# Patient Record
Sex: FEMALE | Race: WHITE | Employment: OTHER
[De-identification: names, ages, dates, MRNs, and addresses within clinical notes are randomized per-mention and may not be internally consistent; named-entity substitution may affect disease eponyms.]

---

## 2017-02-13 DIAGNOSIS — E78.5 DYSLIPIDEMIA: ICD-10-CM

## 2017-02-13 RX ORDER — CLOPIDOGREL BISULFATE 75 MG/1
75 TABLET ORAL DAILY
Qty: 90 TABLET | Refills: 3 | Status: CANCELLED | OUTPATIENT
Start: 2017-02-13

## 2017-02-13 RX ORDER — ASPIRIN 81 MG/1
81 TABLET, CHEWABLE ORAL DAILY
Qty: 90 TABLET | Refills: 3 | Status: CANCELLED | OUTPATIENT
Start: 2017-02-13

## 2017-02-13 RX ORDER — ATORVASTATIN CALCIUM 40 MG/1
40 TABLET, FILM COATED ORAL DAILY
Qty: 90 TABLET | Refills: 3 | Status: CANCELLED | OUTPATIENT
Start: 2017-02-13

## 2017-02-13 RX ORDER — LOSARTAN POTASSIUM 25 MG/1
25 TABLET ORAL DAILY
Qty: 90 TABLET | Refills: 3 | Status: CANCELLED | OUTPATIENT
Start: 2017-02-13

## 2017-02-14 ENCOUNTER — OFFICE VISIT (OUTPATIENT)
Dept: PRIMARY CARE CLINIC | Facility: CLINIC | Age: 52
End: 2017-02-14

## 2017-02-14 VITALS
HEIGHT: 63 IN | RESPIRATION RATE: 18 BRPM | TEMPERATURE: 97.4 F | HEART RATE: 93 BPM | DIASTOLIC BLOOD PRESSURE: 72 MMHG | SYSTOLIC BLOOD PRESSURE: 133 MMHG | BODY MASS INDEX: 41.62 KG/M2 | WEIGHT: 234.9 LBS

## 2017-02-14 DIAGNOSIS — I10 ESSENTIAL HYPERTENSION: Chronic | ICD-10-CM

## 2017-02-14 DIAGNOSIS — F34.1 DYSTHYMIA: ICD-10-CM

## 2017-02-14 DIAGNOSIS — N28.9 DECREASED RENAL FUNCTION: ICD-10-CM

## 2017-02-14 PROCEDURE — 99214 OFFICE O/P EST MOD 30 MIN: CPT | Performed by: NURSE PRACTITIONER

## 2017-02-14 RX ORDER — ESCITALOPRAM OXALATE 10 MG/1
TABLET ORAL
Qty: 90 TABLET | Refills: 3 | Status: SHIPPED | OUTPATIENT
Start: 2017-02-14 | End: 2018-02-24 | Stop reason: SDUPTHER

## 2017-02-14 RX ORDER — LOSARTAN POTASSIUM 25 MG/1
25 TABLET ORAL DAILY
Qty: 90 TABLET | Refills: 3 | Status: SHIPPED | OUTPATIENT
Start: 2017-02-14 | End: 2017-11-21 | Stop reason: SDUPTHER

## 2017-02-14 RX ORDER — FLUDROCORTISONE ACETATE 0.1 MG/1
0.1 TABLET ORAL DAILY
Qty: 90 TABLET | Refills: 3 | Status: SHIPPED | OUTPATIENT
Start: 2017-02-14 | End: 2017-08-01 | Stop reason: SDUPTHER

## 2017-02-14 RX ORDER — GABAPENTIN 800 MG/1
TABLET ORAL
Qty: 90 TABLET | Refills: 0 | Status: SHIPPED | OUTPATIENT
Start: 2017-02-14 | End: 2017-03-02 | Stop reason: SDUPTHER

## 2017-02-14 RX ORDER — CLOPIDOGREL BISULFATE 75 MG/1
75 TABLET ORAL DAILY
Qty: 90 TABLET | Refills: 3 | Status: SHIPPED | OUTPATIENT
Start: 2017-02-14 | End: 2018-02-24 | Stop reason: SDUPTHER

## 2017-02-14 RX ORDER — ASPIRIN 81 MG/1
81 TABLET, CHEWABLE ORAL DAILY
Qty: 100 TABLET | Refills: 3 | Status: SHIPPED | OUTPATIENT
Start: 2017-02-14 | End: 2018-03-13 | Stop reason: SDUPTHER

## 2017-02-14 RX ORDER — ATORVASTATIN CALCIUM 40 MG/1
40 TABLET, FILM COATED ORAL DAILY
Qty: 90 TABLET | Refills: 3 | Status: SHIPPED | OUTPATIENT
Start: 2017-02-14 | End: 2018-02-24 | Stop reason: SDUPTHER

## 2017-02-14 ASSESSMENT — ENCOUNTER SYMPTOMS
SORE THROAT: 0
ABDOMINAL PAIN: 0
NAUSEA: 0
SHORTNESS OF BREATH: 0
COUGH: 0
WHEEZING: 0
VOMITING: 0
RHINORRHEA: 0
BACK PAIN: 1
DIARRHEA: 0
CONSTIPATION: 0

## 2017-02-15 ENCOUNTER — TELEPHONE (OUTPATIENT)
Dept: PRIMARY CARE CLINIC | Facility: CLINIC | Age: 52
End: 2017-02-15

## 2017-03-02 ENCOUNTER — OFFICE VISIT (OUTPATIENT)
Dept: NEUROLOGY | Facility: CLINIC | Age: 52
End: 2017-03-02

## 2017-03-02 VITALS
DIASTOLIC BLOOD PRESSURE: 80 MMHG | HEIGHT: 63 IN | SYSTOLIC BLOOD PRESSURE: 114 MMHG | WEIGHT: 235.6 LBS | HEART RATE: 78 BPM | BODY MASS INDEX: 41.75 KG/M2

## 2017-03-02 DIAGNOSIS — R26.9 GAIT DIFFICULTY: ICD-10-CM

## 2017-03-02 DIAGNOSIS — M79.605 DIFFUSE PAIN IN LEFT LOWER EXTREMITY: Primary | ICD-10-CM

## 2017-03-02 PROCEDURE — 99214 OFFICE O/P EST MOD 30 MIN: CPT | Performed by: PSYCHIATRY & NEUROLOGY

## 2017-03-02 RX ORDER — CAPSAICIN 0.025 %
CREAM (GRAM) TOPICAL 2 TIMES DAILY
Qty: 1 TUBE | Refills: 6 | Status: SHIPPED | OUTPATIENT
Start: 2017-03-02 | End: 2018-05-21 | Stop reason: ALTCHOICE

## 2017-03-02 RX ORDER — GABAPENTIN 800 MG/1
TABLET ORAL
Qty: 90 TABLET | Refills: 6 | Status: SHIPPED | OUTPATIENT
Start: 2017-03-02 | End: 2018-03-01 | Stop reason: SDUPTHER

## 2017-04-12 ENCOUNTER — HOSPITAL ENCOUNTER (EMERGENCY)
Age: 52
Discharge: HOME | DRG: 364 | End: 2017-04-13
Payer: COMMERCIAL

## 2017-04-12 VITALS
SYSTOLIC BLOOD PRESSURE: 163 MMHG | HEART RATE: 106 BPM | RESPIRATION RATE: 16 BRPM | OXYGEN SATURATION: 93 % | TEMPERATURE: 100 F | DIASTOLIC BLOOD PRESSURE: 93 MMHG

## 2017-04-12 DIAGNOSIS — L03.311 ABDOMINAL WALL CELLULITIS: Primary | ICD-10-CM

## 2017-04-12 LAB
-: ABNORMAL
ABSOLUTE EOS #: 0.1 K/UL (ref 0–0.4)
ABSOLUTE LYMPH #: 1.8 K/UL (ref 1–4.8)
ABSOLUTE MONO #: 0.5 K/UL (ref 0–1)
AMORPHOUS: ABNORMAL
ANION GAP SERPL CALCULATED.3IONS-SCNC: 15 MMOL/L (ref 9–17)
BACTERIA: ABNORMAL
BASOPHILS # BLD: 1 % (ref 0–2)
BASOPHILS ABSOLUTE: 0.1 K/UL (ref 0–0.2)
BILIRUBIN URINE: NEGATIVE
BUN BLDV-MCNC: 10 MG/DL (ref 6–20)
BUN/CREAT BLD: 16 (ref 9–20)
CALCIUM SERPL-MCNC: 8.7 MG/DL (ref 8.6–10.4)
CASTS UA: ABNORMAL /LPF
CHLORIDE BLD-SCNC: 94 MMOL/L (ref 98–107)
CO2: 24 MMOL/L (ref 20–31)
COLOR: YELLOW
COMMENT UA: ABNORMAL
CREAT SERPL-MCNC: 0.62 MG/DL (ref 0.5–0.9)
CRYSTALS, UA: ABNORMAL /HPF
DIFFERENTIAL TYPE: ABNORMAL
EOSINOPHILS RELATIVE PERCENT: 1 % (ref 0–8)
EPITHELIAL CELLS UA: ABNORMAL /HPF (ref 0–25)
GFR AFRICAN AMERICAN: >60 ML/MIN
GFR NON-AFRICAN AMERICAN: >60 ML/MIN
GFR SERPL CREATININE-BSD FRML MDRD: ABNORMAL ML/MIN/{1.73_M2}
GFR SERPL CREATININE-BSD FRML MDRD: ABNORMAL ML/MIN/{1.73_M2}
GLUCOSE BLD-MCNC: 311 MG/DL (ref 70–99)
GLUCOSE URINE: ABNORMAL
HCT VFR BLD CALC: 39.6 % (ref 36–46)
HEMOGLOBIN: 13.2 G/DL (ref 12–16)
KETONES, URINE: ABNORMAL
LACTIC ACID, WHOLE BLOOD: NORMAL MMOL/L (ref 0.7–2.1)
LACTIC ACID: 2 MMOL/L (ref 0.5–2.2)
LEUKOCYTE ESTERASE, URINE: ABNORMAL
LYMPHOCYTES # BLD: 18 % (ref 24–44)
MCH RBC QN AUTO: 26 PG (ref 26–34)
MCHC RBC AUTO-ENTMCNC: 33.3 G/DL (ref 31–37)
MCV RBC AUTO: 78.2 FL (ref 80–100)
MONOCYTES # BLD: 5 % (ref 0–12)
MUCUS: ABNORMAL
NITRITE, URINE: NEGATIVE
OTHER OBSERVATIONS UA: ABNORMAL
PDW BLD-RTO: 15.8 % (ref 12.1–15.2)
PH UA: 5.5 (ref 5–9)
PLATELET # BLD: 276 K/UL (ref 140–450)
PLATELET ESTIMATE: ABNORMAL
PMV BLD AUTO: ABNORMAL FL (ref 6–12)
POTASSIUM SERPL-SCNC: 4.3 MMOL/L (ref 3.7–5.3)
PROTEIN UA: NEGATIVE
RBC # BLD: 5.07 M/UL (ref 4–5.2)
RBC # BLD: ABNORMAL 10*6/UL
RBC UA: ABNORMAL /HPF (ref 0–2)
RENAL EPITHELIAL, UA: ABNORMAL /HPF
SEG NEUTROPHILS: 75 % (ref 36–66)
SEGMENTED NEUTROPHILS ABSOLUTE COUNT: 7.3 K/UL (ref 1.8–7.7)
SODIUM BLD-SCNC: 133 MMOL/L (ref 135–144)
SPECIFIC GRAVITY UA: 1.01 (ref 1.01–1.02)
TRICHOMONAS: ABNORMAL
TURBIDITY: CLEAR
URINE HGB: NEGATIVE
UROBILINOGEN, URINE: NORMAL
WBC # BLD: 9.8 K/UL (ref 3.5–11)
WBC # BLD: ABNORMAL 10*3/UL
WBC UA: ABNORMAL /HPF (ref 0–5)
YEAST: ABNORMAL

## 2017-04-12 PROCEDURE — 99283 EMERGENCY DEPT VISIT LOW MDM: CPT

## 2017-04-12 PROCEDURE — 96365 THER/PROPH/DIAG IV INF INIT: CPT

## 2017-04-12 PROCEDURE — 2580000003 HC RX 258: Performed by: PHYSICIAN ASSISTANT

## 2017-04-12 PROCEDURE — 83605 ASSAY OF LACTIC ACID: CPT

## 2017-04-12 PROCEDURE — 2500000003 HC RX 250 WO HCPCS: Performed by: PHYSICIAN ASSISTANT

## 2017-04-12 PROCEDURE — 80048 BASIC METABOLIC PNL TOTAL CA: CPT

## 2017-04-12 PROCEDURE — 81001 URINALYSIS AUTO W/SCOPE: CPT

## 2017-04-12 PROCEDURE — 6360000002 HC RX W HCPCS: Performed by: PHYSICIAN ASSISTANT

## 2017-04-12 PROCEDURE — 87040 BLOOD CULTURE FOR BACTERIA: CPT

## 2017-04-12 PROCEDURE — 6370000000 HC RX 637 (ALT 250 FOR IP): Performed by: PHYSICIAN ASSISTANT

## 2017-04-12 PROCEDURE — 96375 TX/PRO/DX INJ NEW DRUG ADDON: CPT

## 2017-04-12 PROCEDURE — 86403 PARTICLE AGGLUT ANTBDY SCRN: CPT

## 2017-04-12 PROCEDURE — 87086 URINE CULTURE/COLONY COUNT: CPT

## 2017-04-12 PROCEDURE — 85025 COMPLETE CBC W/AUTO DIFF WBC: CPT

## 2017-04-12 PROCEDURE — 36415 COLL VENOUS BLD VENIPUNCTURE: CPT

## 2017-04-12 RX ORDER — CLINDAMYCIN PHOSPHATE 600 MG/50ML
600 INJECTION INTRAVENOUS ONCE
Status: COMPLETED | OUTPATIENT
Start: 2017-04-12 | End: 2017-04-12

## 2017-04-12 RX ORDER — ONDANSETRON 2 MG/ML
4 INJECTION INTRAMUSCULAR; INTRAVENOUS ONCE
Status: COMPLETED | OUTPATIENT
Start: 2017-04-12 | End: 2017-04-12

## 2017-04-12 RX ORDER — 0.9 % SODIUM CHLORIDE 0.9 %
1000 INTRAVENOUS SOLUTION INTRAVENOUS ONCE
Status: COMPLETED | OUTPATIENT
Start: 2017-04-12 | End: 2017-04-12

## 2017-04-12 RX ORDER — ONDANSETRON 4 MG/1
4 TABLET, ORALLY DISINTEGRATING ORAL EVERY 8 HOURS PRN
Qty: 10 TABLET | Refills: 0 | Status: SHIPPED | OUTPATIENT
Start: 2017-04-12 | End: 2018-02-15 | Stop reason: ALTCHOICE

## 2017-04-12 RX ORDER — MORPHINE SULFATE 2 MG/ML
2 INJECTION, SOLUTION INTRAMUSCULAR; INTRAVENOUS ONCE
Status: COMPLETED | OUTPATIENT
Start: 2017-04-12 | End: 2017-04-12

## 2017-04-12 RX ORDER — CLINDAMYCIN HYDROCHLORIDE 150 MG/1
300 CAPSULE ORAL 3 TIMES DAILY
Qty: 60 CAPSULE | Refills: 0 | Status: ON HOLD | OUTPATIENT
Start: 2017-04-12 | End: 2017-04-17 | Stop reason: HOSPADM

## 2017-04-12 RX ADMIN — ONDANSETRON 4 MG: 2 INJECTION INTRAMUSCULAR; INTRAVENOUS at 21:56

## 2017-04-12 RX ADMIN — MORPHINE SULFATE 2 MG: 2 INJECTION, SOLUTION INTRAMUSCULAR; INTRAVENOUS at 21:56

## 2017-04-12 RX ADMIN — CLINDAMYCIN PHOSPHATE 600 MG: 12 INJECTION, SOLUTION INTRAMUSCULAR; INTRAVENOUS at 22:53

## 2017-04-12 RX ADMIN — SODIUM CHLORIDE 1000 ML: 9 INJECTION, SOLUTION INTRAVENOUS at 22:53

## 2017-04-12 ASSESSMENT — ENCOUNTER SYMPTOMS
COLOR CHANGE: 1
CONSTIPATION: 0
EYE DISCHARGE: 0
ABDOMINAL PAIN: 0
SHORTNESS OF BREATH: 0
CHEST TIGHTNESS: 0
WHEEZING: 0
COUGH: 0
RHINORRHEA: 0
BACK PAIN: 0
VOMITING: 0
SORE THROAT: 0
EYE REDNESS: 0
BLOOD IN STOOL: 0
NAUSEA: 0
DIARRHEA: 0

## 2017-04-12 ASSESSMENT — PAIN SCALES - GENERAL
PAINLEVEL_OUTOF10: 6
PAINLEVEL_OUTOF10: 4

## 2017-04-12 ASSESSMENT — PAIN DESCRIPTION - LOCATION: LOCATION: ABDOMEN

## 2017-04-12 ASSESSMENT — PAIN DESCRIPTION - PAIN TYPE: TYPE: ACUTE PAIN

## 2017-04-12 ASSESSMENT — PAIN DESCRIPTION - DESCRIPTORS: DESCRIPTORS: BURNING

## 2017-04-13 ENCOUNTER — OFFICE VISIT (OUTPATIENT)
Dept: PRIMARY CARE CLINIC | Age: 52
End: 2017-04-13
Payer: COMMERCIAL

## 2017-04-13 ENCOUNTER — HOSPITAL ENCOUNTER (INPATIENT)
Age: 52
LOS: 4 days | Discharge: HOME | DRG: 364 | End: 2017-04-17
Attending: INTERNAL MEDICINE | Admitting: INTERNAL MEDICINE
Payer: COMMERCIAL

## 2017-04-13 VITALS
WEIGHT: 238.7 LBS | DIASTOLIC BLOOD PRESSURE: 70 MMHG | TEMPERATURE: 97.5 F | HEART RATE: 82 BPM | SYSTOLIC BLOOD PRESSURE: 103 MMHG | RESPIRATION RATE: 20 BRPM | BODY MASS INDEX: 42.28 KG/M2

## 2017-04-13 DIAGNOSIS — L03.311 CELLULITIS OF ABDOMINAL WALL: Primary | ICD-10-CM

## 2017-04-13 DIAGNOSIS — L03.116 CELLULITIS OF LEFT LOWER EXTREMITY: ICD-10-CM

## 2017-04-13 DIAGNOSIS — L03.119 CELLULITIS AND ABSCESS OF LEG: ICD-10-CM

## 2017-04-13 DIAGNOSIS — L03.311 ABDOMINAL WALL CELLULITIS: Primary | ICD-10-CM

## 2017-04-13 DIAGNOSIS — L02.419 CELLULITIS AND ABSCESS OF LEG: ICD-10-CM

## 2017-04-13 PROBLEM — L03.115 CELLULITIS OF RIGHT LEG: Status: ACTIVE | Noted: 2017-04-13

## 2017-04-13 LAB
ABSOLUTE EOS #: 0.1 K/UL (ref 0–0.4)
ABSOLUTE LYMPH #: 1.9 K/UL (ref 1–4.8)
ABSOLUTE MONO #: 0.6 K/UL (ref 0–1)
ANION GAP SERPL CALCULATED.3IONS-SCNC: 14 MMOL/L (ref 9–17)
BASOPHILS # BLD: 1 % (ref 0–2)
BASOPHILS ABSOLUTE: 0 K/UL (ref 0–0.2)
BUN BLDV-MCNC: 14 MG/DL (ref 6–20)
BUN/CREAT BLD: 19 (ref 9–20)
CALCIUM SERPL-MCNC: 9.4 MG/DL (ref 8.6–10.4)
CHLORIDE BLD-SCNC: 96 MMOL/L (ref 98–107)
CO2: 24 MMOL/L (ref 20–31)
CREAT SERPL-MCNC: 0.72 MG/DL (ref 0.5–0.9)
CULTURE: ABNORMAL
CULTURE: ABNORMAL
DIFFERENTIAL TYPE: ABNORMAL
EOSINOPHILS RELATIVE PERCENT: 2 % (ref 0–8)
ESTIMATED AVERAGE GLUCOSE: 315 MG/DL
GFR AFRICAN AMERICAN: >60 ML/MIN
GFR NON-AFRICAN AMERICAN: >60 ML/MIN
GFR SERPL CREATININE-BSD FRML MDRD: ABNORMAL ML/MIN/{1.73_M2}
GFR SERPL CREATININE-BSD FRML MDRD: ABNORMAL ML/MIN/{1.73_M2}
GLUCOSE BLD-MCNC: 297 MG/DL (ref 74–100)
GLUCOSE BLD-MCNC: 407 MG/DL (ref 70–99)
HBA1C MFR BLD: 12.6 % (ref 4.8–5.9)
HCT VFR BLD CALC: 37.8 % (ref 36–46)
HEMOGLOBIN: 12.5 G/DL (ref 12–16)
LACTIC ACID: 3 MMOL/L (ref 0.5–2.2)
LYMPHOCYTES # BLD: 21 % (ref 24–44)
Lab: ABNORMAL
Lab: ABNORMAL
MCH RBC QN AUTO: 26.1 PG (ref 26–34)
MCHC RBC AUTO-ENTMCNC: 33.2 G/DL (ref 31–37)
MCV RBC AUTO: 78.7 FL (ref 80–100)
MONOCYTES # BLD: 6 % (ref 0–12)
PDW BLD-RTO: 15.4 % (ref 12.1–15.2)
PLATELET # BLD: 276 K/UL (ref 140–450)
PLATELET ESTIMATE: ABNORMAL
PMV BLD AUTO: ABNORMAL FL (ref 6–12)
POTASSIUM SERPL-SCNC: 4.5 MMOL/L (ref 3.7–5.3)
RBC # BLD: 4.81 M/UL (ref 4–5.2)
RBC # BLD: ABNORMAL 10*6/UL
SEG NEUTROPHILS: 70 % (ref 36–66)
SEGMENTED NEUTROPHILS ABSOLUTE COUNT: 6.2 K/UL (ref 1.8–7.7)
SODIUM BLD-SCNC: 134 MMOL/L (ref 135–144)
SPECIMEN DESCRIPTION: ABNORMAL
SPECIMEN DESCRIPTION: ABNORMAL
STATUS: ABNORMAL
WBC # BLD: 8.9 K/UL (ref 3.5–11)
WBC # BLD: ABNORMAL 10*3/UL

## 2017-04-13 PROCEDURE — 94760 N-INVAS EAR/PLS OXIMETRY 1: CPT

## 2017-04-13 PROCEDURE — 94664 DEMO&/EVAL PT USE INHALER: CPT

## 2017-04-13 PROCEDURE — 1200000000 HC SEMI PRIVATE

## 2017-04-13 PROCEDURE — 6370000000 HC RX 637 (ALT 250 FOR IP): Performed by: INTERNAL MEDICINE

## 2017-04-13 PROCEDURE — 2580000003 HC RX 258: Performed by: NURSE PRACTITIONER

## 2017-04-13 PROCEDURE — 83036 HEMOGLOBIN GLYCOSYLATED A1C: CPT

## 2017-04-13 PROCEDURE — 82947 ASSAY GLUCOSE BLOOD QUANT: CPT

## 2017-04-13 PROCEDURE — 99284 EMERGENCY DEPT VISIT MOD MDM: CPT

## 2017-04-13 PROCEDURE — 80048 BASIC METABOLIC PNL TOTAL CA: CPT

## 2017-04-13 PROCEDURE — 2580000003 HC RX 258: Performed by: INTERNAL MEDICINE

## 2017-04-13 PROCEDURE — 85025 COMPLETE CBC W/AUTO DIFF WBC: CPT

## 2017-04-13 PROCEDURE — 99213 OFFICE O/P EST LOW 20 MIN: CPT | Performed by: NURSE PRACTITIONER

## 2017-04-13 PROCEDURE — 2580000003 HC RX 258

## 2017-04-13 PROCEDURE — 83605 ASSAY OF LACTIC ACID: CPT

## 2017-04-13 PROCEDURE — 6360000002 HC RX W HCPCS

## 2017-04-13 RX ORDER — SODIUM CHLORIDE 9 MG/ML
INJECTION, SOLUTION INTRAVENOUS CONTINUOUS
Status: DISCONTINUED | OUTPATIENT
Start: 2017-04-13 | End: 2017-04-13

## 2017-04-13 RX ORDER — FAMOTIDINE 20 MG/1
20 TABLET, FILM COATED ORAL 2 TIMES DAILY
Status: DISCONTINUED | OUTPATIENT
Start: 2017-04-13 | End: 2017-04-17 | Stop reason: HOSPADM

## 2017-04-13 RX ORDER — SODIUM CHLORIDE 9 MG/ML
INJECTION, SOLUTION INTRAVENOUS CONTINUOUS
Status: DISCONTINUED | OUTPATIENT
Start: 2017-04-13 | End: 2017-04-16

## 2017-04-13 RX ORDER — DEXTROSE MONOHYDRATE 50 MG/ML
100 INJECTION, SOLUTION INTRAVENOUS PRN
Status: DISCONTINUED | OUTPATIENT
Start: 2017-04-13 | End: 2017-04-17 | Stop reason: HOSPADM

## 2017-04-13 RX ORDER — ATORVASTATIN CALCIUM 40 MG/1
40 TABLET, FILM COATED ORAL DAILY
Status: DISCONTINUED | OUTPATIENT
Start: 2017-04-13 | End: 2017-04-17 | Stop reason: HOSPADM

## 2017-04-13 RX ORDER — ONDANSETRON 2 MG/ML
4 INJECTION INTRAMUSCULAR; INTRAVENOUS EVERY 6 HOURS PRN
Status: DISCONTINUED | OUTPATIENT
Start: 2017-04-13 | End: 2017-04-17 | Stop reason: HOSPADM

## 2017-04-13 RX ORDER — ASPIRIN 81 MG/1
81 TABLET, CHEWABLE ORAL DAILY
Status: DISCONTINUED | OUTPATIENT
Start: 2017-04-13 | End: 2017-04-17 | Stop reason: HOSPADM

## 2017-04-13 RX ORDER — GABAPENTIN 400 MG/1
800 CAPSULE ORAL 3 TIMES DAILY
Status: DISCONTINUED | OUTPATIENT
Start: 2017-04-13 | End: 2017-04-17 | Stop reason: HOSPADM

## 2017-04-13 RX ORDER — DEXTROSE MONOHYDRATE 25 G/50ML
12.5 INJECTION, SOLUTION INTRAVENOUS PRN
Status: DISCONTINUED | OUTPATIENT
Start: 2017-04-13 | End: 2017-04-17 | Stop reason: HOSPADM

## 2017-04-13 RX ORDER — SODIUM CHLORIDE 0.9 % (FLUSH) 0.9 %
10 SYRINGE (ML) INJECTION PRN
Status: DISCONTINUED | OUTPATIENT
Start: 2017-04-13 | End: 2017-04-17 | Stop reason: HOSPADM

## 2017-04-13 RX ORDER — NICOTINE POLACRILEX 4 MG
15 LOZENGE BUCCAL PRN
Status: DISCONTINUED | OUTPATIENT
Start: 2017-04-13 | End: 2017-04-17 | Stop reason: HOSPADM

## 2017-04-13 RX ORDER — SODIUM CHLORIDE 0.9 % (FLUSH) 0.9 %
10 SYRINGE (ML) INJECTION EVERY 12 HOURS SCHEDULED
Status: DISCONTINUED | OUTPATIENT
Start: 2017-04-13 | End: 2017-04-17 | Stop reason: HOSPADM

## 2017-04-13 RX ORDER — DEXTROSE MONOHYDRATE 50 MG/ML
INJECTION, SOLUTION INTRAVENOUS
Status: COMPLETED
Start: 2017-04-13 | End: 2017-04-13

## 2017-04-13 RX ORDER — FLUDROCORTISONE ACETATE 0.1 MG/1
0.1 TABLET ORAL DAILY
Status: DISCONTINUED | OUTPATIENT
Start: 2017-04-13 | End: 2017-04-17 | Stop reason: HOSPADM

## 2017-04-13 RX ORDER — ALBUTEROL SULFATE 90 UG/1
2 AEROSOL, METERED RESPIRATORY (INHALATION) EVERY 6 HOURS PRN
Status: DISCONTINUED | OUTPATIENT
Start: 2017-04-13 | End: 2017-04-17 | Stop reason: HOSPADM

## 2017-04-13 RX ORDER — VANCOMYCIN HYDROCHLORIDE 500 MG/10ML
INJECTION, POWDER, LYOPHILIZED, FOR SOLUTION INTRAVENOUS
Status: DISPENSED
Start: 2017-04-13 | End: 2017-04-14

## 2017-04-13 RX ORDER — ESCITALOPRAM OXALATE 5 MG/1
10 TABLET ORAL DAILY
Status: DISCONTINUED | OUTPATIENT
Start: 2017-04-13 | End: 2017-04-17 | Stop reason: HOSPADM

## 2017-04-13 RX ORDER — CLOPIDOGREL BISULFATE 75 MG/1
75 TABLET ORAL DAILY
Status: DISCONTINUED | OUTPATIENT
Start: 2017-04-13 | End: 2017-04-17 | Stop reason: HOSPADM

## 2017-04-13 RX ORDER — HYDROCODONE BITARTRATE AND ACETAMINOPHEN 5; 325 MG/1; MG/1
1 TABLET ORAL EVERY 6 HOURS PRN
Status: DISCONTINUED | OUTPATIENT
Start: 2017-04-13 | End: 2017-04-17 | Stop reason: HOSPADM

## 2017-04-13 RX ORDER — ACETAMINOPHEN 325 MG/1
650 TABLET ORAL EVERY 4 HOURS PRN
Status: DISCONTINUED | OUTPATIENT
Start: 2017-04-13 | End: 2017-04-17 | Stop reason: HOSPADM

## 2017-04-13 RX ORDER — LOSARTAN POTASSIUM 25 MG/1
25 TABLET ORAL DAILY
Status: DISCONTINUED | OUTPATIENT
Start: 2017-04-13 | End: 2017-04-17 | Stop reason: HOSPADM

## 2017-04-13 RX ADMIN — SODIUM CHLORIDE: 9 INJECTION, SOLUTION INTRAVENOUS at 21:49

## 2017-04-13 RX ADMIN — VANCOMYCIN HYDROCHLORIDE 1500 MG: 500 INJECTION, POWDER, LYOPHILIZED, FOR SOLUTION INTRAVENOUS at 21:52

## 2017-04-13 RX ADMIN — SODIUM CHLORIDE: 9 INJECTION, SOLUTION INTRAVENOUS at 16:11

## 2017-04-13 RX ADMIN — DEXTROSE MONOHYDRATE 250 ML: 50 INJECTION, SOLUTION INTRAVENOUS at 21:52

## 2017-04-13 RX ADMIN — INSULIN GLARGINE 40 UNITS: 100 INJECTION, SOLUTION SUBCUTANEOUS at 22:01

## 2017-04-13 RX ADMIN — FAMOTIDINE 20 MG: 20 TABLET ORAL at 22:09

## 2017-04-13 RX ADMIN — GABAPENTIN 800 MG: 400 CAPSULE ORAL at 22:09

## 2017-04-13 RX ADMIN — INSULIN LISPRO 3 UNITS: 100 INJECTION, SOLUTION INTRAVENOUS; SUBCUTANEOUS at 22:00

## 2017-04-13 RX ADMIN — METOPROLOL TARTRATE 12.5 MG: 25 TABLET, FILM COATED ORAL at 22:09

## 2017-04-13 ASSESSMENT — ENCOUNTER SYMPTOMS
DIARRHEA: 0
COLOR CHANGE: 1
NAUSEA: 0
CHANGE IN BOWEL HABIT: 0
RESPIRATORY NEGATIVE: 1
TROUBLE SWALLOWING: 0
COUGH: 0
ABDOMINAL PAIN: 1
VOMITING: 0
COUGH: 0
COLOR CHANGE: 1
CHEST TIGHTNESS: 0
CONSTIPATION: 0
EYES NEGATIVE: 1
BLOOD IN STOOL: 0
GASTROINTESTINAL NEGATIVE: 1

## 2017-04-13 ASSESSMENT — PAIN SCALES - GENERAL
PAINLEVEL_OUTOF10: 0
PAINLEVEL_OUTOF10: 6
PAINLEVEL_OUTOF10: 0

## 2017-04-13 ASSESSMENT — PAIN DESCRIPTION - PAIN TYPE: TYPE: ACUTE PAIN

## 2017-04-13 ASSESSMENT — PAIN DESCRIPTION - ORIENTATION: ORIENTATION: LEFT;ANTERIOR

## 2017-04-14 LAB
GLUCOSE BLD-MCNC: 233 MG/DL (ref 74–100)
GLUCOSE BLD-MCNC: 352 MG/DL (ref 74–100)
GLUCOSE BLD-MCNC: 362 MG/DL (ref 74–100)
GLUCOSE BLD-MCNC: 383 MG/DL (ref 74–100)

## 2017-04-14 PROCEDURE — 1200000000 HC SEMI PRIVATE

## 2017-04-14 PROCEDURE — 87205 SMEAR GRAM STAIN: CPT

## 2017-04-14 PROCEDURE — 0J9B0ZZ DRAINAGE OF PERINEUM SUBCUTANEOUS TISSUE AND FASCIA, OPEN APPROACH: ICD-10-PCS | Performed by: SURGERY

## 2017-04-14 PROCEDURE — 6370000000 HC RX 637 (ALT 250 FOR IP): Performed by: INTERNAL MEDICINE

## 2017-04-14 PROCEDURE — 86403 PARTICLE AGGLUT ANTBDY SCRN: CPT

## 2017-04-14 PROCEDURE — 87186 SC STD MICRODIL/AGAR DIL: CPT

## 2017-04-14 PROCEDURE — 2580000003 HC RX 258: Performed by: INTERNAL MEDICINE

## 2017-04-14 PROCEDURE — 87075 CULTR BACTERIA EXCEPT BLOOD: CPT

## 2017-04-14 PROCEDURE — 6370000000 HC RX 637 (ALT 250 FOR IP): Performed by: PHYSICIAN ASSISTANT

## 2017-04-14 PROCEDURE — 6360000002 HC RX W HCPCS: Performed by: INTERNAL MEDICINE

## 2017-04-14 PROCEDURE — 87070 CULTURE OTHR SPECIMN AEROBIC: CPT

## 2017-04-14 PROCEDURE — 2580000003 HC RX 258: Performed by: PHYSICIAN ASSISTANT

## 2017-04-14 PROCEDURE — 2500000003 HC RX 250 WO HCPCS: Performed by: SURGERY

## 2017-04-14 PROCEDURE — 82947 ASSAY GLUCOSE BLOOD QUANT: CPT

## 2017-04-14 RX ORDER — LIDOCAINE HYDROCHLORIDE 10 MG/ML
30 INJECTION, SOLUTION INFILTRATION; PERINEURAL ONCE
Status: COMPLETED | OUTPATIENT
Start: 2017-04-14 | End: 2017-04-14

## 2017-04-14 RX ADMIN — GABAPENTIN 800 MG: 400 CAPSULE ORAL at 08:41

## 2017-04-14 RX ADMIN — METOPROLOL TARTRATE 12.5 MG: 25 TABLET, FILM COATED ORAL at 20:45

## 2017-04-14 RX ADMIN — GABAPENTIN 800 MG: 400 CAPSULE ORAL at 14:10

## 2017-04-14 RX ADMIN — FLUDROCORTISONE ACETATE 0.1 MG: 0.1 TABLET ORAL at 08:42

## 2017-04-14 RX ADMIN — GABAPENTIN 800 MG: 400 CAPSULE ORAL at 20:45

## 2017-04-14 RX ADMIN — INSULIN LISPRO 10 UNITS: 100 INJECTION, SOLUTION INTRAVENOUS; SUBCUTANEOUS at 12:15

## 2017-04-14 RX ADMIN — HYDROCODONE BITARTRATE AND ACETAMINOPHEN 1 TABLET: 5; 325 TABLET ORAL at 03:15

## 2017-04-14 RX ADMIN — HYDROCODONE BITARTRATE AND ACETAMINOPHEN 1 TABLET: 5; 325 TABLET ORAL at 20:53

## 2017-04-14 RX ADMIN — VANCOMYCIN HYDROCHLORIDE 1500 MG: 1 INJECTION, POWDER, LYOPHILIZED, FOR SOLUTION INTRAVENOUS at 20:44

## 2017-04-14 RX ADMIN — ASPIRIN 81 MG 81 MG: 81 TABLET ORAL at 08:42

## 2017-04-14 RX ADMIN — ATORVASTATIN CALCIUM 40 MG: 40 TABLET, FILM COATED ORAL at 08:41

## 2017-04-14 RX ADMIN — INSULIN GLARGINE 40 UNITS: 100 INJECTION, SOLUTION SUBCUTANEOUS at 20:48

## 2017-04-14 RX ADMIN — VANCOMYCIN HYDROCHLORIDE 1500 MG: 1 INJECTION, POWDER, LYOPHILIZED, FOR SOLUTION INTRAVENOUS at 09:29

## 2017-04-14 RX ADMIN — CLOPIDOGREL BISULFATE 75 MG: 75 TABLET ORAL at 08:41

## 2017-04-14 RX ADMIN — FAMOTIDINE 20 MG: 20 TABLET ORAL at 08:41

## 2017-04-14 RX ADMIN — INSULIN LISPRO 10 UNITS: 100 INJECTION, SOLUTION INTRAVENOUS; SUBCUTANEOUS at 08:10

## 2017-04-14 RX ADMIN — INSULIN LISPRO 2 UNITS: 100 INJECTION, SOLUTION INTRAVENOUS; SUBCUTANEOUS at 20:49

## 2017-04-14 RX ADMIN — ESCITALOPRAM 10 MG: 5 TABLET, FILM COATED ORAL at 08:41

## 2017-04-14 RX ADMIN — ENOXAPARIN SODIUM 40 MG: 40 INJECTION SUBCUTANEOUS at 08:42

## 2017-04-14 RX ADMIN — FAMOTIDINE 20 MG: 20 TABLET ORAL at 20:45

## 2017-04-14 RX ADMIN — SODIUM CHLORIDE: 9 INJECTION, SOLUTION INTRAVENOUS at 15:58

## 2017-04-14 RX ADMIN — LIDOCAINE HYDROCHLORIDE 30 ML: 10 INJECTION, SOLUTION INFILTRATION; PERINEURAL at 16:55

## 2017-04-14 RX ADMIN — INSULIN LISPRO 10 UNITS: 100 INJECTION, SOLUTION INTRAVENOUS; SUBCUTANEOUS at 16:35

## 2017-04-14 ASSESSMENT — PAIN SCALES - GENERAL
PAINLEVEL_OUTOF10: 6
PAINLEVEL_OUTOF10: 7
PAINLEVEL_OUTOF10: 0
PAINLEVEL_OUTOF10: 3

## 2017-04-14 ASSESSMENT — PAIN DESCRIPTION - LOCATION: LOCATION: LEG

## 2017-04-14 ASSESSMENT — PAIN DESCRIPTION - DESCRIPTORS: DESCRIPTORS: BURNING;DISCOMFORT

## 2017-04-14 ASSESSMENT — PAIN DESCRIPTION - PAIN TYPE: TYPE: ACUTE PAIN

## 2017-04-14 ASSESSMENT — PAIN DESCRIPTION - ORIENTATION: ORIENTATION: RIGHT;UPPER

## 2017-04-15 LAB
GLUCOSE BLD-MCNC: 212 MG/DL (ref 74–100)
GLUCOSE BLD-MCNC: 238 MG/DL (ref 74–100)
GLUCOSE BLD-MCNC: 285 MG/DL (ref 74–100)
GLUCOSE BLD-MCNC: 298 MG/DL (ref 74–100)

## 2017-04-15 PROCEDURE — 6370000000 HC RX 637 (ALT 250 FOR IP): Performed by: INTERNAL MEDICINE

## 2017-04-15 PROCEDURE — 2580000003 HC RX 258: Performed by: INTERNAL MEDICINE

## 2017-04-15 PROCEDURE — 1200000000 HC SEMI PRIVATE

## 2017-04-15 PROCEDURE — 82947 ASSAY GLUCOSE BLOOD QUANT: CPT

## 2017-04-15 PROCEDURE — 6370000000 HC RX 637 (ALT 250 FOR IP): Performed by: PHYSICIAN ASSISTANT

## 2017-04-15 PROCEDURE — 6360000002 HC RX W HCPCS: Performed by: INTERNAL MEDICINE

## 2017-04-15 PROCEDURE — 2580000003 HC RX 258: Performed by: PHYSICIAN ASSISTANT

## 2017-04-15 RX ADMIN — FLUDROCORTISONE ACETATE 0.1 MG: 0.1 TABLET ORAL at 08:16

## 2017-04-15 RX ADMIN — VANCOMYCIN HYDROCHLORIDE 1500 MG: 1 INJECTION, POWDER, LYOPHILIZED, FOR SOLUTION INTRAVENOUS at 08:22

## 2017-04-15 RX ADMIN — GABAPENTIN 800 MG: 400 CAPSULE ORAL at 08:17

## 2017-04-15 RX ADMIN — SODIUM CHLORIDE: 9 INJECTION, SOLUTION INTRAVENOUS at 04:38

## 2017-04-15 RX ADMIN — GABAPENTIN 800 MG: 400 CAPSULE ORAL at 14:28

## 2017-04-15 RX ADMIN — METOPROLOL TARTRATE 12.5 MG: 25 TABLET, FILM COATED ORAL at 20:30

## 2017-04-15 RX ADMIN — FAMOTIDINE 20 MG: 20 TABLET ORAL at 08:16

## 2017-04-15 RX ADMIN — INSULIN LISPRO 6 UNITS: 100 INJECTION, SOLUTION INTRAVENOUS; SUBCUTANEOUS at 12:57

## 2017-04-15 RX ADMIN — ACETAMINOPHEN 650 MG: 325 TABLET, FILM COATED ORAL at 08:17

## 2017-04-15 RX ADMIN — CLOPIDOGREL BISULFATE 75 MG: 75 TABLET ORAL at 08:17

## 2017-04-15 RX ADMIN — HYDROCODONE BITARTRATE AND ACETAMINOPHEN 1 TABLET: 5; 325 TABLET ORAL at 16:19

## 2017-04-15 RX ADMIN — SODIUM CHLORIDE: 9 INJECTION, SOLUTION INTRAVENOUS at 17:20

## 2017-04-15 RX ADMIN — FAMOTIDINE 20 MG: 20 TABLET ORAL at 20:30

## 2017-04-15 RX ADMIN — ESCITALOPRAM 10 MG: 5 TABLET, FILM COATED ORAL at 08:17

## 2017-04-15 RX ADMIN — INSULIN LISPRO 4 UNITS: 100 INJECTION, SOLUTION INTRAVENOUS; SUBCUTANEOUS at 08:21

## 2017-04-15 RX ADMIN — INSULIN LISPRO 6 UNITS: 100 INJECTION, SOLUTION INTRAVENOUS; SUBCUTANEOUS at 17:16

## 2017-04-15 RX ADMIN — INSULIN LISPRO 2 UNITS: 100 INJECTION, SOLUTION INTRAVENOUS; SUBCUTANEOUS at 20:35

## 2017-04-15 RX ADMIN — HYDROCODONE BITARTRATE AND ACETAMINOPHEN 1 TABLET: 5; 325 TABLET ORAL at 04:39

## 2017-04-15 RX ADMIN — ATORVASTATIN CALCIUM 40 MG: 40 TABLET, FILM COATED ORAL at 08:16

## 2017-04-15 RX ADMIN — VANCOMYCIN HYDROCHLORIDE 1500 MG: 1 INJECTION, POWDER, LYOPHILIZED, FOR SOLUTION INTRAVENOUS at 20:33

## 2017-04-15 RX ADMIN — GABAPENTIN 800 MG: 400 CAPSULE ORAL at 20:30

## 2017-04-15 ASSESSMENT — PAIN DESCRIPTION - PAIN TYPE
TYPE: ACUTE PAIN

## 2017-04-15 ASSESSMENT — PAIN DESCRIPTION - ORIENTATION
ORIENTATION: RIGHT;UPPER
ORIENTATION: RIGHT

## 2017-04-15 ASSESSMENT — PAIN SCALES - GENERAL
PAINLEVEL_OUTOF10: 3
PAINLEVEL_OUTOF10: 7
PAINLEVEL_OUTOF10: 6
PAINLEVEL_OUTOF10: 3
PAINLEVEL_OUTOF10: 0
PAINLEVEL_OUTOF10: 3
PAINLEVEL_OUTOF10: 4

## 2017-04-15 ASSESSMENT — PAIN DESCRIPTION - DESCRIPTORS
DESCRIPTORS: SORE
DESCRIPTORS: ACHING
DESCRIPTORS: ACHING
DESCRIPTORS: DISCOMFORT
DESCRIPTORS: SORE

## 2017-04-15 ASSESSMENT — PAIN DESCRIPTION - LOCATION
LOCATION: LEG

## 2017-04-15 ASSESSMENT — PAIN DESCRIPTION - FREQUENCY: FREQUENCY: INTERMITTENT

## 2017-04-16 LAB
GLUCOSE BLD-MCNC: 152 MG/DL (ref 74–100)
GLUCOSE BLD-MCNC: 224 MG/DL (ref 74–100)
GLUCOSE BLD-MCNC: 238 MG/DL (ref 74–100)
GLUCOSE BLD-MCNC: 269 MG/DL (ref 74–100)
VANCOMYCIN TROUGH DATE LAST DOSE: NORMAL
VANCOMYCIN TROUGH DOSE AMOUNT: NORMAL
VANCOMYCIN TROUGH TIME LAST DOSE: NORMAL
VANCOMYCIN TROUGH: 15.1 UG/ML (ref 10–20)

## 2017-04-16 PROCEDURE — 6370000000 HC RX 637 (ALT 250 FOR IP): Performed by: PHYSICIAN ASSISTANT

## 2017-04-16 PROCEDURE — 6360000002 HC RX W HCPCS: Performed by: INTERNAL MEDICINE

## 2017-04-16 PROCEDURE — 36415 COLL VENOUS BLD VENIPUNCTURE: CPT

## 2017-04-16 PROCEDURE — 1200000000 HC SEMI PRIVATE

## 2017-04-16 PROCEDURE — 2580000003 HC RX 258: Performed by: INTERNAL MEDICINE

## 2017-04-16 PROCEDURE — 94760 N-INVAS EAR/PLS OXIMETRY 1: CPT

## 2017-04-16 PROCEDURE — 94664 DEMO&/EVAL PT USE INHALER: CPT

## 2017-04-16 PROCEDURE — 6370000000 HC RX 637 (ALT 250 FOR IP): Performed by: INTERNAL MEDICINE

## 2017-04-16 PROCEDURE — 82947 ASSAY GLUCOSE BLOOD QUANT: CPT

## 2017-04-16 PROCEDURE — 2580000003 HC RX 258: Performed by: PHYSICIAN ASSISTANT

## 2017-04-16 PROCEDURE — 94640 AIRWAY INHALATION TREATMENT: CPT

## 2017-04-16 PROCEDURE — 80202 ASSAY OF VANCOMYCIN: CPT

## 2017-04-16 RX ADMIN — METOPROLOL TARTRATE 12.5 MG: 25 TABLET, FILM COATED ORAL at 20:50

## 2017-04-16 RX ADMIN — GABAPENTIN 800 MG: 400 CAPSULE ORAL at 09:22

## 2017-04-16 RX ADMIN — INSULIN LISPRO 2 UNITS: 100 INJECTION, SOLUTION INTRAVENOUS; SUBCUTANEOUS at 20:53

## 2017-04-16 RX ADMIN — FAMOTIDINE 20 MG: 20 TABLET ORAL at 09:23

## 2017-04-16 RX ADMIN — ALBUTEROL SULFATE 2 PUFF: 90 AEROSOL, METERED RESPIRATORY (INHALATION) at 14:25

## 2017-04-16 RX ADMIN — METOPROLOL TARTRATE 12.5 MG: 25 TABLET, FILM COATED ORAL at 09:34

## 2017-04-16 RX ADMIN — SODIUM CHLORIDE: 9 INJECTION, SOLUTION INTRAVENOUS at 04:41

## 2017-04-16 RX ADMIN — GABAPENTIN 800 MG: 400 CAPSULE ORAL at 14:10

## 2017-04-16 RX ADMIN — Medication 10 ML: at 20:50

## 2017-04-16 RX ADMIN — FLUDROCORTISONE ACETATE 0.1 MG: 0.1 TABLET ORAL at 09:22

## 2017-04-16 RX ADMIN — HYDROCODONE BITARTRATE AND ACETAMINOPHEN 1 TABLET: 5; 325 TABLET ORAL at 04:41

## 2017-04-16 RX ADMIN — ASPIRIN 81 MG 81 MG: 81 TABLET ORAL at 09:22

## 2017-04-16 RX ADMIN — ESCITALOPRAM 10 MG: 5 TABLET, FILM COATED ORAL at 09:22

## 2017-04-16 RX ADMIN — VANCOMYCIN HYDROCHLORIDE 1500 MG: 1 INJECTION, POWDER, LYOPHILIZED, FOR SOLUTION INTRAVENOUS at 20:50

## 2017-04-16 RX ADMIN — INSULIN LISPRO 4 UNITS: 100 INJECTION, SOLUTION INTRAVENOUS; SUBCUTANEOUS at 17:04

## 2017-04-16 RX ADMIN — GABAPENTIN 800 MG: 400 CAPSULE ORAL at 20:50

## 2017-04-16 RX ADMIN — INSULIN LISPRO 6 UNITS: 100 INJECTION, SOLUTION INTRAVENOUS; SUBCUTANEOUS at 12:09

## 2017-04-16 RX ADMIN — VANCOMYCIN HYDROCHLORIDE 1500 MG: 1 INJECTION, POWDER, LYOPHILIZED, FOR SOLUTION INTRAVENOUS at 09:47

## 2017-04-16 RX ADMIN — CLOPIDOGREL BISULFATE 75 MG: 75 TABLET ORAL at 09:22

## 2017-04-16 RX ADMIN — ATORVASTATIN CALCIUM 40 MG: 40 TABLET, FILM COATED ORAL at 09:23

## 2017-04-16 RX ADMIN — FAMOTIDINE 20 MG: 20 TABLET ORAL at 20:50

## 2017-04-16 RX ADMIN — ENOXAPARIN SODIUM 40 MG: 40 INJECTION SUBCUTANEOUS at 09:23

## 2017-04-16 RX ADMIN — LOSARTAN POTASSIUM 25 MG: 25 TABLET, FILM COATED ORAL at 09:23

## 2017-04-16 ASSESSMENT — PAIN DESCRIPTION - PAIN TYPE
TYPE: ACUTE PAIN
TYPE: ACUTE PAIN

## 2017-04-16 ASSESSMENT — PAIN DESCRIPTION - LOCATION
LOCATION: LEG
LOCATION: LEG

## 2017-04-16 ASSESSMENT — PAIN SCALES - GENERAL
PAINLEVEL_OUTOF10: 2
PAINLEVEL_OUTOF10: 5
PAINLEVEL_OUTOF10: 0
PAINLEVEL_OUTOF10: 5

## 2017-04-16 ASSESSMENT — PAIN DESCRIPTION - DESCRIPTORS
DESCRIPTORS: SORE
DESCRIPTORS: SORE

## 2017-04-16 ASSESSMENT — PAIN DESCRIPTION - ORIENTATION
ORIENTATION: UPPER;RIGHT
ORIENTATION: RIGHT;UPPER

## 2017-04-17 ENCOUNTER — TELEPHONE (OUTPATIENT)
Dept: NEUROLOGY | Age: 52
End: 2017-04-17

## 2017-04-17 VITALS
OXYGEN SATURATION: 95 % | HEART RATE: 66 BPM | SYSTOLIC BLOOD PRESSURE: 128 MMHG | TEMPERATURE: 98 F | HEIGHT: 63 IN | WEIGHT: 245.5 LBS | BODY MASS INDEX: 43.5 KG/M2 | RESPIRATION RATE: 16 BRPM | DIASTOLIC BLOOD PRESSURE: 71 MMHG

## 2017-04-17 LAB
BUN BLDV-MCNC: 10 MG/DL (ref 6–20)
CREAT SERPL-MCNC: 0.62 MG/DL (ref 0.5–0.9)
GFR AFRICAN AMERICAN: >60 ML/MIN
GFR NON-AFRICAN AMERICAN: >60 ML/MIN
GFR SERPL CREATININE-BSD FRML MDRD: NORMAL ML/MIN/{1.73_M2}
GFR SERPL CREATININE-BSD FRML MDRD: NORMAL ML/MIN/{1.73_M2}
GLUCOSE BLD-MCNC: 166 MG/DL (ref 74–100)
GLUCOSE BLD-MCNC: 282 MG/DL (ref 74–100)

## 2017-04-17 PROCEDURE — 6370000000 HC RX 637 (ALT 250 FOR IP): Performed by: PHYSICIAN ASSISTANT

## 2017-04-17 PROCEDURE — 84520 ASSAY OF UREA NITROGEN: CPT

## 2017-04-17 PROCEDURE — 82565 ASSAY OF CREATININE: CPT

## 2017-04-17 PROCEDURE — 82947 ASSAY GLUCOSE BLOOD QUANT: CPT

## 2017-04-17 PROCEDURE — 2580000003 HC RX 258: Performed by: INTERNAL MEDICINE

## 2017-04-17 PROCEDURE — 36415 COLL VENOUS BLD VENIPUNCTURE: CPT

## 2017-04-17 PROCEDURE — 6360000002 HC RX W HCPCS: Performed by: INTERNAL MEDICINE

## 2017-04-17 PROCEDURE — 6370000000 HC RX 637 (ALT 250 FOR IP): Performed by: INTERNAL MEDICINE

## 2017-04-17 RX ORDER — LEVOFLOXACIN 750 MG/1
750 TABLET ORAL DAILY
Qty: 10 TABLET | Refills: 0 | Status: SHIPPED | OUTPATIENT
Start: 2017-04-17 | End: 2017-04-27

## 2017-04-17 RX ADMIN — CLOPIDOGREL BISULFATE 75 MG: 75 TABLET ORAL at 09:04

## 2017-04-17 RX ADMIN — FLUDROCORTISONE ACETATE 0.1 MG: 0.1 TABLET ORAL at 09:04

## 2017-04-17 RX ADMIN — Medication 10 ML: at 09:07

## 2017-04-17 RX ADMIN — ASPIRIN 81 MG 81 MG: 81 TABLET ORAL at 09:04

## 2017-04-17 RX ADMIN — ESCITALOPRAM 10 MG: 5 TABLET, FILM COATED ORAL at 09:04

## 2017-04-17 RX ADMIN — FAMOTIDINE 20 MG: 20 TABLET ORAL at 09:04

## 2017-04-17 RX ADMIN — VANCOMYCIN HYDROCHLORIDE 1500 MG: 1 INJECTION, POWDER, LYOPHILIZED, FOR SOLUTION INTRAVENOUS at 09:18

## 2017-04-17 RX ADMIN — GABAPENTIN 800 MG: 400 CAPSULE ORAL at 09:04

## 2017-04-17 RX ADMIN — LOSARTAN POTASSIUM 25 MG: 25 TABLET, FILM COATED ORAL at 09:04

## 2017-04-17 RX ADMIN — INSULIN LISPRO 6 UNITS: 100 INJECTION, SOLUTION INTRAVENOUS; SUBCUTANEOUS at 12:34

## 2017-04-17 RX ADMIN — METOPROLOL TARTRATE 12.5 MG: 25 TABLET, FILM COATED ORAL at 09:04

## 2017-04-17 RX ADMIN — INSULIN LISPRO 2 UNITS: 100 INJECTION, SOLUTION INTRAVENOUS; SUBCUTANEOUS at 09:06

## 2017-04-17 RX ADMIN — ENOXAPARIN SODIUM 40 MG: 40 INJECTION SUBCUTANEOUS at 09:04

## 2017-04-17 RX ADMIN — ATORVASTATIN CALCIUM 40 MG: 40 TABLET, FILM COATED ORAL at 09:04

## 2017-04-17 ASSESSMENT — PAIN SCALES - GENERAL
PAINLEVEL_OUTOF10: 0
PAINLEVEL_OUTOF10: 3
PAINLEVEL_OUTOF10: 3
PAINLEVEL_OUTOF10: 0
PAINLEVEL_OUTOF10: 0

## 2017-04-17 ASSESSMENT — PAIN DESCRIPTION - PAIN TYPE: TYPE: ACUTE PAIN

## 2017-04-17 ASSESSMENT — PAIN DESCRIPTION - DESCRIPTORS: DESCRIPTORS: SORE

## 2017-04-17 ASSESSMENT — PAIN DESCRIPTION - ORIENTATION: ORIENTATION: RIGHT;UPPER

## 2017-04-17 ASSESSMENT — PAIN DESCRIPTION - LOCATION: LOCATION: LEG

## 2017-04-18 ENCOUNTER — TELEPHONE (OUTPATIENT)
Dept: SURGERY | Age: 52
End: 2017-04-18

## 2017-04-18 LAB
CULTURE: NORMAL
Lab: NORMAL
SPECIMEN DESCRIPTION: NORMAL
STATUS: NORMAL

## 2017-04-19 LAB
CULTURE: ABNORMAL
DIRECT EXAM: ABNORMAL
DIRECT EXAM: ABNORMAL
Lab: ABNORMAL
Lab: ABNORMAL
ORGANISM: ABNORMAL
SPECIMEN DESCRIPTION: ABNORMAL
SPECIMEN DESCRIPTION: ABNORMAL
STATUS: ABNORMAL

## 2017-05-01 ENCOUNTER — HOSPITAL ENCOUNTER (OUTPATIENT)
Age: 52
Discharge: HOME | End: 2017-05-01
Payer: COMMERCIAL

## 2017-05-01 LAB
ANION GAP SERPL CALCULATED.3IONS-SCNC: 17 MMOL/L (ref 9–17)
BUN BLDV-MCNC: 15 MG/DL (ref 6–20)
BUN/CREAT BLD: 21 (ref 9–20)
CALCIUM SERPL-MCNC: 9.4 MG/DL (ref 8.6–10.4)
CHLORIDE BLD-SCNC: 96 MMOL/L (ref 98–107)
CO2: 24 MMOL/L (ref 20–31)
CREAT SERPL-MCNC: 0.72 MG/DL (ref 0.5–0.9)
CREATININE URINE: 182.8 MG/DL (ref 28–217)
ESTIMATED AVERAGE GLUCOSE: 303 MG/DL
GFR AFRICAN AMERICAN: >60 ML/MIN
GFR NON-AFRICAN AMERICAN: >60 ML/MIN
GFR SERPL CREATININE-BSD FRML MDRD: ABNORMAL ML/MIN/{1.73_M2}
GFR SERPL CREATININE-BSD FRML MDRD: ABNORMAL ML/MIN/{1.73_M2}
GLUCOSE BLD-MCNC: 328 MG/DL (ref 70–99)
HBA1C MFR BLD: 12.2 % (ref 4.8–5.9)
MICROALBUMIN/CREAT 24H UR: 23 MG/L
MICROALBUMIN/CREAT UR-RTO: 13 MCG/MG CREAT
POTASSIUM SERPL-SCNC: 4.7 MMOL/L (ref 3.7–5.3)
SODIUM BLD-SCNC: 137 MMOL/L (ref 135–144)

## 2017-05-01 PROCEDURE — 82570 ASSAY OF URINE CREATININE: CPT

## 2017-05-01 PROCEDURE — 36415 COLL VENOUS BLD VENIPUNCTURE: CPT

## 2017-05-01 PROCEDURE — 83036 HEMOGLOBIN GLYCOSYLATED A1C: CPT

## 2017-05-01 PROCEDURE — 80048 BASIC METABOLIC PNL TOTAL CA: CPT

## 2017-05-01 PROCEDURE — 82043 UR ALBUMIN QUANTITATIVE: CPT

## 2017-05-03 ENCOUNTER — TELEPHONE (OUTPATIENT)
Dept: NEUROLOGY | Age: 52
End: 2017-05-03

## 2017-05-09 ENCOUNTER — OFFICE VISIT (OUTPATIENT)
Dept: CARDIOLOGY | Age: 52
End: 2017-05-09
Payer: COMMERCIAL

## 2017-05-09 VITALS
BODY MASS INDEX: 41.46 KG/M2 | HEIGHT: 63 IN | WEIGHT: 234 LBS | HEART RATE: 82 BPM | RESPIRATION RATE: 16 BRPM | OXYGEN SATURATION: 95 % | SYSTOLIC BLOOD PRESSURE: 111 MMHG | DIASTOLIC BLOOD PRESSURE: 74 MMHG

## 2017-05-09 DIAGNOSIS — R07.2 PRECORDIAL PAIN: Primary | ICD-10-CM

## 2017-05-09 DIAGNOSIS — I20.9 ANGINA, CLASS II (HCC): ICD-10-CM

## 2017-05-09 DIAGNOSIS — G47.33 OSA (OBSTRUCTIVE SLEEP APNEA): ICD-10-CM

## 2017-05-09 DIAGNOSIS — K21.9 GASTROESOPHAGEAL REFLUX DISEASE WITHOUT ESOPHAGITIS: ICD-10-CM

## 2017-05-09 DIAGNOSIS — I25.118 CORONARY ARTERY DISEASE INVOLVING NATIVE CORONARY ARTERY OF NATIVE HEART WITH OTHER FORM OF ANGINA PECTORIS (HCC): ICD-10-CM

## 2017-05-09 PROCEDURE — 99214 OFFICE O/P EST MOD 30 MIN: CPT | Performed by: FAMILY MEDICINE

## 2017-05-09 RX ORDER — OMEPRAZOLE 20 MG/1
20 CAPSULE, DELAYED RELEASE ORAL DAILY
Qty: 90 CAPSULE | Refills: 3 | Status: SHIPPED | OUTPATIENT
Start: 2017-05-09 | End: 2020-04-29 | Stop reason: ALTCHOICE

## 2017-05-12 ENCOUNTER — HOSPITAL ENCOUNTER (INPATIENT)
Age: 52
LOS: 2 days | Discharge: HOME | DRG: 383 | End: 2017-05-14
Attending: INTERNAL MEDICINE | Admitting: INTERNAL MEDICINE
Payer: COMMERCIAL

## 2017-05-12 DIAGNOSIS — L03.311 CELLULITIS OF ABDOMINAL WALL: Primary | ICD-10-CM

## 2017-05-12 LAB
-: ABNORMAL
ABSOLUTE EOS #: 0.1 K/UL (ref 0–0.4)
ABSOLUTE LYMPH #: 1.8 K/UL (ref 1–4.8)
ABSOLUTE MONO #: 0.5 K/UL (ref 0–1)
AMORPHOUS: ABNORMAL
ANION GAP SERPL CALCULATED.3IONS-SCNC: 14 MMOL/L (ref 9–17)
BACTERIA: ABNORMAL
BASOPHILS # BLD: 0 %
BASOPHILS ABSOLUTE: 0 K/UL (ref 0–0.2)
BILIRUBIN URINE: NEGATIVE
BUN BLDV-MCNC: 11 MG/DL (ref 6–20)
BUN/CREAT BLD: 18 (ref 9–20)
CALCIUM SERPL-MCNC: 9.3 MG/DL (ref 8.6–10.4)
CASTS UA: ABNORMAL /LPF
CHLORIDE BLD-SCNC: 92 MMOL/L (ref 98–107)
CO2: 24 MMOL/L (ref 20–31)
COLOR: YELLOW
COMMENT UA: ABNORMAL
CREAT SERPL-MCNC: 0.62 MG/DL (ref 0.5–0.9)
CRYSTALS, UA: ABNORMAL /HPF
DIFFERENTIAL TYPE: ABNORMAL
EOSINOPHILS RELATIVE PERCENT: 1 %
EPITHELIAL CELLS UA: ABNORMAL /HPF (ref 0–25)
GFR AFRICAN AMERICAN: >60 ML/MIN
GFR NON-AFRICAN AMERICAN: >60 ML/MIN
GFR SERPL CREATININE-BSD FRML MDRD: ABNORMAL ML/MIN/{1.73_M2}
GFR SERPL CREATININE-BSD FRML MDRD: ABNORMAL ML/MIN/{1.73_M2}
GLUCOSE BLD-MCNC: 313 MG/DL (ref 70–99)
GLUCOSE BLD-MCNC: 328 MG/DL (ref 74–100)
GLUCOSE URINE: ABNORMAL
HCT VFR BLD CALC: 38.4 % (ref 36–46)
HEMOGLOBIN: 12.8 G/DL (ref 12–16)
KETONES, URINE: NEGATIVE
LACTIC ACID, WHOLE BLOOD: ABNORMAL MMOL/L (ref 0.7–2.1)
LACTIC ACID: 2.7 MMOL/L (ref 0.5–2.2)
LEUKOCYTE ESTERASE, URINE: NEGATIVE
LYMPHOCYTES # BLD: 14 %
MCH RBC QN AUTO: 26.2 PG (ref 26–34)
MCHC RBC AUTO-ENTMCNC: 33.5 G/DL (ref 31–37)
MCV RBC AUTO: 78.3 FL (ref 80–100)
MONOCYTES # BLD: 4 %
MUCUS: ABNORMAL
NITRITE, URINE: NEGATIVE
OTHER OBSERVATIONS UA: ABNORMAL
PDW BLD-RTO: 15.1 % (ref 12.1–15.2)
PH UA: 5.5 (ref 5–9)
PLATELET # BLD: 250 K/UL (ref 140–450)
PLATELET ESTIMATE: ABNORMAL
PMV BLD AUTO: ABNORMAL FL (ref 6–12)
POTASSIUM SERPL-SCNC: 4 MMOL/L (ref 3.7–5.3)
PROTEIN UA: NEGATIVE
RBC # BLD: 4.9 M/UL (ref 4–5.2)
RBC # BLD: ABNORMAL 10*6/UL
RBC UA: ABNORMAL /HPF (ref 0–2)
RENAL EPITHELIAL, UA: ABNORMAL /HPF
SEG NEUTROPHILS: 81 %
SEGMENTED NEUTROPHILS ABSOLUTE COUNT: 10.1 K/UL (ref 1.8–7.7)
SODIUM BLD-SCNC: 130 MMOL/L (ref 135–144)
SPECIFIC GRAVITY UA: 1.02 (ref 1.01–1.02)
TRICHOMONAS: ABNORMAL
TURBIDITY: ABNORMAL
URINE HGB: ABNORMAL
UROBILINOGEN, URINE: NORMAL
WBC # BLD: 12.4 K/UL (ref 3.5–11)
WBC # BLD: ABNORMAL 10*3/UL
WBC UA: ABNORMAL /HPF (ref 0–5)
YEAST: ABNORMAL

## 2017-05-12 PROCEDURE — 36415 COLL VENOUS BLD VENIPUNCTURE: CPT

## 2017-05-12 PROCEDURE — 87040 BLOOD CULTURE FOR BACTERIA: CPT

## 2017-05-12 PROCEDURE — 6370000000 HC RX 637 (ALT 250 FOR IP): Performed by: PHYSICIAN ASSISTANT

## 2017-05-12 PROCEDURE — 99284 EMERGENCY DEPT VISIT MOD MDM: CPT

## 2017-05-12 PROCEDURE — 83605 ASSAY OF LACTIC ACID: CPT

## 2017-05-12 PROCEDURE — 6370000000 HC RX 637 (ALT 250 FOR IP): Performed by: INTERNAL MEDICINE

## 2017-05-12 PROCEDURE — 96374 THER/PROPH/DIAG INJ IV PUSH: CPT

## 2017-05-12 PROCEDURE — 80048 BASIC METABOLIC PNL TOTAL CA: CPT

## 2017-05-12 PROCEDURE — 96375 TX/PRO/DX INJ NEW DRUG ADDON: CPT

## 2017-05-12 PROCEDURE — 6360000002 HC RX W HCPCS: Performed by: PHYSICIAN ASSISTANT

## 2017-05-12 PROCEDURE — 87086 URINE CULTURE/COLONY COUNT: CPT

## 2017-05-12 PROCEDURE — 82947 ASSAY GLUCOSE BLOOD QUANT: CPT

## 2017-05-12 PROCEDURE — 1200000000 HC SEMI PRIVATE

## 2017-05-12 PROCEDURE — 85025 COMPLETE CBC W/AUTO DIFF WBC: CPT

## 2017-05-12 PROCEDURE — 81001 URINALYSIS AUTO W/SCOPE: CPT

## 2017-05-12 PROCEDURE — 2580000003 HC RX 258: Performed by: PHYSICIAN ASSISTANT

## 2017-05-12 PROCEDURE — 2580000003 HC RX 258: Performed by: INTERNAL MEDICINE

## 2017-05-12 PROCEDURE — 6360000002 HC RX W HCPCS: Performed by: INTERNAL MEDICINE

## 2017-05-12 RX ORDER — ESCITALOPRAM OXALATE 5 MG/1
10 TABLET ORAL DAILY
Status: DISCONTINUED | OUTPATIENT
Start: 2017-05-12 | End: 2017-05-14 | Stop reason: HOSPADM

## 2017-05-12 RX ORDER — ASPIRIN 81 MG/1
81 TABLET, CHEWABLE ORAL DAILY
Status: DISCONTINUED | OUTPATIENT
Start: 2017-05-12 | End: 2017-05-14 | Stop reason: HOSPADM

## 2017-05-12 RX ORDER — GABAPENTIN 400 MG/1
800 CAPSULE ORAL 3 TIMES DAILY
Status: DISCONTINUED | OUTPATIENT
Start: 2017-05-12 | End: 2017-05-14 | Stop reason: HOSPADM

## 2017-05-12 RX ORDER — ONDANSETRON 2 MG/ML
4 INJECTION INTRAMUSCULAR; INTRAVENOUS EVERY 6 HOURS PRN
Status: DISCONTINUED | OUTPATIENT
Start: 2017-05-12 | End: 2017-05-14 | Stop reason: HOSPADM

## 2017-05-12 RX ORDER — ACETAMINOPHEN 325 MG/1
650 TABLET ORAL EVERY 4 HOURS PRN
Status: DISCONTINUED | OUTPATIENT
Start: 2017-05-12 | End: 2017-05-14 | Stop reason: HOSPADM

## 2017-05-12 RX ORDER — LOSARTAN POTASSIUM 25 MG/1
25 TABLET ORAL DAILY
Status: DISCONTINUED | OUTPATIENT
Start: 2017-05-12 | End: 2017-05-14 | Stop reason: HOSPADM

## 2017-05-12 RX ORDER — MORPHINE SULFATE 2 MG/ML
2 INJECTION, SOLUTION INTRAMUSCULAR; INTRAVENOUS
Status: DISCONTINUED | OUTPATIENT
Start: 2017-05-12 | End: 2017-05-14 | Stop reason: HOSPADM

## 2017-05-12 RX ORDER — PANTOPRAZOLE SODIUM 40 MG/1
40 TABLET, DELAYED RELEASE ORAL
Status: DISCONTINUED | OUTPATIENT
Start: 2017-05-13 | End: 2017-05-14 | Stop reason: HOSPADM

## 2017-05-12 RX ORDER — ATORVASTATIN CALCIUM 40 MG/1
40 TABLET, FILM COATED ORAL NIGHTLY
Status: DISCONTINUED | OUTPATIENT
Start: 2017-05-12 | End: 2017-05-14 | Stop reason: HOSPADM

## 2017-05-12 RX ORDER — NICOTINE POLACRILEX 4 MG
15 LOZENGE BUCCAL PRN
Status: DISCONTINUED | OUTPATIENT
Start: 2017-05-12 | End: 2017-05-14 | Stop reason: HOSPADM

## 2017-05-12 RX ORDER — DEXTROSE MONOHYDRATE 25 G/50ML
12.5 INJECTION, SOLUTION INTRAVENOUS PRN
Status: DISCONTINUED | OUTPATIENT
Start: 2017-05-12 | End: 2017-05-14 | Stop reason: HOSPADM

## 2017-05-12 RX ORDER — FLUDROCORTISONE ACETATE 0.1 MG/1
0.1 TABLET ORAL DAILY
Status: DISCONTINUED | OUTPATIENT
Start: 2017-05-12 | End: 2017-05-14 | Stop reason: HOSPADM

## 2017-05-12 RX ORDER — ACETAMINOPHEN 500 MG
1000 TABLET ORAL ONCE
Status: COMPLETED | OUTPATIENT
Start: 2017-05-12 | End: 2017-05-12

## 2017-05-12 RX ORDER — 0.9 % SODIUM CHLORIDE 0.9 %
1000 INTRAVENOUS SOLUTION INTRAVENOUS ONCE
Status: COMPLETED | OUTPATIENT
Start: 2017-05-12 | End: 2017-05-12

## 2017-05-12 RX ORDER — ONDANSETRON 4 MG/1
4 TABLET, ORALLY DISINTEGRATING ORAL EVERY 8 HOURS PRN
Status: DISCONTINUED | OUTPATIENT
Start: 2017-05-12 | End: 2017-05-14 | Stop reason: HOSPADM

## 2017-05-12 RX ORDER — SODIUM CHLORIDE 9 MG/ML
INJECTION, SOLUTION INTRAVENOUS CONTINUOUS
Status: DISCONTINUED | OUTPATIENT
Start: 2017-05-12 | End: 2017-05-14 | Stop reason: HOSPADM

## 2017-05-12 RX ORDER — SODIUM CHLORIDE 0.9 % (FLUSH) 0.9 %
10 SYRINGE (ML) INJECTION PRN
Status: DISCONTINUED | OUTPATIENT
Start: 2017-05-12 | End: 2017-05-14 | Stop reason: HOSPADM

## 2017-05-12 RX ORDER — MORPHINE SULFATE 2 MG/ML
2 INJECTION, SOLUTION INTRAMUSCULAR; INTRAVENOUS ONCE
Status: COMPLETED | OUTPATIENT
Start: 2017-05-12 | End: 2017-05-12

## 2017-05-12 RX ORDER — CLOPIDOGREL BISULFATE 75 MG/1
75 TABLET ORAL DAILY
Status: DISCONTINUED | OUTPATIENT
Start: 2017-05-12 | End: 2017-05-14 | Stop reason: HOSPADM

## 2017-05-12 RX ORDER — ONDANSETRON 2 MG/ML
4 INJECTION INTRAMUSCULAR; INTRAVENOUS ONCE
Status: COMPLETED | OUTPATIENT
Start: 2017-05-12 | End: 2017-05-12

## 2017-05-12 RX ORDER — ALBUTEROL SULFATE 90 UG/1
2 AEROSOL, METERED RESPIRATORY (INHALATION) EVERY 6 HOURS PRN
Status: DISCONTINUED | OUTPATIENT
Start: 2017-05-12 | End: 2017-05-13

## 2017-05-12 RX ORDER — SODIUM CHLORIDE 0.9 % (FLUSH) 0.9 %
10 SYRINGE (ML) INJECTION EVERY 12 HOURS SCHEDULED
Status: DISCONTINUED | OUTPATIENT
Start: 2017-05-12 | End: 2017-05-14 | Stop reason: HOSPADM

## 2017-05-12 RX ORDER — DEXTROSE MONOHYDRATE 50 MG/ML
100 INJECTION, SOLUTION INTRAVENOUS PRN
Status: DISCONTINUED | OUTPATIENT
Start: 2017-05-12 | End: 2017-05-14 | Stop reason: HOSPADM

## 2017-05-12 RX ADMIN — Medication 10 ML: at 19:43

## 2017-05-12 RX ADMIN — INSULIN GLARGINE 50 UNITS: 100 INJECTION, SOLUTION SUBCUTANEOUS at 20:40

## 2017-05-12 RX ADMIN — MORPHINE SULFATE 2 MG: 2 INJECTION, SOLUTION INTRAMUSCULAR; INTRAVENOUS at 16:13

## 2017-05-12 RX ADMIN — ACETAMINOPHEN 1000 MG: 500 TABLET ORAL at 16:12

## 2017-05-12 RX ADMIN — SODIUM CHLORIDE: 9 INJECTION, SOLUTION INTRAVENOUS at 19:43

## 2017-05-12 RX ADMIN — INSULIN LISPRO 4 UNITS: 100 INJECTION, SOLUTION INTRAVENOUS; SUBCUTANEOUS at 20:41

## 2017-05-12 RX ADMIN — ONDANSETRON 4 MG: 2 INJECTION INTRAMUSCULAR; INTRAVENOUS at 16:13

## 2017-05-12 RX ADMIN — VANCOMYCIN HYDROCHLORIDE 1000 MG: 1 INJECTION, POWDER, LYOPHILIZED, FOR SOLUTION INTRAVENOUS at 19:43

## 2017-05-12 RX ADMIN — GABAPENTIN 800 MG: 400 CAPSULE ORAL at 20:40

## 2017-05-12 RX ADMIN — METOPROLOL TARTRATE 12.5 MG: 25 TABLET ORAL at 20:40

## 2017-05-12 RX ADMIN — ENOXAPARIN SODIUM 40 MG: 40 INJECTION SUBCUTANEOUS at 20:42

## 2017-05-12 RX ADMIN — SODIUM CHLORIDE 1000 ML: 9 INJECTION, SOLUTION INTRAVENOUS at 16:32

## 2017-05-12 ASSESSMENT — ENCOUNTER SYMPTOMS
BACK PAIN: 0
COUGH: 0
SHORTNESS OF BREATH: 0
EYE REDNESS: 0
ABDOMINAL PAIN: 0
RHINORRHEA: 0
CHEST TIGHTNESS: 0
SORE THROAT: 0
DIARRHEA: 0
NAUSEA: 0
EYE DISCHARGE: 0
WHEEZING: 0
VOMITING: 0
CONSTIPATION: 0
BLOOD IN STOOL: 0

## 2017-05-12 ASSESSMENT — PAIN DESCRIPTION - PAIN TYPE
TYPE: ACUTE PAIN

## 2017-05-12 ASSESSMENT — PAIN DESCRIPTION - FREQUENCY: FREQUENCY: CONTINUOUS

## 2017-05-12 ASSESSMENT — PAIN SCALES - GENERAL
PAINLEVEL_OUTOF10: 2
PAINLEVEL_OUTOF10: 3
PAINLEVEL_OUTOF10: 2
PAINLEVEL_OUTOF10: 3
PAINLEVEL_OUTOF10: 3

## 2017-05-12 ASSESSMENT — PAIN DESCRIPTION - LOCATION
LOCATION: ABDOMEN

## 2017-05-12 ASSESSMENT — PAIN DESCRIPTION - ORIENTATION: ORIENTATION: LOWER

## 2017-05-12 ASSESSMENT — PAIN DESCRIPTION - DESCRIPTORS: DESCRIPTORS: BURNING;DISCOMFORT

## 2017-05-13 LAB
ALBUMIN SERPL-MCNC: 3.3 G/DL (ref 3.5–5.2)
ALBUMIN/GLOBULIN RATIO: 1.1 (ref 1–2.5)
ALP BLD-CCNC: 81 U/L (ref 35–104)
ALT SERPL-CCNC: 9 U/L (ref 5–33)
ANION GAP SERPL CALCULATED.3IONS-SCNC: 12 MMOL/L (ref 9–17)
AST SERPL-CCNC: 9 U/L
BILIRUB SERPL-MCNC: 0.48 MG/DL (ref 0.3–1.2)
BUN BLDV-MCNC: 10 MG/DL (ref 6–20)
BUN/CREAT BLD: 20 (ref 9–20)
CALCIUM SERPL-MCNC: 8.3 MG/DL (ref 8.6–10.4)
CHLORIDE BLD-SCNC: 98 MMOL/L (ref 98–107)
CO2: 25 MMOL/L (ref 20–31)
CREAT SERPL-MCNC: 0.51 MG/DL (ref 0.5–0.9)
CULTURE: NORMAL
CULTURE: NORMAL
ESTIMATED AVERAGE GLUCOSE: 298 MG/DL
GFR AFRICAN AMERICAN: >60 ML/MIN
GFR NON-AFRICAN AMERICAN: >60 ML/MIN
GFR SERPL CREATININE-BSD FRML MDRD: ABNORMAL ML/MIN/{1.73_M2}
GFR SERPL CREATININE-BSD FRML MDRD: ABNORMAL ML/MIN/{1.73_M2}
GLUCOSE BLD-MCNC: 226 MG/DL (ref 74–100)
GLUCOSE BLD-MCNC: 267 MG/DL (ref 74–100)
GLUCOSE BLD-MCNC: 297 MG/DL (ref 74–100)
GLUCOSE BLD-MCNC: 299 MG/DL (ref 74–100)
GLUCOSE BLD-MCNC: 300 MG/DL (ref 70–99)
HBA1C MFR BLD: 12 % (ref 4.8–5.9)
Lab: NORMAL
Lab: NORMAL
POTASSIUM SERPL-SCNC: 3.9 MMOL/L (ref 3.7–5.3)
SODIUM BLD-SCNC: 135 MMOL/L (ref 135–144)
SPECIMEN DESCRIPTION: NORMAL
SPECIMEN DESCRIPTION: NORMAL
STATUS: NORMAL
TOTAL PROTEIN: 6.3 G/DL (ref 6.4–8.3)

## 2017-05-13 PROCEDURE — 6370000000 HC RX 637 (ALT 250 FOR IP): Performed by: INTERNAL MEDICINE

## 2017-05-13 PROCEDURE — 36415 COLL VENOUS BLD VENIPUNCTURE: CPT

## 2017-05-13 PROCEDURE — 6360000002 HC RX W HCPCS: Performed by: INTERNAL MEDICINE

## 2017-05-13 PROCEDURE — 94664 DEMO&/EVAL PT USE INHALER: CPT

## 2017-05-13 PROCEDURE — 2580000003 HC RX 258: Performed by: INTERNAL MEDICINE

## 2017-05-13 PROCEDURE — 82947 ASSAY GLUCOSE BLOOD QUANT: CPT

## 2017-05-13 PROCEDURE — 1200000000 HC SEMI PRIVATE

## 2017-05-13 PROCEDURE — 80053 COMPREHEN METABOLIC PANEL: CPT

## 2017-05-13 PROCEDURE — 83036 HEMOGLOBIN GLYCOSYLATED A1C: CPT

## 2017-05-13 RX ORDER — ALBUTEROL SULFATE 90 UG/1
2 AEROSOL, METERED RESPIRATORY (INHALATION) 3 TIMES DAILY
Status: DISCONTINUED | OUTPATIENT
Start: 2017-05-13 | End: 2017-05-13

## 2017-05-13 RX ORDER — ALBUTEROL SULFATE 90 UG/1
2 AEROSOL, METERED RESPIRATORY (INHALATION) EVERY 4 HOURS PRN
Status: DISCONTINUED | OUTPATIENT
Start: 2017-05-13 | End: 2017-05-14 | Stop reason: HOSPADM

## 2017-05-13 RX ADMIN — ESCITALOPRAM 10 MG: 5 TABLET, FILM COATED ORAL at 08:17

## 2017-05-13 RX ADMIN — INSULIN LISPRO 3 UNITS: 100 INJECTION, SOLUTION INTRAVENOUS; SUBCUTANEOUS at 20:15

## 2017-05-13 RX ADMIN — ASPIRIN 81 MG 81 MG: 81 TABLET ORAL at 08:18

## 2017-05-13 RX ADMIN — LOSARTAN POTASSIUM 25 MG: 25 TABLET, FILM COATED ORAL at 08:18

## 2017-05-13 RX ADMIN — METOPROLOL TARTRATE 12.5 MG: 25 TABLET ORAL at 08:18

## 2017-05-13 RX ADMIN — PANTOPRAZOLE SODIUM 40 MG: 40 TABLET, DELAYED RELEASE ORAL at 06:56

## 2017-05-13 RX ADMIN — ACETAMINOPHEN 650 MG: 325 TABLET, FILM COATED ORAL at 07:03

## 2017-05-13 RX ADMIN — CLOPIDOGREL BISULFATE 75 MG: 75 TABLET ORAL at 08:18

## 2017-05-13 RX ADMIN — VANCOMYCIN HYDROCHLORIDE 1000 MG: 1 INJECTION, POWDER, LYOPHILIZED, FOR SOLUTION INTRAVENOUS at 08:16

## 2017-05-13 RX ADMIN — VANCOMYCIN HYDROCHLORIDE 1000 MG: 1 INJECTION, POWDER, LYOPHILIZED, FOR SOLUTION INTRAVENOUS at 22:31

## 2017-05-13 RX ADMIN — ATORVASTATIN CALCIUM 40 MG: 40 TABLET, FILM COATED ORAL at 20:14

## 2017-05-13 RX ADMIN — INSULIN GLARGINE 50 UNITS: 100 INJECTION, SOLUTION SUBCUTANEOUS at 20:15

## 2017-05-13 RX ADMIN — FLUDROCORTISONE ACETATE 0.1 MG: 0.1 TABLET ORAL at 08:18

## 2017-05-13 RX ADMIN — GABAPENTIN 800 MG: 400 CAPSULE ORAL at 08:17

## 2017-05-13 RX ADMIN — GABAPENTIN 800 MG: 400 CAPSULE ORAL at 13:59

## 2017-05-13 RX ADMIN — ENOXAPARIN SODIUM 40 MG: 40 INJECTION SUBCUTANEOUS at 08:19

## 2017-05-13 RX ADMIN — METOPROLOL TARTRATE 12.5 MG: 25 TABLET ORAL at 20:14

## 2017-05-13 RX ADMIN — SODIUM CHLORIDE: 9 INJECTION, SOLUTION INTRAVENOUS at 20:14

## 2017-05-13 RX ADMIN — INSULIN LISPRO 4 UNITS: 100 INJECTION, SOLUTION INTRAVENOUS; SUBCUTANEOUS at 16:59

## 2017-05-13 RX ADMIN — VANCOMYCIN HYDROCHLORIDE 1000 MG: 1 INJECTION, POWDER, LYOPHILIZED, FOR SOLUTION INTRAVENOUS at 20:13

## 2017-05-13 RX ADMIN — INSULIN LISPRO 6 UNITS: 100 INJECTION, SOLUTION INTRAVENOUS; SUBCUTANEOUS at 08:20

## 2017-05-13 RX ADMIN — INSULIN LISPRO 6 UNITS: 100 INJECTION, SOLUTION INTRAVENOUS; SUBCUTANEOUS at 12:17

## 2017-05-13 RX ADMIN — GABAPENTIN 800 MG: 400 CAPSULE ORAL at 20:13

## 2017-05-13 ASSESSMENT — PAIN DESCRIPTION - PAIN TYPE
TYPE: ACUTE PAIN
TYPE: ACUTE PAIN

## 2017-05-13 ASSESSMENT — PAIN SCALES - GENERAL
PAINLEVEL_OUTOF10: 3
PAINLEVEL_OUTOF10: 2
PAINLEVEL_OUTOF10: 1
PAINLEVEL_OUTOF10: 0

## 2017-05-13 ASSESSMENT — PAIN DESCRIPTION - LOCATION
LOCATION: ABDOMEN
LOCATION: ABDOMEN

## 2017-05-13 ASSESSMENT — PAIN DESCRIPTION - DESCRIPTORS
DESCRIPTORS: ACHING;BURNING
DESCRIPTORS: ACHING

## 2017-05-13 ASSESSMENT — PAIN DESCRIPTION - ORIENTATION
ORIENTATION: MID;LOWER
ORIENTATION: LOWER

## 2017-05-14 VITALS
DIASTOLIC BLOOD PRESSURE: 86 MMHG | TEMPERATURE: 97.8 F | BODY MASS INDEX: 42.88 KG/M2 | HEART RATE: 82 BPM | WEIGHT: 242 LBS | SYSTOLIC BLOOD PRESSURE: 132 MMHG | OXYGEN SATURATION: 93 % | RESPIRATION RATE: 18 BRPM | HEIGHT: 63 IN

## 2017-05-14 LAB
GLUCOSE BLD-MCNC: 203 MG/DL (ref 74–100)
GLUCOSE BLD-MCNC: 260 MG/DL (ref 74–100)
VANCOMYCIN TROUGH DATE LAST DOSE: ABNORMAL
VANCOMYCIN TROUGH DOSE AMOUNT: ABNORMAL
VANCOMYCIN TROUGH TIME LAST DOSE: ABNORMAL
VANCOMYCIN TROUGH: 8.5 UG/ML (ref 10–20)

## 2017-05-14 PROCEDURE — 2580000003 HC RX 258: Performed by: INTERNAL MEDICINE

## 2017-05-14 PROCEDURE — 80202 ASSAY OF VANCOMYCIN: CPT

## 2017-05-14 PROCEDURE — 6370000000 HC RX 637 (ALT 250 FOR IP): Performed by: INTERNAL MEDICINE

## 2017-05-14 PROCEDURE — 36415 COLL VENOUS BLD VENIPUNCTURE: CPT

## 2017-05-14 PROCEDURE — 82947 ASSAY GLUCOSE BLOOD QUANT: CPT

## 2017-05-14 PROCEDURE — 6360000002 HC RX W HCPCS: Performed by: INTERNAL MEDICINE

## 2017-05-14 RX ORDER — CLINDAMYCIN HYDROCHLORIDE 300 MG/1
300 CAPSULE ORAL 3 TIMES DAILY
Qty: 30 CAPSULE | Refills: 0 | Status: SHIPPED | OUTPATIENT
Start: 2017-05-14 | End: 2017-05-24

## 2017-05-14 RX ADMIN — PANTOPRAZOLE SODIUM 40 MG: 40 TABLET, DELAYED RELEASE ORAL at 07:47

## 2017-05-14 RX ADMIN — ASPIRIN 81 MG 81 MG: 81 TABLET ORAL at 09:08

## 2017-05-14 RX ADMIN — ESCITALOPRAM 10 MG: 5 TABLET, FILM COATED ORAL at 09:08

## 2017-05-14 RX ADMIN — INSULIN LISPRO 4 UNITS: 100 INJECTION, SOLUTION INTRAVENOUS; SUBCUTANEOUS at 07:49

## 2017-05-14 RX ADMIN — VANCOMYCIN HYDROCHLORIDE 1000 MG: 1 INJECTION, POWDER, LYOPHILIZED, FOR SOLUTION INTRAVENOUS at 07:47

## 2017-05-14 RX ADMIN — GABAPENTIN 800 MG: 400 CAPSULE ORAL at 09:08

## 2017-05-14 RX ADMIN — METOPROLOL TARTRATE 12.5 MG: 25 TABLET ORAL at 09:08

## 2017-05-14 RX ADMIN — CLOPIDOGREL BISULFATE 75 MG: 75 TABLET ORAL at 09:08

## 2017-05-14 RX ADMIN — INSULIN LISPRO 6 UNITS: 100 INJECTION, SOLUTION INTRAVENOUS; SUBCUTANEOUS at 12:09

## 2017-05-14 RX ADMIN — LOSARTAN POTASSIUM 25 MG: 25 TABLET, FILM COATED ORAL at 09:08

## 2017-05-14 RX ADMIN — FLUDROCORTISONE ACETATE 0.1 MG: 0.1 TABLET ORAL at 09:08

## 2017-05-14 RX ADMIN — ENOXAPARIN SODIUM 40 MG: 40 INJECTION SUBCUTANEOUS at 09:08

## 2017-05-14 ASSESSMENT — PAIN SCALES - GENERAL: PAINLEVEL_OUTOF10: 0

## 2017-05-15 ENCOUNTER — CARE COORDINATION (OUTPATIENT)
Dept: CARE COORDINATION | Age: 52
End: 2017-05-15

## 2017-05-16 ENCOUNTER — OFFICE VISIT (OUTPATIENT)
Dept: PRIMARY CARE CLINIC | Age: 52
End: 2017-05-16
Payer: COMMERCIAL

## 2017-05-16 VITALS
BODY MASS INDEX: 42.77 KG/M2 | WEIGHT: 241.4 LBS | DIASTOLIC BLOOD PRESSURE: 82 MMHG | SYSTOLIC BLOOD PRESSURE: 149 MMHG | TEMPERATURE: 96.6 F | HEART RATE: 83 BPM | RESPIRATION RATE: 20 BRPM | HEIGHT: 63 IN

## 2017-05-16 DIAGNOSIS — F43.23 ADJUSTMENT DISORDER WITH MIXED ANXIETY AND DEPRESSED MOOD: ICD-10-CM

## 2017-05-16 DIAGNOSIS — L03.311 ABDOMINAL WALL CELLULITIS: Primary | ICD-10-CM

## 2017-05-16 PROCEDURE — 99496 TRANSJ CARE MGMT HIGH F2F 7D: CPT | Performed by: NURSE PRACTITIONER

## 2017-05-16 RX ORDER — HYDROXYZINE HYDROCHLORIDE 25 MG/1
25 TABLET, FILM COATED ORAL 3 TIMES DAILY PRN
Qty: 30 TABLET | Refills: 0 | Status: ON HOLD | OUTPATIENT
Start: 2017-05-16 | End: 2017-05-30 | Stop reason: HOSPADM

## 2017-05-17 LAB
CULTURE: NORMAL
Lab: NORMAL
Lab: NORMAL
SPECIMEN DESCRIPTION: NORMAL
SPECIMEN DESCRIPTION: NORMAL
STATUS: NORMAL
STATUS: NORMAL

## 2017-05-26 ENCOUNTER — APPOINTMENT (OUTPATIENT)
Dept: GENERAL RADIOLOGY | Age: 52
DRG: 383 | End: 2017-05-26
Payer: COMMERCIAL

## 2017-05-26 ENCOUNTER — HOSPITAL ENCOUNTER (INPATIENT)
Age: 52
LOS: 4 days | Discharge: HOME | DRG: 383 | End: 2017-05-30
Attending: EMERGENCY MEDICINE | Admitting: FAMILY MEDICINE
Payer: COMMERCIAL

## 2017-05-26 DIAGNOSIS — N39.0 URINARY TRACT INFECTION, SITE UNSPECIFIED: ICD-10-CM

## 2017-05-26 DIAGNOSIS — Z95.828 S/P PICC CENTRAL LINE PLACEMENT: ICD-10-CM

## 2017-05-26 DIAGNOSIS — L02.219 CELLULITIS AND ABSCESS OF TRUNK: Primary | ICD-10-CM

## 2017-05-26 DIAGNOSIS — R26.9 GAIT DIFFICULTY: ICD-10-CM

## 2017-05-26 DIAGNOSIS — L03.319 CELLULITIS AND ABSCESS OF TRUNK: Primary | ICD-10-CM

## 2017-05-26 LAB
-: ABNORMAL
ABSOLUTE BANDS #: 1.6 K/UL (ref 0–1)
ABSOLUTE EOS #: 0 K/UL (ref 0–0.4)
ABSOLUTE LYMPH #: 1.78 K/UL (ref 1–4.8)
ABSOLUTE MONO #: 0.18 K/UL (ref 0–1)
ALBUMIN SERPL-MCNC: 4 G/DL (ref 3.5–5.2)
ALBUMIN/GLOBULIN RATIO: 1 (ref 1–2.5)
ALP BLD-CCNC: 103 U/L (ref 35–104)
ALT SERPL-CCNC: 16 U/L (ref 5–33)
AMORPHOUS: ABNORMAL
ANION GAP SERPL CALCULATED.3IONS-SCNC: 20 MMOL/L (ref 9–17)
AST SERPL-CCNC: 17 U/L
BACTERIA: ABNORMAL
BANDS: 9 %
BASOPHILS # BLD: 0 %
BASOPHILS ABSOLUTE: 0 K/UL (ref 0–0.2)
BILIRUB SERPL-MCNC: 0.54 MG/DL (ref 0.3–1.2)
BILIRUBIN URINE: NEGATIVE
BUN BLDV-MCNC: 12 MG/DL (ref 6–20)
BUN/CREAT BLD: 13 (ref 9–20)
CALCIUM SERPL-MCNC: 9.1 MG/DL (ref 8.6–10.4)
CASTS UA: ABNORMAL /LPF
CHLORIDE BLD-SCNC: 96 MMOL/L (ref 98–107)
CO2: 22 MMOL/L (ref 20–31)
COLOR: YELLOW
COMMENT UA: ABNORMAL
CREAT SERPL-MCNC: 0.94 MG/DL (ref 0.5–0.9)
CRYSTALS, UA: ABNORMAL /HPF
DIFFERENTIAL TYPE: ABNORMAL
EOSINOPHILS RELATIVE PERCENT: 0 %
EPITHELIAL CELLS UA: ABNORMAL /HPF (ref 0–25)
GFR AFRICAN AMERICAN: >60 ML/MIN
GFR NON-AFRICAN AMERICAN: >60 ML/MIN
GFR SERPL CREATININE-BSD FRML MDRD: ABNORMAL ML/MIN/{1.73_M2}
GFR SERPL CREATININE-BSD FRML MDRD: ABNORMAL ML/MIN/{1.73_M2}
GLUCOSE BLD-MCNC: 244 MG/DL (ref 70–99)
GLUCOSE URINE: ABNORMAL
HCT VFR BLD CALC: 39.5 % (ref 36–46)
HEMOGLOBIN: 13.3 G/DL (ref 12–16)
KETONES, URINE: NEGATIVE
LACTIC ACID, WHOLE BLOOD: ABNORMAL MMOL/L (ref 0.7–2.1)
LACTIC ACID: 2.7 MMOL/L (ref 0.5–2.2)
LEUKOCYTE ESTERASE, URINE: ABNORMAL
LYMPHOCYTES # BLD: 10 %
MCH RBC QN AUTO: 26.7 PG (ref 26–34)
MCHC RBC AUTO-ENTMCNC: 33.8 G/DL (ref 31–37)
MCV RBC AUTO: 79 FL (ref 80–100)
MONOCYTES # BLD: 1 %
MORPHOLOGY: NORMAL
MUCUS: ABNORMAL
NITRITE, URINE: NEGATIVE
OTHER OBSERVATIONS UA: ABNORMAL
PDW BLD-RTO: 15 % (ref 12.1–15.2)
PH UA: 5.5 (ref 5–9)
PLATELET # BLD: 312 K/UL (ref 140–450)
PLATELET ESTIMATE: ABNORMAL
PMV BLD AUTO: ABNORMAL FL (ref 6–12)
POTASSIUM SERPL-SCNC: 4 MMOL/L (ref 3.7–5.3)
PROTEIN UA: NEGATIVE
RBC # BLD: 5 M/UL (ref 4–5.2)
RBC # BLD: ABNORMAL 10*6/UL
RBC UA: ABNORMAL /HPF (ref 0–2)
RENAL EPITHELIAL, UA: ABNORMAL /HPF
SEG NEUTROPHILS: 80 %
SEGMENTED NEUTROPHILS ABSOLUTE COUNT: 14.24 K/UL (ref 1.8–7.7)
SODIUM BLD-SCNC: 138 MMOL/L (ref 135–144)
SPECIFIC GRAVITY UA: >1.03 (ref 1.01–1.02)
TOTAL PROTEIN: 7.9 G/DL (ref 6.4–8.3)
TRICHOMONAS: ABNORMAL
TURBIDITY: ABNORMAL
URINE HGB: ABNORMAL
UROBILINOGEN, URINE: NORMAL
WBC # BLD: 17.8 K/UL (ref 3.5–11)
WBC # BLD: ABNORMAL 10*3/UL
WBC UA: ABNORMAL /HPF (ref 0–5)
YEAST: ABNORMAL

## 2017-05-26 PROCEDURE — 6360000002 HC RX W HCPCS: Performed by: EMERGENCY MEDICINE

## 2017-05-26 PROCEDURE — 2580000003 HC RX 258: Performed by: EMERGENCY MEDICINE

## 2017-05-26 PROCEDURE — 36415 COLL VENOUS BLD VENIPUNCTURE: CPT

## 2017-05-26 PROCEDURE — 86403 PARTICLE AGGLUT ANTBDY SCRN: CPT

## 2017-05-26 PROCEDURE — 81001 URINALYSIS AUTO W/SCOPE: CPT

## 2017-05-26 PROCEDURE — 99285 EMERGENCY DEPT VISIT HI MDM: CPT

## 2017-05-26 PROCEDURE — 71010 XR CHEST PORTABLE: CPT

## 2017-05-26 PROCEDURE — 85025 COMPLETE CBC W/AUTO DIFF WBC: CPT

## 2017-05-26 PROCEDURE — 80053 COMPREHEN METABOLIC PANEL: CPT

## 2017-05-26 PROCEDURE — 1200000000 HC SEMI PRIVATE

## 2017-05-26 PROCEDURE — 87040 BLOOD CULTURE FOR BACTERIA: CPT

## 2017-05-26 PROCEDURE — 83605 ASSAY OF LACTIC ACID: CPT

## 2017-05-26 PROCEDURE — 96365 THER/PROPH/DIAG IV INF INIT: CPT

## 2017-05-26 PROCEDURE — 87086 URINE CULTURE/COLONY COUNT: CPT

## 2017-05-26 RX ORDER — SODIUM CHLORIDE 9 MG/ML
INJECTION, SOLUTION INTRAVENOUS CONTINUOUS
Status: DISCONTINUED | OUTPATIENT
Start: 2017-05-26 | End: 2017-05-27

## 2017-05-26 RX ADMIN — CEFTRIAXONE 1 G: 1 INJECTION, POWDER, FOR SOLUTION INTRAMUSCULAR; INTRAVENOUS at 23:28

## 2017-05-26 RX ADMIN — VANCOMYCIN HYDROCHLORIDE 1000 MG: 1 INJECTION, POWDER, LYOPHILIZED, FOR SOLUTION INTRAVENOUS at 22:02

## 2017-05-26 RX ADMIN — SODIUM CHLORIDE: 9 INJECTION, SOLUTION INTRAVENOUS at 21:50

## 2017-05-26 ASSESSMENT — PAIN DESCRIPTION - DESCRIPTORS: DESCRIPTORS: BURNING;STABBING

## 2017-05-26 ASSESSMENT — PAIN SCALES - GENERAL: PAINLEVEL_OUTOF10: 3

## 2017-05-26 ASSESSMENT — PAIN DESCRIPTION - FREQUENCY: FREQUENCY: INTERMITTENT

## 2017-05-26 ASSESSMENT — ENCOUNTER SYMPTOMS
EYES NEGATIVE: 1
RESPIRATORY NEGATIVE: 1

## 2017-05-26 ASSESSMENT — PAIN DESCRIPTION - ONSET: ONSET: ON-GOING

## 2017-05-26 ASSESSMENT — PAIN DESCRIPTION - LOCATION: LOCATION: ABDOMEN

## 2017-05-26 ASSESSMENT — PAIN DESCRIPTION - ORIENTATION: ORIENTATION: LOWER;RIGHT

## 2017-05-26 ASSESSMENT — PAIN DESCRIPTION - PAIN TYPE: TYPE: ACUTE PAIN

## 2017-05-27 PROBLEM — L03.311 CELLULITIS OF ABDOMINAL WALL: Status: ACTIVE | Noted: 2017-05-26

## 2017-05-27 LAB
ABSOLUTE EOS #: 0.1 K/UL (ref 0–0.4)
ABSOLUTE LYMPH #: 0.9 K/UL (ref 1–4.8)
ABSOLUTE MONO #: 0.4 K/UL (ref 0–1)
ANION GAP SERPL CALCULATED.3IONS-SCNC: 19 MMOL/L (ref 9–17)
BASOPHILS # BLD: 0 %
BASOPHILS ABSOLUTE: 0 K/UL (ref 0–0.2)
BUN BLDV-MCNC: 10 MG/DL (ref 6–20)
BUN/CREAT BLD: 13 (ref 9–20)
CALCIUM SERPL-MCNC: 8.4 MG/DL (ref 8.6–10.4)
CHLORIDE BLD-SCNC: 93 MMOL/L (ref 98–107)
CO2: 21 MMOL/L (ref 20–31)
CREAT SERPL-MCNC: 0.77 MG/DL (ref 0.5–0.9)
CULTURE: ABNORMAL
CULTURE: ABNORMAL
DIFFERENTIAL TYPE: ABNORMAL
EOSINOPHILS RELATIVE PERCENT: 1 %
GFR AFRICAN AMERICAN: >60 ML/MIN
GFR NON-AFRICAN AMERICAN: >60 ML/MIN
GFR SERPL CREATININE-BSD FRML MDRD: ABNORMAL ML/MIN/{1.73_M2}
GFR SERPL CREATININE-BSD FRML MDRD: ABNORMAL ML/MIN/{1.73_M2}
GLUCOSE BLD-MCNC: 228 MG/DL (ref 74–100)
GLUCOSE BLD-MCNC: 238 MG/DL (ref 74–100)
GLUCOSE BLD-MCNC: 245 MG/DL (ref 74–100)
GLUCOSE BLD-MCNC: 248 MG/DL (ref 74–100)
GLUCOSE BLD-MCNC: 306 MG/DL (ref 74–100)
GLUCOSE BLD-MCNC: 398 MG/DL (ref 70–99)
HCT VFR BLD CALC: 37.8 % (ref 36–46)
HEMOGLOBIN: 12.7 G/DL (ref 12–16)
LYMPHOCYTES # BLD: 11 %
Lab: ABNORMAL
Lab: ABNORMAL
MCH RBC QN AUTO: 26.7 PG (ref 26–34)
MCHC RBC AUTO-ENTMCNC: 33.6 G/DL (ref 31–37)
MCV RBC AUTO: 79.3 FL (ref 80–100)
MONOCYTES # BLD: 4 %
PDW BLD-RTO: 15.3 % (ref 12.1–15.2)
PLATELET # BLD: 270 K/UL (ref 140–450)
PLATELET ESTIMATE: ABNORMAL
PMV BLD AUTO: ABNORMAL FL (ref 6–12)
POTASSIUM SERPL-SCNC: 3.8 MMOL/L (ref 3.7–5.3)
RBC # BLD: 4.76 M/UL (ref 4–5.2)
RBC # BLD: ABNORMAL 10*6/UL
SEG NEUTROPHILS: 84 %
SEGMENTED NEUTROPHILS ABSOLUTE COUNT: 6.9 K/UL (ref 1.8–7.7)
SODIUM BLD-SCNC: 133 MMOL/L (ref 135–144)
SPECIMEN DESCRIPTION: ABNORMAL
SPECIMEN DESCRIPTION: ABNORMAL
STATUS: ABNORMAL
WBC # BLD: 8.3 K/UL (ref 3.5–11)
WBC # BLD: ABNORMAL 10*3/UL

## 2017-05-27 PROCEDURE — 94664 DEMO&/EVAL PT USE INHALER: CPT

## 2017-05-27 PROCEDURE — 6360000002 HC RX W HCPCS: Performed by: FAMILY MEDICINE

## 2017-05-27 PROCEDURE — 2580000003 HC RX 258: Performed by: FAMILY MEDICINE

## 2017-05-27 PROCEDURE — 94760 N-INVAS EAR/PLS OXIMETRY 1: CPT

## 2017-05-27 PROCEDURE — 80048 BASIC METABOLIC PNL TOTAL CA: CPT

## 2017-05-27 PROCEDURE — 6370000000 HC RX 637 (ALT 250 FOR IP): Performed by: FAMILY MEDICINE

## 2017-05-27 PROCEDURE — 85025 COMPLETE CBC W/AUTO DIFF WBC: CPT

## 2017-05-27 PROCEDURE — 82947 ASSAY GLUCOSE BLOOD QUANT: CPT

## 2017-05-27 PROCEDURE — 1200000000 HC SEMI PRIVATE

## 2017-05-27 PROCEDURE — 36415 COLL VENOUS BLD VENIPUNCTURE: CPT

## 2017-05-27 RX ORDER — HYDROXYZINE HYDROCHLORIDE 25 MG/1
25 TABLET, FILM COATED ORAL 3 TIMES DAILY PRN
Status: DISCONTINUED | OUTPATIENT
Start: 2017-05-27 | End: 2017-05-31 | Stop reason: HOSPADM

## 2017-05-27 RX ORDER — ATORVASTATIN CALCIUM 40 MG/1
40 TABLET, FILM COATED ORAL NIGHTLY
Status: DISCONTINUED | OUTPATIENT
Start: 2017-05-27 | End: 2017-05-31 | Stop reason: HOSPADM

## 2017-05-27 RX ORDER — NICOTINE POLACRILEX 4 MG
15 LOZENGE BUCCAL PRN
Status: DISCONTINUED | OUTPATIENT
Start: 2017-05-27 | End: 2017-05-31 | Stop reason: HOSPADM

## 2017-05-27 RX ORDER — ESCITALOPRAM OXALATE 5 MG/1
10 TABLET ORAL DAILY
Status: DISCONTINUED | OUTPATIENT
Start: 2017-05-27 | End: 2017-05-31 | Stop reason: HOSPADM

## 2017-05-27 RX ORDER — ACETAMINOPHEN 325 MG/1
650 TABLET ORAL EVERY 4 HOURS PRN
Status: DISCONTINUED | OUTPATIENT
Start: 2017-05-27 | End: 2017-05-31 | Stop reason: HOSPADM

## 2017-05-27 RX ORDER — LOSARTAN POTASSIUM 25 MG/1
25 TABLET ORAL DAILY
Status: DISCONTINUED | OUTPATIENT
Start: 2017-05-27 | End: 2017-05-31 | Stop reason: HOSPADM

## 2017-05-27 RX ORDER — GABAPENTIN 400 MG/1
800 CAPSULE ORAL 3 TIMES DAILY
Status: DISCONTINUED | OUTPATIENT
Start: 2017-05-27 | End: 2017-05-31 | Stop reason: HOSPADM

## 2017-05-27 RX ORDER — ONDANSETRON 4 MG/1
4 TABLET, ORALLY DISINTEGRATING ORAL EVERY 8 HOURS PRN
Status: DISCONTINUED | OUTPATIENT
Start: 2017-05-27 | End: 2017-05-31 | Stop reason: HOSPADM

## 2017-05-27 RX ORDER — ASPIRIN 81 MG/1
81 TABLET, CHEWABLE ORAL DAILY
Status: DISCONTINUED | OUTPATIENT
Start: 2017-05-27 | End: 2017-05-31 | Stop reason: HOSPADM

## 2017-05-27 RX ORDER — DEXTROSE MONOHYDRATE 50 MG/ML
100 INJECTION, SOLUTION INTRAVENOUS PRN
Status: DISCONTINUED | OUTPATIENT
Start: 2017-05-27 | End: 2017-05-31 | Stop reason: HOSPADM

## 2017-05-27 RX ORDER — ALBUTEROL SULFATE 90 UG/1
2 AEROSOL, METERED RESPIRATORY (INHALATION) EVERY 6 HOURS PRN
Status: DISCONTINUED | OUTPATIENT
Start: 2017-05-27 | End: 2017-05-31 | Stop reason: HOSPADM

## 2017-05-27 RX ORDER — OMEPRAZOLE 20 MG/1
20 CAPSULE, DELAYED RELEASE ORAL DAILY
Status: DISCONTINUED | OUTPATIENT
Start: 2017-05-27 | End: 2017-05-31 | Stop reason: HOSPADM

## 2017-05-27 RX ORDER — FLUDROCORTISONE ACETATE 0.1 MG/1
0.1 TABLET ORAL DAILY
Status: DISCONTINUED | OUTPATIENT
Start: 2017-05-27 | End: 2017-05-31 | Stop reason: HOSPADM

## 2017-05-27 RX ORDER — CLOPIDOGREL BISULFATE 75 MG/1
75 TABLET ORAL DAILY
Status: DISCONTINUED | OUTPATIENT
Start: 2017-05-27 | End: 2017-05-31 | Stop reason: HOSPADM

## 2017-05-27 RX ORDER — DEXTROSE MONOHYDRATE 25 G/50ML
12.5 INJECTION, SOLUTION INTRAVENOUS PRN
Status: DISCONTINUED | OUTPATIENT
Start: 2017-05-27 | End: 2017-05-31 | Stop reason: HOSPADM

## 2017-05-27 RX ADMIN — LINAGLIPTIN 2.5 MG: 5 TABLET, FILM COATED ORAL at 08:38

## 2017-05-27 RX ADMIN — ASPIRIN 81 MG 81 MG: 81 TABLET ORAL at 08:02

## 2017-05-27 RX ADMIN — INSULIN LISPRO 6 UNITS: 100 INJECTION, SOLUTION INTRAVENOUS; SUBCUTANEOUS at 17:04

## 2017-05-27 RX ADMIN — INSULIN GLARGINE 50 UNITS: 100 INJECTION, SOLUTION SUBCUTANEOUS at 01:01

## 2017-05-27 RX ADMIN — METFORMIN HYDROCHLORIDE 1000 MG: 500 TABLET, FILM COATED ORAL at 17:05

## 2017-05-27 RX ADMIN — CLOPIDOGREL BISULFATE 75 MG: 75 TABLET ORAL at 08:01

## 2017-05-27 RX ADMIN — DEXTROSE MONOHYDRATE 500 MG: 50 INJECTION, SOLUTION INTRAVENOUS at 01:30

## 2017-05-27 RX ADMIN — ESCITALOPRAM 10 MG: 5 TABLET, FILM COATED ORAL at 08:02

## 2017-05-27 RX ADMIN — ATORVASTATIN CALCIUM 40 MG: 40 TABLET, FILM COATED ORAL at 20:08

## 2017-05-27 RX ADMIN — GABAPENTIN 800 MG: 400 CAPSULE ORAL at 08:01

## 2017-05-27 RX ADMIN — VANCOMYCIN HYDROCHLORIDE 1500 MG: 1 INJECTION, POWDER, LYOPHILIZED, FOR SOLUTION INTRAVENOUS at 10:01

## 2017-05-27 RX ADMIN — INSULIN LISPRO 3 UNITS: 100 INJECTION, SOLUTION INTRAVENOUS; SUBCUTANEOUS at 20:13

## 2017-05-27 RX ADMIN — METOPROLOL TARTRATE 12.5 MG: 25 TABLET ORAL at 08:03

## 2017-05-27 RX ADMIN — INSULIN LISPRO 3 UNITS: 100 INJECTION, SOLUTION INTRAVENOUS; SUBCUTANEOUS at 01:05

## 2017-05-27 RX ADMIN — VANCOMYCIN HYDROCHLORIDE 1500 MG: 1 INJECTION, POWDER, LYOPHILIZED, FOR SOLUTION INTRAVENOUS at 22:21

## 2017-05-27 RX ADMIN — FLUDROCORTISONE ACETATE 0.1 MG: 0.1 TABLET ORAL at 08:02

## 2017-05-27 RX ADMIN — METOPROLOL TARTRATE 12.5 MG: 25 TABLET ORAL at 01:01

## 2017-05-27 RX ADMIN — INSULIN LISPRO 12 UNITS: 100 INJECTION, SOLUTION INTRAVENOUS; SUBCUTANEOUS at 12:04

## 2017-05-27 RX ADMIN — INSULIN GLARGINE 50 UNITS: 100 INJECTION, SOLUTION SUBCUTANEOUS at 20:13

## 2017-05-27 RX ADMIN — INSULIN LISPRO 6 UNITS: 100 INJECTION, SOLUTION INTRAVENOUS; SUBCUTANEOUS at 08:50

## 2017-05-27 RX ADMIN — LINAGLIPTIN 2.5 MG: 5 TABLET, FILM COATED ORAL at 17:05

## 2017-05-27 RX ADMIN — OMEPRAZOLE 20 MG: 20 CAPSULE, DELAYED RELEASE ORAL at 08:38

## 2017-05-27 RX ADMIN — LOSARTAN POTASSIUM 25 MG: 25 TABLET, FILM COATED ORAL at 08:02

## 2017-05-27 RX ADMIN — GABAPENTIN 800 MG: 400 CAPSULE ORAL at 20:08

## 2017-05-27 RX ADMIN — METFORMIN HYDROCHLORIDE 1000 MG: 500 TABLET, FILM COATED ORAL at 08:38

## 2017-05-27 RX ADMIN — ATORVASTATIN CALCIUM 40 MG: 40 TABLET, FILM COATED ORAL at 01:00

## 2017-05-27 RX ADMIN — GABAPENTIN 800 MG: 400 CAPSULE ORAL at 14:12

## 2017-05-27 ASSESSMENT — PAIN DESCRIPTION - PAIN TYPE
TYPE: ACUTE PAIN

## 2017-05-27 ASSESSMENT — PAIN DESCRIPTION - FREQUENCY
FREQUENCY: INTERMITTENT

## 2017-05-27 ASSESSMENT — PAIN DESCRIPTION - LOCATION
LOCATION: ABDOMEN

## 2017-05-27 ASSESSMENT — PAIN DESCRIPTION - DESCRIPTORS
DESCRIPTORS: DISCOMFORT

## 2017-05-27 ASSESSMENT — PAIN DESCRIPTION - ORIENTATION
ORIENTATION: LOWER
ORIENTATION: LOWER
ORIENTATION: LOWER;OUTER;RIGHT

## 2017-05-27 ASSESSMENT — PAIN SCALES - GENERAL
PAINLEVEL_OUTOF10: 2
PAINLEVEL_OUTOF10: 4
PAINLEVEL_OUTOF10: 3

## 2017-05-28 LAB
ABSOLUTE EOS #: 0.2 K/UL (ref 0–0.4)
ABSOLUTE LYMPH #: 1.3 K/UL (ref 1–4.8)
ABSOLUTE MONO #: 0.3 K/UL (ref 0–1)
ANION GAP SERPL CALCULATED.3IONS-SCNC: 15 MMOL/L (ref 9–17)
BASOPHILS # BLD: 0 %
BASOPHILS ABSOLUTE: 0 K/UL (ref 0–0.2)
BUN BLDV-MCNC: 8 MG/DL (ref 6–20)
BUN/CREAT BLD: 11 (ref 9–20)
CALCIUM SERPL-MCNC: 8.7 MG/DL (ref 8.6–10.4)
CHLORIDE BLD-SCNC: 100 MMOL/L (ref 98–107)
CO2: 23 MMOL/L (ref 20–31)
CREAT SERPL-MCNC: 0.7 MG/DL (ref 0.5–0.9)
DIFFERENTIAL TYPE: ABNORMAL
EOSINOPHILS RELATIVE PERCENT: 3 %
GFR AFRICAN AMERICAN: >60 ML/MIN
GFR NON-AFRICAN AMERICAN: >60 ML/MIN
GFR SERPL CREATININE-BSD FRML MDRD: ABNORMAL ML/MIN/{1.73_M2}
GFR SERPL CREATININE-BSD FRML MDRD: ABNORMAL ML/MIN/{1.73_M2}
GLUCOSE BLD-MCNC: 173 MG/DL (ref 74–100)
GLUCOSE BLD-MCNC: 174 MG/DL (ref 70–99)
GLUCOSE BLD-MCNC: 182 MG/DL (ref 74–100)
GLUCOSE BLD-MCNC: 230 MG/DL (ref 74–100)
GLUCOSE BLD-MCNC: 244 MG/DL (ref 74–100)
HCT VFR BLD CALC: 37.6 % (ref 36–46)
HEMOGLOBIN: 12.6 G/DL (ref 12–16)
LACTIC ACID, WHOLE BLOOD: ABNORMAL MMOL/L (ref 0.7–2.1)
LACTIC ACID: 3.8 MMOL/L (ref 0.5–2.2)
LYMPHOCYTES # BLD: 21 %
MCH RBC QN AUTO: 26.8 PG (ref 26–34)
MCHC RBC AUTO-ENTMCNC: 33.4 G/DL (ref 31–37)
MCV RBC AUTO: 80.2 FL (ref 80–100)
MONOCYTES # BLD: 5 %
PDW BLD-RTO: 15.6 % (ref 12.1–15.2)
PLATELET # BLD: 275 K/UL (ref 140–450)
PLATELET ESTIMATE: ABNORMAL
PMV BLD AUTO: ABNORMAL FL (ref 6–12)
POTASSIUM SERPL-SCNC: 3.6 MMOL/L (ref 3.7–5.3)
RBC # BLD: 4.69 M/UL (ref 4–5.2)
RBC # BLD: ABNORMAL 10*6/UL
SEG NEUTROPHILS: 71 %
SEGMENTED NEUTROPHILS ABSOLUTE COUNT: 4.5 K/UL (ref 1.8–7.7)
SODIUM BLD-SCNC: 138 MMOL/L (ref 135–144)
VANCOMYCIN TROUGH DATE LAST DOSE: NORMAL
VANCOMYCIN TROUGH DOSE AMOUNT: NORMAL
VANCOMYCIN TROUGH TIME LAST DOSE: NORMAL
VANCOMYCIN TROUGH: 11.1 UG/ML (ref 10–20)
WBC # BLD: 6.3 K/UL (ref 3.5–11)
WBC # BLD: ABNORMAL 10*3/UL

## 2017-05-28 PROCEDURE — 80048 BASIC METABOLIC PNL TOTAL CA: CPT

## 2017-05-28 PROCEDURE — 80202 ASSAY OF VANCOMYCIN: CPT

## 2017-05-28 PROCEDURE — 82947 ASSAY GLUCOSE BLOOD QUANT: CPT

## 2017-05-28 PROCEDURE — 85025 COMPLETE CBC W/AUTO DIFF WBC: CPT

## 2017-05-28 PROCEDURE — 83605 ASSAY OF LACTIC ACID: CPT

## 2017-05-28 PROCEDURE — 6360000002 HC RX W HCPCS: Performed by: FAMILY MEDICINE

## 2017-05-28 PROCEDURE — 1200000000 HC SEMI PRIVATE

## 2017-05-28 PROCEDURE — 36415 COLL VENOUS BLD VENIPUNCTURE: CPT

## 2017-05-28 PROCEDURE — 6370000000 HC RX 637 (ALT 250 FOR IP): Performed by: FAMILY MEDICINE

## 2017-05-28 PROCEDURE — 2580000003 HC RX 258: Performed by: FAMILY MEDICINE

## 2017-05-28 RX ORDER — LOPERAMIDE HYDROCHLORIDE 2 MG/1
4 CAPSULE ORAL 2 TIMES DAILY PRN
Status: DISCONTINUED | OUTPATIENT
Start: 2017-05-28 | End: 2017-05-31 | Stop reason: HOSPADM

## 2017-05-28 RX ADMIN — LINAGLIPTIN 2.5 MG: 5 TABLET, FILM COATED ORAL at 08:26

## 2017-05-28 RX ADMIN — LINAGLIPTIN 2.5 MG: 5 TABLET, FILM COATED ORAL at 16:56

## 2017-05-28 RX ADMIN — ESCITALOPRAM 10 MG: 5 TABLET, FILM COATED ORAL at 08:19

## 2017-05-28 RX ADMIN — METOPROLOL TARTRATE 12.5 MG: 25 TABLET ORAL at 08:19

## 2017-05-28 RX ADMIN — CLOPIDOGREL BISULFATE 75 MG: 75 TABLET ORAL at 08:19

## 2017-05-28 RX ADMIN — LOPERAMIDE HYDROCHLORIDE 4 MG: 2 CAPSULE ORAL at 20:54

## 2017-05-28 RX ADMIN — METOPROLOL TARTRATE 12.5 MG: 25 TABLET ORAL at 20:54

## 2017-05-28 RX ADMIN — ATORVASTATIN CALCIUM 40 MG: 40 TABLET, FILM COATED ORAL at 20:54

## 2017-05-28 RX ADMIN — GABAPENTIN 800 MG: 400 CAPSULE ORAL at 14:30

## 2017-05-28 RX ADMIN — INSULIN LISPRO 3 UNITS: 100 INJECTION, SOLUTION INTRAVENOUS; SUBCUTANEOUS at 20:53

## 2017-05-28 RX ADMIN — VANCOMYCIN HYDROCHLORIDE 1500 MG: 1 INJECTION, POWDER, LYOPHILIZED, FOR SOLUTION INTRAVENOUS at 10:30

## 2017-05-28 RX ADMIN — OMEPRAZOLE 20 MG: 20 CAPSULE, DELAYED RELEASE ORAL at 08:26

## 2017-05-28 RX ADMIN — METFORMIN HYDROCHLORIDE 1000 MG: 500 TABLET, FILM COATED ORAL at 08:20

## 2017-05-28 RX ADMIN — LOSARTAN POTASSIUM 25 MG: 25 TABLET, FILM COATED ORAL at 08:19

## 2017-05-28 RX ADMIN — INSULIN GLARGINE 50 UNITS: 100 INJECTION, SOLUTION SUBCUTANEOUS at 20:54

## 2017-05-28 RX ADMIN — ASPIRIN 81 MG 81 MG: 81 TABLET ORAL at 08:19

## 2017-05-28 RX ADMIN — INSULIN LISPRO 3 UNITS: 100 INJECTION, SOLUTION INTRAVENOUS; SUBCUTANEOUS at 16:57

## 2017-05-28 RX ADMIN — METFORMIN HYDROCHLORIDE 1000 MG: 500 TABLET, FILM COATED ORAL at 16:56

## 2017-05-28 RX ADMIN — FLUDROCORTISONE ACETATE 0.1 MG: 0.1 TABLET ORAL at 08:19

## 2017-05-28 RX ADMIN — INSULIN LISPRO 3 UNITS: 100 INJECTION, SOLUTION INTRAVENOUS; SUBCUTANEOUS at 08:28

## 2017-05-28 RX ADMIN — GABAPENTIN 800 MG: 400 CAPSULE ORAL at 08:19

## 2017-05-28 RX ADMIN — GABAPENTIN 800 MG: 400 CAPSULE ORAL at 20:54

## 2017-05-28 RX ADMIN — VANCOMYCIN HYDROCHLORIDE 1500 MG: 1 INJECTION, POWDER, LYOPHILIZED, FOR SOLUTION INTRAVENOUS at 22:03

## 2017-05-28 RX ADMIN — INSULIN LISPRO 6 UNITS: 100 INJECTION, SOLUTION INTRAVENOUS; SUBCUTANEOUS at 12:06

## 2017-05-28 ASSESSMENT — PAIN SCALES - GENERAL
PAINLEVEL_OUTOF10: 0
PAINLEVEL_OUTOF10: 3
PAINLEVEL_OUTOF10: 0

## 2017-05-28 ASSESSMENT — PAIN DESCRIPTION - DESCRIPTORS: DESCRIPTORS: DISCOMFORT

## 2017-05-28 ASSESSMENT — PAIN DESCRIPTION - LOCATION: LOCATION: ABDOMEN

## 2017-05-28 ASSESSMENT — PAIN DESCRIPTION - FREQUENCY: FREQUENCY: INTERMITTENT

## 2017-05-28 ASSESSMENT — PAIN DESCRIPTION - ONSET: ONSET: ON-GOING

## 2017-05-28 ASSESSMENT — PAIN DESCRIPTION - PAIN TYPE: TYPE: ACUTE PAIN

## 2017-05-28 ASSESSMENT — PAIN DESCRIPTION - ORIENTATION: ORIENTATION: LOWER

## 2017-05-29 LAB
ABSOLUTE EOS #: 0.2 K/UL (ref 0–0.4)
ABSOLUTE LYMPH #: 1.6 K/UL (ref 1–4.8)
ABSOLUTE MONO #: 0.4 K/UL (ref 0–1)
ANION GAP SERPL CALCULATED.3IONS-SCNC: 13 MMOL/L (ref 9–17)
BASOPHILS # BLD: 0 %
BASOPHILS ABSOLUTE: 0 K/UL (ref 0–0.2)
BUN BLDV-MCNC: 9 MG/DL (ref 6–20)
BUN/CREAT BLD: 15 (ref 9–20)
CALCIUM SERPL-MCNC: 8.5 MG/DL (ref 8.6–10.4)
CHLORIDE BLD-SCNC: 103 MMOL/L (ref 98–107)
CO2: 24 MMOL/L (ref 20–31)
CREAT SERPL-MCNC: 0.6 MG/DL (ref 0.5–0.9)
DIFFERENTIAL TYPE: ABNORMAL
EOSINOPHILS RELATIVE PERCENT: 3 %
GFR AFRICAN AMERICAN: >60 ML/MIN
GFR NON-AFRICAN AMERICAN: >60 ML/MIN
GFR SERPL CREATININE-BSD FRML MDRD: ABNORMAL ML/MIN/{1.73_M2}
GFR SERPL CREATININE-BSD FRML MDRD: ABNORMAL ML/MIN/{1.73_M2}
GLUCOSE BLD-MCNC: 142 MG/DL (ref 74–100)
GLUCOSE BLD-MCNC: 156 MG/DL (ref 70–99)
GLUCOSE BLD-MCNC: 158 MG/DL (ref 74–100)
GLUCOSE BLD-MCNC: 243 MG/DL (ref 74–100)
GLUCOSE BLD-MCNC: 256 MG/DL (ref 74–100)
HCT VFR BLD CALC: 36 % (ref 36–46)
HEMOGLOBIN: 11.9 G/DL (ref 12–16)
LYMPHOCYTES # BLD: 24 %
MCH RBC QN AUTO: 26.2 PG (ref 26–34)
MCHC RBC AUTO-ENTMCNC: 32.9 G/DL (ref 31–37)
MCV RBC AUTO: 79.7 FL (ref 80–100)
MONOCYTES # BLD: 6 %
PDW BLD-RTO: 15.2 % (ref 12.1–15.2)
PLATELET # BLD: 253 K/UL (ref 140–450)
PLATELET ESTIMATE: ABNORMAL
PMV BLD AUTO: ABNORMAL FL (ref 6–12)
POTASSIUM SERPL-SCNC: 3.7 MMOL/L (ref 3.7–5.3)
RBC # BLD: 4.52 M/UL (ref 4–5.2)
RBC # BLD: ABNORMAL 10*6/UL
SEG NEUTROPHILS: 67 %
SEGMENTED NEUTROPHILS ABSOLUTE COUNT: 4.6 K/UL (ref 1.8–7.7)
SODIUM BLD-SCNC: 140 MMOL/L (ref 135–144)
WBC # BLD: 6.8 K/UL (ref 3.5–11)
WBC # BLD: ABNORMAL 10*3/UL

## 2017-05-29 PROCEDURE — 80048 BASIC METABOLIC PNL TOTAL CA: CPT

## 2017-05-29 PROCEDURE — 2580000003 HC RX 258: Performed by: FAMILY MEDICINE

## 2017-05-29 PROCEDURE — 36415 COLL VENOUS BLD VENIPUNCTURE: CPT

## 2017-05-29 PROCEDURE — 6360000002 HC RX W HCPCS: Performed by: FAMILY MEDICINE

## 2017-05-29 PROCEDURE — 82947 ASSAY GLUCOSE BLOOD QUANT: CPT

## 2017-05-29 PROCEDURE — 6370000000 HC RX 637 (ALT 250 FOR IP): Performed by: FAMILY MEDICINE

## 2017-05-29 PROCEDURE — 85025 COMPLETE CBC W/AUTO DIFF WBC: CPT

## 2017-05-29 PROCEDURE — 1200000000 HC SEMI PRIVATE

## 2017-05-29 RX ORDER — SODIUM CHLORIDE 0.9 % (FLUSH) 0.9 %
10 SYRINGE (ML) INJECTION PRN
Status: DISCONTINUED | OUTPATIENT
Start: 2017-05-29 | End: 2017-05-31 | Stop reason: HOSPADM

## 2017-05-29 RX ORDER — LIDOCAINE HYDROCHLORIDE 10 MG/ML
5 INJECTION, SOLUTION EPIDURAL; INFILTRATION; INTRACAUDAL; PERINEURAL ONCE
Status: DISCONTINUED | OUTPATIENT
Start: 2017-05-29 | End: 2017-05-31 | Stop reason: HOSPADM

## 2017-05-29 RX ORDER — SODIUM CHLORIDE 0.9 % (FLUSH) 0.9 %
10 SYRINGE (ML) INJECTION EVERY 12 HOURS SCHEDULED
Status: DISCONTINUED | OUTPATIENT
Start: 2017-05-29 | End: 2017-05-31 | Stop reason: HOSPADM

## 2017-05-29 RX ADMIN — INSULIN LISPRO 5 UNITS: 100 INJECTION, SOLUTION INTRAVENOUS; SUBCUTANEOUS at 20:33

## 2017-05-29 RX ADMIN — INSULIN LISPRO 6 UNITS: 100 INJECTION, SOLUTION INTRAVENOUS; SUBCUTANEOUS at 12:08

## 2017-05-29 RX ADMIN — CLOPIDOGREL BISULFATE 75 MG: 75 TABLET ORAL at 08:05

## 2017-05-29 RX ADMIN — ATORVASTATIN CALCIUM 40 MG: 40 TABLET, FILM COATED ORAL at 20:31

## 2017-05-29 RX ADMIN — VANCOMYCIN HYDROCHLORIDE 1500 MG: 1 INJECTION, POWDER, LYOPHILIZED, FOR SOLUTION INTRAVENOUS at 10:24

## 2017-05-29 RX ADMIN — LOSARTAN POTASSIUM 25 MG: 25 TABLET, FILM COATED ORAL at 08:05

## 2017-05-29 RX ADMIN — INSULIN GLARGINE 50 UNITS: 100 INJECTION, SOLUTION SUBCUTANEOUS at 20:33

## 2017-05-29 RX ADMIN — LINAGLIPTIN 2.5 MG: 5 TABLET, FILM COATED ORAL at 08:07

## 2017-05-29 RX ADMIN — ASPIRIN 81 MG 81 MG: 81 TABLET ORAL at 08:05

## 2017-05-29 RX ADMIN — OMEPRAZOLE 20 MG: 20 CAPSULE, DELAYED RELEASE ORAL at 08:07

## 2017-05-29 RX ADMIN — GABAPENTIN 800 MG: 400 CAPSULE ORAL at 20:31

## 2017-05-29 RX ADMIN — INSULIN LISPRO 3 UNITS: 100 INJECTION, SOLUTION INTRAVENOUS; SUBCUTANEOUS at 08:09

## 2017-05-29 RX ADMIN — Medication 10 ML: at 21:38

## 2017-05-29 RX ADMIN — METFORMIN HYDROCHLORIDE 1000 MG: 500 TABLET, FILM COATED ORAL at 17:10

## 2017-05-29 RX ADMIN — METOPROLOL TARTRATE 12.5 MG: 25 TABLET ORAL at 20:30

## 2017-05-29 RX ADMIN — METOPROLOL TARTRATE 12.5 MG: 25 TABLET ORAL at 08:06

## 2017-05-29 RX ADMIN — VANCOMYCIN HYDROCHLORIDE 1500 MG: 1 INJECTION, POWDER, LYOPHILIZED, FOR SOLUTION INTRAVENOUS at 21:36

## 2017-05-29 RX ADMIN — GABAPENTIN 800 MG: 400 CAPSULE ORAL at 13:39

## 2017-05-29 RX ADMIN — INSULIN LISPRO 3 UNITS: 100 INJECTION, SOLUTION INTRAVENOUS; SUBCUTANEOUS at 17:06

## 2017-05-29 RX ADMIN — LINAGLIPTIN 2.5 MG: 5 TABLET, FILM COATED ORAL at 17:07

## 2017-05-29 RX ADMIN — GABAPENTIN 800 MG: 400 CAPSULE ORAL at 08:05

## 2017-05-29 RX ADMIN — ESCITALOPRAM 10 MG: 5 TABLET, FILM COATED ORAL at 08:05

## 2017-05-29 RX ADMIN — METFORMIN HYDROCHLORIDE 1000 MG: 500 TABLET, FILM COATED ORAL at 08:05

## 2017-05-29 RX ADMIN — FLUDROCORTISONE ACETATE 0.1 MG: 0.1 TABLET ORAL at 08:05

## 2017-05-29 ASSESSMENT — PAIN SCALES - GENERAL: PAINLEVEL_OUTOF10: 0

## 2017-05-30 ENCOUNTER — APPOINTMENT (OUTPATIENT)
Dept: GENERAL RADIOLOGY | Age: 52
DRG: 383 | End: 2017-05-30
Payer: COMMERCIAL

## 2017-05-30 VITALS
BODY MASS INDEX: 42.82 KG/M2 | WEIGHT: 241.7 LBS | RESPIRATION RATE: 16 BRPM | HEIGHT: 63 IN | SYSTOLIC BLOOD PRESSURE: 109 MMHG | HEART RATE: 78 BPM | TEMPERATURE: 97 F | DIASTOLIC BLOOD PRESSURE: 76 MMHG | OXYGEN SATURATION: 95 %

## 2017-05-30 PROBLEM — E66.01 MORBID OBESITY (HCC): Chronic | Status: ACTIVE | Noted: 2017-05-30

## 2017-05-30 PROBLEM — N30.00 ACUTE CYSTITIS: Status: ACTIVE | Noted: 2017-05-30

## 2017-05-30 LAB
GLUCOSE BLD-MCNC: 122 MG/DL (ref 74–100)
GLUCOSE BLD-MCNC: 137 MG/DL (ref 74–100)
GLUCOSE BLD-MCNC: 144 MG/DL (ref 74–100)
GLUCOSE BLD-MCNC: 154 MG/DL (ref 74–100)
GLUCOSE BLD-MCNC: 242 MG/DL (ref 74–100)
INR BLD: 1 (ref 0.9–1.2)
PROTHROMBIN TIME: 10.7 SEC (ref 9.7–12.2)

## 2017-05-30 PROCEDURE — 2580000003 HC RX 258: Performed by: FAMILY MEDICINE

## 2017-05-30 PROCEDURE — C1751 CATH, INF, PER/CENT/MIDLINE: HCPCS

## 2017-05-30 PROCEDURE — 6360000002 HC RX W HCPCS: Performed by: FAMILY MEDICINE

## 2017-05-30 PROCEDURE — 36415 COLL VENOUS BLD VENIPUNCTURE: CPT

## 2017-05-30 PROCEDURE — 71010 XR CHEST LIMITED: CPT

## 2017-05-30 PROCEDURE — 6370000000 HC RX 637 (ALT 250 FOR IP): Performed by: FAMILY MEDICINE

## 2017-05-30 PROCEDURE — 85610 PROTHROMBIN TIME: CPT

## 2017-05-30 PROCEDURE — 2580000003 HC RX 258: Performed by: PHYSICIAN ASSISTANT

## 2017-05-30 PROCEDURE — 02HV33Z INSERTION OF INFUSION DEVICE INTO SUPERIOR VENA CAVA, PERCUTANEOUS APPROACH: ICD-10-PCS | Performed by: RADIOLOGY

## 2017-05-30 PROCEDURE — 1200000000 HC SEMI PRIVATE

## 2017-05-30 PROCEDURE — 36569 INSJ PICC 5 YR+ W/O IMAGING: CPT | Performed by: RADIOLOGY

## 2017-05-30 PROCEDURE — 2500000003 HC RX 250 WO HCPCS: Performed by: RADIOLOGY

## 2017-05-30 PROCEDURE — 77001 FLUOROGUIDE FOR VEIN DEVICE: CPT | Performed by: RADIOLOGY

## 2017-05-30 PROCEDURE — 82947 ASSAY GLUCOSE BLOOD QUANT: CPT

## 2017-05-30 PROCEDURE — 36569 INSJ PICC 5 YR+ W/O IMAGING: CPT

## 2017-05-30 RX ORDER — 0.9 % SODIUM CHLORIDE 0.9 %
10 VIAL (ML) INJECTION PRN
Status: CANCELLED | OUTPATIENT
Start: 2017-05-31

## 2017-05-30 RX ORDER — LIDOCAINE HYDROCHLORIDE 10 MG/ML
5 INJECTION, SOLUTION INFILTRATION; PERINEURAL ONCE
Status: DISCONTINUED | OUTPATIENT
Start: 2017-05-30 | End: 2017-05-31 | Stop reason: HOSPADM

## 2017-05-30 RX ORDER — LIDOCAINE HYDROCHLORIDE 20 MG/ML
INJECTION, SOLUTION INFILTRATION; PERINEURAL
Status: COMPLETED | OUTPATIENT
Start: 2017-05-30 | End: 2017-05-30

## 2017-05-30 RX ORDER — SODIUM CHLORIDE 0.9 % (FLUSH) 0.9 %
10 SYRINGE (ML) INJECTION PRN
Status: DISCONTINUED | OUTPATIENT
Start: 2017-05-30 | End: 2017-05-31 | Stop reason: HOSPADM

## 2017-05-30 RX ORDER — HEPARIN SODIUM (PORCINE) LOCK FLUSH IV SOLN 100 UNIT/ML 100 UNIT/ML
500 SOLUTION INTRAVENOUS PRN
Status: CANCELLED | OUTPATIENT
Start: 2017-05-31

## 2017-05-30 RX ORDER — 0.9 % SODIUM CHLORIDE 0.9 %
5 VIAL (ML) INJECTION PRN
Status: CANCELLED | OUTPATIENT
Start: 2017-05-31

## 2017-05-30 RX ORDER — SODIUM CHLORIDE 0.9 % (FLUSH) 0.9 %
10 SYRINGE (ML) INJECTION EVERY 12 HOURS SCHEDULED
Status: DISCONTINUED | OUTPATIENT
Start: 2017-05-30 | End: 2017-05-31 | Stop reason: HOSPADM

## 2017-05-30 RX ADMIN — LIDOCAINE HYDROCHLORIDE 2 ML: 20 INJECTION, SOLUTION INFILTRATION; PERINEURAL at 13:33

## 2017-05-30 RX ADMIN — METOPROLOL TARTRATE 12.5 MG: 25 TABLET ORAL at 08:17

## 2017-05-30 RX ADMIN — METFORMIN HYDROCHLORIDE 1000 MG: 500 TABLET, FILM COATED ORAL at 16:19

## 2017-05-30 RX ADMIN — ESCITALOPRAM 10 MG: 5 TABLET, FILM COATED ORAL at 08:15

## 2017-05-30 RX ADMIN — ATORVASTATIN CALCIUM 40 MG: 40 TABLET, FILM COATED ORAL at 20:26

## 2017-05-30 RX ADMIN — OMEPRAZOLE 20 MG: 20 CAPSULE, DELAYED RELEASE ORAL at 08:30

## 2017-05-30 RX ADMIN — LINAGLIPTIN 2.5 MG: 5 TABLET, FILM COATED ORAL at 08:29

## 2017-05-30 RX ADMIN — INSULIN LISPRO 6 UNITS: 100 INJECTION, SOLUTION INTRAVENOUS; SUBCUTANEOUS at 11:49

## 2017-05-30 RX ADMIN — VANCOMYCIN HYDROCHLORIDE 1500 MG: 1 INJECTION, POWDER, LYOPHILIZED, FOR SOLUTION INTRAVENOUS at 11:33

## 2017-05-30 RX ADMIN — METFORMIN HYDROCHLORIDE 1000 MG: 500 TABLET, FILM COATED ORAL at 08:16

## 2017-05-30 RX ADMIN — INSULIN LISPRO 3 UNITS: 100 INJECTION, SOLUTION INTRAVENOUS; SUBCUTANEOUS at 16:19

## 2017-05-30 RX ADMIN — Medication 10 ML: at 20:07

## 2017-05-30 RX ADMIN — METOPROLOL TARTRATE 12.5 MG: 25 TABLET ORAL at 20:27

## 2017-05-30 RX ADMIN — FLUDROCORTISONE ACETATE 0.1 MG: 0.1 TABLET ORAL at 08:16

## 2017-05-30 RX ADMIN — GABAPENTIN 800 MG: 400 CAPSULE ORAL at 08:16

## 2017-05-30 RX ADMIN — ASPIRIN 81 MG 81 MG: 81 TABLET ORAL at 08:17

## 2017-05-30 RX ADMIN — GABAPENTIN 800 MG: 400 CAPSULE ORAL at 13:56

## 2017-05-30 RX ADMIN — LOSARTAN POTASSIUM 25 MG: 25 TABLET, FILM COATED ORAL at 08:16

## 2017-05-30 RX ADMIN — LINAGLIPTIN 2.5 MG: 5 TABLET, FILM COATED ORAL at 16:21

## 2017-05-30 RX ADMIN — GABAPENTIN 800 MG: 400 CAPSULE ORAL at 20:26

## 2017-05-30 RX ADMIN — CLOPIDOGREL BISULFATE 75 MG: 75 TABLET ORAL at 08:16

## 2017-05-30 RX ADMIN — VANCOMYCIN HYDROCHLORIDE 1500 MG: 1 INJECTION, POWDER, LYOPHILIZED, FOR SOLUTION INTRAVENOUS at 20:08

## 2017-05-30 RX ADMIN — INSULIN GLARGINE 50 UNITS: 100 INJECTION, SOLUTION SUBCUTANEOUS at 20:26

## 2017-05-30 ASSESSMENT — PAIN SCALES - GENERAL
PAINLEVEL_OUTOF10: 0

## 2017-05-31 ENCOUNTER — HOSPITAL ENCOUNTER (OUTPATIENT)
Dept: INFUSION THERAPY | Age: 52
Discharge: HOME | End: 2017-05-31
Payer: COMMERCIAL

## 2017-05-31 VITALS
TEMPERATURE: 98.3 F | HEART RATE: 72 BPM | RESPIRATION RATE: 18 BRPM | SYSTOLIC BLOOD PRESSURE: 110 MMHG | DIASTOLIC BLOOD PRESSURE: 72 MMHG | OXYGEN SATURATION: 98 %

## 2017-05-31 VITALS
TEMPERATURE: 98.5 F | HEART RATE: 78 BPM | SYSTOLIC BLOOD PRESSURE: 107 MMHG | DIASTOLIC BLOOD PRESSURE: 62 MMHG | RESPIRATION RATE: 18 BRPM

## 2017-05-31 DIAGNOSIS — L03.311 CELLULITIS OF ABDOMINAL WALL: ICD-10-CM

## 2017-05-31 PROCEDURE — 2580000003 HC RX 258: Performed by: PHYSICIAN ASSISTANT

## 2017-05-31 PROCEDURE — 6360000002 HC RX W HCPCS: Performed by: PHYSICIAN ASSISTANT

## 2017-05-31 PROCEDURE — 96366 THER/PROPH/DIAG IV INF ADDON: CPT

## 2017-05-31 PROCEDURE — 96365 THER/PROPH/DIAG IV INF INIT: CPT

## 2017-05-31 RX ORDER — 0.9 % SODIUM CHLORIDE 0.9 %
10 VIAL (ML) INJECTION PRN
Status: CANCELLED | OUTPATIENT
Start: 2017-05-31

## 2017-05-31 RX ORDER — 0.9 % SODIUM CHLORIDE 0.9 %
10 VIAL (ML) INJECTION PRN
Status: DISCONTINUED | OUTPATIENT
Start: 2017-05-31 | End: 2017-06-01 | Stop reason: HOSPADM

## 2017-05-31 RX ORDER — HEPARIN SODIUM (PORCINE) LOCK FLUSH IV SOLN 100 UNIT/ML 100 UNIT/ML
500 SOLUTION INTRAVENOUS PRN
Status: CANCELLED | OUTPATIENT
Start: 2017-05-31

## 2017-05-31 RX ORDER — 0.9 % SODIUM CHLORIDE 0.9 %
5 VIAL (ML) INJECTION PRN
Status: CANCELLED | OUTPATIENT
Start: 2017-05-31

## 2017-05-31 RX ADMIN — DEXTROSE MONOHYDRATE 1500 MG: 50 INJECTION, SOLUTION INTRAVENOUS at 08:17

## 2017-05-31 RX ADMIN — Medication 10 ML: at 10:21

## 2017-05-31 RX ADMIN — VANCOMYCIN HYDROCHLORIDE 1500 MG: 1 INJECTION, POWDER, LYOPHILIZED, FOR SOLUTION INTRAVENOUS at 19:45

## 2017-05-31 RX ADMIN — Medication 10 ML: at 08:05

## 2017-05-31 RX ADMIN — Medication 10 ML: at 21:55

## 2017-05-31 RX ADMIN — Medication 10 ML: at 19:45

## 2017-05-31 RX ADMIN — Medication 10 ML: at 10:20

## 2017-05-31 NOTE — PLAN OF CARE
Problem: Infection - Central Venous Catheter-Associated Bloodstream Infection:  Goal: Will show no infection signs and symptoms  Will show no infection signs and symptoms  Outcome: Ongoing    Problem: Other  Goal: Other  Tolerate IV medication with no adverse reaction noted.   Outcome: Ongoing

## 2017-05-31 NOTE — PLAN OF CARE
Problem: Infection - Central Venous Catheter-Associated Bloodstream Infection:  Goal: Will show no infection signs and symptoms  Will show no infection signs and symptoms   Outcome: Met This Shift    Problem: Other  Goal: Other  Tolerate IV medication with no adverse reaction noted. Outcome: Met This Shift    Problem: KNOWLEDGE DEFICIT  Goal: Patient/S.O. demonstrates understanding of disease process, treatment plan, medications, and discharge instructions.   Outcome: Met This Shift

## 2017-05-31 NOTE — IP AVS SNAPSHOT
After Visit Summary  (Discharge Instructions)    Medication List for Home    Based on the information you provided to us as well as any changes during this visit, the following is your updated medication list.  Compare this with your prescription bottles at home. If you have any questions or concerns, contact your primary care physician's office. Daily Medication List (This medication list can be shared with any healthcare provider who is helping you manage your medications)      ASK your doctor about these medications if you have questions        Last Dose    Next Dose Due AM NOON PM NIGHT    albuterol sulfate  (90 BASE) MCG/ACT inhaler   Commonly known as:  VENTOLIN HFA   INHALE 2 PUFFS INTO THE LUNGS EVERY 6 HOURS AS NEEDED FOR WHEEZING. aspirin 81 MG chewable tablet   Take 1 tablet by mouth daily                                         atorvastatin 40 MG tablet   Commonly known as:  LIPITOR   Take 1 tablet by mouth daily                                         Blood Pressure Monitor Kit   1 each by Does not apply route daily as needed ESSENTIAL HYPERTENSION   I10                                         capsaicin 0.025 % cream   Commonly known as:  ZOSTRIX   Apply topically 2 times daily Apply topically 2 times daily. clopidogrel 75 MG tablet   Commonly known as:  PLAVIX   Take 1 tablet by mouth daily                                         escitalopram 10 MG tablet   Commonly known as:  LEXAPRO   TAKE 1 TABLET BY MOUTH DAILY.                                          fludrocortisone 0.1 MG tablet   Commonly known as:  FLORINEF   Take 1 tablet by mouth daily                                         gabapentin 800 MG tablet   Commonly known as:  NEURONTIN   TAKE 1 TABLET BY MOUTH 3 TIMES A DAY                                         glucose blood VI test strips strip   Commonly known as:  FREESTYLE LITE Most Recent Value    Temp  98.5 °F (36.9 °C)    Pulse  78    Resp  18    BP  107/62         After Visit Summary    This summary was created for you. Thank you for entrusting your care to us. The following information includes details about your hospital/visit stay along with steps you should take to help with your recovery once you leave the hospital.  In this packet, you will find information about the topics listed below:    · Instructions about your medications including a list of your home medications  · A summary of your hospital visit  · Follow-up appointments once you have left the hospital  · Your care plan at home      You may receive a survey regarding the care you received during your stay. Your input is valuable to us. We encourage you to complete and return your survey in the envelope provided. We hope you will choose us in the future for your healthcare needs. Patient Information     Patient Name MARCOS Kraus 1965      Care Provided at:     Name Address Phone       [facility] [place] [phone]            Your Visit    Here you will find information about your visit, including the reason for your visit. Please take this sheet with you when you visit your doctor or other health care provider in the future. It will help determine the best possible medical care for you at that time. If you have any questions once you leave the hospital, please call the department phone number listed below. Why you were here     Your primary diagnosis was:  Not on File      Visit Information     Date & Time Department Dept. Phone    2017 [address] [facility] ([facility])        Follow-up Appointments    Below is a list of your follow-up and future appointments. This may not be a complete list as you may have made appointments directly with providers that we are not aware of or your providers may have made some for you. Please call your providers to confirm appointments. It is important to keep your appointments. Please bring your current insurance card, photo ID, co-pay, and all medication bottles to your appointment. If self-pay, payment is expected at the time of service.         Future Appointments     5/31/2017 8:00 PM     Appointment with [facility] 01 at [address] ([place])   No address on file       6/1/2017 8:00 AM     Appointment with [facility] 05 at [address] (MOB)   No address on file       6/1/2017 8:00 PM     Appointment with [facility] 01 at [address] (MOB)   No address on file       6/2/2017 8:00 AM     Appointment with [facility] 05 at [address] (MOB)   No address on file       6/2/2017 8:00 PM     Appointment with [facility] 01 at [address] (MOB)   No address on file       6/3/2017 8:00 AM     Appointment with [facility] 01 at [address] (MOB)   No address on file       6/3/2017 8:00 PM     Appointment with [facility] 01 at [address] (MOB)   No address on file       6/4/2017 8:00 AM     Appointment with [facility] 01 at [address] (MOB)   No address on file       6/4/2017 8:00 PM     Appointment with [facility] 01 at [address] (MOB)   No address on file       6/5/2017 8:00 AM     Appointment with [facility] 05 at [address] (MOB)   No address on file       6/5/2017 9:20 AM     Appointment with Zoya Mendez MD at [facility] ([phone] [place] [address],Sixth Floor [phone]       6/5/2017 8:00 PM     Appointment with [facility] 01 at [address] (MOB)   No address on file       6/6/2017 9:30 AM     [facility] [address] [place] [address] ([phone])   Please arrive 15 minutes prior to appointment, bring insurance card and photo ID.   [facility]       6/12/2017 11:00 AM     Appointment with Desiree Bella CNP at [facility] ([phone])   Please arrive 15 minutes prior to appointment, bring photo ID and insurance card. [facility]  UMANG Lux       6/27/2017 1:30 PM     Appointment with Kain Caldwell MD at [address] [place] [address] ([phone])   Please arrive 15 minutes prior to appointment time, bring insurance card and photo ID. [address]  [address]              Care Plan Once You Return Home    This section includes instructions you will need to follow once you leave the hospital.  Your care team will discuss these with you, so you and those caring for you know how to best care for your health needs at home. This section may also include educational information about certain health topics that may be of help to you. Discharge Instructions                                 Outpatient Department Instructions for IV Therapy    [place] [address]  [phone]      You are advised to carry out the following instructions:    Diet:  As prescribed by your physician    Activity:  As prescribed by your physician    Heparin/Saline Lock:  If the PICC site becomes red, sore, has drainage, becomes swollen or painful,or you develop a fever notify your physician. Your PICC site will be evaluated everyday by the therapy nurse. Other:  · If you develop hives, rash, itching or have trouble breathing notify your physician or go to the nearest Emergency Room  · These could be signs of an allergic reaction to the medication. · Keep the IV site covered with a bandage - and keep it clean and dry  ·           Prescriptions: As prescribed by your physician    Follow up appointment:  Return twice a day for the next five days for IV Antibotic therapy. 8 a.m. and  p.m.   The 8 p.m. dose you need to go to the Emergency room and tell them that they need to call the supervisor and they will take you to third floor and give it to you there. 8 p.m. is starting tonight. Important information for a smoker       SMOKING: QUIT SMOKING. THIS IS THE MOST IMPORTANT ACTION YOU CAN TAKE TO IMPROVE YOUR CURRENT AND FUTURE HEALTH. Call the [facility] [place] NOW ([phone])    Smoking harms nonsmokers. When nonsmokers are around people who smoke, they absorb nicotine, carbon monoxide, and other ingredients of tobacco smoke. DO NOT SMOKE AROUND CHILDREN     Children exposed to secondhand smoke are at an increased risk of:  Sudden Infant Death Syndrome (SIDS), acute respiratory infections, inflammation of the middle ear, and severe asthma. Over a longer time, it causes heart disease and lung cancer. There is no safe level of exposure to secondhand smoke. [employer] Signup     Our records indicate that you have an active [employer] account. You can view your After Visit Summary by going to [email or web address] and logging in with your [employer] username and password. If you don't have a [employer] username and password but a parent or guardian has access to your record, the parent or guardian should login with their own [employer]pper username and password and access your record to view the After Visit Summary. Additional Information  If you have questions, please contact the physician practice where you receive care. Remember, [employer] is NOT to be used for urgent needs. For medical emergencies, dial 911. For questions regarding your [employer] account call [phone]. If you have a clinical question, please call your doctor's office. View your information online  ? Review your current list of  medications, immunization, and allergies. ? Review your future test results online . ? Review your discharge instructions provided by your caregivers at discharge    Certain functionality such as prescription refills, scheduling appointments or sending messages to your provider are not activated if your provider does not use Ronald in his/her office    For questions regarding your MyChart account call [phone]. If you have a clinical question, please call your doctor's office. The information on all pages of the After Visit Summary has been reviewed with me, the patient and/or responsible adult, by my health care provider(s). I had the opportunity to ask questions regarding this information. I understand I should dispose of my armband safely at home to protect my health information. A complete copy of the After Visit Summary has been given to me, the patient and/or responsible adult.            Patient Signature/Responsible Adult:____________________    Clinician Signature:_____________________    Date:_____________________    Time:_____________________

## 2017-06-01 ENCOUNTER — HOSPITAL ENCOUNTER (OUTPATIENT)
Dept: INFUSION THERAPY | Age: 52
Discharge: HOME | End: 2017-06-01
Payer: COMMERCIAL

## 2017-06-01 VITALS
DIASTOLIC BLOOD PRESSURE: 66 MMHG | TEMPERATURE: 98.3 F | HEART RATE: 82 BPM | RESPIRATION RATE: 18 BRPM | SYSTOLIC BLOOD PRESSURE: 122 MMHG | OXYGEN SATURATION: 92 %

## 2017-06-01 VITALS
TEMPERATURE: 98.5 F | RESPIRATION RATE: 20 BRPM | SYSTOLIC BLOOD PRESSURE: 122 MMHG | DIASTOLIC BLOOD PRESSURE: 72 MMHG | HEART RATE: 89 BPM

## 2017-06-01 DIAGNOSIS — L03.311 CELLULITIS OF ABDOMINAL WALL: ICD-10-CM

## 2017-06-01 PROCEDURE — 6360000002 HC RX W HCPCS: Performed by: PHYSICIAN ASSISTANT

## 2017-06-01 PROCEDURE — 2580000003 HC RX 258: Performed by: PHYSICIAN ASSISTANT

## 2017-06-01 PROCEDURE — 96366 THER/PROPH/DIAG IV INF ADDON: CPT

## 2017-06-01 PROCEDURE — 96365 THER/PROPH/DIAG IV INF INIT: CPT

## 2017-06-01 RX ORDER — 0.9 % SODIUM CHLORIDE 0.9 %
10 VIAL (ML) INJECTION PRN
Status: CANCELLED | OUTPATIENT
Start: 2017-06-01

## 2017-06-01 RX ORDER — HEPARIN SODIUM (PORCINE) LOCK FLUSH IV SOLN 100 UNIT/ML 100 UNIT/ML
500 SOLUTION INTRAVENOUS PRN
Status: CANCELLED | OUTPATIENT
Start: 2017-06-01

## 2017-06-01 RX ORDER — 0.9 % SODIUM CHLORIDE 0.9 %
10 VIAL (ML) INJECTION PRN
Status: DISCONTINUED | OUTPATIENT
Start: 2017-06-01 | End: 2017-06-02 | Stop reason: HOSPADM

## 2017-06-01 RX ORDER — 0.9 % SODIUM CHLORIDE 0.9 %
5 VIAL (ML) INJECTION PRN
Status: CANCELLED | OUTPATIENT
Start: 2017-06-01

## 2017-06-01 RX ADMIN — Medication 10 ML: at 07:47

## 2017-06-01 RX ADMIN — DEXTROSE MONOHYDRATE 1500 MG: 50 INJECTION, SOLUTION INTRAVENOUS at 07:59

## 2017-06-01 RX ADMIN — DEXTROSE MONOHYDRATE 1500 MG: 50 INJECTION, SOLUTION INTRAVENOUS at 20:30

## 2017-06-01 RX ADMIN — Medication 10 ML: at 10:07

## 2017-06-01 RX ADMIN — Medication 10 ML: at 10:06

## 2017-06-01 NOTE — PLAN OF CARE
Problem: Other  Goal: Other  Tolerate IV medication with no adverse reaction noted.    Outcome: Met This Shift

## 2017-06-02 ENCOUNTER — HOSPITAL ENCOUNTER (OUTPATIENT)
Dept: INFUSION THERAPY | Age: 52
Discharge: HOME | End: 2017-06-02
Payer: COMMERCIAL

## 2017-06-02 VITALS
DIASTOLIC BLOOD PRESSURE: 75 MMHG | SYSTOLIC BLOOD PRESSURE: 109 MMHG | TEMPERATURE: 97.2 F | OXYGEN SATURATION: 94 % | RESPIRATION RATE: 18 BRPM | HEART RATE: 94 BPM

## 2017-06-02 VITALS
DIASTOLIC BLOOD PRESSURE: 81 MMHG | RESPIRATION RATE: 20 BRPM | HEART RATE: 77 BPM | SYSTOLIC BLOOD PRESSURE: 140 MMHG | TEMPERATURE: 98 F

## 2017-06-02 DIAGNOSIS — L03.311 CELLULITIS OF ABDOMINAL WALL: ICD-10-CM

## 2017-06-02 PROCEDURE — 6360000002 HC RX W HCPCS: Performed by: PHYSICIAN ASSISTANT

## 2017-06-02 PROCEDURE — 96366 THER/PROPH/DIAG IV INF ADDON: CPT

## 2017-06-02 PROCEDURE — 96365 THER/PROPH/DIAG IV INF INIT: CPT

## 2017-06-02 PROCEDURE — 2580000003 HC RX 258: Performed by: PHYSICIAN ASSISTANT

## 2017-06-02 RX ORDER — 0.9 % SODIUM CHLORIDE 0.9 %
5 VIAL (ML) INJECTION PRN
Status: CANCELLED | OUTPATIENT
Start: 2017-06-02

## 2017-06-02 RX ORDER — 0.9 % SODIUM CHLORIDE 0.9 %
10 VIAL (ML) INJECTION PRN
Status: CANCELLED | OUTPATIENT
Start: 2017-06-02

## 2017-06-02 RX ORDER — HEPARIN SODIUM (PORCINE) LOCK FLUSH IV SOLN 100 UNIT/ML 100 UNIT/ML
500 SOLUTION INTRAVENOUS PRN
Status: CANCELLED | OUTPATIENT
Start: 2017-06-02

## 2017-06-02 RX ORDER — 0.9 % SODIUM CHLORIDE 0.9 %
10 VIAL (ML) INJECTION PRN
Status: DISCONTINUED | OUTPATIENT
Start: 2017-06-02 | End: 2017-06-03 | Stop reason: HOSPADM

## 2017-06-02 RX ADMIN — Medication 10 ML: at 10:20

## 2017-06-02 RX ADMIN — Medication 10 ML: at 10:19

## 2017-06-02 RX ADMIN — DEXTROSE MONOHYDRATE 1500 MG: 50 INJECTION, SOLUTION INTRAVENOUS at 08:12

## 2017-06-02 RX ADMIN — Medication 10 ML: at 08:02

## 2017-06-02 RX ADMIN — VANCOMYCIN HYDROCHLORIDE 1500 MG: 1 INJECTION, POWDER, LYOPHILIZED, FOR SOLUTION INTRAVENOUS at 19:53

## 2017-06-02 NOTE — PLAN OF CARE
Problem: Other  Goal: Other  Tolerate IV medication with no adverse reaction noted.    Outcome: Ongoing

## 2017-06-02 NOTE — PLAN OF CARE
Problem: KNOWLEDGE DEFICIT  Goal: Patient/S.O. demonstrates understanding of disease process, treatment plan, medications, and discharge instructions.   Outcome: Ongoing

## 2017-06-02 NOTE — PLAN OF CARE
Problem: Infection - Central Venous Catheter-Associated Bloodstream Infection:  Goal: Will show no infection signs and symptoms  Will show no infection signs and symptoms   Outcome: Met This Shift    Problem: Other  Goal: Other  Tolerate IV medication with no adverse reaction noted.    Outcome: Met This Shift

## 2017-06-03 ENCOUNTER — HOSPITAL ENCOUNTER (OUTPATIENT)
Dept: INFUSION THERAPY | Age: 52
Discharge: HOME | End: 2017-06-03
Payer: COMMERCIAL

## 2017-06-03 VITALS
TEMPERATURE: 98.5 F | RESPIRATION RATE: 14 BRPM | DIASTOLIC BLOOD PRESSURE: 77 MMHG | HEART RATE: 92 BPM | SYSTOLIC BLOOD PRESSURE: 106 MMHG

## 2017-06-03 VITALS
SYSTOLIC BLOOD PRESSURE: 122 MMHG | TEMPERATURE: 97.3 F | HEART RATE: 74 BPM | RESPIRATION RATE: 16 BRPM | OXYGEN SATURATION: 96 % | DIASTOLIC BLOOD PRESSURE: 77 MMHG

## 2017-06-03 DIAGNOSIS — L03.311 CELLULITIS OF ABDOMINAL WALL: ICD-10-CM

## 2017-06-03 PROCEDURE — 6360000002 HC RX W HCPCS: Performed by: PHYSICIAN ASSISTANT

## 2017-06-03 PROCEDURE — 2580000003 HC RX 258: Performed by: PHYSICIAN ASSISTANT

## 2017-06-03 PROCEDURE — 96366 THER/PROPH/DIAG IV INF ADDON: CPT

## 2017-06-03 PROCEDURE — 96365 THER/PROPH/DIAG IV INF INIT: CPT

## 2017-06-03 RX ORDER — HEPARIN SODIUM (PORCINE) LOCK FLUSH IV SOLN 100 UNIT/ML 100 UNIT/ML
500 SOLUTION INTRAVENOUS PRN
Status: CANCELLED | OUTPATIENT
Start: 2017-06-03

## 2017-06-03 RX ORDER — 0.9 % SODIUM CHLORIDE 0.9 %
5 VIAL (ML) INJECTION PRN
Status: CANCELLED | OUTPATIENT
Start: 2017-06-03

## 2017-06-03 RX ORDER — 0.9 % SODIUM CHLORIDE 0.9 %
10 VIAL (ML) INJECTION PRN
Status: DISCONTINUED | OUTPATIENT
Start: 2017-06-03 | End: 2017-06-04 | Stop reason: HOSPADM

## 2017-06-03 RX ORDER — 0.9 % SODIUM CHLORIDE 0.9 %
10 VIAL (ML) INJECTION PRN
Status: CANCELLED | OUTPATIENT
Start: 2017-06-03

## 2017-06-03 RX ORDER — 0.9 % SODIUM CHLORIDE 0.9 %
5 VIAL (ML) INJECTION PRN
Status: DISCONTINUED | OUTPATIENT
Start: 2017-06-03 | End: 2017-06-04 | Stop reason: HOSPADM

## 2017-06-03 RX ORDER — HEPARIN SODIUM (PORCINE) LOCK FLUSH IV SOLN 100 UNIT/ML 100 UNIT/ML
500 SOLUTION INTRAVENOUS PRN
Status: DISCONTINUED | OUTPATIENT
Start: 2017-06-03 | End: 2017-06-04 | Stop reason: HOSPADM

## 2017-06-03 RX ADMIN — VANCOMYCIN HYDROCHLORIDE 1500 MG: 1 INJECTION, POWDER, LYOPHILIZED, FOR SOLUTION INTRAVENOUS at 20:00

## 2017-06-03 RX ADMIN — Medication 10 ML: at 10:42

## 2017-06-03 RX ADMIN — DEXTROSE MONOHYDRATE 1500 MG: 50 INJECTION, SOLUTION INTRAVENOUS at 08:06

## 2017-06-03 ASSESSMENT — PAIN SCALES - GENERAL: PAINLEVEL_OUTOF10: 0

## 2017-06-04 ENCOUNTER — HOSPITAL ENCOUNTER (OUTPATIENT)
Dept: INFUSION THERAPY | Age: 52
Discharge: HOME | End: 2017-06-04
Payer: COMMERCIAL

## 2017-06-04 VITALS
HEART RATE: 86 BPM | DIASTOLIC BLOOD PRESSURE: 76 MMHG | SYSTOLIC BLOOD PRESSURE: 116 MMHG | RESPIRATION RATE: 16 BRPM | TEMPERATURE: 98.1 F

## 2017-06-04 VITALS
DIASTOLIC BLOOD PRESSURE: 72 MMHG | TEMPERATURE: 97.6 F | HEART RATE: 72 BPM | OXYGEN SATURATION: 93 % | SYSTOLIC BLOOD PRESSURE: 113 MMHG | RESPIRATION RATE: 14 BRPM

## 2017-06-04 DIAGNOSIS — L03.311 CELLULITIS OF ABDOMINAL WALL: ICD-10-CM

## 2017-06-04 PROCEDURE — 96366 THER/PROPH/DIAG IV INF ADDON: CPT

## 2017-06-04 PROCEDURE — 6360000002 HC RX W HCPCS: Performed by: PHYSICIAN ASSISTANT

## 2017-06-04 PROCEDURE — 96365 THER/PROPH/DIAG IV INF INIT: CPT

## 2017-06-04 PROCEDURE — 2580000003 HC RX 258: Performed by: PHYSICIAN ASSISTANT

## 2017-06-04 RX ORDER — 0.9 % SODIUM CHLORIDE 0.9 %
5 VIAL (ML) INJECTION PRN
Status: CANCELLED | OUTPATIENT
Start: 2017-06-04

## 2017-06-04 RX ORDER — HEPARIN SODIUM (PORCINE) LOCK FLUSH IV SOLN 100 UNIT/ML 100 UNIT/ML
500 SOLUTION INTRAVENOUS PRN
Status: CANCELLED | OUTPATIENT
Start: 2017-06-04

## 2017-06-04 RX ORDER — 0.9 % SODIUM CHLORIDE 0.9 %
10 VIAL (ML) INJECTION PRN
Status: CANCELLED | OUTPATIENT
Start: 2017-06-04

## 2017-06-04 RX ORDER — 0.9 % SODIUM CHLORIDE 0.9 %
10 VIAL (ML) INJECTION PRN
Status: DISCONTINUED | OUTPATIENT
Start: 2017-06-04 | End: 2017-06-05 | Stop reason: HOSPADM

## 2017-06-04 RX ADMIN — DEXTROSE MONOHYDRATE 1500 MG: 50 INJECTION, SOLUTION INTRAVENOUS at 08:08

## 2017-06-04 RX ADMIN — VANCOMYCIN HYDROCHLORIDE 1500 MG: 1 INJECTION, POWDER, LYOPHILIZED, FOR SOLUTION INTRAVENOUS at 19:45

## 2017-06-04 NOTE — PLAN OF CARE
Verbally reviewed discharge instructions for care and follow up. Previous print out of these instructions were given with prior treatment. Pt verbalized understanding of instructions given verbally.

## 2017-06-04 NOTE — PLAN OF CARE
Problem: KNOWLEDGE DEFICIT  Goal: Patient/S.O. demonstrates understanding of disease process, treatment plan, medications, and discharge instructions. Intervention: ASSESS BASELINE KNOWLEDGE  Aware treatment rationale and number of therapies scheduled. Discussed s/s infection  Intervention: PROVIDE TEACHING AT LEVEL OF UNDERSTANDING  Verbalizes understanding          Problem: Infection - Central Venous Catheter-Associated Bloodstream Infection:  Intervention: Central line needs assessment  PICC free from redness and swelling. Denies pain. Cool top the touch.     Goal: Will show no infection signs and symptoms  Will show no infection signs and symptoms   Outcome: Ongoing

## 2017-06-05 ENCOUNTER — HOSPITAL ENCOUNTER (OUTPATIENT)
Dept: INFUSION THERAPY | Age: 52
Discharge: HOME | End: 2017-06-05
Payer: COMMERCIAL

## 2017-06-05 ENCOUNTER — TELEPHONE (OUTPATIENT)
Dept: PRIMARY CARE CLINIC | Age: 52
End: 2017-06-05

## 2017-06-05 ENCOUNTER — HOSPITAL ENCOUNTER (OUTPATIENT)
Age: 52
Discharge: HOME | End: 2017-06-05
Payer: COMMERCIAL

## 2017-06-05 VITALS
TEMPERATURE: 98 F | HEART RATE: 82 BPM | SYSTOLIC BLOOD PRESSURE: 129 MMHG | OXYGEN SATURATION: 94 % | RESPIRATION RATE: 18 BRPM | DIASTOLIC BLOOD PRESSURE: 70 MMHG

## 2017-06-05 VITALS
SYSTOLIC BLOOD PRESSURE: 138 MMHG | RESPIRATION RATE: 16 BRPM | DIASTOLIC BLOOD PRESSURE: 76 MMHG | HEART RATE: 71 BPM | TEMPERATURE: 96.8 F

## 2017-06-05 DIAGNOSIS — L03.311 CELLULITIS OF ABDOMINAL WALL: ICD-10-CM

## 2017-06-05 PROCEDURE — 96366 THER/PROPH/DIAG IV INF ADDON: CPT

## 2017-06-05 PROCEDURE — 2580000003 HC RX 258: Performed by: PHYSICIAN ASSISTANT

## 2017-06-05 PROCEDURE — 6360000002 HC RX W HCPCS: Performed by: PHYSICIAN ASSISTANT

## 2017-06-05 PROCEDURE — 96365 THER/PROPH/DIAG IV INF INIT: CPT

## 2017-06-05 RX ORDER — HEPARIN SODIUM (PORCINE) LOCK FLUSH IV SOLN 100 UNIT/ML 100 UNIT/ML
500 SOLUTION INTRAVENOUS PRN
Status: CANCELLED | OUTPATIENT
Start: 2017-06-05

## 2017-06-05 RX ORDER — 0.9 % SODIUM CHLORIDE 0.9 %
10 VIAL (ML) INJECTION PRN
Status: CANCELLED | OUTPATIENT
Start: 2017-06-05

## 2017-06-05 RX ORDER — 0.9 % SODIUM CHLORIDE 0.9 %
5 VIAL (ML) INJECTION PRN
Status: CANCELLED | OUTPATIENT
Start: 2017-06-05

## 2017-06-05 RX ORDER — 0.9 % SODIUM CHLORIDE 0.9 %
10 VIAL (ML) INJECTION PRN
Status: DISCONTINUED | OUTPATIENT
Start: 2017-06-05 | End: 2017-06-06 | Stop reason: HOSPADM

## 2017-06-05 RX ADMIN — Medication 10 ML: at 08:30

## 2017-06-05 RX ADMIN — Medication 10 ML: at 10:55

## 2017-06-05 RX ADMIN — VANCOMYCIN HYDROCHLORIDE 1500 MG: 1 INJECTION, POWDER, LYOPHILIZED, FOR SOLUTION INTRAVENOUS at 19:57

## 2017-06-05 RX ADMIN — DEXTROSE MONOHYDRATE 1500 MG: 50 INJECTION, SOLUTION INTRAVENOUS at 08:32

## 2017-06-05 NOTE — PLAN OF CARE
Problem: KNOWLEDGE DEFICIT  Goal: Patient/S.O. demonstrates understanding of disease process, treatment plan, medications, and discharge instructions.   Outcome: Met This Shift    Problem: Infection - Central Venous Catheter-Associated Bloodstream Infection:  Goal: Will show no infection signs and symptoms  Will show no infection signs and symptoms   Outcome: Met This Shift

## 2017-06-05 NOTE — PLAN OF CARE
Verbally reviewed discharge instructions for care and follow up. Previous print out of these instructions were given with prior treatment. Pt verbalized understanding of instructions given verbally. Escorted pt to door. Left via ambulation.

## 2017-06-05 NOTE — PLAN OF CARE
Problem: KNOWLEDGE DEFICIT  Goal: Patient/S.O. demonstrates understanding of disease process, treatment plan, medications, and discharge instructions.   Outcome: Ongoing    Problem: Infection - Central Venous Catheter-Associated Bloodstream Infection:  Goal: Will show no infection signs and symptoms  Will show no infection signs and symptoms   Outcome: Ongoing

## 2017-06-06 ENCOUNTER — OFFICE VISIT (OUTPATIENT)
Dept: PRIMARY CARE CLINIC | Age: 52
End: 2017-06-06
Payer: COMMERCIAL

## 2017-06-06 ENCOUNTER — HOSPITAL ENCOUNTER (OUTPATIENT)
Dept: INFUSION THERAPY | Age: 52
Discharge: HOME | End: 2017-06-06
Payer: COMMERCIAL

## 2017-06-06 VITALS
HEART RATE: 72 BPM | RESPIRATION RATE: 18 BRPM | HEIGHT: 60 IN | WEIGHT: 238.1 LBS | TEMPERATURE: 97.4 F | BODY MASS INDEX: 46.75 KG/M2 | DIASTOLIC BLOOD PRESSURE: 72 MMHG | SYSTOLIC BLOOD PRESSURE: 108 MMHG

## 2017-06-06 VITALS — TEMPERATURE: 99 F | HEART RATE: 80 BPM | DIASTOLIC BLOOD PRESSURE: 91 MMHG | SYSTOLIC BLOOD PRESSURE: 148 MMHG

## 2017-06-06 DIAGNOSIS — L03.311 CELLULITIS OF ABDOMINAL WALL: Primary | ICD-10-CM

## 2017-06-06 PROBLEM — L03.115 CELLULITIS OF RIGHT LEG: Status: RESOLVED | Noted: 2017-04-13 | Resolved: 2017-06-06

## 2017-06-06 PROCEDURE — 99214 OFFICE O/P EST MOD 30 MIN: CPT | Performed by: NURSE PRACTITIONER

## 2017-06-06 PROCEDURE — 99211 OFF/OP EST MAY X REQ PHY/QHP: CPT

## 2017-06-06 RX ORDER — 0.9 % SODIUM CHLORIDE 0.9 %
10 VIAL (ML) INJECTION PRN
Status: CANCELLED | OUTPATIENT
Start: 2017-06-06

## 2017-06-06 RX ORDER — 0.9 % SODIUM CHLORIDE 0.9 %
5 VIAL (ML) INJECTION PRN
Status: CANCELLED | OUTPATIENT
Start: 2017-06-06

## 2017-06-06 RX ORDER — HEPARIN SODIUM (PORCINE) LOCK FLUSH IV SOLN 100 UNIT/ML 100 UNIT/ML
500 SOLUTION INTRAVENOUS PRN
Status: CANCELLED | OUTPATIENT
Start: 2017-06-06

## 2017-06-06 ASSESSMENT — ENCOUNTER SYMPTOMS
COUGH: 0
WHEEZING: 0
SORE THROAT: 0
VOMITING: 0
COLOR CHANGE: 1
DIARRHEA: 0
CONSTIPATION: 0
SHORTNESS OF BREATH: 0
NAUSEA: 0
ABDOMINAL PAIN: 0
RHINORRHEA: 0

## 2017-06-06 ASSESSMENT — PATIENT HEALTH QUESTIONNAIRE - PHQ9
1. LITTLE INTEREST OR PLEASURE IN DOING THINGS: 1
SUM OF ALL RESPONSES TO PHQ QUESTIONS 1-9: 1
SUM OF ALL RESPONSES TO PHQ9 QUESTIONS 1 & 2: 1
2. FEELING DOWN, DEPRESSED OR HOPELESS: 0

## 2017-06-27 ENCOUNTER — OFFICE VISIT (OUTPATIENT)
Dept: CARDIOLOGY | Age: 52
End: 2017-06-27
Payer: COMMERCIAL

## 2017-06-27 VITALS
OXYGEN SATURATION: 96 % | HEART RATE: 97 BPM | WEIGHT: 237.6 LBS | RESPIRATION RATE: 18 BRPM | SYSTOLIC BLOOD PRESSURE: 148 MMHG | BODY MASS INDEX: 42.1 KG/M2 | HEIGHT: 63 IN | DIASTOLIC BLOOD PRESSURE: 81 MMHG

## 2017-06-27 DIAGNOSIS — G47.33 OSA (OBSTRUCTIVE SLEEP APNEA): ICD-10-CM

## 2017-06-27 DIAGNOSIS — I20.9 ANGINA, CLASS II (HCC): ICD-10-CM

## 2017-06-27 DIAGNOSIS — I10 ESSENTIAL HYPERTENSION: Chronic | ICD-10-CM

## 2017-06-27 DIAGNOSIS — I25.118 CORONARY ARTERY DISEASE INVOLVING NATIVE CORONARY ARTERY OF NATIVE HEART WITH OTHER FORM OF ANGINA PECTORIS (HCC): Primary | ICD-10-CM

## 2017-06-27 PROCEDURE — 99213 OFFICE O/P EST LOW 20 MIN: CPT | Performed by: FAMILY MEDICINE

## 2017-07-24 ENCOUNTER — HOSPITAL ENCOUNTER (EMERGENCY)
Age: 52
Discharge: HOME | End: 2017-07-25
Attending: INTERNAL MEDICINE
Payer: COMMERCIAL

## 2017-07-24 DIAGNOSIS — R55 SYNCOPE AND COLLAPSE: Primary | ICD-10-CM

## 2017-07-24 PROCEDURE — 93005 ELECTROCARDIOGRAM TRACING: CPT

## 2017-07-24 PROCEDURE — 80053 COMPREHEN METABOLIC PANEL: CPT

## 2017-07-24 PROCEDURE — 83874 ASSAY OF MYOGLOBIN: CPT

## 2017-07-24 PROCEDURE — 99285 EMERGENCY DEPT VISIT HI MDM: CPT

## 2017-07-24 PROCEDURE — 84484 ASSAY OF TROPONIN QUANT: CPT

## 2017-07-24 PROCEDURE — 82550 ASSAY OF CK (CPK): CPT

## 2017-07-24 PROCEDURE — 85379 FIBRIN DEGRADATION QUANT: CPT

## 2017-07-24 PROCEDURE — 94761 N-INVAS EAR/PLS OXIMETRY MLT: CPT

## 2017-07-24 PROCEDURE — 85025 COMPLETE CBC W/AUTO DIFF WBC: CPT

## 2017-07-24 PROCEDURE — 82553 CREATINE MB FRACTION: CPT

## 2017-07-24 RX ORDER — IPRATROPIUM BROMIDE AND ALBUTEROL SULFATE 2.5; .5 MG/3ML; MG/3ML
1 SOLUTION RESPIRATORY (INHALATION) ONCE
Status: COMPLETED | OUTPATIENT
Start: 2017-07-24 | End: 2017-07-25

## 2017-07-24 RX ORDER — METHYLPREDNISOLONE SODIUM SUCCINATE 125 MG/2ML
125 INJECTION, POWDER, LYOPHILIZED, FOR SOLUTION INTRAMUSCULAR; INTRAVENOUS ONCE
Status: COMPLETED | OUTPATIENT
Start: 2017-07-24 | End: 2017-07-25

## 2017-07-24 ASSESSMENT — ENCOUNTER SYMPTOMS
COUGH: 0
NAUSEA: 0
SORE THROAT: 0
CHEST TIGHTNESS: 0
VOMITING: 0
ABDOMINAL PAIN: 0
EYE PAIN: 0
EYE DISCHARGE: 0
BACK PAIN: 0
DIARRHEA: 0
SHORTNESS OF BREATH: 1

## 2017-07-24 ASSESSMENT — PAIN DESCRIPTION - DESCRIPTORS: DESCRIPTORS: SHARP

## 2017-07-24 ASSESSMENT — PAIN DESCRIPTION - LOCATION: LOCATION: HEAD

## 2017-07-24 ASSESSMENT — PAIN DESCRIPTION - PAIN TYPE: TYPE: ACUTE PAIN

## 2017-07-24 ASSESSMENT — PAIN SCALES - GENERAL: PAINLEVEL_OUTOF10: 5

## 2017-07-25 ENCOUNTER — APPOINTMENT (OUTPATIENT)
Dept: CT IMAGING | Age: 52
End: 2017-07-25
Payer: COMMERCIAL

## 2017-07-25 ENCOUNTER — APPOINTMENT (OUTPATIENT)
Dept: GENERAL RADIOLOGY | Age: 52
End: 2017-07-25
Payer: COMMERCIAL

## 2017-07-25 VITALS
HEIGHT: 63 IN | TEMPERATURE: 99.6 F | OXYGEN SATURATION: 95 % | DIASTOLIC BLOOD PRESSURE: 69 MMHG | RESPIRATION RATE: 20 BRPM | WEIGHT: 238 LBS | HEART RATE: 89 BPM | SYSTOLIC BLOOD PRESSURE: 111 MMHG | BODY MASS INDEX: 42.17 KG/M2

## 2017-07-25 LAB
% CKMB: 1.8 % (ref 0–3)
ABSOLUTE EOS #: 0.1 K/UL (ref 0–0.4)
ABSOLUTE LYMPH #: 2.1 K/UL (ref 1–4.8)
ABSOLUTE MONO #: 0.4 K/UL (ref 0–1)
ALBUMIN SERPL-MCNC: 3.8 G/DL (ref 3.5–5.2)
ALBUMIN/GLOBULIN RATIO: 1 (ref 1–2.5)
ALP BLD-CCNC: 86 U/L (ref 35–104)
ALT SERPL-CCNC: 16 U/L (ref 5–33)
ANION GAP SERPL CALCULATED.3IONS-SCNC: 21 MMOL/L (ref 9–17)
AST SERPL-CCNC: 17 U/L
BASOPHILS # BLD: 0 %
BASOPHILS ABSOLUTE: 0 K/UL (ref 0–0.2)
BILIRUB SERPL-MCNC: 0.31 MG/DL (ref 0.3–1.2)
BUN BLDV-MCNC: 9 MG/DL (ref 6–20)
BUN/CREAT BLD: 10 (ref 9–20)
CALCIUM SERPL-MCNC: 9 MG/DL (ref 8.6–10.4)
CHLORIDE BLD-SCNC: 94 MMOL/L (ref 98–107)
CK MB: 1 NG/ML
CKMB INTERPRETATION: NORMAL
CO2: 18 MMOL/L (ref 20–31)
CREAT SERPL-MCNC: 0.87 MG/DL (ref 0.5–0.9)
D-DIMER QUANTITATIVE: 0.33 MG/L FEU (ref 0.19–0.5)
DIFFERENTIAL TYPE: ABNORMAL
EKG ATRIAL RATE: 107 BPM
EKG ATRIAL RATE: 91 BPM
EKG P AXIS: 40 DEGREES
EKG P AXIS: 43 DEGREES
EKG P-R INTERVAL: 142 MS
EKG P-R INTERVAL: 146 MS
EKG Q-T INTERVAL: 328 MS
EKG Q-T INTERVAL: 374 MS
EKG QRS DURATION: 74 MS
EKG QRS DURATION: 86 MS
EKG QTC CALCULATION (BAZETT): 437 MS
EKG QTC CALCULATION (BAZETT): 460 MS
EKG R AXIS: 15 DEGREES
EKG R AXIS: 16 DEGREES
EKG T AXIS: 50 DEGREES
EKG T AXIS: 99 DEGREES
EKG VENTRICULAR RATE: 107 BPM
EKG VENTRICULAR RATE: 91 BPM
EOSINOPHILS RELATIVE PERCENT: 2 %
GFR AFRICAN AMERICAN: >60 ML/MIN
GFR NON-AFRICAN AMERICAN: >60 ML/MIN
GFR SERPL CREATININE-BSD FRML MDRD: ABNORMAL ML/MIN/{1.73_M2}
GFR SERPL CREATININE-BSD FRML MDRD: ABNORMAL ML/MIN/{1.73_M2}
GLUCOSE BLD-MCNC: 378 MG/DL (ref 70–99)
HCT VFR BLD CALC: 38.6 % (ref 36–46)
HEMOGLOBIN: 13.1 G/DL (ref 12–16)
LYMPHOCYTES # BLD: 25 %
MCH RBC QN AUTO: 26.8 PG (ref 26–34)
MCHC RBC AUTO-ENTMCNC: 33.8 G/DL (ref 31–37)
MCV RBC AUTO: 79.2 FL (ref 80–100)
MONOCYTES # BLD: 5 %
MYOGLOBIN: 24 NG/ML (ref 25–58)
PDW BLD-RTO: 15.3 % (ref 12.1–15.2)
PLATELET # BLD: 267 K/UL (ref 140–450)
PLATELET ESTIMATE: ABNORMAL
PMV BLD AUTO: 8.5 FL (ref 6–12)
POTASSIUM SERPL-SCNC: 3.6 MMOL/L (ref 3.7–5.3)
RBC # BLD: 4.88 M/UL (ref 4–5.2)
RBC # BLD: ABNORMAL 10*6/UL
SEG NEUTROPHILS: 68 %
SEGMENTED NEUTROPHILS ABSOLUTE COUNT: 5.8 K/UL (ref 1.8–7.7)
SODIUM BLD-SCNC: 133 MMOL/L (ref 135–144)
TOTAL CK: 55 U/L (ref 26–192)
TOTAL PROTEIN: 7.6 G/DL (ref 6.4–8.3)
TROPONIN INTERP: NORMAL
TROPONIN INTERP: NORMAL
TROPONIN T: <0.03 NG/ML
TROPONIN T: <0.03 NG/ML
WBC # BLD: 8.5 K/UL (ref 3.5–11)
WBC # BLD: ABNORMAL 10*3/UL

## 2017-07-25 PROCEDURE — 6370000000 HC RX 637 (ALT 250 FOR IP): Performed by: INTERNAL MEDICINE

## 2017-07-25 PROCEDURE — 84484 ASSAY OF TROPONIN QUANT: CPT

## 2017-07-25 PROCEDURE — 96374 THER/PROPH/DIAG INJ IV PUSH: CPT

## 2017-07-25 PROCEDURE — 36415 COLL VENOUS BLD VENIPUNCTURE: CPT

## 2017-07-25 PROCEDURE — 6360000002 HC RX W HCPCS: Performed by: INTERNAL MEDICINE

## 2017-07-25 PROCEDURE — 93005 ELECTROCARDIOGRAM TRACING: CPT

## 2017-07-25 PROCEDURE — 71010 XR CHEST LIMITED ONE VIEW: CPT

## 2017-07-25 PROCEDURE — 94664 DEMO&/EVAL PT USE INHALER: CPT

## 2017-07-25 PROCEDURE — 70450 CT HEAD/BRAIN W/O DYE: CPT

## 2017-07-25 PROCEDURE — 96375 TX/PRO/DX INJ NEW DRUG ADDON: CPT

## 2017-07-25 RX ORDER — ONDANSETRON 2 MG/ML
4 INJECTION INTRAMUSCULAR; INTRAVENOUS ONCE
Status: DISCONTINUED | OUTPATIENT
Start: 2017-07-25 | End: 2017-07-25

## 2017-07-25 RX ORDER — ONDANSETRON 2 MG/ML
4 INJECTION INTRAMUSCULAR; INTRAVENOUS ONCE
Status: COMPLETED | OUTPATIENT
Start: 2017-07-25 | End: 2017-07-25

## 2017-07-25 RX ORDER — MORPHINE SULFATE 4 MG/ML
4 INJECTION, SOLUTION INTRAMUSCULAR; INTRAVENOUS ONCE
Status: DISCONTINUED | OUTPATIENT
Start: 2017-07-25 | End: 2017-07-25

## 2017-07-25 RX ORDER — ACETAMINOPHEN 325 MG/1
650 TABLET ORAL ONCE
Status: COMPLETED | OUTPATIENT
Start: 2017-07-25 | End: 2017-07-25

## 2017-07-25 RX ADMIN — IPRATROPIUM BROMIDE AND ALBUTEROL SULFATE 1 AMPULE: .5; 3 SOLUTION RESPIRATORY (INHALATION) at 00:04

## 2017-07-25 RX ADMIN — ACETAMINOPHEN 650 MG: 325 TABLET, FILM COATED ORAL at 00:27

## 2017-07-25 RX ADMIN — ONDANSETRON 4 MG: 2 INJECTION INTRAMUSCULAR; INTRAVENOUS at 00:27

## 2017-07-25 RX ADMIN — METHYLPREDNISOLONE SODIUM SUCCINATE 125 MG: 125 INJECTION, POWDER, FOR SOLUTION INTRAMUSCULAR; INTRAVENOUS at 00:06

## 2017-07-25 ASSESSMENT — PAIN SCALES - GENERAL: PAINLEVEL_OUTOF10: 5

## 2017-08-01 ENCOUNTER — OFFICE VISIT (OUTPATIENT)
Dept: CARDIOLOGY | Age: 52
End: 2017-08-01
Payer: COMMERCIAL

## 2017-08-01 VITALS
OXYGEN SATURATION: 98 % | SYSTOLIC BLOOD PRESSURE: 118 MMHG | BODY MASS INDEX: 41.25 KG/M2 | HEART RATE: 79 BPM | RESPIRATION RATE: 16 BRPM | WEIGHT: 232.8 LBS | HEIGHT: 63 IN | DIASTOLIC BLOOD PRESSURE: 85 MMHG

## 2017-08-01 DIAGNOSIS — I95.1 DYSAUTONOMIA ORTHOSTATIC HYPOTENSION SYNDROME: ICD-10-CM

## 2017-08-01 DIAGNOSIS — I20.9 ANGINA, CLASS II (HCC): ICD-10-CM

## 2017-08-01 DIAGNOSIS — R55 SYNCOPE AND COLLAPSE: Primary | ICD-10-CM

## 2017-08-01 DIAGNOSIS — I25.118 CORONARY ARTERY DISEASE OF NATIVE ARTERY OF NATIVE HEART WITH STABLE ANGINA PECTORIS (HCC): ICD-10-CM

## 2017-08-01 PROCEDURE — 99213 OFFICE O/P EST LOW 20 MIN: CPT | Performed by: FAMILY MEDICINE

## 2017-08-01 RX ORDER — FLUDROCORTISONE ACETATE 0.1 MG/1
0.2 TABLET ORAL DAILY
Qty: 90 TABLET | Refills: 3 | Status: SHIPPED | OUTPATIENT
Start: 2017-08-01 | End: 2019-10-23

## 2017-08-11 ENCOUNTER — HOSPITAL ENCOUNTER (OUTPATIENT)
Age: 52
Discharge: HOME | End: 2017-08-11
Payer: COMMERCIAL

## 2017-09-06 ENCOUNTER — HOSPITAL ENCOUNTER (OUTPATIENT)
Age: 52
Discharge: HOME | End: 2017-09-06
Payer: COMMERCIAL

## 2017-09-06 LAB
ALBUMIN SERPL-MCNC: 4 G/DL (ref 3.5–5.2)
ALBUMIN/GLOBULIN RATIO: 1.1 (ref 1–2.5)
ALP BLD-CCNC: 94 U/L (ref 35–104)
ALT SERPL-CCNC: 18 U/L (ref 5–33)
ANION GAP SERPL CALCULATED.3IONS-SCNC: 13 MMOL/L (ref 9–17)
AST SERPL-CCNC: 20 U/L
BILIRUB SERPL-MCNC: 0.54 MG/DL (ref 0.3–1.2)
BUN BLDV-MCNC: 10 MG/DL (ref 6–20)
BUN/CREAT BLD: 16 (ref 9–20)
CALCIUM SERPL-MCNC: 9.3 MG/DL (ref 8.6–10.4)
CHLORIDE BLD-SCNC: 98 MMOL/L (ref 98–107)
CHOLESTEROL/HDL RATIO: 4.1
CHOLESTEROL: 172 MG/DL
CO2: 25 MMOL/L (ref 20–31)
CREAT SERPL-MCNC: 0.62 MG/DL (ref 0.5–0.9)
CREATININE URINE: 110.4 MG/DL (ref 28–217)
ESTIMATED AVERAGE GLUCOSE: 255 MG/DL
GFR AFRICAN AMERICAN: >60 ML/MIN
GFR NON-AFRICAN AMERICAN: >60 ML/MIN
GFR SERPL CREATININE-BSD FRML MDRD: ABNORMAL ML/MIN/{1.73_M2}
GFR SERPL CREATININE-BSD FRML MDRD: ABNORMAL ML/MIN/{1.73_M2}
GLUCOSE BLD-MCNC: 322 MG/DL (ref 70–99)
HBA1C MFR BLD: 10.5 % (ref 4.8–5.9)
HDLC SERPL-MCNC: 42 MG/DL
LDL CHOLESTEROL: 82 MG/DL (ref 0–130)
MICROALBUMIN/CREAT 24H UR: 28 MG/L
MICROALBUMIN/CREAT UR-RTO: 25 MCG/MG CREAT
POTASSIUM SERPL-SCNC: 4.7 MMOL/L (ref 3.7–5.3)
SODIUM BLD-SCNC: 136 MMOL/L (ref 135–144)
TOTAL PROTEIN: 7.5 G/DL (ref 6.4–8.3)
TRIGL SERPL-MCNC: 241 MG/DL
VLDLC SERPL CALC-MCNC: ABNORMAL MG/DL (ref 1–30)

## 2017-09-06 PROCEDURE — 80053 COMPREHEN METABOLIC PANEL: CPT

## 2017-09-06 PROCEDURE — 80061 LIPID PANEL: CPT

## 2017-09-06 PROCEDURE — 82570 ASSAY OF URINE CREATININE: CPT

## 2017-09-06 PROCEDURE — 36415 COLL VENOUS BLD VENIPUNCTURE: CPT

## 2017-09-06 PROCEDURE — 82043 UR ALBUMIN QUANTITATIVE: CPT

## 2017-09-06 PROCEDURE — 83036 HEMOGLOBIN GLYCOSYLATED A1C: CPT

## 2017-09-07 ENCOUNTER — OFFICE VISIT (OUTPATIENT)
Dept: PRIMARY CARE CLINIC | Age: 52
End: 2017-09-07
Payer: COMMERCIAL

## 2017-09-07 VITALS
HEIGHT: 63 IN | RESPIRATION RATE: 18 BRPM | TEMPERATURE: 97.5 F | HEART RATE: 92 BPM | DIASTOLIC BLOOD PRESSURE: 68 MMHG | SYSTOLIC BLOOD PRESSURE: 93 MMHG | BODY MASS INDEX: 41.37 KG/M2 | WEIGHT: 233.5 LBS

## 2017-09-07 DIAGNOSIS — E78.5 DYSLIPIDEMIA: ICD-10-CM

## 2017-09-07 PROCEDURE — 99214 OFFICE O/P EST MOD 30 MIN: CPT | Performed by: NURSE PRACTITIONER

## 2017-09-07 RX ORDER — ALBUTEROL SULFATE 90 UG/1
AEROSOL, METERED RESPIRATORY (INHALATION)
Qty: 1 INHALER | Refills: 3 | Status: SHIPPED | OUTPATIENT
Start: 2017-09-07 | End: 2018-01-09 | Stop reason: SDUPTHER

## 2017-09-07 RX ORDER — ALOGLIPTIN AND METFORMIN HYDROCHLORIDE 12.5; 1 MG/1; MG/1
1 TABLET, FILM COATED ORAL 2 TIMES DAILY
Qty: 60 TABLET | Refills: 11 | Status: SHIPPED | OUTPATIENT
Start: 2017-09-07 | End: 2018-08-07 | Stop reason: SDUPTHER

## 2017-09-07 ASSESSMENT — ENCOUNTER SYMPTOMS
WHEEZING: 0
VOMITING: 0
ABDOMINAL PAIN: 0
COUGH: 0
RHINORRHEA: 0
NAUSEA: 0
SHORTNESS OF BREATH: 0
SORE THROAT: 0

## 2017-09-21 ENCOUNTER — APPOINTMENT (OUTPATIENT)
Dept: GENERAL RADIOLOGY | Age: 52
End: 2017-09-21
Payer: COMMERCIAL

## 2017-09-21 ENCOUNTER — HOSPITAL ENCOUNTER (EMERGENCY)
Age: 52
Discharge: HOME | End: 2017-09-22
Attending: EMERGENCY MEDICINE
Payer: COMMERCIAL

## 2017-09-21 VITALS
SYSTOLIC BLOOD PRESSURE: 148 MMHG | HEART RATE: 97 BPM | TEMPERATURE: 97.6 F | RESPIRATION RATE: 13 BRPM | OXYGEN SATURATION: 98 % | DIASTOLIC BLOOD PRESSURE: 100 MMHG

## 2017-09-21 DIAGNOSIS — S63.501A RIGHT WRIST SPRAIN, INITIAL ENCOUNTER: Primary | ICD-10-CM

## 2017-09-21 DIAGNOSIS — S63.91XA HAND SPRAIN, RIGHT, INITIAL ENCOUNTER: ICD-10-CM

## 2017-09-21 PROCEDURE — 99284 EMERGENCY DEPT VISIT MOD MDM: CPT

## 2017-09-21 PROCEDURE — 73110 X-RAY EXAM OF WRIST: CPT

## 2017-09-21 PROCEDURE — 6370000000 HC RX 637 (ALT 250 FOR IP): Performed by: EMERGENCY MEDICINE

## 2017-09-21 PROCEDURE — 73130 X-RAY EXAM OF HAND: CPT

## 2017-09-21 PROCEDURE — 29125 APPL SHORT ARM SPLINT STATIC: CPT

## 2017-09-21 PROCEDURE — 73080 X-RAY EXAM OF ELBOW: CPT

## 2017-09-21 RX ORDER — IBUPROFEN 600 MG/1
600 TABLET ORAL ONCE
Status: COMPLETED | OUTPATIENT
Start: 2017-09-21 | End: 2017-09-21

## 2017-09-21 RX ADMIN — IBUPROFEN 600 MG: 600 TABLET, FILM COATED ORAL at 23:28

## 2017-09-21 ASSESSMENT — PAIN DESCRIPTION - LOCATION: LOCATION: ARM

## 2017-09-21 ASSESSMENT — PAIN DESCRIPTION - PAIN TYPE: TYPE: ACUTE PAIN

## 2017-09-21 ASSESSMENT — PAIN SCALES - GENERAL: PAINLEVEL_OUTOF10: 4

## 2017-09-21 ASSESSMENT — PAIN DESCRIPTION - ORIENTATION: ORIENTATION: RIGHT

## 2017-09-21 ASSESSMENT — PAIN DESCRIPTION - DESCRIPTORS: DESCRIPTORS: ACHING

## 2017-09-25 ENCOUNTER — HOSPITAL ENCOUNTER (EMERGENCY)
Age: 52
Discharge: HOME | End: 2017-09-25
Payer: COMMERCIAL

## 2017-09-25 VITALS
SYSTOLIC BLOOD PRESSURE: 152 MMHG | HEART RATE: 94 BPM | OXYGEN SATURATION: 98 % | DIASTOLIC BLOOD PRESSURE: 115 MMHG | RESPIRATION RATE: 16 BRPM | TEMPERATURE: 97.9 F

## 2017-09-25 DIAGNOSIS — Z47.89 AFTERCARE FOR CAST OR SPLINT CHECK OR CHANGE: Primary | ICD-10-CM

## 2017-09-25 PROCEDURE — 29125 APPL SHORT ARM SPLINT STATIC: CPT

## 2017-09-25 PROCEDURE — 99282 EMERGENCY DEPT VISIT SF MDM: CPT

## 2017-09-25 RX ORDER — HYDROCODONE BITARTRATE AND ACETAMINOPHEN 5; 325 MG/1; MG/1
1 TABLET ORAL EVERY 6 HOURS PRN
Qty: 10 TABLET | Refills: 0 | Status: SHIPPED | OUTPATIENT
Start: 2017-09-25 | End: 2017-10-02

## 2017-09-25 ASSESSMENT — ENCOUNTER SYMPTOMS
RHINORRHEA: 0
EYE PAIN: 0
WHEEZING: 0
DIARRHEA: 0
EYE ITCHING: 0
VOMITING: 0
BACK PAIN: 0
ABDOMINAL PAIN: 0
NAUSEA: 0
COUGH: 0
SHORTNESS OF BREATH: 0
SORE THROAT: 0

## 2017-09-25 ASSESSMENT — PAIN DESCRIPTION - ORIENTATION: ORIENTATION: RIGHT

## 2017-09-25 ASSESSMENT — PAIN DESCRIPTION - LOCATION: LOCATION: WRIST

## 2017-09-25 ASSESSMENT — PAIN SCALES - GENERAL: PAINLEVEL_OUTOF10: 5

## 2017-10-11 ENCOUNTER — HOSPITAL ENCOUNTER (OUTPATIENT)
Dept: DIABETES SERVICES | Age: 52
Setting detail: THERAPIES SERIES
Discharge: HOME | End: 2017-10-11
Payer: COMMERCIAL

## 2017-10-11 PROCEDURE — G0108 DIAB MANAGE TRN  PER INDIV: HCPCS | Performed by: COUNSELOR

## 2017-10-11 ASSESSMENT — PATIENT HEALTH QUESTIONNAIRE - PHQ9
7. TROUBLE CONCENTRATING ON THINGS, SUCH AS READING THE NEWSPAPER OR WATCHING TELEVISION: 2
9. THOUGHTS THAT YOU WOULD BE BETTER OFF DEAD, OR OF HURTING YOURSELF: 0
2. FEELING DOWN, DEPRESSED OR HOPELESS: 1
4. FEELING TIRED OR HAVING LITTLE ENERGY: 1
5. POOR APPETITE OR OVEREATING: 1
SUM OF ALL RESPONSES TO PHQ QUESTIONS 1-9: 12
8. MOVING OR SPEAKING SO SLOWLY THAT OTHER PEOPLE COULD HAVE NOTICED. OR THE OPPOSITE, BEING SO FIGETY OR RESTLESS THAT YOU HAVE BEEN MOVING AROUND A LOT MORE THAN USUAL: 1
6. FEELING BAD ABOUT YOURSELF - OR THAT YOU ARE A FAILURE OR HAVE LET YOURSELF OR YOUR FAMILY DOWN: 1
SUM OF ALL RESPONSES TO PHQ9 QUESTIONS 1 & 2: 3
1. LITTLE INTEREST OR PLEASURE IN DOING THINGS: 2
3. TROUBLE FALLING OR STAYING ASLEEP: 3
10. IF YOU CHECKED OFF ANY PROBLEMS, HOW DIFFICULT HAVE THESE PROBLEMS MADE IT FOR YOU TO DO YOUR WORK, TAKE CARE OF THINGS AT HOME, OR GET ALONG WITH OTHER PEOPLE: 1

## 2017-10-11 NOTE — PROGRESS NOTES
Initial  Assess    Date:  10-11-17 2nd  Visit     Date:   3rd  Visit    Date: 4th  Visit    Date: Comments  S.O.C=Stage of Change/Readiness to change:  · Pre=Pre-contemplation stage--not thinking about changing  · C=Contemplation stage--ambivalent about changing  · P=Preparation stage--prepared to made a specific change  · A=Action stage--committed to modify behaviors  · M=Maintenance and relapse prevention--incorporating new behavior   Participant Stated  Goal       To stop drinking soda and drink water instead. Participant Stated Goal  Patient will use the plate method to monitor carbs at meals. Will eat three meals a day with a snack in the evening. S.O.C  [] PRE  [] C  [] P      [] A  [] M                    S.O.C  [] PRE  [] C  [] P      [] A  [] M     S.O.C  [] PRE  [] C  [] P      [] A  [] M                     S.O.C  [] PRE  [] C  [] P      [] A  [] M      S.O.C  [] PRE  [] C  [] P      [] A  [] M                    S.O.C  [] PRE  [] C  [] P      [] A  [] M     S.O.C  [] PRE  [] C  [] P      [] A  [] M                    S.O.C  [] PRE  [] C  [] P      [] A  [] M   Current main goal attainment frequency:   [] Never  [] Some  [] Half  [] Most  [] All    Participant confidence to master goal this visit:   [] Excellent  [] Good  [] Yandy Hurd  [] Poor            Current main goal attainment frequency:   [] Never  [] Some  [] Half  [] Most  [] All    Participant confidence to master goal this visit:   [] Excellent  [] Good [] Fair  [] Poor                  Session  Topics & Learning Objectives:      Comments:   Diabetes disease process & Treatment process: Define diabetes & identify own type of diabetes;  Identify options for treating diabetes                  Rating  [] 1  [] 2  [x] 3  [] 4      [] NC  [] N/A   Rating  [] 1  [] 2  [] 3  [] 4  [] NC  [] N/A     Rating  [] 1  [] 2  [] 3  [] 4  [] NC  [] N/A     Rating  [] 1  [] 2  [] 3  [] 4  [] NC  [] N/A           Incorporating nutritional management into lifestyle: Describe effect of type, amount & timing of food on blood glucose; Describe basic meal planning techniques & current nutrition guidelines; Correctly read food labels & demonstrate CHO counting & portion control with personalized meal plan. Rating  [] 1  [] 2  [x] 3  [] 4  [] NC  [] N/A     Rating  [] 1  [] 2  [] 3  [] 4  [] NC  [] N/A     Rating  [] 1  [] 2  [] 3  [] 4  [] NC  [] N/A     Rating  [] 1  [] 2  [] 3  [] 4  [] NC  [] N/A                 Incorporating physical activity into lifestyle:   State effect of exercise on blood glucose levels. Identifies personal exercise plan. Discussed safety tips while exercising. Rating  [] 1  [] 2  [x] 3  [] 4  [] NC  [] N/A     Rating  [] 1  [] 2  [] 3  [] 4  [] NC  [] N/A       Rating  [] 1  [] 2  [] 3  [] 4  [] NC  [] N/A     Rating  [] 1  [] 2  [] 3  [] 4  [] NC  [] N/A           Using medications safely: State effect of diabetes medicines on diabetes;   Name diabetes medication taking, action, timing & side effects     Rating  [] 1  [] 2  [x] 3  [] 4  [] NC  [] N/A       Rating  [] 1  [] 2  [] 3  [] 4  [] NC  [] N/A       Rating  [] 1  [] 2  [] 3  [] 4  [] NC  [] N/A       Rating  [] 1  [] 2  [] 3  [] 4  [] NC  [] N/A           Monitoring blood glucose, interpreting and using results: Identify recommended blood glucose targets, personal targets, appropriate techniques and problem solving. Rating  [] 1  [] 2  [x] 3  [] 4  [] NC  [] N/A     Rating  [] 1  [] 2  [] 3  [] 4  [] NC  [] N/A       Rating  [] 1  [] 2  [] 3  [] 4  [] NC  [] N/A     Rating  [] 1  [] 2  [] 3  [] 4  [] NC  [] N/A         Prevention, detection & treatment of acute complications: List symptoms of hyper- and hypoglycemia;    Describe how to treat low blood sugar & actions for lowering high blood glucose levels  Rating  [] 1  [] 2  [x] 3  [] 4  [] NC  [] N/A     Rating  [] 1  [] 2  [] 3  [] 4  [] NC  [] N/A     Rating  [] 1  [] 2  [] 3  [] 4  [] NC  [] N/A     Rating  [] 1  [] 2  [] 3  [] 4  [] NC  [] N/A         Prevention, detection & treatment of chronic complications: Define the natural course of diabetes & describe the relationship of blood glucose levels to long term complications of diabetes               Rating  [] 1  [] 2  [x] 3  [] 4  [] NC  [] N/A     Rating  [] 1  [] 2  [] 3  [] 4  [] NC  [] N/A     Rating  [] 1  [] 2  [] 3  [] 4  [] NC  [] N/A     Rating  [] 1  [] 2  [] 3  [] 4  [] NC  [] N/A      Developing strategies to address psychosocial issues: Describe feelings about living with diabetes; Identify support needed & support network. Rating  [] 1  [] 2  [x] 3  [] 4  [] NC  [] N/A       Rating  [] 1  [] 2  [] 3  [] 4  [] NC  [] N/A     Rating  [] 1  [] 2  [] 3  [] 4  [] NC  [] N/A     Rating  [] 1  [] 2  [] 3  [] 4  [] NC  [] N/A          Developing strategies to promote health/change behavior:  Define the ABCs of diabetes; Identify appropriate screenings, schedule & personal plan for screenings.       Rating  [] 1  [] 2  [x] 3  [] 4  [] N  [] N/A     Rating  [] 1  [] 2  [] 3  [] 4  [] NC  [] N/A     Rating  [] 1  [] 2  [] 3  [] 4  [] NC  [] N/A     Rating  [] 1  [] 2  [] 3  [] 4  [] NC  [] N/A               Handouts/Booklets given:     [x] Living Well with Diabetes    [x] Daily Diabetic Meal Planning Guide   [] Nutrition in the [employer] Resources    [] Resources for People With Diabetes   [] Other

## 2017-10-21 ENCOUNTER — OFFICE VISIT (OUTPATIENT)
Dept: NEUROLOGY | Age: 52
End: 2017-10-21
Payer: COMMERCIAL

## 2017-10-21 DIAGNOSIS — M79.605 DIFFUSE PAIN IN LEFT LOWER EXTREMITY: ICD-10-CM

## 2017-10-21 PROCEDURE — 95886 MUSC TEST DONE W/N TEST COMP: CPT | Performed by: PSYCHIATRY & NEUROLOGY

## 2017-10-21 PROCEDURE — 95910 NRV CNDJ TEST 7-8 STUDIES: CPT | Performed by: PSYCHIATRY & NEUROLOGY

## 2017-10-21 NOTE — PROGRESS NOTES
[facility] [address], [address] [facility]    [phone] phone  [phone] fax          Name: Azucena Porter Patient ID: [id number]   Gender: Female Date of Exam: 10/21/2017   Age: 46 y YOB: 1965   Height: 5'3\" Weight: 80 Lbs   Referring Physician: Marilin Woodward MD   Examining Physician: Pretty Suazo MD        Patient History:   numbness, pain in bilateral lower extremtites, pain mostly in legs, numbness mostly in feet, having the symptoms for approximately 3 years. Motor Nerve Conduction:    Nerve and Site Latency Amplitude Segment Latency  Difference Distance Conduction  Velocity            Peroneal.R         Ankle 4.5 ms 3.6 mV Extensor digitorum brevis-Ankle 4.5 ms 80 mm  m/s   Fibula (head) 11.8 ms 3.2 mV Ankle-Fibula (head) 7.3 ms 290 mm 40 m/s   Popliteal fossa 12.7 ms 5.7 mV Fibula (head)-Popliteal fossa 0.9 ms 80 mm 89 m/s   Tibial.L         Ankle 4.7 ms 9.5 mV Abductor hallucis-Ankle 4.7 ms 80 mm  m/s   Popliteal fossa 14.1 ms 7.6 mV Ankle-Popliteal fossa 9.4 ms 380 mm 40 m/s   Peroneal.L         Ankle 4.6 ms 1.7 mV Extensor digitorum brevis-Ankle 4.6 ms 80 mm  m/s   Fibula (head) 12.6 ms 1.5 mV Ankle-Fibula (head) 8.0 ms 300 mm 38 m/s   Popliteal fossa 13.4 ms 4.6 mV Fibula (head)-Popliteal fossa 0.8 ms 90 mm 73 m/s   Tibial.R         Ankle 4.5 ms 7.5 mV Abductor hallucis-Ankle 4.5 ms 80 mm  m/s   Popliteal fossa 14.0 ms 8.3 mV Ankle-Popliteal fossa 9.5 ms 360 mm 38 m/s       Sensory Nerve Conduction:    Nerve and Site Onset Latency Peak  Latency Amplitude Segment Latency  Difference Distance Conduction  Velocity             Sural.R          Lower leg 3.1 ms 3.8 ms 9 mV Ankle-Lower leg 3.1 ms 140 mm 45 m/s   Sural.L          Lower leg  ms  ms  mV Ankle-Lower leg  ms 140 mm  m/s           EMG Summary Table     Spontaneous MUAP Recruitment    IA Fib PSW Fasc H.F. Amp Dur.  PPP Pattern   L. TIBIALIS ANTERIOR + + + + None + + N FFU   L. GASTROC. MEDIUS + +1 +1 None None + + N FFU   L. VASTUS MEDIALIS N None None None None N N N N   L. GLUTEUS MEDIUS N None None None None N N N N   L. ILIOPSOAS N None None None None N N N N   L. LUMBAR PSP (U) N None None None        L. LUMBAR PSP (M) N None None None        L. LUMBAR PSP (L) N None None None          EMG Summary Table     Spontaneous MUAP Recruitment    IA Fib PSW Fasc H.F. Amp Dur. PPP Pattern   R. TIBIALIS ANTERIOR + + + + None + + N FFU   R. GASTROC. MEDIUS + +1 +1 None None + + N FFU   R. VASTUS MEDIALIS N None None None None N N N N   R. GLUTEUS MEDIUS N None None None None N N N N   R. ILIOPSOAS N None None None None N N N N   R. LUMBAR PSP (U) N None None None        R. LUMBAR PSP (M) N None None None        R. LUMBAR PSP (L) N None None None        FFU=Fast Firing Unit        Right peroneal motor study showed normal DML, CMAP amplitude, borderline normal motor nerve conduction velocity distally. Left tibial motor study showed normal DML, normal CMAP amplitude and borderline normal motor nerve conduction velocity. Left peroneal motor study showed normal DML, low distal CMAP amplitude and borderline slow motor nerve conduction velocity distally. Right tibial motor study showed normal DML, CMAP amplitude and slow motor nerve conduction velocity. Right sural antidromic sensory response showed normal peak latency, low amplitude and sensory nerve conduction velocity. Left sural antidromic sensory response was absent. Monopolar EMG study of the selected muscles revealed evidence of active denervation in bilateral tibialis anterior and gastrocnemius medialis. 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 There is no evidence of lumbosacral radiculopathy or plexopathy.     __________________________________  Mario Jackson MD  Diplomat American Board of Psychiatry and Neurology  Diplomat American Board of Neurophysiology

## 2017-10-24 ENCOUNTER — HOSPITAL ENCOUNTER (OUTPATIENT)
Dept: DIABETES SERVICES | Age: 52
Setting detail: THERAPIES SERIES
Discharge: HOME | End: 2017-10-24
Payer: COMMERCIAL

## 2017-10-24 PROCEDURE — G0108 DIAB MANAGE TRN  PER INDIV: HCPCS | Performed by: COUNSELOR

## 2017-10-24 NOTE — PROGRESS NOTES
Diabetes Self-Management Education Record     Progress Note: 10-24-17Patient reports she has reduced soda intake. She has been feeling dizzy at times. Her blood glucose readings have gone from 260-121. Reviewed signs and symptoms  Of hypoglycemia and how to treat. Encouraged to talk with PCP to report change in blood glucose readings. Patient reports that she eats three meals per day. When a food diary was taken, it was observed that Larissa Madden is taking in too many carbs at most meals.   Also discussed importance of not drinking regular soda.       Participant Name: Sandie Qiana  Referring Provider:  [address] [place] [address]     [place] to learning:  Considerations: []Language []Emotional []Health Literacy  []Cognitive []Memory changes []Financial []Cultural   []Muslim [x]Vision []Hearing  []Speech []Lack of desire  []Literacy  []Psycho-social  []None  If considerations are noted, accommodations made:  When asked if patient could see written material, she said yes.     Identified barriers to learning/self management:      The following information was discussed:     [x] Diabetes disease process and treatment options   [x] Healthy nutrition, carbohydrate counting, meal planning  [x] Monitoring blood glucose and other parameters; interpreting and using results  [x] Acute complications--prevention, detection and treatment  [x] Medication management and safety Instructed by: [] Pharmacist [] nurse  [x] Incorporating physical activity into lifestyle Instructed by:[] Exercise physiologist [] nurse  [x] Exercise for Health, Reducing Risks for Heart Disease, Diabetes and Heart Health  [] Preventing, through risk reduction behaviors, detecting, and treating chronic complications  [] Sick Day management  [] Developing personalized strategies to address psychosocial issues and concerns  [] Developing personalized strategies to promote health and behavior change through goalsetting, behavior change strategies aimed at risk NC  [] N/A         Prevention, detection & treatment of acute complications: List symptoms of hyper- and hypoglycemia; Describe how to treat low blood sugar & actions for lowering high blood glucose levels  Rating  [] 1  [] 2  [x] 3  [] 4  [] NC  [] N/A    Rating  [] 1  [] 2  [] 3  [x] 4  [] NC  [] N/A    Rating  [] 1  [] 2  [] 3  [] 4  [] NC  [] N/A    Rating  [] 1  [] 2  [] 3  [] 4  [] NC  [] N/A         Prevention, detection & treatment of chronic complications: Define the natural course of diabetes & describe the relationship of blood glucose levels to long term complications of diabetes                   Rating  [] 1  [] 2  [x] 3  [] 4  [] NC  [] N/A    Rating  [] 1  [] 2  [] 3  [x] 4  [] NC  [] N/A    Rating  [] 1  [] 2  [] 3  [] 4  [] NC  [] N/A    Rating  [] 1  [] 2  [] 3  [] 4  [] NC  [] N/A      Developing strategies to address psychosocial issues: Describe feelings about living with diabetes; Identify support needed & support network. Rating  [] 1  [] 2  [x] 3  [] 4  [] NC  [] N/A       Rating  [] 1  [] 2  [] 3  [x] 4  [] NC  [] N/A    Rating  [] 1  [] 2  [] 3  [] 4  [] NC  [] N/A    Rating  [] 1  [] 2  [] 3  [] 4  [] NC  [] N/A          Developing strategies to promote health/change behavior:  Define the ABCs of diabetes;  Identify appropriate screenings, schedule & personal plan for screenings.       Rating  [] 1  [] 2  [x] 3  [] 4  [] N  [] N/A    Rating  [] 1  [] 2  [] 3  [x] 4  [] NC  [] N/A    Rating  [] 1  [] 2  [] 3  [] 4  [] NC  [] N/A    Rating  [] 1  [] 2  [] 3  [] 4  [] NC  [] N/A               Handouts/Booklets given:      [x] Living Well with Diabetes    [x] Daily Diabetic Meal Planning Guide   [] Nutrition in the [place] [id number]    [] Resources for People With Diabetes   [] Other

## 2017-10-25 ENCOUNTER — OFFICE VISIT (OUTPATIENT)
Dept: NEUROLOGY | Age: 52
End: 2017-10-25
Payer: COMMERCIAL

## 2017-10-25 VITALS
HEART RATE: 82 BPM | SYSTOLIC BLOOD PRESSURE: 98 MMHG | WEIGHT: 229.2 LBS | HEIGHT: 63 IN | BODY MASS INDEX: 40.61 KG/M2 | DIASTOLIC BLOOD PRESSURE: 73 MMHG

## 2017-10-25 DIAGNOSIS — E11.42 DIABETIC POLYNEUROPATHY ASSOCIATED WITH TYPE 2 DIABETES MELLITUS (HCC): ICD-10-CM

## 2017-10-25 DIAGNOSIS — R26.9 GAIT DIFFICULTY: ICD-10-CM

## 2017-10-25 DIAGNOSIS — M79.2 NEUROPATHIC PAIN: Primary | ICD-10-CM

## 2017-10-25 DIAGNOSIS — M62.838 MUSCLE SPASM: ICD-10-CM

## 2017-10-25 PROCEDURE — G8417 CALC BMI ABV UP PARAM F/U: HCPCS | Performed by: PSYCHIATRY & NEUROLOGY

## 2017-10-25 PROCEDURE — 3014F SCREEN MAMMO DOC REV: CPT | Performed by: PSYCHIATRY & NEUROLOGY

## 2017-10-25 PROCEDURE — 3046F HEMOGLOBIN A1C LEVEL >9.0%: CPT | Performed by: PSYCHIATRY & NEUROLOGY

## 2017-10-25 PROCEDURE — G8484 FLU IMMUNIZE NO ADMIN: HCPCS | Performed by: PSYCHIATRY & NEUROLOGY

## 2017-10-25 PROCEDURE — G8598 ASA/ANTIPLAT THER USED: HCPCS | Performed by: PSYCHIATRY & NEUROLOGY

## 2017-10-25 PROCEDURE — 99214 OFFICE O/P EST MOD 30 MIN: CPT | Performed by: PSYCHIATRY & NEUROLOGY

## 2017-10-25 PROCEDURE — 3017F COLORECTAL CA SCREEN DOC REV: CPT | Performed by: PSYCHIATRY & NEUROLOGY

## 2017-10-25 PROCEDURE — 1036F TOBACCO NON-USER: CPT | Performed by: PSYCHIATRY & NEUROLOGY

## 2017-10-25 PROCEDURE — G8427 DOCREV CUR MEDS BY ELIG CLIN: HCPCS | Performed by: PSYCHIATRY & NEUROLOGY

## 2017-10-25 RX ORDER — DULOXETIN HYDROCHLORIDE 30 MG/1
CAPSULE, DELAYED RELEASE ORAL
Qty: 30 CAPSULE | Refills: 0 | Status: SHIPPED | OUTPATIENT
Start: 2017-10-25 | End: 2017-11-17 | Stop reason: RX

## 2017-10-25 NOTE — PROGRESS NOTES
frequency: present, Urinary urgency: absent, Urinary incontinence: absent   Musculoskeletal Neck pain: absent, Back pain: present, Stiffness: absent, Muscle pain: present, Joint pain: present, Restless legs: present   Dermatological Hair loss: absent, Skin changes: absent   Neurological Memory loss: absent, Confusion: present, Seizures: absent; Trouble walking or imbalance: present, Dizziness: present, Weakness: present, Numbness present, Tremor: absent, Spasm: present, Speech difficulty: absent, Headache: absent, Light sensitivity: absent   Psychiatric Anxiety: absent, Depression  present, Suicidal ideations absent, Mood Disorder: absent   Hematologic Abnormal bleeding: absent, Anemia: absent, Clotting disorder: absent, Lymph gland changes: absent     Current Outpatient Prescriptions on File Prior to Visit   Medication Sig Dispense Refill    albuterol sulfate HFA (VENTOLIN HFA) 108 (90 Base) MCG/ACT inhaler INHALE 2 PUFFS INTO THE LUNGS EVERY 6 HOURS AS NEEDED FOR WHEEZING.  1 Inhaler 3    glucose blood VI test strips (FREESTYLE LITE) strip Use to check blood sugars 3 times daily and as needed for Diabetes E11.40 100 each 11    alogliptin-metformin 12.5-1000 MG TABS Take 1 tablet by mouth 2 times daily 60 tablet 11    fludrocortisone (FLORINEF) 0.1 MG tablet Take 2 tablets by mouth daily 90 tablet 3    metoprolol tartrate (LOPRESSOR) 25 MG tablet Take 1 tablet by mouth 2 times daily 180 tablet 3    omeprazole (PRILOSEC) 20 MG delayed release capsule Take 1 capsule by mouth daily 90 capsule 3    insulin glargine (LANTUS SOLOSTAR) 100 UNIT/ML injection pen Inject 50 Units into the skin nightly 5 Pen 5    ondansetron (ZOFRAN ODT) 4 MG disintegrating tablet Take 1 tablet by mouth every 8 hours as needed for Nausea or Vomiting 10 tablet 0    gabapentin (NEURONTIN) 800 MG tablet TAKE 1 TABLET BY MOUTH 3 TIMES A DAY 90 tablet 6    capsaicin (ZOSTRIX) 0.025 % cream Apply topically 2 times daily Apply topically 2 times daily. 1 Tube 6    Liraglutide (VICTOZA) 18 MG/3ML SOPN SC injection INJECT 1.8 MG INTO THE SKIN DAILY AS DIRECTED 5 Pen 11    escitalopram (LEXAPRO) 10 MG tablet TAKE 1 TABLET BY MOUTH DAILY. 90 tablet 3    Insulin Pen Needle (BD ULTRA-FINE PEN NEEDLES) 29G X 12.7MM MISC USE AS DIRECTED BY PHYSICIAN 100 each 3    atorvastatin (LIPITOR) 40 MG tablet Take 1 tablet by mouth daily 90 tablet 3    losartan (COZAAR) 25 MG tablet Take 1 tablet by mouth daily 90 tablet 3    clopidogrel (PLAVIX) 75 MG tablet Take 1 tablet by mouth daily 90 tablet 3    aspirin 81 MG chewable tablet Take 1 tablet by mouth daily 100 tablet 3    Blood Pressure Monitor KIT 1 each by Does not apply route daily as needed ESSENTIAL HYPERTENSION   I10 1 kit 0     No current facility-administered medications on file prior to visit. Allergies: Jay Bangura has No Known Allergies. Past Medical History:   Diagnosis Date    Anxiety     Asthma     CAD (coronary artery disease)     Clinical trial participant at discharge 3/31/16    [facility] [address]     Depression     Diabetic neuropathy ([facility] [address]     H/O cardiac catheterization 4/26/16    LMCA: Mild Irregularities 10-20%. LAD: Lesion on Prox LAD: Proximal subsection. 100% stenosis. LCx: Mild irregularities 10-20%. RCA: Mild irregularities 10-20%. Widely patent mid RCA stent. EF:60%.  H/O cardiovascular stress test 10/07/2016    Overall results are most consistent with a low risk for significant CAD.     H/O echocardiogram 10/05/2016    EF:>60%. Inferoseptal wall abnormal in its motion which is not unusal status post open heart surgery. Evidence of mild (grade I) diastolic dysfunction is seen.  H/O tilt table evaluation 07/12/2016    Abnormal. PTs HR, Blood Pressure response and symptoms were most consistent with dysautonomia.  Combined w/viligant maintenance of euvolemia and maintaining a moderate salt intake, pharmaciologic treatment w/ ProAmatine, SSRI such as Lexapro and/or Mestinon among other treatments have shown some effectiveness in the treatment of this condition.  Hx of blood clots     Hyperlipidemia     Hypertension     Intermittent claudication (HCC)     Kidney stones     Movement disorder     neuropathy in legs    Neuromuscular disorder (HCC)     Osteoarthritis     Reflux     Seizures (HCC)     last over 10 yr ago    Stented coronary artery 9/22/2010     LAD/[place]    Stented coronary artery 3/31/16    RCA/[place]    Type II or unspecified type diabetes mellitus without mention of complication, not stated as uncontrolled     Unspecified sleep apnea     no machine       Past Surgical History:   Procedure Laterality Date    ABDOMINAL HERNIA REPAIR  1-2016    repair done [place]    APPENDECTOMY      CARDIAC CATHETERIZATION Left 4/26/2016    right radial/ [facility]/ Dr Hernandez Sender COLONOSCOPY  12/16/14    -hemorrhoids,bx    CORONARY ANGIOPLASTY WITH STENT PLACEMENT  9/2010    CORONARY ANGIOPLASTY WITH STENT PLACEMENT  3-    [facility] / DR Agustin Billy CORONARY ARTERY BYPASS GRAFT  08/15/2016    OP X 1- Dr Francy Weaver, COLON, DIAGNOSTIC  12/16/2014    HERNIA REPAIR  12/13/12    at the medial aspect of a [place] RUQ scar); repaired [address]  02/16/2015    incisional, recurrent    HERNIA REPAIR  02/2016    HYSTERECTOMY      OTHER SURGICAL HISTORY  10/8/2015    abd wound washout, mesh removal, wound vac placement    UPPP UVULOPALATOPHARYGOPLASTY  06/26/2012    VENTRAL HERNIA REPAIR  09/21/2015    With [facility] - Dr Titi Martinez History: Romina Camejo  reports that she quit smoking about 7 years ago. She has never used smokeless tobacco. She reports that she does not drink alcohol or use drugs.     Family History   Problem Relation Age of Onset   [facility] Cancer Mother     Diabetes Mother     Cancer Father      thyroid    Diabetes Sister     Heart Disease Sister     Heart Disease Brother     Heart Disease Maternal Uncle     Cancer Maternal Grandmother      On exam: Blood pressure 98/73, pulse 82, height 5' 3\" (1.6 m), weight 229 lb 3.2 oz (104 kg), last menstrual period 12/05/1996, not currently breastfeeding. This is a 46 y.o. female appears her stated age and in no acute distress. HEENT: NC/ AT. Neck: supple and no bruits heard. On neurologic exam: she  is alert, awake, and oriented times three. She followed commands well. She could recall major news events well. Speech exam revealed no dysarthria and no aphasia. She has good naming and good repetition. Able to do serial subtractions ok. Her higher intellectual functions seem to be with in normal limits. CN exam: esotropia on right side; PERRLA. visual fields are full. Fundi reveal intact venous pulsations. Disc margins are clear. No ptosis noted. Facial sensation and facial motor exam grossly normal. Tongue protrudes midline. No atrophy noted. Palate elevates symmetrically. Able to hear to the finger rub equally well. Shoulder shrug is 5/5 bilaterally. Motor exam:  she has good motor strength in proximal and distal muscle groups bilaterally in all of the extremities. DTRs: symmetric and 1+ biceps, brachioradialis, triceps, patellar reflexes and absent Achilles reflexes bilaterally. No pronator drift noted. No atrophy noted. Sensory exam: impaired pinprick and temperature in stocking pattern, asymmetric pattern with the left worse than right. Cerebellar exam:  Reveals intact finger-nose-finger testing. Muscle tone is normal.   Gait exam: she has wide-based gait and could not do tandem gait. Positive Romberg sign present. She is able to walk with cane. Diagnostic data reviewed with the patient:   NCS/ EMG (8-22-14): There is electrodiagnostic evidence of peripheral polyneuropathy with predominant involvement of sensory fibers only.

## 2017-11-09 ENCOUNTER — APPOINTMENT (OUTPATIENT)
Dept: GENERAL RADIOLOGY | Age: 52
DRG: 463 | End: 2017-11-09
Payer: COMMERCIAL

## 2017-11-09 ENCOUNTER — HOSPITAL ENCOUNTER (INPATIENT)
Age: 52
LOS: 2 days | Discharge: HOME | DRG: 463 | End: 2017-11-11
Attending: FAMILY MEDICINE | Admitting: INTERNAL MEDICINE
Payer: COMMERCIAL

## 2017-11-09 DIAGNOSIS — A41.9 SEPSIS, DUE TO UNSPECIFIED ORGANISM: Primary | ICD-10-CM

## 2017-11-09 DIAGNOSIS — N30.00 ACUTE CYSTITIS WITHOUT HEMATURIA: ICD-10-CM

## 2017-11-09 LAB
-: ABNORMAL
ABSOLUTE EOS #: 0.3 K/UL (ref 0–0.4)
ABSOLUTE IMMATURE GRANULOCYTE: ABNORMAL K/UL (ref 0–0.3)
ABSOLUTE LYMPH #: 2.4 K/UL (ref 1–4.8)
ABSOLUTE MONO #: 0.7 K/UL (ref 0–1)
ALBUMIN SERPL-MCNC: 4 G/DL (ref 3.5–5.2)
ALBUMIN/GLOBULIN RATIO: 1.2 (ref 1–2.5)
ALP BLD-CCNC: 94 U/L (ref 35–104)
ALT SERPL-CCNC: 17 U/L (ref 5–33)
AMORPHOUS: ABNORMAL
ANION GAP SERPL CALCULATED.3IONS-SCNC: 18 MMOL/L (ref 9–17)
AST SERPL-CCNC: 15 U/L
BACTERIA: ABNORMAL
BASOPHILS # BLD: 1 %
BASOPHILS ABSOLUTE: 0.1 K/UL (ref 0–0.2)
BILIRUB SERPL-MCNC: 0.74 MG/DL (ref 0.3–1.2)
BILIRUBIN DIRECT: <0.08 MG/DL
BILIRUBIN URINE: NEGATIVE
BILIRUBIN, INDIRECT: NORMAL MG/DL (ref 0–1)
BNP INTERPRETATION: NORMAL
BUN BLDV-MCNC: 19 MG/DL (ref 6–20)
BUN/CREAT BLD: 25 (ref 9–20)
CALCIUM SERPL-MCNC: 9 MG/DL (ref 8.6–10.4)
CASTS UA: ABNORMAL /LPF
CHLORIDE BLD-SCNC: 93 MMOL/L (ref 98–107)
CO2: 18 MMOL/L (ref 20–31)
COLOR: YELLOW
COMMENT UA: ABNORMAL
CREAT SERPL-MCNC: 0.75 MG/DL (ref 0.5–0.9)
CRYSTALS, UA: ABNORMAL /HPF
DIFFERENTIAL TYPE: ABNORMAL
EKG ATRIAL RATE: 85 BPM
EKG P AXIS: 19 DEGREES
EKG P-R INTERVAL: 142 MS
EKG Q-T INTERVAL: 356 MS
EKG QRS DURATION: 76 MS
EKG QTC CALCULATION (BAZETT): 423 MS
EKG R AXIS: 26 DEGREES
EKG T AXIS: 41 DEGREES
EKG VENTRICULAR RATE: 85 BPM
EOSINOPHILS RELATIVE PERCENT: 2 %
EPITHELIAL CELLS UA: ABNORMAL /HPF (ref 0–25)
GFR AFRICAN AMERICAN: >60 ML/MIN
GFR NON-AFRICAN AMERICAN: >60 ML/MIN
GFR SERPL CREATININE-BSD FRML MDRD: ABNORMAL ML/MIN/{1.73_M2}
GFR SERPL CREATININE-BSD FRML MDRD: ABNORMAL ML/MIN/{1.73_M2}
GLOBULIN: NORMAL G/DL (ref 1.5–3.8)
GLUCOSE BLD-MCNC: 298 MG/DL (ref 74–100)
GLUCOSE BLD-MCNC: 446 MG/DL (ref 70–99)
GLUCOSE URINE: ABNORMAL
HCT VFR BLD CALC: 42.5 % (ref 36–46)
HEMOGLOBIN: 14.2 G/DL (ref 12–16)
IMMATURE GRANULOCYTES: ABNORMAL %
INR BLD: 1 (ref 0.9–1.2)
KETONES, URINE: ABNORMAL
LACTIC ACID: 2.7 MMOL/L (ref 0.5–2.2)
LEUKOCYTE ESTERASE, URINE: NEGATIVE
LIPASE: 40 U/L (ref 13–60)
LYMPHOCYTES # BLD: 17 %
MCH RBC QN AUTO: 27.2 PG (ref 26–34)
MCHC RBC AUTO-ENTMCNC: 33.3 G/DL (ref 31–37)
MCV RBC AUTO: 81.7 FL (ref 80–100)
MONOCYTES # BLD: 5 %
MUCUS: ABNORMAL
NITRITE, URINE: POSITIVE
OTHER OBSERVATIONS UA: ABNORMAL
PDW BLD-RTO: 14.4 % (ref 12.1–15.2)
PH UA: 5.5 (ref 5–9)
PLATELET # BLD: 291 K/UL (ref 140–450)
PLATELET ESTIMATE: ABNORMAL
PMV BLD AUTO: 9.1 FL (ref 6–12)
POTASSIUM SERPL-SCNC: 4.8 MMOL/L (ref 3.7–5.3)
PRO-BNP: 58 PG/ML
PROTEIN UA: ABNORMAL
PROTHROMBIN TIME: 10.3 SEC (ref 9.7–12.2)
RBC # BLD: 5.2 M/UL (ref 4–5.2)
RBC # BLD: ABNORMAL 10*6/UL
RBC UA: ABNORMAL /HPF (ref 0–2)
RENAL EPITHELIAL, UA: ABNORMAL /HPF
SEG NEUTROPHILS: 75 %
SEGMENTED NEUTROPHILS ABSOLUTE COUNT: 10.2 K/UL (ref 1.8–7.7)
SODIUM BLD-SCNC: 129 MMOL/L (ref 135–144)
SPECIFIC GRAVITY UA: 1.02 (ref 1.01–1.02)
TOTAL PROTEIN: 7.4 G/DL (ref 6.4–8.3)
TRICHOMONAS: ABNORMAL
TROPONIN INTERP: NORMAL
TROPONIN T: <0.03 NG/ML
TURBIDITY: CLEAR
URINE HGB: NEGATIVE
UROBILINOGEN, URINE: NORMAL
WBC # BLD: 13.8 K/UL (ref 3.5–11)
WBC # BLD: ABNORMAL 10*3/UL
WBC UA: ABNORMAL /HPF (ref 0–5)
YEAST: ABNORMAL

## 2017-11-09 PROCEDURE — 84484 ASSAY OF TROPONIN QUANT: CPT

## 2017-11-09 PROCEDURE — 2000000000 HC ICU R&B

## 2017-11-09 PROCEDURE — 83690 ASSAY OF LIPASE: CPT

## 2017-11-09 PROCEDURE — 80076 HEPATIC FUNCTION PANEL: CPT

## 2017-11-09 PROCEDURE — 94760 N-INVAS EAR/PLS OXIMETRY 1: CPT

## 2017-11-09 PROCEDURE — 83880 ASSAY OF NATRIURETIC PEPTIDE: CPT

## 2017-11-09 PROCEDURE — 6370000000 HC RX 637 (ALT 250 FOR IP): Performed by: FAMILY MEDICINE

## 2017-11-09 PROCEDURE — 6370000000 HC RX 637 (ALT 250 FOR IP): Performed by: INTERNAL MEDICINE

## 2017-11-09 PROCEDURE — 80048 BASIC METABOLIC PNL TOTAL CA: CPT

## 2017-11-09 PROCEDURE — 71010 XR CHEST PORTABLE: CPT

## 2017-11-09 PROCEDURE — 94664 DEMO&/EVAL PT USE INHALER: CPT

## 2017-11-09 PROCEDURE — 85610 PROTHROMBIN TIME: CPT

## 2017-11-09 PROCEDURE — 6360000002 HC RX W HCPCS: Performed by: FAMILY MEDICINE

## 2017-11-09 PROCEDURE — 2580000003 HC RX 258: Performed by: FAMILY MEDICINE

## 2017-11-09 PROCEDURE — 83605 ASSAY OF LACTIC ACID: CPT

## 2017-11-09 PROCEDURE — 85025 COMPLETE CBC W/AUTO DIFF WBC: CPT

## 2017-11-09 PROCEDURE — 87077 CULTURE AEROBIC IDENTIFY: CPT

## 2017-11-09 PROCEDURE — 93005 ELECTROCARDIOGRAM TRACING: CPT

## 2017-11-09 PROCEDURE — 87040 BLOOD CULTURE FOR BACTERIA: CPT

## 2017-11-09 PROCEDURE — 99285 EMERGENCY DEPT VISIT HI MDM: CPT

## 2017-11-09 PROCEDURE — 87186 SC STD MICRODIL/AGAR DIL: CPT

## 2017-11-09 PROCEDURE — 36415 COLL VENOUS BLD VENIPUNCTURE: CPT

## 2017-11-09 PROCEDURE — 87086 URINE CULTURE/COLONY COUNT: CPT

## 2017-11-09 PROCEDURE — 6360000002 HC RX W HCPCS: Performed by: INTERNAL MEDICINE

## 2017-11-09 PROCEDURE — 2580000003 HC RX 258: Performed by: INTERNAL MEDICINE

## 2017-11-09 PROCEDURE — 82947 ASSAY GLUCOSE BLOOD QUANT: CPT

## 2017-11-09 PROCEDURE — 81001 URINALYSIS AUTO W/SCOPE: CPT

## 2017-11-09 RX ORDER — ACETAMINOPHEN 650 MG/1
650 SUPPOSITORY RECTAL EVERY 4 HOURS PRN
Status: DISCONTINUED | OUTPATIENT
Start: 2017-11-09 | End: 2017-11-11 | Stop reason: HOSPADM

## 2017-11-09 RX ORDER — ASPIRIN 81 MG/1
324 TABLET, CHEWABLE ORAL ONCE
Status: COMPLETED | OUTPATIENT
Start: 2017-11-09 | End: 2017-11-09

## 2017-11-09 RX ORDER — GABAPENTIN 400 MG/1
800 CAPSULE ORAL 3 TIMES DAILY
Status: DISCONTINUED | OUTPATIENT
Start: 2017-11-09 | End: 2017-11-11 | Stop reason: HOSPADM

## 2017-11-09 RX ORDER — ALOGLIPTIN AND METFORMIN HYDROCHLORIDE 12.5; 1 MG/1; MG/1
1 TABLET, FILM COATED ORAL 2 TIMES DAILY
Status: DISCONTINUED | OUTPATIENT
Start: 2017-11-09 | End: 2017-11-09

## 2017-11-09 RX ORDER — 0.9 % SODIUM CHLORIDE 0.9 %
500 INTRAVENOUS SOLUTION INTRAVENOUS ONCE
Status: COMPLETED | OUTPATIENT
Start: 2017-11-09 | End: 2017-11-09

## 2017-11-09 RX ORDER — LOSARTAN POTASSIUM 25 MG/1
25 TABLET ORAL DAILY
Status: DISCONTINUED | OUTPATIENT
Start: 2017-11-10 | End: 2017-11-11 | Stop reason: HOSPADM

## 2017-11-09 RX ORDER — ESCITALOPRAM OXALATE 5 MG/1
10 TABLET ORAL DAILY
Status: DISCONTINUED | OUTPATIENT
Start: 2017-11-10 | End: 2017-11-11 | Stop reason: HOSPADM

## 2017-11-09 RX ORDER — FLUDROCORTISONE ACETATE 0.1 MG/1
0.2 TABLET ORAL DAILY
Status: DISCONTINUED | OUTPATIENT
Start: 2017-11-10 | End: 2017-11-11 | Stop reason: HOSPADM

## 2017-11-09 RX ORDER — PANTOPRAZOLE SODIUM 40 MG/1
40 TABLET, DELAYED RELEASE ORAL
Status: DISCONTINUED | OUTPATIENT
Start: 2017-11-10 | End: 2017-11-11 | Stop reason: HOSPADM

## 2017-11-09 RX ORDER — CLOPIDOGREL BISULFATE 75 MG/1
75 TABLET ORAL DAILY
Status: DISCONTINUED | OUTPATIENT
Start: 2017-11-10 | End: 2017-11-11 | Stop reason: HOSPADM

## 2017-11-09 RX ORDER — ASPIRIN 81 MG/1
81 TABLET, CHEWABLE ORAL DAILY
Status: DISCONTINUED | OUTPATIENT
Start: 2017-11-10 | End: 2017-11-11 | Stop reason: HOSPADM

## 2017-11-09 RX ORDER — SODIUM CHLORIDE 0.9 % (FLUSH) 0.9 %
10 SYRINGE (ML) INJECTION PRN
Status: DISCONTINUED | OUTPATIENT
Start: 2017-11-09 | End: 2017-11-11 | Stop reason: HOSPADM

## 2017-11-09 RX ORDER — ONDANSETRON 2 MG/ML
4 INJECTION INTRAMUSCULAR; INTRAVENOUS EVERY 6 HOURS PRN
Status: DISCONTINUED | OUTPATIENT
Start: 2017-11-09 | End: 2017-11-11 | Stop reason: HOSPADM

## 2017-11-09 RX ORDER — 0.9 % SODIUM CHLORIDE 0.9 %
1000 INTRAVENOUS SOLUTION INTRAVENOUS ONCE
Status: COMPLETED | OUTPATIENT
Start: 2017-11-09 | End: 2017-11-09

## 2017-11-09 RX ORDER — ALBUTEROL SULFATE 90 UG/1
2 AEROSOL, METERED RESPIRATORY (INHALATION) EVERY 6 HOURS PRN
Status: DISCONTINUED | OUTPATIENT
Start: 2017-11-09 | End: 2017-11-11 | Stop reason: HOSPADM

## 2017-11-09 RX ORDER — DULOXETIN HYDROCHLORIDE 30 MG/1
30 CAPSULE, DELAYED RELEASE ORAL DAILY
Status: DISCONTINUED | OUTPATIENT
Start: 2017-11-10 | End: 2017-11-11 | Stop reason: HOSPADM

## 2017-11-09 RX ORDER — SODIUM CHLORIDE 0.9 % (FLUSH) 0.9 %
10 SYRINGE (ML) INJECTION EVERY 12 HOURS SCHEDULED
Status: DISCONTINUED | OUTPATIENT
Start: 2017-11-09 | End: 2017-11-11 | Stop reason: HOSPADM

## 2017-11-09 RX ORDER — FAMOTIDINE 20 MG/1
20 TABLET, FILM COATED ORAL 2 TIMES DAILY
Status: DISCONTINUED | OUTPATIENT
Start: 2017-11-09 | End: 2017-11-11 | Stop reason: HOSPADM

## 2017-11-09 RX ORDER — ACETAMINOPHEN 325 MG/1
650 TABLET ORAL EVERY 4 HOURS PRN
Status: DISCONTINUED | OUTPATIENT
Start: 2017-11-09 | End: 2017-11-11 | Stop reason: HOSPADM

## 2017-11-09 RX ORDER — ATORVASTATIN CALCIUM 40 MG/1
40 TABLET, FILM COATED ORAL DAILY
Status: DISCONTINUED | OUTPATIENT
Start: 2017-11-09 | End: 2017-11-11 | Stop reason: HOSPADM

## 2017-11-09 RX ORDER — ONDANSETRON 4 MG/1
4 TABLET, ORALLY DISINTEGRATING ORAL EVERY 8 HOURS PRN
Status: DISCONTINUED | OUTPATIENT
Start: 2017-11-09 | End: 2017-11-11 | Stop reason: HOSPADM

## 2017-11-09 RX ORDER — SODIUM CHLORIDE 9 MG/ML
INJECTION, SOLUTION INTRAVENOUS CONTINUOUS
Status: DISCONTINUED | OUTPATIENT
Start: 2017-11-09 | End: 2017-11-11

## 2017-11-09 RX ADMIN — Medication 30 ML: at 13:39

## 2017-11-09 RX ADMIN — ENOXAPARIN SODIUM 40 MG: 40 INJECTION SUBCUTANEOUS at 20:42

## 2017-11-09 RX ADMIN — ASPIRIN 81 MG 324 MG: 81 TABLET ORAL at 13:39

## 2017-11-09 RX ADMIN — CEFTRIAXONE 1 G: 1 INJECTION, POWDER, FOR SOLUTION INTRAMUSCULAR; INTRAVENOUS at 16:46

## 2017-11-09 RX ADMIN — METFORMIN HYDROCHLORIDE 1000 MG: 500 TABLET, FILM COATED ORAL at 18:21

## 2017-11-09 RX ADMIN — ONDANSETRON 4 MG: 2 INJECTION INTRAMUSCULAR; INTRAVENOUS at 20:41

## 2017-11-09 RX ADMIN — INSULIN GLARGINE 50 UNITS: 100 INJECTION, SOLUTION SUBCUTANEOUS at 20:42

## 2017-11-09 RX ADMIN — SODIUM CHLORIDE 500 ML: 9 INJECTION, SOLUTION INTRAVENOUS at 13:43

## 2017-11-09 RX ADMIN — SODIUM CHLORIDE 500 ML: 9 INJECTION, SOLUTION INTRAVENOUS at 14:40

## 2017-11-09 RX ADMIN — FAMOTIDINE 20 MG: 20 TABLET, FILM COATED ORAL at 20:42

## 2017-11-09 RX ADMIN — GABAPENTIN 800 MG: 400 CAPSULE ORAL at 20:42

## 2017-11-09 RX ADMIN — SODIUM CHLORIDE 1000 ML: 9 INJECTION, SOLUTION INTRAVENOUS at 16:49

## 2017-11-09 RX ADMIN — SODIUM CHLORIDE: 9 INJECTION, SOLUTION INTRAVENOUS at 18:20

## 2017-11-09 ASSESSMENT — PAIN DESCRIPTION - LOCATION
LOCATION: ABDOMEN;CHEST
LOCATION: ABDOMEN
LOCATION: ABDOMEN

## 2017-11-09 ASSESSMENT — PAIN DESCRIPTION - DESCRIPTORS
DESCRIPTORS: ACHING
DESCRIPTORS: ACHING

## 2017-11-09 ASSESSMENT — PAIN SCALES - GENERAL
PAINLEVEL_OUTOF10: 2
PAINLEVEL_OUTOF10: 3
PAINLEVEL_OUTOF10: 5

## 2017-11-09 ASSESSMENT — PAIN DESCRIPTION - PAIN TYPE
TYPE: ACUTE PAIN

## 2017-11-09 NOTE — ED PROVIDER NOTES
stent (9/2010); UPPP (06/26/2012); Colonoscopy (12/16/14); Endoscopy, colon, diagnostic (12/16/2014); ventral hernia repair (09/21/2015); other surgical history (10/8/2015); Abdominal hernia repair (1-2016); Coronary angioplasty with stent (3-); Appendectomy; Cardiac surgery; Cardiac catheterization (Left, 4/26/2016); hernia repair (12/13/12); hernia repair (02/16/2015); hernia repair (02/2016); and Coronary artery bypass graft (08/15/2016). CURRENT MEDICATIONS       Current Discharge Medication List      CONTINUE these medications which have NOT CHANGED    Details   DULoxetine (CYMBALTA) 30 MG extended release capsule Take one cap once daily  Qty: 30 capsule, Refills: 0    Associated Diagnoses: Neuropathic pain; Diabetic polyneuropathy associated with type 2 diabetes mellitus (HCC)      albuterol sulfate HFA (VENTOLIN HFA) 108 (90 Base) MCG/ACT inhaler INHALE 2 PUFFS INTO THE LUNGS EVERY 6 HOURS AS NEEDED FOR WHEEZING.   Qty: 1 Inhaler, Refills: 3      glucose blood VI test strips (FREESTYLE LITE) strip Use to check blood sugars 3 times daily and as needed for Diabetes E11.40  Qty: 100 each, Refills: 11    Associated Diagnoses: Uncontrolled type 2 diabetes mellitus with diabetic polyneuropathy, with long-term current use of insulin ([facility])      alogliptin-metformin 12.5-1000 MG TABS Take 1 tablet by mouth 2 times daily  Qty: 60 tablet, Refills: 11    Associated Diagnoses: Uncontrolled type 2 diabetes mellitus with diabetic polyneuropathy, with long-term current use of insulin ([facility])      fludrocortisone (FLORINEF) 0.1 MG tablet Take 2 tablets by mouth daily  Qty: 90 tablet, Refills: 3      metoprolol tartrate (LOPRESSOR) 25 MG tablet Take 1 tablet by mouth 2 times daily  Qty: 180 tablet, Refills: 3    Associated Diagnoses: Essential hypertension      omeprazole (PRILOSEC) 20 MG delayed release capsule Take 1 capsule by mouth daily  Qty: 90 capsule, Refills: 3      insulin glargine (LANTUS SOLOSTAR) 100 UNIT/ML injection pen Inject 50 Units into the skin nightly  Qty: 5 Pen, Refills: 5    Associated Diagnoses: Uncontrolled type 2 diabetes mellitus with diabetic polyneuropathy, with long-term current use of insulin ([facility])      gabapentin (NEURONTIN) 800 MG tablet TAKE 1 TABLET BY MOUTH 3 TIMES A DAY  Qty: 90 tablet, Refills: 6    Associated Diagnoses: Uncontrolled type 2 diabetes mellitus with diabetic polyneuropathy, with long-term current use of insulin ([address] Gait difficulty      capsaicin (ZOSTRIX) 0.025 % cream Apply topically 2 times daily Apply topically 2 times daily. Qty: 1 Tube, Refills: 6    Associated Diagnoses: Gait difficulty      Liraglutide (VICTOZA) 18 MG/3ML SOPN SC injection INJECT 1.8 MG INTO THE SKIN DAILY AS DIRECTED  Qty: 5 Pen, Refills: 11    Associated Diagnoses: Uncontrolled type 2 diabetes mellitus with diabetic polyneuropathy, with long-term current use of insulin ([facility])      escitalopram (LEXAPRO) 10 MG tablet TAKE 1 TABLET BY MOUTH DAILY.   Qty: 90 tablet, Refills: 3    Associated Diagnoses: Dysthymia      Insulin Pen Needle (BD ULTRA-FINE PEN NEEDLES) 29G X 12.7MM MISC USE AS DIRECTED BY PHYSICIAN  Qty: 100 each, Refills: 3    Comments: NEEDS REFILL RENEWAL  Associated Diagnoses: Uncontrolled type 2 diabetes mellitus with diabetic polyneuropathy, with long-term current use of insulin ([facility])      atorvastatin (LIPITOR) 40 MG tablet Take 1 tablet by mouth daily  Qty: 90 tablet, Refills: 3    Associated Diagnoses: Uncontrolled type 2 diabetes mellitus with diabetic polyneuropathy, with long-term current use of insulin ([facility])      losartan (COZAAR) 25 MG tablet Take 1 tablet by mouth daily  Qty: 90 tablet, Refills: 3    Associated Diagnoses: Essential hypertension      clopidogrel (PLAVIX) 75 MG tablet Take 1 tablet by mouth daily  Qty: 90 tablet, Refills: 3      aspirin 81 MG chewable tablet Take 1 tablet by mouth daily  Qty: 100 tablet, Refills: 3      Blood Pressure Monitor KIT 1 each by Does not apply route daily as needed ESSENTIAL HYPERTENSION   I10  Qty: 1 kit, Refills: 0    Associated Diagnoses: Essential hypertension      ondansetron (ZOFRAN ODT) 4 MG disintegrating tablet Take 1 tablet by mouth every 8 hours as needed for Nausea or Vomiting  Qty: 10 tablet, Refills: 0             ALLERGIES     has No Known Allergies. FAMILY HISTORY     indicated that her mother is . She indicated that her father is . She indicated that her sister is alive. She indicated that her brother is alive. She indicated that the status of her maternal grandmother is unknown. She indicated that her maternal uncle is . family history includes Cancer in her father, maternal grandmother, and mother; Diabetes in her mother and sister; Heart Disease in her brother, maternal uncle, and sister. SOCIAL HISTORY      reports that she quit smoking about 7 years ago. She has never used smokeless tobacco. She reports that she does not drink alcohol or use drugs. PHYSICAL EXAM     INITIAL VITALS:  height is 5' 3\" (1.6 m) and weight is 230 lb 3.2 oz (104.4 kg). Her temporal temperature is 98.6 °F (37 °C). Her blood pressure is 104/56 (abnormal) and her pulse is 81. Her respiration is 14 and oxygen saturation is 96%. Physical Exam   Constitutional: Patient is oriented to person, place, and time. Patient appears well-developed and well-nourished. Patient is active and cooperative. HENT:   Head: Normocephalic and atraumatic. Head is without contusion. Right Ear: Hearing and external ear normal. No drainage. Left Ear: Hearing and external ear normal. No drainage. Nose: Nose normal. No nasal deformity. No epistaxis. Mouth/Throat: Mucous membranes are not dry. Eyes: Noted right amblyopia, left eye appears normal. Conjunctivae, sclera, and lids are normal. Right eye exhibits no discharge. Left eye exhibits no discharge.    Neck: Full passive range of motion without pain and phonation normal. Cardiovascular:  Normal rate, regular rhythm and intact distal pulses. Pulses: Right radial pulse  2+   Pulmonary/Chest: Effort normal. No tachypnea and no bradypnea. No wheezes, rhonchi, or rales. Abdominal: Soft. Markedly Increased BMI, mild epigastric and RUQ pain to moderate palpation without rigidity rebound or guarding,  Musculoskeletal:   Negative acute trauma or deformity,  apparent full range of motion and normal strength all extremities appropriate to age. Neurological: Patient is alert and oriented to person, place, and time. patient displays no tremor. Patient displays no seizure activity. .   Skin: Skin is warm and dry. Patient is not diaphoretic. Psychiatric: Patient has a normal mood and affect. Patient speech is normal and behavior is normal. Cognition and memory are normal.      DIFFERENTIAL DIAGNOSIS:   ACS, angina, gerd, aa, msk, anxiety, abd wall hernia, gallstones in liver (post zaid)    DIAGNOSTIC RESULTS     EKG: All EKG's are interpreted by the Emergency Department Physician who either signs or Co-signs this chart in the absence of a cardiologist.  EKG    The patient had an EKG which is interpreted by me in the absence of a Cardiologist.   [] Without comparison to previous. [x] With comparison to a previous EKG Dated 7/25/2017    Sinus rhythm rate 85 normal axis normal intervals      RADIOLOGY: non-plain film images(s) such as CT, Ultrasound and MRI are read by the radiologist.  XR Chest Portable   Final Result   PREVIOUS THORACIC SURGERY. NO ACTIVE INFILTRATE OR VASCULAR CONGESTION.           LABS:   Labs Reviewed   CBC WITH AUTO DIFFERENTIAL - Abnormal; Notable for the following:        Result Value    WBC 13.8 (*)     Segs Absolute 10.20 (*)     All other components within normal limits   BASIC METABOLIC PANEL - Abnormal; Notable for the following:     Glucose 446 (*)     Bun/Cre Ratio 25 (*)     Sodium 129 (*)     Chloride 93 (*)     CO2 18 (*)     Anion Gap 18 (*)     All other components within normal limits   LACTIC ACID - Abnormal; Notable for the following:     Lactic Acid 2.7 (*)     All other components within normal limits   UA W/REFLEX CULTURE - Abnormal; Notable for the following:     Glucose, Ur 3+ (*)     Ketones, Urine TRACE (*)     Specific Gravity, UA 1.025 (*)     Protein, UA TRACE (*)     Nitrite, Urine POSITIVE (*)     All other components within normal limits   MICROSCOPIC URINALYSIS - Abnormal; Notable for the following:     Bacteria, UA 2+ (*)     All other components within normal limits   GLUCOSE, WHOLE BLOOD - Abnormal; Notable for the following:     POC Glucose 298 (*)     All other components within normal limits   URINE CULTURE   CULTURE BLOOD #1   CULTURE BLOOD #2   HEPATIC FUNCTION PANEL   LIPASE   TROPONIN   BRAIN NATRIURETIC PEPTIDE   PROTIME-INR   COMPREHENSIVE METABOLIC PANEL       EMERGENCY DEPARTMENT COURSE:   Vitals:    Vitals:    11/09/17 1830 11/09/17 1900 11/09/17 1930 11/09/17 2000   BP: 104/70 98/79 98/79 (!) 104/56   Pulse: 80 88 85 81   Resp: 19 10 25 14   Temp: 98.6 °F (37 °C)      TempSrc: Temporal      SpO2: 94% 95% 95% 96%   Weight:       Height:         Patient history and physical exam taken at bedside, discussed patient's symptoms and exam findings as well as initial plan of workup to include EKG, chest x-ray, blood work with saline lock insertion, and chewable aspirin, GI cocktail, IV fluids 500 mL normal saline ordered. Patient placed on cardiac monitor and continuous pulse oximetry resting semi-Fowlers in bed.     EKG as above    Initial lab work reviewed, noting lactic acid 2.7, white blood cell count 13.8 with 75% PMNs, no reported bandemia, blood glucose 446 creatinine 0.75 BUN 19, sodium 129 (corrected to 137 Corby method) hepatic function panel and lipase normal    Chest x-ray as above    Urinalysis reviewed positive for nitrite    SIRS/Sepsis Criteria:    Temp >38 C or <36 C  HR > 90 bpm   RR > or PaCO2 < 32  WBC > 96407 or mg (not administered)   fludrocortisone (FLORINEF) tablet 0.2 mg (not administered)   gabapentin (NEURONTIN) capsule 800 mg (not administered)   insulin glargine (LANTUS) injection pen 50 Units (not administered)   Liraglutide (VICTOZA) SC injection 1.8 mg (1.8 mg Subcutaneous Not Given 11/9/17 1808)   losartan (COZAAR) tablet 25 mg (not administered)   metoprolol tartrate (LOPRESSOR) tablet 25 mg (not administered)   pantoprazole (PROTONIX) tablet 40 mg (not administered)   ondansetron (ZOFRAN-ODT) disintegrating tablet 4 mg (not administered)   0.9 % sodium chloride infusion ( Intravenous New Bag 11/9/17 1820)   sodium chloride flush 0.9 % injection 10 mL (10 mLs Intravenous Not Given 11/9/17 1930)   sodium chloride flush 0.9 % injection 10 mL (not administered)   acetaminophen (TYLENOL) tablet 650 mg (not administered)   acetaminophen (TYLENOL) suppository 650 mg (not administered)   magnesium hydroxide (MILK OF MAGNESIA) 400 MG/5ML suspension 30 mL (not administered)   ondansetron (ZOFRAN) injection 4 mg (4 mg Intravenous Given 11/9/17 2041)   famotidine (PEPCID) tablet 20 mg (not administered)   enoxaparin (LOVENOX) injection 40 mg (not administered)   cefTRIAXone (ROCEPHIN) 1 g in sterile water 10 mL IV syringe (not administered)   influenza quadrivalent split vaccine (FLUZONE;FLUARIX;FLULAVAL;AFLURIA) injection 0.5 mL (not administered)   linagliptin (TRADJENTA) tablet 5 mg (not administered)     And   metFORMIN (GLUCOPHAGE) tablet 1,000 mg (1,000 mg Oral Given 11/9/17 1821)   aspirin chewable tablet 324 mg (324 mg Oral Given 11/9/17 1339)   gi cocktail 30 mL (30 mLs Oral Given 11/9/17 1339)   0.9 % sodium chloride bolus (0 mLs Intravenous Stopped 11/9/17 1420)   0.9 % sodium chloride bolus (0 mLs Intravenous Stopped 11/9/17 1540)   cefTRIAXone (ROCEPHIN) 1 g in sterile water 10 mL IV syringe (0 g Intravenous Stopped 11/9/17 1649)   0.9 % sodium chloride bolus (0 mLs Intravenous Stopped 11/9/17 2564)

## 2017-11-09 NOTE — ED NOTES
Multiple attempts to collect 2nd blood culture without success.       Giuliana Roberts RN  11/09/17 [id number]

## 2017-11-10 PROBLEM — N30.00 ACUTE CYSTITIS: Status: ACTIVE | Noted: 2017-11-10

## 2017-11-10 LAB
ALBUMIN SERPL-MCNC: 3.3 G/DL (ref 3.5–5.2)
ALBUMIN/GLOBULIN RATIO: 1.1 (ref 1–2.5)
ALP BLD-CCNC: 76 U/L (ref 35–104)
ALT SERPL-CCNC: 12 U/L (ref 5–33)
ANION GAP SERPL CALCULATED.3IONS-SCNC: 15 MMOL/L (ref 9–17)
AST SERPL-CCNC: 11 U/L
BILIRUB SERPL-MCNC: 0.51 MG/DL (ref 0.3–1.2)
BUN BLDV-MCNC: 13 MG/DL (ref 6–20)
BUN/CREAT BLD: 22 (ref 9–20)
CALCIUM SERPL-MCNC: 8.6 MG/DL (ref 8.6–10.4)
CHLORIDE BLD-SCNC: 101 MMOL/L (ref 98–107)
CO2: 19 MMOL/L (ref 20–31)
CREAT SERPL-MCNC: 0.6 MG/DL (ref 0.5–0.9)
CULTURE: ABNORMAL
CULTURE: ABNORMAL
GFR AFRICAN AMERICAN: >60 ML/MIN
GFR NON-AFRICAN AMERICAN: >60 ML/MIN
GFR SERPL CREATININE-BSD FRML MDRD: ABNORMAL ML/MIN/{1.73_M2}
GFR SERPL CREATININE-BSD FRML MDRD: ABNORMAL ML/MIN/{1.73_M2}
GLUCOSE BLD-MCNC: 191 MG/DL (ref 74–100)
GLUCOSE BLD-MCNC: 236 MG/DL (ref 74–100)
GLUCOSE BLD-MCNC: 268 MG/DL (ref 70–99)
Lab: ABNORMAL
Lab: ABNORMAL
ORGANISM: ABNORMAL
POTASSIUM SERPL-SCNC: 4.2 MMOL/L (ref 3.7–5.3)
SODIUM BLD-SCNC: 135 MMOL/L (ref 135–144)
SPECIMEN DESCRIPTION: ABNORMAL
SPECIMEN DESCRIPTION: ABNORMAL
STATUS: ABNORMAL
TOTAL PROTEIN: 6.3 G/DL (ref 6.4–8.3)

## 2017-11-10 PROCEDURE — 87040 BLOOD CULTURE FOR BACTERIA: CPT

## 2017-11-10 PROCEDURE — 6370000000 HC RX 637 (ALT 250 FOR IP): Performed by: PHYSICIAN ASSISTANT

## 2017-11-10 PROCEDURE — 2580000003 HC RX 258: Performed by: PHYSICIAN ASSISTANT

## 2017-11-10 PROCEDURE — 80053 COMPREHEN METABOLIC PANEL: CPT

## 2017-11-10 PROCEDURE — 2580000003 HC RX 258: Performed by: INTERNAL MEDICINE

## 2017-11-10 PROCEDURE — 6370000000 HC RX 637 (ALT 250 FOR IP): Performed by: INTERNAL MEDICINE

## 2017-11-10 PROCEDURE — 36415 COLL VENOUS BLD VENIPUNCTURE: CPT

## 2017-11-10 PROCEDURE — 1200000000 HC SEMI PRIVATE

## 2017-11-10 PROCEDURE — 6360000002 HC RX W HCPCS: Performed by: INTERNAL MEDICINE

## 2017-11-10 PROCEDURE — 82947 ASSAY GLUCOSE BLOOD QUANT: CPT

## 2017-11-10 RX ORDER — NICOTINE POLACRILEX 4 MG
15 LOZENGE BUCCAL PRN
Status: DISCONTINUED | OUTPATIENT
Start: 2017-11-10 | End: 2017-11-11 | Stop reason: HOSPADM

## 2017-11-10 RX ORDER — DEXTROSE MONOHYDRATE 50 MG/ML
100 INJECTION, SOLUTION INTRAVENOUS PRN
Status: DISCONTINUED | OUTPATIENT
Start: 2017-11-10 | End: 2017-11-11 | Stop reason: HOSPADM

## 2017-11-10 RX ORDER — DEXTROSE MONOHYDRATE 25 G/50ML
12.5 INJECTION, SOLUTION INTRAVENOUS PRN
Status: DISCONTINUED | OUTPATIENT
Start: 2017-11-10 | End: 2017-11-11 | Stop reason: HOSPADM

## 2017-11-10 RX ADMIN — GABAPENTIN 800 MG: 400 CAPSULE ORAL at 20:51

## 2017-11-10 RX ADMIN — INSULIN LISPRO 1 UNITS: 100 INJECTION, SOLUTION INTRAVENOUS; SUBCUTANEOUS at 20:48

## 2017-11-10 RX ADMIN — CEFTRIAXONE 1 G: 1 INJECTION, POWDER, FOR SOLUTION INTRAMUSCULAR; INTRAVENOUS at 16:30

## 2017-11-10 RX ADMIN — LOSARTAN POTASSIUM 25 MG: 25 TABLET ORAL at 08:03

## 2017-11-10 RX ADMIN — METOPROLOL TARTRATE 25 MG: 25 TABLET ORAL at 08:03

## 2017-11-10 RX ADMIN — ENOXAPARIN SODIUM 40 MG: 40 INJECTION SUBCUTANEOUS at 08:03

## 2017-11-10 RX ADMIN — INSULIN LISPRO 4 UNITS: 100 INJECTION, SOLUTION INTRAVENOUS; SUBCUTANEOUS at 16:36

## 2017-11-10 RX ADMIN — GABAPENTIN 800 MG: 400 CAPSULE ORAL at 08:02

## 2017-11-10 RX ADMIN — FAMOTIDINE 20 MG: 20 TABLET, FILM COATED ORAL at 08:03

## 2017-11-10 RX ADMIN — SODIUM CHLORIDE: 9 INJECTION, SOLUTION INTRAVENOUS at 02:12

## 2017-11-10 RX ADMIN — ASPIRIN 81 MG 81 MG: 81 TABLET ORAL at 08:03

## 2017-11-10 RX ADMIN — PANTOPRAZOLE SODIUM 40 MG: 40 TABLET, DELAYED RELEASE ORAL at 08:08

## 2017-11-10 RX ADMIN — ATORVASTATIN CALCIUM 40 MG: 40 TABLET, FILM COATED ORAL at 08:03

## 2017-11-10 RX ADMIN — SODIUM CHLORIDE: 9 INJECTION, SOLUTION INTRAVENOUS at 18:44

## 2017-11-10 RX ADMIN — METFORMIN HYDROCHLORIDE 1000 MG: 500 TABLET, FILM COATED ORAL at 16:37

## 2017-11-10 RX ADMIN — METOPROLOL TARTRATE 25 MG: 25 TABLET ORAL at 20:51

## 2017-11-10 RX ADMIN — INSULIN GLARGINE 50 UNITS: 100 INJECTION, SOLUTION SUBCUTANEOUS at 20:48

## 2017-11-10 RX ADMIN — ESCITALOPRAM 10 MG: 5 TABLET, FILM COATED ORAL at 08:02

## 2017-11-10 RX ADMIN — GABAPENTIN 800 MG: 400 CAPSULE ORAL at 14:16

## 2017-11-10 RX ADMIN — DULOXETINE HYDROCHLORIDE 30 MG: 30 CAPSULE, DELAYED RELEASE ORAL at 08:03

## 2017-11-10 RX ADMIN — FLUDROCORTISONE ACETATE 0.2 MG: 0.1 TABLET ORAL at 08:03

## 2017-11-10 RX ADMIN — CLOPIDOGREL BISULFATE 75 MG: 75 TABLET ORAL at 08:03

## 2017-11-10 RX ADMIN — FAMOTIDINE 20 MG: 20 TABLET, FILM COATED ORAL at 20:51

## 2017-11-10 RX ADMIN — LINAGLIPTIN 5 MG: 5 TABLET, FILM COATED ORAL at 08:10

## 2017-11-10 RX ADMIN — METFORMIN HYDROCHLORIDE 1000 MG: 500 TABLET, FILM COATED ORAL at 08:03

## 2017-11-10 RX ADMIN — METOPROLOL TARTRATE 25 MG: 25 TABLET ORAL at 01:30

## 2017-11-10 ASSESSMENT — PAIN SCALES - GENERAL
PAINLEVEL_OUTOF10: 0

## 2017-11-10 NOTE — PLAN OF CARE
Problem: Pain:  Goal: Pain level will decrease  Pain level will decrease   Outcome: Ongoing  Pt is able to rate pain using a 0-10 scale. Medication, ice and repositioning reduce the pain if needed and pt is aware that it is available. Will continue to monitor. Problem: Falls - Risk of  Goal: Absence of falls  Outcome: Ongoing  Pt has no falls and is continuing to be monitored. Fall precautions remain intact. Bracelet on pt, star on, bed low and locked, alarm on, side rails up, call light and personal belongings within reach, and room clear and clean of clutter. Problem: Gas Exchange - Impaired:  Goal: Levels of oxygenation will improve  Levels of oxygenation will improve   Outcome: Ongoing  Patient is on room air at this time. Pulse oximetry is 92% or higher will continue to monitor. Problem: Infection, Septic Shock:  Goal: Will show no infection signs and symptoms  Will show no infection signs and symptoms   Outcome: Ongoing  Patient on IV antibiotics at this time. No fever present. IV fluids infusing. Will continue to monitor.

## 2017-11-10 NOTE — PROGRESS NOTES
Nutrition Assessment    Type and Reason for Visit: Initial (decreased PO before admission)    Nutrition Recommendations:   Continue with current diet  F/u for CC diet education needs    Malnutrition Assessment:  · Malnutrition Status: Insufficient data  · Context: Acute illness or injury  · Findings of the 6 clinical characteristics of malnutrition (Minimum of 2 out of 6 clinical characteristics is required to make the diagnosis of moderate or severe Protein Calorie Malnutrition based on AND/ASPEN Guidelines):  1. Energy Intake-Not available,      2. Weight Loss-No significant weight loss,    3. Fat Loss-Unable to assess,    4. Muscle Loss-Unable to assess,    5. Fluid Accumulation-No significant fluid accumulation,    6.  Strength-Not measured    Nutrition Diagnosis:   · Problem: Altered nutrition-related lab values  · Etiology: related to Endocrine dysfunction     Signs and symptoms:  as evidenced by Lab values (A1c 10.5)    Nutrition Assessment:  · Subjective Assessment: None. Pt is asleep per nursing. Noted Pt facing the window and appears asleep.    · Nutrition-Focused Physical Findings:    · Wound Type: None  · Current Nutrition Therapies:  · Oral Diet Orders: Carb Control 4 Carbs/Meal   · Oral Diet intake: %, Select  · Oral Nutrition Supplement (ONS) Orders: None  · Anthropometric Measures:  · Ht: 5' 3\" (160 cm)   · Current Body Wt: 232 lb 1.6 oz (105.3 kg)  · Admission Body Wt: 232 lb (105.2 kg)  · Usual Body Wt: 241 lb 6.4 oz (109.5 kg) (May 16, 2016)  · % Weight Change: 4.1% weight loss ,  6 months  · Ideal Body Wt: 115 lb (52.2 kg), % Ideal Body 202%  · BMI Classification: BMI > or equal to 40.0 Obese Class III  Wt Readings from Last 10 Encounters:   11/10/17 232 lb 1.6 oz (105.3 kg)   10/25/17 229 lb 3.2 oz (104 kg)   09/07/17 233 lb 8 oz (105.9 kg)   08/01/17 232 lb 12.8 oz (105.6 kg)   07/24/17 238 lb (108 kg)   06/27/17 237 lb 9.6 oz (107.8 kg)   06/06/17 238 lb 1.6 oz (108 kg) 05/30/17 241 lb 11.2 oz (109.6 kg)   05/16/17 241 lb 6.4 oz (109.5 kg)   05/14/17 242 lb (109.8 kg)     Lab Results   Component Value Date    LABA1C 10.5 09/06/2017     Recent Labs      11/09/17   2038   POCGLU  298*     Lab Results   Component Value Date    TRIG 241 09/06/2017    HDL 42 09/06/2017     Recent Labs      11/09/17   1307  11/10/17   0540   NA  129*  135   K  4.8  4.2   CL  93*  101   CO2  18*  19*   BUN  19  13   CREATININE  0.75  0.60   GLUCOSE  446*  268*   ALT  17  12   ALKPHOS  94  76   GFR                              Lab Results   Component Value Date    LABALBU 3.3 11/10/2017    LABALBU 4.2 12/05/2011      Estimated Intake vs Estimated Needs: Intake Meets Needs    Nutrition Risk Level: Moderate  Pt with poorly controlled glycemia. She had seen diabetes educator, but did not show up for her appointment for the dietitian. PO is good. She is here with a UTI. Will f/u with diet education needs.    Nutrition Interventions:   Continue current diet  Continued Inpatient Monitoring, Education Needed, Coordination of Care    Nutrition Evaluation:   · Evaluation: Goals set   · Goals: PO > 75% of meals    · Monitoring: Meal Intake, Pertinent Labs, Weight, Patient/Family Education      Electronically signed by Lizbeth Pierson RD, LD on 11/10/17 at 2:27 PM    Contact Number: [phone]

## 2017-11-10 NOTE — PROGRESS NOTES
RESPIRATORY ASSESSMENT PROTOCOL                                                                                              Patient Name: Symone Mckeon Room#: U861/J001-97 : 1965     Admitting diagnosis: UTI (urinary tract infection) [N39.0]       Medical History:   Past Medical History:   Diagnosis Date    Anxiety     Asthma     CAD (coronary artery disease)     Clinical trial participant at discharge 3/31/16    [facility]) [facility]     Depression     Diabetic neuropathy ([facility] [facility]     H/O cardiac catheterization 16    LMCA: Mild Irregularities 10-20%. LAD: Lesion on Prox LAD: Proximal subsection. 100% stenosis. LCx: Mild irregularities 10-20%. RCA: Mild irregularities 10-20%. Widely patent mid RCA stent. EF:60%.  H/O cardiovascular stress test 10/07/2016    Overall results are most consistent with a low risk for significant CAD.     H/O echocardiogram 10/05/2016    EF:>60%. Inferoseptal wall abnormal in its motion which is not unusal status post open heart surgery. Evidence of mild (grade I) diastolic dysfunction is seen.  H/O tilt table evaluation 2016    Abnormal. PTs HR, Blood Pressure response and symptoms were most consistent with dysautonomia. Combined w/viligant maintenance of euvolemia and maintaining a moderate salt intake, pharmaciologic treatment w/ ProAmatine, SSRI such as Lexapro and/or Mestinon among other treatments have shown some effectiveness in the treatment of this condition.      Hx of blood clots     Hyperlipidemia     Hypertension     Intermittent claudication (HCC)     Kidney stones     Movement disorder     neuropathy in legs    Neuromuscular disorder (HCC)     Osteoarthritis     Reflux     Seizures (HCC)     last over 10 yr ago    Stented coronary artery 2010     LAD/[place]    Stented coronary artery 3/31/16    RCA/[place]    Type II or unspecified type diabetes mellitus without mention of complication, not with  [physician-ordered bronchodilator(s)] via spacer TID PRN. If unable to utilize MDI: HHN [physician-ordered bronchodilator(s)] TID PRN. Notify physician if condition deteriorates. If Acuity Level is 2, 3, or 4 in any of the following:    [] COUGH     [] SURGICAL HISTORY (SURG HX)  [] CHEST XRAY (CXR)    Goal: Improvement in sputum mobilization in patients with ineffective airway clearance. Reverse atelectasis. [] Bronchopulmonary Hygiene Protocol    Total Acuity:   16-32  []  Secondary Assessment in 24 hrs Total Acuity:  9-15  []  Secondary Assessment in 24 hrs Total Acuity:  4-8  []  Secondary Assessment in 48 hrs Total Acuity:  0-3  []  Secondary Assessment in 72 hrs   METANEB QID with [physician-ordered bronchodilator(s)] if CXR Acuity = 4; otherwise:  PD&P, PEP, or Vest QID & PRN  NT Sxn PRN for ineffective cough  METANEB QID with [physician-ordered bronchodilator(s)] if CXR Acuity = 4; otherwise:  PD&P, PEP, or Vest TID & PRN  NT Sxn PRN for ineffective cough  Instruct patient to self-perform IS q1hr WA   Directed Cough self-performed q1hr WA     If Acuity Level is 2 or above in the following:    [] PULMONARY HISTORY (PULM HX)    Goal: Assist patient in quitting smoking to slow or stop the progression of lung disease.     [] Smoking Cessation Protocol    SMOKING CESSATION EDUCATION provided according to policy FM_215: (tristen with an X)  ____Yes    ____ No     ____ NA    Smoking Cessation Booklet given:  ____Yes  ____No ____Patient Sandra Search

## 2017-11-10 NOTE — PLAN OF CARE
Problem: Nutrition  Goal: Optimal nutrition therapy  Outcome: Ongoing  Nutrition Problem: Altered nutrition-related lab values  Intervention: Food and/or Nutrient Delivery: Continue current diet  Nutritional Goals: PO > 75% of meals

## 2017-11-10 NOTE — PROGRESS NOTES
Discussed discharge plans with the patient. Patient is a 46year old female here with Sepsis, due to unspecified organism . She is alert and oriented. Patient is  and lives with family. She uses a cane at home. Patient helps with the cooking and the cleaning. She manges her own medication. The family does the driving. Her PCP is Luli Plasencia CNP. She has medical insurance that helps with medication costs. The discharge plan is home with no services.     JASIEL Felix

## 2017-11-10 NOTE — PLAN OF CARE
Problem: Falls - Risk of  Goal: Absence of falls  Outcome: Ongoing  Pt up with standby assist, call light in reach, instructed pt to use call light for assistance, will continue to monitor    Problem: Serum Glucose Level - Abnormal:  Goal: Ability to maintain appropriate glucose levels will improve  Ability to maintain appropriate glucose levels will improve   Outcome: Ongoing  Will continue to monitor

## 2017-11-10 NOTE — H&P
Hospital Medicine  History and Physical    Patient:  Staci Tilley  MRN: [id number]    Chief Complaint:  Abdominal pain    History Obtained From:  patient, electronic medical record    PCP: Skip Plasencia CNP    History of Present Illness: The patient is a 46 y.o. female known to the hospitalist service. Presented with abdominal pain and pleuritic type chest pain as it worsened with deep breathing and cough. Mild SOB at home. Evening 11/8/17 patient's symptoms began. RUQ and epigastric sharp pain as well. Denied any nausea, diaphoresis, fever, chills, trauma or falls. h/o COPD, CAD with MIs, CABG and PCI in past, anxiety, DM, HTN. Elevated WBC and LA. CXR with no acute process. Troponin negative. Found to have UTI. She was hypotensive in the ER and responded to IVF. She was admitted for UTI. Past Medical History:        Diagnosis Date    Anxiety     Asthma     CAD (coronary artery disease)     Clinical trial participant at discharge 3/31/16    COPD [facility] obstructive [facility]) ([facility] [address]     Depression     Diabetic neuropathy ([facility] [address]     H/O cardiac catheterization 4/26/16    LMCA: Mild Irregularities 10-20%. LAD: Lesion on Prox LAD: Proximal subsection. 100% stenosis. LCx: Mild irregularities 10-20%. RCA: Mild irregularities 10-20%. Widely patent mid RCA stent. EF:60%.  H/O cardiovascular stress test 10/07/2016    Overall results are most consistent with a low risk for significant CAD.     H/O echocardiogram 10/05/2016    EF:>60%. Inferoseptal wall abnormal in its motion which is not unusal status post open heart surgery. Evidence of mild (grade I) diastolic dysfunction is seen.  H/O tilt table evaluation 07/12/2016    Abnormal. PTs HR, Blood Pressure response and symptoms were most consistent with dysautonomia.  Combined w/viligant maintenance of euvolemia and maintaining a moderate salt intake, pharmaciologic treatment w/ ProAmatine, SSRI such as Lexapro and/or Mestinon among other distress  EYES: PERRL  NECK: normal, supple,  no carotid bruits  PULM: clear to auscultation bilaterally- no wheezes, rales or rhonchi, normal air movement, no respiratory distress  COR: regular rate & rhythm and no murmurs  ABD:  Obese, soft, non-tender, non-distended, normal bowel sounds, no masses or organomegaly  EXT:   edema: trace affecting bilateral foot, bilateral ankle  NEURO: follows commands, HUMPHRIES, no deficits  SKIN:  no rashes or significant lesions  -----------------------------------------------------------------  Diagnostic Data:   Lab Results   Component Value Date    WBC 13.8 (H) 11/09/2017    HGB 14.2 11/09/2017     11/09/2017       Lab Results   Component Value Date    BUN 13 11/10/2017    CREATININE 0.60 11/10/2017     11/10/2017    K 4.2 11/10/2017    CALCIUM 8.6 11/10/2017     11/10/2017    CO2 19 (L) 11/10/2017    LABGLOM >60 11/10/2017       Lab Results   Component Value Date    WBCUA 2 TO 5 11/09/2017    RBCUA None 11/09/2017    EPITHUA 0 TO 2 11/09/2017    LEUKOCYTESUR NEGATIVE 11/09/2017    SPECGRAV 1.025 (H) 11/09/2017    GLUCOSEU 3+ (A) 11/09/2017    KETUA TRACE (A) 11/09/2017    PROTEINU TRACE (A) 11/09/2017    HGBUR NEGATIVE 11/09/2017    CASTUA NOT REPORTED 11/09/2017    CRYSTUA NOT REPORTED 11/09/2017    BACTERIA 2+ (A) 11/09/2017    YEAST NOT REPORTED 11/09/2017       Lab Results   Component Value Date    MYOGLOBIN 24 (L) 07/25/2017    TROPONINT <0.03 11/09/2017    CKTOTAL 55 07/25/2017    CKMB 1.0 07/25/2017    PROBNP 58 11/09/2017       Xr Chest Portable    Result Date: 11/9/2017  REPORT: Portable chest. INDICATION: Chest pain; mid chest discomfort since last night. FINDINGS: A portable erect view of the chest, taken at 1306 hours, shows no significant change from the study of 7/25/2017. There are sternal sutures. The heart is not enlarged. The lung fields and costophrenic angles are clear.  There is no evidence of a  pneumothorax or free air beneath the diaphragm. Final report electronically signed by Farhat Maldonado on 11/9/2017 1:21 PM    PREVIOUS THORACIC SURGERY. NO ACTIVE INFILTRATE OR VASCULAR CONGESTION. Assessment:    Principal Problem:    Acute cystitis  Active Problems:     Morbid obesity with BMI of 40.0-44.9, adult ([facility])    COPD (chronic obstructive pulmonary disease)    Diabetic neuropathy    Diabetes type 2, controlled ([address]    S/P CABG x 1    DARON (obstructive sleep apnea)      Patient Active Problem List    Diagnosis Date Noted    Cellulitis of abdominal wall 05/26/2017     Priority: High     Class: Acute    Heart palpitations 05/10/2016     Priority: High    Essential hypertension      Priority: High     Class: Chronic    Diabetes type 2, uncontrolled 02/21/2012     Priority: High     Class: Chronic    CAD (coronary artery disease) 11/22/2011     Priority: High     Class: Chronic    Morbid obesity with BMI of 40.0-44.9, adult ([address] 02/16/2015     Priority: Medium     Class: Chronic    Acute cystitis 11/10/2017    Acute cystitis 05/30/2017    Morbid obesity ([address] 05/30/2017    Angina, class II ([address] 05/09/2017    Precordial pain 05/09/2017    DARON (obstructive sleep apnea) 05/09/2017    Angina, class I ([address] 11/01/2016    S/P CABG x 1 09/26/2016    Angina, class III ([address] 07/08/2016    Coronary artery disease involving native coronary artery of native heart     Hypotension due to drugs 05/11/2016    Dysautonomia orthostatic hypotension syndrome ([address] 05/11/2016    Syncope and collapse     Primary osteoarthritis involving multiple joints 04/14/2016    Diabetes type 2, controlled ([address] 11/23/2015    Gastroesophageal reflux disease without esophagitis 10/27/2015    Simple chronic bronchitis ([address]     Post op infection     History of ventral hernia repair 10/06/2015    History of incisional hernia repair 10/06/2015    Abdominal wall cellulitis 10/06/2015    Abdominal wall abscess 10/06/2015    Affective disorder ([address] 05/22/2014    Muscle spasm 09/24/2013    Gait difficulty 03/18/2013    Diabetic neuropathy 03/18/2013    Back pain 03/18/2013    Bilateral leg weakness 03/02/2013    Insomnia 12/22/2012    Allergic rhinitis 12/22/2012    COPD (chronic obstructive pulmonary disease) 12/22/2012    Depression 12/22/2012    Dyslipidemia 11/22/2011    Radiculopathy 11/22/2011       Plan:     · This patient requires inpatient admission because of UTI, hypotensive in ER - now resolved  · Factors affecting the medical complexity of this patient include COPD, CAD, DM, HTN  · Estimated length of stay is 2 days  · IVF  · abx  · PT  · SSI  · Transfer to floor  · High risk medication monitoring: none    CORE MEASURES  DVT prophylaxis: Lovenox  Decubitus ulcer present on admission: No  CODE STATUS: FULL CODE  Nutrition Status: good   Physical therapy: Yes   Old Charts reviewed: Yes  EKG Reviewed: Yes  Advance Directive Addressed:  Yes    Dayne Dolan PA-C  11/10/2017, 2:08 PM

## 2017-11-11 VITALS
SYSTOLIC BLOOD PRESSURE: 108 MMHG | TEMPERATURE: 98.1 F | DIASTOLIC BLOOD PRESSURE: 73 MMHG | WEIGHT: 235.2 LBS | HEART RATE: 70 BPM | BODY MASS INDEX: 41.67 KG/M2 | OXYGEN SATURATION: 93 % | RESPIRATION RATE: 18 BRPM | HEIGHT: 63 IN

## 2017-11-11 LAB
GLUCOSE BLD-MCNC: 155 MG/DL (ref 74–100)
GLUCOSE BLD-MCNC: 198 MG/DL (ref 74–100)

## 2017-11-11 PROCEDURE — 82947 ASSAY GLUCOSE BLOOD QUANT: CPT

## 2017-11-11 PROCEDURE — 6360000002 HC RX W HCPCS: Performed by: INTERNAL MEDICINE

## 2017-11-11 PROCEDURE — 2580000003 HC RX 258: Performed by: PHYSICIAN ASSISTANT

## 2017-11-11 PROCEDURE — G0008 ADMIN INFLUENZA VIRUS VAC: HCPCS | Performed by: INTERNAL MEDICINE

## 2017-11-11 PROCEDURE — 6370000000 HC RX 637 (ALT 250 FOR IP): Performed by: INTERNAL MEDICINE

## 2017-11-11 PROCEDURE — 90686 IIV4 VACC NO PRSV 0.5 ML IM: CPT | Performed by: INTERNAL MEDICINE

## 2017-11-11 RX ORDER — CEPHALEXIN 500 MG/1
500 CAPSULE ORAL 3 TIMES DAILY
Qty: 21 CAPSULE | Refills: 0 | Status: SHIPPED | OUTPATIENT
Start: 2017-11-11 | End: 2017-11-18

## 2017-11-11 RX ADMIN — ASPIRIN 81 MG 81 MG: 81 TABLET ORAL at 08:37

## 2017-11-11 RX ADMIN — FAMOTIDINE 20 MG: 20 TABLET, FILM COATED ORAL at 08:37

## 2017-11-11 RX ADMIN — FLUDROCORTISONE ACETATE 0.2 MG: 0.1 TABLET ORAL at 08:37

## 2017-11-11 RX ADMIN — INSULIN LISPRO 2 UNITS: 100 INJECTION, SOLUTION INTRAVENOUS; SUBCUTANEOUS at 12:08

## 2017-11-11 RX ADMIN — GABAPENTIN 800 MG: 400 CAPSULE ORAL at 13:29

## 2017-11-11 RX ADMIN — ATORVASTATIN CALCIUM 40 MG: 40 TABLET, FILM COATED ORAL at 08:37

## 2017-11-11 RX ADMIN — ESCITALOPRAM 10 MG: 5 TABLET, FILM COATED ORAL at 08:36

## 2017-11-11 RX ADMIN — INFLUENZA VIRUS VACCINE 0.5 ML: 15; 15; 15; 15 SUSPENSION INTRAMUSCULAR at 09:54

## 2017-11-11 RX ADMIN — LINAGLIPTIN 5 MG: 5 TABLET, FILM COATED ORAL at 09:54

## 2017-11-11 RX ADMIN — DULOXETINE HYDROCHLORIDE 30 MG: 30 CAPSULE, DELAYED RELEASE ORAL at 08:37

## 2017-11-11 RX ADMIN — METFORMIN HYDROCHLORIDE 1000 MG: 500 TABLET, FILM COATED ORAL at 08:37

## 2017-11-11 RX ADMIN — PANTOPRAZOLE SODIUM 40 MG: 40 TABLET, DELAYED RELEASE ORAL at 07:07

## 2017-11-11 RX ADMIN — GABAPENTIN 800 MG: 400 CAPSULE ORAL at 08:37

## 2017-11-11 RX ADMIN — CLOPIDOGREL BISULFATE 75 MG: 75 TABLET ORAL at 08:37

## 2017-11-11 RX ADMIN — LOSARTAN POTASSIUM 25 MG: 25 TABLET ORAL at 08:37

## 2017-11-11 RX ADMIN — METOPROLOL TARTRATE 25 MG: 25 TABLET ORAL at 08:37

## 2017-11-11 RX ADMIN — ENOXAPARIN SODIUM 40 MG: 40 INJECTION SUBCUTANEOUS at 08:37

## 2017-11-11 RX ADMIN — INSULIN LISPRO 2 UNITS: 100 INJECTION, SOLUTION INTRAVENOUS; SUBCUTANEOUS at 08:43

## 2017-11-11 RX ADMIN — SODIUM CHLORIDE: 9 INJECTION, SOLUTION INTRAVENOUS at 04:25

## 2017-11-11 ASSESSMENT — PAIN SCALES - GENERAL
PAINLEVEL_OUTOF10: 0

## 2017-11-11 NOTE — PROGRESS NOTES
Pt d/c'd off unit at this time, ambulatory with family member to home. Belongings in hand. No issues noted.

## 2017-11-11 NOTE — PLAN OF CARE
Problem: Falls - Risk of  Goal: Absence of falls  Outcome: Ongoing  Bed in low position. Wheels locked. 2/4 side rails are up. Fall band on. Call light within reach. Problem: Serum Glucose Level - Abnormal:  Goal: Ability to maintain appropriate glucose levels will improve  Ability to maintain appropriate glucose levels will improve   Outcome: Ongoing  Monitoring blood sugars, and giving insulin as ordered, will continue to monitor.

## 2017-11-11 NOTE — PROGRESS NOTES
Progress Note    SUBJECTIVE:  FU related to  Feeling better overall. Denies shortness of breath. Denies fevers. Much more like herself. OBJECTIVE:    Vitals:   TEMPERATURE:  Current - Temp: 98.1 °F (36.7 °C); Max - Temp  Av.3 °F (36.8 °C)  Min: 98.1 °F (36.7 °C)  Max: 98.5 °F (36.9 °C)  RESPIRATIONS RANGE: Resp  Av.5  Min: 18  Max: 20  PULSE RANGE: Pulse  Av.8  Min: 70  Max: 81  BLOOD PRESSURE RANGE:  Systolic (99ZRD), ARACELI:742 , Min:96 , WPE:464   ; Diastolic (53BVC), OLO:62, Min:61, Max:73    PULSE OXIMETRY RANGE: SpO2  Av.8 %  Min: 93 %  Max: 97 %  24HR INTAKE/OUTPUT:      Intake/Output Summary (Last 24 hours) at 17 1045  Last data filed at 17 0854   Gross per 24 hour   Intake             4637 ml   Output              500 ml   Net             4137 ml     -----------------------------------------------------------------  Exam:  General: A & O x3  HEENT: Head: Normal, normocephalic, atraumatic.   Supple neck & negative  Heart: regular rate & rhythm and Regular  Lungs: clear to auscultation bilaterally  Abdomen: Normal & soft, No tenderness and BS normal  Extremities:  No edema   Neuro: NonFocal     -----------------------------------------------------------------  Diagnostic Data:  Lab Results   Component Value Date    WBC 13.8 (H) 2017    HGB 14.2 2017     2017       Lab Results   Component Value Date    BUN 13 11/10/2017    CREATININE 0.60 11/10/2017     11/10/2017    K 4.2 11/10/2017    CALCIUM 8.6 11/10/2017     11/10/2017    CO2 19 (L) 11/10/2017    LABGLOM >60 11/10/2017       Lab Results   Component Value Date    WBCUA 2 TO 5 2017    RBCUA None 2017    EPITHUA 0 TO 2 2017    LEUKOCYTESUR NEGATIVE 2017    SPECGRAV 1.025 (H) 2017    GLUCOSEU 3+ (A) 2017    KETUA TRACE (A) 2017    PROTEINU TRACE (A) 2017    HGBUR NEGATIVE 2017    CASTUA NOT REPORTED 2017    CRYSTUA NOT REPORTED ([facility] 07/08/2016    Coronary artery disease involving native coronary artery of native heart     Hypotension due to drugs 05/11/2016    Dysautonomia orthostatic hypotension syndrome (HCC) 05/11/2016    Syncope and collapse     Primary osteoarthritis involving multiple joints 04/14/2016    Diabetes type 2, controlled ([facility] 11/23/2015    Gastroesophageal reflux disease without esophagitis 10/27/2015    Simple chronic bronchitis (HCC)     Post op infection     History of ventral hernia repair 10/06/2015    History of incisional hernia repair 10/06/2015    Abdominal wall cellulitis 10/06/2015    Abdominal wall abscess 10/06/2015    Affective disorder ([facility] 05/22/2014    Muscle spasm 09/24/2013    Gait difficulty 03/18/2013    Diabetic neuropathy 03/18/2013    Back pain 03/18/2013    Bilateral leg weakness 03/02/2013    Insomnia 12/22/2012    Allergic rhinitis 12/22/2012    COPD (chronic obstructive pulmonary disease) 12/22/2012    Depression 12/22/2012    Dyslipidemia 11/22/2011    Radiculopathy 11/22/2011       PLAN:  · Decrease IV fluids. Probably discharge later today if still doing well.   · Critical Care Time: Angélica Adams M.D.

## 2017-11-13 ENCOUNTER — TELEPHONE (OUTPATIENT)
Dept: PRIMARY CARE CLINIC | Age: 52
End: 2017-11-13

## 2017-11-13 NOTE — TELEPHONE ENCOUNTER
Post Acute Care Discharge Phone Assessment     Review purpose of telephone call with: Luana Beckford  Date: 17    - To evaluate the client after hospital discharge  - To ensure the client has received and understands self care instructions  - To identify and prevent potential adverse events  - To provide additional education and initiate post acute services       Raven Nunez   : 1965 Phone #: [phone] (home)    1. Hospital Information    Discharged from: [facility]    Discharge to home  Discharge Date: 17   Admission Date: 17    Non-face-to-face services provided:  Phone call f/u, schedule f/u appt. Hospital records: reviewed    Was instructed to follow up in: 1 week    Hospital Diagnosis: Sepsis, UTI       Hospital Consultants:  none    Initial contact Date: 2017  Type of Contact:  by phone      2. Assessment of Current Condition      Questions: How have you felt since discharge from the hospital:     better    Did you receive a discharge summary from the hospital? yes    Is there any lingering or new fever? No    Are you eating and drinking OK? yes    Are there any new complaints of pain? No    If you have a wound - is the dressing clean, dry, and intact? N/A    Reinforce Education    Does the patient know -   1. What to do if her symptoms worsen? yes   2. For what symptoms she should call the doctor?  yes   3. What symptoms she should get help with right away? Yes    4. Does she know how to contact her physician?  yes    Are there any questions about the patients medications? No   Does the patient have a list of all the medications that were prescribed to be taken after discharge? yes   Does the patient have all the medications that were prescribed?  yes   Is the patient taking all the prescribed medications?   yes   Does the patient understand what all the medications are taken for? yes      ( Update medication list and refresh medication smartlinks (PLAVIX) 75 MG tablet Take 1 tablet by mouth daily 90 tablet 3    aspirin 81 MG chewable tablet Take 1 tablet by mouth daily 100 tablet 3    Blood Pressure Monitor KIT 1 each by Does not apply route daily as needed ESSENTIAL HYPERTENSION   I10 1 kit 0     No current facility-administered medications for this visit. Current Medications (record all meds as 'taken' or 'not taken' in home med activity)  No outpatient prescriptions have been marked as taking for the 11/13/17 encounter (Telephone) with Skip Plasencia CNP. Are there any questions about how the patient should take care of herself? No   Does the patient understand her diet regimen? yes   Does the patient understand his or her activity level? yes   Does the patient understand how to care for her wound? N/A   Does the patient understand how to monitor her weight? N/A   Does the patient understand what to do if she becomes short of breath? N/A   Does the patient understand what to do if she has increased edema? yes    Does the patient understand how to monitor vital signs? Blood Pressure? N/A    Heart Rate? N/A     Blood sugar?  yes    Is the patient having any trouble with ADL's? No   Any problems showering or bathing? No   Does the patient have trouble eating or feeding themselves? No   Is the patient having trouble getting out of bed or going to the toilet? No   Is the patient able to move around as expected? Yes       Home care services initiated: no     Services provided: none      Does that patient have equipment needs? No   Does the patient have the appropriate equipment? N/A   Can the patient use the equipment properly and safely? N/A    Reinforce Follow-Up  - Does patient have an appointment with her PCP? yes  - Does patient have an appointment with her specialist physicians? N/A      Care Coordination  Is patient assigned ambulatory care coordinator for chronic condition management?  N/A    If yes, was Care Coordination informed  -        Follow up appointment date: (7 days for more severe illness, 14 days for others)  Future Appointments  Date Time Provider Emma Gaxiola   11/21/2017 11:00 AM [facility] Burn[facility]   12/7/2017 2:30 PM ANGELIKA Horne [facility]   3/1/2018 1:20 PM Dot Lin MD Neuro Spec [facility]       Other Interventions or Actions taken as result of  Assessment  - Appointment scheduled for Friday 11/17.          Jorge A Paige

## 2017-11-14 LAB
CULTURE: NORMAL
Lab: NORMAL
SPECIMEN DESCRIPTION: NORMAL
STATUS: NORMAL

## 2017-11-14 NOTE — DISCHARGE SUMMARY
Discharge Summary    Saleem Dee  :  1965  MRN:  [id number]    Admit date:  2017      Discharge date:  2017     Admitting Physician:  Dion Isaacs MD    Discharge Diagnoses:      Principal Problem:    Acute cystitis  Active Problems: Morbid obesity with BMI of 40.0-44.9, adult ([facility])    COPD (chronic obstructive pulmonary disease)    Diabetic neuropathy    Diabetes type 2, controlled ([facility] [address]    S/P CABG x 1    DARON (obstructive sleep apnea)      Active Hospital Problems    Diagnosis Date Noted    Morbid obesity with BMI of 40.0-44.9, adult ([facility] [address] [E66.01, Z68.41] 2015     Priority: Medium     Class: Chronic    Acute cystitis [N30.00] 11/10/2017    DARON (obstructive sleep apnea) [G47.33] 2017    S/P CABG x 1 [Z95.1] 2016    Diabetes type 2, controlled ([facility] [address] [E11.9] 2015    Diabetic neuropathy [E11.40] 2013    COPD (chronic obstructive pulmonary disease) [J44.9] 2012       Discharge Medications:       Kavon Arreguin   Home Medication Instructions CRY:[id number]    Printed on:17 1618   Medication Information                      albuterol sulfate HFA (VENTOLIN HFA) 108 (90 Base) MCG/ACT inhaler  INHALE 2 PUFFS INTO THE LUNGS EVERY 6 HOURS AS NEEDED FOR WHEEZING. alogliptin-metformin 12.5-1000 MG TABS  Take 1 tablet by mouth 2 times daily             aspirin 81 MG chewable tablet  Take 1 tablet by mouth daily             atorvastatin (LIPITOR) 40 MG tablet  Take 1 tablet by mouth daily             Blood Pressure Monitor KIT  1 each by Does not apply route daily as needed ESSENTIAL HYPERTENSION   I10             capsaicin (ZOSTRIX) 0.025 % cream  Apply topically 2 times daily Apply topically 2 times daily.              cephALEXin (KEFLEX) 500 MG capsule  Take 1 capsule by mouth 3 times daily for 7 days             clopidogrel (PLAVIX) 75 MG tablet  Take 1 tablet by mouth daily             DULoxetine (CYMBALTA) 30 MG extended release capsule  Take one cap once daily             escitalopram (LEXAPRO) 10 MG tablet  TAKE 1 TABLET BY MOUTH DAILY.              fludrocortisone (FLORINEF) 0.1 MG tablet  Take 2 tablets by mouth daily             gabapentin (NEURONTIN) 800 MG tablet  TAKE 1 TABLET BY MOUTH 3 TIMES A DAY             glucose blood VI test strips (FREESTYLE LITE) strip  Use to check blood sugars 3 times daily and as needed for Diabetes E11.40             insulin glargine (LANTUS SOLOSTAR) 100 UNIT/ML injection pen  Inject 50 Units into the skin nightly             Insulin Pen Needle (BD ULTRA-FINE PEN NEEDLES) 29G X 12.7MM MISC  USE AS DIRECTED BY PHYSICIAN             Liraglutide (VICTOZA) 18 MG/3ML SOPN SC injection  INJECT 1.8 MG INTO THE SKIN DAILY AS DIRECTED             losartan (COZAAR) 25 MG tablet  Take 1 tablet by mouth daily             metoprolol tartrate (LOPRESSOR) 25 MG tablet  Take 1 tablet by mouth 2 times daily             omeprazole (PRILOSEC) 20 MG delayed release capsule  Take 1 capsule by mouth daily             ondansetron (ZOFRAN ODT) 4 MG disintegrating tablet  Take 1 tablet by mouth every 8 hours as needed for Nausea or Vomiting                     Patient will be followed by Shane Plasencia CNP in 1-2 weeks    Signed:  Deni Rodriguez  11/14/2017, 4:18 PM

## 2017-11-15 LAB
CULTURE: NORMAL
Lab: NORMAL
SPECIMEN DESCRIPTION: NORMAL
STATUS: NORMAL

## 2017-11-17 ENCOUNTER — OFFICE VISIT (OUTPATIENT)
Dept: PRIMARY CARE CLINIC | Age: 52
End: 2017-11-17
Payer: COMMERCIAL

## 2017-11-17 VITALS
RESPIRATION RATE: 18 BRPM | WEIGHT: 232.5 LBS | HEIGHT: 63 IN | SYSTOLIC BLOOD PRESSURE: 115 MMHG | HEART RATE: 67 BPM | DIASTOLIC BLOOD PRESSURE: 73 MMHG | BODY MASS INDEX: 41.2 KG/M2 | TEMPERATURE: 97.2 F

## 2017-11-17 DIAGNOSIS — A41.9 SEPSIS, DUE TO UNSPECIFIED ORGANISM: Primary | ICD-10-CM

## 2017-11-17 DIAGNOSIS — N30.00 ACUTE CYSTITIS WITHOUT HEMATURIA: ICD-10-CM

## 2017-11-17 DIAGNOSIS — R22.42 MASS OF LEFT THIGH: ICD-10-CM

## 2017-11-17 PROCEDURE — 99495 TRANSJ CARE MGMT MOD F2F 14D: CPT | Performed by: NURSE PRACTITIONER

## 2017-11-17 PROCEDURE — 1111F DSCHRG MED/CURRENT MED MERGE: CPT | Performed by: NURSE PRACTITIONER

## 2017-11-17 ASSESSMENT — ENCOUNTER SYMPTOMS
DIARRHEA: 0
SHORTNESS OF BREATH: 0
COUGH: 0
VOMITING: 0
ABDOMINAL PAIN: 0
NAUSEA: 0
RHINORRHEA: 0
BACK PAIN: 0
SORE THROAT: 0

## 2017-11-17 NOTE — PATIENT INSTRUCTIONS
or above 250 mg/dL.)  Call your doctor when your blood sugar results are ___________________________________. (For example: Less than 70 or above 250 mg/dL.)  What are the symptoms of low and high blood sugar? Common symptoms of low blood sugar are sweating and feeling shaky, weak, hungry, or confused. Symptoms can start quickly. Common symptoms of high blood sugar are feeling very thirsty or very hungry. You may also pass urine more often than usual. You may have blurry vision and may lose weight without trying. But some people may have high or low blood sugar without having any symptoms. That's a good reason to check your blood sugar on a regular schedule. What should you do if you have symptoms? Work with your doctor to fill in the blank spaces below that apply to you. Low blood sugar  If you have symptoms of low blood sugar, check your blood sugar. If it's below _____ (for example, below 70), eat or drink a quick-sugar food that has about 15 grams of carbohydrate. Your goal is to get your level back to your safe range. Check your blood sugar again 15 minutes later. If it's still not in your target range, take another 15 grams of carbohydrate and check your blood sugar again in 15 minutes. Repeat this until you reach your target. Then go back to your regular testing schedule. When you have low blood sugar, it's best to stop or reduce any physical activity until your blood sugar is back in your target range and is stable. If you must stay active, eat or drink 30 grams of carbohydrate. Then check your blood sugar again in 15 minutes. If it's not in your target range, take another 30 grams of carbohydrates. Check your blood sugar again in 15 minutes. Keep doing this until you reach your target. You can then go back to your regular testing schedule. If your symptoms or blood sugar levels are getting worse or have not improved after 15 minutes, seek medical care right away.   Here are some examples of warranty or liability for your use of this information.

## 2017-11-17 NOTE — PROGRESS NOTES
Post-Discharge Transitional Care Management Services      Arthur Luo   YOB: 1965    Date of Office Visit:  11/17/2017  Date of Hospital Admission: 11/9/17  Date of Hospital Discharge: 11/11/17  [facility] Risk Score [risk of hospital readmission >=10  medium risk (chance of readmission ~ 12%) >14  high risk (chance of readmission ~18%)]:Risk Score: 26.75    Care management risk score Rising risk (score 2-5) and Complex Care (Scores >=6): 6       Patient Active Problem List   Diagnosis    CAD (coronary artery disease)    Dyslipidemia    Radiculopathy    Diabetes type 2, uncontrolled    Insomnia    Allergic rhinitis    COPD (chronic obstructive pulmonary disease)    Depression    Bilateral leg weakness    Gait difficulty    Diabetic neuropathy    Back pain    Muscle spasm    Affective disorder ([address]    Morbid obesity with BMI of 40.0-44.9, adult ([address]    History of ventral hernia repair    History of incisional hernia repair    Abdominal wall cellulitis    Abdominal wall abscess    Post op infection    Simple chronic bronchitis (HCC)    Essential hypertension    Gastroesophageal reflux disease without esophagitis    Diabetes type 2, controlled ([address]    Primary osteoarthritis involving multiple joints    Heart palpitations    Hypotension due to drugs    Dysautonomia orthostatic hypotension syndrome (HCC)    Syncope and collapse    Coronary artery disease involving native coronary artery of native heart    Angina, class III (HCC)    S/P CABG x 1    Angina, class I (HCC)    Angina, class II (HCC)    Precordial pain    DARON (obstructive sleep apnea)    Cellulitis of abdominal wall    Acute cystitis    Morbid obesity ([address]    Acute cystitis       No Known Allergies    Medications listed as ordered at the time of discharge from hospital   Timo Rabago   [place] Medication Instructions MIS:    Printed on:11/17/17 2786   Medication Information albuterol sulfate HFA (VENTOLIN HFA) 108 (90 Base) MCG/ACT inhaler  INHALE 2 PUFFS INTO THE LUNGS EVERY 6 HOURS AS NEEDED FOR WHEEZING. alogliptin-metformin 12.5-1000 MG TABS  Take 1 tablet by mouth 2 times daily             aspirin 81 MG chewable tablet  Take 1 tablet by mouth daily             atorvastatin (LIPITOR) 40 MG tablet  Take 1 tablet by mouth daily             Blood Pressure Monitor KIT  1 each by Does not apply route daily as needed ESSENTIAL HYPERTENSION   I10             capsaicin (ZOSTRIX) 0.025 % cream  Apply topically 2 times daily Apply topically 2 times daily. cephALEXin (KEFLEX) 500 MG capsule  Take 1 capsule by mouth 3 times daily for 7 days             clopidogrel (PLAVIX) 75 MG tablet  Take 1 tablet by mouth daily             escitalopram (LEXAPRO) 10 MG tablet  TAKE 1 TABLET BY MOUTH DAILY.              fludrocortisone (FLORINEF) 0.1 MG tablet  Take 2 tablets by mouth daily             gabapentin (NEURONTIN) 800 MG tablet  TAKE 1 TABLET BY MOUTH 3 TIMES A DAY             glucose blood VI test strips (FREESTYLE LITE) strip  Use to check blood sugars 3 times daily and as needed for Diabetes E11.40             insulin glargine (LANTUS SOLOSTAR) 100 UNIT/ML injection pen  Inject 50 Units into the skin nightly             Insulin Pen Needle (BD ULTRA-FINE PEN NEEDLES) 29G X 12.7MM MISC  USE AS DIRECTED BY PHYSICIAN             Liraglutide (VICTOZA) 18 MG/3ML SOPN SC injection  INJECT 1.8 MG INTO THE SKIN DAILY AS DIRECTED             losartan (COZAAR) 25 MG tablet  Take 1 tablet by mouth daily             metoprolol tartrate (LOPRESSOR) 25 MG tablet  Take 1 tablet by mouth 2 times daily             omeprazole (PRILOSEC) 20 MG delayed release capsule  Take 1 capsule by mouth daily             ondansetron (ZOFRAN ODT) 4 MG disintegrating tablet  Take 1 tablet by mouth every 8 hours as needed for Nausea or Vomiting                   Medications marked \"taking\" at this

## 2017-11-20 ENCOUNTER — HOSPITAL ENCOUNTER (OUTPATIENT)
Dept: ULTRASOUND IMAGING | Age: 52
Discharge: HOME | End: 2017-11-20
Payer: COMMERCIAL

## 2017-11-20 ENCOUNTER — TELEPHONE (OUTPATIENT)
Dept: PRIMARY CARE CLINIC | Age: 52
End: 2017-11-20

## 2017-11-20 DIAGNOSIS — R22.42 MASS OF LEFT THIGH: ICD-10-CM

## 2017-11-20 PROCEDURE — 76881 US COMPL JOINT R-T W/IMG: CPT

## 2017-11-20 NOTE — TELEPHONE ENCOUNTER
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

## 2017-11-20 NOTE — TELEPHONE ENCOUNTER
Patient informed of US results and verbalized understanding. . Patient states she will hold off on seeing a surgeon and will let the office know if the lipoma bothers her.

## 2017-11-21 ENCOUNTER — OFFICE VISIT (OUTPATIENT)
Dept: CARDIOLOGY | Age: 52
End: 2017-11-21
Payer: COMMERCIAL

## 2017-11-21 VITALS
WEIGHT: 230 LBS | HEIGHT: 63 IN | DIASTOLIC BLOOD PRESSURE: 83 MMHG | OXYGEN SATURATION: 97 % | HEART RATE: 80 BPM | BODY MASS INDEX: 40.75 KG/M2 | SYSTOLIC BLOOD PRESSURE: 122 MMHG | RESPIRATION RATE: 20 BRPM

## 2017-11-21 DIAGNOSIS — I20.9 ANGINA, CLASS II (HCC): ICD-10-CM

## 2017-11-21 DIAGNOSIS — I95.1 DYSAUTONOMIA ORTHOSTATIC HYPOTENSION SYNDROME: Primary | ICD-10-CM

## 2017-11-21 DIAGNOSIS — I25.118 CORONARY ARTERY DISEASE OF NATIVE ARTERY OF NATIVE HEART WITH STABLE ANGINA PECTORIS (HCC): ICD-10-CM

## 2017-11-21 PROCEDURE — G8598 ASA/ANTIPLAT THER USED: HCPCS | Performed by: FAMILY MEDICINE

## 2017-11-21 PROCEDURE — G8484 FLU IMMUNIZE NO ADMIN: HCPCS | Performed by: FAMILY MEDICINE

## 2017-11-21 PROCEDURE — G8417 CALC BMI ABV UP PARAM F/U: HCPCS | Performed by: FAMILY MEDICINE

## 2017-11-21 PROCEDURE — 3017F COLORECTAL CA SCREEN DOC REV: CPT | Performed by: FAMILY MEDICINE

## 2017-11-21 PROCEDURE — 99214 OFFICE O/P EST MOD 30 MIN: CPT | Performed by: FAMILY MEDICINE

## 2017-11-21 PROCEDURE — 1036F TOBACCO NON-USER: CPT | Performed by: FAMILY MEDICINE

## 2017-11-21 PROCEDURE — G8427 DOCREV CUR MEDS BY ELIG CLIN: HCPCS | Performed by: FAMILY MEDICINE

## 2017-11-21 PROCEDURE — 1111F DSCHRG MED/CURRENT MED MERGE: CPT | Performed by: FAMILY MEDICINE

## 2017-11-21 PROCEDURE — 3014F SCREEN MAMMO DOC REV: CPT | Performed by: FAMILY MEDICINE

## 2017-11-21 RX ORDER — LOSARTAN POTASSIUM 25 MG/1
25 TABLET ORAL NIGHTLY
Qty: 90 TABLET | Refills: 3
Start: 2017-11-21 | End: 2018-02-26 | Stop reason: SDUPTHER

## 2017-11-21 NOTE — PROGRESS NOTES
Allison Dugan [facility] am scribing for and in the presence of Dr. Ruby Cross    Patient: Juancarlos Yoo  : 1965  Date of Visit: 2017    REASON FOR VISIT / CONSULTATION: 3 Month Follow-Up (HX: CABG, Syncope, CAD. Last visit increased her Florinef to 0.2mg daily. In ER on  for CP and abdominal pain found out it was an infection. Pt states she is doing ok. C/O: CP (discomfort in her chest feels ache, lasts about 5min or so comes and goes, occurs about two to three times a month), Lightheaded/dizziness usually in the mornings. Denies: Palpitations, SOB. )      Dear Loyda Plasencia CNP      I had the pleasure of seeing your patient Juancarlos Yoo in consultation today. As you know, Ms. Glo Gregory is a 46 y.o. female who presents with a history of intermittent episodes of back and chest discomfort that started developing since her recent CABG involving a LIMA-LAD 8/15/16. She also has a history of  dysautonomia on a tilt table test, and was started on Florinef as well as Lexapro. She reports having moderate lightheadedness and dizziness most mornings, that sometimes becomes severe and then resolves after a few hours. She also reports having chest discomfort which is ache in quality and lasts for about 5 minutes with rest but this only happens maybe a couple of times per month and is much better since going up on her beta-blocker. She denied any current chest pain, palpitations, shortness of breath at rest, abdominal pain, bleeding problems, problems with her medications or any other concerns at this time. Past Medical History:   Diagnosis Date    Anxiety     Asthma     CAD (coronary artery disease)     Clinical trial participant at discharge 3/31/16    COPD (chronic obstructive pulmonary disease) ([facility] [address]     Depression     Diabetic neuropathy ([facility] [address] [facility]     H/O cardiac catheterization 16    LMCA: Mild Irregularities 10-20%. LAD: Lesion on Prox LAD: Proximal subsection. 100% stenosis.  LCx: PLACEMENT  3-    JESSE RCA / DR Jae Rosenthal    CORONARY ARTERY BYPASS GRAFT  08/15/2016    OP X 1- Dr Niki Cantu, COLON, DIAGNOSTIC  12/16/2014    HERNIA REPAIR  12/13/12    at the medial aspect of a [place] RUQ scar); repaired [address]   02/16/2015    incisional, recurrent    HERNIA REPAIR  02/2016    HYSTERECTOMY      OTHER SURGICAL HISTORY  10/8/2015    abd wound washout, mesh removal, wound vac placement    UPPP UVULOPALATOPHARYGOPLASTY  06/26/2012    VENTRAL HERNIA REPAIR  09/21/2015    With [facility] - Dr Shelia Doll History:  Social History   Substance Use Topics    Smoking status: Former Smoker     Quit date: 9/19/2010    Smokeless tobacco: Never Used    Alcohol use No        CURRENT MEDICATIONS:  Outpatient Prescriptions Marked as Taking for the 11/21/17 encounter (Office Visit) with Peggy Pelayo MD   Medication Sig Dispense Refill    Insulin Degludec (TRESIBA FLEXTOUCH) 200 UNIT/ML SOPN Inject 50 Units into the skin daily 3 Pen 5    albuterol sulfate HFA (VENTOLIN HFA) 108 (90 Base) MCG/ACT inhaler INHALE 2 PUFFS INTO THE LUNGS EVERY 6 HOURS AS NEEDED FOR WHEEZING.  1 Inhaler 3    glucose blood VI test strips (FREESTYLE LITE) strip Use to check blood sugars 3 times daily and as needed for Diabetes E11.40 100 each 11    alogliptin-metformin 12.5-1000 MG TABS Take 1 tablet by mouth 2 times daily 60 tablet 11    fludrocortisone (FLORINEF) 0.1 MG tablet Take 2 tablets by mouth daily 90 tablet 3    metoprolol tartrate (LOPRESSOR) 25 MG tablet Take 1 tablet by mouth 2 times daily 180 tablet 3    omeprazole (PRILOSEC) 20 MG delayed release capsule Take 1 capsule by mouth daily 90 capsule 3    ondansetron (ZOFRAN ODT) 4 MG disintegrating tablet Take 1 tablet by mouth every 8 hours as needed for Nausea or Vomiting 10 tablet 0    gabapentin (NEURONTIN) 800 MG tablet TAKE 1 TABLET BY MOUTH 3 TIMES A DAY 90 tablet 6    capsaicin (ZOSTRIX) 0.025 % cream Apply topically 2 times daily Apply topically 2 times daily. 1 Tube 6    Liraglutide (VICTOZA) 18 MG/3ML SOPN SC injection INJECT 1.8 MG INTO THE SKIN DAILY AS DIRECTED 5 Pen 11    escitalopram (LEXAPRO) 10 MG tablet TAKE 1 TABLET BY MOUTH DAILY. 90 tablet 3    Insulin Pen Needle (BD ULTRA-FINE PEN NEEDLES) 29G X 12.7MM MISC USE AS DIRECTED BY PHYSICIAN 100 each 3    atorvastatin (LIPITOR) 40 MG tablet Take 1 tablet by mouth daily 90 tablet 3    losartan (COZAAR) 25 MG tablet Take 1 tablet by mouth daily 90 tablet 3    clopidogrel (PLAVIX) 75 MG tablet Take 1 tablet by mouth daily 90 tablet 3    aspirin 81 MG chewable tablet Take 1 tablet by mouth daily 100 tablet 3    Blood Pressure Monitor KIT 1 each by Does not apply route daily as needed ESSENTIAL HYPERTENSION   I10 1 kit 0       FAMILY HISTORY: family history includes Cancer in her father, maternal grandmother, and mother; Diabetes in her mother and sister; Heart Disease in her brother, maternal uncle, and sister. PHYSICAL EXAM:   /83 (Site: Left Arm, Position: Sitting, Cuff Size: Large Adult)   Pulse 80   Resp 20   Ht 5' 3\" (1.6 m)   Wt 230 lb (104.3 kg)   LMP 12/05/1996   SpO2 97%   BMI 40.74 kg/m²  Body mass index is 40.74 kg/m². Constitutional: She is oriented to person, place, and time. She appears well-developed and well-nourished. In no acute distress. HEENT: Normocephalic and atraumatic. No JVD present. Carotid bruit is not present. No mass and no thyromegaly present. No lymphadenopathy present. Cardiovascular: Normal rate, regular rhythm, normal heart sounds and intact distal pulses. Exam reveals no gallop and no friction rub. No significant cardiac murmur was heard. Pulmonary/Chest: Effort normal and breath sounds normal. No respiratory distress. She has no wheezes, rhonchi or rales. Abdominal: Soft, non-tender. Bowel sounds and aorta are normal. She exhibits no organomegaly, mass or bruit.    Extremities: No edema, cyanosis, or clubbing. Pulses are 2+ radial/carotid/dorsalis pedis and posterior tibial bilaterally. Neurological: She is alert and oriented to person, place, and time. No evidence of gross cranial nerve deficit. Coordination appeared normal.   Skin: Skin is warm and dry. There is no rash or diaphoresis. Psychiatric: She has a normal mood and affect. Her speech is normal and behavior is normal.      MOST RECENT LABS ON RECORD:   Lab Results   Component Value Date    WBC 13.8 (H) 11/09/2017    HGB 14.2 11/09/2017    HCT 42.5 11/09/2017     11/09/2017    CHOL 172 09/06/2017    TRIG 241 (H) 09/06/2017    HDL 42 09/06/2017    LDLCHOLESTEROL 82 09/06/2017    ALT 12 11/10/2017    AST 11 11/10/2017     11/10/2017    K 4.2 11/10/2017     11/10/2017    CREATININE 0.60 11/10/2017    BUN 13 11/10/2017    CO2 19 (L) 11/10/2017    TSH 2.80 06/04/2016    INR 1.0 11/09/2017    LABA1C 10.5 (H) 09/06/2017    LABMICR 25 (H) 09/06/2017    BNP NOT REPORTED 05/11/2014        ASSESSMENT:  1. Dysautonomia orthostatic hypotension syndrome (HCC)    2. Coronary artery disease of native artery of native heart with stable angina pectoris ([address]    3. Angina, class II ([address]       PLAN:    Orthostatic Hypotension Dysautonomia Orthostatic Hypotension Syndrome:  · Pharmacological Management: Continue Fludocortisone (Florinef)  0.2 mg   · ACE/ARB Inhibitor: Told her that instead of taking her Losartan 25 mg in the morning to switch this to nightly to see if this helps with her lightheadedness/dizziness  · Nonpharmacologic counseling: Because of her condition, I reminded her to try and keep herself well-hydrated and to take extra time when moving from laying to sitting, sitting to standing and standing to walking and not to avoid salt in her diet. I also advised her to put water by her bedside and drink this before she gets out of bed in the morning.     · Atherosclerotic Heart Disease: CABG involving LIMA-LAD on 8/15/16  Antiplatelet Agent: Continue Aspirin 81 mg daily and clopidogrel (Plavix 75 mg daily. I also reminded her to watch for signs of blood in her stool or black tarry stools and stop the medication immediately if this develops as this could be life threatening. · Beta Blocker Therapy: Continue Metoprolol tartrate (Lopressor) 25 mg twice daily I discussed the potential side effects of this medication including lightheadedness and dizziness and told her to call the office if this occurs. · Other Anti-anginal medications: Not indicated     · Statin Therapy: Continue atorvastatin (Lipitor) 40 mg nightly. · Angina: [place] Class II  Beta Blocker: Continue dose of Metoprolol 25 mg twice daily since this continues to improve her symptoms   Counseling: Counseled her to come to the emergency room if she develops persistent or worsening chest pain or worsening shortness of breath at any point. FOLLOW UP:   I told Ms. William Palm to call my office if she had any problems, but otherwise told her to Return in about 4 months (around 3/21/2018). However, I would be happy to see her sooner should the need arise. Once again, thank you for allowing me to participate in this patients care. Please do not hesitate to contact me could I be of further assistance. Sincerely,  Andres Barone. Eugenio ZENG, MS, F.A.C.C. [facility] Cardiology Specialist   [address] [place] [address]  Phone: [phone]; Fax: [phone]    I believe that the risk of significant morbidity and mortality related to the patient's current medical conditions are: Intermediate. The documentation recorded by the scribe, accurately and completely reflects the services I personally performed and the decisions made by me. Ld Rose.  Amador Mcpherson MD, MS, F.A.C.C. 11/21/2017

## 2017-11-22 DIAGNOSIS — M79.2 NEUROPATHIC PAIN: ICD-10-CM

## 2017-11-22 DIAGNOSIS — E11.42 DIABETIC POLYNEUROPATHY ASSOCIATED WITH TYPE 2 DIABETES MELLITUS (HCC): ICD-10-CM

## 2017-11-22 RX ORDER — DULOXETIN HYDROCHLORIDE 30 MG/1
CAPSULE, DELAYED RELEASE ORAL
Qty: 30 CAPSULE | Refills: 3 | Status: SHIPPED | OUTPATIENT
Start: 2017-11-22 | End: 2018-03-01 | Stop reason: SDUPTHER

## 2017-12-07 ENCOUNTER — OFFICE VISIT (OUTPATIENT)
Dept: PRIMARY CARE CLINIC | Age: 52
End: 2017-12-07
Payer: COMMERCIAL

## 2017-12-07 VITALS
SYSTOLIC BLOOD PRESSURE: 119 MMHG | RESPIRATION RATE: 18 BRPM | BODY MASS INDEX: 40.38 KG/M2 | WEIGHT: 227.9 LBS | HEART RATE: 84 BPM | DIASTOLIC BLOOD PRESSURE: 75 MMHG | HEIGHT: 63 IN | TEMPERATURE: 97.2 F

## 2017-12-07 DIAGNOSIS — I10 ESSENTIAL HYPERTENSION: Chronic | ICD-10-CM

## 2017-12-07 DIAGNOSIS — Z12.31 ENCOUNTER FOR SCREENING MAMMOGRAM FOR BREAST CANCER: ICD-10-CM

## 2017-12-07 LAB — HBA1C MFR BLD: 13.3 %

## 2017-12-07 PROCEDURE — G8417 CALC BMI ABV UP PARAM F/U: HCPCS | Performed by: NURSE PRACTITIONER

## 2017-12-07 PROCEDURE — G8484 FLU IMMUNIZE NO ADMIN: HCPCS | Performed by: NURSE PRACTITIONER

## 2017-12-07 PROCEDURE — 3017F COLORECTAL CA SCREEN DOC REV: CPT | Performed by: NURSE PRACTITIONER

## 2017-12-07 PROCEDURE — 3046F HEMOGLOBIN A1C LEVEL >9.0%: CPT | Performed by: NURSE PRACTITIONER

## 2017-12-07 PROCEDURE — 1111F DSCHRG MED/CURRENT MED MERGE: CPT | Performed by: NURSE PRACTITIONER

## 2017-12-07 PROCEDURE — 83036 HEMOGLOBIN GLYCOSYLATED A1C: CPT | Performed by: NURSE PRACTITIONER

## 2017-12-07 PROCEDURE — G8598 ASA/ANTIPLAT THER USED: HCPCS | Performed by: NURSE PRACTITIONER

## 2017-12-07 PROCEDURE — 1036F TOBACCO NON-USER: CPT | Performed by: NURSE PRACTITIONER

## 2017-12-07 PROCEDURE — 3014F SCREEN MAMMO DOC REV: CPT | Performed by: NURSE PRACTITIONER

## 2017-12-07 PROCEDURE — G8427 DOCREV CUR MEDS BY ELIG CLIN: HCPCS | Performed by: NURSE PRACTITIONER

## 2017-12-07 PROCEDURE — 99214 OFFICE O/P EST MOD 30 MIN: CPT | Performed by: NURSE PRACTITIONER

## 2017-12-07 RX ORDER — CLOPIDOGREL BISULFATE 75 MG/1
75 TABLET ORAL DAILY
Qty: 90 TABLET | Refills: 3 | Status: CANCELLED | OUTPATIENT
Start: 2017-12-07

## 2017-12-07 RX ORDER — ESCITALOPRAM OXALATE 10 MG/1
TABLET ORAL
Qty: 90 TABLET | Refills: 3 | Status: CANCELLED | OUTPATIENT
Start: 2017-12-07

## 2017-12-07 RX ORDER — ASPIRIN 81 MG/1
81 TABLET, CHEWABLE ORAL DAILY
Qty: 100 TABLET | Refills: 3 | Status: CANCELLED | OUTPATIENT
Start: 2017-12-07

## 2017-12-07 RX ORDER — ATORVASTATIN CALCIUM 40 MG/1
40 TABLET, FILM COATED ORAL DAILY
Qty: 90 TABLET | Refills: 3 | Status: CANCELLED | OUTPATIENT
Start: 2017-12-07

## 2017-12-07 ASSESSMENT — ENCOUNTER SYMPTOMS
COUGH: 0
WHEEZING: 0
DIARRHEA: 0
VISUAL CHANGE: 0
RHINORRHEA: 0
NAUSEA: 0
CONSTIPATION: 0
BLURRED VISION: 0
ABDOMINAL PAIN: 0
VOMITING: 0
SORE THROAT: 0
SHORTNESS OF BREATH: 0

## 2017-12-07 NOTE — PATIENT INSTRUCTIONS
Patient Education        Diabetic Neuropathy: Care Instructions  Your Care Instructions    When you have diabetes, your blood sugar level may get too high. Over time, high blood sugar levels can damage nerves. This is called diabetic neuropathy. Nerve damage can cause pain, burning, tingling, and numbness and may leave you feeling weak. The feet are often affected. When you have nerve damage in your feet, you cannot feel your feet and toes as well as normal and may not notice cuts or sores. Even a small injury can lead to a serious infection. It is very important that you follow your doctor's advice on foot care. Sometimes diabetes damages nerves that help the body function. If this happens, your blood pressure, sweating, digestion, and urination might be affected. Your doctor may give you a target blood sugar level that is higher or lower than you are used to. Try to keep your blood sugar very close to this target level to prevent more damage. Follow-up care is a key part of your treatment and safety. Be sure to make and go to all appointments, and call your doctor if you are having problems. It's also a good idea to know your test results and keep a list of the medicines you take. How can you care for yourself at home? · Take your medicines exactly as prescribed. Call your doctor if you think you are having a problem with your medicine. It is very important that you take your insulin or diabetes pills as your doctor tells you. · Try to keep blood sugar at your target level. ¨ Eat a variety of healthy foods, with carbohydrate spread out in your meals. A dietitian can help you plan meals. ¨ Try to get at least 30 minutes of exercise on most days. ¨ Check your blood sugar as many times each day as your doctor recommends. · Take and record your blood pressure at home if your doctor tells you to. Learn the importance of the two measures of blood pressure (such as 130 over 80, or 130/80).  To take your blood pressure at home:  ¨ Ask your doctor to check your blood pressure monitor to be sure it is accurate and the cuff fits you. Also ask your doctor to watch you to make sure that you are using it right. ¨ Do not use medicine known to raise blood pressure (such as some nasal decongestant sprays) before taking your blood pressure. ¨ Avoid taking your blood pressure if you have just exercised or are nervous or upset. Rest at least 15 minutes before you take a reading. · Take pain medicines exactly as directed. ¨ If the doctor gave you a prescription medicine for pain, take it as prescribed. ¨ If you are not taking a prescription pain medicine, ask your doctor if you can take an over-the-counter medicine. · Do not smoke. Smoking can increase your chance for a heart attack or stroke. If you need help quitting, talk to your doctor about stop-smoking programs and medicines. These can increase your chances of quitting for good. · Limit alcohol to 2 drinks a day for men and 1 drink a day for women. Too much alcohol can cause health problems. · Eat small meals often, rather than 2 or 3 large meals a day. To care for your feet  · Prevent injury by wearing shoes at all times, even when you are indoors. · Do foot care as part of your daily routine. Wash your feet and then rub lotion on your feet, but not between your toes. Use a handheld mirror or magnifying mirror to inspect your feet for blisters, cuts, cracks, or sores. · Have your toenails trimmed and filed straight across. · Wear shoes and socks that fit well. Soft shoes that have good support and that fit well (such as tennis shoes) are best for your feet. · Check your shoes for any loose objects or rough edges before you put them on. · Ask your doctor to check your feet during each visit. Your doctor may notice a foot problem you have missed. · Get early treatment for any foot problem, even a minor one. When should you call for help?   Call your doctor now or

## 2017-12-07 NOTE — PROGRESS NOTES
Name: Carlos Richard  : 1965         Chief Complaint:     Chief Complaint   Patient presents with    Diabetes     Blood sugar today was 421 at lunch and 369 this morning.  Hypertension       History of Present Illness:      Carlos Richard is a 46 y.o.  female who presents with Diabetes (Blood sugar today was 421 at lunch and 369 this morning. ) and Hypertension      Angeles Mccray is here today for a routine office visit. Diabetes   She presents for her follow-up diabetic visit. She has type 2 diabetes mellitus. Her disease course has been worsening. Hypoglycemia symptoms include dizziness. Pertinent negatives for hypoglycemia include no headaches. Associated symptoms include foot paresthesias and polyphagia. Pertinent negatives for diabetes include no blurred vision, no chest pain, no fatigue, no polydipsia, no polyuria, no visual change and no weakness. There are no hypoglycemic complications. Symptoms are worsening. Diabetic complications include heart disease and peripheral neuropathy. Pertinent negatives for diabetic complications include no CVA or nephropathy. Risk factors for coronary artery disease include diabetes mellitus, dyslipidemia, hypertension, obesity, post-menopausal and sedentary lifestyle. Current diabetic treatment includes oral agent (dual therapy) and insulin injections. She is compliant with treatment most of the time. Her weight is stable. She is following a generally healthy diet. When asked about meal planning, she reported none. She has had a previous visit with a dietitian. She rarely participates in exercise. Her home blood glucose trend is increasing steadily. Her breakfast blood glucose range is generally >200 mg/dl. An ACE inhibitor/angiotensin II receptor blocker is being taken. She does not see a podiatrist.Eye exam is not current. Hypertension   This is a chronic problem. The current episode started more than 1 year ago. The problem is unchanged.  The problem is Hypertension     Intermittent claudication (HCC)     Kidney stones     Movement disorder     neuropathy in legs    Neuromuscular disorder (HCC)     Osteoarthritis     Reflux     Seizures (HCC)     last over 10 yr ago    Stented coronary artery 9/22/2010     LAD/Kabour    Stented coronary artery 3/31/16    RCA/Kabour    Type II or unspecified type diabetes mellitus without mention of complication, not stated as uncontrolled     Unspecified sleep apnea     no machine      Reviewed all health maintenance requirements and ordered appropriate tests  Health Maintenance Due   Topic Date Due    Diabetic foot exam  05/04/2017    Breast cancer screen  08/10/2017    Diabetic hemoglobin A1C test  12/06/2017       Past Surgical History:     Past Surgical History:   Procedure Laterality Date    ABDOMINAL HERNIA REPAIR  1-2016    repair done [place]    APPENDECTOMY      CARDIAC CATHETERIZATION Left 4/26/2016    [facility] To[facility] Ac[facility]  12/16/14    -hemorrhoids,bx    CORONARY ANGIOPLASTY WITH STENT PLACEMENT  9/2010    CORONARY ANGIOPLASTY WITH STENT PLACEMENT  3-    [facility]CA / DR Naty Alfaro    CORONARY ARTERY BYPASS GRAFT  08/15/2016    OP X 1- Dr Frank Urbina, COLON, DIAGNOSTIC  12/16/2014    HERNIA REPAIR  12/13/12    at the medial aspect of a [place] RUQ scar); [address]  02/16/2015    incisional, recurrent    HERNIA REPAIR  02/2016    HYSTERECTOMY      OTHER SURGICAL HISTORY  10/8/2015    abd wound washout, mesh removal, wound vac placement    UPPP UVULOPALATOPHARYGOPLASTY  06/26/2012    VENTRAL HERNIA REPAIR  09/21/2015    With Mesh - Dr Amado Schwab        Medications:       Prior to Admission medications    Medication Sig Start Date End Date Taking?  Authorizing Provider   insulin aspart (NOVOLOG FLEXPEN) 100 UNIT/ML injection pen Inject 4 Units into the skin 3 times daily (before meals) 12/7/17  Yes Loyda Plasencia CNP   Insulin Pen Needle (BD ULTRA-FINE PEN NEEDLES) 29G X 12.7MM MISC USE AS DIRECTED BY PHYSICIAN 12/7/17  Yes Loyda Plasencia CNP   DULoxetine (CYMBALTA) 30 MG extended release capsule TAKE ONE CAPSULE BY MOUTH EVERY DAY 11/22/17  Yes Melyssa Douglas MD   losartan (COZAAR) 25 MG tablet Take 1 tablet by mouth nightly 11/21/17  Yes Peggy Pelayo MD   Insulin Degludec (TRESIBA FLEXTOUCH) 200 UNIT/ML SOPN Inject 50 Units into the skin daily 11/17/17  Yes Loyda Plasencia CNP   albuterol sulfate HFA (VENTOLIN HFA) 108 (90 Base) MCG/ACT inhaler INHALE 2 PUFFS INTO THE LUNGS EVERY 6 HOURS AS NEEDED FOR WHEEZING. 9/7/17  Yes Loyda Plasencia CNP   glucose blood VI test strips (FREESTYLE LITE) strip Use to check blood sugars 3 times daily and as needed for Diabetes E11.40 9/7/17  Yes Loyda Plasencia CNP   alogliptin-metformin 12.5-1000 MG TABS Take 1 tablet by mouth 2 times daily 9/7/17  Yes Loyda Plasencia CNP   fludrocortisone (FLORINEF) 0.1 MG tablet Take 2 tablets by mouth daily 8/1/17  Yes Peggy Pelayo MD   metoprolol tartrate (LOPRESSOR) 25 MG tablet Take 1 tablet by mouth 2 times daily 6/27/17  Yes Peggy Pelayo MD   omeprazole (PRILOSEC) 20 MG delayed release capsule Take 1 capsule by mouth daily 5/9/17  Yes Peggy Pelayo MD   ondansetron (ZOFRAN ODT) 4 MG disintegrating tablet Take 1 tablet by mouth every 8 hours as needed for Nausea or Vomiting 4/12/17  Yes Kasey Sommer PA-C   gabapentin (NEURONTIN) 800 MG tablet TAKE 1 TABLET BY MOUTH 3 TIMES A DAY 3/2/17  Yes Melyssa Douglas MD   capsaicin (ZOSTRIX) 0.025 % cream Apply topically 2 times daily Apply topically 2 times daily. 3/2/17  Yes Melyssa Douglas MD   Liraglutide (VICTOZA) 18 MG/3ML SOPN SC injection INJECT 1.8 MG INTO THE SKIN DAILY AS DIRECTED 2/14/17  Yes Pearle Lose Might, CNP   escitalopram (LEXAPRO) 10 MG tablet TAKE 1 TABLET BY MOUTH DAILY.  2/14/17  Yes Pearle Lose Might, CNP   atorvastatin (LIPITOR) 40 MG tablet Take 1 tablet by mouth daily 2/14/17  Yes [employer] Might, CNP   clopidogrel (PLAVIX) 75 MG tablet Take 1 tablet by mouth daily 2/14/17  Yes [employer] Might, CNP   aspirin 81 MG chewable tablet Take 1 tablet by mouth daily 2/14/17  Yes [employer] Might, CNP   Blood Pressure Monitor KIT 1 each by Does not apply route daily as needed ESSENTIAL HYPERTENSION   I10 5/31/16  Yes [employer] Might, CNP        Allergies:       Review of patient's allergies indicates no known allergies. Social History:     Tobacco:    reports that she quit smoking about 7 years ago. She has never used smokeless tobacco.  Alcohol:      reports that she does not drink alcohol. Drug Use:  reports that she does not use drugs. Family History:     Family History   Problem Relation Age of Onset    Cancer Mother     Diabetes Mother     Cancer Father      thyroid    Diabetes Sister     Heart Disease Sister     Heart Disease Brother     Heart Disease Maternal Uncle     Cancer Maternal Grandmother        Review of Systems:     Positive and Negative as described in HPI    Review of Systems   Constitutional: Negative for chills, fatigue and fever. HENT: Negative for congestion, rhinorrhea and sore throat. Eyes: Negative for blurred vision and visual disturbance. Respiratory: Negative for cough, shortness of breath and wheezing. Cardiovascular: Negative for chest pain and palpitations. Gastrointestinal: Negative for abdominal pain, constipation, diarrhea, nausea and vomiting. Endocrine: Positive for polyphagia. Negative for polydipsia and polyuria. Genitourinary: Negative for difficulty urinating and dysuria. Musculoskeletal: Negative for neck pain and neck stiffness. Skin: Negative for rash. Neurological: Positive for dizziness. Negative for syncope, weakness and headaches.        Physical Exam:   Vitals:  /75 (Site: Left Arm, Position: Sitting, Cuff Size: Medium Adult)   Pulse 84   Temp 97.2 °F polyneuropathy, with long-term current use of insulin ([facility])   DIABETES FOOT EXAM    POCT glycosylated hemoglobin (Hb A1C)    insulin aspart (NOVOLOG FLEXPEN) 100 UNIT/ML injection pen    Insulin Pen Needle (BD ULTRA-FINE PEN NEEDLES) 29G X 12.7MM MISC    Comprehensive Metabolic Panel    Hemoglobin A1C    Microalbumin, Ur   2. Essential hypertension     3. Encounter for screening mammogram for breast cancer  ELIGIO DIGITAL SCREEN W CAD BILATERAL       1. Omayra Antunez received counseling on the following healthy behaviors: nutrition, exercise and medication adherence  2. Patient given educational materials - see patient instructions  3. Was a self-tracking handout given in paper form or via [employer]? No  If yes, see orders or list here. 4.  Discussed use, benefit, and side effects of prescribed medications. Barriers to medication compliance addressed. All patient questions answered. Pt voiced understanding. 5.  Reviewed prior labs and health maintenance  6. Continue current medications, diet and exercise. Completed Refills   Requested Prescriptions     Signed Prescriptions Disp Refills    insulin aspart (NOVOLOG FLEXPEN) 100 UNIT/ML injection pen 5 pen 3     Sig: Inject 4 Units into the skin 3 times daily (before meals)    Insulin Pen Needle (BD ULTRA-FINE PEN NEEDLES) 29G X 12.7MM MISC 150 each 11     Sig: USE AS DIRECTED BY PHYSICIAN     ADD MEALTIME INSULIN START WITH 4 U PRIOR TO MEAL  RECORD 2 HR PP GLUCOSE  CALL READINGS WEEKLY    Return in about 3 months (around 3/7/2018) for check up.

## 2017-12-11 ENCOUNTER — APPOINTMENT (OUTPATIENT)
Dept: GENERAL RADIOLOGY | Age: 52
End: 2017-12-11
Payer: COMMERCIAL

## 2017-12-11 ENCOUNTER — HOSPITAL ENCOUNTER (EMERGENCY)
Age: 52
Discharge: HOME | End: 2017-12-11
Attending: EMERGENCY MEDICINE
Payer: COMMERCIAL

## 2017-12-11 VITALS
BODY MASS INDEX: 39.51 KG/M2 | TEMPERATURE: 98 F | WEIGHT: 223 LBS | RESPIRATION RATE: 16 BRPM | OXYGEN SATURATION: 96 % | DIASTOLIC BLOOD PRESSURE: 91 MMHG | SYSTOLIC BLOOD PRESSURE: 126 MMHG | HEART RATE: 88 BPM | HEIGHT: 63 IN

## 2017-12-11 DIAGNOSIS — M79.604 RIGHT LEG PAIN: ICD-10-CM
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
## 2017-12-11 PROCEDURE — 99283 EMERGENCY DEPT VISIT LOW MDM: CPT

## 2017-12-11 PROCEDURE — 73552 X-RAY EXAM OF FEMUR 2/>: CPT

## 2017-12-11 PROCEDURE — 73590 X-RAY EXAM OF LOWER LEG: CPT

## 2017-12-11 PROCEDURE — 73502 X-RAY EXAM HIP UNI 2-3 VIEWS: CPT

## 2017-12-11 PROCEDURE — 6370000000 HC RX 637 (ALT 250 FOR IP): Performed by: EMERGENCY MEDICINE

## 2017-12-11 RX ORDER — IBUPROFEN 800 MG/1
800 TABLET ORAL EVERY 8 HOURS
Qty: 30 TABLET | Refills: 0 | Status: SHIPPED | OUTPATIENT
Start: 2017-12-11 | End: 2018-03-08 | Stop reason: ALTCHOICE

## 2017-12-11 RX ORDER — IBUPROFEN 800 MG/1
800 TABLET ORAL ONCE
Status: COMPLETED | OUTPATIENT
Start: 2017-12-11 | End: 2017-12-11

## 2017-12-11 RX ADMIN — IBUPROFEN 800 MG: 800 TABLET, FILM COATED ORAL at 08:48

## 2017-12-11 ASSESSMENT — ENCOUNTER SYMPTOMS
SHORTNESS OF BREATH: 0
CONSTIPATION: 0
DIARRHEA: 0
ABDOMINAL DISTENTION: 0
NAUSEA: 0
VOMITING: 0
SORE THROAT: 0
COUGH: 0
RHINORRHEA: 0
WHEEZING: 0

## 2017-12-11 ASSESSMENT — PAIN SCALES - GENERAL
PAINLEVEL_OUTOF10: 4
PAINLEVEL_OUTOF10: 6
PAINLEVEL_OUTOF10: 6

## 2017-12-11 NOTE — ED PROVIDER NOTES
Coronary angioplasty with stent (3-); Appendectomy; Cardiac surgery; Cardiac catheterization (Left, 4/26/2016); hernia repair (12/13/12); hernia repair (02/16/2015); hernia repair (02/2016); and Coronary artery bypass graft (08/15/2016). Social History     Social History    Marital status:      Spouse name: N/A    Number of children: N/A    Years of education: N/A     Occupational History    Not on file. Social History Main Topics    Smoking status: Former Smoker     Quit date: 9/19/2010    Smokeless tobacco: Never Used    Alcohol use No    Drug use: No    Sexual activity: Yes     Partners: Male     Other Topics Concern    Not on file     Social History Narrative    No narrative on file       Family History   Problem Relation Age of Onset    Cancer Mother     Diabetes Mother     Cancer Father      thyroid    Diabetes Sister     Heart Disease Sister     Heart Disease Brother     Heart Disease Maternal Uncle     Cancer Maternal Grandmother        Allergies:  Review of patient's allergies indicates no known allergies. Home Medications:  Prior to Admission medications    Medication Sig Start Date End Date Taking?  Authorizing Provider   ibuprofen (ADVIL;MOTRIN) 800 MG tablet Take 1 tablet by mouth every 8 hours 12/11/17  Yes Albino Frames, DO   insulin aspart (NOVOLOG FLEXPEN) 100 UNIT/ML injection pen Inject 4 Units into the skin 3 times daily (before meals) 12/7/17   Filomena Plasencia CNP   Insulin Pen Needle (BD ULTRA-FINE PEN NEEDLES) 29G X 12.7MM MISC USE AS DIRECTED BY PHYSICIAN 12/7/17   Filomena Plasencia CNP   DULoxetine (CYMBALTA) 30 MG extended release capsule TAKE ONE CAPSULE BY MOUTH EVERY DAY 11/22/17   Jeni Arias MD   losartan (COZAAR) 25 MG tablet Take 1 tablet by mouth nightly 11/21/17   Cristina Torres MD   Insulin Degludec (TRESIBA FLEXTOUCH) 200 UNIT/ML SOPN Inject 50 Units into the skin daily 11/17/17   Filomena Plasencia CNP   albuterol sulfate HFA

## 2017-12-29 ENCOUNTER — TELEPHONE (OUTPATIENT)
Dept: PRIMARY CARE CLINIC | Age: 52
End: 2017-12-29

## 2018-01-09 NOTE — TELEPHONE ENCOUNTER
Health Maintenance   Topic Date Due    Diabetic foot exam  05/04/2017    Breast cancer screen  08/10/2017    DTaP/Tdap/Td vaccine (1 - Tdap) 12/07/2018 (Originally 2/13/1984)    A1C test (Diabetic or Prediabetic)  03/07/2018    Diabetic retinal exam  04/06/2018    Diabetic microalbuminuria test  09/06/2018    Lipid screen  09/06/2018    Potassium monitoring  11/10/2018    Creatinine monitoring  11/10/2018    Colon cancer screen colonoscopy  01/27/2026    Flu vaccine  Completed    Pneumococcal med risk  Completed    Hepatitis C screen  Completed    HIV screen  Completed             (applicable per patient's age: Cancer Screenings, Depression Screening, Fall Risk Screening, Immunizations)    Hemoglobin A1C (%)   Date Value   12/07/2017 13.3   09/06/2017 10.5 (H)   05/13/2017 12.0 (H)     Microalb/Crt.  Ratio (mcg/mg creat)   Date Value   09/06/2017 25 (H)     LDL Cholesterol (mg/dL)   Date Value   09/06/2017 82     AST (U/L)   Date Value   11/10/2017 11     ALT (U/L)   Date Value   11/10/2017 12     BUN (mg/dL)   Date Value   11/10/2017 13      (goal A1C is < 7)   (goal LDL is <100) need 30-50% reduction from baseline     BP Readings from Last 3 Encounters:   12/11/17 (!) 126/91   12/07/17 119/75   11/21/17 122/83    (goal /80)      All Future Testing planned in [facility]:  Lab Frequency Next Occurrence   Microalbumin, Ur Once 06/12/2017   PT eval and treat Once 11/01/2017   ELIGIO DIGITAL SCREEN W CAD BILATERAL Once 01/12/2018   Comprehensive Metabolic Panel Once 07/95/1406   Hemoglobin A1C Once 03/07/2018   Microalbumin, Ur Once 03/07/2018       Next Visit Date:  Future Appointments  Date Time Provider Emma Gaxiola   1/10/2018 12:30 PM [facility] MAMMOGRAPHY ROOM AT [facility] Rad   3/1/2018 1:20 PM Leslie Joshua MD Neuro Spec [id number]   3/8/2018 2:00 PM Zoraida Plasencia CNP [facility]   3/27/2018 1:30 PM Meet Gomez MD [facility]            Patient Active Problem

## 2018-01-10 ENCOUNTER — TELEPHONE (OUTPATIENT)
Dept: PRIMARY CARE CLINIC | Age: 53
End: 2018-01-10

## 2018-01-10 ENCOUNTER — HOSPITAL ENCOUNTER (OUTPATIENT)
Dept: WOMENS IMAGING | Age: 53
Discharge: HOME | End: 2018-01-10
Payer: COMMERCIAL

## 2018-01-10 DIAGNOSIS — Z12.31 ENCOUNTER FOR SCREENING MAMMOGRAM FOR BREAST CANCER: ICD-10-CM

## 2018-01-10 PROCEDURE — 77067 SCR MAMMO BI INCL CAD: CPT

## 2018-01-10 NOTE — TELEPHONE ENCOUNTER
----- Message from Brian Pereira CNP sent at 1/10/2018  2:18 PM EST -----  Results are normal, please call patient and make them aware.

## 2018-02-15 ENCOUNTER — APPOINTMENT (OUTPATIENT)
Dept: CT IMAGING | Age: 53
End: 2018-02-15
Payer: COMMERCIAL

## 2018-02-15 ENCOUNTER — HOSPITAL ENCOUNTER (EMERGENCY)
Age: 53
Discharge: HOME | End: 2018-02-15
Payer: COMMERCIAL

## 2018-02-15 VITALS
HEART RATE: 104 BPM | TEMPERATURE: 99.4 F | OXYGEN SATURATION: 93 % | RESPIRATION RATE: 20 BRPM | DIASTOLIC BLOOD PRESSURE: 78 MMHG | SYSTOLIC BLOOD PRESSURE: 127 MMHG

## 2018-02-15 DIAGNOSIS — L08.9 SOFT TISSUE INFECTION: Primary | ICD-10-CM

## 2018-02-15 LAB
ABSOLUTE EOS #: 0.2 K/UL (ref 0–0.4)
ABSOLUTE IMMATURE GRANULOCYTE: ABNORMAL K/UL (ref 0–0.3)
ABSOLUTE LYMPH #: 2.1 K/UL (ref 1–4.8)
ABSOLUTE MONO #: 0.4 K/UL (ref 0–1)
ANION GAP SERPL CALCULATED.3IONS-SCNC: 14 MMOL/L (ref 9–17)
BASOPHILS # BLD: 1 % (ref 0–2)
BASOPHILS ABSOLUTE: 0.1 K/UL (ref 0–0.2)
BUN BLDV-MCNC: 12 MG/DL (ref 6–20)
BUN/CREAT BLD: 16 (ref 9–20)
CALCIUM SERPL-MCNC: 9.5 MG/DL (ref 8.6–10.4)
CHLORIDE BLD-SCNC: 94 MMOL/L (ref 98–107)
CHP ED QC CHECK: YES
CO2: 27 MMOL/L (ref 20–31)
CREAT SERPL-MCNC: 0.75 MG/DL (ref 0.5–0.9)
DIFFERENTIAL TYPE: ABNORMAL
EOSINOPHILS RELATIVE PERCENT: 2 % (ref 0–8)
GFR AFRICAN AMERICAN: >60 ML/MIN
GFR NON-AFRICAN AMERICAN: >60 ML/MIN
GFR SERPL CREATININE-BSD FRML MDRD: ABNORMAL ML/MIN/{1.73_M2}
GFR SERPL CREATININE-BSD FRML MDRD: ABNORMAL ML/MIN/{1.73_M2}
GLUCOSE BLD-MCNC: 267 MG/DL
GLUCOSE BLD-MCNC: 267 MG/DL (ref 74–100)
GLUCOSE BLD-MCNC: 480 MG/DL (ref 70–99)
HCT VFR BLD CALC: 39.9 % (ref 36–46)
HEMOGLOBIN: 13.5 G/DL (ref 12–16)
IMMATURE GRANULOCYTES: ABNORMAL %
LYMPHOCYTES # BLD: 25 % (ref 24–44)
MCH RBC QN AUTO: 27.3 PG (ref 26–34)
MCHC RBC AUTO-ENTMCNC: 33.9 G/DL (ref 31–37)
MCV RBC AUTO: 80.4 FL (ref 80–100)
MONOCYTES # BLD: 5 % (ref 0–12)
NRBC AUTOMATED: ABNORMAL PER 100 WBC
PDW BLD-RTO: 14.8 % (ref 12.1–15.2)
PLATELET # BLD: 296 K/UL (ref 140–450)
PLATELET ESTIMATE: ABNORMAL
PMV BLD AUTO: 8.3 FL (ref 6–12)
POTASSIUM SERPL-SCNC: 4.5 MMOL/L (ref 3.7–5.3)
RBC # BLD: 4.96 M/UL (ref 4–5.2)
RBC # BLD: ABNORMAL 10*6/UL
SEG NEUTROPHILS: 67 % (ref 36–66)
SEGMENTED NEUTROPHILS ABSOLUTE COUNT: 5.9 K/UL (ref 1.8–7.7)
SODIUM BLD-SCNC: 135 MMOL/L (ref 135–144)
WBC # BLD: 8.6 K/UL (ref 3.5–11)
WBC # BLD: ABNORMAL 10*3/UL

## 2018-02-15 PROCEDURE — 85025 COMPLETE CBC W/AUTO DIFF WBC: CPT

## 2018-02-15 PROCEDURE — 80048 BASIC METABOLIC PNL TOTAL CA: CPT

## 2018-02-15 PROCEDURE — 99283 EMERGENCY DEPT VISIT LOW MDM: CPT

## 2018-02-15 PROCEDURE — 96372 THER/PROPH/DIAG INJ SC/IM: CPT

## 2018-02-15 PROCEDURE — 6370000000 HC RX 637 (ALT 250 FOR IP): Performed by: PHYSICIAN ASSISTANT

## 2018-02-15 PROCEDURE — 70491 CT SOFT TISSUE NECK W/DYE: CPT

## 2018-02-15 PROCEDURE — 6360000004 HC RX CONTRAST MEDICATION: Performed by: PHYSICIAN ASSISTANT

## 2018-02-15 PROCEDURE — 82947 ASSAY GLUCOSE BLOOD QUANT: CPT

## 2018-02-15 RX ORDER — CLINDAMYCIN HYDROCHLORIDE 150 MG/1
300 CAPSULE ORAL 4 TIMES DAILY
Qty: 80 CAPSULE | Refills: 0 | Status: SHIPPED | OUTPATIENT
Start: 2018-02-15 | End: 2018-02-25

## 2018-02-15 RX ADMIN — INSULIN LISPRO 10 UNITS: 100 INJECTION, SOLUTION INTRAVENOUS; SUBCUTANEOUS at 16:19

## 2018-02-15 RX ADMIN — IOPAMIDOL 100 ML: 612 INJECTION, SOLUTION INTRAVENOUS at 15:37

## 2018-02-15 ASSESSMENT — PAIN SCALES - GENERAL: PAINLEVEL_OUTOF10: 5

## 2018-02-15 ASSESSMENT — PATIENT HEALTH QUESTIONNAIRE - PHQ9: SUM OF ALL RESPONSES TO PHQ QUESTIONS 1-9: 4

## 2018-02-16 ASSESSMENT — ENCOUNTER SYMPTOMS
EYE DISCHARGE: 0
DIARRHEA: 0
WHEEZING: 0
BACK PAIN: 0
ABDOMINAL PAIN: 0
CONSTIPATION: 0
NAUSEA: 0
EYE REDNESS: 0
VOMITING: 0
COUGH: 0
SHORTNESS OF BREATH: 0
SORE THROAT: 0
BLOOD IN STOOL: 0
CHEST TIGHTNESS: 0
RHINORRHEA: 0
FACIAL SWELLING: 1

## 2018-02-16 NOTE — ED PROVIDER NOTES
Negative for arthralgias, back pain and myalgias. Skin: Negative for pallor and rash. Allergic/Immunologic: Negative for food allergies and immunocompromised state. Neurological: Negative for dizziness, syncope, weakness and light-headedness. Hematological: Negative for adenopathy. Does not bruise/bleed easily. Psychiatric/Behavioral: Negative for behavioral problems and suicidal ideas. The patient is not nervous/anxious. Except as noted above the remainder of the review of systems was reviewed and negative. PAST MEDICAL HISTORY     Past Medical History:   Diagnosis Date    Anxiety     Asthma     CAD (coronary artery disease)     Clinical trial participant at discharge 3/31/16    COPD [facility] [facility]     Depression     Diabetic neuropathy ([facility]     H/O cardiac catheterization 4/26/16    LMCA: Mild Irregularities 10-20%. LAD: Lesion on Prox LAD: Proximal subsection. 100% stenosis. LCx: Mild irregularities 10-20%. RCA: Mild irregularities 10-20%. Widely patent mid RCA stent. EF:60%.  H/O cardiovascular stress test 10/07/2016    Overall results are most consistent with a low risk for significant CAD.     H/O echocardiogram 10/05/2016    EF:>60%. Inferoseptal wall abnormal in its motion which is not unusal status post open heart surgery. Evidence of mild (grade I) diastolic dysfunction is seen.  H/O tilt table evaluation 07/12/2016    Abnormal. PTs HR, Blood Pressure response and symptoms were most consistent with dysautonomia. Combined w/viligant maintenance of euvolemia and maintaining a moderate salt intake, pharmaciologic treatment w/ ProAmatine, SSRI such as Lexapro and/or Mestinon among other treatments have shown some effectiveness in the treatment of this condition.      Hx of blood clots     Hyperlipidemia     Hypertension     Intermittent claudication (HCC)     Kidney stones     Movement disorder     neuropathy in legs    [facility]rom[facility] [address]     Osteoarthritis     Reflux     Seizures (HCC)     last over 10 yr ago    Stented coronary artery 9/22/2010     LAD/[facility]our    Stented coronary artery 3/31/16    RCA/Kabour    Type II or unspecified type diabetes mellitus without mention of complication, not stated as uncontrolled     Unspecified sleep apnea     no machine         SURGICAL HISTORY       Past Surgical History:   Procedure Laterality Date    ABDOMINAL HERNIA REPAIR  1-2016    repair done [place]    APPENDECTOMY      CARDIAC CATHETERIZATION Left 4/26/2016    right radial/ [facility]/ Dr Cassie Núñez COLONOSCOPY  12/16/14    -hemorrhoids,bx    CORONARY ANGIOPLASTY WITH STENT PLACEMENT  9/2010    CORONARY ANGIOPLASTY WITH STENT PLACEMENT  3-    [facility] / DR Brett Flores    CORONARY ARTERY BYPASS GRAFT  08/15/2016    OP X 1- Dr Celestino Lee, COLON, DIAGNOSTIC  12/16/2014    HERNIA REPAIR  12/13/12    at the medial aspect of a [place] RUQ scar); [address]  02/16/2015    incisional, recurrent    HERNIA REPAIR  02/2016    HYSTERECTOMY      OTHER SURGICAL HISTORY  10/8/2015    abd wound washout, mesh removal, wound vac placement    UPPP UVULOPALATOPHARYGOPLASTY  06/26/2012    VENTRAL HERNIA REPAIR  09/21/2015    With Mesh - Dr Tosha Bills       Discharge Medication List as of 2/15/2018  5:24 PM      CONTINUE these medications which have NOT CHANGED    Details   VENTOLIN  (90 Base) MCG/ACT inhaler INHALE 2 PUFFS INTO THE LUNGS EVERY 6 HOURS AS NEEDED FOR WHEEZING., Disp-18 Inhaler, R-3Normal      Blood Glucose Monitoring Suppl BERE 3 TIMES DAILY Starting Fri 12/29/2017, Disp-1 Device, R-0, NormalUnit insurance will cover      ibuprofen (ADVIL;MOTRIN) 800 MG tablet Take 1 tablet by mouth every 8 hours, Disp-30 tablet, R-0Print      insulin aspart (NOVOLOG FLEXPEN) 100 UNIT/ML injection pen Inject 4 none    LABS:  Labs Reviewed   CBC WITH AUTO DIFFERENTIAL - Abnormal; Notable for the following:        Result Value    Seg Neutrophils 67 (*)     All other components within normal limits   BASIC METABOLIC PANEL - Abnormal; Notable for the following:     Glucose 480 (*)     Chloride 94 (*)     All other components within normal limits   GLUCOSE, WHOLE BLOOD - Abnormal; Notable for the following:     POC Glucose 267 (*)     All other components within normal limits   POCT GLUCOSE - Normal       All other labs were within normal range or not returned as of this dictation. EMERGENCY DEPARTMENT COURSE and DIFFERENTIAL DIAGNOSIS/MDM:   Vitals:    Vitals:    02/15/18 1305   BP: 127/78   Pulse: 104   Resp: 20   Temp: 99.4 °F (37.4 °C)   TempSrc: Tympanic   SpO2: 93%         MDM  51-year-old female who presents with a lump under her chin. On exam it feels like lymphadenopathy. It is very tender to touch. She is afebrile she is diabetic. She is missing teeth does have some erythema noted to the floor of the oral pharyngeal space. This could be an abscess with this soft tissue infection. I'm going to get labs and get a CT    CT is showing a soft tissue infection I reviewed the CT results with my attending physician, Dr. Thee Ndiaye. He agrees with plan to start clindamycin. An discharge home. No other recommendations. Patient's blood sugars come down she is feeling well. She reports she would like to go home. I discussed with her results diagnosis and plan of treatment. Discussed specifically with her that she needs to be rechecked tomorrow. If she develops a fever or any worsening swelling she is return immediately to the ER. She verbalized agreement of this plan questions have been answered at length. She will otherwise return immediately to the ER with any new or worsening complaints     Procedures    FINAL IMPRESSION      1.  Soft tissue infection          DISPOSITION/PLAN   DISPOSITION Decision To

## 2018-02-17 ENCOUNTER — HOSPITAL ENCOUNTER (OUTPATIENT)
Age: 53
Discharge: HOME | End: 2018-02-17
Payer: COMMERCIAL

## 2018-02-17 LAB
BUN BLDV-MCNC: 13 MG/DL (ref 6–20)
CREAT SERPL-MCNC: 0.58 MG/DL (ref 0.5–0.9)
GFR AFRICAN AMERICAN: >60 ML/MIN
GFR NON-AFRICAN AMERICAN: >60 ML/MIN
GFR SERPL CREATININE-BSD FRML MDRD: NORMAL ML/MIN/{1.73_M2}
GFR SERPL CREATININE-BSD FRML MDRD: NORMAL ML/MIN/{1.73_M2}

## 2018-02-17 PROCEDURE — 84520 ASSAY OF UREA NITROGEN: CPT

## 2018-02-17 PROCEDURE — 82565 ASSAY OF CREATININE: CPT

## 2018-02-17 PROCEDURE — 36415 COLL VENOUS BLD VENIPUNCTURE: CPT

## 2018-02-20 ENCOUNTER — OFFICE VISIT (OUTPATIENT)
Dept: PRIMARY CARE CLINIC | Age: 53
End: 2018-02-20
Payer: COMMERCIAL

## 2018-02-20 VITALS
BODY MASS INDEX: 41.21 KG/M2 | SYSTOLIC BLOOD PRESSURE: 138 MMHG | HEIGHT: 63 IN | DIASTOLIC BLOOD PRESSURE: 78 MMHG | WEIGHT: 232.6 LBS | TEMPERATURE: 97.4 F | HEART RATE: 86 BPM | RESPIRATION RATE: 20 BRPM

## 2018-02-20 DIAGNOSIS — L08.9 NECK INFECTION: ICD-10-CM

## 2018-02-20 PROCEDURE — 3017F COLORECTAL CA SCREEN DOC REV: CPT | Performed by: NURSE PRACTITIONER

## 2018-02-20 PROCEDURE — G8598 ASA/ANTIPLAT THER USED: HCPCS | Performed by: NURSE PRACTITIONER

## 2018-02-20 PROCEDURE — G8484 FLU IMMUNIZE NO ADMIN: HCPCS | Performed by: NURSE PRACTITIONER

## 2018-02-20 PROCEDURE — 3046F HEMOGLOBIN A1C LEVEL >9.0%: CPT | Performed by: NURSE PRACTITIONER

## 2018-02-20 PROCEDURE — 3014F SCREEN MAMMO DOC REV: CPT | Performed by: NURSE PRACTITIONER

## 2018-02-20 PROCEDURE — G8417 CALC BMI ABV UP PARAM F/U: HCPCS | Performed by: NURSE PRACTITIONER

## 2018-02-20 PROCEDURE — 99214 OFFICE O/P EST MOD 30 MIN: CPT | Performed by: NURSE PRACTITIONER

## 2018-02-20 PROCEDURE — G8427 DOCREV CUR MEDS BY ELIG CLIN: HCPCS | Performed by: NURSE PRACTITIONER

## 2018-02-20 PROCEDURE — 1036F TOBACCO NON-USER: CPT | Performed by: NURSE PRACTITIONER

## 2018-02-20 ASSESSMENT — ENCOUNTER SYMPTOMS
WHEEZING: 0
COUGH: 0
NAUSEA: 0
SHORTNESS OF BREATH: 0
CONSTIPATION: 0
VOMITING: 0
SORE THROAT: 0
ABDOMINAL PAIN: 0
DIARRHEA: 0
BLURRED VISION: 0
RHINORRHEA: 0

## 2018-02-24 DIAGNOSIS — I10 ESSENTIAL HYPERTENSION: Chronic | ICD-10-CM

## 2018-02-24 DIAGNOSIS — F34.1 DYSTHYMIA: ICD-10-CM

## 2018-02-26 RX ORDER — ATORVASTATIN CALCIUM 40 MG/1
40 TABLET, FILM COATED ORAL DAILY
Qty: 90 TABLET | Refills: 1 | Status: SHIPPED | OUTPATIENT
Start: 2018-02-26 | End: 2019-01-05 | Stop reason: SDUPTHER

## 2018-02-26 RX ORDER — CLOPIDOGREL BISULFATE 75 MG/1
75 TABLET ORAL DAILY
Qty: 90 TABLET | Refills: 1 | Status: SHIPPED | OUTPATIENT
Start: 2018-02-26 | End: 2018-05-21 | Stop reason: ALTCHOICE

## 2018-02-26 RX ORDER — ESCITALOPRAM OXALATE 10 MG/1
TABLET ORAL
Qty: 90 TABLET | Refills: 1 | Status: SHIPPED | OUTPATIENT
Start: 2018-02-26 | End: 2019-01-05 | Stop reason: SDUPTHER

## 2018-02-26 RX ORDER — LOSARTAN POTASSIUM 25 MG/1
25 TABLET ORAL DAILY
Qty: 90 TABLET | Refills: 1 | Status: SHIPPED | OUTPATIENT
Start: 2018-02-26 | End: 2019-01-05 | Stop reason: SDUPTHER

## 2018-03-01 ENCOUNTER — OFFICE VISIT (OUTPATIENT)
Dept: NEUROLOGY | Age: 53
End: 2018-03-01
Payer: COMMERCIAL

## 2018-03-01 VITALS
HEART RATE: 91 BPM | HEIGHT: 63 IN | SYSTOLIC BLOOD PRESSURE: 119 MMHG | DIASTOLIC BLOOD PRESSURE: 86 MMHG | WEIGHT: 231 LBS | BODY MASS INDEX: 40.93 KG/M2

## 2018-03-01 DIAGNOSIS — M79.2 NEUROPATHIC PAIN: ICD-10-CM

## 2018-03-01 DIAGNOSIS — R26.9 GAIT DIFFICULTY: ICD-10-CM

## 2018-03-01 DIAGNOSIS — E11.42 DIABETIC POLYNEUROPATHY ASSOCIATED WITH TYPE 2 DIABETES MELLITUS (HCC): ICD-10-CM

## 2018-03-01 PROCEDURE — G8484 FLU IMMUNIZE NO ADMIN: HCPCS | Performed by: PSYCHIATRY & NEUROLOGY

## 2018-03-01 PROCEDURE — 99214 OFFICE O/P EST MOD 30 MIN: CPT | Performed by: PSYCHIATRY & NEUROLOGY

## 2018-03-01 PROCEDURE — 3014F SCREEN MAMMO DOC REV: CPT | Performed by: PSYCHIATRY & NEUROLOGY

## 2018-03-01 PROCEDURE — G8427 DOCREV CUR MEDS BY ELIG CLIN: HCPCS | Performed by: PSYCHIATRY & NEUROLOGY

## 2018-03-01 PROCEDURE — G8598 ASA/ANTIPLAT THER USED: HCPCS | Performed by: PSYCHIATRY & NEUROLOGY

## 2018-03-01 PROCEDURE — 1036F TOBACCO NON-USER: CPT | Performed by: PSYCHIATRY & NEUROLOGY

## 2018-03-01 PROCEDURE — G8417 CALC BMI ABV UP PARAM F/U: HCPCS | Performed by: PSYCHIATRY & NEUROLOGY

## 2018-03-01 PROCEDURE — 3017F COLORECTAL CA SCREEN DOC REV: CPT | Performed by: PSYCHIATRY & NEUROLOGY

## 2018-03-01 PROCEDURE — 3046F HEMOGLOBIN A1C LEVEL >9.0%: CPT | Performed by: PSYCHIATRY & NEUROLOGY

## 2018-03-01 RX ORDER — DULOXETIN HYDROCHLORIDE 30 MG/1
CAPSULE, DELAYED RELEASE ORAL
Qty: 30 CAPSULE | Refills: 3 | Status: SHIPPED | OUTPATIENT
Start: 2018-03-01 | End: 2018-09-11 | Stop reason: SDUPTHER

## 2018-03-01 RX ORDER — GABAPENTIN 800 MG/1
TABLET ORAL
Qty: 90 TABLET | Refills: 3 | Status: SHIPPED | OUTPATIENT
Start: 2018-03-01 | End: 2018-08-30 | Stop reason: SDUPTHER

## 2018-03-01 ASSESSMENT — ENCOUNTER SYMPTOMS
EYES NEGATIVE: 1
DIARRHEA: 1
RESPIRATORY NEGATIVE: 1

## 2018-03-01 NOTE — PROGRESS NOTES
NEUROLOGY FOLLOW-UP  Patient Name:  Mitchell Baldwin  :   1965  Clinic Visit Date: 3/1/2018    I saw Ms. Mitchell Baldwin in follow-up in the office today in continuation of neurologic care. She is a 48 y.o.  female with diabetic neuropathy. She comes to clinic stating that her bilateral lower extremity painful sensations are much better. She has been using duloxetine Cymbalta along with the gabapentin and capsaicin cream with significant relief in dysesthesias. Upon further questioning; she has not had any medication related adverse effects. She denies any side effects such as headache, drowsiness, nausea, abdominal pain, weakness. She also stated that she has been participating in physical therapy and it has been helping. Review of Systems   Constitutional: Negative. HENT: Negative. Eyes: Negative. Respiratory: Negative. Cardiovascular: Negative. Gastrointestinal: Positive for diarrhea. Genitourinary: Positive for frequency and urgency. Musculoskeletal: Positive for joint pain and myalgias. Skin: Negative. Neurological: Positive for sensory change. Restless Legs; balance problems; numbness and weakness; spasms   Endo/Heme/Allergies: Negative. Psychiatric/Behavioral: Positive for depression. Denies suicidal ideations       Current Outpatient Prescriptions on File Prior to Visit   Medication Sig Dispense Refill    clopidogrel (PLAVIX) 75 MG tablet TAKE 1 TABLET BY MOUTH DAILY 90 tablet 1    atorvastatin (LIPITOR) 40 MG tablet TAKE 1 TABLET BY MOUTH DAILY 90 tablet 1    escitalopram (LEXAPRO) 10 MG tablet TAKE 1 TABLET EVERY DAY 90 tablet 1    losartan (COZAAR) 25 MG tablet TAKE 1 TABLET BY MOUTH DAILY 90 tablet 1    VENTOLIN  (90 Base) MCG/ACT inhaler INHALE 2 PUFFS INTO THE LUNGS EVERY 6 HOURS AS NEEDED FOR WHEEZING.  18 Inhaler 3    Blood Glucose Monitoring Suppl BERE 1 Units by Does not apply route three times daily Unit insurance CARDIAC CATHETERIZATION Left 4/26/2016    right radial/ [facility] - [facility] [place]/ Dr Harjinder Sutton COLONOSCOPY  12/16/14    -hemorrhoids,bx    CORONARY ANGIOPLASTY WITH STENT PLACEMENT  9/2010    CORONARY ANGIOPLASTY WITH STENT PLACEMENT  3-    [facility] / DR Beacher Dance    CORONARY ARTERY BYPASS GRAFT  08/15/2016    OP X 1- Dr Ish Ashley, COLON, DIAGNOSTIC  12/16/2014    HERNIA REPAIR  12/13/12    at the medial aspect of a [place] RUQ scar); repaired [address]. [id number]  02/16/2015    incisional, recurrent    HERNIA REPAIR  02/2016    HYSTERECTOMY      OTHER SURGICAL HISTORY  10/8/2015    abd wound washout, mesh removal, wound vac placement    UPPP UVULOPALATOPHARYGOPLASTY  06/26/2012    VENTRAL HERNIA REPAIR  09/21/2015    With Mesh - Dr Favian Breaux History: Macario Anderson  reports that she quit smoking about 7 years ago. She has never used smokeless tobacco. She reports that she does not drink alcohol or use drugs. Family History   Problem Relation Age of Onset    Cancer Mother     Diabetes Mother     Cancer Father      thyroid    Diabetes Sister     Heart Disease Sister     Heart Disease Brother     Heart Disease Maternal Uncle     Cancer Maternal Grandmother      On exam: Blood pressure 119/86, pulse 91, height 5' 3\" (1.6 m), weight 231 lb (104.8 kg), last menstrual period 12/05/1996, not currently breastfeeding. This is a 48 y.o. female appears her stated age and in no acute distress. HEENT: NC/ AT. Neck: supple and no bruits heard. On neurologic exam: she  is alert, awake, and oriented times three. She followed commands well. She could recall major news events well. Speech exam revealed no dysarthria and no aphasia. She has good naming and good repetition. Able to do serial subtractions ok. Her higher intellectual functions seem to be with in normal limits. CN exam: esotropia on right side; PERRLA.  visual headley are full. Fundi reveal intact venous pulsations. Disc margins are clear. No ptosis noted. Facial sensation and facial motor exam grossly normal. Tongue protrudes midline. No atrophy noted. Palate elevates symmetrically. Able to hear to the finger rub equally well. Shoulder shrug is 5/5 bilaterally. Motor exam:  she has good motor strength in proximal and distal muscle groups bilaterally in all of the extremities. DTRs: symmetric and 1+ biceps, brachioradialis, triceps, patellar reflexes and absent Achilles reflexes bilaterally. No pronator drift noted. No atrophy noted. Sensory exam: impaired pinprick and temperature in stocking pattern, asymmetric pattern with the left worse than right. Cerebellar exam:  Reveals intact finger-nose-finger testing. Muscle tone is normal.   Gait exam: she has wide-based gait and could not do tandem gait. Positive Romberg sign present. She is able to walk with cane. Diagnostic data reviewed with the patient:   NCS/ EMG (8-22-14): There is electrodiagnostic evidence of peripheral polyneuropathy with predominant involvement of sensory fibers only. Supporting features include reduced amplitudes of bilateral superficial peroneal sensory studies and prolonged H-reflex latencies. X-ray of lumbar spine (3-20-13): Mild degenerative change is noted at L3-L4 with disk space narrowing and spur formation. MRI BRAIN (w/wo): done on 1-2-2013: essentially unremarkable. EEG (1-2-2013): unremarkable. Lab Results   Component Value Date    LABA1C 13.3 12/07/2017       EMG bilateral LE (10/21/17): abnormal electrophysiological study indicative of primarily axonal sensorimotor neuropathy in both lower extremities. There is no evidence of lumbosacral radiculopathy or plexopathy.           Impression and Plan: Ms. Marjorie Ramirez is a 48 y.o. female with   Painful diabetic peripheral polyneuropathy; abnormal EMG c/w axonal polyneuropathy seen in diabetes;

## 2018-03-08 ENCOUNTER — OFFICE VISIT (OUTPATIENT)
Dept: PRIMARY CARE CLINIC | Age: 53
End: 2018-03-08
Payer: COMMERCIAL

## 2018-03-08 ENCOUNTER — TELEPHONE (OUTPATIENT)
Dept: PRIMARY CARE CLINIC | Age: 53
End: 2018-03-08

## 2018-03-08 ENCOUNTER — HOSPITAL ENCOUNTER (OUTPATIENT)
Age: 53
Discharge: HOME | End: 2018-03-08
Payer: COMMERCIAL

## 2018-03-08 VITALS
BODY MASS INDEX: 40.88 KG/M2 | RESPIRATION RATE: 18 BRPM | HEIGHT: 63 IN | DIASTOLIC BLOOD PRESSURE: 77 MMHG | WEIGHT: 230.7 LBS | HEART RATE: 108 BPM | SYSTOLIC BLOOD PRESSURE: 133 MMHG | TEMPERATURE: 97.2 F

## 2018-03-08 DIAGNOSIS — I10 ESSENTIAL HYPERTENSION: Chronic | ICD-10-CM

## 2018-03-08 PROBLEM — N30.00 ACUTE CYSTITIS: Status: RESOLVED | Noted: 2017-11-10 | Resolved: 2018-03-08

## 2018-03-08 PROBLEM — N30.00 ACUTE CYSTITIS: Status: RESOLVED | Noted: 2017-05-30 | Resolved: 2018-03-08

## 2018-03-08 PROBLEM — L03.311 CELLULITIS OF ABDOMINAL WALL: Status: RESOLVED | Noted: 2017-05-26 | Resolved: 2018-03-08

## 2018-03-08 LAB
ALBUMIN SERPL-MCNC: 3.7 G/DL (ref 3.5–5.2)
ALBUMIN/GLOBULIN RATIO: 1 (ref 1–2.5)
ALP BLD-CCNC: 97 U/L (ref 35–104)
ALT SERPL-CCNC: 17 U/L (ref 5–33)
ANION GAP SERPL CALCULATED.3IONS-SCNC: 16 MMOL/L (ref 9–17)
AST SERPL-CCNC: 14 U/L
BILIRUB SERPL-MCNC: 0.31 MG/DL (ref 0.3–1.2)
BUN BLDV-MCNC: 18 MG/DL (ref 6–20)
BUN/CREAT BLD: 27 (ref 9–20)
CALCIUM SERPL-MCNC: 9.6 MG/DL (ref 8.6–10.4)
CHLORIDE BLD-SCNC: 91 MMOL/L (ref 98–107)
CO2: 24 MMOL/L (ref 20–31)
CREAT SERPL-MCNC: 0.67 MG/DL (ref 0.5–0.9)
CREATININE URINE: 33.9 MG/DL (ref 28–217)
ESTIMATED AVERAGE GLUCOSE: 289 MG/DL
GFR AFRICAN AMERICAN: >60 ML/MIN
GFR NON-AFRICAN AMERICAN: >60 ML/MIN
GFR SERPL CREATININE-BSD FRML MDRD: ABNORMAL ML/MIN/{1.73_M2}
GFR SERPL CREATININE-BSD FRML MDRD: ABNORMAL ML/MIN/{1.73_M2}
GLUCOSE BLD-MCNC: 517 MG/DL (ref 70–99)
HBA1C MFR BLD: 11.7 % (ref 4.8–5.9)
MICROALBUMIN/CREAT 24H UR: 21 MG/L
MICROALBUMIN/CREAT UR-RTO: 62 MCG/MG CREAT
POTASSIUM SERPL-SCNC: 4.8 MMOL/L (ref 3.7–5.3)
SODIUM BLD-SCNC: 131 MMOL/L (ref 135–144)
TOTAL PROTEIN: 7.5 G/DL (ref 6.4–8.3)

## 2018-03-08 PROCEDURE — 1036F TOBACCO NON-USER: CPT | Performed by: NURSE PRACTITIONER

## 2018-03-08 PROCEDURE — G8427 DOCREV CUR MEDS BY ELIG CLIN: HCPCS | Performed by: NURSE PRACTITIONER

## 2018-03-08 PROCEDURE — 36415 COLL VENOUS BLD VENIPUNCTURE: CPT

## 2018-03-08 PROCEDURE — 83036 HEMOGLOBIN GLYCOSYLATED A1C: CPT

## 2018-03-08 PROCEDURE — 3014F SCREEN MAMMO DOC REV: CPT | Performed by: NURSE PRACTITIONER

## 2018-03-08 PROCEDURE — 82043 UR ALBUMIN QUANTITATIVE: CPT

## 2018-03-08 PROCEDURE — 3017F COLORECTAL CA SCREEN DOC REV: CPT | Performed by: NURSE PRACTITIONER

## 2018-03-08 PROCEDURE — G8482 FLU IMMUNIZE ORDER/ADMIN: HCPCS | Performed by: NURSE PRACTITIONER

## 2018-03-08 PROCEDURE — 99214 OFFICE O/P EST MOD 30 MIN: CPT | Performed by: NURSE PRACTITIONER

## 2018-03-08 PROCEDURE — G8598 ASA/ANTIPLAT THER USED: HCPCS | Performed by: NURSE PRACTITIONER

## 2018-03-08 PROCEDURE — 82570 ASSAY OF URINE CREATININE: CPT

## 2018-03-08 PROCEDURE — G8417 CALC BMI ABV UP PARAM F/U: HCPCS | Performed by: NURSE PRACTITIONER

## 2018-03-08 PROCEDURE — 80053 COMPREHEN METABOLIC PANEL: CPT

## 2018-03-08 PROCEDURE — 3046F HEMOGLOBIN A1C LEVEL >9.0%: CPT | Performed by: NURSE PRACTITIONER

## 2018-03-08 ASSESSMENT — ENCOUNTER SYMPTOMS
NAUSEA: 0
SHORTNESS OF BREATH: 0
VISUAL CHANGE: 0
ABDOMINAL PAIN: 0
SORE THROAT: 0
DIARRHEA: 0
BLURRED VISION: 0
RHINORRHEA: 0
CONSTIPATION: 0
WHEEZING: 0
COUGH: 0
VOMITING: 0

## 2018-03-08 NOTE — PROGRESS NOTES
Musculoskeletal: Negative for neck pain and neck stiffness. Skin: Negative for rash. Neurological: Negative for syncope, weakness and headaches. Physical Exam:   Vitals:  /77 (Site: Left Arm, Position: Sitting, Cuff Size: Medium Adult)   Pulse 108   Temp 97.2 °F (36.2 °C) (Temporal)   Resp 18   Ht 5' 3\" (1.6 m)   Wt 230 lb 11.2 oz (104.6 kg)   LMP 12/05/1996   BMI 40.87 kg/m²     Physical Exam   Constitutional: She is oriented to person, place, and time. She appears well-developed and well-nourished. No distress. Over nourished   HENT:   Mouth/Throat: Oropharynx is clear and moist.   Eyes: Conjunctivae are normal. No scleral icterus. Neck: Normal range of motion. Neck supple. Cardiovascular: Normal rate and regular rhythm. Pulmonary/Chest: Effort normal and breath sounds normal. She has no wheezes. She has no rales. Abdominal: Soft. Bowel sounds are normal. There is no tenderness. Obese abdomen   Musculoskeletal: She exhibits no edema. Lymphadenopathy:     She has no cervical adenopathy. Neurological: She is alert and oriented to person, place, and time. Skin: Skin is warm and dry. Psychiatric: She has a normal mood and affect. Her behavior is normal.   Nursing note and vitals reviewed. Patient is here for diabetic followup. A complete foot exam was done as follows:  Skin: Skin color, texture, turgor normal. No rashes or lesions.   Pulses:  present 2+  Nails:  normal appearing nails bilaterally  Monofilament testing:  partially present  Hair:  absent  Data:     Lab Results   Component Value Date     03/08/2018    K 4.8 03/08/2018    CL 91 03/08/2018    CO2 24 03/08/2018    BUN 18 03/08/2018    CREATININE 0.67 03/08/2018    GLUCOSE 517 03/08/2018    GLUCOSE 181 02/16/2012    PROT 7.5 03/08/2018    LABALBU 3.7 03/08/2018    LABALBU 4.2 12/05/2011    BILITOT 0.31 03/08/2018    ALKPHOS 97 03/08/2018    AST 14 03/08/2018    ALT 17 03/08/2018     Lab Results

## 2018-03-13 RX ORDER — ASPIRIN 81 MG
81 TABLET,CHEWABLE ORAL DAILY
Qty: 100 TABLET | Refills: 3 | Status: SHIPPED | OUTPATIENT
Start: 2018-03-13 | End: 2018-08-07

## 2018-03-13 NOTE — TELEPHONE ENCOUNTER
Depression     Bilateral leg weakness     Gait difficulty     Diabetic neuropathy     Back pain     Muscle spasm     Affective disorder (HCC)     Morbid obesity with BMI of 40.0-44.9, adult ([facility])     History of ventral hernia repair     History of incisional hernia repair     Simple chronic bronchitis ([facility])     Essential hypertension     Gastroesophageal reflux disease without esophagitis     Primary osteoarthritis involving multiple joints     Heart palpitations     Dysautonomia orthostatic hypotension syndrome ([facility])     Coronary artery disease involving native coronary artery of native heart     Angina, class III ([facility])     S/P CABG x 1     Angina, class I ([facility])     Angina, class II ([facility])     Precordial pain     DARON (obstructive sleep apnea)     Morbid obesity ([facility])     Neuropathic pain

## 2018-05-07 ENCOUNTER — APPOINTMENT (OUTPATIENT)
Dept: CT IMAGING | Age: 53
End: 2018-05-07
Payer: COMMERCIAL

## 2018-05-07 ENCOUNTER — APPOINTMENT (OUTPATIENT)
Dept: GENERAL RADIOLOGY | Age: 53
End: 2018-05-07
Payer: COMMERCIAL

## 2018-05-07 ENCOUNTER — HOSPITAL ENCOUNTER (EMERGENCY)
Age: 53
Discharge: HOME | End: 2018-05-07
Payer: COMMERCIAL

## 2018-05-07 VITALS
RESPIRATION RATE: 20 BRPM | OXYGEN SATURATION: 95 % | HEART RATE: 99 BPM | DIASTOLIC BLOOD PRESSURE: 93 MMHG | SYSTOLIC BLOOD PRESSURE: 134 MMHG | TEMPERATURE: 98.8 F
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
## 2018-05-07 DIAGNOSIS — S63.501A SPRAIN OF RIGHT WRIST, INITIAL ENCOUNTER: ICD-10-CM

## 2018-05-07 PROCEDURE — 74176 CT ABD & PELVIS W/O CONTRAST: CPT

## 2018-05-07 PROCEDURE — 99284 EMERGENCY DEPT VISIT MOD MDM: CPT

## 2018-05-07 PROCEDURE — 73110 X-RAY EXAM OF WRIST: CPT

## 2018-05-07 ASSESSMENT — ENCOUNTER SYMPTOMS
BACK PAIN: 0
CONSTIPATION: 0
COUGH: 0
CHEST TIGHTNESS: 0
SHORTNESS OF BREATH: 0
ABDOMINAL PAIN: 0
EYE DISCHARGE: 0
WHEEZING: 0
RHINORRHEA: 0
VOMITING: 0
EYE REDNESS: 0
BLOOD IN STOOL: 0
SORE THROAT: 0
DIARRHEA: 0
NAUSEA: 0

## 2018-05-07 ASSESSMENT — PAIN SCALES - GENERAL: PAINLEVEL_OUTOF10: 5

## 2018-05-07 ASSESSMENT — PAIN DESCRIPTION - PAIN TYPE: TYPE: ACUTE PAIN

## 2018-05-07 ASSESSMENT — PAIN DESCRIPTION - LOCATION: LOCATION: ABDOMEN

## 2018-05-08 ENCOUNTER — APPOINTMENT (OUTPATIENT)
Dept: CT IMAGING | Age: 53
End: 2018-05-08
Payer: COMMERCIAL

## 2018-05-08 ENCOUNTER — HOSPITAL ENCOUNTER (EMERGENCY)
Age: 53
Discharge: HOME | End: 2018-05-09
Attending: EMERGENCY MEDICINE
Payer: COMMERCIAL

## 2018-05-08 DIAGNOSIS — R10.84 GENERALIZED ABDOMINAL PAIN: Primary | ICD-10-CM

## 2018-05-08 LAB
ALBUMIN SERPL-MCNC: 3.8 G/DL (ref 3.5–5.2)
ALBUMIN/GLOBULIN RATIO: 1.1 (ref 1–2.5)
ALP BLD-CCNC: 104 U/L (ref 35–104)
ALT SERPL-CCNC: 21 U/L (ref 5–33)
ANION GAP SERPL CALCULATED.3IONS-SCNC: 15 MMOL/L (ref 9–17)
AST SERPL-CCNC: 18 U/L
BILIRUB SERPL-MCNC: 0.29 MG/DL (ref 0.3–1.2)
BUN BLDV-MCNC: 9 MG/DL (ref 6–20)
BUN/CREAT BLD: 16 (ref 9–20)
CALCIUM SERPL-MCNC: 9.2 MG/DL (ref 8.6–10.4)
CHLORIDE BLD-SCNC: 94 MMOL/L (ref 98–107)
CO2: 24 MMOL/L (ref 20–31)
CREAT SERPL-MCNC: 0.56 MG/DL (ref 0.5–0.9)
GFR AFRICAN AMERICAN: >60 ML/MIN
GFR NON-AFRICAN AMERICAN: >60 ML/MIN
GFR SERPL CREATININE-BSD FRML MDRD: ABNORMAL ML/MIN/{1.73_M2}
GFR SERPL CREATININE-BSD FRML MDRD: ABNORMAL ML/MIN/{1.73_M2}
GLUCOSE BLD-MCNC: 409 MG/DL (ref 70–99)
HCT VFR BLD CALC: 41.5 % (ref 36.3–47.1)
HEMOGLOBIN: 13.9 G/DL (ref 11.9–15.1)
LACTIC ACID, WHOLE BLOOD: ABNORMAL MMOL/L (ref 0.7–2.1)
LACTIC ACID: 4.3 MMOL/L (ref 0.5–2.2)
LIPASE: 56 U/L (ref 13–60)
MCH RBC QN AUTO: 27.1 PG (ref 25.2–33.5)
MCHC RBC AUTO-ENTMCNC: 33.5 G/DL (ref 28.4–34.8)
MCV RBC AUTO: 81.1 FL (ref 82.6–102.9)
NRBC AUTOMATED: 0 PER 100 WBC
PDW BLD-RTO: 13.7 % (ref 11.8–14.4)
PLATELET # BLD: 286 K/UL (ref 138–453)
PMV BLD AUTO: 9.8 FL (ref 8.1–13.5)
POTASSIUM SERPL-SCNC: 4.4 MMOL/L (ref 3.7–5.3)
RBC # BLD: 5.12 M/UL (ref 3.95–5.11)
SODIUM BLD-SCNC: 133 MMOL/L (ref 135–144)
TOTAL PROTEIN: 7.3 G/DL (ref 6.4–8.3)
WBC # BLD: 8.7 K/UL (ref 3.5–11.3)

## 2018-05-08 PROCEDURE — 83690 ASSAY OF LIPASE: CPT

## 2018-05-08 PROCEDURE — 96374 THER/PROPH/DIAG INJ IV PUSH: CPT

## 2018-05-08 PROCEDURE — 6360000002 HC RX W HCPCS: Performed by: PHYSICIAN ASSISTANT

## 2018-05-08 PROCEDURE — 6360000004 HC RX CONTRAST MEDICATION: Performed by: PHYSICIAN ASSISTANT

## 2018-05-08 PROCEDURE — 36415 COLL VENOUS BLD VENIPUNCTURE: CPT

## 2018-05-08 PROCEDURE — 83605 ASSAY OF LACTIC ACID: CPT

## 2018-05-08 PROCEDURE — 99284 EMERGENCY DEPT VISIT MOD MDM: CPT

## 2018-05-08 PROCEDURE — 96375 TX/PRO/DX INJ NEW DRUG ADDON: CPT

## 2018-05-08 PROCEDURE — 85027 COMPLETE CBC AUTOMATED: CPT

## 2018-05-08 PROCEDURE — 74177 CT ABD & PELVIS W/CONTRAST: CPT

## 2018-05-08 PROCEDURE — 80053 COMPREHEN METABOLIC PANEL: CPT

## 2018-05-08 PROCEDURE — 2580000003 HC RX 258: Performed by: PHYSICIAN ASSISTANT

## 2018-05-08 RX ORDER — 0.9 % SODIUM CHLORIDE 0.9 %
1000 INTRAVENOUS SOLUTION INTRAVENOUS ONCE
Status: COMPLETED | OUTPATIENT
Start: 2018-05-08 | End: 2018-05-09

## 2018-05-08 RX ORDER — MORPHINE SULFATE 4 MG/ML
4 INJECTION, SOLUTION INTRAMUSCULAR; INTRAVENOUS ONCE
Status: COMPLETED | OUTPATIENT
Start: 2018-05-08 | End: 2018-05-08

## 2018-05-08 RX ORDER — ONDANSETRON 2 MG/ML
4 INJECTION INTRAMUSCULAR; INTRAVENOUS ONCE
Status: COMPLETED | OUTPATIENT
Start: 2018-05-08 | End: 2018-05-08

## 2018-05-08 RX ADMIN — ONDANSETRON 4 MG: 2 INJECTION INTRAMUSCULAR; INTRAVENOUS at 22:55

## 2018-05-08 RX ADMIN — MORPHINE SULFATE 4 MG: 4 INJECTION INTRAVENOUS at 22:56

## 2018-05-08 RX ADMIN — SODIUM CHLORIDE 1000 ML: 9 INJECTION, SOLUTION INTRAVENOUS at 22:54

## 2018-05-08 RX ADMIN — IOPAMIDOL 100 ML: 612 INJECTION, SOLUTION INTRAVENOUS at 23:31

## 2018-05-08 ASSESSMENT — ENCOUNTER SYMPTOMS
SHORTNESS OF BREATH: 0
SORE THROAT: 0
BLOOD IN STOOL: 0
EYE REDNESS: 0
BACK PAIN: 0
CHEST TIGHTNESS: 0
RHINORRHEA: 0
COUGH: 0
NAUSEA: 0
VOMITING: 0
EYE DISCHARGE: 0
DIARRHEA: 0
ABDOMINAL PAIN: 1
WHEEZING: 0
CONSTIPATION: 0

## 2018-05-08 ASSESSMENT — PAIN DESCRIPTION - DESCRIPTORS: DESCRIPTORS: SHARP

## 2018-05-08 ASSESSMENT — PAIN DESCRIPTION - LOCATION: LOCATION: ABDOMEN

## 2018-05-08 ASSESSMENT — PAIN DESCRIPTION - ORIENTATION: ORIENTATION: RIGHT;UPPER

## 2018-05-08 ASSESSMENT — PAIN SCALES - GENERAL: PAINLEVEL_OUTOF10: 4

## 2018-05-08 ASSESSMENT — PAIN DESCRIPTION - PAIN TYPE: TYPE: ACUTE PAIN

## 2018-05-08 ASSESSMENT — PAIN DESCRIPTION - DIRECTION: RADIATING_TOWARDS: BACK

## 2018-05-09 VITALS
HEIGHT: 64 IN | WEIGHT: 232 LBS | TEMPERATURE: 98.8 F | HEART RATE: 94 BPM | SYSTOLIC BLOOD PRESSURE: 113 MMHG | DIASTOLIC BLOOD PRESSURE: 86 MMHG | OXYGEN SATURATION: 95 % | RESPIRATION RATE: 18 BRPM | BODY MASS INDEX: 39.61 KG/M2

## 2018-05-09 LAB
LACTIC ACID, WHOLE BLOOD: ABNORMAL MMOL/L (ref 0.7–2.1)
LACTIC ACID: 2.8 MMOL/L (ref 0.5–2.2)

## 2018-05-09 PROCEDURE — 2580000003 HC RX 258: Performed by: PHYSICIAN ASSISTANT

## 2018-05-09 PROCEDURE — 83605 ASSAY OF LACTIC ACID: CPT

## 2018-05-09 PROCEDURE — 36415 COLL VENOUS BLD VENIPUNCTURE: CPT

## 2018-05-09 RX ADMIN — SODIUM CHLORIDE 1000 ML: 9 INJECTION, SOLUTION INTRAVENOUS at 00:04

## 2018-05-21 ENCOUNTER — OFFICE VISIT (OUTPATIENT)
Dept: CARDIOLOGY | Age: 53
End: 2018-05-21
Payer: COMMERCIAL

## 2018-05-21 VITALS
DIASTOLIC BLOOD PRESSURE: 85 MMHG | RESPIRATION RATE: 18 BRPM | HEART RATE: 88 BPM | SYSTOLIC BLOOD PRESSURE: 122 MMHG | WEIGHT: 227.6 LBS | BODY MASS INDEX: 38.86 KG/M2 | OXYGEN SATURATION: 97 % | HEIGHT: 64 IN

## 2018-05-21 DIAGNOSIS — I10 ESSENTIAL HYPERTENSION: Chronic | ICD-10-CM

## 2018-05-21 DIAGNOSIS — I25.118 CORONARY ARTERY DISEASE OF NATIVE ARTERY OF NATIVE HEART WITH STABLE ANGINA PECTORIS (HCC): Primary | ICD-10-CM

## 2018-05-21 DIAGNOSIS — I95.1 ORTHOSTATIC HYPOTENSION: ICD-10-CM

## 2018-05-21 DIAGNOSIS — I20.9 ANGINA, CLASS II (HCC): ICD-10-CM

## 2018-05-21 PROCEDURE — 1036F TOBACCO NON-USER: CPT | Performed by: FAMILY MEDICINE

## 2018-05-21 PROCEDURE — G8427 DOCREV CUR MEDS BY ELIG CLIN: HCPCS | Performed by: FAMILY MEDICINE

## 2018-05-21 PROCEDURE — G8598 ASA/ANTIPLAT THER USED: HCPCS | Performed by: FAMILY MEDICINE

## 2018-05-21 PROCEDURE — 3017F COLORECTAL CA SCREEN DOC REV: CPT | Performed by: FAMILY MEDICINE

## 2018-05-21 PROCEDURE — G8417 CALC BMI ABV UP PARAM F/U: HCPCS | Performed by: FAMILY MEDICINE

## 2018-05-21 PROCEDURE — 99214 OFFICE O/P EST MOD 30 MIN: CPT | Performed by: FAMILY MEDICINE

## 2018-05-21 RX ORDER — METOPROLOL SUCCINATE 50 MG/1
50 TABLET, EXTENDED RELEASE ORAL DAILY
Qty: 90 TABLET | Refills: 3 | Status: SHIPPED | OUTPATIENT
Start: 2018-05-21 | End: 2019-01-16

## 2018-06-07 ASSESSMENT — ENCOUNTER SYMPTOMS
EYE PAIN: 0
EYE DISCHARGE: 0
COUGH: 0
SINUS PRESSURE: 0
DIARRHEA: 0
VOMITING: 0
SINUS PAIN: 0
SHORTNESS OF BREATH: 0
NAUSEA: 0
SORE THROAT: 0
ABDOMINAL PAIN: 1

## 2018-08-07 ENCOUNTER — OFFICE VISIT (OUTPATIENT)
Dept: PRIMARY CARE CLINIC | Age: 53
End: 2018-08-07
Payer: COMMERCIAL

## 2018-08-07 VITALS
TEMPERATURE: 98 F | HEART RATE: 80 BPM | RESPIRATION RATE: 18 BRPM | WEIGHT: 230 LBS | SYSTOLIC BLOOD PRESSURE: 122 MMHG | DIASTOLIC BLOOD PRESSURE: 70 MMHG | BODY MASS INDEX: 39.27 KG/M2 | HEIGHT: 64 IN

## 2018-08-07 DIAGNOSIS — J41.0 SIMPLE CHRONIC BRONCHITIS (HCC): ICD-10-CM

## 2018-08-07 DIAGNOSIS — J44.9 CHRONIC OBSTRUCTIVE PULMONARY DISEASE, UNSPECIFIED COPD TYPE (HCC): Primary | ICD-10-CM

## 2018-08-07 DIAGNOSIS — J44.9 CHRONIC OBSTRUCTIVE PULMONARY DISEASE, UNSPECIFIED COPD TYPE (HCC): ICD-10-CM

## 2018-08-07 DIAGNOSIS — I95.1 DYSAUTONOMIA ORTHOSTATIC HYPOTENSION SYNDROME: ICD-10-CM

## 2018-08-07 DIAGNOSIS — D17.24 LIPOMA OF LEFT THIGH: ICD-10-CM

## 2018-08-07 LAB — HBA1C MFR BLD: 14 %

## 2018-08-07 PROCEDURE — 3046F HEMOGLOBIN A1C LEVEL >9.0%: CPT | Performed by: NURSE PRACTITIONER

## 2018-08-07 PROCEDURE — 83036 HEMOGLOBIN GLYCOSYLATED A1C: CPT | Performed by: NURSE PRACTITIONER

## 2018-08-07 PROCEDURE — G8926 SPIRO NO PERF OR DOC: HCPCS | Performed by: NURSE PRACTITIONER

## 2018-08-07 PROCEDURE — G8427 DOCREV CUR MEDS BY ELIG CLIN: HCPCS | Performed by: NURSE PRACTITIONER

## 2018-08-07 PROCEDURE — 99214 OFFICE O/P EST MOD 30 MIN: CPT | Performed by: NURSE PRACTITIONER

## 2018-08-07 PROCEDURE — G8598 ASA/ANTIPLAT THER USED: HCPCS | Performed by: NURSE PRACTITIONER

## 2018-08-07 PROCEDURE — 1036F TOBACCO NON-USER: CPT | Performed by: NURSE PRACTITIONER

## 2018-08-07 PROCEDURE — 2022F DILAT RTA XM EVC RTNOPTHY: CPT | Performed by: NURSE PRACTITIONER

## 2018-08-07 PROCEDURE — 3023F SPIROM DOC REV: CPT | Performed by: NURSE PRACTITIONER

## 2018-08-07 PROCEDURE — 3017F COLORECTAL CA SCREEN DOC REV: CPT | Performed by: NURSE PRACTITIONER

## 2018-08-07 PROCEDURE — G8417 CALC BMI ABV UP PARAM F/U: HCPCS | Performed by: NURSE PRACTITIONER

## 2018-08-07 RX ORDER — ASPIRIN 81 MG/1
TABLET ORAL
COMMUNITY
Start: 2018-05-13 | End: 2019-03-17 | Stop reason: SDUPTHER

## 2018-08-07 RX ORDER — ALBUTEROL SULFATE 90 UG/1
AEROSOL, METERED RESPIRATORY (INHALATION)
Qty: 18 G | Refills: 3 | Status: SHIPPED | OUTPATIENT
Start: 2018-08-07 | End: 2019-10-08 | Stop reason: SDUPTHER

## 2018-08-07 RX ORDER — ALOGLIPTIN AND METFORMIN HYDROCHLORIDE 12.5; 1 MG/1; MG/1
1 TABLET, FILM COATED ORAL 2 TIMES DAILY
Qty: 60 TABLET | Refills: 11 | Status: SHIPPED | OUTPATIENT
Start: 2018-08-07 | End: 2019-08-25 | Stop reason: SDUPTHER

## 2018-08-07 ASSESSMENT — ENCOUNTER SYMPTOMS
NAUSEA: 0
VISUAL CHANGE: 0
CHEST TIGHTNESS: 0
ABDOMINAL PAIN: 0
RHINORRHEA: 0
SORE THROAT: 0
COUGH: 0
SPUTUM PRODUCTION: 0
SHORTNESS OF BREATH: 0
WHEEZING: 0
BLURRED VISION: 0
TROUBLE SWALLOWING: 0
DIARRHEA: 0
CONSTIPATION: 0
HEMOPTYSIS: 0
VOMITING: 0

## 2018-08-07 ASSESSMENT — COPD QUESTIONNAIRES: COPD: 1

## 2018-08-07 NOTE — PROGRESS NOTES
TABLET BY MOUTH DAILY 2/26/18  Yes CHELSEA Manzo CNP   Blood Glucose Monitoring Suppl BERE 1 Units by Does not apply route three times daily Unit insurance will cover 12/29/17  Yes CHELSEA Ogden CNP   glucose blood VI test strips (FREESTYLE LITE) strip Use to check blood sugars 3 times daily and as needed for Diabetes E11.40 9/7/17  Yes CHELSEA Manzo CNP   fludrocortisone (FLORINEF) 0.1 MG tablet Take 2 tablets by mouth daily 8/1/17  Yes Alyson Madison MD   omeprazole (PRILOSEC) 20 MG delayed release capsule Take 1 capsule by mouth daily 5/9/17  Yes Alyson Madison MD   Blood Pressure Monitor KIT 1 each by Does not apply route daily as needed ESSENTIAL HYPERTENSION   I10 5/31/16  Yes CHELSEA Manzo CNP   Blood Glucose Monitoring Suppl (BLOOD GLUCOSE MONITOR KIT) KIT 1 each by Does not apply route daily. Please dispense whatever BS monitoring kit [facility] covers. Dx: 250, new onset T2DM. Testing once daily 2/21/12 12/29/18 Yes CHELSEA Luong CNP   aspirin 81 MG EC tablet  5/13/18   Historical Provider, MD        Allergies:       Patient has no known allergies. Social History:     Tobacco:    reports that she quit smoking about 7 years ago. She has never used smokeless tobacco.  Alcohol:      reports that she does not drink alcohol. Drug Use:  reports that she does not use drugs. Family History:     Family History   Problem Relation Age of Onset    Cancer Mother     Diabetes Mother     Cancer Father         thyroid    Diabetes Sister     Heart Disease Sister     Heart Disease Brother     Heart Disease Maternal Uncle     Cancer Maternal Grandmother        Review of Systems:     Positive and Negative as described in HPI    Review of Systems   Constitutional: Negative for chills, fatigue and fever. HENT: Negative for congestion, rhinorrhea, sore throat and trouble swallowing. Eyes: Negative for blurred vision and visual disturbance.    Respiratory: Negative BILITOT 0.29 05/08/2018    ALKPHOS 104 05/08/2018    AST 18 05/08/2018    ALT 21 05/08/2018     Lab Results   Component Value Date    WBC 8.7 05/08/2018    RBC 5.12 05/08/2018    RBC 3.88 12/05/2011    HGB 13.9 05/08/2018    HCT 41.5 05/08/2018    MCV 81.1 05/08/2018    MCH 27.1 05/08/2018    MCHC 33.5 05/08/2018    RDW 13.7 05/08/2018     05/08/2018     12/05/2011    MPV 9.8 05/08/2018     Lab Results   Component Value Date    TSH 2.80 06/04/2016     Lab Results   Component Value Date    CHOL 172 09/06/2017    HDL 42 09/06/2017    LABA1C 14.0 08/07/2018       Assessment/Plan:      Diagnosis Orders   1. Uncontrolled type 2 diabetes mellitus with diabetic polyneuropathy, with long-term current use of insulin (HCC)  POCT glycosylated hemoglobin (Hb A1C)     DIABETES FOOT EXAM    insulin aspart (NOVOLOG FLEXPEN) 100 UNIT/ML injection pen    Insulin Degludec (TRESIBA FLEXTOUCH) 200 UNIT/ML SOPN    alogliptin-metformin 12.5-1000 MG TABS    Liraglutide (VICTOZA) 18 MG/3ML SOPN SC injection    Insulin Pen Needle (BD ULTRA-FINE PEN NEEDLES) 29G X 12.7MM MISC    Basic Metabolic Panel    Hemoglobin A1C    Microalbumin, Ur   2. Chronic obstructive pulmonary disease, unspecified COPD type ([address]     3. Simple chronic bronchitis ([address]     4. Dysautonomia orthostatic hypotension syndrome (HCC)     5. Lipoma of left thigh  Jace Willard MD, General Surgery [place]       1. Yaneli Link received counseling on the following healthy behaviors: nutrition, exercise and medication adherence  2. Patient given educational materials - see patient instructions  3. Was a self-tracking handout given in paper form or via [employer]? No  If yes, see orders or list here. 4.  Discussed use, benefit, and side effects of prescribed medications. Barriers to medication compliance addressed. All patient questions answered. Pt voiced understanding. 5.  Reviewed prior labs and health maintenance  6.   Continue current medications,

## 2018-08-08 ENCOUNTER — TELEPHONE (OUTPATIENT)
Dept: PRIMARY CARE CLINIC | Age: 53
End: 2018-08-08

## 2018-08-10 ENCOUNTER — HOSPITAL ENCOUNTER (OUTPATIENT)
Dept: LAB | Age: 53
Discharge: HOME | End: 2018-08-10
Payer: COMMERCIAL

## 2018-08-10 ENCOUNTER — OFFICE VISIT (OUTPATIENT)
Dept: SURGERY | Age: 53
End: 2018-08-10
Payer: COMMERCIAL

## 2018-08-10 ENCOUNTER — TELEPHONE (OUTPATIENT)
Dept: SURGERY | Age: 53
End: 2018-08-10

## 2018-08-10 VITALS
BODY MASS INDEX: 40.7 KG/M2 | HEART RATE: 73 BPM | DIASTOLIC BLOOD PRESSURE: 75 MMHG | SYSTOLIC BLOOD PRESSURE: 112 MMHG | TEMPERATURE: 97 F | HEIGHT: 63 IN | RESPIRATION RATE: 20 BRPM | WEIGHT: 229.7 LBS

## 2018-08-10 DIAGNOSIS — Z01.818 PREOP TESTING: ICD-10-CM

## 2018-08-10 DIAGNOSIS — R22.42 MASS OF LEFT THIGH: Primary | ICD-10-CM

## 2018-08-10 LAB
BUN BLDV-MCNC: 9 MG/DL (ref 6–20)
CREAT SERPL-MCNC: 0.64 MG/DL (ref 0.5–0.9)
GFR AFRICAN AMERICAN: >60 ML/MIN
GFR NON-AFRICAN AMERICAN: >60 ML/MIN
GFR SERPL CREATININE-BSD FRML MDRD: NORMAL ML/MIN/{1.73_M2}
GFR SERPL CREATININE-BSD FRML MDRD: NORMAL ML/MIN/{1.73_M2}

## 2018-08-10 PROCEDURE — 99203 OFFICE O/P NEW LOW 30 MIN: CPT | Performed by: SURGERY

## 2018-08-10 PROCEDURE — G8417 CALC BMI ABV UP PARAM F/U: HCPCS | Performed by: SURGERY

## 2018-08-10 PROCEDURE — 1036F TOBACCO NON-USER: CPT | Performed by: SURGERY

## 2018-08-10 PROCEDURE — 36415 COLL VENOUS BLD VENIPUNCTURE: CPT

## 2018-08-10 PROCEDURE — G8598 ASA/ANTIPLAT THER USED: HCPCS | Performed by: SURGERY

## 2018-08-10 PROCEDURE — 3017F COLORECTAL CA SCREEN DOC REV: CPT | Performed by: SURGERY

## 2018-08-10 PROCEDURE — G8427 DOCREV CUR MEDS BY ELIG CLIN: HCPCS | Performed by: SURGERY

## 2018-08-10 PROCEDURE — 84520 ASSAY OF UREA NITROGEN: CPT

## 2018-08-10 PROCEDURE — 82565 ASSAY OF CREATININE: CPT

## 2018-08-10 NOTE — PROGRESS NOTES
the medial aspect of a [place] RUQ scar); repaired byDr. Soto    HERNIA REPAIR  02/16/2015    incisional, recurrent    HERNIA REPAIR  02/2016    HYSTERECTOMY      OTHER SURGICAL HISTORY  10/8/2015    abd wound washout, mesh removal, wound vac placement    UPPP UVULOPALATOPHARYGOPLASTY  06/26/2012    VENTRAL HERNIA REPAIR  09/21/2015    With Mesh - Dr Nia Ortega History:  reports that she quit smoking about 7 years ago. She has never used smokeless tobacco. She reports that she does not drink alcohol or use drugs. Family History: family history includes Cancer in her father, maternal grandmother, and mother; Diabetes in her mother and sister; Heart Disease in her brother, maternal uncle, and sister. Review of Systems:   General: Denies fever, chills, night sweats, weight loss, malaise, fatigue  HEENT: Denies sore throat, sinus problems, allergic rhinosinusitis  Card: Denies chest pain, palpitations, orthopnea/PND. Denies h/o murmurs  Pulm: Denies cough, shortness of breath, GAMING  GI:  per HPI; denies history of constipation, diarrhea, hematochezia or melena  : Denies polyuria, dysuria, hematuria  Endo: Denies diabetes, thyroid problems. Heme: Denies anemia, h/o bleeding or clotting problems. Neuro: Denies h/o CVA, TIA  Skin: Denies rashes, ulcers  Musculoskeletal: Denies muscle, joint, back pain. Allergies: Patient has no known allergies.     Current Meds:  Current Outpatient Prescriptions:     albuterol sulfate HFA (VENTOLIN HFA) 108 (90 Base) MCG/ACT inhaler, INHALE 2 PUFFS INTO THE LUNGS EVERY 6 HOURS AS NEEDED FOR WHEEZING., Disp: 18 g, Rfl: 3    aspirin 81 MG EC tablet, , Disp: , Rfl:     insulin aspart (NOVOLOG FLEXPEN) 100 UNIT/ML injection pen, Inject 8 Units into the skin 3 times daily (before meals), Disp: 5 pen, Rfl: 3    Insulin Degludec (TRESIBA FLEXTOUCH) 200 UNIT/ML SOPN, Inject 50 Units into the skin daily, Disp: 3 pen, Rfl: 5    alogliptin-metformin 12.5-1000 MG TABS, Take 1 tablet by mouth 2 times daily, Disp: 60 tablet, Rfl: 11    Liraglutide (VICTOZA) 18 MG/3ML SOPN SC injection, INJECT 1.8 MG INTO THE SKIN DAILY AS DIRECTED, Disp: 3 pen, Rfl: 11    Insulin Pen Needle (BD ULTRA-FINE PEN NEEDLES) 29G X 12.7MM MISC, USE AS DIRECTED BY PHYSICIAN 5 times daily, Disp: 150 each, Rfl: 11    metoprolol succinate (TOPROL XL) 50 MG extended release tablet, Take 1 tablet by mouth daily, Disp: 90 tablet, Rfl: 3    DULoxetine (CYMBALTA) 30 MG extended release capsule, TAKE ONE CAPSULE BY MOUTH EVERY DAY, Disp: 30 capsule, Rfl: 3    gabapentin (NEURONTIN) 800 MG tablet, TAKE 1 TABLET BY MOUTH 3 TIMES A DAY., Disp: 90 tablet, Rfl: 3    atorvastatin (LIPITOR) 40 MG tablet, TAKE 1 TABLET BY MOUTH DAILY, Disp: 90 tablet, Rfl: 1    escitalopram (LEXAPRO) 10 MG tablet, TAKE 1 TABLET EVERY DAY, Disp: 90 tablet, Rfl: 1    losartan (COZAAR) 25 MG tablet, TAKE 1 TABLET BY MOUTH DAILY, Disp: 90 tablet, Rfl: 1    Blood Glucose Monitoring Suppl BERE, 1 Units by Does not apply route three times daily Unit insurance will cover, Disp: 1 Device, Rfl: 0    glucose blood VI test strips (FREESTYLE LITE) strip, Use to check blood sugars 3 times daily and as needed for Diabetes E11.40, Disp: 100 each, Rfl: 11    fludrocortisone (FLORINEF) 0.1 MG tablet, Take 2 tablets by mouth daily, Disp: 90 tablet, Rfl: 3    omeprazole (PRILOSEC) 20 MG delayed release capsule, Take 1 capsule by mouth daily, Disp: 90 capsule, Rfl: 3    Blood Pressure Monitor KIT, 1 each by Does not apply route daily as needed ESSENTIAL HYPERTENSION   I10, Disp: 1 kit, Rfl: 0    Blood Glucose Monitoring Suppl (BLOOD GLUCOSE MONITOR KIT) KIT, 1 each by Does not apply route daily. Please dispense whatever BS monitoring kit [facility] covers. Dx: 250, new onset T2DM.  Testing once daily, Disp: 1 kit, Rfl: 0    Vital Signs:  /75 (Site: Left Arm, Position: Sitting, Cuff Size: Medium Adult)   Pulse 73   Temp 97 °F (36.1 °C)

## 2018-08-10 NOTE — LETTER
[address] [facility]  [address] [place] [address] Street  Phone: [phone]  Fax: [phone]    Jensen Fernandes MD        August 10, 2018     Tristan Plasencia, APRN - [facility]  [address] [address]    Patient: Charolet Hamman  MR Number: [id number]  YOB: 1965  Date of Visit: 8/10/2018    Dear Dr. Tristan Plasencia:    Thank you for the request for consultation for Candido Wallace to me for the evaluation of left thigh mass. Below are the relevant portions of my assessment and plan of care. Large soft tissue mass over anterior left thigh, likely a lipoma, but its >4cm and firm, therefore we have to rule out sarcoma. Will get MRI of left thigh with contrast. Followed by core needle biopsy. Definitive treatment would be based on the above tests. If you have questions, please do not hesitate to call me. I look forward to following Herminia Banks along with you.     Sincerely,        Jensen Fernandes MD

## 2018-08-10 NOTE — TELEPHONE ENCOUNTER
GELY Cobb at [facility] office regarding needle core bx in office with  on 8/21/18. Tio Ervin will need the supplies brought up from 's office.

## 2018-08-17 ENCOUNTER — HOSPITAL ENCOUNTER (OUTPATIENT)
Dept: MRI IMAGING | Age: 53
Discharge: HOME | End: 2018-08-19
Payer: COMMERCIAL

## 2018-08-17 DIAGNOSIS — R22.42 MASS OF LEFT THIGH: ICD-10-CM

## 2018-08-17 PROCEDURE — 73720 MRI LWR EXTREMITY W/O&W/DYE: CPT

## 2018-08-17 PROCEDURE — 6360000004 HC RX CONTRAST MEDICATION: Performed by: SURGERY

## 2018-08-17 PROCEDURE — A9579 GAD-BASE MR CONTRAST NOS,1ML: HCPCS | Performed by: SURGERY

## 2018-08-17 RX ADMIN — GADOTERIDOL 20 ML: 279.3 INJECTION, SOLUTION INTRAVENOUS at 14:24

## 2018-08-21 ENCOUNTER — PROCEDURE VISIT (OUTPATIENT)
Dept: SURGERY | Age: 53
End: 2018-08-21

## 2018-08-21 VITALS
HEIGHT: 63 IN | HEART RATE: 105 BPM | WEIGHT: 222.7 LBS | RESPIRATION RATE: 24 BRPM | DIASTOLIC BLOOD PRESSURE: 76 MMHG | TEMPERATURE: 99 F | SYSTOLIC BLOOD PRESSURE: 112 MMHG | BODY MASS INDEX: 39.46 KG/M2

## 2018-08-21 DIAGNOSIS — D17.24 LIPOMA OF LEFT LOWER EXTREMITY: Primary | ICD-10-CM

## 2018-08-21 DIAGNOSIS — Z01.818 PREOP EXAMINATION: ICD-10-CM

## 2018-08-21 PROCEDURE — 99024 POSTOP FOLLOW-UP VISIT: CPT | Performed by: SURGERY

## 2018-08-21 NOTE — PROGRESS NOTES
REPAIR  02/2016    HYSTERECTOMY      OTHER SURGICAL HISTORY  10/8/2015    abd wound washout, mesh removal, wound vac placement    UPPP UVULOPALATOPHARYGOPLASTY  06/26/2012    VENTRAL HERNIA REPAIR  09/21/2015    With Mesh - Dr Artem Grimaldo History:  reports that she quit smoking about 7 years ago. She has never used smokeless tobacco. She reports that she does not drink alcohol or use drugs. Family History: family history includes Cancer in her father, maternal grandmother, and mother; Diabetes in her mother and sister; Heart Disease in her brother, maternal uncle, and sister. Review of Systems:   General: Denies fever, chills, night sweats, weight loss, malaise, fatigue  HEENT: Denies sore throat, sinus problems, allergic rhinosinusitis  Card: Denies chest pain, palpitations, orthopnea/PND. Denies h/o murmurs  Pulm: Denies cough, shortness of breath, GAMING  GI:  per HPI; denies history of constipation, diarrhea, hematochezia or melena  : Denies polyuria, dysuria, hematuria  Endo: Denies diabetes, thyroid problems. Heme: Denies anemia, h/o bleeding or clotting problems. Neuro: Denies h/o CVA, TIA  Skin: Denies rashes, ulcers  Musculoskeletal: Denies muscle, joint, back pain.     Allergies: Tape Juliocesar Passe tape]    Current Meds:  Current Outpatient Prescriptions:     albuterol sulfate HFA (VENTOLIN HFA) 108 (90 Base) MCG/ACT inhaler, INHALE 2 PUFFS INTO THE LUNGS EVERY 6 HOURS AS NEEDED FOR WHEEZING., Disp: 18 g, Rfl: 3    insulin aspart (NOVOLOG FLEXPEN) 100 UNIT/ML injection pen, Inject 8 Units into the skin 3 times daily (before meals), Disp: 5 pen, Rfl: 3    alogliptin-metformin 12.5-1000 MG TABS, Take 1 tablet by mouth 2 times daily, Disp: 60 tablet, Rfl: 11    Liraglutide (VICTOZA) 18 MG/3ML SOPN SC injection, INJECT 1.8 MG INTO THE SKIN DAILY AS DIRECTED, Disp: 3 pen, Rfl: 11    metoprolol succinate (TOPROL XL) 50 MG extended release tablet, Take 1 tablet by mouth daily, Disp: 90

## 2018-08-22 ENCOUNTER — HOSPITAL ENCOUNTER (OUTPATIENT)
Dept: PREADMISSION TESTING | Age: 53
Discharge: HOME | End: 2018-08-26
Attending: SURGERY
Payer: COMMERCIAL

## 2018-08-22 ENCOUNTER — TELEPHONE (OUTPATIENT)
Dept: SURGERY | Age: 53
End: 2018-08-22

## 2018-08-22 VITALS
HEIGHT: 63 IN | RESPIRATION RATE: 20 BRPM | SYSTOLIC BLOOD PRESSURE: 108 MMHG | DIASTOLIC BLOOD PRESSURE: 73 MMHG | HEART RATE: 103 BPM | TEMPERATURE: 97.2 F | WEIGHT: 226.2 LBS | OXYGEN SATURATION: 95 % | BODY MASS INDEX: 40.08 KG/M2

## 2018-08-22 DIAGNOSIS — Z01.818 PREOP EXAMINATION: ICD-10-CM

## 2018-08-22 LAB
ABSOLUTE EOS #: 0.14 K/UL (ref 0–0.44)
ABSOLUTE IMMATURE GRANULOCYTE: 0.05 K/UL (ref 0–0.3)
ABSOLUTE LYMPH #: 2.68 K/UL (ref 1.1–3.7)
ABSOLUTE MONO #: 0.5 K/UL (ref 0.1–1.2)
ANION GAP SERPL CALCULATED.3IONS-SCNC: 12 MMOL/L (ref 9–17)
BASOPHILS # BLD: 1 % (ref 0–2)
BASOPHILS ABSOLUTE: 0.07 K/UL (ref 0–0.2)
BUN BLDV-MCNC: 15 MG/DL (ref 6–20)
BUN/CREAT BLD: 22 (ref 9–20)
CALCIUM SERPL-MCNC: 10.3 MG/DL (ref 8.6–10.4)
CHLORIDE BLD-SCNC: 95 MMOL/L (ref 98–107)
CO2: 30 MMOL/L (ref 20–31)
CREAT SERPL-MCNC: 0.69 MG/DL (ref 0.5–0.9)
DIFFERENTIAL TYPE: ABNORMAL
EKG ATRIAL RATE: 96 BPM
EKG P AXIS: 29 DEGREES
EKG P-R INTERVAL: 136 MS
EKG Q-T INTERVAL: 352 MS
EKG QRS DURATION: 76 MS
EKG QTC CALCULATION (BAZETT): 444 MS
EKG R AXIS: 30 DEGREES
EKG T AXIS: 75 DEGREES
EKG VENTRICULAR RATE: 96 BPM
EOSINOPHILS RELATIVE PERCENT: 1 % (ref 1–4)
GFR AFRICAN AMERICAN: >60 ML/MIN
GFR NON-AFRICAN AMERICAN: >60 ML/MIN
GFR SERPL CREATININE-BSD FRML MDRD: ABNORMAL ML/MIN/{1.73_M2}
GFR SERPL CREATININE-BSD FRML MDRD: ABNORMAL ML/MIN/{1.73_M2}
GLUCOSE BLD-MCNC: 422 MG/DL (ref 70–99)
HCT VFR BLD CALC: 42.8 % (ref 36.3–47.1)
HEMOGLOBIN: 14.2 G/DL (ref 11.9–15.1)
IMMATURE GRANULOCYTES: 1 %
LYMPHOCYTES # BLD: 27 % (ref 24–43)
MCH RBC QN AUTO: 27 PG (ref 25.2–33.5)
MCHC RBC AUTO-ENTMCNC: 33.2 G/DL (ref 28.4–34.8)
MCV RBC AUTO: 81.4 FL (ref 82.6–102.9)
MONOCYTES # BLD: 5 % (ref 3–12)
NRBC AUTOMATED: 0 PER 100 WBC
PDW BLD-RTO: 13.4 % (ref 11.8–14.4)
PLATELET # BLD: 323 K/UL (ref 138–453)
PLATELET ESTIMATE: ABNORMAL
PMV BLD AUTO: 9.4 FL (ref 8.1–13.5)
POTASSIUM SERPL-SCNC: 4.9 MMOL/L (ref 3.7–5.3)
RBC # BLD: 5.26 M/UL (ref 3.95–5.11)
RBC # BLD: ABNORMAL 10*6/UL
SEG NEUTROPHILS: 65 % (ref 36–65)
SEGMENTED NEUTROPHILS ABSOLUTE COUNT: 6.46 K/UL (ref 1.5–8.1)
SODIUM BLD-SCNC: 137 MMOL/L (ref 135–144)
WBC # BLD: 9.9 K/UL (ref 3.5–11.3)
WBC # BLD: ABNORMAL 10*3/UL

## 2018-08-22 PROCEDURE — 36415 COLL VENOUS BLD VENIPUNCTURE: CPT

## 2018-08-22 PROCEDURE — 85025 COMPLETE CBC W/AUTO DIFF WBC: CPT

## 2018-08-22 PROCEDURE — 93005 ELECTROCARDIOGRAM TRACING: CPT

## 2018-08-22 PROCEDURE — 80048 BASIC METABOLIC PNL TOTAL CA: CPT

## 2018-08-22 NOTE — PROGRESS NOTES
Pt has multiple open sores on her marilyn lower extremities and a large area of sores across her abdomen. Pt states Dr. Kelsey Coombs and JENN Plasencia CNP are both aware of these sores. Pt states her abdomen is healing and it was much worse a week ago.

## 2018-08-22 NOTE — TELEPHONE ENCOUNTER
Writer spoke with Leelee Dawkins at [facility] with Jesenia Plasencia's office to infom her that [address] will need to clear pt d/t glucose of 422. Leelee Dawkins stated she will talk to Jesenia Plasencia tomorrow to see about getting pt in to see him asap. Writer also mentioned to Leelee Dawkins that Lorie Prajapati from [facility] informed writer that pt has opened sores on her ABD and bilat legs that she was concerned about.

## 2018-08-24 ENCOUNTER — OFFICE VISIT (OUTPATIENT)
Dept: CARDIOLOGY | Age: 53
End: 2018-08-24
Payer: COMMERCIAL

## 2018-08-24 VITALS
DIASTOLIC BLOOD PRESSURE: 74 MMHG | SYSTOLIC BLOOD PRESSURE: 115 MMHG | OXYGEN SATURATION: 97 % | WEIGHT: 223.6 LBS | HEIGHT: 63 IN | RESPIRATION RATE: 18 BRPM | HEART RATE: 101 BPM | BODY MASS INDEX: 39.62 KG/M2

## 2018-08-24 DIAGNOSIS — I95.1 DYSAUTONOMIA ORTHOSTATIC HYPOTENSION SYNDROME: ICD-10-CM

## 2018-08-24 DIAGNOSIS — I25.118 CORONARY ARTERY DISEASE OF NATIVE ARTERY OF NATIVE HEART WITH STABLE ANGINA PECTORIS (HCC): Primary | ICD-10-CM

## 2018-08-24 DIAGNOSIS — I20.9 ANGINA, CLASS I (HCC): ICD-10-CM

## 2018-08-24 DIAGNOSIS — Z01.810 PREOP CARDIOVASCULAR EXAM: ICD-10-CM

## 2018-08-24 DIAGNOSIS — R00.0 TACHYCARDIA: ICD-10-CM

## 2018-08-24 DIAGNOSIS — I10 ESSENTIAL HYPERTENSION: Chronic | ICD-10-CM

## 2018-08-24 PROCEDURE — 1036F TOBACCO NON-USER: CPT | Performed by: FAMILY MEDICINE

## 2018-08-24 PROCEDURE — 99214 OFFICE O/P EST MOD 30 MIN: CPT | Performed by: FAMILY MEDICINE

## 2018-08-24 PROCEDURE — G8417 CALC BMI ABV UP PARAM F/U: HCPCS | Performed by: FAMILY MEDICINE

## 2018-08-24 PROCEDURE — 3017F COLORECTAL CA SCREEN DOC REV: CPT | Performed by: FAMILY MEDICINE

## 2018-08-24 PROCEDURE — G8598 ASA/ANTIPLAT THER USED: HCPCS | Performed by: FAMILY MEDICINE

## 2018-08-24 PROCEDURE — G8427 DOCREV CUR MEDS BY ELIG CLIN: HCPCS | Performed by: FAMILY MEDICINE

## 2018-08-24 NOTE — PROGRESS NOTES
I CITLAIL Granger am scribing for and in the presence of Dr. 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 Denies: CP, SOB, light headed/dizziness)      Dear [address] [place] [address], APRN - CNP    I had the pleasure of seeing your patient Rona Tejada in consultation today. As you know, Ms. Carlos Light is a 48 y.o. female who presents with a history of intermittent episodes of back and chest discomfort that started developing since her recent CABG involving a LIMA-LAD 8/15/16. She also has a history of  dysautonomia on a tilt table test, and was started on Florinef as well as Lexapro. She reports doing very well since last visit with no issues. Ms. Carlos Light is having a fatty tumor removed for left thigh by Dr Brandon Aguiar on 18. She denied any current chest pain, palpitations, shortness of breath at rest, abdominal pain, bleeding problems, problems with her medications or any other concerns at this time. Past Medical History:   Diagnosis Date    Anxiety     Asthma     CAD (coronary artery disease)     Clinical trial participant at discharge 3/31/16    [facility]     Depression     Diabetic neuropathy ([facility]     H/O cardiac catheterization 16    LMCA: Mild Irregularities 10-20%. LAD: Lesion on Prox LAD: Proximal subsection. 100% stenosis. LCx: Mild irregularities 10-20%. RCA: Mild irregularities 10-20%. Widely patent mid RCA stent. EF:60%.  H/O cardiovascular stress test 10/07/2016    Overall results are most consistent with a low risk for significant CAD.     H/O echocardiogram 10/05/2016    EF:>60%. Inferoseptal wall abnormal in its motion which is not unusal status post open heart surgery.  Evidence of mild removal, wound vac placement    UPPP UVULOPALATOPHARYGOPLASTY  06/26/2012    VENTRAL HERNIA REPAIR  09/21/2015    With Mesh - Dr Jeffrey Lyon History:  Social History   Substance Use Topics    Smoking status: Former Smoker     Quit date: 9/19/2010    Smokeless tobacco: Never Used    Alcohol use No        CURRENT MEDICATIONS:  Outpatient Prescriptions Marked as Taking for the 8/24/18 encounter (Office Visit) with Alyson Madison MD   Medication Sig Dispense Refill    albuterol sulfate HFA (VENTOLIN HFA) 108 (90 Base) MCG/ACT inhaler INHALE 2 PUFFS INTO THE LUNGS EVERY 6 HOURS AS NEEDED FOR WHEEZING. 18 g 3    aspirin 81 MG EC tablet       insulin aspart (NOVOLOG FLEXPEN) 100 UNIT/ML injection pen Inject 8 Units into the skin 3 times daily (before meals) 5 pen 3    Insulin Degludec (TRESIBA FLEXTOUCH) 200 UNIT/ML SOPN Inject 50 Units into the skin daily (Patient taking differently: Inject 50 Units into the skin nightly ) 3 pen 5    alogliptin-metformin 12.5-1000 MG TABS Take 1 tablet by mouth 2 times daily 60 tablet 11    Liraglutide (VICTOZA) 18 MG/3ML SOPN SC injection INJECT 1.8 MG INTO THE SKIN DAILY AS DIRECTED (Patient taking differently: Inject into the skin daily INJECT 1.8 MG INTO THE SKIN DAILY AS DIRECTED) 3 pen 11    Insulin Pen Needle (BD ULTRA-FINE PEN NEEDLES) 29G X 12.7MM MISC USE AS DIRECTED BY PHYSICIAN 5 times daily 150 each 11    metoprolol succinate (TOPROL XL) 50 MG extended release tablet Take 1 tablet by mouth daily 90 tablet 3    DULoxetine (CYMBALTA) 30 MG extended release capsule TAKE ONE CAPSULE BY MOUTH EVERY DAY 30 capsule 3    gabapentin (NEURONTIN) 800 MG tablet TAKE 1 TABLET BY MOUTH 3 TIMES A DAY.  90 tablet 3    atorvastatin (LIPITOR) 40 MG tablet TAKE 1 TABLET BY MOUTH DAILY 90 tablet 1    escitalopram (LEXAPRO) 10 MG tablet TAKE 1 TABLET EVERY DAY 90 tablet 1    losartan (COZAAR) 25 MG tablet TAKE 1 TABLET BY MOUTH DAILY 90 tablet 1    Blood Glucose Monitoring Suppl BERE 1 Units by Does not apply route three times daily Unit insurance will cover 1 Device 0    glucose blood VI test strips (FREESTYLE LITE) strip Use to check blood sugars 3 times daily and as needed for Diabetes E11.40 100 each 11    fludrocortisone (FLORINEF) 0.1 MG tablet Take 2 tablets by mouth daily 90 tablet 3    omeprazole (PRILOSEC) 20 MG delayed release capsule Take 1 capsule by mouth daily 90 capsule 3    Blood Pressure Monitor KIT 1 each by Does not apply route daily as needed ESSENTIAL HYPERTENSION   I10 1 kit 0    Blood Glucose Monitoring Suppl (BLOOD GLUCOSE MONITOR KIT) KIT 1 each by Does not apply route daily. Please dispense whatever BS monitoring kit [employer] covers. Dx: 250, new onset T2DM. Testing once daily 1 kit 0       FAMILY HISTORY: family history includes Cancer in her father, maternal grandmother, and mother; Diabetes in her mother and sister; Heart Disease in her brother, maternal uncle, and sister. PHYSICAL EXAM:   /74 (Site: Right Arm, Position: Sitting, Cuff Size: Large Adult)   Pulse 101   Resp 18   Ht 5' 3\" (1.6 m)   Wt 223 lb 9.6 oz (101.4 kg)   LMP 12/05/1996   SpO2 97%   BMI 39.61 kg/m²  Body mass index is 39.61 kg/m². Constitutional: She is oriented to person, place, and time. She appears well-developed and well-nourished. In no acute distress. HEENT: Normocephalic and atraumatic. No JVD present. Carotid bruit is not present. No mass and no thyromegaly present. No lymphadenopathy present. Cardiovascular: Tachycardic rate, regular rhythm, normal heart sounds. Exam reveals no gallop and no friction rubs. No heart murmur heard. Normal rate, regular rhythm, normal heart sounds and intact distal pulses. Exam reveals no gallop and no friction rub. No significant cardiac murmur was heard. Pulmonary/Chest: Effort normal and breath sounds normal. No respiratory distress. She has no wheezes, rhonchi or rales. Abdominal: Soft, non-tender.  Bowel sounds and aorta are normal. She exhibits no organomegaly, mass or bruit. Extremities: Trace-1+ lower extremity pitting pedal edema. 1/2 way up to the knees bilaterally , cyanosis, or clubbing. Pulses are 2+ radial/carotid/dorsalis pedis and posterior tibial bilaterally. Neurological: She is alert and oriented to person, place, and time. No evidence of gross cranial nerve deficit. Coordination appeared normal.   Skin: Skin is warm and dry. There is no rash or diaphoresis. Psychiatric: She has a normal mood and affect. Her speech is normal and behavior is normal.      MOST RECENT LABS ON RECORD:   Lab Results   Component Value Date    WBC 9.9 08/22/2018    HGB 14.2 08/22/2018    HCT 42.8 08/22/2018     08/22/2018    CHOL 172 09/06/2017    TRIG 241 (H) 09/06/2017    HDL 42 09/06/2017    LDLCHOLESTEROL 82 09/06/2017    ALT 21 05/08/2018    AST 18 05/08/2018     08/22/2018    K 4.9 08/22/2018    CL 95 (L) 08/22/2018    CREATININE 0.69 08/22/2018    BUN 15 08/22/2018    CO2 30 08/22/2018    TSH 2.80 06/04/2016    INR 1.0 11/09/2017    LABA1C 14.0 08/07/2018    LABMICR 62 (H) 03/08/2018    BNP NOT REPORTED 05/11/2014        ASSESSMENT:  1. Coronary artery disease of native artery of native heart with stable angina pectoris ([address]    2. Angina, class I ([address]    3. Dysautonomia orthostatic hypotension syndrome ([address]    4. Essential hypertension    5. Preop cardiovascular exam       PLAN:  · Atherosclerotic Heart Disease: CABG involving LIMA-LAD on 8/15/16  Antiplatelet Agent: Continue ASA 81 mg daily. I also reminded her to watch for signs of blood in her stool or black tarry stools and stop the medication immediately if this develops as this could be life threatening. · Beta Blocker Therapy: Continue Toprol XL 50 mg once daily. I discussed the potential side effects of this medication including lightheadedness and dizziness and told her to call the office if this occurs.   · Statin Therapy: Continue Recommendations: I would also suggest that he continue her beta blocker and statin throughout the perioperative period. · Additional Testing: I Ordered an Echocardiogram to assess Ms. Belcher's ejection fraction and to look for significant valvular heart disease as a source of Ms. Esha Dale symptoms  · ECG done 8/22/2018 shows Sinus Sinus Rhythm. · Additional Testing: Stress test 10/16 Normal.      FOLLOW UP:   I told Ms. Esha Dale to call my office if she had any problems, but otherwise told her to Return in about 6 months (around 2/24/2019). However, I would be happy to see her sooner should the need arise. Once again, thank you for allowing me to participate in this patients care. Please do not hesitate to contact me could I be of further assistance. Sincerely,  Luciana Kocher. Eugenio ZENG, MS, F.A.C.C. [facility] Cardiology Specialist   [address] [place] [address]  Phone: [phone]; Fax: [phone]    I believe that the risk of significant morbidity and mortality related to the patient's current medical conditions are: Intermediate. The documentation recorded by the scribe, accurately and completely reflects the services I personally performed and the decisions made by me. Jose Becker MD, MS, F.A.C.C.  August 24, 2018

## 2018-08-24 NOTE — PATIENT INSTRUCTIONS
SURVEY:    You may be receiving a survey from [employer] regarding your visit today. Please complete the survey to enable us to provide the highest quality of care to you and your family. If you cannot score us a very good on any question, please call the office to discuss how we could have made your experience a very good one. Thank you.

## 2018-08-27 ENCOUNTER — HOSPITAL ENCOUNTER (OUTPATIENT)
Age: 53
Setting detail: OUTPATIENT SURGERY
Discharge: HOME | End: 2018-08-27
Attending: SURGERY | Admitting: SURGERY
Payer: COMMERCIAL

## 2018-08-27 VITALS
DIASTOLIC BLOOD PRESSURE: 77 MMHG | WEIGHT: 226 LBS | BODY MASS INDEX: 40.04 KG/M2 | SYSTOLIC BLOOD PRESSURE: 121 MMHG | TEMPERATURE: 97 F | OXYGEN SATURATION: 92 % | HEART RATE: 83 BPM | RESPIRATION RATE: 18 BRPM | HEIGHT: 63 IN

## 2018-08-27 PROCEDURE — 88304 TISSUE EXAM BY PATHOLOGIST: CPT

## 2018-08-27 PROCEDURE — 3600000012 HC SURGERY LEVEL 2 ADDTL 15MIN: Performed by: SURGERY

## 2018-08-27 PROCEDURE — 3600000002 HC SURGERY LEVEL 2 BASE: Performed by: SURGERY

## 2018-08-27 PROCEDURE — 2709999900 HC NON-CHARGEABLE SUPPLY: Performed by: SURGERY

## 2018-08-27 PROCEDURE — 27337 EXC THIGH/KNEE LES SC 3 CM/>: CPT | Performed by: SURGERY

## 2018-08-27 PROCEDURE — 2500000003 HC RX 250 WO HCPCS: Performed by: SURGERY

## 2018-08-27 RX ORDER — BUPIVACAINE HYDROCHLORIDE AND EPINEPHRINE 2.5; 5 MG/ML; UG/ML
INJECTION, SOLUTION EPIDURAL; INFILTRATION; INTRACAUDAL; PERINEURAL PRN
Status: DISCONTINUED | OUTPATIENT
Start: 2018-08-27 | End: 2018-08-27 | Stop reason: HOSPADM

## 2018-08-27 RX ORDER — SODIUM CHLORIDE, SODIUM LACTATE, POTASSIUM CHLORIDE, CALCIUM CHLORIDE 600; 310; 30; 20 MG/100ML; MG/100ML; MG/100ML; MG/100ML
INJECTION, SOLUTION INTRAVENOUS CONTINUOUS
Status: DISCONTINUED | OUTPATIENT
Start: 2018-08-27 | End: 2018-08-27 | Stop reason: HOSPADM

## 2018-08-27 ASSESSMENT — PAIN - FUNCTIONAL ASSESSMENT: PAIN_FUNCTIONAL_ASSESSMENT: 0-10

## 2018-08-27 NOTE — H&P
metoprolol succinate (TOPROL XL) 50 MG extended release tablet, Take 1 tablet by mouth daily, Disp: 90 tablet, Rfl: 3    DULoxetine (CYMBALTA) 30 MG extended release capsule, TAKE ONE CAPSULE BY MOUTH EVERY DAY, Disp: 30 capsule, Rfl: 3    gabapentin (NEURONTIN) 800 MG tablet, TAKE 1 TABLET BY MOUTH 3 TIMES A DAY., Disp: 90 tablet, Rfl: 3    atorvastatin (LIPITOR) 40 MG tablet, TAKE 1 TABLET BY MOUTH DAILY, Disp: 90 tablet, Rfl: 1    escitalopram (LEXAPRO) 10 MG tablet, TAKE 1 TABLET EVERY DAY, Disp: 90 tablet, Rfl: 1    losartan (COZAAR) 25 MG tablet, TAKE 1 TABLET BY MOUTH DAILY, Disp: 90 tablet, Rfl: 1    fludrocortisone (FLORINEF) 0.1 MG tablet, Take 2 tablets by mouth daily, Disp: 90 tablet, Rfl: 3    omeprazole (PRILOSEC) 20 MG delayed release capsule, Take 1 capsule by mouth daily, Disp: 90 capsule, Rfl: 3    aspirin 81 MG EC tablet, , Disp: , Rfl:     Insulin Degludec (TRESIBA FLEXTOUCH) 200 UNIT/ML SOPN, Inject 50 Units into the skin daily, Disp: 3 pen, Rfl: 5    Insulin Pen Needle (BD ULTRA-FINE PEN NEEDLES) 29G X 12.7MM MISC, USE AS DIRECTED BY PHYSICIAN 5 times daily, Disp: 150 each, Rfl: 11    Blood Glucose Monitoring Suppl BERE, 1 Units by Does not apply route three times daily Unit insurance will cover, Disp: 1 Device, Rfl: 0    glucose blood VI test strips (FREESTYLE LITE) strip, Use to check blood sugars 3 times daily and as needed for Diabetes E11.40, Disp: 100 each, Rfl: 11    Blood Pressure Monitor KIT, 1 each by Does not apply route daily as needed ESSENTIAL HYPERTENSION   I10, Disp: 1 kit, Rfl: 0    Blood Glucose Monitoring Suppl (BLOOD GLUCOSE MONITOR KIT) KIT, 1 each by Does not apply route daily. Please dispense whatever BS monitoring kit [facility] covers. Dx: 250, new onset T2DM.  Testing once daily, Disp: 1 kit, Rfl: 0        Vital Signs:  /76 (Site: Left Arm, Position: Sitting, Cuff Size: Medium Adult)   Pulse 105   Temp 99 °F (37.2 °C) (Tympanic)   Resp 24   Ht 5' 3\" (1.6 m)   Wt 222 lb 11.2 oz (101 kg)   LMP 12/05/1996   BMI 39.45 kg/m²      Physical Exam:  Vital signs and Nurse's note reviewed  Gen:  A&Ox3, NAD  HEENT: NCAT, PERRLA, EOMI, no scleral icterus, oral mucosa moist  Neck: Supple, no thyroid enlargement, no cervical or supraclavicular lymphadenopathy  Chest: Symmetric rise with inhalation, no evidence of trauma  CVS: Regular rate and rhythm, no murmurs, no rubs or gallops  Resp: Good bilateral air entry, clear to auscultation b/l, no wheeze or rhonchi  Abd: soft, non-tender, non-distended, no hepatosplenomegaly or palpable masses, bowel sounds present. Ext: No clubbing, cyanosis, edema, peripheral pulses 2+ Rad/Fem/DP/PT. Findings confirmed, left anterior thigh , soft, 4cm oval, mass partly mobile, skin overlying normal.  CNS: Moves all extremities, no gross focal motor deficits  Skin: No erythema or ulcerations      Labs:         Lab Results   Component Value Date     WBC 8.7 05/08/2018     HGB 13.9 05/08/2018     HCT 41.5 05/08/2018     MCV 81.1 05/08/2018      05/08/2018      12/05/2011            Lab Results   Component Value Date      05/08/2018     K 4.4 05/08/2018     CL 94 05/08/2018     CO2 24 05/08/2018     BUN 9 08/10/2018     CREATININE 0.64 08/10/2018     GLUCOSE 409 05/08/2018     GLUCOSE 181 02/16/2012     CALCIUM 9.2 05/08/2018            Lab Results   Component Value Date     ALKPHOS 104 05/08/2018     ALT 21 05/08/2018     AST 18 05/08/2018     PROT 7.3 05/08/2018     BILITOT 0.29 05/08/2018     BILIDIR <0.08 11/09/2017     LABALBU 3.8 05/08/2018     LABALBU 4.2 12/05/2011            Lab Results   Component Value Date     LACTA 2.8 05/09/2018            Lab Results   Component Value Date     AMYLASE 39 07/17/2016            Lab Results   Component Value Date     LIPASE 56 05/08/2018            Lab Results   Component Value Date     INR 1.0 11/09/2017         Assessment:    Diagnosis Orders   1.  Lipoma of left lower

## 2018-08-29 LAB — SURGICAL PATHOLOGY REPORT: NORMAL

## 2018-08-30 DIAGNOSIS — M79.2 NEUROPATHIC PAIN: ICD-10-CM

## 2018-08-30 DIAGNOSIS — E11.42 DIABETIC POLYNEUROPATHY ASSOCIATED WITH TYPE 2 DIABETES MELLITUS (HCC): ICD-10-CM

## 2018-08-30 RX ORDER — GABAPENTIN 800 MG/1
TABLET ORAL
Qty: 90 TABLET | Refills: 3 | Status: SHIPPED | OUTPATIENT
Start: 2018-08-30 | End: 2018-09-11 | Stop reason: SDUPTHER

## 2018-09-06 ENCOUNTER — HOSPITAL ENCOUNTER (OUTPATIENT)
Dept: NON INVASIVE DIAGNOSTICS | Age: 53
Discharge: HOME | End: 2018-09-06
Payer: COMMERCIAL

## 2018-09-06 ENCOUNTER — OFFICE VISIT (OUTPATIENT)
Dept: SURGERY | Age: 53
End: 2018-09-06

## 2018-09-06 VITALS
RESPIRATION RATE: 24 BRPM | WEIGHT: 227 LBS | HEART RATE: 82 BPM | BODY MASS INDEX: 40.22 KG/M2 | HEIGHT: 63 IN | DIASTOLIC BLOOD PRESSURE: 78 MMHG | SYSTOLIC BLOOD PRESSURE: 122 MMHG | TEMPERATURE: 98.4 F

## 2018-09-06 DIAGNOSIS — Z09 POSTOP CHECK: Primary | ICD-10-CM

## 2018-09-06 DIAGNOSIS — Z01.810 PREOP CARDIOVASCULAR EXAM: ICD-10-CM

## 2018-09-06 DIAGNOSIS — R00.0 TACHYCARDIA: ICD-10-CM

## 2018-09-06 DIAGNOSIS — I20.9 ANGINA, CLASS I (HCC): ICD-10-CM

## 2018-09-06 DIAGNOSIS — I10 ESSENTIAL HYPERTENSION: Chronic | ICD-10-CM

## 2018-09-06 DIAGNOSIS — I95.1 DYSAUTONOMIA ORTHOSTATIC HYPOTENSION SYNDROME: ICD-10-CM

## 2018-09-06 LAB
LV EF: 60 %
LVEF MODALITY: NORMAL

## 2018-09-06 PROCEDURE — 93306 TTE W/DOPPLER COMPLETE: CPT

## 2018-09-06 PROCEDURE — 99024 POSTOP FOLLOW-UP VISIT: CPT | Performed by: SURGERY

## 2018-09-06 NOTE — PROGRESS NOTES
 OTHER SURGICAL HISTORY  10/8/2015    abd wound washout, mesh removal, wound vac placement    CA SUCT NICOLE LIPECTOMY,HEAD/NECK Left 8/27/2018    THIGH LESION BIOPSY EXCISION, SOFT TISSUE MASS performed by Kulwant Johansen [facility] at [facility]  06/26/2012    VENTRAL HERNIA REPAIR  09/21/2015    With Mesh - Dr Cinthya Friend History:  reports that she quit smoking about 7 years ago. She has never used smokeless tobacco. She reports that she does not drink alcohol or use drugs. Family History: family history includes Cancer in her father, maternal grandmother, and mother; Diabetes in her mother and sister; Heart Disease in her brother, maternal uncle, and sister. Review of Systems:   General: Denies fever, chills, night sweats, weight loss, malaise, fatigue  HEENT: Denies sore throat, sinus problems, allergic rhinosinusitis  Card: Denies chest pain, palpitations, orthopnea/PND. Denies h/o murmurs  Pulm: Denies cough, shortness of breath, GAMING  GI:  per HPI; denies history of constipation, diarrhea, hematochezia or melena  : Denies polyuria, dysuria, hematuria  Endo: Denies diabetes, thyroid problems. Heme: Denies anemia, h/o bleeding or clotting problems. Neuro: Denies h/o CVA, TIA  Skin: Denies rashes, ulcers  Musculoskeletal: Denies muscle, joint, back pain.     Allergies: Tape Teryl Bail tape]    Current Meds:  Current Outpatient Prescriptions:     gabapentin (NEURONTIN) 800 MG tablet, TAKE 1 TABLET BY MOUTH 3 TIMES A DAY., Disp: 90 tablet, Rfl: 3    albuterol sulfate HFA (VENTOLIN HFA) 108 (90 Base) MCG/ACT inhaler, INHALE 2 PUFFS INTO THE LUNGS EVERY 6 HOURS AS NEEDED FOR WHEEZING., Disp: 18 g, Rfl: 3    aspirin 81 MG EC tablet, , Disp: , Rfl:     insulin aspart (NOVOLOG FLEXPEN) 100 UNIT/ML injection pen, Inject 8 Units into the skin 3 times daily (before meals), Disp: 5 pen, Rfl: 3    Insulin Degludec (TRESIBA FLEXTOUCH) 200 UNIT/ML SOPN, Inject 50 Units into the skin daily (Patient taking differently: Inject 50 Units into the skin nightly ), Disp: 3 pen, Rfl: 5    alogliptin-metformin 12.5-1000 MG TABS, Take 1 tablet by mouth 2 times daily, Disp: 60 tablet, Rfl: 11    Liraglutide (VICTOZA) 18 MG/3ML SOPN SC injection, INJECT 1.8 MG INTO THE SKIN DAILY AS DIRECTED (Patient taking differently: Inject into the skin daily INJECT 1.8 MG INTO THE SKIN DAILY AS DIRECTED), Disp: 3 pen, Rfl: 11    Insulin Pen Needle (BD ULTRA-FINE PEN NEEDLES) 29G X 12.7MM MISC, USE AS DIRECTED BY PHYSICIAN 5 times daily, Disp: 150 each, Rfl: 11    metoprolol succinate (TOPROL XL) 50 MG extended release tablet, Take 1 tablet by mouth daily, Disp: 90 tablet, Rfl: 3    DULoxetine (CYMBALTA) 30 MG extended release capsule, TAKE ONE CAPSULE BY MOUTH EVERY DAY, Disp: 30 capsule, Rfl: 3    atorvastatin (LIPITOR) 40 MG tablet, TAKE 1 TABLET BY MOUTH DAILY, Disp: 90 tablet, Rfl: 1    escitalopram (LEXAPRO) 10 MG tablet, TAKE 1 TABLET EVERY DAY, Disp: 90 tablet, Rfl: 1    losartan (COZAAR) 25 MG tablet, TAKE 1 TABLET BY MOUTH DAILY, Disp: 90 tablet, Rfl: 1    Blood Glucose Monitoring Suppl BERE, 1 Units by Does not apply route three times daily Unit insurance will cover, Disp: 1 Device, Rfl: 0    glucose blood VI test strips (FREESTYLE LITE) strip, Use to check blood sugars 3 times daily and as needed for Diabetes E11.40, Disp: 100 each, Rfl: 11    fludrocortisone (FLORINEF) 0.1 MG tablet, Take 2 tablets by mouth daily, Disp: 90 tablet, Rfl: 3    omeprazole (PRILOSEC) 20 MG delayed release capsule, Take 1 capsule by mouth daily, Disp: 90 capsule, Rfl: 3    Blood Pressure Monitor KIT, 1 each by Does not apply route daily as needed ESSENTIAL HYPERTENSION   I10, Disp: 1 kit, Rfl: 0    Blood Glucose Monitoring Suppl (BLOOD GLUCOSE MONITOR KIT) KIT, 1 each by Does not apply route daily. Please dispense whatever BS monitoring kit [facility] covers. Dx: 250, new onset T2DM.  Testing once daily, Disp: 1 encounter.         Electronically signed by Florentino Aguirre MD on 9/6/18 at 1:14 PM

## 2018-09-11 ENCOUNTER — OFFICE VISIT (OUTPATIENT)
Dept: NEUROLOGY | Age: 53
End: 2018-09-11
Payer: COMMERCIAL

## 2018-09-11 VITALS
HEART RATE: 99 BPM | SYSTOLIC BLOOD PRESSURE: 127 MMHG | BODY MASS INDEX: 39.83 KG/M2 | DIASTOLIC BLOOD PRESSURE: 83 MMHG | WEIGHT: 224.8 LBS | HEIGHT: 63 IN

## 2018-09-11 DIAGNOSIS — E11.42 DIABETIC POLYNEUROPATHY ASSOCIATED WITH TYPE 2 DIABETES MELLITUS (HCC): ICD-10-CM

## 2018-09-11 DIAGNOSIS — M62.838 MUSCLE SPASM: ICD-10-CM

## 2018-09-11 DIAGNOSIS — M79.2 NEUROPATHIC PAIN: Primary | ICD-10-CM

## 2018-09-11 PROCEDURE — 1036F TOBACCO NON-USER: CPT | Performed by: PSYCHIATRY & NEUROLOGY

## 2018-09-11 PROCEDURE — G8427 DOCREV CUR MEDS BY ELIG CLIN: HCPCS | Performed by: PSYCHIATRY & NEUROLOGY

## 2018-09-11 PROCEDURE — G8598 ASA/ANTIPLAT THER USED: HCPCS | Performed by: PSYCHIATRY & NEUROLOGY

## 2018-09-11 PROCEDURE — G8417 CALC BMI ABV UP PARAM F/U: HCPCS | Performed by: PSYCHIATRY & NEUROLOGY

## 2018-09-11 PROCEDURE — 99214 OFFICE O/P EST MOD 30 MIN: CPT | Performed by: PSYCHIATRY & NEUROLOGY

## 2018-09-11 PROCEDURE — 2022F DILAT RTA XM EVC RTNOPTHY: CPT | Performed by: PSYCHIATRY & NEUROLOGY

## 2018-09-11 PROCEDURE — 3017F COLORECTAL CA SCREEN DOC REV: CPT | Performed by: PSYCHIATRY & NEUROLOGY

## 2018-09-11 PROCEDURE — 3046F HEMOGLOBIN A1C LEVEL >9.0%: CPT | Performed by: PSYCHIATRY & NEUROLOGY

## 2018-09-11 RX ORDER — CYCLOBENZAPRINE HCL 10 MG
10 TABLET ORAL NIGHTLY
Qty: 30 TABLET | Refills: 3 | Status: SHIPPED | OUTPATIENT
Start: 2018-09-11 | End: 2019-01-28 | Stop reason: SDUPTHER

## 2018-09-11 RX ORDER — GABAPENTIN 800 MG/1
TABLET ORAL
Qty: 90 TABLET | Refills: 6 | Status: SHIPPED | OUTPATIENT
Start: 2018-09-11 | End: 2019-03-11 | Stop reason: SDUPTHER

## 2018-09-11 RX ORDER — DULOXETINE 40 MG/1
CAPSULE, DELAYED RELEASE ORAL
Qty: 30 CAPSULE | Refills: 6 | Status: SHIPPED | OUTPATIENT
Start: 2018-09-11 | End: 2019-03-11 | Stop reason: DRUGHIGH

## 2018-09-11 ASSESSMENT — ENCOUNTER SYMPTOMS
EYES NEGATIVE: 1
RESPIRATORY NEGATIVE: 1
DIARRHEA: 1

## 2018-09-11 NOTE — PROGRESS NOTES
NEUROLOGY FOLLOW-UP  Patient Name:  Jamila Mcneil  :   1965  Clinic Visit Date: 2018    I saw Ms. Jamila Mcneil in follow-up in the office today in continuation of neurologic care. She is a 48 y.o.  female with diabetic neuropathy. She comes to clinic stating that Cymbalta has initially helped for her bilateral lower extremity painful sensations. , she also has been having burning pain in lower extremities along with cramps in both calves. Cymbalta has not been helpful for the last few weeks. She is tolerating medication well without any adverse effects. .    Denies symptoms suggestive of lumbar radiculopathy. She has been using duloxetine Cymbalta along with the gabapentin and capsaicin cream  For relief in dysesthesias. Upon further questioning; she has not had any medication related adverse effects. She denies any side effects such as headache, drowsiness, nausea, abdominal pain, weakness. She also stated that she has been participating in physical therapy and it has been helping. Review of Systems   Constitutional: Negative. HENT: Negative. Eyes: Negative. Respiratory: Negative. Cardiovascular: Negative. Gastrointestinal: Positive for diarrhea. Genitourinary: Positive for frequency and urgency. Musculoskeletal: Positive for joint pain and myalgias. Skin: Negative. Neurological: Positive for sensory change. Restless Legs; balance problems; numbness and weakness; spasms   Endo/Heme/Allergies: Negative. Psychiatric/Behavioral: Positive for depression. Denies suicidal ideations       Current Outpatient Prescriptions on File Prior to Visit   Medication Sig Dispense Refill    gabapentin (NEURONTIN) 800 MG tablet TAKE 1 TABLET BY MOUTH 3 TIMES A DAY. 90 tablet 3    albuterol sulfate HFA (VENTOLIN HFA) 108 (90 Base) MCG/ACT inhaler INHALE 2 PUFFS INTO THE LUNGS EVERY 6 HOURS AS NEEDED FOR WHEEZING.  18 g 3    aspirin 81 MG EC tablet        ABDOMINAL HERNIA REPAIR  1-2016    repair done julian    APPENDECTOMY      CARDIAC CATHETERIZATION Left 4/26/2016    right radial/ [facility]/ Dr Angel Barrera COLONOSCOPY  12/16/14    -hemorrhoids,bx    CORONARY ANGIOPLASTY WITH STENT PLACEMENT  9/2010    CORONARY ANGIOPLASTY WITH STENT PLACEMENT  3-    [facility] / DR Hugo Carnes    CORONARY ARTERY BYPASS GRAFT  08/15/2016    OP X 1- Dr Casillas Poplin, COLON, DIAGNOSTIC  12/16/2014    HERNIA REPAIR  12/13/12    at the medial aspect of a [place] RUQ scar); repaired [id number]  02/16/2015    incisional, recurrent    HERNIA REPAIR  02/2016    HYSTERECTOMY      OTHER SURGICAL HISTORY  10/8/2015    abd wound washout, mesh removal, wound vac placement    WA SUCT NICOLE LIPECTOMY,HEAD/NECK Left 8/27/2018    THIGH LESION BIOPSY EXCISION, SOFT TISSUE MASS performed by Esther London MD at [facility] Left 2018    leg    UPPP UVULOPALATOPHARYGOPLASTY  06/26/2012    VENTRAL HERNIA REPAIR  09/21/2015    With Mesh - Dr Kailey Mojica History: Gustavo Hope  reports that she quit smoking about 7 years ago. She has never used smokeless tobacco. She reports that she does not drink alcohol or use drugs. Family History   Problem Relation Age of Onset    Cancer Mother     Diabetes Mother     Cancer Father         thyroid    Diabetes Sister     Heart Disease Sister     Heart Disease Brother     Heart Disease Maternal Uncle     Cancer Maternal Grandmother      On exam: Blood pressure 127/83, pulse 99, height 5' 3\" (1.6 m), weight 224 lb 12.8 oz (102 kg), last menstrual period 12/05/1996, not currently breastfeeding. This is a 48 y.o. female appears her stated age and in no acute distress. HEENT: NC/ AT. Neck: supple and no bruits heard. On neurologic exam: she  is alert, awake, and oriented times three. She followed commands well.   She could recall

## 2018-09-11 NOTE — PROGRESS NOTES
NEUROLOGY FOLLOW-UP  Patient Name:  Gustavo Hope  :   1965  Clinic Visit Date: 2018    I saw Ms. Gustavo Hope in follow-up in the office today in continuation of neurologic care. REVIEW OF SYSTEMS  Constitutional Weight changes: absent, Fevers : absent, Fatigue: present; Any recent hospitalizations:  absent.    HEENT Ears: normal, Eyes: glasses, Visual disturbance: present   Reespiratory Shortness of breath: present, Cough: absent   Cardivascular Chest pain: absent, Leg swelling :absent   GI Constipation: absent, Diarrhea: absent, Swallowing change: absent    Urinary frequency: present, Urinary urgency: absent, Urinary incontinence: absent   Musculoskeletal Neck pain: absent, Back pain: present, Stiffness: absent, Muscle pain: present, Joint pain: present   Dermatological Hair loss: absent, Skin changes: absent   Neurological Memory loss: absent, Confusion: absent, Seizures: absent; Trouble walking or imbalance: present, Dizziness: absent, Weakness: present, Numbness present, Tremor: absent, Spasm: present, Speech difficulty: absent, Headache: absent, Light sensitivity: absent   Psychiatric Anxiety: absent, Depression  present, Suicidal ideations absent   Hematologic Abnormal bleeding: absent, Anemia: absent, Clotting disorder: absent, Lymph gland changes: absent

## 2018-11-13 ENCOUNTER — HOSPITAL ENCOUNTER (OUTPATIENT)
Age: 53
Discharge: HOME | End: 2018-11-13
Payer: COMMERCIAL

## 2018-11-13 LAB
ANION GAP SERPL CALCULATED.3IONS-SCNC: 15 MMOL/L (ref 9–17)
BUN BLDV-MCNC: 14 MG/DL (ref 6–20)
BUN/CREAT BLD: 23 (ref 9–20)
CALCIUM SERPL-MCNC: 9.6 MG/DL (ref 8.6–10.4)
CHLORIDE BLD-SCNC: 91 MMOL/L (ref 98–107)
CO2: 24 MMOL/L (ref 20–31)
CREAT SERPL-MCNC: 0.62 MG/DL (ref 0.5–0.9)
GFR AFRICAN AMERICAN: >60 ML/MIN
GFR NON-AFRICAN AMERICAN: >60 ML/MIN
GFR SERPL CREATININE-BSD FRML MDRD: ABNORMAL ML/MIN/{1.73_M2}
GFR SERPL CREATININE-BSD FRML MDRD: ABNORMAL ML/MIN/{1.73_M2}
GLUCOSE BLD-MCNC: 416 MG/DL (ref 70–99)
POTASSIUM SERPL-SCNC: 4.4 MMOL/L (ref 3.7–5.3)
SODIUM BLD-SCNC: 130 MMOL/L (ref 135–144)

## 2018-11-13 PROCEDURE — 36415 COLL VENOUS BLD VENIPUNCTURE: CPT

## 2018-11-13 PROCEDURE — 80048 BASIC METABOLIC PNL TOTAL CA: CPT

## 2018-11-15 ENCOUNTER — OFFICE VISIT (OUTPATIENT)
Dept: PRIMARY CARE CLINIC | Age: 53
End: 2018-11-15
Payer: COMMERCIAL

## 2018-11-15 VITALS
RESPIRATION RATE: 18 BRPM | BODY MASS INDEX: 40.72 KG/M2 | HEIGHT: 63 IN | DIASTOLIC BLOOD PRESSURE: 62 MMHG | TEMPERATURE: 97.6 F | SYSTOLIC BLOOD PRESSURE: 107 MMHG | HEART RATE: 80 BPM | WEIGHT: 229.8 LBS

## 2018-11-15 DIAGNOSIS — E11.65 UNCONTROLLED TYPE 2 DIABETES MELLITUS WITH HYPERGLYCEMIA (HCC): Primary | ICD-10-CM

## 2018-11-15 DIAGNOSIS — S31.109A OPEN WOUND OF ABDOMINAL WALL, INITIAL ENCOUNTER: ICD-10-CM

## 2018-11-15 DIAGNOSIS — Z23 NEED FOR INFLUENZA VACCINATION: ICD-10-CM

## 2018-11-15 LAB — HBA1C MFR BLD: 13.9 %

## 2018-11-15 PROCEDURE — G8598 ASA/ANTIPLAT THER USED: HCPCS | Performed by: NURSE PRACTITIONER

## 2018-11-15 PROCEDURE — 2022F DILAT RTA XM EVC RTNOPTHY: CPT | Performed by: NURSE PRACTITIONER

## 2018-11-15 PROCEDURE — 1036F TOBACCO NON-USER: CPT | Performed by: NURSE PRACTITIONER

## 2018-11-15 PROCEDURE — 3017F COLORECTAL CA SCREEN DOC REV: CPT | Performed by: NURSE PRACTITIONER

## 2018-11-15 PROCEDURE — 90686 IIV4 VACC NO PRSV 0.5 ML IM: CPT | Performed by: NURSE PRACTITIONER

## 2018-11-15 PROCEDURE — G8427 DOCREV CUR MEDS BY ELIG CLIN: HCPCS | Performed by: NURSE PRACTITIONER

## 2018-11-15 PROCEDURE — 3046F HEMOGLOBIN A1C LEVEL >9.0%: CPT | Performed by: NURSE PRACTITIONER

## 2018-11-15 PROCEDURE — G8417 CALC BMI ABV UP PARAM F/U: HCPCS | Performed by: NURSE PRACTITIONER

## 2018-11-15 PROCEDURE — G8482 FLU IMMUNIZE ORDER/ADMIN: HCPCS | Performed by: NURSE PRACTITIONER

## 2018-11-15 PROCEDURE — 99214 OFFICE O/P EST MOD 30 MIN: CPT | Performed by: NURSE PRACTITIONER

## 2018-11-15 PROCEDURE — 83036 HEMOGLOBIN GLYCOSYLATED A1C: CPT | Performed by: NURSE PRACTITIONER

## 2018-11-15 PROCEDURE — 90471 IMMUNIZATION ADMIN: CPT | Performed by: NURSE PRACTITIONER

## 2018-11-15 ASSESSMENT — PATIENT HEALTH QUESTIONNAIRE - PHQ9
SUM OF ALL RESPONSES TO PHQ9 QUESTIONS 1 & 2: 0
SUM OF ALL RESPONSES TO PHQ QUESTIONS 1-9: 0
SUM OF ALL RESPONSES TO PHQ QUESTIONS 1-9: 0
2. FEELING DOWN, DEPRESSED OR HOPELESS: 0
1. LITTLE INTEREST OR PLEASURE IN DOING THINGS: 0

## 2018-11-15 ASSESSMENT — ENCOUNTER SYMPTOMS
SHORTNESS OF BREATH: 0
COUGH: 0
DIARRHEA: 0
ABDOMINAL PAIN: 0
SORE THROAT: 0
WHEEZING: 0
VOMITING: 0
CONSTIPATION: 0
NAUSEA: 0
RHINORRHEA: 0

## 2018-11-15 NOTE — PATIENT INSTRUCTIONS
the risk of severe complications from flu, which can be prevented by flu vaccine. · [facility] children who get the flu shot along with pneumococcal vaccine (PCV13) and/or DTaP vaccine at the same time might be slightly more likely to have a seizure caused by fever. Ask your doctor for more information. Tell your doctor if a child who is getting flu vaccine has ever had a seizure  Problems that could happen after any injected vaccine:  · People sometimes faint after a medical procedure, including vaccination. Sitting or lying down for about 15 minutes can help prevent fainting, and injuries caused by a fall. Tell your doctor if you feel dizzy, or have vision changes or ringing in the ears. · Some people get severe pain in the shoulder and have difficulty moving the arm where a shot was given. This happens very rarely. · Any medication can cause a severe allergic reaction. Such reactions from a vaccine are very rare, estimated at about 1 in a million doses, and would happen within a few minutes to a few hours after the vaccination. As with any medicine, there is a very remote chance of a vaccine causing a serious injury or death. The safety of vaccines is always being monitored. For more information, visit: www.cdc.gov/vaccinesafety/. What if there is a serious reaction? What should I look for? · Look for anything that concerns you, such as signs of a severe allergic reaction, very high fever, or unusual behavior. Signs of a severe allergic reaction can include hives, swelling of the face and throat, difficulty breathing, a fast heartbeat, dizziness, and weakness - usually within a few minutes to a few hours after the vaccination. What should I do? · If you think it is a severe allergic reaction or other emergency that can't wait, call 9-1-1 and get the person to the nearest hospital. Otherwise, call your doctor. · Reactions should be reported to the \"Vaccine Adverse Event Reporting System\" (VAERS).  Your

## 2018-11-21 ENCOUNTER — OFFICE VISIT (OUTPATIENT)
Dept: SURGERY | Age: 53
End: 2018-11-21
Payer: COMMERCIAL

## 2018-11-21 VITALS
SYSTOLIC BLOOD PRESSURE: 146 MMHG | DIASTOLIC BLOOD PRESSURE: 83 MMHG | HEART RATE: 83 BPM | TEMPERATURE: 97 F | WEIGHT: 225 LBS | HEIGHT: 63 IN | BODY MASS INDEX: 39.87 KG/M2 | RESPIRATION RATE: 24 BRPM

## 2018-11-21 DIAGNOSIS — T14.8XXA EXCORIATION OF PERIWOUND SKIN: Primary | ICD-10-CM

## 2018-11-21 PROCEDURE — 3017F COLORECTAL CA SCREEN DOC REV: CPT | Performed by: SURGERY

## 2018-11-21 PROCEDURE — G8417 CALC BMI ABV UP PARAM F/U: HCPCS | Performed by: SURGERY

## 2018-11-21 PROCEDURE — G8598 ASA/ANTIPLAT THER USED: HCPCS | Performed by: SURGERY

## 2018-11-21 PROCEDURE — G8427 DOCREV CUR MEDS BY ELIG CLIN: HCPCS | Performed by: SURGERY

## 2018-11-21 PROCEDURE — 99213 OFFICE O/P EST LOW 20 MIN: CPT | Performed by: SURGERY

## 2018-11-21 PROCEDURE — 1036F TOBACCO NON-USER: CPT | Performed by: SURGERY

## 2018-11-21 PROCEDURE — G8482 FLU IMMUNIZE ORDER/ADMIN: HCPCS | Performed by: SURGERY

## 2018-11-26 RX ORDER — BLOOD-GLUCOSE METER
KIT MISCELLANEOUS
Qty: 100 STRIP | Refills: 1 | Status: SHIPPED | OUTPATIENT
Start: 2018-11-26 | End: 2019-01-21 | Stop reason: SDUPTHER

## 2018-11-30 ENCOUNTER — OFFICE VISIT (OUTPATIENT)
Dept: SURGERY | Age: 53
End: 2018-11-30
Payer: COMMERCIAL

## 2018-11-30 VITALS
RESPIRATION RATE: 20 BRPM | BODY MASS INDEX: 39.42 KG/M2 | TEMPERATURE: 97 F | HEIGHT: 63 IN | HEART RATE: 85 BPM | DIASTOLIC BLOOD PRESSURE: 88 MMHG | WEIGHT: 222.5 LBS | SYSTOLIC BLOOD PRESSURE: 119 MMHG

## 2018-11-30 DIAGNOSIS — T14.8XXA SKIN ABRASION: Primary | ICD-10-CM

## 2018-11-30 PROCEDURE — G8482 FLU IMMUNIZE ORDER/ADMIN: HCPCS | Performed by: SURGERY

## 2018-11-30 PROCEDURE — G8598 ASA/ANTIPLAT THER USED: HCPCS | Performed by: SURGERY

## 2018-11-30 PROCEDURE — 1036F TOBACCO NON-USER: CPT | Performed by: SURGERY

## 2018-11-30 PROCEDURE — 3017F COLORECTAL CA SCREEN DOC REV: CPT | Performed by: SURGERY

## 2018-11-30 PROCEDURE — G8427 DOCREV CUR MEDS BY ELIG CLIN: HCPCS | Performed by: SURGERY

## 2018-11-30 PROCEDURE — 99213 OFFICE O/P EST LOW 20 MIN: CPT | Performed by: SURGERY

## 2018-11-30 PROCEDURE — G8417 CALC BMI ABV UP PARAM F/U: HCPCS | Performed by: SURGERY

## 2018-12-01 NOTE — PROGRESS NOTES
11/30/18    Cesar Polanco is here for wound check up. The abrasions are slowly healing. She is doing the wound care at home as instructed. I do give her additional multiple adaptic and telfa samples. She is to continue antibiotic ointment daily with non-adhesive dressings. Return next week for wound check. She is advised if this does not fully heal she may need a plastic surgeon opinion owing to her extensive surgical history/mildly compromised blood supply/flow to overlying skin above surgical hernia repair. There are NO signs of infection today. Abdomen soft, nontender, vitals stable. Nonsmoker. Obesity. 15 minutes spent with the patient and more than 50% of that time was face to face and spent on counseling and/or coordination/planning of testing and/or procedures as outlined above (excluding exam, excluding imaging reviewed).

## 2018-12-07 ENCOUNTER — OFFICE VISIT (OUTPATIENT)
Dept: SURGERY | Age: 53
End: 2018-12-07
Payer: COMMERCIAL

## 2018-12-07 VITALS
TEMPERATURE: 97.3 F | RESPIRATION RATE: 20 BRPM | HEART RATE: 91 BPM | BODY MASS INDEX: 40.03 KG/M2 | DIASTOLIC BLOOD PRESSURE: 86 MMHG | SYSTOLIC BLOOD PRESSURE: 126 MMHG | WEIGHT: 226 LBS

## 2018-12-07 DIAGNOSIS — Z51.89 VISIT FOR WOUND CHECK: Primary | ICD-10-CM

## 2018-12-07 PROCEDURE — 1036F TOBACCO NON-USER: CPT | Performed by: SURGERY

## 2018-12-07 PROCEDURE — 3017F COLORECTAL CA SCREEN DOC REV: CPT | Performed by: SURGERY

## 2018-12-07 PROCEDURE — G8482 FLU IMMUNIZE ORDER/ADMIN: HCPCS | Performed by: SURGERY

## 2018-12-07 PROCEDURE — G8598 ASA/ANTIPLAT THER USED: HCPCS | Performed by: SURGERY

## 2018-12-07 PROCEDURE — G8417 CALC BMI ABV UP PARAM F/U: HCPCS | Performed by: SURGERY

## 2018-12-07 PROCEDURE — G8427 DOCREV CUR MEDS BY ELIG CLIN: HCPCS | Performed by: SURGERY

## 2018-12-07 PROCEDURE — 99212 OFFICE O/P EST SF 10 MIN: CPT | Performed by: SURGERY

## 2018-12-07 NOTE — PROGRESS NOTES
12/7/18 visit    Liss Calvo is here for wound check. She is continuing to heal slowly but overall well all things considered with her history. Dressings reapplied. She is to return after the holidays in January for continued monitoring. Skin is without cellulitis, open superficial abrasions are starting to granulate in well and heal. Nonsmoker. Abdomen obese, non-tender, no guarding. Vitals stable. The open sores are located near her prior incisions, likely due to somewhat compromised blood supply overall to the skin. 10 minutes spent with the patient and more than 50% of that time was face to face and spent on counseling and/or coordination/planning of testing and/or procedures as outlined above (excluding exam, excluding imaging reviewed). 11/30/18 visit    Liss Calvo is here for wound check up. The abrasions are slowly healing. She is doing the wound care at home as instructed. I do give her additional multiple adaptic and telfa samples. She is to continue antibiotic ointment daily with non-adhesive dressings. Return next week for wound check. She is advised if this does not fully heal she may need a plastic surgeon opinion owing to her extensive surgical history/mildly compromised blood supply/flow to overlying skin above surgical hernia repair. There are NO signs of infection today. Abdomen soft, nontender, vitals stable. Nonsmoker. Obesity.

## 2018-12-19 ENCOUNTER — HOSPITAL ENCOUNTER (EMERGENCY)
Age: 53
Discharge: HOME | End: 2018-12-20
Attending: EMERGENCY MEDICINE
Payer: COMMERCIAL

## 2018-12-19 VITALS
SYSTOLIC BLOOD PRESSURE: 127 MMHG | DIASTOLIC BLOOD PRESSURE: 88 MMHG | HEART RATE: 98 BPM | RESPIRATION RATE: 18 BRPM | TEMPERATURE: 97.7 F | OXYGEN SATURATION: 95 %

## 2018-12-19 DIAGNOSIS — H04.301 DACROCYSTITIS, RIGHT: Primary | ICD-10-CM

## 2018-12-19 PROCEDURE — 99282 EMERGENCY DEPT VISIT SF MDM: CPT

## 2018-12-19 RX ORDER — MORPHINE SULFATE 2 MG/ML
4 INJECTION, SOLUTION INTRAMUSCULAR; INTRAVENOUS ONCE
Status: COMPLETED | OUTPATIENT
Start: 2018-12-20 | End: 2018-12-20

## 2018-12-19 ASSESSMENT — PAIN DESCRIPTION - LOCATION: LOCATION: EYE

## 2018-12-19 ASSESSMENT — PAIN SCALES - GENERAL: PAINLEVEL_OUTOF10: 4

## 2018-12-19 ASSESSMENT — PAIN DESCRIPTION - ORIENTATION: ORIENTATION: RIGHT

## 2018-12-19 ASSESSMENT — PAIN DESCRIPTION - PAIN TYPE: TYPE: ACUTE PAIN

## 2018-12-20 PROCEDURE — 6360000002 HC RX W HCPCS: Performed by: EMERGENCY MEDICINE

## 2018-12-20 PROCEDURE — 6370000000 HC RX 637 (ALT 250 FOR IP): Performed by: EMERGENCY MEDICINE

## 2018-12-20 PROCEDURE — 96372 THER/PROPH/DIAG INJ SC/IM: CPT

## 2018-12-20 RX ORDER — AMOXICILLIN AND CLAVULANATE POTASSIUM 500; 125 MG/1; MG/1
1 TABLET, FILM COATED ORAL 3 TIMES DAILY
Qty: 30 TABLET | Refills: 0 | Status: SHIPPED | OUTPATIENT
Start: 2018-12-20 | End: 2018-12-30

## 2018-12-20 RX ORDER — SULFACETAMIDE SODIUM 100 MG/ML
2 SOLUTION/ DROPS OPHTHALMIC ONCE
Status: COMPLETED | OUTPATIENT
Start: 2018-12-20 | End: 2018-12-20

## 2018-12-20 RX ORDER — AMOXICILLIN AND CLAVULANATE POTASSIUM 500; 125 MG/1; MG/1
1 TABLET, FILM COATED ORAL ONCE
Status: COMPLETED | OUTPATIENT
Start: 2018-12-20 | End: 2018-12-20

## 2018-12-20 RX ADMIN — SULFACETAMIDE SODIUM 2 DROP: 100 SOLUTION/ DROPS OPHTHALMIC at 01:06

## 2018-12-20 RX ADMIN — MORPHINE SULFATE 4 MG: 2 INJECTION, SOLUTION INTRAMUSCULAR; INTRAVENOUS at 00:22

## 2018-12-20 RX ADMIN — AMOXICILLIN AND CLAVULANATE POTASSIUM 1 TABLET: 500; 125 TABLET, FILM COATED ORAL at 01:06

## 2018-12-20 ASSESSMENT — PAIN SCALES - GENERAL: PAINLEVEL_OUTOF10: 4

## 2018-12-21 ENCOUNTER — CARE COORDINATION (OUTPATIENT)
Dept: CARE COORDINATION | Age: 53
End: 2018-12-21

## 2018-12-21 ASSESSMENT — ENCOUNTER SYMPTOMS
EYE DISCHARGE: 1
EYE ITCHING: 1
RESPIRATORY NEGATIVE: 1
EYE PAIN: 1
EYE REDNESS: 1

## 2018-12-21 NOTE — ED PROVIDER NOTES
[facility] ED  eMERGENCY dEPARTMENT eNCOUnter      Pt Name: Arun Dickey  MRN: [id number]  [place] 1965  Date of evaluation: 12/19/2018  Provider: Evens Rodriguez MD    [address] [place] [address]     Chief Complaint   Patient presents with    Eye Problem     right eye painful and red, onset Monday         HISTORY OF PRESENT ILLNESS   (Location/Symptom, Timing/Onset, Context/Setting,Quality, Duration, Modifying Factors, Severity)  Note limiting factors. Arun Dickey is a51 y.o. female who presents to the emergency department      HPIThis is a 66-year-old woman who woke up 2 days ago with a red painful and swollen eye. She has no vision changes but she does have some excessive tearing. She's never had infections in that eye before. She denies any photophobia or pain with extraocular movements. Nursing Notes werereviewed. REVIEW OF SYSTEMS    (2-9 systems for level 4, 10 or more for level 5)     Review of Systems   Constitutional: Negative. HENT: Negative. Eyes: Positive for pain, discharge, redness and itching. Respiratory: Negative. Cardiovascular: Negative. Neurological: Negative. Hematological: Negative. Except as noted above the remainder of the review of systems was reviewed and negative. PAST MEDICAL HISTORY     Past Medical History:   Diagnosis Date    Anxiety     Asthma     CAD (coronary artery disease)     Clinical trial participant at discharge 3/31/16    COPD [facility]) [facility] [address]     Depression     Diabetic neuropathy ([facility] [address] [facility]     H/O cardiac catheterization 4/26/16    LMCA: Mild Irregularities 10-20%. LAD: Lesion on Prox LAD: Proximal subsection. 100% stenosis. LCx: Mild irregularities 10-20%. RCA: Mild irregularities 10-20%. Widely patent mid RCA stent. EF:60%.     H/O cardiovascular stress test 10/07/2016    Overall results are most consistent with a low risk for significant CAD.     H/O echocardiogram 10/05/2016 EF: >60%. Inferoseptal wall abnormal in its motion which is not unusal status post open heart surgery. Evidence of mild (grade I) diastolic dysfunction is seen.  H/O tilt table evaluation 07/12/2016    Abnormal. PTs HR, Blood Pressure response and symptoms were most consistent with dysautonomia. Combined w/viligant maintenance of euvolemia and maintaining a moderate salt intake, pharmaciologic treatment w/ ProAmatine, SSRI such as Lexapro and/or Mestinon among other treatments have shown some effectiveness in the treatment of this condition.      Hx of blood clots     Hyperlipidemia     Hypertension     Intermittent claudication (HCC)     Kidney stones     Movement disorder     neuropathy in legs    Neuromuscular disorder (HCC)     Osteoarthritis     Reflux     Seizures (HCC)     last over 10 yr ago    Stented coronary artery 9/22/2010     LAD/[place]    Stented coronary artery 3/31/16    RCA/[place]    Type II or unspecified type diabetes mellitus without mention of complication, not stated as uncontrolled     Unspecified sleep apnea     no machine         SURGICALHISTORY       Past Surgical History:   Procedure Laterality Date    ABDOMINAL HERNIA REPAIR  1-2016    repair done [place]    APPENDECTOMY      CARDIAC CATHETERIZATION Left 4/26/2016    right radial/ [facility] [place]/ Dr Radah Thomas COLONOSCOPY  12/16/14    -hemorrhoids,bx    CORONARY ANGIOPLASTY WITH STENT PLACEMENT  9/2010    CORONARY ANGIOPLASTY WITH STENT PLACEMENT  3-    [facility] RCA / DR Arango Gist CORONARY ARTERY BYPASS GRAFT  08/15/2016    OP X 1- Dr Dona Bajwa, COLON, DIAGNOSTIC  12/16/2014    HERNIA REPAIR  12/13/12    at the medial aspect of a [place] RUQ scar); [address]  02/16/2015    incisional, recurrent    HERNIA REPAIR  02/2016    HYSTERECTOMY      OTHER SURGICAL HISTORY  10/8/2015    abd wound washout, mesh removal, wound vac placement    NV SUCT NICOLE LIPECTOMY,HEAD/NECK Left 8/27/2018    THIGH LESION BIOPSY EXCISION, SOFT TISSUE MASS performed by Almira Lefort, [facility] Left 2018    leg    UPPP UVULOPALATOPHARYGOPLASTY  06/26/2012    VENTRAL HERNIA REPAIR  09/21/2015    With Mesh - Dr Prince Rodriguez       Discharge Medication List as of 12/20/2018 12:38 AM      CONTINUE these medications which have NOT CHANGED    Details   FREESTYLE LITE strip USE TO CHECK BLOOD SUGARS 3 TIMES DAILY AND AS NEEDED FOR DIABETES E11.40, Disp-100 strip, R-1Normal      DULoxetine 40 MG CPEP TAKE ONE CAPSULE BY MOUTH EVERY DAY, Disp-30 capsule, R-6Normal      gabapentin (NEURONTIN) 800 MG tablet TAKE 1 TABLET BY MOUTH 3 TIMES A DAY. ., Disp-90 tablet, R-6Normal      cyclobenzaprine (FLEXERIL) 10 MG tablet Take 1 tablet by mouth nightly, Disp-30 tablet, R-3Normal      albuterol sulfate HFA (VENTOLIN HFA) 108 (90 Base) MCG/ACT inhaler INHALE 2 PUFFS INTO THE LUNGS EVERY 6 HOURS AS NEEDED FOR WHEEZING., Disp-18 g, R-3Normal      aspirin 81 MG EC tablet Historical Med      insulin aspart (NOVOLOG FLEXPEN) 100 UNIT/ML injection pen Inject 8 Units into the skin 3 times daily (before meals), Disp-5 pen, R-3Normal      Insulin Degludec (TRESIBA FLEXTOUCH) 200 UNIT/ML SOPN Inject 50 Units into the skin daily, Disp-3 pen, R-5Normal      alogliptin-metformin 12.5-1000 MG TABS Take 1 tablet by mouth 2 times daily, Disp-60 tablet, R-11Normal      Liraglutide (VICTOZA) 18 MG/3ML SOPN SC injection INJECT 1.8 MG INTO THE SKIN DAILY AS DIRECTED, Disp-3 pen, R-11Normal      Insulin Pen Needle (BD ULTRA-FINE PEN NEEDLES) 29G X 12.7MM MISC Disp-150 each, R-11, NormalUSE AS DIRECTED BY PHYSICIAN 5 times daily      metoprolol succinate (TOPROL XL) 50 MG extended release tablet Take 1 tablet by mouth daily, Disp-90 tablet, R-3Normal      atorvastatin (LIPITOR) 40 MG tablet TAKE 1 TABLET BY MOUTH DAILY, Disp-90 tablet,

## 2019-01-04 ENCOUNTER — TELEPHONE (OUTPATIENT)
Dept: SURGERY | Age: 54
End: 2019-01-04

## 2019-01-05 DIAGNOSIS — F34.1 DYSTHYMIA: ICD-10-CM

## 2019-01-05 DIAGNOSIS — I10 ESSENTIAL HYPERTENSION: Chronic | ICD-10-CM

## 2019-01-07 RX ORDER — ESCITALOPRAM OXALATE 10 MG/1
TABLET ORAL
Qty: 90 TABLET | Refills: 1 | Status: SHIPPED | OUTPATIENT
Start: 2019-01-07 | End: 2019-07-20 | Stop reason: SDUPTHER

## 2019-01-07 RX ORDER — LOSARTAN POTASSIUM 25 MG/1
25 TABLET ORAL DAILY
Qty: 90 TABLET | Refills: 1 | Status: SHIPPED | OUTPATIENT
Start: 2019-01-07 | End: 2019-07-20 | Stop reason: SDUPTHER

## 2019-01-07 RX ORDER — ATORVASTATIN CALCIUM 40 MG/1
40 TABLET, FILM COATED ORAL DAILY
Qty: 90 TABLET | Refills: 1 | Status: SHIPPED | OUTPATIENT
Start: 2019-01-07 | End: 2019-07-21 | Stop reason: SDUPTHER

## 2019-01-07 RX ORDER — INSULIN ASPART 100 [IU]/ML
4 INJECTION, SOLUTION INTRAVENOUS; SUBCUTANEOUS
Refills: 2 | OUTPATIENT
Start: 2019-01-07

## 2019-01-15 ENCOUNTER — APPOINTMENT (OUTPATIENT)
Dept: GENERAL RADIOLOGY | Age: 54
End: 2019-01-15
Payer: COMMERCIAL

## 2019-01-15 ENCOUNTER — HOSPITAL ENCOUNTER (EMERGENCY)
Age: 54
Discharge: HOME | End: 2019-01-16
Attending: EMERGENCY MEDICINE
Payer: COMMERCIAL

## 2019-01-15 DIAGNOSIS — I20.9 ANGINA PECTORIS (HCC): Primary | ICD-10-CM

## 2019-01-15 LAB
ABSOLUTE EOS #: 0.14 K/UL (ref 0–0.44)
ABSOLUTE IMMATURE GRANULOCYTE: 0.06 K/UL (ref 0–0.3)
ABSOLUTE LYMPH #: 1.86 K/UL (ref 1.1–3.7)
ABSOLUTE MONO #: 0.5 K/UL (ref 0.1–1.2)
BASOPHILS # BLD: 1 % (ref 0–2)
BASOPHILS ABSOLUTE: 0.06 K/UL (ref 0–0.2)
DIFFERENTIAL TYPE: ABNORMAL
EOSINOPHILS RELATIVE PERCENT: 2 % (ref 1–4)
HCT VFR BLD CALC: 40.4 % (ref 36.3–47.1)
HEMOGLOBIN: 13.4 G/DL (ref 11.9–15.1)
IMMATURE GRANULOCYTES: 1 %
LYMPHOCYTES # BLD: 20 % (ref 24–43)
MCH RBC QN AUTO: 26.6 PG (ref 25.2–33.5)
MCHC RBC AUTO-ENTMCNC: 33.2 G/DL (ref 28.4–34.8)
MCV RBC AUTO: 80.3 FL (ref 82.6–102.9)
MONOCYTES # BLD: 5 % (ref 3–12)
NRBC AUTOMATED: 0 PER 100 WBC
PDW BLD-RTO: 13.9 % (ref 11.8–14.4)
PLATELET # BLD: 300 K/UL (ref 138–453)
PLATELET ESTIMATE: ABNORMAL
PMV BLD AUTO: 10.4 FL (ref 8.1–13.5)
RBC # BLD: 5.03 M/UL (ref 3.95–5.11)
RBC # BLD: ABNORMAL 10*6/UL
SEG NEUTROPHILS: 71 % (ref 36–65)
SEGMENTED NEUTROPHILS ABSOLUTE COUNT: 6.68 K/UL (ref 1.5–8.1)
WBC # BLD: 9.3 K/UL (ref 3.5–11.3)
WBC # BLD: ABNORMAL 10*3/UL

## 2019-01-15 PROCEDURE — 99285 EMERGENCY DEPT VISIT HI MDM: CPT

## 2019-01-15 PROCEDURE — 71045 X-RAY EXAM CHEST 1 VIEW: CPT

## 2019-01-15 PROCEDURE — 84484 ASSAY OF TROPONIN QUANT: CPT

## 2019-01-15 PROCEDURE — 80048 BASIC METABOLIC PNL TOTAL CA: CPT

## 2019-01-15 PROCEDURE — 85025 COMPLETE CBC W/AUTO DIFF WBC: CPT

## 2019-01-15 PROCEDURE — 36415 COLL VENOUS BLD VENIPUNCTURE: CPT

## 2019-01-15 PROCEDURE — 93005 ELECTROCARDIOGRAM TRACING: CPT

## 2019-01-15 RX ORDER — NITROGLYCERIN 0.4 MG/1
0.4 TABLET SUBLINGUAL EVERY 5 MIN PRN
Status: DISCONTINUED | OUTPATIENT
Start: 2019-01-15 | End: 2019-01-16 | Stop reason: HOSPADM

## 2019-01-15 RX ORDER — MORPHINE SULFATE 2 MG/ML
4 INJECTION, SOLUTION INTRAMUSCULAR; INTRAVENOUS ONCE
Status: COMPLETED | OUTPATIENT
Start: 2019-01-16 | End: 2019-01-16

## 2019-01-15 ASSESSMENT — PAIN DESCRIPTION - PAIN TYPE: TYPE: ACUTE PAIN

## 2019-01-15 ASSESSMENT — ENCOUNTER SYMPTOMS
WHEEZING: 0
ABDOMINAL PAIN: 0
BACK PAIN: 0
TROUBLE SWALLOWING: 0
COLOR CHANGE: 0
SHORTNESS OF BREATH: 0
ABDOMINAL DISTENTION: 0
NAUSEA: 0
COUGH: 0
CHOKING: 0
DIARRHEA: 0
CONSTIPATION: 0
VOMITING: 0

## 2019-01-15 ASSESSMENT — PAIN DESCRIPTION - LOCATION: LOCATION: BACK

## 2019-01-15 ASSESSMENT — PAIN DESCRIPTION - ORIENTATION: ORIENTATION: RIGHT

## 2019-01-15 ASSESSMENT — PAIN SCALES - GENERAL: PAINLEVEL_OUTOF10: 5

## 2019-01-16 VITALS
HEART RATE: 89 BPM | WEIGHT: 225.97 LBS | BODY MASS INDEX: 40.03 KG/M2 | TEMPERATURE: 99.3 F | OXYGEN SATURATION: 98 % | SYSTOLIC BLOOD PRESSURE: 120 MMHG | RESPIRATION RATE: 16 BRPM | DIASTOLIC BLOOD PRESSURE: 80 MMHG

## 2019-01-16 LAB
ANION GAP SERPL CALCULATED.3IONS-SCNC: 17 MMOL/L (ref 9–17)
BUN BLDV-MCNC: 14 MG/DL (ref 6–20)
BUN/CREAT BLD: 24 (ref 9–20)
CALCIUM SERPL-MCNC: 9.2 MG/DL (ref 8.6–10.4)
CHLORIDE BLD-SCNC: 92 MMOL/L (ref 98–107)
CHP ED QC CHECK: YES
CO2: 21 MMOL/L (ref 20–31)
CREAT SERPL-MCNC: 0.59 MG/DL (ref 0.5–0.9)
EKG ATRIAL RATE: 107 BPM
EKG ATRIAL RATE: 117 BPM
EKG P AXIS: 33 DEGREES
EKG P AXIS: 34 DEGREES
EKG P-R INTERVAL: 124 MS
EKG P-R INTERVAL: 132 MS
EKG Q-T INTERVAL: 322 MS
EKG Q-T INTERVAL: 346 MS
EKG QRS DURATION: 66 MS
EKG QRS DURATION: 66 MS
EKG QTC CALCULATION (BAZETT): 449 MS
EKG QTC CALCULATION (BAZETT): 461 MS
EKG R AXIS: 16 DEGREES
EKG R AXIS: 6 DEGREES
EKG T AXIS: 51 DEGREES
EKG T AXIS: 82 DEGREES
EKG VENTRICULAR RATE: 107 BPM
EKG VENTRICULAR RATE: 117 BPM
GFR AFRICAN AMERICAN: >60 ML/MIN
GFR NON-AFRICAN AMERICAN: >60 ML/MIN
GFR SERPL CREATININE-BSD FRML MDRD: ABNORMAL ML/MIN/{1.73_M2}
GFR SERPL CREATININE-BSD FRML MDRD: ABNORMAL ML/MIN/{1.73_M2}
GLUCOSE BLD-MCNC: 292 MG/DL
GLUCOSE BLD-MCNC: 292 MG/DL (ref 74–100)
GLUCOSE BLD-MCNC: 425 MG/DL (ref 70–99)
POTASSIUM SERPL-SCNC: 4 MMOL/L (ref 3.7–5.3)
SODIUM BLD-SCNC: 130 MMOL/L (ref 135–144)
TROPONIN INTERP: NORMAL
TROPONIN INTERP: NORMAL
TROPONIN T: <0.03 NG/ML
TROPONIN T: <0.03 NG/ML
TROPONIN, HIGH SENSITIVITY: NORMAL NG/L (ref 0–14)
TROPONIN, HIGH SENSITIVITY: NORMAL NG/L (ref 0–14)

## 2019-01-16 PROCEDURE — 96374 THER/PROPH/DIAG INJ IV PUSH: CPT

## 2019-01-16 PROCEDURE — 6360000002 HC RX W HCPCS: Performed by: EMERGENCY MEDICINE

## 2019-01-16 PROCEDURE — 6370000000 HC RX 637 (ALT 250 FOR IP): Performed by: EMERGENCY MEDICINE

## 2019-01-16 PROCEDURE — 96372 THER/PROPH/DIAG INJ SC/IM: CPT

## 2019-01-16 PROCEDURE — 93005 ELECTROCARDIOGRAM TRACING: CPT

## 2019-01-16 PROCEDURE — 82947 ASSAY GLUCOSE BLOOD QUANT: CPT

## 2019-01-16 PROCEDURE — 96375 TX/PRO/DX INJ NEW DRUG ADDON: CPT

## 2019-01-16 PROCEDURE — 84484 ASSAY OF TROPONIN QUANT: CPT

## 2019-01-16 RX ORDER — METOPROLOL SUCCINATE 50 MG/1
50 TABLET, EXTENDED RELEASE ORAL 2 TIMES DAILY
Qty: 60 TABLET | Refills: 0 | Status: SHIPPED | OUTPATIENT
Start: 2019-01-16 | End: 2019-02-01 | Stop reason: SDUPTHER

## 2019-01-16 RX ORDER — ONDANSETRON 2 MG/ML
4 INJECTION INTRAMUSCULAR; INTRAVENOUS ONCE
Status: COMPLETED | OUTPATIENT
Start: 2019-01-16 | End: 2019-01-16

## 2019-01-16 RX ADMIN — TICAGRELOR 180 MG: 90 TABLET ORAL at 00:08

## 2019-01-16 RX ADMIN — NITROGLYCERIN 0.4 MG: 0.4 TABLET, ORALLY DISINTEGRATING SUBLINGUAL at 00:12

## 2019-01-16 RX ADMIN — INSULIN HUMAN 10 UNITS: 100 INJECTION, SOLUTION PARENTERAL at 01:00

## 2019-01-16 RX ADMIN — MORPHINE SULFATE 4 MG: 2 INJECTION, SOLUTION INTRAMUSCULAR; INTRAVENOUS at 00:09

## 2019-01-16 RX ADMIN — ONDANSETRON 4 MG: 2 INJECTION INTRAMUSCULAR; INTRAVENOUS at 00:59

## 2019-01-16 RX ADMIN — NITROGLYCERIN 0.4 MG: 0.4 TABLET, ORALLY DISINTEGRATING SUBLINGUAL at 00:06

## 2019-01-16 RX ADMIN — ENOXAPARIN SODIUM 100 MG: 100 INJECTION SUBCUTANEOUS at 00:13

## 2019-01-16 ASSESSMENT — PAIN SCALES - GENERAL: PAINLEVEL_OUTOF10: 4

## 2019-01-17 ENCOUNTER — CARE COORDINATION (OUTPATIENT)
Dept: CARE COORDINATION | Age: 54
End: 2019-01-17

## 2019-01-21 RX ORDER — BLOOD-GLUCOSE METER
KIT MISCELLANEOUS
Qty: 100 STRIP | Refills: 1 | Status: SHIPPED | OUTPATIENT
Start: 2019-01-21 | End: 2019-03-25 | Stop reason: SDUPTHER

## 2019-01-28 DIAGNOSIS — M62.838 MUSCLE SPASM: ICD-10-CM

## 2019-01-30 RX ORDER — CYCLOBENZAPRINE HCL 10 MG
TABLET ORAL
Qty: 30 TABLET | Refills: 1 | Status: SHIPPED | OUTPATIENT
Start: 2019-01-30 | End: 2019-03-05

## 2019-01-31 ENCOUNTER — CARE COORDINATION (OUTPATIENT)
Dept: CARE COORDINATION | Age: 54
End: 2019-01-31

## 2019-02-01 ENCOUNTER — OFFICE VISIT (OUTPATIENT)
Dept: CARDIOLOGY | Age: 54
End: 2019-02-01
Payer: COMMERCIAL

## 2019-02-01 VITALS
HEIGHT: 63 IN | RESPIRATION RATE: 16 BRPM | WEIGHT: 226 LBS | SYSTOLIC BLOOD PRESSURE: 128 MMHG | DIASTOLIC BLOOD PRESSURE: 86 MMHG | HEART RATE: 87 BPM | OXYGEN SATURATION: 96 % | BODY MASS INDEX: 40.04 KG/M2

## 2019-02-01 DIAGNOSIS — I95.1 DYSAUTONOMIA ORTHOSTATIC HYPOTENSION SYNDROME: ICD-10-CM

## 2019-02-01 DIAGNOSIS — I10 ESSENTIAL HYPERTENSION: ICD-10-CM

## 2019-02-01 DIAGNOSIS — I25.708 CORONARY ARTERY DISEASE OF BYPASS GRAFT OF NATIVE HEART WITH STABLE ANGINA PECTORIS (HCC): Primary | ICD-10-CM

## 2019-02-01 DIAGNOSIS — I20.9 ANGINA, CLASS III (HCC): ICD-10-CM

## 2019-02-01 PROCEDURE — G8417 CALC BMI ABV UP PARAM F/U: HCPCS | Performed by: FAMILY MEDICINE

## 2019-02-01 PROCEDURE — 1036F TOBACCO NON-USER: CPT | Performed by: FAMILY MEDICINE

## 2019-02-01 PROCEDURE — G8482 FLU IMMUNIZE ORDER/ADMIN: HCPCS | Performed by: FAMILY MEDICINE

## 2019-02-01 PROCEDURE — 99214 OFFICE O/P EST MOD 30 MIN: CPT | Performed by: FAMILY MEDICINE

## 2019-02-01 PROCEDURE — G8427 DOCREV CUR MEDS BY ELIG CLIN: HCPCS | Performed by: FAMILY MEDICINE

## 2019-02-01 PROCEDURE — G8598 ASA/ANTIPLAT THER USED: HCPCS | Performed by: FAMILY MEDICINE

## 2019-02-01 PROCEDURE — 3017F COLORECTAL CA SCREEN DOC REV: CPT | Performed by: FAMILY MEDICINE

## 2019-02-01 RX ORDER — METOPROLOL SUCCINATE 50 MG/1
50 TABLET, EXTENDED RELEASE ORAL DAILY
Qty: 90 TABLET | Refills: 3
Start: 2019-02-01 | End: 2019-03-04

## 2019-02-13 ENCOUNTER — HOSPITAL ENCOUNTER (OUTPATIENT)
Dept: NON INVASIVE DIAGNOSTICS | Age: 54
Discharge: HOME | End: 2019-02-13
Payer: COMMERCIAL

## 2019-02-13 DIAGNOSIS — I25.708 CORONARY ARTERY DISEASE OF BYPASS GRAFT OF NATIVE HEART WITH STABLE ANGINA PECTORIS (HCC): ICD-10-CM

## 2019-02-13 DIAGNOSIS — I95.1 DYSAUTONOMIA ORTHOSTATIC HYPOTENSION SYNDROME: ICD-10-CM

## 2019-02-13 DIAGNOSIS — I10 ESSENTIAL HYPERTENSION: ICD-10-CM

## 2019-02-13 PROCEDURE — A9500 TC99M SESTAMIBI: HCPCS | Performed by: FAMILY MEDICINE

## 2019-02-13 PROCEDURE — 3430000000 HC RX DIAGNOSTIC RADIOPHARMACEUTICAL: Performed by: FAMILY MEDICINE

## 2019-02-13 PROCEDURE — 93017 CV STRESS TEST TRACING ONLY: CPT

## 2019-02-13 PROCEDURE — 6360000002 HC RX W HCPCS: Performed by: FAMILY MEDICINE

## 2019-02-13 PROCEDURE — 78452 HT MUSCLE IMAGE SPECT MULT: CPT

## 2019-02-13 RX ADMIN — Medication 30 MILLICURIE: at 08:36

## 2019-02-13 RX ADMIN — REGADENOSON 0.4 MG: 0.08 INJECTION, SOLUTION INTRAVENOUS at 08:35

## 2019-02-14 ENCOUNTER — HOSPITAL ENCOUNTER (OUTPATIENT)
Dept: NON INVASIVE DIAGNOSTICS | Age: 54
Discharge: HOME | End: 2019-02-14
Payer: COMMERCIAL

## 2019-02-14 PROCEDURE — A9500 TC99M SESTAMIBI: HCPCS | Performed by: FAMILY MEDICINE

## 2019-02-14 PROCEDURE — 3430000000 HC RX DIAGNOSTIC RADIOPHARMACEUTICAL: Performed by: FAMILY MEDICINE

## 2019-02-14 RX ADMIN — Medication 30 MILLICURIE: at 12:30

## 2019-02-15 PROCEDURE — 93018 CV STRESS TEST I&R ONLY: CPT | Performed by: FAMILY MEDICINE

## 2019-02-15 PROCEDURE — 93016 CV STRESS TEST SUPVJ ONLY: CPT | Performed by: FAMILY MEDICINE

## 2019-02-15 PROCEDURE — 78452 HT MUSCLE IMAGE SPECT MULT: CPT | Performed by: FAMILY MEDICINE

## 2019-02-18 ENCOUNTER — TELEPHONE (OUTPATIENT)
Dept: CARDIOLOGY | Age: 54
End: 2019-02-18

## 2019-03-04 ENCOUNTER — OFFICE VISIT (OUTPATIENT)
Dept: CARDIOLOGY | Age: 54
End: 2019-03-04
Payer: COMMERCIAL

## 2019-03-04 ENCOUNTER — HOSPITAL ENCOUNTER (OUTPATIENT)
Age: 54
Discharge: HOME | End: 2019-03-04
Payer: COMMERCIAL

## 2019-03-04 VITALS
SYSTOLIC BLOOD PRESSURE: 133 MMHG | RESPIRATION RATE: 16 BRPM | OXYGEN SATURATION: 96 % | HEART RATE: 97 BPM | BODY MASS INDEX: 40.11 KG/M2 | WEIGHT: 226.4 LBS | HEIGHT: 63 IN | DIASTOLIC BLOOD PRESSURE: 87 MMHG

## 2019-03-04 DIAGNOSIS — I25.10 CORONARY ARTERY DISEASE INVOLVING NATIVE CORONARY ARTERY OF NATIVE HEART WITHOUT ANGINA PECTORIS: ICD-10-CM

## 2019-03-04 DIAGNOSIS — I10 ESSENTIAL HYPERTENSION: Chronic | ICD-10-CM

## 2019-03-04 DIAGNOSIS — R94.39 ABNORMAL STRESS TEST: Primary | ICD-10-CM

## 2019-03-04 DIAGNOSIS — R00.2 HEART PALPITATIONS: ICD-10-CM

## 2019-03-04 DIAGNOSIS — G90.1 DYSAUTONOMIA (HCC): ICD-10-CM

## 2019-03-04 DIAGNOSIS — R94.39 ABNORMAL STRESS TEST: ICD-10-CM

## 2019-03-04 DIAGNOSIS — I20.9 ANGINA, CLASS III (HCC): ICD-10-CM

## 2019-03-04 LAB
ANION GAP SERPL CALCULATED.3IONS-SCNC: 19 MMOL/L (ref 9–17)
BUN BLDV-MCNC: 14 MG/DL (ref 6–20)
BUN/CREAT BLD: 22 (ref 9–20)
CALCIUM SERPL-MCNC: 9.7 MG/DL (ref 8.6–10.4)
CHLORIDE BLD-SCNC: 88 MMOL/L (ref 98–107)
CO2: 21 MMOL/L (ref 20–31)
CREAT SERPL-MCNC: 0.64 MG/DL (ref 0.5–0.9)
GFR AFRICAN AMERICAN: >60 ML/MIN
GFR NON-AFRICAN AMERICAN: >60 ML/MIN
GFR SERPL CREATININE-BSD FRML MDRD: ABNORMAL ML/MIN/{1.73_M2}
GFR SERPL CREATININE-BSD FRML MDRD: ABNORMAL ML/MIN/{1.73_M2}
GLUCOSE BLD-MCNC: 489 MG/DL (ref 70–99)
HCT VFR BLD CALC: 44.9 % (ref 36.3–47.1)
HEMOGLOBIN: 14.4 G/DL (ref 11.9–15.1)
MCH RBC QN AUTO: 25.9 PG (ref 25.2–33.5)
MCHC RBC AUTO-ENTMCNC: 32.1 G/DL (ref 28.4–34.8)
MCV RBC AUTO: 80.8 FL (ref 82.6–102.9)
NRBC AUTOMATED: 0 PER 100 WBC
PDW BLD-RTO: 13.9 % (ref 11.8–14.4)
PLATELET # BLD: 343 K/UL (ref 138–453)
PMV BLD AUTO: 10.2 FL (ref 8.1–13.5)
POTASSIUM SERPL-SCNC: 4.4 MMOL/L (ref 3.7–5.3)
RBC # BLD: 5.56 M/UL (ref 3.95–5.11)
SODIUM BLD-SCNC: 128 MMOL/L (ref 135–144)
WBC # BLD: 9.3 K/UL (ref 3.5–11.3)

## 2019-03-04 PROCEDURE — 99215 OFFICE O/P EST HI 40 MIN: CPT | Performed by: FAMILY MEDICINE

## 2019-03-04 PROCEDURE — 1036F TOBACCO NON-USER: CPT | Performed by: FAMILY MEDICINE

## 2019-03-04 PROCEDURE — G8482 FLU IMMUNIZE ORDER/ADMIN: HCPCS | Performed by: FAMILY MEDICINE

## 2019-03-04 PROCEDURE — 80048 BASIC METABOLIC PNL TOTAL CA: CPT

## 2019-03-04 PROCEDURE — 85027 COMPLETE CBC AUTOMATED: CPT

## 2019-03-04 PROCEDURE — 3017F COLORECTAL CA SCREEN DOC REV: CPT | Performed by: FAMILY MEDICINE

## 2019-03-04 PROCEDURE — G8417 CALC BMI ABV UP PARAM F/U: HCPCS | Performed by: FAMILY MEDICINE

## 2019-03-04 PROCEDURE — G8427 DOCREV CUR MEDS BY ELIG CLIN: HCPCS | Performed by: FAMILY MEDICINE

## 2019-03-04 PROCEDURE — G8598 ASA/ANTIPLAT THER USED: HCPCS | Performed by: FAMILY MEDICINE

## 2019-03-04 PROCEDURE — 36415 COLL VENOUS BLD VENIPUNCTURE: CPT

## 2019-03-04 RX ORDER — METOPROLOL SUCCINATE 100 MG/1
100 TABLET, EXTENDED RELEASE ORAL DAILY
Qty: 90 TABLET | Refills: 3 | Status: SHIPPED | OUTPATIENT
Start: 2019-03-04 | End: 2020-03-13 | Stop reason: SDUPTHER

## 2019-03-05 ENCOUNTER — TELEPHONE (OUTPATIENT)
Dept: CARDIOLOGY | Age: 54
End: 2019-03-05

## 2019-03-05 ENCOUNTER — CARE COORDINATION (OUTPATIENT)
Dept: CARE COORDINATION | Age: 54
End: 2019-03-05

## 2019-03-05 ENCOUNTER — OFFICE VISIT (OUTPATIENT)
Dept: PRIMARY CARE CLINIC | Age: 54
End: 2019-03-05
Payer: COMMERCIAL

## 2019-03-05 VITALS
DIASTOLIC BLOOD PRESSURE: 62 MMHG | HEART RATE: 84 BPM | RESPIRATION RATE: 20 BRPM | SYSTOLIC BLOOD PRESSURE: 100 MMHG | TEMPERATURE: 98.7 F | BODY MASS INDEX: 40.33 KG/M2 | HEIGHT: 63 IN | WEIGHT: 227.6 LBS

## 2019-03-05 DIAGNOSIS — J44.9 CHRONIC OBSTRUCTIVE PULMONARY DISEASE, UNSPECIFIED COPD TYPE (HCC): ICD-10-CM

## 2019-03-05 DIAGNOSIS — E11.42 DIABETIC POLYNEUROPATHY ASSOCIATED WITH TYPE 2 DIABETES MELLITUS (HCC): ICD-10-CM

## 2019-03-05 DIAGNOSIS — E11.65 UNCONTROLLED TYPE 2 DIABETES MELLITUS WITH HYPERGLYCEMIA (HCC): Primary | ICD-10-CM

## 2019-03-05 LAB — HBA1C MFR BLD: 13.6 %

## 2019-03-05 PROCEDURE — 2022F DILAT RTA XM EVC RTNOPTHY: CPT | Performed by: NURSE PRACTITIONER

## 2019-03-05 PROCEDURE — G8427 DOCREV CUR MEDS BY ELIG CLIN: HCPCS | Performed by: NURSE PRACTITIONER

## 2019-03-05 PROCEDURE — G8926 SPIRO NO PERF OR DOC: HCPCS | Performed by: NURSE PRACTITIONER

## 2019-03-05 PROCEDURE — 3046F HEMOGLOBIN A1C LEVEL >9.0%: CPT | Performed by: NURSE PRACTITIONER

## 2019-03-05 PROCEDURE — 1036F TOBACCO NON-USER: CPT | Performed by: NURSE PRACTITIONER

## 2019-03-05 PROCEDURE — G8417 CALC BMI ABV UP PARAM F/U: HCPCS | Performed by: NURSE PRACTITIONER

## 2019-03-05 PROCEDURE — 3017F COLORECTAL CA SCREEN DOC REV: CPT | Performed by: NURSE PRACTITIONER

## 2019-03-05 PROCEDURE — 3023F SPIROM DOC REV: CPT | Performed by: NURSE PRACTITIONER

## 2019-03-05 PROCEDURE — G8598 ASA/ANTIPLAT THER USED: HCPCS | Performed by: NURSE PRACTITIONER

## 2019-03-05 PROCEDURE — 99214 OFFICE O/P EST MOD 30 MIN: CPT | Performed by: NURSE PRACTITIONER

## 2019-03-05 PROCEDURE — 83036 HEMOGLOBIN GLYCOSYLATED A1C: CPT | Performed by: NURSE PRACTITIONER

## 2019-03-05 PROCEDURE — G8482 FLU IMMUNIZE ORDER/ADMIN: HCPCS | Performed by: NURSE PRACTITIONER

## 2019-03-05 ASSESSMENT — ENCOUNTER SYMPTOMS
SORE THROAT: 0
COUGH: 1
DYSPNEA ASSOCIATED WITH: EXERTION
CHEST TIGHTNESS: 0
VOMITING: 0
HOARSE VOICE: 0
DIARRHEA: 0
RHINORRHEA: 0
FREQUENT THROAT CLEARING: 0
DIFFICULTY BREATHING: 0
NAUSEA: 0
ABDOMINAL PAIN: 0
SPUTUM PRODUCTION: 0
HEMOPTYSIS: 0
WHEEZING: 0
SHORTNESS OF BREATH: 0
VISUAL CHANGE: 0
CONSTIPATION: 0

## 2019-03-05 ASSESSMENT — COPD QUESTIONNAIRES: COPD: 1

## 2019-03-06 ENCOUNTER — TELEPHONE (OUTPATIENT)
Dept: PRIMARY CARE CLINIC | Age: 54
End: 2019-03-06

## 2019-03-07 ENCOUNTER — CARE COORDINATION (OUTPATIENT)
Dept: CARE COORDINATION | Age: 54
End: 2019-03-07

## 2019-03-07 ENCOUNTER — HOSPITAL ENCOUNTER (OUTPATIENT)
Dept: CARDIAC CATH/INVASIVE PROCEDURES | Age: 54
Discharge: HOME | End: 2019-03-07
Attending: FAMILY MEDICINE | Admitting: FAMILY MEDICINE
Payer: COMMERCIAL

## 2019-03-07 VITALS
TEMPERATURE: 98.2 F | WEIGHT: 223 LBS | RESPIRATION RATE: 12 BRPM | OXYGEN SATURATION: 92 % | HEIGHT: 63 IN | DIASTOLIC BLOOD PRESSURE: 65 MMHG | BODY MASS INDEX: 39.51 KG/M2 | HEART RATE: 77 BPM | SYSTOLIC BLOOD PRESSURE: 106 MMHG

## 2019-03-07 LAB — GLUCOSE BLD-MCNC: 448 MG/DL (ref 74–100)

## 2019-03-07 PROCEDURE — 93459 L HRT ART/GRFT ANGIO: CPT | Performed by: FAMILY MEDICINE

## 2019-03-07 PROCEDURE — 82947 ASSAY GLUCOSE BLOOD QUANT: CPT

## 2019-03-07 PROCEDURE — 2709999900 HC NON-CHARGEABLE SUPPLY

## 2019-03-07 PROCEDURE — C1894 INTRO/SHEATH, NON-LASER: HCPCS

## 2019-03-07 PROCEDURE — 2580000003 HC RX 258: Performed by: FAMILY MEDICINE

## 2019-03-07 PROCEDURE — 6360000002 HC RX W HCPCS

## 2019-03-07 PROCEDURE — C1769 GUIDE WIRE: HCPCS

## 2019-03-07 PROCEDURE — 2500000003 HC RX 250 WO HCPCS

## 2019-03-07 RX ORDER — NITROGLYCERIN 0.4 MG/1
0.4 TABLET SUBLINGUAL EVERY 5 MIN PRN
Status: DISCONTINUED | OUTPATIENT
Start: 2019-03-07 | End: 2019-03-07 | Stop reason: HOSPADM

## 2019-03-07 RX ORDER — ACETAMINOPHEN 325 MG/1
650 TABLET ORAL EVERY 4 HOURS PRN
Status: DISCONTINUED | OUTPATIENT
Start: 2019-03-07 | End: 2019-03-07 | Stop reason: HOSPADM

## 2019-03-07 RX ORDER — SODIUM CHLORIDE 0.9 % (FLUSH) 0.9 %
10 SYRINGE (ML) INJECTION EVERY 12 HOURS SCHEDULED
Status: DISCONTINUED | OUTPATIENT
Start: 2019-03-07 | End: 2019-03-07 | Stop reason: HOSPADM

## 2019-03-07 RX ORDER — SODIUM CHLORIDE 0.9 % (FLUSH) 0.9 %
10 SYRINGE (ML) INJECTION PRN
Status: DISCONTINUED | OUTPATIENT
Start: 2019-03-07 | End: 2019-03-07 | Stop reason: HOSPADM

## 2019-03-07 RX ORDER — SODIUM CHLORIDE 9 MG/ML
INJECTION, SOLUTION INTRAVENOUS CONTINUOUS
Status: DISCONTINUED | OUTPATIENT
Start: 2019-03-07 | End: 2019-03-07 | Stop reason: HOSPADM

## 2019-03-07 RX ORDER — DIPHENHYDRAMINE HCL 25 MG
50 CAPSULE ORAL ONCE
Status: DISCONTINUED | OUTPATIENT
Start: 2019-03-07 | End: 2019-03-07 | Stop reason: HOSPADM

## 2019-03-07 RX ADMIN — SODIUM CHLORIDE: 9 INJECTION, SOLUTION INTRAVENOUS at 10:12

## 2019-03-11 ENCOUNTER — CARE COORDINATION (OUTPATIENT)
Dept: CARE COORDINATION | Age: 54
End: 2019-03-11

## 2019-03-11 ENCOUNTER — OFFICE VISIT (OUTPATIENT)
Dept: NEUROLOGY | Age: 54
End: 2019-03-11
Payer: COMMERCIAL

## 2019-03-11 VITALS
WEIGHT: 223.6 LBS | HEIGHT: 63 IN | HEART RATE: 121 BPM | BODY MASS INDEX: 39.62 KG/M2 | DIASTOLIC BLOOD PRESSURE: 78 MMHG | SYSTOLIC BLOOD PRESSURE: 133 MMHG

## 2019-03-11 DIAGNOSIS — E11.42 DIABETIC POLYNEUROPATHY ASSOCIATED WITH TYPE 2 DIABETES MELLITUS (HCC): ICD-10-CM

## 2019-03-11 DIAGNOSIS — M79.2 NEUROPATHIC PAIN: Primary | ICD-10-CM

## 2019-03-11 PROCEDURE — 2022F DILAT RTA XM EVC RTNOPTHY: CPT | Performed by: PSYCHIATRY & NEUROLOGY

## 2019-03-11 PROCEDURE — G8598 ASA/ANTIPLAT THER USED: HCPCS | Performed by: PSYCHIATRY & NEUROLOGY

## 2019-03-11 PROCEDURE — G8427 DOCREV CUR MEDS BY ELIG CLIN: HCPCS | Performed by: PSYCHIATRY & NEUROLOGY

## 2019-03-11 PROCEDURE — 99214 OFFICE O/P EST MOD 30 MIN: CPT | Performed by: PSYCHIATRY & NEUROLOGY

## 2019-03-11 PROCEDURE — 3017F COLORECTAL CA SCREEN DOC REV: CPT | Performed by: PSYCHIATRY & NEUROLOGY

## 2019-03-11 PROCEDURE — 1036F TOBACCO NON-USER: CPT | Performed by: PSYCHIATRY & NEUROLOGY

## 2019-03-11 PROCEDURE — 3046F HEMOGLOBIN A1C LEVEL >9.0%: CPT | Performed by: PSYCHIATRY & NEUROLOGY

## 2019-03-11 PROCEDURE — G8482 FLU IMMUNIZE ORDER/ADMIN: HCPCS | Performed by: PSYCHIATRY & NEUROLOGY

## 2019-03-11 PROCEDURE — G8417 CALC BMI ABV UP PARAM F/U: HCPCS | Performed by: PSYCHIATRY & NEUROLOGY

## 2019-03-11 RX ORDER — GABAPENTIN 800 MG/1
TABLET ORAL
Qty: 90 TABLET | Refills: 6 | Status: SHIPPED | OUTPATIENT
Start: 2019-03-11 | End: 2019-11-26 | Stop reason: SDUPTHER

## 2019-03-11 RX ORDER — DULOXETIN HYDROCHLORIDE 60 MG/1
60 CAPSULE, DELAYED RELEASE ORAL DAILY
Qty: 30 CAPSULE | Refills: 3 | Status: SHIPPED | OUTPATIENT
Start: 2019-03-11 | End: 2019-07-19 | Stop reason: SDUPTHER

## 2019-03-18 ENCOUNTER — CARE COORDINATION (OUTPATIENT)
Dept: CARE COORDINATION | Age: 54
End: 2019-03-18

## 2019-03-18 RX ORDER — ASPIRIN 81 MG/1
TABLET ORAL
Qty: 100 TABLET | Refills: 0 | Status: SHIPPED | OUTPATIENT
Start: 2019-03-18 | End: 2019-06-30 | Stop reason: SDUPTHER

## 2019-03-20 ENCOUNTER — HOSPITAL ENCOUNTER (EMERGENCY)
Age: 54
Discharge: HOME | End: 2019-03-20
Attending: EMERGENCY MEDICINE
Payer: COMMERCIAL

## 2019-03-20 VITALS
HEART RATE: 99 BPM | SYSTOLIC BLOOD PRESSURE: 100 MMHG | TEMPERATURE: 98.6 F | BODY MASS INDEX: 39.5 KG/M2 | DIASTOLIC BLOOD PRESSURE: 81 MMHG | OXYGEN SATURATION: 98 % | WEIGHT: 223 LBS | RESPIRATION RATE: 14 BRPM

## 2019-03-20 DIAGNOSIS — M79.601 RIGHT ARM PAIN: Primary | ICD-10-CM

## 2019-03-20 LAB
ABSOLUTE EOS #: 0.03 K/UL (ref 0–0.44)
ABSOLUTE IMMATURE GRANULOCYTE: 0.05 K/UL (ref 0–0.3)
ABSOLUTE LYMPH #: 1.65 K/UL (ref 1.1–3.7)
ABSOLUTE MONO #: 0.63 K/UL (ref 0.1–1.2)
ALBUMIN SERPL-MCNC: 4.1 G/DL (ref 3.5–5.2)
ALBUMIN/GLOBULIN RATIO: 1 (ref 1–2.5)
ALP BLD-CCNC: 109 U/L (ref 35–104)
ALT SERPL-CCNC: 10 U/L (ref 5–33)
ANION GAP SERPL CALCULATED.3IONS-SCNC: 17 MMOL/L (ref 9–17)
AST SERPL-CCNC: 8 U/L
BASOPHILS # BLD: 1 % (ref 0–2)
BASOPHILS ABSOLUTE: 0.06 K/UL (ref 0–0.2)
BILIRUB SERPL-MCNC: 0.88 MG/DL (ref 0.3–1.2)
BNP INTERPRETATION: NORMAL
BUN BLDV-MCNC: 9 MG/DL (ref 6–20)
BUN/CREAT BLD: 11 (ref 9–20)
CALCIUM SERPL-MCNC: 9.7 MG/DL (ref 8.6–10.4)
CHLORIDE BLD-SCNC: 94 MMOL/L (ref 98–107)
CO2: 23 MMOL/L (ref 20–31)
CREAT SERPL-MCNC: 0.82 MG/DL (ref 0.5–0.9)
DIFFERENTIAL TYPE: ABNORMAL
EKG ATRIAL RATE: 105 BPM
EKG P AXIS: 48 DEGREES
EKG P-R INTERVAL: 134 MS
EKG Q-T INTERVAL: 342 MS
EKG QRS DURATION: 74 MS
EKG QTC CALCULATION (BAZETT): 452 MS
EKG R AXIS: 30 DEGREES
EKG T AXIS: 27 DEGREES
EKG VENTRICULAR RATE: 105 BPM
EOSINOPHILS RELATIVE PERCENT: 0 % (ref 1–4)
GFR AFRICAN AMERICAN: >60 ML/MIN
GFR NON-AFRICAN AMERICAN: >60 ML/MIN
GFR SERPL CREATININE-BSD FRML MDRD: ABNORMAL ML/MIN/{1.73_M2}
GFR SERPL CREATININE-BSD FRML MDRD: ABNORMAL ML/MIN/{1.73_M2}
GLUCOSE BLD-MCNC: 296 MG/DL (ref 74–100)
GLUCOSE BLD-MCNC: 433 MG/DL (ref 70–99)
HCT VFR BLD CALC: 42.7 % (ref 36.3–47.1)
HEMOGLOBIN: 14.1 G/DL (ref 11.9–15.1)
IMMATURE GRANULOCYTES: 0 %
LYMPHOCYTES # BLD: 14 % (ref 24–43)
MCH RBC QN AUTO: 26.5 PG (ref 25.2–33.5)
MCHC RBC AUTO-ENTMCNC: 33 G/DL (ref 28.4–34.8)
MCV RBC AUTO: 80.3 FL (ref 82.6–102.9)
MONOCYTES # BLD: 5 % (ref 3–12)
NRBC AUTOMATED: 0 PER 100 WBC
PDW BLD-RTO: 14 % (ref 11.8–14.4)
PLATELET # BLD: 296 K/UL (ref 138–453)
PLATELET ESTIMATE: ABNORMAL
PMV BLD AUTO: 10 FL (ref 8.1–13.5)
POTASSIUM SERPL-SCNC: 4.5 MMOL/L (ref 3.7–5.3)
PRO-BNP: 139 PG/ML
RBC # BLD: 5.32 M/UL (ref 3.95–5.11)
RBC # BLD: ABNORMAL 10*6/UL
SEG NEUTROPHILS: 80 % (ref 36–65)
SEGMENTED NEUTROPHILS ABSOLUTE COUNT: 9.81 K/UL (ref 1.5–8.1)
SODIUM BLD-SCNC: 134 MMOL/L (ref 135–144)
TOTAL PROTEIN: 8.3 G/DL (ref 6.4–8.3)
TROPONIN INTERP: NORMAL
TROPONIN INTERP: NORMAL
TROPONIN T: <0.03 NG/ML
TROPONIN T: <0.03 NG/ML
TROPONIN, HIGH SENSITIVITY: NORMAL NG/L (ref 0–14)
TROPONIN, HIGH SENSITIVITY: NORMAL NG/L (ref 0–14)
WBC # BLD: 12.2 K/UL (ref 3.5–11.3)
WBC # BLD: ABNORMAL 10*3/UL

## 2019-03-20 PROCEDURE — 99284 EMERGENCY DEPT VISIT MOD MDM: CPT

## 2019-03-20 PROCEDURE — 36415 COLL VENOUS BLD VENIPUNCTURE: CPT

## 2019-03-20 PROCEDURE — 80053 COMPREHEN METABOLIC PANEL: CPT

## 2019-03-20 PROCEDURE — 6370000000 HC RX 637 (ALT 250 FOR IP): Performed by: EMERGENCY MEDICINE

## 2019-03-20 PROCEDURE — 84484 ASSAY OF TROPONIN QUANT: CPT

## 2019-03-20 PROCEDURE — 96374 THER/PROPH/DIAG INJ IV PUSH: CPT

## 2019-03-20 PROCEDURE — 82947 ASSAY GLUCOSE BLOOD QUANT: CPT

## 2019-03-20 PROCEDURE — 93005 ELECTROCARDIOGRAM TRACING: CPT

## 2019-03-20 PROCEDURE — 85025 COMPLETE CBC W/AUTO DIFF WBC: CPT

## 2019-03-20 PROCEDURE — 83880 ASSAY OF NATRIURETIC PEPTIDE: CPT

## 2019-03-20 PROCEDURE — 2580000003 HC RX 258: Performed by: EMERGENCY MEDICINE

## 2019-03-20 RX ORDER — CYCLOBENZAPRINE HCL 10 MG
10 TABLET ORAL ONCE
Status: COMPLETED | OUTPATIENT
Start: 2019-03-20 | End: 2019-03-20

## 2019-03-20 RX ORDER — ASPIRIN 325 MG
325 TABLET ORAL ONCE
Status: COMPLETED | OUTPATIENT
Start: 2019-03-20 | End: 2019-03-20

## 2019-03-20 RX ORDER — SODIUM CHLORIDE 9 MG/ML
INJECTION, SOLUTION INTRAVENOUS ONCE
Status: COMPLETED | OUTPATIENT
Start: 2019-03-20 | End: 2019-03-20

## 2019-03-20 RX ADMIN — SODIUM CHLORIDE: 9 INJECTION, SOLUTION INTRAVENOUS at 15:36

## 2019-03-20 RX ADMIN — INSULIN HUMAN 5 UNITS: 100 INJECTION, SOLUTION PARENTERAL at 15:36

## 2019-03-20 RX ADMIN — ASPIRIN 325 MG: 325 TABLET, COATED ORAL at 14:25

## 2019-03-20 RX ADMIN — CYCLOBENZAPRINE HYDROCHLORIDE 10 MG: 10 TABLET, FILM COATED ORAL at 14:25

## 2019-03-20 ASSESSMENT — ENCOUNTER SYMPTOMS
EYE PAIN: 0
SHORTNESS OF BREATH: 0
NAUSEA: 0
SINUS PRESSURE: 0
SINUS PAIN: 0
EYE DISCHARGE: 0
COUGH: 0
SORE THROAT: 0
DIARRHEA: 0
VOMITING: 0
ABDOMINAL PAIN: 0

## 2019-03-20 ASSESSMENT — PAIN DESCRIPTION - ORIENTATION: ORIENTATION: RIGHT

## 2019-03-20 ASSESSMENT — PAIN SCALES - GENERAL
PAINLEVEL_OUTOF10: 5
PAINLEVEL_OUTOF10: 4

## 2019-03-20 ASSESSMENT — PAIN DESCRIPTION - LOCATION: LOCATION: NECK;SHOULDER

## 2019-03-20 ASSESSMENT — PAIN DESCRIPTION - PAIN TYPE: TYPE: ACUTE PAIN

## 2019-03-22 ENCOUNTER — TELEPHONE (OUTPATIENT)
Dept: PRIMARY CARE CLINIC | Age: 54
End: 2019-03-22

## 2019-03-25 RX ORDER — BLOOD-GLUCOSE METER
KIT MISCELLANEOUS
Qty: 100 STRIP | Refills: 1 | Status: SHIPPED | OUTPATIENT
Start: 2019-03-25 | End: 2019-05-26 | Stop reason: SDUPTHER

## 2019-04-01 ENCOUNTER — CARE COORDINATION (OUTPATIENT)
Dept: CARE COORDINATION | Age: 54
End: 2019-04-01

## 2019-04-01 NOTE — CARE COORDINATION
Nutrition Care Coordinator Follow-Up visit:    Food Recall: eating 2-3 meals/day    Activity Level:  Sedentary: X  Lightly Active: Moderately Active:  Very Active:    Adult BMI:  Underweight (below 18.5)  Normal Weight (18.5-24.9)  Overweight (25-29. 9)  Obese (30-39. 9) X  Morbidly Obese (>40)    Weight Change: no significant weight change    Plan:  Plan was established with patient:  Increase dietary fiber by consuming whole grains, fruits and vegetables: X  Limit dietary cholesterol to >200mg/day: Increase water intake:  Avoid added sugar: X    Monitoring: Will monitor weight:  Will monitor adherence to meal plan: Will monitor adherence to exercise plan: Will monitor HGA1c: X    Handouts Provided :  Low Carb snacking: X  Carb counting /individual meal plan: X  Portion Control: X  Food Labels: X  Physical Activity:  Low Fat/Cholesterol:  Hypo/Hyperglycemia:  Calorie Controlled Meal Plan:    Goals: Increase water consumption to 8oz. 6-8 times daily:  Manage blood sugars by consuming 3 meals spaced every 4-5 hours with 2-3 snacks daily: discussed  Increase fiber and decrease fat intake by consuming 1-2 fruit servings and 2-3 vegetable servings per day. Increase physical activity by:  Consume less than 2,000mg of sodium/day  Avoid consumption of sweetened beverages and added sugar by reading food labels: discussed  Monitor blood sugars by using meter to check blood glucose before morning meal and 2 hours after a meal daily: BS down now in 200's down from 300's  Decrease risk of coronary heart disease by consuming fish that contains omega-3 fatty acids at least twice a week, avoiding partially hydrogenated oil/trans fats and limiting saturated fat intake by reading food labels: discussed    Patient goals set:  1. Reviewed what foods contain carbohydrate and how to use  food labels to identify these foods. Discussed limiting portions, using measuring cups or hand to estimate a serving size.   Goal is <1c. of carbohydrate/meal, 45gms of carb/meal, 15gms/snack. 2. Reviewed patient's meal pattern, she is skipping breakfast some days. Discussed quick/easy breakfast ideas and will mail patient list of diabetic friendly breakfast suggestions. Timing and spacing of meals discussed. Goal is 3 meals daily spaced every 4-5 hours. 3. Reviewed cutting out all sweetened drinks and replacing with water or sugar free beverages. 4. Reviewed plate method and encouraged patient to work on increasing non-starchy vegetables. Goal is 1/2 of plate to be non-starchy vegetables, 1/4 lean protein, 1/4 carbs. Patient relays she is working on her meal pattern and reducing her portions. Patient relays she did not get the nutrition information I mailed to her, will re-mail information.   Will follow up in 3-4 weeks to review nutrition information and answer questions.      Lacey Nogueira

## 2019-04-03 ENCOUNTER — HOSPITAL ENCOUNTER (OUTPATIENT)
Age: 54
Discharge: HOME | End: 2019-04-03
Payer: COMMERCIAL

## 2019-04-03 ENCOUNTER — OFFICE VISIT (OUTPATIENT)
Dept: CARDIOLOGY | Age: 54
End: 2019-04-03
Payer: COMMERCIAL

## 2019-04-03 VITALS
SYSTOLIC BLOOD PRESSURE: 101 MMHG | BODY MASS INDEX: 39.41 KG/M2 | DIASTOLIC BLOOD PRESSURE: 63 MMHG | WEIGHT: 222.4 LBS | HEART RATE: 80 BPM | OXYGEN SATURATION: 95 % | HEIGHT: 63 IN | RESPIRATION RATE: 16 BRPM

## 2019-04-03 DIAGNOSIS — I10 ESSENTIAL HYPERTENSION: ICD-10-CM

## 2019-04-03 DIAGNOSIS — R00.2 HEART PALPITATIONS: ICD-10-CM

## 2019-04-03 DIAGNOSIS — E78.5 DYSLIPIDEMIA: ICD-10-CM

## 2019-04-03 DIAGNOSIS — I25.10 CORONARY ARTERY DISEASE INVOLVING NATIVE CORONARY ARTERY OF NATIVE HEART WITHOUT ANGINA PECTORIS: Primary | ICD-10-CM

## 2019-04-03 DIAGNOSIS — I95.1 DYSAUTONOMIA ORTHOSTATIC HYPOTENSION SYNDROME: ICD-10-CM

## 2019-04-03 DIAGNOSIS — I20.9 ANGINA, CLASS III (HCC): ICD-10-CM

## 2019-04-03 DIAGNOSIS — I25.10 CORONARY ARTERY DISEASE INVOLVING NATIVE CORONARY ARTERY OF NATIVE HEART WITHOUT ANGINA PECTORIS: ICD-10-CM

## 2019-04-03 LAB
CHOLESTEROL/HDL RATIO: 3.1
CHOLESTEROL: 138 MG/DL
HDLC SERPL-MCNC: 44 MG/DL
LDL CHOLESTEROL: 50 MG/DL (ref 0–130)
TRIGL SERPL-MCNC: 220 MG/DL
VLDLC SERPL CALC-MCNC: ABNORMAL MG/DL (ref 1–30)

## 2019-04-03 PROCEDURE — G8417 CALC BMI ABV UP PARAM F/U: HCPCS | Performed by: FAMILY MEDICINE

## 2019-04-03 PROCEDURE — 36415 COLL VENOUS BLD VENIPUNCTURE: CPT

## 2019-04-03 PROCEDURE — 1036F TOBACCO NON-USER: CPT | Performed by: FAMILY MEDICINE

## 2019-04-03 PROCEDURE — G8427 DOCREV CUR MEDS BY ELIG CLIN: HCPCS | Performed by: FAMILY MEDICINE

## 2019-04-03 PROCEDURE — 80061 LIPID PANEL: CPT

## 2019-04-03 PROCEDURE — 3017F COLORECTAL CA SCREEN DOC REV: CPT | Performed by: FAMILY MEDICINE

## 2019-04-03 PROCEDURE — 99213 OFFICE O/P EST LOW 20 MIN: CPT | Performed by: FAMILY MEDICINE

## 2019-04-03 PROCEDURE — G8598 ASA/ANTIPLAT THER USED: HCPCS | Performed by: FAMILY MEDICINE

## 2019-04-03 NOTE — PROGRESS NOTES
Debby Leggett am scribing for and in the presence of Sharin Lesches, MD.    Patient: Germaine Scheuermann  : 1965  Date of Visit: April 3, 2019    REASON FOR VISIT / CONSULTATION: Follow-up (HX:Abnoraml stress, CAD, CABG, HTN, Persistent Palp Pt is here for follow up she had heart cath done 3/7/19 and is feeling pretty good since. Her Endocrinologist did make some changes to meds. She has occasional SOB and mild palpitations. Denies: CP, Lightheaded/dizziness)      Dear Krysta Lezama, APRN - CNP,    I had the pleasure of seeing your patient Germaine Scheuermann in consultation today. As you know, Ms. Edie Dial is a 47 y.o. female who presents with a history of intermittent episodes of back and chest discomfort that started developing since her recent CABG involving a LIMA-LAD 8/15/16. She also has a history of  dysautonomia on a tilt table test, and was started on Florinef as well as Lexapro. Recent heart cath done 3/7/2019 shows severe single vessel disease involving LAD Normal LVEDP. Ms. Edie Dial is here for follow up. She states she is feeling well at this time. She only has occasional palpitations when it is time for medication and are not bothersome. Since then she denied any shortness of breath at rest, abdominal pain, bleeding problems, problems with her medications or any other concerns at this time. Past Medical History:   Diagnosis Date    Anxiety     Asthma     CAD (coronary artery disease)     Clinical trial participant at discharge 3/31/16    COPD (chronic obstructive pulmonary disease) ([facility] [address]     Depression     Diabetic neuropathy ([facility] [address]     H/O cardiac catheterization 16    LMCA: Mild Irregularities 10-20%. LAD: Lesion on Prox LAD: Proximal subsection. 100% stenosis. LCx: Mild irregularities 10-20%. RCA: Mild irregularities 10-20%. Widely patent mid RCA stent. EF:60%.     H/O cardiovascular stress test 10/07/2016    Overall results are most consistent with a low risk for significant CAD.     H/O echocardiogram 10/05/2016    EF:>60%. Inferoseptal wall abnormal in its motion which is not unusal status post open heart surgery. Evidence of mild (grade I) diastolic dysfunction is seen.  H/O tilt table evaluation 07/12/2016    Abnormal. PTs HR, Blood Pressure response and symptoms were most consistent with dysautonomia. Combined w/viligant maintenance of euvolemia and maintaining a moderate salt intake, pharmaciologic treatment w/ ProAmatine, SSRI such as Lexapro and/or Mestinon among other treatments have shown some effectiveness in the treatment of this condition.  History of cardiovascular stress test 02/13/2019    Abnormal. Small/moderate perfusion defect of mild/moderate intesity in the anterior and anterolateral regions during stress imaging which is most consistent w/ ischemia but may be artifact. Overall low/intermediate risk for significant CAD.  History of echocardiogram 09/06/2018    EF >60%. Inferoseptal wall is abnormal in its motion which is not unusual s/p open heart. Evidence of mild (grade I) diastolic dysfunction seen.  Hx of blood clots     Hyperlipidemia     Hypertension     Intermittent claudication (HCC)     Kidney stones     Movement disorder     neuropathy in legs    Neuromuscular disorder (HCC)     neuropathy    Osteoarthritis     Reflux     Seizures (HCC)     last over 10 yr ago    Stented coronary artery 9/22/2010     LAD/[place]    Stented coronary artery 3/31/16    RCA/[place]    Type II or unspecified type diabetes mellitus without mention of complication, not stated as uncontrolled     Unspecified sleep apnea     no machine       CURRENT ALLERGIES: Tape [adhesive tape] REVIEW OF SYSTEMS: 14 systems were reviewed. Pertinent positives and negatives as above, all else negative.      Past Surgical History:   Procedure Laterality Date    ABDOMINAL HERNIA REPAIR  1-2016    repair done [place]    APPENDECTOMY      CARDIAC CATHETERIZATION Left into the skin nightly (Patient taking differently: Inject 70 Units into the skin nightly ) 3 pen 0    metoprolol succinate (TOPROL XL) 100 MG extended release tablet Take 1 tablet by mouth daily 90 tablet 3    atorvastatin (LIPITOR) 40 MG tablet TAKE 1 TABLET BY MOUTH DAILY 90 tablet 1    losartan (COZAAR) 25 MG tablet TAKE 1 TABLET BY MOUTH DAILY 90 tablet 1    escitalopram (LEXAPRO) 10 MG tablet TAKE 1 TABLET BY MOUTH EVERY DAY 90 tablet 1    insulin aspart (NOVOLOG FLEXPEN) 100 UNIT/ML injection pen Inject 8 Units into the skin 3 times daily (before meals) (Patient taking differently: Inject 10 Units into the skin 3 times daily (before meals) States takes 15 units breakfast, 20 units lunch, 25 units with dinner and Sliding Scale) 5 pen 3    albuterol sulfate HFA (VENTOLIN HFA) 108 (90 Base) MCG/ACT inhaler INHALE 2 PUFFS INTO THE LUNGS EVERY 6 HOURS AS NEEDED FOR WHEEZING. 18 g 3    alogliptin-metformin 12.5-1000 MG TABS Take 1 tablet by mouth 2 times daily 60 tablet 11    Insulin Pen Needle (BD ULTRA-FINE PEN NEEDLES) 29G X 12.7MM MISC USE AS DIRECTED BY PHYSICIAN 5 times daily 150 each 11    Blood Glucose Monitoring Suppl BERE 1 Units by Does not apply route three times daily Unit insurance will cover 1 Device 0    fludrocortisone (FLORINEF) 0.1 MG tablet Take 2 tablets by mouth daily 90 tablet 3    omeprazole (PRILOSEC) 20 MG delayed release capsule Take 1 capsule by mouth daily 90 capsule 3    Blood Pressure Monitor KIT 1 each by Does not apply route daily as needed ESSENTIAL HYPERTENSION   I10 1 kit 0       FAMILY HISTORY: family history includes Cancer in her father, maternal grandmother, and mother; Diabetes in her mother and sister; Heart Disease in her brother, maternal uncle, and sister.      PHYSICAL EXAM:   /63 (Site: Left Upper Arm, Position: Sitting, Cuff Size: Medium Adult)   Pulse 80   Resp 16   Ht 5' 3\" (1.6 m)   Wt 222 lb 6.4 oz (100.9 kg)   LMP 12/05/1996   SpO2 95% ASSESSMENT:  1. Coronary artery disease involving native coronary artery of native heart without angina pectoris    2. Angina, class III (HCC)    3. Dysautonomia orthostatic hypotension syndrome ([address]    4. Essential hypertension    5. Heart palpitations    6. Dyslipidemia       PLAN:  · Atherosclerotic Heart Disease: CABG involving LIMA-LAD on 8/15/16: Currently appears well controlled  Antiplatelet Agent: Continue ASA 81 mg daily. I also reminded her to watch for signs of blood in her stool or black tarry stools and stop the medication immediately if this develops as this could be life threatening. · Beta Blocker Therapy: Continue Toprol XL to 100 mg once daily. I discussed the potential side effects of this medication including lightheadedness and dizziness and told her to call the office if this occurs. · Statin Therapy: Continue atorvastatin (Lipitor) 40 mg nightly. · Angina: Recent [place] Class III-IV-Currently no Angina   Beta Blocker: Continue Toprol XL  100 mg once daily. Dysautonomia Orthostatic Hypotension Syndrome: Currently appears to be well controlled. It sounds like her balance is not as good lately but is probably not related to this. We will monitor at least for now. · Pharmacological Management: Continue Fludocortisone (Florinef)  0.2 mg and continue Lexapro   · ACE/ARB Inhibitor: Continue Losartan (Cozaar) 25 mg once daily. · Nonpharmacologic counseling: Because of her condition, I reminded her to try and keep herself well-hydrated and to take extra time when moving from laying to sitting, sitting to standing and standing to walking and not to avoid salt in her diet. I also advised her to put water by her bedside and drink this before she gets out of bed in the morning. Essential Hypertension: Controlled   · ACE Inibitor/ARB: Continue losartan (Cozaar)      · Beta Blocker: Continue Toprol  mg once daily.      Persistent Heart palpitations   · Beta Blocker: Continue

## 2019-04-04 ENCOUNTER — TELEPHONE (OUTPATIENT)
Dept: CARDIOLOGY | Age: 54
End: 2019-04-04

## 2019-04-04 NOTE — TELEPHONE ENCOUNTER
----- Message from Francisco J Starks MD sent at 4/3/2019 10:11 PM EDT -----  Let MsHeather Alyson Garcia know their test result was ok. Thanks.

## 2019-04-08 ENCOUNTER — CARE COORDINATION (OUTPATIENT)
Dept: CARE COORDINATION | Age: 54
End: 2019-04-08

## 2019-04-08 NOTE — CARE COORDINATION
W CHELSEA Plasencia CNP   albuterol sulfate HFA (VENTOLIN HFA) 108 (90 Base) MCG/ACT inhaler INHALE 2 PUFFS INTO THE LUNGS EVERY 6 HOURS AS NEEDED FOR WHEEZING. 8/7/18 Jan CHELSEA Hernández CNP   alogliptin-metformin 12.5-1000 MG TABS Take 1 tablet by mouth 2 times daily 8/7/18 Jan CHELSEA Hernández CNP   Insulin Pen Needle (BD ULTRA-FINE PEN NEEDLES) 29G X 12.7MM MISC USE AS DIRECTED BY PHYSICIAN 5 times daily 8/7/18 Jan CHELSEA Hernández CNP   Blood Glucose Monitoring Suppl BERE 1 Units by Does not apply route three times daily Unit insurance will cover 12/29/17   CHELSEA Polk CNP   fludrocortisone (FLORINEF) 0.1 MG tablet Take 2 tablets by mouth daily 8/1/17   Dunia Macias MD   omeprazole (PRILOSEC) 20 MG delayed release capsule Take 1 capsule by mouth daily 5/9/17   Dunia Macias MD   Blood Pressure Monitor KIT 1 each by Does not apply route daily as needed ESSENTIAL HYPERTENSION   I10 5/31/16 Jan CHELSEA Hernández CNP   Blood Glucose Monitoring Suppl (BLOOD GLUCOSE MONITOR KIT) KIT 1 each by Does not apply route daily. Please dispense [facility]. Dx: 250, new onset T2DM.  Testing once daily 2/21/12 3/11/19  CHELSEA Womack CNP       Future Appointments   Date Time Provider Emma Khalida   6/5/2019  1:00 PM CHELSEA Fenton CNP [facility]   9/11/2019  1:20 PM Eboni Villafuerte MD Neuro [facility]   10/23/2019  1:40 PM Dunia Macias MD TIFFIN C[facility]

## 2019-04-10 NOTE — TELEPHONE ENCOUNTER
Health Maintenance   Topic Date Due    Shingles Vaccine (1 of 2) 02/13/2015    Diabetic microalbuminuria test  03/08/2019    Diabetic retinal exam  04/19/2019    Hepatitis B Vaccine (1 of 3 - Risk 3-dose series) 03/05/2020 (Originally 2/13/1984)    DTaP/Tdap/Td vaccine (1 - Tdap) 03/05/2020 (Originally 2/13/1984)    A1C test (Diabetic or Prediabetic)  06/05/2019    Diabetic foot exam  11/15/2019    Breast cancer screen  01/10/2020    Potassium monitoring  03/20/2020    Creatinine monitoring  03/20/2020    Lipid screen  04/03/2020    Colon cancer screen colonoscopy  01/27/2026    Flu vaccine  Completed    Pneumococcal 0-64 years Vaccine  Completed    Hepatitis C screen  Completed    HIV screen  Completed             (applicable per patient's age: Cancer Screenings, Depression Screening, Fall Risk Screening, Immunizations)    Hemoglobin A1C (%)   Date Value   03/05/2019 13.6   11/15/2018 13.9   08/07/2018 14.0     Microalb/Crt.  Ratio (mcg/mg creat)   Date Value   03/08/2018 62 (H)     LDL Cholesterol (mg/dL)   Date Value   04/03/2019 50     AST (U/L)   Date Value   03/20/2019 8     ALT (U/L)   Date Value   03/20/2019 10     BUN (mg/dL)   Date Value   03/20/2019 9      (goal A1C is < 7)   (goal LDL is <100) need 30-50% reduction from baseline     BP Readings from Last 3 Encounters:   04/03/19 101/63   03/20/19 100/81   03/11/19 133/78    (goal /80)      All Future Testing planned in [facility]:  Lab Frequency Next Occurrence   Comprehensive Metabolic Panel Once 58/47/9875   CBC Auto Differential Once 04/26/2019   Hemoglobin A1C Once 04/26/2019   Lipid Panel Once 04/26/2019   Microalbumin, Ur Once 04/26/2019   Referral to Cardiac Cath Once 03/07/2019       Next Visit Date:  Future Appointments   Date Time Provider Emma Gaxiola   6/5/2019  1:00 PM Davin Plasencia APRN - CNP [facility]   9/11/2019  1:20 PM Maximilian Otero MD Neuro Spec [facility]   10/23/2019  1:40 PM Tashi Patel MD

## 2019-04-22 ENCOUNTER — CARE COORDINATION (OUTPATIENT)
Dept: CARE COORDINATION | Age: 54
End: 2019-04-22

## 2019-04-22 NOTE — CARE COORDINATION
assistance currently. Possible Social Deterrents Reviewed:   · Adequate food/ Transportation? Yes, son drives or she takes medic[employer] cab    · Is patient independent with ADL's/IADL'S? Yes   Does pt require help No      Health Maintenance Due   Topic Date Due    Shingles Vaccine (1 of 2) 02/13/2015    Diabetic microalbuminuria test  03/08/2019    Diabetic retinal exam  04/19/2019         Care Coordination Interventions    Program Enrollment:  Complex Care  Referral from Primary Care Provider:  No  Suggested Interventions and Community Resources  Diabetes Education:  Declined (Comment: States completed 3 years ago)  Fall Risk Prevention: In Process  Registered Dietician:  Completed (Comment: Refer to Jacob Tatum 3-5-19)  Zone Management Tools: In Process (Comment: Sent DM, COPD zones 3/5/19)         Goals Addressed     None          Prior to Admission medications    Medication Sig Start Date End Date Taking?  Authorizing Provider   Insulin Degludec (TRESIBA FLEXTOUCH) 200 UNIT/ML SOPN Inject 70 Units into the skin nightly 4/10/19   Robin Plasencia APRN - CNP   FREESTYLE LITE strip USE TO CHECK BLOOD SUGARS 3 TIMES DAILY AND AS NEEDED FOR DIABETES E11.40 3/25/19   Robin Plasencia, APRN - CNP   aspirin 81 MG EC tablet TAKE 1 TABLET BY MOUTH DAILY 3/18/19   Robin Plasencia, APRN - CNP   DULoxetine (CYMBALTA) 60 MG extended release capsule Take 1 capsule by mouth daily 3/11/19   Forrest Bush MD   gabapentin (NEURONTIN) 800 MG tablet TAKE 1 TABLET BY MOUTH 3 TIMES A DAY. 3/11/19 3/10/20  Forrest Bush MD   metoprolol succinate (TOPROL XL) 100 MG extended release tablet Take 1 tablet by mouth daily 3/4/19   Shawn Alvarez MD   atorvastatin (LIPITOR) 40 MG tablet TAKE 1 TABLET BY MOUTH DAILY 1/7/19   Robin Plasencia APRN - CNP   losartan (COZAAR) 25 MG tablet TAKE 1 TABLET BY MOUTH DAILY 1/7/19   Robin Plasencia, APRN - CNP   escitalopram (LEXAPRO) 10 MG tablet TAKE 1 TABLET BY MOUTH EVERY DAY 1/7/19 CHELSEA Mcclellan CNP   insulin aspart (NOVOLOG FLEXPEN) 100 UNIT/ML injection pen Inject 8 Units into the skin 3 times daily (before meals)  Patient taking differently: Inject 10 Units into the skin 3 times daily (before meals) States takes 15 units breakfast, 20 units lunch, 25 units with dinner and Sliding Scale 1/7/19   CHELSEA Mcclellan CNP   albuterol sulfate HFA (VENTOLIN HFA) 108 (90 Base) MCG/ACT inhaler INHALE 2 PUFFS INTO THE LUNGS EVERY 6 HOURS AS NEEDED FOR WHEEZING. 8/7/18   CHELSEA Mcclellan CNP   alogliptin-metformin 12.5-1000 MG TABS Take 1 tablet by mouth 2 times daily 8/7/18   CHELSEA Mcclellan CNP   Insulin Pen Needle (BD ULTRA-FINE PEN NEEDLES) 29G X 12.7MM MISC USE AS DIRECTED BY PHYSICIAN 5 times daily 8/7/18   CHELSEA Mcclellan CNP   Blood Glucose Monitoring Suppl BERE 1 Units by Does not apply route three times daily Unit insurance will cover 12/29/17   CHELSEA Peterson CNP   fludrocortisone (FLORINEF) 0.1 MG tablet Take 2 tablets by mouth daily 8/1/17   Carroll Hickey MD   omeprazole (PRILOSEC) 20 MG delayed release capsule Take 1 capsule by mouth daily 5/9/17   Carroll Hickey MD   Blood Pressure Monitor KIT 1 each by Does not apply route daily as needed ESSENTIAL HYPERTENSION   I10 5/31/16   CHELSEA Mcclellan CNP   Blood Glucose Monitoring Suppl (BLOOD GLUCOSE MONITOR KIT) KIT 1 each by Does not apply route daily. Please dispense whatever BS monitoring kit [facility] covers. Dx: 250, new onset T2DM.  Testing once daily 2/21/12 3/11/19  Leonetta Castleman, APRN - CNP       Future Appointments   Date Time Provider Emma Gaxiola   6/5/2019  1:00 PM CHELSEA Mcclellan CNP [facility]   9/11/2019  1:20 PM Nataliia Mccray MD Neuro Spec [facility]   10/23/2019  1:40 PM Carroll Hickey MD [facility]

## 2019-04-30 ENCOUNTER — CARE COORDINATION (OUTPATIENT)
Dept: CARE COORDINATION | Age: 54
End: 2019-04-30

## 2019-04-30 NOTE — CARE COORDINATION
Ambulatory Care Coordination Note      Attempt to contact patient, no answer. LVM    4/30/2019  CM Risk Score: 10  Cipriano Mortality Risk Score:      ACC: Glo Bautista RN    Patient history; reason for CC: DM, COPD   Episode Started: 3/5/19      CC Plan for next Outreach:  Review current medications, does pt have freestyle, discuss diet exercise    Reason For Call: Berkley Chau dietician referral, review BS, review Cardiology notes, pt no showed Gen. Surg appt 1/4/19, needs to reschedule. Maybe behavioral health referral? Endocrinology referral ( had appt 3/11/19, did she go?)  · Last PCP appt / notes reviewed: 3/5/19  · A1C 13.6 , no change in medication referral to Endocrinology    Specialist appt / notes reviewed:    3/15/19 - Endocrinology - Freestyle placed, Medications as follows : Tresiba 60 units, Novolog 10-12-15 depending on meal size, and sliding scale, continue alogliptin-metformin 12.5-100mg , D/C Victozia and return in two weeks    3/11/19- Neurology - f/u in 6 months increase the dose of Duloxetine from 40mg to 60mg every day and to continue Gabapentin 800mg tid and Capsaicin cream. She will also to continue Cyclobenazeprine as needed only for muscle spasm. 3/5/19 - Cardiology -  Coronary Cath 3/67/19, continue medications, increase Toprol XL to 100 mg, f/u 2 weeks 3/18/19    12/7/18 General Surgery - Wound Check; slow healing recheck in January NO SHOWED APPT 1/4/19    ED visits or Hospitalizations? Patient has had 1 ED visits, and 0 hospitalizations since last CC contact. Last ED visit 1/15/19, last hospitalization greater than 12 months ago    Medications Reviewed with today: Yes  Patient verbalizes that she has all of medications; and denies needing any assistance currently. Possible Social Deterrents Reviewed:   · Adequate food/ Transportation? Yes, son drives or she takes medic[employer] cab    · Is patient independent with ADL's/IADL'S?  Yes   Does pt require help No      Health Maintenance 25 units with dinner and Sliding Scale 1/7/19   CHELSEA Moreno CNP   albuterol sulfate HFA (VENTOLIN HFA) 108 (90 Base) MCG/ACT inhaler INHALE 2 PUFFS INTO THE LUNGS EVERY 6 HOURS AS NEEDED FOR WHEEZING. 8/7/18   CHELSEA Moreno CNP   alogliptin-metformin 12.5-1000 MG TABS Take 1 tablet by mouth 2 times daily 8/7/18   CHELSEA Moreno CNP   Insulin Pen Needle (BD ULTRA-FINE PEN NEEDLES) 29G X 12.7MM MISC USE AS DIRECTED BY PHYSICIAN 5 times daily 8/7/18   CHELSEA Moreno CNP   Blood Glucose Monitoring Suppl BERE 1 Units by Does not apply route three times daily Unit insurance will cover 12/29/17   Massielcorinna Brower CHELSEA Driscoll CNP   fludrocortisone (FLORINEF) 0.1 MG tablet Take 2 tablets by mouth daily 8/1/17   Chucky Long MD   omeprazole (PRILOSEC) 20 MG delayed release capsule Take 1 capsule by mouth daily 5/9/17   Chucky Long MD   Blood Pressure Monitor KIT 1 each by Does not apply route daily as needed ESSENTIAL HYPERTENSION   I10 5/31/16   CHELSEA Moreno CNP   Blood Glucose Monitoring Suppl (BLOOD GLUCOSE MONITOR KIT) KIT 1 each by Does not apply route daily. Please dispense whatever BS monitoring kit [facility]. Dx: 250, new onset T2DM.  Testing once daily 2/21/12 3/11/19  CHELSEA Delgado CNP       Future Appointments   Date Time Provider Emma Vegai   6/5/2019  1:00 PM CHELSEA Moreno CNPf Prim [facility]   9/11/2019  1:20 PM [facility]   10/23/2019  1:40 PM Chucky Long MD [place] [facility]

## 2019-05-02 ENCOUNTER — TELEPHONE (OUTPATIENT)
Dept: NEUROLOGY | Age: 54
End: 2019-05-02

## 2019-05-03 ENCOUNTER — CARE COORDINATION (OUTPATIENT)
Dept: CARE COORDINATION | Age: 54
End: 2019-05-03

## 2019-05-03 NOTE — CARE COORDINATION
Nutrition Care Coordinator Follow-Up visit:    Food Recall: eating 2-3 meals/day    Activity Level:  Sedentary: X  Lightly Active: Moderately Active:  Very Active:    Adult BMI:  Underweight (below 18.5)  Normal Weight (18.5-24.9)  Overweight (25-29. 9)  Obese (30-39. 9) X  Morbidly Obese (>40)    Weight Change: weight down 5# in last month or so    Plan:  Plan was established with patient:  Increase dietary fiber by consuming whole grains, fruits and vegetables: X  Limit dietary cholesterol to >200mg/day: Increase water intake:  Avoid added sugar: X  Avoid sweetened beverages: X  Choose lean meats: X      Monitoring: Will monitor weight:  Will monitor adherence to meal plan: Will monitor adherence to exercise plan: Will monitor HGA1c: X    Handouts Provided :  Low Carb snacking:  Carb counting /individual meal plan:  Portion Control: X  Food Labels:  Physical Activity:  Low Fat/Cholesterol:  Hypo/Hyperglycemia:  Calorie Controlled Meal Plan:    Goals: Increase water consumption to 8oz. 6-8 times daily:  Manage blood sugars by consuming 3 meals spaced every 4-5 hours with 2-3 snacks daily: reviewed  Increase fiber and decrease fat intake by consuming 1-2 fruit servings and 2-3 vegetable servings per day. Increase physical activity by:  Consume less than 2,000mg of sodium/day  Avoid consumption of sweetened beverages and added sugar by reading food labels: reviewed  Monitor blood sugars by using meter to check blood glucose before morning meal and 2 hours after a meal daily: BS still 200's slowly improving, patient has endo f/u soon  Decrease risk of coronary heart disease by consuming fish that contains omega-3 fatty acids at least twice a week, avoiding partially hydrogenated oil/trans fats and limiting saturated fat intake by reading food labels: reviewed    Patient goals set:  1. Reviewed using measuring cups or hand to estimate a serving size.  Goal is <1c. of carbohydrate/meal, 45gms of carb/meal, 15gms/snack. 2. Reviewed patient's meal pattern, she admits to still skipping breakfast some days. Discussed quick/easy breakfast ideas. Timing and spacing of meals discussed.  Goal is 3 meals daily spaced every 4-5 hours. 3. Reviewed zero calorie beverages. Encouraged replacing sugary drinks with water or sugar free beverages. 4. Reviewed plate method. Patient relays she is working on increasing non-starchy vegetables. Pascual Bring is 1/2 of plate to be non-starchy vegetables, 1/4 lean protein, 1/4 carbs. Patient relays seeing endo now has f/u next month, blood sugars slowly coming down. Patient did get nutrition information I sent to her and has reviewed. Patient is working on lifestyle changes, encouraged to continue.   Austin Narayan

## 2019-05-08 ENCOUNTER — CARE COORDINATION (OUTPATIENT)
Dept: CARE COORDINATION | Age: 54
End: 2019-05-08

## 2019-05-08 NOTE — CARE COORDINATION
Ambulatory Care Coordination Note      Attempt to contact patient, no answer. LVM    5/8/2019  CM Risk Score: 10  Cipriano Mortality Risk Score:      ACC: Homero Kerr RN    Patient history; reason for CC: DM, COPD   Episode Started: 3/5/19      CC Plan for next Outreach:  Review current medications, does pt have freestyle, discuss diet exercise    Reason For Call: Juana Wallace dietician referral, review BS, review Cardiology notes, pt no showed Gen. Surg appt 1/4/19, needs to reschedule. Maybe behavioral health referral? Endocrinology referral ( had appt 3/11/19, did she go?)  · Last PCP appt / notes reviewed: 3/5/19  · A1C 13.6 , no change in medication referral to Endocrinology    Specialist appt / notes reviewed:    3/28/19 - Endocrinology - Freestyle placed, Medications as follows : Tresiba  70, Novolog 15-20-25 depending on meal size, and sliding scale, continue alogliptin-metformin 12.5-100mg , D/C Victozia and return in 3 months PRN    3/11/19- Neurology - f/u in 6 months increase the dose of Duloxetine from 40mg to 60mg every day and to continue Gabapentin 800mg tid and Capsaicin cream. She will also to continue Cyclobenazeprine as needed only for muscle spasm. 3/5/19 - Cardiology -  Coronary Cath 3/67/19, continue medications, increase Toprol XL to 100 mg, f/u 2 weeks 3/18/19    12/7/18 General Surgery - Wound Check; slow healing recheck in January NO SHOWED APPT 1/4/19    ED visits or Hospitalizations? Patient has had 1 ED visits, and 0 hospitalizations since last CC contact. Last ED visit 1/15/19, last hospitalization greater than 12 months ago    Medications Reviewed with today: Yes  Patient verbalizes that she has all of medications; and denies needing any assistance currently. Possible Social Deterrents Reviewed:   · Adequate food/ Transportation? Yes, son drives or she takes medicad cab    · Is patient independent with ADL's/IADL'S?  Yes   Does pt require help No      Health Maintenance Due Topic Date Due    Shingles Vaccine (1 of 2) 02/13/2015    Diabetic microalbuminuria test  03/08/2019         Care Coordination Interventions    Program Enrollment:  Complex Care  Referral from Primary Care Provider:  No  Suggested Interventions and Community Resources  Diabetes Education:  Declined (Comment: States completed 3 years ago)  Fall Risk Prevention: In Process  Registered Dietician:  Completed (Comment: Refer to Jolene Signs 3-5-19)  Zone Management Tools: In Process (Comment: Sent DM, COPD zones 3/5/19)         Goals Addressed     None          Prior to Admission medications    Medication Sig Start Date End Date Taking?  Authorizing Provider   Insulin Degludec (TRESIBA FLEXTOUCH) 200 UNIT/ML SOPN Inject 70 Units into the skin nightly 4/10/19   Ashish Plasencia APRN - CNP   FREESTYLE LITE strip USE TO CHECK BLOOD SUGARS 3 TIMES DAILY AND AS NEEDED FOR DIABETES E11.40 3/25/19   Ashish Plasencia APRN - CNP   aspirin 81 MG EC tablet TAKE 1 TABLET BY MOUTH DAILY 3/18/19   Ashish Plasencia, APRN - CNP   DULoxetine (CYMBALTA) 60 MG extended release capsule Take 1 capsule by mouth daily 3/11/19   Hattie Harada, MD   gabapentin (NEURONTIN) 800 MG tablet TAKE 1 TABLET BY MOUTH 3 TIMES A DAY. 3/11/19 3/10/20  Hattie Harada, MD   metoprolol succinate (TOPROL XL) 100 MG extended release tablet Take 1 tablet by mouth daily 3/4/19   Emery Upton MD   atorvastatin (LIPITOR) 40 MG tablet TAKE 1 TABLET BY MOUTH DAILY 1/7/19   Ashish Plasencia APRN - CNP   losartan (COZAAR) 25 MG tablet TAKE 1 TABLET BY MOUTH DAILY 1/7/19   Ashish Plasencia, APRN - CNP   escitalopram (LEXAPRO) 10 MG tablet TAKE 1 TABLET BY MOUTH EVERY DAY 1/7/19   Ashish Plasencia, APRN - CNP   insulin aspart (NOVOLOG FLEXPEN) 100 UNIT/ML injection pen Inject 8 Units into the skin 3 times daily (before meals)  Patient taking differently: Inject 10 Units into the skin 3 times daily (before meals) States takes 15 units breakfast, 20 units lunch, 25

## 2019-05-16 ENCOUNTER — CARE COORDINATION (OUTPATIENT)
Dept: CARE COORDINATION | Age: 54
End: 2019-05-16

## 2019-05-16 NOTE — CARE COORDINATION
Ambulatory Care Coordination Note      Attempt to contact patient, no answer. LVM, on VM instructed patient if no call back in one week will no longer reach out to patient. 5/16/2019  CM Risk Score: 10  Cipriano Mortality Risk Score:      ACC: Homero Kerr RN    Patient history; reason for CC: DM, COPD   Episode Started: 3/5/19      CC Plan for next Outreach:  Review current medications, does pt have freestyle, discuss diet exercise    Reason For Call: Juana Wallace dietician referral, review BS, review Cardiology notes, pt no showed Gen. Surg appt 1/4/19, needs to reschedule. Maybe behavioral health referral? Endocrinology referral ( had appt 3/11/19, did she go?)  · Last PCP appt / notes reviewed: 3/5/19  · A1C 13.6 , no change in medication referral to Endocrinology    Specialist appt / notes reviewed:    3/28/19 - Endocrinology - Freestyle placed, Medications as follows : Tresiba  70, Novolog 15-20-25 depending on meal size, and sliding scale, continue alogliptin-metformin 12.5-100mg , D/C Victozia and return in 3 months PRN    3/11/19- Neurology - f/u in 6 months increase the dose of Duloxetine from 40mg to 60mg every day and to continue Gabapentin 800mg tid and Capsaicin cream. She will also to continue Cyclobenazeprine as needed only for muscle spasm. 3/5/19 - Cardiology -  Coronary Cath 3/67/19, continue medications, increase Toprol XL to 100 mg, f/u 2 weeks 3/18/19    12/7/18 General Surgery - Wound Check; slow healing recheck in January NO SHOWED APPT 1/4/19    ED visits or Hospitalizations? Patient has had 1 ED visits, and 0 hospitalizations since last CC contact. Last ED visit 1/15/19, last hospitalization greater than 12 months ago    Medications Reviewed with today: Yes  Patient verbalizes that she has all of medications; and denies needing any assistance currently. Possible Social Deterrents Reviewed:   · Adequate food/ Transportation?  Yes, son drives or she takes medicad cab    · Is patient independent with ADL's/IADL'S? Yes   Does pt require help No      Health Maintenance Due   Topic Date Due    Shingles Vaccine (1 of 2) 02/13/2015    Diabetic microalbuminuria test  03/08/2019         Care Coordination Interventions    Program Enrollment:  Complex Care  Referral from Primary Care Provider:  No  Suggested Interventions and Community Resources  Diabetes Education:  Declined (Comment: States completed 3 years ago)  Fall Risk Prevention: In Process  Registered Dietician:  Completed (Comment: Refer to Valarie Combs 3-5-19)  Zone Management Tools: In Process (Comment: Sent DM, COPD zones 3/5/19)         Goals Addressed     None          Prior to Admission medications    Medication Sig Start Date End Date Taking?  Authorizing Provider   Insulin Degludec (TRESIBA FLEXTOUCH) 200 UNIT/ML SOPN Inject 70 Units into the skin nightly 4/10/19   CHELSEA Zaldivar CNP   FREESTYLE LITE strip USE TO CHECK BLOOD SUGARS 3 TIMES DAILY AND AS NEEDED FOR DIABETES E11.40 3/25/19   CHELSEA Zaldivar CNP   aspirin 81 MG EC tablet TAKE 1 TABLET BY MOUTH DAILY 3/18/19   CHELSEA Zaldivar - CNP   DULoxetine (CYMBALTA) 60 MG extended release capsule Take 1 capsule by mouth daily 3/11/19   Mikala Watson MD   gabapentin (NEURONTIN) 800 MG tablet TAKE 1 TABLET BY MOUTH 3 TIMES A DAY. 3/11/19 3/10/20  Mikala Watson MD   metoprolol succinate (TOPROL XL) 100 MG extended release tablet Take 1 tablet by mouth daily 3/4/19   Dashawn Raymond MD   atorvastatin (LIPITOR) 40 MG tablet TAKE 1 TABLET BY MOUTH DAILY 1/7/19   CHELSEA Zaldivar CNP   losartan (COZAAR) 25 MG tablet TAKE 1 TABLET BY MOUTH DAILY 1/7/19   CHELSEA Zaldivar - CNP   escitalopram (LEXAPRO) 10 MG tablet TAKE 1 TABLET BY MOUTH EVERY DAY 1/7/19   CHELSEA Zaldivar CNP   insulin aspart (NOVOLOG FLEXPEN) 100 UNIT/ML injection pen Inject 8 Units into the skin 3 times daily (before meals)  Patient taking differently: Inject 10 Units into the skin 3 times daily (before meals) States takes 15 units breakfast, 20 units lunch, 25 units with dinner and Sliding Scale 1/7/19   CHELSEA Bob CNP   albuterol sulfate HFA (VENTOLIN HFA) 108 (90 Base) MCG/ACT inhaler INHALE 2 PUFFS INTO THE LUNGS EVERY 6 HOURS AS NEEDED FOR WHEEZING. 8/7/18   CHELSEA Bob CNP   alogliptin-metformin 12.5-1000 MG TABS Take 1 tablet by mouth 2 times daily 8/7/18   CHELSEA Bob CNP   Insulin Pen Needle (BD ULTRA-FINE PEN NEEDLES) 29G X 12.7MM MISC USE AS DIRECTED BY PHYSICIAN 5 times daily 8/7/18   CHELSEA Bob CNP   Blood Glucose Monitoring Suppl BERE 1 Units by Does not apply route three times daily Unit insurance will cover 12/29/17   CHELSEA Santoyo CNP   fludrocortisone (FLORINEF) 0.1 MG tablet Take 2 tablets by mouth daily 8/1/17   Anabel De Leon MD   omeprazole (PRILOSEC) 20 MG delayed release capsule Take 1 capsule by mouth daily 5/9/17   Anabel De Leon MD   Blood Pressure Monitor KIT 1 each by Does not apply route daily as needed ESSENTIAL HYPERTENSION   I10 5/31/16   CHELSEA Bob CNP   Blood Glucose Monitoring Suppl (BLOOD GLUCOSE MONITOR KIT) KIT 1 each by Does not apply route daily. Please dispense [facility] BS [facility] kit [facility]. Dx: 250, new onset T2DM.  Testing once daily 2/21/12 3/11/19  Lilia Ganser, APRN - CNP       Future Appointments   Date Time Provider Emma Gaxiola   6/5/2019 12:20 PM CHELSEA Bob CNP [facility]   9/12/2019  1:20 PM Nilsa Riedel, MD Neuro Spec [facility]   10/23/2019  1:40 PM Anabel De Leon MD [facility]

## 2019-05-22 ENCOUNTER — CARE COORDINATION (OUTPATIENT)
Dept: CARE COORDINATION | Age: 54
End: 2019-05-22

## 2019-05-22 NOTE — CARE COORDINATION
Topic Date Due    Shingles Vaccine (1 of 2) 02/13/2015    Diabetic microalbuminuria test  03/08/2019    A1C test (Diabetic or Prediabetic)  06/05/2019         Care Coordination Interventions    Program Enrollment:  Complex Care  Referral from Primary Care Provider:  No  Suggested Interventions and Community Resources  Diabetes Education:  Declined (Comment: States completed 3 years ago)  Fall Risk Prevention: In Process  Registered Dietician:  Completed (Comment: Refer to Dayana Simons 3-5-19)  Zone Management Tools: In Process (Comment: Sent DM, COPD zones 3/5/19)         Goals Addressed     None          Prior to Admission medications    Medication Sig Start Date End Date Taking?  Authorizing Provider   Insulin Degludec (TRESIBA FLEXTOUCH) 200 UNIT/ML SOPN Inject 70 Units into the skin nightly 4/10/19   Nadia Deem Might, APRN - CNP   FREESTYLE LITE strip USE TO CHECK BLOOD SUGARS 3 TIMES DAILY AND AS NEEDED FOR DIABETES E11.40 3/25/19   Nadia Deem Might, APRN - CNP   aspirin 81 MG EC tablet TAKE 1 TABLET BY MOUTH DAILY 3/18/19   Nadia Deem Might, APRN - CNP   DULoxetine (CYMBALTA) 60 MG extended release capsule Take 1 capsule by mouth daily 3/11/19   Hermelinda Freedman MD   gabapentin (NEURONTIN) 800 MG tablet TAKE 1 TABLET BY MOUTH 3 TIMES A DAY. 3/11/19 3/10/20  Hermelinda Freedman MD   metoprolol succinate (TOPROL XL) 100 MG extended release tablet Take 1 tablet by mouth daily 3/4/19   Kristina Craft MD   atorvastatin (LIPITOR) 40 MG tablet TAKE 1 TABLET BY MOUTH DAILY 1/7/19   Nadia Deem Might, APRN - CNP   losartan (COZAAR) 25 MG tablet TAKE 1 TABLET BY MOUTH DAILY 1/7/19   Nadia Deem Might, APRN - CNP   escitalopram (LEXAPRO) 10 MG tablet TAKE 1 TABLET BY MOUTH EVERY DAY 1/7/19   Nadia Deem Might, APRN - CNP   insulin aspart (NOVOLOG FLEXPEN) 100 UNIT/ML injection pen Inject 8 Units into the skin 3 times daily (before meals)  Patient taking differently: Inject 10 Units into the skin 3 times daily (before meals) States takes 15 units breakfast, 20 units lunch, 25 units with dinner and Sliding Scale 1/7/19   CHELSEA Fish CNP   albuterol sulfate HFA (VENTOLIN HFA) 108 (90 Base) MCG/ACT inhaler INHALE 2 PUFFS INTO THE LUNGS EVERY 6 HOURS AS NEEDED FOR WHEEZING. 8/7/18   CHELSEA Fish CNP   alogliptin-metformin 12.5-1000 MG TABS Take 1 tablet by mouth 2 times daily 8/7/18   CHELSEA Fish CNP   Insulin Pen Needle (BD ULTRA-FINE PEN NEEDLES) 29G X 12.7MM MISC USE AS DIRECTED BY PHYSICIAN 5 times daily 8/7/18   CHELSEA Fish CNP   Blood Glucose Monitoring Suppl BERE 1 Units by Does not apply route three times daily Unit insurance will cover 12/29/17   Liz Alcaraz CHELSEA Driscoll CNP   fludrocortisone (FLORINEF) 0.1 MG tablet Take 2 tablets by mouth daily 8/1/17   Margene Klinefelter, MD   omeprazole (PRILOSEC) 20 MG delayed release capsule Take 1 capsule by mouth daily 5/9/17   Margene Klinefelter, MD   Blood Pressure Monitor KIT 1 each by Does not apply route daily as needed ESSENTIAL HYPERTENSION   I10 5/31/16   CHELSEA Fish CNP   Blood Glucose Monitoring Suppl (BLOOD GLUCOSE MONITOR KIT) KIT 1 each by Does not apply route daily. Please dispense whatever BS monitoring kit [facility] covers. Dx: 250, new onset T2DM.  Testing once daily 2/21/12 3/11/19  CHELSEA Momin CNP       Future Appointments   Date Time Provider Emma Gaxiola   6/5/2019 12:20 PM CHELSEA Fish CNP [facility]   9/12/2019  1:20 PM Hector Hale MD Neuro Spec [facility]   10/23/2019  1:40 PM Margene Klinefelter, MD [facility]

## 2019-05-28 RX ORDER — BLOOD-GLUCOSE METER
KIT MISCELLANEOUS
Qty: 100 STRIP | Refills: 1 | Status: SHIPPED | OUTPATIENT
Start: 2019-05-28 | End: 2019-07-26 | Stop reason: SDUPTHER

## 2019-05-30 ENCOUNTER — TELEPHONE (OUTPATIENT)
Dept: PRIMARY CARE CLINIC | Age: 54
End: 2019-05-30

## 2019-05-30 NOTE — TELEPHONE ENCOUNTER
Attempted to call patient regarding need to get fasting labs done for upcoming appt. Instructed to call this office with any questions.

## 2019-06-04 ENCOUNTER — HOSPITAL ENCOUNTER (OUTPATIENT)
Age: 54
Discharge: HOME | End: 2019-06-04
Payer: COMMERCIAL

## 2019-06-04 DIAGNOSIS — E11.65 UNCONTROLLED TYPE 2 DIABETES MELLITUS WITH HYPERGLYCEMIA (HCC): ICD-10-CM

## 2019-06-04 LAB
ABSOLUTE EOS #: 0.18 K/UL (ref 0–0.44)
ABSOLUTE IMMATURE GRANULOCYTE: 0.06 K/UL (ref 0–0.3)
ABSOLUTE LYMPH #: 2.18 K/UL (ref 1.1–3.7)
ABSOLUTE MONO #: 0.56 K/UL (ref 0.1–1.2)
ALBUMIN SERPL-MCNC: 3.6 G/DL (ref 3.5–5.2)
ALBUMIN/GLOBULIN RATIO: 0.8 (ref 1–2.5)
ALP BLD-CCNC: 107 U/L (ref 35–104)
ALT SERPL-CCNC: 14 U/L (ref 5–33)
ANION GAP SERPL CALCULATED.3IONS-SCNC: 16 MMOL/L (ref 9–17)
AST SERPL-CCNC: 11 U/L
BASOPHILS # BLD: 1 % (ref 0–2)
BASOPHILS ABSOLUTE: 0.09 K/UL (ref 0–0.2)
BILIRUB SERPL-MCNC: 0.41 MG/DL (ref 0.3–1.2)
BUN BLDV-MCNC: 13 MG/DL (ref 6–20)
BUN/CREAT BLD: 21 (ref 9–20)
CALCIUM SERPL-MCNC: 9.4 MG/DL (ref 8.6–10.4)
CHLORIDE BLD-SCNC: 96 MMOL/L (ref 98–107)
CHOLESTEROL/HDL RATIO: 3.6
CHOLESTEROL: 161 MG/DL
CO2: 22 MMOL/L (ref 20–31)
CREAT SERPL-MCNC: 0.63 MG/DL (ref 0.5–0.9)
CREATININE URINE: 51.6 MG/DL (ref 28–217)
DIFFERENTIAL TYPE: ABNORMAL
EOSINOPHILS RELATIVE PERCENT: 2 % (ref 1–4)
ESTIMATED AVERAGE GLUCOSE: 318 MG/DL
GFR AFRICAN AMERICAN: >60 ML/MIN
GFR NON-AFRICAN AMERICAN: >60 ML/MIN
GFR SERPL CREATININE-BSD FRML MDRD: ABNORMAL ML/MIN/{1.73_M2}
GFR SERPL CREATININE-BSD FRML MDRD: ABNORMAL ML/MIN/{1.73_M2}
GLUCOSE BLD-MCNC: 400 MG/DL (ref 70–99)
HBA1C MFR BLD: 12.7 % (ref 4.8–5.9)
HCT VFR BLD CALC: 43.8 % (ref 36.3–47.1)
HDLC SERPL-MCNC: 45 MG/DL
HEMOGLOBIN: 14.3 G/DL (ref 11.9–15.1)
IMMATURE GRANULOCYTES: 1 %
LDL CHOLESTEROL: 60 MG/DL (ref 0–130)
LYMPHOCYTES # BLD: 21 % (ref 24–43)
MCH RBC QN AUTO: 26.6 PG (ref 25.2–33.5)
MCHC RBC AUTO-ENTMCNC: 32.6 G/DL (ref 28.4–34.8)
MCV RBC AUTO: 81.4 FL (ref 82.6–102.9)
MICROALBUMIN/CREAT 24H UR: 50 MG/L
MICROALBUMIN/CREAT UR-RTO: 97 MCG/MG CREAT
MONOCYTES # BLD: 6 % (ref 3–12)
NRBC AUTOMATED: 0 PER 100 WBC
PDW BLD-RTO: 14 % (ref 11.8–14.4)
PLATELET # BLD: 311 K/UL (ref 138–453)
PLATELET ESTIMATE: ABNORMAL
PMV BLD AUTO: 10 FL (ref 8.1–13.5)
POTASSIUM SERPL-SCNC: 4.7 MMOL/L (ref 3.7–5.3)
RBC # BLD: 5.38 M/UL (ref 3.95–5.11)
RBC # BLD: ABNORMAL 10*6/UL
SEG NEUTROPHILS: 69 % (ref 36–65)
SEGMENTED NEUTROPHILS ABSOLUTE COUNT: 7.13 K/UL (ref 1.5–8.1)
SODIUM BLD-SCNC: 134 MMOL/L (ref 135–144)
TOTAL PROTEIN: 8.1 G/DL (ref 6.4–8.3)
TRIGL SERPL-MCNC: 279 MG/DL
VLDLC SERPL CALC-MCNC: ABNORMAL MG/DL (ref 1–30)
WBC # BLD: 10.2 K/UL (ref 3.5–11.3)
WBC # BLD: ABNORMAL 10*3/UL

## 2019-06-04 PROCEDURE — 80053 COMPREHEN METABOLIC PANEL: CPT

## 2019-06-04 PROCEDURE — 82043 UR ALBUMIN QUANTITATIVE: CPT

## 2019-06-04 PROCEDURE — 83036 HEMOGLOBIN GLYCOSYLATED A1C: CPT

## 2019-06-04 PROCEDURE — 36415 COLL VENOUS BLD VENIPUNCTURE: CPT

## 2019-06-04 PROCEDURE — 85025 COMPLETE CBC W/AUTO DIFF WBC: CPT

## 2019-06-04 PROCEDURE — 80061 LIPID PANEL: CPT

## 2019-06-04 PROCEDURE — 82570 ASSAY OF URINE CREATININE: CPT

## 2019-06-05 ENCOUNTER — CARE COORDINATION (OUTPATIENT)
Dept: CARE COORDINATION | Age: 54
End: 2019-06-05

## 2019-06-05 NOTE — CARE COORDINATION
Nutrition Care Coordinator Follow-Up visit:    Food Recall: eating 2-3 meals/day    Activity Level:  Sedentary: X  Lightly Active: Moderately Active:  Very Active:    Adult BMI:  Underweight (below 18.5)  Normal Weight (18.5-24.9)  Overweight (25-29. 9)  Obese (30-39. 9) X  Morbidly Obese (>40)    Weight Change: no change    Plan:  Plan was established with patient:  Increase dietary fiber by consuming whole grains, fruits and vegetables: X  Limit dietary cholesterol to >200mg/day: Increase water intake:  Avoid added sugar: X  Avoid sweetened beverages: X  Choose lean meats: X      Monitoring: Will monitor weight:  Will monitor adherence to meal plan: Will monitor adherence to exercise plan: Will monitor HGA1c: X    Handouts Provided :  Low Carb snacking:  Carb counting /individual meal plan:  Portion Control:  Food Labels:  Physical Activity:  Low Fat/Cholesterol:  Hypo/Hyperglycemia:  Calorie Controlled Meal Plan:  Plate method: X    Goals: Increase water consumption to 8oz. 6-8 times daily:  Manage blood sugars by consuming 3 meals spaced every 4-5 hours with 2-3 snacks daily: reviewed  Increase fiber and decrease fat intake by consuming 1-2 fruit servings and 2-3 vegetable servings per day. Increase physical activity by:  Consume less than 2,000mg of sodium/day  Avoid consumption of sweetened beverages and added sugar by reading food labels: reviewed  Monitor blood sugars by using meter to check blood glucose before morning meal and 2 hours after a meal daily: BS still elevated 200-300 per patient, has appointment with new endocrinologist on June 10th. Decrease risk of coronary heart disease by consuming fish that contains omega-3 fatty acids at least twice a week, avoiding partially hydrogenated oil/trans fats and limiting saturated fat intake by reading food labels: reviewed    Patient goals set:  1. Reviewed using measuring cups or hand to estimate a serving size.  Goal is <1c. of carbohydrate/meal, 45gms of carb/meal, 15gms/snack. 2. Patient continues to skip breakfast some days.  Discussed quick/easy breakfast ideas. Timing and spacing of meals reviewed.  Goal is 3 meals daily spaced every 4-5 hours. 3. Reviewed zero calorie beverages, patient relays she has cut out sugary drinks. 4. Reviewed using plate method at meals. Goal is 1/2 of plate to be non-starchy vegetables, 1/4 lean protein, 1/4 carbs. Reviewed foods from each category along with serving size. Patient relays seeing a new endocrinologist on June 10th due to continued high sugars. Encouraged patient to work on lifestyle changes. Will follow up in 3-4 weeks to review and answer questions.     Nat Low

## 2019-06-12 ENCOUNTER — OFFICE VISIT (OUTPATIENT)
Dept: PRIMARY CARE CLINIC | Age: 54
End: 2019-06-12
Payer: COMMERCIAL

## 2019-06-12 VITALS
HEART RATE: 78 BPM | TEMPERATURE: 97.2 F | WEIGHT: 225.6 LBS | SYSTOLIC BLOOD PRESSURE: 110 MMHG | DIASTOLIC BLOOD PRESSURE: 76 MMHG | HEIGHT: 63 IN | RESPIRATION RATE: 16 BRPM | BODY MASS INDEX: 39.97 KG/M2

## 2019-06-12 DIAGNOSIS — E11.65 UNCONTROLLED TYPE 2 DIABETES MELLITUS WITH HYPERGLYCEMIA (HCC): Primary | ICD-10-CM

## 2019-06-12 DIAGNOSIS — I25.810 CORONARY ARTERY DISEASE INVOLVING CORONARY BYPASS GRAFT OF NATIVE HEART WITHOUT ANGINA PECTORIS: ICD-10-CM

## 2019-06-12 DIAGNOSIS — F51.01 PRIMARY INSOMNIA: ICD-10-CM

## 2019-06-12 PROCEDURE — 3017F COLORECTAL CA SCREEN DOC REV: CPT | Performed by: NURSE PRACTITIONER

## 2019-06-12 PROCEDURE — G8417 CALC BMI ABV UP PARAM F/U: HCPCS | Performed by: NURSE PRACTITIONER

## 2019-06-12 PROCEDURE — 1036F TOBACCO NON-USER: CPT | Performed by: NURSE PRACTITIONER

## 2019-06-12 PROCEDURE — G8427 DOCREV CUR MEDS BY ELIG CLIN: HCPCS | Performed by: NURSE PRACTITIONER

## 2019-06-12 PROCEDURE — G8598 ASA/ANTIPLAT THER USED: HCPCS | Performed by: NURSE PRACTITIONER

## 2019-06-12 PROCEDURE — 2022F DILAT RTA XM EVC RTNOPTHY: CPT | Performed by: NURSE PRACTITIONER

## 2019-06-12 PROCEDURE — 99214 OFFICE O/P EST MOD 30 MIN: CPT | Performed by: NURSE PRACTITIONER

## 2019-06-12 PROCEDURE — 3046F HEMOGLOBIN A1C LEVEL >9.0%: CPT | Performed by: NURSE PRACTITIONER

## 2019-06-12 RX ORDER — TRAZODONE HYDROCHLORIDE 50 MG/1
50 TABLET ORAL NIGHTLY
Qty: 90 TABLET | Refills: 1 | Status: SHIPPED | OUTPATIENT
Start: 2019-06-12 | End: 2019-12-11 | Stop reason: SDUPTHER

## 2019-06-12 ASSESSMENT — PATIENT HEALTH QUESTIONNAIRE - PHQ9
SUM OF ALL RESPONSES TO PHQ QUESTIONS 1-9: 1
SUM OF ALL RESPONSES TO PHQ9 QUESTIONS 1 & 2: 1
SUM OF ALL RESPONSES TO PHQ QUESTIONS 1-9: 1
2. FEELING DOWN, DEPRESSED OR HOPELESS: 1
1. LITTLE INTEREST OR PLEASURE IN DOING THINGS: 0

## 2019-06-12 ASSESSMENT — ENCOUNTER SYMPTOMS
SHORTNESS OF BREATH: 0
DIFFICULTY BREATHING: 0
FREQUENT THROAT CLEARING: 0
SORE THROAT: 0
VISUAL CHANGE: 0
COUGH: 1
WHEEZING: 0
CHEST TIGHTNESS: 0
HOARSE VOICE: 0
VOMITING: 0
HEMOPTYSIS: 0
SPUTUM PRODUCTION: 0
DIARRHEA: 0
ABDOMINAL PAIN: 0
CONSTIPATION: 0
RHINORRHEA: 0
NAUSEA: 0

## 2019-06-12 ASSESSMENT — COPD QUESTIONNAIRES: COPD: 1

## 2019-06-12 NOTE — PROGRESS NOTES
Name: Aleida Vargas  : 1965         Chief Complaint:     Chief Complaint   Patient presents with    Diabetes     Routine office visit. States her blood sugar today was 304. States \"I have an appt with Dr. Ranjan Mooney on Monday. \"     Coronary Artery Disease    Insomnia       History of Present Illness:      Aleida Vargas is a 47 y.o.  female who presents with Diabetes (Routine office visit. States her blood sugar today was 304. States \"I have an appt with Dr. Ranjan Mooney on Monday. \" ); Coronary Artery Disease; and Insomnia      Beatris Landau is here today for a routine office visit. Primary insomnia-patient states she has had trouble sleeping for quite some time. Patient states she has trouble falling asleep and staying asleep. Patient states she is only been sleeping approximately 3 hours per night. Diabetes- slightly improved, patient has appointment on Monday with her endocrinologist.  She is encouraged to discuss her continually elevated fasting sugars. Diabetes   She presents for her follow-up diabetic visit. She has type 2 diabetes mellitus. Her disease course has been stable. There are no hypoglycemic associated symptoms. Pertinent negatives for hypoglycemia include no dizziness or headaches. Associated symptoms include foot paresthesias, polydipsia and polyuria. Pertinent negatives for diabetes include no chest pain, no fatigue, no polyphagia, no visual change and no weakness. There are no hypoglycemic complications. Symptoms are worsening. Diabetic complications include heart disease and peripheral neuropathy. Pertinent negatives for diabetic complications include no CVA or nephropathy. Risk factors for coronary artery disease include diabetes mellitus, obesity and sedentary lifestyle. Current diabetic treatment includes insulin injections and oral agent (dual therapy). She is compliant with treatment all of the time. Her weight is stable. She is following a generally healthy diet.  Meal planning condition.  History of cardiovascular stress test 02/13/2019    Abnormal. Small/moderate perfusion defect of mild/moderate intesity in the anterior and anterolateral regions during stress imaging which is most consistent w/ ischemia but may be artifact. Overall low/intermediate risk for significant CAD.  History of echocardiogram 09/06/2018    EF >60%. Inferoseptal wall is abnormal in its motion which is not unusual s/p open heart. Evidence of mild (grade I) diastolic dysfunction seen.  Hx of blood clots     Hyperlipidemia     Hypertension     Intermittent claudication (HCC)     Kidney stones     Movement disorder     neuropathy in legs    Neuromuscular disorder (HCC)     neuropathy    Osteoarthritis     Reflux     Seizures (HCC)     last over 10 yr ago    Stented coronary artery 9/22/2010     LAD/[facility]    Stented coronary artery 3/31/16    RCA/[facility]    Type II or unspecified type diabetes mellitus without mention of complication, not stated as uncontrolled     Unspecified sleep apnea     no machine      Reviewed all health maintenance requirements and ordered appropriate tests  There are no preventive care reminders to display for this patient.     Past Surgical History:     Past Surgical History:   Procedure Laterality Date    ABDOMINAL HERNIA REPAIR  1-2016    repair done [place]    APPENDECTOMY      CARDIAC CATHETERIZATION Left 4/26/2016    [facility] radial/ [facility]/ Dr Jennifer Sahu Left 03/07/2019    Left Radial/[facility]/    CARDIAC SURGERY      CHOLECYSTECTOMY  1991    COLONOSCOPY  12/16/14    -hemorrhoids,bx    CORONARY ANGIOPLASTY WITH STENT PLACEMENT  9/2010    CORONARY ANGIOPLASTY WITH STENT PLACEMENT  3-    [facility] RCA / DR Dawson Beverly    CORONARY ARTERY BYPASS GRAFT  08/15/2016    OP X 1- Dr Jaida Mack, COLON, DIAGNOSTIC  12/16/2014    HERNIA REPAIR  12/13/12    at the medial aspect of a [place] RUQ scar); [id number]   02/16/2015    incisional, recurrent    HERNIA REPAIR  02/2016    HYSTERECTOMY      OTHER SURGICAL HISTORY  10/8/2015    abd wound washout, mesh removal, wound vac placement    TN SUCT NICOLE LIPECTOMY,HEAD/NECK Left 8/27/2018    THIGH LESION BIOPSY EXCISION, SOFT TISSUE MASS performed by Narcisa Siddiqui MD at [facility] Left 2018    leg    UPPP UVULOPALATOPHARYGOPLASTY  06/26/2012    VENTRAL HERNIA REPAIR  09/21/2015    With [facility] - Dr Dalia Weiss        Medications:       Prior to Admission medications    Medication Sig Start Date End Date Taking? Authorizing Provider   traZODone (DESYREL) 50 MG tablet Take 1 tablet by mouth nightly 6/12/19  Yes Belen Plasencia APRN - CNP   FREESTYLE LITE strip USE TO CHECK BLOOD SUGARS 3 TIMES DAILY AND AS NEEDED FOR DIABETES E11.40 5/28/19  Yes Belen Plasencia, APRN - CNP   insulin aspart (NOVOLOG FLEXPEN) 100 UNIT/ML injection pen Inject 10 Units into the skin 3 times daily (before meals)  Patient taking differently: Inject 10 Units into the skin 3 times daily (before meals) Sliding scale, 10, 12 and 15 units daily.  5/28/19  Yes Belen Plasencia APRN - ANGELIKA   Insulin Degludec (TRESIBA FLEXTOUCH) 200 UNIT/ML SOPN Inject 70 Units into the skin nightly 4/10/19  Yes Belen Plasencia, APRN - CNP   aspirin 81 MG EC tablet TAKE 1 TABLET BY MOUTH DAILY 3/18/19  Yes Belen Plasencia, APRN - CNP   DULoxetine (CYMBALTA) 60 MG extended release capsule Take 1 capsule by mouth daily 3/11/19  Yes Nilsa Riedel, MD   gabapentin (NEURONTIN) 800 MG tablet TAKE 1 TABLET BY MOUTH 3 TIMES A DAY. 3/11/19 3/10/20 Yes Nilsa Riedel, MD   metoprolol succinate (TOPROL XL) 100 MG extended release tablet Take 1 tablet by mouth daily 3/4/19  Yes Anabel De Leon MD   atorvastatin (LIPITOR) 40 MG tablet TAKE 1 TABLET BY MOUTH DAILY 1/7/19  Yes Belen Plasencia, APRN - CNP   losartan (COZAAR) 25 MG tablet TAKE 1 TABLET BY MOUTH DAILY 1/7/19  Yes Carlene Ray CHELSEA Vann CNP   escitalopram (LEXAPRO) 10 MG tablet TAKE 1 TABLET BY MOUTH EVERY DAY 1/7/19  Yes CHELSEA Armendariz CNP   albuterol sulfate HFA (VENTOLIN HFA) 108 (90 Base) MCG/ACT inhaler INHALE 2 PUFFS INTO THE LUNGS EVERY 6 HOURS AS NEEDED FOR WHEEZING. 8/7/18  Yes CHELSEA Armendariz CNP   alogliptin-metformin 12.5-1000 MG TABS Take 1 tablet by mouth 2 times daily 8/7/18  Yes CHELSEA Armendariz CNP   Insulin Pen Needle (BD ULTRA-FINE PEN NEEDLES) 29G X 12.7MM MISC USE AS DIRECTED BY PHYSICIAN 5 times daily 8/7/18  Yes CHELSEA Armendariz CNP   Blood Glucose Monitoring Suppl BERE 1 Units by Does not apply route three times daily Unit insurance will cover 12/29/17  Yes CHELSEA Swanson CNP   fludrocortisone (FLORINEF) 0.1 MG tablet Take 2 tablets by mouth daily 8/1/17  Yes Edmund Mcgarry MD   omeprazole (PRILOSEC) 20 MG delayed release capsule Take 1 capsule by mouth daily 5/9/17  Yes Edmund Mcgarry MD   Blood Pressure Monitor KIT 1 each by Does not apply route daily as needed ESSENTIAL HYPERTENSION   I10 5/31/16  Yes CHELSEA Armendariz CNP   Blood Glucose Monitoring Suppl (BLOOD GLUCOSE MONITOR KIT) KIT 1 each by Does not apply route daily. Please dispense whatever BS monitoring kit [facility] covers. Dx: 250, new onset T2DM. Testing once daily 2/21/12 3/11/19  CHELSEA Castellanos CNP        Allergies:       Tape Zainab Spurling tape]    Social History:     Tobacco:    reports that she quit smoking about 8 years ago. Her smoking use included cigarettes. She has never used smokeless tobacco.  Alcohol:      reports that she does not drink alcohol. Drug Use:  reports that she does not use drugs.     Family History:     Family History   Problem Relation Age of Onset   [facility] Cancer Mother     Diabetes Mother     Cancer Father         thyroid    Diabetes Sister     Heart Disease Sister     Heart Disease Brother     Heart Disease Maternal Uncle     Cancer Maternal Grandmother        Review of Systems:     Positive and Negative as described in HPI    Review of Systems   Constitutional: Negative for appetite change, chills, fatigue, fever and weight gain. HENT: Positive for postnasal drip. Negative for congestion, hoarse voice, rhinorrhea, sneezing and sore throat. Eyes: Negative for visual disturbance. Respiratory: Positive for cough. Negative for hemoptysis, sputum production, chest tightness, shortness of breath and wheezing. Cardiovascular: Negative for chest pain, dyspnea on exertion, palpitations and leg swelling. Gastrointestinal: Negative for abdominal pain, constipation, diarrhea, nausea and vomiting. Endocrine: Positive for polydipsia and polyuria. Negative for polyphagia. Genitourinary: Negative for difficulty urinating and dysuria. Musculoskeletal: Negative for gait problem and muscle weakness. Skin: Negative for rash. Neurological: Negative for dizziness, syncope, weakness and headaches. Psychiatric/Behavioral: Positive for sleep disturbance. Physical Exam:   Vitals:  /76 (Site: Left Upper Arm, Position: Sitting, Cuff Size: Large Adult)   Pulse 78   Temp 97.2 °F (36.2 °C) (Temporal)   Resp 16   Ht 5' 3\" (1.6 m)   Wt 225 lb 9.6 oz (102.3 kg)   LMP 12/05/1996   BMI 39.96 kg/m²     Physical Exam   Constitutional: She is oriented to person, place, and time. She appears well-developed and well-nourished. No distress. Over nourished   HENT:   Mouth/Throat: Oropharynx is clear and moist.   Eyes: Conjunctivae are normal. No scleral icterus. Neck: Normal range of motion. Neck supple. Cardiovascular: Normal rate and regular rhythm. Pulmonary/Chest: Effort normal and breath sounds normal. She has no wheezes. She has no rales. Abdominal: Soft. Bowel sounds are normal. She exhibits no distension. There is no tenderness. Obese abdomen   Musculoskeletal: She exhibits no edema. Lymphadenopathy:     She has no cervical adenopathy.    Neurological: She is and health maintenance  6. Continue current medications, diet and exercise. Completed Refills   Requested Prescriptions     Signed Prescriptions Disp Refills    traZODone (DESYREL) 50 MG tablet 90 tablet 1     Sig: Take 1 tablet by mouth nightly         Return in about 6 months (around 12/12/2019) for check up.

## 2019-06-12 NOTE — PATIENT INSTRUCTIONS
SURVEY:    You may be receiving a survey from [employer] regarding your visit today. Please complete the survey to enable us to provide the highest quality of care to you and your family. If you cannot score us a very good on any question, please call the office to discuss how we could have made your experience a very good one. Thank you. Patient Education        Counting Carbohydrates: Care Instructions  Your Care Instructions    You don't have to eat special foods when you have diabetes. You just have to be careful to eat healthy foods. Carbohydrates (carbs) raise blood sugar higher and quicker than any other nutrient. Carbs are found in desserts, breads and cereals, and fruit. They're also in starchy vegetables. These include potatoes, corn, and grains such as rice and pasta. Carbs are also in milk and yogurt. The more carbs you eat at one time, the higher your blood sugar will rise. Spreading carbs all through the day helps keep your blood sugar levels within your target range. Counting carbs is one of the best ways to keep your blood sugar under control. If you use insulin, counting carbs helps you match the right amount of insulin to the number of grams of carbs in a meal. Then you can change your diet and insulin dose as needed. Testing your blood sugar several times a day can help you learn how carbs affect your blood sugar. A registered dietitian or certified diabetes educator can help you plan meals and snacks. Follow-up care is a key part of your treatment and safety. Be sure to make and go to all appointments, and call your doctor if you are having problems. It's also a good idea to know your test results and keep a list of the medicines you take. How can you care for yourself at home?   Know your daily amount of carbohydrates  Your daily amount depends on several things, such as your weight, how active you are, which diabetes medicines you take, and what your goals are for your blood sugar levels. A registered dietitian or certified diabetes educator can help you plan how many carbs to include in each meal and snack. For most adults, a guideline for the daily amount of carbs is:  · 45 to 60 grams at each meal. That's about the same as 3 to 4 carbohydrate servings. · 15 to 20 grams at each snack. That's about the same as 1 carbohydrate serving. Count carbs  Counting carbs lets you know how much rapid-acting insulin to take before you eat. If you use an insulin pump, you get a constant rate of insulin during the day. So the pump must be programmed at meals. This gives you extra insulin to cover the rise in blood sugar after meals. If you take insulin:  · Learn your own insulin-to-carb ratio. You and your diabetes health professional will figure out the ratio. You can do this by testing your blood sugar after meals. For example, you may need a certain amount of insulin for every 15 grams of carbs. · Add up the carb grams in a meal. Then you can figure out how many units of insulin to take based on your insulin-to-carb ratio. · Exercise lowers blood sugar. You can use less insulin than you would if you were not doing exercise. Keep in mind that timing matters. If you exercise within 1 hour after a meal, your body may need less insulin for that meal than it would if you exercised 3 hours after the meal. Test your blood sugar to find out how exercise affects your need for insulin. If you do or don't take insulin:  · Look at labels on packaged foods. This can tell you how many carbs are in a serving. You can also use guides from the American Diabetes Association. · Be aware of portions, or serving sizes. If a package has two servings and you eat the whole package, you need to double the number of grams of carbohydrate listed for one serving. · Protein, fat, and fiber do not raise blood sugar as much as carbs do.  If you eat a lot of these nutrients in a meal, your blood sugar will rise more

## 2019-06-21 ENCOUNTER — TELEPHONE (OUTPATIENT)
Dept: PRIMARY CARE CLINIC | Age: 54
End: 2019-06-21

## 2019-07-08 ENCOUNTER — CARE COORDINATION (OUTPATIENT)
Dept: CARE COORDINATION | Age: 54
End: 2019-07-08

## 2019-07-23 RX ORDER — DULOXETIN HYDROCHLORIDE 60 MG/1
CAPSULE, DELAYED RELEASE ORAL
Qty: 30 CAPSULE | Refills: 1 | Status: SHIPPED | OUTPATIENT
Start: 2019-07-23 | End: 2019-09-22 | Stop reason: SDUPTHER

## 2019-07-26 RX ORDER — BLOOD-GLUCOSE METER
KIT MISCELLANEOUS
Qty: 100 STRIP | Refills: 1 | Status: SHIPPED | OUTPATIENT
Start: 2019-07-26 | End: 2019-09-24 | Stop reason: SDUPTHER

## 2019-07-29 RX ORDER — INSULIN DEGLUDEC 200 U/ML
INJECTION, SOLUTION SUBCUTANEOUS
Qty: 12 PEN | Refills: 3 | Status: SHIPPED | OUTPATIENT
Start: 2019-07-29 | End: 2020-03-30

## 2019-07-29 NOTE — TELEPHONE ENCOUNTER
1:40 PM Barber Plasencia, APRN - CNP [facility]            Patient Active Problem List:     CAD (coronary artery disease)     Dyslipidemia     Radiculopathy     Diabetes type 2, uncontrolled     Insomnia     Allergic rhinitis     COPD (chronic obstructive pulmonary disease)     Depression     Bilateral leg weakness     Gait difficulty     Diabetic neuropathy     Back pain     Muscle spasm     Affective disorder (HCC)     Morbid obesity with BMI of 40.0-44.9, adult ([facility] [address]     History of ventral hernia repair     History of incisional hernia repair     Essential hypertension     Gastroesophageal reflux disease without esophagitis     Primary osteoarthritis involving multiple joints     Heart palpitations     Dysautonomia orthostatic hypotension syndrome (HCC)     Coronary artery disease involving native coronary artery of native heart     Angina, class III (HCC)     S/P CABG x 1     Angina, class I (HCC)     Angina pectoris (HCC)     Precordial pain     DARON (obstructive sleep apnea)     Morbid obesity (HCC)     Neuropathic pain     Soft tissue mass     Dysautonomia (HCC)     Abnormal cardiovascular stress test

## 2019-08-05 ENCOUNTER — CARE COORDINATION (OUTPATIENT)
Dept: CARE COORDINATION | Age: 54
End: 2019-08-05

## 2019-08-22 ENCOUNTER — CARE COORDINATION (OUTPATIENT)
Dept: CARE COORDINATION | Age: 54
End: 2019-08-22

## 2019-08-26 RX ORDER — ALOGLIPTIN AND METFORMIN HYDROCHLORIDE 12.5; 1 MG/1; MG/1
TABLET, FILM COATED ORAL
Qty: 60 TABLET | Refills: 11 | Status: SHIPPED | OUTPATIENT
Start: 2019-08-26 | End: 2019-12-12

## 2019-08-26 NOTE — TELEPHONE ENCOUNTER
Health Maintenance   Topic Date Due    A1C test (Diabetic or Prediabetic)  09/04/2019    Hepatitis B Vaccine (1 of 3 - Risk 3-dose series) 03/05/2020 (Originally 2/13/1984)    DTaP/Tdap/Td vaccine (1 - Tdap) 03/05/2020 (Originally 2/13/1984)    Shingles Vaccine (1 of 2) 06/12/2020 (Originally 2/13/2015)    Flu vaccine (1) 09/01/2019    Diabetic foot exam  11/15/2019    Breast cancer screen  01/10/2020    Diabetic retinal exam  04/25/2020    Diabetic microalbuminuria test  06/04/2020    Lipid screen  06/04/2020    Potassium monitoring  06/04/2020    Creatinine monitoring  06/04/2020    Colon cancer screen colonoscopy  01/27/2026    Pneumococcal 0-64 years Vaccine  Completed    Hepatitis C screen  Completed    HIV screen  Completed             (applicable per patient's age: Cancer Screenings, Depression Screening, Fall Risk Screening, Immunizations)    Hemoglobin A1C (%)   Date Value   06/04/2019 12.7 (H)   03/05/2019 13.6   11/15/2018 13.9     Microalb/Crt.  Ratio (mcg/mg creat)   Date Value   06/04/2019 97 (H)     LDL Cholesterol (mg/dL)   Date Value   06/04/2019 60     AST (U/L)   Date Value   06/04/2019 11     ALT (U/L)   Date Value   06/04/2019 14     BUN (mg/dL)   Date Value   06/04/2019 13      (goal A1C is < 7)   (goal LDL is <100) need 30-50% reduction from baseline     BP Readings from Last 3 Encounters:   06/12/19 110/76   04/03/19 101/63   03/20/19 100/81    (goal /80)      All Future Testing planned in [facility]:  Lab Frequency Next Occurrence   Referral to Cardiac Cath Once 03/07/2019   CBC Auto Differential Once 12/09/2019   Comprehensive Metabolic Panel Once 20/52/1492   Lipid Panel Once 12/09/2019   Microalbumin, Ur Once 12/09/2019   Hemoglobin A1C Once 12/09/2019       Next Visit Date:  Future Appointments   Date Time Provider Emma Gaxiola   9/12/2019  1:20 PM Kimberly Sommer MD [facility]   10/23/2019  1:40 PM Berkley Wilkins MD [facility]   12/12/2019 1:40 PM Jeanie Plasencia, APRN - CNP [facility]            Patient Active Problem List:     CAD (coronary artery disease)     Dyslipidemia     Radiculopathy     Diabetes type 2, uncontrolled     Insomnia     Allergic rhinitis     COPD (chronic obstructive pulmonary disease)     Depression     Bilateral leg weakness     Gait difficulty     Diabetic neuropathy     Back pain     Muscle spasm     Affective disorder (HCC)     Morbid obesity with BMI of 40.0-44.9, adult ([facility] [address]     History of ventral hernia repair     History of incisional hernia repair     Essential hypertension     Gastroesophageal reflux disease without esophagitis     Primary osteoarthritis involving multiple joints     Heart palpitations     Dysautonomia orthostatic hypotension syndrome (HCC)     Coronary artery disease involving native coronary artery of native heart     Angina, class III (HCC)     S/P CABG x 1     Angina, class I (HCC)     Angina pectoris (HCC)     Precordial pain     DARON (obstructive sleep apnea)     Morbid obesity (HCC)     Neuropathic pain     Soft tissue mass     Dysautonomia (HCC)     Abnormal cardiovascular stress test

## 2019-09-02 ENCOUNTER — HOSPITAL ENCOUNTER (EMERGENCY)
Age: 54
Discharge: HOME | End: 2019-09-02
Payer: COMMERCIAL

## 2019-09-02 VITALS
HEIGHT: 63 IN | TEMPERATURE: 96.6 F | HEART RATE: 105 BPM | OXYGEN SATURATION: 95 % | SYSTOLIC BLOOD PRESSURE: 113 MMHG | DIASTOLIC BLOOD PRESSURE: 80 MMHG | BODY MASS INDEX: 42.17 KG/M2 | WEIGHT: 238 LBS | RESPIRATION RATE: 16 BRPM

## 2019-09-02 DIAGNOSIS — H04.301 INFECTION OF RIGHT TEAR DUCT: Primary | ICD-10-CM

## 2019-09-02 PROCEDURE — 99282 EMERGENCY DEPT VISIT SF MDM: CPT

## 2019-09-02 RX ORDER — TETRACAINE HYDROCHLORIDE 5 MG/ML
1 SOLUTION OPHTHALMIC ONCE
Status: DISCONTINUED | OUTPATIENT
Start: 2019-09-02 | End: 2019-09-02 | Stop reason: HOSPADM

## 2019-09-02 RX ORDER — ERYTHROMYCIN 5 MG/G
1 OINTMENT OPHTHALMIC EVERY 6 HOURS
Qty: 3.5 G | Refills: 0 | Status: SHIPPED | OUTPATIENT
Start: 2019-09-02 | End: 2019-09-09

## 2019-09-02 ASSESSMENT — PAIN DESCRIPTION - ORIENTATION: ORIENTATION: RIGHT

## 2019-09-02 ASSESSMENT — PAIN DESCRIPTION - LOCATION: LOCATION: EYE

## 2019-09-02 ASSESSMENT — PAIN DESCRIPTION - DESCRIPTORS: DESCRIPTORS: BURNING

## 2019-09-02 ASSESSMENT — VISUAL ACUITY
OU: 20/50
OS: 20/50
OD: 20/160

## 2019-09-02 ASSESSMENT — PAIN SCALES - GENERAL: PAINLEVEL_OUTOF10: 5

## 2019-09-02 ASSESSMENT — PAIN DESCRIPTION - PAIN TYPE: TYPE: ACUTE PAIN;CHRONIC PAIN

## 2019-09-02 NOTE — ED PROVIDER NOTES
[facility] ED  EMERGENCY DEPARTMENT ENCOUNTER      Pt Name: Carlos Roland  MRN: [id number]  [place] 1965  Date of evaluation: 9/2/2019  Provider: Donavon Curtis PA-C    CHIEF COMPLAINT       Chief Complaint   Patient presents with    Eye Pain     Right, onset yesterday. History of infection to right eye. Pt states she is partially blind in that eye normally       HISTORY OF PRESENT ILLNESS    Carlos Roland is a 47 y.o. female who presents to the emergency department from home noted pain and swelling on the medial right lower eyelid yesterday. Patient is partially blind in that eye had and is noted no additional visual changes. She does get chronic eye infections but states that this seems more inflamed on the medial aspect. Triage notes and Nursing notes were reviewed by myself. Any discrepancies are addressed above. PAST MEDICAL HISTORY     Past Medical History:   Diagnosis Date    Anxiety     Asthma     CAD (coronary artery disease)     Clinical trial participant at discharge 3/31/16    COPD [facility] obstructive pulmonary [facility]) [facility] [address]     Depression     Diabetic neuropathy ([facility] [address]     H/O cardiac catheterization 4/26/16    LMCA: Mild Irregularities 10-20%. LAD: Lesion on Prox LAD: Proximal subsection. 100% stenosis. LCx: Mild irregularities 10-20%. RCA: Mild irregularities 10-20%. Widely patent mid RCA stent. EF:60%.  H/O cardiovascular stress test 10/07/2016    Overall results are most consistent with a low risk for significant CAD.     H/O echocardiogram 10/05/2016    EF:>60%. Inferoseptal wall abnormal in its motion which is not unusal status post open heart surgery. Evidence of mild (grade I) diastolic dysfunction is seen.  H/O tilt table evaluation 07/12/2016    Abnormal. PTs HR, Blood Pressure response and symptoms were most consistent with dysautonomia.  Combined w/viligant maintenance of euvolemia and maintaining a moderate salt intake, pharmaciologic 2010     Years since quittin.9    Smokeless tobacco: Never Used   Substance and Sexual Activity    Alcohol use: No    Drug use: No    Sexual activity: Yes     Partners: Male   Lifestyle    Physical activity:     Days per week: None     Minutes per session: None    Stress: None   Relationships    Social connections:     Talks on phone: None     Gets together: None     Attends Taoist service: None     Active member of club or organization: None     Attends meetings of clubs or organizations: None     Relationship status: None    Intimate partner violence:     Fear of current or ex partner: None     Emotionally abused: None     Physically abused: None     Forced sexual activity: None   Other Topics Concern    None   Social History Narrative    None       REVIEW OF SYSTEMS     Review of Systems    Except as noted above the remainder of the review of systems was reviewed and is negative. SCREENINGS           PHYSICAL EXAM    (up to 7 for level 4, 8 or more for level 5)     ED Triage Vitals [19 1103]   BP Temp Temp Source Pulse Resp SpO2 Height Weight   (!) 139/100 96.6 °F (35.9 °C) Temporal 102 16 95 % 5' 3\" (1.6 m) 238 lb (108 kg)       Physical Exam  Active and oriented ×3. Nontoxic. No acute distress. Well-hydrated. Head is atraumatic, facies symmetrical.  Pupils equal round and reactive to light, extraocular movements are intact with a disconjugate gaze noted on the right that is chronic, anterior chambers are clear bilaterally. No foreign bodies noted under eyelid. Fluorescein dye negative for uptake on right eye. Right inferior proximal tear duct is inflamed with mild erythema noted. Respirations nonlabored. Skin free of any obvious rashes or lesions. Extremities without edema. Good affect. Pleasant patient.     DIAGNOSTIC RESULTS     none    EMERGENCY DEPARTMENT COURSE andMedical Decision Making:     Vitals:    Vitals:    19 1103 19 1148   BP: (!) 139/100

## 2019-09-03 ENCOUNTER — TELEPHONE (OUTPATIENT)
Dept: PRIMARY CARE CLINIC | Age: 54
End: 2019-09-03

## 2019-09-03 NOTE — TELEPHONE ENCOUNTER
[facility] Transitions Initial Follow Up Call    Outreach made within 2 business days of discharge: Yes    Patient: Hannah Tamayo Patient : 1965   MRN: [id number]  Reason for Admission: There are no discharge diagnoses documented for the most recent discharge. Discharge Date: 19       Spoke with: 9/3/18 @ 11:57am Called and left message for pt to call our office re: recent ER visit. cf  9/3/19 @ 1:39pm Called and left message for pt to call our office re: recent ER visit. cf      Discharge department/facility: [facility] ER    TCM Interactive Patient Contact:  Was patient able to fill all prescriptions:   Was patient instructed to bring all medications to the follow-up visit:   Is patient taking all medications as directed in the discharge summary?    Does patient understand their discharge instructions:   Does patient have questions or concerns that need addressed prior to 7-14 day follow up office visit:     Scheduled appointment with PCP within 7-14 days    Follow Up  Future Appointments   Date Time Provider Emma Gaxiola   2019  1:20 PM Vasquez Mota MD Neuro [facility]   10/23/2019  1:40 PM Anamika Puga MD [facility]   2019  1:40 PM Cande Plasencia, APRN - CNP [facility]       Nathalie Keith

## 2019-09-06 ENCOUNTER — CARE COORDINATION (OUTPATIENT)
Dept: CARE COORDINATION | Age: 54
End: 2019-09-06

## 2019-09-06 NOTE — CARE COORDINATION
3rd unsuccessful attempt to reach patient. Plan: will send patient a letter letting her know I  Am trying to reach her and attempt to reach again in 1-2 week if no response.

## 2019-09-12 ENCOUNTER — HOSPITAL ENCOUNTER (EMERGENCY)
Age: 54
Discharge: HOME | End: 2019-09-12
Payer: COMMERCIAL

## 2019-09-12 ENCOUNTER — OFFICE VISIT (OUTPATIENT)
Dept: NEUROLOGY | Age: 54
End: 2019-09-12
Payer: COMMERCIAL

## 2019-09-12 VITALS
WEIGHT: 219.6 LBS | DIASTOLIC BLOOD PRESSURE: 85 MMHG | BODY MASS INDEX: 38.91 KG/M2 | SYSTOLIC BLOOD PRESSURE: 133 MMHG | HEIGHT: 63 IN | HEART RATE: 101 BPM

## 2019-09-12 VITALS
OXYGEN SATURATION: 94 % | SYSTOLIC BLOOD PRESSURE: 166 MMHG | RESPIRATION RATE: 17 BRPM | HEART RATE: 110 BPM | DIASTOLIC BLOOD PRESSURE: 94 MMHG | TEMPERATURE: 98 F

## 2019-09-12 DIAGNOSIS — M79.2 NEUROPATHIC PAIN: Primary | ICD-10-CM

## 2019-09-12 DIAGNOSIS — E11.42 DIABETIC POLYNEUROPATHY ASSOCIATED WITH TYPE 2 DIABETES MELLITUS (HCC): ICD-10-CM

## 2019-09-12 DIAGNOSIS — R21 RASH AND OTHER NONSPECIFIC SKIN ERUPTION: Primary | ICD-10-CM

## 2019-09-12 PROCEDURE — G8598 ASA/ANTIPLAT THER USED: HCPCS | Performed by: PSYCHIATRY & NEUROLOGY

## 2019-09-12 PROCEDURE — 1036F TOBACCO NON-USER: CPT | Performed by: PSYCHIATRY & NEUROLOGY

## 2019-09-12 PROCEDURE — 2022F DILAT RTA XM EVC RTNOPTHY: CPT | Performed by: PSYCHIATRY & NEUROLOGY

## 2019-09-12 PROCEDURE — 99214 OFFICE O/P EST MOD 30 MIN: CPT | Performed by: PSYCHIATRY & NEUROLOGY

## 2019-09-12 PROCEDURE — 3046F HEMOGLOBIN A1C LEVEL >9.0%: CPT | Performed by: PSYCHIATRY & NEUROLOGY

## 2019-09-12 PROCEDURE — G8427 DOCREV CUR MEDS BY ELIG CLIN: HCPCS | Performed by: PSYCHIATRY & NEUROLOGY

## 2019-09-12 PROCEDURE — 3017F COLORECTAL CA SCREEN DOC REV: CPT | Performed by: PSYCHIATRY & NEUROLOGY

## 2019-09-12 PROCEDURE — 99282 EMERGENCY DEPT VISIT SF MDM: CPT

## 2019-09-12 PROCEDURE — G8417 CALC BMI ABV UP PARAM F/U: HCPCS | Performed by: PSYCHIATRY & NEUROLOGY

## 2019-09-12 RX ORDER — TRIAMCINOLONE ACETONIDE 1 MG/G
CREAM TOPICAL
Qty: 1 TUBE | Refills: 0 | Status: SHIPPED | OUTPATIENT
Start: 2019-09-12 | End: 2019-10-27 | Stop reason: ALTCHOICE

## 2019-09-12 ASSESSMENT — ENCOUNTER SYMPTOMS
VOMITING: 0
RHINORRHEA: 0
BACK PAIN: 0
COUGH: 0
DIARRHEA: 0
WHEEZING: 0
SORE THROAT: 0
BLOOD IN STOOL: 0
EYE DISCHARGE: 0
CONSTIPATION: 0
CHEST TIGHTNESS: 0
ABDOMINAL PAIN: 0
NAUSEA: 0
SHORTNESS OF BREATH: 0
EYE REDNESS: 0

## 2019-09-12 ASSESSMENT — PAIN DESCRIPTION - DESCRIPTORS: DESCRIPTORS: BURNING

## 2019-09-12 ASSESSMENT — PAIN DESCRIPTION - PAIN TYPE: TYPE: ACUTE PAIN

## 2019-09-12 ASSESSMENT — PAIN DESCRIPTION - FREQUENCY: FREQUENCY: CONTINUOUS

## 2019-09-12 ASSESSMENT — PAIN DESCRIPTION - LOCATION: LOCATION: GROIN

## 2019-09-12 ASSESSMENT — PAIN SCALES - GENERAL: PAINLEVEL_OUTOF10: 5

## 2019-09-12 ASSESSMENT — PAIN DESCRIPTION - ORIENTATION: ORIENTATION: LEFT;RIGHT

## 2019-09-12 NOTE — ED PROVIDER NOTES
Discharge Medication List as of 9/12/2019  6:52 PM      CONTINUE these medications which have NOT CHANGED    Details   alogliptin-metformin 12.5-1000 MG TABS TAKE 1 TABLET BY MOUTH TWICE A DAY, Disp-60 tablet, R-11Normal      TRESIBA FLEXTOUCH 200 UNIT/ML SOPN INJECT 70 UNITS INTO THE SKIN NIGHTLY, Disp-12 pen, R-3Normal      DULoxetine (CYMBALTA) 60 MG extended release capsule TAKE 1 CAPSULE BY MOUTH EVERY DAY, Disp-30 capsule, R-1Normal      escitalopram (LEXAPRO) 10 MG tablet TAKE 1 TABLET BY MOUTH EVERY DAY, Disp-90 tablet, R-3Normal      losartan (COZAAR) 25 MG tablet TAKE 1 TABLET BY MOUTH DAILY, Disp-90 tablet, R-3Normal      atorvastatin (LIPITOR) 40 MG tablet TAKE 1 TABLET BY MOUTH DAILY, Disp-90 tablet, R-3Normal      aspirin 81 MG EC tablet TAKE 1 TABLET BY MOUTH DAILY, Disp-90 tablet, R-3Normal      traZODone (DESYREL) 50 MG tablet Take 1 tablet by mouth nightly, Disp-90 tablet, R-1Normal      insulin aspart (NOVOLOG FLEXPEN) 100 UNIT/ML injection pen Inject 10 Units into the skin 3 times daily (before meals), Disp-5 pen, R-5Normal      gabapentin (NEURONTIN) 800 MG tablet TAKE 1 TABLET BY MOUTH 3 TIMES A DAY., Disp-90 tablet, R-6Normal      metoprolol succinate (TOPROL XL) 100 MG extended release tablet Take 1 tablet by mouth daily, Disp-90 tablet, R-3Normal      fludrocortisone (FLORINEF) 0.1 MG tablet Take 2 tablets by mouth daily, Disp-90 tablet, R-3Normal      omeprazole (PRILOSEC) 20 MG delayed release capsule Take 1 capsule by mouth daily, Disp-90 capsule, R-3Normal      Gabapentin POWD Gabapentin 6%, amitriptyline 5%, Ketamine 5%., baclofen2% to be applied topically to painful areas tid., Disp-5 g, R-1Normal      FREESTYLE LITE strip USE TO CHECK BLOOD SUGARS 3 TIMES DAILY AND AS NEEDED FOR DIABETES E11.40, Disp-100 strip, R-1Normal      albuterol sulfate HFA (VENTOLIN HFA) 108 (90 Base) MCG/ACT inhaler INHALE 2 PUFFS INTO THE LUNGS EVERY 6 HOURS AS NEEDED FOR WHEEZING., Disp-18 g, R-3Normal

## 2019-09-12 NOTE — PROGRESS NOTES
NEUROLOGY FOLLOW-UP  Patient Name:  Janett Chi  :   1965  Clinic Visit Date: 2019        REVIEW OF SYSTEMS  Constitutional Weight changes: absent, Fevers : absent, Fatigue: present; Any recent hospitalizations:  absent.    HEENT Ears: normal, Eyes: , Visual disturbance: present   Reespiratory Shortness of breath: present, Cough: present   Cardivascular Chest pain: absent, Leg swelling :absent   GI Constipation: present, Diarrhea: absent, Swallowing change: absent    Urinary frequency: present, Urinary urgency: absent, Urinary incontinence: absent   Musculoskeletal Neck pain: absent, Back pain: present, Stiffness: absent, Muscle pain: absent, Joint pain: present   Dermatological Hair loss: absent, Skin changes: absent   Neurological Memory loss: absent, Confusion: absent, Seizures: absent; Trouble walking or imbalance: present, Dizziness: absent, Weakness: present, Numbness present, Tremor: absent, Spasm: absent, Speech difficulty: absent, Headache: present, Light sensitivity: absent   Psychiatric Anxiety: absent, Depression  present, Suicidal ideations absent   Hematologic Abnormal bleeding: absent, Anemia: absent, Clotting disorder: absent, Lymph gland changes: absent
Although every effort was made to ensure the accuracy of this  automated transcription, some errors in transcription may have occurred, occasionally words are mis-transcribed.

## 2019-09-13 ENCOUNTER — TELEPHONE (OUTPATIENT)
Dept: PRIMARY CARE CLINIC | Age: 54
End: 2019-09-13

## 2019-09-13 NOTE — TELEPHONE ENCOUNTER
[facility] Transitions Initial Follow Up Call    Outreach made within 2 business days of discharge: Yes    Patient: Gino Willoughby Patient : 1965   MRN: [id number]  Reason for Admission: There are no discharge diagnoses documented for the most recent discharge. Discharge Date: 19       Spoke with: Renee Call    Discharge department/facility: [facility] ER    TCM Interactive Patient Contact:  Was patient able to fill all prescriptions: Yes  Was patient instructed to bring all medications to the follow-up visit: Yes  Is patient taking all medications as directed in the discharge summary?  Yes  Does patient understand their discharge instructions: Yes  Does patient have questions or concerns that need addressed prior to 7-14 day follow up office visit: no, will be calling dermatologist today    Scheduled appointment with PCP within 7-14 days    Follow Up  Future Appointments   Date Time Provider Emma Gaxiola   10/23/2019  1:40 PM MD UMANG Ring [facility]   12/10/2019 11:20 AM Sol Beauchamp MD Neuro Spec [facility]   2019  1:40 PM CHELSEA Astorga - CNP [facility]       Johanna Roger

## 2019-09-18 ENCOUNTER — CARE COORDINATION (OUTPATIENT)
Dept: CARE COORDINATION | Age: 54
End: 2019-09-18

## 2019-09-23 RX ORDER — DULOXETIN HYDROCHLORIDE 60 MG/1
CAPSULE, DELAYED RELEASE ORAL
Qty: 30 CAPSULE | Refills: 1 | Status: SHIPPED | OUTPATIENT
Start: 2019-09-23 | End: 2020-02-10 | Stop reason: DRUGHIGH

## 2019-09-24 ENCOUNTER — TELEPHONE (OUTPATIENT)
Dept: NEUROLOGY | Age: 54
End: 2019-09-24

## 2019-09-24 RX ORDER — BLOOD-GLUCOSE METER
KIT MISCELLANEOUS
Qty: 100 STRIP | Refills: 1 | Status: SHIPPED | OUTPATIENT
Start: 2019-09-24 | End: 2019-11-19 | Stop reason: SDUPTHER

## 2019-09-24 RX ORDER — ASPIRIN 81 MG
TABLET,CHEWABLE ORAL
Qty: 1 TUBE | Refills: 3 | Status: SHIPPED | OUTPATIENT
Start: 2019-09-24 | End: 2019-10-27 | Stop reason: ALTCHOICE

## 2019-09-24 NOTE — TELEPHONE ENCOUNTER
Jw Pinedo  Please let the patient know that we can send the script for Capsaicin cream if she has not tried; if she has tried, please find out what strength cream, she tried.    Thank you.   -dr. Jericho Art

## 2019-09-24 NOTE — TELEPHONE ENCOUNTER
12/12/2019  1:40 PM Perry Plasencia, APRN - CNP [facility]            Patient Active Problem List:     CAD (coronary artery disease)     Dyslipidemia     Radiculopathy     Diabetes type 2, uncontrolled     Insomnia     Allergic rhinitis     COPD (chronic obstructive pulmonary disease)     Depression     Bilateral leg weakness     Gait difficulty     Diabetic neuropathy     Back pain     Muscle spasm     Affective disorder (HCC)     Morbid obesity with BMI of 40.0-44.9, adult ([facility] [address]     History of ventral hernia repair     History of incisional hernia repair     Essential hypertension     Gastroesophageal reflux disease without esophagitis     Primary osteoarthritis involving multiple joints     Heart palpitations     Dysautonomia orthostatic hypotension syndrome (HCC)     Coronary artery disease involving native coronary artery of native heart     Angina, class III (HCC)     S/P CABG x 1     Angina, class I (HCC)     Angina pectoris (HCC)     Precordial pain     DARON (obstructive sleep apnea)     Morbid obesity (HCC)     Neuropathic pain     Soft tissue mass     Dysautonomia (HCC)     Abnormal cardiovascular stress test

## 2019-09-24 NOTE — TELEPHONE ENCOUNTER
Christie's insurance denied coverage for the Gabapentin cream. Please advise if there is something else that can be prescribed.

## 2019-10-08 DIAGNOSIS — J44.9 CHRONIC OBSTRUCTIVE PULMONARY DISEASE, UNSPECIFIED COPD TYPE (HCC): ICD-10-CM

## 2019-10-08 RX ORDER — ALBUTEROL SULFATE 90 UG/1
AEROSOL, METERED RESPIRATORY (INHALATION)
Qty: 18 INHALER | Refills: 3 | Status: SHIPPED | OUTPATIENT
Start: 2019-10-08 | End: 2020-11-10

## 2019-10-11 ENCOUNTER — TELEPHONE (OUTPATIENT)
Dept: NEUROLOGY | Age: 54
End: 2019-10-11

## 2019-10-23 ENCOUNTER — HOSPITAL ENCOUNTER (OUTPATIENT)
Dept: NON INVASIVE DIAGNOSTICS | Age: 54
Discharge: HOME | End: 2019-10-23
Payer: COMMERCIAL

## 2019-10-23 ENCOUNTER — OFFICE VISIT (OUTPATIENT)
Dept: CARDIOLOGY | Age: 54
End: 2019-10-23
Payer: COMMERCIAL

## 2019-10-23 VITALS
RESPIRATION RATE: 18 BRPM | HEART RATE: 80 BPM | SYSTOLIC BLOOD PRESSURE: 117 MMHG | DIASTOLIC BLOOD PRESSURE: 76 MMHG | HEIGHT: 63 IN | OXYGEN SATURATION: 95 % | BODY MASS INDEX: 38.66 KG/M2 | WEIGHT: 218.2 LBS

## 2019-10-23 DIAGNOSIS — R42 EPISODIC LIGHTHEADEDNESS: ICD-10-CM

## 2019-10-23 DIAGNOSIS — R00.2 PALPITATIONS: ICD-10-CM

## 2019-10-23 DIAGNOSIS — Z95.1 S/P CABG X 1: ICD-10-CM

## 2019-10-23 DIAGNOSIS — R00.2 PALPITATIONS: Primary | ICD-10-CM

## 2019-10-23 DIAGNOSIS — I25.10 CORONARY ARTERY DISEASE INVOLVING NATIVE CORONARY ARTERY OF NATIVE HEART WITHOUT ANGINA PECTORIS: ICD-10-CM

## 2019-10-23 DIAGNOSIS — E66.01 MORBID OBESITY (HCC): Chronic | ICD-10-CM

## 2019-10-23 PROBLEM — I20.9 ANGINA PECTORIS (HCC): Status: RESOLVED | Noted: 2017-05-09 | Resolved: 2019-10-23

## 2019-10-23 PROCEDURE — G8484 FLU IMMUNIZE NO ADMIN: HCPCS | Performed by: FAMILY MEDICINE

## 2019-10-23 PROCEDURE — 93225 XTRNL ECG REC<48 HRS REC: CPT

## 2019-10-23 PROCEDURE — 1036F TOBACCO NON-USER: CPT | Performed by: FAMILY MEDICINE

## 2019-10-23 PROCEDURE — 3017F COLORECTAL CA SCREEN DOC REV: CPT | Performed by: FAMILY MEDICINE

## 2019-10-23 PROCEDURE — G8427 DOCREV CUR MEDS BY ELIG CLIN: HCPCS | Performed by: FAMILY MEDICINE

## 2019-10-23 PROCEDURE — G8598 ASA/ANTIPLAT THER USED: HCPCS | Performed by: FAMILY MEDICINE

## 2019-10-23 PROCEDURE — 93226 XTRNL ECG REC<48 HR SCAN A/R: CPT

## 2019-10-23 PROCEDURE — 99214 OFFICE O/P EST MOD 30 MIN: CPT | Performed by: FAMILY MEDICINE

## 2019-10-23 PROCEDURE — G8417 CALC BMI ABV UP PARAM F/U: HCPCS | Performed by: FAMILY MEDICINE

## 2019-10-23 RX ORDER — FLUDROCORTISONE ACETATE 0.1 MG/1
0.3 TABLET ORAL DAILY
Qty: 90 TABLET | Refills: 3 | Status: SHIPPED | OUTPATIENT
Start: 2019-10-23 | End: 2020-02-26

## 2019-10-25 ENCOUNTER — OFFICE VISIT (OUTPATIENT)
Dept: PRIMARY CARE CLINIC | Age: 54
End: 2019-10-25
Payer: COMMERCIAL

## 2019-10-25 VITALS
TEMPERATURE: 97.8 F | DIASTOLIC BLOOD PRESSURE: 55 MMHG | BODY MASS INDEX: 38 KG/M2 | HEART RATE: 107 BPM | WEIGHT: 214.5 LBS | RESPIRATION RATE: 22 BRPM | SYSTOLIC BLOOD PRESSURE: 124 MMHG

## 2019-10-25 DIAGNOSIS — L03.116 CELLULITIS OF LEFT LOWER LEG: Primary | ICD-10-CM

## 2019-10-25 PROCEDURE — G8427 DOCREV CUR MEDS BY ELIG CLIN: HCPCS | Performed by: NURSE PRACTITIONER

## 2019-10-25 PROCEDURE — 1036F TOBACCO NON-USER: CPT | Performed by: NURSE PRACTITIONER

## 2019-10-25 PROCEDURE — 3017F COLORECTAL CA SCREEN DOC REV: CPT | Performed by: NURSE PRACTITIONER

## 2019-10-25 PROCEDURE — G8484 FLU IMMUNIZE NO ADMIN: HCPCS | Performed by: NURSE PRACTITIONER

## 2019-10-25 PROCEDURE — G8417 CALC BMI ABV UP PARAM F/U: HCPCS | Performed by: NURSE PRACTITIONER

## 2019-10-25 PROCEDURE — G8598 ASA/ANTIPLAT THER USED: HCPCS | Performed by: NURSE PRACTITIONER

## 2019-10-25 PROCEDURE — 99213 OFFICE O/P EST LOW 20 MIN: CPT | Performed by: NURSE PRACTITIONER

## 2019-10-25 RX ORDER — CEPHALEXIN 500 MG/1
500 CAPSULE ORAL 4 TIMES DAILY
Qty: 40 CAPSULE | Refills: 0 | Status: SHIPPED | OUTPATIENT
Start: 2019-10-25 | End: 2019-11-04

## 2019-10-25 ASSESSMENT — ENCOUNTER SYMPTOMS: COLOR CHANGE: 1

## 2019-10-27 ENCOUNTER — HOSPITAL ENCOUNTER (EMERGENCY)
Age: 54
Discharge: HOME | End: 2019-10-27
Payer: COMMERCIAL

## 2019-10-27 ENCOUNTER — APPOINTMENT (OUTPATIENT)
Dept: GENERAL RADIOLOGY | Age: 54
End: 2019-10-27
Payer: COMMERCIAL

## 2019-10-27 VITALS
TEMPERATURE: 98.1 F | HEART RATE: 85 BPM | SYSTOLIC BLOOD PRESSURE: 153 MMHG | RESPIRATION RATE: 18 BRPM | DIASTOLIC BLOOD PRESSURE: 94 MMHG | OXYGEN SATURATION: 94 %

## 2019-10-27 DIAGNOSIS — L03.116 CELLULITIS OF LEFT LOWER EXTREMITY: Primary | ICD-10-CM

## 2019-10-27 LAB
ANION GAP SERPL CALCULATED.3IONS-SCNC: 14 MMOL/L (ref 9–17)
BUN BLDV-MCNC: 5 MG/DL (ref 6–20)
BUN/CREAT BLD: 10 (ref 9–20)
CALCIUM SERPL-MCNC: 9.1 MG/DL (ref 8.6–10.4)
CHLORIDE BLD-SCNC: 94 MMOL/L (ref 98–107)
CHP ED QC CHECK: YES
CO2: 24 MMOL/L (ref 20–31)
CREAT SERPL-MCNC: 0.5 MG/DL (ref 0.5–0.9)
GFR AFRICAN AMERICAN: >60 ML/MIN
GFR NON-AFRICAN AMERICAN: >60 ML/MIN
GFR SERPL CREATININE-BSD FRML MDRD: ABNORMAL ML/MIN/{1.73_M2}
GFR SERPL CREATININE-BSD FRML MDRD: ABNORMAL ML/MIN/{1.73_M2}
GLUCOSE BLD-MCNC: 305 MG/DL
GLUCOSE BLD-MCNC: 305 MG/DL (ref 74–100)
GLUCOSE BLD-MCNC: 358 MG/DL (ref 70–99)
HCT VFR BLD CALC: 39.6 % (ref 36.3–47.1)
HEMOGLOBIN: 13 G/DL (ref 11.9–15.1)
LACTIC ACID, WHOLE BLOOD: NORMAL MMOL/L (ref 0.7–2.1)
LACTIC ACID: 1.4 MMOL/L (ref 0.5–2.2)
MCH RBC QN AUTO: 25.8 PG (ref 25.2–33.5)
MCHC RBC AUTO-ENTMCNC: 32.8 G/DL (ref 28.4–34.8)
MCV RBC AUTO: 78.6 FL (ref 82.6–102.9)
NRBC AUTOMATED: 0 PER 100 WBC
PDW BLD-RTO: 13.6 % (ref 11.8–14.4)
PLATELET # BLD: 312 K/UL (ref 138–453)
PMV BLD AUTO: 9.6 FL (ref 8.1–13.5)
POTASSIUM SERPL-SCNC: 3.8 MMOL/L (ref 3.7–5.3)
RBC # BLD: 5.04 M/UL (ref 3.95–5.11)
SODIUM BLD-SCNC: 132 MMOL/L (ref 135–144)
WBC # BLD: 10.3 K/UL (ref 3.5–11.3)

## 2019-10-27 PROCEDURE — 96365 THER/PROPH/DIAG IV INF INIT: CPT

## 2019-10-27 PROCEDURE — 85027 COMPLETE CBC AUTOMATED: CPT

## 2019-10-27 PROCEDURE — 6370000000 HC RX 637 (ALT 250 FOR IP): Performed by: PHYSICIAN ASSISTANT

## 2019-10-27 PROCEDURE — 80048 BASIC METABOLIC PNL TOTAL CA: CPT

## 2019-10-27 PROCEDURE — 73590 X-RAY EXAM OF LOWER LEG: CPT

## 2019-10-27 PROCEDURE — 2500000003 HC RX 250 WO HCPCS: Performed by: PHYSICIAN ASSISTANT

## 2019-10-27 PROCEDURE — 82947 ASSAY GLUCOSE BLOOD QUANT: CPT

## 2019-10-27 PROCEDURE — 36415 COLL VENOUS BLD VENIPUNCTURE: CPT

## 2019-10-27 PROCEDURE — 99283 EMERGENCY DEPT VISIT LOW MDM: CPT

## 2019-10-27 PROCEDURE — 96375 TX/PRO/DX INJ NEW DRUG ADDON: CPT

## 2019-10-27 PROCEDURE — 83605 ASSAY OF LACTIC ACID: CPT

## 2019-10-27 RX ORDER — SULFAMETHOXAZOLE AND TRIMETHOPRIM 800; 160 MG/1; MG/1
1 TABLET ORAL 2 TIMES DAILY
Qty: 20 TABLET | Refills: 0 | Status: SHIPPED | OUTPATIENT
Start: 2019-10-27 | End: 2019-11-06

## 2019-10-27 RX ORDER — CLINDAMYCIN PHOSPHATE 600 MG/50ML
600 INJECTION INTRAVENOUS ONCE
Status: COMPLETED | OUTPATIENT
Start: 2019-10-27 | End: 2019-10-27

## 2019-10-27 RX ADMIN — CLINDAMYCIN PHOSPHATE 600 MG: 600 INJECTION, SOLUTION INTRAVENOUS at 13:06

## 2019-10-27 RX ADMIN — INSULIN HUMAN 5 UNITS: 100 INJECTION, SOLUTION PARENTERAL at 13:06

## 2019-10-27 ASSESSMENT — PAIN DESCRIPTION - PAIN TYPE: TYPE: ACUTE PAIN

## 2019-10-27 ASSESSMENT — ENCOUNTER SYMPTOMS
RHINORRHEA: 0
SHORTNESS OF BREATH: 0
CONSTIPATION: 0
BLOOD IN STOOL: 0
ABDOMINAL PAIN: 0
DIARRHEA: 0
WHEEZING: 0
NAUSEA: 0
VOMITING: 0
SORE THROAT: 0
EYE DISCHARGE: 0
BACK PAIN: 0
COUGH: 0
CHEST TIGHTNESS: 0
EYE REDNESS: 0

## 2019-10-27 ASSESSMENT — PAIN DESCRIPTION - LOCATION: LOCATION: LEG

## 2019-10-27 ASSESSMENT — PAIN DESCRIPTION - FREQUENCY: FREQUENCY: INTERMITTENT

## 2019-10-27 ASSESSMENT — PAIN DESCRIPTION - DESCRIPTORS: DESCRIPTORS: DISCOMFORT

## 2019-10-27 ASSESSMENT — PAIN SCALES - GENERAL: PAINLEVEL_OUTOF10: 7

## 2019-10-27 ASSESSMENT — PAIN DESCRIPTION - ORIENTATION: ORIENTATION: LEFT;LOWER

## 2019-10-28 ENCOUNTER — TELEPHONE (OUTPATIENT)
Dept: PRIMARY CARE CLINIC | Age: 54
End: 2019-10-28

## 2019-10-29 LAB
ACQUISITION DURATION: NORMAL S
AVERAGE HEART RATE: 82 BPM
EKG DIAGNOSIS: NORMAL
HOLTER MAX HEART RATE: 129 BPM
HOOKUP DATE: NORMAL
HOOKUP TIME: NORMAL
MAX HEART RATE TIME/DATE: NORMAL
MIN HEART RATE TIME/DATE: NORMAL
MIN HEART RATE: 55 BPM
NUMBER OF QRS COMPLEXES: NORMAL
NUMBER OF SUPRAVENTRICULAR BEATS IN RUNS: 0
NUMBER OF SUPRAVENTRICULAR COUPLETS: 0
NUMBER OF SUPRAVENTRICULAR ECTOPICS: 17
NUMBER OF SUPRAVENTRICULAR ISOLATED BEATS: 17
NUMBER OF SUPRAVENTRICULAR RUNS: 0
NUMBER OF VENTRICULAR BEATS IN RUNS: 0
NUMBER OF VENTRICULAR BIGEMINAL CYCLES: 0
NUMBER OF VENTRICULAR COUPLETS: 0
NUMBER OF VENTRICULAR ECTOPICS: 6
NUMBER OF VENTRICULAR ISOLATED BEATS: 6
NUMBER OF VENTRICULAR RUNS: 0

## 2019-11-12 ENCOUNTER — APPOINTMENT (OUTPATIENT)
Dept: GENERAL RADIOLOGY | Age: 54
End: 2019-11-12
Payer: COMMERCIAL

## 2019-11-12 ENCOUNTER — APPOINTMENT (OUTPATIENT)
Dept: CT IMAGING | Age: 54
End: 2019-11-12
Payer: COMMERCIAL

## 2019-11-12 ENCOUNTER — HOSPITAL ENCOUNTER (EMERGENCY)
Age: 54
Discharge: HOME | End: 2019-11-12
Attending: EMERGENCY MEDICINE
Payer: COMMERCIAL

## 2019-11-12 VITALS
TEMPERATURE: 98.1 F | BODY MASS INDEX: 37.91 KG/M2 | RESPIRATION RATE: 18 BRPM | HEART RATE: 67 BPM | OXYGEN SATURATION: 95 % | SYSTOLIC BLOOD PRESSURE: 126 MMHG | WEIGHT: 214 LBS | DIASTOLIC BLOOD PRESSURE: 81 MMHG

## 2019-11-12 DIAGNOSIS — M79.605 LEFT LEG PAIN: Primary | ICD-10-CM

## 2019-11-12 DIAGNOSIS — R73.9 HYPERGLYCEMIA: ICD-10-CM

## 2019-11-12 LAB
-: NORMAL
AMORPHOUS: NORMAL
ANION GAP SERPL CALCULATED.3IONS-SCNC: 14 MMOL/L (ref 9–17)
BACTERIA: NORMAL
BILIRUBIN URINE: NEGATIVE
BUN BLDV-MCNC: 12 MG/DL (ref 6–20)
BUN/CREAT BLD: 23 (ref 9–20)
C-REACTIVE PROTEIN: 31.9 MG/L (ref 0–5)
CALCIUM SERPL-MCNC: 9.2 MG/DL (ref 8.6–10.4)
CASTS UA: NORMAL /LPF
CHLORIDE BLD-SCNC: 93 MMOL/L (ref 98–107)
CHP ED QC CHECK: NORMAL
CHP ED QC CHECK: YES
CO2: 22 MMOL/L (ref 20–31)
COLOR: YELLOW
COMMENT UA: ABNORMAL
CREAT SERPL-MCNC: 0.52 MG/DL (ref 0.5–0.9)
CRYSTALS, UA: NORMAL /HPF
EPITHELIAL CELLS UA: NORMAL /HPF (ref 0–25)
GFR AFRICAN AMERICAN: >60 ML/MIN
GFR NON-AFRICAN AMERICAN: >60 ML/MIN
GFR SERPL CREATININE-BSD FRML MDRD: ABNORMAL ML/MIN/{1.73_M2}
GFR SERPL CREATININE-BSD FRML MDRD: ABNORMAL ML/MIN/{1.73_M2}
GLUCOSE BLD-MCNC: 262 MG/DL
GLUCOSE BLD-MCNC: 262 MG/DL (ref 74–100)
GLUCOSE BLD-MCNC: 350 MG/DL
GLUCOSE BLD-MCNC: 350 MG/DL (ref 74–100)
GLUCOSE BLD-MCNC: 429 MG/DL (ref 70–99)
GLUCOSE URINE: ABNORMAL
HCT VFR BLD CALC: 40.8 % (ref 36.3–47.1)
HEMOGLOBIN: 13.4 G/DL (ref 11.9–15.1)
KETONES, URINE: NEGATIVE
LEUKOCYTE ESTERASE, URINE: NEGATIVE
MCH RBC QN AUTO: 25.7 PG (ref 25.2–33.5)
MCHC RBC AUTO-ENTMCNC: 32.8 G/DL (ref 28.4–34.8)
MCV RBC AUTO: 78.3 FL (ref 82.6–102.9)
MUCUS: NORMAL
NITRITE, URINE: NEGATIVE
NRBC AUTOMATED: 0 PER 100 WBC
OTHER OBSERVATIONS UA: NORMAL
PDW BLD-RTO: 13.8 % (ref 11.8–14.4)
PH UA: 5.5 (ref 5–9)
PLATELET # BLD: 307 K/UL (ref 138–453)
PMV BLD AUTO: 9.8 FL (ref 8.1–13.5)
POTASSIUM SERPL-SCNC: 4.5 MMOL/L (ref 3.7–5.3)
PROTEIN UA: NEGATIVE
RBC # BLD: 5.21 M/UL (ref 3.95–5.11)
RBC UA: NORMAL /HPF (ref 0–2)
RENAL EPITHELIAL, UA: NORMAL /HPF
SEDIMENTATION RATE, ERYTHROCYTE: 28 MM (ref 0–20)
SODIUM BLD-SCNC: 129 MMOL/L (ref 135–144)
SPECIFIC GRAVITY UA: 1.01 (ref 1.01–1.02)
TRICHOMONAS: NORMAL
TURBIDITY: CLEAR
URINE HGB: NEGATIVE
UROBILINOGEN, URINE: NORMAL
WBC # BLD: 9.1 K/UL (ref 3.5–11.3)
WBC UA: NORMAL /HPF (ref 0–5)
YEAST: NORMAL

## 2019-11-12 PROCEDURE — 85027 COMPLETE CBC AUTOMATED: CPT

## 2019-11-12 PROCEDURE — 96376 TX/PRO/DX INJ SAME DRUG ADON: CPT

## 2019-11-12 PROCEDURE — 80048 BASIC METABOLIC PNL TOTAL CA: CPT

## 2019-11-12 PROCEDURE — 81001 URINALYSIS AUTO W/SCOPE: CPT

## 2019-11-12 PROCEDURE — 2580000003 HC RX 258: Performed by: EMERGENCY MEDICINE

## 2019-11-12 PROCEDURE — 82947 ASSAY GLUCOSE BLOOD QUANT: CPT

## 2019-11-12 PROCEDURE — 85651 RBC SED RATE NONAUTOMATED: CPT

## 2019-11-12 PROCEDURE — 73552 X-RAY EXAM OF FEMUR 2/>: CPT

## 2019-11-12 PROCEDURE — 6360000002 HC RX W HCPCS: Performed by: EMERGENCY MEDICINE

## 2019-11-12 PROCEDURE — 96374 THER/PROPH/DIAG INJ IV PUSH: CPT

## 2019-11-12 PROCEDURE — 86140 C-REACTIVE PROTEIN: CPT

## 2019-11-12 PROCEDURE — 99284 EMERGENCY DEPT VISIT MOD MDM: CPT

## 2019-11-12 PROCEDURE — 74174 CTA ABD&PLVS W/CONTRAST: CPT

## 2019-11-12 PROCEDURE — 6360000004 HC RX CONTRAST MEDICATION: Performed by: EMERGENCY MEDICINE

## 2019-11-12 PROCEDURE — 72170 X-RAY EXAM OF PELVIS: CPT

## 2019-11-12 PROCEDURE — 6370000000 HC RX 637 (ALT 250 FOR IP): Performed by: EMERGENCY MEDICINE

## 2019-11-12 PROCEDURE — 36415 COLL VENOUS BLD VENIPUNCTURE: CPT

## 2019-11-12 PROCEDURE — 96375 TX/PRO/DX INJ NEW DRUG ADDON: CPT

## 2019-11-12 RX ORDER — ACETAMINOPHEN 325 MG/1
650 TABLET ORAL EVERY 6 HOURS PRN
Qty: 20 TABLET | Refills: 0 | Status: SHIPPED | OUTPATIENT
Start: 2019-11-12 | End: 2020-09-09

## 2019-11-12 RX ORDER — ACETAMINOPHEN 500 MG
1000 TABLET ORAL ONCE
Status: COMPLETED | OUTPATIENT
Start: 2019-11-12 | End: 2019-11-12

## 2019-11-12 RX ORDER — FENTANYL CITRATE 50 UG/ML
25 INJECTION, SOLUTION INTRAMUSCULAR; INTRAVENOUS ONCE
Status: COMPLETED | OUTPATIENT
Start: 2019-11-12 | End: 2019-11-12

## 2019-11-12 RX ORDER — 0.9 % SODIUM CHLORIDE 0.9 %
1000 INTRAVENOUS SOLUTION INTRAVENOUS ONCE
Status: COMPLETED | OUTPATIENT
Start: 2019-11-12 | End: 2019-11-12

## 2019-11-12 RX ADMIN — INSULIN LISPRO 10 UNITS: 100 INJECTION, SOLUTION INTRAVENOUS; SUBCUTANEOUS at 12:12

## 2019-11-12 RX ADMIN — FENTANYL CITRATE 25 MCG: 50 INJECTION INTRAMUSCULAR; INTRAVENOUS at 10:51

## 2019-11-12 RX ADMIN — ACETAMINOPHEN 1000 MG: 500 TABLET, FILM COATED ORAL at 13:05

## 2019-11-12 RX ADMIN — SODIUM CHLORIDE 1000 ML: 9 INJECTION, SOLUTION INTRAVENOUS at 12:08

## 2019-11-12 RX ADMIN — INSULIN LISPRO 10 UNITS: 100 INJECTION, SOLUTION INTRAVENOUS; SUBCUTANEOUS at 13:05

## 2019-11-12 RX ADMIN — IOPAMIDOL 75 ML: 755 INJECTION, SOLUTION INTRAVENOUS at 11:41

## 2019-11-12 ASSESSMENT — PAIN SCALES - GENERAL
PAINLEVEL_OUTOF10: 3
PAINLEVEL_OUTOF10: 2
PAINLEVEL_OUTOF10: 3
PAINLEVEL_OUTOF10: 8
PAINLEVEL_OUTOF10: 6

## 2019-11-12 ASSESSMENT — PAIN DESCRIPTION - PAIN TYPE
TYPE: ACUTE PAIN

## 2019-11-12 ASSESSMENT — ENCOUNTER SYMPTOMS
COUGH: 0
NAUSEA: 0
DIARRHEA: 0
RHINORRHEA: 0
VOMITING: 0
SORE THROAT: 0
ABDOMINAL DISTENTION: 0
WHEEZING: 0
SHORTNESS OF BREATH: 0
CONSTIPATION: 0

## 2019-11-12 ASSESSMENT — PAIN DESCRIPTION - DESCRIPTORS
DESCRIPTORS: ACHING
DESCRIPTORS: SHOOTING;SHARP
DESCRIPTORS: ACHING

## 2019-11-12 ASSESSMENT — PAIN DESCRIPTION - LOCATION
LOCATION: HIP

## 2019-11-12 ASSESSMENT — PAIN DESCRIPTION - FREQUENCY: FREQUENCY: CONTINUOUS

## 2019-11-12 ASSESSMENT — PAIN DESCRIPTION - ORIENTATION
ORIENTATION: LEFT

## 2019-11-13 ENCOUNTER — TELEPHONE (OUTPATIENT)
Dept: PRIMARY CARE CLINIC | Age: 54
End: 2019-11-13

## 2019-11-19 RX ORDER — INSULIN DEGLUDEC 200 U/ML
INJECTION, SOLUTION SUBCUTANEOUS
Refills: 3 | OUTPATIENT
Start: 2019-11-19

## 2019-11-19 RX ORDER — BLOOD-GLUCOSE METER
KIT MISCELLANEOUS
Qty: 100 STRIP | Refills: 1 | Status: SHIPPED | OUTPATIENT
Start: 2019-11-19 | End: 2020-01-22

## 2019-11-24 DIAGNOSIS — M79.2 NEUROPATHIC PAIN: ICD-10-CM

## 2019-11-24 DIAGNOSIS — E11.42 DIABETIC POLYNEUROPATHY ASSOCIATED WITH TYPE 2 DIABETES MELLITUS (HCC): ICD-10-CM

## 2019-11-26 RX ORDER — GABAPENTIN 800 MG/1
TABLET ORAL
Qty: 90 TABLET | Refills: 1 | OUTPATIENT
Start: 2019-11-26 | End: 2020-01-24

## 2019-11-27 RX ORDER — GABAPENTIN 800 MG/1
TABLET ORAL
Qty: 90 TABLET | Refills: 1 | Status: SHIPPED | OUTPATIENT
Start: 2019-11-27 | End: 2020-01-22

## 2019-12-02 ENCOUNTER — CARE COORDINATION (OUTPATIENT)
Dept: CARE COORDINATION | Age: 54
End: 2019-12-02

## 2019-12-04 ENCOUNTER — TELEPHONE (OUTPATIENT)
Dept: PRIMARY CARE CLINIC | Age: 54
End: 2019-12-04

## 2019-12-09 ENCOUNTER — HOSPITAL ENCOUNTER (OUTPATIENT)
Age: 54
Discharge: HOME | End: 2019-12-09
Payer: COMMERCIAL

## 2019-12-09 DIAGNOSIS — E11.65 UNCONTROLLED TYPE 2 DIABETES MELLITUS WITH HYPERGLYCEMIA (HCC): ICD-10-CM

## 2019-12-09 LAB
ABSOLUTE EOS #: 0.11 K/UL (ref 0–0.44)
ABSOLUTE IMMATURE GRANULOCYTE: 0.05 K/UL (ref 0–0.3)
ABSOLUTE LYMPH #: 1.95 K/UL (ref 1.1–3.7)
ABSOLUTE MONO #: 0.41 K/UL (ref 0.1–1.2)
ALBUMIN SERPL-MCNC: 3.7 G/DL (ref 3.5–5.2)
ALBUMIN/GLOBULIN RATIO: 0.9 (ref 1–2.5)
ALP BLD-CCNC: 100 U/L (ref 35–104)
ALT SERPL-CCNC: 16 U/L (ref 5–33)
ANION GAP SERPL CALCULATED.3IONS-SCNC: 13 MMOL/L (ref 9–17)
AST SERPL-CCNC: 12 U/L
BASOPHILS # BLD: 1 % (ref 0–2)
BASOPHILS ABSOLUTE: 0.06 K/UL (ref 0–0.2)
BILIRUB SERPL-MCNC: 0.4 MG/DL (ref 0.3–1.2)
BUN BLDV-MCNC: 9 MG/DL (ref 6–20)
BUN/CREAT BLD: 17 (ref 9–20)
CALCIUM SERPL-MCNC: 9.3 MG/DL (ref 8.6–10.4)
CHLORIDE BLD-SCNC: 99 MMOL/L (ref 98–107)
CHOLESTEROL/HDL RATIO: 3.4
CHOLESTEROL: 163 MG/DL
CO2: 21 MMOL/L (ref 20–31)
CREAT SERPL-MCNC: 0.54 MG/DL (ref 0.5–0.9)
CREATININE URINE: 35.6 MG/DL (ref 28–217)
DIFFERENTIAL TYPE: ABNORMAL
EOSINOPHILS RELATIVE PERCENT: 1 % (ref 1–4)
ESTIMATED AVERAGE GLUCOSE: 301 MG/DL
GFR AFRICAN AMERICAN: >60 ML/MIN
GFR NON-AFRICAN AMERICAN: >60 ML/MIN
GFR SERPL CREATININE-BSD FRML MDRD: ABNORMAL ML/MIN/{1.73_M2}
GFR SERPL CREATININE-BSD FRML MDRD: ABNORMAL ML/MIN/{1.73_M2}
GLUCOSE BLD-MCNC: 394 MG/DL (ref 70–99)
HBA1C MFR BLD: 12.1 % (ref 4.8–5.9)
HCT VFR BLD CALC: 40.8 % (ref 36.3–47.1)
HDLC SERPL-MCNC: 48 MG/DL
HEMOGLOBIN: 13.5 G/DL (ref 11.9–15.1)
IMMATURE GRANULOCYTES: 1 %
LDL CHOLESTEROL: 81 MG/DL (ref 0–130)
LYMPHOCYTES # BLD: 22 % (ref 24–43)
MCH RBC QN AUTO: 26.6 PG (ref 25.2–33.5)
MCHC RBC AUTO-ENTMCNC: 33.1 G/DL (ref 28.4–34.8)
MCV RBC AUTO: 80.5 FL (ref 82.6–102.9)
MICROALBUMIN/CREAT 24H UR: <12 MG/L
MICROALBUMIN/CREAT UR-RTO: NORMAL MCG/MG CREAT
MONOCYTES # BLD: 5 % (ref 3–12)
NRBC AUTOMATED: 0 PER 100 WBC
PDW BLD-RTO: 14 % (ref 11.8–14.4)
PLATELET # BLD: 308 K/UL (ref 138–453)
PLATELET ESTIMATE: ABNORMAL
PMV BLD AUTO: 9.8 FL (ref 8.1–13.5)
POTASSIUM SERPL-SCNC: 4.3 MMOL/L (ref 3.7–5.3)
RBC # BLD: 5.07 M/UL (ref 3.95–5.11)
RBC # BLD: ABNORMAL 10*6/UL
SEG NEUTROPHILS: 70 % (ref 36–65)
SEGMENTED NEUTROPHILS ABSOLUTE COUNT: 6.29 K/UL (ref 1.5–8.1)
SODIUM BLD-SCNC: 133 MMOL/L (ref 135–144)
TOTAL PROTEIN: 7.7 G/DL (ref 6.4–8.3)
TRIGL SERPL-MCNC: 172 MG/DL
VLDLC SERPL CALC-MCNC: ABNORMAL MG/DL (ref 1–30)
WBC # BLD: 8.9 K/UL (ref 3.5–11.3)
WBC # BLD: ABNORMAL 10*3/UL

## 2019-12-09 PROCEDURE — 83036 HEMOGLOBIN GLYCOSYLATED A1C: CPT

## 2019-12-09 PROCEDURE — 85025 COMPLETE CBC W/AUTO DIFF WBC: CPT

## 2019-12-09 PROCEDURE — 82043 UR ALBUMIN QUANTITATIVE: CPT

## 2019-12-09 PROCEDURE — 82570 ASSAY OF URINE CREATININE: CPT

## 2019-12-09 PROCEDURE — 80053 COMPREHEN METABOLIC PANEL: CPT

## 2019-12-09 PROCEDURE — 36415 COLL VENOUS BLD VENIPUNCTURE: CPT

## 2019-12-09 PROCEDURE — 80061 LIPID PANEL: CPT

## 2019-12-11 DIAGNOSIS — F51.01 PRIMARY INSOMNIA: ICD-10-CM

## 2019-12-11 RX ORDER — TRAZODONE HYDROCHLORIDE 50 MG/1
TABLET ORAL
Qty: 90 TABLET | Refills: 3 | Status: SHIPPED | OUTPATIENT
Start: 2019-12-11 | End: 2020-03-13 | Stop reason: ALTCHOICE

## 2019-12-12 ENCOUNTER — CARE COORDINATION (OUTPATIENT)
Dept: CARE COORDINATION | Age: 54
End: 2019-12-12

## 2019-12-12 ENCOUNTER — OFFICE VISIT (OUTPATIENT)
Dept: PRIMARY CARE CLINIC | Age: 54
End: 2019-12-12
Payer: COMMERCIAL

## 2019-12-12 VITALS
HEIGHT: 63 IN | SYSTOLIC BLOOD PRESSURE: 121 MMHG | BODY MASS INDEX: 38.45 KG/M2 | TEMPERATURE: 98.3 F | HEART RATE: 96 BPM | DIASTOLIC BLOOD PRESSURE: 75 MMHG | WEIGHT: 217 LBS | RESPIRATION RATE: 14 BRPM

## 2019-12-12 DIAGNOSIS — Z23 NEED FOR INFLUENZA VACCINATION: ICD-10-CM

## 2019-12-12 DIAGNOSIS — I25.810 CORONARY ARTERY DISEASE INVOLVING CORONARY BYPASS GRAFT OF NATIVE HEART WITHOUT ANGINA PECTORIS: ICD-10-CM

## 2019-12-12 DIAGNOSIS — Z12.31 ENCOUNTER FOR SCREENING MAMMOGRAM FOR BREAST CANCER: ICD-10-CM

## 2019-12-12 PROCEDURE — 90471 IMMUNIZATION ADMIN: CPT | Performed by: NURSE PRACTITIONER

## 2019-12-12 PROCEDURE — 90686 IIV4 VACC NO PRSV 0.5 ML IM: CPT | Performed by: NURSE PRACTITIONER

## 2019-12-12 PROCEDURE — 99214 OFFICE O/P EST MOD 30 MIN: CPT | Performed by: NURSE PRACTITIONER

## 2019-12-12 PROCEDURE — G8482 FLU IMMUNIZE ORDER/ADMIN: HCPCS | Performed by: NURSE PRACTITIONER

## 2019-12-12 PROCEDURE — G8598 ASA/ANTIPLAT THER USED: HCPCS | Performed by: NURSE PRACTITIONER

## 2019-12-12 PROCEDURE — 1036F TOBACCO NON-USER: CPT | Performed by: NURSE PRACTITIONER

## 2019-12-12 PROCEDURE — 2022F DILAT RTA XM EVC RTNOPTHY: CPT | Performed by: NURSE PRACTITIONER

## 2019-12-12 PROCEDURE — G8427 DOCREV CUR MEDS BY ELIG CLIN: HCPCS | Performed by: NURSE PRACTITIONER

## 2019-12-12 PROCEDURE — 3017F COLORECTAL CA SCREEN DOC REV: CPT | Performed by: NURSE PRACTITIONER

## 2019-12-12 PROCEDURE — 3046F HEMOGLOBIN A1C LEVEL >9.0%: CPT | Performed by: NURSE PRACTITIONER

## 2019-12-12 PROCEDURE — G8417 CALC BMI ABV UP PARAM F/U: HCPCS | Performed by: NURSE PRACTITIONER

## 2019-12-12 RX ORDER — METFORMIN HYDROCHLORIDE 500 MG/1
1000 TABLET, EXTENDED RELEASE ORAL
Qty: 180 TABLET | Refills: 3 | Status: SHIPPED | OUTPATIENT
Start: 2019-12-12 | End: 2021-03-30 | Stop reason: SDUPTHER

## 2019-12-12 ASSESSMENT — ENCOUNTER SYMPTOMS
ABDOMINAL PAIN: 0
RHINORRHEA: 0
COUGH: 0
SHORTNESS OF BREATH: 0
VOMITING: 0
DIARRHEA: 0
CHEST TIGHTNESS: 0
NAUSEA: 0
SORE THROAT: 0
VISUAL CHANGE: 0
CONSTIPATION: 0
WHEEZING: 0

## 2019-12-13 ENCOUNTER — CARE COORDINATION (OUTPATIENT)
Dept: CARE COORDINATION | Age: 54
End: 2019-12-13

## 2019-12-13 ASSESSMENT — ENCOUNTER SYMPTOMS: DYSPNEA ASSOCIATED WITH: EXERTION

## 2019-12-16 ENCOUNTER — CARE COORDINATION (OUTPATIENT)
Dept: CARE COORDINATION | Age: 54
End: 2019-12-16

## 2019-12-23 ENCOUNTER — CARE COORDINATION (OUTPATIENT)
Dept: CARE COORDINATION | Age: 54
End: 2019-12-23

## 2019-12-27 ENCOUNTER — CARE COORDINATION (OUTPATIENT)
Dept: CARE COORDINATION | Age: 54
End: 2019-12-27

## 2019-12-30 ENCOUNTER — OFFICE VISIT (OUTPATIENT)
Dept: PRIMARY CARE CLINIC | Age: 54
End: 2019-12-30
Payer: COMMERCIAL

## 2019-12-30 ENCOUNTER — CARE COORDINATION (OUTPATIENT)
Dept: CARE COORDINATION | Age: 54
End: 2019-12-30

## 2019-12-30 VITALS
TEMPERATURE: 98.6 F | DIASTOLIC BLOOD PRESSURE: 88 MMHG | RESPIRATION RATE: 22 BRPM | HEART RATE: 102 BPM | WEIGHT: 213.6 LBS | SYSTOLIC BLOOD PRESSURE: 127 MMHG | BODY MASS INDEX: 37.84 KG/M2

## 2019-12-30 DIAGNOSIS — J20.9 ACUTE BRONCHITIS, UNSPECIFIED ORGANISM: ICD-10-CM

## 2019-12-30 DIAGNOSIS — J01.40 ACUTE NON-RECURRENT PANSINUSITIS: Primary | ICD-10-CM

## 2019-12-30 PROCEDURE — G8427 DOCREV CUR MEDS BY ELIG CLIN: HCPCS | Performed by: NURSE PRACTITIONER

## 2019-12-30 PROCEDURE — G8598 ASA/ANTIPLAT THER USED: HCPCS | Performed by: NURSE PRACTITIONER

## 2019-12-30 PROCEDURE — G8417 CALC BMI ABV UP PARAM F/U: HCPCS | Performed by: NURSE PRACTITIONER

## 2019-12-30 PROCEDURE — 99214 OFFICE O/P EST MOD 30 MIN: CPT | Performed by: NURSE PRACTITIONER

## 2019-12-30 PROCEDURE — 1036F TOBACCO NON-USER: CPT | Performed by: NURSE PRACTITIONER

## 2019-12-30 PROCEDURE — G8482 FLU IMMUNIZE ORDER/ADMIN: HCPCS | Performed by: NURSE PRACTITIONER

## 2019-12-30 PROCEDURE — 3017F COLORECTAL CA SCREEN DOC REV: CPT | Performed by: NURSE PRACTITIONER

## 2019-12-30 RX ORDER — BENZONATATE 200 MG/1
200 CAPSULE ORAL 3 TIMES DAILY PRN
Qty: 20 CAPSULE | Refills: 0 | Status: SHIPPED | OUTPATIENT
Start: 2019-12-30 | End: 2020-01-06

## 2019-12-30 RX ORDER — AMOXICILLIN AND CLAVULANATE POTASSIUM 875; 125 MG/1; MG/1
1 TABLET, FILM COATED ORAL 2 TIMES DAILY
Qty: 20 TABLET | Refills: 0 | Status: SHIPPED | OUTPATIENT
Start: 2019-12-30 | End: 2020-01-09

## 2019-12-30 ASSESSMENT — ENCOUNTER SYMPTOMS
SINUS PRESSURE: 1
SHORTNESS OF BREATH: 0
COUGH: 1
WHEEZING: 0
RHINORRHEA: 1
SORE THROAT: 1
DIARRHEA: 0
SINUS PAIN: 1
TROUBLE SWALLOWING: 0
HEARTBURN: 0
NAUSEA: 0
VOMITING: 0
DYSPNEA ASSOCIATED WITH: EXERTION
HEMOPTYSIS: 0
ABDOMINAL PAIN: 0

## 2020-01-03 ENCOUNTER — CARE COORDINATION (OUTPATIENT)
Dept: CARE COORDINATION | Age: 55
End: 2020-01-03

## 2020-01-03 NOTE — CARE COORDINATION
Reason For Call Today:  -Attempted to reach Shirlene Zazueta today to follow up on blood sugar readings since increasing Novolog to 15 units TID. Also follow up on walk in visit- did she get antibiotic and tessalon pearls picked up? Effective yet?   -Unable to reach Shirlene Zazueta today  -Left a voicemail message requesting a return phone call back to this AC when patient is able to [phone], office hours given. Future Appointments   Date Time Provider Emma Gaxiola   2/7/2020 12:30 PM [facility] MAMMOGRAPHY ROOM AT [facility]   2/25/2020  9:00 AM Shae Green MD Neuro Spec [facility]   3/13/2020 10:00 AM CHELSEA Dickinson - CNP [facility]   4/29/2020  1:40 [facility]       Care Coordination Plan of Care: This nurse Care Coordinator will await call back from patient, and if no return call; will attempt to reach patient back again next week.

## 2020-01-08 ENCOUNTER — CARE COORDINATION (OUTPATIENT)
Dept: CARE COORDINATION | Age: 55
End: 2020-01-08

## 2020-01-08 ASSESSMENT — ENCOUNTER SYMPTOMS: DYSPNEA ASSOCIATED WITH: EXERTION

## 2020-01-08 NOTE — CARE COORDINATION
Ambulatory Care Coordination Note  1/8/2020  CM Risk Score: 10  Charlson 10 Year Mortality Risk Score: 79%     ACC: Olga Lidia Dixon RN    Summary Note:   Date Care Coordination Episode Started:   12/12/19    Reason For Call Today:   Follow up on blood sugar readings since increasing Novolog to 15 units three times daily. Remind Scott Sauceda to increase her Ozempic dose this coming Sunday to 0.5mg. Follow up on recent illness- has she completed her antibiotic?     -Spoke with Scott Sauceda today   -Scott Sauceda states she is doing better       Topics discussed today:   1.) Illness  -Utilized walk in clinic as encouraged on 12/30/19  -Was prescribed Augmentin and tessalon pearls   -Reports she has 1 more day of Augmentin left   -Has been using Tessalon pearls as needed  -Still having a slight cough but has improved greatly  -Reports feeling \"much better\"    2.) Ozempic   -ACM reminding Scott Sauceda that this Sunday she should increase her Ozempic from 0.25mg to 0.5 mg weekly   -Rosannakei Sauceda has been on 0.25 mg weekly for 4 weeks   -Scott Sauceda voices understanding and will increase dose on Sunday as instructed       Recent appointments Reviewed (PCP/Specialist):   Office note from Walk In Visit:  Assessment:        Diagnosis Orders   1. Acute non-recurrent pansinusitis  amoxicillin-clavulanate (AUGMENTIN) 875-125 MG per tablet   2. Acute bronchitis, unspecified organism  benzonatate (TESSALON) 200 MG capsule         Plan:   Discussed exam, POCT findings, plan of care (including prescriptive and supportive as listed below) and follow-up atlength with  Patient. Reviewed all prescribed and recommended medications, administration and side effects. Encouraged to return to [address] [place] [address] for noimprovement and or worsening of symptoms. To ER or call 911 if any difficulty breathing, shortness of breath, inability to swallow, hives or temp greater than 103 degrees. Questions answered. They verbalized understandingand agreement.          Reason Patient is stable  Breathlessness:  exertion  Change in chronic cough?:  No/At Baseline  Change in sputum?:  No/At Baseline  Self Monitoring - SaO2:  No  Have you had a recent diagnosis of pneumonia either by PCP or at a hospital?:  No      and   General Assessment    Do you have any symptoms that are causing concern?:  Yes  Progression since Onset:  Gradually Improving  Reported Symptoms:  Cough, Congestion         Any ED visits or Hospitalizations since last Call? · No  · Did utilize walk in clinic on 12/30/19      Medications Reviewed with Vivian Javed today:  · Arik Santamaria that she has all medications. · Vivian Javed denies any questions. · Any cost issues with medications No         Reviewed Upcoming follow up appointments with Vivian Monteroo  Future Appointments   Date Time Provider Emma Gaxiola   2/7/2020 12:30  Dirk Lo. [facility] Rad   2/25/2020  9:00 AM Jaime Newberry MD Neuro Spec [facility]   3/13/2020 10:00 AM CHELSEA Galeano - CNP Tiff [facility]   4/29/2020  1:40 PM Chandni Lerner MD TIFF CARD [facility]       Goals reviewed. Patient to continue to work to improve:   Goals      Self Monitoring      Self-Monitored Blood Glucose - I will check my blood sugar Fasting blood sugar and Other: 2 hours PP largest meal  I will notify my provider of any trends of increasing or decreasing blood sugars over a 1 month period. I will notify my provider if I have any blood sugar readings less than 70 more than 2 times a month.       Barriers: fear of failure, overwhelmed by complexity of regimen and lack of education  Plan for overcoming my barriers: working with this ambulatory care manager  -Start monitoring blood sugar morning fasting and then 2 hours PP largest meal   -Start keeping a blood sugar log using tracker sheets ACM provided in the office   -Inform ACM or PCP of any low or high readings   -Take all insulin as prescribed   -Comply with site rotation   -Get yearly eye exam with optometrist -Get yearly foot check with podiatrist   -Follow sick day guidelines if needed   -Follow a carb controlled diet, meal plan with avoidance of concentrated sweets and plate method   Confidence: 7/10  Anticipated Goal Completion Date: 3/13/2020              Education Reviewed with patient today: See Education Module. Interventions completed today:  Increase Ozempic dose on Sunday to 0.5 mg weekly. · Patient to reach out to care coordinator at [phone] or MD office with questions. · Reminded Sedrick Louis of walk in care clinic availability/hours; and when to utilize the facility if MD office not available. Care Coordinator Plan of Care: This nurse CC will plan to follow up with Sedrick Louis in 1-2 weeks. Obtain blood sugar log. Have readings improved? Assess night time snack if AM readings are still high. Make sure she did increase her Ozempic to 0.5mg weekly on 1/12/2020. Care Coordination Interventions    Program Enrollment:  Complex Care  Referral from Primary Care Provider:  No  Suggested Interventions and Community Resources         Prior to Admission medications    Medication Sig Start Date End Date Taking? Authorizing Provider   insulin aspart (NOVOLOG FLEXPEN) 100 UNIT/ML injection pen Inject 15 Units into the skin 3 times daily (before meals) 12/30/19   CHELSEA Cruz CNP   amoxicillin-clavulanate (AUGMENTIN) 875-125 MG per tablet Take 1 tablet by mouth 2 times daily for 10 days 12/30/19 1/9/20  CHELSEA Cruz CNP   Semaglutide, 1 MG/DOSE, (OZEMPIC, 1 MG/DOSE,) 2 MG/1.5ML SOPN Inject 1 mg into the skin once a week 12/12/19   CHELSEA Cruz CNP   Semaglutide,0.25 or 0.5MG/DOS, (OZEMPIC, 0.25 OR 0.5 MG/DOSE,) 2 MG/1.5ML SOPN Inject 0.25 mg weekly for 4 weeks, then increase to 0.5 mg weekly.  12/12/19   CHELSEA Cruz CNP   metFORMIN (GLUCOPHAGE-XR) 500 MG extended release tablet Take 2 tablets by mouth daily (with breakfast) 12/12/19   CHELSEA Cruz CNP traZODone (DESYREL) 50 MG tablet TAKE 1 TABLET BY MOUTH EVERY DAY AT NIGHT 12/11/19   Grady Plasencia APRN - CNP   gabapentin (NEURONTIN) 800 MG tablet TAKE 1 TABLET BY MOUTH 3 TIMES A DAY.  11/27/19 11/25/20  Jayshree Dye MD   FREESTYLE LITE strip USE TO CHECK BLOOD SUGARS 3 TIMES DAILY AND AS NEEDED FOR DIABETES E11.40 11/19/19   Grady Plasencia APRN - CNP   acetaminophen (TYLENOL) 325 MG tablet Take 2 tablets by mouth every 6 hours as needed for Pain 11/12/19   Jonelle Relic, DO   fludrocortisone (FLORINEF) 0.1 MG tablet Take 3 tablets by mouth daily 10/23/19   Lauren Starks MD   albuterol sulfate HFA (VENTOLIN HFA) 108 (90 Base) MCG/ACT inhaler INHALE 2 PUFFS EVERY 6 HOURS AS NEEDED FOR WHEEZING 10/8/19   CHELSEA Quinteros - CNP   DULoxetine (CYMBALTA) 60 MG extended release capsule TAKE 1 CAPSULE BY MOUTH EVERY DAY 9/23/19   Jayshree Dye MD   TRESIBA FLEXTOUCH 200 UNIT/ML SOPN INJECT 70 UNITS INTO THE SKIN NIGHTLY 7/29/19   Grady Plasencia APRN - CNP   escitalopram (LEXAPRO) 10 MG tablet TAKE 1 TABLET BY MOUTH EVERY DAY  Patient taking differently: Take 10 mg by mouth daily  7/22/19   Grady Plasencia APRN - CNP   losartan (COZAAR) 25 MG tablet TAKE 1 TABLET BY MOUTH DAILY 7/22/19   Orshalini Plasencia, APRN - CNP   atorvastatin (LIPITOR) 40 MG tablet TAKE 1 TABLET BY MOUTH DAILY 7/22/19   [facility], APRN - CNP   aspirin 81 MG EC tablet TAKE 1 TABLET BY MOUTH DAILY  Patient taking differently: Take 81 mg by mouth daily  7/1/19   Grady Plasencia APRN - ANGELIKA   metoprolol succinate (TOPROL XL) 100 MG extended release tablet Take 1 tablet by mouth daily 3/4/19   Lauren Starks MD   Insulin Pen Needle (BD ULTRA-FINE PEN NEEDLES) 29G X 12.7MM MISC USE AS DIRECTED BY PHYSICIAN 5 times daily 8/7/18   CHELSEA Quinteros CNP   Blood Glucose Monitoring Suppl BERE 1 Units by Does not apply route three times daily Unit insurance will cover 12/29/17   CHELSEA Kraus CNP   omeprazole (PRILOSEC) 20 MG delayed release capsule Take 1 capsule by mouth daily 5/9/17   Bj Smalls MD   Blood Pressure Monitor KIT 1 each by Does not apply route daily as needed ESSENTIAL HYPERTENSION   I10  Patient not taking: Reported on 12/30/2019 5/31/16   CHELSEA Pham CNP   Blood Glucose Monitoring Suppl (BLOOD GLUCOSE MONITOR KIT) KIT 1 each by Does not apply route daily. Please dispense whatever BS monitoring kit [employer] covers. Dx: 250, new onset T2DM.  Testing once daily 2/21/12 10/27/19  CHELSEA Díaz - ANGELIKA

## 2020-01-22 ENCOUNTER — CARE COORDINATION (OUTPATIENT)
Dept: CARE COORDINATION | Age: 55
End: 2020-01-22

## 2020-01-22 ENCOUNTER — TELEPHONE (OUTPATIENT)
Dept: PRIMARY CARE CLINIC | Age: 55
End: 2020-01-22

## 2020-01-22 RX ORDER — INSULIN LISPRO 100 [IU]/ML
15 INJECTION, SOLUTION INTRAVENOUS; SUBCUTANEOUS
Qty: 12 PEN | Refills: 1 | Status: SHIPPED | OUTPATIENT
Start: 2020-01-22 | End: 2020-03-13 | Stop reason: DRUGHIGH

## 2020-01-22 RX ORDER — GABAPENTIN 800 MG/1
TABLET ORAL
Qty: 90 TABLET | Refills: 0 | Status: SHIPPED | OUTPATIENT
Start: 2020-01-25 | End: 2020-02-04

## 2020-01-22 RX ORDER — BLOOD-GLUCOSE METER
KIT MISCELLANEOUS
Qty: 100 STRIP | Refills: 1 | Status: SHIPPED | OUTPATIENT
Start: 2020-01-22 | End: 2020-03-19

## 2020-01-22 ASSESSMENT — ENCOUNTER SYMPTOMS: DYSPNEA ASSOCIATED WITH: EXERTION

## 2020-01-22 NOTE — TELEPHONE ENCOUNTER
Health Maintenance   Topic Date Due    Breast cancer screen  01/10/2020    Hepatitis B vaccine (1 of 3 - Risk 3-dose series) 03/05/2020 (Originally 2/13/1984)    DTaP/Tdap/Td vaccine (1 - Tdap) 03/05/2020 (Originally 2/13/1976)    Shingles Vaccine (1 of 2) 06/12/2020 (Originally 2/13/2015)    A1C test (Diabetic or Prediabetic)  03/09/2020    Diabetic retinal exam  04/25/2020    Diabetic microalbuminuria test  12/09/2020    Lipid screen  12/09/2020    Potassium monitoring  12/09/2020    Creatinine monitoring  12/09/2020    Diabetic foot exam  12/12/2020    Colon cancer screen colonoscopy  01/27/2026    Flu vaccine  Completed    Pneumococcal 0-64 years Vaccine  Completed    Hepatitis C screen  Completed    HIV screen  Completed             (applicable per patient's age: Cancer Screenings, Depression Screening, Fall Risk Screening, Immunizations)    Hemoglobin A1C (%)   Date Value   12/09/2019 12.1 (H)   06/04/2019 12.7 (H)   03/05/2019 13.6     Microalb/Crt.  Ratio (mcg/mg creat)   Date Value   12/09/2019 CANNOT BE CALCULATED     LDL Cholesterol (mg/dL)   Date Value   12/09/2019 81     AST (U/L)   Date Value   12/09/2019 12     ALT (U/L)   Date Value   12/09/2019 16     BUN (mg/dL)   Date Value   12/09/2019 9      (goal A1C is < 7)   (goal LDL is <100) need 30-50% reduction from baseline     BP Readings from Last 3 Encounters:   12/30/19 127/88   12/12/19 121/75   11/12/19 126/81    (goal /80)      All Future Testing planned in [facility]:  Lab Frequency Next Occurrence   Referral to Cardiac Cath Once 03/07/2019   ELIGIO DIGITAL SCREEN W CAD BILATERAL Once 02/07/2020       Next Visit Date:  Future Appointments   Date Time Provider Emma Gaxiola   2/4/2020 10:40 AM Maria Antonia Narvaez MD Neuro Spec [facility]   2/7/2020 12:30 PM [facility] MAMMOGRAPHY ROOM AT [facility]   3/13/2020 10:00 AM CHELSEA Haines - CNP Tiff [facility]   4/29/2020  1:40 PM Johanna Lucas MD [facility] [place] Patient Active Problem List:     CAD (coronary artery disease)     Dyslipidemia     Radiculopathy     Diabetes type 2, uncontrolled     Insomnia     Allergic rhinitis     COPD (chronic obstructive pulmonary disease)     Depression     Bilateral leg weakness     Gait difficulty     Diabetic neuropathy     Back pain     Muscle spasm     Affective disorder (HCC)     Morbid obesity with BMI of 40.0-44.9, adult ([facility] [address]     History of ventral hernia repair     History of incisional hernia repair     Essential hypertension     Gastroesophageal reflux disease without esophagitis     Primary osteoarthritis involving multiple joints     Heart palpitations     Dysautonomia orthostatic hypotension syndrome (HCC)     Coronary artery disease involving native coronary artery of native heart     Angina, class III ([facility])     S/P CABG x 1     Precordial pain     DARON (obstructive sleep apnea)     Morbid obesity ([facility])     Neuropathic pain     Soft tissue mass     D[facility]autonomia ([facility]

## 2020-01-22 NOTE — CARE COORDINATION
your blood sugar?:  Other (Comment: morning fasting and 2 hours PP largest meal)   Do you have barriers with adherence to non-pharmacologic self-management interventions? (Nutrition/Exercise/Self-Monitoring):  Yes   Have you ever had to go to the ED for symptoms of low blood sugar?:  No       No patient-reported symptoms   Do you have hyperglycemia symptoms?:  No   Do you have hypoglycemia symptoms?:  No   Last Blood Sugar Value:  212   Blood Sugar Monitoring Regimen:  2 Hours Post Meal, Morning Fasting   Blood Sugar Trends:  Fluctuating          2.) COPD  -Denies any increased shortness of breath or changes in cough   -No concerns/issues voiced today  Assessment Completed:  COPD Assessment    Does the patient understand envrionmental exposure?:  Yes  Is the patient able to verbalize Rescue vs. Long Acting medications?:  Yes  Does the patient have a nebulizer?:  No  Does the patient use a space with inhaled medications?:  Yes     No patient-reported symptoms         Symptoms:   None:  Yes      Symptom course:  stable  Breathlessness:  exertion  Change in chronic cough?:  No/At Baseline  Change in sputum?:  No/At Baseline  Self Monitoring - SaO2:  No  Have you had a recent diagnosis of pneumonia either by PCP or at a hospital?:  No         Any ED visits or Hospitalizations since last Call? · No      Medications Reviewed with [place] today:  · Otilio Wallace that she has all medications. · [facility] denies any questions. · Any cost issues with medications No- need to change insulin to what insurance will cover (Novolog and Ozempic)       [employer] Management tool reviewed today is applicable:   Yes, Diabetes       Reviewed social needs/Home Needs if any:   · Does patient have enough food? Yes  · Does patient have transportation to appointment/store/pharmacy, etc?Yes  · Does patient need any help in the home? Denies   · If yes, what type of help? n/a         Reviewed Upcoming follow up appointments with [facility]  Future Appointments   Date Time Provider Emma Gaxiola   2/4/2020 10:40 AM Antoine Leija MD Neuro Spec [facility]   2/7/2020 12:30 PM [facility] MAMMOGRAPHY ROOM AT [facility] Rad   3/13/2020 10:00 AM Suzette Plasencia, APRN - CNP T[facility] Prim [facility]   4/29/2020  1:40 PM Hamida Ventura MD TIFF CARD [facility]         Goals reviewed. Patient to continue to work to improve:   Goals      Self Monitoring      Self-Monitored Blood Glucose - I will check my blood sugar Fasting blood sugar and Other: 2 hours PP largest meal  I will notify my provider of any trends of increasing or decreasing blood sugars over a 1 month period. I will notify my provider if I have any blood sugar readings less than 70 more than 2 times a month. Barriers: fear of failure, overwhelmed by complexity of regimen and lack of education  Plan for overcoming my barriers: working with this ambulatory care manager  -Start monitoring blood sugar morning fasting and then 2 hours PP largest meal   -Start keeping a blood sugar log using tracker sheets ACM provided in the office   -Inform ACM or PCP of any low or high readings   -Take all insulin as prescribed   -Comply with site rotation   -Get yearly eye exam with optometrist   -Get yearly foot check with podiatrist   -Follow sick day guidelines if needed   -Follow a carb controlled diet, meal plan with avoidance of concentrated sweets and plate method   Confidence: 7/10  Anticipated Goal Completion Date: 3/13/2020                Education Reviewed with patient today: See Education Module. Interventions completed today:  · Patient to reach out to care coordinator at [phone] or MD office with questions. · Reminded Sven Ramesh of walk in care clinic availability/hours; and when to utilize the facility if MD office not available. Care Coordinator Plan of Care: This nurse CC will plan to route encounter to PCP- inform about no alternative formulary for [address] covered by [employer].  Inform him of alternative for Novolog. Will update PCP as well on blood sugar readings. Will then reach out to [place] and update. Care Coordination Interventions    Program Enrollment:  Complex Care  Referral from Primary Care Provider:  No  Suggested Interventions and Community Resources         Prior to Admission medications    Medication Sig Start Date End Date Taking? Authorizing Provider   FREESTYLE LITE strip USE TO CHECK BLOOD SUGARS 3 TIMES DAILY AND AS NEEDED FOR DIABETES E11.40 1/22/20   CHELSEA So CNP   insulin aspart (NOVOLOG FLEXPEN) 100 UNIT/ML injection pen Inject 15 Units into the skin 3 times daily (before meals) 12/30/19   CHELSEA Joshua CNP   Semaglutide, 1 MG/DOSE, (OZEMPIC, 1 MG/DOSE,) 2 MG/1.5ML SOPN Inject 1 mg into the skin once a week 12/12/19   CHELSEA Joshua - CNP   Semaglutide,0.25 or 0.5MG/DOS, (OZEMPIC, 0.25 OR 0.5 MG/DOSE,) 2 MG/1.5ML SOPN Inject 0.25 mg weekly for 4 weeks, then increase to 0.5 mg weekly. 12/12/19   CHELSEA Joshua CNP   metFORMIN (GLUCOPHAGE-XR) 500 MG extended release tablet Take 2 tablets by mouth daily (with breakfast) 12/12/19   CHELSEA Joshua - ANGELIKA   traZODone (DESYREL) 50 MG tablet TAKE 1 TABLET BY MOUTH EVERY DAY AT NIGHT 12/11/19   CHELSEA Joshua - CNP   gabapentin (NEURONTIN) 800 MG tablet TAKE 1 TABLET BY MOUTH 3 TIMES A DAY.  11/27/19 11/25/20  Marcia Mann MD   acetaminophen (TYLENOL) 325 MG tablet Take 2 tablets by mouth every 6 hours as needed for Pain 11/12/19   Mika Conn DO   fludrocortisone (FLORINEF) 0.1 MG tablet Take 3 tablets by mouth daily 10/23/19   Carmelo Tom MD   albuterol sulfate HFA (VENTOLIN HFA) 108 (90 Base) MCG/ACT inhaler INHALE 2 PUFFS EVERY 6 HOURS AS NEEDED FOR WHEEZING 10/8/19   CHELSEA Joshua - CNP   DULoxetine (CYMBALTA) 60 MG extended release capsule TAKE 1 CAPSULE BY MOUTH EVERY DAY 9/23/19   Marcia Mann MD   TRESIBA FLEXTOUCH 200 UNIT/ML SOPN INJECT

## 2020-01-23 NOTE — TELEPHONE ENCOUNTER
Can we please contact patient's pharmacy or insurance company to see what an alternative to [address] is that they will cover.   Thank you

## 2020-01-29 ENCOUNTER — CARE COORDINATION (OUTPATIENT)
Dept: CARE COORDINATION | Age: 55
End: 2020-01-29

## 2020-01-29 NOTE — CARE COORDINATION
Ambulatory Care Coordination Note  1/29/2020  CM Risk Score: 10  Charlson 10 Year Mortality Risk Score: 79%     ACC: Jovan Reyes RN    Summary Note:   Date Care Coordination Episode Started:   12/12/2019    Reason For Call Today:   Follow up blood sugar readings. Review blood sugar goals. Discuss insulin now covered by insurance. -Spoke with Nicolasa Tisha today   -Nicolasajoss Giles states she is doing well     Topics discussed today:   Insulin Coverage   -Informed Nicolasa Giles that her Novolog will now by Insulin Lispro (Humalog)- continue 15 units three times daily with meals as she was   -Informed Nicolasa Giles that her Ozempic will be changing to [address] [place] [address] to continue taking Ozempic until last dose then change to [facility]   -Start taking Trulicity [facility]- will take Trulicity weekly (recommend sticking with Sundays as she was with Ozempic) will take 0.75 mg weekly   -Nicolasa Giles voices understanding       Reason Patient is in Care coordination:   Diabetes, Elevated A1C, COPD       Chronic Conditions:   1.)Diabetes  -Nicolasa Giles is compliant with monitoring blood sugars: morning fasting and 2 hours post prandial largest meal (evening)   -Nicolasa Giles states he average morning fasting readings are [facility] states her average post prandial is under 200   -Reviewed with Nicolasa Giles goal of blood sugar being 130 and under prior to meals, and 180 and under 2 hours after meals.  Nicolasa Simons verbalizes understanding.   -Discussed diabetic friendly snacks for bedtime as her morning readings are so high   -Denies missing any doses of Tresiba 70 units nightly   Lab Results   Component Value Date    LABA1C 12.1 (H) 12/09/2019     Lab Results   Component Value Date     12/09/2019   Assessment Completed:  Diabetes Assessment    Medic Alert ID:  No  Meal Planning:  Avoidance of concentrated sweets   How often do you test your blood sugar?:  Other (Comment: morning fasting and 2 hours PP largest meal)   Do you have barriers with adherence to non-pharmacologic self-management interventions? (Nutrition/Exercise/Self-Monitoring):  Yes   Have you ever had to go to the ED for symptoms of low blood sugar?:  No       No patient-reported symptoms   Do you have hyperglycemia symptoms?:  No   Do you have hypoglycemia symptoms?:  No   Last Blood Sugar Value:  193   Blood Sugar Monitoring Regimen:  Morning Fasting, 2 Hours Post Meal   Blood Sugar Trends:  Fluctuating          2.) COPD  Assessment Completed:  COPD Assessment    Does the patient understand envrionmental exposure?:  Yes  Is the patient able to verbalize Rescue vs. Long Acting medications?:  Yes  Does the patient have a nebulizer?:  No  Does the patient use a space with inhaled medications?:  Yes     No patient-reported symptoms         Symptoms:   None:  Yes      Symptom course:  stable  Change in chronic cough?:  No/At Baseline  Change in sputum?:  No/At Baseline  Self Monitoring - SaO2:  No  Have you had a recent diagnosis of pneumonia either by PCP or at a hospital?:  No         Any ED visits or Hospitalizations since last Call? · No      Medications Reviewed with Marty Flores today:  · Lolly Salgado that she has all medications. · Marty Flores denies any questions. · Any cost issues with medications No      [employer] Management tool reviewed today is applicable:   Yes, Diabetes       Reviewed social needs/Home Needs if any:   · Does patient have enough food? Yes  · Does patient have transportation to appointment/store/pharmacy, etc?Yes  · Does patient need any help in the home?denies   · If yes, what type of help? n/a         Reviewed Upcoming follow up appointments with Marty Flores  Future Appointments   Date Time Provider [facility]   2/4/2020 10:40 AM Syed Krause MD Neuro Spec [facility]   2/7/2020 12:30 PM [facility] MAMMOGRAPHY ROOM AT [facility] Rad   3/13/2020 10:00 AM CHELSEA Dill - CNP Tiff Prim Ca [facility]   4/29/2020  1:40 PM Martina Woodall MD TIFF CARD [facility]wasi Antonio Goals reviewed. Patient to continue to work to improve:   Goals      Self Monitoring      Self-Monitored Blood Glucose - I will check my blood sugar Fasting blood sugar and Other: 2 hours PP largest meal  I will notify my provider of any trends of increasing or decreasing blood sugars over a 1 month period. I will notify my provider if I have any blood sugar readings less than 70 more than 2 times a month. Barriers: fear of failure, overwhelmed by complexity of regimen and lack of education  Plan for overcoming my barriers: working with this ambulatory care manager  -Start monitoring blood sugar morning fasting and then 2 hours PP largest meal   -Start keeping a blood sugar log using tracker sheets ACM provided in the office   -Inform ACM or PCP of any low or high readings   -Take all insulin as prescribed   -Comply with site rotation   -Get yearly eye exam with optometrist   -Get yearly foot check with podiatrist   -Follow sick day guidelines if needed   -Follow a carb controlled diet, meal plan with avoidance of concentrated sweets and plate method   Confidence: 7/10  Anticipated Goal Completion Date: 3/13/2020              Education Reviewed with patient today: See Education Module. Interventions completed today:  · Patient to reach out to care coordinator at [phone] or MD office with questions. · Reminded Marty Sandra  of walk in care clinic availability/hours      Care Coordinator Plan of Care: This nurse CC will plan to reach out to Marty Sandra in 1-2 weeks. Has she got Trulicity from pharmacy yet? Any questions about new insulin? Obtain blood sugar log- update PCP. If morning fasting readings still high- may need to increase Dre Smiley? Review diet again.           Care Coordination Interventions    Program Enrollment:  Complex Care  Referral from Primary Care Provider:  No  Suggested Interventions and Community Resources         Prior to Admission medications    Medication Sig Start Date End Date CHELSEA Plasencia CNP   metoprolol succinate (TOPROL XL) 100 MG extended release tablet Take 1 tablet by mouth daily 3/4/19   Itzel Steel MD   Insulin Pen Needle (BD ULTRA-FINE PEN NEEDLES) 29G X 12.7MM MISC USE AS DIRECTED BY PHYSICIAN 5 times daily 8/7/18   CHELSEA Villarreal CNP   Blood Glucose Monitoring Suppl BERE 1 Units by Does not apply route three times daily Unit insurance will cover 12/29/17   CHELSEA Dimas CNP   omeprazole (PRILOSEC) 20 MG delayed release capsule Take 1 capsule by mouth daily 5/9/17   Itzel Steel MD   Blood Pressure Monitor KIT 1 each by Does not apply route daily as needed ESSENTIAL HYPERTENSION   I10  Patient not taking: Reported on 12/30/2019 5/31/16   CHELSEA Villarreal CNP   Blood Glucose Monitoring Suppl (BLOOD GLUCOSE MONITOR KIT) KIT 1 each by Does not apply route daily. Please dispense whatever BS monitoring kit [employer] covers. Dx: 250, new onset T2DM.  Testing once daily 2/21/12 10/27/19  CHELSEA Dillon CNP

## 2020-02-04 ENCOUNTER — OFFICE VISIT (OUTPATIENT)
Dept: NEUROLOGY | Age: 55
End: 2020-02-04
Payer: COMMERCIAL

## 2020-02-04 VITALS
HEART RATE: 90 BPM | WEIGHT: 214 LBS | DIASTOLIC BLOOD PRESSURE: 83 MMHG | BODY MASS INDEX: 37.92 KG/M2 | HEIGHT: 63 IN | SYSTOLIC BLOOD PRESSURE: 124 MMHG

## 2020-02-04 PROCEDURE — G8427 DOCREV CUR MEDS BY ELIG CLIN: HCPCS | Performed by: PSYCHIATRY & NEUROLOGY

## 2020-02-04 PROCEDURE — G8482 FLU IMMUNIZE ORDER/ADMIN: HCPCS | Performed by: PSYCHIATRY & NEUROLOGY

## 2020-02-04 PROCEDURE — 3017F COLORECTAL CA SCREEN DOC REV: CPT | Performed by: PSYCHIATRY & NEUROLOGY

## 2020-02-04 PROCEDURE — G8417 CALC BMI ABV UP PARAM F/U: HCPCS | Performed by: PSYCHIATRY & NEUROLOGY

## 2020-02-04 PROCEDURE — 3046F HEMOGLOBIN A1C LEVEL >9.0%: CPT | Performed by: PSYCHIATRY & NEUROLOGY

## 2020-02-04 PROCEDURE — 2022F DILAT RTA XM EVC RTNOPTHY: CPT | Performed by: PSYCHIATRY & NEUROLOGY

## 2020-02-04 PROCEDURE — 99214 OFFICE O/P EST MOD 30 MIN: CPT | Performed by: PSYCHIATRY & NEUROLOGY

## 2020-02-04 PROCEDURE — 1036F TOBACCO NON-USER: CPT | Performed by: PSYCHIATRY & NEUROLOGY

## 2020-02-04 RX ORDER — TIZANIDINE 2 MG/1
2 TABLET ORAL NIGHTLY PRN
Qty: 20 TABLET | Refills: 1 | Status: SHIPPED | OUTPATIENT
Start: 2020-02-04 | End: 2020-03-22

## 2020-02-04 RX ORDER — PREGABALIN 100 MG/1
100 CAPSULE ORAL 3 TIMES DAILY
Qty: 90 CAPSULE | Refills: 1 | Status: SHIPPED | OUTPATIENT
Start: 2020-02-04 | End: 2020-03-13 | Stop reason: ALTCHOICE

## 2020-02-04 NOTE — PROGRESS NOTES
NEUROLOGY FOLLOW-UP  Patient Name:  Maddi Coffey  :   1965  Clinic Visit Date: 2020    I saw Ms. Maddi Coffey in follow-up in the office today in continuation of neurologic care. She is a 47 y.o.  female with painful diabetic peripheral polyneuropathy with poorly controlled diabetes with hemoglobin A1c at 12.1 (2019) on gabapentin 800 tid & cyclobenzaprine prn. Comes to clinic stating that cyclobenzaprine stopped as she was having severe sleepiness. She has been having severe muscle spasms in bilateral lower extremities. Gabapentin is not at all helping for pain in both lower legs and feet. Her pain has been extending upwards and she is also having painful sensations in fingers, left hand greater than right hand. Denies neck pain radiating down upper extremities. She describes painful sensations as burning pain in both feet and lower legs along with cramps in calves. Since last visit; her hemoglobin A1c has a slightly improved from 12.7 to 12.1. She is still taking duloxetine with no significant relief. She is tolerating it well without any adverse effects. She has been using duloxetine, Cymbalta along with the gabapentin and capsaicin cream for relief in dysesthesias. She gets exacerbation of symptoms whenever she misses any dose. She has not had any medication related adverse effects. She denies any side effects such as headache, drowsiness, nausea, abdominal pain, weakness. She also stated that she has been participating in physical therapy and it has been helping. Review of systems done by staff reviewed and pertinent positives include balance difficulties, numbness, spasms, headaches and depression. Denies suicidal ideations.       Current Outpatient Medications on File Prior to Visit   Medication Sig Dispense Refill    Dulaglutide (TRULICITY) 3.09 BR/7.7PM SOPN Inject 0.75 mg into the skin once a week 12 pen 1    gabapentin (NEURONTIN) 800 MG tablet TAKE 1 TABLET BY MOUTH THREE TIMES A DAY 90 tablet 0    FREESTYLE LITE strip USE TO CHECK BLOOD SUGARS 3 TIMES DAILY AND AS NEEDED FOR DIABETES E11.40 100 strip 1    insulin lispro, 1 Unit Dial, (HUMALOG KWIKPEN) 100 UNIT/ML SOPN Inject 15 Units into the skin 3 times daily (before meals) 12 pen 1    metFORMIN (GLUCOPHAGE-XR) 500 MG extended release tablet Take 2 tablets by mouth daily (with breakfast) 180 tablet 3    traZODone (DESYREL) 50 MG tablet TAKE 1 TABLET BY MOUTH EVERY DAY AT NIGHT 90 tablet 3    fludrocortisone (FLORINEF) 0.1 MG tablet Take 3 tablets by mouth daily 90 tablet 3    albuterol sulfate HFA (VENTOLIN HFA) 108 (90 Base) MCG/ACT inhaler INHALE 2 PUFFS EVERY 6 HOURS AS NEEDED FOR WHEEZING 18 Inhaler 3    DULoxetine (CYMBALTA) 60 MG extended release capsule TAKE 1 CAPSULE BY MOUTH EVERY DAY 30 capsule 1    TRESIBA FLEXTOUCH 200 UNIT/ML SOPN INJECT 70 UNITS INTO THE SKIN NIGHTLY 12 pen 3    escitalopram (LEXAPRO) 10 MG tablet TAKE 1 TABLET BY MOUTH EVERY DAY (Patient taking differently: Take 10 mg by mouth daily ) 90 tablet 3    losartan (COZAAR) 25 MG tablet TAKE 1 TABLET BY MOUTH DAILY 90 tablet 3    atorvastatin (LIPITOR) 40 MG tablet TAKE 1 TABLET BY MOUTH DAILY 90 tablet 3    aspirin 81 MG EC tablet TAKE 1 TABLET BY MOUTH DAILY (Patient taking differently: Take 81 mg by mouth daily ) 90 tablet 3    metoprolol succinate (TOPROL XL) 100 MG extended release tablet Take 1 tablet by mouth daily 90 tablet 3    Insulin Pen Needle (BD ULTRA-FINE PEN NEEDLES) 29G X 12.7MM MISC USE AS DIRECTED BY PHYSICIAN 5 times daily 150 each 11    Blood Glucose Monitoring Suppl BERE 1 Units by Does not apply route three times daily Unit insurance will cover 1 Device 0    omeprazole (PRILOSEC) 20 MG delayed release capsule Take 1 capsule by mouth daily 90 capsule 3    Blood Pressure Monitor KIT 1 each by Does not apply route daily as needed ESSENTIAL HYPERTENSION   I10 1 kit 0    Blood Glucose Monitoring Suppl (BLOOD GLUCOSE MONITOR KIT) KIT 1 each by Does not apply route daily. Please dispense whatever BS monitoring kit [employer] covers. Dx: 250, new onset T2DM. Testing once daily 1 kit 0    acetaminophen (TYLENOL) 325 MG tablet Take 2 tablets by mouth every 6 hours as needed for Pain (Patient not taking: Reported on 2/4/2020) 20 tablet 0     No current facility-administered medications on file prior to visit. Allergies: Liliam Lebron is allergic to tape [adhesive tape]. Past Medical History:   Diagnosis Date    Anxiety     Asthma     CAD (coronary artery disease)     Clinical trial participant at discharge 3/31/16    COPD [facility]chronic obstructive pulmonary [facility]) [facility] [address]     Depression     Diabetic neuropathy ([facility] [address]     H/O cardiac catheterization 4/26/16    LMCA: Mild Irregularities 10-20%. LAD: Lesion on Prox LAD: Proximal subsection. 100% stenosis. LCx: Mild irregularities 10-20%. RCA: Mild irregularities 10-20%. Widely patent mid RCA stent. EF:60%.  H/O cardiovascular stress test 10/07/2016    Overall results are most consistent with a low risk for significant CAD.     H/O echocardiogram 10/05/2016    EF:>60%. Inferoseptal wall abnormal in its motion which is not unusal status post open heart surgery. Evidence of mild (grade I) diastolic dysfunction is seen.  H/O tilt table evaluation 07/12/2016    Abnormal. PTs HR, Blood Pressure response and symptoms were most consistent with dysautonomia. Combined w/viligant maintenance of euvolemia and maintaining a moderate salt intake, pharmaciologic treatment w/ ProAmatine, SSRI such as Lexapro and/or Mestinon among other treatments have shown some effectiveness in the treatment of this condition.  History of cardiovascular stress test 02/13/2019    Abnormal. Small/moderate perfusion defect of mild/moderate intesity in the anterior and anterolateral regions during stress imaging which is most consistent w/ ischemia but may be artifact.  Overall low/intermediate risk for significant CAD.  History of echocardiogram 09/06/2018    EF >60%. Inferoseptal wall is abnormal in its motion which is not unusual s/p open heart. Evidence of mild (grade I) diastolic dysfunction seen.     Hx of blood clots     Hyperlipidemia     Hypertension     Intermittent claudication (HCC)     Kidney stones     Movement disorder     neuropathy in legs    Neuromuscular disorder (HCC)     neuropathy    Osteoarthritis     Reflux     Seizures (HCC)     last over 10 yr ago    Stented coronary artery 9/22/2010     LAD/[place]    Stented coronary artery 3/31/16    RCA/[place]    Type II or unspecified type diabetes mellitus without mention of complication, not stated as uncontrolled     Unspecified sleep apnea     no machine       Past Surgical History:   Procedure Laterality Date    ABDOMINAL HERNIA REPAIR  1-2016    repair done [place]    APPENDECTOMY      CARDIAC CATHETERIZATION Left 4/26/2016    right radial/ Latisha Graves/ Dr Harshad Sher Left 03/07/2019    [email or web address]   [address]    COLONOSCOPY  12/16/14    -hemorrhoids,bx    CORONARY ANGIOPLASTY WITH STENT PLACEMENT  9/2010    CORONARY ANGIOPLASTY WITH STENT PLACEMENT  3-    [facility] RCA / DR Yue Medrano CORONARY ARTERY BYPASS GRAFT  08/15/2016    OP X 1- Dr Marnie Lew, COLON, DIAGNOSTIC  12/16/2014    HERNIA REPAIR  12/13/12    at the medial aspect of a [place] RUQ scar); repaired [address]. [id number]  02/16/2015    incisional, recurrent    HERNIA REPAIR  02/2016    HYSTERECTOMY      OTHER SURGICAL HISTORY  10/8/2015    abd wound washout, mesh removal, wound vac placement    [place] SUCT NICOLE LIPECTOMY,HEAD/NECK Left 8/27/2018    THIGH LESION BIOPSY EXCISION, SOFT TISSUE MASS performed by Maria Teresa Valverde MD at [facility] Left 2018    leg    UPPP UVULOPALATOPHARYGOPLASTY  06/26/2012  VENTRAL HERNIA REPAIR  09/21/2015    With [facility] - Dr Tristan Left History: Monika Dugan  reports that she quit smoking about 9 years ago. Her smoking use included cigarettes. She has never used smokeless tobacco. She reports that she does not drink alcohol or use drugs. Family History   Problem Relation Age of Onset    Cancer Mother     Diabetes Mother     Cancer Father         thyroid    Diabetes Sister     Heart Disease Sister     Heart Disease Brother     Heart Disease Maternal Uncle     Cancer Maternal Grandmother      On exam: Blood pressure 124/83, pulse 90, height 5' 3\" (1.6 m), weight 214 lb (97.1 kg), last menstrual period 12/05/1996, not currently breastfeeding. GENERAL  Appears comfortable and in no distress   HEENT  NC/ AT   NECK  Supple and no bruits heard   MENTAL STATUS:  Alert, oriented, intact memory, no confusion, normal speech, normal language, no hallucination or delusion: Flat affect stating ongoing pain is making her sad. CRANIAL NERVES: II     -      PERRLA, Fundi reveal intact venous pulsations; Visual fields intact to confrontation  III,IV,VI -  EOMs full, no afferent defect, no                      ELOY, no ptosis  V     -     Normal facial sensation  VII    -     Normal facial symmetry  VIII   -     Intact hearing  IX,X -     Symmetrical palate  XI    -     Symmetrical shoulder shrug  XII   -     Midline tongue, no atrophy    MOTOR FUNCTION:  significant for good strength of grade 5/5 in bilateral proximal and distal muscle groups of both upper and lower extremities with normal bulk, normal tone and no involuntary movements, no tremor   SENSORY FUNCTION:  Impaired pinprick and temperature in stocking pattern, asymmetric; left greater than right.      CEREBELLAR FUNCTION:  Intact fine motor control over upper limbs   REFLEX FUNCTION:  Symmetric, no perverted reflex, no Babinski sign   STATION and GAIT  Normal station, wide based gait; could not do tandem gait; possibility  Medication Counseling: risks and benefits including possible adverse effects discussed with the patient and she verbalized understanding those instructions. Also discussed about importance of euglycemia in control of dysesthesias and she voiced understanding those instructions. Follow-up in 3 months or sooner for further questions and concerns. Please note that portions of this note were completed with a voice recognition program.  Although every effort was made to ensure the accuracy of this  automated transcription, some errors in transcription may have occurred, occasionally words are mis-transcribed.

## 2020-02-04 NOTE — PROGRESS NOTES
NEUROLOGY FOLLOW-UP  Patient Name:  Jesus Zazueta  :   1965  Clinic Visit Date: 2020        REVIEW OF SYSTEMS  Constitutional Weight changes: absent, Fevers : absent, Fatigue: present; Any recent hospitalizations:  absent.    HEENT Ears: normal, Eyes: glasses, Visual disturbance: absent   Reespiratory Shortness of breath: present, Cough: absent   Cardivascular Chest pain: absent, Leg swelling :absent   GI Constipation: absent, Diarrhea: absent, Swallowing change: absent    Urinary frequency: absent, Urinary urgency: absent, Urinary incontinence: absent   Musculoskeletal Neck pain: absent, Back pain: present, Stiffness: absent, Muscle pain: absent, Joint pain: present   Dermatological Hair loss: absent, Skin changes: absent   Neurological Memory loss: absent, Confusion: present, Seizures: absent; Trouble walking or imbalance: present, Dizziness: absent, Weakness: present, Numbness present, Tremor: absent, Spasm: present, Speech difficulty: absent, Headache: present, Light sensitivity: absent   Psychiatric Anxiety: absent, Depression  present, Suicidal ideations absent   Hematologic Abnormal bleeding: absent, Anemia: absent, Clotting disorder: absent, Lymph gland changes: absent

## 2020-02-07 ENCOUNTER — HOSPITAL ENCOUNTER (OUTPATIENT)
Dept: WOMENS IMAGING | Age: 55
Discharge: HOME | End: 2020-02-09
Payer: COMMERCIAL

## 2020-02-07 ENCOUNTER — CARE COORDINATION (OUTPATIENT)
Dept: CARE COORDINATION | Age: 55
End: 2020-02-07

## 2020-02-07 PROCEDURE — 77063 BREAST TOMOSYNTHESIS BI: CPT

## 2020-02-07 NOTE — CARE COORDINATION
Reason For Call Today:  -Attempted to reach [facility] today to follow up Dr. Acosta Foster appointment. Any new orders/instructions? Obtain blood sugar log. Did she change to new covered insulin (Ozempic vs Trulicity). -Unable to reach [facility] today   -Left a voicemail message requesting a return phone call back to this [facility] when patient is able to [phone], office hours given. Future Appointments   Date Time Provider Emma Gaxiola   2/7/2020 12:30 PM [place] MAMMOGRAPHY ROOM AT [facility]   3/13/2020 10:00 AM CHELSEA Jama - CNP Tiff Prim Ca [facility]   4/29/2020  1:40 PM Flora Blood MD TIFF CARD [facility]   5/6/2020  1:00 PM Trell Kunz MD [address] [place] [address] Coordination Plan of Care: This nurse Care Coordinator will await call back from patient, and if no return call; will attempt to reach patient back again next week.

## 2020-02-10 ENCOUNTER — TELEPHONE (OUTPATIENT)
Dept: PRIMARY CARE CLINIC | Age: 55
End: 2020-02-10

## 2020-02-10 RX ORDER — DULOXETIN HYDROCHLORIDE 30 MG/1
30 CAPSULE, DELAYED RELEASE ORAL DAILY
Qty: 30 CAPSULE | Refills: 1 | Status: SHIPPED | OUTPATIENT
Start: 2020-02-10 | End: 2022-09-01 | Stop reason: SDUPTHER

## 2020-02-10 NOTE — TELEPHONE ENCOUNTER
----- Message from [address] [place] [address], CHELSEA - CNP sent at 2/9/2020 11:39 AM EST -----  Results are normal, please call patient and make them aware.

## 2020-02-13 ENCOUNTER — CARE COORDINATION (OUTPATIENT)
Dept: CARE COORDINATION | Age: 55
End: 2020-02-13

## 2020-02-13 ENCOUNTER — TELEPHONE (OUTPATIENT)
Dept: CARDIOLOGY | Age: 55
End: 2020-02-13

## 2020-02-13 NOTE — TELEPHONE ENCOUNTER
Dr Reynaldo Etienne has pt on Cymbalta and Taz Plasencia has her on Trazodone. She was told that there was a flag on her medications that the combination of Cymbalta and trazodone can make her heart race. You have her on Toprol  mg and she states her heart is still racing. She wants to know if she needs to increase her Toprol to help with her racing heart?

## 2020-02-13 NOTE — CARE COORDINATION
diagnosis of pneumonia either by PCP or at a hospital?:  No         Any ED visits or Hospitalizations since last Call? · No      Medications Reviewed with Felicitas Méndez today:  · Chandrakant Tomlinson that she has all medications. · Felicitas Méndez denies any questions. · Any cost issues with medications No      [employer] Management tool reviewed today is applicable:   Yes, Diabetes     Reviewed social needs/Home Needs if any:   · Does patient have enough food? Yes  · Does patient have transportation to appointment/store/pharmacy, etc?Yes, caresource and sister in law   · Does patient need any help in the home? Denies   · If yes, what type of help? n/a         Reviewed Upcoming follow up appointments with [facility]  Future Appointments   Date Time Provider Emma Gaxiola   3/13/2020 10:00 AM CHELSEA Waite - CNP [facility]   4/29/2020  1:40 PM Joaquina Santos MD [facility]   5/6/2020  1:00 PM Cameron Zhu MD [address] [place] [address] reviewed. Patient to continue to work to improve:   Goals      Self Monitoring      Self-Monitored Blood Glucose - I will check my blood sugar Fasting blood sugar and Other: 2 hours PP largest meal  I will notify my provider of any trends of increasing or decreasing blood sugars over a 1 month period. I will notify my provider if I have any blood sugar readings less than 70 more than 2 times a month.       Barriers: fear of failure, overwhelmed by complexity of regimen and lack of education  Plan for overcoming my barriers: working with this ambulatory care manager  -Start monitoring blood sugar morning fasting and then 2 hours PP largest meal   -Start keeping a blood sugar log using tracker sheets ACM provided in the office   -Inform ACM or PCP of any low or high readings   -Take all insulin as prescribed   -Comply with site rotation   -Get yearly eye exam with optometrist   -Get yearly foot check with podiatrist   -Follow sick day guidelines if needed   -Follow a carb controlled diet, meal plan with avoidance of concentrated sweets and plate method   Confidence: 7/10  Anticipated Goal Completion Date: 3/13/2020              Education Reviewed with patient today: See Education Module. Interventions completed today:  · Patient to reach out to care coordinator at [phone] or MD office with questions. · Reminded Joe Vasquez of walk in care clinic availability/hours; and when to utilize the facility if MD office not available. Care Coordinator Plan of Care: This nurse CC will plan to mail Diabetic Support group pamphlet to Joe Vasquez. Will also send diabetic educator business card. Will plan to follow up with Joe Vasquez in 2 weeks. Obtain blood sugar log and update PCP. Remind her of support group. Assess for new care coordination needs. Care Coordination Interventions    Program Enrollment:  Complex Care  Referral from Primary Care Provider:  No  Suggested Interventions and Community Resources         Prior to Admission medications    Medication Sig Start Date End Date Taking? Authorizing Provider   DULoxetine (CYMBALTA) 30 MG extended release capsule Take 1 capsule by mouth daily 2/10/20   Dean Liu MD   pregabalin (LYRICA) 100 MG capsule Take 1 capsule by mouth 3 times daily for 90 doses.  2/4/20 3/5/20  Dean Liu MD   tiZANidine (ZANAFLEX) 2 MG tablet Take 1 tablet by mouth nightly as needed (spasms) 2/4/20   Dean Liu MD   Dulaglutide (TRULICITY) 7.84 BI/6.6ES SOPN Inject 0.75 mg into the skin once a week 1/29/20   CHELSEA So CNP   FREESTYLE LITE strip USE TO CHECK BLOOD SUGARS 3 TIMES DAILY AND AS NEEDED FOR DIABETES E11.40 1/22/20   CHELSEA So CNP   insulin lispro, 1 Unit Dial, (HUMALOG KWIKPEN) 100 UNIT/ML SOPN Inject 15 Units into the skin 3 times daily (before meals) 1/22/20   CHELSEA So CNP   metFORMIN (GLUCOPHAGE-XR) 500 MG extended release tablet Take 2 tablets by mouth daily (with

## 2020-02-13 NOTE — TELEPHONE ENCOUNTER
Please let patient know that she can go up on Toprol XL to 1 and 1/2 tab daily for 150 daily but if symptoms persist we should do another Holter. Thanks.

## 2020-02-13 NOTE — TELEPHONE ENCOUNTER
Pt advised to increase to 1 and 1/2 tabs a day. I told her if her symptoms worsen to go to the ER. She is going to call Monday and let us know how she feels.

## 2020-02-24 ENCOUNTER — CARE COORDINATION (OUTPATIENT)
Dept: CARE COORDINATION | Age: 55
End: 2020-02-24

## 2020-02-26 RX ORDER — FLUDROCORTISONE ACETATE 0.1 MG/1
TABLET ORAL
Qty: 90 TABLET | Refills: 3 | Status: SHIPPED | OUTPATIENT
Start: 2020-02-26 | End: 2020-06-16

## 2020-03-02 ENCOUNTER — CARE COORDINATION (OUTPATIENT)
Dept: CARE COORDINATION | Age: 55
End: 2020-03-02

## 2020-03-02 NOTE — CARE COORDINATION
Reason For Call Today:  -Attempted to reach Modesto Salgado today to follow up on blood sugar readings. Follow up on her receiving mailing about diabetic support group- remind of meeting 3/3 at 9 am. Follow up on increasing Toprol per Cardiology for racing heart- effective?   -Unable to reach Modesto Salgado today   -Left a voicemail message requesting a return phone call back to this AC when patient is able to [phone], office hours given. Future Appointments   Date Time Provider Emma Khalida   3/13/2020 10:00 AM CHELSEA Bazzi - CNP [facility]   4/29/2020  1:40 PM Colletta Busman, MD [facility]   5/6/2020  1:00 PM Meg Brennan MD [address] [place] [address] Coordination Plan of Care: This nurse Care Coordinator will await call back from patient, and if no return call; will attempt to reach patient back again next week.

## 2020-03-03 ENCOUNTER — TELEPHONE (OUTPATIENT)
Dept: PRIMARY CARE CLINIC | Age: 55
End: 2020-03-03

## 2020-03-03 NOTE — TELEPHONE ENCOUNTER
pts alogliptin metformin is on manufacture back order. Pharmacy is requesting a alternative to be called in. Health Maintenance   Topic Date Due    A1C test (Diabetic or Prediabetic)  03/09/2020    Hepatitis B vaccine (1 of 3 - Risk 3-dose series) 03/05/2020 (Originally 2/13/1984)    DTaP/Tdap/Td vaccine (1 - Tdap) 03/05/2020 (Originally 2/13/1976)    Shingles Vaccine (1 of 2) 06/12/2020 (Originally 2/13/2015)    Diabetic retinal exam  04/25/2020    Diabetic microalbuminuria test  12/09/2020    Lipid screen  12/09/2020    Potassium monitoring  12/09/2020    Creatinine monitoring  12/09/2020    Diabetic foot exam  12/12/2020    Breast cancer screen  02/07/2022    Colon cancer screen colonoscopy  01/27/2026    Flu vaccine  Completed    Pneumococcal 0-64 years Vaccine  Completed    Hepatitis C screen  Completed    HIV screen  Completed    Hepatitis A vaccine  Aged Out    Hib vaccine  Aged Out    Meningococcal (ACWY) vaccine  Aged Out             (applicable per patient's age: Cancer Screenings, Depression Screening, Fall Risk Screening, Immunizations)    Hemoglobin A1C (%)   Date Value   12/09/2019 12.1 (H)   06/04/2019 12.7 (H)   03/05/2019 13.6     Microalb/Crt.  Ratio (mcg/mg creat)   Date Value   12/09/2019 CANNOT BE CALCULATED     LDL Cholesterol (mg/dL)   Date Value   12/09/2019 81     AST (U/L)   Date Value   12/09/2019 12     ALT (U/L)   Date Value   12/09/2019 16     BUN (mg/dL)   Date Value   12/09/2019 9      (goal A1C is < 7)   (goal LDL is <100) need 30-50% reduction from baseline     BP Readings from Last 3 Encounters:   02/04/20 124/83   12/30/19 127/88   12/12/19 121/75    (goal /80)      All Future Testing planned in [facility]:  Lab Frequency Next Occurrence   Referral to Cardiac Cath Once 03/07/2019       Next Visit Date:  Future Appointments   Date Time Provider Emma Gaxiola   3/13/2020 10:00 AM Kwan Plasencia, APRN - CNP [facility]

## 2020-03-03 NOTE — TELEPHONE ENCOUNTER
Looks like she is on the Trulicity and extended release metformin. Since she is on Trulicity she does not need to be on alogliptin. Can you please contact her to make sure she is able to get the Trulicity.   Thank you

## 2020-03-10 ENCOUNTER — CARE COORDINATION (OUTPATIENT)
Dept: CARE COORDINATION | Age: 55
End: 2020-03-10

## 2020-03-10 NOTE — CARE COORDINATION
Reason For Call Today:  -Attempted to reach Rosalia Ramey today to follow up on blood sugar readings. Review medications- dosage and frequency. Does she have all of her medications at home?   -Unable to reach Rosalia Ramey today   -3rd unsuccessful reach out attempt via phone   -Left a voicemail message requesting a return phone call back to this AC when patient is able to [phone], office hours given. Future Appointments   Date Time Provider Emma Persaudisti   3/13/2020 10:00 AM CHELSEA Black - CNP [facility]   4/29/2020  1:40 PM Maribel Key MD [facility]   5/6/2020  1:00 PM Elizabeth Sears MD [address] [place] [address] Coordination Plan of Care: This nurse Care Coordinator will await call back from patient, and if no return call; will attempt to meet patient at PCP appointment this week.

## 2020-03-13 ENCOUNTER — CARE COORDINATION (OUTPATIENT)
Dept: CARE COORDINATION | Age: 55
End: 2020-03-13

## 2020-03-13 ENCOUNTER — OFFICE VISIT (OUTPATIENT)
Dept: PRIMARY CARE CLINIC | Age: 55
End: 2020-03-13
Payer: COMMERCIAL

## 2020-03-13 VITALS
RESPIRATION RATE: 16 BRPM | TEMPERATURE: 97.6 F | HEIGHT: 63 IN | SYSTOLIC BLOOD PRESSURE: 116 MMHG | HEART RATE: 95 BPM | WEIGHT: 216.4 LBS | DIASTOLIC BLOOD PRESSURE: 88 MMHG | BODY MASS INDEX: 38.34 KG/M2

## 2020-03-13 LAB — HBA1C MFR BLD: 12.7 %

## 2020-03-13 PROCEDURE — 3023F SPIROM DOC REV: CPT | Performed by: NURSE PRACTITIONER

## 2020-03-13 PROCEDURE — G8417 CALC BMI ABV UP PARAM F/U: HCPCS | Performed by: NURSE PRACTITIONER

## 2020-03-13 PROCEDURE — 3017F COLORECTAL CA SCREEN DOC REV: CPT | Performed by: NURSE PRACTITIONER

## 2020-03-13 PROCEDURE — 99214 OFFICE O/P EST MOD 30 MIN: CPT | Performed by: NURSE PRACTITIONER

## 2020-03-13 PROCEDURE — G8926 SPIRO NO PERF OR DOC: HCPCS | Performed by: NURSE PRACTITIONER

## 2020-03-13 PROCEDURE — 3046F HEMOGLOBIN A1C LEVEL >9.0%: CPT | Performed by: NURSE PRACTITIONER

## 2020-03-13 PROCEDURE — G8427 DOCREV CUR MEDS BY ELIG CLIN: HCPCS | Performed by: NURSE PRACTITIONER

## 2020-03-13 PROCEDURE — 1036F TOBACCO NON-USER: CPT | Performed by: NURSE PRACTITIONER

## 2020-03-13 PROCEDURE — 2022F DILAT RTA XM EVC RTNOPTHY: CPT | Performed by: NURSE PRACTITIONER

## 2020-03-13 PROCEDURE — 83036 HEMOGLOBIN GLYCOSYLATED A1C: CPT | Performed by: NURSE PRACTITIONER

## 2020-03-13 PROCEDURE — G8482 FLU IMMUNIZE ORDER/ADMIN: HCPCS | Performed by: NURSE PRACTITIONER

## 2020-03-13 RX ORDER — DULAGLUTIDE 1.5 MG/.5ML
1.5 INJECTION, SOLUTION SUBCUTANEOUS WEEKLY
Qty: 12 PEN | Refills: 3 | Status: SHIPPED | OUTPATIENT
Start: 2020-03-13 | End: 2021-03-30

## 2020-03-13 RX ORDER — INSULIN LISPRO 100 [IU]/ML
18 INJECTION, SOLUTION INTRAVENOUS; SUBCUTANEOUS
Qty: 12 PEN | Refills: 1 | Status: SHIPPED | OUTPATIENT
Start: 2020-03-13 | End: 2020-06-23 | Stop reason: DRUGHIGH

## 2020-03-13 RX ORDER — METOPROLOL SUCCINATE 100 MG/1
100 TABLET, EXTENDED RELEASE ORAL DAILY
Qty: 90 TABLET | Refills: 3 | Status: SHIPPED | OUTPATIENT
Start: 2020-03-13 | End: 2022-05-16

## 2020-03-13 ASSESSMENT — ENCOUNTER SYMPTOMS
HOARSE VOICE: 0
VOMITING: 0
CONSTIPATION: 0
SPUTUM PRODUCTION: 0
VISUAL CHANGE: 0
WHEEZING: 0
RHINORRHEA: 0
HEMOPTYSIS: 0
SHORTNESS OF BREATH: 0
DIARRHEA: 0
ABDOMINAL PAIN: 0
DIFFICULTY BREATHING: 0
SORE THROAT: 0
CHEST TIGHTNESS: 0
FREQUENT THROAT CLEARING: 0
NAUSEA: 0
COUGH: 0

## 2020-03-13 ASSESSMENT — COPD QUESTIONNAIRES: COPD: 1

## 2020-03-13 NOTE — PROGRESS NOTES
Name: Kelle Nino  : 1965         Chief Complaint:     Chief Complaint   Patient presents with    Diabetes     Routine office visit. States \"BS sugar this morning was 366. \"     Coronary Artery Disease       History of Present Illness:      Kelle Nino is a 54 y.o.  female who presents with Diabetes (Routine office visit. States \"BS sugar this morning was 366. \" ) and Coronary Artery Disease      Rolf Galarza is here today for a routine office visit. Care coordinator present for the visit today. DM-no improvement, below for further detail     Dysautonomia-stable, patient follows with cardiology    COPD-stable, see below for further detail    Affective disorder-stable, patient follows with neurology and continues with Cymbalta with good result. Angina-stable, patient follows with cardiology    Morbid obesity-stable, patient working with care coordinator with efforts to lose weight. Diabetes   She presents for her follow-up diabetic visit. She has type 2 diabetes mellitus. Her disease course has been stable. There are no hypoglycemic associated symptoms. Pertinent negatives for hypoglycemia include no dizziness or headaches. Associated symptoms include foot paresthesias, polydipsia and polyuria. Pertinent negatives for diabetes include no chest pain, no fatigue, no polyphagia, no visual change and no weakness. There are no hypoglycemic complications. Symptoms are worsening. Diabetic complications include heart disease and peripheral neuropathy. Pertinent negatives for diabetic complications include no CVA or nephropathy. Risk factors for coronary artery disease include diabetes mellitus, obesity and sedentary lifestyle. Current diabetic treatment includes insulin injections and oral agent (dual therapy). She is compliant with treatment all of the time. Her weight is stable. She is following a generally healthy diet. Meal planning includes avoidance of concentrated sweets.  She has had a previous included cigarettes. She has a 20.00 pack-year smoking history. She has never used smokeless tobacco.  Alcohol:      reports no history of alcohol use. Drug Use:  reports no history of drug use. Family History:     Family History   Problem Relation Age of Onset    Cancer Mother     Diabetes Mother     Cancer Father         thyroid    Diabetes Sister     Heart Disease Sister     Heart Disease Brother     Heart Disease Maternal Uncle     Cancer Maternal Grandmother        Review of Systems:     Positive and Negative as described in HPI    Review of Systems   Constitutional: Negative for appetite change, chills, fatigue, fever and weight gain. HENT: Negative for congestion, hoarse voice, postnasal drip, rhinorrhea, sneezing and sore throat. Eyes: Negative for visual disturbance. Respiratory: Negative for cough, hemoptysis, sputum production, chest tightness, shortness of breath and wheezing. Cardiovascular: Negative for chest pain, palpitations and leg swelling. Gastrointestinal: Negative for abdominal pain, constipation, diarrhea, nausea and vomiting. Endocrine: Positive for polydipsia and polyuria. Negative for polyphagia. Genitourinary: Negative for difficulty urinating and dysuria. Musculoskeletal: Negative for gait problem and muscle weakness. Skin: Negative for rash. Neurological: Negative for dizziness, syncope, weakness and headaches. Physical Exam:   Vitals:  /88 (Site: Right Upper Arm, Position: Sitting, Cuff Size: Large Adult)   Pulse 95   Temp 97.6 °F (36.4 °C) (Temporal)   Resp 16   Ht 5' 3\" (1.6 m)   Wt 216 lb 6.4 oz (98.2 kg)   LMP 12/05/1996   BMI 38.33 kg/m²     Physical Exam  Vitals signs and nursing note reviewed. Constitutional:       General: She is not in acute distress. Appearance: Normal appearance. She is well-developed. She is obese.    HENT:      Mouth/Throat:      Mouth: Mucous membranes are moist.   Eyes:      General: No scleral icterus. Conjunctiva/sclera: Conjunctivae normal.   Neck:      Musculoskeletal: Normal range of motion and neck supple. Cardiovascular:      Rate and Rhythm: Normal rate and regular rhythm. Pulmonary:      Effort: Pulmonary effort is normal.      Breath sounds: Normal breath sounds. No wheezing or rales. Abdominal:      General: Bowel sounds are normal. There is no distension. Palpations: Abdomen is soft. Tenderness: There is no abdominal tenderness. Comments: Obese abdomen   Musculoskeletal:      Right lower leg: No edema. Left lower leg: No edema. Lymphadenopathy:      Cervical: No cervical adenopathy. Skin:     General: Skin is warm and dry. Neurological:      Mental Status: She is alert and oriented to person, place, and time. Psychiatric:         Mood and Affect: Mood normal.         Behavior: Behavior normal.         Data:     Lab Results   Component Value Date     12/09/2019    K 4.3 12/09/2019    CL 99 12/09/2019    CO2 21 12/09/2019    BUN 9 12/09/2019    CREATININE 0.54 12/09/2019    GLUCOSE 394 12/09/2019    GLUCOSE 181 02/16/2012    PROT 7.7 12/09/2019    LABALBU 3.7 12/09/2019    LABALBU 4.2 12/05/2011    BILITOT 0.40 12/09/2019    ALKPHOS 100 12/09/2019    AST 12 12/09/2019    ALT 16 12/09/2019     Lab Results   Component Value Date    WBC 8.9 12/09/2019    RBC 5.07 12/09/2019    RBC 3.88 12/05/2011    HGB 13.5 12/09/2019    HCT 40.8 12/09/2019    MCV 80.5 12/09/2019    MCH 26.6 12/09/2019    MCHC 33.1 12/09/2019    RDW 14.0 12/09/2019     12/09/2019     12/05/2011    MPV 9.8 12/09/2019     Lab Results   Component Value Date    TSH 2.80 06/04/2016     Lab Results   Component Value Date    CHOL 163 12/09/2019    HDL 48 12/09/2019    LABA1C 12.7 03/13/2020       Assessment/Plan:      Diagnosis Orders   1.  Uncontrolled type 2 diabetes mellitus with diabetic polyneuropathy, with long-term current use of insulin (HCC)  POCT glycosylated hemoglobin (Hb A1C)    Dulaglutide (TRULICITY) 1.5 MQ/3.4UY SOPN    CBC Auto Differential    ALT    AST    Basic Metabolic Panel    Hemoglobin A1C    Lipid Panel    Microalbumin, Ur   2. Dysautonomia ([facility]     3. Chronic obstructive pulmonary disease, unspecified COPD type ([facility]     4. Affective disorder ([facility]     5. [facility]     6. Angina pectoris, unspecified ([facility]       We will increase Trulicity to 1.5 mg weekly. Continue 70 units of Tresiba daily. Continue metformin 1000 mg daily and adjust Humalog to 18 units prior to meals. She will keep in close contact with the care coordinator and report 2-hour postprandial and fasting blood sugars. We can discontinue alogliptin at this time. Stop Lexapro and trazodone. Continue Cymbalta. 1.  Zigmund Screen received counseling on the following healthy behaviors: nutrition, exercise and medication adherence  2. Patient given educational materials - see patient instructions  3. Was a self-tracking handout given in paper form or via [employer]? No  If yes, see orders or list here. 4.  Discussed use, benefit, and side effects of prescribed medications. Barriers to medication compliance addressed. All patient questions answered. Pt voiced understanding. 5.  Reviewed prior labs and health maintenance  6. Continue current medications, diet and exercise. Completed Refills   Requested Prescriptions     Signed Prescriptions Disp Refills    Dulaglutide (TRULICITY) 1.5 LC/9.8YO SOPN 12 pen 3     Sig: Inject 1.5 mg into the skin once a week    insulin lispro, 1 Unit Dial, (HUMALOG KWIKPEN) 100 UNIT/ML SOPN 12 pen 1     Sig: Inject 18 Units into the skin 3 times daily (before meals)    metoprolol succinate (TOPROL XL) 100 MG extended release tablet 90 tablet 3     Sig: Take 1 tablet by mouth daily         Return in about 3 months (around 6/13/2020) for check up.

## 2020-03-13 NOTE — PATIENT INSTRUCTIONS
SURVEY:    You may be receiving a survey from [employer] regarding your visit today. Please complete the survey to enable us to provide the highest quality of care to you and your family. If you cannot score us a very good on any question, please call the office to discuss how we could have made your experience a very good one. Thank you. Patient Education        Counting Carbohydrates: Care Instructions  Your Care Instructions    You don't have to eat special foods when you have diabetes. You just have to be careful to eat healthy foods. Carbohydrates (carbs) raise blood sugar higher and quicker than any other nutrient. Carbs are found in desserts, breads and cereals, and fruit. They're also in starchy vegetables. These include potatoes, corn, and grains such as rice and pasta. Carbs are also in milk and yogurt. The more carbs you eat at one time, the higher your blood sugar will rise. Spreading carbs all through the day helps keep your blood sugar levels within your target range. Counting carbs is one of the best ways to keep your blood sugar under control. If you use insulin, counting carbs helps you match the right amount of insulin to the number of grams of carbs in a meal. Then you can change your diet and insulin dose as needed. Testing your blood sugar several times a day can help you learn how carbs affect your blood sugar. A registered dietitian or certified diabetes educator can help you plan meals and snacks. Follow-up care is a key part of your treatment and safety. Be sure to make and go to all appointments, and call your doctor if you are having problems. It's also a good idea to know your test results and keep a list of the medicines you take. How can you care for yourself at home?   Know your daily amount of carbohydrates  Your daily amount depends on several things, such as your weight, how active you are, which diabetes medicines you take, and what your goals are for your blood

## 2020-03-13 NOTE — CARE COORDINATION
Interventions and Community Resources         Prior to Admission medications    Medication Sig Start Date End Date Taking?  Authorizing Provider   Dulaglutide (TRULICITY) 1.5 PK/0.6XG SOPN Inject 1.5 mg into the skin once a week 3/13/20   CHELSEA Dickinson CNP   insulin lispro, 1 Unit Dial, (HUMALOG KWIKPEN) 100 UNIT/ML SOPN Inject 18 Units into the skin 3 times daily (before meals) 3/13/20   CHELSEA Dickinson - CNP   metoprolol succinate (TOPROL XL) 100 MG extended release tablet Take 1 tablet by mouth daily 3/13/20   CHELSEA Dickinson - CNP   fludrocortisone (FLORINEF) 0.1 MG tablet TAKE 3 TABLETS BY MOUTH EVERY DAY 2/26/20   Idris Castillo MD   DULoxetine (CYMBALTA) 30 MG extended release capsule Take 1 capsule by mouth daily 2/10/20   April MD Peter   tiZANidine (ZANAFLEX) 2 MG tablet Take 1 tablet by mouth nightly as needed (spasms) 2/4/20 April MD Peter   FREESTYLE LITE strip USE TO CHECK BLOOD SUGARS 3 TIMES DAILY AND AS NEEDED FOR DIABETES E11.40 1/22/20   CHELSEA Sherman - CNP   metFORMIN (GLUCOPHAGE-XR) 500 MG extended release tablet Take 2 tablets by mouth daily (with breakfast) 12/12/19   CHELSEA Dickinson - CNP   acetaminophen (TYLENOL) 325 MG tablet Take 2 tablets by mouth every 6 hours as needed for Pain 11/12/19   Seema Zuniga DO   albuterol sulfate HFA (VENTOLIN HFA) 108 (90 Base) MCG/ACT inhaler INHALE 2 PUFFS EVERY 6 HOURS AS NEEDED FOR WHEEZING 10/8/19   CHELSEA Dickinson - ANGELIKA   TRESIBA FLEXTOUCH 200 UNIT/ML SOPN INJECT 70 UNITS INTO THE SKIN NIGHTLY 7/29/19   Praveenia Macy Plasencia APRN - CNP   losartan (COZAAR) 25 MG tablet TAKE 1 TABLET BY MOUTH DAILY 7/22/19   Praveenia Sit Luis Angel APRN - CNP   atorvastatin (LIPITOR) 40 MG tablet TAKE 1 TABLET BY MOUTH DAILY 7/22/19   Praveenia Macy Plasencia APRN - CNP   aspirin 81 MG EC tablet TAKE 1 TABLET BY MOUTH DAILY  Patient taking differently: Take 81 mg by mouth daily  7/1/19   oRnna Plasencia, APRN - CNP   Insulin Pen Needle (BD ULTRA-FINE PEN NEEDLES) 29G X 12.7MM MISC USE AS DIRECTED BY PHYSICIAN 5 times daily 8/7/18   CHELSEA Lawson CNP   Blood Glucose Monitoring Suppl BERE 1 Units by Does not apply route three times daily Unit insurance will cover 12/29/17   Rocio Loza CHELSEA Driscoll CNP   omeprazole (PRILOSEC) 20 MG delayed release capsule Take 1 capsule by mouth daily 5/9/17   Delmi Rivas MD   Blood Pressure Monitor KIT 1 each by Does not apply route daily as needed ESSENTIAL HYPERTENSION   I10 5/31/16   CHELSEA Lawson CNP   Blood Glucose Monitoring Suppl (BLOOD GLUCOSE MONITOR KIT) KIT 1 each by Does not apply route daily. Please dispense whatever BS [facility] kit [facility]. Dx: 89, new onset T2DM.  Testing once daily 2/21/12 2/4/20  CHELSEA Pack CNP

## 2020-03-20 ENCOUNTER — CARE COORDINATION (OUTPATIENT)
Dept: CARE COORDINATION | Age: 55
End: 2020-03-20

## 2020-03-20 ASSESSMENT — ENCOUNTER SYMPTOMS: DYSPNEA ASSOCIATED WITH: EXERTION

## 2020-03-20 NOTE — CARE COORDINATION
Ambulatory Care Coordination Note  3/20/2020  CM Risk Score: 10  Charlson 10 Year Mortality Risk Score: 79%     ACC: Ed Machado RN    Summary Note:   Date Care Coordination Episode Started:   12/12/2019    Reason For Call Today:   Follow up PCP visit from last week- make sure patient made medication adjustments. Review medications. Discuss diet/stress. Obtain blood sugar log and update PCP. -Spoke with Marty Sandra today   -Marty Sandra states she is doing well     Topics discussed today:   1.) Medications   -Reviewed med list with patient   -Patient did make all medication adjustments per orders/instructions at PCP appointment on 3/13  -Denies any questions about medications today   -Reports she has all of her medications and testing supplies at home     2.) COVID-19 Risk  Preventing the Spread of Coronavirus Disease 2019 in Homes  Stay home except to get medical care  People who are mildly ill with COVID-19 are able to isolate at home during their illness. You should restrict activities outside your home, except for getting medical care. Do not go to work, school, or public areas. Avoid using public transportation, ride-sharing, or taxis. Separate yourself from other people and animals in your home  People: As much as possible, you should stay in a specific room and away from other people in your home. Also, you should use a separate bathroom, if available. Animals: You should restrict contact with pets and other animals while you are sick with COVID-19, just like you would around other people. Although there have not been reports of pets or other animals becoming sick with COVID-19, it is still recommended that people sick with COVID-19 limit contact with animals until more information is known about the virus. When possible, have another member of your household care for your animals while you are sick.  If you are sick with COVID-19, avoid contact with your pet, including petting, snuggling, being kissed or licked, water. Clean all high-touch surfaces everyday  High touch surfaces include counters, tabletops, doorknobs, bathroom fixtures, toilets, phones, keyboards, tablets, and bedside tables. Also, clean any surfaces that may have blood, stool, or body fluids on them. Use a household cleaning spray or wipe, according to the label instructions. Labels contain instructions for safe and effective use of the cleaning product including precautions you should take when applying the product, such as wearing gloves and making sure you have good ventilation during use of the product. Reason Patient is in Care coordination:   Diabetes, Elevated A1C, COPD       Chronic Conditions:   1.)Diabetes   -Compliant with monitoring morning fasting and 2 hours PP largest meal   -Denies missing any doses of medications/insulin   -Reviewed medication/insulin dosing/frequesncy today   -Reports she is eating 3 meals per day with 1-2 snacks throughout the day   -Did report stress level back down from last week   -Discussed diabetic friendly snack options again   -Blood sugar log obtained and will be routed to PCP:  3/14- 376,210  3/,209  3/,468   3/,183  3/,136  3/,139  3/   Lab Results   Component Value Date    LABA1C 12.7 03/13/2020     Lab Results   Component Value Date     12/09/2019   Assessment Completed:  Diabetes Assessment    Medic Alert ID:  No  Meal Planning:  Avoidance of concentrated sweets   How often do you test your blood sugar?:  Other (Comment: morning fasting and 2 hours PP largest meal)   Do you have barriers with adherence to non-pharmacologic self-management interventions?  (Nutrition/Exercise/Self-Monitoring):  Yes   Have you ever had to go to the ED for symptoms of low blood sugar?:  No       Increase or Decrease trend in Blood Sugars   Do you have hyperglycemia symptoms?:  No   Do you have hypoglycemia symptoms?:  No   Last Blood Sugar Value:  219   Blood Sugar Monitoring mg into the skin once a week 3/13/20   Jaqueline Plasencia, APRN - CNP   insulin lispro, 1 Unit Dial, (HUMALOG KWIKPEN) 100 UNIT/ML SOPN Inject 18 Units into the skin 3 times daily (before meals) 3/13/20   Jaqueline Stewart Might, APRN - CNP   metoprolol succinate (TOPROL XL) 100 MG extended release tablet Take 1 tablet by mouth daily 3/13/20   Jaqueline Plasencia, APRN - CNP   fludrocortisone (FLORINEF) 0.1 MG tablet TAKE 3 TABLETS BY MOUTH EVERY DAY 2/26/20   Joaquina Santos MD   DULoxetine (CYMBALTA) 30 MG extended release capsule Take 1 capsule by mouth daily 2/10/20   Cameron Zhu MD   tiZANidine (ZANAFLEX) 2 MG tablet Take 1 tablet by mouth nightly as needed (spasms) 2/4/20   Cameron Zhu MD   metFORMIN (GLUCOPHAGE-XR) 500 MG extended release tablet Take 2 tablets by mouth daily (with breakfast) 12/12/19   Jaqueline Plasencia, APRN - CNP   acetaminophen (TYLENOL) 325 MG tablet Take 2 tablets by mouth every 6 hours as needed for Pain 11/12/19   Nehemiah Saucedo DO   albuterol sulfate HFA (VENTOLIN HFA) 108 (90 Base) MCG/ACT inhaler INHALE 2 PUFFS EVERY 6 HOURS AS NEEDED FOR WHEEZING 10/8/19   Jaqueline Plasencia, APRN - CNP   TRESIBA FLEXTOUCH 200 UNIT/ML SOPN INJECT 70 UNITS INTO THE SKIN NIGHTLY 7/29/19   Jaqueline Plasencia, APRN - CNP   losartan (COZAAR) 25 MG tablet TAKE 1 TABLET BY MOUTH DAILY 7/22/19   Pama Crown Luis Angel, APRN - CNP   atorvastatin (LIPITOR) 40 MG tablet TAKE 1 TABLET BY MOUTH DAILY 7/22/19   Pama Crown Luis Angel, APRN - CNP   aspirin 81 MG EC tablet TAKE 1 TABLET BY MOUTH DAILY  Patient taking differently: Take 81 mg by mouth daily  7/1/19   Jaqueline Plasencia, APRN - CNP   Insulin Pen Needle (BD ULTRA-FINE PEN NEEDLES) 29G X 12.7MM MISC USE AS DIRECTED BY PHYSICIAN 5 times daily 8/7/18   Jaqueline Plasencia, APRN - CNP   Blood Glucose Monitoring Suppl BERE 1 Units by Does not apply route three times daily Unit insurance will cover 12/29/17   Bunny Landau Rosipko, APRN - CNP   omeprazole (PRILOSEC) 20 MG delayed release capsule Take 1 capsule by mouth daily 5/9/17   Mary Magana MD   Blood Pressure Monitor KIT 1 each by Does not apply route daily as needed ESSENTIAL HYPERTENSION   I10 5/31/16   CHELSEA Cruz CNP   Blood Glucose Monitoring Suppl (BLOOD GLUCOSE MONITOR KIT) KIT 1 each by Does not apply route daily. Please dispense whatever BS monitoring kit [employer] covers. Dx: 250, new onset T2DM.  Testing once daily 2/21/12 2/4/20  CHELSEA Pace CNP

## 2020-03-20 NOTE — CARE COORDINATION
Reason For Call:  -Call back to [address] of PCP instructions:         Coming down nicely, continue current plan. Reduce mealtime insulin to 16 units. Thank you.                Judy Zhang voiced understanding and was able to repeat instructions back to [id number] [facility] of Care: This nurse Care Coordinator will plan to reach out to [facility] again in 1 week to obtain blood sugar log and update PCP. Discuss diet/exercise.

## 2020-03-27 ENCOUNTER — CARE COORDINATION (OUTPATIENT)
Dept: CARE COORDINATION | Age: 55
End: 2020-03-27

## 2020-03-30 ENCOUNTER — CARE COORDINATION (OUTPATIENT)
Dept: CARE COORDINATION | Age: 55
End: 2020-03-30

## 2020-03-30 RX ORDER — INSULIN DEGLUDEC 200 U/ML
80 INJECTION, SOLUTION SUBCUTANEOUS DAILY
Qty: 12 PEN | Refills: 3
Start: 2020-03-30 | End: 2021-03-30 | Stop reason: DRUGHIGH

## 2020-03-30 NOTE — CARE COORDINATION
Could we please verity with Jaleesa Wilson that the first number is the fasting and the second number is the 2 hr pp. Thank you.

## 2020-03-30 NOTE — CARE COORDINATION
Ambulatory Care Coordination Note  3/30/2020  CM Risk Score: 10  Charlson 10 Year Mortality Risk Score: 79%     ACC: Tiara Barajas, RN    Summary Note: Called patient today to get blood sugar log and update PCP. Patient denies missing any medication. insulin. Reports she is fighting off a cold (sneezing, congestion,watery eyes). Denies fever, chills, headache, cough. Denies needing PCP appointment at this time. Blood sugar log obtained. Readings are morning fasting and 2 hours PP largest meal:   3/, 163  3/,143  3/,125  3/,121  9/,516  3/,189 (started with cold symptoms)   7/,495  3/,207  3/,131  3/    Future Appointments   Date Time Provider Emma Gaxiola   4/29/2020  1:40 PM Chandni Lerner MD [facility]   5/6/2020  1:00 PM Jaime Newberry MD [address]   6/16/2020 10:00 AM CHELSEA Galeano - CNP [facility]         Care Coordination Plan of Care: This nurse Care Coordinator will update PCP with blood sugar readings. If new orders obtained will inform patient. Otherwise will reach out to [place] again next week to obtain blood sugar readings. Discuss diet/exercise. Care Coordination Interventions    Program Enrollment:  Complex Care  Referral from Primary Care Provider:  No  Suggested Interventions and Community Resources  Diabetes Education:  Declined  Fall Risk Prevention:  Completed  Medi Set or Pill Pack:  Completed  Specialty Services Referral:  Completed (Comment: Pulmonology)  Transportation Support:  Completed  Zone Management Tools:  Completed (Comment: DM, COPD)         Prior to Admission medications    Medication Sig Start Date End Date Taking?  Authorizing Provider   tiZANidine (ZANAFLEX) 2 MG tablet TAKE 1 TABLET BY MOUTH NIGHTLY AS NEEDED (SPASMS) 3/22/20   Jaime Newberry MD   FREESTYLE LITE strip USE TO CHECK BLOOD SUGARS 3 TIMES DAILY AND AS NEEDED FOR DIABETES E11.40 3/19/20 CHELSEA Westfall - ANGELIKA   Dulaglutide (TRULICITY) 1.5 OP/5.2PW SOPN Inject 1.5 mg into the skin once a week 3/13/20   CHELSEA Westfall CNP   insulin lispro, 1 Unit Dial, (HUMALOG KWIKPEN) 100 UNIT/ML SOPN Inject 18 Units into the skin 3 times daily (before meals) 3/13/20   CHELSEA Westfall CNP   metoprolol succinate (TOPROL XL) 100 MG extended release tablet Take 1 tablet by mouth daily 3/13/20   CHELSEA Westfall - CNP   fludrocortisone (FLORINEF) 0.1 MG tablet TAKE 3 TABLETS BY MOUTH EVERY DAY 2/26/20   Dinorah Longo MD   DULoxetine (CYMBALTA) 30 MG extended release capsule Take 1 capsule by mouth daily 2/10/20   Wyatt Fernandes MD   metFORMIN (GLUCOPHAGE-XR) 500 MG extended release tablet Take 2 tablets by mouth daily (with breakfast) 12/12/19   CHELSEA Westfall CNP   acetaminophen (TYLENOL) 325 MG tablet Take 2 tablets by mouth every 6 hours as needed for Pain 11/12/19   Laura Potter DO   albuterol sulfate HFA (VENTOLIN HFA) 108 (90 Base) MCG/ACT inhaler INHALE 2 PUFFS EVERY 6 HOURS AS NEEDED FOR WHEEZING 10/8/19   CHELSEA Wetsfall CNP   TRESIBA FLEXTOUCH 200 UNIT/ML SOPN INJECT 70 UNITS INTO THE SKIN NIGHTLY 7/29/19   CHELSEA Westfall CNP   losartan (COZAAR) 25 MG tablet TAKE 1 TABLET BY MOUTH DAILY 7/22/19   CHELSEA Westfall - CNP   atorvastatin (LIPITOR) 40 MG tablet TAKE 1 TABLET BY MOUTH DAILY 7/22/19   CHELSEA Westfall - CNP   aspirin 81 MG EC tablet TAKE 1 TABLET BY MOUTH DAILY  Patient taking differently: Take 81 mg by mouth daily  7/1/19   CHELSEA Westfall CNP   Insulin Pen Needle (BD ULTRA-FINE PEN NEEDLES) 29G X 12.7MM MISC USE AS DIRECTED BY PHYSICIAN 5 times daily 8/7/18   CHELSEA Westfall - CNP   Blood Glucose Monitoring Suppl BERE 1 Units by Does not apply route three times daily Unit insurance will cover 12/29/17   CHELSEA Malagon CNP   omeprazole (PRILOSEC) 20 MG delayed release capsule Take 1 capsule by mouth daily 5/9/17 Tyrell Nicholson MD   Blood Pressure Monitor KIT 1 each by Does not apply route daily as needed ESSENTIAL HYPERTENSION   I10 5/31/16   CHELSEA Viera CNP   Blood Glucose Monitoring Suppl (BLOOD GLUCOSE MONITOR KIT) KIT 1 each by Does not apply route daily. Please dispense whatever BS monitoring kit [employer] covers. Dx: 250, new onset T2DM.  Testing once daily 2/21/12 2/4/20  CHELSEA Mei CNP

## 2020-03-30 NOTE — CARE COORDINATION
Call To:  -Call placed to [address] [place] [address] of PCP instructions:       -Increase Mary Balsam slowly by 2 units every 3 days until fastings are improved or she gets to 80 units.     -Handy Rutledge voices understanding  -Reports she is taking 70 units currently daily   -Instructed to take 72 units today, tuesday and Wednesday- may need to increase to 74 units for Thursday- voices understanding       Care Coordination Plan of Care: This nurse Care Coordinator will reach out to Handy Rutledge in 1 week- follow up on blood sugar readings. Update PCP.

## 2020-03-30 NOTE — CARE COORDINATION
Call To:  -Call placed to [address] [place] [address] that readings were correct for morning fasting and 2 hours PP   -Log reviewed again       Care Coordination Plan of Care:    This nurse Care Coordinator will update PCP

## 2020-04-06 ENCOUNTER — CARE COORDINATION (OUTPATIENT)
Dept: CARE COORDINATION | Age: 55
End: 2020-04-06

## 2020-04-06 NOTE — CARE COORDINATION
Reason For Call Today:  -Attempted to reach Nikki Millerble today to follow up on blood sugar log and update PCP. Follow up on what dose of Estephanie Fraser she is currently taking. Discuss diet and exercise.   -Unable to reach Nikki Millerble today   -Left a voicemail message requesting a return phone call back to this AC when patient is able to [phone], office hours given. Future Appointments   Date Time Provider Emma Gaxiola   4/29/2020  1:40 PM Dennie Savant, MD [facility]   5/6/2020  1:00 PM Romeo Cleary MD [address]   6/16/2020 10:00 AM CHELSEA Wiggins - CNP [facility]         Care Coordination Plan of Care: This nurse Care Coordinator will await call back from patient, and if no return call; will attempt to reach patient back again this week if able.

## 2020-04-08 ENCOUNTER — CARE COORDINATION (OUTPATIENT)
Dept: CARE COORDINATION | Age: 55
End: 2020-04-08

## 2020-04-08 NOTE — CARE COORDINATION
Reason For Call Today:  -Attempted to reach Cathi Gonzalez today to follow up on blood sugar log and update PCP. Follow up on what dose of [place] she is currently taking. Discuss diet and exercise.   -Unable to reach Cathi Gonzalez today   -Left a voicemail message requesting a return phone call back to this AC when patient is able to [phone], office hours given. Future Appointments   Date Time Provider Emma Gaxiola   4/29/2020  1:40 PM Georgi Butler MD [facility]   5/6/2020  1:00 PM Marta Correia MD [address]   6/16/2020 10:00 AM CHELSEA Patterson - CNP [facility]         Care Coordination Plan of Care: This nurse Care Coordinator will await call back from patient, and if no return call; will attempt to reach patient back again early next week.

## 2020-04-13 ENCOUNTER — CARE COORDINATION (OUTPATIENT)
Dept: CARE COORDINATION | Age: 55
End: 2020-04-13

## 2020-04-13 NOTE — TELEPHONE ENCOUNTER
Pharmacy requesting refill of Lyrica      Medication active on med list yes      Date of last fill: 3/5/2020  verified on 4/13/2020   verified by Alize Oliver      Date of last appointment 2/4/2020    Next Visit Date:  5/6/2020    Pt notified response time for refills is 24-48 hours

## 2020-04-14 RX ORDER — PREGABALIN 100 MG/1
CAPSULE ORAL
Qty: 90 CAPSULE | Refills: 1 | Status: SHIPPED | OUTPATIENT
Start: 2020-04-14 | End: 2020-04-29 | Stop reason: ALTCHOICE

## 2020-04-14 ASSESSMENT — ENCOUNTER SYMPTOMS: DYSPNEA ASSOCIATED WITH: EXERTION

## 2020-04-14 NOTE — CARE COORDINATION
Resources  Diabetes Education:  Declined  Fall Risk Prevention:  Completed  Medi Set or Pill Pack:  Completed  Specialty Services Referral:  Completed (Comment: Pulmonology)  Transportation Support:  Completed  Zone Management Tools:  Completed (Comment: DM, COPD)         Prior to Admission medications    Medication Sig Start Date End Date Taking?  Authorizing Provider   Insulin Degludec (TRESIBA FLEXTOUCH) 200 UNIT/ML SOPN Inject 80 Units into the skin daily  Patient taking differently: Inject 80 Units into the skin daily Currently taking 76 units daily 3/30/20  Yes CHELSEA Truong CNP   tiZANidine (ZANAFLEX) 2 MG tablet TAKE 1 TABLET BY MOUTH NIGHTLY AS NEEDED (SPASMS) 3/22/20   Alethea Burgos MD   FREESTYLE LITE strip USE TO CHECK BLOOD SUGARS 3 TIMES DAILY AND AS NEEDED FOR DIABETES E11.40 3/19/20   CHELSEA Truong CNP   Dulaglutide (TRULICITY) 1.5 MT/0.6GH SOPN Inject 1.5 mg into the skin once a week 3/13/20   CHELSEA Truong CNP   insulin lispro, 1 Unit Dial, (HUMALOG KWIKPEN) 100 UNIT/ML SOPN Inject 18 Units into the skin 3 times daily (before meals) 3/13/20   CHELSEA Truong CNP   metoprolol succinate (TOPROL XL) 100 MG extended release tablet Take 1 tablet by mouth daily 3/13/20   CHELSEA Truong CNP   fludrocortisone (FLORINEF) 0.1 MG tablet TAKE 3 TABLETS BY MOUTH EVERY DAY 2/26/20   Paola Angela MD   DULoxetine (CYMBALTA) 30 MG extended release capsule Take 1 capsule by mouth daily 2/10/20   Alethea Burgso MD   metFORMIN (GLUCOPHAGE-XR) 500 MG extended release tablet Take 2 tablets by mouth daily (with breakfast) 12/12/19   CHELSEA Truong CNP   acetaminophen (TYLENOL) 325 MG tablet Take 2 tablets by mouth every 6 hours as needed for Pain 11/12/19   Keo Murrieta DO   albuterol sulfate HFA (VENTOLIN HFA) 108 (90 Base) MCG/ACT inhaler INHALE 2 PUFFS EVERY 6 HOURS AS NEEDED FOR WHEEZING 10/8/19   CHELSEA Truong CNP   losartan (COZAAR) 25

## 2020-04-28 ENCOUNTER — CARE COORDINATION (OUTPATIENT)
Dept: CARE COORDINATION | Age: 55
End: 2020-04-28

## 2020-04-28 NOTE — CARE COORDINATION
Reason For Call Today:  -Attempted to reach Virginie Gunn today to follow up on blood sugar log and update PCP. Follow up on what dose of Junita Sax she is currently taking. Discuss diet and exercise.   -Unable to reach Virginie Gunn today   -Left a voicemail message requesting a return phone call back to this AC when patient is able to [phone], office hours given. Future Appointments   Date Time Provider Emma Gaxiola   4/29/2020  1:40 PM Maurice Burgos MD [facility]   5/6/2020  1:00 PM Claudia Bergman MD [facility]   6/16/2020 10:00 AM Clement Plasencia APRN - CNP [facility]         Care Coordination Plan of Care: This nurse Care Coordinator will await call back from patient, and if no return call; will attempt to reach patient back again early next week.

## 2020-04-29 ENCOUNTER — OFFICE VISIT (OUTPATIENT)
Dept: CARDIOLOGY | Age: 55
End: 2020-04-29
Payer: COMMERCIAL

## 2020-04-29 ENCOUNTER — HOSPITAL ENCOUNTER (OUTPATIENT)
Dept: NON INVASIVE DIAGNOSTICS | Age: 55
Discharge: HOME | End: 2020-04-29
Payer: COMMERCIAL

## 2020-04-29 VITALS
SYSTOLIC BLOOD PRESSURE: 138 MMHG | OXYGEN SATURATION: 96 % | BODY MASS INDEX: 38.27 KG/M2 | DIASTOLIC BLOOD PRESSURE: 91 MMHG | HEIGHT: 63 IN | HEART RATE: 96 BPM | WEIGHT: 216 LBS

## 2020-04-29 PROCEDURE — G8427 DOCREV CUR MEDS BY ELIG CLIN: HCPCS | Performed by: FAMILY MEDICINE

## 2020-04-29 PROCEDURE — 99214 OFFICE O/P EST MOD 30 MIN: CPT | Performed by: FAMILY MEDICINE

## 2020-04-29 PROCEDURE — 1036F TOBACCO NON-USER: CPT | Performed by: FAMILY MEDICINE

## 2020-04-29 PROCEDURE — 3017F COLORECTAL CA SCREEN DOC REV: CPT | Performed by: FAMILY MEDICINE

## 2020-04-29 PROCEDURE — 0296T HC EXT ECG RECORDING 2-21 DAY HOOKUP: CPT

## 2020-04-29 PROCEDURE — 93000 ELECTROCARDIOGRAM COMPLETE: CPT | Performed by: FAMILY MEDICINE

## 2020-04-29 PROCEDURE — G8417 CALC BMI ABV UP PARAM F/U: HCPCS | Performed by: FAMILY MEDICINE

## 2020-04-29 NOTE — PROGRESS NOTES
Adarsh Olea am scribing for and in the presence of Radha Montesinos. Morro Luz MD, MS, F.A.C.C..    Patient: Carrie Altamirano  : 1965  Date of Visit: 2020    REASON FOR VISIT / CONSULTATION: 6 Month Follow-Up (Hx of: palpitations, lightheadedness, CAD, and morbidy obesity. Pt denies chest pain and lightheadedness/dizziness. She admits to \"fluttering\" in her chest since . She states this happens nearly every day and lasts 10-15 minutes. This typically comes on when she is up walking but has come on at rest before. She has intermittent SOB as well.)    Dear CHELSEA Winkler CNP,    I had the pleasure of seeing your patient Carrie Altamirano in consultation today. As you know, Ms. Ed Vázquez is a 54 y.o. female who presents with a history of intermittent episodes of back and chest discomfort that started developing since her recent CABG involving a LIMA-LAD 8/15/16. She also has a history of  dysautonomia on a tilt table test, and was started on Florinef as well as Lexapro. Recent heart cath done 3/7/2019 shows severe single vessel disease involving LAD Normal LVEDP. She had a Holter monitor done on 10/23/2019 that did show PAC's and PVC's but was largely unremarkable. Since the last time I saw Ms. Ed Vázquez she reports feeling mild fluttering in her chest with lightheaded/dizziness but says that it has been much worse recently. It has happened at least 5 times since  and have lasted for at least 10-15 minutes or so. She also has some intermittent shortness of breath as well. She denied any chest pain, pressure or tightness. She denies any abdominal pain, bleeding problems, problems with her medications or any other concerns at this time. Exercise Tolerance: Ms. Ed Vázquez reports that she has a fairly good exercise tolerance. Her says that she could walk about 1/2 block without developing chest discomfort or significant shortness of breath.      Past Medical History:   Diagnosis Date No    Drug use: No        CURRENT MEDICATIONS:  Outpatient Medications Marked as Taking for the 4/29/20 encounter (Office Visit) with Amy Ahumada MD   Medication Sig Dispense Refill    Insulin Degludec (TRESIBA FLEXTOUCH) 200 UNIT/ML SOPN Inject 80 Units into the skin daily (Patient taking differently: Inject 80 Units into the skin daily Currently taking 76 units daily) 12 pen 3    tiZANidine (ZANAFLEX) 2 MG tablet TAKE 1 TABLET BY MOUTH NIGHTLY AS NEEDED (SPASMS) 30 tablet 2    FREESTYLE LITE strip USE TO CHECK BLOOD SUGARS 3 TIMES DAILY AND AS NEEDED FOR DIABETES E11.40 270 strip 3    Dulaglutide (TRULICITY) 1.5 ZW/7.4IC SOPN Inject 1.5 mg into the skin once a week 12 pen 3    insulin lispro, 1 Unit Dial, (HUMALOG KWIKPEN) 100 UNIT/ML SOPN Inject 18 Units into the skin 3 times daily (before meals) 12 pen 1    metoprolol succinate (TOPROL XL) 100 MG extended release tablet Take 1 tablet by mouth daily 90 tablet 3    fludrocortisone (FLORINEF) 0.1 MG tablet TAKE 3 TABLETS BY MOUTH EVERY DAY 90 tablet 3    DULoxetine (CYMBALTA) 30 MG extended release capsule Take 1 capsule by mouth daily 30 capsule 1    metFORMIN (GLUCOPHAGE-XR) 500 MG extended release tablet Take 2 tablets by mouth daily (with breakfast) 180 tablet 3    albuterol sulfate HFA (VENTOLIN HFA) 108 (90 Base) MCG/ACT inhaler INHALE 2 PUFFS EVERY 6 HOURS AS NEEDED FOR WHEEZING 18 Inhaler 3    losartan (COZAAR) 25 MG tablet TAKE 1 TABLET BY MOUTH DAILY 90 tablet 3    atorvastatin (LIPITOR) 40 MG tablet TAKE 1 TABLET BY MOUTH DAILY 90 tablet 3    aspirin 81 MG EC tablet TAKE 1 TABLET BY MOUTH DAILY (Patient taking differently: Take 81 mg by mouth daily ) 90 tablet 3    Insulin Pen Needle (BD ULTRA-FINE PEN NEEDLES) 29G X 12.7MM MISC USE AS DIRECTED BY PHYSICIAN 5 times daily 150 each 11    Blood Glucose Monitoring Suppl BERE 1 Units by Does not apply route three times daily Unit insurance will cover 1 Device 0    Blood Pressure Monitor KIT do not hesitate to contact me could I be of further assistance. Sincerely,  Sergio Walters. Eugenio ZENG, MS, F.A.C.C. [facility] Specialist   [address] [place] [address]  Phone: [phone]; Fax: [phone]    I believe that the risk of significant morbidity and mortality related to the patient's current medical conditions are: Intermediate. The documentation recorded by the scribe, accurately and completely reflects the services I personally performed and the decisions made by me. Jarod Steiner MD, MS, F.A.C.C.  April 29, 2020

## 2020-04-30 ENCOUNTER — CARE COORDINATION (OUTPATIENT)
Dept: CARE COORDINATION | Age: 55
End: 2020-04-30

## 2020-05-04 ENCOUNTER — CARE COORDINATION (OUTPATIENT)
Dept: CARE COORDINATION | Age: 55
End: 2020-05-04

## 2020-05-04 ASSESSMENT — ENCOUNTER SYMPTOMS: DYSPNEA ASSOCIATED WITH: EXERTION

## 2020-05-04 NOTE — CARE COORDINATION
of event monitor. Care Coordination Interventions    Program Enrollment:  Complex Care  Referral from Primary Care Provider:  No  Suggested Interventions and Community Resources  Diabetes Education:  Declined  Fall Risk Prevention:  Completed  Medi Set or Pill Pack:  Completed  Specialty Services Referral:  Completed (Comment: Pulmonology)  Transportation Support:  Completed  Zone Management Tools:  Completed (Comment: DM, COPD)           Prior to Admission medications    Medication Sig Start Date End Date Taking?  Authorizing Provider   Insulin Degludec (TRESIBA FLEXTOUCH) 200 UNIT/ML SOPN Inject 80 Units into the skin daily  Patient taking differently: Inject 80 Units into the skin daily Currently taking 76 units daily 3/30/20   CHELSEA Wheat CNP   tiZANidine (ZANAFLEX) 2 MG tablet TAKE 1 TABLET BY MOUTH NIGHTLY AS NEEDED (SPASMS) 3/22/20   Madelin Smith MD   FREESTYLE LITE strip USE TO CHECK BLOOD SUGARS 3 TIMES DAILY AND AS NEEDED FOR DIABETES E11.40 3/19/20   CHELSEA Wheat CNP   Dulaglutide (TRULICITY) 1.5 CO/2.4MH SOPN Inject 1.5 mg into the skin once a week 3/13/20   CHELSEA Wheat CNP   insulin lispro, 1 Unit Dial, (HUMALOG KWIKPEN) 100 UNIT/ML SOPN Inject 18 Units into the skin 3 times daily (before meals) 3/13/20   CHELSEA Wheat CNP   metoprolol succinate (TOPROL XL) 100 MG extended release tablet Take 1 tablet by mouth daily 3/13/20   CHELSEA Wheat CNP   fludrocortisone (FLORINEF) 0.1 MG tablet TAKE 3 TABLETS BY MOUTH EVERY DAY 2/26/20   Leatha Mendenhall MD   DULoxetine (CYMBALTA) 30 MG extended release capsule Take 1 capsule by mouth daily 2/10/20   Madelin Smith MD   metFORMIN (GLUCOPHAGE-XR) 500 MG extended release tablet Take 2 tablets by mouth daily (with breakfast) 12/12/19   CHELSEA Wheat CNP   acetaminophen (TYLENOL) 325 MG tablet Take 2 tablets by mouth every 6 hours as needed for Pain 11/12/19   Aydin Crenshaw, DO albuterol sulfate HFA (VENTOLIN HFA) 108 (90 Base) MCG/ACT inhaler INHALE 2 PUFFS EVERY 6 HOURS AS NEEDED FOR WHEEZING 10/8/19   CHELSEA Alvarez CNP   losartan (COZAAR) 25 MG tablet TAKE 1 TABLET BY MOUTH DAILY 7/22/19   CHELSEA Alvarez CNP   atorvastatin (LIPITOR) 40 MG tablet TAKE 1 TABLET BY MOUTH DAILY 7/22/19   CHELSEA Alvarez CNP   aspirin 81 MG EC tablet TAKE 1 TABLET BY MOUTH DAILY  Patient taking differently: Take 81 mg by mouth daily  7/1/19   CHELSEA Alvarez CNP   Insulin Pen Needle (BD ULTRA-FINE PEN NEEDLES) 29G X 12.7MM MISC USE AS DIRECTED BY PHYSICIAN 5 times daily 8/7/18   CHELSEA Alvarez CNP   Blood Glucose Monitoring Suppl BERE 1 Units by Does not apply route three times daily Unit insurance will cover 12/29/17   CHELSEA Alex CNP   Blood Pressure Monitor KIT 1 each by Does not apply route daily as needed ESSENTIAL HYPERTENSION   I10 5/31/16   CHELSEA Alvarez CNP   Blood Glucose Monitoring Suppl (BLOOD GLUCOSE MONITOR KIT) KIT 1 each by Does not apply route daily. Please dispense whatever BS monitoring kit [facility] covers. Dx: 250, new onset T2DM.  Testing once daily 2/21/12 4/29/20  CHELSEA Ochoa CNP

## 2020-05-13 ENCOUNTER — CARE COORDINATION (OUTPATIENT)
Dept: CARE COORDINATION | Age: 55
End: 2020-05-13

## 2020-05-13 ASSESSMENT — ENCOUNTER SYMPTOMS: DYSPNEA ASSOCIATED WITH: EXERTION

## 2020-05-13 NOTE — CARE COORDINATION
5/19/2020 10:40 AM Romeo Cleary MD Neuro Spec [facility]   6/16/2020 10:00 AM Donavon Plasencia APRN - CNP [facility]   10/30/2020 10:40 AM Dennie Savant, MD [facility]       Goals reviewed. Patient to continue to work to improve:   Goals      Self Monitoring      Self-Monitored Blood Glucose - I will check my blood sugar Fasting blood sugar and Other: 2 hours PP largest meal  I will notify my provider of any trends of increasing or decreasing blood sugars over a 1 month period. I will notify my provider if I have any blood sugar readings less than 70 more than 2 times a month. Barriers: fear of failure, overwhelmed by complexity of regimen and lack of education  Plan for overcoming my barriers: working with this ambulatory care manager  -Start monitoring blood sugar morning fasting and then 2 hours PP largest meal   -Start keeping a blood sugar log using tracker sheets ACM provided in the office   -Inform ACM or PCP of any low or high readings   -Take all insulin as prescribed   -Comply with site rotation   -Get yearly eye exam with optometrist   -Get yearly foot check with podiatrist   -Follow sick day guidelines if needed   -Follow a carb controlled diet, meal plan with avoidance of concentrated sweets and plate method   Confidence: 7/10  Anticipated Goal Completion Date: 3/13/2020    Updated Goals:  -Continue monitoring blood sugar morning fasting and 2 hours postprandial largest meal   -Follow a carb controlled diet; diabetic friendly snacks before bed   -Attend diabetic support group when able   -Start exercising 5-10 minutes each day   -Yearly eye exam   inspect feet daily; skin care   Anticipated updated Goal Completion Date: 6/14/2020               Education Reviewed with patient today: See Education Module. Interventions completed today:  · Patient to reach out to care coordinator at [phone] or MD office with questions.   · Reminded Nikki Moya of walk in care clinic

## 2020-05-19 ENCOUNTER — TELEPHONE (OUTPATIENT)
Dept: NEUROLOGY | Age: 55
End: 2020-05-19

## 2020-05-19 ENCOUNTER — OFFICE VISIT (OUTPATIENT)
Dept: NEUROLOGY | Age: 55
End: 2020-05-19
Payer: COMMERCIAL

## 2020-05-19 VITALS
HEART RATE: 99 BPM | DIASTOLIC BLOOD PRESSURE: 82 MMHG | HEIGHT: 63 IN | BODY MASS INDEX: 38.09 KG/M2 | SYSTOLIC BLOOD PRESSURE: 134 MMHG | WEIGHT: 215 LBS | TEMPERATURE: 97.7 F

## 2020-05-19 PROCEDURE — G8417 CALC BMI ABV UP PARAM F/U: HCPCS | Performed by: PSYCHIATRY & NEUROLOGY

## 2020-05-19 PROCEDURE — 99214 OFFICE O/P EST MOD 30 MIN: CPT | Performed by: PSYCHIATRY & NEUROLOGY

## 2020-05-19 PROCEDURE — 1036F TOBACCO NON-USER: CPT | Performed by: PSYCHIATRY & NEUROLOGY

## 2020-05-19 PROCEDURE — 3017F COLORECTAL CA SCREEN DOC REV: CPT | Performed by: PSYCHIATRY & NEUROLOGY

## 2020-05-19 PROCEDURE — G8427 DOCREV CUR MEDS BY ELIG CLIN: HCPCS | Performed by: PSYCHIATRY & NEUROLOGY

## 2020-05-19 PROCEDURE — 3046F HEMOGLOBIN A1C LEVEL >9.0%: CPT | Performed by: PSYCHIATRY & NEUROLOGY

## 2020-05-19 PROCEDURE — 2022F DILAT RTA XM EVC RTNOPTHY: CPT | Performed by: PSYCHIATRY & NEUROLOGY

## 2020-05-19 RX ORDER — PREGABALIN 100 MG/1
100 CAPSULE ORAL 3 TIMES DAILY
Qty: 90 CAPSULE | Refills: 3 | Status: SHIPPED | OUTPATIENT
Start: 2020-05-19 | End: 2020-09-22 | Stop reason: SDUPTHER

## 2020-05-19 NOTE — PROGRESS NOTES
NEUROLOGY FOLLOW-UP  Patient Name:  Kiet Brown  :   1965  Clinic Visit Date: 2020    I saw Ms. Kiet Brown in follow-up in the office today in continuation of neurologic care. She is a 54 y.o.  female with painful diabetic peripheral polyneuropathy comes to clinic stating that pain in lower extremities significantly worsened. She has pins-and-needles sensations along with burning pain in both feet and lower legs along with cramps in calves extending upwards. She has been suffering from similar sensations in upper extremities also. She has been off of Lyrica for the past couple of weeks. She has failed gabapentin 800 mg tid. Therefore it was discontinued and started on Lyrica. She could not tolerate cyclobenzaprine due to increased lethargy. She is suffering from muscle spasms in bilateral lower extremities. Since last visit; her hemoglobin A1c has slightly improved from 12.7 to 12.1. She is still taking duloxetine with no significant relief. She is tolerating it well without any adverse effects. She has been using duloxetine, Cymbalta along with the capsaicin cream for relief in dysesthesias. She gets exacerbation of symptoms whenever she misses any dose. She has not had any medication related adverse effects. She denies any side effects such as headache, drowsiness, nausea, abdominal pain, weakness. She also stated that she has been participating in physical therapy and it has been helping. REVIEW OF SYSTEMS  Constitutional Weight changes: absent, Fevers : absent, Fatigue: absent; Any recent hospitalizations:  absent.    HEENT Ears: normal, Eyes: glasses, Visual disturbance: absent   Reespiratory Shortness of breath: absent, Cough: absent   Cardivascular Chest pain: absent, Leg swelling :absent   GI Constipation: absent, Diarrhea: absent, Swallowing change: absent    Urinary frequency: absent, Urinary urgency: absent, Urinary incontinence: absent losartan (COZAAR) 25 MG tablet TAKE 1 TABLET BY MOUTH DAILY 90 tablet 3    atorvastatin (LIPITOR) 40 MG tablet TAKE 1 TABLET BY MOUTH DAILY 90 tablet 3    aspirin 81 MG EC tablet TAKE 1 TABLET BY MOUTH DAILY (Patient taking differently: Take 81 mg by mouth daily ) 90 tablet 3    Insulin Pen Needle (BD ULTRA-FINE PEN NEEDLES) 29G X 12.7MM MISC USE AS DIRECTED BY PHYSICIAN 5 times daily 150 each 11    Blood Glucose Monitoring Suppl BERE 1 Units by Does not apply route three times daily Unit insurance will cover 1 Device 0    Blood Pressure Monitor KIT 1 each by Does not apply route daily as needed ESSENTIAL HYPERTENSION   I10 1 kit 0    Blood Glucose Monitoring Suppl (BLOOD GLUCOSE MONITOR KIT) KIT 1 each by Does not apply route daily. Please dispense whatever BS monitoring kit [facility] covers. Dx: 250, new onset T2DM. Testing once daily 1 kit 0     No current facility-administered medications on file prior to visit. Allergies: Magdi Tracy is allergic to tape [adhesive tape]. Past Medical History:   Diagnosis Date    Anxiety     Asthma     CAD (coronary artery disease)     Clinical trial participant at discharge 3/31/16    [facility] [address]     Depression     Diabetic neuropathy [facility] [address]     H/O cardiac catheterization 4/26/16    LMCA: Mild Irregularities 10-20%. LAD: Lesion on Prox LAD: Proximal subsection. 100% stenosis. LCx: Mild irregularities 10-20%. RCA: Mild irregularities 10-20%. Widely patent mid RCA stent. EF:60%.  H/O cardiovascular stress test 10/07/2016    Overall results are most consistent with a low risk for significant CAD.     H/O echocardiogram 10/05/2016    EF:>60%. Inferoseptal wall abnormal in its motion which is not unusal status post open heart surgery. Evidence of mild (grade I) diastolic dysfunction is seen.      H/O tilt table evaluation 07/12/2016    Abnormal. PTs HR, Blood Pressure response and symptoms were most consistent with dysautonomia. Combined w/viligant maintenance of euvolemia and maintaining a moderate salt intake, pharmaciologic treatment w/ ProAmatine, SSRI such as Lexapro and/or Mestinon among other treatments have shown some effectiveness in the treatment of this condition.  History of cardiovascular stress test 02/13/2019    Abnormal. Small/moderate perfusion defect of mild/moderate intesity in the anterior and anterolateral regions during stress imaging which is most consistent w/ ischemia but may be artifact. Overall low/intermediate risk for significant CAD.  History of echocardiogram 09/06/2018    EF >60%. Inferoseptal wall is abnormal in its motion which is not unusual s/p open heart. Evidence of mild (grade I) diastolic dysfunction seen.     Hx of blood clots     Hyperlipidemia     Hypertension     Intermittent claudication (HCC)     Kidney stones     Movement disorder     neuropathy in legs    Neuromuscular disorder (HCC)     neuropathy    Osteoarthritis     Reflux     Seizures (HCC)     last over 10 yr ago    Stented coronary artery 9/22/2010     LAD/[place]    Stented coronary artery 3/31/16    RCA/[place]    Type II or unspecified type diabetes mellitus without mention of complication, not stated as uncontrolled     Unspecified sleep apnea     no machine       Past Surgical History:   Procedure Laterality Date    ABDOMINAL HERNIA REPAIR  1-2016    repair done [place]    APPENDECTOMY      CARDIAC CATHETERIZATION Left 4/26/2016    right radial/ [facility]/ Dr Janie Garcia Left 03/07/2019    [facility] Radial[facility]/    CARDIAC SURGERY      CHOLECYSTECTOMY  1991    COLONOSCOPY  12/16/14    -hemorrhoids,bx    CORONARY ANGIOPLASTY WITH STENT PLACEMENT  9/2010    CORONARY ANGIOPLASTY WITH STENT PLACEMENT  3-    [facility]CA / DR Claudia Santiago    CORONARY ARTERY BYPASS GRAFT  08/15/2016    OP X 1- Dr Vonda Conner, COLON, DIAGNOSTIC  12/16/2014 shrug  XII   -     Midline tongue, no atrophy    MOTOR FUNCTION:  significant for good strength of grade 5/5 in bilateral proximal and distal muscle groups of both upper and lower extremities with normal bulk, normal tone and no involuntary movements, no tremor   SENSORY FUNCTION:  Impaired pinprick and temperature in stocking pattern, asymmetric; left greater than right. CEREBELLAR FUNCTION:  Intact fine motor control over upper limbs   REFLEX FUNCTION:  Symmetric, no perverted reflex, no Babinski sign   STATION and GAIT  Normal station, wide based gait; could not do tandem gait; positive Romberg sign. Able to walk with cane. Diagnostic data reviewed with the patient:   NCS/ EMG (8-22-14): There is electrodiagnostic evidence of peripheral polyneuropathy with predominant involvement of sensory fibers only. Supporting features include reduced amplitudes of bilateral superficial peroneal sensory studies and prolonged H-reflex latencies. X-ray of lumbar spine (3-20-13): Mild degenerative change is noted at L3-L4 with disk space narrowing and spur formation. MRI BRAIN (w/wo): done on 1-2-2013: essentially unremarkable. EEG (1-2-2013): unremarkable. Lab Results   Component Value Date    LABA1C 12.7 03/13/2020       EMG bilateral LE (10/21/17): abnormal electrophysiological study indicative of primarily axonal sensorimotor neuropathy in both lower extremities. There is no evidence of lumbosacral radiculopathy or plexopathy. EKG (8/23/18); NSR.          Impression and Plan: Ms. Kiet Brown is a 54 y.o. female with   Painful diabetic peripheral polyneuropathy; abnormal EMG; inadequately controlled DM with A1c at 12.1  Medically refractory neuropathic pain; refractory max dose of gabapentin at 800 tid; stopped gabapentin and was started on pregabalin 100 mg tid and it was stopped by Dr. Jarod Steiner on 4/29/20 for unclear reason; we called their office; they stated \"they did not want to

## 2020-05-21 PROBLEM — M62.838 MUSCLE SPASMS OF BOTH LOWER EXTREMITIES: Status: ACTIVE | Noted: 2020-05-21

## 2020-05-27 ENCOUNTER — CARE COORDINATION (OUTPATIENT)
Dept: CARE COORDINATION | Age: 55
End: 2020-05-27

## 2020-06-02 ENCOUNTER — CARE COORDINATION (OUTPATIENT)
Dept: CARE COORDINATION | Age: 55
End: 2020-06-02

## 2020-06-02 RX ORDER — OMEPRAZOLE 20 MG/1
20 CAPSULE, DELAYED RELEASE ORAL
Qty: 90 CAPSULE | Refills: 1 | Status: SHIPPED | OUTPATIENT
Start: 2020-06-02 | End: 2020-11-23

## 2020-06-02 ASSESSMENT — ENCOUNTER SYMPTOMS: DYSPNEA ASSOCIATED WITH: EXERTION

## 2020-06-02 NOTE — CARE COORDINATION
Ambulatory Care Coordination Note  6/2/2020  CM Risk Score: 10  Charlson 10 Year Mortality Risk Score: 79%     ACC: Rajeev Packer, RN    Summary Note:   Date Care Coordination Episode Started:   12/12/2019    Reason For Call Today:   Obtain blood sugar log. Review diet and exercise. Assess for new care coordination needs. Topics discussed today:   1.) GERD  -Nikki Moya reports that her GERD is getting really bad  -Nikki Moya voices that she stopped taking Prilosec per AVS from Dr. Candice Baumann appointment on 4/29/20- did note on AVS. That is when he had her stop Lyrica as well. -Informed Nikki Moya that ACM will question PCP about prescribing Prilosec again     2.) Lyrica  -Voices that Dr. Guanako Jewell did re-prescribe her Lyrica   -Did note that on AVS from 4/29/20 Dr. Candice Baumann has stopped this- not sure why. Patient very confused. -Voices that she has re-started her Lyrica and is feeling better       Chronic Conditions:   1.)Diabetes   -Complaint with monitoring blood sugar; morning fasting and 2 hours postprandial largest meal   -Denies missing any doses of oral antidiabetic medications or insulin   -Discussed exercise: start out easy 5-10 minutes of walking each day.  Has been more active lately.   -Encouraged diabetic support group (when able)  -Trulicity 1.5 mg weekly- on Sunday   -Humalog 18 units TID   -Tresiba 80 units nightly (increased on 5/4)   -Metformin 1,000mg at breakfast   -Blood sugar Log:  Morning Fasting, 2 hours PP   6/,518  5/,112  9/,939  5/,112  3/,111  5/,117  1/,598  5/,109  2/,499  5/,122  5/,757  5/,112  1/,492  5/,122  9/,569  2/,702  5/,121  1/,904  6/1-212,149  6/2-179  Lab Results   Component Value Date    LABA1C 12.7 03/13/2020     Lab Results   Component Value Date     12/09/2019   Assessment Completed:  Diabetes Assessment    Medic Alert ID:  No  Meal Planning:  Avoidance of concentrated 10:00 AM CHELSEA Harrington - CNP [facility]   9/22/2020  1:00 PM [facility]   10/30/2020 10:40 AM Dale Salas MD TIFF CARD [facility]       Goals reviewed. Patient to continue to work to improve:   Goals      Self Monitoring      Self-Monitored Blood Glucose - I will check my blood sugar Fasting blood sugar and Other: 2 hours PP largest meal  I will notify my provider of any trends of increasing or decreasing blood sugars over a 1 month period. I will notify my provider if I have any blood sugar readings less than 70 more than 2 times a month. Barriers: fear of failure, overwhelmed by complexity of regimen and lack of education  Plan for overcoming my barriers: working with this ambulatory care manager  -Start monitoring blood sugar morning fasting and then 2 hours PP largest meal   -Start keeping a blood sugar log using tracker sheets ACM provided in the office   -Inform ACM or PCP of any low or high readings   -Take all insulin as prescribed   -Comply with site rotation   -Get yearly eye exam with optometrist   -Get yearly foot check with podiatrist   -Follow sick day guidelines if needed   -Follow a carb controlled diet, meal plan with avoidance of concentrated sweets and plate method   Confidence: 7/10  Anticipated Goal Completion Date: 3/13/2020    Updated Goals:  -Continue monitoring blood sugar morning fasting and 2 hours postprandial largest meal   -Follow a carb controlled diet; diabetic friendly snacks before bed   -Attend diabetic support group when able   -Start exercising 5-10 minutes each day   -Yearly eye exam   inspect feet daily; skin care   Anticipated updated Goal Completion Date: 6/14/2020               Education Reviewed with patient today: See Education Module. Interventions completed today:  · Patient to reach out to care coordinator at [phone] or MD office with questions.   · Reminded Feroz Payne of walk in care clinic availability/hours; and when to utilize the facility if MD office not available. Care Coordinator Plan of Care: This nurse CC will plan to send blood sugar log to PCP for review. Will request medication refill per patient request. Will follow up with patient following PCP appointment. Any new orders/instructions? Assess readiness for graduation. Care Coordination Interventions    Program Enrollment:  Complex Care  Referral from Primary Care Provider:  No  Suggested Interventions and Community Resources  Diabetes Education:  Declined  Fall Risk Prevention:  Completed  Medi Set or Pill Pack:  Completed  Specialty Services Referral:  Completed (Comment: Pulmonology)  Transportation Support:  Completed  Zone Management Tools:  Completed (Comment: DM, COPD)       Prior to Admission medications    Medication Sig Start Date End Date Taking? Authorizing Provider   pregabalin (LYRICA) 100 MG capsule Take 1 capsule by mouth 3 times daily.  5/19/20 6/18/21  Sundeep Roca MD   Insulin Degludec (TRESIBA FLEXTOUCH) 200 UNIT/ML SOPN Inject 80 Units into the skin daily 3/30/20   Loren Plasencia APRN - CNP   tiZANidine (ZANAFLEX) 2 MG tablet TAKE 1 TABLET BY MOUTH NIGHTLY AS NEEDED (SPASMS) 3/22/20   Sundeep Roca MD   FREESTYLE LITE strip USE TO CHECK BLOOD SUGARS 3 TIMES DAILY AND AS NEEDED FOR DIABETES E11.40 3/19/20   Loren Plasencia APRN - CNP   Dulaglutide (TRULICITY) 1.5 ZX/3.9ZB SOPN Inject 1.5 mg into the skin once a week 3/13/20   Loren Plasencia APRN - CNP   insulin lispro, 1 Unit Dial, (HUMALOG KWIKPEN) 100 UNIT/ML SOPN Inject 18 Units into the skin 3 times daily (before meals) 3/13/20   Loren Plasencia APRN - CNP   metoprolol succinate (TOPROL XL) 100 MG extended release tablet Take 1 tablet by mouth daily 3/13/20   Loren Plasencia APRN - CNP   fludrocortisone (FLORINEF) 0.1 MG tablet TAKE 3 TABLETS BY MOUTH EVERY DAY 2/26/20   Mikel Leggett MD   DULoxetine (CYMBALTA) 30 MG extended release capsule Take 1

## 2020-06-10 ENCOUNTER — TELEPHONE (OUTPATIENT)
Dept: PRIMARY CARE CLINIC | Age: 55
End: 2020-06-10

## 2020-06-15 ENCOUNTER — TELEPHONE (OUTPATIENT)
Dept: PRIMARY CARE CLINIC | Age: 55
End: 2020-06-15

## 2020-06-16 RX ORDER — FLUDROCORTISONE ACETATE 0.1 MG/1
TABLET ORAL
Qty: 90 TABLET | Refills: 3 | Status: SHIPPED | OUTPATIENT
Start: 2020-06-16 | End: 2020-10-12

## 2020-06-17 ENCOUNTER — CARE COORDINATION (OUTPATIENT)
Dept: CARE COORDINATION | Age: 55
End: 2020-06-17

## 2020-06-17 NOTE — CARE COORDINATION
-ACM had planned to reach out to patient today following PCP appointment 6/16- review recent A1C results and assess for graduation.   -Noted patient had rescheduled her PCP appointment for next week   -Labs not completed yet; aware they need done   -Will defer call for today       Future Appointments   Date Time Provider Emma Gaxiola   6/23/2020 12:20 PM CHELSEA Griggs - CNP [facility]   9/22/2020  1:00 PM [facility]   10/30/2020 10:40 AM Raymundo Bush MD TIFF CARD [facility]       Care Coordination Plan of Care: This nurse Care Coordinator will plan to reach out to patient following PCP appointment. What was recent A1C? Assess readiness for graduation.

## 2020-06-19 ENCOUNTER — HOSPITAL ENCOUNTER (OUTPATIENT)
Age: 55
Discharge: HOME | End: 2020-06-19
Payer: COMMERCIAL

## 2020-06-19 LAB
ABSOLUTE EOS #: 0.12 K/UL (ref 0–0.44)
ABSOLUTE IMMATURE GRANULOCYTE: 0.04 K/UL (ref 0–0.3)
ABSOLUTE LYMPH #: 2.13 K/UL (ref 1.1–3.7)
ABSOLUTE MONO #: 0.42 K/UL (ref 0.1–1.2)
ALT SERPL-CCNC: 15 U/L (ref 5–33)
ANION GAP SERPL CALCULATED.3IONS-SCNC: 13 MMOL/L (ref 9–17)
AST SERPL-CCNC: 20 U/L
BASOPHILS # BLD: 1 % (ref 0–2)
BASOPHILS ABSOLUTE: 0.07 K/UL (ref 0–0.2)
BUN BLDV-MCNC: 9 MG/DL (ref 6–20)
BUN/CREAT BLD: 16 (ref 9–20)
CALCIUM SERPL-MCNC: 9.1 MG/DL (ref 8.6–10.4)
CHLORIDE BLD-SCNC: 98 MMOL/L (ref 98–107)
CHOLESTEROL/HDL RATIO: 2.8
CHOLESTEROL: 120 MG/DL
CO2: 22 MMOL/L (ref 20–31)
CREAT SERPL-MCNC: 0.58 MG/DL (ref 0.5–0.9)
CREATININE URINE: 80.5 MG/DL (ref 28–217)
DIFFERENTIAL TYPE: ABNORMAL
EOSINOPHILS RELATIVE PERCENT: 1 % (ref 1–4)
ESTIMATED AVERAGE GLUCOSE: 258 MG/DL
GFR AFRICAN AMERICAN: >60 ML/MIN
GFR NON-AFRICAN AMERICAN: >60 ML/MIN
GFR SERPL CREATININE-BSD FRML MDRD: ABNORMAL ML/MIN/{1.73_M2}
GFR SERPL CREATININE-BSD FRML MDRD: ABNORMAL ML/MIN/{1.73_M2}
GLUCOSE BLD-MCNC: 291 MG/DL (ref 70–99)
HBA1C MFR BLD: 10.6 % (ref 4.8–5.9)
HCT VFR BLD CALC: 43 % (ref 36.3–47.1)
HDLC SERPL-MCNC: 43 MG/DL
HEMOGLOBIN: 13.6 G/DL (ref 11.9–15.1)
IMMATURE GRANULOCYTES: 0 %
LDL CHOLESTEROL: 45 MG/DL (ref 0–130)
LYMPHOCYTES # BLD: 22 % (ref 24–43)
MCH RBC QN AUTO: 25.7 PG (ref 25.2–33.5)
MCHC RBC AUTO-ENTMCNC: 31.6 G/DL (ref 28.4–34.8)
MCV RBC AUTO: 81.3 FL (ref 82.6–102.9)
MICROALBUMIN/CREAT 24H UR: <12 MG/L
MICROALBUMIN/CREAT UR-RTO: NORMAL MCG/MG CREAT
MONOCYTES # BLD: 4 % (ref 3–12)
NRBC AUTOMATED: 0 PER 100 WBC
PDW BLD-RTO: 13.9 % (ref 11.8–14.4)
PLATELET # BLD: 304 K/UL (ref 138–453)
PLATELET ESTIMATE: ABNORMAL
PMV BLD AUTO: 10 FL (ref 8.1–13.5)
POTASSIUM SERPL-SCNC: 4.3 MMOL/L (ref 3.7–5.3)
RBC # BLD: 5.29 M/UL (ref 3.95–5.11)
RBC # BLD: ABNORMAL 10*6/UL
SEG NEUTROPHILS: 72 % (ref 36–65)
SEGMENTED NEUTROPHILS ABSOLUTE COUNT: 6.87 K/UL (ref 1.5–8.1)
SODIUM BLD-SCNC: 133 MMOL/L (ref 135–144)
TRIGL SERPL-MCNC: 159 MG/DL
VLDLC SERPL CALC-MCNC: ABNORMAL MG/DL (ref 1–30)
WBC # BLD: 9.7 K/UL (ref 3.5–11.3)
WBC # BLD: ABNORMAL 10*3/UL

## 2020-06-19 PROCEDURE — 85025 COMPLETE CBC W/AUTO DIFF WBC: CPT

## 2020-06-19 PROCEDURE — 80048 BASIC METABOLIC PNL TOTAL CA: CPT

## 2020-06-19 PROCEDURE — 80061 LIPID PANEL: CPT

## 2020-06-19 PROCEDURE — 82043 UR ALBUMIN QUANTITATIVE: CPT

## 2020-06-19 PROCEDURE — 36415 COLL VENOUS BLD VENIPUNCTURE: CPT

## 2020-06-19 PROCEDURE — 82570 ASSAY OF URINE CREATININE: CPT

## 2020-06-19 PROCEDURE — 84450 TRANSFERASE (AST) (SGOT): CPT

## 2020-06-19 PROCEDURE — 83036 HEMOGLOBIN GLYCOSYLATED A1C: CPT

## 2020-06-19 PROCEDURE — 84460 ALANINE AMINO (ALT) (SGPT): CPT

## 2020-06-23 ENCOUNTER — OFFICE VISIT (OUTPATIENT)
Dept: PRIMARY CARE CLINIC | Age: 55
End: 2020-06-23
Payer: COMMERCIAL

## 2020-06-23 VITALS
TEMPERATURE: 98 F | DIASTOLIC BLOOD PRESSURE: 68 MMHG | HEIGHT: 63 IN | WEIGHT: 217.1 LBS | HEART RATE: 92 BPM | BODY MASS INDEX: 38.46 KG/M2 | RESPIRATION RATE: 16 BRPM | SYSTOLIC BLOOD PRESSURE: 99 MMHG

## 2020-06-23 PROCEDURE — 3046F HEMOGLOBIN A1C LEVEL >9.0%: CPT | Performed by: NURSE PRACTITIONER

## 2020-06-23 PROCEDURE — G8427 DOCREV CUR MEDS BY ELIG CLIN: HCPCS | Performed by: NURSE PRACTITIONER

## 2020-06-23 PROCEDURE — G8926 SPIRO NO PERF OR DOC: HCPCS | Performed by: NURSE PRACTITIONER

## 2020-06-23 PROCEDURE — 2022F DILAT RTA XM EVC RTNOPTHY: CPT | Performed by: NURSE PRACTITIONER

## 2020-06-23 PROCEDURE — 1036F TOBACCO NON-USER: CPT | Performed by: NURSE PRACTITIONER

## 2020-06-23 PROCEDURE — 99214 OFFICE O/P EST MOD 30 MIN: CPT | Performed by: NURSE PRACTITIONER

## 2020-06-23 PROCEDURE — G8417 CALC BMI ABV UP PARAM F/U: HCPCS | Performed by: NURSE PRACTITIONER

## 2020-06-23 PROCEDURE — 3017F COLORECTAL CA SCREEN DOC REV: CPT | Performed by: NURSE PRACTITIONER

## 2020-06-23 PROCEDURE — 3023F SPIROM DOC REV: CPT | Performed by: NURSE PRACTITIONER

## 2020-06-23 RX ORDER — INSULIN LISPRO 100 [IU]/ML
20 INJECTION, SOLUTION INTRAVENOUS; SUBCUTANEOUS
Qty: 12 PEN | Refills: 1 | Status: SHIPPED
Start: 2020-06-23

## 2020-06-23 ASSESSMENT — PATIENT HEALTH QUESTIONNAIRE - PHQ9
SUM OF ALL RESPONSES TO PHQ QUESTIONS 1-9: 1
1. LITTLE INTEREST OR PLEASURE IN DOING THINGS: 0
SUM OF ALL RESPONSES TO PHQ9 QUESTIONS 1 & 2: 1
SUM OF ALL RESPONSES TO PHQ QUESTIONS 1-9: 1
2. FEELING DOWN, DEPRESSED OR HOPELESS: 1

## 2020-06-23 ASSESSMENT — ENCOUNTER SYMPTOMS
SHORTNESS OF BREATH: 0
COUGH: 0
VOMITING: 0
DIFFICULTY BREATHING: 0
SPUTUM PRODUCTION: 0
FREQUENT THROAT CLEARING: 0
CHEST TIGHTNESS: 0
WHEEZING: 0
SORE THROAT: 0
DIARRHEA: 0
CONSTIPATION: 0
HEMOPTYSIS: 0
ABDOMINAL PAIN: 0
NAUSEA: 0
HOARSE VOICE: 0
RHINORRHEA: 0
VISUAL CHANGE: 0

## 2020-06-23 ASSESSMENT — COPD QUESTIONNAIRES: COPD: 1

## 2020-06-23 NOTE — PROGRESS NOTES
dysfunction is seen.  H/O tilt table evaluation 07/12/2016    Abnormal. PTs HR, Blood Pressure response and symptoms were most consistent with dysautonomia. Combined w/viligant maintenance of euvolemia and maintaining a moderate salt intake, pharmaciologic treatment w/ ProAmatine, SSRI such as Lexapro and/or Mestinon among other treatments have shown some effectiveness in the treatment of this condition.  History of cardiovascular stress test 02/13/2019    Abnormal. Small/moderate perfusion defect of mild/moderate intesity in the anterior and anterolateral regions during stress imaging which is most consistent w/ ischemia but may be artifact. Overall low/intermediate risk for significant CAD.  History of echocardiogram 09/06/2018    EF >60%. Inferoseptal wall is abnormal in its motion which is not unusual s/p open heart. Evidence of mild (grade I) diastolic dysfunction seen.  Hx of blood clots     Hyperlipidemia     Hypertension     Intermittent claudication (HCC)     Kidney stones     Movement disorder     neuropathy in legs    Neuromuscular disorder (HCC)     neuropathy    Osteoarthritis     Reflux     Seizures (HCC)     last over 10 yr ago    Stented coronary artery 9/22/2010     LAD/[place]    Stented coronary artery 3/31/16    RCA/[place]    Type II or unspecified type diabetes mellitus without mention of complication, not stated as uncontrolled     Unspecified sleep apnea     no machine      Reviewed all health maintenance requirements and ordered appropriate tests  There are no preventive care reminders to display for this patient.     Past Surgical History:     Past Surgical History:   Procedure Laterality Date    ABDOMINAL HERNIA REPAIR  1-2016    repair done julian    APPENDECTOMY      CARDIAC CATHETERIZATION Left 4/26/2016    right radial/ [facility] [place]/ Dr Yamileth Buck Left 03/07/2019    Left Radial/[facility] [place]/    CARDIAC SURGERY      CHOLECYSTECTOMY  1991    COLONOSCOPY  12/16/14    -hemorrhoids,bx    CORONARY ANGIOPLASTY WITH STENT PLACEMENT  9/2010    CORONARY ANGIOPLASTY WITH STENT PLACEMENT  3-    [facility] / DR Vu.Shave    CORONARY ARTERY BYPASS GRAFT  08/15/2016    OP X 1- Dr Griggs Pain, COLON, DIAGNOSTIC  12/16/2014    HERNIA REPAIR  12/13/12    at the medial aspect of a [place] RUQ scar); repaired [address]  02/16/2015    incisional, recurrent    HERNIA REPAIR  02/2016    HYSTERECTOMY      OTHER SURGICAL HISTORY  10/8/2015    abd wound washout, mesh removal, wound vac placement    SD SUCT NICOLE LIPECTOMY,HEAD/NECK Left 8/27/2018    THIGH LESION BIOPSY EXCISION, SOFT TISSUE MASS performed by [facility] Left 2018    leg    UPPP UVULOPALATOPHARYGOPLASTY  06/26/2012    VENTRAL HERNIA REPAIR  09/21/2015    With Mesh - Dr Cristobal Rao        Medications:       Prior to Admission medications    Medication Sig Start Date End Date Taking? Authorizing Provider   insulin lispro, 1 Unit Dial, (HUMALOG KWIKPEN) 100 UNIT/ML SOPN Inject 20 Units into the skin 3 times daily (before meals) 6/23/20  Yes Jf Plasencia APRN - CNP   fludrocortisone (FLORINEF) 0.1 MG tablet TAKE 3 TABLETS BY MOUTH EVERY DAY 6/16/20  Yes Mercedes Rush MD   omeprazole (PRILOSEC) 20 MG delayed release capsule Take 1 capsule by mouth every morning (before breakfast) 6/2/20  Yes CHELSEA Oviedo - CNP   pregabalin (LYRICA) 100 MG capsule Take 1 capsule by mouth 3 times daily.  5/19/20 6/18/21 Yes Manjeet Thayer MD   Insulin Degludec (TRESIBA FLEXTOUCH) 200 UNIT/ML SOPN Inject 80 Units into the skin daily 3/30/20  Yes CHELSEA Oviedo - CNP   tiZANidine (ZANAFLEX) 2 MG tablet TAKE 1 TABLET BY MOUTH NIGHTLY AS NEEDED (SPASMS) 3/22/20  Yes Manjeet Thayer MD   FREESTYLE LITE strip USE TO CHECK BLOOD SUGARS 3 TIMES DAILY AND AS NEEDED FOR DIABETES E11.40 3/19/20  Yes Jf Dan CHELSEA Plasencia CNP   Dulaglutide (TRULICITY) 1.5 XH/6.3DI SOPN Inject 1.5 mg into the skin once a week 3/13/20  Yes CHELSEA Delgadillo CNP   metoprolol succinate (TOPROL XL) 100 MG extended release tablet Take 1 tablet by mouth daily 3/13/20  Yes CHELSEA Delgadillo CNP   DULoxetine (CYMBALTA) 30 MG extended release capsule Take 1 capsule by mouth daily 2/10/20  Yes Kailyn Stuart MD   metFORMIN (GLUCOPHAGE-XR) 500 MG extended release tablet Take 2 tablets by mouth daily (with breakfast) 12/12/19  Yes CHELSEA Delgadillo CNP   acetaminophen (TYLENOL) 325 MG tablet Take 2 tablets by mouth every 6 hours as needed for Pain 11/12/19  Yes Enedina Severino DO   albuterol sulfate HFA (VENTOLIN HFA) 108 (90 Base) MCG/ACT inhaler INHALE 2 PUFFS EVERY 6 HOURS AS NEEDED FOR WHEEZING 10/8/19  Yes CHELSEA Delgadillo CNP   losartan (COZAAR) 25 MG tablet TAKE 1 TABLET BY MOUTH DAILY 7/22/19  Yes CHELSEA Delgadillo CNP   atorvastatin (LIPITOR) 40 MG tablet TAKE 1 TABLET BY MOUTH DAILY 7/22/19  Yes CHELSEA Delgadillo CNP   aspirin 81 MG EC tablet TAKE 1 TABLET BY MOUTH DAILY  Patient taking differently: Take 81 mg by mouth daily  7/1/19  Yes CHELSEA Delgadillo CNP   Insulin Pen Needle (BD ULTRA-FINE PEN NEEDLES) 29G X 12.7MM MISC USE AS DIRECTED BY PHYSICIAN 5 times daily 8/7/18  Yes CHELSEA Delgadillo CNP   Blood Glucose Monitoring Suppl BERE 1 Units by Does not apply route three times daily Unit insurance will cover 12/29/17  Yes CHELSEA Hayes CNP   Blood Pressure Monitor KIT 1 each by Does not apply route daily as needed ESSENTIAL HYPERTENSION   I10 5/31/16  Yes CHELSEA Delgadillo CNP   Blood Glucose Monitoring Suppl (BLOOD GLUCOSE MONITOR KIT) KIT 1 each by Does not apply route daily. Please dispense whatever BS monitoring kit [facility]. Dx: 250, new onset T2DM.  Testing once daily 2/21/12 5/19/20  Leverne Parisian, APRN - CNP        Allergies:       Kelsi Jon tape]    Social History:     Tobacco:    reports that she quit smoking about 9 years ago. Her smoking use included cigarettes. She has a 20.00 pack-year smoking history. She has never used smokeless tobacco.  Alcohol:      reports no history of alcohol use. Drug Use:  reports no history of drug use. Family History:     Family History   Problem Relation Age of Onset    Cancer Mother     Diabetes Mother     Cancer Father         thyroid    Diabetes Sister     Heart Disease Sister     Heart Disease Brother     Heart Disease Maternal Uncle     Cancer Maternal Grandmother        Review of Systems:     Positive and Negative as described in HPI    Review of Systems   Constitutional: Negative for appetite change, chills, fatigue and fever. HENT: Negative for congestion, hoarse voice, postnasal drip, rhinorrhea, sneezing and sore throat. Eyes: Positive for visual disturbance. Respiratory: Negative for cough, hemoptysis, sputum production, shortness of breath and wheezing. Cardiovascular: Negative for chest pain and palpitations. Gastrointestinal: Negative for abdominal pain, constipation, diarrhea, nausea and vomiting. Endocrine: Negative for polydipsia, polyphagia and polyuria. Genitourinary: Negative for difficulty urinating and dysuria. Musculoskeletal: Negative for gait problem, neck pain and neck stiffness. Skin: Negative for rash. Neurological: Negative for dizziness, syncope, weakness, light-headedness and headaches. Physical Exam:   Vitals:  BP 99/68 (Site: Left Upper Arm, Position: Sitting, Cuff Size: Large Adult)   Pulse 92   Temp 98 °F (36.7 °C) (Temporal)   Resp 16   Ht 5' 3\" (1.6 m)   Wt 217 lb 1.6 oz (98.5 kg)   LMP 12/05/1996   BMI 38.46 kg/m²     Physical Exam  Vitals signs and nursing note reviewed. Constitutional:       General: She is not in acute distress. Appearance: Normal appearance. She is well-developed. She is obese.    HENT:      Mouth/Throat: Mouth: Mucous membranes are moist.   Eyes:      General: No scleral icterus. Conjunctiva/sclera: Conjunctivae normal.   Neck:      Musculoskeletal: Normal range of motion and neck supple. Cardiovascular:      Rate and Rhythm: Normal rate and regular rhythm. Pulmonary:      Effort: Pulmonary effort is normal.      Breath sounds: Normal breath sounds. No wheezing or rales. Abdominal:      General: Bowel sounds are normal. There is no distension. Palpations: Abdomen is soft. Tenderness: There is no abdominal tenderness. Comments: Obese abdomen   Musculoskeletal:      Right lower leg: No edema. Left lower leg: No edema. Lymphadenopathy:      Cervical: No cervical adenopathy. Skin:     General: Skin is warm and dry. Neurological:      Mental Status: She is alert and oriented to person, place, and time. Psychiatric:         Mood and Affect: Mood normal.         Behavior: Behavior normal.         Data:     Lab Results   Component Value Date     06/19/2020    K 4.3 06/19/2020    CL 98 06/19/2020    CO2 22 06/19/2020    BUN 9 06/19/2020    CREATININE 0.58 06/19/2020    GLUCOSE 291 06/19/2020    GLUCOSE 181 02/16/2012    PROT 7.7 12/09/2019    LABALBU 3.7 12/09/2019    LABALBU 4.2 12/05/2011    BILITOT 0.40 12/09/2019    ALKPHOS 100 12/09/2019    AST 20 06/19/2020    ALT 15 06/19/2020     Lab Results   Component Value Date    WBC 9.7 06/19/2020    RBC 5.29 06/19/2020    RBC 3.88 12/05/2011    HGB 13.6 06/19/2020    HCT 43.0 06/19/2020    MCV 81.3 06/19/2020    MCH 25.7 06/19/2020    MCHC 31.6 06/19/2020    RDW 13.9 06/19/2020     06/19/2020     12/05/2011    MPV 10.0 06/19/2020     Lab Results   Component Value Date    TSH 2.80 06/04/2016     Lab Results   Component Value Date    CHOL 120 06/19/2020    HDL 43 06/19/2020    LABA1C 10.6 06/19/2020       Assessment/Plan:      Diagnosis Orders   1.  Uncontrolled type 2 diabetes mellitus with diabetic polyneuropathy, with

## 2020-06-23 NOTE — PATIENT INSTRUCTIONS
other.)  · Your blood sugar target range before a meal is ___________________. Your blood sugar target range after a meal is _______________________. · Do this--___________________________________________________--to get your blood sugar back within your safe range if your blood sugar results are _________________________________________. (For example: Less than 70 or above 250 mg/dL.)  Call your doctor when your blood sugar results are ___________________________________. (For example: Less than 70 or above 250 mg/dL.)  What are the symptoms of low and high blood sugar? Common symptoms of low blood sugar are sweating and feeling shaky, weak, hungry, or confused. Symptoms can start quickly. Common symptoms of high blood sugar are feeling very thirsty or very hungry. You may also pass urine more often than usual. You may have blurry vision and may lose weight without trying. But some people may have high or low blood sugar without having any symptoms. That's a good reason to check your blood sugar on a regular schedule. What should you do if you have symptoms? Work with your doctor to fill in the blank spaces below that apply to you. Low blood sugar  If you have symptoms of low blood sugar, check your blood sugar. If it's below _____ ( for example, below 70), eat or drink a quick-sugar food that has about 15 grams of carbohydrate. Your goal is to get your level back to your safe range. Check your blood sugar again 15 minutes later. If it's still not in your target range, take another 15 grams of carbohydrate and check your blood sugar again in 15 minutes. Repeat this until you reach your target. Then go back to your regular testing schedule. Children usually need less than 15 grams of carbohydrate. Check with your doctor or diabetes educator for the amount that is right for your child.   When you have low blood sugar, it's best to stop or reduce any physical activity until your blood sugar is back in your

## 2020-06-24 ENCOUNTER — CARE COORDINATION (OUTPATIENT)
Dept: CARE COORDINATION | Age: 55
End: 2020-06-24

## 2020-06-26 RX ORDER — TIZANIDINE 2 MG/1
TABLET ORAL
Qty: 30 TABLET | Refills: 2 | Status: SHIPPED | OUTPATIENT
Start: 2020-06-26 | End: 2020-09-22 | Stop reason: SDUPTHER

## 2020-06-26 NOTE — TELEPHONE ENCOUNTER
Pharmacy requesting refill of Tizanidine 2 mg. Medication active on med list yes      Date last ordered: 3/22/2020  verified on 6/25/2020   verified by YE Peñaloza LPN      Date of last appointment 5/19/2020    Next Visit Date:  9/22/2020

## 2020-07-01 ENCOUNTER — CARE COORDINATION (OUTPATIENT)
Dept: CARE COORDINATION | Age: 55
End: 2020-07-01

## 2020-07-01 NOTE — CARE COORDINATION
Reason For Call Today:  -Attempted to reach Dia Conrad today to follow up PCP appointment. Review new insulin orders (increase meal time insulin from 18 to 20 units) and review latest A1C. PCP had requested patient remain in care coordination at this time. PCP request ACM review with patient when to monitor blood sugars.   -Unable to reach Dia Conrad today   -Left a voicemail message requesting a return phone call back to this ACM when patient is able to [phone], office hours given. Future Appointments   Date Time Provider Emma Gaxiola   9/22/2020  1:00 PM Shirley Rabago MD Neuro Spec [facility]   9/22/2020  2:40 PM CHELSEA Izquierdo - CNP [facility]   10/30/2020 10:40 AM Nicolas Alarcon MD TIFF C[facility]         Care Coordination Plan of Care: This nurse Care Coordinator will await call back from patient, and if no return call; will attempt to reach patient back again next week.

## 2020-07-07 ENCOUNTER — CARE COORDINATION (OUTPATIENT)
Dept: CARE COORDINATION | Age: 55
End: 2020-07-07

## 2020-07-07 NOTE — LETTER
7/7/2020    [address]    [place]     I have enjoyed working with you to improve your health. I would like to continue to provide you support. However, I have been unable to reach you at, [phone] (home) . If you would like continued access to your Nurse Care Coordinator, Sejal Meneses RN, you can reach me at [phone]. I will wait to hear from you and will no longer reach out to you. Wesley CHELSEA Bautista CNP and his/her team will continue to provide care and be available for questions.       In good health,     Sejal Meneses RN

## 2020-07-07 NOTE — CARE COORDINATION
Reason For Call Today:  -Attempted to reach Sylvester Kenny today to follow up PCP appointment from 6/23. Review  insulin orders (increase meal time insulin from 18 to 20 units) and review latest A1C. PCP had requested patient remain in care coordination, however have been unsuccessful at reaching patient.  -3rd unsuccessful reach out attempt  -Will send letter through My Chart   -Unable to reach Sylvester Kenny today   -Left a voicemail message requesting a return phone call back to this AC when patient is able to [phone], office hours given. Future Appointments   Date Time Provider Emma Gaxiola   9/22/2020  1:00 PM Kyung Hopper MD Neuro Spec [facility]   9/22/2020  2:40 PM CHELSEA Romero - CNP [facility]   10/30/2020 10:40 AM Stephanie Loco MD [facility]         Care Coordination Plan of Care: This nurse Care Coordinator will await call back from patient, and if no return call; will attempt to reach patient back again next week. Will send letter to patient through My Chart to contact this AC.

## 2020-07-14 ENCOUNTER — CARE COORDINATION (OUTPATIENT)
Dept: CARE COORDINATION | Age: 55
End: 2020-07-14

## 2020-07-21 ENCOUNTER — CARE COORDINATION (OUTPATIENT)
Dept: CARE COORDINATION | Age: 55
End: 2020-07-21

## 2020-07-21 ASSESSMENT — ENCOUNTER SYMPTOMS: DYSPNEA ASSOCIATED WITH: EXERTION

## 2020-07-21 NOTE — CARE COORDINATION
Ambulatory Care Coordination Note  7/21/2020  CM Risk Score: 10  Charlson 10 Year Mortality Risk Score: 79%     ACC: Victoriano Mejía RN    Summary Note:   Date Care Coordination Episode Started:   12/12/19    Reason For Call Today:   Follow up blood sugar reading. Review medications. Assess for any new care coordination needs.     -Spoke with Jorge Hurd today   -Verbalizes she has been busy- had family in and out of town visiting, family in the hospital and then family who had Glenn. She has been busy and stressed. Chronic Conditions:   1.)Diabetes  -Compliant with monitoring blood sugar morning fasting and 2 hours PP   -Voices that she has been taking her insulin and medications as ordered  -Denies missing any doses of insulin   -Trulicity 1.5 mg on Sundays   -Humalog 20 units three times daily with meals and Tresiba 80 units nightly   -Blood Sugar Log as follows:  2/,312  7/,140  9/,355  7/,130  7/,120  2/,251  7/,150  7/,124  7/  Lab Results   Component Value Date    LABA1C 10.6 (H) 06/19/2020     Lab Results   Component Value Date     06/19/2020   Assessment Completed:  Diabetes Assessment    Medic Alert ID:  No  Meal Planning:  Avoidance of concentrated sweets, Plate Method   How often do you test your blood sugar?:  Other (Comment: morning fasting and 2 hours PP largest meal)   Do you have barriers with adherence to non-pharmacologic self-management interventions?  (Nutrition/Exercise/Self-Monitoring):  Yes   Have you ever had to go to the ED for symptoms of low blood sugar?:  No       No patient-reported symptoms   Do you have hyperglycemia symptoms?:  No   Do you have hypoglycemia symptoms?:  No   Last Blood Sugar Value:  171   Blood Sugar Monitoring Regimen:  Morning Fasting, 2 Hours Post Meal   Blood Sugar Trends:  Fluctuating          2.) COPD  -Follows with [facility]  -Voices her breathing is \"about the same\"   -No cough Assessment Completed:  COPD Assessment    Does the patient understand envrionmental exposure?:  Yes  Is the patient able to verbalize Rescue vs. Long Acting medications?:  Yes  Does the patient have a nebulizer?:  No  Does the patient use a space with inhaled medications?:  Yes     No patient-reported symptoms         Symptoms:   None:  Yes      Symptom course:  stable  Breathlessness:  exertion  Change in chronic cough?:  No/At Baseline  Change in sputum?:  No/At Baseline  Self Monitoring - SaO2:  No  Have you had a recent diagnosis of pneumonia either by PCP or at a hospital?:  No         Any ED visits or Hospitalizations since last Call? · No      Medications Reviewed with Tatiana Zazueta today:  · Mariana Ends that she has all medications. · Tatiana Zazueta denies any questions. · Any cost issues with medications No      [employer] Management tool reviewed today is applicable:   Yes, Diabetes       Reviewed social needs/Home Needs if any:   · Does patient have enough food? Yes  · Does patient have transportation to appointment/store/pharmacy, etc?Yes  · Does patient need any help in the home? Denies   · If yes, what type of help? n/a         Reviewed Upcoming follow up appointments with Tatiana Zazueta   Future Appointments   Date Time Provider Emma Gaxiola   9/22/2020  1:00 PM Rocael Nelson MD Neuro Spec [facility]   9/22/2020  2:40 PM CHELSEA Cates - CNP [facility]   10/30/2020 10:40 AM [facility]         Goals reviewed. Patient to continue to work to improve:   Goals      Self Monitoring      Self-Monitored Blood Glucose - I will check my blood sugar Fasting blood sugar and Other: 2 hours PP largest meal  I will notify my provider of any trends of increasing or decreasing blood sugars over a 1 month period. I will notify my provider if I have any blood sugar readings less than 70 more than 2 times a month.       Barriers: fear of failure, overwhelmed by complexity of regimen and lack of education  Plan for overcoming my barriers: working with this ambulatory care manager  -Start monitoring blood sugar morning fasting and then 2 hours PP largest meal   -Start keeping a blood sugar log using tracker sheets ACM provided in the office   -Inform ACM or PCP of any low or high readings   -Take all insulin as prescribed   -Comply with site rotation   -Get yearly eye exam with optometrist   -Get yearly foot check with podiatrist   -Follow sick day guidelines if needed   -Follow a carb controlled diet, meal plan with avoidance of concentrated sweets and plate method   Confidence: 7/10  Anticipated Goal Completion Date: 3/13/2020    Updated Goals:  -Continue monitoring blood sugar morning fasting and 2 hours postprandial largest meal   -Follow a carb controlled diet; diabetic friendly snacks before bed   -Attend diabetic support group when able   -Start exercising 5-10 minutes each day   -Yearly eye exam   inspect feet daily; skin care   Anticipated updated Goal Completion Date: 6/14/2020               Education Reviewed with patient today: See Education Module. Interventions completed today:  · Patient to reach out to care coordinator at [phone] or MD office with questions. · Reminded Chante Hodges of walk in care clinic availability/hours; and when to utilize the facility if MD office not available. Care Coordinator Plan of Care: This nurse CC will plan to update PCP on blood sugar readings. Will plan to reach out to Chante Hodges in 2 weeks. Obtain blood sugar readings. Review blood sugar goals and review diet. Assess for new care coordination needs.         Care Coordination Interventions    Program Enrollment:  Complex Care  Referral from Primary Care Provider:  No  Suggested Interventions and Community Resources  Diabetes Education:  Declined  Fall Risk Prevention:  Completed  Medi Set or Pill Pack:  Completed  Specialty Services Referral:  Completed (Comment: Pulmonology)  Transportation Support: DAILY 7/22/19   CHELSEA Wilcox CNP   atorvastatin (LIPITOR) 40 MG tablet TAKE 1 TABLET BY MOUTH DAILY 7/22/19   CHELSEA Wilcox CNP   aspirin 81 MG EC tablet TAKE 1 TABLET BY MOUTH DAILY  Patient taking differently: Take 81 mg by mouth daily  7/1/19   CHELSEA Wilcox CNP   Insulin Pen Needle (BD ULTRA-FINE PEN NEEDLES) 29G X 12.7MM MISC USE AS DIRECTED BY PHYSICIAN 5 times daily 8/7/18   CHELSEA Wilcox CNP   Blood Glucose Monitoring Suppl BERE 1 Units by Does not apply route three times daily Unit insurance will cover 12/29/17   Jocelyn ResCHELSEA CNP   Blood Pressure Monitor KIT 1 each by Does not apply route daily as needed ESSENTIAL HYPERTENSION   I10 5/31/16   CHELSEA Wilcox CNP   Blood Glucose Monitoring Suppl (BLOOD GLUCOSE MONITOR KIT) KIT 1 each by Does not apply route daily. Please dispense whatever BS monitoring kit [facility] covers. Dx: 250, new onset T2DM.  Testing once daily 2/21/12 5/19/20  CHELSEA Silverio CNP

## 2020-07-23 RX ORDER — LOSARTAN POTASSIUM 25 MG/1
25 TABLET ORAL DAILY
Qty: 90 TABLET | Refills: 3 | Status: SHIPPED | OUTPATIENT
Start: 2020-07-23

## 2020-07-23 RX ORDER — ATORVASTATIN CALCIUM 40 MG/1
40 TABLET, FILM COATED ORAL DAILY
Qty: 90 TABLET | Refills: 3 | Status: SHIPPED | OUTPATIENT
Start: 2020-07-23 | End: 2021-11-01

## 2020-07-23 NOTE — TELEPHONE ENCOUNTER
(coronary artery disease)     Dyslipidemia     Radiculopathy     Diabetes type 2, uncontrolled     Insomnia     Allergic rhinitis     COPD (chronic obstructive pulmonary disease)     Depression     Bilateral leg weakness     Gait difficulty     Diabetic neuropathy     Back pain     Muscle spasm     Affective disorder ([facility])     Morbid obesity with BMI of 40.0-44.9, adult ([facility])     History of ventral hernia repair     History of incisional hernia repair     Essential hypertension     Gastroesophageal reflux disease without esophagitis     Primary osteoarthritis involving multiple joints     Heart palpitations     Coronary artery disease involving native coronary artery of native heart     Angina, class III ([facility])     S/P CABG x 1     Precordial pain     DARON (obstructive sleep apnea)     Morbid obesity ([facility])     Neuropathic pain     Soft tissue mass     Dysautonomia ([facility])     Muscle spasms of both lower extremities

## 2020-08-03 ENCOUNTER — CARE COORDINATION (OUTPATIENT)
Dept: CARE COORDINATION | Age: 55
End: 2020-08-03

## 2020-08-03 NOTE — CARE COORDINATION
Reason For Call Today:  -Attempted to reach Carrington Ching today to follow up on blood sugar readings. Review medications and assess for new care coordination needs. Assess readiness for graduation   -Unable to reach Carrington Ching today   -Left a voicemail message requesting a return phone call back to this AC when patient is able to [phone], office hours given. Future Appointments   Date Time Provider Emma Gaxiola   9/22/2020  1:00 PM Yunior Parker MD Neuro Spec [facility]   9/22/2020  2:40 PM CHELSEA Pearson - CNP [facility]   10/30/2020 10:40 AM Tilman Duane, MD [facility]       Care Coordination Plan of Care: This nurse Care Coordinator will await call back from patient, and if no return call; will attempt to reach patient back again this week if able.

## 2020-08-05 ENCOUNTER — CARE COORDINATION (OUTPATIENT)
Dept: CARE COORDINATION | Age: 55
End: 2020-08-05

## 2020-08-05 NOTE — CARE COORDINATION
Reason For Call Today:  -Attempted to reach Sravanthi Gris today to follow up on blood sugar readings. Review medications and assess for new care coordination needs. Assess readiness for graduation   -Unable to reach Sravanthi Gris today   -Left a voicemail message requesting a return phone call back to this AC when patient is able to [phone], office hours given. Future Appointments   Date Time Provider Emma Gaxiola   9/22/2020  1:00 PM Katherin Marrero MD Neuro Spec [facility]   9/22/2020  2:40 PM Antonietta Plasencia APRN - CNP [facility]   10/30/2020 10:40 AM Lito Riley MD TIFF CARD [facility]       Care Coordination Plan of Care: This nurse Care Coordinator will await call back from patient, and if no return call; will attempt to reach patient back again next week.

## 2020-08-11 ENCOUNTER — CARE COORDINATION (OUTPATIENT)
Dept: CARE COORDINATION | Age: 55
End: 2020-08-11

## 2020-08-11 NOTE — CARE COORDINATION
Ambulatory Care Coordination Note  8/11/2020  CM Risk Score: 10  Charlson 10 Year Mortality Risk Score: 79%     ACC: Jeffrey Luz RN    Summary Note:   Date Care Coordination Episode Started:   12/12/2019    Reason For Call Today:   Assess for new care coordination needs and readiness for graduation. Topics Reviewed Today:  1.) Medications  -Does the patient have all of their prescribed medications filled? Yes   -Is there any barriers to medication adherence? No  -Is there any financial issues with affording any medications? No     2.) Transportation  -Does the patient drive? No  -How is patient transported to appointments? Friends/family    -Any additional phone numbers for transportation in the area needed? No     3.) Living/Housing  -Does the patient live alone? Yes  -What type of housing does the patient live in? Apartment   -Does the patient feel safe in their home? Yes  -Who does the cooking, cleaning, housework? Ebony Acevedo     4.) Toe Injury  -Voices on 8/1 she dropped a can of corn onto great toe and since then has been having issues with delayed healing   -Reports redness has increased and area is warm   -Voices \"it is all scratched up\" when questioning if she had open areas on toe   -Does not follow with Podiatry   -Will question PCP on advice   -ACM encouraged to keep leg up throughout the day and apply ice to help with swelling to toe  -Instructed to keep close watch on toe- if redness continues to get worse or she develops a fever to call PCP or go to Walk In Clinic.   -Encouraged she reach out to Podiatry office to establish new patient care due to diabetes     5.) Cold/Sinus  -States she has had a cough for few weeks now- feels like drainage and has congestion. Not around anyone with known COVID positive results.    -ACM encouraged she try OTC medications: mucinex, cough and cold medications   -Denies fever, chills, headache, etc.   -Declines needing PCP appointment or Walk In visit for this Chronic Conditions:   1.)Diabetes  -Complaint with monitoring blood sugars  -Complaint with keeping blood sugar log  -Average morning fasting readings are 150-170  -Average 2 hours PP readings are 120-140   -Compliant with taking medications/insulin   -Does follow with optomitry yearly   -Does not follow podiatry   -Has had previous visit with dietician   -Understands the 15/15 rule for Hypoglycemia   -Avoids concentrated sweets   Lab Results   Component Value Date    LABA1C 10.6 (H) 06/19/2020     Lab Results   Component Value Date     06/19/2020   -December of 2019 A1C was 12.2   -Due for A1C in September 2020   Assessment Completed:  Diabetes Assessment    Medic Alert ID:  No  Meal Planning:  Avoidance of concentrated sweets, Plate Method   How often do you test your blood sugar?:  Other (Comment: morning fasting and 2 hours PP largest meal)   Do you have barriers with adherence to non-pharmacologic self-management interventions?  (Nutrition/Exercise/Self-Monitoring):  Yes   Have you ever had to go to the ED for symptoms of low blood sugar?:  No       Unhealing or open wounds   Do you have hyperglycemia symptoms?:  No   Do you have hypoglycemia symptoms?:  No   Last Blood Sugar Value:  162   Blood Sugar Monitoring Regimen:  Morning Fasting, 2 Hours Post Meal   Blood Sugar Trends:  No Change          2.) COPD  -Follows with [facility]   -Denies any increased shortness of breath or changes in cough   -Does have cough currently due to drainage/congestion   -No issues voiced   Assessment Completed:  COPD Assessment    Does the patient understand envrionmental exposure?:  Yes  Is the patient able to verbalize Rescue vs. Long Acting medications?:  Yes  Does the patient have a nebulizer?:  No  Does the patient use a space with inhaled medications?:  Yes     No patient-reported symptoms         Symptoms:   None:  Yes      Symptom course:  stable  Change in chronic cough?:  No/At Baseline  Change in sputum?:  No/At Baseline  Self Monitoring - SaO2:  No  Have you had a recent diagnosis of pneumonia either by PCP or at a hospital?:  No       Any ED visits or Hospitalizations since last Call? · No      Medications Reviewed with Ebony Acevedo today:  · Lyn Whittington that she has all medications. · Ebony Acevedo denies any questions. · Any cost issues with medications No      Zone Management tool reviewed today is applicable:   Yes, Diabetes  Has DM and COPD zone sheets at home       Reviewed social needs/Home Needs if any:   · Does patient have enough food? Yes  · Does patient have transportation to appointment/store/pharmacy, etc?Yes  · Does patient need any help in the home? Denies   · If yes, what type of help? n/a         Reviewed Upcoming follow up appointments with Ebony Acevedo  Future Appointments   Date Time Provider Emma Gaxiola   9/22/2020  1:00 PM Chadd Burnham MD Neuro Spec [facility]   9/22/2020  2:40 PM CHELSEA Sanon - CNP [facility]   10/30/2020 10:40 AM Grant Love MD TIFF C[facility]         Goals reviewed and completed    Education Reviewed with patient today: See Education Module. Interventions completed today:  · Patient to reach out to care coordinator at [phone] or MD office with questions. · Reminded Ebony Acevedo of walk in care clinic availability/hours; and when to utilize the facility if MD office not available. Care Coordinator Plan of Care: This nurse CC will plan to send to PCP and question about toe injury. Will also send for graduation approval due to no ongoing care coordination needs and episode being greater than 8 months.        Care Coordination Interventions    Program Enrollment:  Complex Care  Referral from Primary Care Provider:  No  Suggested Interventions and Community Resources  Diabetes Education:  Declined  Fall Risk Prevention:  Completed  Medi Set or Pill Pack:  Completed  Specialty Services Referral:  Completed (Comment: Pulmonology)  Transportation Support:  Completed  Zone Management Tools:  Completed (Comment: DM, COPD)         Prior to Admission medications    Medication Sig Start Date End Date Taking? Authorizing Provider   atorvastatin (LIPITOR) 40 MG tablet Take 1 tablet by mouth daily 7/23/20   Em Plasencia, CHELSEA Coyle CNP   losartan (COZAAR) 25 MG tablet Take 1 tablet by mouth daily 7/23/20   Em Plasencia, CHELSEA Coyle CNP   tiZANidine (ZANAFLEX) 2 MG tablet TAKE 1 TABLET BY MOUTH NIGHTLY AS NEEDED(SPASMS) 6/26/20   Keyon Del Rio MD   insulin lispro, 1 Unit Dial, (HUMALOG KWIKPEN) 100 UNIT/ML SOPN Inject 20 Units into the skin 3 times daily (before meals) 6/23/20   Em Plasencia, CHELSEA Coyle CNP   fludrocortisone (FLORINEF) 0.1 MG tablet TAKE 3 TABLETS BY MOUTH EVERY DAY 6/16/20   Angelina Blank MD   omeprazole (PRILOSEC) 20 MG delayed release capsule Take 1 capsule by mouth every morning (before breakfast) 6/2/20   Em Plasencia, CHELSEA Coyle CNP   pregabalin (LYRICA) 100 MG capsule Take 1 capsule by mouth 3 times daily.  5/19/20 6/18/21  Keyon Del Rio MD   Insulin Degludec (TRESIBA FLEXTOUCH) 200 UNIT/ML SOPN Inject 80 Units into the skin daily 3/30/20   CHELSEA Osorio CNP   FREESTYLE LITE strip USE TO CHECK BLOOD SUGARS 3 TIMES DAILY AND AS NEEDED FOR DIABETES E11.40 3/19/20   Em Plasencia, CHELSEA Coyle CNP   Dulaglutide (TRULICITY) 1.5 TI/9.7SO SOPN Inject 1.5 mg into the skin once a week 3/13/20   Em Plasencia, CHELSEA Coyle CNP   metoprolol succinate (TOPROL XL) 100 MG extended release tablet Take 1 tablet by mouth daily 3/13/20   Em Plasencia, CHELSEA - CNP   DULoxetine (CYMBALTA) 30 MG extended release capsule Take 1 capsule by mouth daily 2/10/20   Keyon Del Rio MD   metFORMIN (GLUCOPHAGE-XR) 500 MG extended release tablet Take 2 tablets by mouth daily (with breakfast) 12/12/19   CHELSEA Osorio CNP   acetaminophen (TYLENOL) 325 MG tablet Take 2 tablets by mouth every 6 hours as needed for Pain 11/12/19 Ciara Salgado,    albuterol sulfate HFA (VENTOLIN HFA) 108 (90 Base) MCG/ACT inhaler INHALE 2 PUFFS EVERY 6 HOURS AS NEEDED FOR WHEEZING 10/8/19   CHELSEA Pearson CNP   aspirin 81 MG EC tablet TAKE 1 TABLET BY MOUTH DAILY  Patient taking differently: Take 81 mg by mouth daily  7/1/19   CHELSEA Pearson CNP   Insulin Pen Needle (BD ULTRA-FINE PEN NEEDLES) 29G X 12.7MM MISC USE AS DIRECTED BY PHYSICIAN 5 times daily 8/7/18   CHELSEA Pearson CNP   Blood Glucose Monitoring Suppl BERE 1 Units by Does not apply route three times daily Unit insurance will cover 12/29/17   CHELSEA Barajas CNP   Blood Pressure Monitor KIT 1 each by Does not apply route daily as needed ESSENTIAL HYPERTENSION   I10 5/31/16   CHELSEA Pearson CNP   Blood Glucose Monitoring Suppl (BLOOD GLUCOSE MONITOR KIT) KIT 1 each by Does not apply route daily. Please dispense whatever BS monitoring kit [facility] covers. Dx: 250, new onset T2DM.  Testing once daily 2/21/12 5/19/20  CHELSEA Ramos CNP

## 2020-08-27 ENCOUNTER — HOSPITAL ENCOUNTER (OUTPATIENT)
Dept: CT IMAGING | Age: 55
Discharge: HOME | End: 2020-08-29
Payer: COMMERCIAL

## 2020-08-27 ENCOUNTER — OFFICE VISIT (OUTPATIENT)
Dept: PRIMARY CARE CLINIC | Age: 55
End: 2020-08-27
Payer: COMMERCIAL

## 2020-08-27 VITALS
OXYGEN SATURATION: 98 % | HEART RATE: 107 BPM | TEMPERATURE: 97.9 F | WEIGHT: 216.4 LBS | SYSTOLIC BLOOD PRESSURE: 140 MMHG | HEIGHT: 63 IN | RESPIRATION RATE: 18 BRPM | DIASTOLIC BLOOD PRESSURE: 99 MMHG | BODY MASS INDEX: 38.34 KG/M2

## 2020-08-27 PROCEDURE — 3017F COLORECTAL CA SCREEN DOC REV: CPT | Performed by: NURSE PRACTITIONER

## 2020-08-27 PROCEDURE — 73700 CT LOWER EXTREMITY W/O DYE: CPT

## 2020-08-27 PROCEDURE — 99213 OFFICE O/P EST LOW 20 MIN: CPT | Performed by: NURSE PRACTITIONER

## 2020-08-27 PROCEDURE — G8427 DOCREV CUR MEDS BY ELIG CLIN: HCPCS | Performed by: NURSE PRACTITIONER

## 2020-08-27 PROCEDURE — 1036F TOBACCO NON-USER: CPT | Performed by: NURSE PRACTITIONER

## 2020-08-27 PROCEDURE — G8417 CALC BMI ABV UP PARAM F/U: HCPCS | Performed by: NURSE PRACTITIONER

## 2020-08-27 RX ORDER — AMOXICILLIN AND CLAVULANATE POTASSIUM 875; 125 MG/1; MG/1
1 TABLET, FILM COATED ORAL 2 TIMES DAILY
Qty: 20 TABLET | Refills: 0 | Status: SHIPPED | OUTPATIENT
Start: 2020-08-27 | End: 2020-09-06

## 2020-08-27 ASSESSMENT — ENCOUNTER SYMPTOMS
VOMITING: 0
NAUSEA: 0
COLOR CHANGE: 1

## 2020-08-27 NOTE — PROGRESS NOTES
[facility] WALK-IN [facility]  [address] [facility] [address] [facility]  Dept: [phone]  Dept Fax: [phone]    Niharika Villegas is a 54 y.o. female who [facility] today for her medical conditions/complaints as noted below. Niharika Villegas is c/o of Foot Pain (left, start with her big toe and now her whole foot hurts, dropped can on it august 1. )      HPI:     Dia Conrad is here today for a same-day visit. She reports at the beginning of August she dropped a corn can on her foot. She had pain and a laceration of her big toe at that time. Since then it has become more swollen and she has developed pain up her left foot into her ankle. Pain is exacerbated by palpitation and weightbearing. She denies any fever or chills. She does have a history of type 2 diabetes. See below for further detail. Foot Pain    The pain is present in the left foot, left toes and left ankle. This is a new problem. The current episode started 1 to 4 weeks ago (x2-3 weeks). The problem occurs constantly. The problem has been waxing and waning. The pain is at a severity of 5/10. The pain is moderate. Associated symptoms include joint swelling and stiffness. Pertinent negatives include no fever or itching. Family history does not include gout. There is no history of diabetes, gout or rheumatoid arthritis. Past Medical History:   Diagnosis Date    Anxiety     Asthma     CAD (coronary artery disease)     Clinical trial participant at discharge 3/31/16    [facility]     Depression     Diabetic neuropathy ([facility]     H/O cardiac catheterization 4/26/16    LMCA: Mild Irregularities 10-20%. LAD: Lesion on Prox LAD: Proximal subsection. 100% stenosis. LCx: Mild irregularities 10-20%. RCA: Mild irregularities 10-20%. Widely patent mid RCA stent. EF:60%.     H/O cardiovascular stress test 10/07/2016    Overall results are most consistent with a low risk for significant CAD.     H/O echocardiogram 10/05/2016    EF:>60%. Inferoseptal wall abnormal in its motion which is not unusal status post open heart surgery. Evidence of mild (grade I) diastolic dysfunction is seen.  H/O tilt table evaluation 07/12/2016    Abnormal. PTs HR, Blood Pressure response and symptoms were most consistent with dysautonomia. Combined w/viligant maintenance of euvolemia and maintaining a moderate salt intake, pharmaciologic treatment w/ ProAmatine, SSRI such as Lexapro and/or Mestinon among other treatments have shown some effectiveness in the treatment of this condition.  History of cardiovascular stress test 02/13/2019    Abnormal. Small/moderate perfusion defect of mild/moderate intesity in the anterior and anterolateral regions during stress imaging which is most consistent w/ ischemia but may be artifact. Overall low/intermediate risk for significant CAD.  History of echocardiogram 09/06/2018    EF >60%. Inferoseptal wall is abnormal in its motion which is not unusual s/p open heart. Evidence of mild (grade I) diastolic dysfunction seen.     Hx of blood clots     Hyperlipidemia     Hypertension     Intermittent claudication (HCC)     Kidney stones     Movement disorder     neuropathy in legs    Neuromuscular disorder (HCC)     neuropathy    Osteoarthritis     Reflux     Seizures (HCC)     last over 10 yr ago    Stented coronary artery 9/22/2010     LAD/[place]    Stented coronary artery 3/31/16    RCA/[place]    Type II or unspecified type diabetes mellitus without mention of complication, not stated as uncontrolled     Unspecified sleep apnea     no machine        Current Outpatient Medications   Medication Sig Dispense Refill    amoxicillin-clavulanate (AUGMENTIN) 875-125 MG per tablet Take 1 tablet by mouth 2 times daily for 10 days 20 tablet 0    atorvastatin (LIPITOR) 40 MG tablet Take 1 tablet by mouth daily 90 tablet 3    losartan (COZAAR) 25 MG tablet Take 1 tablet by mouth daily 90 tablet 3    tiZANidine (ZANAFLEX) 2 MG tablet TAKE 1 TABLET BY MOUTH NIGHTLY AS NEEDED(SPASMS) 30 tablet 2    insulin lispro, 1 Unit Dial, (HUMALOG KWIKPEN) 100 UNIT/ML SOPN Inject 20 Units into the skin 3 times daily (before meals) 12 pen 1    fludrocortisone (FLORINEF) 0.1 MG tablet TAKE 3 TABLETS BY MOUTH EVERY DAY 90 tablet 3    omeprazole (PRILOSEC) 20 MG delayed release capsule Take 1 capsule by mouth every morning (before breakfast) 90 capsule 1    pregabalin (LYRICA) 100 MG capsule Take 1 capsule by mouth 3 times daily.  90 capsule 3    FREESTYLE LITE strip USE TO CHECK BLOOD SUGARS 3 TIMES DAILY AND AS NEEDED FOR DIABETES E11.40 270 strip 3    Dulaglutide (TRULICITY) 1.4 YO/5.3TQ SOPN Inject 1.5 mg into the skin once a week 12 pen 3    metoprolol succinate (TOPROL XL) 100 MG extended release tablet Take 1 tablet by mouth daily 90 tablet 3    DULoxetine (CYMBALTA) 30 MG extended release capsule Take 1 capsule by mouth daily 30 capsule 1    metFORMIN (GLUCOPHAGE-XR) 500 MG extended release tablet Take 2 tablets by mouth daily (with breakfast) 180 tablet 3    acetaminophen (TYLENOL) 325 MG tablet Take 2 tablets by mouth every 6 hours as needed for Pain 20 tablet 0    albuterol sulfate HFA (VENTOLIN HFA) 108 (90 Base) MCG/ACT inhaler INHALE 2 PUFFS EVERY 6 HOURS AS NEEDED FOR WHEEZING 18 Inhaler 3    aspirin 81 MG EC tablet TAKE 1 TABLET BY MOUTH DAILY (Patient taking differently: Take 81 mg by mouth daily ) 90 tablet 3    Insulin Pen Needle (BD ULTRA-FINE PEN NEEDLES) 29G X 12.7MM MISC USE AS DIRECTED BY PHYSICIAN 5 times daily 150 each 11    Blood Glucose Monitoring Suppl BERE 1 Units by Does not apply route three times daily Unit insurance will cover 1 Device 0    Blood Pressure Monitor KIT 1 each by Does not apply route daily as needed ESSENTIAL HYPERTENSION   I10 1 kit 0    Insulin Degludec (TRESIBA FLEXTOUCH) 200 UNIT/ML SOPN Inject 80 Units into the skin daily 12 pen 3    Blood Glucose Monitoring Suppl (BLOOD GLUCOSE MONITOR KIT) KIT 1 each by Does not apply route daily. Please dispense whatever BS monitoring kit [employer] covers. Dx: 250, new onset T2DM. Testing once daily 1 kit 0     No current facility-administered medications for this visit. Allergies   Allergen Reactions    [facility] Rash       Subjective:      Review of Systems   Constitutional: Negative for activity change, fatigue and fever. Cardiovascular: Positive for leg swelling. Gastrointestinal: Negative for nausea and vomiting. Musculoskeletal: Positive for arthralgias, joint swelling and stiffness. Negative for gout. Skin: Positive for color change and wound. Negative for itching. Objective:     Physical Exam  Vitals signs and nursing note reviewed. Constitutional:       General: She is not in acute distress. Appearance: Normal appearance. She is not toxic-appearing or diaphoretic. Neck:      Musculoskeletal: Normal range of motion and neck supple. Cardiovascular:      Rate and Rhythm: Normal rate and regular rhythm. Pulmonary:      Effort: Pulmonary effort is normal.      Breath sounds: Normal breath sounds. No wheezing, rhonchi or rales. Musculoskeletal:        Feet:    Skin:     Findings: Erythema and wound present. Neurological:      General: No focal deficit present. Mental Status: She is alert and oriented to person, place, and time. Psychiatric:         Mood and Affect: Mood normal.         Behavior: Behavior normal.       BP (!) 140/99 (Site: Right Upper Arm, Position: Sitting, Cuff Size: Large Adult)   Pulse 107   Temp 97.9 °F (36.6 °C)   Resp 18   Ht 5' 2.99\" (1.6 m)   Wt 216 lb 6.4 oz (98.2 kg)   LMP 12/05/1996   SpO2 98%   BMI 38.34 kg/m²     Assessment:      Diagnosis Orders   1.  Left foot infection  amoxicillin-clavulanate (AUGMENTIN) 875-125 MG per tablet    CT FOOT LEFT WO CONTRAST     CT scan with no acute process. We will treat her for a left foot infection/cellulitis with Augmentin. Plan:         Discussed exam, POCT findings, plan of care (including prescriptive and supportive as listed below) and follow-up atlength with patient. Reviewed all prescribed and recommended medications, administration and side effects. Encouraged to return to [address] [place] [address] for noimprovement and or worsening of symptoms. To ER or call 911 if any difficulty breathing, shortness of breath, inability to swallow, hives or temp greater than 103 degrees. Questions answered. They verbalized understandingand agreement. Return if symptoms worsen or fail to improve. Orders Placed This Encounter   Medications    amoxicillin-clavulanate (AUGMENTIN) 875-125 MG per tablet     Sig: Take 1 tablet by mouth 2 times daily for 10 days     Dispense:  20 tablet     Refill:  0          All patient questions answered. Pt voiced understanding.       Electronically signed by CHELSEA Solorzano CNP on 8/27/2020 at 6:01 PM

## 2020-09-09 ENCOUNTER — OFFICE VISIT (OUTPATIENT)
Dept: PRIMARY CARE CLINIC | Age: 55
End: 2020-09-09
Payer: COMMERCIAL

## 2020-09-09 VITALS
HEART RATE: 105 BPM | BODY MASS INDEX: 38.62 KG/M2 | HEIGHT: 63 IN | TEMPERATURE: 96.5 F | DIASTOLIC BLOOD PRESSURE: 94 MMHG | RESPIRATION RATE: 18 BRPM | OXYGEN SATURATION: 98 % | SYSTOLIC BLOOD PRESSURE: 128 MMHG | WEIGHT: 218 LBS

## 2020-09-09 PROCEDURE — G8427 DOCREV CUR MEDS BY ELIG CLIN: HCPCS | Performed by: NURSE PRACTITIONER

## 2020-09-09 PROCEDURE — G8417 CALC BMI ABV UP PARAM F/U: HCPCS | Performed by: NURSE PRACTITIONER

## 2020-09-09 PROCEDURE — 3017F COLORECTAL CA SCREEN DOC REV: CPT | Performed by: NURSE PRACTITIONER

## 2020-09-09 PROCEDURE — 3046F HEMOGLOBIN A1C LEVEL >9.0%: CPT | Performed by: NURSE PRACTITIONER

## 2020-09-09 PROCEDURE — 99213 OFFICE O/P EST LOW 20 MIN: CPT | Performed by: NURSE PRACTITIONER

## 2020-09-09 PROCEDURE — 2022F DILAT RTA XM EVC RTNOPTHY: CPT | Performed by: NURSE PRACTITIONER

## 2020-09-09 PROCEDURE — 1036F TOBACCO NON-USER: CPT | Performed by: NURSE PRACTITIONER

## 2020-09-09 RX ORDER — ACETAMINOPHEN 500 MG
500 TABLET ORAL 4 TIMES DAILY PRN
Qty: 40 TABLET | Refills: 0 | Status: SHIPPED | OUTPATIENT
Start: 2020-09-09

## 2020-09-09 RX ORDER — SULFAMETHOXAZOLE AND TRIMETHOPRIM 800; 160 MG/1; MG/1
1 TABLET ORAL 2 TIMES DAILY
Qty: 14 TABLET | Refills: 0 | Status: SHIPPED | OUTPATIENT
Start: 2020-09-09 | End: 2020-09-16

## 2020-09-09 ASSESSMENT — ENCOUNTER SYMPTOMS
VOMITING: 0
COLOR CHANGE: 1
NAUSEA: 0

## 2020-09-09 NOTE — PROGRESS NOTES
results are most consistent with a low risk for significant CAD.     H/O echocardiogram 10/05/2016    EF:>60%. Inferoseptal wall abnormal in its motion which is not unusal status post open heart surgery. Evidence of mild (grade I) diastolic dysfunction is seen.  H/O tilt table evaluation 07/12/2016    Abnormal. PTs HR, Blood Pressure response and symptoms were most consistent with dysautonomia. Combined w/viligant maintenance of euvolemia and maintaining a moderate salt intake, pharmaciologic treatment w/ ProAmatine, SSRI such as Lexapro and/or Mestinon among other treatments have shown some effectiveness in the treatment of this condition.  History of cardiovascular stress test 02/13/2019    Abnormal. Small/moderate perfusion defect of mild/moderate intesity in the anterior and anterolateral regions during stress imaging which is most consistent w/ ischemia but may be artifact. Overall low/intermediate risk for significant CAD.  History of echocardiogram 09/06/2018    EF >60%. Inferoseptal wall is abnormal in its motion which is not unusual s/p open heart. Evidence of mild (grade I) diastolic dysfunction seen.     Hx of blood clots     Hyperlipidemia     Hypertension     Intermittent claudication (HCC)     Kidney stones     Movement disorder     neuropathy in legs    Neuromuscular disorder (HCC)     neuropathy    Osteoarthritis     Reflux     Seizures (HCC)     last over 10 yr ago    Stented coronary artery 9/22/2010     LAD/[place]    Stented coronary artery 3/31/16    RCA/[place]    Type II or unspecified type diabetes mellitus without mention of complication, not stated as uncontrolled     Unspecified sleep apnea     no machine        Current Outpatient Medications   Medication Sig Dispense Refill    sulfamethoxazole-trimethoprim (BACTRIM DS;SEPTRA DS) 800-160 MG per tablet Take 1 tablet by mouth 2 times daily for 7 days 14 tablet 0    acetaminophen (TYLENOL) 500 MG tablet Take 1 tablet by mouth 4 times daily as needed for Pain 40 tablet 0    atorvastatin (LIPITOR) 40 MG tablet Take 1 tablet by mouth daily 90 tablet 3    losartan (COZAAR) 25 MG tablet Take 1 tablet by mouth daily 90 tablet 3    tiZANidine (ZANAFLEX) 2 MG tablet TAKE 1 TABLET BY MOUTH NIGHTLY AS NEEDED(SPASMS) 30 tablet 2    insulin lispro, 1 Unit Dial, (HUMALOG KWIKPEN) 100 UNIT/ML SOPN Inject 20 Units into the skin 3 times daily (before meals) 12 pen 1    fludrocortisone (FLORINEF) 0.1 MG tablet TAKE 3 TABLETS BY MOUTH EVERY DAY 90 tablet 3    omeprazole (PRILOSEC) 20 MG delayed release capsule Take 1 capsule by mouth every morning (before breakfast) 90 capsule 1    pregabalin (LYRICA) 100 MG capsule Take 1 capsule by mouth 3 times daily.  90 capsule 3    Insulin Degludec (TRESIBA FLEXTOUCH) 200 UNIT/ML SOPN Inject 80 Units into the skin daily 12 pen 3    FREESTYLE LITE strip USE TO CHECK BLOOD SUGARS 3 TIMES DAILY AND AS NEEDED FOR DIABETES E11.40 270 strip 3    Dulaglutide (TRULICITY) 1.5 TG/8.1PL SOPN Inject 1.5 mg into the skin once a week 12 pen 3    metoprolol succinate (TOPROL XL) 100 MG extended release tablet Take 1 tablet by mouth daily 90 tablet 3    DULoxetine (CYMBALTA) 30 MG extended release capsule Take 1 capsule by mouth daily 30 capsule 1    metFORMIN (GLUCOPHAGE-XR) 500 MG extended release tablet Take 2 tablets by mouth daily (with breakfast) 180 tablet 3    albuterol sulfate HFA (VENTOLIN HFA) 108 (90 Base) MCG/ACT inhaler INHALE 2 PUFFS EVERY 6 HOURS AS NEEDED FOR WHEEZING 18 Inhaler 3    aspirin 81 MG EC tablet TAKE 1 TABLET BY MOUTH DAILY (Patient taking differently: Take 81 mg by mouth daily ) 90 tablet 3    Insulin Pen Needle (BD ULTRA-FINE PEN NEEDLES) 29G X 12.7MM MISC USE AS DIRECTED BY PHYSICIAN 5 times daily 150 each 11    Blood Glucose Monitoring Suppl BERE 1 Units by Does not apply route three times daily Unit insurance will cover 1 Device 0    Blood Pressure Monitor KIT 1 each by Does not apply route daily as needed ESSENTIAL HYPERTENSION   I10 1 kit 0    Blood Glucose Monitoring Suppl (BLOOD GLUCOSE MONITOR KIT) KIT 1 each by Does not apply route daily. Please dispense whatever BS monitoring kit [employer] covers. Dx: 250, new onset T2DM. Testing once daily 1 kit 0     No current facility-administered medications for this visit. Allergies   Allergen Reactions    Tape Cortez Watkins Tape] Rash       Subjective:      Review of Systems   Constitutional: Negative for chills, fatigue and fever. Gastrointestinal: Negative for nausea and vomiting. Musculoskeletal: Positive for arthralgias and gait problem. Skin: Positive for color change and wound. Objective:     Physical Exam  Vitals signs and nursing note reviewed. Constitutional:       General: She is not in acute distress. Appearance: Normal appearance. She is not toxic-appearing or diaphoretic. HENT:      Head: Normocephalic and atraumatic. Neck:      Musculoskeletal: Normal range of motion and neck supple. Cardiovascular:      Rate and Rhythm: Normal rate and regular rhythm. Pulses:           Dorsalis pedis pulses are 2+ on the left side. Heart sounds: No murmur. Pulmonary:      Effort: Pulmonary effort is normal.      Breath sounds: Normal breath sounds. No wheezing, rhonchi or rales. Musculoskeletal:        Feet:    Feet:      Right foot:      Skin integrity: No erythema or warmth. Left foot:      Skin integrity: Erythema and warmth present. Skin:     Capillary Refill: Capillary refill takes less than 2 seconds. Neurological:      General: No focal deficit present. Mental Status: She is alert and oriented to person, place, and time.    Psychiatric:         Mood and Affect: Mood normal.         Behavior: Behavior normal.       BP (!) 128/94   Pulse 105   Temp 96.5 °F (35.8 °C) (Temporal)   Resp 18   Ht 5' 3\" (1.6 m)   Wt 218 lb (98.9 kg)   LMP 12/05/1996   SpO2 98%   BMI 38.62 kg/m²     Assessment:      Diagnosis Orders   1. Acute paronychia of toe of left foot  sulfamethoxazole-trimethoprim (BACTRIM DS;SEPTRA DS) 800-160 MG per tablet    Zana Todd DPM, Podiatry, [place]   2. Uncontrolled type 2 diabetes mellitus with diabetic polyneuropathy, with long-term current use of insulin [facility])  Zana Todd DPM, Podiatry, [place]       Plan:     Discussed exam, POCT findings, plan of care (including prescriptive and supportive as listed below) and follow-up atlength with patient. Reviewed all prescribed and recommended medications, administration and side effects. Encouraged to return to [address] [place] [address] for noimprovement and or worsening of symptoms. To ER or call 911 if any difficulty breathing, shortness of breath, inability to swallow, hives or temp greater than 103 degrees. Questions answered. They verbalized understandingand agreement. Return if symptoms worsen or fail to improve. Orders Placed This Encounter   Medications    sulfamethoxazole-trimethoprim (BACTRIM DS;SEPTRA DS) 800-160 MG per tablet     Sig: Take 1 tablet by mouth 2 times daily for 7 days     Dispense:  14 tablet     Refill:  0    acetaminophen (TYLENOL) 500 MG tablet     Sig: Take 1 tablet by mouth 4 times daily as needed for Pain     Dispense:  40 tablet     Refill:  0          All patient questions answered. Pt voiced understanding.       Electronically signed by CHELSEA Fofana CNP on 9/9/2020 at 11:01 AM

## 2020-09-15 ENCOUNTER — OFFICE VISIT (OUTPATIENT)
Dept: PODIATRY | Age: 55
End: 2020-09-15
Payer: COMMERCIAL

## 2020-09-15 VITALS
TEMPERATURE: 97.4 F | HEART RATE: 96 BPM | SYSTOLIC BLOOD PRESSURE: 134 MMHG | RESPIRATION RATE: 16 BRPM | BODY MASS INDEX: 38.62 KG/M2 | HEIGHT: 63 IN | DIASTOLIC BLOOD PRESSURE: 74 MMHG

## 2020-09-15 PROCEDURE — G8417 CALC BMI ABV UP PARAM F/U: HCPCS | Performed by: PODIATRIST

## 2020-09-15 PROCEDURE — G8427 DOCREV CUR MEDS BY ELIG CLIN: HCPCS | Performed by: PODIATRIST

## 2020-09-15 PROCEDURE — 1036F TOBACCO NON-USER: CPT | Performed by: PODIATRIST

## 2020-09-15 PROCEDURE — 3017F COLORECTAL CA SCREEN DOC REV: CPT | Performed by: PODIATRIST

## 2020-09-15 PROCEDURE — 99203 OFFICE O/P NEW LOW 30 MIN: CPT | Performed by: PODIATRIST

## 2020-09-15 ASSESSMENT — PATIENT HEALTH QUESTIONNAIRE - PHQ9
SUM OF ALL RESPONSES TO PHQ9 QUESTIONS 1 & 2: 0
1. LITTLE INTEREST OR PLEASURE IN DOING THINGS: 0
2. FEELING DOWN, DEPRESSED OR HOPELESS: 0
SUM OF ALL RESPONSES TO PHQ QUESTIONS 1-9: 0
SUM OF ALL RESPONSES TO PHQ QUESTIONS 1-9: 0

## 2020-09-15 NOTE — PROGRESS NOTES
Subjective:      Patient ID: Khanh Rangel is a 54 y.o. female. Cc: Left great toe ; previously intense erythema / purple          Much better ,last 3 days  HPI original progressive since direct trauma 8-1-2020          Referred PCP          triage p.o.empiric ATB          Baseline CT scan     Review of Systems   Constitutional: Positive for activity change. Negative for appetite change, chills, diaphoresis, fatigue, fever and unexpected weight change. ** no Pain   HENT: Negative. Negative for nosebleeds. Eyes: Negative. Respiratory: Positive for apnea and shortness of breath. Negative for cough, choking, chest tightness, wheezing and stridor. +GAMING, -PE,    Cardiovascular: Positive for palpitations. Negative for chest pain and leg swelling.        +angina, -claudication , -DVT, +MI   Gastrointestinal: Negative for abdominal pain, anal bleeding, constipation, diarrhea, nausea and vomiting. +GERD   Endocrine:        +DM, +obesity   Genitourinary: Positive for frequency. Negative for hematuria. +nocuria , -ERSD,   Musculoskeletal: Positive for arthralgias, back pain, gait problem and myalgias. Skin: Positive for color change. Negative for pallor, rash and wound. Allergic/Immunologic: Negative for environmental allergies, food allergies and immunocompromised state. +tape   Neurological: Positive for numbness. Negative for weakness. Hematological: Negative. Negative for adenopathy. Does not bruise/bleed easily. -hypercoag.,    Psychiatric/Behavioral: Negative. Past Medical History:   Diagnosis Date    Anxiety     Asthma     CAD (coronary artery disease)     Clinical trial participant at discharge 3/31/16    [facility]     Depression     Diabetic neuropathy [facility]     H/O cardiac catheterization 4/26/16    LMCA: Mild Irregularities 10-20%. LAD: Lesion on Prox LAD: Proximal subsection. 100% stenosis.  LCx: Mild irregularities 10-20%. RCA: Mild irregularities 10-20%. Widely patent mid RCA stent. EF:60%.  H/O cardiovascular stress test 10/07/2016    Overall results are most consistent with a low risk for significant CAD.     H/O echocardiogram 10/05/2016    EF:>60%. Inferoseptal wall abnormal in its motion which is not unusal status post open heart surgery. Evidence of mild (grade I) diastolic dysfunction is seen.  H/O tilt table evaluation 07/12/2016    Abnormal. PTs HR, Blood Pressure response and symptoms were most consistent with dysautonomia. Combined w/viligant maintenance of euvolemia and maintaining a moderate salt intake, pharmaciologic treatment w/ ProAmatine, SSRI such as Lexapro and/or Mestinon among other treatments have shown some effectiveness in the treatment of this condition.  History of cardiovascular stress test 02/13/2019    Abnormal. Small/moderate perfusion defect of mild/moderate intesity in the anterior and anterolateral regions during stress imaging which is most consistent w/ ischemia but may be artifact. Overall low/intermediate risk for significant CAD.  History of echocardiogram 09/06/2018    EF >60%. Inferoseptal wall is abnormal in its motion which is not unusual s/p open heart. Evidence of mild (grade I) diastolic dysfunction seen.     Hx of blood clots     Hyperlipidemia     Hypertension     Intermittent claudication (HCC)     Kidney stones     Movement disorder     neuropathy in legs    Neuromuscular disorder (HCC)     neuropathy    Osteoarthritis     Reflux     Seizures (HCC)     last over 10 yr ago    Stented coronary artery 9/22/2010     LAD/[place]    Stented coronary artery 3/31/16    RCA/[place]    Type II or unspecified type diabetes mellitus without mention of complication, not stated as uncontrolled     Unspecified sleep apnea     no machine     Past Surgical History:   Procedure Laterality Date    ABDOMINAL HERNIA REPAIR  1-2016    repair done [place]  APPENDECTOMY      CARDIAC CATHETERIZATION Left 4/26/2016    right radial/ [facility] [place]/ Dr Allyssa Gambino Left 03/07/2019    Left Radial/[facility] [place]/    CARDIAC SURGERY      CHOLECYSTECTOMY  1991    COLONOSCOPY  12/16/14    -hemorrhoids,bx    CORONARY ANGIOPLASTY WITH STENT PLACEMENT  9/2010    CORONARY ANGIOPLASTY WITH STENT PLACEMENT  3-    [facility] / DR Ruperto Fall    CORONARY ARTERY BYPASS GRAFT  08/15/2016    OP X 1- Dr Lan Godwin, COLON, DIAGNOSTIC  12/16/2014    HERNIA REPAIR  12/13/12    at the medial aspect of a [place] RUQ scar); [id number]  02/16/2015    incisional, recurrent    HERNIA REPAIR  02/2016    HYSTERECTOMY      OTHER SURGICAL HISTORY  10/8/2015    abd wound washout, mesh removal, wound vac placement    CA SUCT NICOLE LIPECTOMY,HEAD/NECK Left 8/27/2018    THIGH LESION BIOPSY EXCISION, SOFT TISSUE MASS performed by Víctor Alexander MD at [facility] 2018    leg    UPPP UVULOPALATOPHARYGOPLASTY  06/26/2012    VENTRAL HERNIA REPAIR  09/21/2015    With Mesh - Dr Desirae Canseco     Allergies   Allergen Reactions    Tape Mardel Gutting Tape] Rash       Current Outpatient Medications:     acetaminophen (TYLENOL) 500 MG tablet, Take 1 tablet by mouth 4 times daily as needed for Pain, Disp: 40 tablet, Rfl: 0    atorvastatin (LIPITOR) 40 MG tablet, Take 1 tablet by mouth daily, Disp: 90 tablet, Rfl: 3    losartan (COZAAR) 25 MG tablet, Take 1 tablet by mouth daily, Disp: 90 tablet, Rfl: 3    insulin lispro, 1 Unit Dial, (HUMALOG KWIKPEN) 100 UNIT/ML SOPN, Inject 20 Units into the skin 3 times daily (before meals), Disp: 12 pen, Rfl: 1    fludrocortisone (FLORINEF) 0.1 MG tablet, TAKE 3 TABLETS BY MOUTH EVERY DAY, Disp: 90 tablet, Rfl: 3    omeprazole (PRILOSEC) 20 MG delayed release capsule, Take 1 capsule by mouth every morning (before breakfast), Disp: 90 capsule, Rfl: 1    Insulin Degludec (TRESIBA FLEXTOUCH) 200 UNIT/ML SOPN, Inject 80 Units into the skin daily, Disp: 12 pen, Rfl: 3    Dulaglutide (TRULICITY) 1.5 QX/2.7ES SOPN, Inject 1.5 mg into the skin once a week, Disp: 12 pen, Rfl: 3    metoprolol succinate (TOPROL XL) 100 MG extended release tablet, Take 1 tablet by mouth daily, Disp: 90 tablet, Rfl: 3    DULoxetine (CYMBALTA) 30 MG extended release capsule, Take 1 capsule by mouth daily, Disp: 30 capsule, Rfl: 1    metFORMIN (GLUCOPHAGE-XR) 500 MG extended release tablet, Take 2 tablets by mouth daily (with breakfast), Disp: 180 tablet, Rfl: 3    albuterol sulfate HFA (VENTOLIN HFA) 108 (90 Base) MCG/ACT inhaler, INHALE 2 PUFFS EVERY 6 HOURS AS NEEDED FOR WHEEZING, Disp: 18 Inhaler, Rfl: 3    aspirin 81 MG EC tablet, TAKE 1 TABLET BY MOUTH DAILY (Patient taking differently: Take 81 mg by mouth daily ), Disp: 90 tablet, Rfl: 3    Insulin Pen Needle (BD ULTRA-FINE PEN NEEDLES) 29G X 12.7MM MISC, USE AS DIRECTED BY PHYSICIAN 5 times daily, Disp: 150 each, Rfl: 11    Blood Glucose Monitoring Suppl BERE, 1 Units by Does not apply route three times daily Unit insurance will cover, Disp: 1 Device, Rfl: 0    Blood Pressure Monitor KIT, 1 each by Does not apply route daily as needed ESSENTIAL HYPERTENSION   I10, Disp: 1 kit, Rfl: 0    tiZANidine (ZANAFLEX) 2 MG tablet, TAKE 1 TABLET BY MOUTH NIGHTLY AS NEEDED(SPASMS), Disp: 30 tablet, Rfl: 2    pregabalin (LYRICA) 100 MG capsule, Take 1 capsule by mouth 3 times daily. , Disp: 90 capsule, Rfl: 3    blood glucose test strips (FREESTYLE LITE) strip, USE TO CHECK BLOOD SUGARS 3 TIMES DAILY AND AS NEEDED FOR DIABETES E11.40, Disp: 270 strip, Rfl: 3    Blood Glucose Monitoring Suppl (BLOOD GLUCOSE MONITOR KIT) KIT, 1 each by Does not apply route daily. Please dispense whatever BS monitoring kit [facility] covers. Dx: 250, new onset T2DM.  Testing once daily, Disp: 1 kit, Rfl: 0  Family History   Problem Relation Age of Onset    Cancer Mother    [facility] Diabetes Mother     Cancer Father         thyroid    Diabetes Sister     Heart Disease Sister     Heart Disease Brother     Heart Disease Maternal Uncle     Cancer Maternal Grandmother      Social History     Socioeconomic History    Marital status:      Spouse name: Not on file    Number of children: Not on file    Years of education: Not on file    Highest education level: Not on file   Occupational History    Not on file   Social Needs    Financial resource strain: Not on file    Food insecurity     Worry: Not on file     Inability: Not on file   [employer] needs     Medical: Not on file     Non-medical: Not on file   Tobacco Use    Smoking status: Former Smoker     Packs/day: 2.00     Years: 10.00     Pack years: 20.00     Types: Cigarettes     Last attempt to quit: 9/19/2010     Years since quitting: 10.0    Smokeless tobacco: Never Used   Substance and Sexual Activity    Alcohol use: No    Drug use: No    Sexual activity: Yes     Partners: Male   Lifestyle    Physical activity     Days per week: Not on file     Minutes per session: Not on file    Stress: Not on file   Relationships    Social connections     Talks on phone: Not on file     Gets together: Not on file     Attends Christianity service: Not on file     Active member of club or organization: Not on file     Attends meetings of clubs or organizations: Not on file     Relationship status: Not on file    Intimate partner violence     Fear of current or ex partner: Not on file     Emotionally abused: Not on file     Physically abused: Not on file     Forced sexual activity: Not on file   Other Topics Concern    Not on file   Social History Narrative    Not on file     -international travel , < 6 months       Objective:   Physical Exam 56 Y/O WF, WD/WN, A&O x 3,                             VSS, Afebrile, A1c 10.6                            Ambulatory, plantigrade , bipedal , propulsive                     L1 ; originally medial labia ; now no erythema / PMT                            Lateral labia ; minimal purple hue / minor +PMT                                                   No drainage                                                   No anatomic spicule formation of nail plate                                                   No warmth                    LLE ; +2/4 pedal pulses / -pallor on elevation / - rubor on dependency                               Decreased 2-pt and light touch Epicritic toes , dorsal and plantar -- forefoot / midfoot wnl     Assessment:      Contusion of hallux ; pre abscess paronychia sequela - L1   Complicating DM LOPS  vascular perfusion acceptable       Plan:      Palliative care and treatment of minor injury   Finish ATB  Monitor next 7 days for exploratory I&D  Rx add astringent soaks to treatment protocol         Leslie Ozuna DPM   9/15/2020  12:34 PM

## 2020-09-16 RX ORDER — BLOOD-GLUCOSE METER
KIT MISCELLANEOUS
Qty: 270 STRIP | Refills: 3 | Status: SHIPPED | OUTPATIENT
Start: 2020-09-16 | End: 2021-07-16

## 2020-09-22 ENCOUNTER — OFFICE VISIT (OUTPATIENT)
Dept: NEUROLOGY | Age: 55
End: 2020-09-22
Payer: COMMERCIAL

## 2020-09-22 VITALS
WEIGHT: 218.6 LBS | DIASTOLIC BLOOD PRESSURE: 84 MMHG | BODY MASS INDEX: 38.73 KG/M2 | HEIGHT: 63 IN | SYSTOLIC BLOOD PRESSURE: 136 MMHG

## 2020-09-22 PROCEDURE — 1036F TOBACCO NON-USER: CPT | Performed by: PSYCHIATRY & NEUROLOGY

## 2020-09-22 PROCEDURE — 2022F DILAT RTA XM EVC RTNOPTHY: CPT | Performed by: PSYCHIATRY & NEUROLOGY

## 2020-09-22 PROCEDURE — 99214 OFFICE O/P EST MOD 30 MIN: CPT | Performed by: PSYCHIATRY & NEUROLOGY

## 2020-09-22 PROCEDURE — G8427 DOCREV CUR MEDS BY ELIG CLIN: HCPCS | Performed by: PSYCHIATRY & NEUROLOGY

## 2020-09-22 PROCEDURE — G8417 CALC BMI ABV UP PARAM F/U: HCPCS | Performed by: PSYCHIATRY & NEUROLOGY

## 2020-09-22 PROCEDURE — 3046F HEMOGLOBIN A1C LEVEL >9.0%: CPT | Performed by: PSYCHIATRY & NEUROLOGY

## 2020-09-22 PROCEDURE — 3017F COLORECTAL CA SCREEN DOC REV: CPT | Performed by: PSYCHIATRY & NEUROLOGY

## 2020-09-22 RX ORDER — PREGABALIN 100 MG/1
100 CAPSULE ORAL 3 TIMES DAILY
Qty: 90 CAPSULE | Refills: 3 | Status: SHIPPED | OUTPATIENT
Start: 2020-09-22 | End: 2021-02-18

## 2020-09-22 RX ORDER — TIZANIDINE 2 MG/1
TABLET ORAL
Qty: 30 TABLET | Refills: 2 | Status: SHIPPED | OUTPATIENT
Start: 2020-09-22 | End: 2022-09-01 | Stop reason: SDUPTHER

## 2020-09-22 NOTE — PROGRESS NOTES
NEUROLOGY FOLLOW-UP  Patient Name:  Khanh Rangel  :   1965  Clinic Visit Date: 2020        REVIEW OF SYSTEMS  Constitutional Weight changes: absent, Fevers : absent, Fatigue: present; Any recent hospitalizations:  absent.    HEENT Ears: normal, Eyes: no correction, Visual disturbance: absent   Reespiratory Shortness of breath: absent, Cough: absent   Cardivascular Chest pain: absent, Leg swelling :absent   GI Constipation: absent, Diarrhea: absent, Swallowing change: absent    Urinary frequency: present, Urinary urgency: absent, Urinary incontinence: absent   Musculoskeletal Neck pain: absent, Back pain: absent, Stiffness: absent, Muscle pain: absent, Joint pain: absent   Dermatological Hair loss: absent, Skin changes: absent   Neurological Memory loss: absent, Confusion: absent, Seizures: absent; Trouble walking or imbalance: absent, Dizziness: absent, Weakness: absent, Numbness absent, Tremor: absent, Spasm: present, Speech difficulty: absent, Headache: absent, Light sensitivity: absent   Psychiatric Anxiety: absent, Depression  absent, Suicidal ideations absent   Hematologic Abnormal bleeding: absent, Anemia: absent, Clotting disorder: absent, Lymph gland changes: absent

## 2020-09-22 NOTE — PROGRESS NOTES
NEUROLOGY FOLLOW-UP  Patient Name:  Margaret Lopes  :   1965  Clinic Visit Date: 2020    I saw Ms. Margaret Lopes in follow-up in the office today in continuation of neurologic care. She is a 54 y.o.  female with painful diabetic peripheral polyneuropathy comes to clinic stating that pain in lower extremities is stable with fluctuating severity at times even though it has been manageable. She still has pins-and-needles sensations along with burning pain in both feet and lower legs along with cramps in calves extending upwards. She has been suffering from similar sensations in upper extremities also. She is back on Lyrica since may and feels \"its helping much\". She has failed gabapentin 800 mg tid. Therefore it was discontinued and started on Lyrica. She could not tolerate cyclobenzaprine due to increased lethargy. She is suffering from muscle spasms in bilateral lower extremities. Since last visit; her hemoglobin A1c has slightly improved from 12.7 to 12.1. She is still taking duloxetine with no significant relief. She is tolerating it well without any adverse effects. She has been using duloxetine, Cymbalta along with the capsaicin cream for relief in dysesthesias. She gets exacerbation of symptoms whenever she misses any dose. She has not had any medication related adverse effects. She denies any side effects such as headache, drowsiness, nausea, abdominal pain, weakness. She also stated that she has been participating in physical therapy and it has been helping. Review of systems done by staff reviewed and pertinent positives include pain in lower extremities and occasional balance difficulties.      Current Outpatient Medications on File Prior to Visit   Medication Sig Dispense Refill    blood glucose test strips (FREESTYLE LITE) strip USE TO CHECK BLOOD SUGARS 3 TIMES DAILY AND AS NEEDED FOR DIABETES E11.40 270 strip 3    acetaminophen (TYLENOL) 500 MG tablet Take 1 tablet by mouth 4 times daily as needed for Pain 40 tablet 0    atorvastatin (LIPITOR) 40 MG tablet Take 1 tablet by mouth daily 90 tablet 3    losartan (COZAAR) 25 MG tablet Take 1 tablet by mouth daily 90 tablet 3    tiZANidine (ZANAFLEX) 2 MG tablet TAKE 1 TABLET BY MOUTH NIGHTLY AS NEEDED(SPASMS) 30 tablet 2    insulin lispro, 1 Unit Dial, (HUMALOG KWIKPEN) 100 UNIT/ML SOPN Inject 20 Units into the skin 3 times daily (before meals) 12 pen 1    fludrocortisone (FLORINEF) 0.1 MG tablet TAKE 3 TABLETS BY MOUTH EVERY DAY 90 tablet 3    omeprazole (PRILOSEC) 20 MG delayed release capsule Take 1 capsule by mouth every morning (before breakfast) 90 capsule 1    pregabalin (LYRICA) 100 MG capsule Take 1 capsule by mouth 3 times daily.  90 capsule 3    Insulin Degludec (TRESIBA FLEXTOUCH) 200 UNIT/ML SOPN Inject 80 Units into the skin daily 12 pen 3    Dulaglutide (TRULICITY) 1.5 PO/8.3QH SOPN Inject 1.5 mg into the skin once a week 12 pen 3    metoprolol succinate (TOPROL XL) 100 MG extended release tablet Take 1 tablet by mouth daily 90 tablet 3    DULoxetine (CYMBALTA) 30 MG extended release capsule Take 1 capsule by mouth daily 30 capsule 1    metFORMIN (GLUCOPHAGE-XR) 500 MG extended release tablet Take 2 tablets by mouth daily (with breakfast) 180 tablet 3    albuterol sulfate HFA (VENTOLIN HFA) 108 (90 Base) MCG/ACT inhaler INHALE 2 PUFFS EVERY 6 HOURS AS NEEDED FOR WHEEZING 18 Inhaler 3    aspirin 81 MG EC tablet TAKE 1 TABLET BY MOUTH DAILY (Patient taking differently: Take 81 mg by mouth daily ) 90 tablet 3    Insulin Pen Needle (BD ULTRA-FINE PEN NEEDLES) 29G X 12.7MM MISC USE AS DIRECTED BY PHYSICIAN 5 times daily 150 each 11    Blood Glucose Monitoring Suppl BERE 1 Units by Does not apply route three times daily Unit insurance will cover 1 Device 0    Blood Pressure Monitor KIT 1 each by Does not apply route daily as needed ESSENTIAL HYPERTENSION   I10 1 kit 0    Blood Glucose Monitoring Suppl (BLOOD GLUCOSE MONITOR KIT) KIT 1 each by Does not apply route daily. Please dispense whatever BS monitoring kit [employer] covers. Dx: 250, new onset T2DM. Testing once daily 1 kit 0     No current facility-administered medications on file prior to visit. Allergies: Selina Alves is allergic to tape [adhesive tape]. Past Medical History:   Diagnosis Date    Anxiety     Asthma     CAD (coronary artery disease)     Clinical trial participant at discharge 3/31/16    [facility]ch[facility] [facility]     Depression     Diabetic neuropathy ([facility] [address] [facility]     H/O cardiac catheterization 4/26/16    LMCA: Mild Irregularities 10-20%. LAD: Lesion on Prox LAD: Proximal subsection. 100% stenosis. LCx: Mild irregularities 10-20%. RCA: Mild irregularities 10-20%. Widely patent mid RCA stent. EF:60%.  H/O cardiovascular stress test 10/07/2016    Overall results are most consistent with a low risk for significant CAD.     H/O echocardiogram 10/05/2016    EF:>60%. Inferoseptal wall abnormal in its motion which is not unusal status post open heart surgery. Evidence of mild (grade I) diastolic dysfunction is seen.  H/O tilt table evaluation 07/12/2016    Abnormal. PTs HR, Blood Pressure response and symptoms were most consistent with dysautonomia. Combined w/viligant maintenance of euvolemia and maintaining a moderate salt intake, pharmaciologic treatment w/ ProAmatine, SSRI such as Lexapro and/or Mestinon among other treatments have shown some effectiveness in the treatment of this condition.  History of cardiovascular stress test 02/13/2019    Abnormal. Small/moderate perfusion defect of mild/moderate intesity in the anterior and anterolateral regions during stress imaging which is most consistent w/ ischemia but may be artifact. Overall low/intermediate risk for significant CAD.  History of echocardiogram 09/06/2018    EF >60%.  Inferoseptal wall is abnormal in its motion which is not unusual s/p open heart. Evidence of mild (grade I) diastolic dysfunction seen.  Hx of blood clots     Hyperlipidemia     Hypertension     Intermittent claudication (HCC)     Kidney stones     Movement disorder     neuropathy in legs    Neuromuscular disorder (HCC)     neuropathy    Osteoarthritis     Reflux     Seizures (HCC)     last over 10 yr ago    Stented coronary artery 9/22/2010     LAD/[place]    Stented coronary artery 3/31/16    RCA/[place]    Type II or unspecified type diabetes mellitus without mention of complication, not stated as uncontrolled     Unspecified sleep apnea     no machine       Past Surgical History:   Procedure Laterality Date    ABDOMINAL HERNIA REPAIR  1-2016    repair done [place]    APPENDECTOMY      CARDIAC CATHETERIZATION Left 4/26/2016    right radial/ [id number] [facility] [place]/ Dr Coley Flaming Left 03/07/2019    Left Radial/[facility] [place]/   [address]    COLONOSCOPY  12/16/14    -hemorrhoids,bx    CORONARY ANGIOPLASTY WITH STENT PLACEMENT  9/2010    CORONARY ANGIOPLASTY WITH STENT PLACEMENT  3-    [facility] RCA / DR Gage Stover CORONARY ARTERY BYPASS GRAFT  08/15/2016    OP X 1- Dr Tonny Epley, COLON, DIAGNOSTIC  12/16/2014    HERNIA REPAIR  12/13/12    at the medial aspect of a [place] RUQ scar); repaired [id number]  02/16/2015    incisional, recurrent    HERNIA REPAIR  02/2016    HYSTERECTOMY      OTHER SURGICAL HISTORY  10/8/2015    abd wound washout, mesh removal, wound vac placement    OK SUCT NICOLE LIPECTOMY,HEAD/NECK Left 8/27/2018    THIGH LESION BIOPSY EXCISION, SOFT TISSUE MASS performed by Ranulfo Reyes MD at [facility] Left 2018    leg    UPPP UVULOPALATOPHARYGOPLASTY  06/26/2012    VENTRAL HERNIA REPAIR  09/21/2015    With [facility] - Dr Krystal Rodriguez History: Selina Alves  reports that she quit smoking about 10 years ago.  Her smoking use included cigarettes. She has a 20.00 pack-year smoking history. She has never used smokeless tobacco. She reports that she does not drink alcohol or use drugs. Family History   Problem Relation Age of Onset    Cancer Mother     Diabetes Mother     Cancer Father         thyroid    Diabetes Sister     Heart Disease Sister     Heart Disease Brother     Heart Disease Maternal Uncle     Cancer Maternal Grandmother      On exam: Blood pressure 136/84, height 5' 3\" (1.6 m), weight 218 lb 9.6 oz (99.2 kg), last menstrual period 12/05/1996, not currently breastfeeding. GENERAL  Appears comfortable and in no distress   HEENT  NC/ AT   NECK  Supple and no bruits heard   MENTAL STATUS:  Alert, oriented, intact memory, no confusion, normal speech, normal language, no hallucination or delusion; appropriate affect   CRANIAL NERVES: II     -      PERRLA, Fundi reveal intact venous pulsations; Visual fields intact to confrontation  III,IV,VI -  EOMs full, no afferent defect, no                      ELOY, no ptosis  V     -     Normal facial sensation  VII    -     Normal facial symmetry  VIII   -     Intact hearing  IX,X -     Symmetrical palate  XI    -     Symmetrical shoulder shrug  XII   -     Midline tongue, no atrophy    MOTOR FUNCTION:  significant for good strength of grade 5/5 in bilateral proximal and distal muscle groups of both upper and lower extremities with normal bulk, normal tone and no involuntary movements, no tremor   SENSORY FUNCTION:  Impaired pinprick and temperature in stocking pattern, asymmetric; left greater than right. CEREBELLAR FUNCTION:  Intact fine motor control over upper limbs   REFLEX FUNCTION:  Symmetric, no perverted reflex, no Babinski sign   STATION and GAIT  Normal station, wide based gait; could not do tandem gait; positive Romberg sign. Able to walk with cane. Diagnostic data reviewed with the patient:   NCS/ EMG (8-22-14):  There is electrodiagnostic evidence of peripheral polyneuropathy with predominant involvement of sensory fibers only. Supporting features include reduced amplitudes of bilateral superficial peroneal sensory studies and prolonged H-reflex latencies. X-ray of lumbar spine (3-20-13): Mild degenerative change is noted at L3-L4 with disk space narrowing and spur formation. MRI BRAIN (w/wo): done on 1-2-2013: essentially unremarkable. EEG (1-2-2013): unremarkable. Lab Results   Component Value Date    LABA1C 10.6 (H) 06/19/2020       EMG bilateral LE (10/21/17): abnormal electrophysiological study indicative of primarily axonal sensorimotor neuropathy in both lower extremities. There is no evidence of lumbosacral radiculopathy or plexopathy. EKG (8/23/18); NSR. Impression and Plan: Ms. Esther Toledo is a 54 y.o. female with   Painful diabetic peripheral polyneuropathy; abnormal EMG; marginally controlled DM with A1c improved from 12.1 to 10.6  Medically refractory neuropathic pain; refractory max dose of gabapentin at 800 tid; switched to Pregabalin 100 mg tid; tolerable on it; will continue the same. Intermittent severe muscle spasms in bilateral lower extremities: stable on tizanidine 2 mg nightly as needed. Also to continue duloxetine 60 mg every day  Also on insulin, Tresiba, metformin for diabetes control  Again, she was well advised about the importance of euglycemia in control of diabetic neuropathy and medication administration instructions and she voiced understanding those instructions. Regarding opioids: There is no evidence regarding long-term effectiveness; with high addiction potential and increased potential for tolerance and overdose and respiratory depression complications possibility  Medication Counseling: risks and benefits including possible adverse effects discussed with the patient and she verbalized understanding those instructions.  Also discussed about importance of euglycemia in control of dysesthesias and she voiced understanding those instructions. Follow-up in 6 months. Please note that portions of this note were completed with a voice recognition program.  Although every effort was made to ensure the accuracy of this automated transcription, some errors in transcription may have occurred; occasionally words are mis-transcribed. Thank you for understanding.

## 2020-09-24 ENCOUNTER — TELEPHONE (OUTPATIENT)
Dept: WOUND CARE | Age: 55
End: 2020-09-24

## 2020-09-24 ASSESSMENT — ENCOUNTER SYMPTOMS
COUGH: 0
CHEST TIGHTNESS: 0
WHEEZING: 0
DIARRHEA: 0
SHORTNESS OF BREATH: 1
EYES NEGATIVE: 1
APNEA: 1
VOMITING: 0
CHOKING: 0
COLOR CHANGE: 1
STRIDOR: 0
ABDOMINAL PAIN: 0
ANAL BLEEDING: 0
BACK PAIN: 1
NAUSEA: 0
CONSTIPATION: 0

## 2020-09-30 ENCOUNTER — OFFICE VISIT (OUTPATIENT)
Dept: PRIMARY CARE CLINIC | Age: 55
End: 2020-09-30
Payer: COMMERCIAL

## 2020-09-30 VITALS
WEIGHT: 217.6 LBS | DIASTOLIC BLOOD PRESSURE: 66 MMHG | SYSTOLIC BLOOD PRESSURE: 109 MMHG | RESPIRATION RATE: 16 BRPM | HEART RATE: 91 BPM | BODY MASS INDEX: 38.55 KG/M2 | HEIGHT: 63 IN | TEMPERATURE: 97.1 F

## 2020-09-30 LAB — HBA1C MFR BLD: 11.9 %

## 2020-09-30 PROCEDURE — 2022F DILAT RTA XM EVC RTNOPTHY: CPT | Performed by: NURSE PRACTITIONER

## 2020-09-30 PROCEDURE — G8417 CALC BMI ABV UP PARAM F/U: HCPCS | Performed by: NURSE PRACTITIONER

## 2020-09-30 PROCEDURE — 3017F COLORECTAL CA SCREEN DOC REV: CPT | Performed by: NURSE PRACTITIONER

## 2020-09-30 PROCEDURE — 90686 IIV4 VACC NO PRSV 0.5 ML IM: CPT | Performed by: NURSE PRACTITIONER

## 2020-09-30 PROCEDURE — 90471 IMMUNIZATION ADMIN: CPT | Performed by: NURSE PRACTITIONER

## 2020-09-30 PROCEDURE — G8427 DOCREV CUR MEDS BY ELIG CLIN: HCPCS | Performed by: NURSE PRACTITIONER

## 2020-09-30 PROCEDURE — 99214 OFFICE O/P EST MOD 30 MIN: CPT | Performed by: NURSE PRACTITIONER

## 2020-09-30 PROCEDURE — 83036 HEMOGLOBIN GLYCOSYLATED A1C: CPT | Performed by: NURSE PRACTITIONER

## 2020-09-30 PROCEDURE — 3046F HEMOGLOBIN A1C LEVEL >9.0%: CPT | Performed by: NURSE PRACTITIONER

## 2020-09-30 PROCEDURE — 1036F TOBACCO NON-USER: CPT | Performed by: NURSE PRACTITIONER

## 2020-09-30 RX ORDER — NITROGLYCERIN 0.4 MG/1
0.4 TABLET SUBLINGUAL EVERY 5 MIN PRN
Qty: 25 TABLET | Refills: 1 | Status: SHIPPED | OUTPATIENT
Start: 2020-09-30 | End: 2022-05-27 | Stop reason: SDUPTHER

## 2020-09-30 RX ORDER — NICOTINE 14MG/24HR
1 PATCH, TRANSDERMAL 24 HOURS TRANSDERMAL DAILY
Qty: 30 CAPSULE | Refills: 0 | Status: SHIPPED | OUTPATIENT
Start: 2020-09-30

## 2020-09-30 ASSESSMENT — ENCOUNTER SYMPTOMS
BLOATING: 0
DIARRHEA: 1
CHEST TIGHTNESS: 0
DIFFICULTY BREATHING: 0
COUGH: 0
ABDOMINAL PAIN: 1
SORE THROAT: 0
HOARSE VOICE: 0
SPUTUM PRODUCTION: 0
CONSTIPATION: 0
FLATUS: 0
VISUAL CHANGE: 0
FREQUENT THROAT CLEARING: 0
HEMOPTYSIS: 0
RHINORRHEA: 0
WHEEZING: 0
SHORTNESS OF BREATH: 0
VOMITING: 0
NAUSEA: 0

## 2020-09-30 ASSESSMENT — COPD QUESTIONNAIRES: COPD: 1

## 2020-09-30 NOTE — PATIENT INSTRUCTIONS
SURVEY:    You may be receiving a survey from [employer] regarding your visit today. Please complete the survey to enable us to provide the highest quality of care to you and your family. If you cannot score us a very good on any question, please call the office to discuss how we could have made your experience a very good one. Thank you. Patient Education        Counting Carbohydrates: Care Instructions  Your Care Instructions     You don't have to eat special foods when you have diabetes. You just have to be careful to eat healthy foods. Carbohydrates (carbs) raise blood sugar higher and quicker than any other nutrient. Carbs are found in desserts, breads and cereals, and fruit. They're also in starchy vegetables. These include potatoes, corn, and grains such as rice and pasta. Carbs are also in milk and yogurt. The more carbs you eat at one time, the higher your blood sugar will rise. Spreading carbs all through the day helps keep your blood sugar levels within your target range. Counting carbs is one of the best ways to keep your blood sugar under control. If you use insulin, counting carbs helps you match the right amount of insulin to the number of grams of carbs in a meal. Then you can change your diet and insulin dose as needed. Testing your blood sugar several times a day can help you learn how carbs affect your blood sugar. A registered dietitian or certified diabetes educator can help you plan meals and snacks. Follow-up care is a key part of your treatment and safety. Be sure to make and go to all appointments, and call your doctor if you are having problems. It's also a good idea to know your test results and keep a list of the medicines you take. How can you care for yourself at home?   Know your daily amount of carbohydrates  Your daily amount depends on several things, such as your weight, how active you are, which diabetes medicines you take, and what your goals are for your blood sugar levels. A registered dietitian or certified diabetes educator can help you plan how many carbs to include in each meal and snack. For most adults, a guideline for the daily amount of carbs is:  · 45 to 60 grams at each meal. That's about the same as 3 to 4 carbohydrate servings. · 15 to 20 grams at each snack. That's about the same as 1 carbohydrate serving. Count carbs  Counting carbs lets you know how much rapid-acting insulin to take before you eat. If you use an insulin pump, you get a constant rate of insulin during the day. So the pump must be programmed at meals. This gives you extra insulin to cover the rise in blood sugar after meals. If you take insulin:  · Learn your own insulin-to-carb ratio. You and your diabetes health professional will figure out the ratio. You can do this by testing your blood sugar after meals. For example, you may need a certain amount of insulin for every 15 grams of carbs. · Add up the carb grams in a meal. Then you can figure out how many units of insulin to take based on your insulin-to-carb ratio. · Exercise lowers blood sugar. You can use less insulin than you would if you were not doing exercise. Keep in mind that timing matters. If you exercise within 1 hour after a meal, your body may need less insulin for that meal than it would if you exercised 3 hours after the meal. Test your blood sugar to find out how exercise affects your need for insulin. If you do or don't take insulin:  · Look at labels on packaged foods. This can tell you how many carbs are in a serving. You can also use guides from the American Diabetes Association. · Be aware of portions, or serving sizes. If a package has two servings and you eat the whole package, you need to double the number of grams of carbohydrate listed for one serving. · Protein, fat, and fiber do not raise blood sugar as much as carbs do.  If you eat a lot of these nutrients in a meal, your blood sugar will rise more influenza virus vaccine? Influenza virus (commonly known as \"the flu\") is a serious disease caused by a virus. Influenza virus can spread from one person to another through small droplets of saliva that are expelled into the air when an infected person coughs or sneezes. The virus can also be passed through contact with objects the infected person has touched, such as a door handle or other surfaces. Influenza virus vaccine is used to prevent infection caused by influenza virus. The vaccine is redeveloped each year to contain specific strains of inactivated (killed) flu virus that are recommended by public health officials for that year. The injectable influenza virus vaccine (flu shot) is a \"killed virus\" vaccine. Influenza virus vaccine is also available in a nasal spray form, which is a \"live virus\" vaccine. Influenza virus vaccine works by exposing you to a small dose of the virus, which helps your body to develop immunity to the disease. Influenza virus vaccine will not treat an active infection that has already developed in the body. Influenza virus vaccine is for use in adults and children who are at least 7 months old. Becoming infected with influenza is much more dangerous to your health than receiving this vaccine. Influenza causes thousands of deaths each year, and hundreds of thousands of hospitalizations. However, like any medicine, this vaccine can cause side effects but the risk of serious side effects is extremely low. Like any vaccine, influenza virus vaccine may not provide protection from disease in every person. This vaccine will not prevent illness caused by hernandez flu (\"bird flu\"). What should I discuss with my healthcare provider before receiving this vaccine?   You may not be able to receive this vaccine if you are allergic to eggs, or if you have:  · a history of severe allergic reaction to a flu vaccine; or  · a history of Guillain-Costilla syndrome (within 6 weeks after receiving a flu vaccine). Tell your doctor if you have ever had:  · bleeding problems;  · a neurologic disorder or disease affecting the brain (or if this was a reaction to a previous vaccine);  · seizures;  · a weak immune system caused by disease, bone marrow transplant, or by using certain medicines or receiving cancer treatments; or  · an allergy to latex. You can still receive a vaccine if you have a minor cold. If you have a severe illness with a fever or any type of infection, wait until you get better before receiving this vaccine. The Centers for Disease Control and Prevention recommends that pregnant women get a flu shot during any trimester of pregnancy to protect themselves and their  babies from flu. The nasal spray form of influenza vaccine is not recommended for use in pregnant women. It may not be safe to breastfeed while using this medicine. Ask your doctor about any risk. This vaccine should not be given to a child younger than 7 months old. How is this vaccine given? Some brands of this vaccine are made for use in adults and not in children. Your child's doctor can recommend the best influenza virus vaccine for your child. This vaccine is given as an injection (shot) into a muscle. You will receive this injection in a doctor's office or other clinic setting. You should receive a flu vaccine every year. Your immunity will gradually decrease over the 12 months after you receive the influenza virus vaccine. Children receiving this vaccine may need a booster shot one month after receiving the first vaccine. The influenza virus vaccine is usually given in October or November. Some people may need to have their vaccines earlier or later. Follow your doctor's instructions. Your doctor may recommend treating fever and pain with an aspirin-free pain reliever such as acetaminophen (Tylenol) or ibuprofen (Motrin, Advil, and others) when the shot is given and for the next 24 hours.  Follow the label directions or your doctor's instructions about how much of this medicine to give your child. It is especially important to prevent fever from occurring in a child who has a seizure disorder such as epilepsy. What happens if I miss a dose? Since flu shots are usually given only one time per year, you will most likely not be on a dosing schedule. Call your doctor if you forget to receive your yearly flu shot in October or November. If your child misses a booster dose of this vaccine, call your doctor for instructions. What happens if I overdose? An overdose of this vaccine is unlikely to occur. What should I avoid before or after receiving this vaccine? Follow your doctor's instructions about any restrictions on food, beverages, or activity. What are the possible side effects of influenza virus injectable vaccine? Get emergency medical help if you have signs of an allergic reaction: hives; difficulty breathing; swelling of your face, lips, tongue, or throat. You should not receive a booster vaccine if you had a life-threatening allergic reaction after the first shot. Keep track of any and all side effects you have after receiving this vaccine. If you ever need to receive influenza virus vaccine in the future, you will need to tell your doctor if the previous shot caused any side effects. Influenza virus injectable (killed virus) vaccine will not cause you to become ill with the flu virus that it contains. However, you may have flu-like symptoms at any time during flu season that may be caused by other strains of influenza virus. Call your doctor at once if you have:  · a light-headed feeling, like you might pass out;  · severe weakness or unusual feeling in your arms and legs (may occur 2 to 4 weeks after you receive the vaccine);  · high fever;  · seizure (convulsions); or  · unusual bleeding.   Common side effects may include:  · low fever, chills;  · mild fussiness or crying;  · redness, bruising, pain, swelling, or a lump where the vaccine was injected;  · headache, tired feeling; or  · joint or muscle pain. This is not a complete list of side effects and others may occur. Call your doctor for medical advice about side effects. You may report vaccine side effects to the [address] [place] [address] [address] and Human Services at [phone]. What other drugs will affect influenza virus injectable vaccine? If you are using any of these medications, you may not be able to receive the vaccine, or may need to wait until the other treatments are finished:  · phenytoin, theophylline, or warfarin (Coumadin, Jantoven);  · an oral, nasal, inhaled, or injectable steroid medicine;  · medications to treat psoriasis, rheumatoid arthritis, or other autoimmune disorders--azathioprine, etanercept, leflunomide, and others; or  · medicines to treat or prevent organ transplant rejection--basiliximab, cyclosporine, muromonab-CD3, mycophenolate mofetil, sirolimus, tacrolimus. This list is not complete. Other drugs may affect influenza virus vaccine, including prescription and over-the-counter medicines, vitamins, and herbal products. Not all possible drug interactions are listed here. Where can I get more information? Your doctor or pharmacist can provide more information about this vaccine. Additional information is available from your local health department or the Centers for Disease Control and Prevention. Remember, keep this and all other medicines out of the reach of children, never share your medicines with others, and use this medication only for the indication prescribed. Every effort has been made to ensure that the information provided by [address] is accurate, up-to-date, and complete, but no guarantee is made to that effect. Drug information contained herein may be time sensitive.  [employer] information has been compiled for use by healthcare practitioners and consumers in the [place] and therefore [employer] does not warrant that uses outside of the [place] are appropriate, unless specifically indicated otherwise. [facility]'s drug information does not endorse drugs, diagnose patients or recommend therapy. [facility] drug information is an informational resource designed to assist licensed healthcare practitioners in caring for their patients and/or to serve consumers viewing this service as a supplement to, and not a substitute for, the expertise, skill, knowledge and judgment of healthcare practitioners. The absence of a warning for a given drug or drug combination in no way should be construed to indicate that the drug or drug combination is safe, effective or appropriate for any given patient. [facility] does not assume any responsibility for any aspect of healthcare administered with the aid of information [facility] provides. The information contained herein is not intended to cover all possible uses, directions, precautions, warnings, drug interactions, allergic reactions, or adverse effects. If you have questions about the drugs you are taking, check with your doctor, nurse or pharmacist.  Copyright 2943-7475 [address]: 7.14. Revision date: 7/10/2019. Care instructions adapted under license by [facility] [place] ([facility]). If you have questions about a medical condition or this instruction, always ask your healthcare professional. [place] [address] any warranty or liability for your use of this information.

## 2020-09-30 NOTE — PROGRESS NOTES
exercise. There is no change in her home blood glucose trend. Her breakfast blood glucose range is generally >200 mg/dl. An ACE inhibitor/angiotensin II receptor blocker is being taken. She does not see a podiatrist.Eye exam is current. Diarrhea    This is a recurrent problem. The current episode started 1 to 4 weeks ago. The problem occurs 2 to 4 times per day. The problem has been unchanged. The stool consistency is described as watery. The patient states that diarrhea does not awaken her from sleep. Associated symptoms include abdominal pain (mild cramping). Pertinent negatives include no arthralgias, bloating, chills, coughing, fever, headaches, increased  flatus, myalgias, sweats, URI, vomiting or weight loss. Nothing aggravates the symptoms. Risk factors include recent antibiotic use. She has tried nothing for the symptoms. The treatment provided no relief. There is no history of inflammatory bowel disease. Past Medical History:     Past Medical History:   Diagnosis Date    Anxiety     Asthma     CAD (coronary artery disease)     Clinical trial participant at discharge 3/31/16    [facility]ch[facility] [address]     Depression     Diabetic neuropathy ([facility] [address]     H/O cardiac catheterization 4/26/16    LMCA: Mild Irregularities 10-20%. LAD: Lesion on Prox LAD: Proximal subsection. 100% stenosis. LCx: Mild irregularities 10-20%. RCA: Mild irregularities 10-20%. Widely patent mid RCA stent. EF:60%.  H/O cardiovascular stress test 10/07/2016    Overall results are most consistent with a low risk for significant CAD.     H/O echocardiogram 10/05/2016    EF:>60%. Inferoseptal wall abnormal in its motion which is not unusal status post open heart surgery. Evidence of mild (grade I) diastolic dysfunction is seen.  H/O tilt table evaluation 07/12/2016    Abnormal. PTs HR, Blood Pressure response and symptoms were most consistent with dysautonomia.  Combined w/viligant maintenance of euvolemia and maintaining a moderate salt intake, pharmaciologic treatment w/ ProAmatine, SSRI such as Lexapro and/or Mestinon among other treatments have shown some effectiveness in the treatment of this condition.  History of cardiovascular stress test 02/13/2019    Abnormal. Small/moderate perfusion defect of mild/moderate intesity in the anterior and anterolateral regions during stress imaging which is most consistent w/ ischemia but may be artifact. Overall low/intermediate risk for significant CAD.  History of echocardiogram 09/06/2018    EF >60%. Inferoseptal wall is abnormal in its motion which is not unusual s/p open heart. Evidence of mild (grade I) diastolic dysfunction seen.  Hx of blood clots     Hyperlipidemia     Hypertension     Intermittent claudication (HCC)     Kidney stones     Movement disorder     neuropathy in legs    Neuromuscular disorder (HCC)     neuropathy    Osteoarthritis     Reflux     Seizures (HCC)     last over 10 yr ago    Stented coronary artery 9/22/2010     LAD/[place]    Stented coronary artery 3/31/16    RCA/[place]    Type II or unspecified type diabetes mellitus without mention of complication, not stated as uncontrolled     Unspecified sleep apnea     no machine      Reviewed all health maintenance requirements and ordered appropriate tests  There are no preventive care reminders to display for this patient.     Past Surgical History:     Past Surgical History:   Procedure Laterality Date    ABDOMINAL HERNIA REPAIR  1-2016    repair done [place]    APPENDECTOMY      CARDIAC CATHETERIZATION Left 4/26/2016    right [facility] [place]/ Dr Kilgore Hidden Left 03/07/2019    Left Rad[facility] [place]/    CARDIAC SURGERY      CHOLECYSTECTOMY  1991    COLONOSCOPY  12/16/14    -hemorrhoids,bx    CORONARY ANGIOPLASTY WITH STENT PLACEMENT  9/2010    CORONARY ANGIOPLASTY WITH STENT PLACEMENT  3-    [facility] / DR Shiloh Hernandez    CORONARY ARTERY BYPASS GRAFT  08/15/2016    OP X 1- Dr Charlie Adam, COLON, DIAGNOSTIC  12/16/2014    HERNIA REPAIR  12/13/12    at the medial aspect of a [place] RUQ scar); repaired [id number]  02/16/2015    incisional, recurrent    HERNIA REPAIR  02/2016    HYSTERECTOMY      OTHER SURGICAL HISTORY  10/8/2015    abd wound washout, mesh removal, wound vac placement    ME SUCT NICOLE LIPECTOMY,HEAD/NECK Left 8/27/2018    THIGH LESION BIOPSY EXCISION, SOFT TISSUE MASS performed by Akilah Hollins MD at [facility] Left 2018    leg    UPPP UVULOPALATOPHARYGOPLASTY  06/26/2012    VENTRAL HERNIA REPAIR  09/21/2015    With Mesh - Dr Lui Brown        Medications:       Prior to Admission medications    Medication Sig Start Date End Date Taking? Authorizing Provider   nitroGLYCERIN (NITROSTAT) 0.4 MG SL tablet Place 1 tablet under the tongue every 5 minutes as needed for Chest pain 9/30/20  Yes Norma Favors Might, APRN - CNP   Saccharomyces boulardii (PROBIOTIC) 250 MG CAPS Take 1 capsule by mouth daily 9/30/20  Yes Norma Favors Might, APRN - CNP   tiZANidine (ZANAFLEX) 2 MG tablet TAKE 1 TABLET BY MOUTH NIGHTLY AS NEEDED(SPASMS) 9/22/20  Yes Rocael Nelson MD   pregabalin (LYRICA) 100 MG capsule Take 1 capsule by mouth 3 times daily.  9/22/20 10/22/21 Yes Rocael Nelson MD   blood glucose test strips (FREESTYLE LITE) strip USE TO CHECK BLOOD SUGARS 3 TIMES DAILY AND AS NEEDED FOR DIABETES E11.40 9/16/20  Yes Norma Favors Might, APRN - CNP   acetaminophen (TYLENOL) 500 MG tablet Take 1 tablet by mouth 4 times daily as needed for Pain 9/9/20  Yes Tae Zamudio APRN - CNP   atorvastatin (LIPITOR) 40 MG tablet Take 1 tablet by mouth daily 7/23/20  Yes Norma Favors Might, APRN - CNP   losartan (COZAAR) 25 MG tablet Take 1 tablet by mouth daily 7/23/20  Yes Norma Favors Might, APRN - CNP   insulin lispro, 1 Unit Dial, (HUMALOG KWIKPEN) 100 UNIT/ML SOPN Inject 20 Units into the skin 3 times daily (before meals) 6/23/20  Yes CHELSEA Farfan CNP   fludrocortisone (FLORINEF) 0.1 MG tablet TAKE 3 TABLETS BY MOUTH EVERY DAY 6/16/20  Yes Ankush Fenton MD   omeprazole (PRILOSEC) 20 MG delayed release capsule Take 1 capsule by mouth every morning (before breakfast) 6/2/20  Yes CHELSEA Farfan CNP   Insulin Degludec (TRESIBA FLEXTOUCH) 200 UNIT/ML SOPN Inject 80 Units into the skin daily 3/30/20  Yes CHELSEA Farfan CNP   Dulaglutide (TRULICITY) 1.5 EO/0.2XD SOPN Inject 1.5 mg into the skin once a week 3/13/20  Yes CHELSEA Farfan CNP   metoprolol succinate (TOPROL XL) 100 MG extended release tablet Take 1 tablet by mouth daily 3/13/20  Yes CHELSEA Farfan CNP   DULoxetine (CYMBALTA) 30 MG extended release capsule Take 1 capsule by mouth daily 2/10/20  Yes Joanne Josue MD   metFORMIN (GLUCOPHAGE-XR) 500 MG extended release tablet Take 2 tablets by mouth daily (with breakfast) 12/12/19  Yes CHELSEA Farfan CNP   albuterol sulfate HFA (VENTOLIN HFA) 108 (90 Base) MCG/ACT inhaler INHALE 2 PUFFS EVERY 6 HOURS AS NEEDED FOR WHEEZING 10/8/19  Yes CHELSEA Farfan CNP   aspirin 81 MG EC tablet TAKE 1 TABLET BY MOUTH DAILY  Patient taking differently: Take 81 mg by mouth daily  7/1/19  CHELSEA Reese CNP   Insulin Pen Needle (BD ULTRA-FINE PEN NEEDLES) 29G X 12.7MM MISC USE AS DIRECTED BY PHYSICIAN 5 times daily 8/7/18  Yes CHELSEA Farfan CNP   Blood Glucose Monitoring Suppl BERE 1 Units by Does not apply route three times daily Unit insurance will cover 12/29/17  Yes CHELSEA Ham CNP   Blood Pressure Monitor KIT 1 each by Does not apply route daily as needed ESSENTIAL HYPERTENSION   I10 5/31/16  Yes CHELSEA Farfan CNP   Blood Glucose Monitoring Suppl (BLOOD GLUCOSE MONITOR KIT) KIT 1 each by Does not apply route daily. Please dispense whatever BS monitoring kit [employer] covers. Dx: 250, new onset T2DM. signs and nursing note reviewed. Constitutional:       General: She is not in acute distress. Appearance: Normal appearance. She is well-developed. She is obese. HENT:      Mouth/Throat:      Mouth: Mucous membranes are moist.   Eyes:      General: No scleral icterus. Conjunctiva/sclera: Conjunctivae normal.   Neck:      Musculoskeletal: Normal range of motion and neck supple. Cardiovascular:      Rate and Rhythm: Normal rate and regular rhythm. Pulmonary:      Effort: Pulmonary effort is normal.      Breath sounds: Normal breath sounds. No wheezing or rales. Abdominal:      General: Bowel sounds are normal. There is no distension. Palpations: Abdomen is soft. Tenderness: There is no abdominal tenderness. Comments: Obese abdomen   Musculoskeletal:      Right lower leg: No edema. Left lower leg: No edema. Lymphadenopathy:      Cervical: No cervical adenopathy. Skin:     General: Skin is warm and dry. Neurological:      Mental Status: She is alert and oriented to person, place, and time.    Psychiatric:         Mood and Affect: Mood normal.         Behavior: Behavior normal.         Data:     Lab Results   Component Value Date     06/19/2020    K 4.3 06/19/2020    CL 98 06/19/2020    CO2 22 06/19/2020    BUN 9 06/19/2020    CREATININE 0.58 06/19/2020    GLUCOSE 291 06/19/2020    GLUCOSE 181 02/16/2012    PROT 7.7 12/09/2019    LABALBU 3.7 12/09/2019    LABALBU 4.2 12/05/2011    BILITOT 0.40 12/09/2019    ALKPHOS 100 12/09/2019    AST 20 06/19/2020    ALT 15 06/19/2020     Lab Results   Component Value Date    WBC 9.7 06/19/2020    RBC 5.29 06/19/2020    RBC 3.88 12/05/2011    HGB 13.6 06/19/2020    HCT 43.0 06/19/2020    MCV 81.3 06/19/2020    MCH 25.7 06/19/2020    MCHC 31.6 06/19/2020    RDW 13.9 06/19/2020     06/19/2020     12/05/2011    MPV 10.0 06/19/2020     Lab Results   Component Value Date    TSH 2.80 06/04/2016     Lab Results   Component Value Date    CHOL 120 06/19/2020    HDL 43 06/19/2020    LABA1C 11.9 09/30/2020       Assessment/Plan:      Diagnosis Orders   1. Uncontrolled type 2 diabetes mellitus with diabetic polyneuropathy, with long-term current use of insulin (HCC)  POCT glycosylated hemoglobin (Hb A1C)    External Referral To Endocrinology    CBC Auto Differential    ALT    AST    Basic Metabolic Panel    Lipid Panel    Hemoglobin A1C    Microalbumin, Ur   2. Diarrhea due to drug  Saccharomyces boulardii (PROBIOTIC) 250 MG CAPS   3. Needs flu shot         1. Jacqueline Flank received counseling on the following healthy behaviors: nutrition, exercise and medication adherence  2. Patient given educational materials - see patient instructions  3. Was a self-tracking handout given in paper form or via [employer]? No  If yes, see orders or list here. 4.  Discussed use, benefit, and side effects of prescribed medications. Barriers to medication compliance addressed. All patient questions answered. Pt voiced understanding. 5.  Reviewed prior labs and health maintenance  6. Continue current medications, diet and exercise. Completed Refills   Requested Prescriptions     Signed Prescriptions Disp Refills    nitroGLYCERIN (NITROSTAT) 0.4 MG SL tablet 25 tablet 1     Sig: Place 1 tablet under the tongue every 5 minutes as needed for Chest pain    Saccharomyces boulardii (PROBIOTIC) 250 MG CAPS 30 capsule 0     Sig: Take 1 capsule by mouth daily         Return in about 6 months (around 3/30/2021) for Check up.

## 2020-09-30 NOTE — PROGRESS NOTES
Vaccine Information Sheet, \"Influenza - Inactivated\"  given to Alicia Alcantara, or parent/legal guardian of  Alicia Alcantara and verbalized understanding. Patient responses:    Have you ever had a reaction to a flu vaccine? No  Are you able to eat eggs without adverse effects? No  Do you have any current illness? No  Have you ever had Guillian Whitehouse Syndrome? No    Flu vaccine given per order. Please see immunization tab. After obtaining consent, and per orders of [facility] Might, injection of flu given in Left deltoid by Francisco Burkitt. Patient instructed to remain in clinic for 20 minutes afterwards, and to report any adverse reaction to me immediately.

## 2020-09-30 NOTE — LETTER
[facility]  [address]  [place] [address]  Phone: [phone]  Fax: [phone] [facility], APRN - CNP        September 30, 2020     Patient: Niharika Villegas   YOB: 1965   Date of Visit: 9/30/2020       To Whom It May Concern: It is my medical opinion that Olita Bevels must be allowed to take of face mask if having breathing difficulties. .    If you have any questions or concerns, please don't hesitate to call.     Sincerely,        Jordan Plasencia, APRN - CNP

## 2020-10-12 RX ORDER — FLUDROCORTISONE ACETATE 0.1 MG/1
TABLET ORAL
Qty: 270 TABLET | Refills: 3 | Status: SHIPPED | OUTPATIENT
Start: 2020-10-12 | End: 2021-10-13

## 2020-10-30 ENCOUNTER — OFFICE VISIT (OUTPATIENT)
Dept: CARDIOLOGY | Age: 55
End: 2020-10-30
Payer: COMMERCIAL

## 2020-10-30 VITALS
WEIGHT: 217.4 LBS | DIASTOLIC BLOOD PRESSURE: 68 MMHG | SYSTOLIC BLOOD PRESSURE: 108 MMHG | OXYGEN SATURATION: 98 % | BODY MASS INDEX: 38.52 KG/M2 | HEIGHT: 63 IN | RESPIRATION RATE: 18 BRPM | HEART RATE: 87 BPM

## 2020-10-30 PROCEDURE — 99214 OFFICE O/P EST MOD 30 MIN: CPT | Performed by: FAMILY MEDICINE

## 2020-10-30 PROCEDURE — 1036F TOBACCO NON-USER: CPT | Performed by: FAMILY MEDICINE

## 2020-10-30 PROCEDURE — 3017F COLORECTAL CA SCREEN DOC REV: CPT | Performed by: FAMILY MEDICINE

## 2020-10-30 PROCEDURE — G8482 FLU IMMUNIZE ORDER/ADMIN: HCPCS | Performed by: FAMILY MEDICINE

## 2020-10-30 PROCEDURE — G8417 CALC BMI ABV UP PARAM F/U: HCPCS | Performed by: FAMILY MEDICINE

## 2020-10-30 PROCEDURE — G8427 DOCREV CUR MEDS BY ELIG CLIN: HCPCS | Performed by: FAMILY MEDICINE

## 2020-10-30 NOTE — PATIENT INSTRUCTIONS
SURVEY:    You may be receiving a survey from [employer] regarding your visit today. Please complete the survey to enable us to provide the highest quality of care to you and your family. If you cannot score us a very good on any question, please call the office to discuss how we could have made your experience a very good one. Thank you.     Your MA today was [place]

## 2020-10-30 NOTE — PROGRESS NOTES
on Prox LAD: Proximal subsection. 100% stenosis. LCx: Mild irregularities 10-20%. RCA: Mild irregularities 10-20%. Widely patent mid RCA stent. EF:60%.  H/O cardiovascular stress test 10/07/2016    Overall results are most consistent with a low risk for significant CAD.     H/O echocardiogram 10/05/2016    EF:>60%. Inferoseptal wall abnormal in its motion which is not unusal status post open heart surgery. Evidence of mild (grade I) diastolic dysfunction is seen.  H/O tilt table evaluation 07/12/2016    Abnormal. PTs HR, Blood Pressure response and symptoms were most consistent with dysautonomia. Combined w/viligant maintenance of euvolemia and maintaining a moderate salt intake, pharmaciologic treatment w/ ProAmatine, SSRI such as Lexapro and/or Mestinon among other treatments have shown some effectiveness in the treatment of this condition.  History of cardiovascular stress test 02/13/2019    Abnormal. Small/moderate perfusion defect of mild/moderate intesity in the anterior and anterolateral regions during stress imaging which is most consistent w/ ischemia but may be artifact. Overall low/intermediate risk for significant CAD.  History of echocardiogram 09/06/2018    EF >60%. Inferoseptal wall is abnormal in its motion which is not unusual s/p open heart. Evidence of mild (grade I) diastolic dysfunction seen.     Hx of blood clots     Hyperlipidemia     Hypertension     Intermittent claudication (HCC)     Kidney stones     Movement disorder     neuropathy in legs    Neuromuscular disorder (HCC)     neuropathy    Osteoarthritis     Reflux     Seizures (HCC)     last over 10 yr ago    Stented coronary artery 9/22/2010     LAD/[place]    Stented coronary artery 3/31/16    RCA/[place]    Type II or unspecified type diabetes mellitus without mention of complication, not stated as uncontrolled     Unspecified sleep apnea     no machine       CURRENT ALLERGIES: Tape [adhesive tape] REVIEW OF SYSTEMS: 14 systems were reviewed. Pertinent positives and negatives as above, all else negative.      Past Surgical History:   Procedure Laterality Date    ABDOMINAL HERNIA REPAIR  1-2016    repair done julian    APPENDECTOMY      CARDIAC CATHETERIZATION Left 4/26/2016    right radial/ [facility]/ Dr Masha Oliver Left 03/07/2019    [facility] Rad[facility]/    CARDIAC SURGERY      CHOLECYSTECTOMY  1991    COLONOSCOPY  12/16/14    -hemorrhoids,bx    CORONARY ANGIOPLASTY WITH STENT PLACEMENT  9/2010    CORONARY ANGIOPLASTY WITH STENT PLACEMENT  3-    [facility] / DR Pradip Izquierdo    CORONARY ARTERY BYPASS GRAFT  08/15/2016    OP X 1- Dr Alayna Small, COLON, DIAGNOSTIC  12/16/2014    HERNIA REPAIR  12/13/12    at the medial aspect of a [place] RUQ scar); [id number]  [facility]  02/16/2015    incisional, recurrent    HERNIA REPAIR  02/2016    HYSTERECTOMY      OTHER SURGICAL HISTORY  10/8/2015    abd wound washout, mesh removal, wound vac placement    AR SUCT NICOLE LIPECTOMY,HEAD/NECK Left 8/27/2018    THIGH LESION BIOPSY EXCISION, SOFT TISSUE MASS performed by [facility]aina Thomas MD at [facility] Left 2018    leg    UPPP UVULOPALATOPHARYGOPLASTY  06/26/2012    VENTRAL HERNIA REPAIR  09/21/2015    With Mesh - Dr Cristina Anders History:  Social History     Tobacco Use    Smoking status: Former Smoker     Packs/day: 2.00     Years: 10.00     Pack years: 20.00     Types: Cigarettes     Last attempt to quit: 9/19/2010     Years since quitting: 10.1    Smokeless tobacco: Never Used   Substance Use Topics    Alcohol use: No    Drug use: No        CURRENT MEDICATIONS:  Outpatient Medications Marked as Taking for the 10/30/20 encounter (Office Visit) with Brionna Greer MD   Medication Sig Dispense Refill    fludrocortisone (FLORINEF) 0.1 MG tablet TAKE 3 TABLETS BY MOUTH EVERY  tablet 3    nitroGLYCERIN (NITROSTAT) 0.4 MG SL tablet Place 1 tablet under the tongue every 5 minutes as needed for Chest pain 25 tablet 1    Saccharomyces boulardii (PROBIOTIC) 250 MG CAPS Take 1 capsule by mouth daily 30 capsule 0    tiZANidine (ZANAFLEX) 2 MG tablet TAKE 1 TABLET BY MOUTH NIGHTLY AS NEEDED(SPASMS) 30 tablet 2    pregabalin (LYRICA) 100 MG capsule Take 1 capsule by mouth 3 times daily.  90 capsule 3    acetaminophen (TYLENOL) 500 MG tablet Take 1 tablet by mouth 4 times daily as needed for Pain 40 tablet 0    atorvastatin (LIPITOR) 40 MG tablet Take 1 tablet by mouth daily 90 tablet 3    losartan (COZAAR) 25 MG tablet Take 1 tablet by mouth daily 90 tablet 3    insulin lispro, 1 Unit Dial, (HUMALOG KWIKPEN) 100 UNIT/ML SOPN Inject 20 Units into the skin 3 times daily (before meals) 12 pen 1    omeprazole (PRILOSEC) 20 MG delayed release capsule Take 1 capsule by mouth every morning (before breakfast) 90 capsule 1    Insulin Degludec (TRESIBA FLEXTOUCH) 200 UNIT/ML SOPN Inject 80 Units into the skin daily (Patient taking differently: Inject 86 Units into the skin daily ) 12 pen 3    Dulaglutide (TRULICITY) 1.5 ML/2.0IQ SOPN Inject 1.5 mg into the skin once a week 12 pen 3    metoprolol succinate (TOPROL XL) 100 MG extended release tablet Take 1 tablet by mouth daily 90 tablet 3    DULoxetine (CYMBALTA) 30 MG extended release capsule Take 1 capsule by mouth daily 30 capsule 1    metFORMIN (GLUCOPHAGE-XR) 500 MG extended release tablet Take 2 tablets by mouth daily (with breakfast) 180 tablet 3    albuterol sulfate HFA (VENTOLIN HFA) 108 (90 Base) MCG/ACT inhaler INHALE 2 PUFFS EVERY 6 HOURS AS NEEDED FOR WHEEZING 18 Inhaler 3    aspirin 81 MG EC tablet TAKE 1 TABLET BY MOUTH DAILY (Patient taking differently: Take 81 mg by mouth daily ) 90 tablet 3    Insulin Pen Needle (BD ULTRA-FINE PEN NEEDLES) 29G X 12.7MM MISC USE AS DIRECTED BY PHYSICIAN 5 times daily 150 each 11       FAMILY HISTORY: family history includes Cancer in her father, maternal grandmother, and mother; Diabetes in her mother and sister; Heart Disease in her brother, maternal uncle, and sister. PHYSICAL EXAM:   /68 (Site: Left Upper Arm, Position: Sitting, Cuff Size: Large Adult)   Pulse 87   Resp 18   Ht 5' 3\" (1.6 m)   Wt 217 lb 6.4 oz (98.6 kg)   LMP 12/05/1996   SpO2 98%   BMI 38.51 kg/m²  Body mass index is 38.51 kg/m². Constitutional: She is obese but oriented to person, place, and time. She appears well-developed and well-nourished. In no acute distress. HEENT: Normocephalic and atraumatic. No JVD present. Carotid bruit is not present. No mass and no thyromegaly present. No lymphadenopathy present. Cardiovascular: Normal rate, regular rhythm, normal heart sounds. Exam reveals no gallop and no friction rubs. No murmur was heard. .   Normal rate, regular rhythm, normal heart sounds and intact distal pulses. Exam reveals no gallop and no friction rub. No significant cardiac murmur was heard. Pulmonary/Chest: Effort normal and breath sounds normal. No respiratory distress. She has no wheezes, rhonchi or rales. Abdominal: Soft, non-tender. Bowel sounds and aorta are normal. She exhibits no organomegaly, mass or bruit. Extremities: Trace. No cyanosis or clubbing. 2+ radial and carotid pulses. Distal extremity pulses: 2+ bilaterally. Neurological: She is alert and oriented to person, place, and time. No evidence of gross cranial nerve deficit. Coordination appeared normal.   Skin: Skin is warm and dry. There is no rash or diaphoresis. Psychiatric: She has a normal mood and affect.  Her speech is normal and behavior is normal.      MOST RECENT LABS ON RECORD:   Lab Results   Component Value Date    WBC 9.7 06/19/2020    HGB 13.6 06/19/2020    HCT 43.0 06/19/2020     06/19/2020    CHOL 120 06/19/2020    TRIG 159 (H) 06/19/2020    HDL 43 06/19/2020    LDLCHOLESTEROL 45 06/19/2020    ALT 15 06/19/2020    AST 20 06/19/2020     (L) 06/19/2020    K 4.3 06/19/2020    CL 98 06/19/2020    CREATININE 0.58 06/19/2020    BUN 9 06/19/2020    CO2 22 06/19/2020    TSH 2.80 06/04/2016    INR 1.0 11/09/2017    LABA1C 11.9 09/30/2020    LABMICR CANNOT BE CALCULATED 06/19/2020    BNP NOT REPORTED 05/11/2014        ASSESSMENT:  1. Palpitations    2. Lightheadedness    3. ASHD (arteriosclerotic heart disease)    4. Dysautonomia orthostatic hypotension syndrome (HCC)    5. Essential hypertension    6. Mixed hyperlipidemia       PLAN:  Persistent Heart palpitations which have recently worsened as above: Mild to moderate with some intermittent lightheadedness and dizziness as well as mild chest pain with episodes  · Beta Blocker: Continue metoprolol succinate (Toprol XL) 100 mg once daily. · Anticoagulation: Not indicated     · Lightheadedness/dizziness:  · Continue Florinef (fludrocortisone) 0.3 mg daily. I explained that this can cause some increased lower extremity edema but usually this is fairly mild. · Nonpharmacologic counseling: Because of her condition, I reminded her to try and keep herself well-hydrated and to take extra time when moving from laying to sitting, sitting to standing and standing to walking. I also explained to her to help improve her symptoms she should include 3 g sodium diet, 1 or 2 L of sports drinks daily, knee-high compressions stockings. · Additional Testing: none    · Atherosclerotic Heart Disease: CABG involving LIMA-LAD on 8/15/16: Currently appears well controlled  Antiplatelet Agent: Continue ASA 81 mg daily. I also reminded her to watch for signs of blood in her stool or black tarry stools and stop the medication immediately if this develops as this could be life threatening. · Beta Blocker Therapy: Continue Toprol XL to 100 mg once daily. I discussed the potential side effects of this medication including lightheadedness and dizziness and told her to call the office if this occurs.   · Statin Therapy: Continue atorvastatin (Lipitor) 40 mg nightly. · Angina: Recent [place] Class II   Beta Blocker: Continue Toprol XL  100 mg once daily. Dysautonomia Orthostatic Hypotension Syndrome: Currently appears to be well controlled. It sounds like her balance is not as good lately but is probably not related to this. We will monitor at least for now. · Pharmacological Management: Continue Fludocortisone (Florinef)  0.3 mg  · ACE/ARB Inhibitor: Continue Losartan (Cozaar) 25 mg once daily. · Nonpharmacologic counseling: Because of her condition, I reminded her to try and keep herself well-hydrated and to take extra time when moving from laying to sitting, sitting to standing and standing to walking and not to avoid salt in her diet. I also advised her to put water by her bedside and drink this before she gets out of bed in the morning. Essential Hypertension: Controlled   · ACE Inibitor/ARB: Continue losartan (Cozaar)      · Beta Blocker: Continue Toprol  mg once daily. · Hyperlipidemia: Mixed  · Cholesterol Reduction Therapy: Continue Atorvastatin (Lipitor) 40 mg daily. Finally, I recommended that she continue her other medications and follow up with you as previously scheduled. FOLLOW UP:   I told Ms. Jose Ring to call my office if she had any problems, but otherwise told her to Return in about 1 year (around 10/30/2021). However, I would be happy to see her sooner should the need arise. Once again, thank you for allowing me to participate in this patients care. Please do not hesitate to contact me could I be of further assistance. Sincerely,  Alberto Davidson. Eugenio ZENG, MS, F.A.C.C. [facility] Cardiology Specialist   [address] [place] [address]  Phone: [phone]; Fax: [phone]    I believe that the risk of significant morbidity and mortality related to the patient's current medical conditions are: low-intermediate.     The documentation recorded by the scribe, accurately and completely reflects the services I personally performed and the decisions made by me. Gage Wells MD, MS, F.A.C.C.  October 30, 2020

## 2020-11-09 ENCOUNTER — APPOINTMENT (OUTPATIENT)
Dept: GENERAL RADIOLOGY | Age: 55
End: 2020-11-09
Payer: COMMERCIAL

## 2020-11-09 ENCOUNTER — HOSPITAL ENCOUNTER (EMERGENCY)
Age: 55
Discharge: HOME | End: 2020-11-10
Attending: EMERGENCY MEDICINE
Payer: COMMERCIAL

## 2020-11-09 LAB
-: ABNORMAL
ABSOLUTE EOS #: 0.14 K/UL (ref 0–0.44)
ABSOLUTE IMMATURE GRANULOCYTE: 0.04 K/UL (ref 0–0.3)
ABSOLUTE LYMPH #: 3.52 K/UL (ref 1.1–3.7)
ABSOLUTE MONO #: 0.57 K/UL (ref 0.1–1.2)
ALBUMIN SERPL-MCNC: 4.2 G/DL (ref 3.5–5.2)
ALBUMIN/GLOBULIN RATIO: 1.1 (ref 1–2.5)
ALLEN TEST: ABNORMAL
ALP BLD-CCNC: 104 U/L (ref 35–104)
ALT SERPL-CCNC: 19 U/L (ref 5–33)
AMORPHOUS: ABNORMAL
ANION GAP SERPL CALCULATED.3IONS-SCNC: 14 MMOL/L (ref 9–17)
AST SERPL-CCNC: 21 U/L
BACTERIA: ABNORMAL
BASOPHILS # BLD: 1 % (ref 0–2)
BASOPHILS ABSOLUTE: 0.08 K/UL (ref 0–0.2)
BILIRUB SERPL-MCNC: 0.5 MG/DL (ref 0.3–1.2)
BILIRUBIN URINE: ABNORMAL
BUN BLDV-MCNC: 11 MG/DL (ref 6–20)
BUN/CREAT BLD: 18 (ref 9–20)
CALCIUM SERPL-MCNC: 9.5 MG/DL (ref 8.6–10.4)
CARBOXYHEMOGLOBIN: ABNORMAL % (ref 0–5)
CASTS UA: ABNORMAL /LPF
CHLORIDE BLD-SCNC: 98 MMOL/L (ref 98–107)
CO2: 24 MMOL/L (ref 20–31)
COLOR: YELLOW
COMMENT UA: ABNORMAL
CREAT SERPL-MCNC: 0.6 MG/DL (ref 0.5–0.9)
CRYSTALS, UA: ABNORMAL /HPF
DIFFERENTIAL TYPE: ABNORMAL
EOSINOPHILS RELATIVE PERCENT: 1 % (ref 1–4)
EPITHELIAL CELLS UA: ABNORMAL /HPF (ref 0–25)
FIO2: ABNORMAL
GFR AFRICAN AMERICAN: >60 ML/MIN
GFR NON-AFRICAN AMERICAN: >60 ML/MIN
GFR SERPL CREATININE-BSD FRML MDRD: ABNORMAL ML/MIN/{1.73_M2}
GFR SERPL CREATININE-BSD FRML MDRD: ABNORMAL ML/MIN/{1.73_M2}
GLUCOSE BLD-MCNC: 140 MG/DL (ref 74–100)
GLUCOSE BLD-MCNC: 162 MG/DL (ref 70–99)
GLUCOSE URINE: NEGATIVE
HCO3 VENOUS: 24.1 MMOL/L (ref 24–30)
HCT VFR BLD CALC: 44.9 % (ref 36.3–47.1)
HEMOGLOBIN: 14.6 G/DL (ref 11.9–15.1)
IMMATURE GRANULOCYTES: 0 %
INR BLD: 1
KETONES, URINE: NEGATIVE
LEUKOCYTE ESTERASE, URINE: NEGATIVE
LYMPHOCYTES # BLD: 26 % (ref 24–43)
MCH RBC QN AUTO: 25.4 PG (ref 25.2–33.5)
MCHC RBC AUTO-ENTMCNC: 32.5 G/DL (ref 28.4–34.8)
MCV RBC AUTO: 78.1 FL (ref 82.6–102.9)
METHEMOGLOBIN: ABNORMAL % (ref 0–1.9)
MODE: ABNORMAL
MONOCYTES # BLD: 4 % (ref 3–12)
MUCUS: ABNORMAL
NEGATIVE BASE EXCESS, VEN: ABNORMAL MMOL/L (ref 0–2)
NITRITE, URINE: NEGATIVE
NOTIFICATION TIME: ABNORMAL
NOTIFICATION: ABNORMAL
NRBC AUTOMATED: 0 PER 100 WBC
O2 DEVICE/FLOW/%: ABNORMAL
O2 SAT, VEN: 55.9 % (ref 60–85)
OTHER OBSERVATIONS UA: ABNORMAL
OXYHEMOGLOBIN: ABNORMAL % (ref 95–98)
PATIENT TEMP: 37
PCO2, VEN, TEMP ADJ: ABNORMAL MMHG (ref 39–55)
PCO2, VEN: 37.5 (ref 39–55)
PDW BLD-RTO: 14.5 % (ref 11.8–14.4)
PEEP/CPAP: ABNORMAL
PH UA: 5.5 (ref 5–9)
PH VENOUS: 7.43 (ref 7.32–7.42)
PH, VEN, TEMP ADJ: ABNORMAL (ref 7.32–7.42)
PLATELET # BLD: 388 K/UL (ref 138–453)
PLATELET ESTIMATE: ABNORMAL
PMV BLD AUTO: 9.5 FL (ref 8.1–13.5)
PO2, VEN, TEMP ADJ: ABNORMAL MMHG (ref 30–50)
PO2, VEN: 28.6 (ref 30–50)
POSITIVE BASE EXCESS, VEN: 0.1 MMOL/L (ref 0–2)
POTASSIUM SERPL-SCNC: 3.7 MMOL/L (ref 3.7–5.3)
PROTEIN UA: ABNORMAL
PROTHROMBIN TIME: 13.1 SEC (ref 11.5–14.2)
PSV: ABNORMAL
PT. POSITION: ABNORMAL
RBC # BLD: 5.75 M/UL (ref 3.95–5.11)
RBC # BLD: ABNORMAL 10*6/UL
RBC UA: ABNORMAL /HPF (ref 0–2)
RENAL EPITHELIAL, UA: ABNORMAL /HPF
RESPIRATORY RATE: ABNORMAL
SAMPLE SITE: ABNORMAL
SEG NEUTROPHILS: 68 % (ref 36–65)
SEGMENTED NEUTROPHILS ABSOLUTE COUNT: 9.24 K/UL (ref 1.5–8.1)
SET RATE: ABNORMAL
SODIUM BLD-SCNC: 136 MMOL/L (ref 135–144)
SPECIFIC GRAVITY UA: >1.03 (ref 1.01–1.02)
TEXT FOR RESPIRATORY: ABNORMAL
TOTAL HB: ABNORMAL G/DL (ref 12–16)
TOTAL PROTEIN: 8.2 G/DL (ref 6.4–8.3)
TOTAL RATE: ABNORMAL
TRICHOMONAS: ABNORMAL
TROPONIN INTERP: NORMAL
TROPONIN T: NORMAL NG/ML
TROPONIN, HIGH SENSITIVITY: <6 NG/L (ref 0–14)
TURBIDITY: ABNORMAL
URINE HGB: NEGATIVE
UROBILINOGEN, URINE: NORMAL
VT: ABNORMAL
WBC # BLD: 13.6 K/UL (ref 3.5–11.3)
WBC # BLD: ABNORMAL 10*3/UL
WBC UA: ABNORMAL /HPF (ref 0–5)
YEAST: ABNORMAL

## 2020-11-09 PROCEDURE — 85610 PROTHROMBIN TIME: CPT

## 2020-11-09 PROCEDURE — 71045 X-RAY EXAM CHEST 1 VIEW: CPT

## 2020-11-09 PROCEDURE — 82947 ASSAY GLUCOSE BLOOD QUANT: CPT

## 2020-11-09 PROCEDURE — 84484 ASSAY OF TROPONIN QUANT: CPT

## 2020-11-09 PROCEDURE — 93005 ELECTROCARDIOGRAM TRACING: CPT | Performed by: EMERGENCY MEDICINE

## 2020-11-09 PROCEDURE — 99284 EMERGENCY DEPT VISIT MOD MDM: CPT

## 2020-11-09 PROCEDURE — 81001 URINALYSIS AUTO W/SCOPE: CPT

## 2020-11-09 PROCEDURE — 2580000003 HC RX 258: Performed by: EMERGENCY MEDICINE

## 2020-11-09 PROCEDURE — 6370000000 HC RX 637 (ALT 250 FOR IP): Performed by: EMERGENCY MEDICINE

## 2020-11-09 PROCEDURE — 80053 COMPREHEN METABOLIC PANEL: CPT

## 2020-11-09 PROCEDURE — 85025 COMPLETE CBC W/AUTO DIFF WBC: CPT

## 2020-11-09 PROCEDURE — 82805 BLOOD GASES W/O2 SATURATION: CPT

## 2020-11-09 PROCEDURE — 36415 COLL VENOUS BLD VENIPUNCTURE: CPT

## 2020-11-09 RX ORDER — SULFAMETHOXAZOLE AND TRIMETHOPRIM 800; 160 MG/1; MG/1
1 TABLET ORAL ONCE
Status: COMPLETED | OUTPATIENT
Start: 2020-11-09 | End: 2020-11-09

## 2020-11-09 RX ORDER — CEPHALEXIN 500 MG/1
500 CAPSULE ORAL ONCE
Status: COMPLETED | OUTPATIENT
Start: 2020-11-09 | End: 2020-11-09

## 2020-11-09 RX ORDER — 0.9 % SODIUM CHLORIDE 0.9 %
1000 INTRAVENOUS SOLUTION INTRAVENOUS ONCE
Status: COMPLETED | OUTPATIENT
Start: 2020-11-09 | End: 2020-11-09

## 2020-11-09 RX ADMIN — SULFAMETHOXAZOLE AND TRIMETHOPRIM 1 TABLET: 800; 160 TABLET ORAL at 22:54

## 2020-11-09 RX ADMIN — CEPHALEXIN 500 MG: 500 CAPSULE ORAL at 22:54

## 2020-11-09 RX ADMIN — SODIUM CHLORIDE 1000 ML: 9 INJECTION, SOLUTION INTRAVENOUS at 22:54

## 2020-11-09 ASSESSMENT — PAIN SCALES - GENERAL
PAINLEVEL_OUTOF10: 7
PAINLEVEL_OUTOF10: 7

## 2020-11-09 ASSESSMENT — PAIN DESCRIPTION - DESCRIPTORS: DESCRIPTORS: CONSTANT;HEADACHE

## 2020-11-09 ASSESSMENT — PAIN DESCRIPTION - PAIN TYPE
TYPE: ACUTE PAIN
TYPE: ACUTE PAIN

## 2020-11-09 ASSESSMENT — PAIN DESCRIPTION - PROGRESSION: CLINICAL_PROGRESSION: NOT CHANGED

## 2020-11-09 ASSESSMENT — PAIN DESCRIPTION - LOCATION
LOCATION: HEAD
LOCATION: HEAD

## 2020-11-10 ENCOUNTER — TELEPHONE (OUTPATIENT)
Dept: PRIMARY CARE CLINIC | Age: 55
End: 2020-11-10

## 2020-11-10 VITALS
TEMPERATURE: 96.1 F | WEIGHT: 217 LBS | RESPIRATION RATE: 18 BRPM | HEART RATE: 96 BPM | OXYGEN SATURATION: 95 % | SYSTOLIC BLOOD PRESSURE: 156 MMHG | BODY MASS INDEX: 38.44 KG/M2 | DIASTOLIC BLOOD PRESSURE: 109 MMHG

## 2020-11-10 LAB
EKG ATRIAL RATE: 103 BPM
EKG P AXIS: 41 DEGREES
EKG P-R INTERVAL: 134 MS
EKG Q-T INTERVAL: 338 MS
EKG QRS DURATION: 72 MS
EKG QTC CALCULATION (BAZETT): 442 MS
EKG R AXIS: 27 DEGREES
EKG T AXIS: 51 DEGREES
EKG VENTRICULAR RATE: 103 BPM
TROPONIN INTERP: NORMAL
TROPONIN T: NORMAL NG/ML
TROPONIN, HIGH SENSITIVITY: <6 NG/L (ref 0–14)

## 2020-11-10 PROCEDURE — 93010 ELECTROCARDIOGRAM REPORT: CPT | Performed by: INTERNAL MEDICINE

## 2020-11-10 RX ORDER — CEPHALEXIN 500 MG/1
500 CAPSULE ORAL 4 TIMES DAILY
Qty: 28 CAPSULE | Refills: 0 | Status: SHIPPED | OUTPATIENT
Start: 2020-11-10 | End: 2020-11-17

## 2020-11-10 RX ORDER — ONDANSETRON 4 MG/1
4 TABLET, ORALLY DISINTEGRATING ORAL EVERY 8 HOURS PRN
Qty: 20 TABLET | Refills: 0 | Status: SHIPPED | OUTPATIENT
Start: 2020-11-10

## 2020-11-10 RX ORDER — SULFAMETHOXAZOLE AND TRIMETHOPRIM 800; 160 MG/1; MG/1
1 TABLET ORAL 2 TIMES DAILY
Qty: 14 TABLET | Refills: 0 | Status: SHIPPED | OUTPATIENT
Start: 2020-11-10 | End: 2020-11-17

## 2020-11-10 ASSESSMENT — ENCOUNTER SYMPTOMS
SHORTNESS OF BREATH: 1
COUGH: 1
VOMITING: 0
WHEEZING: 0
ABDOMINAL PAIN: 0
NAUSEA: 0
COLOR CHANGE: 1
RHINORRHEA: 0
BACK PAIN: 0

## 2020-11-11 ENCOUNTER — CARE COORDINATION (OUTPATIENT)
Dept: CARE COORDINATION | Age: 55
End: 2020-11-11

## 2020-11-11 RX ORDER — ALBUTEROL SULFATE 90 UG/1
AEROSOL, METERED RESPIRATORY (INHALATION)
Qty: 18 INHALER | Refills: 3 | Status: SHIPPED | OUTPATIENT
Start: 2020-11-11 | End: 2022-07-07 | Stop reason: SDUPTHER

## 2020-11-11 NOTE — CARE COORDINATION
Attempted to call for ED follow up/COVID outreach, left VM message asking her to call me back. She already had a follow up call from PCP office, has podiatry visit tomorrow. No further outreach planned.     Future Appointments   Date Time Provider Emma Gaxiola   11/12/2020 11:00 AM Miguel renner DPM TIFF PODIATR [facility]   3/29/2021  1:40 PM Wicho Walters MD Neuro Spec [facility]   3/30/2021  2:00 PM CHELSEA Campa - CNP Tiff Prim Ca [facility]   11/1/2021  9:00 AM Gage Wells MD TIFF CARD [facility]

## 2020-11-12 ENCOUNTER — OFFICE VISIT (OUTPATIENT)
Dept: PODIATRY | Age: 55
End: 2020-11-12
Payer: COMMERCIAL

## 2020-11-12 VITALS
DIASTOLIC BLOOD PRESSURE: 75 MMHG | BODY MASS INDEX: 38.44 KG/M2 | TEMPERATURE: 96.5 F | RESPIRATION RATE: 18 BRPM | HEART RATE: 88 BPM | SYSTOLIC BLOOD PRESSURE: 145 MMHG | HEIGHT: 63 IN

## 2020-11-12 PROCEDURE — G8428 CUR MEDS NOT DOCUMENT: HCPCS | Performed by: PODIATRIST

## 2020-11-12 PROCEDURE — 1036F TOBACCO NON-USER: CPT | Performed by: PODIATRIST

## 2020-11-12 PROCEDURE — G8417 CALC BMI ABV UP PARAM F/U: HCPCS | Performed by: PODIATRIST

## 2020-11-12 PROCEDURE — G8482 FLU IMMUNIZE ORDER/ADMIN: HCPCS | Performed by: PODIATRIST

## 2020-11-12 PROCEDURE — 99213 OFFICE O/P EST LOW 20 MIN: CPT | Performed by: PODIATRIST

## 2020-11-12 PROCEDURE — 3017F COLORECTAL CA SCREEN DOC REV: CPT | Performed by: PODIATRIST

## 2020-11-12 PROCEDURE — 11730 AVULSION NAIL PLATE SIMPLE 1: CPT | Performed by: PODIATRIST

## 2020-11-12 ASSESSMENT — PATIENT HEALTH QUESTIONNAIRE - PHQ9
SUM OF ALL RESPONSES TO PHQ QUESTIONS 1-9: 0
1. LITTLE INTEREST OR PLEASURE IN DOING THINGS: 0
2. FEELING DOWN, DEPRESSED OR HOPELESS: 0
SUM OF ALL RESPONSES TO PHQ QUESTIONS 1-9: 0
SUM OF ALL RESPONSES TO PHQ9 QUESTIONS 1 & 2: 0
SUM OF ALL RESPONSES TO PHQ QUESTIONS 1-9: 0

## 2020-11-12 NOTE — PROGRESS NOTES
SUBJECTIVE:   Terry Pozo is a 54 y.o. female who presents with acutely ingrown left, lateral 1st toenail. Has pain and tenderness at the area without fever. HPI: 4 days , insidious onset           ED/ER visit ; Rx Bactrim DS          MRR: recurrence of pathology , after medicinal treatment 9-  ROS: +pain             -residual constitutional             -easy bruising / epistaxis             -DVT / -hypercoag.             +current ATB ; Bactrim DS     Past Medical History:   Diagnosis Date    Anxiety     Asthma     CAD (coronary artery disease)     Clinical trial participant at discharge 3/31/16    [facility] [address]     Depression     Diabetic neuropathy ([facility] [address] [facility]     H/O cardiac catheterization 4/26/16    LMCA: Mild Irregularities 10-20%. LAD: Lesion on Prox LAD: Proximal subsection. 100% stenosis. LCx: Mild irregularities 10-20%. RCA: Mild irregularities 10-20%. Widely patent mid RCA stent. EF:60%.  H/O cardiovascular stress test 10/07/2016    Overall results are most consistent with a low risk for significant CAD.     H/O echocardiogram 10/05/2016    EF:>60%. Inferoseptal wall abnormal in its motion which is not unusal status post open heart surgery. Evidence of mild (grade I) diastolic dysfunction is seen.  H/O tilt table evaluation 07/12/2016    Abnormal. PTs HR, Blood Pressure response and symptoms were most consistent with dysautonomia. Combined w/viligant maintenance of euvolemia and maintaining a moderate salt intake, pharmaciologic treatment w/ ProAmatine, SSRI such as Lexapro and/or Mestinon among other treatments have shown some effectiveness in the treatment of this condition.  History of cardiovascular stress test 02/13/2019    Abnormal. Small/moderate perfusion defect of mild/moderate intesity in the anterior and anterolateral regions during stress imaging which is most consistent w/ ischemia but may be artifact.  Overall low/intermediate risk for significant CAD.  History of echocardiogram 09/06/2018    EF >60%. Inferoseptal wall is abnormal in its motion which is not unusual s/p open heart. Evidence of mild (grade I) diastolic dysfunction seen.     Hx of blood clots     Hyperlipidemia     Hypertension     Intermittent claudication (HCC)     Kidney stones     Movement disorder     neuropathy in legs    Neuromuscular disorder (HCC)     neuropathy    Osteoarthritis     Reflux     Seizures (HCC)     last over 10 yr ago    Stented coronary artery 9/22/2010     LAD/[place]    Stented coronary artery 3/31/16    RCA/[place]    Type II or unspecified type diabetes mellitus without mention of complication, not stated as uncontrolled     Unspecified sleep apnea     no machine     Past Surgical History:   Procedure Laterality Date    ABDOMINAL HERNIA REPAIR  1-2016    repair done [place]    APPENDECTOMY      CARDIAC CATHETERIZATION Left 4/26/2016    right radial/ [facility] [place]/ Dr Canelo Butler Left 03/07/2019    Left Radial/[facility] [place]/   [address]    COLONOSCOPY  12/16/14    -hemorrhoids,bx    CORONARY ANGIOPLASTY WITH STENT PLACEMENT  9/2010    CORONARY ANGIOPLASTY WITH STENT PLACEMENT  3-    [facility] / DR Marjorie Cancino CORONARY ARTERY BYPASS GRAFT  08/15/2016    OP X 1- Dr Carisa Fernandez, COLON, DIAGNOSTIC  12/16/2014    HERNIA REPAIR  12/13/12    at the medial aspect of a [place] RUQ scar); [id number]  02/16/2015    incisional, recurrent    HERNIA REPAIR  02/2016    HYSTERECTOMY      OTHER SURGICAL HISTORY  10/8/2015    abd wound washout, mesh removal, wound vac placement    NC SUCT NICOLE LIPECTOMY,HEAD/NECK Left 8/27/2018    THIGH LESION BIOPSY EXCISION, SOFT TISSUE MASS performed [facility] Left 2018    leg    UPPP UVULOPALATOPHARYGOPLASTY  06/26/2012    VENTRAL HERNIA REPAIR 09/21/2015    With Mesh - Dr Halie Baca     Allergies   Allergen Reactions    Tape Mari Sicilian Tape] Rash     Current Outpatient Medications on File Prior to Visit   Medication Sig Dispense Refill    albuterol sulfate HFA (VENTOLIN HFA) 108 (90 Base) MCG/ACT inhaler INHALE 2 PUFFS EVERY 6 HOURS AS NEEDED FOR WHEEZING 18 Inhaler 3    sulfamethoxazole-trimethoprim (BACTRIM DS) 800-160 MG per tablet Take 1 tablet by mouth 2 times daily for 7 days 14 tablet 0    cephALEXin (KEFLEX) 500 MG capsule Take 1 capsule by mouth 4 times daily for 7 days 28 capsule 0    ondansetron (ZOFRAN ODT) 4 MG disintegrating tablet Take 1 tablet by mouth every 8 hours as needed for Nausea 20 tablet 0    fludrocortisone (FLORINEF) 0.1 MG tablet TAKE 3 TABLETS BY MOUTH EVERY  tablet 3    nitroGLYCERIN (NITROSTAT) 0.4 MG SL tablet Place 1 tablet under the tongue every 5 minutes as needed for Chest pain 25 tablet 1    Saccharomyces boulardii (PROBIOTIC) 250 MG CAPS Take 1 capsule by mouth daily 30 capsule 0    tiZANidine (ZANAFLEX) 2 MG tablet TAKE 1 TABLET BY MOUTH NIGHTLY AS NEEDED(SPASMS) 30 tablet 2    pregabalin (LYRICA) 100 MG capsule Take 1 capsule by mouth 3 times daily.  90 capsule 3    blood glucose test strips (FREESTYLE LITE) strip USE TO CHECK BLOOD SUGARS 3 TIMES DAILY AND AS NEEDED FOR DIABETES E11.40 270 strip 3    acetaminophen (TYLENOL) 500 MG tablet Take 1 tablet by mouth 4 times daily as needed for Pain 40 tablet 0    atorvastatin (LIPITOR) 40 MG tablet Take 1 tablet by mouth daily 90 tablet 3    losartan (COZAAR) 25 MG tablet Take 1 tablet by mouth daily 90 tablet 3    insulin lispro, 1 Unit Dial, (HUMALOG KWIKPEN) 100 UNIT/ML SOPN Inject 20 Units into the skin 3 times daily (before meals) 12 pen 1    omeprazole (PRILOSEC) 20 MG delayed release capsule Take 1 capsule by mouth every morning (before breakfast) 90 capsule 1    Insulin Degludec (TRESIBA FLEXTOUCH) 200 UNIT/ML SOPN Inject 80 Units into the skin daily (Patient taking differently: Inject 86 Units into the skin daily ) 12 pen 3    Dulaglutide (TRULICITY) 1.5 AI/0.1FC SOPN Inject 1.5 mg into the skin once a week 12 pen 3    metoprolol succinate (TOPROL XL) 100 MG extended release tablet Take 1 tablet by mouth daily 90 tablet 3    DULoxetine (CYMBALTA) 30 MG extended release capsule Take 1 capsule by mouth daily 30 capsule 1    metFORMIN (GLUCOPHAGE-XR) 500 MG extended release tablet Take 2 tablets by mouth daily (with breakfast) 180 tablet 3    aspirin 81 MG EC tablet TAKE 1 TABLET BY MOUTH DAILY (Patient taking differently: Take 81 mg by mouth daily ) 90 tablet 3    Insulin Pen Needle (BD ULTRA-FINE PEN NEEDLES) 29G X 12.7MM MISC USE AS DIRECTED BY PHYSICIAN 5 times daily 150 each 11    Blood Glucose Monitoring Suppl BERE 1 Units by Does not apply route three times daily Unit insurance will cover 1 Device 0    Blood Pressure Monitor KIT 1 each by Does not apply route daily as needed ESSENTIAL HYPERTENSION   I10 1 kit 0    Blood Glucose Monitoring Suppl (BLOOD GLUCOSE MONITOR KIT) KIT 1 each by Does not apply route daily. Please dispense whatever BS monitoring kit [facility] covers. Dx: 250, new onset T2DM. Testing once daily 1 kit 0     No current facility-administered medications on file prior to visit. OBJECTIVE:  Patient appears well, normal vital signs. Left 1st toenail reveals ingrown edge with erythema, pus and tenderness, latera labia. Ill defined erythema of distal toe , L1   NVS intact , L1   EHL / FHL ; +5/5 ,L1     ASSESSMENT:  Paronychia with cellulitis / abscess , L1 ; painful and recurrent   Spontaneous healing potential acceptable - adequate     REC: I&D     Incision and Drainage Procedure Note (40841)(TA)     Indication: painful paronychia , recurrent ; L1      TIME OUT  Yes       Anatomical marking noted Yes      Consent signed YES   Procedure: The patient was positioned in dorsal recumbent position.  The treatment and consent form reviewed. The skin over the Left 1st toe was prepped with Chloraprep. Local anesthesia was obtained by infiltration using 2% Lidocaine with epinephrine. Blunt dissection freed the nail from the overriding eponychium, laterally and the underlying nail bed in V-block fashion of the lateral labia. The nail is transected from the distal to proximal and concomitant excision of the nail plate is done. Additional uniplanar decompression is done bluntly with a spatula. The nail bed/incision site was then irrigated and packed with sterile gauze. The patient tolerated the procedure well. Complications: None    Post procedure care: Antibiotics as per the AVS/orders and prescription.

## 2020-11-23 RX ORDER — OMEPRAZOLE 20 MG/1
CAPSULE, DELAYED RELEASE ORAL
Qty: 90 CAPSULE | Refills: 1 | Status: SHIPPED | OUTPATIENT
Start: 2020-11-23

## 2020-11-23 NOTE — TELEPHONE ENCOUNTER
Health Maintenance   Topic Date Due    Hepatitis B vaccine (1 of 3 - Risk 3-dose series) 03/13/2021 (Originally 2/13/1984)    DTaP/Tdap/Td vaccine (1 - Tdap) 03/13/2021 (Originally 2/13/1984)    Shingles Vaccine (1 of 2) 06/23/2021 (Originally 2/13/2015)    Diabetic foot exam  12/12/2020    A1C test (Diabetic or Prediabetic)  12/30/2020    Diabetic retinal exam  06/15/2021    Diabetic microalbuminuria test  06/19/2021    Lipid screen  06/19/2021    Potassium monitoring  11/09/2021    Creatinine monitoring  11/09/2021    Breast cancer screen  02/07/2022    Colon cancer screen colonoscopy  01/27/2026    Flu vaccine  Completed    Hepatitis C screen  Completed    HIV screen  Completed    Hepatitis A vaccine  Aged Out    Hib vaccine  Aged Out    Meningococcal (ACWY) vaccine  Aged Out    Pneumococcal 0-64 years Vaccine  Aged Out             (applicable per patient's age: Cancer Screenings, Depression Screening, Fall Risk Screening, Immunizations)    Hemoglobin A1C (%)   Date Value   09/30/2020 11.9   06/19/2020 10.6 (H)   03/13/2020 12.7     Microalb/Crt.  Ratio (mcg/mg creat)   Date Value   06/19/2020 CANNOT BE CALCULATED     LDL Cholesterol (mg/dL)   Date Value   06/19/2020 45     AST (U/L)   Date Value   11/09/2020 21     ALT (U/L)   Date Value   11/09/2020 19     BUN (mg/dL)   Date Value   11/09/2020 11      (goal A1C is < 7)   (goal LDL is <100) need 30-50% reduction from baseline     BP Readings from Last 3 Encounters:   11/12/20 (!) 145/75   11/09/20 (!) 156/109   10/30/20 108/68    (goal /80)      All Future Testing planned in [facility]:  Lab Frequency Next Occurrence   CBC Auto Differential Once 03/29/2021   ALT Once 03/30/2021   AST Once 47/90/8749   Basic Metabolic Panel Once 71/51/6331   Lipid Panel Once 03/29/2021   Hemoglobin A1C Once 03/29/2021   Microalbumin, Ur Once 03/29/2021       Next Visit Date:  Future Appointments   Date Time Provider Emma Gaxiola   3/29/2021  1:40 PM

## 2020-12-15 ENCOUNTER — OFFICE VISIT (OUTPATIENT)
Dept: PODIATRY | Age: 55
End: 2020-12-15
Payer: COMMERCIAL

## 2020-12-15 ENCOUNTER — HOSPITAL ENCOUNTER (OUTPATIENT)
Dept: LAB | Age: 55
Setting detail: SPECIMEN
Discharge: HOME | End: 2020-12-15
Payer: COMMERCIAL

## 2020-12-15 VITALS
SYSTOLIC BLOOD PRESSURE: 135 MMHG | HEIGHT: 63 IN | BODY MASS INDEX: 38.44 KG/M2 | TEMPERATURE: 96.9 F | RESPIRATION RATE: 20 BRPM | HEART RATE: 106 BPM | DIASTOLIC BLOOD PRESSURE: 80 MMHG

## 2020-12-15 PROCEDURE — G8482 FLU IMMUNIZE ORDER/ADMIN: HCPCS | Performed by: PODIATRIST

## 2020-12-15 PROCEDURE — 1036F TOBACCO NON-USER: CPT | Performed by: PODIATRIST

## 2020-12-15 PROCEDURE — G8417 CALC BMI ABV UP PARAM F/U: HCPCS | Performed by: PODIATRIST

## 2020-12-15 PROCEDURE — 99212 OFFICE O/P EST SF 10 MIN: CPT | Performed by: PODIATRIST

## 2020-12-15 PROCEDURE — 86403 PARTICLE AGGLUT ANTBDY SCRN: CPT

## 2020-12-15 PROCEDURE — 10061 I&D ABSCESS COMP/MULTIPLE: CPT | Performed by: PODIATRIST

## 2020-12-15 PROCEDURE — 3017F COLORECTAL CA SCREEN DOC REV: CPT | Performed by: PODIATRIST

## 2020-12-15 PROCEDURE — G8427 DOCREV CUR MEDS BY ELIG CLIN: HCPCS | Performed by: PODIATRIST

## 2020-12-15 PROCEDURE — 87186 SC STD MICRODIL/AGAR DIL: CPT

## 2020-12-15 PROCEDURE — 87070 CULTURE OTHR SPECIMN AEROBIC: CPT

## 2020-12-15 PROCEDURE — 87205 SMEAR GRAM STAIN: CPT

## 2020-12-15 RX ORDER — SULFAMETHOXAZOLE AND TRIMETHOPRIM 800; 160 MG/1; MG/1
1 TABLET ORAL 2 TIMES DAILY
Qty: 20 TABLET | Refills: 0 | Status: SHIPPED | OUTPATIENT
Start: 2020-12-15 | End: 2020-12-25

## 2020-12-15 ASSESSMENT — PATIENT HEALTH QUESTIONNAIRE - PHQ9
SUM OF ALL RESPONSES TO PHQ QUESTIONS 1-9: 0
2. FEELING DOWN, DEPRESSED OR HOPELESS: 0
SUM OF ALL RESPONSES TO PHQ9 QUESTIONS 1 & 2: 0
SUM OF ALL RESPONSES TO PHQ QUESTIONS 1-9: 0
1. LITTLE INTEREST OR PLEASURE IN DOING THINGS: 0
SUM OF ALL RESPONSES TO PHQ QUESTIONS 1-9: 0

## 2020-12-15 NOTE — PATIENT INSTRUCTIONS
PODIATRY PATIENT DISCHARGE INSTRUCTIONS    CALL [phone] regarding podiatry questions    OFFICE HOURS ARE TUESDAY MORNING  8:30-NOON and can change with out notice    Discharge instructions for patient's plan of care:  Left great toe  Antibiotic ordered follow as directed on bottle Bactrim DS 1 tablet twice a day for 10 days. Apply betadine to toe twice a day for 3 days, then may begin to soak if desired. Cover with dry dressing till healed. Return to clinic as needed. We hope we gave you VERY GOOD care today!

## 2020-12-15 NOTE — PROGRESS NOTES
Subjective:      Patient ID: Augustin Farrell is a 54 y.o. female. Cc: Left great toe          Got stepped on again          Pain     HPI previous minor I&D , > 6 weeks ago; healed         NEW minor trauma , 10 days          Bleed ing / pain / redness  / drainage         Using soaks and neosporin ; no improvement     Review of Systems   Constitutional: Positive for activity change. Negative for appetite change, chills, fatigue and fever. +pain    HENT: Negative for nosebleeds. Eyes: Negative. Respiratory: Negative for apnea, cough, choking, shortness of breath and wheezing. +GAMING , +COPD    Cardiovascular: Negative for chest pain, palpitations and leg swelling.        -claudication , -DVT , -angina, -MI /stenting , +CAD    Gastrointestinal: Negative for blood in stool, constipation, diarrhea, nausea and vomiting. Endocrine:        +DM    Genitourinary: Positive for frequency. Negative for hematuria. Musculoskeletal: Positive for gait problem. Negative for arthralgias, back pain, joint swelling, myalgias, neck pain and neck stiffness. Skin: Positive for color change and wound. Negative for pallor and rash. Allergic/Immunologic: Negative for environmental allergies, food allergies and immunocompromised state.        -meds, +tape, -metals    Neurological: Positive for numbness. Hematological: Negative for adenopathy. Does not bruise/bleed easily. -hyper coagulopathy    Psychiatric/Behavioral: Negative.         Past Medical History:   Diagnosis Date    Anxiety     Asthma     CAD (coronary artery disease)     Clinical trial participant at discharge 3/31/16    [facility] obstructive [facility] [address]     Depression     Diabetic neuropathy ([facility] [address]     H/O cardiac catheterization 4/26/16 LMCA: Mild Irregularities 10-20%. LAD: Lesion on Prox LAD: Proximal subsection. 100% stenosis. LCx: Mild irregularities 10-20%. RCA: Mild irregularities 10-20%. Widely patent mid RCA stent. EF:60%.  H/O cardiovascular stress test 10/07/2016    Overall results are most consistent with a low risk for significant CAD.     H/O echocardiogram 10/05/2016    EF:>60%. Inferoseptal wall abnormal in its motion which is not unusal status post open heart surgery. Evidence of mild (grade I) diastolic dysfunction is seen.  H/O tilt table evaluation 07/12/2016    Abnormal. PTs HR, Blood Pressure response and symptoms were most consistent with dysautonomia. Combined w/viligant maintenance of euvolemia and maintaining a moderate salt intake, pharmaciologic treatment w/ ProAmatine, SSRI such as Lexapro and/or Mestinon among other treatments have shown some effectiveness in the treatment of this condition.  History of cardiovascular stress test 02/13/2019    Abnormal. Small/moderate perfusion defect of mild/moderate intesity in the anterior and anterolateral regions during stress imaging which is most consistent w/ ischemia but may be artifact. Overall low/intermediate risk for significant CAD.  History of echocardiogram 09/06/2018    EF >60%. Inferoseptal wall is abnormal in its motion which is not unusual s/p open heart. Evidence of mild (grade I) diastolic dysfunction seen.     Hx of blood clots     Hyperlipidemia     Hypertension     Intermittent claudication (HCC)     Kidney stones     Movement disorder     neuropathy in legs    Neuromuscular disorder (HCC)     neuropathy    Osteoarthritis     Reflux     Seizures (HCC)     last over 10 yr ago    Stented coronary artery 9/22/2010     LAD/[facility]    Stented coronary artery 3/31/16    RCA/[facility]    Type II or unspecified type diabetes mellitus without mention of complication, not stated as uncontrolled     Unspecified sleep apnea     no machine  nitroGLYCERIN (NITROSTAT) 0.4 MG SL tablet Place 1 tablet under the tongue every 5 minutes as needed for Chest pain 25 tablet 1    Saccharomyces boulardii (PROBIOTIC) 250 MG CAPS Take 1 capsule by mouth daily 30 capsule 0    tiZANidine (ZANAFLEX) 2 MG tablet TAKE 1 TABLET BY MOUTH NIGHTLY AS NEEDED(SPASMS) 30 tablet 2    pregabalin (LYRICA) 100 MG capsule Take 1 capsule by mouth 3 times daily.  90 capsule 3    blood glucose test strips (FREESTYLE LITE) strip USE TO CHECK BLOOD SUGARS 3 TIMES DAILY AND AS NEEDED FOR DIABETES E11.40 270 strip 3    acetaminophen (TYLENOL) 500 MG tablet Take 1 tablet by mouth 4 times daily as needed for Pain 40 tablet 0    atorvastatin (LIPITOR) 40 MG tablet Take 1 tablet by mouth daily 90 tablet 3    losartan (COZAAR) 25 MG tablet Take 1 tablet by mouth daily 90 tablet 3    insulin lispro, 1 Unit Dial, (HUMALOG KWIKPEN) 100 UNIT/ML SOPN Inject 20 Units into the skin 3 times daily (before meals) 12 pen 1    Insulin Degludec (TRESIBA FLEXTOUCH) 200 UNIT/ML SOPN Inject 80 Units into the skin daily (Patient taking differently: Inject 86 Units into the skin daily ) 12 pen 3    Dulaglutide (TRULICITY) 1.5 FU/4.4FE SOPN Inject 1.5 mg into the skin once a week 12 pen 3    metoprolol succinate (TOPROL XL) 100 MG extended release tablet Take 1 tablet by mouth daily 90 tablet 3    DULoxetine (CYMBALTA) 30 MG extended release capsule Take 1 capsule by mouth daily 30 capsule 1    metFORMIN (GLUCOPHAGE-XR) 500 MG extended release tablet Take 2 tablets by mouth daily (with breakfast) 180 tablet 3    aspirin 81 MG EC tablet TAKE 1 TABLET BY MOUTH DAILY (Patient taking differently: Take 81 mg by mouth daily ) 90 tablet 3    Insulin Pen Needle (BD ULTRA-FINE PEN NEEDLES) 29G X 12.7MM MISC USE AS DIRECTED BY PHYSICIAN 5 times daily 150 each 11    Blood Glucose Monitoring Suppl BERE 1 Units by Does not apply route three times daily Unit insurance will cover 1 Device 0  Blood Pressure Monitor KIT 1 each by Does not apply route daily as needed ESSENTIAL HYPERTENSION   I10 1 kit 0    Blood Glucose Monitoring Suppl (BLOOD GLUCOSE MONITOR KIT) KIT 1 each by Does not apply route daily. Please dispense whatever BS monitoring kit [facility]. Dx: 250, new onset T2DM. Testing once daily 1 kit 0     No current facility-administered medications on file prior to visit. Objective:   Physical Exam  54 Y/O WF, WD/WN, A&O x 3,                             Ambulatory ; bipedal , antalgic Left                             L1 : Eponychium and nail bed ; +erythema / +PMT                                                                                Transverse , in fold -- pus pocket , probed wound                                                                                 Post traumatic / atypical transection of nail plate ; irregular                                                                                 Photo documentation     Assessment:      Abscess of hallux Left -- painful   Non healing minor trauma wound , Left hallux       REC: I&D ; ,anatomically consistent with multiplanar / complex (transverse)   Tx:       Incision and Drainage Note (26115)     Type of Incision and Drainage: Complex    Performed by: Jonathon Fraser DPM    Consent obtained: Yes    Time out taken: Yes    Pain Control:   LA 4 cc Lidocaine 2% plain , V-block infiltrate     Drainage Type: moderate, purulent    Instruments: #15 blade scalpel, forceps and spatula & hemostat     The wound/ulcer was sharply debrided    down through and included excision of  epidermis, dermis and subcutaneous tissue.       Devitalized Tissue Debrided:  slough and nail spicule remnents     Location of Incision and Drainage:    Wound/Ulcer #: 1    Incision and Drainage Size cm  Incision 08/27/18 Thigh Left (Active)   Number of days: 840          Wound size pre: 2.1 x 0.2 cm , transverse along eponychial fold Wound size post: 2.3 x 0.2 cm x 0.2 cm depth, transverse along eponychial fold ; by definition (2) planes      Culture sent: Yes     Bleeding:with a moderate amount of bleeding    Hemostasis Achieved: by pressure    Procedural Pain: 0  / 10     Post Procedural Pain: 0 / 10     Response to treatment: Well tolerated by patient. Plan:      Rx Empiric ATB ;  Bactrim DS  Local wound care ; astringent , BID  RTC 10 days         Heather Bazan DPM   12/15/2020    Addendum : culture final MSSA & heavy Streph GRP G                        Hx Empiric Bactrim DS bid x 10 days  ; 12/15/2020

## 2020-12-18 LAB
CULTURE: ABNORMAL
CULTURE: ABNORMAL
DIRECT EXAM: ABNORMAL
DIRECT EXAM: ABNORMAL
Lab: ABNORMAL
SPECIMEN DESCRIPTION: ABNORMAL

## 2020-12-20 ASSESSMENT — ENCOUNTER SYMPTOMS
NAUSEA: 0
BLOOD IN STOOL: 0
SHORTNESS OF BREATH: 0
BACK PAIN: 0
VOMITING: 0
COLOR CHANGE: 1
WHEEZING: 0
COUGH: 0
EYES NEGATIVE: 1
DIARRHEA: 0
CHOKING: 0
APNEA: 0
CONSTIPATION: 0

## 2021-01-14 NOTE — ED PROVIDER NOTES
[facility] ED  Emergency Department Encounter  EmergencyMedicine Attending     Pt Zana Green  MRN: [id number]  [place] 1965  Date of evaluation: 11/9/20  PCP:  Ezequiel Mendoza [place] Leonora       Chief Complaint   Patient presents with    Headache    Shortness of Breath    Other     patient states variable glucose readings today, running lower than normal today. HISTORY OF PRESENT ILLNESS  (Location/Symptom, Timing/Onset, Context/Setting, Quality, Duration, Modifying Factors, Severity.)      Arabella Levin is a 54 y.o. female who presents with multiple different complaints. Biggest complaint with the patient is in here for is concern for low glucose at home that was 69. Patient says that normally her glucose runs very high and 69 was very low for her. Patient says that she was able to eat something afterwards. Her glucose right now is fairly unremarkable. Patient is concerned about why her glucose was dropping. Did take her normal diabetes regimen otherwise. No chest pain no abdominal pain no vomiting but does report some nausea. No significant difficulty breathing however does report shortness of breath from her usual COPD. No hemoptysis, no asymmetrical leg swelling, no immobilization. Patient also reports a left toe pain with erythema warmth and infection. Says that she is scheduled to see a podiatrist soon but has not been able to get to see 1 yet.     PAST MEDICAL / SURGICAL / SOCIAL / FAMILY HISTORY      has a past medical history of Anxiety, Asthma, CAD (coronary artery disease), Clinical trial participant at discharge, COPD (chronic obstructive pulmonary disease) ([facility] [address] Depression, Diabetic neuropathy ([facility] [address] H/O cardiac catheterization, H/O cardiovascular stress test, H/O echocardiogram, H/O tilt table evaluation, History of cardiovascular stress test, History of echocardiogram, Hx of blood clots, Hyperlipidemia, Hypertension, Intermittent Daily Note     Today's date: 2021  Patient name: Rolanda Adam  : 1972  MRN: [id number]  Referring provider: Shar Khanna MD  Dx:   Encounter Diagnosis     ICD-10-CM    1  Bursitis of right shoulder  M75 51        Start Time: 745          Subjective: I was a little sore when I woke up but I worked it out  Objective: See treatment diary below    Assessment: Pt liat advancement as per flow sheet and protocol well and with minimal discomfort  He did report some soreness after progression to wall slides with abd movement  Pt remains with end range tightness in all planes  Pt declined CP to home  Patient would benefit from continued PT    Plan: Continue per plan of care  Precautions: Per Aundria Crater can remove stitches at 7-10 days post-op  Can change protocol on 21 at 4 weeks post-op      Re-Eval DUE: 21     Manuals 20   R Shoulder  10 minutes   To tolerance 10 minutes to tolerance 10 minutes to tolerance 10' to liat 10 minutes to tolerance                Neuro Re-Ed            MTP/LTP                         Ther Ex 20   Pendulums 1 x 10 each 1x10 ea 1 x 10 each 1 x 10 each 1x10 ea   Elbow ROM 1 x 10  1x10  1 x 10 1 x 10 1x10   Wrist ROM 1 x 10 each 1x10 each 1 x 10 each DC 1x10 ea   Digiflex - Comp/Ind Blue 1 x 15 each Blue 1x15 each Blue 1 x 15 each DC Blue 1x15 ea   Shrugs/Rolls 1 x 10 each 1x10 each 1 x 10 each 2# 1 x 10 each 1x10 ea   Retractions 1 x 10 1x10 1 x 10 DC 1x10   MTP/LTP    Green 1 x 10 ea    UBE Alt 10 minutes Alt 10 minutes Alt 10 minutes Alt 10 minutes Alt 10 minutes   Finger Ladder - Flex/Abd 1 x 3 each 1x13 each 1 x 3 each 1 x 3 each 1 x 3 each   Pulleys Flex/Abd 2 mins each 2 mins each 2 mins each 2 mins each 2 mins ea   Table Slides - Flex/Abd 1 x 10 each 1x10 each 1 x 10 each DC 1x10 ea   Wall slides    1 x 10 ea    Isometrics Submax 3" 1 x 10 each 3" 1x10 each 3" 1  x10 ea 3" 1 x 10 each 3"x10 ea   Cane TE - Flex/Abd/ER 1 x 10 each  Flex/Abd 1x10 each flex/abd 1 x 10 each Flex/Abd 1 x 10 each  Flex/Abd 1 x 10 each  Flex/Abd   S/L ER 2 x 10  2x10 2 x 10 2  x10 2x10   S/L ABd    1 x 10    Prone Flex 2 x 10 2x10 2 x 10 2 x 10 2x10    Prone Horiz Abd 1 x 10  2x10 2 x 10 2 x 10 1x10   TBand ER/IR        Jonathan ER w/TBand Red 2 x 10  Red 2x10 Red 2 x 10 Green 2 x 10 RTB 2x10   Ther Activity                          Gait Training                          Modalities 1/4/21 1/7/21 1-11-21 1-14-21 12/31/20   CP prn Seated 10 minues Seated 10 minutes Supine   10 minutes Pt declined   to home Seated 10 min Social connections     Talks on phone: Not on file     Gets together: Not on file     Attends Gnosticist service: Not on file     Active member of club or organization: Not on file     Attends meetings of clubs or organizations: Not on file     Relationship status: Not on file    Intimate partner violence     Fear of current or ex partner: Not on file     Emotionally abused: Not on file     Physically abused: Not on file     Forced sexual activity: Not on file   Other Topics Concern    Not on file   Social History Narrative    Not on file       Family History   Problem Relation Age of Onset    Cancer Mother     Diabetes Mother     Cancer Father         thyroid    Diabetes Sister     Heart Disease Sister     Heart Disease Brother     Heart Disease Maternal Uncle     Cancer Maternal Grandmother        Allergies:  Tape [adhesive tape]    Home Medications:  Prior to Admission medications    Medication Sig Start Date End Date Taking?  Authorizing Provider   sulfamethoxazole-trimethoprim (BACTRIM DS) 800-160 MG per tablet Take 1 tablet by mouth 2 times daily for 7 days 11/10/20 11/17/20 Yes Giuliana Issa MD   cephALEXin (KEFLEX) 500 MG capsule Take 1 capsule by mouth 4 times daily for 7 days 11/10/20 11/17/20 Yes Giuliana Issa MD   ondansetron (ZOFRAN ODT) 4 MG disintegrating tablet Take 1 tablet by mouth every 8 hours as needed for Nausea 11/10/20  Yes Giuliana Issa MD   fludrocortisone (FLORINEF) 0.1 MG tablet TAKE 3 TABLETS BY MOUTH EVERY DAY 10/12/20   Homero Cooper MD   nitroGLYCERIN (NITROSTAT) 0.4 MG SL tablet Place 1 tablet under the tongue every 5 minutes as needed for Chest pain 9/30/20   CHELSEA Franco - CNP   Saccharomyces boulardii (PROBIOTIC) 250 MG CAPS Take 1 capsule by mouth daily 9/30/20   CHELSEA Franco - CNP   tiZANidine (ZANAFLEX) 2 MG tablet TAKE 1 TABLET BY MOUTH NIGHTLY AS NEEDED(SPASMS) 9/22/20   Behzad Schmitz MD   pregabalin (LYRICA) 100 MG capsule Take 1 capsule by mouth 3 times daily.  9/22/20 10/22/21  Rah Lock MD   blood glucose test strips (FREESTYLE LITE) strip USE TO CHECK BLOOD SUGARS 3 TIMES DAILY AND AS NEEDED FOR DIABETES E11.40 9/16/20   CHELSEA Mcbride CNP   acetaminophen (TYLENOL) 500 MG tablet Take 1 tablet by mouth 4 times daily as needed for Pain 9/9/20   Leo Zamudio, CHELSEA Coyle CNP   atorvastatin (LIPITOR) 40 MG tablet Take 1 tablet by mouth daily 7/23/20   CHELSEA Mcbride - CNP   losartan (COZAAR) 25 MG tablet Take 1 tablet by mouth daily 7/23/20   CHELSEA Mcbride CNP   insulin lispro, 1 Unit Dial, (HUMALOG KWIKPEN) 100 UNIT/ML SOPN Inject 20 Units into the skin 3 times daily (before meals) 6/23/20   CHELSEA Mcbride CNP   omeprazole (PRILOSEC) 20 MG delayed release capsule Take 1 capsule by mouth every morning (before breakfast) 6/2/20   CHELSEA Mcbride - CNP   Insulin Degludec (TRESIBA FLEXTOUCH) 200 UNIT/ML SOPN Inject 80 Units into the skin daily  Patient taking differently: Inject 86 Units into the skin daily  3/30/20   CHELSEA Mcbride CNP   Dulaglutide (TRULICITY) 1.5 IW/0.4OZ SOPN Inject 1.5 mg into the skin once a week 3/13/20   CHELSEA Mcbride CNP   metoprolol succinate (TOPROL XL) 100 MG extended release tablet Take 1 tablet by mouth daily 3/13/20   CHELSEA Mcbride - CNP   DULoxetine (CYMBALTA) 30 MG extended release capsule Take 1 capsule by mouth daily 2/10/20   Rah Lock MD   metFORMIN (GLUCOPHAGE-XR) 500 MG extended release tablet Take 2 tablets by mouth daily (with breakfast) 12/12/19   CHELSEA Mcbride CNP   albuterol sulfate HFA (VENTOLIN HFA) 108 (90 Base) MCG/ACT inhaler INHALE 2 PUFFS EVERY 6 HOURS AS NEEDED FOR WHEEZING 10/8/19   CHELSEA Mcbride CNP   aspirin 81 MG EC tablet TAKE 1 TABLET BY MOUTH DAILY  Patient taking differently: Take 81 mg by mouth daily  7/1/19   Roma Plasencia, APRN - CNP   Insulin Pen Needle (BD ULTRA-FINE PEN NEEDLES) 29G X 12.7MM MISC USE AS DIRECTED BY PHYSICIAN 5 times daily 8/7/18   Kiya Plasencia APRN - CNP   Blood Glucose Monitoring Suppl BERE 1 Units by Does not apply route three times daily Unit insurance will cover 12/29/17   Martha DriscollCHELSEA - CNP   Blood Pressure Monitor KIT 1 each by Does not apply route daily as needed ESSENTIAL HYPERTENSION   I10 5/31/16   CHELSEA Ibarra - CNP   Blood Glucose Monitoring Suppl (BLOOD GLUCOSE MONITOR KIT) KIT 1 each by Does not apply route daily. Please dispense whatever BS monitoring kit Darlyn covers. Dx: 250, new onset T2DM. Testing once daily 2/21/12 9/22/20  CHELSEA Thomson CNP       REVIEW OF SYSTEMS    (2-9 systems for level 4, 10 or more for level 5)      Review of Systems   Constitutional: Negative for chills and fever. HENT: Negative for congestion and rhinorrhea. Respiratory: Positive for cough and shortness of breath. Negative for wheezing. Cardiovascular: Negative for chest pain and leg swelling. Gastrointestinal: Negative for abdominal pain, nausea and vomiting. Genitourinary: Negative for dysuria. Musculoskeletal: Negative for back pain. Skin: Positive for color change and wound. Neurological: Negative for weakness and headaches. Psychiatric/Behavioral: Negative for agitation. PHYSICAL EXAM   (up to 7 for level 4, 8 or more for level 5)      INITIAL VITALS:   BP (!) 156/109   Pulse 96   Temp 96.1 °F (35.6 °C) (Temporal)   Resp 18   Wt 217 lb (98.4 kg)   LMP 12/05/1996   SpO2 95%   BMI 38.44 kg/m²     Physical Exam  Constitutional:       General: She is not in acute distress. Appearance: She is well-developed. HENT:      Head: Normocephalic and atraumatic. Eyes:      General:         Right eye: No discharge. Left eye: No discharge. Conjunctiva/sclera: Conjunctivae normal.   Cardiovascular:      Rate and Rhythm: Normal rate and regular rhythm. Heart sounds: Normal heart sounds. No murmur.  No friction rub. No gallop. Pulmonary:      Effort: Pulmonary effort is normal. No respiratory distress. Breath sounds: Normal breath sounds. No wheezing or rales. Abdominal:      General: There is no distension. Palpations: Abdomen is soft. Tenderness: There is no abdominal tenderness. There is no guarding or rebound. Comments: Several excoriations of the abdomen, no surrounding erythema warmth   Musculoskeletal:         General: No tenderness. Skin:     General: Skin is warm. Findings: Erythema present. Comments: Erythema, warmth, cellulitic appearing left first toe  No significant bony tenderness however at this time. Neurological:      Mental Status: She is alert.          DIFFERENTIAL  DIAGNOSIS     PLAN (LABS / IMAGING / EKG):  Orders Placed This Encounter   Procedures    XR CHEST PORTABLE    Glucose, Whole Blood    CBC Auto Differential    Comprehensive Metabolic Panel    Protime-INR    Blood gas, venous    Troponin    Urinalysis Reflex to Culture    Microscopic Urinalysis    Troponin    POCT glucose    EKG 12 Lead    Insert peripheral IV       MEDICATIONS ORDERED:  Orders Placed This Encounter   Medications    0.9 % sodium chloride bolus    cephALEXin (KEFLEX) capsule 500 mg     Order Specific Question:   Antimicrobial Indications     Answer:   Skin and Soft Tissue Infection    sulfamethoxazole-trimethoprim (BACTRIM DS;SEPTRA DS) 800-160 MG per tablet 1 tablet     Order Specific Question:   Antimicrobial Indications     Answer:   Skin and Soft Tissue Infection    sulfamethoxazole-trimethoprim (BACTRIM DS) 800-160 MG per tablet     Sig: Take 1 tablet by mouth 2 times daily for 7 days     Dispense:  14 tablet     Refill:  0    cephALEXin (KEFLEX) 500 MG capsule     Sig: Take 1 capsule by mouth 4 times daily for 7 days     Dispense:  28 capsule     Refill:  0    ondansetron (ZOFRAN ODT) 4 MG disintegrating tablet     Sig: Take 1 tablet by mouth every 8 hours as needed for Nausea     Dispense:  20 tablet     Refill:  0       DDX: Atypical ACS versus Cellulitis versus pneumonia versus COPD versus DKA versus hyperglycemia versus hypoglycemia versus UTI    DIAGNOSTIC RESULTS / EMERGENCY DEPARTMENT COURSE / MDM   :  Results for orders placed or performed during the hospital encounter of 11/09/20   Glucose, Whole Blood   Result Value Ref Range    POC Glucose 140 (H) 74 - 100 mg/dL   CBC Auto Differential   Result Value Ref Range    WBC 13.6 (H) 3.5 - 11.3 k/uL    RBC 5.75 (H) 3.95 - 5.11 m/uL    Hemoglobin 14.6 11.9 - 15.1 g/dL    Hematocrit 44.9 36.3 - 47.1 %    MCV 78.1 (L) 82.6 - 102.9 fL    MCH 25.4 25.2 - 33.5 pg    MCHC 32.5 28.4 - 34.8 g/dL    RDW 14.5 (H) 11.8 - 14.4 %    Platelets 345 027 - 358 k/uL    MPV 9.5 8.1 - 13.5 fL    NRBC Automated 0.0 0.0 per 100 WBC    Differential Type NOT REPORTED     Seg Neutrophils 68 (H) 36 - 65 %    Lymphocytes 26 24 - 43 %    Monocytes 4 3 - 12 %    Eosinophils % 1 1 - 4 %    Basophils 1 0 - 2 %    Immature Granulocytes 0 0 %    Segs Absolute 9.24 (H) 1.50 - 8.10 k/uL    Absolute Lymph # 3.52 1.10 - 3.70 k/uL    Absolute Mono # 0.57 0.10 - 1.20 k/uL    Absolute Eos # 0.14 0.00 - 0.44 k/uL    Basophils Absolute 0.08 0.00 - 0.20 k/uL    Absolute Immature Granulocyte 0.04 0.00 - 0.30 k/uL    WBC Morphology NOT REPORTED     RBC Morphology NOT REPORTED     Platelet Estimate NOT REPORTED    Comprehensive Metabolic Panel   Result Value Ref Range    Glucose 162 (H) 70 - 99 mg/dL    BUN 11 6 - 20 mg/dL    CREATININE 0.60 0.50 - 0.90 mg/dL    Bun/Cre Ratio 18 9 - 20    Calcium 9.5 8.6 - 10.4 mg/dL    Sodium 136 135 - 144 mmol/L    Potassium 3.7 3.7 - 5.3 mmol/L    Chloride 98 98 - 107 mmol/L    CO2 24 20 - 31 mmol/L    Anion Gap 14 9 - 17 mmol/L    Alkaline Phosphatase 104 35 - 104 U/L    ALT 19 5 - 33 U/L    AST 21 <32 U/L    Total Bilirubin 0.50 0.3 - 1.2 mg/dL    Total Protein 8.2 6.4 - 8.3 g/dL    Alb 4.2 3.5 - 5.2 g/dL NEGATIVE NEGATIVE    Leukocyte Esterase, Urine NEGATIVE NEGATIVE    Urinalysis Comments NOT REPORTED    Microscopic Urinalysis   Result Value Ref Range    -          WBC, UA 2 TO 5 0 - 5 /HPF    RBC, UA 0 TO 2 0 - 2 /HPF    Casts UA NOT REPORTED /LPF    Crystals, UA NOT REPORTED None /HPF    Epithelial Cells UA 2 TO 5 0 - 25 /HPF    Renal Epithelial, UA NOT REPORTED 0 /HPF    Bacteria, UA 1+ (A) None    Mucus, UA 1+ (A) None    Trichomonas, UA NOT REPORTED None    Amorphous, UA 1+ (A) None    Other Observations UA NOT REPORTED NOT REQ. Yeast, UA 2+ (A) None   Troponin   Result Value Ref Range    Troponin, High Sensitivity <6 0 - 14 ng/L    Troponin T NOT REPORTED <0.03 ng/mL    Troponin Interp NOT REPORTED    EKG 12 Lead   Result Value Ref Range    Ventricular Rate 103 BPM    Atrial Rate 103 BPM    P-R Interval 134 ms    QRS Duration 72 ms    Q-T Interval 338 ms    QTc Calculation (Bazett) 442 ms    P Axis 41 degrees    R Axis 27 degrees    T Axis 51 degrees       IMPRESSION: 49-year-old female who presents with a chief complaint of glucose abnormalities, patient was concerned that her glucose was low, had something to eat, at this time the glucose is not significantly low or high. No signs of DKA on blood work. Normal anion gap. However patient did have leukocytosis, likely source of infection is the left great toe that is erythematous and warm and concerning for a cellulitis of the toe. No purulent discharge at this time. Patient is afebrile, initially had some tachycardia that resolved after fluids were given. Given the shortness of breath, a full cardiac work-up including EKG, chest x-ray, 2 sets of troponins were done which were all unremarkable. Otherwise no chest pain, no desaturations, no signs of right heart strain on EKG. Patient's biggest concern essentially is her glucose as she has never seen her glucose this low. Says that normally she runs extremely high.   I believe the infectious process likely caused relative hypoglycemia for the patient. Patient currently afebrile, tachycardia is resolved, on reassessment says that she feels much better and feels very comfortable going home with antibiotics. RADIOLOGY:    Xr Chest Portable    Result Date: 11/9/2020  EXAMINATION: ONE XRAY VIEW OF THE CHEST 11/9/2020 10:45 pm COMPARISON: 01/15/2019 HISTORY: ORDERING SYSTEM PROVIDED HISTORY: SOB TECHNOLOGIST PROVIDED HISTORY: SOB FINDINGS: Prior sternotomy. Cardiomediastinal silhouette is unchanged in size. No pulmonary consolidation, pleural effusion, or pneumothorax. No acute osseous abnormality. No acute cardiopulmonary abnormality. EKG      EKG Interpretation    Interpreted by me    Rhythm: normal sinus   Rate: 103  Axis: normal  Ectopy: none  Conduction: normal  ST Segments: no acute change  T Waves: no acute change  Q Waves: none    Clinical Impression: Sinus tachy    All EKG's are interpreted by the Emergency Department Physician who either signs or Co-signs this chart in the absence of a cardiologist.    EMERGENCY DEPARTMENT COURSE:    On reassessment patient says that she feels much better and would like to go home. Will send home with outpatient antibiotics for follow-up with PCP. Return to ER with worsening symptoms. Initial tachycardia is resolved, patient is afebrile, stating she feels much better and would like to go home. Plan for discharge, very strict return to ER precautions were given for any chest pain, worsening shortness of breath, fevers, nausea vomiting. PROCEDURES:  None    CONSULTS:  None    CRITICAL CARE:  None    FINAL IMPRESSION      1. Cellulitis of toe of left foot    2.  Leukocytosis, unspecified type          DISPOSITION / PLAN     DISPOSITION Decision To Discharge 11/10/2020 12:57:38 AM      PATIENT REFERRED TO:  CHELSEA Stokes [facility]  [phone]    In 1 day        DISCHARGE MEDICATIONS:  Discharge Medication List as of 11/10/2020 12:58 AM      START taking these medications    Details   sulfamethoxazole-trimethoprim (BACTRIM DS) 800-160 MG per tablet Take 1 tablet by mouth 2 times daily for 7 days, Disp-14 tablet,R-0Print      cephALEXin (KEFLEX) 500 MG capsule Take 1 capsule by mouth 4 times daily for 7 days, Disp-28 capsule,R-0Print      ondansetron (ZOFRAN ODT) 4 MG disintegrating tablet Take 1 tablet by mouth every 8 hours as needed for Nausea, Disp-20 tablet,R-0Print             Dede Allen MD  Emergency Medicine Attending    (Please note that portions of thisnote were completed with a voice recognition program.  Efforts were made to edit the dictations but occasionally words are mis-transcribed.)        Dede Allen MD  11/10/20 0235

## 2021-01-28 ENCOUNTER — CARE COORDINATION (OUTPATIENT)
Dept: CARE COORDINATION | Age: 56
End: 2021-01-28

## 2021-01-28 NOTE — CARE COORDINATION
Reason For Call:  -Reach out attempt made today to patient to assess for care coordination as patient is eligible   -Unable to reach [place] today   -Left a voicemail message with reason for call.  Requesting a return phone call back to this [facility] when patient is able to [phone], office hours given.   -Had graduated from care coordination almost 6 months ago     Patient Active Problem List   Diagnosis    CAD (coronary artery disease)    Dyslipidemia    Radiculopathy    Diabetes type 2, uncontrolled    Insomnia    Allergic rhinitis    COPD (chronic obstructive pulmonary disease)    Depression    Bilateral leg weakness    Gait difficulty    Diabetic neuropathy    Back pain    Muscle spasm    Affective disorder ([address]    Morbid obesity with BMI of 40.0-44.9, adult ([address]    History of ventral hernia repair    History of incisional hernia repair    Essential hypertension    Gastroesophageal reflux disease without esophagitis    Primary osteoarthritis involving multiple joints    Heart palpitations    Coronary artery disease involving native coronary artery of native heart    [facility]ina, class III [facility]    S/P CABG x 1    Precordial pain    DARON (obstructive sleep apnea)    Morbid obesity (HCC)    Neuropathic pain    Soft tissue mass    Dysautonomia ([facility] [address]    Muscle spasms of both lower extremities     Lab Results   Component Value Date    LABA1C 11.9 09/30/2020     Lab Results   Component Value Date     06/19/2020       Future Appointments   Date Time Provider Emma Gaxiola   3/30/2021 11:40 AM Lara Moreno MD Neuro Spec [id number]   3/30/2021  2:00 PM CHELSEA Villeda - CNP [facility]   11/1/2021  9:00 AM [facility]

## 2021-02-18 DIAGNOSIS — E11.42 DIABETIC POLYNEUROPATHY ASSOCIATED WITH TYPE 2 DIABETES MELLITUS (HCC): ICD-10-CM

## 2021-02-19 RX ORDER — PREGABALIN 100 MG/1
CAPSULE ORAL
Qty: 90 CAPSULE | Refills: 1 | Status: SHIPPED | OUTPATIENT
Start: 2021-02-19 | End: 2021-04-20

## 2021-03-08 ENCOUNTER — HOSPITAL ENCOUNTER (EMERGENCY)
Age: 56
Discharge: HOME | End: 2021-03-08
Attending: EMERGENCY MEDICINE
Payer: COMMERCIAL

## 2021-03-08 ENCOUNTER — APPOINTMENT (OUTPATIENT)
Dept: GENERAL RADIOLOGY | Age: 56
End: 2021-03-08
Payer: COMMERCIAL

## 2021-03-08 VITALS
RESPIRATION RATE: 16 BRPM | BODY MASS INDEX: 38.97 KG/M2 | WEIGHT: 220 LBS | OXYGEN SATURATION: 98 % | DIASTOLIC BLOOD PRESSURE: 88 MMHG | SYSTOLIC BLOOD PRESSURE: 160 MMHG | HEART RATE: 86 BPM | TEMPERATURE: 97.8 F
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
## 2021-03-08 DIAGNOSIS — S29.012A MUSCLE STRAIN OF RIGHT UPPER BACK, INITIAL ENCOUNTER: ICD-10-CM

## 2021-03-08 PROCEDURE — 99284 EMERGENCY DEPT VISIT MOD MDM: CPT

## 2021-03-08 PROCEDURE — 6370000000 HC RX 637 (ALT 250 FOR IP): Performed by: EMERGENCY MEDICINE

## 2021-03-08 PROCEDURE — 71046 X-RAY EXAM CHEST 2 VIEWS: CPT

## 2021-03-08 RX ORDER — IBUPROFEN 400 MG/1
400 TABLET ORAL ONCE
Status: COMPLETED | OUTPATIENT
Start: 2021-03-08 | End: 2021-03-08

## 2021-03-08 RX ORDER — LIDOCAINE 50 MG/G
1 PATCH TOPICAL DAILY
Qty: 30 PATCH | Refills: 0 | Status: SHIPPED | OUTPATIENT
Start: 2021-03-08 | End: 2021-11-01

## 2021-03-08 RX ORDER — LIDOCAINE 4 G/G
1 PATCH TOPICAL DAILY
Status: DISCONTINUED | OUTPATIENT
Start: 2021-03-08 | End: 2021-03-08 | Stop reason: HOSPADM

## 2021-03-08 RX ADMIN — IBUPROFEN 400 MG: 400 TABLET, FILM COATED ORAL at 11:48

## 2021-03-08 ASSESSMENT — PAIN DESCRIPTION - ORIENTATION: ORIENTATION: RIGHT;UPPER

## 2021-03-08 ASSESSMENT — PAIN DESCRIPTION - LOCATION: LOCATION: BACK

## 2021-03-08 ASSESSMENT — PAIN DESCRIPTION - PAIN TYPE: TYPE: ACUTE PAIN

## 2021-03-08 ASSESSMENT — PAIN SCALES - GENERAL: PAINLEVEL_OUTOF10: 5
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
## 2021-03-10 ENCOUNTER — CARE COORDINATION (OUTPATIENT)
Dept: CARE COORDINATION | Age: 56
End: 2021-03-10

## 2021-03-10 NOTE — CARE COORDINATION
Reason For Call Today:  -Attempted to reach Anna Pulliam today for ED Follow Up/ COVID at risk monitoring following her ED visit to [facility] on 3/8/2021  -Also assess for care coordination as she is eligible   -COVID: not tested  -My Chart: Active   -Unable to reach Anna Pulliam today   -Left a voicemail message with reason for call. Requesting a return phone call back to this [facility] when patient is able to [phone], office hours given.

## 2021-03-17 ENCOUNTER — CARE COORDINATION (OUTPATIENT)
Dept: CARE COORDINATION | Age: 56
End: 2021-03-17

## 2021-03-17 NOTE — CARE COORDINATION
Reason For Call Today:  -Attempted to reach William Ying today to assess for care coordination as she is eligible   -Unable to reach William Ying  -Left a voicemail message with reason for call. Requesting a return phone call back to this [facility] when patient is able to [phone], office hours given.    -2nd outreach attempt     Future Appointments   Date Time Provider Emma Gaxiola   3/30/2021  2:00 PM CHELSEA Collins - CNP [facility]   4/20/2021 11:40 AM Brennan Mark MD Neuro Spec TOP   11/1/2021  9:00 AM Charles Aaron MD TIFF CARD [facility]

## 2021-03-22 ENCOUNTER — TELEPHONE (OUTPATIENT)
Dept: PRIMARY CARE CLINIC | Age: 56
End: 2021-03-22

## 2021-03-23 ENCOUNTER — HOSPITAL ENCOUNTER (OUTPATIENT)
Age: 56
Discharge: HOME | End: 2021-03-23
Payer: COMMERCIAL

## 2021-03-23 LAB
ABSOLUTE EOS #: 0.19 K/UL (ref 0–0.44)
ABSOLUTE IMMATURE GRANULOCYTE: 0.09 K/UL (ref 0–0.3)
ABSOLUTE LYMPH #: 3.23 K/UL (ref 1.1–3.7)
ABSOLUTE MONO #: 0.56 K/UL (ref 0.1–1.2)
ALT SERPL-CCNC: 11 U/L (ref 5–33)
ANION GAP SERPL CALCULATED.3IONS-SCNC: 11 MMOL/L (ref 9–17)
AST SERPL-CCNC: 13 U/L
BASOPHILS # BLD: 1 % (ref 0–2)
BASOPHILS ABSOLUTE: 0.1 K/UL (ref 0–0.2)
BUN BLDV-MCNC: 16 MG/DL (ref 6–20)
BUN/CREAT BLD: 26 (ref 9–20)
CALCIUM SERPL-MCNC: 9.3 MG/DL (ref 8.6–10.4)
CHLORIDE BLD-SCNC: 97 MMOL/L (ref 98–107)
CHOLESTEROL/HDL RATIO: 3.8
CHOLESTEROL: 166 MG/DL
CO2: 24 MMOL/L (ref 20–31)
CREAT SERPL-MCNC: 0.61 MG/DL (ref 0.5–0.9)
CREATININE URINE: 154.7 MG/DL (ref 28–217)
DIFFERENTIAL TYPE: ABNORMAL
EOSINOPHILS RELATIVE PERCENT: 2 % (ref 1–4)
ESTIMATED AVERAGE GLUCOSE: 272 MG/DL
GFR AFRICAN AMERICAN: >60 ML/MIN
GFR NON-AFRICAN AMERICAN: >60 ML/MIN
GFR SERPL CREATININE-BSD FRML MDRD: ABNORMAL ML/MIN/{1.73_M2}
GFR SERPL CREATININE-BSD FRML MDRD: ABNORMAL ML/MIN/{1.73_M2}
GLUCOSE BLD-MCNC: 179 MG/DL (ref 70–99)
HBA1C MFR BLD: 11.1 % (ref 4–6)
HCT VFR BLD CALC: 44.4 % (ref 36.3–47.1)
HDLC SERPL-MCNC: 44 MG/DL
HEMOGLOBIN: 14.5 G/DL (ref 11.9–15.1)
IMMATURE GRANULOCYTES: 1 %
LDL CHOLESTEROL: 83 MG/DL (ref 0–130)
LYMPHOCYTES # BLD: 25 % (ref 24–43)
MCH RBC QN AUTO: 25.9 PG (ref 25.2–33.5)
MCHC RBC AUTO-ENTMCNC: 32.7 G/DL (ref 28.4–34.8)
MCV RBC AUTO: 79.4 FL (ref 82.6–102.9)
MICROALBUMIN/CREAT 24H UR: 13 MG/L
MICROALBUMIN/CREAT UR-RTO: 8 MCG/MG CREAT
MONOCYTES # BLD: 4 % (ref 3–12)
NRBC AUTOMATED: 0 PER 100 WBC
PDW BLD-RTO: 13.2 % (ref 11.8–14.4)
PLATELET # BLD: 348 K/UL (ref 138–453)
PLATELET ESTIMATE: ABNORMAL
PMV BLD AUTO: 9.1 FL (ref 8.1–13.5)
POTASSIUM SERPL-SCNC: 3.9 MMOL/L (ref 3.7–5.3)
RBC # BLD: 5.59 M/UL (ref 3.95–5.11)
RBC # BLD: ABNORMAL 10*6/UL
SEG NEUTROPHILS: 67 % (ref 36–65)
SEGMENTED NEUTROPHILS ABSOLUTE COUNT: 8.91 K/UL (ref 1.5–8.1)
SODIUM BLD-SCNC: 132 MMOL/L (ref 135–144)
TRIGL SERPL-MCNC: 193 MG/DL
VLDLC SERPL CALC-MCNC: ABNORMAL MG/DL (ref 1–30)
WBC # BLD: 13.1 K/UL (ref 3.5–11.3)
WBC # BLD: ABNORMAL 10*3/UL

## 2021-03-23 PROCEDURE — 84460 ALANINE AMINO (ALT) (SGPT): CPT

## 2021-03-23 PROCEDURE — 84450 TRANSFERASE (AST) (SGOT): CPT

## 2021-03-23 PROCEDURE — 82570 ASSAY OF URINE CREATININE: CPT

## 2021-03-23 PROCEDURE — 83036 HEMOGLOBIN GLYCOSYLATED A1C: CPT

## 2021-03-23 PROCEDURE — 85025 COMPLETE CBC W/AUTO DIFF WBC: CPT

## 2021-03-23 PROCEDURE — 80061 LIPID PANEL: CPT

## 2021-03-23 PROCEDURE — 36415 COLL VENOUS BLD VENIPUNCTURE: CPT

## 2021-03-23 PROCEDURE — 82043 UR ALBUMIN QUANTITATIVE: CPT

## 2021-03-23 PROCEDURE — 80048 BASIC METABOLIC PNL TOTAL CA: CPT

## 2021-03-30 ENCOUNTER — OFFICE VISIT (OUTPATIENT)
Dept: PRIMARY CARE CLINIC | Age: 56
End: 2021-03-30
Payer: COMMERCIAL

## 2021-03-30 VITALS
SYSTOLIC BLOOD PRESSURE: 118 MMHG | HEIGHT: 63 IN | HEART RATE: 84 BPM | OXYGEN SATURATION: 98 % | BODY MASS INDEX: 38.96 KG/M2 | DIASTOLIC BLOOD PRESSURE: 64 MMHG | TEMPERATURE: 97.8 F | WEIGHT: 219.9 LBS | RESPIRATION RATE: 22 BRPM

## 2021-03-30 DIAGNOSIS — I95.1 DYSAUTONOMIA ORTHOSTATIC HYPOTENSION SYNDROME: ICD-10-CM

## 2021-03-30 DIAGNOSIS — J44.9 CHRONIC OBSTRUCTIVE PULMONARY DISEASE, UNSPECIFIED COPD TYPE (HCC): ICD-10-CM

## 2021-03-30 PROBLEM — G90.1 DYSAUTONOMIA (HCC): Status: RESOLVED | Noted: 2019-03-04 | Resolved: 2021-03-30

## 2021-03-30 PROCEDURE — 99214 OFFICE O/P EST MOD 30 MIN: CPT | Performed by: NURSE PRACTITIONER

## 2021-03-30 PROCEDURE — 2022F DILAT RTA XM EVC RTNOPTHY: CPT | Performed by: NURSE PRACTITIONER

## 2021-03-30 PROCEDURE — 3023F SPIROM DOC REV: CPT | Performed by: NURSE PRACTITIONER

## 2021-03-30 PROCEDURE — 3017F COLORECTAL CA SCREEN DOC REV: CPT | Performed by: NURSE PRACTITIONER

## 2021-03-30 PROCEDURE — G8926 SPIRO NO PERF OR DOC: HCPCS | Performed by: NURSE PRACTITIONER

## 2021-03-30 PROCEDURE — 3046F HEMOGLOBIN A1C LEVEL >9.0%: CPT | Performed by: NURSE PRACTITIONER

## 2021-03-30 PROCEDURE — G8482 FLU IMMUNIZE ORDER/ADMIN: HCPCS | Performed by: NURSE PRACTITIONER

## 2021-03-30 PROCEDURE — G8427 DOCREV CUR MEDS BY ELIG CLIN: HCPCS | Performed by: NURSE PRACTITIONER

## 2021-03-30 PROCEDURE — 1036F TOBACCO NON-USER: CPT | Performed by: NURSE PRACTITIONER

## 2021-03-30 PROCEDURE — G8417 CALC BMI ABV UP PARAM F/U: HCPCS | Performed by: NURSE PRACTITIONER

## 2021-03-30 RX ORDER — METFORMIN HYDROCHLORIDE 500 MG/1
1000 TABLET, EXTENDED RELEASE ORAL
Qty: 180 TABLET | Refills: 3 | Status: SHIPPED | OUTPATIENT
Start: 2021-03-30

## 2021-03-30 RX ORDER — INSULIN DEGLUDEC 200 U/ML
86 INJECTION, SOLUTION SUBCUTANEOUS DAILY
Qty: 1 PEN | Refills: 0 | Status: SHIPPED
Start: 2021-03-30

## 2021-03-30 ASSESSMENT — ENCOUNTER SYMPTOMS
VISUAL CHANGE: 0
RHINORRHEA: 0
DIFFICULTY BREATHING: 0
SPUTUM PRODUCTION: 0
FREQUENT THROAT CLEARING: 0
NAUSEA: 0
CHEST TIGHTNESS: 0
COUGH: 0
SORE THROAT: 0
SHORTNESS OF BREATH: 0
DIARRHEA: 0
ABDOMINAL PAIN: 0
WHEEZING: 0
VOMITING: 0
CONSTIPATION: 0
HEMOPTYSIS: 0
HOARSE VOICE: 0

## 2021-03-30 ASSESSMENT — PATIENT HEALTH QUESTIONNAIRE - PHQ9
SUM OF ALL RESPONSES TO PHQ QUESTIONS 1-9: 0
SUM OF ALL RESPONSES TO PHQ9 QUESTIONS 1 & 2: 0
SUM OF ALL RESPONSES TO PHQ QUESTIONS 1-9: 0

## 2021-03-30 ASSESSMENT — COPD QUESTIONNAIRES: COPD: 1

## 2021-03-30 NOTE — PATIENT INSTRUCTIONS
Dr Ashley Gutiérrez CNP  Appointment scheduled for  April 15, 2021 @ 2:20-pm    SURVEY:    You may be receiving a survey from [employer] regarding your visit today. Please complete the survey to enable us to provide the highest quality of care to you and your family. If you cannot score us a very good on any question, please call the office to discuss how we could have made your experience a very good one. Thank you. Patient Education        Learning About Carbohydrate (Carb) Counting and Eating Out When You Have Diabetes  Why plan your meals? Meal planning can be a key part of managing diabetes. Planning meals and snacks with the right balance of carbohydrate, protein, and fat can help you keep your blood sugar at the target level you set with your doctor. You don't have to eat special foods. You can eat what your family eats, including sweets once in a while. But you do have to pay attention to how often you eat and how much you eat of certain foods. You may want to work with a dietitian or a certified diabetes educator. He or she can give you tips and meal ideas and can answer your questions about meal planning. This health professional can also help you reach a healthy weight if that is one of your goals. What should you know about eating carbs? Managing the amount of carbohydrate (carbs) you eat is an important part of healthy meals when you have diabetes. Carbohydrate is found in many foods. · Learn which foods have carbs. And learn the amounts of carbs in different foods. ? Bread, cereal, pasta, and rice have about 15 grams of carbs in a serving. A serving is 1 slice of bread (1 ounce), ½ cup of cooked cereal, or 1/3 cup of cooked pasta or rice. ? Fruits have 15 grams of carbs in a serving.  A serving is 1 small fresh fruit, such as an apple or orange; ½ of a banana; ½ cup of cooked or canned fruit; ½ cup of fruit juice; 1 cup of melon or raspberries; or 2 tablespoons of dried fruit.  ? Milk and no-sugar-added yogurt have 15 grams of carbs in a serving. A serving is 1 cup of milk or 2/3 cup of no-sugar-added yogurt. ? Starchy vegetables have 15 grams of carbs in a serving. A serving is ½ cup of mashed potatoes or sweet potato; 1 cup winter squash; ½ of a small baked potato; ½ cup of cooked beans; or ½ cup cooked corn or green peas. · Learn how much carbs to eat each day and at each meal. A dietitian or CDE can teach you how to keep track of the amount of carbs you eat. This is called carbohydrate counting. · If you are not sure how to count carbohydrate grams, use the Plate Method to plan meals. It is a good, quick way to make sure that you have a balanced meal. It also helps you spread carbs throughout the day. ? Divide your plate by types of foods. Put non-starchy vegetables on half the plate, meat or other protein food on one-quarter of the plate, and a grain or starchy vegetable in the final quarter of the plate. To this you can add a small piece of fruit and 1 cup of milk or yogurt, depending on how many carbs you are supposed to eat at a meal.  · Try to eat about the same amount of carbs at each meal. Do not \"save up\" your daily allowance of carbs to eat at one meal.  · Proteins have very little or no carbs per serving. Examples of proteins are beef, chicken, turkey, fish, eggs, tofu, cheese, cottage cheese, and peanut butter. A serving size of meat is 3 ounces, which is about the size of a deck of cards. Examples of meat substitute serving sizes (equal to 1 ounce of meat) are 1/4 cup of cottage cheese, 1 egg, 1 tablespoon of peanut butter, and ½ cup of tofu. How can you eat out and still eat healthy? · Learn to estimate the serving sizes of foods that have carbohydrate. If you measure food at home, it will be easier to estimate the amount in a serving of restaurant food.   · If the meal you order has too much carbohydrate (such as potatoes, corn, or baked beans), ask to have a low-carbohydrate food instead. Ask for a salad or green vegetables. · If you use insulin, check your blood sugar before and after eating out to help you plan how much to eat in the future. · If you eat more carbohydrate at a meal than you had planned, take a walk or do other exercise. This will help lower your blood sugar. What are some tips for eating healthy? · Limit saturated fat, such as the fat from meat and dairy products. This is a healthy choice because people who have diabetes are at higher risk of heart disease. So choose lean cuts of meat and nonfat or low-fat dairy products. Use olive or canola oil instead of butter or shortening when cooking. · Don't skip meals. Your blood sugar may drop too low if you skip meals and take insulin or certain medicines for diabetes. · Check with your doctor before you drink alcohol. Alcohol can cause your blood sugar to drop too low. Alcohol can also cause a bad reaction if you take certain diabetes medicines. Follow-up care is a key part of your treatment and safety. Be sure to make and go to all appointments, and call your doctor if you are having problems. It's also a good idea to know your test results and keep a list of the medicines you take. Where can you learn more? Go to [email or web address]://[email or web address]. org and sign in to your [employer] account. Enter L808 in the ZALP box to learn more about \"Learning About Carbohydrate (Carb) Counting and Eating Out When You Have Diabetes. \"     If you do not have an account, please click on the \"Sign Up Now\" link. Current as of: August 31, 2020               Content Version: 12.8  © 4124-8746 [employer]wise, [employer]. Care instructions adapted under license by [facility] ([facility]). If you have questions about a medical condition or this instruction, always ask your healthcare professional. [place] [address] any warranty or liability for your use of this information.

## 2021-03-30 NOTE — PROGRESS NOTES
inhibitor/angiotensin II receptor blocker is being taken. She does not see a podiatrist.Eye exam is current. COPD  There is no chest tightness, cough, difficulty breathing, frequent throat clearing, hemoptysis, hoarse voice, shortness of breath, sputum production or wheezing. This is a chronic problem. The current episode started more than 1 year ago. The problem occurs intermittently. The problem has been unchanged. Pertinent negatives include no appetite change, chest pain, fever, headaches, postnasal drip, rhinorrhea, sneezing or sore throat. Her symptoms are aggravated by URI, strenuous activity, exposure to smoke, exposure to fumes, climbing stairs and change in weather. Her symptoms are alleviated by beta-agonist, steroid inhaler and rest. She reports significant improvement on treatment. Her symptoms are not alleviated by rest. There are no known risk factors for lung disease. Her past medical history is significant for asthma, bronchitis and COPD. There is no history of bronchiectasis, emphysema or pneumonia. Past Medical History:     Past Medical History:   Diagnosis Date    Anxiety     Asthma     CAD (coronary artery disease)     Clinical trial participant at discharge 3/31/16    [facility] [address]     Depression     Diabetic neuropathy ([facility] [address] [facility]     H/O cardiac catheterization 4/26/16    LMCA: Mild Irregularities 10-20%. LAD: Lesion on Prox LAD: Proximal subsection. 100% stenosis. LCx: Mild irregularities 10-20%. RCA: Mild irregularities 10-20%. Widely patent mid RCA stent. EF:60%.  H/O cardiovascular stress test 10/07/2016    Overall results are most consistent with a low risk for significant CAD.     H/O echocardiogram 10/05/2016    EF:>60%. Inferoseptal wall abnormal in its motion which is not unusal status post open heart surgery. Evidence of mild (grade I) diastolic dysfunction is seen.      H/O tilt table evaluation 07/12/2016    Abnormal. PTs HR, Blood Pressure response and symptoms were most consistent with dysautonomia. Combined w/viligant maintenance of euvolemia and maintaining a moderate salt intake, pharmaciologic treatment w/ ProAmatine, SSRI such as Lexapro and/or Mestinon among other treatments have shown some effectiveness in the treatment of this condition.  History of cardiovascular stress test 02/13/2019    Abnormal. Small/moderate perfusion defect of mild/moderate intesity in the anterior and anterolateral regions during stress imaging which is most consistent w/ ischemia but may be artifact. Overall low/intermediate risk for significant CAD.  History of echocardiogram 09/06/2018    EF >60%. Inferoseptal wall is abnormal in its motion which is not unusual s/p open heart. Evidence of mild (grade I) diastolic dysfunction seen.     Hx of blood clots     Hyperlipidemia     Hypertension     Intermittent claudication (HCC)     Kidney stones     Movement disorder     neuropathy in legs    Neuromuscular disorder (HCC)     neuropathy    Osteoarthritis     Reflux     Seizures (HCC)     last over 10 yr ago    Stented coronary artery 9/22/2010     LAD/[place]    Stented coronary artery 3/31/16    RCA/[place]    Type II or unspecified type diabetes mellitus without mention of complication, not stated as uncontrolled     Unspecified sleep apnea     no machine      Reviewed all health maintenance requirements and ordered appropriate tests  Health Maintenance Due   Topic Date Due    Diabetic foot exam  12/12/2020       Past Surgical History:     Past Surgical History:   Procedure Laterality Date    ABDOMINAL HERNIA REPAIR  1-2016    repair done julian    APPENDECTOMY      CARDIAC CATHETERIZATION Left 4/26/2016    right [facility] [place]/ Dr Marni Caldwell Left 03/07/2019    Left [facility] [place]/   [address]    COLONOSCOPY  12/16/14    -hemorrhoids,bx  CORONARY ANGIOPLASTY WITH STENT PLACEMENT  9/2010    CORONARY ANGIOPLASTY WITH STENT PLACEMENT  3-    [facility] / DR Juan Lin    CORONARY ARTERY BYPASS GRAFT  08/15/2016    OP X 1- Dr Ortencia Holstein, COLON, DIAGNOSTIC  12/16/2014    HERNIA REPAIR  12/13/12    at the medial aspect of a [place] RUQ scar); repaired [address]. [id number]  02/16/2015    incisional, recurrent    HERNIA REPAIR  02/2016    HYSTERECTOMY      OTHER SURGICAL HISTORY  10/8/2015    abd wound washout, mesh removal, wound vac placement    MT SUCT NICOLE LIPECTOMY,HEAD/NECK Left 8/27/2018    THIGH LESION BIOPSY EXCISION, SOFT TISSUE MASS performed by Judie Cazares [facility] at [facility] Left 2018    leg    UPPP UVULOPALATOPHARYGOPLASTY  06/26/2012    VENTRAL HERNIA REPAIR  09/21/2015    With Mesh - Dr Carolina Siddiqui        Medications:       Prior to Admission medications    Medication Sig Start Date End Date Taking?  Authorizing Provider   TRULICITY 1.5 QD/8.2KB SOPN INJECT 1.5 MG INTO THE SKIN ONCE A WEEK 3/30/21  Yes Ramy Plasencia, CHELSEA Coyle CNP   Insulin Degludec (TRESIBA FLEXTOUCH) 200 UNIT/ML SOPN Inject 86 Units into the skin daily PER DR MCELROY 3/30/21  Yes Ramy Plasencia, CHELSEA Coyle CNP   metFORMIN (GLUCOPHAGE-XR) 500 MG extended release tablet Take 2 tablets by mouth daily (with breakfast) 3/30/21  Yes CHELSEA Smith CNP   pregabalin (LYRICA) 100 MG capsule TAKE 1 CAPSULE BY MOUTH THREE TIMES A DAY 2/19/21 4/20/21 Yes Desean Berger MD   omeprazole (PRILOSEC) 20 MG delayed release capsule TAKE 1 CAPSULE BY MOUTH EVERY DAY IN THE MORNING BEFORE BREAKFAST 11/23/20  Yes Ramy Plasencia, CHELSEA Coyle CNP   albuterol sulfate HFA (VENTOLIN HFA) 108 (90 Base) MCG/ACT inhaler INHALE 2 PUFFS EVERY 6 HOURS AS NEEDED FOR WHEEZING 11/11/20  Yes Ramy Plasencia, CHELSEA Coyle CNP   ondansetron (ZOFRAN ODT) 4 MG disintegrating tablet Take 1 tablet by mouth every 8 hours as needed for Nausea 11/10/20  Yes Jin Forrest MD fludrocortisone (FLORINEF) 0.1 MG tablet TAKE 3 TABLETS BY MOUTH EVERY DAY 10/12/20  Yes Sandra Rayo MD   Saccharomyces boulardii (PROBIOTIC) 250 MG CAPS Take 1 capsule by mouth daily 9/30/20  Yes CHELSEA Ricardo CNP   tiZANidine (ZANAFLEX) 2 MG tablet TAKE 1 TABLET BY MOUTH NIGHTLY AS NEEDED(SPASMS) 9/22/20  Yes Kamilah Herring MD   blood glucose test strips (FREESTYLE LITE) strip USE TO CHECK BLOOD SUGARS 3 TIMES DAILY AND AS NEEDED FOR DIABETES E11.40 9/16/20  Yes CHELSEA Ricardo CNP   acetaminophen (TYLENOL) 500 MG tablet Take 1 tablet by mouth 4 times daily as needed for Pain 9/9/20  Yes CHELSEA So CNP   atorvastatin (LIPITOR) 40 MG tablet Take 1 tablet by mouth daily 7/23/20  Yes CHELSEA Ricardo CNP   losartan (COZAAR) 25 MG tablet Take 1 tablet by mouth daily 7/23/20  Yes CHELSEA Ricardo CNP   insulin lispro, 1 Unit Dial, (HUMALOG KWIKPEN) 100 UNIT/ML SOPN Inject 20 Units into the skin 3 times daily (before meals) 6/23/20  Yes CHELSEA Ricardo CNP   metoprolol succinate (TOPROL XL) 100 MG extended release tablet Take 1 tablet by mouth daily 3/13/20  Yes CHELSEA Ricardo CNP   DULoxetine (CYMBALTA) 30 MG extended release capsule Take 1 capsule by mouth daily 2/10/20  Yes Kamilah Herring MD   aspirin 81 MG EC tablet TAKE 1 TABLET BY MOUTH DAILY  Patient taking differently: Take 81 mg by mouth daily  7/1/19  Yes CHELSEA Ricardo CNP   Insulin Pen Needle (BD ULTRA-FINE PEN NEEDLES) 29G X 12.7MM MISC USE AS DIRECTED BY PHYSICIAN 5 times daily 8/7/18  Yes CHELSEA Ricardo CNP   Blood Glucose Monitoring Suppl BERE 1 Units by Does not apply route three times daily Unit insurance will cover 12/29/17  Yes CHELSEA Centeno CNP   Blood Glucose Monitoring Suppl (BLOOD GLUCOSE MONITOR KIT) KIT 1 each by Does not apply route daily. Please dispense [facility] BS [facility]. Dx: 250, new onset T2DM.  Testing once daily 2/21/12 3/30/21 Yes Zen Barry APRN - CNP   lidocaine (LIDODERM) 5 % Place 1 patch onto the skin daily 12 hours on, 12 hours off. Patient not taking: Reported on 3/30/2021 3/8/21   Karly Andino,    nitroGLYCERIN (NITROSTAT) 0.4 MG SL tablet Place 1 tablet under the tongue every 5 minutes as needed for Chest pain  Patient not taking: Reported on 3/30/2021 9/30/20   Levell Labrum Might, APRN - CNP   Blood Pressure Monitor KIT 1 each by Does not apply route daily as needed ESSENTIAL HYPERTENSION   I10 5/31/16   Levell Labrum Might, APRN - CNP        Allergies:       Tape Iva  tape]    Social History:     Tobacco:    reports that she quit smoking about 10 years ago. Her smoking use included cigarettes. She has a 20.00 pack-year smoking history. She has never used smokeless tobacco.  Alcohol:      reports no history of alcohol use. Drug Use:  reports no history of drug use. Family History:     Family History   Problem Relation Age of Onset    Cancer Mother     Diabetes Mother     Cancer Father         thyroid    Diabetes Sister     Heart Disease Sister     Heart Disease Brother     Heart Disease Maternal Uncle     Cancer Maternal Grandmother        Review of Systems:     Positive and Negative as described in HPI    Review of Systems   Constitutional: Negative for appetite change, chills, fatigue and fever. HENT: Negative for congestion, hoarse voice, postnasal drip, rhinorrhea, sneezing and sore throat. Eyes: Negative for visual disturbance. Respiratory: Negative for cough, hemoptysis, sputum production, shortness of breath and wheezing. Cardiovascular: Negative for chest pain and palpitations. Gastrointestinal: Negative for abdominal pain, constipation, diarrhea, nausea and vomiting. Endocrine: Positive for polydipsia and polyuria. Negative for polyphagia. Genitourinary: Negative for difficulty urinating and dysuria.    Musculoskeletal: Negative for gait problem, neck pain and neck stiffness. Skin: Negative for rash. Neurological: Negative for dizziness, syncope, weakness, light-headedness and headaches. Physical Exam:   Vitals:  /64 (Site: Left Upper Arm)   Pulse 84   Temp 97.8 °F (36.6 °C) (Temporal)   Resp 22   Ht 5' 3\" (1.6 m)   Wt 219 lb 14.4 oz (99.7 kg)   LMP 12/05/1996   SpO2 98%   BMI 38.95 kg/m²     Physical Exam  Vitals signs and nursing note reviewed. Constitutional:       General: She is not in acute distress. Appearance: Normal appearance. She is well-developed. She is obese. She is not ill-appearing. HENT:      Mouth/Throat:      Mouth: Mucous membranes are moist.   Eyes:      General: No scleral icterus. Conjunctiva/sclera: Conjunctivae normal.   Neck:      Musculoskeletal: Normal range of motion and neck supple. Cardiovascular:      Rate and Rhythm: Normal rate and regular rhythm. Heart sounds: No murmur. Pulmonary:      Effort: Pulmonary effort is normal.      Breath sounds: Normal breath sounds. No wheezing or rales. Abdominal:      General: Bowel sounds are normal. There is no distension. Palpations: Abdomen is soft. Tenderness: There is no abdominal tenderness. Comments: Obese abdomen   Musculoskeletal:      Right lower leg: No edema. Left lower leg: No edema. Lymphadenopathy:      Cervical: No cervical adenopathy. Skin:     General: Skin is warm and dry. Neurological:      Mental Status: She is alert and oriented to person, place, and time.    Psychiatric:         Mood and Affect: Mood normal.         Behavior: Behavior normal.         Data:     Lab Results   Component Value Date     03/23/2021    K 3.9 03/23/2021    CL 97 03/23/2021    CO2 24 03/23/2021    BUN 16 03/23/2021    CREATININE 0.61 03/23/2021    GLUCOSE 179 03/23/2021    GLUCOSE 181 02/16/2012    PROT 8.2 11/09/2020    LABALBU 4.2 11/09/2020    LABALBU 4.2 12/05/2011    BILITOT 0.50 11/09/2020    ALKPHOS 104 11/09/2020    AST 13

## 2021-04-05 ENCOUNTER — APPOINTMENT (OUTPATIENT)
Dept: GENERAL RADIOLOGY | Age: 56
End: 2021-04-05
Payer: COMMERCIAL

## 2021-04-05 ENCOUNTER — HOSPITAL ENCOUNTER (EMERGENCY)
Age: 56
Discharge: HOME | End: 2021-04-05
Payer: COMMERCIAL

## 2021-04-05 VITALS
DIASTOLIC BLOOD PRESSURE: 75 MMHG | SYSTOLIC BLOOD PRESSURE: 141 MMHG | TEMPERATURE: 96.7 F | HEART RATE: 96 BPM | RESPIRATION RATE: 18 BRPM | OXYGEN SATURATION: 96 %

## 2021-04-05 DIAGNOSIS — S63.657A SPRAIN OF METACARPOPHALANGEAL (MCP) JOINT OF LEFT LITTLE FINGER, INITIAL ENCOUNTER: Primary | ICD-10-CM

## 2021-04-05 PROCEDURE — 99283 EMERGENCY DEPT VISIT LOW MDM: CPT

## 2021-04-05 PROCEDURE — 73130 X-RAY EXAM OF HAND: CPT

## 2021-04-05 RX ORDER — IBUPROFEN 800 MG/1
800 TABLET ORAL EVERY 8 HOURS PRN
Qty: 21 TABLET | Refills: 0 | Status: ON HOLD | OUTPATIENT
Start: 2021-04-05 | End: 2022-04-24 | Stop reason: HOSPADM

## 2021-04-05 ASSESSMENT — PAIN DESCRIPTION - DESCRIPTORS: DESCRIPTORS: THROBBING

## 2021-04-05 ASSESSMENT — PAIN DESCRIPTION - ORIENTATION: ORIENTATION: LEFT
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
## 2021-04-07 ENCOUNTER — CARE COORDINATION (OUTPATIENT)
Dept: CARE COORDINATION | Age: 56
End: 2021-04-07

## 2021-04-07 NOTE — CARE COORDINATION
Reason For Call:  -Attempted to reach [facility] today to assess and enroll into care coordination. Also to follow up her most recent ED visit.   -Unable to reach [facility] today   -Left a voicemail message with reason for call. Requesting a return phone call back to this AC when patient is able to [phone], office hours given.      Future Appointments   Date Time Provider [facility]   4/20/2021 11:40 AM Elver Benjamin MD Neuro Spec [facility]   9/30/2021  2:20 PM CHELSEA Garcia - CNP [facility]   11/1/2021  9:00 AM Dhara Alberto MD TIFF CARD [facility]   [facility] [place]: 4/15/21 at 2:20pm

## 2021-04-14 ENCOUNTER — CARE COORDINATION (OUTPATIENT)
Dept: CARE COORDINATION | Age: 56
End: 2021-04-14

## 2021-04-14 NOTE — CARE COORDINATION
Reason For Call:  -Attempted to reach Hui Herron today to assess and enroll into care coordination. Also to follow up her most recent ED visit and PCP appointment.    -Unable to reach Hui Herron today   -Left a voicemail message with reason for call. Requesting a return phone call back to this AC when patient is able to [phone], office hours given.      Future Appointments   Date Time Provider Emma Gaxiola   4/20/2021 11:40 AM Kaleb Briscoe MD Neuro Spec [facility]   9/30/2021  2:20 PM CHELSEA Marti - CNP [facility]   11/1/2021  9:00 AM Wesly Shannon MD TIFF CARD [facility]   [address] [facility] [place]: 4/15/21 at 2:20pm       No further outreach calls planned at this time

## 2021-04-20 ENCOUNTER — OFFICE VISIT (OUTPATIENT)
Dept: NEUROLOGY | Age: 56
End: 2021-04-20
Payer: COMMERCIAL

## 2021-04-20 VITALS
WEIGHT: 221 LBS | DIASTOLIC BLOOD PRESSURE: 84 MMHG | SYSTOLIC BLOOD PRESSURE: 132 MMHG | BODY MASS INDEX: 39.16 KG/M2 | HEART RATE: 68 BPM | HEIGHT: 63 IN | TEMPERATURE: 97.4 F

## 2021-04-20 DIAGNOSIS — M79.2 NEUROPATHIC PAIN: Primary | ICD-10-CM

## 2021-04-20 DIAGNOSIS — E11.42 DIABETIC POLYNEUROPATHY ASSOCIATED WITH TYPE 2 DIABETES MELLITUS (HCC): ICD-10-CM

## 2021-04-20 DIAGNOSIS — M62.838 MUSCLE SPASMS OF BOTH LOWER EXTREMITIES: ICD-10-CM

## 2021-04-20 PROCEDURE — 3046F HEMOGLOBIN A1C LEVEL >9.0%: CPT | Performed by: PSYCHIATRY & NEUROLOGY

## 2021-04-20 PROCEDURE — G8427 DOCREV CUR MEDS BY ELIG CLIN: HCPCS | Performed by: PSYCHIATRY & NEUROLOGY

## 2021-04-20 PROCEDURE — G8417 CALC BMI ABV UP PARAM F/U: HCPCS | Performed by: PSYCHIATRY & NEUROLOGY

## 2021-04-20 PROCEDURE — 1036F TOBACCO NON-USER: CPT | Performed by: PSYCHIATRY & NEUROLOGY

## 2021-04-20 PROCEDURE — 99214 OFFICE O/P EST MOD 30 MIN: CPT | Performed by: PSYCHIATRY & NEUROLOGY

## 2021-04-20 PROCEDURE — 3017F COLORECTAL CA SCREEN DOC REV: CPT | Performed by: PSYCHIATRY & NEUROLOGY

## 2021-04-20 PROCEDURE — 2022F DILAT RTA XM EVC RTNOPTHY: CPT | Performed by: PSYCHIATRY & NEUROLOGY

## 2021-04-20 RX ORDER — PREGABALIN 150 MG/1
CAPSULE ORAL
Qty: 90 CAPSULE | Refills: 1 | Status: SHIPPED | OUTPATIENT
Start: 2021-04-20 | End: 2021-10-26 | Stop reason: SDUPTHER

## 2021-04-20 NOTE — PROGRESS NOTES
NEUROLOGY FOLLOW-UP  Patient Name:  Mateus Farooq  :   1965  Clinic Visit Date: 2021  Last Visit: 20      I saw Ms. Mateus Farooq in follow-up in the office today in continuation of neurologic care. She is a 64 y.o.  female with painful diabetic peripheral polyneuropathy comes to clinic stating that she has been taking Lyrica 100 mg 3 times daily and she still has ongoing pins-and-needles sensations along with burning pain in both feet and lower legs along with cramps in calves. Earlier it has helped but it is not helping anymore for the past couple of weeks. She denied any medication related adverse effects. She has failed maximum doses of gabapentin 800 mg 3 times daily. Therefore it was switched to Lyrica. She could not tolerate cyclobenzaprine due to increased lethargy. She is suffering from muscle spasms in bilateral lower extremities. Since last visit; her hemoglobin A1c has slightly improved from 12.7 to 11. She is still taking duloxetine with no significant relief. She is tolerating it well without any adverse effects. She has been using duloxetine, Cymbalta along with the capsaicin cream for relief in dysesthesias. She gets exacerbation of symptoms whenever she misses any dose. She has not had any medication related adverse effects. She denies any side effects such as headache, drowsiness, nausea, abdominal pain, weakness. She also stated that she has been participating in physical therapy and it has been helping. Review of systems done by staff reviewed and pertinent positives include pain in lower extremities and occasional balance difficulties.      Current Outpatient Medications on File Prior to Visit   Medication Sig Dispense Refill    ibuprofen (IBU) 800 MG tablet Take 1 tablet by mouth every 8 hours as needed for Pain 21 tablet 0    TRULICITY 1.5 GS/4.3KN SOPN INJECT 1.5 MG INTO THE SKIN ONCE A WEEK 12 pen 3    Insulin Degludec (TRESIBA FLEXTOUCH) 200 UNIT/ML SOPN Inject 86 Units into the skin daily PER DR MCELROY 1 pen 0    metFORMIN (GLUCOPHAGE-XR) 500 MG extended release tablet Take 2 tablets by mouth daily (with breakfast) 180 tablet 3    pregabalin (LYRICA) 100 MG capsule TAKE 1 CAPSULE BY MOUTH THREE TIMES A DAY 90 capsule 1    omeprazole (PRILOSEC) 20 MG delayed release capsule TAKE 1 CAPSULE BY MOUTH EVERY DAY IN THE MORNING BEFORE BREAKFAST 90 capsule 1    albuterol sulfate HFA (VENTOLIN HFA) 108 (90 Base) MCG/ACT inhaler INHALE 2 PUFFS EVERY 6 HOURS AS NEEDED FOR WHEEZING 18 Inhaler 3    ondansetron (ZOFRAN ODT) 4 MG disintegrating tablet Take 1 tablet by mouth every 8 hours as needed for Nausea 20 tablet 0    fludrocortisone (FLORINEF) 0.1 MG tablet TAKE 3 TABLETS BY MOUTH EVERY  tablet 3    nitroGLYCERIN (NITROSTAT) 0.4 MG SL tablet Place 1 tablet under the tongue every 5 minutes as needed for Chest pain 25 tablet 1    Saccharomyces boulardii (PROBIOTIC) 250 MG CAPS Take 1 capsule by mouth daily 30 capsule 0    tiZANidine (ZANAFLEX) 2 MG tablet TAKE 1 TABLET BY MOUTH NIGHTLY AS NEEDED(SPASMS) 30 tablet 2    blood glucose test strips (FREESTYLE LITE) strip USE TO CHECK BLOOD SUGARS 3 TIMES DAILY AND AS NEEDED FOR DIABETES E11.40 270 strip 3    acetaminophen (TYLENOL) 500 MG tablet Take 1 tablet by mouth 4 times daily as needed for Pain 40 tablet 0    atorvastatin (LIPITOR) 40 MG tablet Take 1 tablet by mouth daily 90 tablet 3    losartan (COZAAR) 25 MG tablet Take 1 tablet by mouth daily 90 tablet 3    insulin lispro, 1 Unit Dial, (HUMALOG KWIKPEN) 100 UNIT/ML SOPN Inject 20 Units into the skin 3 times daily (before meals) 12 pen 1    metoprolol succinate (TOPROL XL) 100 MG extended release tablet Take 1 tablet by mouth daily 90 tablet 3    DULoxetine (CYMBALTA) 30 MG extended release capsule Take 1 capsule by mouth daily 30 capsule 1    aspirin 81 MG EC tablet TAKE 1 TABLET BY MOUTH DAILY (Patient taking differently: Take 81 mg by mouth daily ) 90 tablet 3    Insulin Pen Needle (BD ULTRA-FINE PEN NEEDLES) 29G X 12.7MM MISC USE AS DIRECTED BY PHYSICIAN 5 times daily 150 each 11    Blood Glucose Monitoring Suppl BERE 1 Units by Does not apply route three times daily Unit insurance will cover 1 Device 0    Blood Pressure Monitor KIT 1 each by Does not apply route daily as needed ESSENTIAL HYPERTENSION   I10 1 kit 0    Blood Glucose Monitoring Suppl (BLOOD GLUCOSE MONITOR KIT) KIT 1 each by Does not apply route daily. Please dispense whatever BS monitoring kit [facility] covers. Dx: 250, new onset T2DM. Testing once daily 1 kit 0    lidocaine (LIDODERM) 5 % Place 1 patch onto the skin daily 12 hours on, 12 hours off. (Patient not taking: Reported on 4/20/2021) 30 patch 0     No current facility-administered medications on file prior to visit. Allergies: Tony Flores is allergic to tape [adhesive tape]. Past Medical History:   Diagnosis Date    Anxiety     Asthma     CAD (coronary artery disease)     Clinical trial participant at discharge 3/31/16    [facility] [address]     Depression     Diabetic neuropathy [facility] [address]     H/O cardiac catheterization 4/26/16    LMCA: Mild Irregularities 10-20%. LAD: Lesion on Prox LAD: Proximal subsection. 100% stenosis. LCx: Mild irregularities 10-20%. RCA: Mild irregularities 10-20%. Widely patent mid RCA stent. EF:60%.  H/O cardiovascular stress test 10/07/2016    Overall results are most consistent with a low risk for significant CAD.     H/O echocardiogram 10/05/2016    EF:>60%. Inferoseptal wall abnormal in its motion which is not unusal status post open heart surgery. Evidence of mild (grade I) diastolic dysfunction is seen.  H/O tilt table evaluation 07/12/2016    Abnormal. PTs HR, Blood Pressure response and symptoms were most consistent with dysautonomia.  Combined w/viligant maintenance of euvolemia and maintaining a moderate salt intake,  HERNIA REPAIR  02/16/2015    incisional, recurrent    HERNIA REPAIR  02/2016    HYSTERECTOMY      OTHER SURGICAL HISTORY  10/8/2015    abd wound washout, mesh removal, wound vac placement    IN SUCT NICOLE LIPECTOMY,HEAD/NECK Left 8/27/2018    THIGH LESION BIOPSY EXCISION, SOFT TISSUE MASS [facility]    leg    UPPP UVULOPALATOPHARYGOPLASTY  06/26/2012    VENTRAL HERNIA REPAIR  09/21/2015    With [facility] - Dr Mirna Marvin History: Darren Stinson  reports that she quit smoking about 10 years ago. Her smoking use included cigarettes. She has a 20.00 pack-year smoking history. She has never used smokeless tobacco. She reports that she does not drink alcohol or use drugs. Family History   Problem Relation Age of Onset    Cancer Mother     Diabetes Mother     Cancer Father         thyroid    Diabetes Sister     Heart Disease Sister     Heart Disease Brother     Heart Disease Maternal Uncle     Cancer Maternal Grandmother      On exam: Blood pressure 132/84, pulse 68, temperature 97.4 °F (36.3 °C), temperature source Temporal, height 5' 3\" (1.6 m), weight 221 lb (100.2 kg), last menstrual period 12/05/1996, not currently breastfeeding. GENERAL  Appears comfortable and in no distress   HEENT  NC/ AT   NECK  Supple and no bruits heard   MENTAL STATUS:  Alert, oriented, intact memory, no confusion, normal speech, normal language, no hallucination or delusion; appropriate affect   CRANIAL NERVES: II     -      PERRLA, Fundi reveal intact venous pulsations;   Visual fields intact to confrontation  III,IV,VI -  EOMs full, no afferent defect, no                      ELOY, no ptosis  V     -     Normal facial sensation  VII    -     Normal facial symmetry  VIII   -     Intact hearing  IX,X -     Symmetrical palate  XI    -     Symmetrical shoulder shrug  XII   -     Midline tongue, no atrophy    MOTOR FUNCTION:  significant for good strength of grade 5/5 in bilateral proximal and distal muscle groups of both upper and lower extremities with normal bulk, normal tone and no involuntary movements, no tremor   SENSORY FUNCTION:  Impaired pinprick and temperature in stocking pattern, asymmetric; left greater than right. CEREBELLAR FUNCTION:  Intact fine motor control over upper limbs   REFLEX FUNCTION:  Symmetric, no perverted reflex, no Babinski sign   STATION and GAIT  Normal station, wide based gait; could not do tandem gait; positive Romberg sign. Able to walk with cane. Diagnostic data reviewed with the patient:   NCS/ EMG (8-22-14): There is electrodiagnostic evidence of peripheral polyneuropathy with predominant involvement of sensory fibers only. Supporting features include reduced amplitudes of bilateral superficial peroneal sensory studies and prolonged H-reflex latencies. X-ray of lumbar spine (3-20-13): Mild degenerative change is noted at L3-L4 with disk space narrowing and spur formation. MRI BRAIN (w/wo): done on 1-2-2013: essentially unremarkable. EEG (1-2-2013): unremarkable. Lab Results   Component Value Date    LABA1C 11.1 (H) 03/23/2021       EMG bilateral LE (10/21/17): abnormal electrophysiological study indicative of primarily axonal sensorimotor neuropathy in both lower extremities. There is no evidence of lumbosacral radiculopathy or plexopathy. EKG (8/23/18); NSR. Impression and Plan: Ms. Arvizu Notice is a 64 y.o. female with   Painful diabetic peripheral polyneuropathy; inadequately controlled. Abnormal EMG; marginally controlled DM with A1c improved from 12.1 to 11.1  Medically refractory neuropathic pain; refractory to max dose of gabapentin at 800 tid; switched to Pregabalin; will increase the dose from 100 mg tid to 150 mg tid. Intermittent severe muscle spasms in bilateral lower extremities: stable on tizanidine 2 mg nightly as needed.   Also to continue duloxetine 60 mg every day  Also on insulin, Tresiba, metformin for diabetes control  Again, she was well advised about the importance of euglycemia in control of diabetic neuropathy and medication administration instructions and she voiced understanding those instructions. Regarding opioids: There is no evidence regarding long-term effectiveness; with high addiction potential and increased potential for tolerance and overdose and respiratory depression complications possibility  Medication Counseling: risks and benefits including possible adverse effects discussed with the patient and she verbalized understanding those instructions. Also discussed about importance of euglycemia in control of dysesthesias and she voiced understanding those instructions. Follow-up in 6 months. Please note that portions of this note were completed with a voice recognition program.  Although every effort was made to ensure the accuracy of this automated transcription, some errors in transcription may have occurred; occasionally words are mis-transcribed. Thank you for understanding.

## 2021-06-15 ENCOUNTER — OFFICE VISIT (OUTPATIENT)
Dept: PODIATRY | Age: 56
End: 2021-06-15
Payer: COMMERCIAL

## 2021-06-15 ENCOUNTER — HOSPITAL ENCOUNTER (OUTPATIENT)
Dept: LAB | Age: 56
Setting detail: SPECIMEN
Discharge: HOME | End: 2021-06-15
Payer: COMMERCIAL

## 2021-06-15 VITALS
HEART RATE: 107 BPM | BODY MASS INDEX: 39.15 KG/M2 | TEMPERATURE: 97.7 F | SYSTOLIC BLOOD PRESSURE: 132 MMHG | HEIGHT: 63 IN | DIASTOLIC BLOOD PRESSURE: 72 MMHG | RESPIRATION RATE: 18 BRPM

## 2021-06-15 DIAGNOSIS — L03.032 PARONYCHIA OF GREAT TOE OF LEFT FOOT: ICD-10-CM

## 2021-06-15 DIAGNOSIS — L02.612 ABSCESS OF LEFT GREAT TOE: Primary | ICD-10-CM

## 2021-06-15 DIAGNOSIS — L02.612 ABSCESS OF LEFT GREAT TOE: ICD-10-CM

## 2021-06-15 PROCEDURE — 87070 CULTURE OTHR SPECIMN AEROBIC: CPT

## 2021-06-15 PROCEDURE — 10060 I&D ABSCESS SIMPLE/SINGLE: CPT | Performed by: PODIATRIST

## 2021-06-15 PROCEDURE — 87186 SC STD MICRODIL/AGAR DIL: CPT

## 2021-06-15 PROCEDURE — 87205 SMEAR GRAM STAIN: CPT

## 2021-06-15 PROCEDURE — 86403 PARTICLE AGGLUT ANTBDY SCRN: CPT

## 2021-06-15 RX ORDER — CEPHALEXIN 500 MG/1
500 CAPSULE ORAL 2 TIMES DAILY
Qty: 14 CAPSULE | Refills: 0 | Status: SHIPPED | OUTPATIENT
Start: 2021-06-15 | End: 2021-06-22

## 2021-06-15 RX ORDER — FLASH GLUCOSE SENSOR
KIT MISCELLANEOUS
COMMUNITY
Start: 2021-05-19 | End: 2022-07-07

## 2021-06-15 ASSESSMENT — PATIENT HEALTH QUESTIONNAIRE - PHQ9
SUM OF ALL RESPONSES TO PHQ QUESTIONS 1-9: 0
2. FEELING DOWN, DEPRESSED OR HOPELESS: 0
SUM OF ALL RESPONSES TO PHQ9 QUESTIONS 1 & 2: 0
1. LITTLE INTEREST OR PLEASURE IN DOING THINGS: 0

## 2021-06-15 NOTE — PROGRESS NOTES
SUBJECTIVE:   Hattie Huerta is a 64 y.o. female who presents with abscess and acutely ingrown left, medial 1st toenail. Has pain and tenderness at the area without fever. +drainage  HPI: 1.5 weeks           Insidious , non traumatic onset           Self care ; soaks / H2o2 / neosporin   ROS: neg. Constitutional             -smoking            -claudication             -hepatitis            -hypercoag. / -easy bruising or clotting issues             +DM              +CAD stent     Past Medical History:   Diagnosis Date    Anxiety     Asthma     CAD (coronary artery disease)     Clinical trial participant at discharge 3/31/16    [facility]) [facility] [address]     Depression     Diabetic neuropathy ([facility] [address]     H/O cardiac catheterization 4/26/16    LMCA: Mild Irregularities 10-20%. LAD: Lesion on Prox LAD: Proximal subsection. 100% stenosis. LCx: Mild irregularities 10-20%. RCA: Mild irregularities 10-20%. Widely patent mid RCA stent. EF:60%.  H/O cardiovascular stress test 10/07/2016    Overall results are most consistent with a low risk for significant CAD.     H/O echocardiogram 10/05/2016    EF:>60%. Inferoseptal wall abnormal in its motion which is not unusal status post open heart surgery. Evidence of mild (grade I) diastolic dysfunction is seen.  H/O tilt table evaluation 07/12/2016    Abnormal. PTs HR, Blood Pressure response and symptoms were most consistent with dysautonomia. Combined w/viligant maintenance of euvolemia and maintaining a moderate salt intake, pharmaciologic treatment w/ ProAmatine, SSRI such as Lexapro and/or Mestinon among other treatments have shown some effectiveness in the treatment of this condition.  History of cardiovascular stress test 02/13/2019    Abnormal. Small/moderate perfusion defect of mild/moderate intesity in the anterior and anterolateral regions during stress imaging which is most consistent w/ ischemia but may be artifact.  Overall low/intermediate risk for significant CAD.  History of echocardiogram 09/06/2018    EF >60%. Inferoseptal wall is abnormal in its motion which is not unusual s/p open heart. Evidence of mild (grade I) diastolic dysfunction seen.  Hx of blood clots     Hyperlipidemia     Hypertension     Intermittent claudication (HCC)     Kidney stones     Movement disorder     neuropathy in legs    Neuromuscular disorder (HCC)     neuropathy    Osteoarthritis     Reflux     Seizures (HCC)     last over 10 yr ago    Stented coronary artery 9/22/2010     LAD/[place]    Stented coronary artery 3/31/16    RCA/[place]    Type II or unspecified type diabetes mellitus without mention of complication, not stated as uncontrolled     Unspecified sleep apnea     no machine       OBJECTIVE:  Patient appears well, normal vital signs. Left medial 1st toenail reveals ingrown edge with erythema, pus and tenderness. (+PMT) / +minor tissue changes ; pre granuloma   L1 ; +CFT, < 3 sec     ASSESSMENT:  Abscess of left hallux ; associated paronychia   Adequate perfusion for spontaneous healing and infection treatment       Incision and Drainage Procedure Note (15108)     Indication: pain drainage ; progressive infection      TIME OUT  Yes       Anatomical marking noted Yes    Consent signed YES   Procedure: The patient was positioned in dorsal recumbent position. The treatment and consent form reviewed. The skin over the Left 1st toewas prepped with Chloraprep. Local anesthesia was obtained by infiltration using 3.5 cc of 2% Lidocaine without epinephrine. In uniplanar I&D fashion , Spatula  Blunt dissection freed the nail from the overriding eponychium and the underlying nail bed in atraumatic fashion of the medial labia. The nail is transected from the distal to proximal and coincidental & concomitant excision of the nail plate is done. A deep culture taken.   The nail bed/incision site was then irrigated and packed with sterile gauze.   Culture taken Yes  The patient tolerated the procedure well. Complications: None  Tx: Empiric Rx Keflex 500 mg BID x 7 days     Post procedure care: Antibiotics as per the AVS/orders and prescription.

## 2021-06-15 NOTE — PATIENT INSTRUCTIONS
PODIATRY PATIENT DISCHARGE INSTRUCTIONS    CALL [phone] regarding podiatry questions    OFFICE HOURS ARE TUESDAY MORNING  8:30-NOON and can change with out notice    Discharge instructions for patient's plan of care:  Left great toe  Soak in warm epsom salt water this afternoon, later this evening and in morning for 5-10 minutes let air dry and then apply antibiotic ointment to area cover with gauze or bandaide. Apply antibiotic ointment to area for the next 4 days cover with bandaid. Take antibiotic as ordered -pickup at [facility] -Keflex 500 mg 1 capsule twice a day for 7 days. Culture taken. Return to clinic as needed. We hope we gave you VERY GOOD care today!
DISPLAY PLAN FREE TEXT

## 2021-06-17 LAB
CULTURE: ABNORMAL
DIRECT EXAM: ABNORMAL
DIRECT EXAM: ABNORMAL
Lab: ABNORMAL
SPECIMEN DESCRIPTION: ABNORMAL

## 2021-07-02 ENCOUNTER — HOSPITAL ENCOUNTER (EMERGENCY)
Age: 56
Discharge: HOME | End: 2021-07-02
Payer: COMMERCIAL

## 2021-07-02 VITALS
BODY MASS INDEX: 40.75 KG/M2 | RESPIRATION RATE: 20 BRPM | OXYGEN SATURATION: 97 % | DIASTOLIC BLOOD PRESSURE: 86 MMHG | TEMPERATURE: 97.2 F | HEIGHT: 63 IN | HEART RATE: 100 BPM | WEIGHT: 230 LBS | SYSTOLIC BLOOD PRESSURE: 152 MMHG

## 2021-07-02 DIAGNOSIS — T14.8XXA SKIN AVULSION: Primary | ICD-10-CM

## 2021-07-02 PROCEDURE — 99282 EMERGENCY DEPT VISIT SF MDM: CPT

## 2021-07-02 PROCEDURE — 6360000002 HC RX W HCPCS: Performed by: PHYSICIAN ASSISTANT

## 2021-07-02 PROCEDURE — 90471 IMMUNIZATION ADMIN: CPT | Performed by: PHYSICIAN ASSISTANT

## 2021-07-02 PROCEDURE — 90715 TDAP VACCINE 7 YRS/> IM: CPT | Performed by: PHYSICIAN ASSISTANT

## 2021-07-02 RX ADMIN — TETANUS TOXOID, REDUCED DIPHTHERIA TOXOID AND ACELLULAR PERTUSSIS VACCINE, ADSORBED 0.5 ML: 5; 2.5; 8; 8; 2.5 SUSPENSION INTRAMUSCULAR at 20:14

## 2021-07-02 ASSESSMENT — ENCOUNTER SYMPTOMS
ABDOMINAL PAIN: 0
BACK PAIN: 0
EYE DISCHARGE: 0
VOMITING: 0
DIARRHEA: 0
SHORTNESS OF BREATH: 0
CHEST TIGHTNESS: 0
COUGH: 0
CONSTIPATION: 0
RHINORRHEA: 0
BLOOD IN STOOL: 0
EYE REDNESS: 0
WHEEZING: 0
NAUSEA: 0
SORE THROAT: 0

## 2021-07-02 ASSESSMENT — PAIN DESCRIPTION - LOCATION: LOCATION: FINGER (COMMENT WHICH ONE)

## 2021-07-02 ASSESSMENT — PAIN DESCRIPTION - FREQUENCY: FREQUENCY: CONTINUOUS

## 2021-07-02 ASSESSMENT — PAIN DESCRIPTION - DESCRIPTORS: DESCRIPTORS: THROBBING

## 2021-07-02 ASSESSMENT — PAIN DESCRIPTION - PAIN TYPE: TYPE: ACUTE PAIN

## 2021-07-02 ASSESSMENT — PAIN SCALES - GENERAL: PAINLEVEL_OUTOF10: 7

## 2021-07-02 ASSESSMENT — PAIN DESCRIPTION - ORIENTATION: ORIENTATION: LEFT

## 2021-07-03 NOTE — ED PROVIDER NOTES
[facility]  EMERGENCY DEPARTMENT ENCOUNTER      Pt Name: Sheri Ricks  MRN: [id number]  [place] 1965  Date of evaluation: 7/2/2021  Provider: Aroldo Dodson Dr     Chief Complaint   Patient presents with    Laceration     left thumb; cut on edge of picture frame         HISTORY OF PRESENT ILLNESS   (Location/Symptom, Timing/Onset, Context/Setting,Quality, Duration, Modifying Factors, Severity)  Note limiting factors. Sheri Ricks is a61 y.o. female who presents to the emergency department with complaints of laceration to her left finger onset just prior to arrival.  Patient reports he cut on the edge of his margarette. Reports he could not get the bleeding to control at home so she came to the ER for further evaluation. Denies any nausea vomiting lightheadedness or dizziness or syncopal episode. Reports that she is not up-to-date on her tetanus shot. She has no other complaints this time. Reports he is to move her finger freely. Denies any pain. HPI    Nursing Notes werereviewed. REVIEW OF SYSTEMS    (2-9 systems for level 4, 10 or more for level 5)     Review of Systems   Constitutional: Negative for chills, diaphoresis and fever. HENT: Negative for congestion, ear pain, rhinorrhea and sore throat. Eyes: Negative for discharge, redness and visual disturbance. Respiratory: Negative for cough, chest tightness, shortness of breath and wheezing. Cardiovascular: Negative for chest pain and palpitations. Gastrointestinal: Negative for abdominal pain, blood in stool, constipation, diarrhea, nausea and vomiting. Endocrine: Negative for polydipsia, polyphagia and polyuria. Genitourinary: Negative for decreased urine volume, difficulty urinating, dysuria, frequency and hematuria. Musculoskeletal: Negative for arthralgias, back pain and myalgias. Skin: Positive for wound. Negative for pallor and rash.    Allergic/Immunologic: Negative for food allergies and immunocompromised state. Neurological: Negative for dizziness, syncope, weakness and light-headedness. Hematological: Negative for adenopathy. Does not bruise/bleed easily. Psychiatric/Behavioral: Negative for behavioral problems and suicidal ideas. The patient is not nervous/anxious. Except as noted above the remainder of the review of systems was reviewed and negative. PAST MEDICAL HISTORY     Past Medical History:   Diagnosis Date    Anxiety     Asthma     CAD (coronary artery disease)     Clinical trial participant at discharge 3/31/16    COPD [facility]chronic obstructive [facility] [address]     Depression     Diabetic neuropathy ([facility] [address]     H/O cardiac catheterization 4/26/16    LMCA: Mild Irregularities 10-20%. LAD: Lesion on Prox LAD: Proximal subsection. 100% stenosis. LCx: Mild irregularities 10-20%. RCA: Mild irregularities 10-20%. Widely patent mid RCA stent. EF:60%.  H/O cardiovascular stress test 10/07/2016    Overall results are most consistent with a low risk for significant CAD.     H/O echocardiogram 10/05/2016    EF:>60%. Inferoseptal wall abnormal in its motion which is not unusal status post open heart surgery. Evidence of mild (grade I) diastolic dysfunction is seen.  H/O tilt table evaluation 07/12/2016    Abnormal. PTs HR, Blood Pressure response and symptoms were most consistent with dysautonomia. Combined w/viligant maintenance of euvolemia and maintaining a moderate salt intake, pharmaciologic treatment w/ ProAmatine, SSRI such as Lexapro and/or Mestinon among other treatments have shown some effectiveness in the treatment of this condition.  History of cardiovascular stress test 02/13/2019    Abnormal. Small/moderate perfusion defect of mild/moderate intesity in the anterior and anterolateral regions during stress imaging which is most consistent w/ ischemia but may be artifact. Overall low/intermediate risk for significant CAD.     History of echocardiogram 09/06/2018    EF >60%. Inferoseptal wall is abnormal in its motion which is not unusual s/p open heart. Evidence of mild (grade I) diastolic dysfunction seen.     Hx of blood clots     Hyperlipidemia     Hypertension     Intermittent claudication (HCC)     Kidney stones     Movement disorder     neuropathy in legs    Neuromuscular disorder (HCC)     neuropathy    Osteoarthritis     Reflux     Seizures (HCC)     last over 10 yr ago    Stented coronary artery 9/22/2010     LAD/[place]    Stented coronary artery 3/31/16    RCA/[place]    Type II or unspecified type diabetes mellitus without mention of complication, not stated as uncontrolled     Unspecified sleep apnea     no machine         SURGICALHISTORY       Past Surgical History:   Procedure Laterality Date    ABDOMINAL HERNIA REPAIR  1-2016    repair done [place]    APPENDECTOMY      CARDIAC CATHETERIZATION Left 4/26/2016    right radial/ Darren Graves/ Dr Munroe Sox Left 03/07/2019    Left Radial/[facility]/    CARDIAC SURGERY      CHOLECYSTECTOMY  1991    COLONOSCOPY  12/16/14    -hemorrhoids,bx    CORONARY ANGIOPLASTY WITH STENT PLACEMENT  9/2010    CORONARY ANGIOPLASTY WITH STENT PLACEMENT  3-    JESSE RCA / DR Emory Delgado CORONARY ARTERY BYPASS GRAFT  08/15/2016    OP X 1- Dr Stella Mathias, COLON, DIAGNOSTIC  12/16/2014    HERNIA REPAIR  12/13/12    at the medial aspect of a [place] RUQ scar); repaired [address]. [id number]   02/16/2015    incisional, recurrent    HERNIA REPAIR  02/2016    HYSTERECTOMY      OTHER SURGICAL HISTORY  10/8/2015    abd wound washout, mesh removal, wound vac placement    SC SUCT NICOLE LIPECTOMY,HEAD/NECK Left 8/27/2018    THIGH LESION BIOPSY EXCISION, SOFT TISSUE MASS performed by Cinthia Alvarado [facility] at [facility] Left 2018    leg    UPPP UVULOPALATOPHARYGOPLASTY  06/26/2012    VENTRAL HERNIA REPAIR 09/21/2015    With Mesh - Dr Cody Andrade       Previous Medications    ACETAMINOPHEN (TYLENOL) 500 MG TABLET    Take 1 tablet by mouth 4 times daily as needed for Pain    ALBUTEROL SULFATE HFA (VENTOLIN HFA) 108 (90 BASE) MCG/ACT INHALER    INHALE 2 PUFFS EVERY 6 HOURS AS NEEDED FOR WHEEZING    ASPIRIN 81 MG EC TABLET    TAKE 1 TABLET BY MOUTH DAILY    ATORVASTATIN (LIPITOR) 40 MG TABLET    Take 1 tablet by mouth daily    BLOOD GLUCOSE MONITORING SUPPL (BLOOD GLUCOSE MONITOR KIT) KIT    1 each by Does not apply route daily. Please dispense whatever BS monitoring kit [facility]. Dx: 250, new onset T2DM. Testing once daily    BLOOD GLUCOSE MONITORING SUPPL BERE    1 Units by Does not apply route three times daily Unit insurance will cover    BLOOD GLUCOSE TEST STRIPS (FREESTYLE LITE) STRIP    USE TO CHECK BLOOD SUGARS 3 TIMES DAILY AND AS NEEDED FOR DIABETES E11.40    BLOOD PRESSURE MONITOR KIT    1 each by Does not apply route daily as needed ESSENTIAL HYPERTENSION   I10    CONTINUOUS BLOOD GLUC SENSOR (FREESTYLE MCKAYLA 2 SENSOR) MISC    2 FREESTYLE MCKAYLA 2 SENSORS TO USE WITH MCKAYLA 2 READER TO CHECK BLOOD SUGARS 6 8 TIMES A DAY    DULOXETINE (CYMBALTA) 30 MG EXTENDED RELEASE CAPSULE    Take 1 capsule by mouth daily    FLUDROCORTISONE (FLORINEF) 0.1 MG TABLET    TAKE 3 TABLETS BY MOUTH EVERY DAY    IBUPROFEN (IBU) 800 MG TABLET    Take 1 tablet by mouth every 8 hours as needed for Pain    INSULIN DEGLUDEC (TRESIBA FLEXTOUCH) 200 UNIT/ML SOPN    Inject 86 Units into the skin daily PER DR MCELROY    INSULIN LISPRO, 1 UNIT DIAL, (HUMALOG KWIKPEN) 100 UNIT/ML SOPN    Inject 20 Units into the skin 3 times daily (before meals)    INSULIN PEN NEEDLE (BD ULTRA-FINE PEN NEEDLES) 29G X 12.7MM MISC    USE AS DIRECTED BY PHYSICIAN 5 times daily    LIDOCAINE (LIDODERM) 5 %    Place 1 patch onto the skin daily 12 hours on, 12 hours off.     LOSARTAN (COZAAR) 25 MG TABLET    Take 1 tablet by mouth daily Financial Resource Strain:     Difficulty of Paying Living Expenses:    Food Insecurity:     Worried About Running Out of Food in the Last Year:     [address] in the Last Year:    Transportation Needs:     Lack of Transportation (Medical):  Lack of Transportation (Non-Medical):    Physical Activity:     Days of Exercise per Week:     Minutes of Exercise per Session:    Stress:     Feeling of Stress :    Social Connections:     Frequency of Communication with Friends and Family:     Frequency of Social Gatherings with Friends and Family:     Attends Hoah[employer] Services:     Active Member of Clubs or Organizations:     Attends Club or Organization Meetings:     Marital Status:    Intimate Partner Violence:     Fear of Current or Ex-Partner:     Emotionally Abused:     Physically Abused:     Sexually Abused:        SCREENINGS             PHYSICAL EXAM    (up to 7 for level 4, 8 or more for level 5)     ED Triage Vitals [07/02/21 1947]   BP Temp Temp Source Pulse Resp SpO2 Height Weight   (!) 152/86 97.2 °F (36.2 °C) Oral 100 20 97 % 5' 3\" (1.6 m) 230 lb (104.3 kg)       Physical Exam  Vitals and nursing note reviewed. Constitutional:       General: She is not in acute distress. Appearance: She is well-developed. She is not diaphoretic. HENT:      Head: Normocephalic and atraumatic. Right Ear: External ear normal.      Left Ear: External ear normal.   Eyes:      General: No scleral icterus. Right eye: No discharge. Left eye: No discharge. Conjunctiva/sclera: Conjunctivae normal.   Neck:      Trachea: No tracheal deviation. Cardiovascular:      Rate and Rhythm: Normal rate and regular rhythm. Pulmonary:      Effort: Pulmonary effort is normal. No respiratory distress. Breath sounds: No stridor. Musculoskeletal:         General: No tenderness or deformity. Normal range of motion. Cervical back: Normal range of motion and neck supple.    Skin: General: Skin is warm and dry. Findings: No erythema or rash. Comments: Laceration noted to the left thumb lateral aspect. It is a skin avulsion. It is superficial.  There is no depth. Patient has full range of motion of the finger. There is no foreign body. Intact distal pulses sensation cap refills less than 2 seconds. She has no bony tenderness. Neurological:      Mental Status: She is alert and oriented to person, place, and time. Cranial Nerves: No cranial nerve deficit. Motor: No abnormal muscle tone. Deep Tendon Reflexes: Reflexes are normal and symmetric. Psychiatric:         Behavior: Behavior normal.         DIAGNOSTIC RESULTS     EKG: All EKG's are interpreted by the Emergency Department Physician who either signs orCo-signs this chart in the absence of a cardiologist.      RADIOLOGY:   Non-plainfilm images such as CT, Ultrasound and MRI are read by the radiologist. Plain radiographic images are visualized and preliminarily interpreted by the emergency physician with the below findings:      Interpretationper the Radiologist below, if available at the time of this note:    No orders to display         ED BEDSIDE ULTRASOUND:   Performed by ED Physician - none    LABS:  Labs Reviewed - No data to display    All other labs were within normal range or not returned as of this dictation. EMERGENCY DEPARTMENT COURSE and DIFFERENTIAL DIAGNOSIS/MDM:   Vitals:    Vitals:    07/02/21 1947   BP: (!) 152/86   Pulse: 100   Resp: 20   Temp: 97.2 °F (36.2 °C)   TempSrc: Oral   SpO2: 97%   Weight: 230 lb (104.3 kg)   Height: 5' 3\" (1.6 m)         MDM  14-year-old female who present secondary to skin avulsion of the left thumb. There is very superficial wound noted. We will get her a tetanus shot clean and irrigate the wound. Forage motion of the finger maintained. Patient's wound was irrigated at length. I applied Gelfoam with pressure dressing.   She is not bleeding through this.    Patient is resting comfortably at this time and in no distress. We discussed at length the patient's diagnosis. Patient understands to follow up with primary care provider in the next 2 days. Patient verbalized understanding of this. We went over medications. Strict return warnings were given. Patient will return to the emergency room as needed with new or worsening complaints. Procedures    FINAL IMPRESSION      1. Skin avulsion        DISPOSITION/PLAN   DISPOSITION Decision To Discharge 07/02/2021 08:07:29 PM      PATIENT REFERRED TO:  [facility]  [address]  [phone]    If symptoms worsen, As needed    [address] [facility]  [phone]    In 2 days  For wound re-check      DISCHARGE MEDICATIONS:  New Prescriptions    No medications on file              Summation      Patient Course:      ED Medications administered this visit:    Medications   gelatin adsorbable (GELFOAM) sponge 1 each (has no administration in time range)   Tetanus-Diphth-Acell Pertussis (BOOSTRIX) injection 0.5 mL (0.5 mLs Intramuscular Given 7/2/21 2014)       New Prescriptions from this visit:    New Prescriptions    No medications on file       Follow-up:  [facility]  [address] [facility]  [phone]    If symptoms worsen, As needed    [address] [address]  [phone]    In 2 days  For wound re-check        Final Impression:   1.  Skin avulsion               (Please note that portions of this note were completed with a voice recognition program.  Efforts were made to edit the dictations but occasionally words are mis-transcribed.)           Juliet Cates PA-C  07/02/21 2023

## 2021-07-06 ENCOUNTER — OFFICE VISIT (OUTPATIENT)
Dept: PRIMARY CARE CLINIC | Age: 56
End: 2021-07-06
Payer: COMMERCIAL

## 2021-07-06 VITALS
WEIGHT: 231.9 LBS | HEART RATE: 88 BPM | BODY MASS INDEX: 41.08 KG/M2 | OXYGEN SATURATION: 98 % | SYSTOLIC BLOOD PRESSURE: 132 MMHG | RESPIRATION RATE: 22 BRPM | DIASTOLIC BLOOD PRESSURE: 70 MMHG | TEMPERATURE: 97.8 F

## 2021-07-06 DIAGNOSIS — S61.002S: Primary | ICD-10-CM

## 2021-07-06 PROCEDURE — 3017F COLORECTAL CA SCREEN DOC REV: CPT | Performed by: NURSE PRACTITIONER

## 2021-07-06 PROCEDURE — G8417 CALC BMI ABV UP PARAM F/U: HCPCS | Performed by: NURSE PRACTITIONER

## 2021-07-06 PROCEDURE — G8427 DOCREV CUR MEDS BY ELIG CLIN: HCPCS | Performed by: NURSE PRACTITIONER

## 2021-07-06 PROCEDURE — 1036F TOBACCO NON-USER: CPT | Performed by: NURSE PRACTITIONER

## 2021-07-06 PROCEDURE — 99213 OFFICE O/P EST LOW 20 MIN: CPT | Performed by: NURSE PRACTITIONER

## 2021-07-06 SDOH — ECONOMIC STABILITY: FOOD INSECURITY: WITHIN THE PAST 12 MONTHS, THE FOOD YOU BOUGHT JUST DIDN'T LAST AND YOU DIDN'T HAVE MONEY TO GET MORE.: SOMETIMES TRUE

## 2021-07-06 SDOH — ECONOMIC STABILITY: FOOD INSECURITY: WITHIN THE PAST 12 MONTHS, YOU WORRIED THAT YOUR FOOD WOULD RUN OUT BEFORE YOU GOT MONEY TO BUY MORE.: SOMETIMES TRUE

## 2021-07-06 ASSESSMENT — SOCIAL DETERMINANTS OF HEALTH (SDOH): HOW HARD IS IT FOR YOU TO PAY FOR THE VERY BASICS LIKE FOOD, HOUSING, MEDICAL CARE, AND HEATING?: NOT VERY HARD

## 2021-07-06 ASSESSMENT — ENCOUNTER SYMPTOMS
SHORTNESS OF BREATH: 0
VOMITING: 0
NAUSEA: 0
COUGH: 0

## 2021-07-06 NOTE — PROGRESS NOTES
Name: Mika Connolly  : 1965         Chief Complaint:     Chief Complaint   Patient presents with    ED Follow-up     left thumb, cut on glass picture frame       History of Present Illness:      Mika Connolly is a 64 y.o.  female who presents with ED Follow-up (left thumb, cut on glass picture frame)      Sanjeev Chan is here today for an ER follow-up office visit. Unfortunately she sustained a laceration to her left thumb while trying to  a picture frame yesterday. She was treated in the emergency department for an avulsion laceration and is doing well. See below for further comment. Wound Check  She was originally treated yesterday. Previous treatment included wound cleansing or irrigation. Her temperature was unmeasured prior to arrival. There has been no drainage from the wound. There is no redness present. There is no swelling present. There is no pain present. She has no difficulty moving the affected extremity or digit. Past Medical History:     Past Medical History:   Diagnosis Date    Anxiety     Asthma     CAD (coronary artery disease)     Clinical trial participant at discharge 3/31/16    COPD [facility]) [facility]     Depression     Diabetic neuropathy ([facility] [facility]     H/O cardiac catheterization 16    LMCA: Mild Irregularities 10-20%. LAD: Lesion on Prox LAD: Proximal subsection. 100% stenosis. LCx: Mild irregularities 10-20%. RCA: Mild irregularities 10-20%. Widely patent mid RCA stent. EF:60%.  H/O cardiovascular stress test 10/07/2016    Overall results are most consistent with a low risk for significant CAD.     H/O echocardiogram 10/05/2016    EF:>60%. Inferoseptal wall abnormal in its motion which is not unusal status post open heart surgery. Evidence of mild (grade I) diastolic dysfunction is seen.  H/O tilt table evaluation 2016    Abnormal. PTs HR, Blood Pressure response and symptoms were most consistent with dysautonomia. Combined w/viligant maintenance of euvolemia and maintaining a moderate salt intake, pharmaciologic treatment w/ ProAmatine, SSRI such as Lexapro and/or Mestinon among other treatments have shown some effectiveness in the treatment of this condition.  History of cardiovascular stress test 02/13/2019    Abnormal. Small/moderate perfusion defect of mild/moderate intesity in the anterior and anterolateral regions during stress imaging which is most consistent w/ ischemia but may be artifact. Overall low/intermediate risk for significant CAD.  History of echocardiogram 09/06/2018    EF >60%. Inferoseptal wall is abnormal in its motion which is not unusual s/p open heart. Evidence of mild (grade I) diastolic dysfunction seen.     Hx of blood clots     Hyperlipidemia     Hypertension     Intermittent claudication (HCC)     Kidney stones     Movement disorder     neuropathy in legs    Neuromuscular disorder (HCC)     neuropathy    Osteoarthritis     Reflux     Seizures (HCC)     last over 10 yr ago    Stented coronary artery 9/22/2010     LAD/[place]    Stented coronary artery 3/31/16    RCA/[place]    Type II or unspecified type diabetes mellitus without mention of complication, not stated as uncontrolled     Unspecified sleep apnea     no machine      Reviewed all health maintenance requirements and ordered appropriate tests  Health Maintenance Due   Topic Date Due    Diabetic foot exam  12/12/2020    A1C test (Diabetic or Prediabetic)  06/23/2021    Diabetic retinal exam  06/15/2021       Past Surgical History:     Past Surgical History:   Procedure Laterality Date    ABDOMINAL HERNIA REPAIR  1-2016    repair done julian    APPENDECTOMY      CARDIAC CATHETERIZATION Left 4/26/2016    right radial/ [facility]/ Dr Cinthia Willett Left 03/07/2019    Left Radial/[facility]/   [address]    COLONOSCOPY  12/16/14 -hemorrhoids,bx    CORONARY ANGIOPLASTY WITH STENT PLACEMENT  9/2010    CORONARY ANGIOPLASTY WITH STENT PLACEMENT  3-    [facility] / DR Maame Nelson    CORONARY ARTERY BYPASS GRAFT  08/15/2016    OP X 1- Dr Mana Best, COLON, DIAGNOSTIC  12/16/2014    HERNIA REPAIR  12/13/12    at the medial aspect of a [place] RUQ scar); repaired [address]. [address]  02/16/2015    incisional, recurrent    HERNIA REPAIR  02/2016    HYSTERECTOMY      OTHER SURGICAL HISTORY  10/8/2015    abd wound washout, mesh removal, wound vac placement    OH SUCT NICOLE LIPECTOMY,HEAD/NECK Left 8/27/2018    THIGH LESION BIOPSY EXCISION, SOFT TISSUE MASS performed by Sarah Manjarrez MD at [facility] Left 2018    leg    UPPP UVULOPALATOPHARYGOPLASTY  06/26/2012    VENTRAL HERNIA REPAIR  09/21/2015    With Mesh - Dr Dominique Ontiveros        Medications:       Prior to Admission medications    Medication Sig Start Date End Date Taking?  Authorizing Provider   Continuous Blood Gluc Sensor (FREESTYLE MCKAYLA 2 SENSOR) MIS 2 FREESTYLE MCKAYLA 2 SENSORS TO USE WITH MCKAYLA 2 READER TO CHECK BLOOD SUGARS 6 8 TIMES A DAY 5/19/21  Yes Historical Provider, MD   pregabalin (LYRICA) 150 MG capsule Take one cap three times daily 4/20/21 4/20/22 Yes Lahoma Hatchet, MD   ibuprofen (IBU) 800 MG tablet Take 1 tablet by mouth every 8 hours as needed for Pain 4/5/21  Yes BOONE Lr   TRULICITY 1.5 BY/8.9RF SOPN INJECT 1.5 MG INTO THE SKIN ONCE A WEEK 3/30/21  Yes CHELSEA Jimenez CNP   Insulin Degludec (TRESIBA FLEXTOUCH) 200 UNIT/ML SOPN Inject 86 Units into the skin daily PER DR MCELROY  Patient taking differently: Inject 100 Units into the skin daily PER DR MCELROY 3/30/21  Yes CHELSEA Jimenez CNP   metFORMIN (GLUCOPHAGE-XR) 500 MG extended release tablet Take 2 tablets by mouth daily (with breakfast) 3/30/21  Yes CHELSEA Jimenez CNP   lidocaine (LIDODERM) 5 % Place 1 patch onto the skin daily 12 hours on, 12 hours off. 3/8/21  Yes Nasir Madrid DO   omeprazole (PRILOSEC) 20 MG delayed release capsule TAKE 1 CAPSULE BY MOUTH EVERY DAY IN THE MORNING BEFORE BREAKFAST 11/23/20  Yes CHELSEA Carvajal CNP   albuterol sulfate HFA (VENTOLIN HFA) 108 (90 Base) MCG/ACT inhaler INHALE 2 PUFFS EVERY 6 HOURS AS NEEDED FOR WHEEZING 11/11/20  Yes CHELSEA Carvajal - CNP   fludrocortisone (FLORINEF) 0.1 MG tablet TAKE 3 TABLETS BY MOUTH EVERY DAY 10/12/20  Yes Leesa Dela Cruz MD   Saccharomyces boulardii (PROBIOTIC) 250 MG CAPS Take 1 capsule by mouth daily 9/30/20  Yes CHELSEA Carvajal CNP   tiZANidine (ZANAFLEX) 2 MG tablet TAKE 1 TABLET BY MOUTH NIGHTLY AS NEEDED(SPASMS) 9/22/20  Yes Solomon De Leon MD   blood glucose test strips (FREESTYLE LITE) strip USE TO CHECK BLOOD SUGARS 3 TIMES DAILY AND AS NEEDED FOR DIABETES E11.40 9/16/20  Yes CHELSEA Carvajal CNP   acetaminophen (TYLENOL) 500 MG tablet Take 1 tablet by mouth 4 times daily as needed for Pain 9/9/20  Yes CHELSEA So CNP   atorvastatin (LIPITOR) 40 MG tablet Take 1 tablet by mouth daily 7/23/20  Yes CHELSEA Carvajal CNP   losartan (COZAAR) 25 MG tablet Take 1 tablet by mouth daily 7/23/20  Yes CHELSEA Carvajal CNP   insulin lispro, 1 Unit Dial, (HUMALOG KWIKPEN) 100 UNIT/ML SOPN Inject 20 Units into the skin 3 times daily (before meals) 6/23/20  Yes CHELSEA Carvajal CNP   metoprolol succinate (TOPROL XL) 100 MG extended release tablet Take 1 tablet by mouth daily 3/13/20  Yes CHELSEA Carvajal CNP   DULoxetine (CYMBALTA) 30 MG extended release capsule Take 1 capsule by mouth daily 2/10/20  Yes Solomon De Leon MD   aspirin 81 MG EC tablet TAKE 1 TABLET BY MOUTH DAILY  Patient taking differently: Take 81 mg by mouth daily  7/1/19  Yes Maxene Goo Might, APRN - CNP   Insulin Pen Needle (BD ULTRA-FINE PEN NEEDLES) 29G X 12.7MM MISC USE AS DIRECTED BY PHYSICIAN 5 times daily 8/7/18  Yes Elizabeth Colon Might, APRN - CNP   Blood Glucose Monitoring Suppl BERE 1 Units by Does not apply route three times daily Unit insurance will cover 12/29/17  Yes CHELSEA Saldana CNP   Blood Pressure Monitor KIT 1 each by Does not apply route daily as needed ESSENTIAL HYPERTENSION   I10 5/31/16  Yes CHELSEA Trujillo CNP   Blood Glucose Monitoring Suppl (BLOOD GLUCOSE MONITOR KIT) KIT 1 each by Does not apply route daily. Please dispense whatever BS monitoring kit [employer] covers. Dx: 250, new onset T2DM. Testing once daily 2/21/12 7/6/21 Yes CHELSEA Cassidy CNP   ondansetron (ZOFRAN ODT) 4 MG disintegrating tablet Take 1 tablet by mouth every 8 hours as needed for Nausea  Patient not taking: Reported on 7/6/2021 11/10/20   Radha Medina MD   nitroGLYCERIN (NITROSTAT) 0.4 MG SL tablet Place 1 tablet under the tongue every 5 minutes as needed for Chest pain  Patient not taking: Reported on 7/6/2021 9/30/20   CHELSEA Trujillo CNP        Allergies:       Tape Radhames Latin tape]    Social History:     Tobacco:    reports that she quit smoking about 10 years ago. Her smoking use included cigarettes. She has a 20.00 pack-year smoking history. She has never used smokeless tobacco.  Alcohol:      reports no history of alcohol use. Drug Use:  reports no history of drug use. Family History:     Family History   Problem Relation Age of Onset    Cancer Mother     Diabetes Mother     Cancer Father         thyroid    Diabetes Sister     Heart Disease Sister     Heart Disease Brother     Heart Disease Maternal Uncle     Cancer Maternal Grandmother        Review of Systems:     Positive and Negative as described in HPI    Review of Systems   Constitutional: Negative for chills, fatigue and fever. Respiratory: Negative for cough and shortness of breath. Gastrointestinal: Negative for nausea and vomiting. Skin: Positive for wound.        Physical Exam:   Vitals:  /70 (Position: Sitting)   Pulse 88   Temp 97.8 °F (36.6 °C) (Temporal)   Resp 22   Wt 231 lb 14.4 oz (105.2 kg)   LMP 12/05/1996   SpO2 98%   BMI 41.08 kg/m²     Physical Exam  Vitals and nursing note reviewed. Constitutional:       Appearance: Normal appearance. Cardiovascular:      Rate and Rhythm: Normal rate and regular rhythm. Pulmonary:      Effort: Pulmonary effort is normal.      Breath sounds: Normal breath sounds. Musculoskeletal:      Left hand: Laceration present. Arms:       Cervical back: Normal range of motion and neck supple. Skin:     General: Skin is warm and dry. Findings: No erythema. Comments: Superficial avulsion laceration to the medial aspect of the left distal thumb. There is no surrounding erythema or drainage. There is no pain to the area. She is keeping it covered with antibiotic ointment and Band-Aid. Neurological:      Mental Status: She is alert. Data:     Lab Results   Component Value Date     03/23/2021    K 3.9 03/23/2021    CL 97 03/23/2021    CO2 24 03/23/2021    BUN 16 03/23/2021    CREATININE 0.61 03/23/2021    GLUCOSE 179 03/23/2021    GLUCOSE 181 02/16/2012    PROT 8.2 11/09/2020    LABALBU 4.2 11/09/2020    LABALBU 4.2 12/05/2011    BILITOT 0.50 11/09/2020    ALKPHOS 104 11/09/2020    AST 13 03/23/2021    ALT 11 03/23/2021     Lab Results   Component Value Date    WBC 13.1 03/23/2021    RBC 5.59 03/23/2021    RBC 3.88 12/05/2011    HGB 14.5 03/23/2021    HCT 44.4 03/23/2021    MCV 79.4 03/23/2021    MCH 25.9 03/23/2021    MCHC 32.7 03/23/2021    RDW 13.2 03/23/2021     03/23/2021     12/05/2011    MPV 9.1 03/23/2021     Lab Results   Component Value Date    TSH 2.80 06/04/2016     Lab Results   Component Value Date    CHOL 166 03/23/2021    HDL 44 03/23/2021    LABA1C 11.1 03/23/2021       Assessment/Plan:      Diagnosis Orders   1.  Avulsion of skin of left thumb, sequela       Wash daily with soap and water, keep covered with Band-Aid and use triple antibiotic ointment. 1.  Lucy Mathew received counseling on the following healthy behaviors: nutrition, exercise and medication adherence  2. Patient given educational materials - see patient instructions  3. Was a self-tracking handout given in paper form or via [employer]? No  If yes, see orders or list here. 4.  Discussed use, benefit, and side effects of prescribed medications. Barriers to medication compliance addressed. All patient questions answered. Pt voiced understanding. 5.  Reviewed prior labs and health maintenance  6. Continue current medications, diet and exercise. Completed Refills   Requested Prescriptions      No prescriptions requested or ordered in this encounter         Return if symptoms worsen or fail to improve.

## 2021-07-06 NOTE — PATIENT INSTRUCTIONS
SURVEY:    You may be receiving a survey from [employer] regarding your visit today. Please complete the survey to enable us to provide the highest quality of care to you and your family. If you cannot score us a very good on any question, please call the office to discuss how we could have made your experience a very good one. Thank you.

## 2021-07-16 DIAGNOSIS — I10 ESSENTIAL HYPERTENSION: Chronic | ICD-10-CM

## 2021-07-16 RX ORDER — GLUCOSAMINE HCL/CHONDROITIN SU 500-400 MG
CAPSULE ORAL
Qty: 300 STRIP | Refills: 0 | Status: SHIPPED | OUTPATIENT
Start: 2021-07-16

## 2021-07-16 RX ORDER — BLOOD-GLUCOSE METER
1 EACH MISCELLANEOUS DAILY
Qty: 1 KIT | Refills: 0 | Status: SHIPPED | OUTPATIENT
Start: 2021-07-16 | End: 2021-07-20

## 2021-07-16 RX ORDER — LANCETS 30 GAUGE
1 EACH MISCELLANEOUS 2 TIMES DAILY
Qty: 300 EACH | Refills: 1 | Status: SHIPPED | OUTPATIENT
Start: 2021-07-16

## 2021-07-16 NOTE — TELEPHONE ENCOUNTER
Health Maintenance   Topic Date Due    Low dose CT lung screening  09/24/2017    Diabetic foot exam  12/12/2020    Diabetic retinal exam  06/15/2021    A1C test (Diabetic or Prediabetic)  06/23/2021    Hepatitis B vaccine (1 of 3 - Risk 3-dose series) 03/30/2022 (Originally 2/13/1984)    Shingles Vaccine (1 of 2) 07/06/2022 (Originally 2/13/2015)    Flu vaccine (1) 09/01/2021    Breast cancer screen  02/07/2022    Diabetic microalbuminuria test  03/23/2022    Lipid screen  03/23/2022    Potassium monitoring  03/23/2022    Creatinine monitoring  03/23/2022    Colon cancer screen colonoscopy  01/27/2026    Pneumococcal 0-64 years Vaccine (2 of 2 - PPSV23) 02/13/2030    DTaP/Tdap/Td vaccine (2 - Td or Tdap) 07/02/2031    COVID-19 Vaccine  Completed    Hepatitis C screen  Completed    HIV screen  Completed    Hepatitis A vaccine  Aged Out    Hib vaccine  Aged Out    Meningococcal (ACWY) vaccine  Aged Out             (applicable per patient's age: Cancer Screenings, Depression Screening, Fall Risk Screening, Immunizations)    Hemoglobin A1C (%)   Date Value   03/23/2021 11.1 (H)   09/30/2020 11.9   06/19/2020 10.6 (H)     Microalb/Crt.  Ratio (mcg/mg creat)   Date Value   03/23/2021 8     LDL Cholesterol (mg/dL)   Date Value   03/23/2021 83     AST (U/L)   Date Value   03/23/2021 13     ALT (U/L)   Date Value   03/23/2021 11     BUN (mg/dL)   Date Value   03/23/2021 16      (goal A1C is < 7)   (goal LDL is <100) need 30-50% reduction from baseline     BP Readings from Last 3 Encounters:   07/06/21 132/70   07/02/21 (!) 152/86   06/15/21 132/72    (goal /80)      All Future Testing planned in [facility]:  Lab Frequency Next Occurrence   Basic Metabolic Panel Once 57/25/2012   Hemoglobin A1C Once 09/26/2021       Next Visit Date:  Future Appointments   Date Time Provider Emma Gaxiola   9/30/2021  2:20 PM Selin Plasencia APRN - CNP [facility]   10/26/2021 11:20 AM Reanna Rincon [address], MD Neuro Spec [facility]   11/1/2021  9:00 AM Gage Wells MD TIFF C[facility]            Patient Active Problem List:     CAD (coronary artery disease)     Dyslipidemia     Radiculopathy     Diabetes type 2, uncontrolled     Insomnia     Allergic rhinitis     COPD (chronic obstructive pulmonary disease)     Depression     Bilateral leg weakness     Gait difficulty     Diabetic neuropathy     Back pain     Muscle spasm     Affective disorder (HCC)     Morbid obesity with BMI of 40.0-44.9, adult ([facility] [address]     History of ventral hernia repair     History of incisional hernia repair     Essential hypertension     Gastroesophageal reflux disease without esophagitis     Primary osteoarthritis involving multiple joints     Heart palpitations     Dysautonomia orthostatic hypotension syndrome     Coronary artery disease involving native coronary artery of native heart     Angina, class III ([facility])     S/P CABG x 1     Precordial pain     DARON (obstructive sleep apnea)     Morbid obesity (HCC)     Neuropathic pain     Soft tissue mass     Muscle spasms of both lower extremities     Abscess of left great toe     Paronychia of great toe of left foot

## 2021-07-19 ENCOUNTER — TELEPHONE (OUTPATIENT)
Dept: PRIMARY CARE CLINIC | Age: 56
End: 2021-07-19

## 2021-07-19 NOTE — TELEPHONE ENCOUNTER
Received fax from [employer] for diabetic supplies stating that insurance will only cover one touch diabetic supplies. Will need strips, lancets and meter. Called pharmacy because orders were sent on 7/16/21 for One Touch meter and supplies of lancets and test strips. Informed pharmacy to give patient supplies covered by insurance.

## 2021-07-20 RX ORDER — BLOOD-GLUCOSE METER
1 EACH MISCELLANEOUS DAILY
Qty: 1 KIT | Refills: 0 | Status: SHIPPED | OUTPATIENT
Start: 2021-07-20

## 2021-07-20 NOTE — TELEPHONE ENCOUNTER
Pt called the office in regards to a blood glucose monitor device. Patient went to [facility] to  her strips for One Touch but pharmacy did not have a script for a One Touch device. Patient currently has a FreeStyle device. Health Maintenance   Topic Date Due    Low dose CT lung screening  09/24/2017    Diabetic foot exam  12/12/2020    Diabetic retinal exam  06/15/2021    A1C test (Diabetic or Prediabetic)  06/23/2021    Hepatitis B vaccine (1 of 3 - Risk 3-dose series) 03/30/2022 (Originally [birth year]/1984)    Shingles Vaccine (1 of 2) 07/06/2022 (Originally 2/13/2015)    Flu vaccine (1) 09/01/2021    Breast cancer screen  02/07/2022    Diabetic microalbuminuria test  03/23/2022    Lipid screen  03/23/2022    Potassium monitoring  03/23/2022    Creatinine monitoring  03/23/2022    Colon cancer screen colonoscopy  01/27/2026    Pneumococcal 0-64 years Vaccine (2 of 2 - PPSV23) 02/13/2030    DTaP/Tdap/Td vaccine (2 - Td or Tdap) 07/02/2031    COVID-19 Vaccine  Completed    Hepatitis C screen  Completed    HIV screen  Completed    Hepatitis A vaccine  Aged Out    Hib vaccine  Aged Out    Meningococcal (ACWY) vaccine  Aged Out             (applicable per patient's age: Cancer Screenings, Depression Screening, Fall Risk Screening, Immunizations)    Hemoglobin A1C (%)   Date Value   03/23/2021 11.1 (H)   09/30/2020 11.9   06/19/2020 10.6 (H)     Microalb/Crt.  Ratio (mcg/mg creat)   Date Value   03/23/2021 8     LDL Cholesterol (mg/dL)   Date Value   03/23/2021 83     AST (U/L)   Date Value   03/23/2021 13     ALT (U/L)   Date Value   03/23/2021 11     BUN (mg/dL)   Date Value   03/23/2021 16      (goal A1C is < 7)   (goal LDL is <100) need 30-50% reduction from baseline     BP Readings from Last 3 Encounters:   07/06/21 132/70   07/02/21 (!) 152/86   06/15/21 132/72    (goal /80)      All Future Testing planned in [facility]:  Lab Frequency Next Occurrence   Basic Metabolic Panel Once 07/02/0882 Hemoglobin A1C Once 09/26/2021       Next Visit Date:  Future Appointments   Date Time Provider Emma Gaxiola   9/30/2021  2:20 PM CHELSEA Ohara - CNP [facility]   10/26/2021 11:20 AM Cortland Jeans, MD Neuro Spec [facility]   11/1/2021  9:00 AM Senia Peters MD [facility]            Patient Active Problem List:     CAD (coronary artery disease)     Dyslipidemia     Radiculopathy     Diabetes type 2, uncontrolled     Insomnia     Allergic rhinitis     COPD (chronic obstructive pulmonary disease)     Depression     Bilateral leg weakness     Gait difficulty     Diabetic neuropathy     Back pain     Muscle spasm     Affective disorder (HCC)     Morbid obesity with BMI of 40.0-44.9, adult ([facility] [address]     History of ventral hernia repair     History of incisional hernia repair     Essential hypertension     Gastroesophageal reflux disease without esophagitis     Primary osteoarthritis involving multiple joints     Heart palpitations     Dysautonomia orthostatic hypotension syndrome     Coronary artery disease involving native coronary artery of native heart     Angina, class III ([facility])     S/P CABG x 1     Precordial pain     DARON (obstructive sleep apnea)     Morbid obesity (HCC)     Neuropathic pain     Soft tissue mass     Muscle spasms of both lower extremities     Abscess of left great toe     Paronychia of great toe of left foot

## 2021-08-03 ENCOUNTER — OFFICE VISIT (OUTPATIENT)
Dept: PRIMARY CARE CLINIC | Age: 56
End: 2021-08-03
Payer: COMMERCIAL

## 2021-08-03 VITALS
TEMPERATURE: 98.9 F | DIASTOLIC BLOOD PRESSURE: 74 MMHG | SYSTOLIC BLOOD PRESSURE: 128 MMHG | HEART RATE: 90 BPM | RESPIRATION RATE: 22 BRPM | OXYGEN SATURATION: 98 %

## 2021-08-03 DIAGNOSIS — J01.40 ACUTE NON-RECURRENT PANSINUSITIS: Primary | ICD-10-CM

## 2021-08-03 PROCEDURE — 99213 OFFICE O/P EST LOW 20 MIN: CPT | Performed by: NURSE PRACTITIONER

## 2021-08-03 PROCEDURE — G8417 CALC BMI ABV UP PARAM F/U: HCPCS | Performed by: NURSE PRACTITIONER

## 2021-08-03 PROCEDURE — 3017F COLORECTAL CA SCREEN DOC REV: CPT | Performed by: NURSE PRACTITIONER

## 2021-08-03 PROCEDURE — 1036F TOBACCO NON-USER: CPT | Performed by: NURSE PRACTITIONER

## 2021-08-03 PROCEDURE — G8427 DOCREV CUR MEDS BY ELIG CLIN: HCPCS | Performed by: NURSE PRACTITIONER

## 2021-08-03 RX ORDER — AMOXICILLIN AND CLAVULANATE POTASSIUM 875; 125 MG/1; MG/1
1 TABLET, FILM COATED ORAL 2 TIMES DAILY
Qty: 20 TABLET | Refills: 0 | Status: SHIPPED | OUTPATIENT
Start: 2021-08-03 | End: 2021-08-13

## 2021-08-03 ASSESSMENT — ENCOUNTER SYMPTOMS
SHORTNESS OF BREATH: 0
SINUS PRESSURE: 1
SORE THROAT: 1
SWOLLEN GLANDS: 0
HOARSE VOICE: 0
COUGH: 0

## 2021-08-03 NOTE — PATIENT INSTRUCTIONS
SURVEY:    You may be receiving a survey from [employer] regarding your visit today. Please complete the survey to enable us to provide the highest quality of care to you and your family. If you cannot score us a very good on any question, please call the office to discuss how we could have made your experience a very good one. Thank you. Nadia Plasencia, APRN-ANGELIKA Zamudio, APRN-CNP  Tommie Smith, JEANNA Sullivan, Enrrique Bhatti  Alice, PCA    Patient Education        Sinusitis: Care Instructions  Your Care Instructions     Sinusitis is an infection of the lining of the sinus cavities in your head. Sinusitis often follows a cold. It causes pain and pressure in your head and face. In most cases, sinusitis gets better on its own in 1 to 2 weeks. But some mild symptoms may last for several weeks. Sometimes antibiotics are needed. Follow-up care is a key part of your treatment and safety. Be sure to make and go to all appointments, and call your doctor if you are having problems. It's also a good idea to know your test results and keep a list of the medicines you take. How can you care for yourself at home? · Take an over-the-counter pain medicine, such as acetaminophen (Tylenol), ibuprofen (Advil, Motrin), or naproxen (Aleve). Read and follow all instructions on the label. · If the doctor prescribed antibiotics, take them as directed. Do not stop taking them just because you feel better. You need to take the full course of antibiotics. · Be careful when taking over-the-counter cold or flu medicines and Tylenol at the same time. Many of these medicines have acetaminophen, which is Tylenol. Read the labels to make sure that you are not taking more than the recommended dose. Too much acetaminophen (Tylenol) can be harmful. · Breathe warm, moist air from a steamy shower, a hot bath, or a sink filled with hot water. Avoid cold, dry air. Using a humidifier in your home may help.  Follow the directions for cleaning the machine. · Use saline (saltwater) nasal washes. This can help keep your nasal passages open and wash out mucus and bacteria. You can buy saline nose drops at a grocery store or drugstore. Or you can make your own at home by adding 1 teaspoon of salt and 1 teaspoon of baking soda to 2 cups of distilled water. If you make your own, fill a bulb syringe with the solution, insert the tip into your nostril, and squeeze gently. Donovan Lennon your nose. · Put a hot, wet towel or a warm gel pack on your face 3 or 4 times a day for 5 to 10 minutes each time. · Try a decongestant nasal spray like oxymetazoline (Afrin). Do not use it for more than 3 days in a row. Using it for more than 3 days can make your congestion worse. When should you call for help? Call your doctor now or seek immediate medical care if:    · You have new or worse swelling or redness in your face or around your eyes.     · You have a new or higher fever. Watch closely for changes in your health, and be sure to contact your doctor if:    · You have new or worse facial pain.     · The mucus from your nose becomes thicker (like pus) or has new blood in it.     · You are not getting better as expected. Where can you learn more? Go to https://[employer]. org and sign in to your [employer] account. Enter I878 in the [facility] box to learn more about \"Sinusitis: Care Instructions. \"     If you do not have an account, please click on the \"Sign Up Now\" link. Current as of: December 2, 2020               Content Version: 12.9  © 0617-7132 [employer]wise, Incorporated. Care instructions adapted under license by [facility] ([facility]). If you have questions about a medical condition or this instruction, always ask your healthcare professional. [address] any warranty or liability for your use of this information.

## 2021-08-03 NOTE — PROGRESS NOTES
intake, pharmaciologic treatment w/ ProAmatine, SSRI such as Lexapro and/or Mestinon among other treatments have shown some effectiveness in the treatment of this condition.  History of cardiovascular stress test 02/13/2019    Abnormal. Small/moderate perfusion defect of mild/moderate intesity in the anterior and anterolateral regions during stress imaging which is most consistent w/ ischemia but may be artifact. Overall low/intermediate risk for significant CAD.  History of echocardiogram 09/06/2018    EF >60%. Inferoseptal wall is abnormal in its motion which is not unusual s/p open heart. Evidence of mild (grade I) diastolic dysfunction seen.     Hx of blood clots     Hyperlipidemia     Hypertension     Intermittent claudication (HCC)     Kidney stones     Movement disorder     neuropathy in legs    Neuromuscular disorder (HCC)     neuropathy    Osteoarthritis     Reflux     Seizures (HCC)     last over 10 yr ago    Stented coronary artery 9/22/2010     LAD/[place]    Stented coronary artery 3/31/16    RCA/[place]    Type II or unspecified type diabetes mellitus without mention of complication, not stated as uncontrolled     Unspecified sleep apnea     no machine      Reviewed all health maintenance requirements and ordered appropriate tests  Health Maintenance Due   Topic Date Due    Diabetic foot exam  12/12/2020    A1C test (Diabetic or Prediabetic)  06/23/2021       Past Surgical History:     Past Surgical History:   Procedure Laterality Date    ABDOMINAL HERNIA REPAIR  1-2016    repair done julian    APPENDECTOMY      CARDIAC CATHETERIZATION Left 4/26/2016    right radial/ [facility] [place]/ Dr Mackenzie Barton Left 03/07/2019    Left Radial/[facility] [place]/    CARDIAC SURGERY      CHOLECYSTECTOMY  1991    COLONOSCOPY  12/16/14    -hemorrhoids,bx    CORONARY ANGIOPLASTY WITH STENT PLACEMENT  9/2010    CORONARY ANGIOPLASTY WITH STENT PLACEMENT (TRESIBA FLEXTOUCH) 200 UNIT/ML SOPN Inject 86 Units into the skin daily PER DR MCELROY  Patient taking differently: Inject 110 Units into the skin daily PER DR MCELROY 3/30/21  Yes CHELSEA Bennett CNP   metFORMIN (GLUCOPHAGE-XR) 500 MG extended release tablet Take 2 tablets by mouth daily (with breakfast) 3/30/21  Yes CHELSEA Bennett CNP   omeprazole (PRILOSEC) 20 MG delayed release capsule TAKE 1 CAPSULE BY MOUTH EVERY DAY IN THE MORNING BEFORE BREAKFAST 11/23/20  Yes CHELSEA Bennett CNP   albuterol sulfate HFA (VENTOLIN HFA) 108 (90 Base) MCG/ACT inhaler INHALE 2 PUFFS EVERY 6 HOURS AS NEEDED FOR WHEEZING 11/11/20  Yes CHELSEA Bennett CNP   fludrocortisone (FLORINEF) 0.1 MG tablet TAKE 3 TABLETS BY MOUTH EVERY DAY 10/12/20  Yes Rajwinder Mccurdy MD   Saccharomyces boulardii (PROBIOTIC) 250 MG CAPS Take 1 capsule by mouth daily 9/30/20  Yes CHELSEA Bennett CNP   tiZANidine (ZANAFLEX) 2 MG tablet TAKE 1 TABLET BY MOUTH NIGHTLY AS NEEDED(SPASMS) 9/22/20  Yes Erik Thayer MD   acetaminophen (TYLENOL) 500 MG tablet Take 1 tablet by mouth 4 times daily as needed for Pain 9/9/20  Yes CHELSEA So CNP   atorvastatin (LIPITOR) 40 MG tablet Take 1 tablet by mouth daily 7/23/20  Yes CHELSEA Bennett CNP   losartan (COZAAR) 25 MG tablet Take 1 tablet by mouth daily 7/23/20  Yes CHELSEA Bennett CNP   insulin lispro, 1 Unit Dial, (HUMALOG KWIKPEN) 100 UNIT/ML SOPN Inject 20 Units into the skin 3 times daily (before meals) 6/23/20  Yes CHELSEA Bennett CNP   metoprolol succinate (TOPROL XL) 100 MG extended release tablet Take 1 tablet by mouth daily 3/13/20  Yes CHELSEA Bennett CNP   DULoxetine (CYMBALTA) 30 MG extended release capsule Take 1 capsule by mouth daily 2/10/20  Yes Erik Thayer MD   aspirin 81 MG EC tablet TAKE 1 TABLET BY MOUTH DAILY  Patient taking differently: Take 81 mg by mouth daily  7/1/19  Yes Ludmila Swartz Might, APRN - CNP   Insulin Pen Needle (BD ULTRA-FINE PEN NEEDLES) 29G X 12.7MM MISC USE AS DIRECTED BY PHYSICIAN 5 times daily 8/7/18  Yes CHELSEA [place] CNP   Blood Glucose Monitoring Suppl BERE 1 Units by Does not apply route three times daily Unit insurance will cover 12/29/17  Yes CHELSEA Stratton CNP   Blood Glucose Monitoring Suppl (BLOOD GLUCOSE MONITOR KIT) KIT 1 each by Does not apply route daily. Please dispense whatever BS monitoring kit [employer] covers. Dx: 250, new onset T2DM. Testing once daily 2/21/12 8/3/21 Yes CHELSEA Vasquez CNP   TRULICITY 1.5 AA/9.8HU SOPN INJECT 1.5 MG INTO THE SKIN ONCE A WEEK 3/30/21   CHELSEA [place] CNP   lidocaine (LIDODERM) 5 % Place 1 patch onto the skin daily 12 hours on, 12 hours off. Patient not taking: Reported on 8/3/2021 3/8/21   Aimee Madrid DO   ondansetron (ZOFRAN ODT) 4 MG disintegrating tablet Take 1 tablet by mouth every 8 hours as needed for Nausea  Patient not taking: Reported on 7/6/2021 11/10/20   Pritesh Gaines MD   nitroGLYCERIN (NITROSTAT) 0.4 MG SL tablet Place 1 tablet under the tongue every 5 minutes as needed for Chest pain  Patient not taking: Reported on 7/6/2021 9/30/20   CHELSEA [place] CNP   Blood Pressure Monitor KIT 1 each by Does not apply route daily as needed ESSENTIAL HYPERTENSION   I10  Patient not taking: Reported on 8/3/2021 5/31/16   CHELSEA [place] CNP        Allergies:       Tape Olivier Chavis tape]    Social History:     Tobacco:    reports that she quit smoking about 10 years ago. Her smoking use included cigarettes. She has a 20.00 pack-year smoking history. She has never used smokeless tobacco.  Alcohol:      reports no history of alcohol use. Drug Use:  reports no history of drug use.     Family History:     Family History   Problem Relation Age of Onset   Jeannie Clark Cancer Mother     Diabetes Mother     Cancer Father         thyroid    Diabetes Sister     Heart Disease Sister     Heart Disease Brother     Heart Disease Maternal Uncle     Cancer Maternal Grandmother        Review of Systems:     Positive and Negative as described in HPI    Review of Systems   Constitutional: Positive for chills and fever. Negative for diaphoresis. HENT: Positive for congestion, ear pain, sinus pressure, sneezing and sore throat. Negative for hoarse voice. Respiratory: Negative for cough and shortness of breath. Musculoskeletal: Negative for neck pain. Neurological: Positive for headaches. Physical Exam:   Vitals:  /74   Pulse 90   Temp 98.9 °F (37.2 °C) (Temporal)   Resp 22   LMP 12/05/1996   SpO2 98%     Physical Exam  Vitals and nursing note reviewed. Constitutional:       General: She is not in acute distress. Appearance: She is well-developed. She is ill-appearing. HENT:      Right Ear: Ear canal normal. A middle ear effusion is present. Left Ear: Ear canal normal.  No middle ear effusion. Nose: Mucosal edema and rhinorrhea present. Right Sinus: Maxillary sinus tenderness and frontal sinus tenderness present. Left Sinus: Maxillary sinus tenderness and frontal sinus tenderness present. Mouth/Throat:      Mouth: Mucous membranes are not dry. Pharynx: Posterior oropharyngeal erythema present. Eyes:      Conjunctiva/sclera: Conjunctivae normal.   Cardiovascular:      Rate and Rhythm: Normal rate and regular rhythm. Heart sounds: Normal heart sounds. Pulmonary:      Effort: Pulmonary effort is normal.      Breath sounds: Normal breath sounds. No wheezing. Musculoskeletal:      Cervical back: Normal range of motion and neck supple. Lymphadenopathy:      Cervical: No cervical adenopathy. Skin:     General: Skin is warm and dry. Findings: No rash. Neurological:      Mental Status: She is alert.          Data:     Lab Results   Component Value Date     03/23/2021    K 3.9 03/23/2021    CL 97 03/23/2021    CO2 24 03/23/2021    BUN 16 03/23/2021 CREATININE 0.61 03/23/2021    GLUCOSE 179 03/23/2021    GLUCOSE 181 02/16/2012    PROT 8.2 11/09/2020    LABALBU 4.2 11/09/2020    LABALBU 4.2 12/05/2011    BILITOT 0.50 11/09/2020    ALKPHOS 104 11/09/2020    AST 13 03/23/2021    ALT 11 03/23/2021     Lab Results   Component Value Date    WBC 13.1 03/23/2021    RBC 5.59 03/23/2021    RBC 3.88 12/05/2011    HGB 14.5 03/23/2021    HCT 44.4 03/23/2021    MCV 79.4 03/23/2021    MCH 25.9 03/23/2021    MCHC 32.7 03/23/2021    RDW 13.2 03/23/2021     03/23/2021     12/05/2011    MPV 9.1 03/23/2021     Lab Results   Component Value Date    TSH 2.80 06/04/2016     Lab Results   Component Value Date    CHOL 166 03/23/2021    HDL 44 03/23/2021    LABA1C 11.1 03/23/2021       Assessment/Plan:      Diagnosis Orders   1. Acute non-recurrent pansinusitis  amoxicillin-clavulanate (AUGMENTIN) 875-125 MG per tablet       1. Eduarda Dinh received counseling on the following healthy behaviors: nutrition and medication adherence  2. Patient given educational materials - see patient instructions  3. Was a self-tracking handout given in paper form or via [employer]? No  If yes, see orders or list here. 4.  Discussed use, benefit, and side effects of prescribed medications. Barriers to medication compliance addressed. All patient questions answered. Pt voiced understanding. 5.  Reviewed prior labs and health maintenance  6. Continue current medications, diet and exercise. Completed Refills   Requested Prescriptions     Signed Prescriptions Disp Refills    amoxicillin-clavulanate (AUGMENTIN) 875-125 MG per tablet 20 tablet 0     Sig: Take 1 tablet by mouth 2 times daily for 10 days         Return if symptoms worsen or fail to improve.

## 2021-09-30 ENCOUNTER — OFFICE VISIT (OUTPATIENT)
Dept: PRIMARY CARE CLINIC | Age: 56
End: 2021-09-30
Payer: COMMERCIAL

## 2021-09-30 VITALS
DIASTOLIC BLOOD PRESSURE: 78 MMHG | HEART RATE: 88 BPM | OXYGEN SATURATION: 98 % | TEMPERATURE: 97.8 F | SYSTOLIC BLOOD PRESSURE: 122 MMHG | RESPIRATION RATE: 20 BRPM | BODY MASS INDEX: 41.08 KG/M2 | WEIGHT: 231.9 LBS

## 2021-09-30 DIAGNOSIS — K21.9 GASTROESOPHAGEAL REFLUX DISEASE WITHOUT ESOPHAGITIS: ICD-10-CM

## 2021-09-30 PROCEDURE — 99214 OFFICE O/P EST MOD 30 MIN: CPT | Performed by: NURSE PRACTITIONER

## 2021-09-30 PROCEDURE — G8427 DOCREV CUR MEDS BY ELIG CLIN: HCPCS | Performed by: NURSE PRACTITIONER

## 2021-09-30 PROCEDURE — 3046F HEMOGLOBIN A1C LEVEL >9.0%: CPT | Performed by: NURSE PRACTITIONER

## 2021-09-30 PROCEDURE — 2022F DILAT RTA XM EVC RTNOPTHY: CPT | Performed by: NURSE PRACTITIONER

## 2021-09-30 PROCEDURE — G8417 CALC BMI ABV UP PARAM F/U: HCPCS | Performed by: NURSE PRACTITIONER

## 2021-09-30 PROCEDURE — 1036F TOBACCO NON-USER: CPT | Performed by: NURSE PRACTITIONER

## 2021-09-30 PROCEDURE — 83036 HEMOGLOBIN GLYCOSYLATED A1C: CPT | Performed by: NURSE PRACTITIONER

## 2021-09-30 PROCEDURE — 3017F COLORECTAL CA SCREEN DOC REV: CPT | Performed by: NURSE PRACTITIONER

## 2021-09-30 ASSESSMENT — ENCOUNTER SYMPTOMS
HEMOPTYSIS: 0
WATER BRASH: 0
RHINORRHEA: 0
VISUAL CHANGE: 0
CHEST TIGHTNESS: 0
SPUTUM PRODUCTION: 0
ABDOMINAL PAIN: 0
VOMITING: 0
FREQUENT THROAT CLEARING: 0
GLOBUS SENSATION: 0
SHORTNESS OF BREATH: 0
SORE THROAT: 0
BELCHING: 0
DIFFICULTY BREATHING: 0
STRIDOR: 0
HEARTBURN: 1
CONSTIPATION: 0
WHEEZING: 0
DIARRHEA: 0

## 2021-09-30 ASSESSMENT — COPD QUESTIONNAIRES: COPD: 1

## 2021-09-30 NOTE — PATIENT INSTRUCTIONS
SURVEY:    You may be receiving a survey from [employer] regarding your visit today. Please complete the survey to enable us to provide the highest quality of care to you and your family. If you cannot score us a very good on any question, please call the office to discuss how we could have made your experience a very good one. Thank you. Joss Plasencia, APRN-ANGELIKA Panchal, ANGELIKA Rossi, LPN Theodis Mcburney, LPN Joya Alcon, [place]  Alice, PCA    Patient Education        Counting Carbohydrates for Diabetes: Care Instructions  Your Care Instructions     You don't have to eat special foods when you have diabetes. You just have to be careful to eat healthy foods. Carbohydrates (carbs) raise blood sugar higher and quicker than any other nutrient. Carbs are found in desserts, breads and cereals, and fruit. They're also in starchy vegetables. These include potatoes, corn, and grains such as rice and pasta. Carbs are also in milk and yogurt. The more carbs you eat at one time, the higher your blood sugar will rise. Spreading carbs all through the day helps keep your blood sugar levels within your target range. Counting carbs is one of the best ways to keep your blood sugar under control. If you use insulin, counting carbs helps you match the right amount of insulin to the number of grams of carbs in a meal. Then you can change your diet and insulin dose as needed. Testing your blood sugar several times a day can help you learn how carbs affect your blood sugar. A registered dietitian or certified diabetes educator can help you plan meals and snacks. Follow-up care is a key part of your treatment and safety. Be sure to make and go to all appointments, and call your doctor if you are having problems. It's also a good idea to know your test results and keep a list of the medicines you take. How can you care for yourself at home?   Know your daily amount of carbohydrates  Your daily amount depends on several things, such as your weight, how active you are, which diabetes medicines you take, and what your goals are for your blood sugar levels. A registered dietitian or certified diabetes educator can help you plan how many carbs to include in each meal and snack. For most adults, a guideline for the daily amount of carbs is:  · 45 to 60 grams at each meal. That's about the same as 3 to 4 carbohydrate servings. · 15 to 20 grams at each snack. That's about the same as 1 carbohydrate serving. Count carbs  Counting carbs lets you know how much rapid-acting insulin to take before you eat. If you use an insulin pump, you get a constant rate of insulin during the day. So the pump must be programmed at meals. This gives you extra insulin to cover the rise in blood sugar after meals. If you take insulin:  · Learn your own insulin-to-carb ratio. You and your diabetes health professional will figure out the ratio. You can do this by testing your blood sugar after meals. For example, you may need a certain amount of insulin for every 15 grams of carbs. · Add up the carb grams in a meal. Then you can figure out how many units of insulin to take based on your insulin-to-carb ratio. · Exercise lowers blood sugar. You can use less insulin than you would if you were not doing exercise. Keep in mind that timing matters. If you exercise within 1 hour after a meal, your body may need less insulin for that meal than it would if you exercised 3 hours after the meal. Test your blood sugar to find out how exercise affects your need for insulin. If you do or don't take insulin:  · Look at labels on packaged foods. This can tell you how many carbs are in a serving. You can also use guides from the American Diabetes Association. · Be aware of portions, or serving sizes. If a package has two servings and you eat the whole package, you need to double the number of grams of carbohydrate listed for one serving.   · Protein, fat, and fiber do not raise blood sugar as much as carbs do. If you eat a lot of these nutrients in a meal, your blood sugar will rise more slowly than it would otherwise. Eat from all food groups  · Eat at least three meals a day. · Plan meals to include food from all the food groups. The food groups include grains, fruits, dairy, proteins, and vegetables. · Talk to your dietitian or diabetes educator about ways to add limited amounts of sweets into your meal plan. · If you drink alcohol, talk to your doctor. It may not be recommended when you are taking certain diabetes medicines. Where can you learn more? Go to https://Pictour.uspepiceweb.[facility]. org and sign in to your [employer] account. Enter U520 in the Pax8 box to learn more about \"Counting Carbohydrates for Diabetes: Care Instructions. \"     If you do not have an account, please click on the \"Sign Up Now\" link. Current as of: August 31, 2020               Content Version: 13.0  © 2006-2021 Health[employer]. Care instructions adapted under license by [facility] ([facility]). If you have questions about a medical condition or this instruction, always ask your healthcare professional. [address] any warranty or liability for your use of this information.

## 2021-09-30 NOTE — PROGRESS NOTES
Name: Augustin Farrell  : 1965         Chief Complaint:     Chief Complaint   Patient presents with    Diabetes     routine check, no concerns. A1C 9.1    COPD       History of Present Illness:      Augustin Farrell is a 64 y.o.  female who presents with Diabetes (routine check, no concerns. A1C 9.1) and COPD      Yfn Orozco is here today for a routine office visit. DM-she did have some initial improvement after seeing the diabetologist.  However unfortunately her A1c is back to 11.1. See below for further detail. Diabetes  She presents for her follow-up diabetic visit. She has type 2 diabetes mellitus. Her disease course has been stable. There are no hypoglycemic associated symptoms. Pertinent negatives for hypoglycemia include no dizziness or headaches. Associated symptoms include foot paresthesias. Pertinent negatives for diabetes include no fatigue, no polydipsia, no polyphagia, no polyuria, no visual change and no weakness. There are no hypoglycemic complications. Symptoms are worsening. Diabetic complications include heart disease and peripheral neuropathy. Pertinent negatives for diabetic complications include no CVA or nephropathy. Risk factors for coronary artery disease include diabetes mellitus, obesity and sedentary lifestyle. Current diabetic treatment includes insulin injections and oral agent (dual therapy). She is compliant with treatment all of the time. Her weight is stable. She is following a generally healthy diet. Meal planning includes avoidance of concentrated sweets. She has had a previous visit with a dietitian. She never participates in exercise. There is no change in her home blood glucose trend. Her breakfast blood glucose range is generally >200 mg/dl. An ACE inhibitor/angiotensin II receptor blocker is being taken. She does not see a podiatrist.Eye exam is current. Gastroesophageal Reflux  She complains of heartburn.  She reports no abdominal pain, no belching, no dysphagia, no early satiety, no globus sensation, no sore throat, no stridor, no water brash or no wheezing. This is a chronic problem. The current episode started more than 1 year ago. The problem occurs rarely. The problem has been unchanged. The heartburn duration is less than a minute. The heartburn is located in the substernum. The heartburn is of mild intensity. The heartburn does not wake her from sleep. The heartburn does not limit her activity. The heartburn doesn't change with position. Pertinent negatives include no fatigue. Risk factors include caffeine use, lack of exercise and obesity. She has tried a PPI for the symptoms. The treatment provided significant relief. Past procedures do not include an EGD or a UGI. Past invasive treatments do not include gastroplasty, gastroplication or reflux surgery. Past Medical History:     Past Medical History:   Diagnosis Date    Anxiety     Asthma     CAD (coronary artery disease)     Clinical trial participant at discharge 3/31/16    [facility] [address]     Depression     Diabetic neuropathy ([facility] [address]     H/O cardiac catheterization 4/26/16    LMCA: Mild Irregularities 10-20%. LAD: Lesion on Prox LAD: Proximal subsection. 100% stenosis. LCx: Mild irregularities 10-20%. RCA: Mild irregularities 10-20%. Widely patent mid RCA stent. EF:60%.  H/O cardiovascular stress test 10/07/2016    Overall results are most consistent with a low risk for significant CAD.     H/O echocardiogram 10/05/2016    EF:>60%. Inferoseptal wall abnormal in its motion which is not unusal status post open heart surgery. Evidence of mild (grade I) diastolic dysfunction is seen.  H/O tilt table evaluation 07/12/2016    Abnormal. PTs HR, Blood Pressure response and symptoms were most consistent with dysautonomia.  Combined w/viligant maintenance of euvolemia and maintaining a moderate salt intake, pharmaciologic treatment w/ ProAmatine, SSRI such as Lexapro ENDOSCOPY, COLON, DIAGNOSTIC  12/16/2014    HERNIA REPAIR  12/13/12    at the medial aspect of a [place] RUQ scar); [address]  02/16/2015    incisional, recurrent    HERNIA REPAIR  02/2016    HYSTERECTOMY      OTHER SURGICAL HISTORY  10/8/2015    abd wound washout, mesh removal, wound vac placement    MA SUCT NICOLE LIPECTOMY,HEAD/NECK Left 8/27/2018    THIGH LESION BIOPSY EXCISION, SOFT TISSUE MASS performed [facility]    leg    UPPP UVULOPALATOPHARYGOPLASTY  06/26/2012    VENTRAL HERNIA REPAIR  09/21/2015    With Mesh - Dr Katie Luther        Medications:       Prior to Admission medications    Medication Sig Start Date End Date Taking? Authorizing Provider   Blood Glucose Monitoring Suppl (ONE TOUCH ULTRA 2) w/Device KIT 1 kit by Does not apply route daily 7/20/21  Yes CHELSEA Jane CNP   blood glucose monitor strips Test 3 times a day & as needed for symptoms of irregular blood glucose. Dispense sufficient amount for indicated testing frequency plus additional to accommodate PRN testing needs.  7/16/21  Yes [de-identified] M CHELSEA Zamudio CNP   Lancets MISC 1 each by Does not apply route 2 times daily 7/16/21  Yes CHELSEA So CNP   Continuous Blood Gluc Sensor (FREESTYLE MCKAYLA 2 SENSOR) MISC 2 FREESTYLE MCKAYLA 2 SENSORS TO USE WITH MCKAYLA 2 READER TO CHECK BLOOD SUGARS 6 8 TIMES A DAY 5/19/21  Yes Historical Provider, MD   pregabalin (LYRICA) 150 MG capsule Take one cap three times daily 4/20/21 4/20/22 Yes Alfredo Zarco MD   ibuprofen (IBU) 800 MG tablet Take 1 tablet by mouth every 8 hours as needed for Pain 4/5/21  Yes BOONE Lr   TRULICITY 1.5 UU/8.6NO SOPN INJECT 1.5 MG INTO THE SKIN ONCE A WEEK 3/30/21  Yes CHELSEA Jane CNP   Insulin Degludec (TRESIBA FLEXTOUCH) 200 UNIT/ML SOPN Inject 86 Units into the skin daily PER DR MCELROY  Patient taking differently: Inject 110 Units into the skin daily PER DR MCELROY 3/30/21  Yes [facility] CHELSEA Plasencia CNP   metFORMIN (GLUCOPHAGE-XR) 500 MG extended release tablet Take 2 tablets by mouth daily (with breakfast) 3/30/21  Yes [facility] CHELSEA Plasencia CNP   omeprazole (PRILOSEC) 20 MG delayed release capsule TAKE 1 CAPSULE BY MOUTH EVERY DAY IN THE MORNING BEFORE BREAKFAST 11/23/20  Yes [facility] CHELSEA Plasencia CNP   albuterol sulfate HFA (VENTOLIN HFA) 108 (90 Base) MCG/ACT inhaler INHALE 2 PUFFS EVERY 6 HOURS AS NEEDED FOR WHEEZING 11/11/20  Yes [employer] MightCHELSEA CNP   fludrocortisone (FLORINEF) 0.1 MG tablet TAKE 3 TABLETS BY MOUTH EVERY DAY 10/12/20  Yes Chaitanya Wilson MD   Saccharomyces boulardii (PROBIOTIC) 250 MG CAPS Take 1 capsule by mouth daily 9/30/20  Yes [employer] Might, APRN - CNP   tiZANidine (ZANAFLEX) 2 MG tablet TAKE 1 TABLET BY MOUTH NIGHTLY AS NEEDED(SPASMS) 9/22/20  Yes Faraz Johnson MD   acetaminophen (TYLENOL) 500 MG tablet Take 1 tablet by mouth 4 times daily as needed for Pain 9/9/20  Yes CHELSEA So CNP   atorvastatin (LIPITOR) 40 MG tablet Take 1 tablet by mouth daily 7/23/20  Yes [facility] Might, APRN - CNP   losartan (COZAAR) 25 MG tablet Take 1 tablet by mouth daily 7/23/20  Yes [employer] Might, APRN - CNP   insulin lispro, 1 Unit Dial, (HUMALOG KWIKPEN) 100 UNIT/ML SOPN Inject 20 Units into the skin 3 times daily (before meals) 6/23/20  Yes [facility] CHELSEA Plasencia CNP   metoprolol succinate (TOPROL XL) 100 MG extended release tablet Take 1 tablet by mouth daily 3/13/20  Yes [facility] CHELSEA Plasencia CNP   DULoxetine (CYMBALTA) 30 MG extended release capsule Take 1 capsule by mouth daily 2/10/20  Yes Faraz Johnson MD   aspirin 81 MG EC tablet TAKE 1 TABLET BY MOUTH DAILY  Patient taking differently: Take 81 mg by mouth daily  7/1/19  Yes [facility] CHELSEA Plasencia CNP   Insulin Pen Needle (BD ULTRA-FINE PEN NEEDLES) 29G X 12.7MM MISC USE AS DIRECTED BY PHYSICIAN 5 times daily 8/7/18  Yes [facility] Prashanth Might, APRN - CNP   Blood Glucose Monitoring Suppl BERE 1 Units by Does not apply route three times daily Unit insurance will cover 12/29/17  Yes CHELSEA Stratton CNP   Blood Pressure Monitor KIT 1 each by Does not apply route daily as needed ESSENTIAL HYPERTENSION   I10 5/31/16  Yes CHELSEA Washington CNP   Blood Glucose Monitoring Suppl (BLOOD GLUCOSE MONITOR KIT) KIT 1 each by Does not apply route daily. Please dispense whatever BS monitoring kit [employer] covers. Dx: 250, new onset T2DM. Testing once daily 2/21/12 9/30/21 Yes CHELSEA Vasquez CNP   lidocaine (LIDODERM) 5 % Place 1 patch onto the skin daily 12 hours on, 12 hours off. Patient not taking: Reported on 8/3/2021 3/8/21   Aimee Madrid DO   ondansetron (ZOFRAN ODT) 4 MG disintegrating tablet Take 1 tablet by mouth every 8 hours as needed for Nausea  Patient not taking: Reported on 7/6/2021 11/10/20   Pritesh Gaines MD   nitroGLYCERIN (NITROSTAT) 0.4 MG SL tablet Place 1 tablet under the tongue every 5 minutes as needed for Chest pain  Patient not taking: Reported on 7/6/2021 9/30/20   CHELSEA Washington CNP        Allergies:       Tape Olivier Chavis tape]    Social History:     Tobacco:    reports that she quit smoking about 11 years ago. Her smoking use included cigarettes. She has a 20.00 pack-year smoking history. She has never used smokeless tobacco.  Alcohol:      reports no history of alcohol use. Drug Use:  reports no history of drug use. Family History:     Family History   Problem Relation Age of Onset    Cancer Mother     Diabetes Mother     Cancer Father         thyroid    Diabetes Sister     Heart Disease Sister     Heart Disease Brother     Heart Disease Maternal Uncle     Cancer Maternal Grandmother        Review of Systems:     Positive and Negative as described in HPI    Review of Systems   Constitutional: Negative for appetite change, chills, fatigue and fever.    HENT: Negative for congestion, postnasal drip, rhinorrhea, sneezing and sore throat. Eyes: Negative for visual disturbance. Respiratory: Negative for hemoptysis, sputum production, shortness of breath and wheezing. Cardiovascular: Negative for palpitations. Gastrointestinal: Positive for heartburn. Negative for abdominal pain, constipation, diarrhea, dysphagia and vomiting. Endocrine: Negative for polydipsia, polyphagia and polyuria. Genitourinary: Negative for difficulty urinating and dysuria. Musculoskeletal: Negative for gait problem, neck pain and neck stiffness. Skin: Negative for rash. Neurological: Negative for dizziness, syncope, weakness, light-headedness and headaches. Physical Exam:   Vitals:  /78 (Position: Sitting)   Pulse 88   Temp 97.8 °F (36.6 °C) (Temporal)   Resp 20   Wt 231 lb 14.4 oz (105.2 kg)   LMP 12/05/1996   SpO2 98%   BMI 41.08 kg/m²     Physical Exam  Vitals and nursing note reviewed. Constitutional:       General: She is not in acute distress. Appearance: Normal appearance. She is well-developed. She is obese. She is not ill-appearing. HENT:      Mouth/Throat:      Mouth: Mucous membranes are moist.   Eyes:      General: No scleral icterus. Conjunctiva/sclera: Conjunctivae normal.   Cardiovascular:      Rate and Rhythm: Normal rate and regular rhythm. Heart sounds: No murmur heard. Pulmonary:      Effort: Pulmonary effort is normal.      Breath sounds: Normal breath sounds. No wheezing or rales. Abdominal:      General: Bowel sounds are normal. There is no distension. Palpations: Abdomen is soft. Tenderness: There is no abdominal tenderness. Comments: Obese abdomen   Musculoskeletal:      Cervical back: Normal range of motion and neck supple. Right lower leg: No edema. Left lower leg: No edema. Lymphadenopathy:      Cervical: No cervical adenopathy. Skin:     General: Skin is warm and dry.    Neurological:      Mental Status: She is alert and oriented to person, place, and time. Psychiatric:         Mood and Affect: Mood normal.         Behavior: Behavior normal.         Data:     Lab Results   Component Value Date     03/23/2021    K 3.9 03/23/2021    CL 97 03/23/2021    CO2 24 03/23/2021    BUN 16 03/23/2021    CREATININE 0.61 03/23/2021    GLUCOSE 179 03/23/2021    GLUCOSE 181 02/16/2012    PROT 8.2 11/09/2020    LABALBU 4.2 11/09/2020    LABALBU 4.2 12/05/2011    BILITOT 0.50 11/09/2020    ALKPHOS 104 11/09/2020    AST 13 03/23/2021    ALT 11 03/23/2021     Lab Results   Component Value Date    WBC 13.1 03/23/2021    RBC 5.59 03/23/2021    RBC 3.88 12/05/2011    HGB 14.5 03/23/2021    HCT 44.4 03/23/2021    MCV 79.4 03/23/2021    MCH 25.9 03/23/2021    MCHC 32.7 03/23/2021    RDW 13.2 03/23/2021     03/23/2021     12/05/2011    MPV 9.1 03/23/2021     Lab Results   Component Value Date    TSH 2.80 06/04/2016     Lab Results   Component Value Date    CHOL 166 03/23/2021    HDL 44 03/23/2021    LABA1C 11.1 03/23/2021       Assessment/Plan:      Diagnosis Orders   1. Uncontrolled type 2 diabetes mellitus with diabetic polyneuropathy, with long-term current use of insulin ([facility])  POCT glycosylated hemoglobin (Hb A1C)    CBC Auto Differential    ALT    AST    Basic Metabolic Panel    Hemoglobin A1C    Lipid Panel    Microalbumin, Ur   2. Gastroesophageal reflux disease without esophagitis       Continue all current medications. Continue care with diabetologist.    We will see her back in 3 months with labs. Sooner if any problems. 1.  Cindi Low received counseling on the following healthy behaviors: nutrition, exercise and medication adherence  2. Patient given educational materials - see patient instructions  3. Was a self-tracking handout given in paper form or via [employer]? No  If yes, see orders or list here. 4.  Discussed use, benefit, and side effects of prescribed medications. Barriers to medication compliance addressed.   All patient questions answered. Pt voiced understanding. 5.  Reviewed prior labs and health maintenance  6. Continue current medications, diet and exercise. Completed Refills   Requested Prescriptions      No prescriptions requested or ordered in this encounter         Return in about 3 months (around 12/30/2021) for Check up.

## 2021-10-01 LAB — HBA1C MFR BLD: 9.4 %

## 2021-10-13 RX ORDER — FLUDROCORTISONE ACETATE 0.1 MG/1
TABLET ORAL
Qty: 270 TABLET | Refills: 3 | Status: SHIPPED | OUTPATIENT
Start: 2021-10-13 | End: 2022-10-06

## 2021-10-23 NOTE — PROGRESS NOTES
for Pain 40 tablet 0    atorvastatin (LIPITOR) 40 MG tablet Take 1 tablet by mouth daily 90 tablet 3    losartan (COZAAR) 25 MG tablet Take 1 tablet by mouth daily 90 tablet 3    insulin lispro, 1 Unit Dial, (HUMALOG KWIKPEN) 100 UNIT/ML SOPN Inject 20 Units into the skin 3 times daily (before meals) 12 pen 1    metoprolol succinate (TOPROL XL) 100 MG extended release tablet Take 1 tablet by mouth daily 90 tablet 3    DULoxetine (CYMBALTA) 30 MG extended release capsule Take 1 capsule by mouth daily 30 capsule 1    aspirin 81 MG EC tablet TAKE 1 TABLET BY MOUTH DAILY (Patient taking differently: Take 81 mg by mouth daily ) 90 tablet 3    Insulin Pen Needle (BD ULTRA-FINE PEN NEEDLES) 29G X 12.7MM MISC USE AS DIRECTED BY PHYSICIAN 5 times daily 150 each 11    Blood Glucose Monitoring Suppl BERE 1 Units by Does not apply route three times daily Unit insurance will cover 1 Device 0    Blood Pressure Monitor KIT 1 each by Does not apply route daily as needed ESSENTIAL HYPERTENSION   I10 1 kit 0    Blood Glucose Monitoring Suppl (BLOOD GLUCOSE MONITOR KIT) KIT 1 each by Does not apply route daily. Please dispense whatever BS monitoring kit [facility] covers. Dx: 250, new onset T2DM. Testing once daily 1 kit 0     No current facility-administered medications on file prior to visit. Allergies: Lucille Grant is allergic to tape [adhesive tape]. Past Medical History:   Diagnosis Date    Anxiety     Asthma     CAD (coronary artery disease)     Clinical trial participant at discharge 3/31/16    COPD [facility]) [facility] [address]     Depression     Diabetic neuropathy ([facility] [address]     H/O cardiac catheterization 4/26/16    LMCA: Mild Irregularities 10-20%. LAD: Lesion on Prox LAD: Proximal subsection. 100% stenosis. LCx: Mild irregularities 10-20%. RCA: Mild irregularities 10-20%. Widely patent mid RCA stent. EF:60%.     H/O cardiovascular stress test 10/07/2016    Overall results are most consistent with a low risk for significant CAD.     H/O echocardiogram 10/05/2016    EF:>60%. Inferoseptal wall abnormal in its motion which is not unusal status post open heart surgery. Evidence of mild (grade I) diastolic dysfunction is seen.  H/O tilt table evaluation 07/12/2016    Abnormal. PTs HR, Blood Pressure response and symptoms were most consistent with dysautonomia. Combined w/viligant maintenance of euvolemia and maintaining a moderate salt intake, pharmaciologic treatment w/ ProAmatine, SSRI such as Lexapro and/or Mestinon among other treatments have shown some effectiveness in the treatment of this condition.  History of cardiovascular stress test 02/13/2019    Abnormal. Small/moderate perfusion defect of mild/moderate intesity in the anterior and anterolateral regions during stress imaging which is most consistent w/ ischemia but may be artifact. Overall low/intermediate risk for significant CAD.  History of echocardiogram 09/06/2018    EF >60%. Inferoseptal wall is abnormal in its motion which is not unusual s/p open heart. Evidence of mild (grade I) diastolic dysfunction seen.     Hx of blood clots     Hyperlipidemia     Hypertension     Intermittent claudication (HCC)     Kidney stones     Movement disorder     neuropathy in legs    Neuromuscular disorder (HCC)     neuropathy    Osteoarthritis     Reflux     Seizures (HCC)     last over 10 yr ago    Stented coronary artery 9/22/2010     LAD/[place]    Stented coronary artery 3/31/16    RCA/[place]    Type II or unspecified type diabetes mellitus without mention of complication, not stated as uncontrolled     Unspecified sleep apnea     no machine       Past Surgical History:   Procedure Laterality Date    ABDOMINAL HERNIA REPAIR  1-2016    repair done [place]    APPENDECTOMY      CARDIAC CATHETERIZATION Left 4/26/2016    right radial/ [facility]/ Dr María Elena Sanford Left 03/07/2019    [facility]    CARDIAC SURGERY      CHOLECYSTECTOMY  1991    COLONOSCOPY  12/16/14    -hemorrhoids,bx    CORONARY ANGIOPLASTY WITH STENT PLACEMENT  9/2010    CORONARY ANGIOPLASTY WITH STENT PLACEMENT  3-    [facility] RCA / DR Kailash Pennington    CORONARY ARTERY BYPASS GRAFT  08/15/2016    OP X 1- Dr Jazmyn Cote, COLON, DIAGNOSTIC  12/16/2014    HERNIA REPAIR  12/13/12    at the medial aspect of a [place] RUQ scar); [address]  02/16/2015    incisional, recurrent    HERNIA REPAIR  02/2016    HYSTERECTOMY      OTHER SURGICAL HISTORY  10/8/2015    abd wound washout, mesh removal, wound vac placement    FL SUCT NICOLE LIPECTOMY,HEAD/NECK Left 8/27/2018    THIGH LESION BIOPSY EXCISION, SOFT TISSUE MASS performed [facility] Left 2018    leg    UPPP UVULOPALATOPHARYGOPLASTY  06/26/2012    VENTRAL HERNIA REPAIR  09/21/2015    With Mesh - Dr Puneet Pimentel History: Staci Murcia  reports that she quit smoking about 11 years ago. Her smoking use included cigarettes. She has a 20.00 pack-year smoking history. She has never used smokeless tobacco. She reports that she does not drink alcohol and does not use drugs. Family History   Problem Relation Age of Onset    Cancer Mother     Diabetes Mother     Cancer Father         thyroid    Diabetes Sister     Heart Disease Sister     Heart Disease Brother     Heart Disease Maternal Uncle     Cancer Maternal Grandmother      On exam: Vitals: Blood pressure 135/88, pulse 82, temperature 97.3 °F (36.3 °C), height 5' 3\" (1.6 m), weight 226 lb (102.5 kg), last menstrual period 12/05/1996, not currently breastfeeding.         GENERAL  Appears comfortable and in no distress   HEENT  NC/ AT   NECK  Supple and no bruits heard   MENTAL STATUS:  Alert, oriented, intact memory, no confusion, normal speech, normal language, no hallucination or delusion; appropriate affect   CRANIAL NERVES: II     -      PERRLA, Fundi reveal intact venous pulsations; Visual fields intact to confrontation  III,IV,VI -  EOMs full, no afferent defect, no                      ELOY, no ptosis  V     -     Normal facial sensation  VII    -     Normal facial symmetry  VIII   -     Intact hearing  IX,X -     Symmetrical palate  XI    -     Symmetrical shoulder shrug  XII   -     Midline tongue, no atrophy    MOTOR FUNCTION:  significant for good strength of grade 5/5 in bilateral proximal and distal muscle groups of both upper and lower extremities with normal bulk, normal tone and no involuntary movements, no tremor   SENSORY FUNCTION:  Impaired light touch, pinprick and temperature in stocking pattern in lower extremities left lower extremity greater than right lower extremity. CEREBELLAR FUNCTION:  Intact fine motor control over upper limbs   REFLEX FUNCTION:  Symmetric, no perverted reflex, no Babinski sign   STATION and GAIT  Normal station, wide based gait; could not do tandem gait; positive Romberg sign. Able to walk with cane. Diagnostic data reviewed with the patient:   NCS/ EMG (8-22-14): There is electrodiagnostic evidence of peripheral polyneuropathy with predominant involvement of sensory fibers only. Supporting features include reduced amplitudes of bilateral superficial peroneal sensory studies and prolonged H-reflex latencies. X-ray of lumbar spine (3-20-13): Mild degenerative change is noted at L3-L4 with disk space narrowing and spur formation. MRI BRAIN (w/wo): done on 1-2-2013: essentially unremarkable. EEG (1-2-2013): unremarkable. Lab Results   Component Value Date    LABA1C 9.4 10/01/2021       EMG bilateral LE (10/21/17): abnormal electrophysiological study indicative of primarily axonal sensorimotor neuropathy in both lower extremities. There is no evidence of lumbosacral radiculopathy or plexopathy. EKG (8/23/18); NSR.              Impression and Plan: Ms. Prabhu Burris is a 64 y.o. female with   Painful diabetic peripheral polyneuropathy; marginally controlled with intermittent exacerbations; but tolerable at present dose of pregabalin 150 tid  Abnormal EMG; marginally controlled DM with A1c improved from 12.1 --> 11.1  --> 9.4  Medically refractory neuropathic pain; refractory to max dose of gabapentin at 800 tid; switched to Pregabalin; doing well on pregabalin; will continue the same dose of Pregabalin at 150 mg tid. Intermittent severe muscle spasms in bilateral lower extremities: stable on tizanidine 2 mg nightly as needed. Also to continue duloxetine 60 mg every day  Also on insulin, Tresiba, metformin for diabetes control  Again, she was well advised about the importance of euglycemia in control of diabetic neuropathy and medication administration instructions and she voiced understanding those instructions. Regarding opioids: There is no evidence regarding long-term effectiveness; with high addiction potential and increased potential for tolerance and overdose and respiratory depression complications possibility  Medication Counseling: risks and benefits including possible adverse effects discussed with the patient and she verbalized understanding those instructions. Also discussed about importance of euglycemia in control of dysesthesias and she voiced understanding those instructions. Follow-up in 6 months. This note was partially created using voice recognition software and is inherently subject to errors including those of syntax and \"sound alike\" substitutions which may escape proofreading. In such instances, original meaning may be extrapolated by contextual derivation.

## 2021-10-26 ENCOUNTER — OFFICE VISIT (OUTPATIENT)
Dept: NEUROLOGY | Age: 56
End: 2021-10-26
Payer: COMMERCIAL

## 2021-10-26 VITALS
WEIGHT: 226 LBS | TEMPERATURE: 97.3 F | HEIGHT: 63 IN | SYSTOLIC BLOOD PRESSURE: 135 MMHG | HEART RATE: 82 BPM | DIASTOLIC BLOOD PRESSURE: 88 MMHG | BODY MASS INDEX: 40.04 KG/M2

## 2021-10-26 DIAGNOSIS — E11.42 DIABETIC POLYNEUROPATHY ASSOCIATED WITH TYPE 2 DIABETES MELLITUS (HCC): Primary | ICD-10-CM

## 2021-10-26 DIAGNOSIS — M62.838 MUSCLE SPASMS OF BOTH LOWER EXTREMITIES: ICD-10-CM

## 2021-10-26 DIAGNOSIS — M79.2 NEUROPATHIC PAIN: ICD-10-CM

## 2021-10-26 PROCEDURE — G8417 CALC BMI ABV UP PARAM F/U: HCPCS | Performed by: PSYCHIATRY & NEUROLOGY

## 2021-10-26 PROCEDURE — G8484 FLU IMMUNIZE NO ADMIN: HCPCS | Performed by: PSYCHIATRY & NEUROLOGY

## 2021-10-26 PROCEDURE — 3017F COLORECTAL CA SCREEN DOC REV: CPT | Performed by: PSYCHIATRY & NEUROLOGY

## 2021-10-26 PROCEDURE — 99214 OFFICE O/P EST MOD 30 MIN: CPT | Performed by: PSYCHIATRY & NEUROLOGY

## 2021-10-26 PROCEDURE — 3046F HEMOGLOBIN A1C LEVEL >9.0%: CPT | Performed by: PSYCHIATRY & NEUROLOGY

## 2021-10-26 PROCEDURE — 2022F DILAT RTA XM EVC RTNOPTHY: CPT | Performed by: PSYCHIATRY & NEUROLOGY

## 2021-10-26 PROCEDURE — 1036F TOBACCO NON-USER: CPT | Performed by: PSYCHIATRY & NEUROLOGY

## 2021-10-26 PROCEDURE — G8427 DOCREV CUR MEDS BY ELIG CLIN: HCPCS | Performed by: PSYCHIATRY & NEUROLOGY

## 2021-10-26 RX ORDER — DULAGLUTIDE 3 MG/.5ML
INJECTION, SOLUTION SUBCUTANEOUS
COMMUNITY
Start: 2021-10-07 | End: 2021-11-01

## 2021-10-26 RX ORDER — PREGABALIN 150 MG/1
CAPSULE ORAL
Qty: 90 CAPSULE | Refills: 6 | Status: SHIPPED | OUTPATIENT
Start: 2021-10-26 | End: 2022-03-01 | Stop reason: SDUPTHER

## 2021-11-01 ENCOUNTER — OFFICE VISIT (OUTPATIENT)
Dept: CARDIOLOGY | Age: 56
End: 2021-11-01
Payer: COMMERCIAL

## 2021-11-01 VITALS
OXYGEN SATURATION: 98 % | HEART RATE: 80 BPM | WEIGHT: 228.8 LBS | DIASTOLIC BLOOD PRESSURE: 73 MMHG | BODY MASS INDEX: 40.54 KG/M2 | SYSTOLIC BLOOD PRESSURE: 115 MMHG | HEIGHT: 63 IN | RESPIRATION RATE: 17 BRPM

## 2021-11-01 DIAGNOSIS — R00.2 PALPITATIONS: ICD-10-CM

## 2021-11-01 DIAGNOSIS — E78.2 MIXED HYPERLIPIDEMIA: ICD-10-CM

## 2021-11-01 DIAGNOSIS — I10 ESSENTIAL HYPERTENSION: Chronic | ICD-10-CM

## 2021-11-01 DIAGNOSIS — Z95.1 S/P CABG (CORONARY ARTERY BYPASS GRAFT): ICD-10-CM

## 2021-11-01 DIAGNOSIS — R06.02 SHORTNESS OF BREATH ON EXERTION: Primary | ICD-10-CM

## 2021-11-01 DIAGNOSIS — I25.10 ASHD (ARTERIOSCLEROTIC HEART DISEASE): ICD-10-CM

## 2021-11-01 DIAGNOSIS — I20.9 ANGINA, CLASS II (HCC): ICD-10-CM

## 2021-11-01 DIAGNOSIS — I95.1 DYSAUTONOMIA ORTHOSTATIC HYPOTENSION SYNDROME: ICD-10-CM

## 2021-11-01 PROCEDURE — G8427 DOCREV CUR MEDS BY ELIG CLIN: HCPCS | Performed by: FAMILY MEDICINE

## 2021-11-01 PROCEDURE — G8417 CALC BMI ABV UP PARAM F/U: HCPCS | Performed by: FAMILY MEDICINE

## 2021-11-01 PROCEDURE — 1036F TOBACCO NON-USER: CPT | Performed by: FAMILY MEDICINE

## 2021-11-01 PROCEDURE — G8484 FLU IMMUNIZE NO ADMIN: HCPCS | Performed by: FAMILY MEDICINE

## 2021-11-01 PROCEDURE — 3017F COLORECTAL CA SCREEN DOC REV: CPT | Performed by: FAMILY MEDICINE

## 2021-11-01 PROCEDURE — 93000 ELECTROCARDIOGRAM COMPLETE: CPT | Performed by: FAMILY MEDICINE

## 2021-11-01 PROCEDURE — 99214 OFFICE O/P EST MOD 30 MIN: CPT | Performed by: FAMILY MEDICINE

## 2021-11-01 RX ORDER — ATORVASTATIN CALCIUM 80 MG/1
80 TABLET, FILM COATED ORAL DAILY
Qty: 90 TABLET | Refills: 3 | Status: SHIPPED | OUTPATIENT
Start: 2021-11-01

## 2021-11-01 NOTE — PATIENT INSTRUCTIONS
SURVEY:    You may be receiving a survey from [employer] regarding your visit today. Please complete the survey to enable us to provide the highest quality of care to you and your family. If you cannot score us a very good on any question, please call the office to discuss how we could have made your experience a very good one. Thank you.

## 2021-11-16 ENCOUNTER — HOSPITAL ENCOUNTER (OUTPATIENT)
Dept: NON INVASIVE DIAGNOSTICS | Age: 56
Discharge: HOME | End: 2021-11-16
Payer: COMMERCIAL

## 2021-11-16 DIAGNOSIS — I20.9 ANGINA, CLASS II (HCC): ICD-10-CM

## 2021-11-16 DIAGNOSIS — R00.2 PALPITATIONS: ICD-10-CM

## 2021-11-16 DIAGNOSIS — I10 ESSENTIAL HYPERTENSION: Chronic | ICD-10-CM

## 2021-11-16 DIAGNOSIS — Z95.1 S/P CABG (CORONARY ARTERY BYPASS GRAFT): ICD-10-CM

## 2021-11-16 DIAGNOSIS — I25.10 ASHD (ARTERIOSCLEROTIC HEART DISEASE): ICD-10-CM

## 2021-11-16 DIAGNOSIS — E78.2 MIXED HYPERLIPIDEMIA: ICD-10-CM

## 2021-11-16 DIAGNOSIS — I95.1 DYSAUTONOMIA ORTHOSTATIC HYPOTENSION SYNDROME: ICD-10-CM

## 2021-11-16 DIAGNOSIS — R06.02 SHORTNESS OF BREATH ON EXERTION: ICD-10-CM

## 2021-11-16 LAB
LV EF: 55 %
LVEF MODALITY: NORMAL

## 2021-11-16 PROCEDURE — 93306 TTE W/DOPPLER COMPLETE: CPT

## 2021-11-17 ENCOUNTER — TELEPHONE (OUTPATIENT)
Dept: CARDIOLOGY | Age: 56
End: 2021-11-17

## 2021-11-17 NOTE — TELEPHONE ENCOUNTER
----- Message from Olvin Curry MD sent at 11/16/2021 11:54 PM EST -----  Let Ms. Mulugeta Givens know their test result was ok. Will discuss at next visit. Thanks.

## 2021-12-29 ENCOUNTER — HOSPITAL ENCOUNTER (OUTPATIENT)
Age: 56
Discharge: HOME | End: 2021-12-29
Payer: COMMERCIAL

## 2021-12-29 LAB
ABSOLUTE EOS #: 0.14 K/UL (ref 0–0.44)
ABSOLUTE IMMATURE GRANULOCYTE: 0.06 K/UL (ref 0–0.3)
ABSOLUTE LYMPH #: 2.5 K/UL (ref 1.1–3.7)
ABSOLUTE MONO #: 0.38 K/UL (ref 0.1–1.2)
ALT SERPL-CCNC: 7 U/L (ref 5–33)
ANION GAP SERPL CALCULATED.3IONS-SCNC: 11 MMOL/L (ref 9–17)
AST SERPL-CCNC: 7 U/L
BASOPHILS # BLD: 1 % (ref 0–2)
BASOPHILS ABSOLUTE: 0.07 K/UL (ref 0–0.2)
BUN BLDV-MCNC: 13 MG/DL (ref 6–20)
BUN/CREAT BLD: 23 (ref 9–20)
CALCIUM SERPL-MCNC: 9.3 MG/DL (ref 8.6–10.4)
CHLORIDE BLD-SCNC: 99 MMOL/L (ref 98–107)
CHOLESTEROL/HDL RATIO: 4.1
CHOLESTEROL: 165 MG/DL
CO2: 23 MMOL/L (ref 20–31)
CREAT SERPL-MCNC: 0.56 MG/DL (ref 0.5–0.9)
CREATININE URINE: 86.8 MG/DL (ref 28–217)
DIFFERENTIAL TYPE: ABNORMAL
EOSINOPHILS RELATIVE PERCENT: 1 % (ref 1–4)
ESTIMATED AVERAGE GLUCOSE: 209 MG/DL
GFR AFRICAN AMERICAN: >60 ML/MIN
GFR NON-AFRICAN AMERICAN: >60 ML/MIN
GFR SERPL CREATININE-BSD FRML MDRD: ABNORMAL ML/MIN/{1.73_M2}
GFR SERPL CREATININE-BSD FRML MDRD: ABNORMAL ML/MIN/{1.73_M2}
GLUCOSE BLD-MCNC: 290 MG/DL (ref 70–99)
HBA1C MFR BLD: 8.9 % (ref 4–6)
HCT VFR BLD CALC: 39.5 % (ref 36.3–47.1)
HDLC SERPL-MCNC: 40 MG/DL
HEMOGLOBIN: 12.5 G/DL (ref 11.9–15.1)
IMMATURE GRANULOCYTES: 1 %
LDL CHOLESTEROL: 95 MG/DL (ref 0–130)
LYMPHOCYTES # BLD: 25 % (ref 24–43)
MCH RBC QN AUTO: 25.2 PG (ref 25.2–33.5)
MCHC RBC AUTO-ENTMCNC: 31.6 G/DL (ref 28.4–34.8)
MCV RBC AUTO: 79.5 FL (ref 82.6–102.9)
MICROALBUMIN/CREAT 24H UR: <12 MG/L
MICROALBUMIN/CREAT UR-RTO: NORMAL MCG/MG CREAT
MONOCYTES # BLD: 4 % (ref 3–12)
NRBC AUTOMATED: 0 PER 100 WBC
PDW BLD-RTO: 14.6 % (ref 11.8–14.4)
PLATELET # BLD: 373 K/UL (ref 138–453)
PLATELET ESTIMATE: ABNORMAL
PMV BLD AUTO: 9.3 FL (ref 8.1–13.5)
POTASSIUM SERPL-SCNC: 4.6 MMOL/L (ref 3.7–5.3)
RBC # BLD: 4.97 M/UL (ref 3.95–5.11)
RBC # BLD: ABNORMAL 10*6/UL
SEG NEUTROPHILS: 68 % (ref 36–65)
SEGMENTED NEUTROPHILS ABSOLUTE COUNT: 7.01 K/UL (ref 1.5–8.1)
SODIUM BLD-SCNC: 133 MMOL/L (ref 135–144)
TRIGL SERPL-MCNC: 151 MG/DL
VLDLC SERPL CALC-MCNC: ABNORMAL MG/DL (ref 1–30)
WBC # BLD: 10.2 K/UL (ref 3.5–11.3)
WBC # BLD: ABNORMAL 10*3/UL

## 2021-12-29 PROCEDURE — 85025 COMPLETE CBC W/AUTO DIFF WBC: CPT

## 2021-12-29 PROCEDURE — 84460 ALANINE AMINO (ALT) (SGPT): CPT

## 2021-12-29 PROCEDURE — 83036 HEMOGLOBIN GLYCOSYLATED A1C: CPT

## 2021-12-29 PROCEDURE — 82043 UR ALBUMIN QUANTITATIVE: CPT

## 2021-12-29 PROCEDURE — 84450 TRANSFERASE (AST) (SGOT): CPT

## 2021-12-29 PROCEDURE — 36415 COLL VENOUS BLD VENIPUNCTURE: CPT

## 2021-12-29 PROCEDURE — 80061 LIPID PANEL: CPT

## 2021-12-29 PROCEDURE — 82570 ASSAY OF URINE CREATININE: CPT

## 2021-12-29 PROCEDURE — 80048 BASIC METABOLIC PNL TOTAL CA: CPT

## 2022-01-06 ENCOUNTER — OFFICE VISIT (OUTPATIENT)
Dept: PRIMARY CARE CLINIC | Age: 57
End: 2022-01-06
Payer: COMMERCIAL

## 2022-01-06 VITALS
HEART RATE: 90 BPM | SYSTOLIC BLOOD PRESSURE: 110 MMHG | BODY MASS INDEX: 40.63 KG/M2 | OXYGEN SATURATION: 98 % | WEIGHT: 229.3 LBS | RESPIRATION RATE: 18 BRPM | TEMPERATURE: 97.4 F | DIASTOLIC BLOOD PRESSURE: 76 MMHG | HEIGHT: 63 IN

## 2022-01-06 DIAGNOSIS — Z23 NEED FOR INFLUENZA VACCINATION: ICD-10-CM

## 2022-01-06 DIAGNOSIS — I10 ESSENTIAL HYPERTENSION: ICD-10-CM

## 2022-01-06 DIAGNOSIS — E11.41 CONTROLLED TYPE 2 DIABETES MELLITUS WITH DIABETIC MONONEUROPATHY, WITH LONG-TERM CURRENT USE OF INSULIN (HCC): Primary | ICD-10-CM

## 2022-01-06 DIAGNOSIS — Z79.4 CONTROLLED TYPE 2 DIABETES MELLITUS WITH DIABETIC MONONEUROPATHY, WITH LONG-TERM CURRENT USE OF INSULIN (HCC): Primary | ICD-10-CM

## 2022-01-06 PROCEDURE — G8417 CALC BMI ABV UP PARAM F/U: HCPCS | Performed by: NURSE PRACTITIONER

## 2022-01-06 PROCEDURE — 3046F HEMOGLOBIN A1C LEVEL >9.0%: CPT | Performed by: NURSE PRACTITIONER

## 2022-01-06 PROCEDURE — 1036F TOBACCO NON-USER: CPT | Performed by: NURSE PRACTITIONER

## 2022-01-06 PROCEDURE — G8482 FLU IMMUNIZE ORDER/ADMIN: HCPCS | Performed by: NURSE PRACTITIONER

## 2022-01-06 PROCEDURE — 90674 CCIIV4 VAC NO PRSV 0.5 ML IM: CPT | Performed by: NURSE PRACTITIONER

## 2022-01-06 PROCEDURE — G8427 DOCREV CUR MEDS BY ELIG CLIN: HCPCS | Performed by: NURSE PRACTITIONER

## 2022-01-06 PROCEDURE — 90471 IMMUNIZATION ADMIN: CPT | Performed by: NURSE PRACTITIONER

## 2022-01-06 PROCEDURE — 99214 OFFICE O/P EST MOD 30 MIN: CPT | Performed by: NURSE PRACTITIONER

## 2022-01-06 PROCEDURE — 2022F DILAT RTA XM EVC RTNOPTHY: CPT | Performed by: NURSE PRACTITIONER

## 2022-01-06 PROCEDURE — 3017F COLORECTAL CA SCREEN DOC REV: CPT | Performed by: NURSE PRACTITIONER

## 2022-01-06 ASSESSMENT — ENCOUNTER SYMPTOMS
DIARRHEA: 0
WHEEZING: 0
VOMITING: 0
ORTHOPNEA: 0
SORE THROAT: 0
CONSTIPATION: 0
ABDOMINAL PAIN: 0
BLURRED VISION: 1
RHINORRHEA: 0
SHORTNESS OF BREATH: 0

## 2022-01-06 NOTE — PATIENT INSTRUCTIONS
SURVEY:     You may be receiving a survey from [employer] regarding your visit today. Please complete the survey to enable us to provide the highest quality of care to you and your family. If you cannot score us a very good on any question, please call the office to discuss how we could have made your experience a very good one. Thank you.   Virginie Plasencia, APRN-ANGELIKA Martinez, CNP Lajuan Kussmaul, LPN  Trista William, LPN  Kiana Briscoe, CITLALI Young, JORGE A Vasquez, CMA  Alice, PCA

## 2022-01-06 NOTE — PROGRESS NOTES
Name: Diana Mendiola  : 1965         Chief Complaint:     Chief Complaint   Patient presents with    Diabetes     routine office visit no concerns     Hypertension       History of Present Illness:      Diana Mendiola is a 64 y.o.  female who presents with Diabetes (routine office visit no concerns ) and Hypertension      Yoli Chapman is here today for a routine office visit. DMimproving, patient follows with diabetologist.  See below for further comment. Diabetes  She presents for her follow-up diabetic visit. She has type 2 diabetes mellitus. Her disease course has been improving. There are no hypoglycemic associated symptoms. Pertinent negatives for hypoglycemia include no dizziness, headaches, nervousness/anxiousness, pallor or sleepiness. Associated symptoms include blurred vision (CHRONIC RIGHT) and foot paresthesias. Pertinent negatives for diabetes include no chest pain, no fatigue, no polydipsia, no polyphagia, no polyuria and no weakness. There are no hypoglycemic complications. Pertinent negatives for hypoglycemia complications include no hospitalization, no nocturnal hypoglycemia and no required assistance. Symptoms are improving. Diabetic complications include peripheral neuropathy. Pertinent negatives for diabetic complications include no CVA, heart disease or nephropathy. Risk factors for coronary artery disease include diabetes mellitus, dyslipidemia, family history, obesity, hypertension, stress, post-menopausal and sedentary lifestyle. Current diabetic treatment includes intensive insulin program. She is compliant with treatment all of the time. Her weight is stable. She is following a generally healthy diet. Meal planning includes avoidance of concentrated sweets. She has had a previous visit with a dietitian. She rarely participates in exercise. Her home blood glucose trend is decreasing steadily. Her breakfast blood glucose range is generally 180-200 mg/dl.  An ACE inhibitor/angiotensin II receptor blocker is being taken. She sees a podiatrist.Eye exam is current. Hypertension  This is a chronic problem. The current episode started more than 1 year ago. The problem is unchanged. The problem is controlled. Associated symptoms include blurred vision (CHRONIC RIGHT). Pertinent negatives include no chest pain, headaches, malaise/fatigue, neck pain, orthopnea, palpitations, peripheral edema or shortness of breath. There are no associated agents to hypertension. Risk factors for coronary artery disease include diabetes mellitus, dyslipidemia, obesity, family history, post-menopausal state, sedentary lifestyle and stress. Past treatments include beta blockers and angiotensin blockers. The current treatment provides moderate improvement. Compliance problems include exercise and diet. There is no history of kidney disease, CAD/MI, CVA or heart failure. There is no history of chronic renal disease. Past Medical History:     Past Medical History:   Diagnosis Date    Anxiety     Asthma     CAD (coronary artery disease)     Clinical trial participant at discharge 3/31/16    [facility]ch[facility] [facility]     Depression     Diabetic neuropathy ([facility] [address]     H/O cardiac catheterization 4/26/16    LMCA: Mild Irregularities 10-20%. LAD: Lesion on Prox LAD: Proximal subsection. 100% stenosis. LCx: Mild irregularities 10-20%. RCA: Mild irregularities 10-20%. Widely patent mid RCA stent. EF:60%.  H/O cardiovascular stress test 10/07/2016    Overall results are most consistent with a low risk for significant CAD.     H/O echocardiogram 10/05/2016    EF:>60%. Inferoseptal wall abnormal in its motion which is not unusal status post open heart surgery. Evidence of mild (grade I) diastolic dysfunction is seen.  H/O tilt table evaluation 07/12/2016    Abnormal. PTs HR, Blood Pressure response and symptoms were most consistent with dysautonomia.  Combined w/viligant maintenance of euvolemia and maintaining a moderate salt intake, pharmaciologic treatment w/ ProAmatine, SSRI such as Lexapro and/or Mestinon among other treatments have shown some effectiveness in the treatment of this condition.  History of cardiovascular stress test 02/13/2019    Abnormal. Small/moderate perfusion defect of mild/moderate intesity in the anterior and anterolateral regions during stress imaging which is most consistent w/ ischemia but may be artifact. Overall low/intermediate risk for significant CAD.  History of echocardiogram 09/06/2018    EF >60%. Inferoseptal wall is abnormal in its motion which is not unusual s/p open heart. Evidence of mild (grade I) diastolic dysfunction seen.  Hx of blood clots     Hyperlipidemia     Hypertension     Intermittent claudication (HCC)     Kidney stones     Movement disorder     neuropathy in legs    Neuromuscular disorder (HCC)     neuropathy    Osteoarthritis     Reflux     Seizures (HCC)     last over 10 yr ago    Stented coronary artery 9/22/2010     LAD/[place]    Stented coronary artery 3/31/16    RCA/[place]    Type II or unspecified type diabetes mellitus without mention of complication, not stated as uncontrolled     Unspecified sleep apnea     no machine      Reviewed all health maintenance requirements and ordered appropriate tests  There are no preventive care reminders to display for this patient.     Past Surgical History:     Past Surgical History:   Procedure Laterality Date    ABDOMINAL HERNIA REPAIR  1-2016    repair done [place]    APPENDECTOMY      CARDIAC CATHETERIZATION Left 4/26/2016    right radial/ [id number] [facility] [place]/ Dr Lotus Chi Left 03/07/2019    [facility] Rad[facility]/    CARDIAC SURGERY      CHOLECYSTECTOMY  1991    COLONOSCOPY  12/16/14    -hemorrhoids,bx    CORONARY ANGIOPLASTY WITH STENT PLACEMENT  9/2010    CORONARY ANGIOPLASTY WITH STENT PLACEMENT  3-    [facility] RCA / DR Rory Primrose CORONARY ARTERY BYPASS GRAFT  08/15/2016    OP X 1- Dr Rayetta Paget, COLON, DIAGNOSTIC  12/16/2014    HERNIA REPAIR  12/13/12    at the medial aspect of a [place] RUQ scar); [id number]  02/16/2015    incisional, recurrent    HERNIA REPAIR  02/2016    HYSTERECTOMY      OTHER SURGICAL HISTORY  10/8/2015    abd wound washout, mesh removal, wound vac placement    UT SUCT NICOLE LIPECTOMY,HEAD/NECK Left 8/27/2018    THIGH LESION BIOPSY EXCISION, SOFT TISSUE MASS performed [facility] Kel[facility] at [facility] Left 2018    leg    UPPP UVULOPALATOPHARYGOPLASTY  06/26/2012    VENTRAL HERNIA REPAIR  09/21/2015    With Mesh - Dr Art Aaron        Medications:       Prior to Admission medications    Medication Sig Start Date End Date Taking? Authorizing Provider   atorvastatin (LIPITOR) 80 MG tablet Take 1 tablet by mouth daily 11/1/21  Yes Gail Garrido MD   pregabalin (LYRICA) 150 MG capsule Take one cap three times daily 10/26/21 10/26/22 Yes Samina Cary MD   fludrocortisone (FLORINEF) 0.1 MG tablet TAKE 3 TABLETS BY MOUTH EVERY DAY 10/13/21  Yes Gail Garrido MD   Blood Glucose Monitoring Suppl (ONE TOUCH ULTRA 2) w/Device KIT 1 kit by Does not apply route daily 7/20/21  Yes CHELSEA Crowder CNP   blood glucose monitor strips Test 3 times a day & as needed for symptoms of irregular blood glucose. Dispense sufficient amount for indicated testing frequency plus additional to accommodate PRN testing needs.  7/16/21  Yes CHELSEA So CNP   Lancets MISC 1 each by Does not apply route 2 times daily 7/16/21  Yes CHELSEA So CNP   Continuous Blood Gluc Sensor (FREESTYLE MCKAYLA 2 SENSOR) MISC 2 FREESTYLE MCKAYLA 2 SENSORS TO USE WITH MCKAYLA 2 READER TO CHECK BLOOD SUGARS 6 8 TIMES A DAY 5/19/21  Yes Historical Provider, MD   ibuprofen (IBU) 800 MG tablet Take 1 tablet by mouth every 8 hours as needed for Pain 4/5/21  Yes Darrell YA BOONE Hernandez   TRULICITY 1.5 AE/9.4YW SOPN INJECT 1.5 MG INTO THE SKIN ONCE A WEEK 3/30/21  Yes Mer Rumps Might, APRN - CNP   Insulin Degludec (TRESIBA FLEXTOUCH) 200 UNIT/ML SOPN Inject 86 Units into the skin daily PER DR MCELROY  Patient taking differently: Inject 110 Units into the skin daily PER DR MCELROY 3/30/21  Yes Mer Rumps Might, APRN - CNP   metFORMIN (GLUCOPHAGE-XR) 500 MG extended release tablet Take 2 tablets by mouth daily (with breakfast) 3/30/21  Yes Mer Rumps Might, APRN - CNP   omeprazole (PRILOSEC) 20 MG delayed release capsule TAKE 1 CAPSULE BY MOUTH EVERY DAY IN THE MORNING BEFORE BREAKFAST 11/23/20  Yes Mer Rumps Might, APRN - CNP   albuterol sulfate HFA (VENTOLIN HFA) 108 (90 Base) MCG/ACT inhaler INHALE 2 PUFFS EVERY 6 HOURS AS NEEDED FOR WHEEZING 11/11/20  Yes Mer Rumps Might, APRN - CNP   ondansetron (ZOFRAN ODT) 4 MG disintegrating tablet Take 1 tablet by mouth every 8 hours as needed for Nausea 11/10/20  Yes Dominique Brock MD   nitroGLYCERIN (NITROSTAT) 0.4 MG SL tablet Place 1 tablet under the tongue every 5 minutes as needed for Chest pain 9/30/20  Yes Mer Rumps Might, APRN - CNP   Saccharomyces boulardii (PROBIOTIC) 250 MG CAPS Take 1 capsule by mouth daily 9/30/20  Yes Mer Rumps Might, APRN - CNP   tiZANidine (ZANAFLEX) 2 MG tablet TAKE 1 TABLET BY MOUTH NIGHTLY AS NEEDED(SPASMS) 9/22/20  Yes Maile Phan MD   acetaminophen (TYLENOL) 500 MG tablet Take 1 tablet by mouth 4 times daily as needed for Pain 9/9/20  Yes CHELSEA So - CNP   losartan (COZAAR) 25 MG tablet Take 1 tablet by mouth daily 7/23/20  Yes Mer Rumps Might, APRN - CNP   insulin lispro, 1 Unit Dial, (HUMALOG KWIKPEN) 100 UNIT/ML SOPN Inject 20 Units into the skin 3 times daily (before meals) 6/23/20  Yes Mer Rumps Might, APRN - CNP   metoprolol succinate (TOPROL XL) 100 MG extended release tablet Take 1 tablet by mouth daily 3/13/20  Yes Mer Rumaaliyah Might, APRN - CNP   DULoxetine (CYMBALTA) 30 MG extended release capsule Take 1 capsule by mouth daily 2/10/20  Yes Vale Jones MD   aspirin 81 MG EC tablet TAKE 1 TABLET BY MOUTH DAILY  Patient taking differently: Take 81 mg by mouth daily  7/1/19  Yes CHELSEA Farrell CNP   Insulin Pen Needle (BD ULTRA-FINE PEN NEEDLES) 29G X 12.7MM MISC USE AS DIRECTED BY PHYSICIAN 5 times daily 8/7/18  Yes CHELSEA Farrell CNP   Blood Glucose Monitoring Suppl BERE 1 Units by Does not apply route three times daily Unit insurance will cover 12/29/17  Yes CHELSEA Pollard CNP   Blood Pressure Monitor KIT 1 each by Does not apply route daily as needed ESSENTIAL HYPERTENSION   I10 5/31/16  Yes CHELSEA Farrell CNP   Blood Glucose Monitoring Suppl (BLOOD GLUCOSE MONITOR KIT) KIT 1 each by Does not apply route daily. Please dispense whatever BS monitoring kit [employer] covers. Dx: 250, new onset T2DM. Testing once daily 2/21/12 1/6/22 Yes CHELSEA Aburto CNP        Allergies:       Tape Song Agreste tape]    Social History:     Tobacco:    reports that she quit smoking about 11 years ago. Her smoking use included cigarettes. She has a 20.00 pack-year smoking history. She has never used smokeless tobacco.  Alcohol:      reports no history of alcohol use. Drug Use:  reports no history of drug use. Family History:     Family History   Problem Relation Age of Onset    Cancer Mother     Diabetes Mother     Cancer Father         thyroid    Diabetes Sister     Heart Disease Sister     Heart Disease Brother     Heart Disease Maternal Uncle     Cancer Maternal Grandmother        Review of Systems:     Positive and Negative as described in HPI    Review of Systems   Constitutional: Negative for appetite change, chills, fatigue, fever and malaise/fatigue. HENT: Negative for congestion, postnasal drip, rhinorrhea, sneezing and sore throat. Eyes: Positive for blurred vision (CHRONIC RIGHT). Negative for visual disturbance.    Respiratory: Negative for shortness of breath and wheezing. Cardiovascular: Negative for chest pain, palpitations and orthopnea. Gastrointestinal: Negative for abdominal pain, constipation, diarrhea and vomiting. Endocrine: Negative for polydipsia, polyphagia and polyuria. Genitourinary: Negative for difficulty urinating and dysuria. Musculoskeletal: Negative for gait problem, neck pain and neck stiffness. Skin: Negative for pallor and rash. Neurological: Positive for numbness. Negative for dizziness, syncope, weakness, light-headedness and headaches. Psychiatric/Behavioral: The patient is not nervous/anxious. Physical Exam:   Vitals:  /76 (Position: Sitting)   Pulse 90   Temp 97.4 °F (36.3 °C) (Temporal)   Resp 18   Ht 5' 3\" (1.6 m)   Wt 229 lb 4.8 oz (104 kg)   LMP 12/05/1996   SpO2 98%   BMI 40.62 kg/m²     Physical Exam  Vitals and nursing note reviewed. Constitutional:       General: She is not in acute distress. Appearance: Normal appearance. She is well-developed. She is obese. She is not ill-appearing. HENT:      Mouth/Throat:      Mouth: Mucous membranes are moist.   Eyes:      General: No scleral icterus. Conjunctiva/sclera: Conjunctivae normal.   Cardiovascular:      Rate and Rhythm: Normal rate and regular rhythm. Heart sounds: No murmur heard. Pulmonary:      Effort: Pulmonary effort is normal.      Breath sounds: Normal breath sounds. No wheezing or rales. Abdominal:      General: Bowel sounds are normal. There is no distension. Palpations: Abdomen is soft. Tenderness: There is no abdominal tenderness. Comments: Obese abdomen   Musculoskeletal:      Cervical back: Normal range of motion and neck supple. Right lower leg: No edema. Left lower leg: No edema. Lymphadenopathy:      Cervical: No cervical adenopathy. Skin:     General: Skin is warm and dry. Neurological:      Mental Status: She is alert and oriented to person, place, and time. Psychiatric:         Mood and Affect: Mood normal.         Behavior: Behavior normal.         Data:     Lab Results   Component Value Date     12/29/2021    K 4.6 12/29/2021    CL 99 12/29/2021    CO2 23 12/29/2021    BUN 13 12/29/2021    CREATININE 0.56 12/29/2021    GLUCOSE 290 12/29/2021    GLUCOSE 181 02/16/2012    PROT 8.2 11/09/2020    LABALBU 4.2 11/09/2020    LABALBU 4.2 12/05/2011    BILITOT 0.50 11/09/2020    ALKPHOS 104 11/09/2020    AST 7 12/29/2021    ALT 7 12/29/2021     Lab Results   Component Value Date    WBC 10.2 12/29/2021    RBC 4.97 12/29/2021    RBC 3.88 12/05/2011    HGB 12.5 12/29/2021    HCT 39.5 12/29/2021    MCV 79.5 12/29/2021    MCH 25.2 12/29/2021    MCHC 31.6 12/29/2021    RDW 14.6 12/29/2021     12/29/2021     12/05/2011    MPV 9.3 12/29/2021     Lab Results   Component Value Date    TSH 2.80 06/04/2016     Lab Results   Component Value Date    CHOL 165 12/29/2021    HDL 40 12/29/2021    LABA1C 8.9 12/29/2021       Assessment/Plan:      Diagnosis Orders   1. Controlled type 2 diabetes mellitus with diabetic mononeuropathy, with long-term current use of insulin (HCC)  Basic Metabolic Panel    Hemoglobin A1C   2. Essential hypertension     3. Need for influenza vaccination  INFLUENZA, MDCK QUADV, 2 YRS AND OLDER, IM, PF, PREFILL SYR OR SDV, 0.5ML (FLUCELVAX QUADV, PF)     We will continue all current medications. A1c is improved. She will continue care with diabetologist as well as all specialists. We will see her back in 6 months with full labs, sooner if any issues. 1.  Shirley Forte received counseling on the following healthy behaviors: nutrition, exercise and medication adherence  2. Patient given educational materials - see patient instructions  3. Was a self-tracking handout given in paper form or via Out[employer]? No  If yes, see orders or list here. 4.  Discussed use, benefit, and side effects of prescribed medications.   Barriers to medication compliance addressed. All patient questions answered. Pt voiced understanding. 5.  Reviewed prior labs and health maintenance  6. Continue current medications, diet and exercise. Completed Refills   Requested Prescriptions      No prescriptions requested or ordered in this encounter         Return in about 6 months (around 7/6/2022) for Check up.

## 2022-01-06 NOTE — PROGRESS NOTES
Vaccine Information Sheet, \"Influenza - Inactivated\"  given to Holly Rangel, or parent/legal guardian of  Holly Rangel and verbalized understanding. Patient responses:    Have you ever had a reaction to a flu vaccine? No  Are you able to eat eggs without adverse effects? Yes  Do you have any current illness? No  Have you ever had [place] Syndrome? No    After obtaining consent, and per orders of Bulmaro Plasencia CNP, injection of flu vaccine given in Left deltoid by Teri Gudino MA. Patient instructed to remain in clinic for 20 minutes afterwards, and to report any adverse reaction to me immediately.

## 2022-02-25 ENCOUNTER — TELEPHONE (OUTPATIENT)
Dept: PRIMARY CARE CLINIC | Age: 57
End: 2022-02-25

## 2022-02-25 NOTE — TELEPHONE ENCOUNTER
Derek Brooks currently lives in a studio apartment with her boyfriend. In order for her to move to a 1 bedroom apartment, her landlord needs a letter of medical necessity. Will you provide letter?

## 2022-02-25 NOTE — TELEPHONE ENCOUNTER
Spoke to Macho, asked she obtain [facility] as to what specific medical condition warrants a larger apartment.

## 2022-03-01 ENCOUNTER — OFFICE VISIT (OUTPATIENT)
Dept: NEUROLOGY | Age: 57
End: 2022-03-01
Payer: COMMERCIAL

## 2022-03-01 VITALS
HEART RATE: 82 BPM | WEIGHT: 223 LBS | DIASTOLIC BLOOD PRESSURE: 78 MMHG | HEIGHT: 63 IN | SYSTOLIC BLOOD PRESSURE: 119 MMHG | TEMPERATURE: 93.6 F | BODY MASS INDEX: 39.51 KG/M2

## 2022-03-01 DIAGNOSIS — M62.838 MUSCLE SPASMS OF BOTH LOWER EXTREMITIES: ICD-10-CM

## 2022-03-01 DIAGNOSIS — M79.2 NEUROPATHIC PAIN: ICD-10-CM

## 2022-03-01 DIAGNOSIS — E11.42 DIABETIC POLYNEUROPATHY ASSOCIATED WITH TYPE 2 DIABETES MELLITUS (HCC): Primary | ICD-10-CM

## 2022-03-01 DIAGNOSIS — R26.9 GAIT DIFFICULTY: ICD-10-CM

## 2022-03-01 PROCEDURE — G8482 FLU IMMUNIZE ORDER/ADMIN: HCPCS | Performed by: PSYCHIATRY & NEUROLOGY

## 2022-03-01 PROCEDURE — G8427 DOCREV CUR MEDS BY ELIG CLIN: HCPCS | Performed by: PSYCHIATRY & NEUROLOGY

## 2022-03-01 PROCEDURE — G8417 CALC BMI ABV UP PARAM F/U: HCPCS | Performed by: PSYCHIATRY & NEUROLOGY

## 2022-03-01 PROCEDURE — 3046F HEMOGLOBIN A1C LEVEL >9.0%: CPT | Performed by: PSYCHIATRY & NEUROLOGY

## 2022-03-01 PROCEDURE — 3017F COLORECTAL CA SCREEN DOC REV: CPT | Performed by: PSYCHIATRY & NEUROLOGY

## 2022-03-01 PROCEDURE — 2022F DILAT RTA XM EVC RTNOPTHY: CPT | Performed by: PSYCHIATRY & NEUROLOGY

## 2022-03-01 PROCEDURE — 99214 OFFICE O/P EST MOD 30 MIN: CPT | Performed by: PSYCHIATRY & NEUROLOGY

## 2022-03-01 PROCEDURE — 1036F TOBACCO NON-USER: CPT | Performed by: PSYCHIATRY & NEUROLOGY

## 2022-03-01 RX ORDER — PREGABALIN 150 MG/1
CAPSULE ORAL
Qty: 90 CAPSULE | Refills: 6 | Status: SHIPPED | OUTPATIENT
Start: 2022-03-01 | End: 2022-09-01 | Stop reason: ALTCHOICE

## 2022-03-01 NOTE — PROGRESS NOTES
NEUROLOGY FOLLOW-UP  Patient Name:  Wille Gaucher  :   1965  Clinic Visit Date: 3/1/2022  Last Visit: 10/26/2021         Dear Dr. Mayela Marie, APRN - CNP       I saw Ms. Wille Gaucher in follow-up in the office today in continuation of neurologic care. As you know she  is a 62 y.o.  female with painful diabetic peripheral polyneuropathy comes to clinic stating that she has been using cane for short distance and walker for longer distances for mobility. She has not ongoing difficulties with significant imbalance secondary to diabetic peripheral polyneuropathy. She is requesting for other alternatives and she does not want to stop walking and want to be outdoors during summer weather. She also stated that she has not been able to walk longer distances with walker because of balance difficulties. She also has ongoing pins-and-needles sensations along with burning pain in both feet. She has been on Lyrica 150 mg 3 times daily lung with duloxetine to help for dysesthesias. [place] [facility] Her blood pressures have been stable. Also takes tizanidine to help for cramps in calves. Her hemoglobin A1c has improved from 12.7 to 11 to 9.4 to 8.9 in Dec 2021. She has been using duloxetine, Cymbalta along with the capsaicin cream for relief in dysesthesias. She gets exacerbation of symptoms whenever she misses any dose. She has not had any medication related adverse effects. Denied any side effects such as headache, drowsiness, nausea, abdominal pain, weakness. She also stated that she has been participating in physical therapy and it has been helping. Review of systems done by staff reviewed and pertinent positives include numbness in lower extremities and pain in lower extremities and occasional balance difficulties.      Current Outpatient Medications on File Prior to Visit   Medication Sig Dispense Refill    atorvastatin (LIPITOR) 80 MG tablet Take 1 tablet by mouth daily 90 tablet 3    pregabalin (LYRICA) 150 MG capsule Take one cap three times daily 90 capsule 6    fludrocortisone (FLORINEF) 0.1 MG tablet TAKE 3 TABLETS BY MOUTH EVERY  tablet 3    Blood Glucose Monitoring Suppl (ONE TOUCH ULTRA 2) w/Device KIT 1 kit by Does not apply route daily 1 kit 0    blood glucose monitor strips Test 3 times a day & as needed for symptoms of irregular blood glucose. Dispense sufficient amount for indicated testing frequency plus additional to accommodate PRN testing needs.  300 strip 0    Lancets MISC 1 each by Does not apply route 2 times daily 300 each 1    Continuous Blood Gluc Sensor (FREESTYLE MCKAYLA 2 SENSOR) MISC 2 FREESTYLE MCKAYLA 2 SENSORS TO USE WITH MCKAYLA 2 READER TO CHECK BLOOD SUGARS 6 8 TIMES A DAY      ibuprofen (IBU) 800 MG tablet Take 1 tablet by mouth every 8 hours as needed for Pain 21 tablet 0    TRULICITY 1.5 LG/9.9PK SOPN INJECT 1.5 MG INTO THE SKIN ONCE A WEEK 12 pen 3    Insulin Degludec (TRESIBA FLEXTOUCH) 200 UNIT/ML SOPN Inject 86 Units into the skin daily PER DR MCELROY (Patient taking differently: Inject 110 Units into the skin daily PER DR MCELROY) 1 pen 0    metFORMIN (GLUCOPHAGE-XR) 500 MG extended release tablet Take 2 tablets by mouth daily (with breakfast) 180 tablet 3    omeprazole (PRILOSEC) 20 MG delayed release capsule TAKE 1 CAPSULE BY MOUTH EVERY DAY IN THE MORNING BEFORE BREAKFAST 90 capsule 1    albuterol sulfate HFA (VENTOLIN HFA) 108 (90 Base) MCG/ACT inhaler INHALE 2 PUFFS EVERY 6 HOURS AS NEEDED FOR WHEEZING 18 Inhaler 3    ondansetron (ZOFRAN ODT) 4 MG disintegrating tablet Take 1 tablet by mouth every 8 hours as needed for Nausea 20 tablet 0    nitroGLYCERIN (NITROSTAT) 0.4 MG SL tablet Place 1 tablet under the tongue every 5 minutes as needed for Chest pain 25 tablet 1    Saccharomyces boulardii (PROBIOTIC) 250 MG CAPS Take 1 capsule by mouth daily 30 capsule 0    tiZANidine (ZANAFLEX) 2 MG tablet TAKE 1 TABLET BY MOUTH NIGHTLY AS NEEDED(SPASMS) 30 tablet 2    acetaminophen (TYLENOL) 500 MG tablet Take 1 tablet by mouth 4 times daily as needed for Pain 40 tablet 0    losartan (COZAAR) 25 MG tablet Take 1 tablet by mouth daily 90 tablet 3    insulin lispro, 1 Unit Dial, (HUMALOG KWIKPEN) 100 UNIT/ML SOPN Inject 20 Units into the skin 3 times daily (before meals) 12 pen 1    metoprolol succinate (TOPROL XL) 100 MG extended release tablet Take 1 tablet by mouth daily 90 tablet 3    DULoxetine (CYMBALTA) 30 MG extended release capsule Take 1 capsule by mouth daily 30 capsule 1    aspirin 81 MG EC tablet TAKE 1 TABLET BY MOUTH DAILY (Patient taking differently: Take 81 mg by mouth daily ) 90 tablet 3    Insulin Pen Needle (BD ULTRA-FINE PEN NEEDLES) 29G X 12.7MM MISC USE AS DIRECTED BY PHYSICIAN 5 times daily 150 each 11    Blood Glucose Monitoring Suppl BERE 1 Units by Does not apply route three times daily Unit insurance will cover 1 Device 0    Blood Pressure Monitor KIT 1 each by Does not apply route daily as needed ESSENTIAL HYPERTENSION   I10 1 kit 0    Blood Glucose Monitoring Suppl (BLOOD GLUCOSE MONITOR KIT) KIT 1 each by Does not apply route daily. Please dispense whatever BS monitoring kit [facility] covers. Dx: 250, new onset T2DM. Testing once daily 1 kit 0     No current facility-administered medications on file prior to visit. Allergies: Diana Mendiola is allergic to tape [adhesive tape]. Past Medical History:   Diagnosis Date    Anxiety     Asthma     CAD (coronary artery disease)     Clinical trial participant at discharge 3/31/16    [facility]     Depression     Diabetic neuropathy ([facility]     H/O cardiac catheterization 4/26/16    LMCA: Mild Irregularities 10-20%. LAD: Lesion on Prox LAD: Proximal subsection. 100% stenosis. LCx: Mild irregularities 10-20%. RCA: Mild irregularities 10-20%. Widely patent mid RCA stent. EF:60%.     H/O cardiovascular stress test 10/07/2016    Overall results are most consistent with a low risk for significant CAD.     H/O echocardiogram 10/05/2016    EF:>60%. Inferoseptal wall abnormal in its motion which is not unusal status post open heart surgery. Evidence of mild (grade I) diastolic dysfunction is seen.  H/O tilt table evaluation 07/12/2016    Abnormal. PTs HR, Blood Pressure response and symptoms were most consistent with dysautonomia. Combined w/viligant maintenance of euvolemia and maintaining a moderate salt intake, pharmaciologic treatment w/ ProAmatine, SSRI such as Lexapro and/or Mestinon among other treatments have shown some effectiveness in the treatment of this condition.  History of cardiovascular stress test 02/13/2019    Abnormal. Small/moderate perfusion defect of mild/moderate intesity in the anterior and anterolateral regions during stress imaging which is most consistent w/ ischemia but may be artifact. Overall low/intermediate risk for significant CAD.  History of echocardiogram 09/06/2018    EF >60%. Inferoseptal wall is abnormal in its motion which is not unusual s/p open heart. Evidence of mild (grade I) diastolic dysfunction seen.     Hx of blood clots     Hyperlipidemia     Hypertension     Intermittent claudication (HCC)     Kidney stones     Movement disorder     neuropathy in legs    Neuromuscular disorder (HCC)     neuropathy    Osteoarthritis     Reflux     Seizures (HCC)     last over 10 yr ago    Stented coronary artery 9/22/2010     LAD/[place]    Stented coronary artery 3/31/16    RCA/[place]    Type II or unspecified type diabetes mellitus without mention of complication, not stated as uncontrolled     Unspecified sleep apnea     no machine       Past Surgical History:   Procedure Laterality Date    ABDOMINAL HERNIA REPAIR  1-2016    repair done julian    APPENDECTOMY      CARDIAC CATHETERIZATION Left 4/26/2016    [facility]/ Dr Carr [place] Left 03/07/2019 Left Radial/[facility] [place]/    CARDIAC SURGERY      CHOLECYSTECTOMY  1991    COLONOSCOPY  12/16/14    -hemorrhoids,bx    CORONARY ANGIOPLASTY WITH STENT PLACEMENT  9/2010    CORONARY ANGIOPLASTY WITH STENT PLACEMENT  3-    [facility] / [facility]    CORONARY ARTERY BYPASS GRAFT  08/15/2016    OP X 1- Dr Tim Waldron, COLON, DIAGNOSTIC  12/16/2014    HERNIA REPAIR  12/13/12    at the medial aspect of a [place] RUQ scar); repaired [address]  02/16/2015    incisional, recurrent    HERNIA REPAIR  02/2016    HYSTERECTOMY      OTHER SURGICAL HISTORY  10/8/2015    abd wound washout, mesh removal, wound vac placement    NV SUCT NICOLE LIPECTOMY,HEAD/NECK Left 8/27/2018    THIGH LESION BIOPSY EXCISION, SOFT TISSUE MASS performed by Stacy Wade MD at [facility] Left 2018    leg    UPPP UVULOPALATOPHARYGOPLASTY  06/26/2012    VENTRAL HERNIA REPAIR  09/21/2015    With [facility] - Dr Abdiaziz Saunders History: Jostin Sheffield  reports that she quit smoking about 11 years ago. Her smoking use included cigarettes. She has a 20.00 pack-year smoking history. She has never used smokeless tobacco. She reports that she does not drink alcohol and does not use drugs. Family History   Problem Relation Age of Onset    Cancer Mother     Diabetes Mother     Cancer Father         thyroid    Diabetes Sister     Heart Disease Sister     Heart Disease Brother     Heart Disease Maternal Uncle     Cancer Maternal Grandmother      On exam: Vitals: Blood pressure 119/78, pulse 82, temperature 93.6 °F (34.2 °C), height 5' 3\" (1.6 m), weight 223 lb (101.2 kg), last menstrual period 12/05/1996, not currently breastfeeding.         GENERAL  Appears comfortable and in no distress   HEENT  NC/ AT   NECK  Supple and no bruits heard   MENTAL STATUS:  Alert, oriented, intact memory, no confusion, normal speech, normal language, no hallucination or delusion; appropriate affect   CRANIAL NERVES: II     -      PERRLA, Fundi reveal intact venous pulsations; Visual fields intact to confrontation  III,IV,VI -  EOMs full, no afferent defect, no                      ELOY, no ptosis  V     -     Normal facial sensation  VII    -     Normal facial symmetry  VIII   -     Intact hearing  IX,X -     Symmetrical palate  XI    -     Symmetrical shoulder shrug  XII   -     Midline tongue, no atrophy    MOTOR FUNCTION:  significant for good strength of grade 5/5 in bilateral proximal and distal muscle groups of both upper and lower extremities with normal bulk, normal tone and no involuntary movements, no tremor   SENSORY FUNCTION:  Impaired light touch, pinprick and temperature in stocking pattern in lower extremities left lower extremity greater than right lower extremity. CEREBELLAR FUNCTION:  Intact fine motor control over upper limbs   REFLEX FUNCTION:  Symmetric, no perverted reflex, no Babinski sign   STATION and GAIT  Normal station, wide based gait; could not do tandem gait; positive Romberg sign. Able to walk with cane. Diagnostic data reviewed with the patient:   NCS/ EMG (8-22-14): There is electrodiagnostic evidence of peripheral polyneuropathy with predominant involvement of sensory fibers only. Supporting features include reduced amplitudes of bilateral superficial peroneal sensory studies and prolonged H-reflex latencies. X-ray of lumbar spine (3-20-13): Mild degenerative change is noted at L3-L4 with disk space narrowing and spur formation. MRI BRAIN (w/wo): done on 1-2-2013: essentially unremarkable. EEG (1-2-2013): unremarkable. Lab Results   Component Value Date    LABA1C 8.9 (H) 12/29/2021       EMG bilateral LE (10/21/17): abnormal electrophysiological study indicative of primarily axonal sensorimotor neuropathy in both lower extremities. There is no evidence of lumbosacral radiculopathy or plexopathy.     EKG (8/23/18); NSR. Impression and Plan: Ms. Diana Mendiola is a 62 y.o. female with   Painful diabetic peripheral polyneuropathy; marginally controlled with intermittent exacerbations; but tolerable at present dose of pregabalin at 150 tid  Significant progressive balance difficulties secondary to above; will give a prescription for power scooter. Abnormal EMG; marginally controlled DM with A1c improved from 12.1 --> 11.1  --> 9.4  --> 8.9  Medically refractory neuropathic pain; refractory to max dose of gabapentin at 800 tid; switched to Pregabalin; doing well on pregabalin; will continue the same dose of Pregabalin at 150 mg tid. Intermittent severe muscle spasms in bilateral lower extremities: stable on tizanidine 2 mg nightly as needed. Also to continue duloxetine 60 mg every day  Also on insulin, Tresiba, metformin for diabetes control  Again, she was well advised about the importance of euglycemia in control of diabetic neuropathy and medication administration instructions and she voiced understanding those instructions. Regarding opioids: There is no evidence regarding long-term effectiveness; with high addiction potential and increased potential for tolerance and overdose and respiratory depression complications possibility  Medication Counseling: risks and benefits including possible adverse effects discussed with the patient and she verbalized understanding those instructions. Also discussed about importance of euglycemia in control of dysesthesias and she voiced understanding those instructions. Follow-up in 6 months. This note was partially created using voice recognition software and is inherently subject to errors including those of syntax and \"sound alike\" substitutions which may escape proofreading. In such instances, original meaning may be extrapolated by contextual derivation.

## 2022-03-01 NOTE — LETTER
[facility] Neurology Specialist  [address] [facility] [address]  Phone: [phone]  Fax: [phone]    Whit Velázquez MD        March 1, 2022     Patient: Darren Stinson   YOB: 1965   Date of Visit: 3/1/2022       To Whom it May Concern:    Eulalia Duran is seen in my clinic on 3/1/2022. She has severe painful diabetic peripheral polyneuropathy with gait difficulties. If you have any questions or concerns, please don't hesitate to call.     Sincerely,         Whit Velázquez MD

## 2022-03-01 NOTE — PROGRESS NOTES
All items selected indicate a positive finding. Those items not selected are negative.   Constitutional [] Weight loss/gain   [x] Fatigue  [] Fever/Chills   HEENT [] Hearing Loss  [] Visual Disturbance  [] Tinnitus  [] Eye pain   Respiratory [x] Shortness of Breath  [x] Cough  [] Snoring   Cardiovascular [] Chest Pain  [] Palpitations  [] Lightheaded   GI [] Constipation  [] Diarrhea  [] Swallowing change  [] Nausea/vomiting    [] Urinary Frequency  [] Urinary Urgency   Musculoskeletal [] Neck pain  [] Back pain  [] Muscle pain  [] Restless legs   Dermatologic [] Skin changes   Neurologic [x] Memory loss/confusion  [] Seizures  [x] Trouble walking or imbalance  [x] Dizziness  [x] Sleep disturbance  [x] Weakness  [x] Numbness  [] Tremors  [] Speech Difficulty  [x] Headaches  [] Light Sensitivity  [] Sound Sensitivity   Endocrinology [x]Excessive thirst  []Excessive hunger   Psychiatric [x] Anxiety/Depression  [] Hallucination   Allergy/immunology []Hives/environmental allergies   Hematologic/lymph [] Abnormal bleeding  [] Abnormal bruising

## 2022-03-08 ENCOUNTER — HOSPITAL ENCOUNTER (OUTPATIENT)
Dept: LAB | Age: 57
Setting detail: SPECIMEN
Discharge: HOME | End: 2022-03-08
Payer: COMMERCIAL

## 2022-03-08 ENCOUNTER — OFFICE VISIT (OUTPATIENT)
Dept: PODIATRY | Age: 57
End: 2022-03-08
Payer: COMMERCIAL

## 2022-03-08 ENCOUNTER — HOSPITAL ENCOUNTER (OUTPATIENT)
Dept: INFUSION THERAPY | Age: 57
Discharge: HOME | End: 2022-03-08

## 2022-03-08 VITALS
TEMPERATURE: 97.1 F | RESPIRATION RATE: 18 BRPM | SYSTOLIC BLOOD PRESSURE: 123 MMHG | HEIGHT: 63 IN | HEART RATE: 84 BPM | DIASTOLIC BLOOD PRESSURE: 76 MMHG | BODY MASS INDEX: 39.5 KG/M2

## 2022-03-08 DIAGNOSIS — L02.612 ABSCESS OF LEFT GREAT TOE: Primary | ICD-10-CM

## 2022-03-08 DIAGNOSIS — L03.032 PARONYCHIA OF GREAT TOE OF LEFT FOOT: ICD-10-CM

## 2022-03-08 DIAGNOSIS — L02.612 ABSCESS OF LEFT GREAT TOE: ICD-10-CM

## 2022-03-08 PROCEDURE — 87070 CULTURE OTHR SPECIMN AEROBIC: CPT

## 2022-03-08 PROCEDURE — 10060 I&D ABSCESS SIMPLE/SINGLE: CPT | Performed by: PODIATRIST

## 2022-03-08 PROCEDURE — 99213 OFFICE O/P EST LOW 20 MIN: CPT | Performed by: PODIATRIST

## 2022-03-08 PROCEDURE — 87186 SC STD MICRODIL/AGAR DIL: CPT

## 2022-03-08 PROCEDURE — 86403 PARTICLE AGGLUT ANTBDY SCRN: CPT

## 2022-03-08 PROCEDURE — 87205 SMEAR GRAM STAIN: CPT

## 2022-03-08 RX ORDER — LIDOCAINE HYDROCHLORIDE 20 MG/ML
20 INJECTION, SOLUTION INFILTRATION; PERINEURAL ONCE
Status: DISCONTINUED | OUTPATIENT
Start: 2022-03-08 | End: 2022-03-09 | Stop reason: HOSPADM

## 2022-03-08 RX ORDER — CEPHALEXIN 500 MG/1
500 CAPSULE ORAL 2 TIMES DAILY
Qty: 14 CAPSULE | Refills: 0 | Status: SHIPPED | OUTPATIENT
Start: 2022-03-08 | End: 2022-03-15

## 2022-03-08 RX ORDER — BUPIVACAINE HYDROCHLORIDE 5 MG/ML
20 INJECTION, SOLUTION EPIDURAL; INTRACAUDAL ONCE
Status: DISCONTINUED | OUTPATIENT
Start: 2022-03-08 | End: 2022-03-09 | Stop reason: HOSPADM

## 2022-03-08 ASSESSMENT — ENCOUNTER SYMPTOMS
WHEEZING: 0
EYES NEGATIVE: 1
VOMITING: 0
DIARRHEA: 0
APNEA: 0
CONSTIPATION: 0
SHORTNESS OF BREATH: 0
COLOR CHANGE: 1
NAUSEA: 0

## 2022-03-08 NOTE — PROGRESS NOTES
SUBJECTIVE:   Holly Rangel is a 62 y.o. female who presents with acutely ingrown left, medial 1st toenail. Has minimal pain and tenderness at the area, which is normally neuropathic ,  without fever. HPI: 1-2 weeks progressive          Insidious onset , non traumatic           Minimal self care ; soaks in Epsom salt   Review of Systems   Constitutional: Positive for activity change. Negative for appetite change, chills and fever. HENT: Negative for nosebleeds. Eyes: Negative. Respiratory: Negative for apnea, shortness of breath and wheezing.         -PE, -smoking, +asthma, +COPD    Cardiovascular: Negative for chest pain, palpitations and leg swelling. +CAD/ASHD , +MI, -current angina    Gastrointestinal: Negative for constipation, diarrhea, nausea and vomiting. Endocrine:        +DM , A1c 9.4   Genitourinary: Positive for frequency. Negative for hematuria. Musculoskeletal: Positive for gait problem. Skin: Positive for color change and wound. Allergic/Immunologic: Negative for environmental allergies, food allergies and immunocompromised state.        -meds, +tape,    Neurological: Positive for numbness. Negative for weakness. Hematological: Negative for adenopathy. Does not bruise/bleed easily.        -Hypercoag. Psychiatric/Behavioral: Negative. Past Medical History:   Diagnosis Date    Anxiety     Asthma     CAD (coronary artery disease)     Clinical trial participant at discharge 3/31/16    COPD (chronic obstructive pulmonary disease) ([facility]     Depression     Diabetic neuropathy ([facility]     H/O cardiac catheterization 4/26/16    LMCA: Mild Irregularities 10-20%. LAD: Lesion on Prox LAD: Proximal subsection. 100% stenosis. LCx: Mild irregularities 10-20%. RCA: Mild irregularities 10-20%. Widely patent mid RCA stent. EF:60%.     H/O cardiovascular stress test 10/07/2016    Overall results are most consistent with a low risk for significant CAD.     H/O echocardiogram 10/05/2016    EF:>60%. Inferoseptal wall abnormal in its motion which is not unusal status post open heart surgery. Evidence of mild (grade I) diastolic dysfunction is seen.  H/O tilt table evaluation 07/12/2016    Abnormal. PTs HR, Blood Pressure response and symptoms were most consistent with dysautonomia. Combined w/viligant maintenance of euvolemia and maintaining a moderate salt intake, pharmaciologic treatment w/ ProAmatine, SSRI such as Lexapro and/or Mestinon among other treatments have shown some effectiveness in the treatment of this condition.  History of cardiovascular stress test 02/13/2019    Abnormal. Small/moderate perfusion defect of mild/moderate intesity in the anterior and anterolateral regions during stress imaging which is most consistent w/ ischemia but may be artifact. Overall low/intermediate risk for significant CAD.  History of echocardiogram 09/06/2018    EF >60%. Inferoseptal wall is abnormal in its motion which is not unusual s/p open heart. Evidence of mild (grade I) diastolic dysfunction seen.     Hx of blood clots     Hyperlipidemia     Hypertension     Intermittent claudication (HCC)     Kidney stones     Movement disorder     neuropathy in legs    Neuromuscular disorder (HCC)     neuropathy    Osteoarthritis     Reflux     Seizures (HCC)     last over 10 yr ago    Stented coronary artery 9/22/2010     LAD/[place]    Stented coronary artery 3/31/16    RCA/[place]    Type II or unspecified type diabetes mellitus without mention of complication, not stated as uncontrolled     Unspecified sleep apnea     no machine     Past Surgical History:   Procedure Laterality Date    ABDOMINAL HERNIA REPAIR  1-2016    repair done [place]    APPENDECTOMY      CARDIAC CATHETERIZATION Left 4/26/2016    right radial/ [facility] [place]/ Dr Arun Hicks Left 03/07/2019    Left Radial/[facility] [place]/   [address] additional inspection and technique freed the nail from the overriding eponychium and the underlying nail bed in atraumatic  fashion of the medial labia. The nail is transected from the distal to proximal and concomitant excision of the nail plate is done. The nail bed/incision site was then irrigated and packed with sterile gauze. Culture taken Yes  The patient tolerated the procedure well. Complications: None    Post procedure care: Antibiotics as per the AVS/orders and prescription.

## 2022-03-08 NOTE — PATIENT INSTRUCTIONS
PODIATRY PATIENT DISCHARGE INSTRUCTIONS    CALL [phone] regarding podiatry questions    OFFICE HOURS ARE TUESDAY MORNING  8:30-NOON and can change with out notice    Discharge instructions for patient's plan of care:  Left great toe -remove dressing this evening soak in warm epsom salt water for 10-15 minutes let air dry and then apply antibiotic ointment to area cover with dry dressing may repeat in morning. Then apply antibiotic ointment to toe daily for the next 3 days and cover with dry dressing or bandaide. Xray of left great toe    Culture done today -Antibiotic   Keflex 500mg 1 tablet twice a day for 7 days. Next Appointment: Tuesday 9/20/22 at 8:30Am for laser removal of left great toenail we will call 1 week prior to order antibiotic. We hope we gave you VERY GOOD care today!

## 2022-03-09 ENCOUNTER — HOSPITAL ENCOUNTER (OUTPATIENT)
Age: 57
Discharge: HOME | End: 2022-03-11
Payer: COMMERCIAL

## 2022-03-09 ENCOUNTER — HOSPITAL ENCOUNTER (OUTPATIENT)
Dept: GENERAL RADIOLOGY | Age: 57
Discharge: HOME | End: 2022-03-11
Payer: COMMERCIAL

## 2022-03-09 DIAGNOSIS — L03.032 PARONYCHIA OF GREAT TOE OF LEFT FOOT: ICD-10-CM

## 2022-03-09 DIAGNOSIS — L02.612 ABSCESS OF LEFT GREAT TOE: ICD-10-CM

## 2022-03-09 PROCEDURE — 73660 X-RAY EXAM OF TOE(S): CPT

## 2022-03-11 LAB
CULTURE: ABNORMAL
DIRECT EXAM: ABNORMAL
SPECIMEN DESCRIPTION: ABNORMAL

## 2022-04-12 ENCOUNTER — HOSPITAL ENCOUNTER (OUTPATIENT)
Dept: LAB | Age: 57
Setting detail: SPECIMEN
Discharge: HOME | End: 2022-04-12
Payer: COMMERCIAL

## 2022-04-12 ENCOUNTER — OFFICE VISIT (OUTPATIENT)
Dept: PODIATRY | Age: 57
End: 2022-04-12
Payer: COMMERCIAL

## 2022-04-12 VITALS
HEART RATE: 91 BPM | RESPIRATION RATE: 20 BRPM | TEMPERATURE: 98.8 F | DIASTOLIC BLOOD PRESSURE: 72 MMHG | HEIGHT: 63 IN | BODY MASS INDEX: 39.5 KG/M2 | SYSTOLIC BLOOD PRESSURE: 132 MMHG

## 2022-04-12 DIAGNOSIS — L02.612 ABSCESS OF LEFT GREAT TOE: ICD-10-CM

## 2022-04-12 DIAGNOSIS — L02.612 ABSCESS OF LEFT GREAT TOE: Primary | ICD-10-CM

## 2022-04-12 PROCEDURE — 87186 SC STD MICRODIL/AGAR DIL: CPT

## 2022-04-12 PROCEDURE — 87205 SMEAR GRAM STAIN: CPT

## 2022-04-12 PROCEDURE — 99213 OFFICE O/P EST LOW 20 MIN: CPT | Performed by: PODIATRIST

## 2022-04-12 PROCEDURE — 87070 CULTURE OTHR SPECIMN AEROBIC: CPT

## 2022-04-12 PROCEDURE — 86403 PARTICLE AGGLUT ANTBDY SCRN: CPT

## 2022-04-12 RX ORDER — CLINDAMYCIN HYDROCHLORIDE 300 MG/1
300 CAPSULE ORAL 2 TIMES DAILY
Qty: 20 CAPSULE | Refills: 0 | Status: SHIPPED | OUTPATIENT
Start: 2022-04-12 | End: 2022-04-22

## 2022-04-12 ASSESSMENT — PATIENT HEALTH QUESTIONNAIRE - PHQ9
SUM OF ALL RESPONSES TO PHQ QUESTIONS 1-9: 0
1. LITTLE INTEREST OR PLEASURE IN DOING THINGS: 0
SUM OF ALL RESPONSES TO PHQ9 QUESTIONS 1 & 2: 0
2. FEELING DOWN, DEPRESSED OR HOPELESS: 0
SUM OF ALL RESPONSES TO PHQ QUESTIONS 1-9: 0

## 2022-04-12 NOTE — PROGRESS NOTES
Vilma Polk (:  1965) is a 62 y.o. female,Established patient, here for evaluation of the following chief complaint(s):  Ingrown Toenail (left great toe and right 3rd toe)         ASSESSMENT/PLAN:  1. Abscess of left great toe  -     Culture, Wound; Future  -     clindamycin (CLEOCIN) 300 MG capsule; Take 1 capsule by mouth 2 times daily for 10 days, Disp-20 capsule, R-0Normal      No follow-ups on file. Subjective   SUBJECTIVE/OBJECTIVE: area of I&D hasn,t completely healed  HPI; I&D 3/8/2022 LEFT great toe and new area R3          MRR: A1c 9.4     Review of Systems   Constitutional: Positive for activity change. Negative for appetite change, chills, diaphoresis, fatigue, fever and unexpected weight change. HENT: Negative for nosebleeds. Respiratory: Negative for apnea, choking, chest tightness, shortness of breath, wheezing and stridor.         -smoking   Cardiovascular: Negative for chest pain, palpitations and leg swelling.        -claudication, -DVT   Gastrointestinal: Negative for constipation, diarrhea, nausea and vomiting. Endocrine:        +DM. Skin: Positive for color change and wound. Negative for pallor and rash. Allergic/Immunologic: Negative for environmental allergies, food allergies and immunocompromised state. +Tape    Hematological: Negative. Psychiatric/Behavioral: Negative.             Objective   Physical Exam   VSS, Afebrile, NAD,A&O x 3,  L1 ; residual +blanching erythema , medial labia  / +PMT ; NO nail spicule noted          (3/8/2022) culture ; MSSA / Giuseppee Fansusana           Photo documentation  IMP: low grade critical colonization , preventing complete healing and remodeling , lack of infectious clearance   P: astringent dressing care       Re culture       Empiric Cleocin 300 mg BID x 10 days        On this date 2022 I have spent 20 minutes reviewing previous notes, test results and face to face with the patient discussing the diagnosis and importance of compliance with the treatment plan as well as documenting on the day of the visit. An electronic signature was used to authenticate this note.     --Liseth Richardson DPM   04/12/2022    Addendum ; culture MSSA / streph                       Hx Empiric Cleocin 300 mg BID on 4/12/2002    Liseth Richardson DPM  4/14/2022  6:29 PM

## 2022-04-12 NOTE — PATIENT INSTRUCTIONS
PODIATRY PATIENT DISCHARGE INSTRUCTIONS    CALL [phone] regarding podiatry questions    OFFICE HOURS ARE TUESDAY MORNING  8:30-NOON and can change with out notice    Discharge instructions for patient's plan of care:    Left great toe and right 3rd toe  Apply betadine to areas 1-2 times a day let air dry and then apply dry dressing. Cleocin 300 mg 1 capsule twice a day for 10 days. Call if not better after antibiotics. We hope we gave you VERY GOOD care today!

## 2022-04-16 LAB
CULTURE: ABNORMAL
DIRECT EXAM: ABNORMAL
DIRECT EXAM: ABNORMAL
SPECIMEN DESCRIPTION: ABNORMAL

## 2022-04-22 ENCOUNTER — HOSPITAL ENCOUNTER (EMERGENCY)
Age: 57
Discharge: ANOTHER HOSPITAL | End: 2022-04-23
Attending: EMERGENCY MEDICINE
Payer: COMMERCIAL

## 2022-04-22 ENCOUNTER — APPOINTMENT (OUTPATIENT)
Dept: GENERAL RADIOLOGY | Age: 57
End: 2022-04-22
Payer: COMMERCIAL

## 2022-04-22 ENCOUNTER — APPOINTMENT (OUTPATIENT)
Dept: CT IMAGING | Age: 57
End: 2022-04-22
Payer: COMMERCIAL

## 2022-04-22 DIAGNOSIS — R10.13 EPIGASTRIC PAIN: Primary | ICD-10-CM

## 2022-04-22 DIAGNOSIS — R94.31 ACUTE ELECTROCARDIOGRAM CHANGES: ICD-10-CM

## 2022-04-22 DIAGNOSIS — R77.8 ELEVATED TROPONIN: ICD-10-CM

## 2022-04-22 LAB
ABSOLUTE EOS #: 0.14 K/UL (ref 0–0.44)
ABSOLUTE IMMATURE GRANULOCYTE: 0.05 K/UL (ref 0–0.3)
ABSOLUTE LYMPH #: 1.77 K/UL (ref 1.1–3.7)
ABSOLUTE MONO #: 0.42 K/UL (ref 0.1–1.2)
ALBUMIN SERPL-MCNC: 3.9 G/DL (ref 3.5–5.2)
ALBUMIN/GLOBULIN RATIO: 1 (ref 1–2.5)
ALP BLD-CCNC: 99 U/L (ref 35–104)
ALT SERPL-CCNC: 9 U/L (ref 5–33)
ANION GAP SERPL CALCULATED.3IONS-SCNC: 12 MMOL/L (ref 9–17)
AST SERPL-CCNC: 11 U/L
BASOPHILS # BLD: 1 % (ref 0–2)
BASOPHILS ABSOLUTE: 0.07 K/UL (ref 0–0.2)
BILIRUB SERPL-MCNC: 0.41 MG/DL (ref 0.3–1.2)
BUN BLDV-MCNC: 10 MG/DL (ref 6–20)
BUN/CREAT BLD: 13 (ref 9–20)
CALCIUM SERPL-MCNC: 9.4 MG/DL (ref 8.6–10.4)
CHLORIDE BLD-SCNC: 96 MMOL/L (ref 98–107)
CO2: 25 MMOL/L (ref 20–31)
CREAT SERPL-MCNC: 0.76 MG/DL (ref 0.5–0.9)
EOSINOPHILS RELATIVE PERCENT: 2 % (ref 1–4)
GFR AFRICAN AMERICAN: >60 ML/MIN
GFR NON-AFRICAN AMERICAN: >60 ML/MIN
GFR SERPL CREATININE-BSD FRML MDRD: ABNORMAL ML/MIN/{1.73_M2}
GFR SERPL CREATININE-BSD FRML MDRD: ABNORMAL ML/MIN/{1.73_M2}
GLUCOSE BLD-MCNC: 331 MG/DL (ref 70–99)
HCT VFR BLD CALC: 41 % (ref 36.3–47.1)
HEMOGLOBIN: 13 G/DL (ref 11.9–15.1)
IMMATURE GRANULOCYTES: 1 %
LIPASE: 53 U/L (ref 13–60)
LYMPHOCYTES # BLD: 19 % (ref 24–43)
MAGNESIUM: 1.7 MG/DL (ref 1.6–2.6)
MCH RBC QN AUTO: 24.5 PG (ref 25.2–33.5)
MCHC RBC AUTO-ENTMCNC: 31.7 G/DL (ref 28.4–34.8)
MCV RBC AUTO: 77.2 FL (ref 82.6–102.9)
MONOCYTES # BLD: 5 % (ref 3–12)
NRBC AUTOMATED: 0 PER 100 WBC
PARTIAL THROMBOPLASTIN TIME: 29 SEC (ref 26.8–34.8)
PARTIAL THROMBOPLASTIN TIME: 38.2 SEC (ref 26.8–34.8)
PDW BLD-RTO: 14.9 % (ref 11.8–14.4)
PLATELET # BLD: 329 K/UL (ref 138–453)
PMV BLD AUTO: 9.4 FL (ref 8.1–13.5)
POTASSIUM SERPL-SCNC: 4.6 MMOL/L (ref 3.7–5.3)
RBC # BLD: 5.31 M/UL (ref 3.95–5.11)
SEG NEUTROPHILS: 72 % (ref 36–65)
SEGMENTED NEUTROPHILS ABSOLUTE COUNT: 6.94 K/UL (ref 1.5–8.1)
SODIUM BLD-SCNC: 133 MMOL/L (ref 135–144)
TOTAL PROTEIN: 7.9 G/DL (ref 6.4–8.3)
TROPONIN, HIGH SENSITIVITY: 15 NG/L (ref 0–14)
TROPONIN, HIGH SENSITIVITY: 15 NG/L (ref 0–14)
WBC # BLD: 9.4 K/UL (ref 3.5–11.3)

## 2022-04-22 PROCEDURE — 6360000004 HC RX CONTRAST MEDICATION: Performed by: EMERGENCY MEDICINE

## 2022-04-22 PROCEDURE — 96376 TX/PRO/DX INJ SAME DRUG ADON: CPT

## 2022-04-22 PROCEDURE — 84484 ASSAY OF TROPONIN QUANT: CPT

## 2022-04-22 PROCEDURE — 99285 EMERGENCY DEPT VISIT HI MDM: CPT

## 2022-04-22 PROCEDURE — 36415 COLL VENOUS BLD VENIPUNCTURE: CPT

## 2022-04-22 PROCEDURE — 83735 ASSAY OF MAGNESIUM: CPT

## 2022-04-22 PROCEDURE — 85025 COMPLETE CBC W/AUTO DIFF WBC: CPT

## 2022-04-22 PROCEDURE — 96374 THER/PROPH/DIAG INJ IV PUSH: CPT

## 2022-04-22 PROCEDURE — 93005 ELECTROCARDIOGRAM TRACING: CPT | Performed by: EMERGENCY MEDICINE

## 2022-04-22 PROCEDURE — 71045 X-RAY EXAM CHEST 1 VIEW: CPT

## 2022-04-22 PROCEDURE — 74177 CT ABD & PELVIS W/CONTRAST: CPT

## 2022-04-22 PROCEDURE — 6370000000 HC RX 637 (ALT 250 FOR IP): Performed by: EMERGENCY MEDICINE

## 2022-04-22 PROCEDURE — 85730 THROMBOPLASTIN TIME PARTIAL: CPT

## 2022-04-22 PROCEDURE — 83690 ASSAY OF LIPASE: CPT

## 2022-04-22 PROCEDURE — 80053 COMPREHEN METABOLIC PANEL: CPT

## 2022-04-22 PROCEDURE — 6360000002 HC RX W HCPCS: Performed by: EMERGENCY MEDICINE

## 2022-04-22 RX ORDER — HEPARIN SODIUM 1000 [USP'U]/ML
4000 INJECTION, SOLUTION INTRAVENOUS; SUBCUTANEOUS PRN
Status: DISCONTINUED | OUTPATIENT
Start: 2022-04-22 | End: 2022-04-23 | Stop reason: HOSPADM

## 2022-04-22 RX ORDER — HEPARIN SODIUM 10000 [USP'U]/100ML
5-30 INJECTION, SOLUTION INTRAVENOUS CONTINUOUS
Status: DISCONTINUED | OUTPATIENT
Start: 2022-04-22 | End: 2022-04-23 | Stop reason: HOSPADM

## 2022-04-22 RX ORDER — ASPIRIN 81 MG/1
TABLET, CHEWABLE ORAL
Status: DISPENSED
Start: 2022-04-22 | End: 2022-04-23

## 2022-04-22 RX ORDER — ASPIRIN 81 MG/1
324 TABLET, CHEWABLE ORAL ONCE
Status: COMPLETED | OUTPATIENT
Start: 2022-04-22 | End: 2022-04-22

## 2022-04-22 RX ORDER — HEPARIN SODIUM 1000 [USP'U]/ML
4000 INJECTION, SOLUTION INTRAVENOUS; SUBCUTANEOUS ONCE
Status: COMPLETED | OUTPATIENT
Start: 2022-04-22 | End: 2022-04-22

## 2022-04-22 RX ORDER — HEPARIN SODIUM 1000 [USP'U]/ML
2000 INJECTION, SOLUTION INTRAVENOUS; SUBCUTANEOUS PRN
Status: DISCONTINUED | OUTPATIENT
Start: 2022-04-22 | End: 2022-04-23 | Stop reason: HOSPADM

## 2022-04-22 RX ADMIN — HEPARIN SODIUM AND DEXTROSE 12 UNITS/KG/HR: 10000; 5 INJECTION INTRAVENOUS at 15:32

## 2022-04-22 RX ADMIN — ASPIRIN 324 MG: 81 TABLET, CHEWABLE ORAL at 15:27

## 2022-04-22 RX ADMIN — HEPARIN SODIUM 4000 UNITS: 1000 INJECTION INTRAVENOUS; SUBCUTANEOUS at 15:28

## 2022-04-22 RX ADMIN — HEPARIN SODIUM 4000 UNITS: 1000 INJECTION INTRAVENOUS; SUBCUTANEOUS at 22:48

## 2022-04-22 RX ADMIN — IOPAMIDOL 75 ML: 755 INJECTION, SOLUTION INTRAVENOUS at 14:22

## 2022-04-22 ASSESSMENT — PAIN SCALES - GENERAL: PAINLEVEL_OUTOF10: 4

## 2022-04-22 ASSESSMENT — PAIN - FUNCTIONAL ASSESSMENT: PAIN_FUNCTIONAL_ASSESSMENT: 0-10

## 2022-04-22 ASSESSMENT — PAIN DESCRIPTION - LOCATION: LOCATION: ABDOMEN

## 2022-04-22 ASSESSMENT — ENCOUNTER SYMPTOMS
HEMATEMESIS: 0
ABDOMINAL PAIN: 1
DIARRHEA: 0
NAUSEA: 0
HEMATOCHEZIA: 0
VOMITING: 0

## 2022-04-22 ASSESSMENT — PAIN DESCRIPTION - PAIN TYPE: TYPE: ACUTE PAIN

## 2022-04-22 ASSESSMENT — PAIN DESCRIPTION - FREQUENCY: FREQUENCY: INTERMITTENT

## 2022-04-22 ASSESSMENT — PAIN DESCRIPTION - DESCRIPTORS: DESCRIPTORS: DISCOMFORT

## 2022-04-22 ASSESSMENT — PAIN DESCRIPTION - ORIENTATION: ORIENTATION: MID;UPPER

## 2022-04-22 NOTE — ED NOTES
Transfer to [facility]. V's started with Narcisa Herrera  04/22/22 1198

## 2022-04-22 NOTE — ED PROVIDER NOTES
[facility] ED  EMERGENCY DEPARTMENT ENCOUNTER      Pt Name: John Boykin  MRN: [id number]  [place] 1965  Date of evaluation: 4/22/2022  Provider: CHELSEA Borrero CNP    CHIEF COMPLAINT     No chief complaint on file. NOT MY PATIENT    HISTORY OF PRESENT ILLNESS   (Location/Symptom, Timing/Onset, Context/Setting, Quality, Duration, Modifying Factors, Severity)  Note limiting factors. John Boykin is a 62 y.o. female who presents to the emergency department     HPI    Nursing Notes were reviewed. REVIEW OF SYSTEMS    (2-9 systems for level 4, 10 or more for level 5)     Review of Systems    Except as noted above the remainder of the review of systems was reviewed and negative. PAST MEDICAL HISTORY     Past Medical History:   Diagnosis Date    Anxiety     Asthma     CAD (coronary artery disease)     Clinical trial participant at discharge 3/31/16    [facility]ch[facility]     Depression     Diabetic neuropathy ([facility]     H/O cardiac catheterization 4/26/16    LMCA: Mild Irregularities 10-20%. LAD: Lesion on Prox LAD: Proximal subsection. 100% stenosis. LCx: Mild irregularities 10-20%. RCA: Mild irregularities 10-20%. Widely patent mid RCA stent. EF:60%.  H/O cardiovascular stress test 10/07/2016    Overall results are most consistent with a low risk for significant CAD.     H/O echocardiogram 10/05/2016    EF:>60%. Inferoseptal wall abnormal in its motion which is not unusal status post open heart surgery. Evidence of mild (grade I) diastolic dysfunction is seen.  H/O tilt table evaluation 07/12/2016    Abnormal. PTs HR, Blood Pressure response and symptoms were most consistent with dysautonomia.  Combined w/viligant maintenance of euvolemia and maintaining a moderate salt intake, pharmaciologic treatment w/ ProAmatine, SSRI such as Lexapro and/or Mestinon among other treatments have shown some effectiveness in the treatment of this condition.  History of cardiovascular stress test 02/13/2019    Abnormal. Small/moderate perfusion defect of mild/moderate intesity in the anterior and anterolateral regions during stress imaging which is most consistent w/ ischemia but may be artifact. Overall low/intermediate risk for significant CAD.  History of echocardiogram 09/06/2018    EF >60%. Inferoseptal wall is abnormal in its motion which is not unusual s/p open heart. Evidence of mild (grade I) diastolic dysfunction seen.     Hx of blood clots     Hyperlipidemia     Hypertension     Intermittent claudication (HCC)     Kidney stones     Movement disorder     neuropathy in legs    Neuromuscular disorder (HCC)     neuropathy    Osteoarthritis     Reflux     Seizures ([facility])     last over 10 yr ago    Stented coronary artery 9/22/2010     LAD/[place]    Stented coronary artery 3/31/16    RCA/[place]    Type II or unspecified type diabetes mellitus without mention of complication, not stated as uncontrolled     Unspecified sleep apnea     no machine         SURGICAL HISTORY       Past Surgical History:   Procedure Laterality Date    ABDOMINAL HERNIA REPAIR  1-2016    repair done [place]    APPENDECTOMY      CARDIAC CATHETERIZATION Left 4/26/2016    right radial/ [id number] [facility] [place]/ Dr Hutson [facility] Left 03/07/2019    Left Radial/[facility] [place]/    CARDIAC SURGERY      CHOLECYSTECTOMY  1991    COLONOSCOPY  12/16/14    -hemorrhoids,bx    CORONARY ANGIOPLASTY WITH STENT PLACEMENT  9/2010    CORONARY ANGIOPLASTY WITH STENT PLACEMENT  3-    [facility]CA / DR Myriam Lane    CORONARY ARTERY BYPASS GRAFT  08/15/2016    OP X 1- Dr Jose Miranda, COLON, DIAGNOSTIC  12/16/2014    HERNIA REPAIR  12/13/12    at the medial aspect of a [place] RUQ scar); repaired [id number]  02/16/2015    incisional, recurrent    HERNIA REPAIR  02/2016    HYSTERECTOMY      OTHER SURGICAL HISTORY 10/8/2015    abd wound washout, mesh removal, wound vac placement    AK SUCT NICOLE LIPECTOMY,HEAD/NECK Left 8/27/2018    THIGH LESION BIOPSY EXCISION, SOFT TISSUE MASS performed by Huseyin Anton MD at [facility] Left 2018    leg    UPPP UVULOPALATOPHARYGOPLASTY  06/26/2012    VENTRAL HERNIA REPAIR  09/21/2015    With Mesh - Dr Radhika Prater       Previous Medications    ACETAMINOPHEN (TYLENOL) 500 MG TABLET    Take 1 tablet by mouth 4 times daily as needed for Pain    ALBUTEROL SULFATE HFA (VENTOLIN HFA) 108 (90 BASE) MCG/ACT INHALER    INHALE 2 PUFFS EVERY 6 HOURS AS NEEDED FOR WHEEZING    ASPIRIN 81 MG EC TABLET    TAKE 1 TABLET BY MOUTH DAILY    ATORVASTATIN (LIPITOR) 80 MG TABLET    Take 1 tablet by mouth daily    BLOOD GLUCOSE MONITOR STRIPS    Test 3 times a day & as needed for symptoms of irregular blood glucose. Dispense sufficient amount for indicated testing frequency plus additional to accommodate PRN testing needs. BLOOD GLUCOSE MONITORING SUPPL (BLOOD GLUCOSE MONITOR KIT) KIT    1 each by Does not apply route daily. Please dispense whatever BS monitoring kit [facility] covers. Dx: 250, new onset T2DM.  Testing once daily    BLOOD GLUCOSE MONITORING SUPPL (ONE TOUCH ULTRA 2) W/DEVICE KIT    1 kit by Does not apply route daily    BLOOD GLUCOSE MONITORING SUPPL BERE    1 Units by Does not apply route three times daily Unit insurance will cover    BLOOD PRESSURE MONITOR KIT    1 each by Does not apply route daily as needed ESSENTIAL HYPERTENSION   I10    CLINDAMYCIN (CLEOCIN) 300 MG CAPSULE    Take 1 capsule by mouth 2 times daily for 10 days    CONTINUOUS BLOOD GLUC SENSOR (FREESTYLE MCKAYLA 2 SENSOR) MISC    2 FREESTYLE MCKAYLA 2 SENSORS TO USE WITH MCKAYLA 2 READER TO CHECK BLOOD SUGARS 6 8 TIMES A DAY    DULOXETINE (CYMBALTA) 30 MG EXTENDED RELEASE CAPSULE    Take 1 capsule by mouth daily    FLUDROCORTISONE (FLORINEF) 0.1 MG TABLET    TAKE 3 TABLETS BY MOUTH EVERY DAY    IBUPROFEN (IBU) 800 MG TABLET    Take 1 tablet by mouth every 8 hours as needed for Pain    INSULIN DEGLUDEC (TRESIBA FLEXTOUCH) 200 UNIT/ML SOPN    Inject 86 Units into the skin daily PER DR MCELROY    INSULIN LISPRO, 1 UNIT DIAL, (HUMALOG KWIKPEN) 100 UNIT/ML SOPN    Inject 20 Units into the skin 3 times daily (before meals)    INSULIN PEN NEEDLE (BD ULTRA-FINE PEN NEEDLES) 29G X 12.7MM MISC    USE AS DIRECTED BY PHYSICIAN 5 times daily    LANCETS MISC    1 each by Does not apply route 2 times daily    LOSARTAN (COZAAR) 25 MG TABLET    Take 1 tablet by mouth daily    METFORMIN (GLUCOPHAGE-XR) 500 MG EXTENDED RELEASE TABLET    Take 2 tablets by mouth daily (with breakfast)    METOPROLOL SUCCINATE (TOPROL XL) 100 MG EXTENDED RELEASE TABLET    Take 1 tablet by mouth daily    NITROGLYCERIN (NITROSTAT) 0.4 MG SL TABLET    Place 1 tablet under the tongue every 5 minutes as needed for Chest pain    OMEPRAZOLE (PRILOSEC) 20 MG DELAYED RELEASE CAPSULE    TAKE 1 CAPSULE BY MOUTH EVERY DAY IN THE MORNING BEFORE BREAKFAST    ONDANSETRON (ZOFRAN ODT) 4 MG DISINTEGRATING TABLET    Take 1 tablet by mouth every 8 hours as needed for Nausea    PREGABALIN (LYRICA) 150 MG CAPSULE    Take one cap three times daily    SACCHAROMYCES BOULARDII (PROBIOTIC) 250 MG CAPS    Take 1 capsule by mouth daily    TIZANIDINE (ZANAFLEX) 2 MG TABLET    TAKE 1 TABLET BY MOUTH NIGHTLY AS NEEDED(SPASMS)    TRULICITY 1.5 DU/8.4DD SOPN    INJECT 1.5 MG INTO THE SKIN ONCE A WEEK       ALLERGIES     Tape Jacob Kawasaki tape]    FAMILY HISTORY       Family History   Problem Relation Age of Onset    Cancer Mother     Diabetes Mother     Cancer Father         thyroid    Diabetes Sister     Heart Disease Sister     Heart Disease Brother     Heart Disease Maternal Uncle     Cancer Maternal Grandmother           SOCIAL HISTORY       Social History     Socioeconomic History    Marital status:      Spouse name: Not on file    Number of children: Not on file    Years of education: Not on file    Highest education level: Not on file   Occupational History    Not on file   Tobacco Use    Smoking status: Former Smoker     Packs/day: 2.00     Years: 10.00     Pack years: 20.00     Types: Cigarettes     Quit date: 2010     Years since quittin.5    Smokeless tobacco: Never Used   Vaping Use    Vaping Use: Never used   Substance and Sexual Activity    Alcohol use: No    Drug use: No    Sexual activity: Yes     Partners: Male   Other Topics Concern    Not on file   Social History Narrative    Not on file     Social Determinants of Health     Financial Resource Strain: Low Risk     Difficulty of Paying Living Expenses: Not very hard   Food Insecurity: Food Insecurity Present    Worried About Running Out of Food in the Last Year: Sometimes true    Arianna of Food in the Last Year: Sometimes true   Transportation Needs:     Lack of Transportation (Medical): Not on file    Lack of Transportation (Non-Medical):  Not on file   Physical Activity:     Days of Exercise per Week: Not on file    Minutes of Exercise per Session: Not on file   Stress:     Feeling of Stress : Not on file   Social Connections:     Frequency of Communication with Friends and Family: Not on file    Frequency of Social Gatherings with Friends and Family: Not on file    Attends Hinduism Services: Not on file    Active Member of [address] [place] [address] or Organizations: Not on file    Attends Club or Organization Meetings: Not on file    Marital Status: Not on file   Intimate Partner Violence:     Fear of Current or Ex-Partner: Not on file    Emotionally Abused: Not on file    Physically Abused: Not on file    Sexually Abused: Not on file   Housing Stability:     Unable to Pay for Housing in the Last Year: Not on file    Number of [place] in the Last Year: Not on file    Unstable Housing in the Last Year: Not on file       SCREENINGS PHYSICAL EXAM    (up to 7 for level 4, 8 or more for level 5)     ED Triage Vitals   BP Temp Temp src Pulse Resp SpO2 Height Weight   -- -- -- -- -- -- -- --       Physical Exam    DIAGNOSTIC RESULTS     EKG: All EKG's are interpreted by the Emergency Department Physician who either signs or Co-signs this chart in the absence of a cardiologist.        RADIOLOGY:   Non-plain film images such as CT, Ultrasound and MRI are read by the radiologist. Plain radiographic images are visualized and preliminarily interpreted by the emergency physician with the below findings:        Interpretation per the Radiologist below, if available at the time of this note:    No orders to display         ED BEDSIDE ULTRASOUND:   Performed by ED Physician - none    LABS:  Labs Reviewed - No data to display    All other labs were within normal range or not returned as of this dictation. EMERGENCY DEPARTMENT COURSE and DIFFERENTIAL DIAGNOSIS/MDM:   Vitals: There were no vitals filed for this visit. MDM      REASSESSMENT     ED Course as of 04/22/22 2033 Fri Apr 22, 2022   1502 Heart cath 3 years ago:    Cardiac Arteries and Lesion Findings     LMCA: Mild irregularities 10-20%.     LAD:       Lesion on Prox LAD: Proximal subsection. LCx: Mild irregularities 10-20%.     RCA: Mild irregularities 10-20%. [WM]   1506 Mild trop elevation that is new for her  New t wave inversions inferior leads  DW hospitalist Dr Freeman Rodriguez, he rec transfer to [facility] since this is likely ACS [WM]   1509 March 2019:  Severe single vessel coronary artery disease involving a 100% stenosis in the left anterior descending coronary artery.  1 of 1 bypass graft patent.  [WM]   1511 Starting heparin for suspected unstable angina [WM]   1512 Dw the patient the plan, she agrees with transfer [WM]   2700 [facility] with [facility], she is requesting we speak with cardiology consultant at [facility] before transferring [WM]   26 DW Dr Angel Palencia he agrees with the transfer to [facility] [WM]   5493 [facility] stating they will notify [facility] July that cardiology agrees with transfer [WM]      ED Course User Index  [WM] Airam Parker MD             CONSULTS:  None    PROCEDURES:  Unless otherwise noted below, none     Procedures        FINAL IMPRESSION    No diagnosis found. DISPOSITION/PLAN   DISPOSITION        PATIENT REFERRED TO:  No follow-up provider specified. DISCHARGE MEDICATIONS:  New Prescriptions    No medications on file     Controlled Substances Monitoring:     RX Monitoring 11/9/2017   Attestation The Prescription Monitoring Report for this patient was reviewed today.    Periodic Controlled Substance Monitoring -       (Please note that portions of this note were completed with a voice recognition program.  Efforts were made to edit the dictations but occasionally words are mis-transcribed.)    CHELSEA Quintanilla CNP (electronically signed)  Attending Emergency Physician            CHELSEA Quintanilla CNP  04/22/22 2035

## 2022-04-22 NOTE — ED PROVIDER NOTES
stress test 10/07/2016    Overall results are most consistent with a low risk for significant CAD.     H/O echocardiogram 10/05/2016    EF:>60%. Inferoseptal wall abnormal in its motion which is not unusal status post open heart surgery. Evidence of mild (grade I) diastolic dysfunction is seen.  H/O tilt table evaluation 07/12/2016    Abnormal. PTs HR, Blood Pressure response and symptoms were most consistent with dysautonomia. Combined w/viligant maintenance of euvolemia and maintaining a moderate salt intake, pharmaciologic treatment w/ ProAmatine, SSRI such as Lexapro and/or Mestinon among other treatments have shown some effectiveness in the treatment of this condition.  History of cardiovascular stress test 02/13/2019    Abnormal. Small/moderate perfusion defect of mild/moderate intesity in the anterior and anterolateral regions during stress imaging which is most consistent w/ ischemia but may be artifact. Overall low/intermediate risk for significant CAD.  History of echocardiogram 09/06/2018    EF >60%. Inferoseptal wall is abnormal in its motion which is not unusual s/p open heart. Evidence of mild (grade I) diastolic dysfunction seen.     Hx of blood clots     Hyperlipidemia     Hypertension     Intermittent claudication (HCC)     Kidney stones     Movement disorder     neuropathy in legs    Neuromuscular disorder (HCC)     neuropathy    Osteoarthritis     Reflux     Seizures (HCC)     last over 10 yr ago    Stented coronary artery 9/22/2010     LAD/[place]    Stented coronary artery 3/31/16    RCA/[place]    Type II or unspecified type diabetes mellitus without mention of complication, not stated as uncontrolled     Unspecified sleep apnea     no machine     Past Problem List  Patient Active Problem List   Diagnosis Code    CAD (coronary artery disease) I25.10    Dyslipidemia E78.5    Radiculopathy M54.10    Diabetes type 2, uncontrolled E11.65    Insomnia G47.00    Allergic rhinitis J30.9    COPD (chronic obstructive pulmonary disease) J44.9    Depression F32. A    Bilateral leg weakness R29.898    Gait difficulty R26.9    Diabetic neuropathy E11.40    Back pain M54.9    Muscle spasm M62.838    Affective disorder ([facility]) F39    Morbid obesity with BMI of 40.0-44.9, adult ([facility]) E66.01, Z68.41    History of ventral hernia repair Z98.890, Z87.19    History of incisional hernia repair Z98.890, Z87.19    Essential hypertension I10    Gastroesophageal reflux disease without esophagitis K21.9    Uncontrolled type 2 diabetes mellitus with diabetic polyneuropathy, with long-term current use of insulin ([facility]) E11.42, Z79.4, E11.65    Primary osteoarthritis involving multiple joints M89.49    Heart palpitations R00.2    Dysautonomia orthostatic hypotension syndrome I95.1    Coronary artery disease involving native coronary artery of native heart I25.10    Angina, class III ([facility]) I20.9    S/P CABG x 1 Z95.1    Precordial pain R07.2    DARON (obstructive sleep apnea) G47.33    Morbid obesity ([facility]) E66.01    Neuropathic pain M79.2    Soft tissue mass M79.89    Muscle spasms of both lower extremities M62.838    Abscess of left great toe L02.612    Paronychia of great toe of left foot L03.032     SURGICAL HISTORY       Past Surgical History:   Procedure Laterality Date    ABDOMINAL HERNIA REPAIR  1-2016    repair done [place]    APPENDECTOMY      CARDIAC CATHETERIZATION Left 4/26/2016    right [facility] [place]/ Dr Vega Aid Left 03/07/2019    Left Radial/[facility] [place]/    CARDIAC SURGERY      CHOLECYSTECTOMY  1991    COLONOSCOPY  12/16/14    -hemorrhoids,bx    CORONARY ANGIOPLASTY WITH STENT PLACEMENT  9/2010    CORONARY ANGIOPLASTY WITH STENT PLACEMENT  3-    JESSE NAYANA / DR Pulliam Reusing    CORONARY ARTERY BYPASS GRAFT  08/15/2016    OP X 1- Dr Madhu Briceño, COLON, DIAGNOSTIC  12/16/2014    HERNIA REPAIR 12/13/12    at the medial aspect of a [place] RUQ scar); repaired byDr. Soto    HERNIA REPAIR  02/16/2015    incisional, recurrent    HERNIA REPAIR  02/2016    HYSTERECTOMY      OTHER SURGICAL HISTORY  10/8/2015    abd wound washout, mesh removal, wound vac placement    VT SUCT NICOLE LIPECTOMY,HEAD/NECK Left 8/27/2018    THIGH LESION BIOPSY EXCISION, SOFT TISSUE MASS performed by Ena Rae MD at [facility] Left 2018    leg    UPPP UVULOPALATOPHARYGOPLASTY  06/26/2012    VENTRAL HERNIA REPAIR  09/21/2015    With Mesh - Dr Gary Martin       Previous Medications    ACETAMINOPHEN (TYLENOL) 500 MG TABLET    Take 1 tablet by mouth 4 times daily as needed for Pain    ALBUTEROL SULFATE HFA (VENTOLIN HFA) 108 (90 BASE) MCG/ACT INHALER    INHALE 2 PUFFS EVERY 6 HOURS AS NEEDED FOR WHEEZING    ASPIRIN 81 MG EC TABLET    TAKE 1 TABLET BY MOUTH DAILY    ATORVASTATIN (LIPITOR) 80 MG TABLET    Take 1 tablet by mouth daily    BLOOD GLUCOSE MONITOR STRIPS    Test 3 times a day & as needed for symptoms of irregular blood glucose. Dispense sufficient amount for indicated testing frequency plus additional to accommodate PRN testing needs. BLOOD GLUCOSE MONITORING SUPPL (BLOOD GLUCOSE MONITOR KIT) KIT    1 each by Does not apply route daily. Please dispense whatever BS monitoring kit [facility] covers. Dx: 250, new onset T2DM.  Testing once daily    BLOOD GLUCOSE MONITORING SUPPL (ONE TOUCH ULTRA 2) W/DEVICE KIT    1 kit by Does not apply route daily    BLOOD GLUCOSE MONITORING SUPPL BERE    1 Units by Does not apply route three times daily Unit insurance will cover    BLOOD PRESSURE MONITOR KIT    1 each by Does not apply route daily as needed ESSENTIAL HYPERTENSION   I10    CLINDAMYCIN (CLEOCIN) 300 MG CAPSULE    Take 1 capsule by mouth 2 times daily for 10 days    CONTINUOUS BLOOD GLUC SENSOR (FREESTYLE MCKAYLA 2 SENSOR) MISC    2 FREESTYLE MCKAYLA 2 SENSORS TO USE WITH MCKAYLA 2 READER TO CHECK BLOOD SUGARS 6 8 TIMES A DAY    DULOXETINE (CYMBALTA) 30 MG EXTENDED RELEASE CAPSULE    Take 1 capsule by mouth daily    FLUDROCORTISONE (FLORINEF) 0.1 MG TABLET    TAKE 3 TABLETS BY MOUTH EVERY DAY    IBUPROFEN (IBU) 800 MG TABLET    Take 1 tablet by mouth every 8 hours as needed for Pain    INSULIN DEGLUDEC (TRESIBA FLEXTOUCH) 200 UNIT/ML SOPN    Inject 86 Units into the skin daily PER DR MCELROY    INSULIN LISPRO, 1 UNIT DIAL, (HUMALOG KWIKPEN) 100 UNIT/ML SOPN    Inject 20 Units into the skin 3 times daily (before meals)    INSULIN PEN NEEDLE (BD ULTRA-FINE PEN NEEDLES) 29G X 12.7MM MISC    USE AS DIRECTED BY PHYSICIAN 5 times daily    LANCETS MISC    1 each by Does not apply route 2 times daily    LOSARTAN (COZAAR) 25 MG TABLET    Take 1 tablet by mouth daily    METFORMIN (GLUCOPHAGE-XR) 500 MG EXTENDED RELEASE TABLET    Take 2 tablets by mouth daily (with breakfast)    METOPROLOL SUCCINATE (TOPROL XL) 100 MG EXTENDED RELEASE TABLET    Take 1 tablet by mouth daily    NITROGLYCERIN (NITROSTAT) 0.4 MG SL TABLET    Place 1 tablet under the tongue every 5 minutes as needed for Chest pain    OMEPRAZOLE (PRILOSEC) 20 MG DELAYED RELEASE CAPSULE    TAKE 1 CAPSULE BY MOUTH EVERY DAY IN THE MORNING BEFORE BREAKFAST    ONDANSETRON (ZOFRAN ODT) 4 MG DISINTEGRATING TABLET    Take 1 tablet by mouth every 8 hours as needed for Nausea    PREGABALIN (LYRICA) 150 MG CAPSULE    Take one cap three times daily    SACCHAROMYCES BOULARDII (PROBIOTIC) 250 MG CAPS    Take 1 capsule by mouth daily    TIZANIDINE (ZANAFLEX) 2 MG TABLET    TAKE 1 TABLET BY MOUTH NIGHTLY AS NEEDED(SPASMS)    TRULICITY 1.5 JX/6.3SA SOPN    INJECT 1.5 MG INTO THE SKIN ONCE A WEEK     ALLERGIES     is allergic to tape [adhesive tape]. FAMILY HISTORY     She indicated that her mother is . She indicated that her father is . She indicated that her sister is alive. She indicated that her brother is alive.  She indicated that the status of her maternal grandmother is unknown. She indicated that her maternal uncle is . SOCIAL HISTORY       Social History     Tobacco Use    Smoking status: Former Smoker     Packs/day: 2.00     Years: 10.00     Pack years: 20.00     Types: Cigarettes     Quit date: 2010     Years since quittin.5    Smokeless tobacco: Never Used   Vaping Use    Vaping Use: Never used   Substance Use Topics    Alcohol use: No    Drug use: No     PHYSICAL EXAM     INITIAL VITALS: /67   Pulse 91   Temp 98.3 °F (36.8 °C) (Tympanic)   Resp 20   Ht 5' 3\" (1.6 m)   Wt 220 lb (99.8 kg)   LMP 1996   SpO2 96%   BMI 38.97 kg/m²    Physical Exam  Constitutional:       General: She is not in acute distress. Appearance: Normal appearance. She is well-developed. She is not diaphoretic. HENT:      Head: Normocephalic and atraumatic. Right Ear: External ear normal.      Left Ear: External ear normal.      Nose: Nose normal. No congestion. Mouth/Throat:      Mouth: Mucous membranes are moist.      Pharynx: Oropharynx is clear. Eyes:      General:         Right eye: No discharge. Left eye: No discharge. Conjunctiva/sclera: Conjunctivae normal.      Pupils: Pupils are equal, round, and reactive to light. Neck:      Trachea: No tracheal deviation. Cardiovascular:      Rate and Rhythm: Normal rate and regular rhythm. Pulses: Normal pulses. Heart sounds: Normal heart sounds. Pulmonary:      Effort: Pulmonary effort is normal. No respiratory distress. Breath sounds: Normal breath sounds. No stridor. No wheezing or rales. Abdominal:      Palpations: Abdomen is soft. Tenderness: There is abdominal tenderness. There is no guarding or rebound. Comments: Healing scars on abdomen, scab lesions  Epigastric tenderness   Musculoskeletal:         General: No tenderness or deformity. Normal range of motion.       Cervical back: Normal range of motion and neck supple. Skin:     General: Skin is warm and dry. Capillary Refill: Capillary refill takes less than 2 seconds. Findings: No erythema or rash. Neurological:      General: No focal deficit present. Mental Status: She is alert and oriented to person, place, and time. Coordination: Coordination normal.   Psychiatric:         Mood and Affect: Mood normal.         Behavior: Behavior normal.         Thought Content: Thought content normal.         Judgment: Judgment normal.         MEDICAL DECISION MAKING:       ED Course as of 04/22/22 1610   Fri Apr 22, 2022   1502 Heart cath 3 years ago:    Cardiac Arteries and Lesion Findings     LMCA: Mild irregularities 10-20%.     LAD:       Lesion on Prox LAD: Proximal subsection. LCx: Mild irregularities 10-20%.     RCA: Mild irregularities 10-20%. [WM]   1506 Mild trop elevation that is new for her  New t wave inversions inferior leads  DW hospitalist Dr Adrianne Waldron, he rec transfer to [facility] since this is likely ACS [WM]   1509 March 2019:  Severe single vessel coronary artery disease involving a 100% stenosis in the left anterior descending coronary artery.  1 of 1 bypass graft patent.  [WM]   1511 Starting heparin for suspected unstable angina [WM]   1512 Dw the patient the plan, she agrees with transfer [WM]   2700 [facility] with [facility], she is requesting we speak with cardiology consultant at [facility] before transferring [WM]   26 DW Dr April Bains he agrees with the transfer to [facility] [WM]   1291 [facility] stating they will notify Leti Rosas that cardiology agrees with transfer [WM]      ED Course User Index  [WM] Gifty Simons MD       Procedures    DIAGNOSTIC RESULTS   EKG:All EKG's are interpreted by the Emergency Department Physician who either signs or Co-signs this chart in the absence of a cardiologist.  NSR, nonspecific changes, no acute ST elev or dep changes on ST segments, inf leads inverted on ekg new changes, normal rate and normal intervals        RADIOLOGY:All plain film, CT, MRI, and formal ultrasound images (except ED bedside ultrasound) are read by the radiologist, see reports below, unless otherwisenoted in MDM or here. XR CHEST PORTABLE   Final Result      1. No acute cardiopulmonary abnormality. CT ABDOMEN PELVIS W IV CONTRAST Additional Contrast? None   Final Result   No acute intra-abdominal or intrapelvic abnormalities are noted. Hepatosplenomegaly      RECOMMENDATIONS:   No additional routine follow-up for incidental abdominal lesions. LABS: All lab results were reviewed by myself, and all abnormals are listed below.   Labs Reviewed   CBC WITH AUTO DIFFERENTIAL - Abnormal; Notable for the following components:       Result Value    RBC 5.31 (*)     MCV 77.2 (*)     MCH 24.5 (*)     RDW 14.9 (*)     Seg Neutrophils 72 (*)     Lymphocytes 19 (*)     Immature Granulocytes 1 (*)     All other components within normal limits   COMPREHENSIVE METABOLIC PANEL - Abnormal; Notable for the following components:    Glucose 331 (*)     Sodium 133 (*)     Chloride 96 (*)     All other components within normal limits   TROPONIN - Abnormal; Notable for the following components:    Troponin, High Sensitivity 15 (*)     All other components within normal limits   TROPONIN - Abnormal; Notable for the following components:    Troponin, High Sensitivity 15 (*)     All other components within normal limits   LIPASE   MAGNESIUM   APTT   APTT   APTT       EMERGENCY DEPARTMENTCOURSE:         Vitals:    Vitals:    04/22/22 1304 04/22/22 1311   BP:  127/67   Pulse: 91 91   Resp:  20   Temp:  98.3 °F (36.8 °C)   TempSrc:  Tympanic   SpO2: 98% 96%   Weight: 220 lb (99.8 kg)    Height: 5' 3\" (1.6 m)        The patient was given the following medications while in the emergency department:  Orders Placed This Encounter   Medications    iopamidol (ISOVUE-370) 76 % injection 75 mL    aspirin chewable tablet 324 mg    heparin (porcine) injection 4,000 Units    heparin (porcine) injection 4,000 Units    heparin (porcine) injection 2,000 Units    heparin 25,000 units in dextrose 5% 250 mL (premix) infusion    aspirin 81 MG chewable tablet     Delmi Soares: domi override       FINAL IMPRESSION      1. Epigastric pain    2. Acute electrocardiogram changes    3. Elevated troponin          DISPOSITION/PLAN   DISPOSITION      The care is provided during an unprecedented national emergency due to the novel coronavirus, COVID 19.   MD Gifty Gil MD  04/22/22 [id number]

## 2022-04-23 ENCOUNTER — HOSPITAL ENCOUNTER (INPATIENT)
Age: 57
LOS: 1 days | Discharge: HOME | DRG: 251 | End: 2022-04-24
Attending: INTERNAL MEDICINE | Admitting: FAMILY MEDICINE
Payer: COMMERCIAL

## 2022-04-23 VITALS
DIASTOLIC BLOOD PRESSURE: 63 MMHG | BODY MASS INDEX: 38.98 KG/M2 | SYSTOLIC BLOOD PRESSURE: 119 MMHG | TEMPERATURE: 98.3 F | RESPIRATION RATE: 20 BRPM | WEIGHT: 220 LBS | HEART RATE: 84 BPM | OXYGEN SATURATION: 94 % | HEIGHT: 63 IN

## 2022-04-23 PROBLEM — I24.9 ACS (ACUTE CORONARY SYNDROME) (HCC): Status: ACTIVE | Noted: 2022-04-23

## 2022-04-23 PROBLEM — E66.9 OBESITY (BMI 30-39.9): Status: ACTIVE | Noted: 2022-04-23

## 2022-04-23 PROBLEM — R10.13 EPIGASTRIC PAIN: Status: ACTIVE | Noted: 2022-04-23

## 2022-04-23 PROBLEM — R07.9 CHEST PAIN: Status: ACTIVE | Noted: 2022-04-23

## 2022-04-23 LAB
EKG ATRIAL RATE: 87 BPM
EKG P AXIS: 18 DEGREES
EKG P-R INTERVAL: 138 MS
EKG Q-T INTERVAL: 370 MS
EKG QRS DURATION: 76 MS
EKG QTC CALCULATION (BAZETT): 445 MS
EKG R AXIS: 3 DEGREES
EKG T AXIS: -15 DEGREES
EKG VENTRICULAR RATE: 87 BPM
GLUCOSE BLD-MCNC: 239 MG/DL (ref 65–105)
HCT VFR BLD CALC: 36.8 % (ref 36.3–47.1)
HEMOGLOBIN: 12.1 G/DL (ref 11.9–15.1)
MCH RBC QN AUTO: 24.9 PG (ref 25.2–33.5)
MCHC RBC AUTO-ENTMCNC: 32.9 G/DL (ref 28.4–34.8)
MCV RBC AUTO: 75.7 FL (ref 82.6–102.9)
NRBC AUTOMATED: 0 PER 100 WBC
PARTIAL THROMBOPLASTIN TIME: 40.1 SEC (ref 20.5–30.5)
PARTIAL THROMBOPLASTIN TIME: 55.9 SEC (ref 26.8–34.8)
PARTIAL THROMBOPLASTIN TIME: 73.2 SEC (ref 26.8–34.8)
PDW BLD-RTO: 14.9 % (ref 11.8–14.4)
PLATELET # BLD: 341 K/UL (ref 138–453)
PMV BLD AUTO: 9.6 FL (ref 8.1–13.5)
RBC # BLD: 4.86 M/UL (ref 3.95–5.11)
TROPONIN, HIGH SENSITIVITY: 12 NG/L (ref 0–14)
TROPONIN, HIGH SENSITIVITY: 12 NG/L (ref 0–14)
TROPONIN, HIGH SENSITIVITY: 14 NG/L (ref 0–14)
WBC # BLD: 9.5 K/UL (ref 3.5–11.3)

## 2022-04-23 PROCEDURE — 96365 THER/PROPH/DIAG IV INF INIT: CPT

## 2022-04-23 PROCEDURE — 36415 COLL VENOUS BLD VENIPUNCTURE: CPT

## 2022-04-23 PROCEDURE — 85730 THROMBOPLASTIN TIME PARTIAL: CPT

## 2022-04-23 PROCEDURE — 85027 COMPLETE CBC AUTOMATED: CPT

## 2022-04-23 PROCEDURE — 99222 1ST HOSP IP/OBS MODERATE 55: CPT | Performed by: INTERNAL MEDICINE

## 2022-04-23 PROCEDURE — 84484 ASSAY OF TROPONIN QUANT: CPT

## 2022-04-23 PROCEDURE — 6370000000 HC RX 637 (ALT 250 FOR IP): Performed by: INTERNAL MEDICINE

## 2022-04-23 PROCEDURE — 93005 ELECTROCARDIOGRAM TRACING: CPT | Performed by: STUDENT IN AN ORGANIZED HEALTH CARE EDUCATION/TRAINING PROGRAM

## 2022-04-23 PROCEDURE — 82947 ASSAY GLUCOSE BLOOD QUANT: CPT

## 2022-04-23 PROCEDURE — G0378 HOSPITAL OBSERVATION PER HR: HCPCS

## 2022-04-23 PROCEDURE — G0379 DIRECT REFER HOSPITAL OBSERV: HCPCS

## 2022-04-23 PROCEDURE — 96366 THER/PROPH/DIAG IV INF ADDON: CPT

## 2022-04-23 PROCEDURE — 6360000002 HC RX W HCPCS: Performed by: INTERNAL MEDICINE

## 2022-04-23 PROCEDURE — 6360000002 HC RX W HCPCS: Performed by: EMERGENCY MEDICINE

## 2022-04-23 PROCEDURE — 93010 ELECTROCARDIOGRAM REPORT: CPT | Performed by: INTERNAL MEDICINE

## 2022-04-23 PROCEDURE — 82948 REAGENT STRIP/BLOOD GLUCOSE: CPT

## 2022-04-23 PROCEDURE — 1200000000 HC SEMI PRIVATE

## 2022-04-23 PROCEDURE — 96376 TX/PRO/DX INJ SAME DRUG ADON: CPT

## 2022-04-23 PROCEDURE — 6370000000 HC RX 637 (ALT 250 FOR IP): Performed by: NURSE PRACTITIONER

## 2022-04-23 RX ORDER — ATORVASTATIN CALCIUM 80 MG/1
80 TABLET, FILM COATED ORAL DAILY
Status: DISCONTINUED | OUTPATIENT
Start: 2022-04-23 | End: 2022-04-24 | Stop reason: HOSPADM

## 2022-04-23 RX ORDER — ONDANSETRON 4 MG/1
4 TABLET, ORALLY DISINTEGRATING ORAL EVERY 8 HOURS PRN
Status: DISCONTINUED | OUTPATIENT
Start: 2022-04-23 | End: 2022-04-24 | Stop reason: HOSPADM

## 2022-04-23 RX ORDER — HEPARIN SODIUM 1000 [USP'U]/ML
2000 INJECTION, SOLUTION INTRAVENOUS; SUBCUTANEOUS PRN
Status: DISCONTINUED | OUTPATIENT
Start: 2022-04-23 | End: 2022-04-24 | Stop reason: HOSPADM

## 2022-04-23 RX ORDER — INSULIN LISPRO 100 [IU]/ML
0-12 INJECTION, SOLUTION INTRAVENOUS; SUBCUTANEOUS
Status: DISCONTINUED | OUTPATIENT
Start: 2022-04-23 | End: 2022-04-24 | Stop reason: HOSPADM

## 2022-04-23 RX ORDER — POTASSIUM CHLORIDE 7.45 MG/ML
10 INJECTION INTRAVENOUS PRN
Status: DISCONTINUED | OUTPATIENT
Start: 2022-04-23 | End: 2022-04-24 | Stop reason: HOSPADM

## 2022-04-23 RX ORDER — METOPROLOL SUCCINATE 100 MG/1
100 TABLET, EXTENDED RELEASE ORAL DAILY
Status: DISCONTINUED | OUTPATIENT
Start: 2022-04-23 | End: 2022-04-24 | Stop reason: HOSPADM

## 2022-04-23 RX ORDER — ASPIRIN 81 MG/1
81 TABLET ORAL DAILY
Status: DISCONTINUED | OUTPATIENT
Start: 2022-04-23 | End: 2022-04-24 | Stop reason: HOSPADM

## 2022-04-23 RX ORDER — INSULIN GLARGINE 100 [IU]/ML
80 INJECTION, SOLUTION SUBCUTANEOUS DAILY
Status: DISCONTINUED | OUTPATIENT
Start: 2022-04-23 | End: 2022-04-24 | Stop reason: HOSPADM

## 2022-04-23 RX ORDER — DULOXETIN HYDROCHLORIDE 30 MG/1
30 CAPSULE, DELAYED RELEASE ORAL DAILY
Status: DISCONTINUED | OUTPATIENT
Start: 2022-04-23 | End: 2022-04-24 | Stop reason: HOSPADM

## 2022-04-23 RX ORDER — HEPARIN SODIUM 1000 [USP'U]/ML
4000 INJECTION, SOLUTION INTRAVENOUS; SUBCUTANEOUS PRN
Status: DISCONTINUED | OUTPATIENT
Start: 2022-04-23 | End: 2022-04-24 | Stop reason: HOSPADM

## 2022-04-23 RX ORDER — POTASSIUM CHLORIDE 20 MEQ/1
40 TABLET, EXTENDED RELEASE ORAL PRN
Status: DISCONTINUED | OUTPATIENT
Start: 2022-04-23 | End: 2022-04-24 | Stop reason: HOSPADM

## 2022-04-23 RX ORDER — INSULIN LISPRO 100 [IU]/ML
0-6 INJECTION, SOLUTION INTRAVENOUS; SUBCUTANEOUS NIGHTLY
Status: DISCONTINUED | OUTPATIENT
Start: 2022-04-23 | End: 2022-04-24 | Stop reason: HOSPADM

## 2022-04-23 RX ORDER — HEPARIN SODIUM 1000 [USP'U]/ML
60 INJECTION, SOLUTION INTRAVENOUS; SUBCUTANEOUS ONCE
Status: DISCONTINUED | OUTPATIENT
Start: 2022-04-23 | End: 2022-04-23

## 2022-04-23 RX ORDER — ACETAMINOPHEN 650 MG/1
650 SUPPOSITORY RECTAL EVERY 6 HOURS PRN
Status: DISCONTINUED | OUTPATIENT
Start: 2022-04-23 | End: 2022-04-24 | Stop reason: HOSPADM

## 2022-04-23 RX ORDER — SODIUM CHLORIDE 0.9 % (FLUSH) 0.9 %
5-40 SYRINGE (ML) INJECTION EVERY 12 HOURS SCHEDULED
Status: DISCONTINUED | OUTPATIENT
Start: 2022-04-23 | End: 2022-04-24 | Stop reason: HOSPADM

## 2022-04-23 RX ORDER — DEXTROSE MONOHYDRATE 50 MG/ML
100 INJECTION, SOLUTION INTRAVENOUS PRN
Status: DISCONTINUED | OUTPATIENT
Start: 2022-04-23 | End: 2022-04-24 | Stop reason: HOSPADM

## 2022-04-23 RX ORDER — ALBUTEROL SULFATE 90 UG/1
2 AEROSOL, METERED RESPIRATORY (INHALATION) EVERY 6 HOURS PRN
Status: DISCONTINUED | OUTPATIENT
Start: 2022-04-23 | End: 2022-04-24 | Stop reason: HOSPADM

## 2022-04-23 RX ORDER — ACETAMINOPHEN 500 MG
500 TABLET ORAL 4 TIMES DAILY PRN
Status: DISCONTINUED | OUTPATIENT
Start: 2022-04-23 | End: 2022-04-24 | Stop reason: HOSPADM

## 2022-04-23 RX ORDER — DEXTROSE MONOHYDRATE 25 G/50ML
12.5 INJECTION, SOLUTION INTRAVENOUS PRN
Status: DISCONTINUED | OUTPATIENT
Start: 2022-04-23 | End: 2022-04-24 | Stop reason: HOSPADM

## 2022-04-23 RX ORDER — FLUDROCORTISONE ACETATE 0.1 MG/1
0.1 TABLET ORAL DAILY
Status: DISCONTINUED | OUTPATIENT
Start: 2022-04-23 | End: 2022-04-24 | Stop reason: HOSPADM

## 2022-04-23 RX ORDER — INSULIN GLARGINE 100 [IU]/ML
80 INJECTION, SOLUTION SUBCUTANEOUS DAILY
Status: DISCONTINUED | OUTPATIENT
Start: 2022-04-24 | End: 2022-04-23

## 2022-04-23 RX ORDER — INSULIN GLARGINE 100 [IU]/ML
110 INJECTION, SOLUTION SUBCUTANEOUS DAILY
Status: DISCONTINUED | OUTPATIENT
Start: 2022-04-23 | End: 2022-04-23

## 2022-04-23 RX ORDER — SODIUM CHLORIDE 9 MG/ML
INJECTION, SOLUTION INTRAVENOUS PRN
Status: DISCONTINUED | OUTPATIENT
Start: 2022-04-23 | End: 2022-04-24 | Stop reason: HOSPADM

## 2022-04-23 RX ORDER — MAGNESIUM SULFATE 1 G/100ML
1000 INJECTION INTRAVENOUS PRN
Status: DISCONTINUED | OUTPATIENT
Start: 2022-04-23 | End: 2022-04-24 | Stop reason: HOSPADM

## 2022-04-23 RX ORDER — PANTOPRAZOLE SODIUM 40 MG/1
40 TABLET, DELAYED RELEASE ORAL
Status: DISCONTINUED | OUTPATIENT
Start: 2022-04-24 | End: 2022-04-24 | Stop reason: HOSPADM

## 2022-04-23 RX ORDER — POLYETHYLENE GLYCOL 3350 17 G/17G
17 POWDER, FOR SOLUTION ORAL DAILY PRN
Status: DISCONTINUED | OUTPATIENT
Start: 2022-04-23 | End: 2022-04-24 | Stop reason: HOSPADM

## 2022-04-23 RX ORDER — NICOTINE POLACRILEX 4 MG
15 LOZENGE BUCCAL PRN
Status: DISCONTINUED | OUTPATIENT
Start: 2022-04-23 | End: 2022-04-24 | Stop reason: HOSPADM

## 2022-04-23 RX ORDER — LOSARTAN POTASSIUM 25 MG/1
25 TABLET ORAL DAILY
Status: DISCONTINUED | OUTPATIENT
Start: 2022-04-23 | End: 2022-04-24 | Stop reason: HOSPADM

## 2022-04-23 RX ORDER — ACETAMINOPHEN 325 MG/1
650 TABLET ORAL EVERY 6 HOURS PRN
Status: DISCONTINUED | OUTPATIENT
Start: 2022-04-23 | End: 2022-04-24 | Stop reason: HOSPADM

## 2022-04-23 RX ORDER — SODIUM CHLORIDE 0.9 % (FLUSH) 0.9 %
10 SYRINGE (ML) INJECTION PRN
Status: DISCONTINUED | OUTPATIENT
Start: 2022-04-23 | End: 2022-04-24 | Stop reason: HOSPADM

## 2022-04-23 RX ADMIN — INSULIN LISPRO 2 UNITS: 100 INJECTION, SOLUTION INTRAVENOUS; SUBCUTANEOUS at 20:39

## 2022-04-23 RX ADMIN — DULOXETINE HYDROCHLORIDE 30 MG: 30 CAPSULE, DELAYED RELEASE ORAL at 18:00

## 2022-04-23 RX ADMIN — HEPARIN SODIUM AND DEXTROSE 16.03 UNITS/KG/HR: 10000; 5 INJECTION INTRAVENOUS at 08:20

## 2022-04-23 RX ADMIN — INSULIN GLARGINE 80 UNITS: 100 INJECTION, SOLUTION SUBCUTANEOUS at 18:01

## 2022-04-23 RX ADMIN — METOPROLOL SUCCINATE 100 MG: 100 TABLET, FILM COATED, EXTENDED RELEASE ORAL at 18:00

## 2022-04-23 RX ADMIN — Medication 18.04 UNITS/KG/HR: at 18:57

## 2022-04-23 RX ADMIN — HEPARIN SODIUM 2000 UNITS: 1000 INJECTION INTRAVENOUS; SUBCUTANEOUS at 11:59

## 2022-04-23 RX ADMIN — ATORVASTATIN CALCIUM 80 MG: 80 TABLET, FILM COATED ORAL at 18:01

## 2022-04-23 RX ADMIN — HEPARIN SODIUM AND DEXTROSE 18 UNITS/KG/HR: 10000; 5 INJECTION INTRAVENOUS at 11:58

## 2022-04-23 RX ADMIN — FLUDROCORTISONE ACETATE 0.1 MG: 0.1 TABLET ORAL at 18:00

## 2022-04-23 RX ADMIN — LOSARTAN POTASSIUM 25 MG: 25 TABLET, FILM COATED ORAL at 18:00

## 2022-04-23 RX ADMIN — Medication 81 MG: at 18:00

## 2022-04-23 RX ADMIN — INSULIN LISPRO 6 UNITS: 100 INJECTION, SOLUTION INTRAVENOUS; SUBCUTANEOUS at 18:02

## 2022-04-23 RX ADMIN — HEPARIN SODIUM 2000 UNITS: 1000 INJECTION INTRAVENOUS; SUBCUTANEOUS at 19:44

## 2022-04-23 ASSESSMENT — PAIN SCALES - GENERAL
PAINLEVEL_OUTOF10: 0
PAINLEVEL_OUTOF10: 0
PAINLEVEL_OUTOF10: 3

## 2022-04-23 ASSESSMENT — ENCOUNTER SYMPTOMS
WHEEZING: 0
COUGH: 0
PHOTOPHOBIA: 0
NAUSEA: 1
BLOOD IN STOOL: 0
ABDOMINAL DISTENTION: 0
VOMITING: 1
APNEA: 1
ABDOMINAL PAIN: 1
SHORTNESS OF BREATH: 0

## 2022-04-23 ASSESSMENT — PAIN DESCRIPTION - DESCRIPTORS: DESCRIPTORS: DISCOMFORT

## 2022-04-23 ASSESSMENT — PAIN DESCRIPTION - ORIENTATION: ORIENTATION: MID;UPPER

## 2022-04-23 ASSESSMENT — LIFESTYLE VARIABLES: HOW OFTEN DO YOU HAVE A DRINK CONTAINING ALCOHOL: NEVER

## 2022-04-23 ASSESSMENT — PAIN DESCRIPTION - LOCATION: LOCATION: CHEST;ABDOMEN

## 2022-04-23 NOTE — ED NOTES
PTT is therapeutic no changes made to infusion at this time.       [place], [address] [facility]  04/23/22 [id number]

## 2022-04-23 NOTE — H&P
[facility]  Office: [address], DO, Vanessa Gallagherdeuce, DO, Jimenez Jurado, DO, Marci Feliz Blood, DO, Tobias Woo MD, Yvan Anton MD, Erik Steel MD, Juan Wesley MD, Slime Gil MD, Sarah Adam MD, Lin Galdamez MD, Abel Ross, DO, Robert Bender, DO, Franco Rojas MD,  Christina Nolasco, DO, Nadine Gil MD, Genie Dakin, MD, Lorena Dietz MD, Bala Merino DO, Cony Frank MD, Mackenzie Randle MD, Hany Mccollum, [facility], [facility] Ahuja, CNP, Thais Correa, CNP, Aliza Au, CNP, Juanjo Browning, CNP, Ron Cuellar, CNP, Nikolas Rivera PA-C, Flavio Norton, [facility], Tong Peterson, CNP, Adarsh Jacobs, CNP, Rachell Palma, CNP, Robert Gomez, CNS, Aury Powell, [facility], Johanna Fraser, CNP, Tahmina Hamlin, CNP, Gildardo Nicholson, CNP         IN-PATIENT SERVICE  History and Physical  [facility]          Name: Candida Turner  MRN: [id number]     Acct: [de-identified]  [place] [facility] [place]    Admit Date: 4/23/2022  PCP: CHELSEA Trujillo CNP    Chief Complaint:  Epigastric pain  Emesis     History of Present Illness:  Candida Turner is a 62 y.o.  female who presents with   Concerns of ACS  ECG changes     Patient transferred from [facility] with:  \"  Abdominal Pain        Epigastric, onset 4 days ago. Pt reports feeling like something is lodged in epigastric. Pt reports emesis x 3, last episode last PM.     Shortness of Breath       Intermittent, worse after eating. History of COPD\"     The patient reports that she had been visiting her neighbor earlier in the week  She became overcome with the smoke from his cigarette smoking  Patient states became somewhat nauseated and had emesis  Since that time and is felt as if something is stuck in the epigastric area  She has had emesis of ingested food  There is a history of hiatal hernia    EGD 2014:  Normal upper endoscopy, with no endoscopic evidence of neoplasia or mucosal abnormality.   Duodenum:     Descending: normal    Bulb: normal   Stomach:    Antrum: normal    Body: normal    Fundus: normal   Esophagus: normal  Patient has a history of coronary artery disease  No anginal type symptoms  She has not used nitroglycerin for years    Initial database has revealed:  Results for Evelin Dinae (MRN [id number]) as of 4/23/2022 15:55   Ref. Range 11/9/2020 22:07 11/9/2020 23:51 4/22/2022 13:16 4/22/2022 15:22 4/23/2022 06:54   Troponin, High Sensitivity Latest Ref Range: 0 - 14 ng/L <6 <6 15 (H) 15 (H) 14     ECG  Normal sinus rhythm  Inferior infarct , age undetermined  Cannot rule out Anterior infarct (cited on or before 09-NOV-2020)  Abnormal ECG  When compared with ECG of 09-NOV-2020 22:54,  Inferior infarct is now Present  T wave inversion now evident in Inferior leads      The patient does follow with Dr. Shakila Billy  Last routine visit was a couple months ago  Prior cardiac cath:   Procedure Summary    Severe single vessel disease involving the LAD coronary artery. Normal left ventricular end diastolic pressure. (LVEDP). Recommendations    Proceed with guideline directed maximal medical management including ASA   81 mg and maximal tolerated doses of a beta blocker, calcium channel   blocker, statin, and/or long acting nitrate. Angiographic Findings    Cardiac Arteries and Lesion Findings   LMCA: Mild irregularities 10-20%. LAD:     Lesion on Prox LAD: Proximal subsection. LCx: Mild irregularities 10-20%. RCA: Mild irregularities 10-20%. Coronary Tree    Dominance: Right   LV function assessed as:Normal.  Ejection Fraction      PMHx:  Past Medical History:   Diagnosis Date    Anxiety     Asthma     CAD (coronary artery disease)     Clinical trial participant at discharge 3/31/16    [facility]     Depression     Diabetic neuropathy ([facility]     H/O cardiac catheterization 4/26/16    LMCA: Mild Irregularities 10-20%. LAD: Lesion on Prox LAD: Proximal subsection.  100% stenosis. LCx: Mild irregularities 10-20%. RCA: Mild irregularities 10-20%. Widely patent mid RCA stent. EF:60%.  H/O cardiovascular stress test 10/07/2016    Overall results are most consistent with a low risk for significant CAD.     H/O echocardiogram 10/05/2016    EF:>60%. Inferoseptal wall abnormal in its motion which is not unusal status post open heart surgery. Evidence of mild (grade I) diastolic dysfunction is seen.  H/O tilt table evaluation 07/12/2016    Abnormal. PTs HR, Blood Pressure response and symptoms were most consistent with dysautonomia. Combined w/viligant maintenance of euvolemia and maintaining a moderate salt intake, pharmaciologic treatment w/ ProAmatine, SSRI such as Lexapro and/or Mestinon among other treatments have shown some effectiveness in the treatment of this condition.  History of cardiovascular stress test 02/13/2019    Abnormal. Small/moderate perfusion defect of mild/moderate intesity in the anterior and anterolateral regions during stress imaging which is most consistent w/ ischemia but may be artifact. Overall low/intermediate risk for significant CAD.  History of echocardiogram 09/06/2018    EF >60%. Inferoseptal wall is abnormal in its motion which is not unusual s/p open heart. Evidence of mild (grade I) diastolic dysfunction seen.     Hx of blood clots     Hyperlipidemia     Hypertension     Intermittent claudication (HCC)     Kidney stones     Movement disorder     neuropathy in legs    Neuromuscular disorder (HCC)     neuropathy    Osteoarthritis     Reflux     Seizures (HCC)     last over 10 yr ago    Stented coronary artery 9/22/2010     LAD/[place]    Stented coronary artery 3/31/16    RCA/[place]    Type II or unspecified type diabetes mellitus without mention of complication, not stated as uncontrolled     Unspecified sleep apnea     no machine        PSHx:  Past Surgical History:   Procedure Laterality Date    ABDOMINAL HERNIA REPAIR 1-2016    repair done julian    APPENDECTOMY      CARDIAC CATHETERIZATION Left 4/26/2016    right radial/ [facility] [place]/ Dr Masters Sic Left 03/07/2019    Left Radial/[facility] [place]/    CARDIAC SURGERY      CABG 2019    CHOLECYSTECTOMY  1991    COLONOSCOPY  12/16/14    -hemorrhoids,bx    CORONARY ANGIOPLASTY WITH STENT PLACEMENT  9/2010    CORONARY ANGIOPLASTY WITH STENT PLACEMENT  3-    JESSE RCA / DR Bryon London    CORONARY ARTERY BYPASS GRAFT  08/15/2016    OP X 1- Dr Rafiq Evans, COLON, DIAGNOSTIC  12/16/2014    HERNIA REPAIR  12/13/12    at the medial aspect of a [place] RUQ scar); repaired [id number]  02/16/2015    incisional, recurrent    HERNIA REPAIR  02/2016    HYSTERECTOMY      OTHER SURGICAL HISTORY  10/8/2015    abd wound washout, mesh removal, wound vac placement    [place] SUCT NICOLE LIPECTOMY,HEAD/NECK Left 8/27/2018    THIGH LESION BIOPSY EXCISION, SOFT TISSUE MASS performed by Jadon Olmedo MD at [facility] Left 2018    leg    UPPP UVULOPALATOPHARYGOPLASTY  06/26/2012    VENTRAL HERNIA REPAIR  09/21/2015    With Mesh - Dr Pearson Creed:  Prior to Admission medications    Medication Sig Start Date End Date Taking? Authorizing Provider   pregabalin (LYRICA) 150 MG capsule Take one cap three times daily  Patient taking differently: Take 150 mg by mouth in the morning, at noon, and at bedtime.  Take one cap three times daily 3/1/22 3/1/23  Erik Thayer MD   atorvastatin (LIPITOR) 80 MG tablet Take 1 tablet by mouth daily 11/1/21   Rajwinder Mccurdy MD   fludrocortisone (FLORINEF) 0.1 MG tablet TAKE 3 TABLETS BY MOUTH EVERY DAY  Patient taking differently: Take 0.1 mg by mouth daily Pt states she takes 1 tab BID 10/13/21   Rajwinder Mccurdy MD   Blood Glucose Monitoring Suppl (ONE TOUCH ULTRA 2) w/Device KIT 1 kit by Does not apply route daily 7/20/21   Estnunu Swartz Might, APRN - CNP   blood glucose monitor strips Test 3 times a day & as needed for symptoms of irregular blood glucose. Dispense sufficient amount for indicated testing frequency plus additional to accommodate PRN testing needs.  7/16/21   CHELSEA Verde CNP   Lancets MISC 1 each by Does not apply route 2 times daily 7/16/21   CHELSEA So CNP   Continuous Blood Gluc Sensor (FREESTYLE MCKAYLA 2 SENSOR) MISC 2 FREESTYLE MCKAYLA 2 SENSORS TO USE WITH MCKAYLA 2 READER TO CHECK BLOOD SUGARS 6 8 TIMES A DAY 5/19/21   Historical Provider, MD   ibuprofen (IBU) 800 MG tablet Take 1 tablet by mouth every 8 hours as needed for Pain 4/5/21   BOONE Lr   TRULICITY 1.5 AU/8.5CJ SOPN INJECT 1.5 MG INTO THE SKIN ONCE A WEEK 3/30/21   Cheryle Huh Might, APRN - CNP   Insulin Degludec (TRESIBA FLEXTOUCH) 200 UNIT/ML SOPN Inject 86 Units into the skin daily PER DR MCELROY  Patient taking differently: Inject 110 Units into the skin daily PER DR MCELROY 3/30/21   Cheryle Huh Might, APRN - CNP   metFORMIN (GLUCOPHAGE-XR) 500 MG extended release tablet Take 2 tablets by mouth daily (with breakfast) 3/30/21   Cheryle Huh Might, APRN - CNP   omeprazole (PRILOSEC) 20 MG delayed release capsule TAKE 1 CAPSULE BY MOUTH EVERY DAY IN [address] [place] [address] BREAKFAST  Patient taking differently: Take 20 mg by mouth Daily  11/23/20   Cheryle Huh Might, APRN - CNP   albuterol sulfate HFA (VENTOLIN HFA) 108 (90 Base) MCG/ACT inhaler INHALE 2 PUFFS EVERY 6 HOURS AS NEEDED FOR WHEEZING  Patient taking differently: Inhale 2 puffs into the lungs every 6 hours as needed INHALE 2 PUFFS EVERY 6 HOURS AS NEEDED FOR WHEEZING 11/11/20   Cheryle Huh Might, APRN - CNP   ondansetron (ZOFRAN ODT) 4 MG disintegrating tablet Take 1 tablet by mouth every 8 hours as needed for Nausea 11/10/20   Lesa Gamble MD   nitroGLYCERIN (NITROSTAT) 0.4 MG SL tablet Place 1 tablet under the tongue every 5 minutes as needed for Chest pain 9/30/20   Cheryle Huh Might, APRN - CNP   Saccharomyces boulardii (PROBIOTIC) 250 MG CAPS Take 1 capsule by mouth daily 9/30/20   CHELSEA Jane CNP   tiZANidine (ZANAFLEX) 2 MG tablet TAKE 1 TABLET BY MOUTH NIGHTLY AS NEEDED(SPASMS)  Patient taking differently: Take 2 mg by mouth nightly as needed TAKE 1 TABLET BY MOUTH NIGHTLY AS NEEDED(SPASMS) 9/22/20   Alfredo Zarco MD   acetaminophen (TYLENOL) 500 MG tablet Take 1 tablet by mouth 4 times daily as needed for Pain 9/9/20   CHELSEA Sharp CNP   losartan (COZAAR) 25 MG tablet Take 1 tablet by mouth daily 7/23/20   CHELSEA Jane CNP   insulin lispro, 1 Unit Dial, (HUMALOG KWIKPEN) 100 UNIT/ML SOPN Inject 20 Units into the skin 3 times daily (before meals) 6/23/20   CHELSEA Jane CNP   metoprolol succinate (TOPROL XL) 100 MG extended release tablet Take 1 tablet by mouth daily 3/13/20   CHELSEA Jane CNP   DULoxetine (CYMBALTA) 30 MG extended release capsule Take 1 capsule by mouth daily 2/10/20   Alfredo Zarco MD   aspirin 81 MG EC tablet TAKE 1 TABLET BY MOUTH DAILY  Patient taking differently: Take 81 mg by mouth daily  7/1/19   CHELSEA Jane CNP   Insulin Pen Needle (BD ULTRA-FINE PEN NEEDLES) 29G X 12.7MM MISC USE AS DIRECTED BY PHYSICIAN 5 times daily 8/7/18   CHELSEA Jane CNP   Blood Glucose Monitoring Suppl BERE 1 Units by Does not apply route three times daily Unit insurance will cover 12/29/17   [place] CHELSEA Crespo CNP   Blood Pressure Monitor KIT 1 each by Does not apply route daily as needed ESSENTIAL HYPERTENSION   I10 5/31/16   CHELSEA Jane CNP   Blood Glucose Monitoring Suppl (BLOOD GLUCOSE MONITOR KIT) KIT 1 each by Does not apply route daily. Please dispense whatever BS monitoring kit [employer] covers. Dx: 250, new onset T2DM. Testing once daily 2/21/12 3/1/22  CHELSEA Caceres CNP         Allergies:   Tape Armstrong Ran tape]    Social Hx:  Tobacco:    reports that she quit smoking about 11 years ago.  Her smoking use included cigarettes. She has a 20.00 pack-year smoking history. She has never used smokeless tobacco.  Alcohol:      reports no history of alcohol use. Drug Use:  reports no history of drug use. Family Hx; Family History   Problem Relation Age of Onset    Cancer Mother     Diabetes Mother     Cancer Father         thyroid    Diabetes Sister     Heart Disease Sister     Heart Disease Brother     Heart Disease Maternal Uncle     Cancer Maternal Grandmother        ROS:  Review of Systems   Constitutional: Positive for activity change (Decreased) and appetite change (Diminished). Negative for chills, diaphoresis, fatigue and fever. HENT: Negative for congestion and nosebleeds. Eyes: Positive for visual disturbance (Vision loss due to diabetes). Negative for photophobia. Respiratory: Positive for apnea (History sleep apnea). Negative for cough, shortness of breath and wheezing. Cardiovascular: Negative for chest pain (No exertional, anginal pain) and palpitations. Gastrointestinal: Positive for abdominal pain (Her epigastric pain is improving), nausea (Resolving) and vomiting (Resolved). Negative for abdominal distention and blood in stool. The patient reports that her GERD has been stable   Genitourinary: Negative for flank pain and hematuria. Musculoskeletal: Negative for arthralgias and myalgias. Skin: Negative for rash and wound. Neurological: Positive for numbness (Known peripheral neuropathy). Negative for dizziness and light-headedness. Physical Exam:  Vitals:  /74   Pulse 70   Temp 97.9 °F (36.6 °C) (Temporal)   Resp 18   LMP 1996   SpO2 94%   Temp (24hrs), Av.9 °F (36.6 °C), Min:97.9 °F (36.6 °C), Max:97.9 °F (36.6 °C)    Physical Exam  Vitals reviewed. Constitutional:       General: She is not in acute distress. Appearance: She is not diaphoretic. HENT:      Head: Normocephalic.       Nose: Nose normal.   Eyes:      General: No scleral icterus. Conjunctiva/sclera: Conjunctivae normal.   Neck:      Trachea: No tracheal deviation. Cardiovascular:      Rate and Rhythm: Normal rate and regular rhythm. Pulmonary:      Effort: Pulmonary effort is normal. No respiratory distress. Breath sounds: Normal breath sounds. No wheezing or rales. Chest:      Chest wall: No tenderness. Abdominal:      General: There is no distension. Palpations: Abdomen is soft. Tenderness: There is no abdominal tenderness. Musculoskeletal:         General: No tenderness. Cervical back: Neck supple. Right lower leg: Edema present. Left lower leg: Edema present. Comments: 1/4 edema at the ankles   Skin:     General: Skin is warm and dry. Neurological:      Mental Status: She is alert. Data:  Laboratory Testing:  Recent Results (from the past 24 hour(s))   APTT    Collection Time: 04/22/22  9:31 PM   Result Value Ref Range    PTT 38.2 (H) 26.8 - 34.8 sec   APTT    Collection Time: 04/23/22  4:13 AM   Result Value Ref Range    PTT 73.2 (H) 26.8 - 34.8 sec   Troponin    Collection Time: 04/23/22  6:54 AM   Result Value Ref Range    Troponin, High Sensitivity 14 0 - 14 ng/L   APTT    Collection Time: 04/23/22 10:04 AM   Result Value Ref Range    PTT 55.9 (H) 26.8 - 34.8 sec   Troponin    Collection Time: 04/23/22  3:38 PM   Result Value Ref Range    Troponin, High Sensitivity 12 0 - 14 ng/L       Imaging/Diagnostics:  CT ABDOMEN PELVIS W IV CONTRAST Additional Contrast? None    Result Date: 4/22/2022  No acute intra-abdominal or intrapelvic abnormalities are noted. Hepatosplenomegaly RECOMMENDATIONS: No additional routine follow-up for incidental abdominal lesions. XR CHEST PORTABLE    Result Date: 4/22/2022  1. No acute cardiopulmonary abnormality.      Assessment:  Primary Problem  ACS (acute coronary syndrome) [facility])    Active Hospital Problems    Diagnosis Date Noted    Essential hypertension [I10]      Priority: High Class: Chronic    Chest pain [R07.9] 04/23/2022     Priority: Medium    Epigastric pain [R10.13] 04/23/2022     Priority: Medium    ACS (acute coronary syndrome) ([facility] [I24.9] 04/23/2022     Priority: Medium    Obesity (BMI 30-39. 9) [E66.9] 04/23/2022     Priority: Medium    Coronary artery disease involving native coronary artery of native heart [I25.10]     Uncontrolled type 2 diabetes mellitus with diabetic polyneuropathy, with long-term current use of insulin ([facility] [E11.42, Z79.4, E11.65] 11/23/2015    Gastroesophageal reflux disease without esophagitis [K21.9] 10/27/2015    Diabetic neuropathy [E11.40] 03/18/2013    COPD (chronic obstructive pulmonary disease) [J44.9] 12/22/2012       Plan:  Admit  Aspirin  Lipitor  Metoprolol  Cardiology evaluation  Trend troponins  Serial EKGs  Reflux precautions  Protonix  Glycemic contol - Monitor and control blood sugars  Blood Pressure - Monitor and control   Risk factor management / weight loss    DVT prophylaxis     Patient is admitted as inpatient status because of co-morbidities listed above, severity of signs and symptoms as outlined, requirement for current medical therapies and most importantly because of direct risk to patient if care not provided in a hospital setting. Expected length of stay > 48 hours.      Consultations:   IP CONSULT TO CARDIOLOGY    Electronically signed by Michelle Castillo DO on 4/23/2022 at 4:25 PM

## 2022-04-24 VITALS
TEMPERATURE: 97.4 F | RESPIRATION RATE: 16 BRPM | WEIGHT: 222.66 LBS | HEART RATE: 59 BPM | BODY MASS INDEX: 39.44 KG/M2 | DIASTOLIC BLOOD PRESSURE: 60 MMHG | OXYGEN SATURATION: 95 % | SYSTOLIC BLOOD PRESSURE: 98 MMHG

## 2022-04-24 LAB
ANION GAP SERPL CALCULATED.3IONS-SCNC: 12 MMOL/L (ref 9–17)
BUN BLDV-MCNC: 14 MG/DL (ref 6–20)
CALCIUM SERPL-MCNC: 8.8 MG/DL (ref 8.6–10.4)
CHLORIDE BLD-SCNC: 100 MMOL/L (ref 98–107)
CO2: 22 MMOL/L (ref 20–31)
CREAT SERPL-MCNC: 0.52 MG/DL (ref 0.5–0.9)
GFR AFRICAN AMERICAN: >60 ML/MIN
GFR NON-AFRICAN AMERICAN: >60 ML/MIN
GFR SERPL CREATININE-BSD FRML MDRD: ABNORMAL ML/MIN/{1.73_M2}
GLUCOSE BLD-MCNC: 142 MG/DL (ref 65–105)
GLUCOSE BLD-MCNC: 144 MG/DL (ref 70–99)
GLUCOSE BLD-MCNC: 205 MG/DL (ref 65–105)
INR BLD: 1
LIPASE: 85 U/L (ref 13–60)
PARTIAL THROMBOPLASTIN TIME: 36.2 SEC (ref 20.5–30.5)
PARTIAL THROMBOPLASTIN TIME: 47.6 SEC (ref 20.5–30.5)
PARTIAL THROMBOPLASTIN TIME: 52.7 SEC (ref 20.5–30.5)
POTASSIUM SERPL-SCNC: 4.3 MMOL/L (ref 3.7–5.3)
PROTHROMBIN TIME: 10.6 SEC (ref 9.1–12.3)
SODIUM BLD-SCNC: 134 MMOL/L (ref 135–144)
TROPONIN, HIGH SENSITIVITY: 14 NG/L (ref 0–14)

## 2022-04-24 PROCEDURE — G0378 HOSPITAL OBSERVATION PER HR: HCPCS

## 2022-04-24 PROCEDURE — 93005 ELECTROCARDIOGRAM TRACING: CPT | Performed by: INTERNAL MEDICINE

## 2022-04-24 PROCEDURE — 82947 ASSAY GLUCOSE BLOOD QUANT: CPT

## 2022-04-24 PROCEDURE — 96376 TX/PRO/DX INJ SAME DRUG ADON: CPT

## 2022-04-24 PROCEDURE — 6370000000 HC RX 637 (ALT 250 FOR IP): Performed by: INTERNAL MEDICINE

## 2022-04-24 PROCEDURE — 36415 COLL VENOUS BLD VENIPUNCTURE: CPT

## 2022-04-24 PROCEDURE — 82948 REAGENT STRIP/BLOOD GLUCOSE: CPT

## 2022-04-24 PROCEDURE — 80048 BASIC METABOLIC PNL TOTAL CA: CPT

## 2022-04-24 PROCEDURE — 99239 HOSP IP/OBS DSCHRG MGMT >30: CPT | Performed by: FAMILY MEDICINE

## 2022-04-24 PROCEDURE — 6360000002 HC RX W HCPCS: Performed by: INTERNAL MEDICINE

## 2022-04-24 PROCEDURE — 96366 THER/PROPH/DIAG IV INF ADDON: CPT

## 2022-04-24 PROCEDURE — 83690 ASSAY OF LIPASE: CPT

## 2022-04-24 PROCEDURE — 6370000000 HC RX 637 (ALT 250 FOR IP): Performed by: NURSE PRACTITIONER

## 2022-04-24 PROCEDURE — 85730 THROMBOPLASTIN TIME PARTIAL: CPT

## 2022-04-24 PROCEDURE — 85610 PROTHROMBIN TIME: CPT

## 2022-04-24 PROCEDURE — 84484 ASSAY OF TROPONIN QUANT: CPT

## 2022-04-24 RX ADMIN — FLUDROCORTISONE ACETATE 0.1 MG: 0.1 TABLET ORAL at 10:40

## 2022-04-24 RX ADMIN — Medication 81 MG: at 10:40

## 2022-04-24 RX ADMIN — PANTOPRAZOLE SODIUM 40 MG: 40 TABLET, DELAYED RELEASE ORAL at 05:28

## 2022-04-24 RX ADMIN — Medication 20.04 UNITS/KG/HR: at 00:29

## 2022-04-24 RX ADMIN — INSULIN GLARGINE 80 UNITS: 100 INJECTION, SOLUTION SUBCUTANEOUS at 10:41

## 2022-04-24 RX ADMIN — INSULIN LISPRO 2 UNITS: 100 INJECTION, SOLUTION INTRAVENOUS; SUBCUTANEOUS at 10:41

## 2022-04-24 RX ADMIN — ATORVASTATIN CALCIUM 80 MG: 80 TABLET, FILM COATED ORAL at 10:40

## 2022-04-24 RX ADMIN — HEPARIN SODIUM 2000 UNITS: 1000 INJECTION INTRAVENOUS; SUBCUTANEOUS at 06:29

## 2022-04-24 RX ADMIN — INSULIN LISPRO 4 UNITS: 100 INJECTION, SOLUTION INTRAVENOUS; SUBCUTANEOUS at 13:16

## 2022-04-24 RX ADMIN — DULOXETINE HYDROCHLORIDE 30 MG: 30 CAPSULE, DELAYED RELEASE ORAL at 10:40

## 2022-04-24 ASSESSMENT — PAIN SCALES - GENERAL: PAINLEVEL_OUTOF10: 0

## 2022-04-24 NOTE — CONSULTS
[facility]    Consult / H&P               Today's Date: 4/24/2022  Patient Name: Sheri Ricks  Date of admission: 4/23/2022  2:47 PM  Patient's age: 62 y.o., 1965  Admission Dx: Chest pain [R07.9]  Epigastric pain [R10.13]    Reason for Consult:  Cardiac evaluation    Requesting Physician: Franky Blunt DO    CHIEF COMPLAINT:  Epigastric pain    History Obtained From:  patient, electronic medical record    HISTORY OF PRESENT ILLNESS:      The patient is a 62 y.o.  female who is admitted to the hospital for epigastric pain and discomfort. Patient noted fullness and heaviness after eating food, new onset, since last Monday. The discomfort is intermittent related to food, associated with nausea, vomited multiple times with no blood in it. Discomfort is relieved with throwing up. Associated with baseline dyspnea. Denies any chest pain, pressure, tightness, sweating, palpitations or syncope. Past Medical History:   has a past medical history of Anxiety, Asthma, CAD (coronary artery disease), Clinical trial participant at discharge, COPD (chronic obstructive pulmonary disease) ([address] Depression, Diabetic neuropathy ([address] H/O cardiac catheterization, H/O cardiovascular stress test, H/O echocardiogram, H/O tilt table evaluation, History of cardiovascular stress test, History of echocardiogram, Hx of blood clots, Hyperlipidemia, Hypertension, Intermittent claudication ([address] Kidney stones, Movement disorder, Neuromuscular disorder ([address] Osteoarthritis, Reflux, Seizures ([address] Stented coronary artery, Stented coronary artery, Type II or unspecified type diabetes mellitus without mention of complication, not stated as uncontrolled, and Unspecified sleep apnea. Past Surgical History:   has a past surgical history that includes Hysterectomy; Cholecystectomy (1991); Coronary angioplasty with stent (9/2010); UPPP (06/26/2012); Colonoscopy (12/16/14);  Endoscopy, colon, diagnostic (12/16/2014); ventral hernia repair (09/21/2015); other surgical history (10/8/2015); Abdominal hernia repair (1-2016); Coronary angioplasty with stent (3-); Appendectomy; hernia repair (12/13/12); hernia repair (02/16/2015); hernia repair (02/2016); Coronary artery bypass graft (08/15/2016); pr suct nellie lipectomy,head/neck (Left, 8/27/2018); tumor removal (Left, 2018); Cardiac surgery; Cardiac catheterization (Left, 4/26/2016); and Cardiac catheterization (Left, 03/07/2019). Home Medications:    Prior to Admission medications    Medication Sig Start Date End Date Taking? Authorizing Provider   pregabalin (LYRICA) 150 MG capsule Take one cap three times daily  Patient taking differently: Take 150 mg by mouth in the morning, at noon, and at bedtime. Take one cap three times daily 3/1/22 3/1/23  Fritz Conde MD   atorvastatin (LIPITOR) 80 MG tablet Take 1 tablet by mouth daily 11/1/21   aTte Veronica MD   fludrocortisone (FLORINEF) 0.1 MG tablet TAKE 3 TABLETS BY MOUTH EVERY DAY  Patient taking differently: Take 0.1 mg by mouth daily Pt states she takes 1 tab BID 10/13/21   Tate Veronica MD   Blood Glucose Monitoring Suppl (ONE TOUCH ULTRA 2) w/Device KIT 1 kit by Does not apply route daily 7/20/21   CHELSEA Woodard CNP   blood glucose monitor strips Test 3 times a day & as needed for symptoms of irregular blood glucose. Dispense sufficient amount for indicated testing frequency plus additional to accommodate PRN testing needs.  7/16/21   CHELSEA Kong CNP   Lancets MISC 1 each by Does not apply route 2 times daily 7/16/21   CHELSEA So CNP   Continuous Blood Gluc Sensor (FREESTYLE MCKAYLA 2 SENSOR) MISC 2 FREESTYLE MCKAYLA 2 SENSORS TO USE WITH MCKAYLA 2 READER TO CHECK BLOOD SUGARS 6 8 TIMES A DAY 5/19/21   Historical Provider, MD   ibuprofen (IBU) 800 MG tablet Take 1 tablet by mouth every 8 hours as needed for Pain 4/5/21   BOONE Lr 1.5 MG/0.5ML SOPN INJECT 1.5 MG INTO THE SKIN ONCE A WEEK 3/30/21   Sharon Plasencia APRN - ANGELIKA   Insulin Degludec (TRESIBA FLEXTOUCH) 200 UNIT/ML SOPN Inject 86 Units into the skin daily PER DR MCELROY  Patient taking differently: Inject 110 Units into the skin daily PER DR MCELROY 3/30/21   Sharon Plasencia APRN - CNP   metFORMIN (GLUCOPHAGE-XR) 500 MG extended release tablet Take 2 tablets by mouth daily (with breakfast) 3/30/21   Sharon Plasencia APRN - CNP   omeprazole (PRILOSEC) 20 MG delayed release capsule TAKE 1 CAPSULE BY MOUTH EVERY DAY IN [address] [place] [address]  Patient taking differently: Take 20 mg by mouth Daily  11/23/20   Sharon Plasencia APRN - ANGELIKA   albuterol sulfate HFA (VENTOLIN HFA) 108 (90 Base) MCG/ACT inhaler INHALE 2 PUFFS EVERY 6 HOURS AS NEEDED FOR WHEEZING  Patient taking differently: Inhale 2 puffs into the lungs every 6 hours as needed INHALE 2 PUFFS EVERY 6 HOURS AS NEEDED FOR WHEEZING 11/11/20   Sharon Plasencia APRN - CNP   ondansetron (ZOFRAN ODT) 4 MG disintegrating tablet Take 1 tablet by mouth every 8 hours as needed for Nausea 11/10/20   Lawanda Wilburn MD   nitroGLYCERIN (NITROSTAT) 0.4 MG SL tablet Place 1 tablet under the tongue every 5 minutes as needed for Chest pain 9/30/20   Sharon Plasencia APRN - CNP   Saccharomyces boulardii (PROBIOTIC) 250 MG CAPS Take 1 capsule by mouth daily 9/30/20   Sharon Plasencia APRN - CNP   tiZANidine (ZANAFLEX) 2 MG tablet TAKE 1 TABLET BY MOUTH NIGHTLY AS NEEDED(SPASMS)  Patient taking differently: Take 2 mg by mouth nightly as needed TAKE 1 TABLET BY MOUTH NIGHTLY AS NEEDED(SPASMS) 9/22/20   Javy Yao MD   acetaminophen (TYLENOL) 500 MG tablet Take 1 tablet by mouth 4 times daily as needed for Pain 9/9/20   Leyla Zamudio, APRN - CNP   losartan (COZAAR) 25 MG tablet Take 1 tablet by mouth daily 7/23/20   Sharon Plasencia APRN - CNP   insulin lispro, 1 Unit Dial, (HUMALOG KWIKPEN) 100 UNIT/ML SOPN Inject 20 Units into the skin 3 times daily (before meals) 6/23/20   CHELSEA Mendoza - CNP   metoprolol succinate (TOPROL XL) 100 MG extended release tablet Take 1 tablet by mouth daily 3/13/20   CHELSEA Mendoza - CNP   DULoxetine (CYMBALTA) 30 MG extended release capsule Take 1 capsule by mouth daily 2/10/20   Chani Saleh MD   aspirin 81 MG EC tablet TAKE 1 TABLET BY MOUTH DAILY  Patient taking differently: Take 81 mg by mouth daily  7/1/19   CHELSEA Mendoza CNP   Insulin Pen Needle (BD ULTRA-FINE PEN NEEDLES) 29G X 12.7MM MISC USE AS DIRECTED BY PHYSICIAN 5 times daily 8/7/18   CHELSEA Mendoza - CNP   Blood Glucose Monitoring Suppl BERE 1 Units by Does not apply route three times daily Unit insurance will cover 12/29/17   CHELSEA Burns CNP   Blood Pressure Monitor KIT 1 each by Does not apply route daily as needed ESSENTIAL HYPERTENSION   I10 5/31/16   CHELSEA Mendoza CNP   Blood Glucose Monitoring Suppl (BLOOD GLUCOSE MONITOR KIT) KIT 1 each by Does not apply route daily. Please dispense whatever BS monitoring kit [facility]. Dx: 250, new onset T2DM. Testing once daily 2/21/12 3/1/22  CHELSEA Fleming CNP         Allergies:  Tape Maxx Citron tape]    Social History:   reports that she quit smoking about 11 years ago. Her smoking use included cigarettes. She has a 20.00 pack-year smoking history. She has never used smokeless tobacco. She reports that she does not drink alcohol and does not use drugs. Family History: family history includes Cancer in her father, maternal grandmother, and mother; Diabetes in her mother and sister; Heart Disease in her brother, maternal uncle, and sister. No h/o sudden cardiac death. No for premature CAD    REVIEW OF SYSTEMS:    Constitutional: there has been no unanticipated weight loss. There's been No change in energy level, No change in activity level. Eyes: No visual changes or diplopia. No scleral icterus.   ENT: No Headaches  Cardiovascular: as above  Respiratory: No previous pulmonary problems, No cough  Gastrointestinal: as above  Genitourinary: No dysuria, trouble voiding, or hematuria. Musculoskeletal:  No gait disturbance, No weakness or joint complaints. Integumentary: No rash or pruritis. Neurological: No headache, diplopia, change in muscle strength, numbness or tingling. No change in gait, balance, coordination, mood, affect, memory, mentation, behavior. Psychiatric: No anxiety, or depression. Endocrine: No temperature intolerance. No excessive thirst, fluid intake, or urination. No tremor. Hematologic/Lymphatic: No abnormal bruising or bleeding, blood clots or swollen lymph nodes. Allergic/Immunologic: No nasal congestion or hives. PHYSICAL EXAM:      /82   Pulse 80   Temp 97.4 °F (36.3 °C) (Temporal)   Resp 18   Wt 222 lb 10.6 oz (101 kg)   LMP 1996   SpO2 93%   BMI 39.44 kg/m²    Constitutional and General Appearance: alert, cooperative, no distress and appears stated age  [de-identified]: PERRL, no cervical lymphadenopathy. No masses palpable. Normal oral mucosa  Respiratory:  Normal excursion and expansion without use of accessory muscles  Resp Auscultation: Good respiratory effort. No for increased work of breathing. On auscultation: clear to auscultation bilaterally  Cardiovascular: The apical impulse is not displaced  Heart tones are crisp and normal. regular S1 and S2.  Jugular venous pulsation Normal  The carotid upstroke is normal in amplitude and contour without delay or bruit  Peripheral pulses are symmetrical and full   Abdomen:   No masses or tenderness  Bowel sounds present  Extremities:   No Cyanosis or Clubbing   Lower extremity edema: No   Skin: Warm and dry  Neurological:  Alert and oriented.   Moves all extremities well  No abnormalities of mood, affect, memory, mentation, or behavior are noted    DATA:    Diagnostics:      EK/22  Normal sinus rhythm  Inferior infarct , age undetermined  Cannot rule out Anterior infarct (cited on or before 2020)  Abnormal ECG.      Normal sinus rhythm  Normal ECG      ECHO:     Global left ventricular systolic function appears preserved with an  estimated ejection fraction of >55%. Mildly increased left ventricular wall thickness with a normal left  ventricular cavity size. Anterior echo free space is seen suggestive of a small effusion or fat pad. No significant valvular disease was seen. Evidence of mild diastolic dysfunction is seen. Stress Test:   1. Abnormal myocardial perfusion study. There is a small/moderate  perfusion defect of mild/moderate intensity in the anterior and  anterolateral region(s) during stress imaging which is most consistent  with ischemia but may be due to artifact. 2.  Global left ventricular systolic function was normal without  regional wall motion abnormalities. 3.  No significant electrocardiographic evidence of myocardial ischemia  during EKG monitoring without significant associated arrhythmias. Overall, these results are most consistent with a low/intermediate risk  for significant coronary artery disease    Cardiac Angiography:3/2019   Severe single vessel disease involving the LAD coronary artery. Normal left ventricular end diastolic pressure. (LVEDP). Recommendations      Proceed with guideline directed maximal medical management including ASA   81 mg and maximal tolerated doses of a beta blocker, calcium channel   blocker, statin, and/or long acting nitrate. CAB2016  OP CABG X 1, LIMA to LAD    Labs:     CBC:   Recent Labs     22  1316 22  1839   WBC 9.4 9.5   HGB 13.0 12.1   HCT 41.0 36.8    341     BMP:   Recent Labs     22  1316 22  0527   * 134*   K 4.6 4.3   CO2 25 22   BUN 10 14   CREATININE 0.76 0.52   LABGLOM >60 >60   GLUCOSE 331* 144*     BNP: No results for input(s): BNP in the last 72 hours.   PT/INR:   Recent Labs 04/24/22 0527   PROTIME 10.6   INR 1.0     APTT:  Recent Labs     04/24/22  0003 04/24/22 0527   APTT 52.7* 47.6*     CARDIAC ENZYMES:No results for input(s): CKTOTAL, CKMB, CKMBINDEX, TROPONINI in the last 72 hours. FASTING LIPID PANEL:  Lab Results   Component Value Date    HDL 40 12/29/2021    TRIG 151 12/29/2021     LIVER PROFILE:  Recent Labs     04/22/22  1316   AST 11   ALT 9   LABALBU 3.9       IMPRESSION:        Patient Active Problem List   Diagnosis    Dyslipidemia    Radiculopathy    Insomnia    Allergic rhinitis    COPD (chronic obstructive pulmonary disease)    Depression    Bilateral leg weakness    Gait difficulty    Diabetic neuropathy    Back pain    Muscle spasm    Affective disorder ([facility])    History of ventral hernia repair    History of incisional hernia repair    Essential hypertension    Gastroesophageal reflux disease without esophagitis    Uncontrolled type 2 diabetes mellitus with diabetic polyneuropathy, with long-term current use of insulin ([facility])    Primary osteoarthritis involving multiple joints    Heart palpitations    Dysautonomia orthostatic hypotension syndrome    Coronary artery disease involving native coronary artery of native heart    Angina, class III ([facility])    S/P CABG x 1    Precordial pain    DARON (obstructive sleep apnea)    Neuropathic pain    Soft tissue mass    Muscle spasms of both lower extremities    Abscess of left great toe    Paronychia of great toe of left foot    Chest pain    Epigastric pain    ACS (acute coronary syndrome) ([facility])    Obesity (BMI 30-39. 9)       RECOMMENDATIONS:  Epigastric discomfort likely non cardiac. Trop  53,04,30,29. EKG shows T wave inversion in lead 3 and aVF which are resolved. Based on extensive cardiac history and risk factor, we will plan for stress test.  Continue on lipitor, cozaar, ASA and Troprol XL    Will discuss with rounding attending  for final recommendations.     Jessica Hernandez MD  Cardiology Service  Internal Medicine Residency Program, PGY-1  [facility]      I reviewed the patient history personally, and examined by me during the visit. I have reviewed the H&P/Consult / Progress note as completed, and made appropriate changes to the patient exam and treatment plans.       I have reviewed the case in details including physical exam and treatment plan with resident / fellow / NP    Patient treatment plan was explained to patient, correction in notes was made as appropriate, and discussed final arrangement based on  my evaluation and exam.    Additional Recommendations: Due to risk factors and for riskstratification, to have a stress test      Dia Haynes MD  [facility]

## 2022-04-24 NOTE — PROGRESS NOTES
[facility]  Office: [address], DO, Naty Maria, DO, Patricia Padron, DO, Mercy Thomas, DO, Nam Wood MD, Marsha Gannon MD, Husam Weaver MD, Doe Douglas MD, Toni Sanches MD, Arelis Smallwood MD, Sabrina Sanchez MD, Shabnam Harris, DO, Ryan Childers, DO, Elsa Manrique MD,  Eduarda Kang, DO, Chi Figueroa MD, Juan Pyle MD, Becca Gaines MD, Marc Osborne DO, Jovani Raines MD, Janelle Adams MD, Sekou Escobar [facility], CNP, Rasheed Patino CNP, Niki Willis, CNP, Reno Tejeda, [facility], Car Alberto, CNP, Yulissa Treadwell PA-C, Roman Harrell, [facility], Duy Torres, CNP, Emiliano Flores, CNP, Sonya Loving, CNP, Yadira Christy, CNS, Katarina Nguyen [facility], Luis Ellison CNP, Kenia Albarado, CNP, Blayne Weeks, CNP         Jose Chawla [place] [address]    Progress Note    4/24/2022    8:00 AM    Name:   Mario Wilson  MRN:     [id number]     Acct:      [de-identified]   Room:   [address] [place] [address] Day:  1  Admit Date:  4/23/2022  2:47 PM    PCP:   CHELSEA Velazquez CNP  Code Status:  Full Code    Subjective:     C/C: No chief complaint on file. Interval History Status: not changed. Pt was seen and examined this morning  No acute events overnight  She is asymptomatic at this time and denies having any complaints      Review of Systems:     12 point ROS performed and negative for anything other than what was stated in subjective     Medications: Allergies:     Allergies   Allergen Reactions    Tape Yesy Helper Tape] Rash       Current Meds:   Scheduled Meds:    aspirin  81 mg Oral Daily    atorvastatin  80 mg Oral Daily    DULoxetine  30 mg Oral Daily    fludrocortisone  0.1 mg Oral Daily    losartan  25 mg Oral Daily    metoprolol succinate  100 mg Oral Daily    pantoprazole  40 mg Oral QAM AC    sodium chloride flush  5-40 mL IntraVENous 2 times per day    insulin lispro  0-12 Units SubCUTAneous TID WC  insulin lispro  0-6 Units SubCUTAneous Nightly    insulin glargine  80 Units SubCUTAneous Daily     Continuous Infusions:    sodium chloride      dextrose      heparin (PORCINE) Infusion 22.0441 Units/kg/hr (04/24/22 0634)     PRN Meds: acetaminophen, albuterol sulfate HFA, ondansetron, sodium chloride flush, sodium chloride, potassium chloride **OR** potassium alternative oral replacement **OR** potassium chloride, magnesium sulfate, polyethylene glycol, acetaminophen **OR** acetaminophen, glucose, dextrose, glucagon (rDNA), dextrose, heparin (porcine), heparin (porcine)    Data:     Past Medical History:   has a past medical history of Anxiety, Asthma, CAD (coronary artery disease), Clinical trial participant at discharge, COPD (chronic obstructive pulmonary disease) ([facility] Depression, Diabetic neuropathy ([facility] H/O cardiac catheterization, H/O cardiovascular stress test, H/O echocardiogram, H/O tilt table evaluation, History of cardiovascular stress test, History of echocardiogram, Hx of blood clots, Hyperlipidemia, Hypertension, Intermittent claudication ([address] Kidney stones, Movement disorder, Neuromuscular disorder ([address] Osteoarthritis, Reflux, Seizures ([address] Stented coronary artery, Stented coronary artery, Type II or unspecified type diabetes mellitus without mention of complication, not stated as uncontrolled, and Unspecified sleep apnea. Social History:   reports that she quit smoking about 11 years ago. Her smoking use included cigarettes. She has a 20.00 pack-year smoking history. She has never used smokeless tobacco. She reports that she does not drink alcohol and does not use drugs.      Family History:   Family History   Problem Relation Age of Onset    Cancer Mother     Diabetes Mother     Cancer Father         thyroid    Diabetes Sister     Heart Disease Sister     Heart Disease Brother     Heart Disease Maternal Uncle     Cancer Maternal Grandmother        Vitals:  /82 Pulse 80   Temp 97.4 °F (36.3 °C) (Temporal)   Resp 18   Wt 222 lb 10.6 oz (101 kg)   LMP 1996   SpO2 93%   BMI 39.44 kg/m²   Temp (24hrs), Av.7 °F (36.5 °C), Min:97.4 °F (36.3 °C), Max:97.9 °F (36.6 °C)    Recent Labs     22  1906 22  0738   POCGLU 239* 142*       I/O (24Hr): Intake/Output Summary (Last 24 hours) at 2022 0800  Last data filed at 2022 8872  Gross per 24 hour   Intake 229.68 ml   Output --   Net 229.68 ml       Labs:  Hematology:  Recent Labs     22  1316 22  1839 22  0527   WBC 9.4 9.5  --    RBC 5.31* 4.86  --    HGB 13.0 12.1  --    HCT 41.0 36.8  --    MCV 77.2* 75.7*  --    MCH 24.5* 24.9*  --    MCHC 31.7 32.9  --    RDW 14.9* 14.9*  --     341  --    MPV 9.4 9.6  --    INR  --   --  1.0     Chemistry:  Recent Labs     22  1316 22  1522 22  1538 22  2158 22  0320 22  0527   *  --   --   --   --  134*   K 4.6  --   --   --   --  4.3   CL 96*  --   --   --   --  100   CO2 25  --   --   --   --  22   GLUCOSE 331*  --   --   --   --  144*   BUN 10  --   --   --   --  14   CREATININE 0.76  --   --   --   --  0.52   MG 1.7  --   --   --   --   --    ANIONGAP 12  --   --   --   --  12   LABGLOM >60  --   --   --   --  >60   GFRAA >60  --   --   --   --  >60   CALCIUM 9.4  --   --   --   --  8.8   TROPHS 15*   < > 12 12 14  --     < > = values in this interval not displayed.      Recent Labs     22  1316 22  1906 22  0527 22  0738   PROT 7.9  --   --   --    LABALBU 3.9  --   --   --    AST 11  --   --   --    ALT 9  --   --   --    ALKPHOS 99  --   --   --    BILITOT 0.41  --   --   --    LIPASE 53  --  85*  --    POCGLU  --  239*  --  142*     ABG:  Lab Results   Component Value Date    POCPH 7.33 08/15/2016    POCPCO2 40 08/15/2016    POCPO2 66 08/15/2016    POCHCO3 21.1 08/15/2016    NBEA 5 08/15/2016    PBEA NOT REPORTED 08/15/2016    JVT2RAC 22 08/15/2016    VJKX9VQV 91 08/15/2016    FIO2 NOT REPORTED 11/09/2020     Lab Results   Component Value Date/Time    SPECIAL NOT REPORTED 06/15/2021 09:00 AM     Lab Results   Component Value Date/Time    CULTURE (A) 04/12/2022 02:10 PM     STREPTOCOCCI, BETA HEMOLYTIC GROUP B MODERATE GROWTH    CULTURE STREPTOCOCCI, BETA HEMOLYTIC GROUP G LIGHT GROWTH (A) 04/12/2022 02:10 PM    CULTURE (A) 04/12/2022 02:10 PM     STAPHYLOCOCCUS AUREUS HEAVY GROWTH This isolate is methicillin susceptible. Radiology:  CT ABDOMEN PELVIS W IV CONTRAST Additional Contrast? None    Result Date: 4/22/2022  No acute intra-abdominal or intrapelvic abnormalities are noted. Hepatosplenomegaly RECOMMENDATIONS: No additional routine follow-up for incidental abdominal lesions. XR CHEST PORTABLE    Result Date: 4/22/2022  1. No acute cardiopulmonary abnormality.        Physical Examination:        General appearance:  alert, cooperative and no distress  Mental Status:  oriented to person, place and time and normal affect  Lungs:  clear to auscultation bilaterally, normal effort  Heart:  regular rate and rhythm, no murmur  Abdomen:  soft, nontender, nondistended, normal bowel sounds, no masses, hepatomegaly, splenomegaly  Extremities:  no edema, redness, tenderness in the calves  Skin:  no gross lesions, rashes, induration    Assessment:        Hospital Problems           Last Modified POA    * (Principal) ACS (acute coronary syndrome) ([address] 4/23/2022 Yes    Essential hypertension (Chronic) 4/23/2022 Yes    Chest pain 4/23/2022 Yes    Epigastric pain 4/23/2022 Yes    Obesity (BMI 30-39.9) 4/23/2022 Yes    COPD (chronic obstructive pulmonary disease) 4/23/2022 Yes    Diabetic neuropathy (Chronic) 4/23/2022 Yes    Gastroesophageal reflux disease without esophagitis 4/23/2022 Yes    Uncontrolled type 2 diabetes mellitus with diabetic polyneuropathy, with long-term current use of insulin ([address] 4/23/2022 Yes    Coronary artery disease involving native coronary artery of native heart 4/23/2022 Yes               S/p MI 2011               Plan:        1. Abdominal pain   2. EKG changes  - cardiology consulted  - currently on heparin drip until cleared by cardio   - telemetry   - cardiac enzymes x 3  - continue asa, lipitor and bb  - lipase trending up this am   - CT abdomen negative for acute abnormality     3. GERD  - PPI     4. DMII  - continue accu checks ac/hs with ISS     5. HTN   - v/s per unit protocol     6.  DISPO  - plan for d/c if cleared by cardiology      Clementine Bajwa MD  4/24/2022  8:00 AM

## 2022-04-24 NOTE — CARE COORDINATION
Case Management Initial Discharge Plan  Mario Wilson,         Met with:patient to discuss discharge plans. Information verified: address, contacts, phone number, , insurance Yes  Insurance Provider: [employer]    Emergency Contact/Next of Kin name & number: pt's son Gold Cardenas [phone], pt's sister Mojgan Liu [phone]  Who are involved in patient's support system? Pt's family    PCP: Bashir Chandra, APRN - CNP  Date of last visit: 1 month ago      Discharge Planning    Living Arrangements:  Spouse/Significant Other     Home is an apartment and pt lives on the 1st level  0 stairs to climb to get into front door    Patient able to perform ADL's:Independent    Current Services (outpatient & in home) none  DME equipment: [address] [facility] [place], glucometer  DME provider: n/a    Is patient receiving oral anticoagulation therapy? No    If indicated:   Physician managing anticoagulation treatment: n/a  Where does patient obtain lab work for ATC treatment? n/a    Does patient have any issues/concerns obtaining medications? No  If yes, what are patient's concerns? Is there a preferred Pharmacy after hours or on weekends? Yes    If yes, which pharmacy? [facility] in [place]    Potential Assistance Needed:  N/A    Patient agreeable to home care: No  [place] of choice provided:  n/a    Prior SNF/Rehab Placement and Facility: none  Agreeable to SNF/Rehab: No  [place] of choice provided: n/a     Evaluation: n/a    Expected Discharge date:       Patient expects to be discharged to: If home: is the family and/or caregiver wiling & able to provide support at home? no  Who will be providing this support?      Follow Up Appointment: Best Day/ Time:      Transportation provider:   Transportation arrangements needed for discharge: No    Readmission Risk              Risk of Unplanned Readmission:  10         Does patient have a readmission risk score greater than 14?: No  If yes, follow-up appointment must be made within 7 days of discharge.      Goals of Care: Safety      Educated pt on transitional options, provided freedom of choice and pt is  agreeable with plan      Discharge Plan: Home independently, has transportation and established PCP, Cardiology consulted- pt asymptomatic- DC dependent on Cardiology plans          Electronically signed by Shawanda Holliday RN on 4/24/22 at 9:57 AM EDT

## 2022-04-24 NOTE — PROGRESS NOTES
Physical Therapy        Physical Therapy Cancel Note      DATE: 2022    NAME: Corwin Goode  MRN: [id number]   : 1965      Patient not seen this date for Physical Therapy due to:    Patient independent with functional mobility. Will defer PT evaluation at this time. Please reorder PT if future needs arise.        Electronically signed by Leora Ramos PT on 2022 at 10:44 AM

## 2022-04-25 ENCOUNTER — TELEPHONE (OUTPATIENT)
Dept: PRIMARY CARE CLINIC | Age: 57
End: 2022-04-25

## 2022-04-25 LAB
EKG ATRIAL RATE: 65 BPM
EKG ATRIAL RATE: 83 BPM
EKG P AXIS: 34 DEGREES
EKG P AXIS: 38 DEGREES
EKG P-R INTERVAL: 146 MS
EKG P-R INTERVAL: 164 MS
EKG Q-T INTERVAL: 386 MS
EKG Q-T INTERVAL: 420 MS
EKG QRS DURATION: 74 MS
EKG QRS DURATION: 76 MS
EKG QTC CALCULATION (BAZETT): 436 MS
EKG QTC CALCULATION (BAZETT): 453 MS
EKG R AXIS: 16 DEGREES
EKG R AXIS: 20 DEGREES
EKG T AXIS: -26 DEGREES
EKG T AXIS: -5 DEGREES
EKG VENTRICULAR RATE: 65 BPM
EKG VENTRICULAR RATE: 83 BPM
GLUCOSE BLD-MCNC: 252 MG/DL (ref 65–105)

## 2022-04-25 PROCEDURE — 93010 ELECTROCARDIOGRAM REPORT: CPT | Performed by: INTERNAL MEDICINE

## 2022-04-25 NOTE — TELEPHONE ENCOUNTER
[facility] Transitions Initial Follow Up Call    Outreach made within 2 business days of discharge: Yes    Patient: Susanna Corbett Patient : 1965   MRN: <[id number]>  Reason for Admission: There are no discharge diagnoses documented for the most recent discharge. Discharge Date: 22       Spoke with: 22 @ 11:18am Called patient and left message for her to call office re: recent hospitalization. Cf  22 @ 12:43pm Called patient for the second time re: recent ER visit, message left for patient to call office. cf    Discharge department/facility: [facility]    TCM Interactive Patient Contact:  Was patient able to fill all prescriptions:   Was patient instructed to bring all medications to the follow-up visit:   Is patient taking all medications as directed in the discharge summary?    Does patient understand their discharge instructions:   Does patient have questions or concerns that need addressed prior to 7-14 day follow up office visit:     Scheduled appointment with PCP within 7-14 days    Follow Up  Future Appointments   Date Time Provider mEma Gaxiola   2022 11:00 AM Cheryle Huh Might, APRN - CNP [facility]   2022  1:00 PM CHELSEA Rose CNP Neuro Spec [facility]   2022  8:30 AM [facility]   2022 10:00 AM Darlin Jiménez [facility]       Rosalia Bashir

## 2022-04-25 NOTE — DISCHARGE SUMMARY
[facility]  Office: [address], DO, Amy Ace, DO, Mane Yadiraa, DO, Santos Yip Blood, DO, Farheen Barnes MD, Rajeev Butler MD, Diego Yang MD, Jeffrey Ojeda MD, John Peterson MD, Shane Landry MD, Libertad Aparicio MD, Carlos A Beebe, DO, Herman Hurst, DO, Christy Mancera MD,  Cory Andrew, DO, Jeyson Posadas MD, Adam Ware MD, Hope Opitz, MD, Hermila Linda, DO, Kamar Catherine MD, Ronaldo Mak MD, Alicia Paulson, [facility], [facility], CNP, Marina Denton, CNP, Sabra Alvarez, CNP, Mali Davila, CNP, Destiny Sierra, CNP, Michelle David PA-C, Garrett Leija, [facility], Dora Freedman, CNP, Caitie Blackman, CNP, Eloina Scott, CNP, Jossy Lanier, CNS, Adalberto Martinez, [facility], Radha Cuevas, CNP, Junie Morales, CNP, Royal Godinez, [facility] 19    Discharge Summary     Patient ID: Shreya Walker  :  1965   MRN: [id number]     ACCOUNT:  [de-identified]   Patient's PCP: Lanice Bence, APRN - CNP  Admit Date: 2022   Discharge Date: 2022      Length of Stay: 1  Code Status:  Prior  Admitting Physician: No admitting provider for patient encounter. Discharge Physician: Jeyson Posadas MD     Active Discharge Diagnoses:     Hospital Problem Lists:  Principal Problem:    ACS (acute coronary syndrome) [facility])  Active Problems:    Essential hypertension    Chest pain    Epigastric pain    Obesity (BMI 30-39. 9)    COPD (chronic obstructive pulmonary disease)    Diabetic neuropathy    Gastroesophageal reflux disease without esophagitis    Uncontrolled type 2 diabetes mellitus with diabetic polyneuropathy, with long-term current use of insulin (HCC)    Coronary artery disease involving native coronary artery of native heart  Resolved Problems:    * No resolved hospital problems. *      Admission Condition:  stable     Discharged Condition: stable    Hospital Stay:     Hospital Course: Shreya Walker is a 62 y.o. PROTIME 10.6 04/24/2022    PROTIME 10.3 09/20/2011    INR 1.0 04/24/2022     PTT:   Lab Results   Component Value Date    APTT 36.2 04/24/2022     FLP:    Lab Results   Component Value Date    CHOL 165 12/29/2021    TRIG 151 12/29/2021    HDL 40 12/29/2021     U/A:    Lab Results   Component Value Date    COLORU YELLOW 11/09/2020    TURBIDITY SLIGHTLY CLOUDY 11/09/2020    SPECGRAV >1.030 11/09/2020    HGBUR NEGATIVE 11/09/2020    PHUR 5.5 11/09/2020    PROTEINU TRACE 11/09/2020    GLUCOSEU NEGATIVE 11/09/2020    KETUA NEGATIVE 11/09/2020    BILIRUBINUR SMALL 11/09/2020    UROBILINOGEN Normal 11/09/2020    NITRU NEGATIVE 11/09/2020    LEUKOCYTESUR NEGATIVE 11/09/2020     TSH:    Lab Results   Component Value Date    TSH 2.80 06/04/2016         Radiology:  CT ABDOMEN PELVIS W IV CONTRAST Additional Contrast? None    Result Date: 4/22/2022  No acute intra-abdominal or intrapelvic abnormalities are noted. Hepatosplenomegaly RECOMMENDATIONS: No additional routine follow-up for incidental abdominal lesions. XR CHEST PORTABLE    Result Date: 4/22/2022  1. No acute cardiopulmonary abnormality. Consultations:    Consults:     Final Specialist Recommendations/Findings:   IP CONSULT TO CARDIOLOGY      The patient was seen and examined on day of discharge and this discharge summary is in conjunction with any daily progress note from day of discharge.     Discharge plan:     Disposition: Home    Physician Follow Up:      CHELSEA [place] [id number]  [phone]    Schedule an appointment as soon as possible for a visit in 1 week      Tate Veronica MD  [address] [place] [address]  [phone]    Schedule an appointment as soon as possible for a visit  Outpatient stress test as soon as possible       Requiring Further Evaluation/Follow Up POST HOSPITALIZATION/Incidental Findings:      Diet: cardiac diet    Activity: As tolerated     Instructions to Patient:      Discharge Medications:      Medication List      CHANGE how you take these medications    albuterol sulfate  (90 Base) MCG/ACT inhaler  Commonly known as: Ventolin HFA  INHALE 2 PUFFS EVERY 6 HOURS AS NEEDED FOR WHEEZING  What changed:   · how much to take  · how to take this  · when to take this  · reasons to take this     aspirin 81 MG EC tablet  TAKE 1 TABLET BY MOUTH DAILY  What changed: See the new instructions. fludrocortisone 0.1 MG tablet  Commonly known as: FLORINEF  TAKE 3 TABLETS BY MOUTH EVERY DAY  What changed: See the new instructions. omeprazole 20 MG delayed release capsule  Commonly known as: PRILOSEC  TAKE 1 CAPSULE BY MOUTH EVERY DAY IN THE MORNING BEFORE BREAKFAST  What changed: See the new instructions. pregabalin 150 MG capsule  Commonly known as: LYRICA  Take one cap three times daily  What changed:   · how much to take  · how to take this  · when to take this     tiZANidine 2 MG tablet  Commonly known as: ZANAFLEX  TAKE 1 TABLET BY MOUTH NIGHTLY AS NEEDED(SPASMS)  What changed:   · how much to take  · how to take this  · when to take this  · reasons to take this     [place] FlexTouch 200 UNIT/ML Sopn  Generic drug: Insulin Degludec  Inject 86 Units into the skin daily PER DR MCELROY  What changed: how much to take        CONTINUE taking these medications    acetaminophen 500 MG tablet  Commonly known as: TYLENOL  Take 1 tablet by mouth 4 times daily as needed for Pain     atorvastatin 80 MG tablet  Commonly known as: LIPITOR  Take 1 tablet by mouth daily     * BLOOD GLUCOSE MONITOR KIT Kit  1 each by Does not apply route daily. Please dispense whatever BS monitoring kit [facility] covers. Dx: 250, new onset T2DM.  Testing once daily     * blood glucose monitor kit and supplies  1 Units by Does not apply route three times daily Unit insurance will cover     * ONE TOUCH ULTRA 2 w/Device Kit  1 kit by Does not apply route daily     blood glucose test strips  Test 3 times a day & as needed for symptoms of irregular blood glucose. Dispense sufficient amount for indicated testing frequency plus additional to accommodate PRN testing needs. Blood Pressure Monitor Kit  1 each by Does not apply route daily as needed ESSENTIAL HYPERTENSION   I10     DULoxetine 30 MG extended release capsule  Commonly known as: CYMBALTA  Take 1 capsule by mouth daily     FreeStyle Yancy 2 Sensor Misc     insulin lispro (1 Unit Dial) 100 UNIT/ML Sopn  Commonly known as: HumaLOG KwikPen  Inject 20 Units into the skin 3 times daily (before meals)     Insulin Pen Needle 29G X 12.7MM Misc  Commonly known as: BD ULTRA-FINE PEN NEEDLES  USE AS DIRECTED BY PHYSICIAN 5 times daily     Lancets Misc  1 each by Does not apply route 2 times daily     losartan 25 MG tablet  Commonly known as: COZAAR  Take 1 tablet by mouth daily     metFORMIN 500 MG extended release tablet  Commonly known as: GLUCOPHAGE-XR  Take 2 tablets by mouth daily (with breakfast)     metoprolol succinate 100 MG extended release tablet  Commonly known as: TOPROL XL  Take 1 tablet by mouth daily     nitroGLYCERIN 0.4 MG SL tablet  Commonly known as: NITROSTAT  Place 1 tablet under the tongue every 5 minutes as needed for Chest pain     ondansetron 4 MG disintegrating tablet  Commonly known as: Zofran ODT  Take 1 tablet by mouth every 8 hours as needed for Nausea     Probiotic 250 MG Caps  Take 1 capsule by mouth daily     Trulicity 1.5 SL/5.0YV Sopn  Generic drug: Dulaglutide  INJECT 1.5 MG INTO THE SKIN ONCE A WEEK         * This list has 3 medication(s) that are the same as other medications prescribed for you. Read the directions carefully, and ask your doctor or other care provider to review them with you. STOP taking these medications    ibuprofen 800 MG tablet  Commonly known as: IBU            No discharge procedures on file.     Time Spent on discharge is  37 mins in patient examination, evaluation, counseling as well as medication reconciliation, prescriptions for required medications, discharge plan and follow up. Electronically signed by   Nataly Stephen MD  4/25/2022  10:20 AM      Thank you  [address] [place] [address] for the opportunity to be involved in this patient's care.

## 2022-05-01 ASSESSMENT — ENCOUNTER SYMPTOMS
CHOKING: 0
DIARRHEA: 0
COLOR CHANGE: 1
WHEEZING: 0
NAUSEA: 0
STRIDOR: 0
APNEA: 0
SHORTNESS OF BREATH: 0
CONSTIPATION: 0
VOMITING: 0
CHEST TIGHTNESS: 0

## 2022-05-02 ENCOUNTER — OFFICE VISIT (OUTPATIENT)
Dept: CARDIOLOGY | Age: 57
End: 2022-05-02
Payer: COMMERCIAL

## 2022-05-02 VITALS
BODY MASS INDEX: 39.51 KG/M2 | OXYGEN SATURATION: 98 % | WEIGHT: 223 LBS | SYSTOLIC BLOOD PRESSURE: 119 MMHG | DIASTOLIC BLOOD PRESSURE: 77 MMHG | HEIGHT: 63 IN | RESPIRATION RATE: 20 BRPM | HEART RATE: 89 BPM

## 2022-05-02 DIAGNOSIS — E78.2 MIXED HYPERLIPIDEMIA: ICD-10-CM

## 2022-05-02 DIAGNOSIS — I25.810 CORONARY ARTERY DISEASE INVOLVING CORONARY BYPASS GRAFT OF NATIVE HEART WITHOUT ANGINA PECTORIS: ICD-10-CM

## 2022-05-02 DIAGNOSIS — R00.2 HEART PALPITATIONS: ICD-10-CM

## 2022-05-02 DIAGNOSIS — R10.13 EPIGASTRIC PAIN: ICD-10-CM

## 2022-05-02 DIAGNOSIS — I10 ESSENTIAL HYPERTENSION: ICD-10-CM

## 2022-05-02 DIAGNOSIS — R94.31 ABNORMAL EKG: Primary | ICD-10-CM

## 2022-05-02 DIAGNOSIS — G90.1 DYSAUTONOMIA (HCC): ICD-10-CM

## 2022-05-02 PROCEDURE — 1111F DSCHRG MED/CURRENT MED MERGE: CPT | Performed by: FAMILY MEDICINE

## 2022-05-02 PROCEDURE — G8427 DOCREV CUR MEDS BY ELIG CLIN: HCPCS | Performed by: FAMILY MEDICINE

## 2022-05-02 PROCEDURE — 3017F COLORECTAL CA SCREEN DOC REV: CPT | Performed by: FAMILY MEDICINE

## 2022-05-02 PROCEDURE — G8417 CALC BMI ABV UP PARAM F/U: HCPCS | Performed by: FAMILY MEDICINE

## 2022-05-02 PROCEDURE — 1036F TOBACCO NON-USER: CPT | Performed by: FAMILY MEDICINE

## 2022-05-02 PROCEDURE — 99214 OFFICE O/P EST MOD 30 MIN: CPT | Performed by: FAMILY MEDICINE

## 2022-05-02 NOTE — PATIENT INSTRUCTIONS
SURVEY:    You may be receiving a survey from [employer] regarding your visit today. Please complete the survey to enable us to provide the highest quality of care to you and your family. If you cannot score us a very good on any question, please call the office to discuss how we could have made your experience a very good one. Thank you.

## 2022-05-02 NOTE — PROGRESS NOTES
Frantz Isaacs am scribing for and in the presence of [place]. Eugenio ZENG, MS, F.A.C.C. Patient: Charles Rothman  : 1965  Date of Visit: May 2, 2022    REASON FOR VISIT / CONSULTATION: Follow-up (HX: CAD S/p CABG, Dysautomina, HTN, HLD. Pt was in ER on  for SOB and abdominal/ CP sent to [facility]. V's for ACS. Pt states she is doing well. C/o: Palpitaiotns once on Easter AM. Denies: CP, Lighteadee/dizziness, SOB ok.)    Dear Valerie Ballard, APRN - CNP,    I had the pleasure of seeing your patient Charles Rothman in consultation today. As you know, Ms. Marcia Black is a 62 y.o. female who presents with a history of intermittent episodes of back and chest discomfort that started developing since her recent CABG involving a LIMA-LAD 8/15/16. She also has a history of  dysautonomia on a tilt table test, and was started on Florinef as well as Lexapro. Recent heart cath done 3/7/2019 shows severe single vessel disease involving LAD Normal LVEDP. She had a Holter monitor done on 10/23/2019 that did show PAC's and PVC's but was largely unremarkable. She did come to the ER on 2022 due to abdominal pain and shortness of breath. She was told her EKG was abnormal and also she had an elevated troponin which was only 15 ng/L and only had minor EKG changes. She was sent to [facility]. V's for this and she was not very happy about this. Since the last time I saw Ms. Marcia Black she reports doing well since her ER visit as above. She was a bit confused on why she was sent to [facility]. V's as she said not much was done there and was told to follow up with me for possible further outpatient testing. She no longer has an abdominal pain and no significant shortness of breath. She did on East morning have some heart palpitations. She laid down and took a nap and then when she woke up every thing was ok and she has had no symptoms since. She denied any chest pain, pressure or tightness. She denies having any lightheaded/dizziness.  She denies any abdominal pain, bleeding problems, bowel issues, problems with her medications or any other concerns at this time. No cough, fever or chills. No nausea or vomiting. No falls or near falls. Bleeding Risks: Ms. Jose Ring denies any current or recent bleeding problems including a history of a GI bleed, ulcers, recent or upcoming surgeries, blood in her stool or black tarry stools or blood in her urine. Past Medical History:   Diagnosis Date    Anxiety     Asthma     CAD (coronary artery disease)     Clinical trial participant at discharge 3/31/16    COPD (chronic obstructive [facility]) [facility] [address]     Depression     Diabetic neuropathy ([facility] [address]     H/O cardiac catheterization 4/26/16    LMCA: Mild Irregularities 10-20%. LAD: Lesion on Prox LAD: Proximal subsection. 100% stenosis. LCx: Mild irregularities 10-20%. RCA: Mild irregularities 10-20%. Widely patent mid RCA stent. EF:60%.  H/O cardiovascular stress test 10/07/2016    Overall results are most consistent with a low risk for significant CAD.     H/O echocardiogram 10/05/2016    EF:>60%. Inferoseptal wall abnormal in its motion which is not unusal status post open heart surgery. Evidence of mild (grade I) diastolic dysfunction is seen.  H/O tilt table evaluation 07/12/2016    Abnormal. PTs HR, Blood Pressure response and symptoms were most consistent with dysautonomia. Combined w/viligant maintenance of euvolemia and maintaining a moderate salt intake, pharmaciologic treatment w/ ProAmatine, SSRI such as Lexapro and/or Mestinon among other treatments have shown some effectiveness in the treatment of this condition.  History of cardiovascular stress test 02/13/2019    Abnormal. Small/moderate perfusion defect of mild/moderate intesity in the anterior and anterolateral regions during stress imaging which is most consistent w/ ischemia but may be artifact. Overall low/intermediate risk for significant CAD.     History of echocardiogram 09/06/2018    EF >60%. Inferoseptal wall is abnormal in its motion which is not unusual s/p open heart. Evidence of mild (grade I) diastolic dysfunction seen.  Hx of blood clots     Hyperlipidemia     Hypertension     Intermittent claudication (HCC)     Kidney stones     Movement disorder     neuropathy in legs    Neuromuscular disorder (HCC)     neuropathy    Osteoarthritis     Reflux     Seizures (HCC)     last over 10 yr ago    Stented coronary artery 9/22/2010     LAD/[place]    Stented coronary artery 3/31/16    RCA/[place]    Type II or unspecified type diabetes mellitus without mention of complication, not stated as uncontrolled     Unspecified sleep apnea     no machine       CURRENT ALLERGIES: Tape [adhesive tape] REVIEW OF SYSTEMS: 14 systems were reviewed. Pertinent positives and negatives as above, all else negative.      Past Surgical History:   Procedure Laterality Date    ABDOMINAL HERNIA REPAIR  1-2016    repair done [place]    APPENDECTOMY      CARDIAC CATHETERIZATION Left 4/26/2016    right radial/ [facility]/ Dr Carrol Vera Left 03/07/2019    Left Radial/[facility] [place]/    CARDIAC SURGERY      CABG 2019    CHOLECYSTECTOMY  1991    COLONOSCOPY  12/16/14    -hemorrhoids,bx    CORONARY ANGIOPLASTY WITH STENT PLACEMENT  9/2010    CORONARY ANGIOPLASTY WITH STENT PLACEMENT  3-    [facility] RCA / DR Jennifer Riley    CORONARY ARTERY BYPASS GRAFT  08/15/2016    OP X 1- Dr Jevon Rao, COLON, DIAGNOSTIC  12/16/2014    HERNIA REPAIR  12/13/12    at the medial aspect of a [place] RUQ scar); repaired [id number]  02/16/2015    incisional, recurrent    HERNIA REPAIR  02/2016    HYSTERECTOMY      OTHER SURGICAL HISTORY  10/8/2015    abd wound washout, mesh removal, wound vac placement    TN SUCT NICOLE LIPECTOMY,HEAD/NECK Left 8/27/2018    THIGH LESION BIOPSY EXCISION, SOFT TISSUE MASS performed by C[facility]ia Alvarado [facility] at [facility] Left 2018    leg    UPPP UVULOPALATOPHARYGOPLASTY  2012    VENTRAL HERNIA REPAIR  2015    With [facility] - Dr Eli Inch History:  Social History     Tobacco Use    Smoking status: Former Smoker     Packs/day: 2.00     Years: 10.00     Pack years: 20.00     Types: Cigarettes     Quit date: 2010     Years since quittin.6    Smokeless tobacco: Never Used   Vaping Use    Vaping Use: Never used   Substance Use Topics    Alcohol use: No    Drug use: No        CURRENT MEDICATIONS:  Outpatient Medications Marked as Taking for the 22 encounter (Office Visit) with Lucia Mobley MD   Medication Sig Dispense Refill    pregabalin (LYRICA) 150 MG capsule Take one cap three times daily (Patient taking differently: Take 150 mg by mouth in the morning, at noon, and at bedtime. Take one cap three times daily) 90 capsule 6    atorvastatin (LIPITOR) 80 MG tablet Take 1 tablet by mouth daily 90 tablet 3    fludrocortisone (FLORINEF) 0.1 MG tablet TAKE 3 TABLETS BY MOUTH EVERY DAY (Patient taking differently: Take 0.1 mg by mouth daily Pt states she takes 1 tab BID) 270 tablet 3    Blood Glucose Monitoring Suppl (ONE TOUCH ULTRA 2) w/Device KIT 1 kit by Does not apply route daily 1 kit 0    blood glucose monitor strips Test 3 times a day & as needed for symptoms of irregular blood glucose. Dispense sufficient amount for indicated testing frequency plus additional to accommodate PRN testing needs.  300 strip 0    Lancets MISC 1 each by Does not apply route 2 times daily 300 each 1    Continuous Blood Gluc Sensor (FREESTYLE MCKAYLA 2 SENSOR) MISC 2 FREESTYLE MCKAYLA 2 SENSORS TO USE WITH MCKAYLA 2 READER TO CHECK BLOOD SUGARS 6 8 TIMES A DAY      TRULICITY 1.5 GL/6.0IY SOPN INJECT 1.5 MG INTO THE SKIN ONCE A WEEK 12 pen 3    Insulin Degludec (TRESIBA FLEXTOUCH) 200 UNIT/ML SOPN Inject 86 Units into the skin daily PER DR MCELROY (Patient taking differently: Inject 110 Units into the skin daily PER DR MCELROY) 1 pen 0    metFORMIN (GLUCOPHAGE-XR) 500 MG extended release tablet Take 2 tablets by mouth daily (with breakfast) 180 tablet 3    omeprazole (PRILOSEC) 20 MG delayed release capsule TAKE 1 CAPSULE BY MOUTH EVERY DAY IN THE MORNING BEFORE BREAKFAST (Patient taking differently: Take 20 mg by mouth Daily ) 90 capsule 1    albuterol sulfate HFA (VENTOLIN HFA) 108 (90 Base) MCG/ACT inhaler INHALE 2 PUFFS EVERY 6 HOURS AS NEEDED FOR WHEEZING (Patient taking differently: Inhale 2 puffs into the lungs every 6 hours as needed INHALE 2 PUFFS EVERY 6 HOURS AS NEEDED FOR WHEEZING) 18 Inhaler 3    ondansetron (ZOFRAN ODT) 4 MG disintegrating tablet Take 1 tablet by mouth every 8 hours as needed for Nausea 20 tablet 0    nitroGLYCERIN (NITROSTAT) 0.4 MG SL tablet Place 1 tablet under the tongue every 5 minutes as needed for Chest pain 25 tablet 1    Saccharomyces boulardii (PROBIOTIC) 250 MG CAPS Take 1 capsule by mouth daily 30 capsule 0    tiZANidine (ZANAFLEX) 2 MG tablet TAKE 1 TABLET BY MOUTH NIGHTLY AS NEEDED(SPASMS) (Patient taking differently: Take 2 mg by mouth nightly as needed TAKE 1 TABLET BY MOUTH NIGHTLY AS NEEDED(SPASMS)) 30 tablet 2    acetaminophen (TYLENOL) 500 MG tablet Take 1 tablet by mouth 4 times daily as needed for Pain 40 tablet 0    losartan (COZAAR) 25 MG tablet Take 1 tablet by mouth daily 90 tablet 3    insulin lispro, 1 Unit Dial, (HUMALOG KWIKPEN) 100 UNIT/ML SOPN Inject 20 Units into the skin 3 times daily (before meals) 12 pen 1    metoprolol succinate (TOPROL XL) 100 MG extended release tablet Take 1 tablet by mouth daily 90 tablet 3    DULoxetine (CYMBALTA) 30 MG extended release capsule Take 1 capsule by mouth daily 30 capsule 1    aspirin 81 MG EC tablet TAKE 1 TABLET BY MOUTH DAILY (Patient taking differently: Take 81 mg by mouth daily ) 90 tablet 3    Insulin Pen Needle (BD ULTRA-FINE PEN NEEDLES) 29G X 12.7MM MISC USE AS DIRECTED BY PHYSICIAN 5 times daily 150 each 11    Blood Glucose Monitoring Suppl BERE 1 Units by Does not apply route three times daily Unit insurance will cover 1 Device 0    Blood Pressure Monitor KIT 1 each by Does not apply route daily as needed ESSENTIAL HYPERTENSION   I10 1 kit 0       FAMILY HISTORY: family history includes Cancer in her father, maternal grandmother, and mother; Diabetes in her mother and sister; Heart Disease in her brother, maternal uncle, and sister. PHYSICAL EXAM:   /77 (Site: Left Upper Arm, Position: Sitting, Cuff Size: Large Adult)   Pulse 89   Resp 20   Ht 5' 3\" (1.6 m)   Wt 223 lb (101.2 kg)   LMP 12/05/1996   SpO2 98%   BMI 39.50 kg/m²  Body mass index is 39.5 kg/m². Constitutional: She is obese but oriented to person, place, and time. She appears well-developed and well-nourished. In no acute distress. HEENT: Normocephalic and atraumatic. No JVD present. Carotid bruit is not present. No mass and no thyromegaly present. No lymphadenopathy present. Cardiovascular: Normal rate, regular rhythm, normal heart sounds. Exam reveals no gallop and no friction rubs. 3/6 systolic murmur, 2nd intercostal space on the RIGHT just lateral to the sternum. Normal rate, regular rhythm, normal heart sounds and intact distal pulses. Exam reveals no gallop and no friction rub. No significant cardiac murmur was heard. Pulmonary/Chest: Effort normal and breath sounds normal. No respiratory distress. She has no wheezes, rhonchi or rales. Abdominal: Soft, non-tender. Bowel sounds and aorta are normal. She exhibits no organomegaly, mass or bruit. Extremities: Trace. No cyanosis or clubbing. 2+ radial and carotid pulses. Distal extremity pulses: 2+ bilaterally. Neurological: She is alert and oriented to person, place, and time. No evidence of gross cranial nerve deficit. Coordination appeared normal.   Skin: Skin is warm and dry. There is no rash or diaphoresis.    Psychiatric: She has a normal mood and affect. Her speech is normal and behavior is normal.      MOST RECENT LABS ON RECORD:   Lab Results   Component Value Date    WBC 9.5 04/23/2022    HGB 12.1 04/23/2022    HCT 36.8 04/23/2022     04/23/2022    CHOL 165 12/29/2021    TRIG 151 (H) 12/29/2021    HDL 40 (L) 12/29/2021    LDLCHOLESTEROL 95 12/29/2021    ALT 9 04/22/2022    AST 11 04/22/2022     (L) 04/24/2022    K 4.3 04/24/2022     04/24/2022    CREATININE 0.52 04/24/2022    BUN 14 04/24/2022    CO2 22 04/24/2022    TSH 2.80 06/04/2016    INR 1.0 04/24/2022    LABA1C 8.9 (H) 12/29/2021    LABMICR Can not be calculated 12/29/2021    BNP NOT REPORTED 05/11/2014        ASSESSMENT:  1. Abnormal EKG    2. Heart palpitations    3. Coronary artery disease involving coronary bypass graft of native heart without angina pectoris    4. Dysautonomia ([address]    5. Essential hypertension    6. Mixed hyperlipidemia       PLAN:  · Abnormal EKG/ EKG Changes/ Mildly Elevated Troponin Level: Reported ACS, admitted but no inpatient testing done. · Additional Testing: I Ordered an Echocardiogram to assess Ms. Belcher's ejection fraction and to look for significant valvular heart disease as a source of Ms. Adams Right symptoms   · Additional Testing List: Because current signs and symptoms can certainly be caused by significant coronary artery disease, I ordered a Regadenoson (Lexiscan) stress test with SPECT imaging to try and rule out this possibility. Intermittent Heart palpitations Stable: Fairly Asymptomatic at this time. Had one episode on Easter morning however no other reoccurrences. · Beta Blocker: Continue metoprolol succinate (Toprol XL) 100 mg once daily. · Additional Testing: I Ordered a CAM monitor to better assess her heart palpitations at this time. She can have this put on after her above stress and echo testing.        · Atherosclerotic Heart Disease: CABG involving LIMA-LAD on 8/15/16: Currently appears well controlled  Antiplatelet Agent: Continue ASA 81 mg daily. I also reminded her to watch for signs of blood in her stool or black tarry stools and stop the medication immediately if this develops as this could be life threatening. · Beta Blocker Therapy: Continue Toprol XL to 100 mg once daily. · Anti-anginal medications: Continue nitroglycerin 0.4 mg tablets as needed for chest pain. · Cholesterol Reduction Therapy: Continue Atorvastatin (Lipitor) 80 mg daily. · Because of this history, I ordered a echocardiogram to better assess the severity of this problem. · Additional Testing List: Because of current signs and symptoms which can certainly be caused by significant coronary artery disease, I ordered a Lexiscan stress test with SPECT imaging to try and rule out ischemic as a cause of this problem. Dysautonomia Orthostatic Hypotension Syndrome: Currently appears to be well controlled. It sounds like her balance is not as good lately but is probably not related to this. We will monitor at least for now. · Pharmacological Management: Continue Fludocortisone (Florinef) 0.1 mg BID. · ACE/ARB Inhibitor: Continue Losartan (Cozaar) 25 mg once daily. · Nonpharmacologic counseling: Because of her condition, I reminded her to try and keep herself well-hydrated and to take extra time when moving from laying to sitting, sitting to standing and standing to walking and not to avoid salt in her diet. I also advised her to put water by her bedside and drink this before she gets out of bed in the morning. Essential Hypertension: Controlled   · ACE Inibitor/ARB: Continue losartan (Cozaar) 25 mg daily. · Beta Blocker: Continue Toprol  mg once daily. · Hyperlipidemia: Mixed, LDL done on 12/29/2021 was 95 mg/dL   · Cholesterol Reduction Therapy: Continue Atorvastatin (Lipitor) 80 mg daily.        Finally, I recommended that she continue her other medications and follow up with you as previously scheduled. FOLLOW UP:   I told Ms. Zaira Gibbs to call my office if she had any problems, but otherwise told her to Return in about 3 months (around 8/2/2022). However, I would be happy to see her sooner should the need arise. Once again, thank you for allowing me to participate in this patients care. Please do not hesitate to contact me could I be of further assistance. Sincerely,  Randi Aiken. Eugenio ZENG, MS, F.A.C.C. [facility] Cardiology Specialist   [address] [place] [address], [address] [facility]  Phone: [phone]; Fax: [phone]    I believe that the risk of significant morbidity and mortality related to the patient's current medical conditions are: intermediate-high. The documentation recorded by the scribe, accurately and completely reflects the services I personally performed and the decisions made by me. Gage Wells MD, MS, F.A.C.C.  May 2, 2022

## 2022-05-06 ENCOUNTER — OFFICE VISIT (OUTPATIENT)
Dept: PRIMARY CARE CLINIC | Age: 57
End: 2022-05-06
Payer: COMMERCIAL

## 2022-05-06 VITALS
WEIGHT: 223.6 LBS | BODY MASS INDEX: 39.62 KG/M2 | HEIGHT: 63 IN | RESPIRATION RATE: 18 BRPM | HEART RATE: 93 BPM | TEMPERATURE: 97.1 F | OXYGEN SATURATION: 96 % | SYSTOLIC BLOOD PRESSURE: 116 MMHG | DIASTOLIC BLOOD PRESSURE: 70 MMHG

## 2022-05-06 DIAGNOSIS — Z12.31 VISIT FOR SCREENING MAMMOGRAM: ICD-10-CM

## 2022-05-06 DIAGNOSIS — Z09 HOSPITAL DISCHARGE FOLLOW-UP: ICD-10-CM

## 2022-05-06 DIAGNOSIS — R10.13 EPIGASTRIC PAIN: Primary | ICD-10-CM

## 2022-05-06 PROCEDURE — 1111F DSCHRG MED/CURRENT MED MERGE: CPT | Performed by: NURSE PRACTITIONER

## 2022-05-06 PROCEDURE — 99214 OFFICE O/P EST MOD 30 MIN: CPT | Performed by: NURSE PRACTITIONER

## 2022-05-06 ASSESSMENT — PATIENT HEALTH QUESTIONNAIRE - PHQ9
8. MOVING OR SPEAKING SO SLOWLY THAT OTHER PEOPLE COULD HAVE NOTICED. OR THE OPPOSITE, BEING SO FIGETY OR RESTLESS THAT YOU HAVE BEEN MOVING AROUND A LOT MORE THAN USUAL: 0
7. TROUBLE CONCENTRATING ON THINGS, SUCH AS READING THE NEWSPAPER OR WATCHING TELEVISION: 0
10. IF YOU CHECKED OFF ANY PROBLEMS, HOW DIFFICULT HAVE THESE PROBLEMS MADE IT FOR YOU TO DO YOUR WORK, TAKE CARE OF THINGS AT HOME, OR GET ALONG WITH OTHER PEOPLE: 0
6. FEELING BAD ABOUT YOURSELF - OR THAT YOU ARE A FAILURE OR HAVE LET YOURSELF OR YOUR FAMILY DOWN: 0
3. TROUBLE FALLING OR STAYING ASLEEP: 0
9. THOUGHTS THAT YOU WOULD BE BETTER OFF DEAD, OR OF HURTING YOURSELF: 0
5. POOR APPETITE OR OVEREATING: 0
SUM OF ALL RESPONSES TO PHQ QUESTIONS 1-9: 0
2. FEELING DOWN, DEPRESSED OR HOPELESS: 0
SUM OF ALL RESPONSES TO PHQ QUESTIONS 1-9: 0
4. FEELING TIRED OR HAVING LITTLE ENERGY: 0
SUM OF ALL RESPONSES TO PHQ QUESTIONS 1-9: 0
SUM OF ALL RESPONSES TO PHQ9 QUESTIONS 1 & 2: 0
1. LITTLE INTEREST OR PLEASURE IN DOING THINGS: 0
SUM OF ALL RESPONSES TO PHQ QUESTIONS 1-9: 0

## 2022-05-06 NOTE — PROGRESS NOTES
Post-Discharge Transitional Care  Follow Up      Cuate Victoria   YOB: 1965    Date of Office Visit:  5/6/2022  Date of Hospital Admission: 4/23/22  Date of Hospital Discharge: 4/24/22  Risk of hospital readmission (high >=14%. Medium >=10%) :Readmission Risk Score: 10.2 ( )      Care management risk score Rising risk (score 2-5) and Complex Care (Scores >=6): 6     Non face to face  following discharge, date last encounter closed (first attempt may have been earlier): 4/25/2022 12:45 PM    Call initiated 2 business days of discharge: Yes    ASSESSMENT/PLAN:   Epigastric pain  Hospital discharge follow-up  -     [place] DISCHARGE MEDS RECONCILED W/ CURRENT OUTPATIENT MED LIST  Visit for screening mammogram  -     [facility] APARNA DIGITAL SCREEN BILATERAL; Future      Medical Decision Making: moderate complexity  Return if symptoms worsen or fail to improve. On this date 5/6/2022 I have spent 42 minutes reviewing previous notes, test results and face to face with the patient discussing the diagnosis and importance of compliance with the treatment plan as well as documenting on the day of the visit. Subjective:   HPI:  Follow up of Hospital problems/diagnosis(es):     Hospital Course: Cuate Victoria is a 62 y.o. female who was admitted for the management of    ACS (acute coronary syndrome) ([facility] [address] , presented to ER with   No chief complaint on file.        1. Abdominal pain   2. EKG changes  - cardiology consulted  - cardio recommends outpatient stress test   - telemetry   - cardiac enzymes x 3  - continue asa, lipitor and bb  - lipase trending up this am   - CT abdomen negative for acute abnormality      3. GERD  - PPI      4. DMII  - continue accu checks ac/hs with ISS      5. HTN   - v/s per unit protocol        Inpatient course: Discharge summary reviewed- see chart. Interval history/Current status:     Letha Ervinncer is here today feeling very well.   She saw her cardiologist last week and no changes were made.  He does not feel that she warranted a transfer to [facility] as no significant cardiac etiology of her symptoms was found. She states her pain in the epigastrium resolved before she even left [place]. She has had no recurrence of the pain. No changes were made to her medication. Patient Active Problem List   Diagnosis    Dyslipidemia    Radiculopathy    Insomnia    Allergic rhinitis    COPD (chronic obstructive pulmonary disease)    Depression    Bilateral leg weakness    Gait difficulty    Diabetic neuropathy    Back pain    Muscle spasm    Affective disorder ([facility] [address]    History of ventral hernia repair    History of incisional hernia repair    Essential hypertension    Gastroesophageal reflux disease without esophagitis    Uncontrolled type 2 diabetes mellitus with diabetic polyneuropathy, with long-term current use of insulin ([facility])    Primary osteoarthritis involving multiple joints    Heart palpitations    Dysautonomia orthostatic hypotension syndrome    Coronary artery disease involving native coronary artery of native heart    Angina, class III ([facility])    S/P CABG x 1    Precordial pain    DARON (obstructive sleep apnea)    Neuropathic pain    Soft tissue mass    Muscle spasms of both lower extremities    Abscess of left great toe    Paronychia of great toe of left foot    Chest pain    Epigastric pain    ACS (acute coronary syndrome) ([facility])    Obesity (BMI 30-39. 9)       Medications listed as ordered at the time of discharge from hospital     Medication List          Accurate as of May 6, 2022 11:52 AM. If you have any questions, ask your nurse or doctor.             CHANGE how you take these medications    albuterol sulfate  (90 Base) MCG/ACT inhaler  Commonly known as: Ventolin HFA  INHALE 2 PUFFS EVERY 6 HOURS AS NEEDED FOR WHEEZING  What changed:   · how much to take  · how to take this  · when to take this  · reasons to take this     aspirin 81 MG EC tablet  TAKE 1 TABLET BY MOUTH DAILY  What changed: See the new instructions. fludrocortisone 0.1 MG tablet  Commonly known as: FLORINEF  TAKE 3 TABLETS BY MOUTH EVERY DAY  What changed: See the new instructions. omeprazole 20 MG delayed release capsule  Commonly known as: PRILOSEC  TAKE 1 CAPSULE BY MOUTH EVERY DAY IN THE MORNING BEFORE BREAKFAST  What changed: See the new instructions. pregabalin 150 MG capsule  Commonly known as: LYRICA  Take one cap three times daily  What changed:   · how much to take  · how to take this  · when to take this     tiZANidine 2 MG tablet  Commonly known as: ZANAFLEX  TAKE 1 TABLET BY MOUTH NIGHTLY AS NEEDED(SPASMS)  What changed:   · how much to take  · how to take this  · when to take this  · reasons to take this     [place] FlexTouch 200 UNIT/ML Sopn  Generic drug: Insulin Degludec  Inject 86 Units into the skin daily PER DR MCELROY  What changed: how much to take        CONTINUE taking these medications    acetaminophen 500 MG tablet  Commonly known as: TYLENOL  Take 1 tablet by mouth 4 times daily as needed for Pain     atorvastatin 80 MG tablet  Commonly known as: LIPITOR  Take 1 tablet by mouth daily     * BLOOD GLUCOSE MONITOR KIT Kit  1 each by Does not apply route daily. Please dispense whatever BS monitoring kit [facility] covers. Dx: 250, new onset T2DM. Testing once daily     * blood glucose monitor kit and supplies  1 Units by Does not apply route three times daily Unit insurance will cover     * ONE TOUCH ULTRA 2 w/Device Kit  1 kit by Does not apply route daily     blood glucose test strips  Test 3 times a day & as needed for symptoms of irregular blood glucose. Dispense sufficient amount for indicated testing frequency plus additional to accommodate PRN testing needs.      Blood Pressure Monitor Kit  1 each by Does not apply route daily as needed ESSENTIAL HYPERTENSION   I10     DULoxetine 30 MG extended release capsule  Commonly known as: CYMBALTA  Take 1 capsule by mouth daily     FreeStyle Yancy 2 Sensor Misc     insulin lispro (1 Unit Dial) 100 UNIT/ML Sopn  Commonly known as: HumaLOG KwikPen  Inject 20 Units into the skin 3 times daily (before meals)     Insulin Pen Needle 29G X 12.7MM Misc  Commonly known as: BD ULTRA-FINE PEN NEEDLES  USE AS DIRECTED BY PHYSICIAN 5 times daily     Lancets Misc  1 each by Does not apply route 2 times daily     losartan 25 MG tablet  Commonly known as: COZAAR  Take 1 tablet by mouth daily     metFORMIN 500 MG extended release tablet  Commonly known as: GLUCOPHAGE-XR  Take 2 tablets by mouth daily (with breakfast)     metoprolol succinate 100 MG extended release tablet  Commonly known as: TOPROL XL  Take 1 tablet by mouth daily     nitroGLYCERIN 0.4 MG SL tablet  Commonly known as: NITROSTAT  Place 1 tablet under the tongue every 5 minutes as needed for Chest pain     ondansetron 4 MG disintegrating tablet  Commonly known as: Zofran ODT  Take 1 tablet by mouth every 8 hours as needed for Nausea     Probiotic 250 MG Caps  Take 1 capsule by mouth daily     Trulicity 1.5 OF/0.3TY Sopn  Generic drug: Dulaglutide  INJECT 1.5 MG INTO THE SKIN ONCE A WEEK         * This list has 3 medication(s) that are the same as other medications prescribed for you. Read the directions carefully, and ask your doctor or other care provider to review them with you. Medications marked \"taking\" at this time  Outpatient Medications Marked as Taking for the 5/6/22 encounter (Office Visit) with CHELSEA Leonardo - CNP   Medication Sig Dispense Refill    pregabalin (LYRICA) 150 MG capsule Take one cap three times daily (Patient taking differently: Take 150 mg by mouth in the morning, at noon, and at bedtime.  Take one cap three times daily) 90 capsule 6    atorvastatin (LIPITOR) 80 MG tablet Take 1 tablet by mouth daily 90 tablet 3    fludrocortisone (FLORINEF) 0.1 MG tablet TAKE 3 TABLETS BY MOUTH EVERY DAY (Patient taking differently: Take 0.1 mg by mouth daily Pt states she takes 1 tab BID) 270 tablet 3    Blood Glucose Monitoring Suppl (ONE TOUCH ULTRA 2) w/Device KIT 1 kit by Does not apply route daily 1 kit 0    blood glucose monitor strips Test 3 times a day & as needed for symptoms of irregular blood glucose. Dispense sufficient amount for indicated testing frequency plus additional to accommodate PRN testing needs.  300 strip 0    Lancets MISC 1 each by Does not apply route 2 times daily 300 each 1    Continuous Blood Gluc Sensor (FREESTYLE MCKAYLA 2 SENSOR) MISC 2 FREESTYLE MCKAYLA 2 SENSORS TO USE WITH MCKAYLA 2 READER TO CHECK BLOOD SUGARS 6 8 TIMES A DAY      TRULICITY 1.5 FV/3.3RA SOPN INJECT 1.5 MG INTO THE SKIN ONCE A WEEK 12 pen 3    Insulin Degludec (TRESIBA FLEXTOUCH) 200 UNIT/ML SOPN Inject 86 Units into the skin daily PER DR MCELROY (Patient taking differently: Inject 110 Units into the skin daily PER DR MCELROY) 1 pen 0    metFORMIN (GLUCOPHAGE-XR) 500 MG extended release tablet Take 2 tablets by mouth daily (with breakfast) 180 tablet 3    omeprazole (PRILOSEC) 20 MG delayed release capsule TAKE 1 CAPSULE BY MOUTH EVERY DAY IN THE MORNING BEFORE BREAKFAST (Patient taking differently: Take 20 mg by mouth Daily ) 90 capsule 1    albuterol sulfate HFA (VENTOLIN HFA) 108 (90 Base) MCG/ACT inhaler INHALE 2 PUFFS EVERY 6 HOURS AS NEEDED FOR WHEEZING (Patient taking differently: Inhale 2 puffs into the lungs every 6 hours as needed INHALE 2 PUFFS EVERY 6 HOURS AS NEEDED FOR WHEEZING) 18 Inhaler 3    ondansetron (ZOFRAN ODT) 4 MG disintegrating tablet Take 1 tablet by mouth every 8 hours as needed for Nausea 20 tablet 0    nitroGLYCERIN (NITROSTAT) 0.4 MG SL tablet Place 1 tablet under the tongue every 5 minutes as needed for Chest pain 25 tablet 1    Saccharomyces boulardii (PROBIOTIC) 250 MG CAPS Take 1 capsule by mouth daily 30 capsule 0    tiZANidine (ZANAFLEX) 2 MG tablet TAKE 1 TABLET BY MOUTH NIGHTLY AS NEEDED(SPASMS) (Patient taking differently: Take 2 mg by mouth nightly as needed TAKE 1 TABLET BY MOUTH NIGHTLY AS NEEDED(SPASMS)) 30 tablet 2    acetaminophen (TYLENOL) 500 MG tablet Take 1 tablet by mouth 4 times daily as needed for Pain 40 tablet 0    losartan (COZAAR) 25 MG tablet Take 1 tablet by mouth daily 90 tablet 3    insulin lispro, 1 Unit Dial, (HUMALOG KWIKPEN) 100 UNIT/ML SOPN Inject 20 Units into the skin 3 times daily (before meals) 12 pen 1    metoprolol succinate (TOPROL XL) 100 MG extended release tablet Take 1 tablet by mouth daily 90 tablet 3    DULoxetine (CYMBALTA) 30 MG extended release capsule Take 1 capsule by mouth daily 30 capsule 1    aspirin 81 MG EC tablet TAKE 1 TABLET BY MOUTH DAILY (Patient taking differently: Take 81 mg by mouth daily ) 90 tablet 3    Insulin Pen Needle (BD ULTRA-FINE PEN NEEDLES) 29G X 12.7MM MISC USE AS DIRECTED BY PHYSICIAN 5 times daily 150 each 11    Blood Glucose Monitoring Suppl BERE 1 Units by Does not apply route three times daily Unit insurance will cover 1 Device 0    Blood Pressure Monitor KIT 1 each by Does not apply route daily as needed ESSENTIAL HYPERTENSION   I10 1 kit 0        Medications patient taking as of now reconciled against medications ordered at time of hospital discharge: Yes    A comprehensive review of systems was negative except for what was noted in the HPI.     Objective:    /70 (Site: Right Upper Arm, Position: Sitting)   Pulse 93   Temp 97.1 °F (36.2 °C) (Temporal)   Resp 18   Ht 5' 3\" (1.6 m)   Wt 223 lb 9.6 oz (101.4 kg)   LMP 12/05/1996   SpO2 96%   BMI 39.61 kg/m²   General Appearance: alert and oriented to person, place and time and obese  Skin: warm and dry  Head: normocephalic and atraumatic  Eyes: conjunctivae normal and sclera anicteric  ENT: oropharynx clear and moist with normal mucous membranes  Neck: neck supple and non tender without mass   Pulmonary/Chest: clear to auscultation bilaterally- no wheezes, rales or rhonchi, normal air movement, no respiratory distress  Cardiovascular: normal rate and regular rhythm  Abdomen: soft, non-tender  Extremities: no edema  Musculoskeletal: normal range of motion, no joint swelling, deformity or tenderness  Neurologic: gait and coordination normal and speech normal      An electronic signature was used to authenticate this note.   --Renata Plasencia, APRN - CNP

## 2022-05-06 NOTE — PATIENT INSTRUCTIONS
SURVEY:     You may be receiving a survey from [email or web address] regarding your visit today. Please complete the survey to enable us to provide the highest quality of care to you and your family. If you cannot score us a very good on any question, please call the office to discuss how we could have made your experience a very good one. Thank you.   Erin Plasencia, APRN-CNP  Alex Tracy, CNP  Madelin Chaudhary, LPN  Providence Boast, LPN  Gaudencio Clifton, CMA  Floretta Hammans, CMA  Christina, CMA  Alice, PCA

## 2022-05-11 ENCOUNTER — HOSPITAL ENCOUNTER (OUTPATIENT)
Dept: NON INVASIVE DIAGNOSTICS | Age: 57
Discharge: HOME | End: 2022-05-11
Payer: COMMERCIAL

## 2022-05-11 DIAGNOSIS — I10 ESSENTIAL HYPERTENSION: ICD-10-CM

## 2022-05-11 DIAGNOSIS — G90.1 DYSAUTONOMIA (HCC): ICD-10-CM

## 2022-05-11 DIAGNOSIS — E78.2 MIXED HYPERLIPIDEMIA: ICD-10-CM

## 2022-05-11 DIAGNOSIS — I25.810 CORONARY ARTERY DISEASE INVOLVING CORONARY BYPASS GRAFT OF NATIVE HEART WITHOUT ANGINA PECTORIS: ICD-10-CM

## 2022-05-11 DIAGNOSIS — R94.31 ABNORMAL EKG: ICD-10-CM

## 2022-05-11 DIAGNOSIS — R00.2 HEART PALPITATIONS: ICD-10-CM

## 2022-05-11 PROCEDURE — 93017 CV STRESS TEST TRACING ONLY: CPT

## 2022-05-11 PROCEDURE — 78451 HT MUSCLE IMAGE SPECT SING: CPT

## 2022-05-11 PROCEDURE — A9500 TC99M SESTAMIBI: HCPCS | Performed by: FAMILY MEDICINE

## 2022-05-11 PROCEDURE — 3430000000 HC RX DIAGNOSTIC RADIOPHARMACEUTICAL: Performed by: FAMILY MEDICINE

## 2022-05-11 RX ADMIN — Medication 30 MILLICURIE: at 10:55

## 2022-05-12 ENCOUNTER — HOSPITAL ENCOUNTER (OUTPATIENT)
Dept: NON INVASIVE DIAGNOSTICS | Age: 57
Discharge: HOME | End: 2022-05-12
Payer: COMMERCIAL

## 2022-05-12 DIAGNOSIS — R94.31 ABNORMAL EKG: ICD-10-CM

## 2022-05-12 DIAGNOSIS — G90.1 DYSAUTONOMIA (HCC): ICD-10-CM

## 2022-05-12 DIAGNOSIS — I10 ESSENTIAL HYPERTENSION: ICD-10-CM

## 2022-05-12 DIAGNOSIS — E78.2 MIXED HYPERLIPIDEMIA: ICD-10-CM

## 2022-05-12 DIAGNOSIS — R00.2 HEART PALPITATIONS: ICD-10-CM

## 2022-05-12 DIAGNOSIS — I25.810 CORONARY ARTERY DISEASE INVOLVING CORONARY BYPASS GRAFT OF NATIVE HEART WITHOUT ANGINA PECTORIS: ICD-10-CM

## 2022-05-12 LAB
LV EF: 55 %
LVEF MODALITY: NORMAL

## 2022-05-12 PROCEDURE — 93242 EXT ECG>48HR<7D RECORDING: CPT

## 2022-05-12 PROCEDURE — 78452 HT MUSCLE IMAGE SPECT MULT: CPT

## 2022-05-12 PROCEDURE — 93243 EXT ECG>48HR<7D SCAN A/R: CPT

## 2022-05-12 PROCEDURE — A9500 TC99M SESTAMIBI: HCPCS | Performed by: FAMILY MEDICINE

## 2022-05-12 PROCEDURE — 93306 TTE W/DOPPLER COMPLETE: CPT

## 2022-05-12 PROCEDURE — 93017 CV STRESS TEST TRACING ONLY: CPT

## 2022-05-12 PROCEDURE — 3430000000 HC RX DIAGNOSTIC RADIOPHARMACEUTICAL: Performed by: FAMILY MEDICINE

## 2022-05-12 RX ADMIN — Medication 30 MILLICURIE: at 14:21

## 2022-05-13 ENCOUNTER — TELEPHONE (OUTPATIENT)
Dept: CARDIOLOGY | Age: 57
End: 2022-05-13

## 2022-05-13 NOTE — TELEPHONE ENCOUNTER
----- Message from Virginie Xavier MD sent at 5/13/2022 12:39 AM EDT -----  Let Ms. Rabia Dawkins know their test result was ok. Will discuss at next visit. Thanks.

## 2022-05-13 NOTE — PROCEDURES
[address], [place] [address]                              CARDIAC STRESS TEST    PATIENT NAME: Braulio Stewart                     :        1965  MED REC NO:   [id number]                              ROOM:  ACCOUNT NO:   [de-identified]                           ADMIT DATE: 2022  PROVIDER:     Falguni Bryan MD    CARDIOVASCULAR DIAGNOSTIC DEPARTMENT    DATE OF STUDY:  2022    ORDERING PROVIDER:  Falguni Bryan MD    PRIMARY CARE PROVIDER:  CHELSEA Ken-ANGELIKA    INTERPRETING PHYSICIAN:  Falguni Bryan MD    EXERCISE MYOCARDIAL PERFUSION STRESS TEST REPORT    Stress/rest single-isotope SPECT imaging with exercise stress and gated  SPECT imaging. INDICATION:  Assessment of a cardiac cause of abnormal ECG. CLINICAL HISTORY:  The patient is a 80-year-old woman with known  coronary artery disease. Previous cardiac history includes stress test, cardiac catheterization,  coronary artery bypass graft, myocardial infarction. Other previous history includes palpitations, dyspnea, diabetes  mellitus, hypertension, family history of coronary artery disease in  mother and father under age 61. Symptoms just prior to testing:  None. Relevant medications:  Metoprolol (Toprol). PROCEDURE:  The patient performed treadmill exercise using a Derek  protocol, completing 2:53 minutes and completing an estimated workload  of 5.86 metabolic equivalents (METS). The test was terminated due to fatigue and shortness of breath. The heart rate was 110 beats per minute at baseline and increased to 146  beats per minute at peak exercise, which was 90% of the maximum  predicted heart rate. The rest blood pressure was 124/78 mmHg and  increased to 192/90 mmHg, which is a normal response. During the  procedure, the patient developed fatigue, shortness of breath and leg  fatigue, but denied any chest discomfort.     Myocardial perfusion imaging: Imaging was performed at rest 30-45  minutes following the injection of 30 mCi of sestamibi. At peak  exercise, the patient was injected with 30 mCi of sestamibi and exercise  was continued for 1 minute. Gating post-stress tomographic imaging was  performed 30-45 minutes after stress. STRESS ECG RESULTS:  The resting electrocardiogram demonstrated normal  sinus rhythm without definitive ST-segment abnormalities suggestive of  myocardial ischemia. At peak exercise and during recovery, the patient developed:    Upsloping ST segment changes in lead(s) II, III and aVF, which did not  meet diagnostic criteria for myocardial ischemia with no premature  atrial contractions (PACs) and no premature ventricular contractions  (PVCs). NUCLEAR IMAGING RESULTS:  The overall quality of the study is good. Moderate attenuation artifact was seen. There is no evidence of abnormal  lung uptake. Additionally, the right ventricle appears normal.  The  left ventricular cavity is noted to be normal in size on the stress  images. There is no evidence of transient ischemic dilatation (TID) of  the left ventricle. Gated SPECT imaging reveals normal myocardial thickening and wall motion  with a calculated left ventricular ejection fraction of 68%. The rest images demonstrate homogeneous tracer distribution throughout  the myocardium. The rest images demonstrated a small perfusion abnormality of mild  intensity in the inferior region(s), which is most likely due to  artifact. On stress imaging, a moderate perfusion abnormality of moderate  intensity was noted in the inferior and inferoseptal region(s). IMPRESSION:  1. Abnormal myocardial perfusion study. There is a moderate perfusion  defect of moderate intensity in the inferior and inferoseptal regions  during stress imaging, which is most consistent with ischemia.     2.  Global left ventricular systolic function was normal without  regional wall motion abnormalities. 3.  No significant electrocardiographic evidence of myocardial ischemia  during EKG monitoring without significant associated arrhythmias. The patient's Duke Treadmill score is 0, which correlates with an  intermediate risk for significant coronary artery disease. Overall, these results are most consistent with an intermediate/high  risk for significant coronary artery disease. Additional testing including cardiac catheterization may be indicated. The sensitivity for detecting ischemia on this test may have been  reduced due to the patient being on a beta blocker.         Joan Lopez MD    D: 05/13/2022 9:54:29       T: 05/13/2022 9:56:22     LORENZO/LOI_EDIT  Job#: [id number]     Doc#: Unknown    CC:  CHELSEA Llanos-CNP

## 2022-05-14 NOTE — RESULT ENCOUNTER NOTE
Please let Ms. Boni Hope know that her test was abnormal and please make them an appointment in 1-2 weeks if not already done. Thanks.     Eugenio

## 2022-05-16 ENCOUNTER — HOSPITAL ENCOUNTER (OUTPATIENT)
Dept: WOMENS IMAGING | Age: 57
Discharge: HOME | End: 2022-05-18
Payer: COMMERCIAL

## 2022-05-16 ENCOUNTER — OFFICE VISIT (OUTPATIENT)
Dept: CARDIOLOGY | Age: 57
End: 2022-05-16
Payer: COMMERCIAL

## 2022-05-16 ENCOUNTER — HOSPITAL ENCOUNTER (OUTPATIENT)
Age: 57
Discharge: HOME | End: 2022-05-16
Payer: COMMERCIAL

## 2022-05-16 ENCOUNTER — TELEPHONE (OUTPATIENT)
Dept: CARDIOLOGY | Age: 57
End: 2022-05-16

## 2022-05-16 VITALS
SYSTOLIC BLOOD PRESSURE: 157 MMHG | OXYGEN SATURATION: 95 % | DIASTOLIC BLOOD PRESSURE: 96 MMHG | HEART RATE: 108 BPM | BODY MASS INDEX: 39.51 KG/M2 | RESPIRATION RATE: 16 BRPM | WEIGHT: 223 LBS | HEIGHT: 63 IN

## 2022-05-16 DIAGNOSIS — I10 ESSENTIAL HYPERTENSION: ICD-10-CM

## 2022-05-16 DIAGNOSIS — R94.31 ABNORMAL ECG: ICD-10-CM

## 2022-05-16 DIAGNOSIS — E78.2 MIXED HYPERLIPIDEMIA: ICD-10-CM

## 2022-05-16 DIAGNOSIS — R94.39 ABNORMAL STRESS TEST: ICD-10-CM

## 2022-05-16 DIAGNOSIS — R00.2 HEART PALPITATIONS: ICD-10-CM

## 2022-05-16 DIAGNOSIS — I25.10 ASHD (ARTERIOSCLEROTIC HEART DISEASE): ICD-10-CM

## 2022-05-16 DIAGNOSIS — Z12.31 VISIT FOR SCREENING MAMMOGRAM: ICD-10-CM

## 2022-05-16 DIAGNOSIS — R94.39 ABNORMAL STRESS TEST: Primary | ICD-10-CM

## 2022-05-16 DIAGNOSIS — I95.1 DYSAUTONOMIA ORTHOSTATIC HYPOTENSION SYNDROME: ICD-10-CM

## 2022-05-16 LAB
ANION GAP SERPL CALCULATED.3IONS-SCNC: 15 MMOL/L (ref 9–17)
BUN BLDV-MCNC: 13 MG/DL (ref 6–20)
BUN/CREAT BLD: 24 (ref 9–20)
CALCIUM SERPL-MCNC: 9.7 MG/DL (ref 8.6–10.4)
CHLORIDE BLD-SCNC: 98 MMOL/L (ref 98–107)
CO2: 22 MMOL/L (ref 20–31)
CREAT SERPL-MCNC: 0.54 MG/DL (ref 0.5–0.9)
GFR AFRICAN AMERICAN: >60 ML/MIN
GFR NON-AFRICAN AMERICAN: >60 ML/MIN
GFR SERPL CREATININE-BSD FRML MDRD: ABNORMAL ML/MIN/{1.73_M2}
GFR SERPL CREATININE-BSD FRML MDRD: ABNORMAL ML/MIN/{1.73_M2}
GLUCOSE BLD-MCNC: 214 MG/DL (ref 70–99)
HCT VFR BLD CALC: 40.7 % (ref 36.3–47.1)
HEMOGLOBIN: 13 G/DL (ref 11.9–15.1)
MCH RBC QN AUTO: 24.3 PG (ref 25.2–33.5)
MCHC RBC AUTO-ENTMCNC: 31.9 G/DL (ref 28.4–34.8)
MCV RBC AUTO: 75.9 FL (ref 82.6–102.9)
NRBC AUTOMATED: 0 PER 100 WBC
PDW BLD-RTO: 15.1 % (ref 11.8–14.4)
PLATELET # BLD: 372 K/UL (ref 138–453)
PMV BLD AUTO: 9.8 FL (ref 8.1–13.5)
POTASSIUM SERPL-SCNC: 4.2 MMOL/L (ref 3.7–5.3)
RBC # BLD: 5.36 M/UL (ref 3.95–5.11)
SODIUM BLD-SCNC: 135 MMOL/L (ref 135–144)
WBC # BLD: 11.4 K/UL (ref 3.5–11.3)

## 2022-05-16 PROCEDURE — 99215 OFFICE O/P EST HI 40 MIN: CPT | Performed by: FAMILY MEDICINE

## 2022-05-16 PROCEDURE — G8417 CALC BMI ABV UP PARAM F/U: HCPCS | Performed by: FAMILY MEDICINE

## 2022-05-16 PROCEDURE — G8427 DOCREV CUR MEDS BY ELIG CLIN: HCPCS | Performed by: FAMILY MEDICINE

## 2022-05-16 PROCEDURE — 1036F TOBACCO NON-USER: CPT | Performed by: FAMILY MEDICINE

## 2022-05-16 PROCEDURE — 85027 COMPLETE CBC AUTOMATED: CPT

## 2022-05-16 PROCEDURE — 80048 BASIC METABOLIC PNL TOTAL CA: CPT

## 2022-05-16 PROCEDURE — 1111F DSCHRG MED/CURRENT MED MERGE: CPT | Performed by: FAMILY MEDICINE

## 2022-05-16 PROCEDURE — 77063 BREAST TOMOSYNTHESIS BI: CPT

## 2022-05-16 PROCEDURE — 36415 COLL VENOUS BLD VENIPUNCTURE: CPT

## 2022-05-16 PROCEDURE — 3017F COLORECTAL CA SCREEN DOC REV: CPT | Performed by: FAMILY MEDICINE

## 2022-05-16 RX ORDER — METOPROLOL SUCCINATE 100 MG/1
150 TABLET, EXTENDED RELEASE ORAL DAILY
Qty: 90 TABLET | Refills: 3 | Status: SHIPPED | OUTPATIENT
Start: 2022-05-16 | End: 2022-07-12

## 2022-05-16 NOTE — PATIENT INSTRUCTIONS
SURVEY:    You may be receiving a survey from [employer] regarding your visit today. Please complete the survey to enable us to provide the highest quality of care to you and your family. If you cannot score us a very good on any question, please call the office to discuss how we could have made your experience a very good one. Thank you.

## 2022-05-16 NOTE — PROGRESS NOTES
I, Genaro Bills am scribing for and in the presence of Pearl Palma. Eugenio ZENG, MS, F.A.C.C. Patient: Carmine Ellison  : 1965  Date of Visit: May 16, 2022    REASON FOR VISIT / CONSULTATION: Follow-up (stress test done. Hx: Stents ( & 2016)) and bypass in 2016. Pt reports that she has real bad reflux and has been getting alot of burps. She does get shortness of breath with exertion. Pt can walk about 50 feet. Denies lightheadedness or dizziness but does have a headach all day yesterday but this has subsided. denies CP)    Dear CHELSEA Clay - CNP,    I had the pleasure of seeing your patient Carmine Ellison in consultation today. As you know, Ms. Wesley Godwin is a 62 y.o. female who presents with a history of intermittent episodes of back and chest discomfort that started developing since her recent CABG involving a LIMA-LAD 8/15/16. She also has a history of  dysautonomia on a tilt table test, and was started on Florinef as well as Lexapro. Recent heart cath done 3/7/2019 shows severe single vessel disease involving LAD Normal LVEDP. She had a Holter monitor done on 10/23/2019 that did show PAC's and PVC's but was largely unremarkable. She did come to the ER on 2022 due to abdominal pain and shortness of breath. She was told her EKG was abnormal and also she had an elevated troponin which was only 15 ng/L and only had minor EKG changes. [facility] was [facility] for this and she was not very happy about this. Cardiovascular stress test done on 2022 showed Overall, these results are most consistent with an intermediate/high risk for significant coronary artery disease. Echocardiogram on 2022 showed EF:>55% with mild diastolic dysfunction and LV wall thickness mildly increased. Ms. Wesley Godwin reports doing ok since last visit but does say that she has had a lot more recent chest discomfort which she had attributed to worsening GERD and says that she can get a lot of gas from this.  She does get shortness of breath with minimal exertion which she says is also worse over the last several weeks. She denied any current chest pain, pressure or tightness. She denies having any lightheaded/dizziness. She denies any abdominal pain, bleeding problems, bowel issues, problems with her medications or any other concerns at this time. No cough, fever or chills. No nausea or vomiting. No falls or near falls. Bleeding Risks: Ms. Angie Cabello denies any current or recent bleeding problems including a history of a GI bleed, ulcers, recent or upcoming surgeries, blood in her stool or black tarry stools or blood in her urine. Past Medical History:   Diagnosis Date    Anxiety     Asthma     CAD (coronary artery disease)     Clinical trial participant at discharge 3/31/16    [facility]     Depression     Diabetic neuropathy ([facility]     H/O cardiac catheterization 4/26/16    LMCA: Mild Irregularities 10-20%. LAD: Lesion on Prox LAD: Proximal subsection. 100% stenosis. LCx: Mild irregularities 10-20%. RCA: Mild irregularities 10-20%. Widely patent mid RCA stent. EF:60%.  H/O cardiovascular stress test 10/07/2016    Overall results are most consistent with a low risk for significant CAD.     H/O echocardiogram 10/05/2016    EF:>60%. Inferoseptal wall abnormal in its motion which is not unusal status post open heart surgery. Evidence of mild (grade I) diastolic dysfunction is seen.  H/O tilt table evaluation 07/12/2016    Abnormal. PTs HR, Blood Pressure response and symptoms were most consistent with dysautonomia. Combined w/viligant maintenance of euvolemia and maintaining a moderate salt intake, pharmaciologic treatment w/ ProAmatine, SSRI such as Lexapro and/or Mestinon among other treatments have shown some effectiveness in the treatment of this condition.      History of cardiovascular stress test 02/13/2019    Abnormal. Small/moderate perfusion defect of mild/moderate intesity in the anterior and anterolateral regions during stress imaging which is most consistent w/ ischemia but may be artifact. Overall low/intermediate risk for significant CAD.  History of echocardiogram 09/06/2018    EF >60%. Inferoseptal wall is abnormal in its motion which is not unusual s/p open heart. Evidence of mild (grade I) diastolic dysfunction seen.  Hx of blood clots     Hyperlipidemia     Hypertension     Intermittent claudication (HCC)     Kidney stones     Movement disorder     neuropathy in legs    Neuromuscular disorder (HCC)     neuropathy    Osteoarthritis     Reflux     Seizures (HCC)     last over 10 yr ago    Stented coronary artery 9/22/2010     LAD/[place]    Stented coronary artery 3/31/16    RCA/[place]    Type II or unspecified type diabetes mellitus without mention of complication, not stated as uncontrolled     Unspecified sleep apnea     no machine       CURRENT ALLERGIES: Tape [adhesive tape] REVIEW OF SYSTEMS: 14 systems were reviewed. Pertinent positives and negatives as above, all else negative.      Past Surgical History:   Procedure Laterality Date    ABDOMINAL HERNIA REPAIR  1-2016    repair done [place]    APPENDECTOMY      CARDIAC CATHETERIZATION Left 4/26/2016    right \[facility]\""/ [facility]/ Dr Caryl Hanson Left 03/07/2019    [facility] Radial/[facility] [place]/    CARDIAC SURGERY      CABG 2019    CHOLECYSTECTOMY  1991    COLONOSCOPY  12/16/14    -hemorrhoids,bx    CORONARY ANGIOPLASTY WITH STENT PLACEMENT  9/2010    CORONARY ANGIOPLASTY WITH STENT PLACEMENT  3-    [facility] RCA / DR Myriam Lane    CORONARY ARTERY BYPASS GRAFT  08/15/2016    OP X 1- Dr Jose Miranda, COLON, DIAGNOSTIC  12/16/2014    HERNIA REPAIR  12/13/12    at the medial aspect of a [place] RUQ scar); repaired [address]. [id number]  02/16/2015    incisional, recurrent    HERNIA REPAIR  02/2016    HYSTERECTOMY  OTHER SURGICAL HISTORY  10/8/2015    abd wound washout, mesh removal, wound vac placement    IA SUCT NICOLE LIPECTOMY,HEAD/NECK Left 2018    THIGH LESION BIOPSY EXCISION, SOFT TISSUE MASS performed by [facility] at [facility] Left 2018    leg    UPPP UVULOPALATOPHARYGOPLASTY  2012    VENTRAL HERNIA REPAIR  2015    With [facility] - Dr Jeremias Mayorga History:  Social History     Tobacco Use    Smoking status: Former Smoker     Packs/day: 2.00     Years: 10.00     Pack years: 20.00     Types: Cigarettes     Quit date: 2010     Years since quittin.6    Smokeless tobacco: Never Used   Vaping Use    Vaping Use: Never used   Substance Use Topics    Alcohol use: No    Drug use: No        CURRENT MEDICATIONS:  Outpatient Medications Marked as Taking for the 22 encounter (Office Visit) with Juan José Holt MD   Medication Sig Dispense Refill    pregabalin (LYRICA) 150 MG capsule Take one cap three times daily (Patient taking differently: Take 150 mg by mouth in the morning, at noon, and at bedtime. Take one cap three times daily) 90 capsule 6    atorvastatin (LIPITOR) 80 MG tablet Take 1 tablet by mouth daily 90 tablet 3    fludrocortisone (FLORINEF) 0.1 MG tablet TAKE 3 TABLETS BY MOUTH EVERY DAY (Patient taking differently: Take 0.1 mg by mouth daily Pt states she takes 1 tab BID) 270 tablet 3    blood glucose monitor strips Test 3 times a day & as needed for symptoms of irregular blood glucose. Dispense sufficient amount for indicated testing frequency plus additional to accommodate PRN testing needs.  300 strip 0    Lancets MISC 1 each by Does not apply route 2 times daily 753 each 1    TRULICITY 1.5 UZ/7.4XV SOPN INJECT 1.5 MG INTO THE SKIN ONCE A WEEK (Patient taking differently: Inject 3 mg into the skin once a week ) 12 pen 3    Insulin Degludec (TRESIBA FLEXTOUCH) 200 UNIT/ML SOPN Inject 86 Units into the skin daily PER DR MCELROY (Patient taking differently: Inject 110 Units into the skin daily PER DR MCELROY) 1 pen 0    metFORMIN (GLUCOPHAGE-XR) 500 MG extended release tablet Take 2 tablets by mouth daily (with breakfast) 180 tablet 3    omeprazole (PRILOSEC) 20 MG delayed release capsule TAKE 1 CAPSULE BY MOUTH EVERY DAY IN THE MORNING BEFORE BREAKFAST (Patient taking differently: Take 20 mg by mouth Daily ) 90 capsule 1    albuterol sulfate HFA (VENTOLIN HFA) 108 (90 Base) MCG/ACT inhaler INHALE 2 PUFFS EVERY 6 HOURS AS NEEDED FOR WHEEZING (Patient taking differently: Inhale 2 puffs into the lungs every 6 hours as needed INHALE 2 PUFFS EVERY 6 HOURS AS NEEDED FOR WHEEZING) 18 Inhaler 3    ondansetron (ZOFRAN ODT) 4 MG disintegrating tablet Take 1 tablet by mouth every 8 hours as needed for Nausea 20 tablet 0    nitroGLYCERIN (NITROSTAT) 0.4 MG SL tablet Place 1 tablet under the tongue every 5 minutes as needed for Chest pain 25 tablet 1    Saccharomyces boulardii (PROBIOTIC) 250 MG CAPS Take 1 capsule by mouth daily 30 capsule 0    tiZANidine (ZANAFLEX) 2 MG tablet TAKE 1 TABLET BY MOUTH NIGHTLY AS NEEDED(SPASMS) (Patient taking differently: Take 2 mg by mouth nightly as needed TAKE 1 TABLET BY MOUTH NIGHTLY AS NEEDED(SPASMS)) 30 tablet 2    acetaminophen (TYLENOL) 500 MG tablet Take 1 tablet by mouth 4 times daily as needed for Pain 40 tablet 0    losartan (COZAAR) 25 MG tablet Take 1 tablet by mouth daily 90 tablet 3    insulin lispro, 1 Unit Dial, (HUMALOG KWIKPEN) 100 UNIT/ML SOPN Inject 20 Units into the skin 3 times daily (before meals) 12 pen 1    metoprolol succinate (TOPROL XL) 100 MG extended release tablet Take 1 tablet by mouth daily 90 tablet 3    DULoxetine (CYMBALTA) 30 MG extended release capsule Take 1 capsule by mouth daily 30 capsule 1    aspirin 81 MG EC tablet TAKE 1 TABLET BY MOUTH DAILY (Patient taking differently: Take 81 mg by mouth daily ) 90 tablet 3    Insulin Pen Needle (BD ULTRA-FINE PEN NEEDLES) 29G X 12.7MM MISC USE AS DIRECTED BY PHYSICIAN 5 times daily 150 each 11       FAMILY HISTORY: family history includes Cancer in her father, maternal grandmother, and mother; Diabetes in her mother and sister; Heart Disease in her brother, maternal uncle, and sister. PHYSICAL EXAM:   BP (!) 157/96 (Site: Right Lower Arm, Position: Sitting, Cuff Size: Medium Adult)   Pulse 108   Resp 16   Ht 5' 3\" (1.6 m)   Wt 223 lb (101.2 kg)   LMP 12/05/1996   SpO2 95%   BMI 39.50 kg/m²  Body mass index is 39.5 kg/m². Constitutional: She is obese but oriented to person, place, and time. She appears well-developed and well-nourished. In no acute distress. HEENT: Normocephalic and atraumatic. No JVD present. Carotid bruit is not present. No mass and no thyromegaly present. No lymphadenopathy present. Cardiovascular: Tachycardic rate, regular rhythm, normal heart sounds. Exam reveals no gallop and no friction rubs. 1/6 systolic murmur, 2nd intercostal space on the RIGHT just lateral to the sternum. Normal rate, regular rhythm, normal heart sounds and intact distal pulses. Exam reveals no gallop and no friction rub. No significant cardiac murmur was heard. Pulmonary/Chest: Effort normal and breath sounds normal. No respiratory distress. She has no wheezes, rhonchi or rales. Abdominal: Soft, non-tender. Bowel sounds and aorta are normal. She exhibits no organomegaly, mass or bruit. Extremities: Trace. No cyanosis or clubbing. 2+ radial and carotid pulses. Distal extremity pulses: 2+ bilaterally. Neurological: She is alert and oriented to person, place, and time. No evidence of gross cranial nerve deficit. Coordination appeared normal.   Skin: Skin is warm and dry. There is no rash or diaphoresis. Psychiatric: She has a normal mood and affect.  Her speech is normal and behavior is normal.      MOST RECENT LABS ON RECORD:   Lab Results   Component Value Date    WBC 9.5 04/23/2022    HGB 12.1 04/23/2022    HCT 36.8 04/23/2022  04/23/2022    CHOL 165 12/29/2021    TRIG 151 (H) 12/29/2021    HDL 40 (L) 12/29/2021    LDLCHOLESTEROL 95 12/29/2021    ALT 9 04/22/2022    AST 11 04/22/2022     (L) 04/24/2022    K 4.3 04/24/2022     04/24/2022    CREATININE 0.52 04/24/2022    BUN 14 04/24/2022    CO2 22 04/24/2022    TSH 2.80 06/04/2016    INR 1.0 04/24/2022    LABA1C 8.9 (H) 12/29/2021    LABMICR Can not be calculated 12/29/2021    BNP NOT REPORTED 05/11/2014        ASSESSMENT:  1. Abnormal stress test    2. ASHD (arteriosclerotic heart disease)    3. Abnormal ECG    4. Heart palpitations    5. Dysautonomia orthostatic hypotension syndrome    6. Essential hypertension    7. Mixed hyperlipidemia       PLAN:   Abnormal Stress Test: Shortness of breath worsening since last visit and GERD like symptoms of belching    For these reasons, I recommended that the patient consider undergoing a cardiac catheterization with coronary angiography to assess their coronary anatomy and facilitate better treatment recommendations. I discussed the risks, benefits, and alternatives to the procedure including the risk of bleeding, contrast induced allergy and/or kidney damage, arrythmia, stroke, heart attack, death, or the need for further procedures. I also discussed the fact that although treatment with simple medical management is a potential treatment option in place of cardiac catheterization, I expressed my opinion that cardiac catheterization in order to define their coronary anatomy and rule out severe 3 vessel or left main coronary artery disease would significant help guide the most appropriate treatment strategy ranging from no treatment, to medications, stents, to even coronary bypass surgery. The patient verbalized understanding of the risks benefits and alternatives and stated that they would like to undergo the procedure. We will plan to schedule the procedure here at [facility] on 5/20/2022.     I took the liberty of ordering a BMP for today to assess their potassium and renal function. I told them that they could get their lab work performed at the location of their choosing, unfortunately, if the lab work was not performed at a [facility]) facility I could not guarantee my ability to follow up with them on their results. and  I took the liberty of ordering a CBC. I told them that they could get their lab work performed at the location of their choosing, unfortunately, if the lab work was not performed at a [facility]) facility I could not guarantee my ability to follow up with them on their results.  Coronary artery disease and myocardial ischemia: Coronary artery stent placement in 2010 and 2016 and CABG involving LIMA-LAD on 8/15/16: Currently appears well controlled   Antiplatelet Agent: Continue Aspirin 81 mg daily.  Beta Blocker: INCREASE to Metoprolol succinate (Toprol XL) 150 mg daily. I also discussed the potential side effects of this medication including lightheadedness and dizziness and instructed them to stop the medication of this occurs and call our office if this occurs.  Anti-anginal medications: Not indicated at this time.  Cholesterol Reduction Therapy: Continue Atorvastatin (Lipitor) 80 mg daily. · History of Abnormal EKG/ EKG Changes/ Mildly Elevated Troponin Level: Reported ACS, admitted but no inpatient testing done. Tachycardia & Intermittent Heart palpitations:Awaiting on CAM monitor results: Fairly Asymptomatic at this time. Had one episode on Easter morning however no other reoccurrences. Beta Blocker: INCREASE to Metoprolol succinate (Toprol XL) 150 mg daily. I also discussed the potential side effects of this medication including lightheadedness and dizziness and instructed them to stop the medication of this occurs and call our office if this occurs. Dysautonomia Orthostatic Hypotension Syndrome: Currently appears to be well controlled.  It sounds like her balance is not as good lately but is probably not related to this. We will monitor at least for now. · Pharmacological Management: Continue Fludocortisone (Florinef) 0.1 mg BID. · Nonpharmacologic counseling: Because of her condition, I reminded her to try and keep herself well-hydrated and to take extra time when moving from laying to sitting, sitting to standing and standing to walking and not to avoid salt in her diet. I also advised her to put water by her bedside and drink this before she gets out of bed in the morning. Essential Hypertension: Controlled   · ACE Inibitor/ARB: Continue losartan (Cozaar) 25 mg daily. · Beta Blocker: INCREASE to Metoprolol succinate (Toprol XL) 150 mg daily. I also discussed the potential side effects of this medication including lightheadedness and dizziness and instructed them to stop the medication of this occurs and call our office if this occurs. · Hyperlipidemia: Mixed, LDL done on 12/29/2021 was 95 mg/dL   · Cholesterol Reduction Therapy: Continue Atorvastatin (Lipitor) 80 mg daily. Finally, I recommended that she continue her other medications and follow up with you as previously scheduled. FOLLOW UP:   I told Ms. Zain Pryor to call my office if she had any problems, but otherwise told her to Return in about 2 weeks (around 5/30/2022). However, I would be happy to see her sooner should the need arise. Once again, thank you for allowing me to participate in this patients care. Please do not hesitate to contact me could I be of further assistance. Sincerely,  [place]. Eugenio ZENG, MS, F.A.C.C. [address] [facility] Cardiology Specialist    [facility], [address] [facility]  Phone: [phone]; Fax: [phone]    I believe that the risk of significant morbidity and mortality related to the patient's current medical conditions are: intermediate-high.       The documentation recorded by the scribe, accurately and completely reflects the services I personally performed and the decisions made by me. Tae Martinez MD, MS, F.A.C.C.  May 16, 2022

## 2022-05-16 NOTE — TELEPHONE ENCOUNTER
----- Message from Darnell Shone, MD sent at 5/13/2022 11:55 PM EDT -----  Please let Ms. Syed Bustamante know that her test was abnormal and please make them an appointment in 1-2 weeks if not already done. Thanks.     Eugenio

## 2022-05-18 ENCOUNTER — TELEPHONE (OUTPATIENT)
Dept: PRIMARY CARE CLINIC | Age: 57
End: 2022-05-18

## 2022-05-18 ENCOUNTER — TELEPHONE (OUTPATIENT)
Dept: CARDIOLOGY | Age: 57
End: 2022-05-18

## 2022-05-18 DIAGNOSIS — R92.8 ABNORMAL MAMMOGRAM OF RIGHT BREAST: Primary | ICD-10-CM

## 2022-05-18 RX ORDER — DULAGLUTIDE 3 MG/.5ML
INJECTION, SOLUTION SUBCUTANEOUS
COMMUNITY
Start: 2022-05-09

## 2022-05-18 NOTE — TELEPHONE ENCOUNTER
----- Message from [address] [place] [address], APRN - CNP sent at 5/18/2022  9:14 AM EDT -----  Additional views of the right breast are needed. Diagnostic mammogram ordered. Thank you.

## 2022-05-18 NOTE — TELEPHONE ENCOUNTER
Contacted patient in regards to mammogram results. Patient verbalized understanding. Scheduled for 6/6/22 @ 1:30pm arrive at 1:15p.

## 2022-05-18 NOTE — TELEPHONE ENCOUNTER
----- Message from Theresa Padron MD sent at 5/17/2022 11:58 PM EDT -----  Let Ms. Guerreronunu Margot know their test result was ok. Will discuss at next visit. Thanks.

## 2022-05-20 ENCOUNTER — HOSPITAL ENCOUNTER (OUTPATIENT)
Dept: CARDIAC CATH/INVASIVE PROCEDURES | Age: 57
Discharge: ANOTHER HOSPITAL | End: 2022-05-20
Attending: FAMILY MEDICINE | Admitting: FAMILY MEDICINE
Payer: COMMERCIAL

## 2022-05-20 ENCOUNTER — HOSPITAL ENCOUNTER (OUTPATIENT)
Dept: CARDIAC CATH/INVASIVE PROCEDURES | Age: 57
Discharge: HOME | End: 2022-05-21
Attending: INTERNAL MEDICINE | Admitting: INTERNAL MEDICINE
Payer: COMMERCIAL

## 2022-05-20 VITALS
TEMPERATURE: 96.8 F | DIASTOLIC BLOOD PRESSURE: 48 MMHG | RESPIRATION RATE: 21 BRPM | HEART RATE: 82 BPM | BODY MASS INDEX: 39.53 KG/M2 | OXYGEN SATURATION: 93 % | SYSTOLIC BLOOD PRESSURE: 102 MMHG | HEIGHT: 63 IN | WEIGHT: 223.11 LBS

## 2022-05-20 DIAGNOSIS — Z95.820 S/P ANGIOPLASTY WITH STENT: ICD-10-CM

## 2022-05-20 LAB
ACTIVATED CLOTTING TIME: 288 SEC (ref 79–149)
HCT VFR BLD CALC: 39.5 % (ref 36.3–47.1)
HEMOGLOBIN: 12.4 G/DL (ref 11.9–15.1)
MCH RBC QN AUTO: 24 PG (ref 25.2–33.5)
MCHC RBC AUTO-ENTMCNC: 31.4 G/DL (ref 28.4–34.8)
MCV RBC AUTO: 76.4 FL (ref 82.6–102.9)
NRBC AUTOMATED: 0 PER 100 WBC
PARTIAL THROMBOPLASTIN TIME: 48.9 SEC (ref 26.8–34.8)
PDW BLD-RTO: 15 % (ref 11.8–14.4)
PLATELET # BLD: 393 K/UL (ref 138–453)
PMV BLD AUTO: 9.4 FL (ref 8.1–13.5)
RBC # BLD: 5.17 M/UL (ref 3.95–5.11)
WBC # BLD: 10 K/UL (ref 3.5–11.3)

## 2022-05-20 PROCEDURE — 6360000004 HC RX CONTRAST MEDICATION

## 2022-05-20 PROCEDURE — 85730 THROMBOPLASTIN TIME PARTIAL: CPT

## 2022-05-20 PROCEDURE — C1892 INTRO/SHEATH,FIXED,PEEL-AWAY: HCPCS

## 2022-05-20 PROCEDURE — 99153 MOD SED SAME PHYS/QHP EA: CPT

## 2022-05-20 PROCEDURE — 2709999900 HC NON-CHARGEABLE SUPPLY

## 2022-05-20 PROCEDURE — C1887 CATHETER, GUIDING: HCPCS

## 2022-05-20 PROCEDURE — 6370000000 HC RX 637 (ALT 250 FOR IP): Performed by: STUDENT IN AN ORGANIZED HEALTH CARE EDUCATION/TRAINING PROGRAM

## 2022-05-20 PROCEDURE — 2500000003 HC RX 250 WO HCPCS

## 2022-05-20 PROCEDURE — C1769 GUIDE WIRE: HCPCS

## 2022-05-20 PROCEDURE — 93459 L HRT ART/GRFT ANGIO: CPT | Performed by: FAMILY MEDICINE

## 2022-05-20 PROCEDURE — C1894 INTRO/SHEATH, NON-LASER: HCPCS

## 2022-05-20 PROCEDURE — 85347 COAGULATION TIME ACTIVATED: CPT

## 2022-05-20 PROCEDURE — 6370000000 HC RX 637 (ALT 250 FOR IP)

## 2022-05-20 PROCEDURE — C9600 PERC DRUG-EL COR STENT SING: HCPCS

## 2022-05-20 PROCEDURE — 93459 L HRT ART/GRFT ANGIO: CPT

## 2022-05-20 PROCEDURE — 6360000002 HC RX W HCPCS: Performed by: FAMILY MEDICINE

## 2022-05-20 PROCEDURE — 99152 MOD SED SAME PHYS/QHP 5/>YRS: CPT

## 2022-05-20 PROCEDURE — C1874 STENT, COATED/COV W/DEL SYS: HCPCS

## 2022-05-20 PROCEDURE — 6360000004 HC RX CONTRAST MEDICATION: Performed by: FAMILY MEDICINE

## 2022-05-20 PROCEDURE — 85027 COMPLETE CBC AUTOMATED: CPT

## 2022-05-20 PROCEDURE — 6360000002 HC RX W HCPCS

## 2022-05-20 PROCEDURE — 2580000003 HC RX 258: Performed by: FAMILY MEDICINE

## 2022-05-20 PROCEDURE — 2580000003 HC RX 258: Performed by: STUDENT IN AN ORGANIZED HEALTH CARE EDUCATION/TRAINING PROGRAM

## 2022-05-20 RX ORDER — SODIUM CHLORIDE 9 MG/ML
INJECTION, SOLUTION INTRAVENOUS PRN
Status: DISCONTINUED | OUTPATIENT
Start: 2022-05-20 | End: 2022-05-20 | Stop reason: HOSPADM

## 2022-05-20 RX ORDER — SODIUM CHLORIDE 9 MG/ML
INJECTION, SOLUTION INTRAVENOUS PRN
Status: DISCONTINUED | OUTPATIENT
Start: 2022-05-20 | End: 2022-05-21 | Stop reason: HOSPADM

## 2022-05-20 RX ORDER — SODIUM CHLORIDE 0.9 % (FLUSH) 0.9 %
5-40 SYRINGE (ML) INJECTION PRN
Status: DISCONTINUED | OUTPATIENT
Start: 2022-05-20 | End: 2022-05-20 | Stop reason: HOSPADM

## 2022-05-20 RX ORDER — SODIUM CHLORIDE 0.9 % (FLUSH) 0.9 %
5-40 SYRINGE (ML) INJECTION EVERY 12 HOURS SCHEDULED
Status: DISCONTINUED | OUTPATIENT
Start: 2022-05-20 | End: 2022-05-20 | Stop reason: HOSPADM

## 2022-05-20 RX ORDER — SODIUM CHLORIDE 0.9 % (FLUSH) 0.9 %
5-40 SYRINGE (ML) INJECTION PRN
Status: DISCONTINUED | OUTPATIENT
Start: 2022-05-20 | End: 2022-05-21 | Stop reason: HOSPADM

## 2022-05-20 RX ORDER — SODIUM CHLORIDE 0.9 % (FLUSH) 0.9 %
5-40 SYRINGE (ML) INJECTION EVERY 12 HOURS SCHEDULED
Status: DISCONTINUED | OUTPATIENT
Start: 2022-05-20 | End: 2022-05-21 | Stop reason: HOSPADM

## 2022-05-20 RX ORDER — HEPARIN SODIUM 1000 [USP'U]/ML
2000 INJECTION, SOLUTION INTRAVENOUS; SUBCUTANEOUS PRN
Status: DISCONTINUED | OUTPATIENT
Start: 2022-05-20 | End: 2022-05-20 | Stop reason: HOSPADM

## 2022-05-20 RX ORDER — HEPARIN SODIUM 10000 [USP'U]/100ML
5-30 INJECTION, SOLUTION INTRAVENOUS CONTINUOUS
Status: DISCONTINUED | OUTPATIENT
Start: 2022-05-20 | End: 2022-05-20 | Stop reason: HOSPADM

## 2022-05-20 RX ORDER — DIPHENHYDRAMINE HCL 25 MG
50 CAPSULE ORAL ONCE
Status: DISCONTINUED | OUTPATIENT
Start: 2022-05-20 | End: 2022-05-20 | Stop reason: HOSPADM

## 2022-05-20 RX ORDER — ACETAMINOPHEN 325 MG/1
650 TABLET ORAL EVERY 4 HOURS PRN
Status: DISCONTINUED | OUTPATIENT
Start: 2022-05-20 | End: 2022-05-20 | Stop reason: HOSPADM

## 2022-05-20 RX ORDER — ASPIRIN 81 MG/1
81 TABLET, CHEWABLE ORAL DAILY
Status: DISCONTINUED | OUTPATIENT
Start: 2022-05-20 | End: 2022-05-21 | Stop reason: HOSPADM

## 2022-05-20 RX ORDER — SODIUM CHLORIDE 9 MG/ML
INJECTION, SOLUTION INTRAVENOUS CONTINUOUS
Status: DISCONTINUED | OUTPATIENT
Start: 2022-05-20 | End: 2022-05-20 | Stop reason: HOSPADM

## 2022-05-20 RX ORDER — NITROGLYCERIN 0.4 MG/1
0.4 TABLET SUBLINGUAL EVERY 5 MIN PRN
Status: DISCONTINUED | OUTPATIENT
Start: 2022-05-20 | End: 2022-05-20 | Stop reason: HOSPADM

## 2022-05-20 RX ORDER — CLOPIDOGREL BISULFATE 75 MG/1
75 TABLET ORAL DAILY
Status: DISCONTINUED | OUTPATIENT
Start: 2022-05-20 | End: 2022-05-21 | Stop reason: HOSPADM

## 2022-05-20 RX ORDER — HEPARIN SODIUM 1000 [USP'U]/ML
4000 INJECTION, SOLUTION INTRAVENOUS; SUBCUTANEOUS PRN
Status: DISCONTINUED | OUTPATIENT
Start: 2022-05-20 | End: 2022-05-20 | Stop reason: HOSPADM

## 2022-05-20 RX ORDER — DULOXETIN HYDROCHLORIDE 30 MG/1
30 CAPSULE, DELAYED RELEASE ORAL DAILY
Status: DISCONTINUED | OUTPATIENT
Start: 2022-05-20 | End: 2022-05-21 | Stop reason: HOSPADM

## 2022-05-20 RX ORDER — ACETAMINOPHEN 325 MG/1
650 TABLET ORAL EVERY 4 HOURS PRN
Status: DISCONTINUED | OUTPATIENT
Start: 2022-05-20 | End: 2022-05-21 | Stop reason: HOSPADM

## 2022-05-20 RX ADMIN — ASPIRIN 81 MG: 81 TABLET, CHEWABLE ORAL at 17:37

## 2022-05-20 RX ADMIN — SODIUM CHLORIDE 1000 ML: 9 INJECTION, SOLUTION INTRAVENOUS at 11:34

## 2022-05-20 RX ADMIN — HEPARIN SODIUM AND DEXTROSE 12 UNITS/KG/HR: 10000; 5 INJECTION INTRAVENOUS at 10:37

## 2022-05-20 RX ADMIN — SODIUM CHLORIDE: 9 INJECTION, SOLUTION INTRAVENOUS at 08:09

## 2022-05-20 RX ADMIN — SODIUM CHLORIDE, PRESERVATIVE FREE 10 ML: 5 INJECTION INTRAVENOUS at 21:38

## 2022-05-20 RX ADMIN — CLOPIDOGREL 75 MG: 75 TABLET, FILM COATED ORAL at 17:36

## 2022-05-20 RX ADMIN — IOPAMIDOL 70 ML: 755 INJECTION, SOLUTION INTRAVENOUS at 09:41

## 2022-05-20 RX ADMIN — DULOXETINE HYDROCHLORIDE 30 MG: 30 CAPSULE, DELAYED RELEASE ORAL at 21:37

## 2022-05-20 NOTE — H&P
[facility]    Consult / H&P               Today's Date: 5/20/2022  Patient Name: John Boykin  Date of admission: No admission date for patient encounter. Patient's age: 62 y.o., 1965  Admission Dx: Chest pain [R07.9]  S/P angioplasty with stent [G84.114]    Reason for Consult:  Cardiac evaluation    Requesting Physician: Cheyenne Ruiz MD    CHIEF COMPLAINT:  Elective PCI     History Obtained From:  patient    HISTORY OF PRESENT ILLNESS:      61 yo female is here for elective PCI of LM. Was sent from [facility] from Dr. Kady Farmer. Had stress test done recently and it was abnormal followed by [facility] - Showed MV CAD and LM disease   Has h/o CABG     Past Medical History:   has a past medical history of Anxiety, Asthma, CAD (coronary artery disease), Clinical trial participant at discharge, COPD (chronic obstructive pulmonary disease) ([address] Depression, Diabetic neuropathy ([address] H/O cardiac catheterization, H/O cardiovascular stress test, H/O echocardiogram, H/O tilt table evaluation, History of cardiovascular stress test, History of echocardiogram, Hx of blood clots, Hyperlipidemia, Hypertension, Intermittent claudication ([address] Kidney stones, Movement disorder, Neuromuscular disorder ([address] Osteoarthritis, Reflux, Seizures ([address] Stented coronary artery, Stented coronary artery, Type II or unspecified type diabetes mellitus without mention of complication, not stated as uncontrolled, and Unspecified sleep apnea. Past Surgical History:   has a past surgical history that includes Hysterectomy; Cholecystectomy (1991); Coronary angioplasty with stent (09/2010); UPPP (06/26/2012); Colonoscopy (12/16/2014); Endoscopy, colon, diagnostic (12/16/2014); ventral hernia repair (09/21/2015); other surgical history (10/08/2015); Abdominal hernia repair (01/2016); Coronary angioplasty with stent (03/31/2016);  Appendectomy; hernia repair (12/13/2012); hernia repair (02/16/2015); hernia repair (02/2016); Coronary artery bypass graft (08/15/2016); pr suct nellie lipectomy,head/neck (Left, 08/27/2018); tumor removal (Left, 2018); Cardiac surgery; Cardiac catheterization (Left, 04/26/2016); Cardiac catheterization (Left, 03/07/2019); Cardiac catheterization (05/20/2022); Cardiac catheterization (05/20/2022); and Coronary angioplasty with stent (05/20/2022). Home Medications:    Prior to Admission medications    Medication Sig Start Date End Date Taking? Authorizing Provider   TRULICITY 3 TH/4.8MV SOPN INJECT 0.5 ML (1 PEN) SUBCUTANEOUSLY WEEKLY,X30 DAYS,INSTR:ROTATE INJECTION SITES 5/9/22   Braulio Jose Daniel   metoprolol succinate (TOPROL XL) 100 MG extended release tablet Take 1.5 tablets by mouth daily 5/16/22   Jolene Carpenter MD   pregabalin (LYRICA) 150 MG capsule Take one cap three times daily  Patient taking differently: Take 150 mg by mouth in the morning, at noon, and at bedtime. Take one cap three times daily 3/1/22 3/1/23  Ruddy Meza MD   atorvastatin (LIPITOR) 80 MG tablet Take 1 tablet by mouth daily 11/1/21   Jolene Carpenter MD   fludrocortisone (FLORINEF) 0.1 MG tablet TAKE 3 TABLETS BY MOUTH EVERY DAY  Patient taking differently: Take 0.1 mg by mouth daily Pt states she takes 1 tab BID 10/13/21   Jolene Carpenter MD   Blood Glucose Monitoring Suppl (ONE TOUCH ULTRA 2) w/Device KIT 1 kit by Does not apply route daily 7/20/21   CHELSEA Delarosa CNP   blood glucose monitor strips Test 3 times a day & as needed for symptoms of irregular blood glucose. Dispense sufficient amount for indicated testing frequency plus additional to accommodate PRN testing needs.  7/16/21   CHELSEA Alvarez CNP   Lancets MISC 1 each by Does not apply route 2 times daily 7/16/21   CHELSEA So CNP   Continuous Blood Gluc Sensor (FREESTYLE MCKAYLA 2 SENSOR) MISC 2 FREESTYLE MCKAYLA 2 SENSORS TO USE WITH MCKAYLA 2 READER TO CHECK BLOOD SUGARS 6 8 TIMES A DAY 5/19/21   Historical Provider, MD   Insulin Degludec (TRESIBA FLEXTOUCH) 200 UNIT/ML SOPN Inject 86 Units into the skin daily PER DR MCELROY  Patient taking differently: Inject 110 Units into the skin daily PER DR MCELROY 3/30/21   Debbrah Kayser Might, APRN - CNP   metFORMIN (GLUCOPHAGE-XR) 500 MG extended release tablet Take 2 tablets by mouth daily (with breakfast) 3/30/21   Debbrah Kayser Might, APRN - CNP   omeprazole (PRILOSEC) 20 MG delayed release capsule TAKE 1 CAPSULE BY MOUTH EVERY DAY IN [address] [place] [address] BREAKFAST  Patient taking differently: Take 20 mg by mouth Daily  11/23/20   Debbrah Kayser Might, APRN - CNP   albuterol sulfate HFA (VENTOLIN HFA) 108 (90 Base) MCG/ACT inhaler INHALE 2 PUFFS EVERY 6 HOURS AS NEEDED FOR WHEEZING  Patient taking differently: Inhale 2 puffs into the lungs every 6 hours as needed INHALE 2 PUFFS EVERY 6 HOURS AS NEEDED FOR WHEEZING 11/11/20   Debbrah Kayser Might, APRN - CNP   ondansetron (ZOFRAN ODT) 4 MG disintegrating tablet Take 1 tablet by mouth every 8 hours as needed for Nausea 11/10/20   Chelsi Whipple MD   nitroGLYCERIN (NITROSTAT) 0.4 MG SL tablet Place 1 tablet under the tongue every 5 minutes as needed for Chest pain 9/30/20   Debbrah Kayser Might, APRN - CNP   Saccharomyces boulardii (PROBIOTIC) 250 MG CAPS Take 1 capsule by mouth daily 9/30/20   Debbrah Kayser Might, APRN - CNP   tiZANidine (ZANAFLEX) 2 MG tablet TAKE 1 TABLET BY MOUTH NIGHTLY AS NEEDED(SPASMS)  Patient taking differently: Take 2 mg by mouth nightly as needed TAKE 1 TABLET BY MOUTH NIGHTLY AS NEEDED(SPASMS) 9/22/20   Scotty Martinez MD   acetaminophen (TYLENOL) 500 MG tablet Take 1 tablet by mouth 4 times daily as needed for Pain 9/9/20   CHELSEA Roberto CNP   losartan (COZAAR) 25 MG tablet Take 1 tablet by mouth daily 7/23/20   Debbrah Kayser Might, APRN - CNP   insulin lispro, 1 Unit Dial, (HUMALOG KWIKPEN) 100 UNIT/ML SOPN Inject 20 Units into the skin 3 times daily (before meals) 6/23/20   Debbrah Kayser Might, APRN - CNP   DULoxetine (CYMBALTA) 30 MG extended release capsule Take 1 capsule by mouth daily 2/10/20   Lahoma Hatchet, MD   aspirin 81 MG EC tablet TAKE 1 TABLET BY MOUTH DAILY  Patient taking differently: Take 81 mg by mouth daily  7/1/19   CHELSEA Jimenez CNP   Insulin Pen Needle (BD ULTRA-FINE PEN NEEDLES) 29G X 12.7MM MISC USE AS DIRECTED BY PHYSICIAN 5 times daily 8/7/18   CHELSEA Jimenez CNP   Blood Glucose Monitoring Suppl BERE 1 Units by Does not apply route three times daily Unit insurance will cover 12/29/17   CHELSEA Gonzalez CNP   Blood Pressure Monitor KIT 1 each by Does not apply route daily as needed ESSENTIAL HYPERTENSION   I10 5/31/16   CHELSEA Jimenez CNP   Blood Glucose Monitoring Suppl (BLOOD GLUCOSE MONITOR KIT) KIT 1 each by Does not apply route daily. Please dispense [facility]. Dx: 250, new onset T2DM. Testing once daily 2/21/12 3/1/22  CHELSEA Pace CNP      Current Facility-Administered Medications: aspirin chewable tablet 81 mg, 81 mg, Oral, Daily  clopidogrel (PLAVIX) tablet 75 mg, 75 mg, Oral, Daily  sodium chloride flush 0.9 % injection 5-40 mL, 5-40 mL, IntraVENous, 2 times per day  sodium chloride flush 0.9 % injection 5-40 mL, 5-40 mL, IntraVENous, PRN  0.9 % sodium chloride infusion, , IntraVENous, PRN  acetaminophen (TYLENOL) tablet 650 mg, 650 mg, Oral, Q4H PRN    Allergies:  Tape [adhesive tape]    Social History:   reports that she quit smoking about 11 years ago. Her smoking use included cigarettes. She has a 20.00 pack-year smoking history. She has never used smokeless tobacco. She reports that she does not drink alcohol and does not use drugs. Family History: family history includes Cancer in her father, maternal grandmother, and mother; Diabetes in her mother and sister; Heart Disease in her brother, maternal uncle, and sister. No h/o sudden cardiac death. No for premature CAD    REVIEW OF SYSTEMS:    Constitutional: there has been no unanticipated weight loss. There's been No change in energy level, No change in activity level. Eyes: No visual changes or diplopia. No scleral icterus. ENT: No Headaches  Cardiovascular: As mentioned in H&p   Respiratory: No previous pulmonary problems, No cough  Gastrointestinal: No abdominal pain. No change in bowel or bladder habits. Genitourinary: No dysuria, trouble voiding, or hematuria. Musculoskeletal:  No gait disturbance, No weakness or joint complaints. PHYSICAL EXAM:      /64   Pulse 83   Temp 97.9 °F (36.6 °C) (Oral)   Resp 23   LMP 12/05/1996   SpO2 97%    Constitutional and General Appearance: alert, cooperative, no distress and appears stated age  HEENT: PERRL, no cervical lymphadenopathy. No masses palpable. Normal oral mucosa  Respiratory:  Normal excursion and expansion without use of accessory muscles  Resp Auscultation: Good respiratory effort. No for increased work of breathing. On auscultation: clear to auscultation bilaterally  Cardiovascular: The apical impulse is not displaced  Heart tones are crisp and normal. regular S1 and S2.  Jugular venous pulsation Normal  The carotid upstroke is normal in amplitude and contour without delay or bruit  Peripheral pulses are symmetrical and full   Abdomen:   No masses or tenderness  Bowel sounds present  Extremities:   No Cyanosis or Clubbing   Lower extremity edema: No   Skin: Warm and dry      Labs:     CBC:   Recent Labs     05/20/22  1025   WBC 10.0   HGB 12.4   HCT 39.5        BMP: No results for input(s): NA, K, CO2, BUN, CREATININE, LABGLOM, GLUCOSE in the last 72 hours. BNP: No results for input(s): BNP in the last 72 hours. PT/INR: No results for input(s): PROTIME, INR in the last 72 hours. APTT:  Recent Labs     05/20/22  1025   APTT 48.9*     CARDIAC ENZYMES:No results for input(s): CKTOTAL, CKMB, CKMBINDEX, TROPONINI in the last 72 hours.   FASTING LIPID PANEL:  Lab Results   Component Value Date    HDL 40 12/29/2021    TRIG 151 12/29/2021     LIVER PROFILE:No results for input(s): AST, ALT, LABALBU in the last 72 hours. IMPRESSION:    Patient Active Problem List   Diagnosis    Dyslipidemia    Radiculopathy    Insomnia    Allergic rhinitis    COPD (chronic obstructive pulmonary disease)    Depression    Bilateral leg weakness    Gait difficulty    Diabetic neuropathy    Back pain    Muscle spasm    Affective disorder ([facility])    History of ventral hernia repair    History of incisional hernia repair    Essential hypertension    Gastroesophageal reflux disease without esophagitis    Uncontrolled type 2 diabetes mellitus with diabetic polyneuropathy, with long-term current use of insulin ([facility])    Primary osteoarthritis involving multiple joints    Heart palpitations    Dysautonomia orthostatic hypotension syndrome    Coronary artery disease involving native coronary artery of native heart    Angina, class III ([facility])    S/P CABG x 1    Precordial pain    DARON (obstructive sleep apnea)    Neuropathic pain    Soft tissue mass    Muscle spasms of both lower extremities    Abscess of left great toe    Paronychia of great toe of left foot    Chest pain    Epigastric pain    ACS (acute coronary syndrome) ([facility])    Obesity (BMI 30-39. 9)    S/P angioplasty with stent       Assessment     1. Elective PCI of LM   2  H/o CAD s/p CABG   3 Abnormal stress test   4   RECOMMENDATIONS:  Proceed with cardiac cath/PCI   Follow post cath orders       Discussed with patient and Nurse. Electronically signed by Susanna Porter MD on 5/20/2022 at 3:44 PM    [facility]      [phone]      I reviewed the patient history personally, and examined by me during the visit. I have reviewed the H&P/Consult / Progress note as completed, and made appropriate changes to the patient exam and treatment plans.       I have reviewed the case in details including physical exam and treatment plan with resident / fellow / NP    Patient treatment plan was explained to patient, correction in notes was made as appropriate, and discussed final arrangement based on  my evaluation and exam.    Additional Recommendations:      Milton Irving MD  [facility]

## 2022-05-20 NOTE — PROGRESS NOTES
Report called to [facility] cath lab and Bailey Nunes in [facility].

## 2022-05-20 NOTE — CARE COORDINATION
Case Management Initial Discharge Plan  Delma Hernandez,             Met with:patient to discuss discharge plans. Information verified: address, contacts, phone number, , insurance Yes  Insurance Provider: [address] [place] [address]    Emergency Contact/Next of Kin name & number: Mir Hughes (son) [phone]  Who are involved in patient's support system? family    PCP: [address] [place] [address], [facility]  Date of last visit: 2 weeks      Discharge Planning    Living Arrangements:  Spouse/Significant Other     Home has 1 stories  0 stairs to climb to get into front door    Patient able to perform ADL's:Independent    Current Services (outpatient & in home) none  DME equipment: cane  DME provider:     Is patient receiving oral anticoagulation therapy? No    Does patient have any issues/concerns obtaining medications? No  If yes, what are patient's concerns? Is there a preferred Pharmacy after hours or on weekends? Yes    If yes, which pharmacy? [facility]    Potential Assistance Needed:       Patient agreeable to home care: No  [place] of choice provided:  n/a    Prior SNF/Rehab Placement and Facility: no  Agreeable to SNF/Rehab: No  [place] of choice provided: n/a     Evaluation: no    Expected Discharge date:       Patient expects to be discharged to: If home: is the family and/or caregiver wiling & able to provide support at home? N/a independent  Who will be providing this support? Follow Up Appointment: Best Day/ Time:      Transportation provider:   Transportation arrangements needed for discharge: No    Readmission Risk              Risk of Unplanned Readmission:  0             Does patient have a readmission risk score greater than 14?: No  If yes, follow-up appointment must be made within 7 days of discharge.      Goals of Care: comfort      Educated patient on transitional options, provided freedom of choice and are agreeable with plan      Discharge Plan: home independently          Electronically signed by Nino Roblero RN on 5/20/22 at 6:06 PM EDT

## 2022-05-20 NOTE — PROGRESS NOTES
Squad here to transport patient to [facility].  Report given. All belongings sent with patient.

## 2022-05-20 NOTE — PROGRESS NOTES
Patient admitted, consent signed and questions answered. Patient ready for procedure. Call light to reach with side rails up 2 of 2. Bilateral groin aeras clipped. No family at bedside with patient. History and physical needs updated.

## 2022-05-20 NOTE — OP NOTE
[facility]    CARDIAC CATHETERIZATION    Date:   5/20/2022  Patient name:  Mario Wilson  Date of admission:  No admission date for patient encounter. MRN:   [id number]  YOB: 1965    Operators:  Primary:   Vivian Romero MD (Attending Physician)      Procedure performed:   - PCI       Pre Procedure Conscious Sedation Data:  ASA Class:    [] I [] II [] III [] IV    Mallampati Class:  [] I [] II [] III [] IV      Indication:  -  MV CAD       Procedure:  Access:  [x] Femoral  [] Radial  artery       [x] Right  [] Left    Procedure: After informed consent was obtained with explanation of the risks and benefits, patient was brought to the cath lab. The access area was prepped and draped in sterile fashion. 1% lidocaine was used for local block. The artery was cannulated with 6  Fr sheath with brisk arterial blood return. The side port was frequently flushed and aspirated with normal saline. Findings:     Cardiac Arteries and Lesion Findings       LMCA:         Lesion on LMCA: Mid subsection. 90% stenosis 10 mm length reduced to 0%. Pre procedure JINNY II flow was noted. Post Procedure JINNY III flow was     present. Good runoff was present. The lesion was diagnosed as High Risk     (C). Devices used         - Luge Wire 182 cm. Number of passes: 1.         - Resolute [place] 4.0 x 15 JESSE. 1 inflation(s) to a max pressure of: 12     pietro. Cardiac Grafts   -There is a LIMA graft that originates at the LIMA and attaches to the Mid   LAD.       Coronary Tree        Dominance: Mixed          Procedure Summary        Successful PTAC -JESSE Left Main disease to treat large dominant native LCX        Recommendations        Post stent protocol           Estimated Blood Loss: 5 mL    ____________________________________________________________________  Tab Mas MD  Fellow, [address]           I have reviewed the case / procedure with resident / fellow  I have examined the patient personally  Patient agree with treatment plan as discussed before, final arrangement based on my evaluation and exam.    Risk and benefit of procedure planned were explained in details. Procedure was performed by me personally, with all aspect of the procedure being done using standard protocol. Note was modified based on my own assessment and treatment.     Jl Cook MD  [facility]

## 2022-05-20 NOTE — CARE COORDINATION
05/20/22 1627   Readmission Assessment   Number of Days since last admission?   (scheduled cardiac cath)

## 2022-05-20 NOTE — PROGRESS NOTES
Call placed to [facility] to arrange Cath Lab to Cath Lab to [facility] under care of Dr Margaret Sanches.

## 2022-05-21 VITALS
TEMPERATURE: 98.4 F | HEART RATE: 72 BPM | RESPIRATION RATE: 18 BRPM | DIASTOLIC BLOOD PRESSURE: 70 MMHG | OXYGEN SATURATION: 95 % | SYSTOLIC BLOOD PRESSURE: 119 MMHG

## 2022-05-21 PROCEDURE — 6370000000 HC RX 637 (ALT 250 FOR IP): Performed by: STUDENT IN AN ORGANIZED HEALTH CARE EDUCATION/TRAINING PROGRAM

## 2022-05-21 RX ORDER — CLOPIDOGREL BISULFATE 75 MG/1
75 TABLET ORAL DAILY
Qty: 30 TABLET | Refills: 3 | Status: SHIPPED | OUTPATIENT
Start: 2022-05-21

## 2022-05-21 RX ADMIN — ASPIRIN 81 MG: 81 TABLET, CHEWABLE ORAL at 08:59

## 2022-05-21 RX ADMIN — CLOPIDOGREL 75 MG: 75 TABLET, FILM COATED ORAL at 08:59

## 2022-05-21 RX ADMIN — DULOXETINE HYDROCHLORIDE 30 MG: 30 CAPSULE, DELAYED RELEASE ORAL at 08:59

## 2022-05-21 ASSESSMENT — PAIN SCALES - GENERAL: PAINLEVEL_OUTOF10: 0

## 2022-05-21 NOTE — DISCHARGE SUMMARY
[facility]  Discharge Note                 Name:  Shreya Walker  YOB: 1965  Social Security Number:  [id number]  Medical Record Number:  [id number]    Date of Admission:  5/20/2022  Date of Discharge:  5/21/2022    Admitting physician: Ying Crocker MD    Discharge Attending: CHELSEA iPnto CNP, CNP  Primary Care Physician: [facility], APRN - CNP  Consultants: Cardiology  Discharge to [address] [place] [address] LIST:  Patient Active Problem List   Diagnosis    Dyslipidemia    Radiculopathy    Insomnia    Allergic rhinitis    COPD (chronic obstructive pulmonary disease)    Depression    Bilateral leg weakness    Gait difficulty    Diabetic neuropathy    Back pain    Muscle spasm    Affective disorder ([address]    History of ventral hernia repair    History of incisional hernia repair    Essential hypertension    Gastroesophageal reflux disease without esophagitis    Uncontrolled type 2 diabetes mellitus with diabetic polyneuropathy, with long-term current use of insulin ([address]    Primary osteoarthritis involving multiple joints    Heart palpitations    Dysautonomia orthostatic hypotension syndrome    Coronary artery disease involving native coronary artery of native heart    Angina, class III ([facility])    S/P CABG x 1    Precordial pain    DARON (obstructive sleep apnea)    Neuropathic pain    Soft tissue mass    Abnormal cardiovascular stress test    Muscle spasms of both lower extremities    Abscess of left great toe    Paronychia of great toe of left foot    Chest pain    Epigastric pain    ACS (acute coronary syndrome) ([facility])    Obesity (BMI 30-39. 9)    S/P angioplasty with stent         Procedures:cardiac catheterization    HOSPITAL COURSE :           The patient was admitted for: CAD  Hospital Procedures if any: PCI   Medications changes recommendation: See list   Follow Up Plan: 2 weeks       Discharge exam: Vitals:    05/21/22 0434   BP: 106/68   Pulse: 70   Resp: 16   Temp: 98.2 °F (36.8 °C)   SpO2: 92%     Neuro: normal  Chest: Clear to ausculation. No wheezing. Cardiac: Regular rate. s1 and s2 auscultated. No murmur noted. Abdomen/groin: soft, non-tender, without masses or organomegaly  Lower extremity edema: none     Follow up with primary care provider 1 week  Follow up with cardiology 4 weeks  Follow up with other consultant physicians at their directions. Discharge Medications:     Medication List      CONTINUE taking these medications    * BLOOD GLUCOSE MONITOR KIT Kit  1 each by Does not apply route daily. Please dispense whatever BS monitoring kit [employer] covers. Dx: 250, new onset T2DM. Testing once daily     * blood glucose monitor kit and supplies  1 Units by Does not apply route three times daily Unit insurance will cover     * ONE TOUCH ULTRA 2 w/Device Kit  1 kit by Does not apply route daily     Blood Pressure Monitor Kit  1 each by Does not apply route daily as needed ESSENTIAL HYPERTENSION   I10     FreeStyle Yancy 2 Sensor Misc     Insulin Pen Needle 29G X 12.7MM Misc  Commonly known as: BD ULTRA-FINE PEN NEEDLES  USE AS DIRECTED BY PHYSICIAN 5 times daily     Lancets Misc  1 each by Does not apply route 2 times daily         * This list has 3 medication(s) that are the same as other medications prescribed for you. Read the directions carefully, and ask your doctor or other care provider to review them with you.             ASK your doctor about these medications    acetaminophen 500 MG tablet  Commonly known as: TYLENOL  Take 1 tablet by mouth 4 times daily as needed for Pain     albuterol sulfate  (90 Base) MCG/ACT inhaler  Commonly known as: Ventolin HFA  INHALE 2 PUFFS EVERY 6 HOURS AS NEEDED FOR WHEEZING     aspirin 81 MG EC tablet  TAKE 1 TABLET BY MOUTH DAILY     atorvastatin 80 MG tablet  Commonly known as: LIPITOR  Take 1 tablet by mouth daily     blood glucose test strips  Test 3 times a day & as needed for symptoms of irregular blood glucose. Dispense sufficient amount for indicated testing frequency plus additional to accommodate PRN testing needs.      DULoxetine 30 MG extended release capsule  Commonly known as: CYMBALTA  Take 1 capsule by mouth daily     fludrocortisone 0.1 MG tablet  Commonly known as: FLORINEF  TAKE 3 TABLETS BY MOUTH EVERY DAY     insulin lispro (1 Unit Dial) 100 UNIT/ML Sopn  Commonly known as: HumaLOG KwikPen  Inject 20 Units into the skin 3 times daily (before meals)     losartan 25 MG tablet  Commonly known as: COZAAR  Take 1 tablet by mouth daily     metFORMIN 500 MG extended release tablet  Commonly known as: GLUCOPHAGE-XR  Take 2 tablets by mouth daily (with breakfast)     metoprolol succinate 100 MG extended release tablet  Commonly known as: TOPROL XL  Take 1.5 tablets by mouth daily     nitroGLYCERIN 0.4 MG SL tablet  Commonly known as: NITROSTAT  Place 1 tablet under the tongue every 5 minutes as needed for Chest pain     omeprazole 20 MG delayed release capsule  Commonly known as: PRILOSEC  TAKE 1 CAPSULE BY MOUTH EVERY DAY IN THE MORNING BEFORE BREAKFAST     ondansetron 4 MG disintegrating tablet  Commonly known as: Zofran ODT  Take 1 tablet by mouth every 8 hours as needed for Nausea     pregabalin 150 MG capsule  Commonly known as: LYRICA  Take one cap three times daily     Probiotic 250 MG Caps  Take 1 capsule by mouth daily     tiZANidine 2 MG tablet  Commonly known as: ZANAFLEX  TAKE 1 TABLET BY MOUTH NIGHTLY AS NEEDED(SPASMS)     Karis Bean FlexTouch 200 UNIT/ML Sopn  Generic drug: Insulin Degludec  Inject 86 Units into the skin daily PER DR LILIBETH Schneider 3 BX/6.5ID Sopn  Generic drug: Dulaglutide            Coronary Discharge Core Measure: Please indicate the medication given by X, and if not the reasons not given:    Not Given Reason  Given      Beta Blockers X      ACE-I X      Statins       ASA X       OAP (Plavix/Effient/Brilinta) X    SL Nitro   X     CARDIAC  CATH 5/20/22  Findings:      Cardiac Arteries and Lesion Findings       LMCA:         Lesion on LMCA: Mid subsection. 90% stenosis 10 mm length reduced to 0%.     Pre procedure JINNY II flow was noted. Post Procedure JINNY III flow was     present. Good runoff was present. The lesion was diagnosed as High Risk     (C).       Devices used         - Luge Wire 182 cm. Number of passes: 1.         - Resolute Eleele 4.0 x 15 JESSE. 1 inflation(s) to a max pressure of: 12     pietro.       Cardiac Grafts   -There is a LIMA graft that originates at the LIMA and attaches to the Mid   LAD.      Coronary Tree        Dominance: Mixed           Procedure Summary        Successful PTAC -JESSE Left Main disease to treat large dominant native LCX        Recommendations        Post stent protocol       Pt seen and examined. Pt resting in bed. She denies any chest pain or shortness of breath. Radial site soft. Discussed with patient and nursing. Medications and discharge instructions reviewed with patient and nursing. Pt verbalizes understanding regarding medications and activity restrictions. Script for plavix given. Will follow up in 2 weeks.   .    Electronically signed by CHELSEA Payne CNP on 5/21/2022 at [address] [facility]      [phone]

## 2022-05-21 NOTE — CARE COORDINATION
[place] [facility]  Written by: Vida Zepeda RN    Patient Name: Mika Connolly  Attending Provider: Gordo Pichardo MD  Admit Date: 2022  4:01 PM  MRN: [id number]  Account: [de-identified]                     : 1965  Discharge Date: 22      Disposition: home, no needs    Vida Zepeda RN

## 2022-05-21 NOTE — PLAN OF CARE
Problem: Chronic Conditions and Co-morbidities  Goal: Patient's chronic conditions and co-morbidity symptoms are monitored and maintained or improved  5/21/2022 1045 by Daly Ozuna RN  Outcome: Completed  5/21/2022 0242 by Pablo Schreiber RN  Outcome: Progressing     Problem: Discharge Planning  Goal: Discharge to home or other facility with appropriate resources  5/21/2022 1045 by Daly Ozuna RN  Outcome: Completed  5/21/2022 0242 by Pablo Schreiber RN  Outcome: Progressing     Problem: Safety - Adult  Goal: Free from fall injury  5/21/2022 1045 by Daly Ozuna RN  Outcome: Completed  5/21/2022 0242 by Pablo Schreiber RN  Outcome: Progressing     Problem: Safety - Adult  Goal: Free from fall injury  5/21/2022 1045 by Daly Ozuna RN  Outcome: Completed  5/21/2022 0242 by Pablo Schreiber RN  Outcome: Progressing     Problem: ABCDS Injury Assessment  Goal: Absence of physical injury  5/21/2022 1045 by Daly Ozuna RN  Outcome: Completed  5/21/2022 0242 by Pablo Schreiber RN  Outcome: Progressing

## 2022-05-21 NOTE — CARE COORDINATION
Transitional planning    Writer to room to discuss d/c planning, patient states she has a ride and no voiced needs.

## 2022-05-24 ENCOUNTER — TELEPHONE (OUTPATIENT)
Dept: PRIMARY CARE CLINIC | Age: 57
End: 2022-05-24

## 2022-05-24 NOTE — TELEPHONE ENCOUNTER
Jl [facility] Transitions Initial Follow Up Call    Outreach made within 2 business days of discharge: Yes    Patient: Alexa Tabor Patient : 1965   MRN: [id number]  Reason for Admission: There are no discharge diagnoses documented for the most recent discharge. Discharge Date: 22       Spoke with: Eduarda Dinh    Discharge department/facility: [facility]     TCM Interactive Patient Contact:  Was patient able to fill all prescriptions: Yes  Was patient instructed to bring all medications to the follow-up visit: Yes  Is patient taking all medications as directed in the discharge summary?  Yes  Does patient understand their discharge instructions: Yes  Does patient have questions or concerns that need addressed prior to 7-14 day follow up office visit: no patient did not want a follow up aapointment    Scheduled appointment with PCP within 7-14 days    Follow Up  Future Appointments   Date Time Provider Emma Gaxiola   2022  1:30 PM [facility]   2022  2:00  [facility] Rad   2022 11:00 AM [facility]   2022  1:00 PM CHELSEA Jones CNP Neuro Spec [facility]   2022  8:30 AM Bettina Alvares DPM [facility]   2022  2:20 PM Theresa Padron MD [facility]       Radha Winkler MA

## 2022-05-25 NOTE — PROCEDURES
[address], [place] [address]                                 EVENT MONITOR    PATIENT NAME: Clive Wild                     :        1965  MED REC NO:   [id number]                              ROOM:  ACCOUNT NO:   [de-identified]                           ADMIT DATE: 2022  PROVIDER:     Lucia Mobley MD    CARDIOVASCULAR DIAGNOSTIC DEPARTMENT    DATE OF STUDY:  2022    ORDERING PROVIDER:  Lucia Mobley MD    PRIMARY CARE PROVIDER:  EDEL Oneil    INTERPRETING PHYSICIAN:  Lucia Mobley MD    DIAGNOSIS:  Palpitations. PHYSICIAN INTERPRETATION:  1. Predominant rhythm:  Normal sinus rhythm. 2.  PAC 0.04%. 3.  PVC 0.01%. Largely unremarkable study.         Jong Banegas MD    D: 2022 16:38:50       T: 2022 16:39:52     LORENZO/LOI_NELIDA  Job#: [id number]     Doc#: Unknown    CC:  EDEL Zarate

## 2022-05-26 ENCOUNTER — TELEPHONE (OUTPATIENT)
Dept: CARDIOLOGY | Age: 57
End: 2022-05-26

## 2022-05-26 NOTE — TELEPHONE ENCOUNTER
----- Message from Tae Martinez MD sent at 5/26/2022 12:19 AM EDT -----  Let Ms. Randee Broussard know their test result was ok. Will discuss at next visit. Thanks.

## 2022-05-27 RX ORDER — NITROGLYCERIN 0.4 MG/1
0.4 TABLET SUBLINGUAL EVERY 5 MIN PRN
Qty: 25 TABLET | Refills: 1 | Status: SHIPPED | OUTPATIENT
Start: 2022-05-27

## 2022-06-06 ENCOUNTER — TELEPHONE (OUTPATIENT)
Dept: PRIMARY CARE CLINIC | Age: 57
End: 2022-06-06

## 2022-06-06 ENCOUNTER — HOSPITAL ENCOUNTER (OUTPATIENT)
Dept: WOMENS IMAGING | Age: 57
Discharge: HOME | End: 2022-06-08
Payer: COMMERCIAL

## 2022-06-06 ENCOUNTER — HOSPITAL ENCOUNTER (OUTPATIENT)
Dept: ULTRASOUND IMAGING | Age: 57
Discharge: HOME | End: 2022-06-08
Payer: COMMERCIAL

## 2022-06-06 DIAGNOSIS — R92.8 ABNORMAL MAMMOGRAM OF RIGHT BREAST: ICD-10-CM

## 2022-06-06 DIAGNOSIS — N63.10 BREAST MASS, RIGHT: Primary | ICD-10-CM

## 2022-06-06 PROCEDURE — G0279 TOMOSYNTHESIS, MAMMO: HCPCS

## 2022-06-06 PROCEDURE — 76642 ULTRASOUND BREAST LIMITED: CPT

## 2022-06-06 NOTE — TELEPHONE ENCOUNTER
----- Message from [address] [place] [address], APRN - CNP sent at 6/6/2022  3:45 PM EDT -----  Needs a biopsy of the right breast.  I have ordered that through radiology. Thank you.

## 2022-06-09 ENCOUNTER — HOSPITAL ENCOUNTER (OUTPATIENT)
Age: 57
Setting detail: SPECIMEN
Discharge: HOME | End: 2022-06-09
Payer: COMMERCIAL

## 2022-06-09 ENCOUNTER — HOSPITAL ENCOUNTER (OUTPATIENT)
Dept: WOMENS IMAGING | Age: 57
Discharge: HOME | End: 2022-06-11
Payer: COMMERCIAL

## 2022-06-09 ENCOUNTER — HOSPITAL ENCOUNTER (OUTPATIENT)
Dept: ULTRASOUND IMAGING | Age: 57
Discharge: HOME | End: 2022-06-11
Payer: COMMERCIAL

## 2022-06-09 VITALS
HEART RATE: 97 BPM | DIASTOLIC BLOOD PRESSURE: 77 MMHG | SYSTOLIC BLOOD PRESSURE: 116 MMHG | RESPIRATION RATE: 12 BRPM | OXYGEN SATURATION: 97 %

## 2022-06-09 DIAGNOSIS — R22.31 MASS OF RIGHT AXILLA: ICD-10-CM

## 2022-06-09 DIAGNOSIS — R92.8 ABNORMAL MAMMOGRAM: ICD-10-CM

## 2022-06-09 DIAGNOSIS — N63.10 BREAST MASS, RIGHT: ICD-10-CM

## 2022-06-09 PROCEDURE — 2500000003 HC RX 250 WO HCPCS: Performed by: RADIOLOGY

## 2022-06-09 PROCEDURE — 88305 TISSUE EXAM BY PATHOLOGIST: CPT

## 2022-06-09 PROCEDURE — 2709999900 US BREAST BIOPSY W LOC DEVICE EACH ADDL LESION RIGHT

## 2022-06-09 PROCEDURE — 19083 BX BREAST 1ST LESION US IMAG: CPT

## 2022-06-09 PROCEDURE — 88360 TUMOR IMMUNOHISTOCHEM/MANUAL: CPT

## 2022-06-09 PROCEDURE — 77065 DX MAMMO INCL CAD UNI: CPT

## 2022-06-09 RX ORDER — LIDOCAINE HYDROCHLORIDE 10 MG/ML
INJECTION, SOLUTION EPIDURAL; INFILTRATION; INTRACAUDAL; PERINEURAL
Status: COMPLETED | OUTPATIENT
Start: 2022-06-09 | End: 2022-06-09

## 2022-06-09 RX ADMIN — LIDOCAINE HYDROCHLORIDE 5 ML: 10 INJECTION, SOLUTION EPIDURAL; INFILTRATION; INTRACAUDAL; PERINEURAL at 13:25

## 2022-06-09 RX ADMIN — LIDOCAINE HYDROCHLORIDE 8 ML: 10 INJECTION, SOLUTION EPIDURAL; INFILTRATION; INTRACAUDAL; PERINEURAL at 13:28

## 2022-06-14 ENCOUNTER — TELEPHONE (OUTPATIENT)
Dept: PRIMARY CARE CLINIC | Age: 57
End: 2022-06-14

## 2022-06-14 DIAGNOSIS — D05.11 DUCTAL CARCINOMA IN SITU (DCIS) OF RIGHT BREAST: Primary | ICD-10-CM

## 2022-06-14 LAB — SURGICAL PATHOLOGY REPORT: NORMAL

## 2022-06-14 NOTE — TELEPHONE ENCOUNTER
----- Message from [address] [place] [address], APRN - CNP sent at 6/14/2022  4:24 PM EDT -----  Patient has some abnormal cells that came back on the biopsy that are cancerous. This is very early. I have referred her to surgery and oncology for evaluation.

## 2022-06-14 NOTE — TELEPHONE ENCOUNTER
Patient informed of results of biopsy. Informed of referrals made. Verbalizes understanding, no questions.

## 2022-06-16 ENCOUNTER — TELEPHONE (OUTPATIENT)
Dept: ONCOLOGY | Age: 57
End: 2022-06-16

## 2022-06-16 ENCOUNTER — OFFICE VISIT (OUTPATIENT)
Dept: ONCOLOGY | Age: 57
End: 2022-06-16
Payer: COMMERCIAL

## 2022-06-16 VITALS
SYSTOLIC BLOOD PRESSURE: 116 MMHG | TEMPERATURE: 97.7 F | RESPIRATION RATE: 18 BRPM | HEART RATE: 96 BPM | BODY MASS INDEX: 37.92 KG/M2 | WEIGHT: 214 LBS | HEIGHT: 63 IN | DIASTOLIC BLOOD PRESSURE: 68 MMHG

## 2022-06-16 DIAGNOSIS — M54.10 RADICULOPATHY, UNSPECIFIED SPINAL REGION: Primary | ICD-10-CM

## 2022-06-16 DIAGNOSIS — J44.9 CHRONIC OBSTRUCTIVE PULMONARY DISEASE, UNSPECIFIED COPD TYPE (HCC): ICD-10-CM

## 2022-06-16 DIAGNOSIS — D05.11 DUCTAL CARCINOMA IN SITU (DCIS) OF RIGHT BREAST: ICD-10-CM

## 2022-06-16 PROBLEM — D05.10 DCIS (DUCTAL CARCINOMA IN SITU): Status: ACTIVE | Noted: 2022-06-16

## 2022-06-16 PROCEDURE — 3017F COLORECTAL CA SCREEN DOC REV: CPT | Performed by: INTERNAL MEDICINE

## 2022-06-16 PROCEDURE — G8427 DOCREV CUR MEDS BY ELIG CLIN: HCPCS | Performed by: INTERNAL MEDICINE

## 2022-06-16 PROCEDURE — 99205 OFFICE O/P NEW HI 60 MIN: CPT | Performed by: INTERNAL MEDICINE

## 2022-06-16 PROCEDURE — 1036F TOBACCO NON-USER: CPT | Performed by: INTERNAL MEDICINE

## 2022-06-16 PROCEDURE — G8417 CALC BMI ABV UP PARAM F/U: HCPCS | Performed by: INTERNAL MEDICINE

## 2022-06-16 PROCEDURE — 3023F SPIROM DOC REV: CPT | Performed by: INTERNAL MEDICINE

## 2022-06-16 NOTE — LETTER
[facility] of [facility] [address]  Phone: [phone]  Fax: [phone]           Jacky Elliott MD      June 16, 2022     Patient: Kenn Arellano   MR Number: [id number]   YOB: 1965   Date of Visit: 6/16/2022       Dear Dr. Monroe Mu:    Thank you for referring Rosemary Bennett to me for evaluation/treatment. Below are the relevant portions of my assessment and plan of care. If you have questions, please do not hesitate to call me. I look forward to following Rox Llanes along with you.     Sincerely,        Jacky Elliott MD    CC providers:  CHELSEA Ohara CNP  [address] [place] [address]

## 2022-06-16 NOTE — PROGRESS NOTES
Patient ID: Cuate Victoria, 1965, [id number], 62 y.o. Referred by :  Ever Plasencia APRN - *   Reason for consultation: Right DCIS      HISTORY OF PRESENT ILLNESS:    Oncologic History:    Cuate Victoria is a very pleasant 62 y.o. female who found on a routine mammogram on May 22, 2022 new group of calcifications upper central right breast and ultrasound did show suspicious lesion at 11:00 6 cm from the nipple core biopsy was done on 6/9/2022 and that showed DCIS strongly ER/OH positive and patient was referred for medical oncology  No family history of breast cancer  No history of using birth control in her younger age  Patient had hysterectomy in her 35s    Past Medical History:   Diagnosis Date    Anxiety     Asthma     CAD (coronary artery disease)     Clinical trial participant at discharge 3/31/16    COPD (chronic obstructive pulmonary disease) ([facility] [address]     Depression     Diabetic neuropathy ([facility] [address]     H/O cardiac catheterization 4/26/16    LMCA: Mild Irregularities 10-20%. LAD: Lesion on Prox LAD: Proximal subsection. 100% stenosis. LCx: Mild irregularities 10-20%. RCA: Mild irregularities 10-20%. Widely patent mid RCA stent. EF:60%.  H/O cardiovascular stress test 10/07/2016    Overall results are most consistent with a low risk for significant CAD.     H/O echocardiogram 10/05/2016    EF:>60%. Inferoseptal wall abnormal in its motion which is not unusal status post open heart surgery. Evidence of mild (grade I) diastolic dysfunction is seen.  H/O tilt table evaluation 07/12/2016    Abnormal. PTs HR, Blood Pressure response and symptoms were most consistent with dysautonomia. Combined w/viligant maintenance of euvolemia and maintaining a moderate salt intake, pharmaciologic treatment w/ ProAmatine, SSRI such as Lexapro and/or Mestinon among other treatments have shown some effectiveness in the treatment of this condition.      History of cardiovascular stress test 02/13/2019 Abnormal. Small/moderate perfusion defect of mild/moderate intesity in the anterior and anterolateral regions during stress imaging which is most consistent w/ ischemia but may be artifact. Overall low/intermediate risk for significant CAD.  History of echocardiogram 09/06/2018    EF >60%. Inferoseptal wall is abnormal in its motion which is not unusual s/p open heart. Evidence of mild (grade I) diastolic dysfunction seen.     Hx of blood clots     Hyperlipidemia     Hypertension     Intermittent claudication (HCC)     Kidney stones     Movement disorder     neuropathy in legs    Neuromuscular disorder (HCC)     neuropathy    Osteoarthritis     Reflux     Seizures ([facility])     last over 10 yr ago    Stented coronary artery 9/22/2010     LAD/[place]    Stented coronary artery 3/31/16    RCA/[place]    Type II or unspecified type diabetes mellitus without mention of complication, not stated as uncontrolled     Unspecified sleep apnea     no machine       Past Surgical History:   Procedure Laterality Date    ABDOMINAL HERNIA REPAIR  01/2016    repair done [place]    APPENDECTOMY      CARDIAC CATHETERIZATION Left 04/26/2016    right radial/ [facility] [place]/ Dr Eufemia Walker Left 03/07/2019    Left Radial/[facility] [place]/    CARDIAC CATHETERIZATION  05/20/2022    Dr Marely Villarreal radial    CARDIAC CATHETERIZATION  05/20/2022    Dr Marely Villarreal radial   [address]      CABG 2019    CHOLECYSTECTOMY  1991    COLONOSCOPY  12/16/2014    -hemorrhoids,bx    CORONARY ANGIOPLASTY WITH STENT PLACEMENT  09/2010    CORONARY ANGIOPLASTY WITH STENT PLACEMENT  03/31/2016    [facility] RCA / DR Mj Costello    CORONARY ANGIOPLASTY WITH STENT PLACEMENT  05/20/2022    CORONARY ARTERY BYPASS GRAFT  08/15/2016    OP X 1- Dr Rivas Candelaria, COLON, DIAGNOSTIC  12/16/2014    HERNIA REPAIR  12/13/2012    at the medial aspect of a [place] RUQ scar); repaired [id number]   02/16/2015    incisional, recurrent    HERNIA REPAIR  02/2016    HYSTERECTOMY (CERVIX STATUS UNKNOWN)      OTHER SURGICAL HISTORY  10/08/2015    abd wound washout, mesh removal, wound vac placement    NY SUCT NICOLE LIPECTOMY,HEAD/NECK Left 08/27/2018    THIGH LESION BIOPSY EXCISION, SOFT TISSUE MASS [facility]    leg    UPPP UVULOPALATOPHARYGOPLASTY  06/26/2012    US BREAST BIOPSY NEEDLE ADDITIONAL RIGHT Right 6/9/2022    US BREAST BIOPSY NEEDLE ADDITIONAL RIGHT 6/9/2022 [facility] ULTRAS[facility]    US BREAST NEEDLE BIOPSY RIGHT Right 6/9/2022    US BREAST NEEDLE BIOPSY RIGHT 6/9/2022 [facility] ULTRASOUND    VENTRAL HERNIA REPAIR  09/21/2015    With Mesh - Dr Katie Luther       Allergies   Allergen Reactions    Tape Liss Rice Tape] Rash       Current Outpatient Medications   Medication Sig Dispense Refill    nitroGLYCERIN (NITROSTAT) 0.4 MG SL tablet Place 1 tablet under the tongue every 5 minutes as needed for Chest pain 25 tablet 1    clopidogrel (PLAVIX) 75 MG tablet Take 1 tablet by mouth daily 30 tablet 3    TRULICITY 3 PE/0.7IN SOPN INJECT 0.5 ML (1 PEN) SUBCUTANEOUSLY WEEKLY,X30 DAYS,INSTR:ROTATE INJECTION SITES      metoprolol succinate (TOPROL XL) 100 MG extended release tablet Take 1.5 tablets by mouth daily 90 tablet 3    pregabalin (LYRICA) 150 MG capsule Take one cap three times daily (Patient taking differently: Take 150 mg by mouth in the morning, at noon, and at bedtime.  Take one cap three times daily) 90 capsule 6    atorvastatin (LIPITOR) 80 MG tablet Take 1 tablet by mouth daily 90 tablet 3    fludrocortisone (FLORINEF) 0.1 MG tablet TAKE 3 TABLETS BY MOUTH EVERY DAY (Patient taking differently: Take 0.1 mg by mouth daily Pt states she takes 1 tab BID) 270 tablet 3    Blood Glucose Monitoring Suppl (ONE TOUCH ULTRA 2) w/Device KIT 1 kit by Does not apply route daily 1 kit 0    blood glucose monitor strips Test 3 times a day & as needed for symptoms of irregular blood glucose. Dispense sufficient amount for indicated testing frequency plus additional to accommodate PRN testing needs.  300 strip 0    Lancets MISC 1 each by Does not apply route 2 times daily 300 each 1    Continuous Blood Gluc Sensor (FREESTYLE MCKAYLA 2 SENSOR) MISC 2 FREESTYLE MCKAYLA 2 SENSORS TO USE WITH MCKAYLA 2 READER TO CHECK BLOOD SUGARS 6 8 TIMES A DAY      Insulin Degludec (TRESIBA FLEXTOUCH) 200 UNIT/ML SOPN Inject 86 Units into the skin daily PER DR MCELROY (Patient taking differently: Inject 110 Units into the skin daily PER DR MCELROY) 1 pen 0    metFORMIN (GLUCOPHAGE-XR) 500 MG extended release tablet Take 2 tablets by mouth daily (with breakfast) 180 tablet 3    omeprazole (PRILOSEC) 20 MG delayed release capsule TAKE 1 CAPSULE BY MOUTH EVERY DAY IN THE MORNING BEFORE BREAKFAST (Patient taking differently: Take 20 mg by mouth Daily ) 90 capsule 1    albuterol sulfate HFA (VENTOLIN HFA) 108 (90 Base) MCG/ACT inhaler INHALE 2 PUFFS EVERY 6 HOURS AS NEEDED FOR WHEEZING (Patient taking differently: Inhale 2 puffs into the lungs every 6 hours as needed INHALE 2 PUFFS EVERY 6 HOURS AS NEEDED FOR WHEEZING) 18 Inhaler 3    ondansetron (ZOFRAN ODT) 4 MG disintegrating tablet Take 1 tablet by mouth every 8 hours as needed for Nausea 20 tablet 0    Saccharomyces boulardii (PROBIOTIC) 250 MG CAPS Take 1 capsule by mouth daily 30 capsule 0    tiZANidine (ZANAFLEX) 2 MG tablet TAKE 1 TABLET BY MOUTH NIGHTLY AS NEEDED(SPASMS) (Patient taking differently: Take 2 mg by mouth nightly as needed TAKE 1 TABLET BY MOUTH NIGHTLY AS NEEDED(SPASMS)) 30 tablet 2    acetaminophen (TYLENOL) 500 MG tablet Take 1 tablet by mouth 4 times daily as needed for Pain 40 tablet 0    losartan (COZAAR) 25 MG tablet Take 1 tablet by mouth daily 90 tablet 3    insulin lispro, 1 Unit Dial, (HUMALOG KWIKPEN) 100 UNIT/ML SOPN Inject 20 Units into the skin 3 times daily (before meals) 12 pen 1    DULoxetine (CYMBALTA) 30 MG extended release capsule Take 1 capsule by mouth daily 30 capsule 1    aspirin 81 MG EC tablet TAKE 1 TABLET BY MOUTH DAILY (Patient taking differently: Take 81 mg by mouth daily ) 90 tablet 3    Insulin Pen Needle (BD ULTRA-FINE PEN NEEDLES) 29G X 12.7MM MISC USE AS DIRECTED BY PHYSICIAN 5 times daily 150 each 11    Blood Pressure Monitor KIT 1 each by Does not apply route daily as needed ESSENTIAL HYPERTENSION   I10 1 kit 0    Blood Glucose Monitoring Suppl BERE 1 Units by Does not apply route three times daily Unit insurance will cover 1 Device 0    Blood Glucose Monitoring Suppl (BLOOD GLUCOSE MONITOR KIT) KIT 1 each by Does not apply route daily. Please dispense whatever BS monitoring kit [employer] covers. Dx: 250, new onset T2DM. Testing once daily 1 kit 0     No current facility-administered medications for this visit.        Social History     Socioeconomic History    Marital status:      Spouse name: Not on file    Number of children: Not on file    Years of education: Not on file    Highest education level: Not on file   Occupational History    Not on file   Tobacco Use    Smoking status: Former Smoker     Packs/day: 2.00     Years: 10.00     Pack years: 20.00     Types: Cigarettes     Quit date: 2010     Years since quittin.7    Smokeless tobacco: Never Used   Vaping Use    Vaping Use: Never used   Substance and Sexual Activity    Alcohol use: No    Drug use: No    Sexual activity: Yes     Partners: Male   Other Topics Concern    Not on file   Social History Narrative    Not on file     Social Determinants of Health     Financial Resource Strain: Low Risk     Difficulty of Paying Living Expenses: Not very hard   Food Insecurity: Food Insecurity Present    Worried About Running Out of Food in the Last Year: Sometimes true    Arianna of Food in the Last Year: Sometimes true   Transportation Needs:     Lack of Transportation (Medical): Not on file    Lack of Transportation (Non-Medical): Not on file   Physical Activity:     Days of Exercise per Week: Not on file    Minutes of Exercise per Session: Not on file   Stress:     Feeling of Stress : Not on file   Social Connections:     Frequency of Communication with Friends and Family: Not on file    Frequency of Social Gatherings with Friends and Family: Not on file    Attends Jewish Services: Not on file    Active Member of [address] [place] [address] or Organizations: Not on file    Attends Club or Organization Meetings: Not on file    Marital Status: Not on file   Intimate Partner Violence:     Fear of Current or Ex-Partner: Not on file    Emotionally Abused: Not on file    Physically Abused: Not on file    Sexually Abused: Not on file   Housing Stability:     Unable to Pay for Housing in the Last Year: Not on file    Number of [place] in the Last Year: Not on file    Unstable Housing in the Last Year: Not on file       Family History   Problem Relation Age of Onset    Cancer Mother     Diabetes Mother     Cancer Father         thyroid    Diabetes Sister     Heart Disease Sister     Heart Disease Brother     Heart Disease Maternal Uncle     Cancer Maternal Grandmother         REVIEW OF SYSTEM:     Constitutional: No fever or chills. No night sweats, no weight loss   Eyes: No eye discharge, double vision, or eye pain   HEENT: negative for sore mouth, sore throat, hoarseness and voice change   Respiratory: negative for cough , sputum, dyspnea, wheezing, hemoptysis, chest pain   Cardiovascular: negative for chest pain, dyspnea, palpitations, orthopnea, PND   Gastrointestinal: negative for nausea, vomiting, diarrhea, constipation, abdominal pain, Dysphagia, hematemesis and hematochezia   Genitourinary: negative for frequency, dysuria, nocturia, urinary incontinence, and hematuria   Integument: negative for rash, skin lesions, bruises.    Hematologic/Lymphatic: negative for easy bruising, bleeding, lymphadenopathy, petechiae and swelling/edema   Endocrine: negative for heat or cold intolerance, tremor, weight changes, change in bowel habits and hair loss   Musculoskeletal: negative for myalgias, arthralgias, pain, joint swelling,and bone pain   Neurological: negative for headaches, dizziness, seizures, weakness, numbness       OBJECTIVE:         Vitals:    06/16/22 0942   BP: 116/68   Pulse: 96   Resp: 18   Temp: 97.7 °F (36.5 °C)       PHYSICAL EXAM:   General appearance - well appearing, no in pain or distress   Mental status - alert and cooperative   Eyes - pupils equal and reactive, extraocular eye movements intact   Ears - bilateral TM's and external ear canals normal   Mouth - mucous membranes moist, pharynx normal without lesions   Neck - supple, no significant adenopathy   Lymphatics - no palpable lymphadenopathy, no hepatosplenomegaly   Chest - clear to auscultation, no wheezes, rales or rhonchi, symmetric air entry   Heart - normal rate, regular rhythm, normal S1, S2, no murmurs, rubs, clicks or gallops   Abdomen - soft, nontender, nondistended, no masses or organomegaly   Neurological - alert, oriented, normal speech, no focal findings or movement disorder noted   Musculoskeletal - no joint tenderness, deformity or swelling   Extremities - peripheral pulses normal, no pedal edema, no clubbing or cyanosis   Skin - normal coloration and turgor, no rashes, no suspicious skin lesions noted ,      LABORATORY DATA:     Lab Results   Component Value Date    WBC 10.0 05/20/2022    HGB 12.4 05/20/2022    HCT 39.5 05/20/2022    MCV 76.4 (L) 05/20/2022     05/20/2022    LYMPHOPCT 19 (L) 04/22/2022    RBC 5.17 (H) 05/20/2022    MCH 24.0 (L) 05/20/2022    MCHC 31.4 05/20/2022    RDW 15.0 (H) 05/20/2022    MONOPCT 5 04/22/2022    BASOPCT 1 04/22/2022    NEUTROABS 6.94 04/22/2022    LYMPHSABS 1.77 04/22/2022    MONOSABS 0.42 04/22/2022    EOSABS 0.14 04/22/2022 BASOSABS 0.07 04/22/2022         Chemistry        Component Value Date/Time     05/16/2022 1531    K 4.2 05/16/2022 1531    CL 98 05/16/2022 1531    CO2 22 05/16/2022 1531    BUN 13 05/16/2022 1531    CREATININE 0.54 05/16/2022 1531        Component Value Date/Time    CALCIUM 9.7 05/16/2022 1531    ALKPHOS 99 04/22/2022 1316    AST 11 04/22/2022 1316    ALT 9 04/22/2022 1316    BILITOT 0.41 04/22/2022 1316            PATHOLOGY DATA:      1 Result Note     1 Follow-up Encounter    Component 6/9/22 1325   Surgical Pathology Report -- Diagnosis --   A.  RIGHT BREAST, UOQ, 11:00, 6 CM FN, CORE NEEDLE BIOPSIES:        -  INTERMEDIATE GRADE DUCTAL CARCINOMA IN SITU, SOLID TYPE.             -  ESTROGEN AND PROGESTERONE RECEPTOR IMMUNOSTAINS STRONGLY   POSITIVE IN DUCTAL CARCINOMA IN SITU. B.  RIGHT AXILLA LYMPH NODE, CORE NEEDLE BIOPSIES:        -  BENIGN LYMPH NODE, NEGATIVE FOR MALIGNANCY. Charanjit Cornejo M.D.   **Electronically Signed Out**         jet/6/13/2022         Clinical Information   Clinical findings: RIGHT UOQ 11:00 6 CM FN, RIGHT AXILLA   Operative Findings:  RIGHT BREAST MASS 11:00; RT AXILLA LYMPH NODE     Source of Specimen   A: RT BREAST MASS   B: RT AXILLA BX     Gross Description   A.  \"SIMON WILD, RIGHT BREAST\" Cores and fragments of fibrofatty   tissue, 1.0 x 0.8 x 0.4 cm in aggregate.  Entirely 1cs.    B.  \"SIMON WILD, RIGHT AXILLA\" Cores and fragments of fibrofatty   tissue, 1.0 x 0.8 x 0.3 cm in aggregate.  Entirely 1cs.  mpb tm       Microscopic Description   A.  Initial levels of fibroglandular breast tissue and adipose tissue   contain atrophic breast lobules.  Subsequent levels however show with   distended ducts lined by atypical epithelial cells in a solid pattern.    Calcifications are not identified.  There is no evidence of invasive   malignancy.  The focus measures 2.5 mm in largest dimension.  Slides   were reviewed with a second pathologist [facility]) who agrees with the diagnosis.    PROCEDURE:                              Core needle biopsies   SPECIMEN LATERALITY / TUMOR SITE:          Right breast upper outer   quadrant 11:00 6 cm from nipple   HISTOLOGIC TYPE:                         Ductal carcinoma in situ                     DCIS ARCHITECTURAL PATTERN:               Solid type   NUCLEAR GRADE:                           intermediate grade   NECROSIS:                              not identified   MICROCALCIFICATIONS:                    Not identified             BIOMARKER TESTING  BREAST CARCINOMA         ESTROGEN RECEPTOR*--   -POSITIVE (PERCENT =>10% OF POSITIVE TUMOR CELL NUCLEI):     Yes, over   95%       ---AVERAGE INTENSITY OF POSITIVE STAINING:               3+, strong   -LOW POSITIVE (PERCENT 1-10% OF POSITIVE TUMOR CELL NUCLEI):     No         ---AVERAGE INTENSITY OF POSITIVE STAINING:               N/A   -NEGATIVE (<1% OF POSITIVE TUMOR CELL NUCLEI):           No     ---AVERAGE INTENSITY OF ANY POSITIVE STAINING:           N/A   ---INTERNAL CONTROL PRESENT AND STAIN AS EXPECTED:           Yes     ---INTERNAL CONTROL CELLS ABSENT (COMMENT):                No     ---INTERNAL CONTROL CELLS PRESENT BUT DO NOT STAIN (COMMENT): No         PROGESTERONE RECEPTOR*--   -POSITIVE (=>1% OF TUMOR CELL NUCLEI POSITIVE):           Yes, over   95%   ---AVERAGE INTENSITY OF POSITIVE STAINING:                   3+,   strong   -NEGATIVE (<1% OF POSITIVE TUMOR CELL NUCLEI):              no   ---AVERAGE INTENSITY OF ANY POSITIVE STAINING:           N/A   ---INTERNAL CONTROL PRESENT AND STAIN AS EXPECTED:           Yes     ---INTERNAL CONTROL CELLS ABSENT (COMMENT):              no   ---INTERNAL CONTROL CELLS PRESENT BUT DO NOT STAIN (COMMENT): No                 IMAGING DATA:      US BREAST BIOPSY W LOC DEVICE EACH ADDL LESION RIGHT  Narrative: EXAMINATION:  ULTRASOUND-GUIDED right BREAST BIOPSY WITH VACUUM-ASSIST    ULTRASOUND-GUIDED CLIP PLACEMENT    POSTPROCEDURE DIGITAL MAMMOGRAM    6/9/2022    HISTORY:  ORDERING SYSTEM PROVIDED HISTORY: Breast mass, right  TECHNOLOGIST PROVIDED HISTORY:  mass    TECHNIQUE:  A timeout was performed to confirm patient identification and site of  procedure. Risks, benefits, and alternatives of the procedure were discussed. Informed written consent was obtained. Transverse and longitudinal gray scale images were obtained of the 11 o'clock  position 6 cm from the nipple. The biopsy site was prepped in standard  fashion. 1% lidocaine was used for local anesthesia and a small skin incision  was made. A 12-gauge, vacuum-assisted biopsy needle was advanced under  sonographic guidance via a lateral medial approach. 3 cores were obtained and  the needle was removed. A [employer] T3 Open Coil biopsy clip was placed at the biopsy site under  ultrasound guidance. Pressure was applied for hemostasis. The patient  tolerated the procedure well with no immediate complications. The right axilla was also imaged redemonstrated enlarged lymph node with  cortical thickening. The overlying subcutaneous tissues were anesthetized  with 1% xylocaine. A 14 gauge Temno biopsy needle was advanced into the  lymph node with 2 samples obtained. Biopsy marker was placed. Needle was  removed and hemostasis obtained. POSTPROCEDURE MAMMOGRAM:    ML and CC views of the right breast were obtained immediately following the  procedure. The biopsy clips are not in the correct location with respect to  the targeted site. There is no evidence of biopsy clip migration from the  biopsy site. Post biopsy clip placement imaging performed in a separate room. Impression: Technically successful ultrasound guided core biopsy of 11 o'clock position  right breast and right axilla and  clip marker placement as described above. BIRADS:  ZW - Pathology pending.   ELIGIO POST BX CLIP PLACEMENT RIGHT  Narrative: EXAMINATION:  ULTRASOUND-GUIDED right BREAST BIOPSY WITH VACUUM-ASSIST    ULTRASOUND-GUIDED CLIP PLACEMENT    POSTPROCEDURE DIGITAL MAMMOGRAM    6/9/2022    HISTORY:  ORDERING SYSTEM PROVIDED HISTORY: Breast mass, right  TECHNOLOGIST PROVIDED HISTORY:  mass    TECHNIQUE:  A timeout was performed to confirm patient identification and site of  procedure. Risks, benefits, and alternatives of the procedure were discussed. Informed written consent was obtained. Transverse and longitudinal gray scale images were obtained of the 11 o'clock  position 6 cm from the nipple. The biopsy site was prepped in standard  fashion. 1% lidocaine was used for local anesthesia and a small skin incision  was made. A 12-gauge, vacuum-assisted biopsy needle was advanced under  sonographic guidance via a lateral medial approach. 3 cores were obtained and  the needle was removed. A [employer] T3 Open Coil biopsy clip was placed at the biopsy site under  ultrasound guidance. Pressure was applied for hemostasis. The patient  tolerated the procedure well with no immediate complications. The right axilla was also imaged redemonstrated enlarged lymph node with  cortical thickening. The overlying subcutaneous tissues were anesthetized  with 1% xylocaine. A 14 gauge Temno biopsy needle was advanced into the  lymph node with 2 samples obtained. Biopsy marker was placed. Needle was  removed and hemostasis obtained. POSTPROCEDURE MAMMOGRAM:    ML and CC views of the right breast were obtained immediately following the  procedure. The biopsy clips are not in the correct location with respect to  the targeted site. There is no evidence of biopsy clip migration from the  biopsy site. Post biopsy clip placement imaging performed in a separate room. Impression: Technically successful ultrasound guided core biopsy of 11 o'clock position  right breast and right axilla and  clip marker placement as described above. BIRADS:  ZW - Pathology pending.   US BREAST BIOPSY W LOC DEVICE 1ST LESION RIGHT  Narrative: EXAMINATION:  ULTRASOUND-GUIDED right BREAST BIOPSY WITH VACUUM-ASSIST    ULTRASOUND-GUIDED CLIP PLACEMENT    POSTPROCEDURE DIGITAL MAMMOGRAM    6/9/2022    HISTORY:  ORDERING SYSTEM PROVIDED HISTORY: Breast mass, right  TECHNOLOGIST PROVIDED HISTORY:  mass    TECHNIQUE:  A timeout was performed to confirm patient identification and site of  procedure. Risks, benefits, and alternatives of the procedure were discussed. Informed written consent was obtained. Transverse and longitudinal gray scale images were obtained of the 11 o'clock  position 6 cm from the nipple. The biopsy site was prepped in standard  fashion. 1% lidocaine was used for local anesthesia and a small skin incision  was made. A 12-gauge, vacuum-assisted biopsy needle was advanced under  sonographic guidance via a lateral medial approach. 3 cores were obtained and  the needle was removed. A [employer] T3 Open Coil biopsy clip was placed at the biopsy site under  ultrasound guidance. Pressure was applied for hemostasis. The patient  tolerated the procedure well with no immediate complications. The right axilla was also imaged redemonstrated enlarged lymph node with  cortical thickening. The overlying subcutaneous tissues were anesthetized  with 1% xylocaine. A 14 gauge Temno biopsy needle was advanced into the  lymph node with 2 samples obtained. Biopsy marker was placed. Needle was  removed and hemostasis obtained. POSTPROCEDURE MAMMOGRAM:    ML and CC views of the right breast were obtained immediately following the  procedure. The biopsy clips are not in the correct location with respect to  the targeted site. There is no evidence of biopsy clip migration from the  biopsy site. Post biopsy clip placement imaging performed in a separate room.   Impression: Technically successful ultrasound guided core biopsy of 11 o'clock position  right breast and right axilla and  clip marker placement as described above.    BIRADS:  ZW - Pathology pending. ASSESSMENT:       Diagnosis Orders   1. Radiculopathy, unspecified spinal region     2. Ductal carcinoma in situ (DCIS) of right breast     3. Chronic obstructive pulmonary disease, unspecified COPD type ([facility] [address]          1. DCIS on the right breast  2. Multiple comorbidity  3. Previous smoker    PLAN:     I reviewed labs, imaging studies, related electronic medical records including outside records and discussed the diagnosis, prognosis and treatment recommendations   I reviewed the surgical pathology results, discuss goals of care and NCCN guidelines with patient and family   Patient had right DCIS which considered a precancerous condition discussed with the patient about natural history of breast cancer in general and DCIS as stage 0 cancer;treatment plan which is lumpectomy and adjuvant radiation and hormonal treatment for 5 years to decrease the risk of invasive cancer in the same side and contralateral malignancy on the contralateral side in the form of tamoxifen or aromatase inhibitors assuming she had menopause  Would like referral to surgery and radiation oncology and I will see her after surgery and review the pathology  will obtain [facility] [address]     Thank you for the consult         I spent a total of 60 minutes on the date of the service which included preparing to see the patient, face-to-face patient care, completing clinical documentation, obtaining and/or reviewing separately obtained history, performing a medically appropriate examination, counseling and educating the patient/family/caregiver and ordering medications, tests, or procedures.                                    [facility]                            This note is created with the assistance of a speech recognition program.  While intending to generate a document that actually reflects the content of the visit, the document can still have some errors including those of syntax and sound a like substitutions which may escape proof reading. It such instances, actual meaning can be extrapolated by contextual diversion.

## 2022-06-16 NOTE — TELEPHONE ENCOUNTER
Name: Timo Mallory  : 1965  MRN: [id number]    Oncology Navigation- Initial Note:    Intake-  Contact Type: Medical Oncology    Diagnosis: Breast-malignant    Home Disposition: Lives with other who is able to assist    Patient needs and barriers to care: Transportation      Continuum of Care: Diagnosis/Active Treatment    Notes: Initial oncology nurse navigation contact made with patient in clinic. Writer introduced self as oncology nurse navigator and introduced navigation program.      Patient was accompanied by spouse. Patient lives in single story with others who are able to assist if needed. Patient notes that she may need assistance with transportation to radiation treatment. Patient notes that she has [employer] and has used transportation services through them before. Oscar Puckett states that she will contact [employer] regarding transportation benefits. Patient denies financial concerns at this time. Patient made aware of Financial counselor and resources along with social work. Diet discussed. Patient is eating and drinking well. Patient states that she has been nervous the last couple days that have affected her appetite. Patient aware that dietician is available. Spiritual Care discussed. Patient denies spiritual care needs at this time. Patient informed navigation folder with contact information, local, and national resources will be mailed to her home. Patient encouraged to review folder when received. Patient encouraged to call with any question or concerns at this time. Will continue to follow.        Electronically signed by Alanna Wild RN on 2022 at 12:40 PM

## 2022-06-22 ENCOUNTER — HOSPITAL ENCOUNTER (OUTPATIENT)
Age: 57
Discharge: HOME | End: 2022-06-22
Payer: COMMERCIAL

## 2022-06-22 DIAGNOSIS — D05.11 DUCTAL CARCINOMA IN SITU (DCIS) OF RIGHT BREAST: ICD-10-CM

## 2022-06-22 LAB
FOLLICLE STIMULATING HORMONE: 28.7 MIU/ML (ref 25.8–134.8)
LH: 36 MIU/ML (ref 7.7–58.5)

## 2022-06-22 PROCEDURE — 83002 ASSAY OF GONADOTROPIN (LH): CPT

## 2022-06-22 PROCEDURE — 36415 COLL VENOUS BLD VENIPUNCTURE: CPT

## 2022-06-22 PROCEDURE — 83001 ASSAY OF GONADOTROPIN (FSH): CPT

## 2022-06-27 ENCOUNTER — TELEPHONE (OUTPATIENT)
Dept: PRIMARY CARE CLINIC | Age: 57
End: 2022-06-27

## 2022-06-27 NOTE — TELEPHONE ENCOUNTER
----- Message from KemahCHELSEA Le - CNP sent at 6/26/2022  7:12 PM EDT -----  Please make sure she has an appointment with Dr. Reji ROBB. The referral has been placed twice. Once by my and once by oncology. Thank you.

## 2022-06-27 NOTE — TELEPHONE ENCOUNTER
Patient went for consultation with Felecia Gross. He is going to do breast MRI to find if the lymph node is benign or not and then after results there office will call her to schedule surgery then.  She goes for her MRI on July 1st.

## 2022-06-30 ENCOUNTER — TELEPHONE (OUTPATIENT)
Dept: PRIMARY CARE CLINIC | Age: 57
End: 2022-06-30

## 2022-07-01 ENCOUNTER — HOSPITAL ENCOUNTER (OUTPATIENT)
Age: 57
Discharge: HOME | End: 2022-07-01
Payer: COMMERCIAL

## 2022-07-01 ENCOUNTER — HOSPITAL ENCOUNTER (OUTPATIENT)
Dept: MRI IMAGING | Age: 57
Discharge: HOME | End: 2022-07-03
Payer: COMMERCIAL

## 2022-07-01 DIAGNOSIS — E11.41 CONTROLLED TYPE 2 DIABETES MELLITUS WITH DIABETIC MONONEUROPATHY, WITH LONG-TERM CURRENT USE OF INSULIN (HCC): ICD-10-CM

## 2022-07-01 DIAGNOSIS — Z79.4 CONTROLLED TYPE 2 DIABETES MELLITUS WITH DIABETIC MONONEUROPATHY, WITH LONG-TERM CURRENT USE OF INSULIN (HCC): ICD-10-CM

## 2022-07-01 DIAGNOSIS — Z01.818 PRE-OP TESTING: ICD-10-CM

## 2022-07-01 DIAGNOSIS — D05.11 DUCTAL CARCINOMA IN SITU (DCIS) OF RIGHT BREAST: ICD-10-CM

## 2022-07-01 LAB
ANION GAP SERPL CALCULATED.3IONS-SCNC: 14 MMOL/L (ref 9–17)
BUN BLDV-MCNC: 11 MG/DL (ref 6–20)
BUN/CREAT BLD: 17 (ref 9–20)
CALCIUM SERPL-MCNC: 9.4 MG/DL (ref 8.6–10.4)
CHLORIDE BLD-SCNC: 96 MMOL/L (ref 98–107)
CO2: 22 MMOL/L (ref 20–31)
CREAT SERPL-MCNC: 0.65 MG/DL (ref 0.5–0.9)
GFR AFRICAN AMERICAN: >60 ML/MIN
GFR NON-AFRICAN AMERICAN: >60 ML/MIN
GFR SERPL CREATININE-BSD FRML MDRD: ABNORMAL ML/MIN/{1.73_M2}
GFR SERPL CREATININE-BSD FRML MDRD: ABNORMAL ML/MIN/{1.73_M2}
GLUCOSE BLD-MCNC: 332 MG/DL (ref 70–99)
POTASSIUM SERPL-SCNC: 4.5 MMOL/L (ref 3.7–5.3)
SODIUM BLD-SCNC: 132 MMOL/L (ref 135–144)

## 2022-07-01 PROCEDURE — C8908 MRI W/O FOL W/CONT, BREAST,: HCPCS

## 2022-07-01 PROCEDURE — A9579 GAD-BASE MR CONTRAST NOS,1ML: HCPCS | Performed by: SURGERY

## 2022-07-01 PROCEDURE — 80048 BASIC METABOLIC PNL TOTAL CA: CPT

## 2022-07-01 PROCEDURE — 83036 HEMOGLOBIN GLYCOSYLATED A1C: CPT

## 2022-07-01 PROCEDURE — 36415 COLL VENOUS BLD VENIPUNCTURE: CPT

## 2022-07-01 PROCEDURE — 6360000004 HC RX CONTRAST MEDICATION: Performed by: SURGERY

## 2022-07-01 RX ADMIN — GADOTERIDOL 19 ML: 279.3 INJECTION, SOLUTION INTRAVENOUS at 11:08

## 2022-07-03 LAB
ESTIMATED AVERAGE GLUCOSE: 220 MG/DL
HBA1C MFR BLD: 9.3 % (ref 4–6)

## 2022-07-05 ENCOUNTER — HOSPITAL ENCOUNTER (OUTPATIENT)
Dept: PREADMISSION TESTING | Age: 57
Discharge: HOME | End: 2022-07-09
Payer: COMMERCIAL

## 2022-07-05 VITALS
BODY MASS INDEX: 37.89 KG/M2 | SYSTOLIC BLOOD PRESSURE: 126 MMHG | DIASTOLIC BLOOD PRESSURE: 72 MMHG | TEMPERATURE: 97.8 F | HEIGHT: 63 IN | RESPIRATION RATE: 16 BRPM | HEART RATE: 70 BPM | WEIGHT: 213.85 LBS | OXYGEN SATURATION: 99 %

## 2022-07-05 LAB
HCT VFR BLD CALC: 36.1 % (ref 36.3–47.1)
HEMOGLOBIN: 11.1 G/DL (ref 11.9–15.1)
MCH RBC QN AUTO: 23.4 PG (ref 25.2–33.5)
MCHC RBC AUTO-ENTMCNC: 30.7 G/DL (ref 28.4–34.8)
MCV RBC AUTO: 76 FL (ref 82.6–102.9)
NRBC AUTOMATED: 0 PER 100 WBC
PDW BLD-RTO: 15.6 % (ref 11.8–14.4)
PLATELET # BLD: 344 K/UL (ref 138–453)
PMV BLD AUTO: 9.3 FL (ref 8.1–13.5)
RBC # BLD: 4.75 M/UL (ref 3.95–5.11)
WBC # BLD: 9 K/UL (ref 3.5–11.3)

## 2022-07-05 PROCEDURE — 85027 COMPLETE CBC AUTOMATED: CPT

## 2022-07-05 PROCEDURE — 36415 COLL VENOUS BLD VENIPUNCTURE: CPT

## 2022-07-05 NOTE — PRE-PROCEDURE INSTRUCTIONS
ARRIVE AT [facility] [address] ON Tuesday, June 12,2022 at 10:30 AM    Once you enter the hospital lobby, take the elevators to the second floor. Check-In is at the surgery registration desk. Continue to take your home medications as you normally do up to and including the night before surgery with the exception of any blood thinning medications. Please stop any blood thinning medications as directed by your surgeon or prescribing physician. Failure to stop certain medications may interfere with your scheduled surgery. These may include:  Aspirin, Warfarin (Coumadin), Clopidogrel (Plavix), Ibuprofen (Motrin, Advil), Naproxen (Aleve), Meloxicam (Mobic), Celecoxib (Celebrex), Eliquis, Pradaxa, Xarelto, Effient, Fish Oil, Herbal supplements. Check with cardiologist when & if blood thinners can be stopped before surgery    If you are diabetic, do not take any of your diabetic medications by mouth the morning of surgery. If you are taking insulin contact the doctor that manages your diabetes for instructions about any changes to your insulin dosages the day before surgery. Do not inject insulin or other injectable diabetic medications the morning of surgery unless otherwise instructed by the doctor who manages your diabetes. Please take the following medication(s) the day of surgery with a small sip of water:  Omeprazole,lamictal,cymbalta,florinef,metoprolol    Please use your inhaler(s) if needed and bring your inhaler(s) from home the day of surgery. PREPARING FOR YOUR SURGERY:     Before surgery, you can play an important role in your own health. Because skin is not sterile, we need to be sure that your skin is as free of germs as possible before surgery by carefully washing before surgery. Preparing or prepping skin before surgery can reduce the risk of a surgical site infection.   Do not shave the area of your body where your surgery will be performed unless you received specific permission cancelled.       If you have any other questions regarding your procedure or the day of surgery, please call [phone]      _________________________  ____________________________  Signature (Patient)              Signature (Provider) & date

## 2022-07-06 ENCOUNTER — OFFICE VISIT (OUTPATIENT)
Dept: CARDIOLOGY | Age: 57
End: 2022-07-06
Payer: COMMERCIAL

## 2022-07-06 VITALS
HEART RATE: 83 BPM | BODY MASS INDEX: 38.52 KG/M2 | WEIGHT: 217.4 LBS | HEIGHT: 63 IN | RESPIRATION RATE: 16 BRPM | SYSTOLIC BLOOD PRESSURE: 117 MMHG | DIASTOLIC BLOOD PRESSURE: 75 MMHG

## 2022-07-06 DIAGNOSIS — Z01.818 PRE-OP EXAM: Primary | ICD-10-CM

## 2022-07-06 DIAGNOSIS — I95.1 DYSAUTONOMIA ORTHOSTATIC HYPOTENSION SYNDROME: ICD-10-CM

## 2022-07-06 DIAGNOSIS — R00.2 HEART PALPITATIONS: ICD-10-CM

## 2022-07-06 DIAGNOSIS — I25.10 CAD S/P PERCUTANEOUS CORONARY ANGIOPLASTY: ICD-10-CM

## 2022-07-06 DIAGNOSIS — I10 ESSENTIAL HYPERTENSION: ICD-10-CM

## 2022-07-06 DIAGNOSIS — E78.2 MIXED HYPERLIPIDEMIA: ICD-10-CM

## 2022-07-06 DIAGNOSIS — Z98.61 CAD S/P PERCUTANEOUS CORONARY ANGIOPLASTY: ICD-10-CM

## 2022-07-06 DIAGNOSIS — R94.31 ABNORMAL ECG: ICD-10-CM

## 2022-07-06 DIAGNOSIS — I25.810 CORONARY ARTERY DISEASE INVOLVING CORONARY BYPASS GRAFT OF NATIVE HEART WITHOUT ANGINA PECTORIS: ICD-10-CM

## 2022-07-06 PROCEDURE — 3017F COLORECTAL CA SCREEN DOC REV: CPT | Performed by: PHYSICIAN ASSISTANT

## 2022-07-06 PROCEDURE — G8427 DOCREV CUR MEDS BY ELIG CLIN: HCPCS | Performed by: PHYSICIAN ASSISTANT

## 2022-07-06 PROCEDURE — 1036F TOBACCO NON-USER: CPT | Performed by: PHYSICIAN ASSISTANT

## 2022-07-06 PROCEDURE — 93000 ELECTROCARDIOGRAM COMPLETE: CPT | Performed by: PHYSICIAN ASSISTANT

## 2022-07-06 PROCEDURE — 99214 OFFICE O/P EST MOD 30 MIN: CPT | Performed by: PHYSICIAN ASSISTANT

## 2022-07-06 PROCEDURE — G8417 CALC BMI ABV UP PARAM F/U: HCPCS | Performed by: PHYSICIAN ASSISTANT

## 2022-07-06 NOTE — PATIENT INSTRUCTIONS
SURVEY:    You may be receiving a survey from [employer] regarding your visit today. Please complete the survey to enable us to provide the highest quality of care to you and your family. If you cannot score us a very good on any question, please call the office to discuss how we could have made your experience a very good one. Thank you.

## 2022-07-06 NOTE — PROGRESS NOTES
Patient: Ro Redding  : 1965  Date of Visit: 2022    REASON FOR VISIT / CONSULTATION: Coronary Artery Disease (Hx: CAD,S/P Stents, CABG, Abn Stress,SOB, Myocardial Ischemia, Dysautonomia, HTN,HLD. She has been feeling fine. Palpitations when she is upset. Denies: CP, SOB, Lightheaded/dizziness. ) and Cardiac Clearance (22 - Dr. Pham Fontenot - Right Breast Lumpectomy. )    Dear CHELSEA Landry - CNP,    I had the pleasure of seeing your patient Ro Redding in consultation today. As you know, Ms. Mulugeta Givens is a 62 y.o. female who presents with a history of intermittent episodes of back and chest discomfort that started developing since her recent CABG involving a LIMA-LAD 8/15/16. She also has a history of  dysautonomia on a tilt table test, and was started on Florinef as well as Lexapro. Recent heart cath done 3/7/2019 shows severe single vessel disease involving LAD Normal LVEDP. She had a Holter monitor done on 10/23/2019 that did show PAC's and PVC's but was largely unremarkable. She did come to the ER on 2022 due to abdominal pain and shortness of breath. She was told her EKG was abnormal and also she had an elevated troponin which was only 15 ng/L and only had minor EKG changes. She was sent to [facility]'s for this and she was not very happy about this. Cardiovascular stress test done on 2022 showed Overall, these results are most consistent with an intermediate/high risk for significant coronary artery disease. Echocardiogram on 2022 showed EF:>55% with mild diastolic dysfunction and LV wall thickness mildly increased. Ms. Negro Senior to the office today for a pre-op evaluation. She has a lumpectomy scheduled on  with Dr Pham Fontenot. She had a mammogram and biopsy showed cancer. No chest pain or pressure. No shortness of breath. No lightheaded/dizziness. She had a stent placed 2022.  She reports she could walk 1/2 mile without shortness of breath, she can ambulate a flight of stairs. She denied any current chest pain, pressure or tightness. She denies having any lightheaded/dizziness. She denies any abdominal pain, bleeding problems, bowel issues, problems with her medications or any other concerns at this time. No cough, fever or chills. No nausea or vomiting. No falls or near falls. Bleeding Risks: Ms. Lesly Gann denies any current or recent bleeding problems including a history of a GI bleed, ulcers, recent or upcoming surgeries, blood in her stool or black tarry stools or blood in her urine. Past Medical History:   Diagnosis Date    Anxiety     Asthma     CAD (coronary artery disease)     x1 stent 2010,x1 stent 2016, x1 stent May 2022    Cancer [facility]) 06/2022    right breast cancer    Clinical trial participant at discharge 3/31/16    COPD [facility]ch[facility] obstructive [facility]     Depression     Diabetic neuropathy ([facility]     GERD (gastroesophageal reflux disease)     H/O cardiac catheterization 4/26/16    LMCA: Mild Irregularities 10-20%. LAD: Lesion on Prox LAD: Proximal subsection. 100% stenosis. LCx: Mild irregularities 10-20%. RCA: Mild irregularities 10-20%. Widely patent mid RCA stent. EF:60%.  H/O cardiovascular stress test 10/07/2016    Overall results are most consistent with a low risk for significant CAD.     H/O echocardiogram 10/05/2016    EF:>60%. Inferoseptal wall abnormal in its motion which is not unusal status post open heart surgery. Evidence of mild (grade I) diastolic dysfunction is seen.  H/O tilt table evaluation 07/12/2016    Abnormal. PTs HR, Blood Pressure response and symptoms were most consistent with dysautonomia. Combined w/viligant maintenance of euvolemia and maintaining a moderate salt intake, pharmaciologic treatment w/ ProAmatine, SSRI such as Lexapro and/or Mestinon among other treatments have shown some effectiveness in the treatment of this condition.      History of cardiovascular stress test 02/13/2019    Abnormal. Small/moderate perfusion defect of mild/moderate intesity in the anterior and anterolateral regions during stress imaging which is most consistent w/ ischemia but may be artifact. Overall low/intermediate risk for significant CAD.  History of echocardiogram 09/06/2018    EF >60%. Inferoseptal wall is abnormal in its motion which is not unusual s/p open heart. Evidence of mild (grade I) diastolic dysfunction seen.  Hx of blood clots     Hyperlipidemia     Hypertension     Intermittent claudication (HCC)     Kidney stones     Movement disorder     neuropathy in legs    Neuromuscular disorder ([facility])     neuropathy    Osteoarthritis     Reflux     Seizures (HCC) 1980'Ss last seizure    Stented coronary artery 9/22/2010     LAD/[place]    Stented coronary artery 3/31/16    RCA/[place]    Type II or unspecified type diabetes mellitus without mention of complication, not stated as uncontrolled     Unspecified sleep apnea     no machine       CURRENT ALLERGIES: Tape [adhesive tape] REVIEW OF SYSTEMS: 14 systems were reviewed. Pertinent positives and negatives as above, all else negative.      Past Surgical History:   Procedure Laterality Date    ABDOMINAL HERNIA REPAIR  01/2016    repair done [place]    APPENDECTOMY      CARDIAC CATHETERIZATION Left 04/26/2016    right radial/ [id number] [facility] [place]/ Dr Dong Andrade Left 03/07/2019    Left Radial/[facility] [place]/    CARDIAC CATHETERIZATION  05/20/2022    Dr Lucia rubalcava    CARDIAC CATHETERIZATION  05/20/2022    Dr Lucia Nunes radial   [address]      CABG 2019    CHOLECYSTECTOMY  1991    COLONOSCOPY  12/16/2014    -hemorrhoids,bx    CORONARY ANGIOPLASTY WITH STENT PLACEMENT  09/2010    CORONARY ANGIOPLASTY WITH STENT PLACEMENT  03/31/2016    [facility] / DR Pratik Robles    CORONARY ANGIOPLASTY WITH STENT PLACEMENT  05/20/2022    CORONARY ARTERY cap three times daily (Patient taking differently: Take 150 mg by mouth in the morning, at noon, and at bedtime. Take one cap three times daily) 90 capsule 6    atorvastatin (LIPITOR) 80 MG tablet Take 1 tablet by mouth daily 90 tablet 3    fludrocortisone (FLORINEF) 0.1 MG tablet TAKE 3 TABLETS BY MOUTH EVERY DAY (Patient taking differently: Take 0.1 mg by mouth daily ) 270 tablet 3    Blood Glucose Monitoring Suppl (ONE TOUCH ULTRA 2) w/Device KIT 1 kit by Does not apply route daily 1 kit 0    blood glucose monitor strips Test 3 times a day & as needed for symptoms of irregular blood glucose. Dispense sufficient amount for indicated testing frequency plus additional to accommodate PRN testing needs.  300 strip 0    Lancets MISC 1 each by Does not apply route 2 times daily 300 each 1    Continuous Blood Gluc Sensor (FREESTYLE MCKAYLA 2 SENSOR) MISC 2 FREESTYLE MCKAYLA 2 SENSORS TO USE WITH MCKAYLA 2 READER TO CHECK BLOOD SUGARS 6 8 TIMES A DAY      Insulin Degludec (TRESIBA FLEXTOUCH) 200 UNIT/ML SOPN Inject 86 Units into the skin daily PER DR MCELROY (Patient taking differently: Inject 110 Units into the skin at bedtime ) 1 pen 0    metFORMIN (GLUCOPHAGE-XR) 500 MG extended release tablet Take 2 tablets by mouth daily (with breakfast) 180 tablet 3    omeprazole (PRILOSEC) 20 MG delayed release capsule TAKE 1 CAPSULE BY MOUTH EVERY DAY IN THE MORNING BEFORE BREAKFAST (Patient taking differently: Take 20 mg by mouth Daily ) 90 capsule 1    albuterol sulfate HFA (VENTOLIN HFA) 108 (90 Base) MCG/ACT inhaler INHALE 2 PUFFS EVERY 6 HOURS AS NEEDED FOR WHEEZING (Patient taking differently: Inhale 2 puffs into the lungs every 6 hours as needed INHALE 2 PUFFS EVERY 6 HOURS AS NEEDED FOR WHEEZING) 18 Inhaler 3    ondansetron (ZOFRAN ODT) 4 MG disintegrating tablet Take 1 tablet by mouth every 8 hours as needed for Nausea 20 tablet 0    Saccharomyces boulardii (PROBIOTIC) 250 MG CAPS Take 1 capsule by mouth daily 30 capsule 0    tiZANidine (ZANAFLEX) 2 MG tablet TAKE 1 TABLET BY MOUTH NIGHTLY AS NEEDED(SPASMS) (Patient taking differently: Take 2 mg by mouth nightly as needed TAKE 1 TABLET BY MOUTH NIGHTLY AS NEEDED(SPASMS)) 30 tablet 2    acetaminophen (TYLENOL) 500 MG tablet Take 1 tablet by mouth 4 times daily as needed for Pain 40 tablet 0    losartan (COZAAR) 25 MG tablet Take 1 tablet by mouth daily 90 tablet 3    insulin lispro, 1 Unit Dial, (HUMALOG KWIKPEN) 100 UNIT/ML SOPN Inject 20 Units into the skin 3 times daily (before meals) 12 pen 1    DULoxetine (CYMBALTA) 30 MG extended release capsule Take 1 capsule by mouth daily 30 capsule 1    aspirin 81 MG EC tablet TAKE 1 TABLET BY MOUTH DAILY (Patient taking differently: Take 81 mg by mouth daily ) 90 tablet 3    Insulin Pen Needle (BD ULTRA-FINE PEN NEEDLES) 29G X 12.7MM MISC USE AS DIRECTED BY PHYSICIAN 5 times daily 150 each 11    Blood Glucose Monitoring Suppl BERE 1 Units by Does not apply route three times daily Unit insurance will cover 1 Device 0    Blood Pressure Monitor KIT 1 each by Does not apply route daily as needed ESSENTIAL HYPERTENSION   I10 1 kit 0       FAMILY HISTORY: family history includes Cancer in her father, maternal grandmother, and mother; Diabetes in her mother and sister; Heart Disease in her brother, maternal uncle, and sister. PHYSICAL EXAM:   /75 (Site: Left Lower Arm, Position: Sitting, Cuff Size: Medium Adult)   Pulse 83   Resp 16   Ht 5' 3\" (1.6 m)   Wt 217 lb 6.4 oz (98.6 kg)   LMP 12/05/1996   BMI 38.51 kg/m²  Body mass index is 38.51 kg/m². Constitutional: She is obese but oriented to person, place, and time. She appears well-developed and well-nourished. In no acute distress. HEENT: Normocephalic and atraumatic. No JVD present. Carotid bruit is not present. No mass and no thyromegaly present. No lymphadenopathy present. Cardiovascular: Normal rate, regular rhythm, normal heart sounds.  Exam reveals no gallop and no friction rubs. 1/6 systolic murmur, 2nd intercostal space on the RIGHT just lateral to the sternum. Normal rate, regular rhythm, normal heart sounds and intact distal pulses. Exam reveals no gallop and no friction rub. No significant cardiac murmur was heard. Pulmonary/Chest: Effort normal and breath sounds normal. No respiratory distress. She has no wheezes, rhonchi or rales. Abdominal: Soft, non-tender. Bowel sounds and aorta are normal. She exhibits no organomegaly, mass or bruit. Extremities: Trace. No cyanosis or clubbing. 2+ radial and carotid pulses. Distal extremity pulses: 2+ bilaterally. Neurological: She is alert and oriented to person, place, and time. No evidence of gross cranial nerve deficit. Coordination appeared normal.   Skin: Skin is warm and dry. There is no rash or diaphoresis. Psychiatric: She has a normal mood and affect. Her speech is normal and behavior is normal.      MOST RECENT LABS ON RECORD:   Lab Results   Component Value Date    WBC 9.0 07/05/2022    HGB 11.1 (L) 07/05/2022    HCT 36.1 (L) 07/05/2022     07/05/2022    CHOL 165 12/29/2021    TRIG 151 (H) 12/29/2021    HDL 40 (L) 12/29/2021    LDLCHOLESTEROL 95 12/29/2021    ALT 9 04/22/2022    AST 11 04/22/2022     (L) 07/01/2022    K 4.5 07/01/2022    CL 96 (L) 07/01/2022    CREATININE 0.65 07/01/2022    BUN 11 07/01/2022    CO2 22 07/01/2022    TSH 2.80 06/04/2016    INR 1.0 04/24/2022    LABA1C 9.3 (H) 07/01/2022    LABMICR Can not be calculated 12/29/2021    BNP NOT REPORTED 05/11/2014        ASSESSMENT:  1. Pre-op exam    2. Coronary artery disease involving coronary bypass graft of native heart without angina pectoris    3. CAD S/P percutaneous coronary angioplasty    4. Abnormal ECG    5. Heart palpitations    6. Dysautonomia orthostatic hypotension syndrome    7. Essential hypertension    8.  Mixed hyperlipidemia       PLAN:  · Pre-Op Clearance:   · Pre-Operative Risk assessment using 2014 ACC/AHA guidelines   · Emergent procedure Yes- Breast cancer  · Active Cardiac Condition No (decompensated HF, Arrhythmia, MI <3 weeks, severe valve disease)  · Risk Level of Procedure Low Risk (endoscopy, superficial skin, breast, ambulatory, or cataract, etc.)  · Revised Cardiac Risk Index Risk factors: History of ischemic heart disease  · Diabetic treated with insulin  · Measurement of Exercise Tolerance before Surgery >4 Yes  · According to the 2014 ACC/AHA pre-operative risk assessment guidelines Carmen Elloitt is at intermediate risk for major cardiac complications during a low risk procedure and may continue as planned. Specific medication recommendations are listed below. Medications recommended to continue should be taken with a sip of water even when NPO.  Medical management to reduce perioperative risk:   Antiplatelet Agent: Continue aspirin and Plavix throughout the procedure   Anticoagulant Agent: Not applicable prior to the procedure.  Anticoagulation Bridging: Not applicable   Additional Recommendations: I would also suggest that she continue her beta blocker and statin throughout the perioperative period.  Additional Testing List: None       Coronary artery disease and myocardial ischemia: Coronary artery stent placement in 2010 and 2016 and CABG involving LIMA-LAD on 8/15/16: Currently appears well controlled   Antiplatelet Agent: Continue Aspirin 81 mg daily.  Beta Blocker: INCREASE to Metoprolol succinate (Toprol XL) 150 mg daily. I also discussed the potential side effects of this medication including lightheadedness and dizziness and instructed them to stop the medication of this occurs and call our office if this occurs.  Anti-anginal medications: Not indicated at this time.  Cholesterol Reduction Therapy: Continue Atorvastatin (Lipitor) 80 mg daily.       · History of Abnormal EKG/ EKG Changes/ Mildly Elevated Troponin Level: Reported ACS, admitted but no inpatient testing done. Tachycardia & Intermittent Heart palpitations:Awaiting on CAM monitor results: Fairly Asymptomatic at this time. Had one episode on Easter morning however no other reoccurrences. Beta Blocker: INCREASE to Metoprolol succinate (Toprol XL) 150 mg daily. I also discussed the potential side effects of this medication including lightheadedness and dizziness and instructed them to stop the medication of this occurs and call our office if this occurs. Dysautonomia Orthostatic Hypotension Syndrome: Currently appears to be well controlled. It sounds like her balance is not as good lately but is probably not related to this. We will monitor at least for now. · Pharmacological Management: Continue Fludocortisone (Florinef) 0.1 mg BID. · Nonpharmacologic counseling: Because of her condition, I reminded her to try and keep herself well-hydrated and to take extra time when moving from laying to sitting, sitting to standing and standing to walking and not to avoid salt in her diet. I also advised her to put water by her bedside and drink this before she gets out of bed in the morning. Essential Hypertension: Controlled   · ACE Inibitor/ARB: Continue losartan (Cozaar) 25 mg daily. · Beta Blocker: INCREASE to Metoprolol succinate (Toprol XL) 150 mg daily. I also discussed the potential side effects of this medication including lightheadedness and dizziness and instructed them to stop the medication of this occurs and call our office if this occurs. · Hyperlipidemia: Mixed, LDL done on 12/29/2021 was 95 mg/dL   · Cholesterol Reduction Therapy: Continue Atorvastatin (Lipitor) 80 mg daily. Finally, I recommended that she continue her other medications and follow up with you as previously scheduled. I discussed patient's symptoms and treatment plan with Dr Katt Butler, he was in agreement with the plan and follow up. FOLLOW UP:   I told Ms. Stallings Else to call my office if she had any problems, but otherwise told her to Return in about 5 months (around 11/21/2022). However, I would be happy to see her sooner should the need arise. Once again, thank you for allowing me to participate in this patients care. Please do not hesitate to contact me could I be of further assistance. Sincerely,  Araceli Nelson PA-C   [facility]iology Specialist   [address] [place] [address]  Phone: [phone]; Fax: [phone]    I believe that the risk of significant morbidity and mortality related to the patient's current medical conditions are: intermediate-high. The documentation recorded by the scribe, accurately and completely reflects the services I personally performed and the decisions made by me.  Barbara Cantu PA-C July 6, 2022

## 2022-07-07 ENCOUNTER — OFFICE VISIT (OUTPATIENT)
Dept: PRIMARY CARE CLINIC | Age: 57
End: 2022-07-07
Payer: COMMERCIAL

## 2022-07-07 VITALS
OXYGEN SATURATION: 97 % | HEART RATE: 82 BPM | WEIGHT: 216.5 LBS | RESPIRATION RATE: 22 BRPM | TEMPERATURE: 97.7 F | SYSTOLIC BLOOD PRESSURE: 122 MMHG | DIASTOLIC BLOOD PRESSURE: 78 MMHG | BODY MASS INDEX: 38.35 KG/M2

## 2022-07-07 DIAGNOSIS — I10 ESSENTIAL HYPERTENSION: ICD-10-CM

## 2022-07-07 DIAGNOSIS — D05.11 DUCTAL CARCINOMA IN SITU (DCIS) OF RIGHT BREAST: ICD-10-CM

## 2022-07-07 PROCEDURE — G8417 CALC BMI ABV UP PARAM F/U: HCPCS | Performed by: NURSE PRACTITIONER

## 2022-07-07 PROCEDURE — G8427 DOCREV CUR MEDS BY ELIG CLIN: HCPCS | Performed by: NURSE PRACTITIONER

## 2022-07-07 PROCEDURE — 3046F HEMOGLOBIN A1C LEVEL >9.0%: CPT | Performed by: NURSE PRACTITIONER

## 2022-07-07 PROCEDURE — 1036F TOBACCO NON-USER: CPT | Performed by: NURSE PRACTITIONER

## 2022-07-07 PROCEDURE — 3017F COLORECTAL CA SCREEN DOC REV: CPT | Performed by: NURSE PRACTITIONER

## 2022-07-07 PROCEDURE — 2022F DILAT RTA XM EVC RTNOPTHY: CPT | Performed by: NURSE PRACTITIONER

## 2022-07-07 PROCEDURE — 99214 OFFICE O/P EST MOD 30 MIN: CPT | Performed by: NURSE PRACTITIONER

## 2022-07-07 RX ORDER — ALBUTEROL SULFATE 90 UG/1
AEROSOL, METERED RESPIRATORY (INHALATION)
Qty: 18 G | Refills: 5 | Status: SHIPPED | OUTPATIENT
Start: 2022-07-07

## 2022-07-07 SDOH — ECONOMIC STABILITY: FOOD INSECURITY: WITHIN THE PAST 12 MONTHS, YOU WORRIED THAT YOUR FOOD WOULD RUN OUT BEFORE YOU GOT MONEY TO BUY MORE.: PATIENT DECLINED

## 2022-07-07 SDOH — ECONOMIC STABILITY: FOOD INSECURITY: WITHIN THE PAST 12 MONTHS, THE FOOD YOU BOUGHT JUST DIDN'T LAST AND YOU DIDN'T HAVE MONEY TO GET MORE.: PATIENT DECLINED

## 2022-07-07 ASSESSMENT — ENCOUNTER SYMPTOMS
ABDOMINAL PAIN: 0
CONSTIPATION: 0
RHINORRHEA: 0
SORE THROAT: 0
BLURRED VISION: 1
SHORTNESS OF BREATH: 0
WHEEZING: 0
VOMITING: 0
DIARRHEA: 0
ORTHOPNEA: 0

## 2022-07-07 ASSESSMENT — PATIENT HEALTH QUESTIONNAIRE - PHQ9
SUM OF ALL RESPONSES TO PHQ QUESTIONS 1-9: 0
SUM OF ALL RESPONSES TO PHQ QUESTIONS 1-9: 0
3. TROUBLE FALLING OR STAYING ASLEEP: 0
1. LITTLE INTEREST OR PLEASURE IN DOING THINGS: 0
9. THOUGHTS THAT YOU WOULD BE BETTER OFF DEAD, OR OF HURTING YOURSELF: 0
SUM OF ALL RESPONSES TO PHQ QUESTIONS 1-9: 0
7. TROUBLE CONCENTRATING ON THINGS, SUCH AS READING THE NEWSPAPER OR WATCHING TELEVISION: 0
2. FEELING DOWN, DEPRESSED OR HOPELESS: 0
5. POOR APPETITE OR OVEREATING: 0
SUM OF ALL RESPONSES TO PHQ QUESTIONS 1-9: 0
10. IF YOU CHECKED OFF ANY PROBLEMS, HOW DIFFICULT HAVE THESE PROBLEMS MADE IT FOR YOU TO DO YOUR WORK, TAKE CARE OF THINGS AT HOME, OR GET ALONG WITH OTHER PEOPLE: 0
SUM OF ALL RESPONSES TO PHQ9 QUESTIONS 1 & 2: 0
6. FEELING BAD ABOUT YOURSELF - OR THAT YOU ARE A FAILURE OR HAVE LET YOURSELF OR YOUR FAMILY DOWN: 0
8. MOVING OR SPEAKING SO SLOWLY THAT OTHER PEOPLE COULD HAVE NOTICED. OR THE OPPOSITE, BEING SO FIGETY OR RESTLESS THAT YOU HAVE BEEN MOVING AROUND A LOT MORE THAN USUAL: 0
4. FEELING TIRED OR HAVING LITTLE ENERGY: 0

## 2022-07-07 ASSESSMENT — SOCIAL DETERMINANTS OF HEALTH (SDOH): HOW HARD IS IT FOR YOU TO PAY FOR THE VERY BASICS LIKE FOOD, HOUSING, MEDICAL CARE, AND HEATING?: PATIENT DECLINED

## 2022-07-07 NOTE — PROGRESS NOTES
trend is decreasing steadily. Her breakfast blood glucose range is generally 180-200 mg/dl. An ACE inhibitor/angiotensin II receptor blocker is being taken. She sees a podiatrist.Eye exam is current. Hypertension  This is a chronic problem. The current episode started more than 1 year ago. The problem is unchanged. The problem is controlled. Associated symptoms include blurred vision (CHRONIC RIGHT). Pertinent negatives include no chest pain, headaches, malaise/fatigue, neck pain, orthopnea, palpitations, peripheral edema or shortness of breath. There are no associated agents to hypertension. Risk factors for coronary artery disease include diabetes mellitus, dyslipidemia, obesity, family history, post-menopausal state, sedentary lifestyle and stress. Past treatments include beta blockers and angiotensin blockers. The current treatment provides moderate improvement. Compliance problems include exercise and diet. There is no history of kidney disease, CAD/MI, CVA or heart failure. There is no history of chronic renal disease. Past Medical History:     Past Medical History:   Diagnosis Date    Anxiety     Asthma     CAD (coronary artery disease)     x1 stent 2010,x1 stent 2016, x1 stent May 2022    Cancer [facility]) 06/2022    right breast cancer    Clinical trial participant at discharge 3/31/16    COPD (chronic obstructive pulmonary disease) [facility] [address]     Depression     Diabetic neuropathy ([facility] [address]     GERD (gastroesophageal reflux disease)     H/O cardiac catheterization 4/26/16    LMCA: Mild Irregularities 10-20%. LAD: Lesion on Prox LAD: Proximal subsection. 100% stenosis. LCx: Mild irregularities 10-20%. RCA: Mild irregularities 10-20%. Widely patent mid RCA stent. EF:60%.  H/O cardiovascular stress test 10/07/2016    Overall results are most consistent with a low risk for significant CAD.     H/O echocardiogram 10/05/2016    EF:>60%.  Inferoseptal wall abnormal in its motion which is not unusal status post open heart surgery. Evidence of mild (grade I) diastolic dysfunction is seen.  H/O tilt table evaluation 07/12/2016    Abnormal. PTs HR, Blood Pressure response and symptoms were most consistent with dysautonomia. Combined w/viligant maintenance of euvolemia and maintaining a moderate salt intake, pharmaciologic treatment w/ ProAmatine, SSRI such as Lexapro and/or Mestinon among other treatments have shown some effectiveness in the treatment of this condition.  History of cardiovascular stress test 02/13/2019    Abnormal. Small/moderate perfusion defect of mild/moderate intesity in the anterior and anterolateral regions during stress imaging which is most consistent w/ ischemia but may be artifact. Overall low/intermediate risk for significant CAD.  History of echocardiogram 09/06/2018    EF >60%. Inferoseptal wall is abnormal in its motion which is not unusual s/p open heart. Evidence of mild (grade I) diastolic dysfunction seen.  Hx of blood clots     Hyperlipidemia     Hypertension     Intermittent claudication (HCC)     Kidney stones     Movement disorder     neuropathy in legs    Neuromuscular disorder (HCC)     neuropathy    Osteoarthritis     Reflux     Seizures (HCC) 1980'Ss last seizure    Stented coronary artery 9/22/2010     LAD/[place]    Stented coronary artery 3/31/16    RCA/[place]    Type II or unspecified type diabetes mellitus without mention of complication, not stated as uncontrolled     Unspecified sleep apnea     no machine      Reviewed all health maintenance requirements and ordered appropriate tests  There are no preventive care reminders to display for this patient.     Past Surgical History:     Past Surgical History:   Procedure Laterality Date    ABDOMINAL HERNIA REPAIR  01/2016    repair done julian    APPENDECTOMY      CARDIAC CATHETERIZATION Left 04/26/2016    right radial/ [facility]/ Dr Forrest Severino Left 03/07/2019 Left Radial/[facility] [place]/    CARDIAC CATHETERIZATION  05/20/2022    Dr Myles Jolly radial    CARDIAC CATHETERIZATION  05/20/2022    Dr Myles Jolly radial   [address]      CABG 2019    CHOLECYSTECTOMY  1991    COLONOSCOPY  12/16/2014    -hemorrhoids,bx    CORONARY ANGIOPLASTY WITH STENT PLACEMENT  09/2010    CORONARY ANGIOPLASTY WITH STENT PLACEMENT  03/31/2016    JESSE RCA / DR Dorothy Wallace    CORONARY ANGIOPLASTY WITH STENT PLACEMENT  05/20/2022    CORONARY ARTERY BYPASS GRAFT  08/15/2016    OP X 1- Dr Terri Leon, COLON, DIAGNOSTIC  12/16/2014    HERNIA REPAIR  12/13/2012    at the medial aspect of a [place] RUQ scar); repaired [id number]  02/16/2015    incisional, recurrent    HERNIA REPAIR  02/2016    HYSTERECTOMY (CERVIX STATUS UNKNOWN)      OTHER SURGICAL HISTORY  10/08/2015    abd wound washout, mesh removal, wound vac placement    RI SUCT NICOLE LIPECTOMY,HEAD/NECK Left 08/27/2018    THIGH LESION BIOPSY EXCISION, SOFT TISSUE MASS performed by Jacquelyn Suarez MD [facility] Left 2018    leg    UPPP UVULOPALATOPHARYGOPLASTY  06/26/2012    US BREAST BIOPSY NEEDLE ADDITIONAL RIGHT Right 6/9/2022    US BREAST BIOPSY NEEDLE ADDITIONAL RIGHT 6/9/2022 [facility] ULTR[facility]OUND    US BREAST NEEDLE BIOPSY RIGHT Right 6/9/2022    US BREAST NEEDLE BIOPSY RIGHT 6/9/2022 [facility] ULTRASOUND    VENTRAL HERNIA REPAIR  09/21/2015    With Mesh - Dr Madhu Harding        Medications:       Prior to Admission medications    Medication Sig Start Date End Date Taking?  Authorizing Provider   albuterol sulfate HFA (VENTOLIN HFA) 108 (90 Base) MCG/ACT inhaler INHALE 2 PUFFS EVERY 6 HOURS AS NEEDED FOR WHEEZING 7/7/22  Yes Jarrod Plasencia APRN - CNP   clopidogrel (PLAVIX) 75 MG tablet Take 1 tablet by mouth daily 5/21/22  Yes CHELSEA King - ANGELIKA   TRULICITY 3 NQ/3.2TE SOPN INJECT 0.5 ML (1 PEN) SUBCUTANEOUSLY WEEKLY,X30 DAYS,INSTR:ROTATE INJECTION SITES 5/9/22  Yes Wale Allan   metoprolol succinate (TOPROL XL) 100 MG extended release tablet Take 1.5 tablets by mouth daily 5/16/22  Yes Shane Mo MD   pregabalin (LYRICA) 150 MG capsule Take one cap three times daily  Patient taking differently: Take 150 mg by mouth in the morning, at noon, and at bedtime. Take one cap three times daily 3/1/22 3/1/23 Yes Beryl Jennings MD   atorvastatin (LIPITOR) 80 MG tablet Take 1 tablet by mouth daily 11/1/21  Yes Shane Mo MD   fludrocortisone (FLORINEF) 0.1 MG tablet TAKE 3 TABLETS BY MOUTH EVERY DAY  Patient taking differently: Take 0.1 mg by mouth daily  10/13/21  Yes Shane Mo MD   Blood Glucose Monitoring Suppl (ONE TOUCH ULTRA 2) w/Device KIT 1 kit by Does not apply route daily 7/20/21  Yes CHELSEA Phillip CNP   blood glucose monitor strips Test 3 times a day & as needed for symptoms of irregular blood glucose. Dispense sufficient amount for indicated testing frequency plus additional to accommodate PRN testing needs.  7/16/21  Yes [de-identified] M CHELSEA Zamudio CNP   Lancets MISC 1 each by Does not apply route 2 times daily 7/16/21  Yes CHELSEA So CNP   Insulin Degludec (TRESIBA FLEXTOUCH) 200 UNIT/ML SOPN Inject 86 Units into the skin daily PER DR MCELROY  Patient taking differently: Inject 110 Units into the skin at bedtime  3/30/21  Yes CHELSEA Phillip CNP   metFORMIN (GLUCOPHAGE-XR) 500 MG extended release tablet Take 2 tablets by mouth daily (with breakfast) 3/30/21  Yes CHELSEA Phillip CNP   omeprazole (PRILOSEC) 20 MG delayed release capsule TAKE 1 CAPSULE BY MOUTH EVERY DAY IN [address] [place] [address] BREAKFAST  Patient taking differently: Take 20 mg by mouth Daily  11/23/20  Yes CHELSEA Phillip CNP   Saccharomyces boulardii (PROBIOTIC) 250 MG CAPS Take 1 capsule by mouth daily 9/30/20  Yes CHELSEA Phillip CNP   tiZANidine (ZANAFLEX) 2 MG tablet TAKE 1 TABLET BY MOUTH NIGHTLY AS NEEDED(SPASMS)  Patient taking differently: Take 2 mg by mouth nightly as needed TAKE 2 TABLET BY MOUTH NIGHTLY AS NEEDED(SPASMS) 9/22/20  Yes Conner Cali MD   acetaminophen (TYLENOL) 500 MG tablet Take 1 tablet by mouth 4 times daily as needed for Pain 9/9/20  Yes CHELSEA So CNP   losartan (COZAAR) 25 MG tablet Take 1 tablet by mouth daily 7/23/20  Yes CHELSEA Quan CNP   insulin lispro, 1 Unit Dial, (HUMALOG KWIKPEN) 100 UNIT/ML SOPN Inject 20 Units into the skin 3 times daily (before meals) 6/23/20  Yes CHELSEA Quan CNP   DULoxetine (CYMBALTA) 30 MG extended release capsule Take 1 capsule by mouth daily 2/10/20  Yes Conner Cali MD   aspirin 81 MG EC tablet TAKE 1 TABLET BY MOUTH DAILY  Patient taking differently: Take 81 mg by mouth daily  7/1/19  Yes CHELSEA Quan CNP   Insulin Pen Needle (BD ULTRA-FINE PEN NEEDLES) 29G X 12.7MM MISC USE AS DIRECTED BY PHYSICIAN 5 times daily 8/7/18  Yes CHELSEA Quan CNP   Blood Pressure Monitor KIT 1 each by Does not apply route daily as needed ESSENTIAL HYPERTENSION   I10 5/31/16  Yes CHELSEA Quan CNP   nitroGLYCERIN (NITROSTAT) 0.4 MG SL tablet Place 1 tablet under the tongue every 5 minutes as needed for Chest pain  Patient not taking: Reported on 7/7/2022 5/27/22   John Reagan MD   ondansetron (ZOFRAN ODT) 4 MG disintegrating tablet Take 1 tablet by mouth every 8 hours as needed for Nausea  Patient not taking: Reported on 7/7/2022 11/10/20   Hayley Wen MD        Allergies:       Tape Lucy Nikki tape]    Social History:     Tobacco:    reports that she quit smoking about 11 years ago. Her smoking use included cigarettes. She has a 20.00 pack-year smoking history. She has never used smokeless tobacco.  Alcohol:      reports no history of alcohol use. Drug Use:  reports no history of drug use.     Family History:     Family History   Problem Relation Age of Onset    Cancer Mother    Jin Shabazz Diabetes Mother     Cancer Father         thyroid    Diabetes Sister     Heart Disease Sister     Heart Disease Brother     Heart Disease Maternal Uncle     Cancer Maternal Grandmother        Review of Systems:     Positive and Negative as described in HPI    Review of Systems   Constitutional: Negative for appetite change, chills, fatigue, fever and malaise/fatigue. HENT: Negative for congestion, postnasal drip, rhinorrhea, sneezing and sore throat. Eyes: Positive for blurred vision (CHRONIC RIGHT). Negative for visual disturbance. Respiratory: Negative for shortness of breath and wheezing. Cardiovascular: Negative for chest pain, palpitations and orthopnea. Gastrointestinal: Negative for abdominal pain, constipation, diarrhea and vomiting. Endocrine: Negative for polydipsia, polyphagia and polyuria. Genitourinary: Negative for difficulty urinating and dysuria. Musculoskeletal: Negative for gait problem, neck pain and neck stiffness. Skin: Negative for pallor and rash. Neurological: Positive for numbness. Negative for dizziness, syncope, weakness, light-headedness and headaches. Psychiatric/Behavioral: The patient is not nervous/anxious. Physical Exam:   Vitals:  /78 (Position: Sitting)   Pulse 82   Temp 97.7 °F (36.5 °C) (Temporal)   Resp 22   Wt 216 lb 8 oz (98.2 kg)   LMP 12/05/1996   SpO2 97%   BMI 38.35 kg/m²     Physical Exam  Vitals and nursing note reviewed. Constitutional:       General: She is not in acute distress. Appearance: Normal appearance. She is well-developed. She is obese. She is not ill-appearing. HENT:      Mouth/Throat:      Mouth: Mucous membranes are moist.   Eyes:      General: No scleral icterus. Conjunctiva/sclera: Conjunctivae normal.   Cardiovascular:      Rate and Rhythm: Normal rate and regular rhythm. Heart sounds: No murmur heard.       Pulmonary:      Effort: Pulmonary effort is normal.      Breath sounds: Normal breath sounds. No wheezing or rales. Abdominal:      General: Bowel sounds are normal. There is no distension. Palpations: Abdomen is soft. Tenderness: There is no abdominal tenderness. Comments: Obese abdomen   Musculoskeletal:      Cervical back: Normal range of motion and neck supple. Right lower leg: No edema. Left lower leg: No edema. Lymphadenopathy:      Cervical: No cervical adenopathy. Skin:     General: Skin is warm and dry. Neurological:      Mental Status: She is alert and oriented to person, place, and time. Psychiatric:         Mood and Affect: Mood normal.         Behavior: Behavior normal.         Data:     Lab Results   Component Value Date/Time     07/01/2022 10:26 AM    K 4.5 07/01/2022 10:26 AM    CL 96 07/01/2022 10:26 AM    CO2 22 07/01/2022 10:26 AM    BUN 11 07/01/2022 10:26 AM    CREATININE 0.65 07/01/2022 10:26 AM    GLUCOSE 332 07/01/2022 10:26 AM    GLUCOSE 181 02/16/2012 08:49 AM    PROT 7.9 04/22/2022 01:16 PM    LABALBU 3.9 04/22/2022 01:16 PM    LABALBU 4.2 12/05/2011 11:35 AM    BILITOT 0.41 04/22/2022 01:16 PM    ALKPHOS 99 04/22/2022 01:16 PM    AST 11 04/22/2022 01:16 PM    ALT 9 04/22/2022 01:16 PM     Lab Results   Component Value Date/Time    WBC 9.0 07/05/2022 02:39 PM    RBC 4.75 07/05/2022 02:39 PM    RBC 3.88 12/05/2011 11:35 AM    HGB 11.1 07/05/2022 02:39 PM    HCT 36.1 07/05/2022 02:39 PM    MCV 76.0 07/05/2022 02:39 PM    MCH 23.4 07/05/2022 02:39 PM    MCHC 30.7 07/05/2022 02:39 PM    RDW 15.6 07/05/2022 02:39 PM     07/05/2022 02:39 PM     12/05/2011 11:35 AM    MPV 9.3 07/05/2022 02:39 PM     Lab Results   Component Value Date/Time    TSH 2.80 06/04/2016 11:04 PM     Lab Results   Component Value Date/Time    CHOL 165 12/29/2021 09:40 AM    HDL 40 12/29/2021 09:40 AM    LABA1C 9.3 07/01/2022 10:26 AM       Assessment/Plan:      Diagnosis Orders   1.  Uncontrolled type 2 diabetes mellitus with diabetic

## 2022-07-11 ENCOUNTER — ANESTHESIA EVENT (OUTPATIENT)
Dept: OPERATING ROOM | Age: 57
End: 2022-07-11
Payer: COMMERCIAL

## 2022-07-12 ENCOUNTER — ANESTHESIA (OUTPATIENT)
Dept: OPERATING ROOM | Age: 57
End: 2022-07-12
Payer: COMMERCIAL

## 2022-07-12 ENCOUNTER — HOSPITAL ENCOUNTER (OUTPATIENT)
Age: 57
Setting detail: OUTPATIENT SURGERY
Discharge: HOME | End: 2022-07-12
Attending: SURGERY | Admitting: SURGERY
Payer: COMMERCIAL

## 2022-07-12 ENCOUNTER — APPOINTMENT (OUTPATIENT)
Dept: MAMMOGRAPHY | Age: 57
End: 2022-07-12
Attending: SURGERY
Payer: COMMERCIAL

## 2022-07-12 ENCOUNTER — HOSPITAL ENCOUNTER (OUTPATIENT)
Dept: ULTRASOUND IMAGING | Age: 57
Discharge: HOME | End: 2022-07-12
Attending: SURGERY | Admitting: SURGERY
Payer: COMMERCIAL

## 2022-07-12 VITALS
DIASTOLIC BLOOD PRESSURE: 65 MMHG | RESPIRATION RATE: 17 BRPM | BODY MASS INDEX: 37.74 KG/M2 | WEIGHT: 213 LBS | TEMPERATURE: 97.7 F | HEIGHT: 63 IN | HEART RATE: 89 BPM | SYSTOLIC BLOOD PRESSURE: 129 MMHG | OXYGEN SATURATION: 91 %

## 2022-07-12 DIAGNOSIS — C50.911 MALIGNANT NEOPLASM OF RIGHT FEMALE BREAST, UNSPECIFIED ESTROGEN RECEPTOR STATUS, UNSPECIFIED SITE OF BREAST (HCC): ICD-10-CM

## 2022-07-12 DIAGNOSIS — G89.18 POSTOPERATIVE PAIN: Primary | ICD-10-CM

## 2022-07-12 DIAGNOSIS — D05.11 DUCTAL CARCINOMA IN SITU (DCIS) OF RIGHT BREAST: ICD-10-CM

## 2022-07-12 LAB
GLUCOSE BLD-MCNC: 194 MG/DL (ref 65–105)
GLUCOSE BLD-MCNC: 206 MG/DL (ref 65–105)
GLUCOSE BLD-MCNC: 227 MG/DL (ref 65–105)

## 2022-07-12 PROCEDURE — 3600000012 HC SURGERY LEVEL 2 ADDTL 15MIN: Performed by: SURGERY

## 2022-07-12 PROCEDURE — 6370000000 HC RX 637 (ALT 250 FOR IP): Performed by: STUDENT IN AN ORGANIZED HEALTH CARE EDUCATION/TRAINING PROGRAM

## 2022-07-12 PROCEDURE — 6360000002 HC RX W HCPCS: Performed by: SURGERY

## 2022-07-12 PROCEDURE — 7100000001 HC PACU RECOVERY - ADDTL 15 MIN: Performed by: SURGERY

## 2022-07-12 PROCEDURE — 88342 IMHCHEM/IMCYTCHM 1ST ANTB: CPT

## 2022-07-12 PROCEDURE — 88360 TUMOR IMMUNOHISTOCHEM/MANUAL: CPT

## 2022-07-12 PROCEDURE — 19285 PERQ DEV BREAST 1ST US IMAG: CPT

## 2022-07-12 PROCEDURE — 7100000000 HC PACU RECOVERY - FIRST 15 MIN: Performed by: SURGERY

## 2022-07-12 PROCEDURE — 88307 TISSUE EXAM BY PATHOLOGIST: CPT

## 2022-07-12 PROCEDURE — 2500000003 HC RX 250 WO HCPCS

## 2022-07-12 PROCEDURE — 88377 M/PHMTRC ALYS ISHQUANT/SEMIQ: CPT

## 2022-07-12 PROCEDURE — 2500000003 HC RX 250 WO HCPCS: Performed by: SURGERY

## 2022-07-12 PROCEDURE — 82947 ASSAY GLUCOSE BLOOD QUANT: CPT

## 2022-07-12 PROCEDURE — 2720000010 HC SURG SUPPLY STERILE: Performed by: SURGERY

## 2022-07-12 PROCEDURE — 88360 TUMOR IMMUNOHISTOCHEM/MANUAL: CPT | Performed by: ANESTHESIOLOGY

## 2022-07-12 PROCEDURE — 7100000010 HC PHASE II RECOVERY - FIRST 15 MIN: Performed by: SURGERY

## 2022-07-12 PROCEDURE — 76098 X-RAY EXAM SURGICAL SPECIMEN: CPT

## 2022-07-12 PROCEDURE — C1819 TISSUE LOCALIZATION-EXCISION: HCPCS

## 2022-07-12 PROCEDURE — 2500000003 HC RX 250 WO HCPCS: Performed by: NURSE ANESTHETIST, CERTIFIED REGISTERED

## 2022-07-12 PROCEDURE — 7100000011 HC PHASE II RECOVERY - ADDTL 15 MIN: Performed by: SURGERY

## 2022-07-12 PROCEDURE — 2709999900 HC NON-CHARGEABLE SUPPLY: Performed by: SURGERY

## 2022-07-12 PROCEDURE — 6360000002 HC RX W HCPCS: Performed by: NURSE ANESTHETIST, CERTIFIED REGISTERED

## 2022-07-12 PROCEDURE — 3700000001 HC ADD 15 MINUTES (ANESTHESIA): Performed by: SURGERY

## 2022-07-12 PROCEDURE — 3600000002 HC SURGERY LEVEL 2 BASE: Performed by: SURGERY

## 2022-07-12 PROCEDURE — 88341 IMHCHEM/IMCYTCHM EA ADD ANTB: CPT

## 2022-07-12 PROCEDURE — 2580000003 HC RX 258: Performed by: NURSE ANESTHETIST, CERTIFIED REGISTERED

## 2022-07-12 PROCEDURE — 3700000000 HC ANESTHESIA ATTENDED CARE: Performed by: SURGERY

## 2022-07-12 RX ORDER — LIDOCAINE HYDROCHLORIDE 20 MG/ML
INJECTION, SOLUTION EPIDURAL; INFILTRATION; INTRACAUDAL; PERINEURAL PRN
Status: DISCONTINUED | OUTPATIENT
Start: 2022-07-12 | End: 2022-07-12 | Stop reason: SDUPTHER

## 2022-07-12 RX ORDER — SODIUM CHLORIDE 9 MG/ML
INJECTION, SOLUTION INTRAVENOUS PRN
Status: DISCONTINUED | OUTPATIENT
Start: 2022-07-12 | End: 2022-07-12 | Stop reason: HOSPADM

## 2022-07-12 RX ORDER — ONDANSETRON 2 MG/ML
4 INJECTION INTRAMUSCULAR; INTRAVENOUS
Status: DISCONTINUED | OUTPATIENT
Start: 2022-07-12 | End: 2022-07-12 | Stop reason: HOSPADM

## 2022-07-12 RX ORDER — DEXAMETHASONE SODIUM PHOSPHATE 10 MG/ML
INJECTION, SOLUTION INTRAMUSCULAR; INTRAVENOUS PRN
Status: DISCONTINUED | OUTPATIENT
Start: 2022-07-12 | End: 2022-07-12 | Stop reason: SDUPTHER

## 2022-07-12 RX ORDER — SODIUM CHLORIDE 9 MG/ML
INJECTION, SOLUTION INTRAVENOUS CONTINUOUS
Status: DISCONTINUED | OUTPATIENT
Start: 2022-07-13 | End: 2022-07-12

## 2022-07-12 RX ORDER — ONDANSETRON 2 MG/ML
INJECTION INTRAMUSCULAR; INTRAVENOUS PRN
Status: DISCONTINUED | OUTPATIENT
Start: 2022-07-12 | End: 2022-07-12 | Stop reason: SDUPTHER

## 2022-07-12 RX ORDER — METOPROLOL SUCCINATE 100 MG/1
TABLET, EXTENDED RELEASE ORAL
Qty: 90 TABLET | Refills: 3 | Status: SHIPPED | OUTPATIENT
Start: 2022-07-12

## 2022-07-12 RX ORDER — OXYCODONE HYDROCHLORIDE 5 MG/1
5 TABLET ORAL
Status: DISCONTINUED | OUTPATIENT
Start: 2022-07-12 | End: 2022-07-12 | Stop reason: HOSPADM

## 2022-07-12 RX ORDER — PROPOFOL 10 MG/ML
INJECTION, EMULSION INTRAVENOUS PRN
Status: DISCONTINUED | OUTPATIENT
Start: 2022-07-12 | End: 2022-07-12 | Stop reason: SDUPTHER

## 2022-07-12 RX ORDER — SODIUM CHLORIDE 0.9 % (FLUSH) 0.9 %
5-40 SYRINGE (ML) INJECTION PRN
Status: DISCONTINUED | OUTPATIENT
Start: 2022-07-12 | End: 2022-07-12 | Stop reason: HOSPADM

## 2022-07-12 RX ORDER — SODIUM CHLORIDE 0.9 % (FLUSH) 0.9 %
5-40 SYRINGE (ML) INJECTION EVERY 12 HOURS SCHEDULED
Status: DISCONTINUED | OUTPATIENT
Start: 2022-07-12 | End: 2022-07-12 | Stop reason: HOSPADM

## 2022-07-12 RX ORDER — FENTANYL CITRATE 50 UG/ML
25 INJECTION, SOLUTION INTRAMUSCULAR; INTRAVENOUS EVERY 5 MIN PRN
Status: DISCONTINUED | OUTPATIENT
Start: 2022-07-12 | End: 2022-07-12 | Stop reason: HOSPADM

## 2022-07-12 RX ORDER — HYDROCODONE BITARTRATE AND ACETAMINOPHEN 5; 325 MG/1; MG/1
1 TABLET ORAL EVERY 6 HOURS PRN
Qty: 12 TABLET | Refills: 0 | Status: SHIPPED | OUTPATIENT
Start: 2022-07-12 | End: 2022-07-15

## 2022-07-12 RX ORDER — LIDOCAINE HYDROCHLORIDE 10 MG/ML
1 INJECTION, SOLUTION EPIDURAL; INFILTRATION; INTRACAUDAL; PERINEURAL
Status: DISCONTINUED | OUTPATIENT
Start: 2022-07-13 | End: 2022-07-12 | Stop reason: HOSPADM

## 2022-07-12 RX ORDER — SODIUM CHLORIDE, SODIUM LACTATE, POTASSIUM CHLORIDE, CALCIUM CHLORIDE 600; 310; 30; 20 MG/100ML; MG/100ML; MG/100ML; MG/100ML
INJECTION, SOLUTION INTRAVENOUS CONTINUOUS
Status: DISCONTINUED | OUTPATIENT
Start: 2022-07-13 | End: 2022-07-12 | Stop reason: HOSPADM

## 2022-07-12 RX ORDER — MIDAZOLAM HYDROCHLORIDE 1 MG/ML
INJECTION INTRAMUSCULAR; INTRAVENOUS PRN
Status: DISCONTINUED | OUTPATIENT
Start: 2022-07-12 | End: 2022-07-12 | Stop reason: SDUPTHER

## 2022-07-12 RX ORDER — FENTANYL CITRATE 50 UG/ML
50 INJECTION, SOLUTION INTRAMUSCULAR; INTRAVENOUS EVERY 5 MIN PRN
Status: DISCONTINUED | OUTPATIENT
Start: 2022-07-12 | End: 2022-07-12 | Stop reason: HOSPADM

## 2022-07-12 RX ORDER — BUPIVACAINE HYDROCHLORIDE AND EPINEPHRINE 5; 5 MG/ML; UG/ML
INJECTION, SOLUTION EPIDURAL; INTRACAUDAL; PERINEURAL PRN
Status: DISCONTINUED | OUTPATIENT
Start: 2022-07-12 | End: 2022-07-12 | Stop reason: ALTCHOICE

## 2022-07-12 RX ORDER — SODIUM CHLORIDE, SODIUM LACTATE, POTASSIUM CHLORIDE, CALCIUM CHLORIDE 600; 310; 30; 20 MG/100ML; MG/100ML; MG/100ML; MG/100ML
INJECTION, SOLUTION INTRAVENOUS CONTINUOUS PRN
Status: DISCONTINUED | OUTPATIENT
Start: 2022-07-12 | End: 2022-07-12 | Stop reason: SDUPTHER

## 2022-07-12 RX ORDER — FENTANYL CITRATE 50 UG/ML
INJECTION, SOLUTION INTRAMUSCULAR; INTRAVENOUS PRN
Status: DISCONTINUED | OUTPATIENT
Start: 2022-07-12 | End: 2022-07-12 | Stop reason: SDUPTHER

## 2022-07-12 RX ADMIN — Medication 50 MCG: at 12:51

## 2022-07-12 RX ADMIN — SODIUM CHLORIDE, POTASSIUM CHLORIDE, SODIUM LACTATE AND CALCIUM CHLORIDE: 600; 310; 30; 20 INJECTION, SOLUTION INTRAVENOUS at 12:51

## 2022-07-12 RX ADMIN — PROPOFOL 180 MG: 10 INJECTION, EMULSION INTRAVENOUS at 12:51

## 2022-07-12 RX ADMIN — DEXAMETHASONE SODIUM PHOSPHATE 4 MG: 10 INJECTION, SOLUTION INTRAMUSCULAR; INTRAVENOUS at 13:00

## 2022-07-12 RX ADMIN — LIDOCAINE HYDROCHLORIDE 100 MG: 20 INJECTION, SOLUTION EPIDURAL; INFILTRATION; INTRACAUDAL; PERINEURAL at 12:51

## 2022-07-12 RX ADMIN — ONDANSETRON 4 MG: 2 INJECTION INTRAMUSCULAR; INTRAVENOUS at 14:20

## 2022-07-12 RX ADMIN — MIDAZOLAM 2 MG: 1 INJECTION INTRAMUSCULAR; INTRAVENOUS at 12:48

## 2022-07-12 RX ADMIN — Medication 25 MCG: at 13:35

## 2022-07-12 RX ADMIN — CEFAZOLIN 2000 MG: 10 INJECTION, POWDER, FOR SOLUTION INTRAVENOUS at 12:57

## 2022-07-12 RX ADMIN — INSULIN HUMAN 2 UNITS: 100 INJECTION, SOLUTION PARENTERAL at 15:41

## 2022-07-12 ASSESSMENT — PAIN - FUNCTIONAL ASSESSMENT: PAIN_FUNCTIONAL_ASSESSMENT: 0-10

## 2022-07-12 NOTE — H&P
Interval H&P Note    Pt Name: Tatiana Flaherty  MRN: [id number]  YOB: 1965  Date of evaluation: 7/12/2022      [x] I have reviewed in [facility] the general surgery progress note by Dr. Paulie Broussard dated 6/20/2022 and the cardiology progress note by Alex Roth PA-C dated 7/6/2022 for an Interval History and Physical note. [x] I have examined  Tatiana Flaherty  There are no changes to the patient who is scheduled for NEEDLE DIRECTED (1130) RIGHT BREAST LUMPECTOMY by Gianni Rosales DO for Malignant neoplasm of right female breast, unspecified estrogen receptor status, unspecified site of breast ([facility] [address] [place]. The patient denies new health changes, fever, chills, wheezing, cough, increased SOB, chest pain, open sores. Patient has multiple scabbed lesions on abdomen from a fall \"a couple months ago. \" Denies open area or drainage. History of coronary artery disease with multiple stents (new stent to left main 5/2022), CABG x1, COPD, hyperlipidemia, hypertension, blood clots, seizures, diabetes, sleep apnea. Patient follows with Dr. Vic Drummond cardiology. Patient had abnormal stress test in May 2022 which resulted in cardiac cath with one stent to left main. Patient had follow up appointment with cardiology on 7/6/2022 for surgical clearance (refer below). Patient has occasional palpitations. Denies shortness of breath, chest pain, dizziness. POC . Last ASA 81mg 7/12/2022 and Plavix 7/12/2022. Echo complete 2D W doppler W color 5/12/2022:  Global left ventricular systolic function appears preserved with an estimated ejection fraction of >55%. The left ventricular cavity size is within normal limits and the left ventricular wall thickness is mildly increased. No definite specific wall motion abnormalities were identified. No significant valvular abnormalities. Evidence of mild (grade I) diastolic dysfunction is seen.     Vital signs: /83   Pulse (!) 107   Temp 97.5 °F (36.4 °C) (Temporal)   Resp Degludec (TRESIBA FLEXTOUCH) 200 UNIT/ML SOPN Inject 86 Units into the skin daily PER DR MCELROY  Patient taking differently: Inject 110 Units into the skin at bedtime  3/30/21   CHELSEA Skinner CNP   metFORMIN (GLUCOPHAGE-XR) 500 MG extended release tablet Take 2 tablets by mouth daily (with breakfast) 3/30/21   CHELSEA Skinner CNP   omeprazole (PRILOSEC) 20 MG delayed release capsule TAKE 1 CAPSULE BY MOUTH EVERY DAY IN [address] [place] [address]  Patient taking differently: Take 20 mg by mouth Daily  11/23/20   CHELSEA Skinner CNP   ondansetron (ZOFRAN ODT) 4 MG disintegrating tablet Take 1 tablet by mouth every 8 hours as needed for Nausea  Patient not taking: Reported on 7/7/2022 11/10/20   David Hopper MD   Saccharomyces boulardii (PROBIOTIC) 250 MG CAPS Take 1 capsule by mouth daily 9/30/20   CHELSEA Skinner CNP   tiZANidine (ZANAFLEX) 2 MG tablet TAKE 1 TABLET BY MOUTH NIGHTLY AS NEEDED(SPASMS)  Patient taking differently: Take 2 mg by mouth nightly as needed TAKE 2 TABLET BY MOUTH NIGHTLY AS NEEDED(SPASMS) 9/22/20   Moses Delgado MD   acetaminophen (TYLENOL) 500 MG tablet Take 1 tablet by mouth 4 times daily as needed for Pain 9/9/20   CHELSEA Johns CNP   losartan (COZAAR) 25 MG tablet Take 1 tablet by mouth daily 7/23/20   CHELSEA Skinner CNP   insulin lispro, 1 Unit Dial, (HUMALOG KWIKPEN) 100 UNIT/ML SOPN Inject 20 Units into the skin 3 times daily (before meals) 6/23/20   CHELSEA Skinner CNP   DULoxetine (CYMBALTA) 30 MG extended release capsule Take 1 capsule by mouth daily 2/10/20   Moses Delgado MD   aspirin 81 MG EC tablet TAKE 1 TABLET BY MOUTH DAILY  Patient taking differently: Take 81 mg by mouth daily  7/1/19   CHELSEA Skinner CNP   Insulin Pen Needle (BD ULTRA-FINE PEN NEEDLES) 29G X 12.7MM MISC USE AS DIRECTED BY PHYSICIAN 5 times daily 8/7/18   CHELSEA Skinner - CNP   Blood Pressure Monitor KIT 1 each by Does not apply route daily as needed ESSENTIAL HYPERTENSION   I10 5/31/16   Radha Plasencia APRN - CNP         This is a 62 y.o. female who is pleasant, cooperative, alert and oriented x3, in no acute distress. Heart:  asymptomatic tachycardic rate and regular rhythm without murmur, gallop or rub. Lungs: Normal respiratory effort with equal expansion, good air exchange, unlabored and clear to auscultation without wheezes or rales bilaterally   Abdomen: soft, nontender, nondistended with bowel sounds active. Skin: Dry peeling skin on upper back. Scattered scabbed lesions on abdomen with no surrounding redness or drainage. Labs:  Recent Labs     07/05/22  1439 07/01/22  1026   HGB 11.1*  --    HCT 36.1*  --    WBC 9.0  --    MCV 76.0*  --      --    NA  --  132*   K  --  4.5   CL  --  96*   CO2  --  22   BUN  --  11   CREATININE  --  0.65   GLUCOSE  --  332*       No results for input(s): COVID19 in the last 720 hours. CHELSEA Christie CNP  Electronically signed 7/12/2022 at 11:25 AM      Emily Lowe DO   Physician   Specialty:  General Surgery   Progress Notes      Signed   Encounter Date:  6/20/2022         Related encounter: Office Visit from 6/20/2022 in [address] [place] [address]           Signed        Expand All Collapse All          Show:Clear all  [x]Manual[x]Template[]Copied    Added by:  [x]Brianne Keith, [facility], [place]      []Alberto for details                Maximino Iyer is a 62 y.o. female who presents to [facility] for an evaluation of the right breast. Her screening brady showed a small group of calc's with a small mass in the upper right breast at 11 o'clock. Also an abnormal node. Biopsies of both showed DCIS on the mass and negative on the node. The pt has had no breast related symptoms. No prior biopsies. All relevant imaging and lab studies have been reviewed prior to today's visit.  Any recent medical records have also been reviewed. Breast Risk Factors:  Age of First Menstrual Period:13  Age of First Child? 24  How many children? 1  Were children breast fed? No  Date of last Mammogram: 22  Date of Last Period: in her 29's due to hyst  Postmenopausal? Yes  Hysterectomy? Yes  Birth control or hormone therapy? No  Any previous breast biopsies? No  Personal Hx of CA? No  Family Hx of Breast CA? No        Body mass index is 37.91 kg/m².       Vitals:     22 1211   Pulse: 76   Resp: 14   SpO2: 98%           Allergies   Allergen Reactions    Tape Merwyn Diamond Tape] Rash      Family History         Family History   Problem Relation Age of Onset    Cancer Mother      Diabetes Mother      Cancer Father           thyroid    Diabetes Sister      Heart Disease Sister      Heart Disease Brother      Heart Disease Maternal Uncle      Cancer Maternal Grandmother           Social History               Socioeconomic History    Marital status:        Spouse name: Not on file    Number of children: Not on file    Years of education: Not on file    Highest education level: Not on file   Occupational History    Not on file   Tobacco Use    Smoking status: Former Smoker       Packs/day: 2.00       Years: 10.00       Pack years: 20.00       Types: Cigarettes       Quit date: 2010       Years since quittin.7    Smokeless tobacco: Never Used   Vaping Use    Vaping Use: Never used   Substance and Sexual Activity    Alcohol use: No    Drug use: No    Sexual activity: Yes       Partners: Male   Other Topics Concern    Not on file   Social History Narrative    Not on file      Social Determinants of Health      Financial Resource Strain: Low Risk     Difficulty of Paying Living Expenses: Not very hard   Food Insecurity: Food Insecurity Present    Worried About Running Out of Food in the Last Year: Sometimes true    Arianna of Food in the Last Year: Sometimes true   Transportation Needs:     Lack of Transportation (Medical): Not on file    Lack of Transportation (Non-Medical): Not on file   Physical Activity:     Days of Exercise per Week: Not on file    Minutes of Exercise per Session: Not on file   Stress:     Feeling of Stress : Not on file   Social Connections:     Frequency of Communication with Friends and Family: Not on file    Frequency of Social Gatherings with Friends and Family: Not on file    Attends Restoration Services: Not on file    Active Member of [employer] Group or Organizations: Not on file    Attends Club or Organization Meetings: Not on file    Marital Status: Not on file   Intimate Partner Violence:     Fear of Current or Ex-Partner: Not on file    Emotionally Abused: Not on file    Physically Abused: Not on file    Sexually Abused: Not on file   Housing Stability:     Unable to Pay for Housing in the Last Year: Not on file    Number of [place] in the Last Year: Not on file    Unstable Housing in the Last Year: Not on file         Social History          Substance and Sexual Activity   Alcohol Use No      Social History           Tobacco Use   Smoking Status Former Smoker    Packs/day: 2.00    Years: 10.00    Pack years: 20.00    Types: Cigarettes    Quit date: 2010    Years since quittin.7   Smokeless Tobacco Never Used      Social History          Substance and Sexual Activity   Drug Use No      Past Medical History        Past Medical History:   Diagnosis Date    Anxiety      Asthma      CAD (coronary artery disease)      Clinical trial participant at discharge 3/31/16    [facility]      Depression      Diabetic neuropathy ([facility]      H/O cardiac catheterization 16     LMCA: Mild Irregularities 10-20%. LAD: Lesion on Prox LAD: Proximal subsection. 100% stenosis. LCx: Mild irregularities 10-20%. RCA: Mild irregularities 10-20%. Widely patent mid RCA stent. EF:60%.     H/O cardiovascular stress test 10/07/2016 Overall results are most consistent with a low risk for significant CAD.     H/O echocardiogram 10/05/2016     EF:>60%. Inferoseptal wall abnormal in its motion which is not unusal status post open heart surgery. Evidence of mild (grade I) diastolic dysfunction is seen.  H/O tilt table evaluation 07/12/2016     Abnormal. PTs HR, Blood Pressure response and symptoms were most consistent with dysautonomia. Combined w/viligant maintenance of euvolemia and maintaining a moderate salt intake, pharmaciologic treatment w/ ProAmatine, SSRI such as Lexapro and/or Mestinon among other treatments have shown some effectiveness in the treatment of this condition.  History of cardiovascular stress test 02/13/2019     Abnormal. Small/moderate perfusion defect of mild/moderate intesity in the anterior and anterolateral regions during stress imaging which is most consistent w/ ischemia but may be artifact. Overall low/intermediate risk for significant CAD.  History of echocardiogram 09/06/2018     EF >60%. Inferoseptal wall is abnormal in its motion which is not unusual s/p open heart. Evidence of mild (grade I) diastolic dysfunction seen.     Hx of blood clots      Hyperlipidemia      Hypertension      Intermittent claudication (HCC)      Kidney stones      Movement disorder       neuropathy in legs    Neuromuscular disorder (HCC)       neuropathy    Osteoarthritis      Reflux      Seizures (HCC)       last over 10 yr ago    Stented coronary artery 9/22/2010      LAD/[place]    Stented coronary artery 3/31/16     RCA/[place]    Type II or unspecified type diabetes mellitus without mention of complication, not stated as uncontrolled      Unspecified sleep apnea       no machine         Past Surgical History         Past Surgical History:   Procedure Laterality Date    ABDOMINAL HERNIA REPAIR   01/2016     repair done [place]    APPENDECTOMY        CARDIAC CATHETERIZATION Left 04/26/2016     right [facility] [place] [place]/ Dr Misha Chambers Left 03/07/2019     Left Radial/[facility] [place]/    CARDIAC CATHETERIZATION   05/20/2022     Dr Rancho Whipple radial   [place]   05/20/2022     Dr Rancho Whipple radial   [address]         CABG 2019    CHOLECYSTECTOMY   1991    COLONOSCOPY   12/16/2014     -hemorrhoids,bx    CORONARY ANGIOPLASTY WITH STENT PLACEMENT   09/2010    CORONARY ANGIOPLASTY WITH STENT PLACEMENT   03/31/2016     JESSE RCA / DR Arellano Alert    CORONARY ANGIOPLASTY WITH STENT PLACEMENT   05/20/2022    CORONARY ARTERY BYPASS GRAFT   08/15/2016     OP X 1- Dr Nelly Carter, COLON, DIAGNOSTIC   12/16/2014    HERNIA REPAIR   12/13/2012     at the medial aspect of a [place] RUQ scar); [facility] [id number]   02/16/2015     incisional, recurrent    HERNIA REPAIR   02/2016    HYSTERECTOMY (CERVIX STATUS UNKNOWN)        OTHER SURGICAL HISTORY   10/08/2015     abd wound washout, mesh removal, wound vac placement    NY SUCT NICOLE LIPECTOMY,HEAD/NECK Left 08/27/2018     THIGH LESION BIOPSY EXCISION, SOFT TISSUE MASS performed by Abdulaziz Stern MD at [facility] Left 2018     leg    UPPP UVULOPALATOPHARYGOPLASTY   06/26/2012    US BREAST BIOPSY NEEDLE ADDITIONAL RIGHT Right 6/9/2022     [facility]    US BREAST NEEDLE BIOPSY RIGHT Right 6/9/2022     US BREAST NEEDLE BIOPSY RIGHT 6/9/2022 [facility] ULTRASOUND    VENTRAL HERNIA REPAIR   09/21/2015     With Mesh - Dr Gus House             Patient Active Problem List   Diagnosis    Dyslipidemia    Radiculopathy    Insomnia    Allergic rhinitis    COPD (chronic obstructive pulmonary disease)    Depression    Bilateral leg weakness    Gait difficulty    Diabetic neuropathy    Back pain    Muscle spasm    Affective disorder ([address]    History of ventral hernia repair    History of incisional hernia repair    Essential hypertension    Gastroesophageal reflux disease without esophagitis    Uncontrolled type 2 diabetes mellitus with diabetic polyneuropathy, with long-term current use of insulin ([facility])    Primary osteoarthritis involving multiple joints    Heart palpitations    Dysautonomia orthostatic hypotension syndrome    Coronary artery disease involving native coronary artery of native heart    Angina, class III ([facility])    S/P CABG x 1    Precordial pain    DARON (obstructive sleep apnea)    Neuropathic pain    Soft tissue mass    Abnormal cardiovascular stress test    Muscle spasms of both lower extremities    Abscess of left great toe    Paronychia of great toe of left foot    Chest pain    Epigastric pain    ACS (acute coronary syndrome) ([facility])    Obesity (BMI 30-39. 9)    S/P angioplasty with stent    DCIS (ductal carcinoma in situ)        Current Medication      Current Outpatient Medications:     nitroGLYCERIN (NITROSTAT) 0.4 MG SL tablet, Place 1 tablet under the tongue every 5 minutes as needed for Chest pain, Disp: 25 tablet, Rfl: 1    clopidogrel (PLAVIX) 75 MG tablet, Take 1 tablet by mouth daily, Disp: 30 tablet, Rfl: 3    TRULICITY 3 RY/2.8CI SOPN, INJECT 0.5 ML (1 PEN) SUBCUTANEOUSLY WEEKLY,X30 DAYS,INSTR:ROTATE INJECTION SITES, Disp: , Rfl:     metoprolol succinate (TOPROL XL) 100 MG extended release tablet, Take 1.5 tablets by mouth daily, Disp: 90 tablet, Rfl: 3    pregabalin (LYRICA) 150 MG capsule, Take one cap three times daily (Patient taking differently: Take 150 mg by mouth in the morning, at noon, and at bedtime.  Take one cap three times daily), Disp: 90 capsule, Rfl: 6    atorvastatin (LIPITOR) 80 MG tablet, Take 1 tablet by mouth daily, Disp: 90 tablet, Rfl: 3    fludrocortisone (FLORINEF) 0.1 MG tablet, TAKE 3 TABLETS BY MOUTH EVERY DAY (Patient taking differently: Take 0.1 mg by mouth daily Pt states she takes 1 tab BID), Disp: 270 tablet, Rfl: 3    Blood Glucose Monitoring Suppl (ONE TOUCH ULTRA 2) w/Device KIT, 1 kit by Does not apply route daily, Disp: 1 kit, Rfl: 0    blood glucose monitor strips, Test 3 times a day & as needed for symptoms of irregular blood glucose. Dispense sufficient amount for indicated testing frequency plus additional to accommodate PRN testing needs. , Disp: 300 strip, Rfl: 0    Lancets MISC, 1 each by Does not apply route 2 times daily, Disp: 300 each, Rfl: 1    Continuous Blood Gluc Sensor (FREESTYLE MCKAYLA 2 SENSOR) MISC, 2 FREESTYLE MCKAYLA 2 SENSORS TO USE WITH MCKAYLA 2 READER TO CHECK BLOOD SUGARS 6 8 TIMES A DAY, Disp: , Rfl:     Insulin Degludec (TRESIBA FLEXTOUCH) 200 UNIT/ML SOPN, Inject 86 Units into the skin daily PER DR MCELROY (Patient taking differently: Inject 110 Units into the skin daily PER DR MCELROY), Disp: 1 pen, Rfl: 0    metFORMIN (GLUCOPHAGE-XR) 500 MG extended release tablet, Take 2 tablets by mouth daily (with breakfast), Disp: 180 tablet, Rfl: 3    omeprazole (PRILOSEC) 20 MG delayed release capsule, TAKE 1 CAPSULE BY MOUTH EVERY DAY IN THE MORNING BEFORE BREAKFAST (Patient taking differently: Take 20 mg by mouth Daily ), Disp: 90 capsule, Rfl: 1    albuterol sulfate HFA (VENTOLIN HFA) 108 (90 Base) MCG/ACT inhaler, INHALE 2 PUFFS EVERY 6 HOURS AS NEEDED FOR WHEEZING (Patient taking differently: Inhale 2 puffs into the lungs every 6 hours as needed INHALE 2 PUFFS EVERY 6 HOURS AS NEEDED FOR WHEEZING), Disp: 18 Inhaler, Rfl: 3    ondansetron (ZOFRAN ODT) 4 MG disintegrating tablet, Take 1 tablet by mouth every 8 hours as needed for Nausea, Disp: 20 tablet, Rfl: 0    Saccharomyces boulardii (PROBIOTIC) 250 MG CAPS, Take 1 capsule by mouth daily, Disp: 30 capsule, Rfl: 0    tiZANidine (ZANAFLEX) 2 MG tablet, TAKE 1 TABLET BY MOUTH NIGHTLY AS NEEDED(SPASMS) (Patient taking differently: Take 2 mg by mouth nightly as needed TAKE 1 TABLET BY MOUTH NIGHTLY AS NEEDED(SPASMS)), Disp: 30 tablet, Rfl: 2    acetaminophen (TYLENOL) 500 MG tablet, Take 1 tablet by mouth 4 times daily as needed for Pain, Disp: 40 tablet, Rfl: 0    losartan (COZAAR) 25 MG tablet, Take 1 tablet by mouth daily, Disp: 90 tablet, Rfl: 3    insulin lispro, 1 Unit Dial, (HUMALOG KWIKPEN) 100 UNIT/ML SOPN, Inject 20 Units into the skin 3 times daily (before meals), Disp: 12 pen, Rfl: 1    DULoxetine (CYMBALTA) 30 MG extended release capsule, Take 1 capsule by mouth daily, Disp: 30 capsule, Rfl: 1    aspirin 81 MG EC tablet, TAKE 1 TABLET BY MOUTH DAILY (Patient taking differently: Take 81 mg by mouth daily ), Disp: 90 tablet, Rfl: 3    Insulin Pen Needle (BD ULTRA-FINE PEN NEEDLES) 29G X 12.7MM MISC, USE AS DIRECTED BY PHYSICIAN 5 times daily, Disp: 150 each, Rfl: 11    Blood Glucose Monitoring Suppl BERE, 1 Units by Does not apply route three times daily Unit insurance will cover, Disp: 1 Device, Rfl: 0    Blood Pressure Monitor KIT, 1 each by Does not apply route daily as needed ESSENTIAL HYPERTENSION   I10, Disp: 1 kit, Rfl: 0    Blood Glucose Monitoring Suppl (BLOOD GLUCOSE MONITOR KIT) KIT, 1 each by Does not apply route daily. Please dispense whatever BS monitoring kit [facility] covers. Dx: 250, new onset T2DM. Testing once daily, Disp: 1 kit, Rfl: 0           Review of Systems   Constitutional: Negative. HENT: Negative. Respiratory: Positive for shortness of breath. COPD   Cardiovascular: Positive for chest pain. CHF, HTN, hx of MI   Gastrointestinal: Negative. Endocrine:        DM   Genitourinary: Negative. Musculoskeletal: Positive for arthralgias and back pain. Skin: Negative. Allergic/Immunologic: Negative. Neurological: Positive for seizures and headaches. Hematological: Negative. Psychiatric/Behavioral: Positive for dysphoric mood. Objective:   Physical Exam  Constitutional:       Appearance: Normal appearance. She is obese.    HENT:      Head: Normocephalic and atraumatic. Mouth/Throat:      Mouth: Mucous membranes are moist.   Eyes:      Extraocular Movements: Extraocular movements intact. Conjunctiva/sclera: Conjunctivae normal.   Pulmonary:      Effort: Pulmonary effort is normal. No respiratory distress. Chest:   Breasts:      Right: No swelling, bleeding, inverted nipple, mass, nipple discharge, skin change, tenderness, axillary adenopathy or supraclavicular adenopathy. Left: No swelling, bleeding, inverted nipple, mass, nipple discharge, skin change, tenderness, axillary adenopathy or supraclavicular adenopathy. Abdominal:      General: There is no distension. Palpations: Abdomen is soft. Lymphadenopathy:      Upper Body:      Right upper body: No supraclavicular or axillary adenopathy. Left upper body: No supraclavicular or axillary adenopathy. Skin:     General: Skin is warm and dry. Neurological:      Mental Status: She is alert and oriented to person, place, and time. Psychiatric:         Mood and Affect: Mood normal.         Behavior: Behavior normal.            Assessment:   1. DCIS right breast  2. Abnormal node on US though biopsy negative                Plan:   We discussed the dx and recommended treatment options using NCCN guidelines. Charissa Cortes is a good candidate for breast conservation and this is her preference. All questions were answered. I spoke with Dr. Lieutenant Link in radiology regarding the concordance of the negative lymph node biopsy and she recommends an MRI to confirm that the node does not need to be excised. We will plan for needle directed right breast lumpectomy. We discussed the procedure and risks and the consent form was signed. Pending the results of the MRI we will proceed.                                     Revision History                                  Rell Longo PA-C   Physician Assistant   Specialty:  Physician Assistant   Progress Notes      Signed   Encounter Date:  7/6/2022 Related encounter: Office Visit from 2022 in [place] [address] [facility]           Signed        Expand All Collapse All        Show:Clear all  [x]Manual[x]Template[x]Copied    Added by:  He Silva PA-C      []Alberto for details               Patient: Zamzam Caceres  : 1965  Date of Visit: 2022     REASON FOR VISIT / CONSULTATION: Coronary Artery Disease (Hx: CAD,S/P Stents, CABG, Abn Stress,SOB, Myocardial Ischemia, Dysautonomia, HTN,HLD. She has been feeling fine. Palpitations when she is upset. Denies: CP, SOB, Lightheaded/dizziness. ) and Cardiac Clearance (22 - Dr. Nelson - Right Breast Lumpectomy. )     Dear CHELSEA Palumbo - CNP,     I had the pleasure of seeing your patient Zamzam Caceres in consultation today. As you know, Ms. Adebayo Benson is a 62 y.o. female who presents with a history of intermittent episodes of back and chest discomfort that started developing since her recent CABG involving a LIMA-LAD 8/15/16. She also has a history of  dysautonomia on a tilt table test, and was started on Florinef as well as Lexapro. Recent heart cath done 3/7/2019 shows severe single vessel disease involving LAD Normal LVEDP. She had a Holter monitor done on 10/23/2019 that did show PAC's and PVC's but was largely unremarkable. She did come to the ER on 2022 due to abdominal pain and shortness of breath. She was told her EKG was abnormal and also she had an elevated troponin which was only 15 ng/L and only had minor EKG changes. She was sent to [facility] [place]. 's for this and she was not very happy about this. Cardiovascular stress test done on 2022 showed Overall, these results are most consistent with an intermediate/high risk for significant coronary artery disease. Echocardiogram on 2022 showed EF:>55% with mild diastolic dysfunction and LV wall thickness mildly increased. Ms. Briceño [place] to the office today for a pre-op evaluation.  She has a lumpectomy scheduled on July 12 with Dr Anne Antunez. She had a mammogram and biopsy showed cancer. No chest pain or pressure. No shortness of breath. No lightheaded/dizziness. She had a stent placed 5/22/2022. She reports she could walk 1/2 mile without shortness of breath, she can ambulate a flight of stairs. She denied any current chest pain, pressure or tightness. She denies having any lightheaded/dizziness. She denies any abdominal pain, bleeding problems, bowel issues, problems with her medications or any other concerns at this time. No cough, fever or chills. No nausea or vomiting. No falls or near falls. Bleeding Risks: Ms. Lesly Gann denies any current or recent bleeding problems including a history of a GI bleed, ulcers, recent or upcoming surgeries, blood in her stool or black tarry stools or blood in her urine. Past Medical History        Past Medical History:   Diagnosis Date    Anxiety      Asthma      CAD (coronary artery disease)       x1 stent 2010,x1 stent 2016, x1 stent May 2022    Cancer [facility]) 06/2022     right breast cancer    Clinical trial participant at discharge 3/31/16    COPD (chronic obstructive pulmonary disease) ([facility]      Depression      Diabetic neuropathy ([facility]      GERD (gastroesophageal reflux disease)      H/O cardiac catheterization 4/26/16     LMCA: Mild Irregularities 10-20%. LAD: Lesion on Prox LAD: Proximal subsection. 100% stenosis. LCx: Mild irregularities 10-20%. RCA: Mild irregularities 10-20%. Widely patent mid RCA stent. EF:60%.  H/O cardiovascular stress test 10/07/2016     Overall results are most consistent with a low risk for significant CAD.     H/O echocardiogram 10/05/2016     EF:>60%. Inferoseptal wall abnormal in its motion which is not unusal status post open heart surgery. Evidence of mild (grade I) diastolic dysfunction is seen.      H/O tilt table evaluation 07/12/2016     Abnormal. PTs HR, Blood Pressure response and symptoms were most consistent with dysautonomia. Combined w/viligant maintenance of euvolemia and maintaining a moderate salt intake, pharmaciologic treatment w/ ProAmatine, SSRI such as Lexapro and/or Mestinon among other treatments have shown some effectiveness in the treatment of this condition.  History of cardiovascular stress test 02/13/2019     Abnormal. Small/moderate perfusion defect of mild/moderate intesity in the anterior and anterolateral regions during stress imaging which is most consistent w/ ischemia but may be artifact. Overall low/intermediate risk for significant CAD.  History of echocardiogram 09/06/2018     EF >60%. Inferoseptal wall is abnormal in its motion which is not unusual s/p open heart. Evidence of mild (grade I) diastolic dysfunction seen.  Hx of blood clots      Hyperlipidemia      Hypertension      Intermittent claudication (HCC)      Kidney stones      Movement disorder       neuropathy in legs    Neuromuscular disorder (HCC)       neuropathy    Osteoarthritis      Reflux      Seizures ([facility]Ss last seizure    Stented coronary artery 9/22/2010      LAD/[place]    Stented coronary artery 3/31/16     RCA/[place]    Type II or unspecified type diabetes mellitus without mention of complication, not stated as uncontrolled      Unspecified sleep apnea       no machine            CURRENT ALLERGIES: Tape [adhesive tape] REVIEW OF SYSTEMS: 14 systems were reviewed. Pertinent positives and negatives as above, all else negative.       Past Surgical History         Past Surgical History:   Procedure Laterality Date    ABDOMINAL HERNIA REPAIR   01/2016     repair done [place]    APPENDECTOMY        CARDIAC CATHETERIZATION Left 04/26/2016     right radial/ [id number] [facility] [place]/ Dr Myles Lara Left 03/07/2019     Left Radial/[facility] [place]/    CARDIAC CATHETERIZATION   05/20/2022     Dr Jose David Xiong radial   [address]     Dr Francesca Brown radial   [address]         CABG     CHOLECYSTECTOMY   1991    COLONOSCOPY   2014     -hemorrhoids,bx    CORONARY ANGIOPLASTY WITH STENT PLACEMENT   2010    CORONARY ANGIOPLASTY WITH STENT PLACEMENT   2016     [facility] RCA / DR Braxton Mixon    CORONARY ANGIOPLASTY WITH STENT PLACEMENT   2022    CORONARY ARTERY BYPASS GRAFT   08/15/2016     OP X 1- Dr Shannan Silver, COLON, DIAGNOSTIC   2014    HERNIA REPAIR   2012     at the medial aspect of a [place] RUQ scar); repaired [address]. [id number]   2015     incisional, recurrent    HERNIA REPAIR   2016    HYSTERECTOMY (CERVIX STATUS UNKNOWN)        OTHER SURGICAL HISTORY   10/08/2015     abd wound washout, mesh removal, wound vac placement    IN SUCT NICOLE LIPECTOMY,HEAD/NECK Left 2018     THIGH LESION BIOPSY EXCISION, SOFT TISSUE MASS performed by Emmett Maciel MD at [facility] Left 2018     leg    UPPP UVULOPALATOPHARYGOPLASTY   2012    US BREAST BIOPSY NEEDLE ADDITIONAL RIGHT Right 2022     US BREAST BIOPSY NEEDLE ADDITIONAL RIGHT 2022 [facility] ULTRASOUND    US BREAST NEEDLE BIOPSY RIGHT Right 2022     US BREAST NEEDLE BIOPSY RIGHT 2022 [facility] ULTRASOUND    VENTRAL HERNIA REPAIR   2015     With [facility] - Dr Leta Russell History:  Social History             Tobacco Use    Smoking status: Former Smoker       Packs/day: 2.00       Years: 10.00       Pack years: 20.00       Types: Cigarettes       Quit date: 2010       Years since quittin.8    Smokeless tobacco: Never Used   Vaping Use    Vaping Use: Never used   Substance Use Topics    Alcohol use: No    Drug use: No          CURRENT MEDICATIONS:  Active Medications          Outpatient Medications Marked as Taking for the 22 encounter (Office Visit) with Ricci Antunez PA-C   Medication Sig Dispense Refill    nitroGLYCERIN (NITROSTAT) 0.4 MG SL tablet Place 1 tablet under the tongue every 5 minutes as needed for Chest pain 25 tablet 1    clopidogrel (PLAVIX) 75 MG tablet Take 1 tablet by mouth daily 30 tablet 3    TRULICITY 3 UN/4.6UL SOPN INJECT 0.5 ML (1 PEN) SUBCUTANEOUSLY WEEKLY,X30 DAYS,INSTR:ROTATE INJECTION SITES        metoprolol succinate (TOPROL XL) 100 MG extended release tablet Take 1.5 tablets by mouth daily 90 tablet 3    pregabalin (LYRICA) 150 MG capsule Take one cap three times daily (Patient taking differently: Take 150 mg by mouth in the morning, at noon, and at bedtime. Take one cap three times daily) 90 capsule 6    atorvastatin (LIPITOR) 80 MG tablet Take 1 tablet by mouth daily 90 tablet 3    fludrocortisone (FLORINEF) 0.1 MG tablet TAKE 3 TABLETS BY MOUTH EVERY DAY (Patient taking differently: Take 0.1 mg by mouth daily ) 270 tablet 3    Blood Glucose Monitoring Suppl (ONE TOUCH ULTRA 2) w/Device KIT 1 kit by Does not apply route daily 1 kit 0    blood glucose monitor strips Test 3 times a day & as needed for symptoms of irregular blood glucose. Dispense sufficient amount for indicated testing frequency plus additional to accommodate PRN testing needs.  300 strip 0    Lancets MISC 1 each by Does not apply route 2 times daily 300 each 1    Continuous Blood Gluc Sensor (FREESTYLE MCKAYLA 2 SENSOR) MISC 2 FREESTYLE MCKAYLA 2 SENSORS TO USE WITH MCKAYLA 2 READER TO CHECK BLOOD SUGARS 6 8 TIMES A DAY        Insulin Degludec (TRESIBA FLEXTOUCH) 200 UNIT/ML SOPN Inject 86 Units into the skin daily PER DR MCELROY (Patient taking differently: Inject 110 Units into the skin at bedtime ) 1 pen 0    metFORMIN (GLUCOPHAGE-XR) 500 MG extended release tablet Take 2 tablets by mouth daily (with breakfast) 180 tablet 3    omeprazole (PRILOSEC) 20 MG delayed release capsule TAKE 1 CAPSULE BY MOUTH EVERY DAY IN THE MORNING BEFORE BREAKFAST (Patient taking differently: Take 20 mg by mouth Daily ) 90 capsule 1    albuterol sulfate HFA (VENTOLIN HFA) 108 (90 Base) MCG/ACT inhaler INHALE 2 PUFFS EVERY 6 HOURS AS NEEDED FOR WHEEZING (Patient taking differently: Inhale 2 puffs into the lungs every 6 hours as needed INHALE 2 PUFFS EVERY 6 HOURS AS NEEDED FOR WHEEZING) 18 Inhaler 3    ondansetron (ZOFRAN ODT) 4 MG disintegrating tablet Take 1 tablet by mouth every 8 hours as needed for Nausea 20 tablet 0    Saccharomyces boulardii (PROBIOTIC) 250 MG CAPS Take 1 capsule by mouth daily 30 capsule 0    tiZANidine (ZANAFLEX) 2 MG tablet TAKE 1 TABLET BY MOUTH NIGHTLY AS NEEDED(SPASMS) (Patient taking differently: Take 2 mg by mouth nightly as needed TAKE 1 TABLET BY MOUTH NIGHTLY AS NEEDED(SPASMS)) 30 tablet 2    acetaminophen (TYLENOL) 500 MG tablet Take 1 tablet by mouth 4 times daily as needed for Pain 40 tablet 0    losartan (COZAAR) 25 MG tablet Take 1 tablet by mouth daily 90 tablet 3    insulin lispro, 1 Unit Dial, (HUMALOG KWIKPEN) 100 UNIT/ML SOPN Inject 20 Units into the skin 3 times daily (before meals) 12 pen 1    DULoxetine (CYMBALTA) 30 MG extended release capsule Take 1 capsule by mouth daily 30 capsule 1    aspirin 81 MG EC tablet TAKE 1 TABLET BY MOUTH DAILY (Patient taking differently: Take 81 mg by mouth daily ) 90 tablet 3    Insulin Pen Needle (BD ULTRA-FINE PEN NEEDLES) 29G X 12.7MM MISC USE AS DIRECTED BY PHYSICIAN 5 times daily 150 each 11    Blood Glucose Monitoring Suppl BERE 1 Units by Does not apply route three times daily Unit insurance will cover 1 Device 0    Blood Pressure Monitor KIT 1 each by Does not apply route daily as needed ESSENTIAL HYPERTENSION   I10 1 kit 0            FAMILY HISTORY: family history includes Cancer in her father, maternal grandmother, and mother; Diabetes in her mother and sister; Heart Disease in her brother, maternal uncle, and sister.       PHYSICAL EXAM:   /75 (Site: Left Lower Arm, Position: Sitting, Cuff Size: Medium Adult)   Pulse 83   Resp 16   Ht 5' 3\" (1.6 m)   Wt 217 lb 6.4 oz (98.6 kg)   LMP 12/05/1996   BMI 38.51 kg/m²  Body mass index is 38.51 kg/m². Constitutional: She is obese but oriented to person, place, and time. She appears well-developed and well-nourished. In no acute distress. HEENT: Normocephalic and atraumatic. No JVD present. Carotid bruit is not present. No mass and no thyromegaly present. No lymphadenopathy present. Cardiovascular: Normal rate, regular rhythm, normal heart sounds. Exam reveals no gallop and no friction rubs. 1/6 systolic murmur, 2nd intercostal space on the RIGHT just lateral to the sternum. Normal rate, regular rhythm, normal heart sounds and intact distal pulses. Exam reveals no gallop and no friction rub. No significant cardiac murmur was heard. Pulmonary/Chest: Effort normal and breath sounds normal. No respiratory distress. She has no wheezes, rhonchi or rales. Abdominal: Soft, non-tender. Bowel sounds and aorta are normal. She exhibits no organomegaly, mass or bruit. Extremities: Trace. No cyanosis or clubbing. 2+ radial and carotid pulses. Distal extremity pulses: 2+ bilaterally. Neurological: She is alert and oriented to person, place, and time. No evidence of gross cranial nerve deficit. Coordination appeared normal.   Skin: Skin is warm and dry. There is no rash or diaphoresis. Psychiatric: She has a normal mood and affect.  Her speech is normal and behavior is normal.       MOST RECENT LABS ON RECORD:         Lab Results   Component Value Date     WBC 9.0 07/05/2022     HGB 11.1 (L) 07/05/2022     HCT 36.1 (L) 07/05/2022      07/05/2022     CHOL 165 12/29/2021     TRIG 151 (H) 12/29/2021     HDL 40 (L) 12/29/2021     LDLCHOLESTEROL 95 12/29/2021     ALT 9 04/22/2022     AST 11 04/22/2022      (L) 07/01/2022     K 4.5 07/01/2022     CL 96 (L) 07/01/2022     CREATININE 0.65 07/01/2022     BUN 11 07/01/2022     CO2 22 07/01/2022     TSH 2.80 06/04/2016     INR 1.0 04/24/2022     LABA1C 9.3 (H) 07/01/2022     LABMICR Can not be calculated 12/29/2021     BNP NOT REPORTED 05/11/2014         ASSESSMENT:  1. Pre-op exam    2. Coronary artery disease involving coronary bypass graft of native heart without angina pectoris    3. CAD S/P percutaneous coronary angioplasty    4. Abnormal ECG    5. Heart palpitations    6. Dysautonomia orthostatic hypotension syndrome    7. Essential hypertension    8. Mixed hyperlipidemia       PLAN:  · Pre-Op Clearance:   ? Pre-Operative Risk assessment using 2014 ACC/AHA guidelines   ? Emergent procedure Yes- Breast cancer  ? Active Cardiac Condition No (decompensated HF, Arrhythmia, MI <3 weeks, severe valve disease)  ? Risk Level of Procedure Low Risk (endoscopy, superficial skin, breast, ambulatory, or cataract, etc.)  ? Revised Cardiac Risk Index Risk factors: History of ischemic heart disease  ? Diabetic treated with insulin  ? Measurement of Exercise Tolerance before Surgery >4 Yes  ? According to the 2014 ACC/AHA pre-operative risk assessment guidelines Lalo Adams is at intermediate risk for major cardiac complications during a low risk procedure and may continue as planned. Specific medication recommendations are listed below. Medications recommended to continue should be taken with a sip of water even when NPO. · Medical management to reduce perioperative risk:  · Antiplatelet Agent: Continue aspirin and Plavix throughout the procedure  · Anticoagulant Agent: Not applicable prior to the procedure. · Anticoagulation Bridging: Not applicable  · Additional Recommendations: I would also suggest that she continue her beta blocker and statin throughout the perioperative period. · Additional Testing List: None        · Coronary artery disease and myocardial ischemia: Coronary artery stent placement in 2010 and 2016 and CABG involving LIMA-LAD on 8/15/16: Currently appears well controlled  · Antiplatelet Agent: Continue Aspirin 81 mg daily.    · Beta Blocker: INCREASE to Metoprolol succinate (Toprol XL) 150 mg daily. I also discussed the potential side effects of this medication including lightheadedness and dizziness and instructed them to stop the medication of this occurs and call our office if this occurs. · Anti-anginal medications: Not indicated at this time. · Cholesterol Reduction Therapy: Continue Atorvastatin (Lipitor) 80 mg daily. · History of Abnormal EKG/ EKG Changes/ Mildly Elevated Troponin Level: Reported ACS, admitted but no inpatient testing done. · Tachycardia & Intermittent Heart palpitations:Awaiting on CAM monitor results: Fairly Asymptomatic at this time. Had one episode on Easter morning however no other reoccurrences. Beta Blocker: INCREASE to Metoprolol succinate (Toprol XL) 150 mg daily. I also discussed the potential side effects of this medication including lightheadedness and dizziness and instructed them to stop the medication of this occurs and call our office if this occurs. · Dysautonomia Orthostatic Hypotension Syndrome: Currently appears to be well controlled. It sounds like her balance is not as good lately but is probably not related to this. We will monitor at least for now. ? Pharmacological Management: Continue Fludocortisone (Florinef) 0.1 mg BID. ? Nonpharmacologic counseling: Because of her condition, I reminded her to try and keep herself well-hydrated and to take extra time when moving from laying to sitting, sitting to standing and standing to walking and not to avoid salt in her diet. I also advised her to put water by her bedside and drink this before she gets out of bed in the morning. · Essential Hypertension: Controlled   ? ACE Inibitor/ARB: Continue losartan (Cozaar) 25 mg daily. ? Beta Blocker: INCREASE to Metoprolol succinate (Toprol XL) 150 mg daily.  I also discussed the potential side effects of this medication including lightheadedness and dizziness and instructed them to stop the medication of this occurs and call our office if this occurs. · Hyperlipidemia: Mixed, LDL done on 12/29/2021 was 95 mg/dL   ? Cholesterol Reduction Therapy: Continue Atorvastatin (Lipitor) 80 mg daily. Finally, I recommended that she continue her other medications and follow up with you as previously scheduled. I discussed patient's symptoms and treatment plan with Dr Lavon Zaragoza, he was in agreement with the plan and follow up. FOLLOW UP:   I told Ms. Pam Chris to call my office if she had any problems, but otherwise told her to Return in about 5 months (around 11/21/2022). However, I would be happy to see her sooner should the need arise. Once again, thank you for allowing me to participate in this patients care. Please do not hesitate to contact me could I be of further assistance. Sincerely,  Zelda Olivarez PA-C   [facility] Cardiology Specialist   [address] [place] [address]  Phone: [phone]; Fax: [phone]     I believe that the risk of significant morbidity and mortality related to the patient's current medical conditions are: intermediate-high. The documentation recorded by the scribe, accurately and completely reflects the services I personally performed and the decisions made by me.  Brooks Osler, PA-C July 6, 2022                  Routing History

## 2022-07-12 NOTE — BRIEF OP NOTE
Brief Postoperative Note      Patient: Tatiana Flaherty  YOB: 1965  MRN: [id number]    Date of Procedure: 7/12/2022    Pre-Op Diagnosis: Malignant neoplasm of right female breast, unspecified estrogen receptor status, unspecified site of breast ([facility] [C50.911]    Post-Op Diagnosis: Same (Right breast DCIS)       Procedure(s):  NEEDLE DIRECTED ( 1130     )    RIGHT BREAST LUMPECTOMY    Surgeon(s):  Gianni Rosales DO    Assistant:  * No surgical staff found *    Anesthesia: General    Estimated Blood Loss (mL): Minimal    Complications: None    Specimens:   ID Type Source Tests Collected by Time Destination   A : RIGHT BREAST MASS DOUBLE SHORT BLACK SUTURE MARKS SUPERIOR MARGIN, SINGLE LONG BLACK SUTURE HARPER LATERAL MARGIN, BLUE SUTURE MARKS DEEP MARGIN Tissue Breast [address], DO 7/12/2022 1342    B : RIGHT BREAST MASS ADDITIONAL INFERIOR MARGIN SUTURE MARKS NEW MARGIN Tissue Breast [address], DO 7/12/2022 1353    C : RIGHT BREAST ADDITIONAL LATERAL TISSUE SUTURE MARKS NEW MARGIN Tissue Breast [address], DO 7/12/2022 1359    D : RIGHT BREAST MEDAIL MARGIN BLACK SUTURE MARKS THE NEW MARGIN Tissue Breast [address], DO 7/12/2022 1406    E : RIGHT BREAST ADDITIONAL SUPERIOR MARGIN SUTURE MARKS NEW MARGIN Tissue Breast [address], DO 7/12/2022 1408        Implants:  * No implants in log *      Drains: * No LDAs found *    Findings: Wire localized breast tissue excised. Specimen Mammography showed clip in situ.     Electronically signed by Rosa Connolly MD on 7/12/2022 at 2:47 PM

## 2022-07-12 NOTE — ANESTHESIA POSTPROCEDURE EVALUATION
Department of Anesthesiology  Postprocedure Note    Patient: Janet Yeager  MRN: [id number]  [place]: 1965  Date of evaluation: 7/12/2022      Procedure Summary     Date: 07/12/22 Room / Location: [address] - INPATIENT    Anesthesia Start: 2108 Anesthesia Stop: 1751    Procedure: NEEDLE DIRECTED ( [phone]     )    RIGHT BREAST LUMPECTOMY (Right Breast) Diagnosis:       Malignant neoplasm of right female breast, unspecified estrogen receptor status, unspecified site of breast ([facility] [address] [address]      (Malignant neoplasm of right female breast, unspecified estrogen receptor status, unspecified site of breast ([facility] [address] Kathy Boyle)    Surgeons: Koko Valdez DO Responsible Provider: Shania Mancilla MD    Anesthesia Type: General ASA Status: 4          Anesthesia Type: General    Sony Phase I: Sony Score: 8    Sony Phase II:        Anesthesia Post Evaluation    Patient location during evaluation: PACU  Patient participation: complete - patient participated  Level of consciousness: awake  Airway patency: patent  Nausea & Vomiting: no nausea and no vomiting  Complications: no  Cardiovascular status: hemodynamically stable  Respiratory status: acceptable  Hydration status: stable  Comments: Treated elevated BG  Multimodal analgesia pain management approach

## 2022-07-12 NOTE — ANESTHESIA PRE PROCEDURE
Department of Anesthesiology  Preprocedure Note       Name:  Kiet Lee   Age:  62 y.o.  :  1965                                          MRN:  [id number]         Date:  2022      Surgeon: Dre Jules):  Leo Car DO    Procedure: Procedure(s):  NEEDLE DIRECTED ( 1130     )    RIGHT BREAST LUMPECTOMY    Medications prior to admission:   Prior to Admission medications    Medication Sig Start Date End Date Taking? Authorizing Provider   metoprolol succinate (TOPROL XL) 100 MG extended release tablet TAKE 1 AND 1/2 TABLETS BY MOUTH EVERY DAY 22   Airam Zuniga MD   albuterol sulfate HFA (VENTOLIN HFA) 108 (90 Base) MCG/ACT inhaler INHALE 2 PUFFS EVERY 6 HOURS AS NEEDED FOR WHEEZING 22   CHELSEA Appiah CNP   nitroGLYCERIN (NITROSTAT) 0.4 MG SL tablet Place 1 tablet under the tongue every 5 minutes as needed for Chest pain  Patient not taking: Reported on 2022   Airam Zuniga MD   clopidogrel (PLAVIX) 75 MG tablet Take 1 tablet by mouth daily 22   CHELSEA Haque CNP   TRULICITY 3 YA/0.9QS SOPN INJECT 0.5 ML (1 PEN) SUBCUTANEOUSLY WEEKLY,X30 DAYS,INSTR:ROTATE INJECTION SITES 22   Whit Solorzano   pregabalin (LYRICA) 150 MG capsule Take one cap three times daily  Patient taking differently: Take 150 mg by mouth in the morning, at noon, and at bedtime. Take one cap three times daily 3/1/22 3/1/23  Damari Cabello MD   atorvastatin (LIPITOR) 80 MG tablet Take 1 tablet by mouth daily 21   Airam Zuniga MD   fludrocortisone (FLORINEF) 0.1 MG tablet TAKE 3 TABLETS BY MOUTH EVERY DAY  Patient taking differently: Take 0.1 mg by mouth daily  10/13/21   Airam Zuniga MD   Blood Glucose Monitoring Suppl (ONE TOUCH ULTRA 2) w/Device KIT 1 kit by Does not apply route daily 21   CHELSEA Appiah CNP   blood glucose monitor strips Test 3 times a day & as needed for symptoms of irregular blood glucose.  Dispense sufficient amount for indicated testing frequency plus additional to accommodate PRN testing needs.  7/16/21   CHELSEA Villeda CNP   Lancets MISC 1 each by Does not apply route 2 times daily 7/16/21   CHELSEA So CNP   Insulin Degludec (TRESIBA FLEXTOUCH) 200 UNIT/ML SOPN Inject 86 Units into the skin daily PER DR MCELROY  Patient taking differently: Inject 110 Units into the skin at bedtime  3/30/21   CHELSEA Wei CNP   metFORMIN (GLUCOPHAGE-XR) 500 MG extended release tablet Take 2 tablets by mouth daily (with breakfast) 3/30/21   CHELSEA Wei CNP   omeprazole (PRILOSEC) 20 MG delayed release capsule TAKE 1 CAPSULE BY MOUTH EVERY DAY IN [address] [place] [address]  Patient taking differently: Take 20 mg by mouth Daily  11/23/20   CHELSEA Wei CNP   ondansetron (ZOFRAN ODT) 4 MG disintegrating tablet Take 1 tablet by mouth every 8 hours as needed for Nausea  Patient not taking: Reported on 7/7/2022 11/10/20   Jj Fontanez MD   Saccharomyces boulardii (PROBIOTIC) 250 MG CAPS Take 1 capsule by mouth daily 9/30/20   CHELSEA Wei CNP   tiZANidine (ZANAFLEX) 2 MG tablet TAKE 1 TABLET BY MOUTH NIGHTLY AS NEEDED(SPASMS)  Patient taking differently: Take 2 mg by mouth nightly as needed TAKE 2 TABLET BY MOUTH NIGHTLY AS NEEDED(SPASMS) 9/22/20   Kari Santos MD   acetaminophen (TYLENOL) 500 MG tablet Take 1 tablet by mouth 4 times daily as needed for Pain 9/9/20   CHELSEA Villeda CNP   losartan (COZAAR) 25 MG tablet Take 1 tablet by mouth daily 7/23/20   CHELSEA Wei CNP   insulin lispro, 1 Unit Dial, (HUMALOG KWIKPEN) 100 UNIT/ML SOPN Inject 20 Units into the skin 3 times daily (before meals) 6/23/20   CHELSEA Wei CNP   DULoxetine (CYMBALTA) 30 MG extended release capsule Take 1 capsule by mouth daily 2/10/20   Kari Santos MD   aspirin 81 MG EC tablet TAKE 1 TABLET BY MOUTH DAILY  Patient taking differently: Take 81 mg by mouth daily  7/1/19   Juani Bryant Might, APRN - CNP   Insulin Pen Needle (BD ULTRA-FINE PEN NEEDLES) 29G X 12.7MM MISC USE AS DIRECTED BY PHYSICIAN 5 times daily 8/7/18   CHELSEA Rahman CNP   Blood Pressure Monitor KIT 1 each by Does not apply route daily as needed ESSENTIAL HYPERTENSION   I10 5/31/16   CHELSEA Rahman CNP       Current medications:    Current Facility-Administered Medications   Medication Dose Route Frequency Provider Last Rate Last Admin    [START ON 7/13/2022] lidocaine PF 1 % injection 1 mL  1 mL IntraDERmal Once PRN Randye Boozbrittney, DO        [START ON 7/13/2022] 0.9 % sodium chloride infusion   IntraVENous Continuous Tigrejohan Sena, DO        [START ON 7/13/2022] lactated ringers infusion   IntraVENous Continuous Tigre W Sena, DO        sodium chloride flush 0.9 % injection 5-40 mL  5-40 mL IntraVENous 2 times per day Tigre W Sena, DO        sodium chloride flush 0.9 % injection 5-40 mL  5-40 mL IntraVENous PRN Randye Boozer, DO        0.9 % sodium chloride infusion   IntraVENous PRN Randye Boozer, DO           Allergies: Allergies   Allergen Reactions    Tape Jacinta Ta Tape] Rash       Problem List:    Patient Active Problem List   Diagnosis Code    Dyslipidemia E78.5    Radiculopathy M54.10    Insomnia G47.00    Allergic rhinitis J30.9    COPD (chronic obstructive pulmonary disease) J44.9    Depression F32. A    Bilateral leg weakness R29.898    Gait difficulty R26.9    Diabetic neuropathy E11.40    Back pain M54.9    Muscle spasm M62.838    Affective disorder (HCC) F39    History of ventral hernia repair Z98.890, Z87.19    History of incisional hernia repair Z98.890, Z87.19    Essential hypertension I10    Gastroesophageal reflux disease without esophagitis K21.9    Uncontrolled type 2 diabetes mellitus with diabetic polyneuropathy, with long-term current use of insulin ([facility]) E11.42, Z79.4, E11.65    Primary osteoarthritis involving multiple joints M89.49  Heart palpitations R00.2    Dysautonomia orthostatic hypotension syndrome I95.1    Coronary artery disease involving native coronary artery of native heart I25.10    Angina, class III ([facility]) I20.9    S/P CABG x 1 Z95.1    Precordial pain R07.2    DARON (obstructive sleep apnea) G47.33    Neuropathic pain M79.2    Soft tissue mass M79.89    Abnormal cardiovascular stress test R94.39    Muscle spasms of both lower extremities M62.838    Abscess of left great toe L02.612    Paronychia of great toe of left foot L03.032    Chest pain R07.9    Epigastric pain R10.13    ACS (acute coronary syndrome) ([facility]) I24.9    Obesity (BMI 30-39. 9) E66.9    S/P angioplasty with stent Z95.820    DCIS (ductal carcinoma in situ) D05.10       Past Medical History:        Diagnosis Date    Anxiety     Asthma     CAD (coronary artery disease)     x1 stent 2010,x1 stent 2016, x1 stent May 2022    Cancer [facility]) 06/2022    right breast cancer    Clinical trial participant at discharge 3/31/16    COPD (chronic obstructive [facility] disease) [facility] [address]     Depression     Diabetic neuropathy ([facility] [address]     GERD (gastroesophageal reflux disease)     H/O cardiac catheterization 4/26/16    LMCA: Mild Irregularities 10-20%. LAD: Lesion on Prox LAD: Proximal subsection. 100% stenosis. LCx: Mild irregularities 10-20%. RCA: Mild irregularities 10-20%. Widely patent mid RCA stent. EF:60%.  H/O cardiovascular stress test 10/07/2016    Overall results are most consistent with a low risk for significant CAD.     H/O echocardiogram 10/05/2016    EF:>60%. Inferoseptal wall abnormal in its motion which is not unusal status post open heart surgery. Evidence of mild (grade I) diastolic dysfunction is seen.  H/O tilt table evaluation 07/12/2016    Abnormal. PTs HR, Blood Pressure response and symptoms were most consistent with dysautonomia.  Combined w/viligant maintenance of euvolemia and maintaining a moderate salt intake, pharmaciologic treatment w/ ProAmatine, SSRI such as Lexapro and/or Mestinon among other treatments have shown some effectiveness in the treatment of this condition.  History of cardiovascular stress test 02/13/2019    Abnormal. Small/moderate perfusion defect of mild/moderate intesity in the anterior and anterolateral regions during stress imaging which is most consistent w/ ischemia but may be artifact. Overall low/intermediate risk for significant CAD.  History of echocardiogram 09/06/2018    EF >60%. Inferoseptal wall is abnormal in its motion which is not unusual s/p open heart. Evidence of mild (grade I) diastolic dysfunction seen.     Hx of blood clots     Hyperlipidemia     Hypertension     Intermittent claudication (HCC)     Kidney stones     Movement disorder     neuropathy in legs    Neuromuscular disorder (HCC)     neuropathy    Osteoarthritis     Reflux     Seizures (HCC) 1980'Ss last seizure    Stented coronary artery 9/22/2010     LAD/[place]    Stented coronary artery 3/31/16    RCA/[place]    Type II or unspecified type diabetes mellitus without mention of complication, not stated as uncontrolled     Unspecified sleep apnea     no machine       Past Surgical History:        Procedure Laterality Date    ABDOMINAL HERNIA REPAIR  01/2016    repair done [place]    APPENDECTOMY      CARDIAC CATHETERIZATION Left 04/26/2016    right radial/ [id number] [facility] [place]/ Dr Birdia Ormond Left 03/07/2019    Left Radial/[facility] [place]/    CARDIAC CATHETERIZATION  05/20/2022    Dr Javier Waldron radial    CARDIAC CATHETERIZATION  05/20/2022    Dr Javier Wladron radial   [address]      CABG 2019   [address] COLONOSCOPY  12/16/2014    -hemorrhoids,bx    CORONARY ANGIOPLASTY WITH STENT PLACEMENT  09/2010    CORONARY ANGIOPLASTY WITH STENT PLACEMENT  03/31/2016    [facility] RCA / DR Alexis Casey CORONARY ANGIOPLASTY WITH STENT PLACEMENT  2022    CORONARY ARTERY BYPASS GRAFT  08/15/2016    OP X 1- Dr Mayuri Be, COLON, DIAGNOSTIC  2014    HERNIA REPAIR  2012    at the medial aspect of a [place] RUQ scar); repaired [address]. [address]  2015    incisional, recurrent    HERNIA REPAIR  2016    HYSTERECTOMY (CERVIX STATUS UNKNOWN)      OTHER SURGICAL HISTORY  10/08/2015    abd wound washout, mesh removal, wound vac placement    ID SUCT NICOLE LIPECTOMY,HEAD/NECK Left 2018    THIGH LESION BIOPSY EXCISION, SOFT TISSUE MASS performed by Homero Franklin MD at [facility] Left 2018    leg    UPPP UVULOPALATOPHARYGOPLASTY  2012    US BREAST BIOPSY NEEDLE ADDITIONAL RIGHT Right 2022    US BREAST BIOPSY NEEDLE ADDITIONAL RIGHT 2022 [facility] ULTRASOUND    US BREAST NEEDLE BIOPSY RIGHT Right 2022    US BREAST NEEDLE BIOPSY RIGHT 2022 [facility] ULTRASOUND    VENTRAL HERNIA REPAIR  2015    With Mesh - Dr Ethel Beauchamp       Social History:    Social History     Tobacco Use    Smoking status: Former Smoker     Packs/day: 2.00     Years: 10.00     Pack years: 20.00     Types: Cigarettes     Quit date: 2010     Years since quittin.8    Smokeless tobacco: Never Used   Substance Use Topics    Alcohol use:  No                                Counseling given: Not Answered      Vital Signs (Current):   Vitals:    22 1103 22 1112   BP: 136/83    Pulse: (!) 107    Resp: 20    Temp: 97.5 °F (36.4 °C)    TempSrc: Temporal    SpO2: 96%    Weight:  213 lb (96.6 kg)   Height:  5' 3\" (1.6 m)                                              BP Readings from Last 3 Encounters:   22 136/83   22 126/72   22 122/78       NPO Status:                                                                                 BMI:   Wt Readings from Last 3 Encounters:   22 213 lb (96.6 kg)   22 213 lb 13.5 oz (97 kg)   22 216 lb 8 oz (98.2 kg) exam    (+) COPD:  sleep apnea:  asthma:                            Cardiovascular:    (+) hypertension:, past MI:, CAD:, CABG/stent (PCA - 2010, 2016, 05/2022. Cardiology clearance for urgent surgery. Pt to continue ASA+Plavix. ):,     (-)  angina    ECG reviewed  Rhythm: regular  Rate: normal  Echocardiogram reviewed  Stress test reviewed  Cleared by cardiology     Beta Blocker:  Dose within 24 Hrs         Neuro/Psych:   (+) depression/anxiety              ROS comment: Peripheral neuropathy diabetic GI/Hepatic/Renal:   (+) GERD:,      (-) liver disease and no renal disease       Endo/Other:    (+) DiabetesType II DM, , .    (-) hypothyroidism        Pt had PAT visit. Abdominal:   (+) obese,           Vascular: Other Findings:           Anesthesia Plan      general     ASA 4       Induction: intravenous. Anesthetic plan and risks discussed with patient. Plan discussed with CRNA.                     Elham Garcia MD   7/12/2022

## 2022-07-12 NOTE — OP NOTE
Operative Note      Patient: Ro Redding  YOB: 1965  MRN: [id number]    Date of Procedure: 7/12/2022    Pre-Op Diagnosis: Malignant neoplasm of right female breast, unspecified estrogen receptor status, unspecified site of breast ([facility] [address] [C50.911]    Post-Op Diagnosis: Same, Right breast Ductal Carcinoma-in-situ       Procedure(s):  NEEDLE DIRECTED ( 1130     )    RIGHT BREAST LUMPECTOMY    Surgeon(s):  Meryle Loan, DO    Assistant:   * No surgical staff found *    Anesthesia: General    Estimated Blood Loss (mL): Minimal    Complications: None    Specimens:   ID Type Source Tests Collected by Time Destination   A : RIGHT BREAST MASS DOUBLE SHORT BLACK SUTURE MARKS SUPERIOR MARGIN, SINGLE LONG BLACK SUTURE HARPER LATERAL MARGIN, BLUE SUTURE MARKS DEEP MARGIN Tissue Breast SURGICAL PATHOLOGY Meryle Loan, DO 7/12/2022 1342    B : RIGHT BREAST MASS ADDITIONAL INFERIOR MARGIN SUTURE MARKS NEW MARGIN Tissue Breast [address], DO 7/12/2022 1353    C : RIGHT BREAST ADDITIONAL LATERAL TISSUE SUTURE MARKS NEW MARGIN Tissue Breast [address], DO 7/12/2022 1359    D : RIGHT BREAST MEDAIL MARGIN BLACK SUTURE MARKS THE NEW MARGIN Tissue Breast [address], DO 7/12/2022 1406    E : RIGHT BREAST ADDITIONAL SUPERIOR MARGIN SUTURE MARKS NEW MARGIN Tissue Breast [address], DO 7/12/2022 1408        Implants:  * No implants in log *      Drains: * No LDAs found *    Findings: wire localized area of breast tissue identified in its entirety and excised. Specimen mammography showed clip within the specimen. Hemostasis adequate at the end of the procedure. Indication - 57y/F who was diagnosed with right breast DCIS on screening mammogram and ultrasound-guided biopsy presented to clinic for further evaluation and management. Plan for wire localization and right breast lumpectomy was made.   Informed consent was taken. Risk benefits and alternate treatment options were explained to the patient. Patient agreed to proceed with surgery. Detailed Description of Procedure: On the day of surgery, patient went to radiology department to get lesion localized with placement of wire using imaging. After placement of the wire, patient was brought to the OR and shifted to the OR table. Patient was laid supine on the OR table with right upper limb extended out. General anesthesia using LMA was induced by anesthesia team.  Formal timeout identifying the patient, procedure, site, antibiotics and allergies was performed. Patient was given preoperative prophylactic antibiotics. The right breast region and the radiologically placed wire were prepped with Hibiclens. The surgical site was drapped in the usual sterile fashion. 0.5% Marcaine with epinephrine was injected intradermally along the incision site. A 5 cm curved incision was made using #15 blade, extending on both sides of the wire in the upper outer quadrant of right breast.  The incision was carried down through the subcutaneous tissue along the direction of the wire using electrocautery. Using palpation, a wide area of breast tissue with wire as the central axis was dissected from the surrounding tissue using electrocautery. The specimen along with wire was excised from the breast and labeled with following markings using sutures - 3-0 nylon for long tail at lateral margin and short tail at superior margin; 2-0 Prolene for deep margin. Additional margins were taken from the lateral, superior, medial, and inferior walls of the cavity. All the specimens were marked with 3-0 nylon sutures and sent for pathology. Once the specimen mammography confirmed that the clip and wire is present within the first specimen, the wound cavity was inspected for any bleeding and hemostasis was achieved using electrocautery.   3-0 Vicryl was used to approximate the walls of the cavity to remove dead space. 3-0 Vicryl was used to put interrupted deep dermal stitches to approximate the edges of the wound. 4-0 Monocryl was used in continuous subcuticular fashion to close the skin edges. Skin glue was applied over the wound. Dressing was applied with fluff, Telfa and covered with Tegaderm. This concluded the procedure. Patient tolerated the procedure well without immediate complications. All the sponge, needles and instrument counts were correct at the end of the case. Patient was extubated and taken to PACU in stable condition. Dr. Ne Vazquez was present throughout the entirety of the case.     Electronically signed by Vilma Cardoza MD on 7/12/2022 at 9:29 PM

## 2022-07-14 ENCOUNTER — TELEPHONE (OUTPATIENT)
Dept: ONCOLOGY | Age: 57
End: 2022-07-14

## 2022-07-14 NOTE — TELEPHONE ENCOUNTER
Name: Janet Yeager  : 1965  MRN: [id number]    Oncology Navigation Follow-Up Note    Contact Type:  Telephone    Notes: Call made to patient to follow up after needle directed lumpectomy. Spoke to [place] who states that she is \"doing great\" and denies any pain. Patient notes that she has a follow up with Dr. Roxana Echols on Monday. Patient denies any needs at this time. Encouraged to call with any questions concerns or needs.    Electronically signed by Michelle Cr RN on 2022 at 3:48 PM

## 2022-07-18 ENCOUNTER — HOSPITAL ENCOUNTER (EMERGENCY)
Age: 57
Discharge: HOME | End: 2022-07-19
Attending: STUDENT IN AN ORGANIZED HEALTH CARE EDUCATION/TRAINING PROGRAM
Payer: COMMERCIAL

## 2022-07-18 DIAGNOSIS — B02.9 HERPES ZOSTER WITHOUT COMPLICATION: Primary | ICD-10-CM

## 2022-07-18 LAB
ABSOLUTE EOS #: 0.04 K/UL (ref 0–0.44)
ABSOLUTE IMMATURE GRANULOCYTE: 0.05 K/UL (ref 0–0.3)
ABSOLUTE LYMPH #: 1.34 K/UL (ref 1.1–3.7)
ABSOLUTE MONO #: 0.57 K/UL (ref 0.1–1.2)
BASOPHILS # BLD: 1 % (ref 0–2)
BASOPHILS ABSOLUTE: 0.05 K/UL (ref 0–0.2)
EOSINOPHILS RELATIVE PERCENT: 1 % (ref 1–4)
HCT VFR BLD CALC: 39.7 % (ref 36.3–47.1)
HEMOGLOBIN: 12.7 G/DL (ref 11.9–15.1)
IMMATURE GRANULOCYTES: 1 %
LYMPHOCYTES # BLD: 16 % (ref 24–43)
MCH RBC QN AUTO: 23.8 PG (ref 25.2–33.5)
MCHC RBC AUTO-ENTMCNC: 32 G/DL (ref 28.4–34.8)
MCV RBC AUTO: 74.3 FL (ref 82.6–102.9)
MONOCYTES # BLD: 7 % (ref 3–12)
NRBC AUTOMATED: 0 PER 100 WBC
PDW BLD-RTO: 15.5 % (ref 11.8–14.4)
PLATELET # BLD: 424 K/UL (ref 138–453)
PMV BLD AUTO: 9 FL (ref 8.1–13.5)
RBC # BLD: 5.34 M/UL (ref 3.95–5.11)
SEG NEUTROPHILS: 74 % (ref 36–65)
SEGMENTED NEUTROPHILS ABSOLUTE COUNT: 6.49 K/UL (ref 1.5–8.1)
SURGICAL PATHOLOGY REPORT: NORMAL
WBC # BLD: 8.5 K/UL (ref 3.5–11.3)

## 2022-07-18 PROCEDURE — 81001 URINALYSIS AUTO W/SCOPE: CPT

## 2022-07-18 PROCEDURE — C9803 HOPD COVID-19 SPEC COLLECT: HCPCS

## 2022-07-18 PROCEDURE — 83605 ASSAY OF LACTIC ACID: CPT

## 2022-07-18 PROCEDURE — 87635 SARS-COV-2 COVID-19 AMP PRB: CPT

## 2022-07-18 PROCEDURE — 85025 COMPLETE CBC W/AUTO DIFF WBC: CPT

## 2022-07-18 PROCEDURE — 99285 EMERGENCY DEPT VISIT HI MDM: CPT

## 2022-07-18 PROCEDURE — 80048 BASIC METABOLIC PNL TOTAL CA: CPT

## 2022-07-18 PROCEDURE — 83735 ASSAY OF MAGNESIUM: CPT

## 2022-07-18 RX ORDER — 0.9 % SODIUM CHLORIDE 0.9 %
1000 INTRAVENOUS SOLUTION INTRAVENOUS ONCE
Status: COMPLETED | OUTPATIENT
Start: 2022-07-18 | End: 2022-07-19

## 2022-07-18 RX ORDER — VALACYCLOVIR HYDROCHLORIDE 500 MG/1
500 TABLET, FILM COATED ORAL ONCE
Status: COMPLETED | OUTPATIENT
Start: 2022-07-18 | End: 2022-07-19

## 2022-07-18 ASSESSMENT — PAIN SCALES - GENERAL: PAINLEVEL_OUTOF10: 3

## 2022-07-18 ASSESSMENT — ENCOUNTER SYMPTOMS
NAUSEA: 1
EYE PAIN: 0
SHORTNESS OF BREATH: 0
VOMITING: 0
ABDOMINAL PAIN: 0
RHINORRHEA: 0

## 2022-07-18 ASSESSMENT — PAIN DESCRIPTION - ORIENTATION: ORIENTATION: RIGHT

## 2022-07-18 ASSESSMENT — PAIN - FUNCTIONAL ASSESSMENT: PAIN_FUNCTIONAL_ASSESSMENT: 0-10

## 2022-07-18 ASSESSMENT — PAIN DESCRIPTION - LOCATION: LOCATION: NECK

## 2022-07-19 ENCOUNTER — APPOINTMENT (OUTPATIENT)
Dept: CT IMAGING | Age: 57
End: 2022-07-19
Payer: COMMERCIAL

## 2022-07-19 ENCOUNTER — TELEPHONE (OUTPATIENT)
Dept: PRIMARY CARE CLINIC | Age: 57
End: 2022-07-19

## 2022-07-19 VITALS
DIASTOLIC BLOOD PRESSURE: 56 MMHG | RESPIRATION RATE: 17 BRPM | OXYGEN SATURATION: 95 % | SYSTOLIC BLOOD PRESSURE: 97 MMHG | HEART RATE: 96 BPM | TEMPERATURE: 101.3 F

## 2022-07-19 LAB
-: NORMAL
ANION GAP SERPL CALCULATED.3IONS-SCNC: 14 MMOL/L (ref 9–17)
BILIRUBIN URINE: NEGATIVE
BUN BLDV-MCNC: 14 MG/DL (ref 6–20)
BUN/CREAT BLD: 18 (ref 9–20)
CALCIUM SERPL-MCNC: 9.8 MG/DL (ref 8.6–10.4)
CHLORIDE BLD-SCNC: 93 MMOL/L (ref 98–107)
CO2: 24 MMOL/L (ref 20–31)
COLOR: YELLOW
CREAT SERPL-MCNC: 0.78 MG/DL (ref 0.5–0.9)
EPITHELIAL CELLS UA: NORMAL /HPF (ref 0–25)
GFR AFRICAN AMERICAN: >60 ML/MIN
GFR NON-AFRICAN AMERICAN: >60 ML/MIN
GFR SERPL CREATININE-BSD FRML MDRD: ABNORMAL ML/MIN/{1.73_M2}
GFR SERPL CREATININE-BSD FRML MDRD: ABNORMAL ML/MIN/{1.73_M2}
GLUCOSE BLD-MCNC: 147 MG/DL (ref 70–99)
GLUCOSE URINE: NEGATIVE
KETONES, URINE: NEGATIVE
LACTIC ACID: 1.9 MMOL/L (ref 0.5–2.2)
LEUKOCYTE ESTERASE, URINE: ABNORMAL
MAGNESIUM: 1.9 MG/DL (ref 1.6–2.6)
NITRITE, URINE: NEGATIVE
PH UA: 6 (ref 5–9)
POTASSIUM SERPL-SCNC: 4.5 MMOL/L (ref 3.7–5.3)
PROTEIN UA: NEGATIVE
RBC UA: NORMAL /HPF (ref 0–2)
SARS-COV-2, RAPID: NOT DETECTED
SODIUM BLD-SCNC: 131 MMOL/L (ref 135–144)
SPECIFIC GRAVITY UA: 1.02 (ref 1.01–1.02)
SPECIMEN DESCRIPTION: NORMAL
TURBIDITY: CLEAR
URINE HGB: ABNORMAL
UROBILINOGEN, URINE: NORMAL
WBC UA: NORMAL /HPF (ref 0–5)

## 2022-07-19 PROCEDURE — 70491 CT SOFT TISSUE NECK W/DYE: CPT

## 2022-07-19 PROCEDURE — 6370000000 HC RX 637 (ALT 250 FOR IP): Performed by: STUDENT IN AN ORGANIZED HEALTH CARE EDUCATION/TRAINING PROGRAM

## 2022-07-19 PROCEDURE — 2580000003 HC RX 258: Performed by: STUDENT IN AN ORGANIZED HEALTH CARE EDUCATION/TRAINING PROGRAM

## 2022-07-19 PROCEDURE — 87040 BLOOD CULTURE FOR BACTERIA: CPT

## 2022-07-19 PROCEDURE — 6360000004 HC RX CONTRAST MEDICATION: Performed by: STUDENT IN AN ORGANIZED HEALTH CARE EDUCATION/TRAINING PROGRAM

## 2022-07-19 RX ORDER — VALACYCLOVIR HYDROCHLORIDE 1 G/1
1000 TABLET, FILM COATED ORAL 2 TIMES DAILY
Qty: 20 TABLET | Refills: 0 | Status: SHIPPED | OUTPATIENT
Start: 2022-07-19 | End: 2022-07-29

## 2022-07-19 RX ORDER — KETOROLAC TROMETHAMINE 30 MG/ML
30 INJECTION, SOLUTION INTRAMUSCULAR; INTRAVENOUS ONCE
Status: DISCONTINUED | OUTPATIENT
Start: 2022-07-19 | End: 2022-07-19 | Stop reason: HOSPADM

## 2022-07-19 RX ADMIN — IOPAMIDOL 75 ML: 755 INJECTION, SOLUTION INTRAVENOUS at 00:35

## 2022-07-19 RX ADMIN — VALACYCLOVIR HYDROCHLORIDE 500 MG: 500 TABLET, FILM COATED ORAL at 00:13

## 2022-07-19 RX ADMIN — SODIUM CHLORIDE 1000 ML: 9 INJECTION, SOLUTION INTRAVENOUS at 00:13

## 2022-07-19 NOTE — DISCHARGE INSTRUCTIONS
Thank you for trusting us  with your care today. You may use tylenol or ibuprofen for fever and pain. Please also make sure to take all of your antiviral medications. Please make an appointment with your primary care doctor for reevalaution in 1-4 days. Please return to the emergency department for any new concerning or worsening symptoms.

## 2022-07-19 NOTE — ED PROVIDER NOTES
[facility] ED  EMERGENCY DEPARTMENT ENCOUNTER      Pt Name: Nessa Huang  MRN: [id number]  [place] 1965  Date of evaluation: 7/18/2022  Provider: Jacqueline Galdamez MD     CHIEF COMPLAINT       Chief Complaint   Patient presents with    Neck Pain     Right side, onset last night, worse today with headache and nausea. HISTORY OF PRESENT ILLNESS   (Location/Symptom, Timing/Onset, Context/Setting, Quality, Duration, Modifying Factors, Severity) Note limiting factors. I wore a surgical mask for the entirety of this encounter. HPI    Nessa Huang is a 62 y.o. female with medical history significant for diabetes, hypertension, diabetic neuropathy, coronary artery disease status post CABG, and breast cancer status postmastectomy who presents to the emergency department for evaluation for right neck pain. Patient states started having neck pain yesterday. She states she applied Bengay with some relief of symptoms. She states however yesterday she has had having headache, worsening neck pain, swelling to her right neck, and fevers and chills at home. She states she has had no sick contacts at home. She states as a result she decided to come home for evaluation. Patient also endorses nausea but no vomiting. She denies chest pain, palpitations, diaphoresis, shortness of breath, or numbness and tingling or radiculopathies to the upper extremities. Denies trauma to the neck. Nursing Notes were reviewed. REVIEW OF SYSTEMS    (2+ for level 4; 10+ for level 5)   Review of Systems   Constitutional:  Positive for chills and fever. Negative for activity change, diaphoresis and unexpected weight change. HENT:  Negative for congestion and rhinorrhea. Eyes:  Negative for pain and visual disturbance. Respiratory:  Negative for shortness of breath. Cardiovascular:  Negative for chest pain and palpitations. Gastrointestinal:  Positive for nausea. Negative for abdominal pain and vomiting. Genitourinary:  Negative for decreased urine volume and hematuria. Musculoskeletal:  Positive for neck pain. Negative for arthralgias and myalgias. Skin:  Negative for wound. Neurological:  Negative for weakness and light-headedness. Psychiatric/Behavioral:  Negative for confusion. PAST MEDICAL HISTORY     Past Medical History:   Diagnosis Date    Anxiety     Asthma     CAD (coronary artery disease)     x1 stent 2010,x1 stent 2016, x1 stent May 2022    Cancer [facility]) 06/2022    right breast cancer    Clinical trial participant at discharge 3/31/16    COPD (chronic obstructive pulmonary disease) ([address]     Depression     Diabetic neuropathy (HCC)     GERD (gastroesophageal reflux disease)     H/O cardiac catheterization 4/26/16    LMCA: Mild Irregularities 10-20%. LAD: Lesion on Prox LAD: Proximal subsection. 100% stenosis. LCx: Mild irregularities 10-20%. RCA: Mild irregularities 10-20%. Widely patent mid RCA stent. EF:60%. H/O cardiovascular stress test 10/07/2016    Overall results are most consistent with a low risk for significant CAD. H/O echocardiogram 10/05/2016    EF:>60%. Inferoseptal wall abnormal in its motion which is not unusal status post open heart surgery. Evidence of mild (grade I) diastolic dysfunction is seen. H/O tilt table evaluation 07/12/2016    Abnormal. PTs HR, Blood Pressure response and symptoms were most consistent with dysautonomia. Combined w/viligant maintenance of euvolemia and maintaining a moderate salt intake, pharmaciologic treatment w/ ProAmatine, SSRI such as Lexapro and/or Mestinon among other treatments have shown some effectiveness in the treatment of this condition. History of cardiovascular stress test 02/13/2019    Abnormal. Small/moderate perfusion defect of mild/moderate intesity in the anterior and anterolateral regions during stress imaging which is most consistent w/ ischemia but may be artifact.  Overall low/intermediate risk for significant CAD.    History of echocardiogram 09/06/2018    EF >60%. Inferoseptal wall is abnormal in its motion which is not unusual s/p open heart. Evidence of mild (grade I) diastolic dysfunction seen.     Hx of blood clots     Hyperlipidemia     Hypertension     Intermittent claudication (HCC)     Kidney stones     Movement disorder     neuropathy in legs    Neuromuscular disorder (HCC)     neuropathy    Osteoarthritis     Reflux     Seizures ([address] 1980'Ss last seizure    Stented coronary artery 9/22/2010     LAD/[place]    Stented coronary artery 3/31/16    RCA/[place]    Type II or unspecified type diabetes mellitus without mention of complication, not stated as uncontrolled     Unspecified sleep apnea     no machine       SURGICAL HISTORY       Past Surgical History:   Procedure Laterality Date    ABDOMINAL HERNIA REPAIR  01/2016    repair done [place]    APPENDECTOMY      BREAST BIOPSY Right 7/12/2022    NEEDLE DIRECTED ( 1130     )    RIGHT BREAST LUMPECTOMY performed by Doris Leary DO at [address] Left 04/26/2016    right radial/ [id number] [facility] [place]/ Dr Izquierdo Putnam Left 03/07/2019    Left Radial/[facility] [place]/    CARDIAC CATHETERIZATION  05/20/2022    Dr Carpio Svetlana radial    CARDIAC CATHETERIZATION  05/20/2022    Dr Balaji Mota radial    [address]      CABG 2019    CHOLECYSTECTOMY  1991    COLONOSCOPY  12/16/2014    -hemorrhoids,bx    CORONARY ANGIOPLASTY WITH STENT PLACEMENT  09/2010    CORONARY ANGIOPLASTY WITH STENT PLACEMENT  03/31/2016    JESSE RCA / DR Logan Trotter    CORONARY ANGIOPLASTY WITH STENT PLACEMENT  05/20/2022    CORONARY ARTERY BYPASS GRAFT  08/15/2016    OP X 1- Dr Annalee Arreola, DIAGNOSTIC  12/16/2014    HERNIA REPAIR  12/13/2012    at the medial aspect of a [place] RUQ scar); repaired byDr. Lee Carrier  02/16/2015    incisional, recurrent    HERNIA REPAIR  02/2016 HYSTERECTOMY (CERVIX STATUS UNKNOWN)      OTHER SURGICAL HISTORY  10/08/2015    abd wound washout, mesh removal, wound vac placement    NH SUCT NICOLE LIPECTOMY,HEAD/NECK Left 08/27/2018    THIGH LESION BIOPSY EXCISION, SOFT TISSUE MASS performed by Erin Ferrer MD at [address] [place] [address] Left 2018    leg    UPPP UVULOPALATOPHARYGOPLASTY  06/26/2012    US BREAST BIOPSY NEEDLE ADDITIONAL RIGHT Right 6/9/2022    US BREAST [facility]SY [facility]LE [facility]    US BREAST NEEDLE BIOPSY RIGHT Right 6/9/2022    US BREAST NEEDLE BIOPSY RIGHT 6/9/2022 [facility] ULTRASOUND    US GUIDED NEEDLE LOC OF RIGHT BREAST Right 7/12/2022    US GUIDED NEEDLE LOC OF RIGHT BREAST 7/12/2022 [facility] ULTRASOUND    VENTRAL HERNIA REPAIR  09/21/2015    With Mesh - Dr Preeti Ariza       Previous Medications    ACETAMINOPHEN (TYLENOL) 500 MG TABLET    Take 1 tablet by mouth 4 times daily as needed for Pain    ALBUTEROL SULFATE HFA (VENTOLIN HFA) 108 (90 BASE) MCG/ACT INHALER    INHALE 2 PUFFS EVERY 6 HOURS AS NEEDED FOR WHEEZING    ASPIRIN 81 MG EC TABLET    TAKE 1 TABLET BY MOUTH DAILY    ATORVASTATIN (LIPITOR) 80 MG TABLET    Take 1 tablet by mouth daily    BLOOD GLUCOSE MONITOR STRIPS    Test 3 times a day & as needed for symptoms of irregular blood glucose. Dispense sufficient amount for indicated testing frequency plus additional to accommodate PRN testing needs.     BLOOD GLUCOSE MONITORING SUPPL (ONE TOUCH ULTRA 2) W/DEVICE KIT    1 kit by Does not apply route daily    BLOOD PRESSURE MONITOR KIT    1 each by Does not apply route daily as needed ESSENTIAL HYPERTENSION   I10    CLOPIDOGREL (PLAVIX) 75 MG TABLET    Take 1 tablet by mouth daily    DULOXETINE (CYMBALTA) 30 MG EXTENDED RELEASE CAPSULE    Take 1 capsule by mouth daily    FLUDROCORTISONE (FLORINEF) 0.1 MG TABLET    TAKE 3 TABLETS BY MOUTH EVERY DAY    INSULIN DEGLUDEC (TRESIBA FLEXTOUCH) 200 UNIT/ML SOPN    Inject 86 Units into the skin daily PER DR MCELROY    INSULIN LISPRO, 1 UNIT DIAL, (HUMALOG KWIKPEN) 100 UNIT/ML SOPN    Inject 20 Units into the skin 3 times daily (before meals)    INSULIN PEN NEEDLE (BD ULTRA-FINE PEN NEEDLES) 29G X 12.7MM MISC    USE AS DIRECTED BY PHYSICIAN 5 times daily    LANCETS MISC    1 each by Does not apply route 2 times daily    LOSARTAN (COZAAR) 25 MG TABLET    Take 1 tablet by mouth daily    METFORMIN (GLUCOPHAGE-XR) 500 MG EXTENDED RELEASE TABLET    Take 2 tablets by mouth daily (with breakfast)    METOPROLOL SUCCINATE (TOPROL XL) 100 MG EXTENDED RELEASE TABLET    TAKE 1 AND 1/2 TABLETS BY MOUTH EVERY DAY    NITROGLYCERIN (NITROSTAT) 0.4 MG SL TABLET    Place 1 tablet under the tongue every 5 minutes as needed for Chest pain    OMEPRAZOLE (PRILOSEC) 20 MG DELAYED RELEASE CAPSULE    TAKE 1 CAPSULE BY MOUTH EVERY DAY IN THE MORNING BEFORE BREAKFAST    ONDANSETRON (ZOFRAN ODT) 4 MG DISINTEGRATING TABLET    Take 1 tablet by mouth every 8 hours as needed for Nausea    PREGABALIN (LYRICA) 150 MG CAPSULE    Take one cap three times daily    SACCHAROMYCES BOULARDII (PROBIOTIC) 250 MG CAPS    Take 1 capsule by mouth daily    TIZANIDINE (ZANAFLEX) 2 MG TABLET    TAKE 1 TABLET BY MOUTH NIGHTLY AS NEEDED(SPASMS)    TRULICITY 3 QL/3.5RZ SOPN    INJECT 0.5 ML (1 PEN) SUBCUTANEOUSLY WEEKLY,X30 DAYS,INSTR:ROTATE INJECTION SITES       ALLERGIES     Tape Ellender Jetty tape]    FAMILY HISTORY       Family History   Problem Relation Age of Onset    Cancer Mother     Diabetes Mother     Cancer Father         thyroid    Diabetes Sister     Heart Disease Sister     Heart Disease Brother     Heart Disease Maternal Uncle     Cancer Maternal Grandmother         SOCIAL HISTORY       Social History     Socioeconomic History    Marital status:    Tobacco Use    Smoking status: Former     Packs/day: 2.00     Years: 10.00     Pack years: 20.00     Types: Cigarettes     Quit date: 2010     Years since quittin.8    Smokeless tobacco: Never Vaping Use    Vaping Use: Never used   Substance and Sexual Activity    Alcohol use: No    Drug use: No    Sexual activity: Yes     Partners: Male     Social Determinants of Health     Financial Resource Strain: Unknown    Difficulty of Paying Living Expenses: Patient refused   Food Insecurity: Unknown    Worried About Running Out of Food in the Last Year: Patient refused    [address] in the Last Year: Patient refused       SCREENINGS    Rodolfo Coma Scale  Eye Opening: Spontaneous  Best Verbal Response: Oriented  Best Motor Response: Obeys commands  [place] Coma Scale Score: 15      PHYSICAL EXAM    (up to 7 for level 4, 8 or more for level 5)     ED Triage Vitals [07/18/22 2249]   BP Temp Temp Source Heart Rate Resp SpO2 Height Weight   124/77 (!) 101.3 °F (38.5 °C) Tympanic (!) 110 16 96 % -- --       Physical Exam  Vitals and nursing note reviewed. Constitutional:       General: She is not in acute distress. Appearance: She is not ill-appearing or toxic-appearing. HENT:      Head: Normocephalic and atraumatic. Right Ear: External ear normal.      Left Ear: External ear normal.      Nose: No congestion or rhinorrhea. Mouth/Throat:      Mouth: Mucous membranes are moist.      Pharynx: Oropharynx is clear. No oropharyngeal exudate. Eyes:      General: No scleral icterus. Right eye: No discharge. Left eye: No discharge. Conjunctiva/sclera: Conjunctivae normal.   Cardiovascular:      Rate and Rhythm: Normal rate. Pulses: Normal pulses. Heart sounds: No murmur heard. No gallop. Pulmonary:      Effort: Pulmonary effort is normal. No respiratory distress. Breath sounds: Normal breath sounds. No stridor. No wheezing or rhonchi. Abdominal:      General: Abdomen is flat. There is no distension. Palpations: Abdomen is soft. Tenderness: There is no abdominal tenderness. Musculoskeletal:         General: No swelling, tenderness or deformity. Cervical back: Normal range of motion and neck supple. Tenderness: Tenderness to palpation along the lateral neck with fluctuance appreciated that appear vesicular. .   Lymphadenopathy:      Cervical: Cervical adenopathy present. Skin:     General: Skin is warm. Coloration: Skin is not jaundiced. Findings: No bruising or rash. Neurological:      General: No focal deficit present. Mental Status: She is alert and oriented to person, place, and time. Sensory: No sensory deficit. Motor: No weakness. Psychiatric:         Mood and Affect: Mood normal.         Behavior: Behavior normal.         Thought Content: Thought content normal.       DIAGNOSTIC RESULTS     Interpretation per the Radiologist below, if available at the time of this note:  CT SOFT TISSUE NECK W CONTRAST    Result Date: 7/19/2022  EXAMINATION: CT OF THE NECK SOFT TISSUE WITH CONTRAST  7/19/2022 TECHNIQUE: CT of the neck was performed with the administration of intravenous contrast. Multiplanar reformatted images are provided for review. Automated exposure control, iterative reconstruction, and/or weight based adjustment of the mA/kV was utilized to reduce the radiation dose to as low as reasonably achievable. COMPARISON: CT neck 02/15/2018 HISTORY: ORDERING SYSTEM PROVIDED HISTORY: right sided swelling TECHNOLOGIST PROVIDED HISTORY: right sided swelling Decision Support Exception - unselect if not a suspected or confirmed emergency medical condition->Emergency Medical Condition (MA) FINDINGS: PHARYNX/LARYNX:  The palatine tonsils are normal in appearance. The tongue is normal in appearance. The valleculae, epiglottis, aryepiglottic folds and pyriform sinuses appear unremarkable. The true and false vocal cords are normal in appearance. No mass or abscess is seen. SALIVARY GLANDS/THYROID:  The parotid and submandibular glands appear unremarkable. The thyroid gland appears unremarkable.  LYMPH NODES:  Borderline enlarged cervical lymph nodes on the right, for instance 8 x 7 mm right level 5 lymph node (2:51). SOFT TISSUES: Mild stranding right lateral neck soft tissues, with adjacent prominent lymph nodes (2:55). No appreciable soft tissue swelling or mass is seen. BRAIN/ORBITS/SINUSES: Small right mastoid effusion. The visualized portion of the intracranial contents appear unremarkable. The visualized portion of the orbits, paranasal sinuses demonstrate no acute abnormality. LUNG APICES/SUPERIOR MEDIASTINUM:  No focal consolidation is seen within the visualized lung apices. No superior mediastinal lymphadenopathy or mass. The visualized portion of the trachea appears unremarkable. BONES:  No aggressive appearing lytic or blastic bony lesion. Degenerative changes cervical spine. Sternotomy wires. Mild stranding right lateral neck soft tissues, with adjacent prominent lymph nodes, nonspecific and may be correlated clinically for possible adjacent cellulitis or prior trauma. No soft tissue abscess. Borderline enlarged right cervical lymph nodes, nonspecific.       ED BEDSIDE ULTRASOUND:   Performed by ED Physician - none    LABS:  Labs Reviewed   BASIC METABOLIC PANEL - Abnormal; Notable for the following components:       Result Value    Glucose 147 (*)     Sodium 131 (*)     Chloride 93 (*)     All other components within normal limits   CBC WITH AUTO DIFFERENTIAL - Abnormal; Notable for the following components:    RBC 5.34 (*)     MCV 74.3 (*)     MCH 23.8 (*)     RDW 15.5 (*)     Seg Neutrophils 74 (*)     Lymphocytes 16 (*)     Immature Granulocytes 1 (*)     All other components within normal limits   URINALYSIS - Abnormal; Notable for the following components:    Specific Gravity, UA 1.025 (*)     Urine Hgb 2+ (*)     Leukocyte Esterase, Urine SMALL (*)     All other components within normal limits   COVID-19, RAPID   CULTURE, BLOOD 1   CULTURE, BLOOD 1   LACTIC ACID   MAGNESIUM   MICROSCOPIC URINALYSIS        All other labs were within normal range or not returned as of this dictation. EMERGENCY DEPARTMENT COURSE and DIFFERENTIAL DIAGNOSIS/MDM:   Vitals:    Vitals:    07/18/22 2249 07/19/22 0053   BP: 124/77 (!) 96/44   Pulse: (!) 110 98   Resp: 16 18   Temp: (!) 101.3 °F (38.5 °C)    TempSrc: Tympanic    SpO2: 96% 95%       Medications   ketorolac (TORADOL) injection 30 mg (has no administration in time range)   0.9 % sodium chloride bolus (1,000 mLs IntraVENous New Bag 7/19/22 0013)   valACYclovir (VALTREX) tablet 500 mg (500 mg Oral Given 7/19/22 0013)   iopamidol (ISOVUE-370) 76 % injection 75 mL (75 mLs IntraVENous Given 7/19/22 0035)       MDM    Patient presenting for evaluation for neck pain with fevers and chills at home noted to be febrile in the emergency department. Presentation is concerning for shingles given vesicular rashes on the back of the neck. While in the department patient with no IV access. Ultrasound guided IV placed by myself. Work-up in the department with lactic acidosis, no leukocytosis, no anemia, mild hyponatremia with a sodium of 131, no renal function impairment, no UTI with a few leukocytes noted but no bacteria on urinalysis. Patient with negative COVID testing patient with CT soft tissue neck with mild stranding on the right lateral neck soft tissues with adjacent prominent lymphadenopathy but no soft tissue abscesses. This is most likely secondary to shingles appreciated on exam.  Patient with no signs of cellulitis. Discussed with patient we will start her on valacyclovir. Discussed will need follow-up with primary care physician for reevaluation to ensure resolution of symptoms. Reasons to return to emergency department discussed. Patient verbalized understanding of information given and agreed to plan. Was discharged in stable condition. REVAL:     She remained hemodynamically stable in the emergency department with symptoms well controlled.     CRITICAL CARE TIME

## 2022-07-19 NOTE — TELEPHONE ENCOUNTER
[facility] Transitions Initial Follow Up Call    Outreach made within 2 business days of discharge: Yes    Patient: Deb Perdomo Patient : 1965   MRN: [id number]  Reason for Admission: There are no discharge diagnoses documented for the most recent discharge. Discharge Date: 22       Spoke with: phone would not ring through 9:40am     Discharge department/facility: [facility]     TCM Interactive Patient Contact:  Was patient able to fill all prescriptions:   Was patient instructed to bring all medications to the follow-up visit:   Is patient taking all medications as directed in the discharge summary?    Does patient understand their discharge instructions:   Does patient have questions or concerns that need addressed prior to 7-14 day follow up office visit:     Scheduled appointment with PCP within 7-14 days    Follow Up  Future Appointments   Date Time Provider Emma Gaxiola   2022  1:00 PM CHELSEA Ventura CNP Neuro Spec [facility]   2022  8:30 AM Osvaldo Waldron DPM TIFF PODIATR [facility]   2022  2:20 PM Farzana Valverde MD TIFF CARD [facility]   2023  2:20 PM [address] [place] [address], APRN - CNP Tiff Prim [facility]       Franko Olivas MA

## 2022-07-21 ENCOUNTER — OFFICE VISIT (OUTPATIENT)
Dept: PRIMARY CARE CLINIC | Age: 57
End: 2022-07-21
Payer: COMMERCIAL

## 2022-07-21 ENCOUNTER — OFFICE VISIT (OUTPATIENT)
Dept: ONCOLOGY | Age: 57
End: 2022-07-21
Payer: COMMERCIAL

## 2022-07-21 VITALS
HEIGHT: 63 IN | BODY MASS INDEX: 37.47 KG/M2 | WEIGHT: 211.5 LBS | HEART RATE: 103 BPM | TEMPERATURE: 97.1 F | DIASTOLIC BLOOD PRESSURE: 76 MMHG | SYSTOLIC BLOOD PRESSURE: 110 MMHG

## 2022-07-21 VITALS
HEART RATE: 92 BPM | HEIGHT: 63 IN | OXYGEN SATURATION: 97 % | SYSTOLIC BLOOD PRESSURE: 130 MMHG | DIASTOLIC BLOOD PRESSURE: 68 MMHG | BODY MASS INDEX: 37.56 KG/M2 | TEMPERATURE: 97.1 F | RESPIRATION RATE: 18 BRPM | WEIGHT: 212 LBS

## 2022-07-21 DIAGNOSIS — Z17.0 MALIGNANT NEOPLASM OF CENTRAL PORTION OF RIGHT BREAST IN FEMALE, ESTROGEN RECEPTOR POSITIVE (HCC): ICD-10-CM

## 2022-07-21 DIAGNOSIS — D05.11 DUCTAL CARCINOMA IN SITU (DCIS) OF RIGHT BREAST: Primary | ICD-10-CM

## 2022-07-21 DIAGNOSIS — C50.111 MALIGNANT NEOPLASM OF CENTRAL PORTION OF RIGHT BREAST IN FEMALE, ESTROGEN RECEPTOR POSITIVE (HCC): ICD-10-CM

## 2022-07-21 DIAGNOSIS — B02.23 POST-HERPETIC POLYNEUROPATHY: Primary | ICD-10-CM

## 2022-07-21 DIAGNOSIS — B02.8 HERPES ZOSTER COMPLICATION: ICD-10-CM

## 2022-07-21 PROCEDURE — G8427 DOCREV CUR MEDS BY ELIG CLIN: HCPCS | Performed by: NURSE PRACTITIONER

## 2022-07-21 PROCEDURE — 99214 OFFICE O/P EST MOD 30 MIN: CPT | Performed by: NURSE PRACTITIONER

## 2022-07-21 PROCEDURE — 1036F TOBACCO NON-USER: CPT | Performed by: INTERNAL MEDICINE

## 2022-07-21 PROCEDURE — 99215 OFFICE O/P EST HI 40 MIN: CPT | Performed by: INTERNAL MEDICINE

## 2022-07-21 PROCEDURE — 3017F COLORECTAL CA SCREEN DOC REV: CPT | Performed by: INTERNAL MEDICINE

## 2022-07-21 PROCEDURE — 1036F TOBACCO NON-USER: CPT | Performed by: NURSE PRACTITIONER

## 2022-07-21 PROCEDURE — G8417 CALC BMI ABV UP PARAM F/U: HCPCS | Performed by: NURSE PRACTITIONER

## 2022-07-21 PROCEDURE — G8417 CALC BMI ABV UP PARAM F/U: HCPCS | Performed by: INTERNAL MEDICINE

## 2022-07-21 PROCEDURE — G9899 SCRN MAM PERF RSLTS DOC: HCPCS | Performed by: INTERNAL MEDICINE

## 2022-07-21 PROCEDURE — G9899 SCRN MAM PERF RSLTS DOC: HCPCS | Performed by: NURSE PRACTITIONER

## 2022-07-21 PROCEDURE — G8427 DOCREV CUR MEDS BY ELIG CLIN: HCPCS | Performed by: INTERNAL MEDICINE

## 2022-07-21 PROCEDURE — 3017F COLORECTAL CA SCREEN DOC REV: CPT | Performed by: NURSE PRACTITIONER

## 2022-07-21 RX ORDER — OXYCODONE HYDROCHLORIDE AND ACETAMINOPHEN 5; 325 MG/1; MG/1
1 TABLET ORAL EVERY 6 HOURS PRN
Qty: 28 TABLET | Refills: 0 | Status: SHIPPED | OUTPATIENT
Start: 2022-07-21 | End: 2022-07-28

## 2022-07-21 ASSESSMENT — ENCOUNTER SYMPTOMS
CONSTIPATION: 0
DIARRHEA: 0
ABDOMINAL PAIN: 0
COUGH: 0
RHINORRHEA: 0
VOMITING: 0
SORE THROAT: 0
NAIL CHANGES: 0
SHORTNESS OF BREATH: 0
WHEEZING: 0
EYE PAIN: 0
NAUSEA: 0

## 2022-07-21 ASSESSMENT — PATIENT HEALTH QUESTIONNAIRE - PHQ9
SUM OF ALL RESPONSES TO PHQ9 QUESTIONS 1 & 2: 0
4. FEELING TIRED OR HAVING LITTLE ENERGY: 0
10. IF YOU CHECKED OFF ANY PROBLEMS, HOW DIFFICULT HAVE THESE PROBLEMS MADE IT FOR YOU TO DO YOUR WORK, TAKE CARE OF THINGS AT HOME, OR GET ALONG WITH OTHER PEOPLE: 0
8. MOVING OR SPEAKING SO SLOWLY THAT OTHER PEOPLE COULD HAVE NOTICED. OR THE OPPOSITE, BEING SO FIGETY OR RESTLESS THAT YOU HAVE BEEN MOVING AROUND A LOT MORE THAN USUAL: 0
SUM OF ALL RESPONSES TO PHQ QUESTIONS 1-9: 0
6. FEELING BAD ABOUT YOURSELF - OR THAT YOU ARE A FAILURE OR HAVE LET YOURSELF OR YOUR FAMILY DOWN: 0
5. POOR APPETITE OR OVEREATING: 0
SUM OF ALL RESPONSES TO PHQ QUESTIONS 1-9: 0
SUM OF ALL RESPONSES TO PHQ QUESTIONS 1-9: 0
2. FEELING DOWN, DEPRESSED OR HOPELESS: 0
SUM OF ALL RESPONSES TO PHQ QUESTIONS 1-9: 0
1. LITTLE INTEREST OR PLEASURE IN DOING THINGS: 0
7. TROUBLE CONCENTRATING ON THINGS, SUCH AS READING THE NEWSPAPER OR WATCHING TELEVISION: 0
9. THOUGHTS THAT YOU WOULD BE BETTER OFF DEAD, OR OF HURTING YOURSELF: 0
3. TROUBLE FALLING OR STAYING ASLEEP: 0

## 2022-07-21 NOTE — PATIENT INSTRUCTIONS
SURVEY:     You may be receiving a survey from [employer] regarding your visit today. Please complete the survey to enable us to provide the highest quality of care to you and your family. If you cannot score us a very good on any question, please call the office to discuss how we could have made your experience a very good one. Thank you.   Aminata Plasencia, APRN-ANGELIKA Scanlon, CNP  Kareen Chase, EANN  Lucero Wilde, JORGE A Moore, JORGE A Vasquez, CMA  Alice, PCA  Hope, PM

## 2022-07-21 NOTE — PROGRESS NOTES
Patient ID: Staci Murcia, 1965, [id number], 62 y.o. Referred by :  No ref. provider found   Reason for consultation: Right DCIS      HISTORY OF PRESENT ILLNESS:    Oncologic History:    Staci Murcia is a very pleasant 62 y.o. female who found on a routine mammogram on May 22, 2022 new group of calcifications upper central right breast and ultrasound did show suspicious lesion at 11:00 6 cm from the nipple core biopsy was done on 6/9/2022 and that showed DCIS strongly ER/N[place] positive and patient was referred for medical oncology  No family history of breast cancer  No history of using birth control in her younger age  Patient had hysterectomy in her 35s    Interim history  Patient underwent lumpectomy on July 12, 2022 and there was 1 cm invasive carcinoma in addition to the DCIS  No sentinel lymph node biopsy    Past Medical History:   Diagnosis Date    Anxiety     Asthma     CAD (coronary artery disease)     x1 stent 2010,x1 stent 2016, x1 stent May 2022    Cancer [facility]) 06/2022    right breast cancer    Clinical trial participant at discharge 3/31/16    COPD (chronic obstructive pulmonary disease) ([facility] [address]     Depression     Diabetic neuropathy ([facility] [address]     GERD (gastroesophageal reflux disease)     H/O cardiac catheterization 4/26/16    LMCA: Mild Irregularities 10-20%. LAD: Lesion on Prox LAD: Proximal subsection. 100% stenosis. LCx: Mild irregularities 10-20%. RCA: Mild irregularities 10-20%. Widely patent mid RCA stent. EF:60%. H/O cardiovascular stress test 10/07/2016    Overall results are most consistent with a low risk for significant CAD. H/O echocardiogram 10/05/2016    EF:>60%. Inferoseptal wall abnormal in its motion which is not unusal status post open heart surgery. Evidence of mild (grade I) diastolic dysfunction is seen. H/O tilt table evaluation 07/12/2016    Abnormal. PTs HR, Blood Pressure response and symptoms were most consistent with dysautonomia.  Combined w/viligant maintenance of euvolemia and maintaining a moderate salt intake, pharmaciologic treatment w/ ProAmatine, SSRI such as Lexapro and/or Mestinon among other treatments have shown some effectiveness in the treatment of this condition. History of cardiovascular stress test 02/13/2019    Abnormal. Small/moderate perfusion defect of mild/moderate intesity in the anterior and anterolateral regions during stress imaging which is most consistent w/ ischemia but may be artifact. Overall low/intermediate risk for significant CAD. History of echocardiogram 09/06/2018    EF >60%. Inferoseptal wall is abnormal in its motion which is not unusual s/p open heart. Evidence of mild (grade I) diastolic dysfunction seen.     Hx of blood clots     Hyperlipidemia     Hypertension     Intermittent claudication (HCC)     Kidney stones     Movement disorder     neuropathy in legs    Neuromuscular disorder (HCC)     neuropathy    Osteoarthritis     Reflux     Seizures ([facility] [address] 1980'Ss last seizure    Stented coronary artery 9/22/2010     LAD/[place]    Stented coronary artery 3/31/16    RCA/[place]    Type II or unspecified type diabetes mellitus without mention of complication, not stated as uncontrolled     Unspecified sleep apnea     no machine       Past Surgical History:   Procedure Laterality Date    ABDOMINAL HERNIA REPAIR  01/2016    repair done [place]    APPENDECTOMY      BREAST BIOPSY Right 7/12/2022    NEEDLE DIRECTED ( 1130     )    RIGHT BREAST LUMPECTOMY performed by Mario Dunbar DO at [address] [facility] Left 04/26/2016    right radial/ [facility] [place]/ Dr Ubaldo Celaya Left 03/07/2019    Left Radial/[facility] [place]/    CARDIAC CATHETERIZATION  05/20/2022    Dr Alee Ding radial    CARDIAC CATHETERIZATION  05/20/2022    Dr Alee Ding radial    [address] [facility]      CABG 2019    [address]    COLONOSCOPY  12/16/2014 -hemorrhoids,bx    CORONARY ANGIOPLASTY WITH STENT PLACEMENT  09/2010    CORONARY ANGIOPLASTY WITH STENT PLACEMENT  03/31/2016    JESSE RCA / DR Kenneth Kulkarni    CORONARY ANGIOPLASTY WITH STENT PLACEMENT  05/20/2022    CORONARY ARTERY BYPASS GRAFT  08/15/2016    OP X 1- Dr Kang Renner, COLON, DIAGNOSTIC  12/16/2014    HERNIA REPAIR  12/13/2012    at the medial aspect of a [place] RUQ scar); repaired byDr. [address]  02/16/2015    incisional, recurrent    HERNIA REPAIR  02/2016    HYSTERECTOMY (CERVIX STATUS UNKNOWN)      OTHER SURGICAL HISTORY  10/08/2015    abd wound washout, mesh removal, wound vac placement    MT SUCT NICOLE LIPECTOMY,HEAD/NECK Left 08/27/2018    THIGH LESION BIOPSY EXCISION, SOFT TISSUE MASS performed by Aliyah Flores MD at [address] Left 2018    leg    UPPP UVULOPALATOPHARYGOPLASTY  06/26/2012    US BREAST BIOPSY NEEDLE ADDITIONAL RIGHT Right 6/9/2022    [facility]    US BREAST NEEDLE BIOPSY RIGHT Right 6/9/2022    US BREAST NEEDLE BIOPSY RIGHT 6/9/2022 [facility] ULTRASOUND    US GUIDED NEEDLE LOC OF RIGHT BREAST Right 7/12/2022    US GUIDED NEEDLE LOC OF RIGHT BREAST 7/12/2022 [facility] ULTRASOUND    VENTRAL HERNIA REPAIR  09/21/2015    With Mesh - Dr Gladys Hollis       Allergies   Allergen Reactions    Tape [place] Tape] Rash       Current Outpatient Medications   Medication Sig Dispense Refill    oxyCODONE-acetaminophen (PERCOCET) 5-325 MG per tablet Take 1 tablet by mouth every 6 hours as needed for Pain for up to 7 days. Intended supply: 7 days. Take lowest dose possible to manage pain 28 tablet 0    valACYclovir (VALTREX) 1 g tablet Take 1 tablet by mouth in the morning and 1 tablet before bedtime. Do all this for 10 days.  20 tablet 0    metoprolol succinate (TOPROL XL) 100 MG extended release tablet TAKE 1 AND 1/2 TABLETS BY MOUTH EVERY DAY 90 tablet 3    albuterol sulfate HFA (VENTOLIN HFA) 108 (90 Base) MCG/ACT inhaler INHALE 2 PUFFS EVERY 6 HOURS AS NEEDED FOR WHEEZING 18 g 5    nitroGLYCERIN (NITROSTAT) 0.4 MG SL tablet Place 1 tablet under the tongue every 5 minutes as needed for Chest pain 25 tablet 1    clopidogrel (PLAVIX) 75 MG tablet Take 1 tablet by mouth daily 30 tablet 3    TRULICITY 3 ZP/7.9EE SOPN INJECT 0.5 ML (1 PEN) SUBCUTANEOUSLY WEEKLY,X30 DAYS,INSTR:ROTATE INJECTION SITES      pregabalin (LYRICA) 150 MG capsule Take one cap three times daily (Patient taking differently: Take 150 mg by mouth in the morning, at noon, and at bedtime. Take one cap three times daily) 90 capsule 6    atorvastatin (LIPITOR) 80 MG tablet Take 1 tablet by mouth daily 90 tablet 3    fludrocortisone (FLORINEF) 0.1 MG tablet TAKE 3 TABLETS BY MOUTH EVERY DAY (Patient taking differently: Take 0.1 mg by mouth in the morning.) 270 tablet 3    Blood Glucose Monitoring Suppl (ONE TOUCH ULTRA 2) w/Device KIT 1 kit by Does not apply route daily 1 kit 0    blood glucose monitor strips Test 3 times a day & as needed for symptoms of irregular blood glucose. Dispense sufficient amount for indicated testing frequency plus additional to accommodate PRN testing needs.  300 strip 0    Lancets MISC 1 each by Does not apply route 2 times daily 300 each 1    Insulin Degludec (TRESIBA FLEXTOUCH) 200 UNIT/ML SOPN Inject 86 Units into the skin daily PER DR MCELROY (Patient taking differently: Inject 110 Units into the skin at bedtime) 1 pen 0    metFORMIN (GLUCOPHAGE-XR) 500 MG extended release tablet Take 2 tablets by mouth daily (with breakfast) 180 tablet 3    omeprazole (PRILOSEC) 20 MG delayed release capsule TAKE 1 CAPSULE BY MOUTH EVERY DAY IN THE MORNING BEFORE BREAKFAST (Patient taking differently: Take 20 mg by mouth in the morning.) 90 capsule 1    ondansetron (ZOFRAN ODT) 4 MG disintegrating tablet Take 1 tablet by mouth every 8 hours as needed for Nausea 20 tablet 0    Saccharomyces boulardii (PROBIOTIC) 250 MG CAPS Take 1 capsule by mouth daily 30 capsule 0    tiZANidine (ZANAFLEX) 2 MG tablet TAKE 1 TABLET BY MOUTH NIGHTLY AS NEEDED(SPASMS) (Patient taking differently: Take 2 mg by mouth nightly as needed TAKE 2 TABLET BY MOUTH NIGHTLY AS NEEDED(SPASMS)) 30 tablet 2    acetaminophen (TYLENOL) 500 MG tablet Take 1 tablet by mouth 4 times daily as needed for Pain 40 tablet 0    losartan (COZAAR) 25 MG tablet Take 1 tablet by mouth daily 90 tablet 3    insulin lispro, 1 Unit Dial, (HUMALOG KWIKPEN) 100 UNIT/ML SOPN Inject 20 Units into the skin 3 times daily (before meals) 12 pen 1    DULoxetine (CYMBALTA) 30 MG extended release capsule Take 1 capsule by mouth daily 30 capsule 1    aspirin 81 MG EC tablet TAKE 1 TABLET BY MOUTH DAILY (Patient taking differently: Take 81 mg by mouth in the morning.) 90 tablet 3    Insulin Pen Needle (BD ULTRA-FINE PEN NEEDLES) 29G X 12.7MM MISC USE AS DIRECTED BY PHYSICIAN 5 times daily 150 each 11    Blood Pressure Monitor KIT 1 each by Does not apply route daily as needed ESSENTIAL HYPERTENSION   I10 1 kit 0     No current facility-administered medications for this visit.        Social History     Socioeconomic History    Marital status:      Spouse name: Not on file    Number of children: Not on file    Years of education: Not on file    Highest education level: Not on file   Occupational History    Not on file   Tobacco Use    Smoking status: Former     Packs/day: 2.00     Years: 10.00     Pack years: 20.00     Types: Cigarettes     Quit date: 2010     Years since quittin.8    Smokeless tobacco: Never   Vaping Use    Vaping Use: Never used   Substance and Sexual Activity    Alcohol use: No    Drug use: No    Sexual activity: Yes     Partners: Male   Other Topics Concern    Not on file   Social History Narrative    Not on file     Social Determinants of Health     Financial Resource Strain: Unknown    Difficulty of Paying Living Expenses: Patient refused   Food Insecurity: Unknown    Worried About Running Out of Food in the Last Year: Patient refused    Ran Out of Food in the Last Year: Patient refused   Transportation Needs: Not on file   Physical Activity: Not on file   Stress: Not on file   Social Connections: Not on file   Intimate Partner Violence: Not on file   Housing Stability: Not on file       Family History   Problem Relation Age of Onset    Cancer Mother     Diabetes Mother     Cancer Father         thyroid    Diabetes Sister     Heart Disease Sister     Heart Disease Brother     Heart Disease Maternal Uncle     Cancer Maternal Grandmother         REVIEW OF SYSTEM:     Constitutional: No fever or chills. No night sweats, no weight loss   Eyes: No eye discharge, double vision, or eye pain   HEENT: negative for sore mouth, sore throat, hoarseness and voice change   Respiratory: negative for cough , sputum, dyspnea, wheezing, hemoptysis, chest pain   Cardiovascular: negative for chest pain, dyspnea, palpitations, orthopnea, PND   Gastrointestinal: negative for nausea, vomiting, diarrhea, constipation, abdominal pain, Dysphagia, hematemesis and hematochezia   Genitourinary: negative for frequency, dysuria, nocturia, urinary incontinence, and hematuria   Integument: negative for rash, skin lesions, bruises.    Hematologic/Lymphatic: negative for easy bruising, bleeding, lymphadenopathy, petechiae and swelling/edema   Endocrine: negative for heat or cold intolerance, tremor, weight changes, change in bowel habits and hair loss   Musculoskeletal: negative for myalgias, arthralgias, pain, joint swelling,and bone pain   Neurological: negative for headaches, dizziness, seizures, weakness, numbness       OBJECTIVE:         Vitals:    07/21/22 1645   BP: 110/76   Pulse: (!) 103   Temp: 97.1 °F (36.2 °C)       PHYSICAL EXAM:   General appearance - well appearing, no in pain or distress   Mental status - alert and cooperative   Eyes - pupils equal and reactive, extraocular eye movements intact   Ears - bilateral TM's and external ear canals normal   Mouth - mucous membranes moist, pharynx normal without lesions   Neck - supple, no significant adenopathy   Lymphatics - no palpable lymphadenopathy, no hepatosplenomegaly   Chest - clear to auscultation, no wheezes, rales or rhonchi, symmetric air entry   Heart - normal rate, regular rhythm, normal S1, S2, no murmurs, rubs, clicks or gallops   Abdomen - soft, nontender, nondistended, no masses or organomegaly   Neurological - alert, oriented, normal speech, no focal findings or movement disorder noted   Musculoskeletal - no joint tenderness, deformity or swelling   Extremities - peripheral pulses normal, no pedal edema, no clubbing or cyanosis   Skin - normal coloration and turgor, no rashes, no suspicious skin lesions noted ,      LABORATORY DATA:     Lab Results   Component Value Date    WBC 8.5 07/18/2022    HGB 12.7 07/18/2022    HCT 39.7 07/18/2022    MCV 74.3 (L) 07/18/2022     07/18/2022    LYMPHOPCT 16 (L) 07/18/2022    RBC 5.34 (H) 07/18/2022    MCH 23.8 (L) 07/18/2022    MCHC 32.0 07/18/2022    RDW 15.5 (H) 07/18/2022    MONOPCT 7 07/18/2022    BASOPCT 1 07/18/2022    NEUTROABS 6.49 07/18/2022    LYMPHSABS 1.34 07/18/2022    MONOSABS 0.57 07/18/2022    EOSABS 0.04 07/18/2022    BASOSABS 0.05 07/18/2022         Chemistry        Component Value Date/Time     (L) 07/18/2022 2343    K 4.5 07/18/2022 2343    CL 93 (L) 07/18/2022 2343    CO2 24 07/18/2022 2343    BUN 14 07/18/2022 2343    CREATININE 0.78 07/18/2022 2343        Component Value Date/Time    CALCIUM 9.8 07/18/2022 2343    ALKPHOS 99 04/22/2022 1316    AST 11 04/22/2022 1316    ALT 9 04/22/2022 1316    BILITOT 0.41 04/22/2022 1316            PATHOLOGY DATA:     1 Result Note    1 Follow-up Encounter    Component 6/9/22 1325   Surgical Pathology Report -- Diagnosis --   A.   RIGHT BREAST, UOQ, 11:00, 6 CM FN, CORE NEEDLE BIOPSIES:        -  INTERMEDIATE GRADE DUCTAL CARCINOMA IN SITU, SOLID TYPE.             -  ESTROGEN AND PROGESTERONE RECEPTOR IMMUNOSTAINS STRONGLY   POSITIVE IN DUCTAL CARCINOMA IN SITU. B.  RIGHT AXILLA LYMPH NODE, CORE NEEDLE BIOPSIES:        -  BENIGN LYMPH NODE, NEGATIVE FOR MALIGNANCY. Melia Eldridge M.D.   **Electronically Signed Out**         jet/6/13/2022         Clinical Information   Clinical findings: RIGHT UOQ 11:00 6 CM FN, RIGHT AXILLA   Operative Findings:  RIGHT BREAST MASS 11:00; RT AXILLA LYMPH NODE     Source of Specimen   A: RT BREAST MASS   B: RT AXILLA BX     Gross Description   A. \"SIMON WILD, RIGHT BREAST\" Cores and fragments of fibrofatty   tissue, 1.0 x 0.8 x 0.4 cm in aggregate. Entirely 1cs. B. \"SIMON WILD, RIGHT AXILLA\" Cores and fragments of fibrofatty   tissue, 1.0 x 0.8 x 0.3 cm in aggregate. Entirely 1cs. mpb tm       Microscopic Description   A. Initial levels of fibroglandular breast tissue and adipose tissue   contain atrophic breast lobules. Subsequent levels however show with   distended ducts lined by atypical epithelial cells in a solid pattern. Calcifications are not identified. There is no evidence of invasive   malignancy. The focus measures 2.5 mm in largest dimension. Slides   were reviewed with a second pathologist [facility]) who agrees with the   diagnosis.    PROCEDURE:                              Core needle biopsies   SPECIMEN LATERALITY / TUMOR SITE:          Right breast upper outer   quadrant 11:00 6 cm from nipple   HISTOLOGIC TYPE:                         Ductal carcinoma in situ                     DCIS ARCHITECTURAL PATTERN:               Solid type   NUCLEAR GRADE:                           intermediate grade   NECROSIS:                              not identified   MICROCALCIFICATIONS:                    Not identified             BIOMARKER TESTING  BREAST CARCINOMA         ESTROGEN RECEPTOR*--   -POSITIVE (PERCENT =>10% OF POSITIVE TUMOR CELL NUCLEI):     Yes, over   95%       ---AVERAGE INTENSITY OF POSITIVE STAINING:               3+, strong   -LOW POSITIVE (PERCENT 1-10% OF POSITIVE TUMOR CELL NUCLEI):     No         ---AVERAGE INTENSITY OF POSITIVE STAINING:               N/A   -NEGATIVE (<1% OF POSITIVE TUMOR CELL NUCLEI):           No     ---AVERAGE INTENSITY OF ANY POSITIVE STAINING:           N/A   ---INTERNAL CONTROL PRESENT AND STAIN AS EXPECTED:           Yes     ---INTERNAL CONTROL CELLS ABSENT (COMMENT):                No     ---INTERNAL CONTROL CELLS PRESENT BUT DO NOT STAIN (COMMENT): No         PROGESTERONE RECEPTOR*--   -POSITIVE (=>1% OF TUMOR CELL NUCLEI POSITIVE):           Yes, over   95%   ---AVERAGE INTENSITY OF POSITIVE STAINING:                   3+,   strong   -NEGATIVE (<1% OF POSITIVE TUMOR CELL NUCLEI):              no   ---AVERAGE INTENSITY OF ANY POSITIVE STAINING:           N/A   ---INTERNAL CONTROL PRESENT AND STAIN AS EXPECTED:           Yes     ---INTERNAL CONTROL CELLS ABSENT (COMMENT):              no   ---INTERNAL CONTROL CELLS PRESENT BUT DO NOT STAIN (COMMENT): No             Component 7/12/22 5052   Surgical Pathology Report -- Diagnosis --     A. RIGHT BREAST, NEEDLE LOCALIZED EXCISIONAL BIOPSY:   -INVASIVE DUCTAL CARCINOMA GRADE 2, 10 MM IN GREATEST EXTENT   -ESTROGEN RECEPTOR POSITIVE, PROGESTERONE RECEPTOR POSITIVE   -DUCTAL CARCINOMA IN SITU, INTERMEDIATE AND HIGH-GRADE, SOLID AND   COMEDO TYPE WITH MICROCALCIFICATIONS   -INVASIVE CARCINOMA IDENTIFIED 0.5 MM FROM THE LATERAL INKED MARGIN   -DUCTAL CARCINOMA IN SITU IDENTIFIED 0.5 MM FROM THE LATERAL INKED   MARGIN     B.  RIGHT BREAST, ADDITIONAL INFERIOR MARGIN:   -BENIGN BREAST TISSUE     C.  RIGHT BREAST, ADDITIONAL LATERAL MARGIN:   -BENIGN BREAST TISSUE     D.  RIGHT BREAST, ADDITIONAL MEDIAL MARGIN:   -BENIGN BREAST TISSUE     E.  RIGHT BREAST, ADDITIONAL SUPERIOR MARGIN:   -BENIGN BREAST TISSUE     -- Diagnosis Comment --     The final inked margins are clear.   Invasive carcinoma and ductal   carcinoma in situ identified 10.5 mm from the nearest margin (lateral   margin). Wandy Valverde D.O.   **Electronically Signed Out**         [facility]/7/14/2022              IMAGING DATA:      CT SOFT TISSUE NECK W CONTRAST  Narrative: EXAMINATION:  CT OF THE NECK SOFT TISSUE WITH CONTRAST  7/19/2022    TECHNIQUE:  CT of the neck was performed with the administration of intravenous contrast.  Multiplanar reformatted images are provided for review. Automated exposure  control, iterative reconstruction, and/or weight based adjustment of the  mA/kV was utilized to reduce the radiation dose to as low as reasonably  achievable. COMPARISON:  CT neck 02/15/2018    HISTORY:  ORDERING SYSTEM PROVIDED HISTORY: right sided swelling  TECHNOLOGIST PROVIDED HISTORY:  right sided swelling  Decision Support Exception - unselect if not a suspected or confirmed  emergency medical condition->Emergency Medical Condition (MA)    FINDINGS:  PHARYNX/LARYNX:  The palatine tonsils are normal in appearance. The tongue  is normal in appearance. The valleculae, epiglottis, aryepiglottic folds and  pyriform sinuses appear unremarkable. The true and false vocal cords are  normal in appearance. No mass or abscess is seen. SALIVARY GLANDS/THYROID:  The parotid and submandibular glands appear  unremarkable. The thyroid gland appears unremarkable. LYMPH NODES:  Borderline enlarged cervical lymph nodes on the right, for  instance 8 x 7 mm right level 5 lymph node (2:51). SOFT TISSUES: Mild stranding right lateral neck soft tissues, with adjacent  prominent lymph nodes (2:55). No appreciable soft tissue swelling or mass is  seen. BRAIN/ORBITS/SINUSES: Small right mastoid effusion. The visualized portion  of the intracranial contents appear unremarkable. The visualized portion of  the orbits, paranasal sinuses demonstrate no acute abnormality.     LUNG APICES/SUPERIOR MEDIASTINUM:  No focal consolidation is seen within the  visualized lung apices. No superior mediastinal lymphadenopathy or mass. The visualized portion of the trachea appears unremarkable. BONES:  No aggressive appearing lytic or blastic bony lesion. Degenerative  changes cervical spine. Sternotomy wires. Impression: Mild stranding right lateral neck soft tissues, with adjacent prominent lymph  nodes, nonspecific and may be correlated clinically for possible adjacent  cellulitis or prior trauma. No soft tissue abscess. Borderline enlarged right cervical lymph nodes, nonspecific. ASSESSMENT:       Diagnosis Orders   1.  Ductal carcinoma in situ (DCIS) of right breast             pStage I/pT1pNX invasive carcinoma of the right breast with DCIS status postlumpectomy done on July 12, 2022  Multiple comorbidity  Previous smoker    PLAN:     I 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 with no sentinel lymph node however initially she had biopsy of the right axillary lymphadenopathy, we discussed with the pathology and surgeon  Discussed with the patient about obtaining Oncotype DX if she required chemotherapy otherwise adjuvant hormonal treatment with aromatase inhibitors  Side effects of hormonal treatment which include but not limited to osteoporosis has been discussed with the patient and she need to be on calcium vitamin D and Xgeva if she had evidence of osteopenia or osteoporosis  Will obtain bone density scan  Referral to radiation oncology    Thank you for the consult         I spent a total of 45 minutes on the date of the service which included preparing to see the patient, face-to-face patient care, completing clinical documentation, obtaining and/or reviewing separately obtained history, performing a medically appropriate examination, counseling and educating the patient/family/caregiver and ordering medications, tests, or procedures. [facility]                            This note is created with the assistance of a speech recognition program.  While intending to generate a document that actually reflects the content of the visit, the document can still have some errors including those of syntax and sound a like substitutions which may escape proof reading. It such instances, actual meaning can be extrapolated by contextual diversion.

## 2022-07-21 NOTE — PROGRESS NOTES
Name: Carmen Elliott  : 1965         Chief Complaint:     Chief Complaint   Patient presents with    Follow-up     Patient was at [facility] ED department and diagnosed with shingles on her neck -patient states they are painful        History of Present Illness:      Carmen Elliott is a 62 y.o.  female who presents with Follow-up (Patient was at [facility] ED department and diagnosed with shingles on her neck -patient states they are painful )      Melanie Ladcorinna is here today for an ER follow-up visit. ER course: Carmen Elliott is a 62 y.o. female with medical history significant for diabetes, hypertension, diabetic neuropathy, coronary artery disease status post CABG, and breast cancer status postmastectomy who presents to the emergency department for evaluation for right neck pain. Patient states started having neck pain yesterday. She states she applied Bengay with some relief of symptoms. She states however yesterday she has had having headache, worsening neck pain, swelling to her right neck, and fevers and chills at home. She states she has had no sick contacts at home. She states as a result she decided to come home for evaluation. Patient also endorses nausea but no vomiting. She denies chest pain, palpitations, diaphoresis, shortness of breath, or numbness and tingling or radiculopathies to the upper extremities. Denies trauma to the neck. Patient presenting for evaluation for neck pain with fevers and chills at home noted to be febrile in the emergency department. Presentation is concerning for shingles given vesicular rashes on the back of the neck. While in the department patient with no IV access. Ultrasound guided IV placed by myself. Work-up in the department with lactic acidosis, no leukocytosis, no anemia, mild hyponatremia with a sodium of 131, no renal function impairment, no UTI with a few leukocytes noted but no bacteria on urinalysis.   Patient with negative COVID testing patient with CT soft tissue neck with mild stranding on the right lateral neck soft tissues with adjacent prominent lymphadenopathy but no soft tissue abscesses. This is most likely secondary to shingles appreciated on exam.  Patient with no signs of cellulitis. Discussed with patient we will start her on valacyclovir. Discussed will need follow-up with primary care physician for reevaluation to ensure resolution of symptoms. Reasons to return to emergency department discussed. Patient verbalized understanding of information given and agreed to plan. Was discharged in stable condition. UPDATE 07/21/2022-Christie continues to complain of significant pain associated with the rash on the back of her neck and upper back. She has been taking Valtrex twice daily which was dosed incorrectly. She should be taking 1000 mg 3 times daily. He has used Norco with some modest relief. This was given to her in the emergency department. See below for further comment. Rash  This is a new problem. The current episode started in the past 7 days. The problem has been gradually worsening since onset. The affected locations include the head, neck and back. The rash is characterized by blistering, pain and redness. It is unknown if there was an exposure to a precipitant. Associated symptoms include anorexia. Pertinent negatives include no congestion, cough, diarrhea, eye pain, facial edema, fatigue, fever, joint pain, nail changes, rhinorrhea, shortness of breath, sore throat or vomiting. Treatments tried: Valtrex. The treatment provided mild relief. Her past medical history is significant for varicella. There is no history of allergies, asthma or eczema.        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 LAD: Lesion on Prox LAD: Proximal subsection. 100% stenosis. LCx: Mild irregularities 10-20%. RCA: Mild irregularities 10-20%. Widely patent mid RCA stent. EF:60%. H/O cardiovascular stress test 10/07/2016    Overall results are most consistent with a low risk for significant CAD. H/O echocardiogram 10/05/2016    EF:>60%. Inferoseptal wall abnormal in its motion which is not unusal status post open heart surgery. Evidence of mild (grade I) diastolic dysfunction is seen. H/O tilt table evaluation 07/12/2016    Abnormal. PTs HR, Blood Pressure response and symptoms were most consistent with dysautonomia. Combined w/viligant maintenance of euvolemia and maintaining a moderate salt intake, pharmaciologic treatment w/ ProAmatine, SSRI such as Lexapro and/or Mestinon among other treatments have shown some effectiveness in the treatment of this condition. History of cardiovascular stress test 02/13/2019    Abnormal. Small/moderate perfusion defect of mild/moderate intesity in the anterior and anterolateral regions during stress imaging which is most consistent w/ ischemia but may be artifact. Overall low/intermediate risk for significant CAD. History of echocardiogram 09/06/2018    EF >60%. Inferoseptal wall is abnormal in its motion which is not unusual s/p open heart. Evidence of mild (grade I) diastolic dysfunction seen.     Hx of blood clots     Hyperlipidemia     Hypertension     Intermittent claudication ([facility])     Kidney stones     Movement disorder     neuropathy in legs    Neuromuscular disorder ([facility])     neuropathy    Osteoarthritis     Reflux     Seizures ([facility] [address] 1980'Ss last seizure    Stented coronary artery 9/22/2010     LAD/[facility]    Stented coronary artery 3/31/16    NAYANA/[facility]    Type II or unspecified type diabetes mellitus without mention of complication, not stated as uncontrolled     Unspecified sleep apnea     no machine      Reviewed all health maintenance requirements and ordered appropriate tests  There are no preventive care reminders to display for this patient.     Past Surgical History:     Past Surgical History:   Procedure Laterality Date    ABDOMINAL HERNIA REPAIR  01/2016    repair done [place]    APPENDECTOMY      BREAST BIOPSY Right 7/12/2022    NEEDLE DIRECTED ( 1130     )    RIGHT BREAST LUMPECTOMY performed by Cornelia Holbrook DO at [facility] Left 04/26/2016    right radial/ [id number] [facility] [place]/ Dr Katelin Arango Left 03/07/2019    Left Radial/[facility] [place]/    CARDIAC CATHETERIZATION  05/20/2022    Dr Eh Torres radial    CARDIAC CATHETERIZATION  05/20/2022    Dr Eh Torres radial    [address]      CABG 2019    CHOLECYSTECTOMY  1991    COLONOSCOPY  12/16/2014    -hemorrhoids,bx    CORONARY ANGIOPLASTY WITH STENT PLACEMENT  09/2010    CORONARY ANGIOPLASTY WITH STENT PLACEMENT  03/31/2016    JESSE RCA / DR Amisha Grajeda WITH STENT PLACEMENT  05/20/2022    CORONARY ARTERY BYPASS GRAFT  08/15/2016    OP X 1- Dr Nadeen Fung, DIAGNOSTIC  12/16/2014    HERNIA REPAIR  12/13/2012    at the medial aspect of a [place] RUQ scar); repaired byDr. [facility]  02/16/2015    incisional, recurrent    HERNIA REPAIR  02/2016    HYSTERECTOMY (CERVIX STATUS UNKNOWN)      OTHER SURGICAL HISTORY  10/08/2015    abd wound washout, mesh removal, wound vac placement    SC SUCT NICOLE LIPECTOMY,HEAD/NECK Left 08/27/2018    THIGH LESION BIOPSY EXCISION, SOFT TISSUE MASS performed by Erin Ferrer MD at [facility] Left 2018    leg    UPPP UVULOPALATOPHARYGOPLASTY  06/26/2012    US BREAST BIOPSY NEEDLE ADDITIONAL fludrocortisone (FLORINEF) 0.1 MG tablet TAKE 3 TABLETS BY MOUTH EVERY DAY  Patient taking differently: Take 0.1 mg by mouth in the morning. 10/13/21  Yes Hermelinda Hylton MD   Blood Glucose Monitoring Suppl (ONE TOUCH ULTRA 2) w/Device KIT 1 kit by Does not apply route daily 7/20/21  Yes CHELSEA Franklin CNP   blood glucose monitor strips Test 3 times a day & as needed for symptoms of irregular blood glucose. Dispense sufficient amount for indicated testing frequency plus additional to accommodate PRN testing needs. 7/16/21  Yes Rockie Cabot M Deats, APRN - CNP   Lancets MISC 1 each by Does not apply route 2 times daily 7/16/21  Yes CHELSEA So CNP   Insulin Degludec (TRESIBA FLEXTOUCH) 200 UNIT/ML SOPN Inject 86 Units into the skin daily PER DR MCELROY  Patient taking differently: Inject 110 Units into the skin at bedtime 3/30/21  Yes CHELSEA Franklin CNP   metFORMIN (GLUCOPHAGE-XR) 500 MG extended release tablet Take 2 tablets by mouth daily (with breakfast) 3/30/21  Yes CHELSEA Franklin CNP   omeprazole (PRILOSEC) 20 MG delayed release capsule TAKE 1 CAPSULE BY MOUTH EVERY DAY IN [address] [place] [address]  Patient taking differently: Take 20 mg by mouth in the morning.  11/23/20  Yes CHELSEA Franklin CNP   ondansetron (ZOFRAN ODT) 4 MG disintegrating tablet Take 1 tablet by mouth every 8 hours as needed for Nausea 11/10/20  Yes Sabino Wahl MD   Saccharomyces boulardii (PROBIOTIC) 250 MG CAPS Take 1 capsule by mouth daily 9/30/20  Yes CHELSEA Franklin CNP   tiZANidine (ZANAFLEX) 2 MG tablet TAKE 1 TABLET BY MOUTH NIGHTLY AS NEEDED(SPASMS)  Patient taking differently: Take 2 mg by mouth nightly as needed TAKE 2 TABLET BY MOUTH NIGHTLY AS NEEDED(SPASMS) 9/22/20  Yes Ronny Faust MD   acetaminophen (TYLENOL) 500 MG tablet Take 1 tablet by mouth 4 times daily as needed for Pain 9/9/20  Yes Tae M Deats, APRN - CNP   losartan (COZAAR) 25 MG tablet Take 1 tablet by mouth daily 7/23/20  Yes CHELSEA Perales CNP   insulin lispro, 1 Unit Dial, (HUMALOG KWIKPEN) 100 UNIT/ML SOPN Inject 20 Units into the skin 3 times daily (before meals) 6/23/20  Yes CHELSEA Perales CNP   DULoxetine (CYMBALTA) 30 MG extended release capsule Take 1 capsule by mouth daily 2/10/20  Yes Dillon Guerin MD   aspirin 81 MG EC tablet TAKE 1 TABLET BY MOUTH DAILY  Patient taking differently: Take 81 mg by mouth in the morning. 7/1/19  Yes CHELSEA Perales CNP   Insulin Pen Needle (BD ULTRA-FINE PEN NEEDLES) 29G X 12.7MM MISC USE AS DIRECTED BY PHYSICIAN 5 times daily 8/7/18  Yes CHELSEA Perales CNP   Blood Pressure Monitor KIT 1 each by Does not apply route daily as needed ESSENTIAL HYPERTENSION   I10 5/31/16  Yes CHELSEA Perales CNP        Allergies:       Tape Arn Screen tape]    Social History:     Tobacco:    reports that she quit smoking about 11 years ago. Her smoking use included cigarettes. She has a 20.00 pack-year smoking history. She has never used smokeless tobacco.  Alcohol:      reports no history of alcohol use. Drug Use:  reports no history of drug use. Family History:     Family History   Problem Relation Age of Onset    Cancer Mother     Diabetes Mother     Cancer Father         thyroid    Diabetes Sister     Heart Disease Sister     Heart Disease Brother     Heart Disease Maternal Uncle     Cancer Maternal Grandmother        Review of Systems:     Positive and Negative as described in HPI    Review of Systems   Constitutional:  Positive for appetite change. Negative for chills, fatigue and fever. HENT:  Negative for congestion, rhinorrhea and sore throat. Eyes:  Negative for pain and visual disturbance. Respiratory:  Negative for cough, shortness of breath and wheezing. Cardiovascular:  Negative for chest pain and palpitations. Gastrointestinal:  Positive for anorexia. Negative for abdominal pain, constipation, diarrhea, nausea and vomiting. Genitourinary:  Negative for difficulty urinating and dysuria. Musculoskeletal:  Positive for myalgias. Negative for gait problem, joint pain, neck pain and neck stiffness. Skin:  Positive for rash. Negative for nail changes. Neurological:  Negative for dizziness, syncope, light-headedness and headaches. Physical Exam:   Vitals:  /68 (Site: Left Upper Arm, Position: Sitting)   Pulse 92   Temp 97.1 °F (36.2 °C) (Temporal)   Resp 18   Ht 5' 3\" (1.6 m)   Wt 212 lb (96.2 kg)   LMP 12/05/1996   SpO2 97%   BMI 37.55 kg/m²     Physical Exam  Constitutional:       General: She is not in acute distress. Appearance: Normal appearance. She is obese. She is not ill-appearing. HENT:      Mouth/Throat:      Mouth: Mucous membranes are moist.      Pharynx: Oropharynx is clear. Eyes:      General: No scleral icterus. Conjunctiva/sclera: Conjunctivae normal.   Cardiovascular:      Rate and Rhythm: Normal rate and regular rhythm. Pulmonary:      Effort: Pulmonary effort is normal.      Breath sounds: Normal breath sounds. No wheezing. Musculoskeletal:         General: Normal range of motion. Cervical back: Normal range of motion and neck supple. Lymphadenopathy:      Cervical: No cervical adenopathy. Skin:     General: Skin is warm and dry. Findings: Erythema and rash present. Rash is vesicular. Neurological:      Mental Status: She is alert and oriented to person, place, and time.        Data:     Lab Results   Component Value Date/Time     07/18/2022 11:43 PM    K 4.5 07/18/2022 11:43 PM    CL 93 07/18/2022 11:43 PM    CO2 24 07/18/2022 11:43 PM    BUN 14 07/18/2022 11:43 PM    CREATININE 0.78 07/18/2022 11:43 PM    GLUCOSE 147 07/18/2022 11:43 PM    GLUCOSE 181 02/16/2012 08:49 AM    PROT 7.9 04/22/2022 01:16 PM    LABALBU 3.9 04/22/2022 01:16 PM    LABALBU 4.2 12/05/2011 11:35 AM    BILITOT 0.41 04/22/2022 01:16 PM    ALKPHOS 99 04/22/2022 01:16 PM    AST 11 04/22/2022 01:16 PM    ALT 9 04/22/2022 01:16 PM     Lab Results   Component Value Date/Time    WBC 8.5 07/18/2022 11:43 PM    RBC 5.34 07/18/2022 11:43 PM    RBC 3.88 12/05/2011 11:35 AM    HGB 12.7 07/18/2022 11:43 PM    HCT 39.7 07/18/2022 11:43 PM    MCV 74.3 07/18/2022 11:43 PM    MCH 23.8 07/18/2022 11:43 PM    MCHC 32.0 07/18/2022 11:43 PM    RDW 15.5 07/18/2022 11:43 PM     07/18/2022 11:43 PM     12/05/2011 11:35 AM    MPV 9.0 07/18/2022 11:43 PM     Lab Results   Component Value Date/Time    TSH 2.80 06/04/2016 11:04 PM     Lab Results   Component Value Date/Time    CHOL 165 12/29/2021 09:40 AM    HDL 40 12/29/2021 09:40 AM    LABA1C 9.3 07/01/2022 10:26 AM       Assessment/Plan:      Diagnosis Orders   1. Post-herpetic polyneuropathy  oxyCODONE-acetaminophen (PERCOCET) 5-325 MG per tablet      2. Herpes zoster complication          She will change dosing of Valtrex to 1 g 3 times daily. Percocet as needed for severe pain. Call if not improving early next week. Sooner if any issues. 1.  Stephani Westbrook received counseling on the following healthy behaviors: medication adherence  2. Patient given educational materials - see patient instructions  3. Was a self-tracking handout given in paper form or via [employer]? No  If yes, see orders or list here. 4.  Discussed use, benefit, and side effects of prescribed medications. Barriers to medication compliance addressed. All patient questions answered. Pt voiced understanding. 5.  Reviewed prior labs and health maintenance  6. Continue current medications, diet and exercise. Completed Refills   Requested Prescriptions     Signed Prescriptions Disp Refills    oxyCODONE-acetaminophen (PERCOCET) 5-325 MG per tablet 28 tablet 0     Sig: Take 1 tablet by mouth every 6 hours as needed for Pain for up to 7 days. Intended supply: 7 days.  Take lowest dose possible to manage pain         Return if symptoms worsen or fail to improve.

## 2022-07-22 ENCOUNTER — TELEPHONE (OUTPATIENT)
Dept: RADIATION ONCOLOGY | Age: 57
End: 2022-07-22

## 2022-07-22 NOTE — TELEPHONE ENCOUNTER
Received call from [facility] office, stating pt has decided to get treatment in [facility] please cancel referral to rad/onc [place].

## 2022-07-24 LAB
CULTURE: NORMAL
CULTURE: NORMAL
Lab: NORMAL
Lab: NORMAL
SPECIMEN DESCRIPTION: NORMAL
SPECIMEN DESCRIPTION: NORMAL

## 2022-08-01 ENCOUNTER — TELEPHONE (OUTPATIENT)
Dept: PRIMARY CARE CLINIC | Age: 57
End: 2022-08-01

## 2022-08-01 RX ORDER — VALACYCLOVIR HYDROCHLORIDE 1 G/1
1000 TABLET, FILM COATED ORAL 3 TIMES DAILY
Qty: 30 TABLET | Refills: 0 | Status: SHIPPED | OUTPATIENT
Start: 2022-08-01 | End: 2022-08-11

## 2022-08-01 NOTE — TELEPHONE ENCOUNTER
Called patient and she stated she is having troubles with her eyes, one is blurry. I advised her to go to the ER per isiah.

## 2022-08-01 NOTE — TELEPHONE ENCOUNTER
Patient was in ED on 7/21/22 for shingles. She came in and seen Mery Feliz he gave her valtrex and some pain medication. She stated she is done with the medication and the shingles have came back. She was wondering what she should do?   Please advise thank you

## 2022-08-01 NOTE — TELEPHONE ENCOUNTER
The shingles did go away came back a couple days ago,  they are on her head and neck the same spots as before.

## 2022-08-15 ENCOUNTER — TELEPHONE (OUTPATIENT)
Dept: PRIMARY CARE CLINIC | Age: 57
End: 2022-08-15

## 2022-08-15 RX ORDER — GABAPENTIN 100 MG/1
100 CAPSULE ORAL EVERY 12 HOURS
Qty: 60 CAPSULE | Refills: 0 | Status: SHIPPED | OUTPATIENT
Start: 2022-08-15 | End: 2022-09-01

## 2022-08-15 NOTE — TELEPHONE ENCOUNTER
Please have her start Neurontin 100 mg at bedtime for 3 days and then may take twice a day if needed. Call in a few weeks with progress. Thank you.

## 2022-08-18 ENCOUNTER — OFFICE VISIT (OUTPATIENT)
Dept: ONCOLOGY | Age: 57
End: 2022-08-18
Payer: COMMERCIAL

## 2022-08-18 VITALS
WEIGHT: 212 LBS | HEIGHT: 63 IN | SYSTOLIC BLOOD PRESSURE: 127 MMHG | TEMPERATURE: 98.2 F | RESPIRATION RATE: 20 BRPM | BODY MASS INDEX: 37.56 KG/M2 | HEART RATE: 94 BPM | DIASTOLIC BLOOD PRESSURE: 72 MMHG

## 2022-08-18 DIAGNOSIS — C50.111 MALIGNANT NEOPLASM OF CENTRAL PORTION OF RIGHT BREAST IN FEMALE, ESTROGEN RECEPTOR POSITIVE (HCC): Primary | ICD-10-CM

## 2022-08-18 DIAGNOSIS — Z17.0 MALIGNANT NEOPLASM OF CENTRAL PORTION OF RIGHT BREAST IN FEMALE, ESTROGEN RECEPTOR POSITIVE (HCC): Primary | ICD-10-CM

## 2022-08-18 DIAGNOSIS — D05.11 DUCTAL CARCINOMA IN SITU (DCIS) OF RIGHT BREAST: ICD-10-CM

## 2022-08-18 PROCEDURE — G8417 CALC BMI ABV UP PARAM F/U: HCPCS | Performed by: INTERNAL MEDICINE

## 2022-08-18 PROCEDURE — G9899 SCRN MAM PERF RSLTS DOC: HCPCS | Performed by: INTERNAL MEDICINE

## 2022-08-18 PROCEDURE — 1036F TOBACCO NON-USER: CPT | Performed by: INTERNAL MEDICINE

## 2022-08-18 PROCEDURE — 3017F COLORECTAL CA SCREEN DOC REV: CPT | Performed by: INTERNAL MEDICINE

## 2022-08-18 PROCEDURE — G8427 DOCREV CUR MEDS BY ELIG CLIN: HCPCS | Performed by: INTERNAL MEDICINE

## 2022-08-18 PROCEDURE — 99215 OFFICE O/P EST HI 40 MIN: CPT | Performed by: INTERNAL MEDICINE

## 2022-08-18 NOTE — PROGRESS NOTES
Patient ID: Quang Burgos, 1965, [id number], 62 y.o. Referred by :  No ref. provider found   Reason for consultation: Right DCIS      HISTORY OF PRESENT ILLNESS:    Oncologic History:    Quang Burgos is a very pleasant 62 y.o. female who found on a routine mammogram on May 22, 2022 new group of calcifications upper central right breast and ultrasound did show suspicious lesion at 11:00 6 cm from the nipple core biopsy was done on 6/9/2022 and that showed DCIS strongly ER/SC positive and patient was referred for medical oncology  No family history of breast cancer  No history of using birth control in her younger age  Patient had hysterectomy in her 35s  Patient underwent lumpectomy on July 12, 2022 and there was 1 cm invasive carcinoma in addition to the DCIS  No sentinel lymph node biopsy      Oncology history  Stage I invasive carcinoma with right breast no sentinel lymph node biopsy  Oncotype DX 5    Interim history  Patient presents to the clinic for a follow-up visit and to discuss results of his lab work-up and other relevant clinical data. During this visit patient's allergy, social, medical, surgical history and medications were reviewed and updated. Oncotype DX 5    Past Medical History:   Diagnosis Date    Anxiety     Asthma     CAD (coronary artery disease)     x1 stent 2010,x1 stent 2016, x1 stent May 2022    Cancer [facility]) 06/2022    right breast cancer    Clinical trial participant at discharge 3/31/16    COPD (chronic obstructive pulmonary disease) ([facility] [address]     Depression     Diabetic neuropathy (HCC)     GERD (gastroesophageal reflux disease)     H/O cardiac catheterization 4/26/16    LMCA: Mild Irregularities 10-20%. LAD: Lesion on Prox LAD: Proximal subsection. 100% stenosis. LCx: Mild irregularities 10-20%. RCA: Mild irregularities 10-20%. Widely patent mid RCA stent. EF:60%.     H/O cardiovascular stress test 10/07/2016    Overall results are most consistent with a low risk for significant CAD. H/O echocardiogram 10/05/2016    EF:>60%. Inferoseptal wall abnormal in its motion which is not unusal status post open heart surgery. Evidence of mild (grade I) diastolic dysfunction is seen. H/O tilt table evaluation 07/12/2016    Abnormal. PTs HR, Blood Pressure response and symptoms were most consistent with dysautonomia. Combined w/viligant maintenance of euvolemia and maintaining a moderate salt intake, pharmaciologic treatment w/ ProAmatine, SSRI such as Lexapro and/or Mestinon among other treatments have shown some effectiveness in the treatment of this condition. History of cardiovascular stress test 02/13/2019    Abnormal. Small/moderate perfusion defect of mild/moderate intesity in the anterior and anterolateral regions during stress imaging which is most consistent w/ ischemia but may be artifact. Overall low/intermediate risk for significant CAD. History of echocardiogram 09/06/2018    EF >60%. Inferoseptal wall is abnormal in its motion which is not unusual s/p open heart. Evidence of mild (grade I) diastolic dysfunction seen.     Hx of blood clots     Hyperlipidemia     Hypertension     Intermittent claudication (HCC)     Kidney stones     Movement disorder     neuropathy in legs    Neuromuscular disorder (HCC)     neuropathy    Osteoarthritis     Reflux     Seizures ([address] 1980'Ss last seizure    Stented coronary artery 9/22/2010     LAD/[place]    Stented coronary artery 3/31/16    RCA/[place]    Type II or unspecified type diabetes mellitus without mention of complication, not stated as uncontrolled     Unspecified sleep apnea     no machine       Past Surgical History:   Procedure Laterality Date    ABDOMINAL HERNIA REPAIR  01/2016    repair done [place]    APPENDECTOMY      BREAST BIOPSY Right 7/12/2022    NEEDLE DIRECTED ( 1130     )    RIGHT [facility] 04/26[facility]2016    right radial/ [facility] [place]/ Dr Carlito Ortiz Left 03/07/2019    Left Radial/[facility] [place]/    CARDIAC CATHETERIZATION  05/20/2022    Dr Mathieu Xiao radial    CARDIAC CATHETERIZATION  05/20/2022    Dr Mathieu Xiao radial    [address]      CABG 2019    CHOLECYSTECTOMY  1991    COLONOSCOPY  12/16/2014    -hemorrhoids,bx    CORONARY ANGIOPLASTY WITH STENT PLACEMENT  09/2010    CORONARY ANGIOPLASTY WITH STENT PLACEMENT  03/31/2016    JESSE RCA / DR Sands Due    CORONARY ANGIOPLASTY WITH STENT PLACEMENT  05/20/2022    CORONARY ARTERY BYPASS GRAFT  08/15/2016    OP X 1- Dr Carole Heath, DIAGNOSTIC  12/16/2014    HERNIA REPAIR  12/13/2012    at the medial aspect of a [place] RUQ scar); repaired by[facility]. [facility]  02/16/2015    incisional, recurrent    HERNIA REPAIR  02/2016    HYSTERECTOMY (CERVIX STATUS UNKNOWN)      OTHER SURGICAL HISTORY  10/08/2015    abd wound washout, mesh removal, wound vac placement    MA SUCT NICOLE LIPECTOMY,HEAD/NECK Left 08/27/2018    THIGH LESION BIOPSY EXCISION, SOFT TISSUE MASS performed by Ally Flores MD at [address] [facility] [place]    leg    UPPP UVULOPALATOPHARYGOPLASTY  06/26/2012    US BREAST BIOPSY NEEDLE ADDITIONAL RIGHT Right 6/9/2022    US BREAST BIOPSY NEEDLE ADDITIONAL RIGHT 6/9/2022 [facility] ULTRASOUND    US BREAST NEEDLE BIOPSY RIGHT Right 6/9/2022    US BREAST NEEDLE BIOPSY RIGHT 6/9/2022 [facility] ULTRASOUND    US GUIDED NEEDLE LOC OF RIGHT BREAST Right 7/12/2022    US GUIDED NEEDLE LOC OF RIGHT BREAST 7/12/2022 [facility] ULTRASOUND    VENTRAL HERNIA REPAIR  09/21/2015    With Mesh - Dr Margaret Jameson       Allergies   Allergen Reactions    Tape Clemetjodi Callejas Tape] Rash       Current Outpatient Medications   Medication Sig Dispense Refill    gabapentin (NEURONTIN) 100 MG capsule Take 1 capsule by mouth in the morning and 1 capsule in the evening. Do all this for 30 days.  60 capsule 0    metoprolol succinate (TOPROL XL) 100 MG extended release tablet TAKE 1 AND 1/2 TABLETS BY MOUTH EVERY DAY 90 tablet 3    albuterol sulfate HFA (VENTOLIN HFA) 108 (90 Base) MCG/ACT inhaler INHALE 2 PUFFS EVERY 6 HOURS AS NEEDED FOR WHEEZING 18 g 5    nitroGLYCERIN (NITROSTAT) 0.4 MG SL tablet Place 1 tablet under the tongue every 5 minutes as needed for Chest pain 25 tablet 1    clopidogrel (PLAVIX) 75 MG tablet Take 1 tablet by mouth daily 30 tablet 3    TRULICITY 3 KS/8.9VF SOPN INJECT 0.5 ML (1 PEN) SUBCUTANEOUSLY WEEKLY,X30 DAYS,INSTR:ROTATE INJECTION SITES      pregabalin (LYRICA) 150 MG capsule Take one cap three times daily (Patient taking differently: Take 150 mg by mouth in the morning, at noon, and at bedtime. Take one cap three times daily) 90 capsule 6    atorvastatin (LIPITOR) 80 MG tablet Take 1 tablet by mouth daily 90 tablet 3    fludrocortisone (FLORINEF) 0.1 MG tablet TAKE 3 TABLETS BY MOUTH EVERY DAY (Patient taking differently: Take 0.1 mg by mouth daily) 270 tablet 3    Blood Glucose Monitoring Suppl (ONE TOUCH ULTRA 2) w/Device KIT 1 kit by Does not apply route daily 1 kit 0    blood glucose monitor strips Test 3 times a day & as needed for symptoms of irregular blood glucose. Dispense sufficient amount for indicated testing frequency plus additional to accommodate PRN testing needs.  300 strip 0    Lancets MISC 1 each by Does not apply route 2 times daily 300 each 1    Insulin Degludec (TRESIBA FLEXTOUCH) 200 UNIT/ML SOPN Inject 86 Units into the skin daily PER DR MCELROY (Patient taking differently: Inject 110 Units into the skin at bedtime) 1 pen 0    metFORMIN (GLUCOPHAGE-XR) 500 MG extended release tablet Take 2 tablets by mouth daily (with breakfast) 180 tablet 3    omeprazole (PRILOSEC) 20 MG delayed release capsule TAKE 1 CAPSULE BY MOUTH EVERY DAY IN THE MORNING BEFORE BREAKFAST (Patient taking differently: Take 20 mg by mouth Daily) 90 capsule 1    ondansetron (ZOFRAN ODT) 4 MG disintegrating tablet Take 1 tablet by mouth every 8 hours as needed for Nausea 20 tablet 0    Saccharomyces boulardii (PROBIOTIC) 250 MG CAPS Take 1 capsule by mouth daily 30 capsule 0    tiZANidine (ZANAFLEX) 2 MG tablet TAKE 1 TABLET BY MOUTH NIGHTLY AS NEEDED(SPASMS) (Patient taking differently: Take 2 mg by mouth nightly as needed TAKE 2 TABLET BY MOUTH NIGHTLY AS NEEDED(SPASMS)) 30 tablet 2    acetaminophen (TYLENOL) 500 MG tablet Take 1 tablet by mouth 4 times daily as needed for Pain 40 tablet 0    losartan (COZAAR) 25 MG tablet Take 1 tablet by mouth daily 90 tablet 3    insulin lispro, 1 Unit Dial, (HUMALOG KWIKPEN) 100 UNIT/ML SOPN Inject 20 Units into the skin 3 times daily (before meals) 12 pen 1    DULoxetine (CYMBALTA) 30 MG extended release capsule Take 1 capsule by mouth daily 30 capsule 1    aspirin 81 MG EC tablet TAKE 1 TABLET BY MOUTH DAILY (Patient taking differently: Take 81 mg by mouth daily) 90 tablet 3    Insulin Pen Needle (BD ULTRA-FINE PEN NEEDLES) 29G X 12.7MM MISC USE AS DIRECTED BY PHYSICIAN 5 times daily 150 each 11    Blood Pressure Monitor KIT 1 each by Does not apply route daily as needed ESSENTIAL HYPERTENSION   I10 1 kit 0     No current facility-administered medications for this visit.        Social History     Socioeconomic History    Marital status:      Spouse name: Not on file    Number of children: Not on file    Years of education: Not on file    Highest education level: Not on file   Occupational History    Not on file   Tobacco Use    Smoking status: Former     Packs/day: 2.00     Years: 10.00     Pack years: 20.00     Types: Cigarettes     Quit date: 2010     Years since quittin.9    Smokeless tobacco: Never   Vaping Use    Vaping Use: Never used   Substance and Sexual Activity    Alcohol use: No    Drug use: No    Sexual activity: Yes     Partners: Male   Other Topics Concern    Not on file   Social History Narrative    Not on file     Social Determinants of Health Financial Resource Strain: Unknown    Difficulty of Paying Living Expenses: Patient refused   Food Insecurity: Unknown    Worried About Running Out of Food in the Last Year: Patient refused    Ran Out of Food in the Last Year: Patient refused   Transportation Needs: Not on file   Physical Activity: Not on file   Stress: Not on file   Social Connections: Not on file   Intimate Partner Violence: Not on file   Housing Stability: Not on file       Family History   Problem Relation Age of Onset    Cancer Mother     Diabetes Mother     Cancer Father         thyroid    Diabetes Sister     Heart Disease Sister     Heart Disease Brother     Heart Disease Maternal Uncle     Cancer Maternal Grandmother         REVIEW OF SYSTEM:     Constitutional: No fever or chills. No night sweats, no weight loss   Eyes: No eye discharge, double vision, or eye pain   HEENT: negative for sore mouth, sore throat, hoarseness and voice change   Respiratory: negative for cough , sputum, dyspnea, wheezing, hemoptysis, chest pain   Cardiovascular: negative for chest pain, dyspnea, palpitations, orthopnea, PND   Gastrointestinal: negative for nausea, vomiting, diarrhea, constipation, abdominal pain, Dysphagia, hematemesis and hematochezia   Genitourinary: negative for frequency, dysuria, nocturia, urinary incontinence, and hematuria   Integument: negative for rash, skin lesions, bruises.    Hematologic/Lymphatic: negative for easy bruising, bleeding, lymphadenopathy, petechiae and swelling/edema   Endocrine: negative for heat or cold intolerance, tremor, weight changes, change in bowel habits and hair loss   Musculoskeletal: negative for myalgias, arthralgias, pain, joint swelling,and bone pain   Neurological: negative for headaches, dizziness, seizures, weakness, numbness       OBJECTIVE:         Vitals:    08/18/22 0926   BP: 127/72   Pulse: 94   Resp: 20   Temp: 98.2 °F (36.8 °C)       PHYSICAL EXAM:   General appearance - well appearing, no in pain or distress   Mental status - alert and cooperative   Eyes - pupils equal and reactive, extraocular eye movements intact   Ears - bilateral TM's and external ear canals normal   Mouth - mucous membranes moist, pharynx normal without lesions   Neck - supple, no significant adenopathy   Lymphatics - no palpable lymphadenopathy, no hepatosplenomegaly   Chest - clear to auscultation, no wheezes, rales or rhonchi, symmetric air entry   Heart - normal rate, regular rhythm, normal S1, S2, no murmurs, rubs, clicks or gallops   Abdomen - soft, nontender, nondistended, no masses or organomegaly   Neurological - alert, oriented, normal speech, no focal findings or movement disorder noted   Musculoskeletal - no joint tenderness, deformity or swelling   Extremities - peripheral pulses normal, no pedal edema, no clubbing or cyanosis   Skin - normal coloration and turgor, no rashes, no suspicious skin lesions noted ,      LABORATORY DATA:     Lab Results   Component Value Date    WBC 8.5 07/18/2022    HGB 12.7 07/18/2022    HCT 39.7 07/18/2022    MCV 74.3 (L) 07/18/2022     07/18/2022    LYMPHOPCT 16 (L) 07/18/2022    RBC 5.34 (H) 07/18/2022    MCH 23.8 (L) 07/18/2022    MCHC 32.0 07/18/2022    RDW 15.5 (H) 07/18/2022    MONOPCT 7 07/18/2022    BASOPCT 1 07/18/2022    NEUTROABS 6.49 07/18/2022    LYMPHSABS 1.34 07/18/2022    MONOSABS 0.57 07/18/2022    EOSABS 0.04 07/18/2022    BASOSABS 0.05 07/18/2022         Chemistry        Component Value Date/Time     (L) 07/18/2022 2343    K 4.5 07/18/2022 2343    CL 93 (L) 07/18/2022 2343    CO2 24 07/18/2022 2343    BUN 14 07/18/2022 2343    CREATININE 0.78 07/18/2022 2343        Component Value Date/Time    CALCIUM 9.8 07/18/2022 2343    ALKPHOS 99 04/22/2022 1316    AST 11 04/22/2022 1316    ALT 9 04/22/2022 1316    BILITOT 0.41 04/22/2022 1316            PATHOLOGY DATA:     1 Result Note    1 Follow-up Encounter    Component 6/9/22 1325   Surgical Pathology Report CARCINOMA         ESTROGEN RECEPTOR*--   -POSITIVE (PERCENT =>10% OF POSITIVE TUMOR CELL NUCLEI):     Yes, over   95%       ---AVERAGE INTENSITY OF POSITIVE STAINING:               3+, strong   -LOW POSITIVE (PERCENT 1-10% OF POSITIVE TUMOR CELL NUCLEI):     No         ---AVERAGE INTENSITY OF POSITIVE STAINING:               N/A   -NEGATIVE (<1% OF POSITIVE TUMOR CELL NUCLEI):           No     ---AVERAGE INTENSITY OF ANY POSITIVE STAINING:           N/A   ---INTERNAL CONTROL PRESENT AND STAIN AS EXPECTED:           Yes     ---INTERNAL CONTROL CELLS ABSENT (COMMENT):                No     ---INTERNAL CONTROL CELLS PRESENT BUT DO NOT STAIN (COMMENT): No         PROGESTERONE RECEPTOR*--   -POSITIVE (=>1% OF TUMOR CELL NUCLEI POSITIVE):           Yes, over   95%   ---AVERAGE INTENSITY OF POSITIVE STAINING:                   3+,   strong   -NEGATIVE (<1% OF POSITIVE TUMOR CELL NUCLEI):              no   ---AVERAGE INTENSITY OF ANY POSITIVE STAINING:           N/A   ---INTERNAL CONTROL PRESENT AND STAIN AS EXPECTED:           Yes     ---INTERNAL CONTROL CELLS ABSENT (COMMENT):              no   ---INTERNAL CONTROL CELLS PRESENT BUT DO NOT STAIN (COMMENT): No             Component 7/12/22 9262   Surgical Pathology Report -- Diagnosis --     A.   RIGHT BREAST, NEEDLE LOCALIZED EXCISIONAL BIOPSY:   -INVASIVE DUCTAL CARCINOMA GRADE 2, 10 MM IN GREATEST EXTENT   -ESTROGEN RECEPTOR POSITIVE, PROGESTERONE RECEPTOR POSITIVE   -DUCTAL CARCINOMA IN SITU, INTERMEDIATE AND HIGH-GRADE, SOLID AND   COMEDO TYPE WITH MICROCALCIFICATIONS   -INVASIVE CARCINOMA IDENTIFIED 0.5 MM FROM THE LATERAL INKED MARGIN   -DUCTAL CARCINOMA IN SITU IDENTIFIED 0.5 MM FROM THE LATERAL INKED   MARGIN     B.  RIGHT BREAST, ADDITIONAL INFERIOR MARGIN:   -BENIGN BREAST TISSUE     C.  RIGHT BREAST, ADDITIONAL LATERAL MARGIN:   -BENIGN BREAST TISSUE     D.  RIGHT BREAST, ADDITIONAL MEDIAL MARGIN:   -BENIGN BREAST TISSUE     E.  RIGHT BREAST, ADDITIONAL SUPERIOR MARGIN:   -BENIGN BREAST TISSUE     -- Diagnosis Comment --     The final inked margins are clear. Invasive carcinoma and ductal   carcinoma in situ identified 10.5 mm from the nearest margin (lateral   margin). Juan Arzate D.O.   **Electronically Signed Out**         [facility]/7/14/2022              IMAGING DATA:      CT SOFT TISSUE NECK W CONTRAST  Narrative: EXAMINATION:  CT OF THE NECK SOFT TISSUE WITH CONTRAST  7/19/2022    TECHNIQUE:  CT of the neck was performed with the administration of intravenous contrast.  Multiplanar reformatted images are provided for review. Automated exposure  control, iterative reconstruction, and/or weight based adjustment of the  mA/kV was utilized to reduce the radiation dose to as low as reasonably  achievable. COMPARISON:  CT neck 02/15/2018    HISTORY:  ORDERING SYSTEM PROVIDED HISTORY: right sided swelling  TECHNOLOGIST PROVIDED HISTORY:  right sided swelling  Decision Support Exception - unselect if not a suspected or confirmed  emergency medical condition->Emergency Medical Condition (MA)    FINDINGS:  PHARYNX/LARYNX:  The palatine tonsils are normal in appearance. The tongue  is normal in appearance. The valleculae, epiglottis, aryepiglottic folds and  pyriform sinuses appear unremarkable. The true and false vocal cords are  normal in appearance. No mass or abscess is seen. SALIVARY GLANDS/THYROID:  The parotid and submandibular glands appear  unremarkable. The thyroid gland appears unremarkable. LYMPH NODES:  Borderline enlarged cervical lymph nodes on the right, for  instance 8 x 7 mm right level 5 lymph node (2:51). SOFT TISSUES: Mild stranding right lateral neck soft tissues, with adjacent  prominent lymph nodes (2:55). No appreciable soft tissue swelling or mass is  seen. BRAIN/ORBITS/SINUSES: Small right mastoid effusion. The visualized portion  of the intracranial contents appear unremarkable.   The visualized portion of  the orbits, paranasal sinuses demonstrate no acute abnormality. LUNG APICES/SUPERIOR MEDIASTINUM:  No focal consolidation is seen within the  visualized lung apices. No superior mediastinal lymphadenopathy or mass. The visualized portion of the trachea appears unremarkable. BONES:  No aggressive appearing lytic or blastic bony lesion. Degenerative  changes cervical spine. Sternotomy wires. Impression: Mild stranding right lateral neck soft tissues, with adjacent prominent lymph  nodes, nonspecific and may be correlated clinically for possible adjacent  cellulitis or prior trauma. No soft tissue abscess. Borderline enlarged right cervical lymph nodes, nonspecific. ASSESSMENT:       Diagnosis Orders   1. Malignant neoplasm of central portion of right breast in female, estrogen receptor positive ([facility] [address]        2.  Ductal carcinoma in situ (DCIS) of right breast             pStage I/pT1pNX invasive carcinoma of the right breast with DCIS status postlumpectomy done on July 12, 2022  Multiple comorbidity  Previous smoker    PLAN:     I 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 with no sentinel lymph node however initially she had biopsy of the right axillary lymphadenopathy, she need sentinel lymph node biopsy and I will refer her back to Dr. Ferreira Cons DX of 5 and no benefit from chemotherapy and to proceed with adjuvant radiation treatment and endocrine treatment for 5 years  Side effects of hormonal treatment which include but not limited to osteoporosis has been discussed with the patient and she need to be on calcium vitamin D and Xgeva if she had evidence of osteopenia or osteoporosis  Will obtain bone density scan  RTC after follow-up with the surgeon and radiation oncology    Thank you for the consult         I spent a total of 45 minutes on the date of the service which included preparing to see the patient, face-to-face patient care, completing clinical documentation, obtaining and/or reviewing separately obtained history, performing a medically appropriate examination, counseling and educating the patient/family/caregiver and ordering medications, tests, or procedures. [facility]                            This note is created with the assistance of a speech recognition program.  While intending to generate a document that actually reflects the content of the visit, the document can still have some errors including those of syntax and sound a like substitutions which may escape proof reading. It such instances, actual meaning can be extrapolated by contextual diversion.

## 2022-08-24 ENCOUNTER — TELEPHONE (OUTPATIENT)
Dept: ONCOLOGY | Age: 57
End: 2022-08-24

## 2022-08-24 NOTE — TELEPHONE ENCOUNTER
Call made to Dr. Pepe Speak office to follow up after cancer conference recommendations. Left message requesting call back. Call received from Meaghan Palmer at Dr. Pepe Speak office. Dr. Bernadine London has spoke to patient and patient is to be scheduled for [facility].       Shanna Huffman RN

## 2022-08-30 ENCOUNTER — HOSPITAL ENCOUNTER (OUTPATIENT)
Dept: WOMENS IMAGING | Age: 57
Discharge: HOME | End: 2022-09-01
Payer: COMMERCIAL

## 2022-08-30 DIAGNOSIS — C50.111 MALIGNANT NEOPLASM OF CENTRAL PORTION OF RIGHT BREAST IN FEMALE, ESTROGEN RECEPTOR POSITIVE (HCC): ICD-10-CM

## 2022-08-30 DIAGNOSIS — Z17.0 MALIGNANT NEOPLASM OF CENTRAL PORTION OF RIGHT BREAST IN FEMALE, ESTROGEN RECEPTOR POSITIVE (HCC): ICD-10-CM

## 2022-08-30 PROCEDURE — 77080 DXA BONE DENSITY AXIAL: CPT

## 2022-09-01 ENCOUNTER — OFFICE VISIT (OUTPATIENT)
Dept: NEUROLOGY | Age: 57
End: 2022-09-01
Payer: COMMERCIAL

## 2022-09-01 VITALS
SYSTOLIC BLOOD PRESSURE: 128 MMHG | HEIGHT: 63 IN | HEART RATE: 91 BPM | DIASTOLIC BLOOD PRESSURE: 89 MMHG | WEIGHT: 213 LBS | BODY MASS INDEX: 37.74 KG/M2

## 2022-09-01 DIAGNOSIS — M79.2 NEUROPATHIC PAIN: ICD-10-CM

## 2022-09-01 DIAGNOSIS — I95.1 DYSAUTONOMIA ORTHOSTATIC HYPOTENSION SYNDROME: ICD-10-CM

## 2022-09-01 DIAGNOSIS — E08.42 DIABETIC POLYNEUROPATHY ASSOCIATED WITH DIABETES MELLITUS DUE TO UNDERLYING CONDITION (HCC): Primary | Chronic | ICD-10-CM

## 2022-09-01 DIAGNOSIS — M62.838 MUSCLE SPASMS OF BOTH LOWER EXTREMITIES: ICD-10-CM

## 2022-09-01 PROCEDURE — G9899 SCRN MAM PERF RSLTS DOC: HCPCS | Performed by: NURSE PRACTITIONER

## 2022-09-01 PROCEDURE — G8417 CALC BMI ABV UP PARAM F/U: HCPCS | Performed by: NURSE PRACTITIONER

## 2022-09-01 PROCEDURE — 3046F HEMOGLOBIN A1C LEVEL >9.0%: CPT | Performed by: NURSE PRACTITIONER

## 2022-09-01 PROCEDURE — 99214 OFFICE O/P EST MOD 30 MIN: CPT | Performed by: NURSE PRACTITIONER

## 2022-09-01 PROCEDURE — 1036F TOBACCO NON-USER: CPT | Performed by: NURSE PRACTITIONER

## 2022-09-01 PROCEDURE — 2022F DILAT RTA XM EVC RTNOPTHY: CPT | Performed by: NURSE PRACTITIONER

## 2022-09-01 PROCEDURE — G8427 DOCREV CUR MEDS BY ELIG CLIN: HCPCS | Performed by: NURSE PRACTITIONER

## 2022-09-01 PROCEDURE — 3017F COLORECTAL CA SCREEN DOC REV: CPT | Performed by: NURSE PRACTITIONER

## 2022-09-01 RX ORDER — TIZANIDINE 2 MG/1
4 TABLET ORAL NIGHTLY PRN
Qty: 60 TABLET | Refills: 5 | Status: SHIPPED | OUTPATIENT
Start: 2022-09-01

## 2022-09-01 RX ORDER — DULOXETIN HYDROCHLORIDE 30 MG/1
30 CAPSULE, DELAYED RELEASE ORAL DAILY
Qty: 30 CAPSULE | Refills: 1 | Status: SHIPPED | OUTPATIENT
Start: 2022-09-01

## 2022-09-01 RX ORDER — GABAPENTIN 800 MG/1
800 TABLET ORAL 3 TIMES DAILY
Qty: 90 TABLET | Refills: 5 | Status: SHIPPED | OUTPATIENT
Start: 2022-09-01 | End: 2022-11-01

## 2022-09-01 NOTE — PROGRESS NOTES
[facility]            [address]          [facility], [address] [facility]          Dept: [phone]          Dept Fax: [phone]    MD Lucy Rodriguez MD Trenna Belfast, MD Finas Bernhardt, CNP            9/1/2022      HISTORY OF PRESENT ILLNESS:       I had the pleasure of seeing Zamzam Caceres, who returns for continuing neurologic care. The patient was seen last on March 1, 2022 for treatment of a painful diabetic peripheral polyneuropathy with balance difficulties and intermittent severe muscle spasms in her bilateral lower extremities. The patient was previously seen by Dr. Real Meza and all of the records and diagnostic studies were carefully reviewed. The patient has a painful diabetic peripheral polyneuropathy with associated balance difficulties and was prescribed lyrica 150 mg three times daily and cymbalta 30 mg daily. She was also given a prescription for a power scooter previously as she has significant balance difficulties associated with her neuropathy. She is here today reporting she has been having a burning pain in her bilateral toes which has worsened since her last visit. She notes that she also has worsened numbness to the bilateral hips. She was previously prescribed gabapentin which she reports was more effective than lyrica in managing the pain associated with her neuropathy. She also has muscle spasms in her bilateral lower extremities and was prescribed tizanidine 2 mg at bedtime daily as needed. She has been consistently taking 2 tablets at bedtime and was requesting an increase in dose    She was recently diagnosed with stage one invasive cancer of the breast.         Testing reviewed:    EMG bilateral LE (10/21/17): abnormal electrophysiological study indicative of primarily axonal sensorimotor neuropathy in both lower extremities. There is no evidence of lumbosacral radiculopathy or plexopathy. EKG (8/23/18); NSR.     NCS/ EMG (8-22-14): There is electrodiagnostic evidence of peripheral polyneuropathy with predominant involvement of sensory fibers only. Supporting features include reduced amplitudes of bilateral superficial peroneal sensory studies and prolonged H-reflex latencies. X-ray of lumbar spine (3-20-13): Mild degenerative change is noted at L3-L4 with disk space narrowing and spur formation. MRI BRAIN (w/wo): done on 1-2-2013: essentially unremarkable. EEG (1-2-2013): unremarkable. PAST MEDICAL HISTORY:         Diagnosis Date    Anxiety     Asthma     CAD (coronary artery disease)     x1 stent 2010,x1 stent 2016, x1 stent May 2022    Cancer [facility]) 06/2022    right breast cancer    Clinical trial participant at discharge 3/31/16    COPD (chronic obstructive pulmonary disease) ([facility] [address]     Depression     Diabetic neuropathy (HCC)     GERD (gastroesophageal reflux disease)     H/O cardiac catheterization 4/26/16    LMCA: Mild Irregularities 10-20%. LAD: Lesion on Prox LAD: Proximal subsection. 100% stenosis. LCx: Mild irregularities 10-20%. RCA: Mild irregularities 10-20%. Widely patent mid RCA stent. EF:60%. H/O cardiovascular stress test 10/07/2016    Overall results are most consistent with a low risk for significant CAD. H/O echocardiogram 10/05/2016    EF:>60%. Inferoseptal wall abnormal in its motion which is not unusal status post open heart surgery. Evidence of mild (grade I) diastolic dysfunction is seen. H/O tilt table evaluation 07/12/2016    Abnormal. PTs HR, Blood Pressure response and symptoms were most consistent with dysautonomia. Combined w/viligant maintenance of euvolemia and maintaining a moderate salt intake, pharmaciologic treatment w/ ProAmatine, SSRI such as Lexapro and/or Mestinon among other treatments have shown some effectiveness in the treatment of this condition.      History of cardiovascular stress test 02/13/2019    Abnormal. Small/moderate perfusion defect of mild/moderate intesity in the anterior and anterolateral regions during stress imaging which is most consistent w/ ischemia but may be artifact. Overall low/intermediate risk for significant CAD. History of echocardiogram 09/06/2018    EF >60%. Inferoseptal wall is abnormal in its motion which is not unusual s/p open heart. Evidence of mild (grade I) diastolic dysfunction seen.     Hx of blood clots     Hyperlipidemia     Hypertension     Intermittent claudication (HCC)     Kidney stones     Movement disorder     neuropathy in legs    Neuromuscular disorder (HCC)     neuropathy    Osteoarthritis     Reflux     Seizures ([address] 1980'Ss last seizure    Stented coronary artery 9/22/2010     LAD/[place]    Stented coronary artery 3/31/16    RCA/[place]    Type II or unspecified type diabetes mellitus without mention of complication, not stated as uncontrolled     Unspecified sleep apnea     no machine        PAST SURGICAL HISTORY:         Procedure Laterality Date    ABDOMINAL HERNIA REPAIR  01/2016    repair done [place]    APPENDECTOMY      BREAST BIOPSY Right 7/12/2022    NEEDLE DIRECTED ( 1130     )    RIGHT BREAST LUMPECTOMY performed by Tawana Richards DO at [address] [facility] Left 04/26/2016    right radial/ [facility]/ Dr Cristobal Jesus Left 03/07/2019    Left Radial/[facility]/    CARDIAC CATHETERIZATION  05/20/2022    Dr Yony Cunningham radial    CARDIAC CATHETERIZATION  05/20/2022    Dr Yony Cunningham radial    [address] [facility]      CABG 2019    CHOLECYSTECTOMY  1991    COLONOSCOPY  12/16/2014    -hemorrhoids,bx    CORONARY ANGIOPLASTY WITH STENT PLACEMENT  09/2010    CORONARY ANGIOPLASTY WITH STENT PLACEMENT  03/31/2016    JESSE RCA / DR Eyad Smith    CORONARY ANGIOPLASTY WITH STENT PLACEMENT  05/20/2022    CORONARY ARTERY BYPASS GRAFT  08/15/2016    OP X 1- Dr Anibal Tellez 2014    HERNIA REPAIR  2012    at the medial aspect of a [place] RUQ scar); repaired byDr. [address] [facility]  2015    incisional, recurrent    HERNIA REPAIR  2016    HYSTERECTOMY (CERVIX STATUS UNKNOWN)      OTHER SURGICAL HISTORY  10/08/2015    abd wound washout, mesh removal, wound vac placement    GA SUCT NICOLE LIPECTOMY,HEAD/NECK Left 2018    THIGH LESION BIOPSY EXCISION, SOFT TISSUE MASS performed by Graciela Delgadillo MD at [address] [facility] Left 2018    leg    UPPP UVULOPALATOPHARYGOPLASTY  2012    US BREAST BIOPSY NEEDLE ADDITIONAL RIGHT Right 2022    US BREAST BIOPSY NEEDLE ADDITIONAL RIGHT 2022 [facility] ULTRASOUND    US BREAST NEEDLE BIOPSY RIGHT Right 2022    US BREAST NEEDLE BIOPSY RIGHT 2022 [facility] ULTRASOUND    US GUIDED NEEDLE LOC OF RIGHT BREAST Right 2022    US GUIDED NEEDLE LOC OF RIGHT BREAST 2022 [facility] ULTRASOUND    VENTRAL HERNIA REPAIR  2015    With [facility] - Dr Yuriy Olea        SOCIAL HISTORY:     Social History     Socioeconomic History    Marital status:      Spouse name: Not on file    Number of children: Not on file    Years of education: Not on file    Highest education level: Not on file   Occupational History    Not on file   Tobacco Use    Smoking status: Former     Packs/day: 2.00     Years: 10.00     Pack years: 20.00     Types: Cigarettes     Quit date: 2010     Years since quittin.9    Smokeless tobacco: Never   Vaping Use    Vaping Use: Never used   Substance and Sexual Activity    Alcohol use: No    Drug use: No    Sexual activity: Yes     Partners: Male   Other Topics Concern    Not on file   Social History Narrative    Not on file     Social Determinants of Health     Financial Resource Strain: Unknown    Difficulty of Paying Living Expenses: Patient refused   Food Insecurity: Unknown    Worried About Running Out of Food in the Last Year: Patient refused    [address] in the Last Year: Patient refused   Transportation Needs: Not on file   Physical Activity: Not on file   Stress: Not on file   Social Connections: Not on file   Intimate Partner Violence: Not on file   Housing Stability: Not on file       CURRENT MEDICATIONS:     Current Outpatient Medications   Medication Sig Dispense Refill    DULoxetine (CYMBALTA) 30 MG extended release capsule Take 1 capsule by mouth daily 30 capsule 1    tiZANidine (ZANAFLEX) 2 MG tablet Take 2 tablets by mouth nightly as needed (spasms) TAKE 2 TABLET BY MOUTH NIGHTLY AS NEEDED(SPASMS) 60 tablet 5    gabapentin (NEURONTIN) 800 MG tablet Take 1 tablet by mouth 3 times daily for 30 days. 90 tablet 5    metoprolol succinate (TOPROL XL) 100 MG extended release tablet TAKE 1 AND 1/2 TABLETS BY MOUTH EVERY DAY 90 tablet 3    nitroGLYCERIN (NITROSTAT) 0.4 MG SL tablet Place 1 tablet under the tongue every 5 minutes as needed for Chest pain 25 tablet 1    clopidogrel (PLAVIX) 75 MG tablet Take 1 tablet by mouth daily 30 tablet 3    TRULICITY 3 XI/6.1ZJ SOPN INJECT 0.5 ML (1 PEN) SUBCUTANEOUSLY WEEKLY,X30 DAYS,INSTR:ROTATE INJECTION SITES      pregabalin (LYRICA) 150 MG capsule Take one cap three times daily (Patient taking differently: Take 150 mg by mouth in the morning, at noon, and at bedtime. Take one cap three times daily) 90 capsule 6    atorvastatin (LIPITOR) 80 MG tablet Take 1 tablet by mouth daily 90 tablet 3    fludrocortisone (FLORINEF) 0.1 MG tablet TAKE 3 TABLETS BY MOUTH EVERY DAY (Patient taking differently: Take 0.1 mg by mouth daily) 270 tablet 3    Blood Glucose Monitoring Suppl (ONE TOUCH ULTRA 2) w/Device KIT 1 kit by Does not apply route daily 1 kit 0    blood glucose monitor strips Test 3 times a day & as needed for symptoms of irregular blood glucose. Dispense sufficient amount for indicated testing frequency plus additional to accommodate PRN testing needs.  300 strip 0    Lancets MISC 1 each by Does not apply route 2 times daily 300 each 1    Insulin Degludec (TRESIBA FLEXTOUCH) 200 UNIT/ML SOPN Inject 86 Units into the skin daily PER DR MCELROY (Patient taking differently: Inject 110 Units into the skin at bedtime) 1 pen 0    metFORMIN (GLUCOPHAGE-XR) 500 MG extended release tablet Take 2 tablets by mouth daily (with breakfast) 180 tablet 3    omeprazole (PRILOSEC) 20 MG delayed release capsule TAKE 1 CAPSULE BY MOUTH EVERY DAY IN THE MORNING BEFORE BREAKFAST (Patient taking differently: Take 20 mg by mouth Daily) 90 capsule 1    ondansetron (ZOFRAN ODT) 4 MG disintegrating tablet Take 1 tablet by mouth every 8 hours as needed for Nausea 20 tablet 0    Saccharomyces boulardii (PROBIOTIC) 250 MG CAPS Take 1 capsule by mouth daily 30 capsule 0    acetaminophen (TYLENOL) 500 MG tablet Take 1 tablet by mouth 4 times daily as needed for Pain 40 tablet 0    losartan (COZAAR) 25 MG tablet Take 1 tablet by mouth daily 90 tablet 3    insulin lispro, 1 Unit Dial, (HUMALOG KWIKPEN) 100 UNIT/ML SOPN Inject 20 Units into the skin 3 times daily (before meals) 12 pen 1    aspirin 81 MG EC tablet TAKE 1 TABLET BY MOUTH DAILY (Patient taking differently: Take 81 mg by mouth daily) 90 tablet 3    Insulin Pen Needle (BD ULTRA-FINE PEN NEEDLES) 29G X 12.7MM MISC USE AS DIRECTED BY PHYSICIAN 5 times daily 150 each 11    Blood Pressure Monitor KIT 1 each by Does not apply route daily as needed ESSENTIAL HYPERTENSION   I10 1 kit 0    albuterol sulfate HFA (VENTOLIN HFA) 108 (90 Base) MCG/ACT inhaler INHALE 2 PUFFS EVERY 6 HOURS AS NEEDED FOR WHEEZING 18 g 5     No current facility-administered medications for this visit. ALLERGIES:     Allergies   Allergen Reactions    Tape [Adhesive Tape] Rash                                 REVIEW OF SYSTEMS        All items selected indicate a positive finding. Those items not selected are negative.   Constitutional [] Weight loss/gain   [] Fatigue  [] Fever/Chills   HEENT [] Hearing Loss  [] Visual Disturbance  [] Tinnitus  [] Eye pain Respiratory [] Shortness of Breath  [] Cough  [] Snoring   Cardiovascular [] Chest Pain  [] Palpitations  [] Lightheaded   GI [] Constipation  [] Diarrhea  [] Swallowing change  [] Nausea/vomiting    [] Urinary Frequency  [] Urinary Urgency   Musculoskeletal [] Neck pain  [] Back pain  [x] Muscle pain  [] Restless legs   Dermatologic [] Skin changes   Neurologic [] Memory loss/confusion  [] Seizures  [x] Trouble walking or imbalance  [] Dizziness  [] Sleep disturbance  [] Weakness  [x] Numbness  [] Tremors  [] Speech Difficulty  [] Headaches  [] Light Sensitivity  [] Sound Sensitivity   Endocrinology []Excessive thirst  []Excessive hunger   Psychiatric [] Anxiety/Depression  [] Hallucination   Allergy/immunology []Hives/environmental allergies   Hematologic/lymph [] Abnormal bleeding  [] Abnormal bruising         PHYSICAL EXAMINATION:       Vitals:    09/01/22 1247   BP: 128/89   Pulse: 91                                              .                                                                                                    General Appearance:  Alert, cooperative, no signs of distress, appears stated age   Head:  Normocephalic, no signs of trauma   Eyes:  Conjunctiva/corneas clear;  eyelids intact   Ears:  Normal external ear and canals   Nose: Nares normal, mucosa normal, no drainage    Throat: Lips and tongue normal; teeth normal;  gums normal   Neck: Supple, intact flexion, extension and rotation;   trachea midline;  no adenopathy;   thyroid: not enlarged;   no carotid pulse abnormality   Back:   Symmetric, no curvature, ROM adequate   Lungs:   Respirations unlabored   Heart:  Regular rate and rhythm           Extremities: Extremities normal, no cyanosis, no edema   Pulses: Symmetric over head and neck   Skin: Skin color, texture normal, no rashes, no lesions                                     NEUROLOGIC EXAMINATION    Neurologic Exam  Mental status    Alert and oriented x 3; intact memory with no confusion, speech or language problems; no hallucinations or delusions  Fund of information appropriate for level of education    Cranial nerves    II - visual fields intact to confrontation bilaterally  III, IV, VI - extra-ocular muscles full: no pupillary defect; no ELOY, no nystagmus, no ptosis   V - normal facial sensation                                                               VII - normal facial symmetry                                                             VIII - intact hearing                                                                             IX, X - symmetrical palate                                                                  XI - symmetrical shoulder shrug                                                       XII - tongue midline without atrophy or fasciculation      Motor function  Normal muscle bulk and tone; strength 5/5 on all 4 extremities, no pronator drift      Sensory function 80% diminished vibration bilaterally, absent temperature sensation bilaterally, diminished pinprick sensation bilaterally to the knee       Cerebellar Intact fine motor movement. No involuntary movements or tremors. No ataxia or dysmetria on finger to nose or heel to shin testing      Reflex function DTR 2+ on bilateral UE and LE, symmetric. Down going toes bilaterally      Gait                   normal base and arm swing                  Medical Decision Making:        In summary, your patient, Lalo Adams exhibits the following, with associated plan:    Painful diabetic peripheral polyneuropathy with 80% diminished sensation in her bilateral lower extremities to the knee  Stop lyrica  Start gabapentin 800 mg three times daily  Continue cymbalta 30 mg daily, if she is still having pain in her bilateral lower extremities after switching to gabapentin I recommended we can further titrate cymbalta   Intermittent muscle spasms in the bilateral lower extremities   Continue tizanidine 4 mg at bedtime daily as needed  Return for follow up visit in 6 months             Signed: Melany Simms CNP      *Please note that portions of this note were completed with a voice recognition program.  Although every effort was made to insure the accuracy of this automated transcription, some errors in transcription may have occurred, occasionally words and are mis-transcribed    Provider Attestation: The documentation recorded by the scribe accurately reflects the service I personally performed and the decisions made by myself. Portions of this note were transcribed by a scribe. I personally performed the history, physical exam, and medical decision-making and confirm the accuracy of the information in the transcribed note. Scribe Attestation:   By signing my name below, Jovani Oviedo, attest that this documentation has been prepared under the direction and in the presence of Melany Simms CNP.

## 2022-09-02 ENCOUNTER — HOSPITAL ENCOUNTER (OUTPATIENT)
Dept: NUCLEAR MEDICINE | Age: 57
Discharge: HOME | End: 2022-09-04
Payer: COMMERCIAL

## 2022-09-02 ENCOUNTER — HOSPITAL ENCOUNTER (OUTPATIENT)
Age: 57
Setting detail: OUTPATIENT SURGERY
Discharge: HOME | End: 2022-09-02
Attending: SURGERY | Admitting: SURGERY
Payer: COMMERCIAL

## 2022-09-02 ENCOUNTER — ANESTHESIA EVENT (OUTPATIENT)
Dept: OPERATING ROOM | Age: 57
End: 2022-09-02
Payer: COMMERCIAL

## 2022-09-02 ENCOUNTER — ANESTHESIA (OUTPATIENT)
Dept: OPERATING ROOM | Age: 57
End: 2022-09-02
Payer: COMMERCIAL

## 2022-09-02 VITALS
HEART RATE: 80 BPM | WEIGHT: 218 LBS | SYSTOLIC BLOOD PRESSURE: 129 MMHG | DIASTOLIC BLOOD PRESSURE: 73 MMHG | RESPIRATION RATE: 14 BRPM | HEIGHT: 63 IN | TEMPERATURE: 97.2 F | OXYGEN SATURATION: 93 % | BODY MASS INDEX: 38.62 KG/M2

## 2022-09-02 DIAGNOSIS — C50.911 MALIGNANT NEOPLASM OF RIGHT FEMALE BREAST, UNSPECIFIED ESTROGEN RECEPTOR STATUS, UNSPECIFIED SITE OF BREAST (HCC): ICD-10-CM

## 2022-09-02 DIAGNOSIS — C50.111 MALIGNANT NEOPLASM OF CENTRAL PORTION OF RIGHT BREAST IN FEMALE, ESTROGEN RECEPTOR POSITIVE (HCC): Primary | ICD-10-CM

## 2022-09-02 DIAGNOSIS — Z17.0 MALIGNANT NEOPLASM OF CENTRAL PORTION OF RIGHT BREAST IN FEMALE, ESTROGEN RECEPTOR POSITIVE (HCC): Primary | ICD-10-CM

## 2022-09-02 LAB
GLUCOSE BLD-MCNC: 266 MG/DL (ref 65–105)
GLUCOSE BLD-MCNC: 315 MG/DL (ref 65–105)

## 2022-09-02 PROCEDURE — 2580000003 HC RX 258: Performed by: ANESTHESIOLOGY

## 2022-09-02 PROCEDURE — 2500000003 HC RX 250 WO HCPCS: Performed by: SURGERY

## 2022-09-02 PROCEDURE — 88307 TISSUE EXAM BY PATHOLOGIST: CPT

## 2022-09-02 PROCEDURE — 3430000000 HC RX DIAGNOSTIC RADIOPHARMACEUTICAL

## 2022-09-02 PROCEDURE — 6360000002 HC RX W HCPCS: Performed by: NURSE ANESTHETIST, CERTIFIED REGISTERED

## 2022-09-02 PROCEDURE — 78195 LYMPH SYSTEM IMAGING: CPT

## 2022-09-02 PROCEDURE — 3430000000 HC RX DIAGNOSTIC RADIOPHARMACEUTICAL: Performed by: SURGERY

## 2022-09-02 PROCEDURE — A9520 TC99 TILMANOCEPT DIAG 0.5MCI: HCPCS | Performed by: SURGERY

## 2022-09-02 PROCEDURE — 3600000002 HC SURGERY LEVEL 2 BASE: Performed by: SURGERY

## 2022-09-02 PROCEDURE — 7100000010 HC PHASE II RECOVERY - FIRST 15 MIN: Performed by: SURGERY

## 2022-09-02 PROCEDURE — A9520 TC99 TILMANOCEPT DIAG 0.5MCI: HCPCS

## 2022-09-02 PROCEDURE — 6370000000 HC RX 637 (ALT 250 FOR IP): Performed by: SURGERY

## 2022-09-02 PROCEDURE — 2720000010 HC SURG SUPPLY STERILE: Performed by: SURGERY

## 2022-09-02 PROCEDURE — 6370000000 HC RX 637 (ALT 250 FOR IP): Performed by: ANESTHESIOLOGY

## 2022-09-02 PROCEDURE — 3700000001 HC ADD 15 MINUTES (ANESTHESIA): Performed by: SURGERY

## 2022-09-02 PROCEDURE — 3600000012 HC SURGERY LEVEL 2 ADDTL 15MIN: Performed by: SURGERY

## 2022-09-02 PROCEDURE — 2709999900 HC NON-CHARGEABLE SUPPLY: Performed by: SURGERY

## 2022-09-02 PROCEDURE — 3700000000 HC ANESTHESIA ATTENDED CARE: Performed by: SURGERY

## 2022-09-02 PROCEDURE — 7100000001 HC PACU RECOVERY - ADDTL 15 MIN: Performed by: SURGERY

## 2022-09-02 PROCEDURE — 82947 ASSAY GLUCOSE BLOOD QUANT: CPT

## 2022-09-02 PROCEDURE — 2500000003 HC RX 250 WO HCPCS: Performed by: NURSE ANESTHETIST, CERTIFIED REGISTERED

## 2022-09-02 PROCEDURE — 7100000000 HC PACU RECOVERY - FIRST 15 MIN: Performed by: SURGERY

## 2022-09-02 PROCEDURE — 7100000011 HC PHASE II RECOVERY - ADDTL 15 MIN: Performed by: SURGERY

## 2022-09-02 RX ORDER — HYDROCODONE BITARTRATE AND ACETAMINOPHEN 5; 325 MG/1; MG/1
1 TABLET ORAL EVERY 8 HOURS PRN
Qty: 10 TABLET | Refills: 0 | Status: SHIPPED | OUTPATIENT
Start: 2022-09-02 | End: 2022-09-09

## 2022-09-02 RX ORDER — SODIUM CHLORIDE 0.9 % (FLUSH) 0.9 %
5-40 SYRINGE (ML) INJECTION EVERY 12 HOURS SCHEDULED
Status: CANCELLED | OUTPATIENT
Start: 2022-09-02

## 2022-09-02 RX ORDER — ONDANSETRON 2 MG/ML
INJECTION INTRAMUSCULAR; INTRAVENOUS PRN
Status: DISCONTINUED | OUTPATIENT
Start: 2022-09-02 | End: 2022-09-02 | Stop reason: SDUPTHER

## 2022-09-02 RX ORDER — LIDOCAINE 40 MG/G
CREAM TOPICAL
Status: CANCELLED | OUTPATIENT
Start: 2022-09-02

## 2022-09-02 RX ORDER — SODIUM CHLORIDE, SODIUM LACTATE, POTASSIUM CHLORIDE, CALCIUM CHLORIDE 600; 310; 30; 20 MG/100ML; MG/100ML; MG/100ML; MG/100ML
INJECTION, SOLUTION INTRAVENOUS CONTINUOUS
Status: DISCONTINUED | OUTPATIENT
Start: 2022-09-02 | End: 2022-09-02 | Stop reason: HOSPADM

## 2022-09-02 RX ORDER — ACETAMINOPHEN 500 MG
1000 TABLET ORAL EVERY 6 HOURS PRN
Qty: 56 TABLET | Refills: 0 | Status: ON HOLD | OUTPATIENT
Start: 2022-09-02 | End: 2022-10-13 | Stop reason: HOSPADM

## 2022-09-02 RX ORDER — SODIUM CHLORIDE 9 MG/ML
INJECTION, SOLUTION INTRAVENOUS CONTINUOUS
Status: DISCONTINUED | OUTPATIENT
Start: 2022-09-02 | End: 2022-09-02 | Stop reason: HOSPADM

## 2022-09-02 RX ORDER — ONDANSETRON 2 MG/ML
4 INJECTION INTRAMUSCULAR; INTRAVENOUS
Status: CANCELLED | OUTPATIENT
Start: 2022-09-02 | End: 2022-09-02

## 2022-09-02 RX ORDER — LIDOCAINE 40 MG/G
CREAM TOPICAL
Status: COMPLETED | OUTPATIENT
Start: 2022-09-02 | End: 2022-09-02

## 2022-09-02 RX ORDER — FENTANYL CITRATE 50 UG/ML
25 INJECTION, SOLUTION INTRAMUSCULAR; INTRAVENOUS EVERY 5 MIN PRN
Status: CANCELLED | OUTPATIENT
Start: 2022-09-02

## 2022-09-02 RX ORDER — HYDROMORPHONE HYDROCHLORIDE 1 MG/ML
0.5 INJECTION, SOLUTION INTRAMUSCULAR; INTRAVENOUS; SUBCUTANEOUS EVERY 5 MIN PRN
Status: CANCELLED | OUTPATIENT
Start: 2022-09-02

## 2022-09-02 RX ORDER — LIDOCAINE HYDROCHLORIDE 10 MG/ML
1 INJECTION, SOLUTION EPIDURAL; INFILTRATION; INTRACAUDAL; PERINEURAL
Status: DISCONTINUED | OUTPATIENT
Start: 2022-09-02 | End: 2022-09-02 | Stop reason: HOSPADM

## 2022-09-02 RX ORDER — LIDOCAINE HYDROCHLORIDE 20 MG/ML
INJECTION, SOLUTION EPIDURAL; INFILTRATION; INTRACAUDAL; PERINEURAL PRN
Status: DISCONTINUED | OUTPATIENT
Start: 2022-09-02 | End: 2022-09-02 | Stop reason: SDUPTHER

## 2022-09-02 RX ORDER — SODIUM CHLORIDE 0.9 % (FLUSH) 0.9 %
5-40 SYRINGE (ML) INJECTION EVERY 12 HOURS SCHEDULED
Status: DISCONTINUED | OUTPATIENT
Start: 2022-09-02 | End: 2022-09-02 | Stop reason: HOSPADM

## 2022-09-02 RX ORDER — MIDAZOLAM HYDROCHLORIDE 1 MG/ML
INJECTION INTRAMUSCULAR; INTRAVENOUS PRN
Status: DISCONTINUED | OUTPATIENT
Start: 2022-09-02 | End: 2022-09-02 | Stop reason: SDUPTHER

## 2022-09-02 RX ORDER — PROPOFOL 10 MG/ML
INJECTION, EMULSION INTRAVENOUS PRN
Status: DISCONTINUED | OUTPATIENT
Start: 2022-09-02 | End: 2022-09-02 | Stop reason: SDUPTHER

## 2022-09-02 RX ORDER — SODIUM CHLORIDE 0.9 % (FLUSH) 0.9 %
5-40 SYRINGE (ML) INJECTION PRN
Status: CANCELLED | OUTPATIENT
Start: 2022-09-02

## 2022-09-02 RX ORDER — SODIUM CHLORIDE 0.9 % (FLUSH) 0.9 %
5-40 SYRINGE (ML) INJECTION PRN
Status: DISCONTINUED | OUTPATIENT
Start: 2022-09-02 | End: 2022-09-02 | Stop reason: HOSPADM

## 2022-09-02 RX ORDER — CEFAZOLIN SODIUM 1 G/3ML
INJECTION, POWDER, FOR SOLUTION INTRAMUSCULAR; INTRAVENOUS PRN
Status: DISCONTINUED | OUTPATIENT
Start: 2022-09-02 | End: 2022-09-02 | Stop reason: SDUPTHER

## 2022-09-02 RX ORDER — DEXAMETHASONE SODIUM PHOSPHATE 10 MG/ML
INJECTION, SOLUTION INTRAMUSCULAR; INTRAVENOUS PRN
Status: DISCONTINUED | OUTPATIENT
Start: 2022-09-02 | End: 2022-09-02 | Stop reason: SDUPTHER

## 2022-09-02 RX ORDER — SODIUM CHLORIDE 9 MG/ML
INJECTION, SOLUTION INTRAVENOUS PRN
Status: CANCELLED | OUTPATIENT
Start: 2022-09-02

## 2022-09-02 RX ORDER — SODIUM CHLORIDE 9 MG/ML
INJECTION, SOLUTION INTRAVENOUS PRN
Status: DISCONTINUED | OUTPATIENT
Start: 2022-09-02 | End: 2022-09-02 | Stop reason: HOSPADM

## 2022-09-02 RX ORDER — BUPIVACAINE HYDROCHLORIDE AND EPINEPHRINE 5; 5 MG/ML; UG/ML
INJECTION, SOLUTION EPIDURAL; INTRACAUDAL; PERINEURAL PRN
Status: DISCONTINUED | OUTPATIENT
Start: 2022-09-02 | End: 2022-09-02 | Stop reason: ALTCHOICE

## 2022-09-02 RX ORDER — FENTANYL CITRATE 50 UG/ML
INJECTION, SOLUTION INTRAMUSCULAR; INTRAVENOUS PRN
Status: DISCONTINUED | OUTPATIENT
Start: 2022-09-02 | End: 2022-09-02 | Stop reason: SDUPTHER

## 2022-09-02 RX ADMIN — PROPOFOL 130 MG: 10 INJECTION, EMULSION INTRAVENOUS at 13:39

## 2022-09-02 RX ADMIN — Medication 50 MCG: at 14:45

## 2022-09-02 RX ADMIN — MIDAZOLAM 2 MG: 1 INJECTION INTRAMUSCULAR; INTRAVENOUS at 13:34

## 2022-09-02 RX ADMIN — CEFAZOLIN 2000 MG: 1 INJECTION, POWDER, FOR SOLUTION INTRAMUSCULAR; INTRAVENOUS at 13:46

## 2022-09-02 RX ADMIN — INSULIN HUMAN 8 UNITS: 100 INJECTION, SOLUTION PARENTERAL at 13:11

## 2022-09-02 RX ADMIN — SODIUM CHLORIDE, POTASSIUM CHLORIDE, SODIUM LACTATE AND CALCIUM CHLORIDE: 600; 310; 30; 20 INJECTION, SOLUTION INTRAVENOUS at 13:34

## 2022-09-02 RX ADMIN — Medication 50 MCG: at 13:39

## 2022-09-02 RX ADMIN — DEXAMETHASONE SODIUM PHOSPHATE 10 MG: 10 INJECTION, SOLUTION INTRAMUSCULAR; INTRAVENOUS at 14:04

## 2022-09-02 RX ADMIN — LIDOCAINE 4%: 4 CREAM TOPICAL at 10:41

## 2022-09-02 RX ADMIN — LIDOCAINE HYDROCHLORIDE 100 MG: 20 INJECTION, SOLUTION EPIDURAL; INFILTRATION; INTRACAUDAL at 13:39

## 2022-09-02 RX ADMIN — SODIUM CHLORIDE, POTASSIUM CHLORIDE, SODIUM LACTATE AND CALCIUM CHLORIDE: 600; 310; 30; 20 INJECTION, SOLUTION INTRAVENOUS at 10:47

## 2022-09-02 RX ADMIN — ONDANSETRON 4 MG: 2 INJECTION INTRAMUSCULAR; INTRAVENOUS at 14:04

## 2022-09-02 RX ADMIN — TILMANOCEPT 0.6 MILLICURIE: KIT at 12:29

## 2022-09-02 ASSESSMENT — PAIN - FUNCTIONAL ASSESSMENT: PAIN_FUNCTIONAL_ASSESSMENT: 0-10

## 2022-09-02 NOTE — H&P
Interval H&P Note    Pt Name: Flora Shepherd  MRN: [id number]  YOB: 1965  Date of evaluation: 9/2/2022      [x] I have reviewed in [facility] the oncology progress note by Dr. Francheska Arias dated 8/18/2022 for an Interval History and Physical note. [x] I have examined  Flora Shepherd  There are no changes to the patient who is scheduled for RIGHT AXILLARY LYMPH SENTINAL NODE BIOPSY  @  12PM by Víctor Richey DO for Malignant neoplasm of right female breast, unspecified estrogen receptor status, unspecified site of breast ([facility] Bri Conshannan. The patient denies new health changes, fever, chills, wheezing, cough, increased SOB, chest pain, open sores or wounds. History of coronary artery disease with multiple stents (new stent to left main 5/2022), CABG x1, COPD, hyperlipidemia, hypertension, blood clots, seizures, diabetes, sleep apnea. Patient follows with Dr. Margie Estrella cardiology. Patient had abnormal stress test in May 2022 which resulted in cardiac cath with one stent to left main. Patient previously underwent right breast lumpectomy on 7/12/2022 and received cardiac clearance. Denies shortness of breath, chest pain, palpitations, dizziness, syncope. Last ASA 81mg and Plavix 9/1/2022. POC . Vital signs: BP (!) 144/88   Pulse 96   Temp 97.3 °F (36.3 °C) (Temporal)   Resp 18   Ht 5' 3\" (1.6 m)   Wt 218 lb (98.9 kg)   LMP 12/05/1996   SpO2 98%   BMI 38.62 kg/m²     Allergies:  Tape Lucy Nikki tape]    Medications:    Prior to Admission medications    Medication Sig Start Date End Date Taking? Authorizing Provider   DULoxetine (CYMBALTA) 30 MG extended release capsule Take 1 capsule by mouth daily 9/1/22   CHELSEA Ordoñez CNP   tiZANidine (ZANAFLEX) 2 MG tablet Take 2 tablets by mouth nightly as needed (spasms) TAKE 2 TABLET BY MOUTH NIGHTLY AS NEEDED(SPASMS) 9/1/22   CHELSEA Ordoñez CNP   gabapentin (NEURONTIN) 800 MG tablet Take 1 tablet by mouth 3 times daily for 30 days.  9/1/22 10/1/22  CHELSEA Kim CNP   metoprolol succinate (TOPROL XL) 100 MG extended release tablet TAKE 1 AND 1/2 TABLETS BY MOUTH EVERY DAY 7/12/22   Airam Zuniga MD   albuterol sulfate HFA (VENTOLIN HFA) 108 (90 Base) MCG/ACT inhaler INHALE 2 PUFFS EVERY 6 HOURS AS NEEDED FOR WHEEZING 7/7/22   CHELSEA Appiah CNP   nitroGLYCERIN (NITROSTAT) 0.4 MG SL tablet Place 1 tablet under the tongue every 5 minutes as needed for Chest pain 5/27/22   Airam Zuniga MD   clopidogrel (PLAVIX) 75 MG tablet Take 1 tablet by mouth daily 5/21/22   CHELSEA Haque CNP   TRULICITY 3 XJ/5.3ZL SOPN INJECT 0.5 ML (1 PEN) SUBCUTANEOUSLY WEEKLY,X30 DAYS,INSTR:ROTATE INJECTION SITES 5/9/22   Whit Solorzano   atorvastatin (LIPITOR) 80 MG tablet Take 1 tablet by mouth daily 11/1/21   Airam Zuniga MD   fludrocortisone (FLORINEF) 0.1 MG tablet TAKE 3 TABLETS BY MOUTH EVERY DAY  Patient taking differently: Take 0.1 mg by mouth daily 10/13/21   Airam Zuniga MD   Blood Glucose Monitoring Suppl (ONE TOUCH ULTRA 2) w/Device KIT 1 kit by Does not apply route daily 7/20/21   CHELSEA Appiah CNP   blood glucose monitor strips Test 3 times a day & as needed for symptoms of irregular blood glucose. Dispense sufficient amount for indicated testing frequency plus additional to accommodate PRN testing needs.  7/16/21   CHELSEA Douglas CNP   Lancets MISC 1 each by Does not apply route 2 times daily 7/16/21   CHELSEA So CNP   Insulin Degludec (TRESIBA FLEXTOUCH) 200 UNIT/ML SOPN Inject 86 Units into the skin daily PER DR MCLEROY  Patient taking differently: Inject 110 Units into the skin at bedtime 3/30/21   CHELSEA Appiah CNP   metFORMIN (GLUCOPHAGE-XR) 500 MG extended release tablet Take 2 tablets by mouth daily (with breakfast) 3/30/21   CHELSEA Appiah CNP   omeprazole (PRILOSEC) 20 MG delayed release capsule TAKE 1 CAPSULE BY MOUTH EVERY DAY IN THE MORNING BEFORE BREAKFAST  Patient taking differently: Take 20 mg by mouth Daily 11/23/20   CHELSEA Aragon CNP   ondansetron (ZOFRAN ODT) 4 MG disintegrating tablet Take 1 tablet by mouth every 8 hours as needed for Nausea 11/10/20   Alejandra Amaral MD   Saccharomyces boulardii (PROBIOTIC) 250 MG CAPS Take 1 capsule by mouth daily 9/30/20   CHELSEA Aragon CNP   acetaminophen (TYLENOL) 500 MG tablet Take 1 tablet by mouth 4 times daily as needed for Pain 9/9/20   CHELSEA Jalloh CNP   losartan (COZAAR) 25 MG tablet Take 1 tablet by mouth daily 7/23/20   CHELSEA Aragon CNP   insulin lispro, 1 Unit Dial, (HUMALOG KWIKPEN) 100 UNIT/ML SOPN Inject 20 Units into the skin 3 times daily (before meals) 6/23/20   CHELSEA Aragon CNP   aspirin 81 MG EC tablet TAKE 1 TABLET BY MOUTH DAILY  Patient taking differently: Take 81 mg by mouth daily 7/1/19   CHELSEA Aragon CNP   Insulin Pen Needle (BD ULTRA-FINE PEN NEEDLES) 29G X 12.7MM MISC USE AS DIRECTED BY PHYSICIAN 5 times daily 8/7/18   CHELSEA Aragon CNP   Blood Pressure Monitor KIT 1 each by Does not apply route daily as needed ESSENTIAL HYPERTENSION   I10 5/31/16   CHELSEA Aragon CNP         This is a 62 y.o. female who is pleasant, cooperative, alert and oriented x3, in no acute distress. Heart: Heart sounds are normal.  HR 96 regular rate and rhythm without murmur, gallop or rub. Lungs: Normal respiratory effort with equal expansion, good air exchange, unlabored and clear to auscultation without wheezes or rales bilaterally   Abdomen: soft, nontender, nondistended with bowel sounds active. Scattered scabbed lesion on abdomen (receiving treatment from wound care)       Labs:  No results for input(s): HGB, HCT, WBC, MCV, PLT, NA, K, CL, CO2, BUN, CREATININE, GLUCOSE, INR, PROTIME, APTT, AST, ALT, LABALBU, HCG in the last 720 hours. No results for input(s): COVID19 in the last 720 hours.     CHELSEA Hartmann CNP  Electronically signed 9/2/2022 at 10:43 AM       Parker Hutton MD   Physician   Specialty:  Hematology and Oncology   Progress Notes      Signed   Encounter Date:  8/18/2022          Related encounter: Office Visit from 8/18/2022 in [facility]           Signed        E        Patient ID: Prabhu Burris, 1965, [id number], 62 y.o. Referred by :  No ref. provider found   Reason for consultation: Right DCIS        HISTORY OF PRESENT ILLNESS:    Oncologic History:    Prabhu Burris is a very pleasant 62 y.o. female who found on a routine mammogram on May 22, 2022 new group of calcifications upper central right breast and ultrasound did show suspicious lesion at 11:00 6 cm from the nipple core biopsy was done on 6/9/2022 and that showed DCIS strongly ER/WI positive and patient was referred for medical oncology  No family history of breast cancer  No history of using birth control in her younger age  Patient had hysterectomy in her 35s  Patient underwent lumpectomy on July 12, 2022 and there was 1 cm invasive carcinoma in addition to the DCIS  No sentinel lymph node biopsy        Oncology history  Stage I invasive carcinoma with right breast no sentinel lymph node biopsy  Oncotype DX 5     Interim history  Patient presents to the clinic for a follow-up visit and to discuss results of his lab work-up and other relevant clinical data. During this visit patient's allergy, social, medical, surgical history and medications were reviewed and updated.    Oncotype DX 5     Past Medical History        Past Medical History:   Diagnosis Date    Anxiety      Asthma      CAD (coronary artery disease)       x1 stent 2010,x1 stent 2016, x1 stent May 2022    Cancer [facility]) 06/2022     right breast cancer    Clinical trial participant at discharge 3/31/16    COPD (chronic obstructive pulmonary disease) ([facility]      Depression      Diabetic neuropathy (HCC)      GERD (gastroesophageal reflux disease)      H/O cardiac catheterization 4/26/16     LMCA: Mild Irregularities 10-20%. LAD: Lesion on Prox LAD: Proximal subsection. 100% stenosis. LCx: Mild irregularities 10-20%. RCA: Mild irregularities 10-20%. Widely patent mid RCA stent. EF:60%. H/O cardiovascular stress test 10/07/2016     Overall results are most consistent with a low risk for significant CAD. H/O echocardiogram 10/05/2016     EF:>60%. Inferoseptal wall abnormal in its motion which is not unusal status post open heart surgery. Evidence of mild (grade I) diastolic dysfunction is seen. H/O tilt table evaluation 07/12/2016     Abnormal. PTs HR, Blood Pressure response and symptoms were most consistent with dysautonomia. Combined w/viligant maintenance of euvolemia and maintaining a moderate salt intake, pharmaciologic treatment w/ ProAmatine, SSRI such as Lexapro and/or Mestinon among other treatments have shown some effectiveness in the treatment of this condition. History of cardiovascular stress test 02/13/2019     Abnormal. Small/moderate perfusion defect of mild/moderate intesity in the anterior and anterolateral regions during stress imaging which is most consistent w/ ischemia but may be artifact. Overall low/intermediate risk for significant CAD. History of echocardiogram 09/06/2018     EF >60%. Inferoseptal wall is abnormal in its motion which is not unusual s/p open heart. Evidence of mild (grade I) diastolic dysfunction seen.     Hx of blood clots      Hyperlipidemia      Hypertension      Intermittent claudication (HCC)      Kidney stones      Movement disorder       neuropathy in legs    Neuromuscular disorder (HCC)       neuropathy    Osteoarthritis      Reflux      Seizures ([facility] 1980'Ss last seizure    Stented coronary artery 9/22/2010      LAD/[place]    Stented coronary artery 3/31/16     RCA/[place]    Type II or unspecified type diabetes mellitus without mention of complication, not stated as uncontrolled      Unspecified sleep apnea no machine            Past Surgical History         Past Surgical History:   Procedure Laterality Date    ABDOMINAL HERNIA REPAIR   01/2016     repair done julian    APPENDECTOMY        BREAST BIOPSY Right 7/12/2022     NEEDLE DIRECTED ( 1130     )    RIGHT BREAST LUMPECTOMY performed by Meryle Loan, DO at [address] [facility] Left 04/26/2016     right radial/ [facility] [place]/ Dr Melody Oconnor Left 03/07/2019     Left Radial/[facility] [place]/    CARDIAC CATHETERIZATION   05/20/2022     Dr Ivonne Junior radial    CARDIAC CATHETERIZATION   05/20/2022     Dr Ivonne Junior radial    [address] [facility]         CABG 2019    CHOLECYSTECTOMY   1991    COLONOSCOPY   12/16/2014     -hemorrhoids,bx    CORONARY ANGIOPLASTY WITH STENT PLACEMENT   09/2010    CORONARY ANGIOPLASTY WITH STENT PLACEMENT   03/31/2016     JESSE RCA / DR Daniel Malcolm    CORONARY ANGIOPLASTY WITH STENT PLACEMENT   05/20/2022    CORONARY ARTERY BYPASS GRAFT   08/15/2016     OP X 1- Dr Tawanna Lee, DIAGNOSTIC   12/16/2014    HERNIA REPAIR   12/13/2012     at the medial aspect of a [place] RUQ scar); repaired byDr. [facility]   02/16/2015     incisional, recurrent    HERNIA REPAIR   02/2016    HYSTERECTOMY (CERVIX STATUS UNKNOWN)        OTHER SURGICAL HISTORY   10/08/2015     abd wound washout, mesh removal, wound vac placement    MO SUCT NICOLE LIPECTOMY,HEAD/NECK Left 08/27/2018     THIGH LESION BIOPSY EXCISION, SOFT TISSUE MASS performed by Sma Van MD at [address] [facility] Left 2018     leg    UPPP UVULOPALATOPHARYGOPLASTY   06/26/2012    US BREAST BIOPSY NEEDLE ADDITIONAL RIGHT Right 6/9/2022     US BREAST BIOPSY NEEDLE ADDITIONAL RIGHT 6/9/2022 [facility] ULTRASOUND    US BREAST NEEDLE BIOPSY RIGHT Right 6/9/2022     US BREAST NEEDLE BIOPSY RIGHT 6/9/2022 [facility] ULTRASOUND    US GUIDED NEEDLE LOC OF RIGHT BREAST Right 7/12/2022     US GUIDED NEEDLE LOC OF RIGHT BREAST 7/12/2022 [facility] ULTRASOUND    VENTRAL HERNIA REPAIR   09/21/2015     With Mesh - Dr Gena Alfonso                 Allergies   Allergen Reactions    Tape Ricke Guard Tape] Rash         Current Facility-Administered Medications          Current Outpatient Medications   Medication Sig Dispense Refill    gabapentin (NEURONTIN) 100 MG capsule Take 1 capsule by mouth in the morning and 1 capsule in the evening. Do all this for 30 days. 60 capsule 0    metoprolol succinate (TOPROL XL) 100 MG extended release tablet TAKE 1 AND 1/2 TABLETS BY MOUTH EVERY DAY 90 tablet 3    albuterol sulfate HFA (VENTOLIN HFA) 108 (90 Base) MCG/ACT inhaler INHALE 2 PUFFS EVERY 6 HOURS AS NEEDED FOR WHEEZING 18 g 5    nitroGLYCERIN (NITROSTAT) 0.4 MG SL tablet Place 1 tablet under the tongue every 5 minutes as needed for Chest pain 25 tablet 1    clopidogrel (PLAVIX) 75 MG tablet Take 1 tablet by mouth daily 30 tablet 3    TRULICITY 3 LK/9.7VI SOPN INJECT 0.5 ML (1 PEN) SUBCUTANEOUSLY WEEKLY,X30 DAYS,INSTR:ROTATE INJECTION SITES        pregabalin (LYRICA) 150 MG capsule Take one cap three times daily (Patient taking differently: Take 150 mg by mouth in the morning, at noon, and at bedtime. Take one cap three times daily) 90 capsule 6    atorvastatin (LIPITOR) 80 MG tablet Take 1 tablet by mouth daily 90 tablet 3    fludrocortisone (FLORINEF) 0.1 MG tablet TAKE 3 TABLETS BY MOUTH EVERY DAY (Patient taking differently: Take 0.1 mg by mouth daily) 270 tablet 3    Blood Glucose Monitoring Suppl (ONE TOUCH ULTRA 2) w/Device KIT 1 kit by Does not apply route daily 1 kit 0    blood glucose monitor strips Test 3 times a day & as needed for symptoms of irregular blood glucose. Dispense sufficient amount for indicated testing frequency plus additional to accommodate PRN testing needs.  300 strip 0    Lancets MISC 1 each by Does not apply route 2 times daily 300 each 1    Insulin Degludec (TRESIBA FLEXTOUCH) 200 UNIT/ML SOPN Inject 86 Units into the skin daily PER DR MCELROY (Patient taking differently: Inject 110 Units into the skin at bedtime) 1 pen 0    metFORMIN (GLUCOPHAGE-XR) 500 MG extended release tablet Take 2 tablets by mouth daily (with breakfast) 180 tablet 3    omeprazole (PRILOSEC) 20 MG delayed release capsule TAKE 1 CAPSULE BY MOUTH EVERY DAY IN THE MORNING BEFORE BREAKFAST (Patient taking differently: Take 20 mg by mouth Daily) 90 capsule 1    ondansetron (ZOFRAN ODT) 4 MG disintegrating tablet Take 1 tablet by mouth every 8 hours as needed for Nausea 20 tablet 0    Saccharomyces boulardii (PROBIOTIC) 250 MG CAPS Take 1 capsule by mouth daily 30 capsule 0    tiZANidine (ZANAFLEX) 2 MG tablet TAKE 1 TABLET BY MOUTH NIGHTLY AS NEEDED(SPASMS) (Patient taking differently: Take 2 mg by mouth nightly as needed TAKE 2 TABLET BY MOUTH NIGHTLY AS NEEDED(SPASMS)) 30 tablet 2    acetaminophen (TYLENOL) 500 MG tablet Take 1 tablet by mouth 4 times daily as needed for Pain 40 tablet 0    losartan (COZAAR) 25 MG tablet Take 1 tablet by mouth daily 90 tablet 3    insulin lispro, 1 Unit Dial, (HUMALOG KWIKPEN) 100 UNIT/ML SOPN Inject 20 Units into the skin 3 times daily (before meals) 12 pen 1    DULoxetine (CYMBALTA) 30 MG extended release capsule Take 1 capsule by mouth daily 30 capsule 1    aspirin 81 MG EC tablet TAKE 1 TABLET BY MOUTH DAILY (Patient taking differently: Take 81 mg by mouth daily) 90 tablet 3    Insulin Pen Needle (BD ULTRA-FINE PEN NEEDLES) 29G X 12.7MM MISC USE AS DIRECTED BY PHYSICIAN 5 times daily 150 each 11    Blood Pressure Monitor KIT 1 each by Does not apply route daily as needed ESSENTIAL HYPERTENSION   I10 1 kit 0      No current facility-administered medications for this visit.             Social History               Socioeconomic History    Marital status:        Spouse name: Not on file    Number of children: Not on file    Years of education: Not on file    Highest education level: Not on file Occupational History    Not on file   Tobacco Use    Smoking status: Former       Packs/day: 2.00       Years: 10.00       Pack years: 20.00       Types: Cigarettes       Quit date: 2010       Years since quittin.9    Smokeless tobacco: Never   Vaping Use    Vaping Use: Never used   Substance and Sexual Activity    Alcohol use: No    Drug use: No    Sexual activity: Yes       Partners: Male   Other Topics Concern    Not on file   Social History Narrative    Not on file      Social Determinants of Health          Financial Resource Strain: Unknown    Difficulty of Paying Living Expenses: Patient refused   Food Insecurity: Unknown    Worried About Running Out of Food in the Last Year: Patient refused    Ran Out of Food in the Last Year: Patient refused   Transportation Needs: Not on file   Physical Activity: Not on file   Stress: Not on file   Social Connections: Not on file   Intimate Partner Violence: Not on file   Housing Stability: Not on file            Family History         Family History   Problem Relation Age of Onset    Cancer Mother      Diabetes Mother      Cancer Father           thyroid    Diabetes Sister      Heart Disease Sister      Heart Disease Brother      Heart Disease Maternal Uncle      Cancer Maternal Grandmother              REVIEW OF SYSTEM:      Constitutional: No fever or chills. No night sweats, no weight loss   Eyes: No eye discharge, double vision, or eye pain   HEENT: negative for sore mouth, sore throat, hoarseness and voice change   Respiratory: negative for cough , sputum, dyspnea, wheezing, hemoptysis, chest pain   Cardiovascular: negative for chest pain, dyspnea, palpitations, orthopnea, PND   Gastrointestinal: negative for nausea, vomiting, diarrhea, constipation, abdominal pain, Dysphagia, hematemesis and hematochezia   Genitourinary: negative for frequency, dysuria, nocturia, urinary incontinence, and hematuria   Integument: negative for rash, skin lesions, bruises. Hematologic/Lymphatic: negative for easy bruising, bleeding, lymphadenopathy, petechiae and swelling/edema   Endocrine: negative for heat or cold intolerance, tremor, weight changes, change in bowel habits and hair loss   Musculoskeletal: negative for myalgias, arthralgias, pain, joint swelling,and bone pain   Neurological: negative for headaches, dizziness, seizures, weakness, numbness        OBJECTIVE:             Vitals:     08/18/22 0926   BP: 127/72   Pulse: 94   Resp: 20   Temp: 98.2 °F (36.8 °C)         PHYSICAL EXAM:   General appearance - well appearing, no in pain or distress   Mental status - alert and cooperative   Eyes - pupils equal and reactive, extraocular eye movements intact   Ears - bilateral TM's and external ear canals normal   Mouth - mucous membranes moist, pharynx normal without lesions   Neck - supple, no significant adenopathy   Lymphatics - no palpable lymphadenopathy, no hepatosplenomegaly   Chest - clear to auscultation, no wheezes, rales or rhonchi, symmetric air entry   Heart - normal rate, regular rhythm, normal S1, S2, no murmurs, rubs, clicks or gallops   Abdomen - soft, nontender, nondistended, no masses or organomegaly   Neurological - alert, oriented, normal speech, no focal findings or movement disorder noted   Musculoskeletal - no joint tenderness, deformity or swelling   Extremities - peripheral pulses normal, no pedal edema, no clubbing or cyanosis   Skin - normal coloration and turgor, no rashes, no suspicious skin lesions noted ,        LABORATORY DATA:            Lab Results   Component Value Date     WBC 8.5 07/18/2022     HGB 12.7 07/18/2022     HCT 39.7 07/18/2022     MCV 74.3 (L) 07/18/2022      07/18/2022     LYMPHOPCT 16 (L) 07/18/2022     RBC 5.34 (H) 07/18/2022     MCH 23.8 (L) 07/18/2022     MCHC 32.0 07/18/2022     RDW 15.5 (H) 07/18/2022     MONOPCT 7 07/18/2022     BASOPCT 1 07/18/2022     NEUTROABS 6.49 07/18/2022     LYMPHSABS 1.34 07/18/2022 MONOSABS 0.57 07/18/2022     EOSABS 0.04 07/18/2022     BASOSABS 0.05 07/18/2022           Chemistry               Component Value Date/Time      (L) 07/18/2022 2343     K 4.5 07/18/2022 2343     CL 93 (L) 07/18/2022 2343     CO2 24 07/18/2022 2343     BUN 14 07/18/2022 2343     CREATININE 0.78 07/18/2022 2343               Component Value Date/Time     CALCIUM 9.8 07/18/2022 2343     ALKPHOS 99 04/22/2022 1316     AST 11 04/22/2022 1316     ALT 9 04/22/2022 1316     BILITOT 0.41 04/22/2022 1316                PATHOLOGY DATA:      1 Result Note    1 Follow-up Encounter     Component 6/9/22 1325   Surgical Pathology Report -- Diagnosis --   A. RIGHT BREAST, UOQ, 11:00, 6 CM FN, CORE NEEDLE BIOPSIES:        -  INTERMEDIATE GRADE DUCTAL CARCINOMA IN SITU, SOLID TYPE.             -  ESTROGEN AND PROGESTERONE RECEPTOR IMMUNOSTAINS STRONGLY   POSITIVE IN DUCTAL CARCINOMA IN SITU. B.  RIGHT AXILLA LYMPH NODE, CORE NEEDLE BIOPSIES:        -  BENIGN LYMPH NODE, NEGATIVE FOR MALIGNANCY. Roula Ching M.D.   **Electronically Signed Out**         jet/6/13/2022         Clinical Information   Clinical findings: RIGHT UOQ 11:00 6 CM FN, RIGHT AXILLA   Operative Findings:  RIGHT BREAST MASS 11:00; RT AXILLA LYMPH NODE     Source of Specimen   A: RT BREAST MASS   B: RT AXILLA BX     Gross Description   A. \"SIMON WILD, RIGHT BREAST\" Cores and fragments of fibrofatty   tissue, 1.0 x 0.8 x 0.4 cm in aggregate. Entirely 1cs. B. \"SIMON WILD, RIGHT AXILLA\" Cores and fragments of fibrofatty   tissue, 1.0 x 0.8 x 0.3 cm in aggregate. Entirely 1cs. mpb tm       Microscopic Description   A. Initial levels of fibroglandular breast tissue and adipose tissue   contain atrophic breast lobules. Subsequent levels however show with   distended ducts lined by atypical epithelial cells in a solid pattern. Calcifications are not identified. There is no evidence of invasive   malignancy.   The focus measures 2.5 mm in largest dimension. Slides   were reviewed with a second pathologist [facility]) who agrees with the   diagnosis. PROCEDURE:                              Core needle biopsies   SPECIMEN LATERALITY / TUMOR SITE:          Right breast upper outer   quadrant 11:00 6 cm from nipple   HISTOLOGIC TYPE:                         Ductal carcinoma in situ                     DCIS ARCHITECTURAL PATTERN:               Solid type   NUCLEAR GRADE:                           intermediate grade   NECROSIS:                              not identified   MICROCALCIFICATIONS:                    Not identified             BIOMARKER TESTING  BREAST CARCINOMA         ESTROGEN RECEPTOR*--   -POSITIVE (PERCENT =>10% OF POSITIVE TUMOR CELL NUCLEI):     Yes, over   95%       ---AVERAGE INTENSITY OF POSITIVE STAINING:               3+, strong   -LOW POSITIVE (PERCENT 1-10% OF POSITIVE TUMOR CELL NUCLEI):     No         ---AVERAGE INTENSITY OF POSITIVE STAINING:               N/A   -NEGATIVE (<1% OF POSITIVE TUMOR CELL NUCLEI):           No     ---AVERAGE INTENSITY OF ANY POSITIVE STAINING:           N/A   ---INTERNAL CONTROL PRESENT AND STAIN AS EXPECTED:           Yes     ---INTERNAL CONTROL CELLS ABSENT (COMMENT):                No     ---INTERNAL CONTROL CELLS PRESENT BUT DO NOT STAIN (COMMENT): No         PROGESTERONE RECEPTOR*--   -POSITIVE (=>1% OF TUMOR CELL NUCLEI POSITIVE):           Yes, over   95%   ---AVERAGE INTENSITY OF POSITIVE STAINING:                   3+,   strong   -NEGATIVE (<1% OF POSITIVE TUMOR CELL NUCLEI):              no   ---AVERAGE INTENSITY OF ANY POSITIVE STAINING:           N/A   ---INTERNAL CONTROL PRESENT AND STAIN AS EXPECTED:           Yes     ---INTERNAL CONTROL CELLS ABSENT (COMMENT):              no   ---INTERNAL CONTROL CELLS PRESENT BUT DO NOT STAIN (COMMENT): No              Component 7/12/22 1894   Surgical Pathology Report -- Diagnosis --     A.   RIGHT BREAST, NEEDLE LOCALIZED EXCISIONAL BIOPSY:   -INVASIVE DUCTAL CARCINOMA GRADE 2, 10 MM IN GREATEST EXTENT   -ESTROGEN RECEPTOR POSITIVE, PROGESTERONE RECEPTOR POSITIVE   -DUCTAL CARCINOMA IN SITU, INTERMEDIATE AND HIGH-GRADE, SOLID AND   COMEDO TYPE WITH MICROCALCIFICATIONS   -INVASIVE CARCINOMA IDENTIFIED 0.5 MM FROM THE LATERAL INKED MARGIN   -DUCTAL CARCINOMA IN SITU IDENTIFIED 0.5 MM FROM THE LATERAL INKED   MARGIN     B.  RIGHT BREAST, ADDITIONAL INFERIOR MARGIN:   -BENIGN BREAST TISSUE     C.  RIGHT BREAST, ADDITIONAL LATERAL MARGIN:   -BENIGN BREAST TISSUE     D.  RIGHT BREAST, ADDITIONAL MEDIAL MARGIN:   -BENIGN BREAST TISSUE     E.  RIGHT BREAST, ADDITIONAL SUPERIOR MARGIN:   -BENIGN BREAST TISSUE     -- Diagnosis Comment --     The final inked margins are clear. Invasive carcinoma and ductal   carcinoma in situ identified 10.5 mm from the nearest margin (lateral   margin). Galdino Valdivia D.O.   **Electronically Signed Out**         [facility]/7/14/2022                 IMAGING DATA:       CT SOFT TISSUE NECK W CONTRAST  Narrative: EXAMINATION:  CT OF THE NECK SOFT TISSUE WITH CONTRAST  7/19/2022     TECHNIQUE:  CT of the neck was performed with the administration of intravenous contrast.  Multiplanar reformatted images are provided for review. Automated exposure  control, iterative reconstruction, and/or weight based adjustment of the  mA/kV was utilized to reduce the radiation dose to as low as reasonably  achievable. COMPARISON:  CT neck 02/15/2018     HISTORY:  ORDERING SYSTEM PROVIDED HISTORY: right sided swelling  TECHNOLOGIST PROVIDED HISTORY:  right sided swelling  Decision Support Exception - unselect if not a suspected or confirmed  emergency medical condition->Emergency Medical Condition (MA)     FINDINGS:  PHARYNX/LARYNX:  The palatine tonsils are normal in appearance. The tongue  is normal in appearance. The valleculae, epiglottis, aryepiglottic folds and  pyriform sinuses appear unremarkable.   The true and false vocal cords are  normal in appearance. No mass or abscess is seen. SALIVARY GLANDS/THYROID:  The parotid and submandibular glands appear  unremarkable. The thyroid gland appears unremarkable. LYMPH NODES:  Borderline enlarged cervical lymph nodes on the right, for  instance 8 x 7 mm right level 5 lymph node (2:51). SOFT TISSUES: Mild stranding right lateral neck soft tissues, with adjacent  prominent lymph nodes (2:55). No appreciable soft tissue swelling or mass is  seen. BRAIN/ORBITS/SINUSES: Small right mastoid effusion. The visualized portion  of the intracranial contents appear unremarkable. The visualized portion of  the orbits, paranasal sinuses demonstrate no acute abnormality. LUNG APICES/SUPERIOR MEDIASTINUM:  No focal consolidation is seen within the  visualized lung apices. No superior mediastinal lymphadenopathy or mass. The visualized portion of the trachea appears unremarkable. BONES:  No aggressive appearing lytic or blastic bony lesion. Degenerative  changes cervical spine. Sternotomy wires. Impression: Mild stranding right lateral neck soft tissues, with adjacent prominent lymph  nodes, nonspecific and may be correlated clinically for possible adjacent  cellulitis or prior trauma. No soft tissue abscess. Borderline enlarged right cervical lymph nodes, nonspecific. ASSESSMENT:         Diagnosis Orders   1. Malignant neoplasm of central portion of right breast in female, estrogen receptor positive ([facility] [address]          2.  Ductal carcinoma in situ (DCIS) of right breast                pStage I/pT1pNX invasive carcinoma of the right breast with DCIS status postlumpectomy done on July 12, 2022  Multiple comorbidity  Previous smoker     PLAN:      I 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 with no sentinel lymph node however initially she had biopsy of the right axillary lymphadenopathy, she need sentinel lymph node biopsy and I will refer her back to Dr. Angelito Pires DX of 5 and no benefit from chemotherapy and to proceed with adjuvant radiation treatment and endocrine treatment for 5 years  Side effects of hormonal treatment which include but not limited to osteoporosis has been discussed with the patient and she need to be on calcium vitamin D and Xgeva if she had evidence of osteopenia or osteoporosis  Will obtain bone density scan  RTC after follow-up with the surgeon and radiation oncology     Thank you for the consult           I spent a total of 45 minutes on the date of the service which included preparing to see the patient, face-to-face patient care, completing clinical documentation, obtaining and/or reviewing separately obtained history, performing a medically appropriate examination, counseling and educating the patient/family/caregiver and ordering medications, tests, or procedures. [facility]                            This note is created with the assistance of a speech recognition program.  While intending to generate a document that actually reflects the content of the visit, the document can still have some errors including those of syntax and sound a like substitutions which may escape proof reading. It such instances, actual meaning can be extrapolated by contextual diversion.

## 2022-09-02 NOTE — ANESTHESIA POSTPROCEDURE EVALUATION
Department of Anesthesiology  Postprocedure Note    Patient: Elias Tanner  MRN: [id number]  [place]: 1965  Date of evaluation: 9/2/2022      Procedure Summary     Date: 09/02/22 Room / Location: [address] [place] [address] [facility]    Anesthesia Start: 7653 Anesthesia Stop: 1280    Procedure: RIGHT AXILLARY LYMPH SENTINAL NODE BIOPSY  @  12PM (Right: Breast) Diagnosis:       Malignant neoplasm of right female breast, unspecified estrogen receptor status, unspecified site of breast ([address]      (Malignant neoplasm of right female breast, unspecified estrogen receptor status, unspecified site of breast ([address] Ann Baumgarten)    Surgeons: Eduarda Roman DO Responsible Provider: Gertrude King DO    Anesthesia Type: General ASA Status: 3          Anesthesia Type: General    Sony Phase I: Sony Score: 8    Sony Phase II: Sony Score: 10      Anesthesia Post Evaluation    Patient location during evaluation: PACU  Patient participation: complete - patient participated  Level of consciousness: awake  Airway patency: patent  Nausea & Vomiting: no nausea and no vomiting  Complications: no  Cardiovascular status: hemodynamically stable  Respiratory status: acceptable  Hydration status: stable  Multimodal analgesia pain management approach

## 2022-09-02 NOTE — OP NOTE
OPERATIVE NOTE    PATIENT: Maximino Iyer    MRN: [id number]    DATE OF PROCEDURE: 9/2/2022     SURGEON: Dr. Emily Lowe DO    ASSISTANT: Kristal Tello DO    PREOPERATIVE DIAGNOSIS:  malignant neoplasm of central portion of right breast    POSTOPERATIVE DIAGNOSIS: Same     OPERATION: Right axillary sentinel lymph node biopsy    DIAGNOSIS: Invasive ductal carcinoma of central portion of right breast in female, estrogen receptor positive ([address]    FINDINGS: Right axillary sentinel lymph node removal and sent for biopsy    ANESTHESIA: General    ESTIMATED BLOOD LOSS:  <56 cc    COMPLICATIONS: None     IMPLANTS:  * No implants in log *      SPECIMENS:   ID Type Source Tests Collected by Time Destination   A : RIGHT SENTINAL NODE BIOPSY  Tissue Breast SURGICAL PATHOLOGY Emily Lowe DO 9/2/2022 1433         INDICATION: Patient initially had DCIS and after surgery there was invasive carcinoma size 1 cm with no sentinel lymph node however initially she had biopsy of the right axillary lymphadenopathy, she need sentinel lymph node biopsy. DETAILS OF OPERATION: The patient was seen and evaluated in the preoperative holding area. Risks, benefits, and alternatives of the procedure were discussed with the patient and they agreed with treatment plan. The patient was transferred to the operating theater and transferred to the operating table. Patient was placed in the supine position and all appropriate hemodynamic monitoring and intravenous access was obtained. Appropriate preoperative antibiotics were given. Endotracheal intubation was then performed for general anesthesia. All pressure points were appropriately padded and the right axilla was prepped and draped in the standard sterile fashion. A surgical timeout was performed confirming the patient, procedure to be performed, laterality, and all other pertinent information.     Prior to the procedure, she underwent nuclear medicine injection with technetium-99. Incision was made of the area of great uptake and the right axilla and taken through the subcutaneous tissue with electric Bovie cautery. Blunt and sharp dissection was made. Neoprobe was used to detect the  presence of the right axillary lymph nodes. Several lymph nodes were detected with high signal and carefully dissected out. We stopped at a point where <10% of original signal. A venous structure was ligated and controlled with clips. Hemostasis was obtained after the venous ligation. Incision area was washed out with normal saline, and surgicel powder was applied to obtain hemostasis. After hemostasis was obtained, the dermal layer was closed with interrupted 3-0 Vicryl sutures and subcuticular closure with 4-0 monocryl. Dermabond was applied to the wound and covered with dressing. EBL< 30 ml. The patient was then transferred off the table and to the postoperative care unit. The patient tolerated the procedure well and there were no immediate complications. Counts were correct at case conclusion.

## 2022-09-02 NOTE — ANESTHESIA PRE PROCEDURE
Department of Anesthesiology  Preprocedure Note       Name:  Mikael Bocanegra   Age:  62 y.o.  :  1965                                          MRN:  [id number]         Date:  2022      Surgeon: Darrel Leonardo):  Katty Sorensen DO    Procedure: Procedure(s):  RIGHT AXILLARY LYMPH SENTINAL NODE BIOPSY  @  12PM    Medications prior to admission:   Prior to Admission medications    Medication Sig Start Date End Date Taking? Authorizing Provider   DULoxetine (CYMBALTA) 30 MG extended release capsule Take 1 capsule by mouth daily 22   CHELSEA Galindo CNP   tiZANidine (ZANAFLEX) 2 MG tablet Take 2 tablets by mouth nightly as needed (spasms) TAKE 2 TABLET BY MOUTH NIGHTLY AS NEEDED(SPASMS) 22   CHELSEA Galindo CNP   gabapentin (NEURONTIN) 800 MG tablet Take 1 tablet by mouth 3 times daily for 30 days.  9/1/22 10/1/22  CHELSEA Galindo CNP   metoprolol succinate (TOPROL XL) 100 MG extended release tablet TAKE 1 AND 1/2 TABLETS BY MOUTH EVERY DAY 22   Jennifer Shelton MD   albuterol sulfate HFA (VENTOLIN HFA) 108 (90 Base) MCG/ACT inhaler INHALE 2 PUFFS EVERY 6 HOURS AS NEEDED FOR WHEEZING 22   CHELSEA Perales CNP   nitroGLYCERIN (NITROSTAT) 0.4 MG SL tablet Place 1 tablet under the tongue every 5 minutes as needed for Chest pain 22   Jennifer Shelton MD   clopidogrel (PLAVIX) 75 MG tablet Take 1 tablet by mouth daily 22   Paisley OhCHELSEA CNP   TRULICITY 3 OG/3.1VL SOPN INJECT 0.5 ML (1 PEN) SUBCUTANEOUSLY WEEKLY,X30 DAYS,INSTR:ROTATE INJECTION SITES 22   Vishal Solomon   atorvastatin (LIPITOR) 80 MG tablet Take 1 tablet by mouth daily 21   Jennifer Shelton MD   fludrocortisone (FLORINEF) 0.1 MG tablet TAKE 3 TABLETS BY MOUTH EVERY DAY  Patient taking differently: Take 0.1 mg by mouth daily 10/13/21   Jennifer Shelton MD   Blood Glucose Monitoring Suppl (ONE TOUCH ULTRA 2) w/Device KIT 1 kit by Does not apply route daily 21   Anastacia Plasencia, APRN - CNP blood glucose monitor strips Test 3 times a day & as needed for symptoms of irregular blood glucose. Dispense sufficient amount for indicated testing frequency plus additional to accommodate PRN testing needs.  7/16/21   CHELSEA Cramer CNP   Lancets MISC 1 each by Does not apply route 2 times daily 7/16/21   CHELSEA So CNP   Insulin Degludec (TRESIBA FLEXTOUCH) 200 UNIT/ML SOPN Inject 86 Units into the skin daily PER DR MCELROY  Patient taking differently: Inject 110 Units into the skin at bedtime 3/30/21   CHELSEA Chavez CNP   metFORMIN (GLUCOPHAGE-XR) 500 MG extended release tablet Take 2 tablets by mouth daily (with breakfast) 3/30/21   CHELSEA Chavez CNP   omeprazole (PRILOSEC) 20 MG delayed release capsule TAKE 1 CAPSULE BY MOUTH EVERY DAY IN [address] [place] [address]  Patient taking differently: Take 20 mg by mouth Daily 11/23/20   CHELSEA Chavez CNP   ondansetron (ZOFRAN ODT) 4 MG disintegrating tablet Take 1 tablet by mouth every 8 hours as needed for Nausea 11/10/20   Neeru Valadez MD   Saccharomyces boulardii (PROBIOTIC) 250 MG CAPS Take 1 capsule by mouth daily 9/30/20   CHELSEA Chavez CNP   acetaminophen (TYLENOL) 500 MG tablet Take 1 tablet by mouth 4 times daily as needed for Pain 9/9/20   CHELSEA Cramer CNP   losartan (COZAAR) 25 MG tablet Take 1 tablet by mouth daily 7/23/20   CHELSEA Chavez CNP   insulin lispro, 1 Unit Dial, (HUMALOG KWIKPEN) 100 UNIT/ML SOPN Inject 20 Units into the skin 3 times daily (before meals) 6/23/20   CHELSEA Chavez CNP   aspirin 81 MG EC tablet TAKE 1 TABLET BY MOUTH DAILY  Patient taking differently: Take 81 mg by mouth daily 7/1/19   CHELSEA Chavez CNP   Insulin Pen Needle (BD ULTRA-FINE PEN NEEDLES) 29G X 12.7MM MISC USE AS DIRECTED BY PHYSICIAN 5 times daily 8/7/18   Ketty Sequin Might, APRN - CNP   Blood Pressure Monitor KIT 1 each by Does not apply route daily as needed ESSENTIAL HYPERTENSION I10 5/31/16   CHELSEA Roe CNP       Current medications:    Current Facility-Administered Medications   Medication Dose Route Frequency Provider Last Rate Last Admin    lidocaine PF 1 % injection 1 mL  1 mL IntraDERmal Once PRN Minnie Whiteside MD        0.9 % sodium chloride infusion   IntraVENous Continuous Ndal Praveen Lucero MD        lactated ringers infusion   IntraVENous Continuous Minnie Whiteside  mL/hr at 09/02/22 1334 NoRateChange at 09/02/22 1334    sodium chloride flush 0.9 % injection 5-40 mL  5-40 mL IntraVENous 2 times per day Minnie Whiteside MD        sodium chloride flush 0.9 % injection 5-40 mL  5-40 mL IntraVENous PRN Minnie Whiteside MD        0.9 % sodium chloride infusion   IntraVENous PRN Minnie Whiteside MD         Facility-Administered Medications Ordered in Other Encounters   Medication Dose Route Frequency Provider Last Rate Last Admin    ceFAZolin (ANCEF) injection   IntraVENous PRN Eliezerette Shall, APRN - CRNA   2,000 mg at 09/02/22 1346    ondansetron (ZOFRAN) injection   IntraVENous PRN Eliezerette Shall, APRN - CRNA   4 mg at 09/02/22 1404    dexamethasone (PF) (DECADRON) injection   IntraVENous PRN Eliezerette Shall, APRN - CRNA   10 mg at 09/02/22 1404    midazolam (VERSED) injection   IntraVENous PRN Denette Shall, APRN - CRNA   2 mg at 09/02/22 1334    fentaNYL (SUBLIMAZE) injection   IntraVENous PRN Denette Shall, APRN - CRNA   50 mcg at 09/02/22 1339    lidocaine PF 2 % injection   IntraVENous PRN Denette Shall, APRN - CRNA   100 mg at 09/02/22 1339    propofol injection   IntraVENous PRN Denette Shall, APRN - CRNA   130 mg at 09/02/22 1339       Allergies:     Allergies   Allergen Reactions    Tape Solitario  Tape] Rash       Problem List:    Patient Active Problem List   Diagnosis Code    Dyslipidemia E78.5    Radiculopathy M54.10    Insomnia G47.00    Allergic rhinitis J30.9    COPD (chronic obstructive pulmonary disease) J44.9    Depression F32.A    Bilateral leg weakness R29.898    Gait difficulty R26.9    Diabetic neuropathy E11.40    Back pain M54.9    Muscle spasm M62.838    Affective disorder ([facility]) F39    History of ventral hernia repair Z98.890, Z87.19    History of incisional hernia repair Z98.890, Z87.19    Essential hypertension I10    Gastroesophageal reflux disease without esophagitis K21.9    Uncontrolled type 2 diabetes mellitus with diabetic polyneuropathy, with long-term current use of insulin ([facility]) E11.42, Z79.4, E11.65    Primary osteoarthritis involving multiple joints M89.49    Heart palpitations R00.2    Dysautonomia orthostatic hypotension syndrome I95.1    Coronary artery disease involving native coronary artery of native heart I25.10    Angina, class III ([facility]) I20.9    S/P CABG x 1 Z95.1    Precordial pain R07.2    DARON (obstructive sleep apnea) G47.33    Neuropathic pain M79.2    Soft tissue mass M79.89    Abnormal cardiovascular stress test R94.39    Muscle spasms of both lower extremities M62.838    Abscess of left great toe L02.612    Paronychia of great toe of left foot L03.032    Chest pain R07.9    Epigastric pain R10.13    ACS (acute coronary syndrome) ([facility]) I24.9    Obesity (BMI 30-39. 9) E66.9    S/P angioplasty with stent Z95.820    DCIS (ductal carcinoma in situ) D05.10    Malignant neoplasm of central portion of right breast in female, estrogen receptor positive ([facility] [address] C50.111, Z17.0       Past Medical History:        Diagnosis Date    Anxiety     Asthma     CAD (coronary artery disease)     x1 stent 2010,x1 stent 2016, x1 stent May 2022    Cancer [facility]) 06/2022    right breast cancer    Clinical trial participant at discharge 3/31/16    COPD (chronic obstructive pulmonary disease) ([facility] [address]     Depression     Diabetic neuropathy ([facility] [address]     GERD (gastroesophageal reflux disease)     H/O cardiac catheterization 4/26/16    LMCA: Mild Irregularities 10-20%.  LAD: Lesion on Prox LAD: Proximal subsection. 100% stenosis. LCx: Mild irregularities 10-20%. RCA: Mild irregularities 10-20%. Widely patent mid RCA stent. EF:60%.  H/O cardiovascular stress test 10/07/2016    Overall results are most consistent with a low risk for significant CAD.     H/O echocardiogram 10/05/2016    EF:>60%. Inferoseptal wall abnormal in its motion which is not unusal status post open heart surgery. Evidence of mild (grade I) diastolic dysfunction is seen.  H/O tilt table evaluation 07/12/2016    Abnormal. PTs HR, Blood Pressure response and symptoms were most consistent with dysautonomia. Combined w/viligant maintenance of euvolemia and maintaining a moderate salt intake, pharmaciologic treatment w/ ProAmatine, SSRI such as Lexapro and/or Mestinon among other treatments have shown some effectiveness in the treatment of this condition.  History of cardiovascular stress test 02/13/2019    Abnormal. Small/moderate perfusion defect of mild/moderate intesity in the anterior and anterolateral regions during stress imaging which is most consistent w/ ischemia but may be artifact. Overall low/intermediate risk for significant CAD.  History of echocardiogram 09/06/2018    EF >60%. Inferoseptal wall is abnormal in its motion which is not unusual s/p open heart. Evidence of mild (grade I) diastolic dysfunction seen.     Hx of blood clots     Hyperlipidemia     Hypertension     Intermittent claudication (HCC)     Kidney stones     Movement disorder     neuropathy in legs    Neuromuscular disorder ([facility])     neuropathy    Osteoarthritis     Reflux     Seizures ([facility]) 1980'Ss last seizure    Stented coronary artery 9/22/2010     LAD/[place]    Stented coronary artery 3/31/16    RCA/[place]    Type II or unspecified type diabetes mellitus without mention of complication, not stated as uncontrolled     Unspecified sleep apnea     no machine       Past Surgical History:        Procedure Laterality Date    ABDOMINAL HERNIA REPAIR  01/2016    repair done [place]    APPENDECTOMY      BREAST BIOPSY Right 7/12/2022    NEEDLE DIRECTED ( 1130     )    RIGHT BREAST LUMPECTOMY performed by Ruthy Villafuerte DO at [address] Left 04/26/2016    right radial/ [facility] [place]/ Dr Anatoly Lerner Left 03/07/2019    Left Radial/[facility] [place]/    CARDIAC CATHETERIZATION  05/20/2022    Dr Crow Mendez radial    Jeimy Nephew  05/20/2022    Dr Crow Mendez radial   [address]      CABG 2019   [address]    COLONOSCOPY  12/16/2014    -hemorrhoids,bx    CORONARY ANGIOPLASTY WITH STENT PLACEMENT  09/2010    CORONARY ANGIOPLASTY WITH STENT PLACEMENT  03/31/2016    [facility] / DR Tio Cornejo    CORONARY ANGIOPLASTY WITH STENT PLACEMENT  05/20/2022    CORONARY ARTERY BYPASS GRAFT  08/15/2016    OP X 1- Dr Olga Larry, COLON, DIAGNOSTIC  12/16/2014    HERNIA REPAIR  12/13/2012    at the medial aspect of a [place] RUQ scar); repaired byDr. [id number]  [facility]  02/16/2015    incisional, recurrent    HERNIA REPAIR  02/2016    HYSTERECTOMY (CERVIX STATUS UNKNOWN)      OTHER SURGICAL HISTORY  10/08/2015    abd wound washout, mesh removal, wound vac placement    ND SUCT NICOLE LIPECTOMY,HEAD/NECK Left 08/27/2018    THIGH LESION BIOPSY EXCISION, SOFT TISSUE MASS performed by Maricarmen Bull MD at [facility] Left 2018    leg    UPPP UVULOPALATOPHARYGOPLASTY  06/26/2012    US BREAST BIOPSY NEEDLE ADDITIONAL RIGHT Right 6/9/2022    [facility]    US BREAST NEEDLE BIOPSY RIGHT Right 6/9/2022    US BREAST NEEDLE BIOPSY RIGHT 6/9/2022 [facility] ULTRASOUND    US GUIDED NEEDLE LOC OF RIGHT BREAST Right 7/12/2022    US GUIDED NEEDLE LOC OF RIGHT BREAST 7/12/2022 [facility] ULTRASOUND    VENTRAL HERNIA REPAIR  09/21/2015    With [facility] - Dr Soliman Links Social History:    Social History     Tobacco Use    Smoking status: Former     Packs/day: 2.00     Years: 10.00     Pack years: 20.00     Types: Cigarettes     Quit date: 2010     Years since quittin.9    Smokeless tobacco: Never   Substance Use Topics    Alcohol use: No                                Counseling given: Not Answered      Vital Signs (Current):   Vitals:    22 1024 22 1030 22 1036   BP:  (!) 144/88    Pulse:  96    Resp:  18    Temp:   97.3 °F (36.3 °C)   TempSrc:   Temporal   SpO2:  98%    Weight: 218 lb (98.9 kg)     Height: 5' 3\" (1.6 m)                                                BP Readings from Last 3 Encounters:   22 (!) 144/88   22 128/89   22 127/72       NPO Status: Time of last liquid consumption:                         Time of last solid consumption:                         Date of last liquid consumption: 22                        Date of last solid food consumption: 22    BMI:   Wt Readings from Last 3 Encounters:   22 218 lb (98.9 kg)   22 213 lb (96.6 kg)   22 212 lb (96.2 kg)     Body mass index is 38.62 kg/m².     CBC:   Lab Results   Component Value Date/Time    WBC 8.5 2022 11:43 PM    RBC 5.34 2022 11:43 PM    RBC 3.88 2011 11:35 AM    HGB 12.7 2022 11:43 PM    HCT 39.7 2022 11:43 PM    MCV 74.3 2022 11:43 PM    RDW 15.5 2022 11:43 PM     2022 11:43 PM     2011 11:35 AM       CMP:   Lab Results   Component Value Date/Time     2022 11:43 PM    K 4.5 2022 11:43 PM    CL 93 2022 11:43 PM    CO2 24 2022 11:43 PM    BUN 14 2022 11:43 PM    CREATININE 0.78 2022 11:43 PM    GFRAA >60 2022 11:43 PM    LABGLOM >60 2022 11:43 PM    GLUCOSE 147 2022 11:43 PM    GLUCOSE 181 2012 08:49 AM    PROT 7.9 2022 01:16 PM    CALCIUM 9.8 2022 11:43 PM    BILITOT 0.41 04/22/2022 01:16 PM    ALKPHOS 99 04/22/2022 01:16 PM    AST 11 04/22/2022 01:16 PM    ALT 9 04/22/2022 01:16 PM       POC Tests:   Recent Labs     09/02/22  1047   POCGLU 315*       Coags:   Lab Results   Component Value Date/Time    PROTIME 10.6 04/24/2022 05:27 AM    PROTIME 10.3 09/20/2011 01:41 AM    INR 1.0 04/24/2022 05:27 AM    APTT 48.9 05/20/2022 10:25 AM       HCG (If Applicable): No results found for: PREGTESTUR, PREGSERUM, HCG, HCGQUANT     ABGs: No results found for: PHART, PO2ART, OFM0RLY, RYE0QLP, BEART, B5EESRHK     Type & Screen (If Applicable):  No results found for: LABABO, LABRH    Drug/Infectious Status (If Applicable):  Lab Results   Component Value Date/Time    HEPCAB NONREACTIVE 08/04/2016 02:56 PM       COVID-19 Screening (If Applicable):   Lab Results   Component Value Date/Time    COVID19 Not Detected 07/18/2022 11:34 PM           Anesthesia Evaluation  Patient summary reviewed and Nursing notes reviewed no history of anesthetic complications:   Airway: Mallampati: II  TM distance: >3 FB   Neck ROM: full  Mouth opening: > = 3 FB   Dental: normal exam         Pulmonary:normal exam    (+) COPD:  sleep apnea:  asthma:                            Cardiovascular:  Exercise tolerance: no interval change,   (+) hypertension:, angina:, CAD:, CABG/stent: no interval change,           Rate: normal                    Neuro/Psych:   (+) psychiatric history:            GI/Hepatic/Renal:   (+) GERD: well controlled,           Endo/Other:    (+) Diabetes, . Abdominal:             Vascular: Other Findings:           Anesthesia Plan      general     ASA 3       Induction: intravenous. MIPS: Prophylactic antiemetics administered. Anesthetic plan and risks discussed with patient. Plan discussed with CRNA.     Attending anesthesiologist reviewed and agrees with Preprocedure content                Shanelle Lara DO   9/2/2022

## 2022-09-02 NOTE — DISCHARGE INSTRUCTIONS
Patient Discharge Instructions  Discharge Date:  9/2/2022    HYGEINE: Ok to shower, no soaking in a tub/pool until seen at your follow up appointment. Clean incision daily with gentle soap and water, do not use alcohol/peroxide or any harsh cleansers. DRIVING: No driving home when discharged from hospital, please have someone to drive you home. Okay to resume driving when pain is adequately controlled and subsided. No driving while taking narcotic medications or while in pain    ACTIVITY: No lifting greater than 15lbs for 6 weeks or any strenuous activity. DIET: Resume your normal diet as advised by your PCP. MEDICATIONS: Take all medications as prescribed. Take Norco as prescribed as needed. Take the 1000mg  tylenol if needed and skip the 500ml dose that you originally has at home. Okay to take lower dose 500ml tylenol alone if you have minimal pain    SPECIAL INSTRUCTIONS:     Follow up with Dr. Frank Anand on 9/12/2022, call the clinic for appointment. Call sooner if fever above 100.4 degrees Farenheit, increase in swelling or redness, thick purulent discharge or pain not controlled with medications.

## 2022-09-06 LAB — SURGICAL PATHOLOGY REPORT: NORMAL

## 2022-09-13 DIAGNOSIS — Z79.2 NEED FOR PROPHYLACTIC ANTIBIOTIC: Primary | ICD-10-CM

## 2022-09-13 RX ORDER — CLINDAMYCIN HYDROCHLORIDE 150 MG/1
150 CAPSULE ORAL 2 TIMES DAILY
Qty: 14 CAPSULE | Refills: 0 | Status: SHIPPED | OUTPATIENT
Start: 2022-09-19 | End: 2022-09-26

## 2022-09-22 ENCOUNTER — OFFICE VISIT (OUTPATIENT)
Dept: ONCOLOGY | Age: 57
End: 2022-09-22
Payer: COMMERCIAL

## 2022-09-22 VITALS
DIASTOLIC BLOOD PRESSURE: 52 MMHG | BODY MASS INDEX: 38.09 KG/M2 | HEART RATE: 97 BPM | SYSTOLIC BLOOD PRESSURE: 132 MMHG | RESPIRATION RATE: 20 BRPM | WEIGHT: 215 LBS | TEMPERATURE: 97.6 F

## 2022-09-22 DIAGNOSIS — C50.111 MALIGNANT NEOPLASM OF CENTRAL PORTION OF RIGHT BREAST IN FEMALE, ESTROGEN RECEPTOR POSITIVE (HCC): Primary | ICD-10-CM

## 2022-09-22 DIAGNOSIS — R94.39 ABNORMAL CARDIOVASCULAR STRESS TEST: ICD-10-CM

## 2022-09-22 DIAGNOSIS — D05.11 DUCTAL CARCINOMA IN SITU (DCIS) OF RIGHT BREAST: ICD-10-CM

## 2022-09-22 DIAGNOSIS — Z17.0 MALIGNANT NEOPLASM OF CENTRAL PORTION OF RIGHT BREAST IN FEMALE, ESTROGEN RECEPTOR POSITIVE (HCC): Primary | ICD-10-CM

## 2022-09-22 DIAGNOSIS — M85.89 OSTEOPENIA OF MULTIPLE SITES: ICD-10-CM

## 2022-09-22 PROCEDURE — 1036F TOBACCO NON-USER: CPT | Performed by: INTERNAL MEDICINE

## 2022-09-22 PROCEDURE — G8428 CUR MEDS NOT DOCUMENT: HCPCS | Performed by: INTERNAL MEDICINE

## 2022-09-22 PROCEDURE — G8417 CALC BMI ABV UP PARAM F/U: HCPCS | Performed by: INTERNAL MEDICINE

## 2022-09-22 PROCEDURE — G9899 SCRN MAM PERF RSLTS DOC: HCPCS | Performed by: INTERNAL MEDICINE

## 2022-09-22 PROCEDURE — 3017F COLORECTAL CA SCREEN DOC REV: CPT | Performed by: INTERNAL MEDICINE

## 2022-09-22 PROCEDURE — 99214 OFFICE O/P EST MOD 30 MIN: CPT | Performed by: INTERNAL MEDICINE

## 2022-09-22 RX ORDER — ANASTROZOLE 1 MG/1
1 TABLET ORAL DAILY
Qty: 30 TABLET | Refills: 6 | Status: ON HOLD | OUTPATIENT
Start: 2022-09-22 | End: 2022-10-13 | Stop reason: HOSPADM

## 2022-09-22 NOTE — PROGRESS NOTES
Widely patent mid RCA stent. EF:60%. H/O cardiovascular stress test 10/07/2016    Overall results are most consistent with a low risk for significant CAD. H/O echocardiogram 10/05/2016    EF:>60%. Inferoseptal wall abnormal in its motion which is not unusal status post open heart surgery. Evidence of mild (grade I) diastolic dysfunction is seen. H/O tilt table evaluation 07/12/2016    Abnormal. PTs HR, Blood Pressure response and symptoms were most consistent with dysautonomia. Combined w/viligant maintenance of euvolemia and maintaining a moderate salt intake, pharmaciologic treatment w/ ProAmatine, SSRI such as Lexapro and/or Mestinon among other treatments have shown some effectiveness in the treatment of this condition. History of cardiovascular stress test 02/13/2019    Abnormal. Small/moderate perfusion defect of mild/moderate intesity in the anterior and anterolateral regions during stress imaging which is most consistent w/ ischemia but may be artifact. Overall low/intermediate risk for significant CAD. History of echocardiogram 09/06/2018    EF >60%. Inferoseptal wall is abnormal in its motion which is not unusual s/p open heart. Evidence of mild (grade I) diastolic dysfunction seen.     Hx of blood clots     Hyperlipidemia     Hypertension     Intermittent claudication (HCC)     Kidney stones     Movement disorder     neuropathy in legs    Neuromuscular disorder (HCC)     neuropathy    Osteoarthritis     Reflux     Seizures ([facility] [address] 1980'Ss last seizure    Stented coronary artery 9/22/2010     LAD/[place]    Stented coronary artery 3/31/16    RCA/[place]    Type II or unspecified type diabetes mellitus without mention of complication, not stated as uncontrolled     Unspecified sleep apnea     no machine       Past Surgical History:   Procedure Laterality Date    ABDOMINAL HERNIA REPAIR  01/2016    repair done [place]    APPENDECTOMY      AXILLARY SURGERY Right 9/2/2022    RIGHT AXILLARY LYMPH SENTINAL NODE BIOPSY  @  12PM performed by Gianni Rosales DO at [address] Right 7/12/2022    NEEDLE DIRECTED ( 1130     )    RIGHT BREAST LUMPECTOMY performed by Gianni Rosales DO at [address] [facility] Left 04/26/2016    right radial/ [id number] [facility] [place]/ Dr Faith Davidson Left 03/07/2019    Left Radial/[facility]/    CARDIAC CATHETERIZATION  05/20/2022    Dr Ulysses Skelton radial    CARDIAC CATHETERIZATION  05/20/2022    Dr Ulysses Skelton radial    [address] [facility]      CABG 2019    CHOLECYSTECTOMY  1991    COLONOSCOPY  12/16/2014    -hemorrhoids,bx    CORONARY ANGIOPLASTY WITH STENT PLACEMENT  09/2010    CORONARY ANGIOPLASTY WITH STENT PLACEMENT  03/31/2016    JESSE RCA / DR Michael Rojo    CORONARY ANGIOPLASTY WITH STENT PLACEMENT  05/20/2022    CORONARY ARTERY BYPASS GRAFT  08/15/2016    OP X 1- Dr Evelia Cruz, DIAGNOSTIC  12/16/2014    HERNIA REPAIR  12/13/2012    at the medial aspect of a [place] RUQ scar); repaired byDr. [facility]  02/16/2015    incisional, recurrent    HERNIA REPAIR  02/2016    HYSTERECTOMY (CERVIX STATUS UNKNOWN)      OTHER SURGICAL HISTORY  10/08/2015    abd wound washout, mesh removal, wound vac placement    MT SUCT NICOLE LIPECTOMY,HEAD/NECK Left 08/27/2018    THIGH LESION BIOPSY EXCISION, SOFT TISSUE MASS performed by Sonia Holbrook MD at [facility] Left 2018    leg    UPPP UVULOPALATOPHARYGOPLASTY  06/26/2012    US BREAST BIOPSY NEEDLE ADDITIONAL RIGHT Right 6/9/2022    US BREAST BIOPSY NEEDLE ADDITIONAL RIGHT 6/9/2022 [facility] ULTRASOUND    US BREAST NEEDLE BIOPSY RIGHT Right 6/9/2022    US BREAST NEEDLE BIOPSY RIGHT 6/9/2022 [facility] ULTRASOUND    US GUIDED NEEDLE LOC OF RIGHT BREAST Right 7/12/2022    US GUIDED NEEDLE LOC OF RIGHT BREAST 7/12/2022 [facility] ULTRASOUND    VENTRAL HERNIA REPAIR  09/21/2015    With Mesh - Dr Kevin Rodriguez Allergen Reactions    Tape Sadimetine Júnior Tape] Rash       Current Outpatient Medications   Medication Sig Dispense Refill    anastrozole (ARIMIDEX) 1 MG tablet Take 1 tablet by mouth daily 30 tablet 6    clindamycin (CLEOCIN) 150 MG capsule Take 1 capsule by mouth 2 times daily for 7 days 14 capsule 0    sulfamethoxazole-trimethoprim (BACTRIM DS;SEPTRA DS) 800-160 MG per tablet Take 1 tablet by mouth 2 times daily for 10 days 20 tablet 0    acetaminophen (TYLENOL) 500 MG tablet Take 2 tablets by mouth every 6 hours as needed for Pain 56 tablet 0    DULoxetine (CYMBALTA) 30 MG extended release capsule Take 1 capsule by mouth daily 30 capsule 1    tiZANidine (ZANAFLEX) 2 MG tablet Take 2 tablets by mouth nightly as needed (spasms) TAKE 2 TABLET BY MOUTH NIGHTLY AS NEEDED(SPASMS) 60 tablet 5    gabapentin (NEURONTIN) 800 MG tablet Take 1 tablet by mouth 3 times daily for 30 days. 90 tablet 5    metoprolol succinate (TOPROL XL) 100 MG extended release tablet TAKE 1 AND 1/2 TABLETS BY MOUTH EVERY DAY 90 tablet 3    albuterol sulfate HFA (VENTOLIN HFA) 108 (90 Base) MCG/ACT inhaler INHALE 2 PUFFS EVERY 6 HOURS AS NEEDED FOR WHEEZING 18 g 5    nitroGLYCERIN (NITROSTAT) 0.4 MG SL tablet Place 1 tablet under the tongue every 5 minutes as needed for Chest pain 25 tablet 1    clopidogrel (PLAVIX) 75 MG tablet Take 1 tablet by mouth daily 30 tablet 3    TRULICITY 3 EC/1.0HT SOPN INJECT 0.5 ML (1 PEN) SUBCUTANEOUSLY WEEKLY,X30 DAYS,INSTR:ROTATE INJECTION SITES      atorvastatin (LIPITOR) 80 MG tablet Take 1 tablet by mouth daily 90 tablet 3    fludrocortisone (FLORINEF) 0.1 MG tablet TAKE 3 TABLETS BY MOUTH EVERY DAY (Patient taking differently: Take 0.1 mg by mouth daily) 270 tablet 3    Blood Glucose Monitoring Suppl (ONE TOUCH ULTRA 2) w/Device KIT 1 kit by Does not apply route daily 1 kit 0    blood glucose monitor strips Test 3 times a day & as needed for symptoms of irregular blood glucose.  Dispense sufficient amount for indicated testing frequency plus additional to accommodate PRN testing needs. 300 strip 0    Lancets MISC 1 each by Does not apply route 2 times daily 300 each 1    Insulin Degludec (TRESIBA FLEXTOUCH) 200 UNIT/ML SOPN Inject 86 Units into the skin daily PER DR MCELROY (Patient taking differently: Inject 110 Units into the skin at bedtime) 1 pen 0    metFORMIN (GLUCOPHAGE-XR) 500 MG extended release tablet Take 2 tablets by mouth daily (with breakfast) 180 tablet 3    omeprazole (PRILOSEC) 20 MG delayed release capsule TAKE 1 CAPSULE BY MOUTH EVERY DAY IN THE MORNING BEFORE BREAKFAST (Patient taking differently: Take 20 mg by mouth Daily) 90 capsule 1    ondansetron (ZOFRAN ODT) 4 MG disintegrating tablet Take 1 tablet by mouth every 8 hours as needed for Nausea 20 tablet 0    Saccharomyces boulardii (PROBIOTIC) 250 MG CAPS Take 1 capsule by mouth daily 30 capsule 0    acetaminophen (TYLENOL) 500 MG tablet Take 1 tablet by mouth 4 times daily as needed for Pain 40 tablet 0    losartan (COZAAR) 25 MG tablet Take 1 tablet by mouth daily 90 tablet 3    insulin lispro, 1 Unit Dial, (HUMALOG KWIKPEN) 100 UNIT/ML SOPN Inject 20 Units into the skin 3 times daily (before meals) 12 pen 1    aspirin 81 MG EC tablet TAKE 1 TABLET BY MOUTH DAILY (Patient taking differently: Take 81 mg by mouth daily) 90 tablet 3    Insulin Pen Needle (BD ULTRA-FINE PEN NEEDLES) 29G X 12.7MM MISC USE AS DIRECTED BY PHYSICIAN 5 times daily 150 each 11    Blood Pressure Monitor KIT 1 each by Does not apply route daily as needed ESSENTIAL HYPERTENSION   I10 1 kit 0     No current facility-administered medications for this visit.        Social History     Socioeconomic History    Marital status:      Spouse name: Not on file    Number of children: Not on file    Years of education: Not on file    Highest education level: Not on file   Occupational History    Not on file   Tobacco Use    Smoking status: Former     Packs/day: 2.00     Years: 10.00     Pack years: 20.00     Types: Cigarettes     Quit date: 2010     Years since quittin.0    Smokeless tobacco: Never   Vaping Use    Vaping Use: Never used   Substance and Sexual Activity    Alcohol use: No    Drug use: No    Sexual activity: Yes     Partners: Male   Other Topics Concern    Not on file   Social History Narrative    Not on file     Social Determinants of Health     Financial Resource Strain: Unknown    Difficulty of Paying Living Expenses: Patient refused   Food Insecurity: Unknown    Worried About Running Out of Food in the Last Year: Patient refused    Ran Out of Food in the Last Year: Patient refused   Transportation Needs: Not on file   Physical Activity: Not on file   Stress: Not on file   Social Connections: Not on file   Intimate Partner Violence: Not on file   Housing Stability: Not on file       Family History   Problem Relation Age of Onset    Cancer Mother     Diabetes Mother     Cancer Father         thyroid    Diabetes Sister     Heart Disease Sister     Heart Disease Brother     Heart Disease Maternal Uncle     Cancer Maternal Grandmother         REVIEW OF SYSTEM:     Constitutional: No fever or chills. No night sweats, no weight loss   Eyes: No eye discharge, double vision, or eye pain   HEENT: negative for sore mouth, sore throat, hoarseness and voice change   Respiratory: negative for cough , sputum, dyspnea, wheezing, hemoptysis, chest pain   Cardiovascular: negative for chest pain, dyspnea, palpitations, orthopnea, PND   Gastrointestinal: negative for nausea, vomiting, diarrhea, constipation, abdominal pain, Dysphagia, hematemesis and hematochezia   Genitourinary: negative for frequency, dysuria, nocturia, urinary incontinence, and hematuria   Integument: negative for rash, skin lesions, bruises.    Hematologic/Lymphatic: negative for easy bruising, bleeding, lymphadenopathy, petechiae and swelling/edema   Endocrine: negative for heat or cold intolerance, tremor, weight changes, change in bowel habits and hair loss   Musculoskeletal: negative for myalgias, arthralgias, pain, joint swelling,and bone pain   Neurological: negative for headaches, dizziness, seizures, weakness, numbness       OBJECTIVE:         Vitals:    09/22/22 1805   BP: (!) 132/52   Pulse: 97   Resp: 20   Temp: 97.6 °F (36.4 °C)       PHYSICAL EXAM:   General appearance - well appearing, no in pain or distress   Mental status - alert and cooperative   Eyes - pupils equal and reactive, extraocular eye movements intact   Ears - bilateral TM's and external ear canals normal   Mouth - mucous membranes moist, pharynx normal without lesions   Neck - supple, no significant adenopathy   Lymphatics - no palpable lymphadenopathy, no hepatosplenomegaly   Chest - clear to auscultation, no wheezes, rales or rhonchi, symmetric air entry   Heart - normal rate, regular rhythm, normal S1, S2, no murmurs, rubs, clicks or gallops   Abdomen - soft, nontender, nondistended, no masses or organomegaly   Neurological - alert, oriented, normal speech, no focal findings or movement disorder noted   Musculoskeletal - no joint tenderness, deformity or swelling   Extremities - peripheral pulses normal, no pedal edema, no clubbing or cyanosis   Skin - normal coloration and turgor, no rashes, no suspicious skin lesions noted ,      LABORATORY DATA:     Lab Results   Component Value Date    WBC 8.5 07/18/2022    HGB 12.7 07/18/2022    HCT 39.7 07/18/2022    MCV 74.3 (L) 07/18/2022     07/18/2022    LYMPHOPCT 16 (L) 07/18/2022    RBC 5.34 (H) 07/18/2022    MCH 23.8 (L) 07/18/2022    MCHC 32.0 07/18/2022    RDW 15.5 (H) 07/18/2022    MONOPCT 7 07/18/2022    BASOPCT 1 07/18/2022    NEUTROABS 6.49 07/18/2022    LYMPHSABS 1.34 07/18/2022    MONOSABS 0.57 07/18/2022    EOSABS 0.04 07/18/2022    BASOSABS 0.05 07/18/2022         Chemistry        Component Value Date/Time     (L) 07/18/2022 2343    K 4.5 07/18/2022 2343    CL 93 (L) 07/18/2022 2343    CO2 24 07/18/2022 2343    BUN 14 07/18/2022 2343    CREATININE 0.78 07/18/2022 2343        Component Value Date/Time    CALCIUM 9.8 07/18/2022 2343    ALKPHOS 99 04/22/2022 1316    AST 11 04/22/2022 1316    ALT 9 04/22/2022 1316    BILITOT 0.41 04/22/2022 1316            PATHOLOGY DATA:     1 Result Note    1 Follow-up Encounter    Component 6/9/22 1325   Surgical Pathology Report -- Diagnosis --   A. RIGHT BREAST, UOQ, 11:00, 6 CM FN, CORE NEEDLE BIOPSIES:        -  INTERMEDIATE GRADE DUCTAL CARCINOMA IN SITU, SOLID TYPE.             -  ESTROGEN AND PROGESTERONE RECEPTOR IMMUNOSTAINS STRONGLY   POSITIVE IN DUCTAL CARCINOMA IN SITU. B.  RIGHT AXILLA LYMPH NODE, CORE NEEDLE BIOPSIES:        -  BENIGN LYMPH NODE, NEGATIVE FOR MALIGNANCY. James Frost M.D.   **Electronically Signed Out**         jet/6/13/2022         Clinical Information   Clinical findings: RIGHT UOQ 11:00 6 CM FN, RIGHT AXILLA   Operative Findings:  RIGHT BREAST MASS 11:00; RT AXILLA LYMPH NODE     Source of Specimen   A: RT BREAST MASS   B: RT AXILLA BX     Gross Description   A. \"SIMON WILD, RIGHT BREAST\" Cores and fragments of fibrofatty   tissue, 1.0 x 0.8 x 0.4 cm in aggregate. Entirely 1cs. B. \"SIMON WILD, RIGHT AXILLA\" Cores and fragments of fibrofatty   tissue, 1.0 x 0.8 x 0.3 cm in aggregate. Entirely 1cs. mpb tm       Microscopic Description   A. Initial levels of fibroglandular breast tissue and adipose tissue   contain atrophic breast lobules. Subsequent levels however show with   distended ducts lined by atypical epithelial cells in a solid pattern. Calcifications are not identified. There is no evidence of invasive   malignancy. The focus measures 2.5 mm in largest dimension. Slides   were reviewed with a second pathologist [facility]) who agrees with the   diagnosis.    PROCEDURE:                              Core needle biopsies   SPECIMEN LATERALITY / TUMOR SITE:          Right breast -INVASIVE CARCINOMA IDENTIFIED 0.5 MM FROM THE LATERAL INKED MARGIN   -DUCTAL CARCINOMA IN SITU IDENTIFIED 0.5 MM FROM THE LATERAL INKED   MARGIN     B.  RIGHT BREAST, ADDITIONAL INFERIOR MARGIN:   -BENIGN BREAST TISSUE     C.  RIGHT BREAST, ADDITIONAL LATERAL MARGIN:   -BENIGN BREAST TISSUE     D.  RIGHT BREAST, ADDITIONAL MEDIAL MARGIN:   -BENIGN BREAST TISSUE     E.  RIGHT BREAST, ADDITIONAL SUPERIOR MARGIN:   -BENIGN BREAST TISSUE     -- Diagnosis Comment --     The final inked margins are clear. Invasive carcinoma and ductal   carcinoma in situ identified 10.5 mm from the nearest margin (lateral   margin). Lavon Leggett D.O.   **Electronically Signed Out**         [facility]/7/14/2022              IMAGING DATA:      NM LYMPHOSCINTIGRAM  Narrative: EXAMINATION:  Right breast lymphoscintigraphy    Injection time: 12:29 p.m. TECHNIQUE:  0.6 mCi of Tc99 Lymphoseek was injected in 4 separate intradermal injections  around the right areola. .    Planar images were acquired. HISTORY:  Right breast cancer    FINDINGS:  The first lymph node appears in the ipsilateral right axilla. Impression: [place] lymph node located in the ipsilateral right axilla. ASSESSMENT:       Diagnosis Orders   1. Malignant neoplasm of central portion of right breast in female, estrogen receptor positive ([address]        2. Abnormal cardiovascular stress test        3. Ductal carcinoma in situ (DCIS) of right breast        4.  Osteopenia of multiple sites             pStage I/pT1pNX invasive carcinoma of the right breast with DCIS status postlumpectomy done on July 12, 2022  Multiple comorbidity  Previous smoker  Osteopenia    PLAN:     I 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 with no sentinel lymph node ,initially she had biopsy of the right axillary lymphadenopathy, case discussed at the tumor board and the plan was to proceed with sentinel lymph node biopsy which was negative   Oncotype DX of 5 and no benefit from chemotherapy and to proceed with adjuvant radiation treatment and endocrine treatment for 5 years  Side effects of hormonal treatment which include but not limited to osteoporosis has been discussed with the patient and she need to be on calcium vitamin D and Xgeva if she had evidence of osteopenia or osteoporosis  Bone density scan did show osteopenia and patient will be given Prolia  RTC after done with radiation      Thank you for the consult         I spent a total of 35 minutes on the date of the service which included preparing to see the patient, face-to-face patient care, completing clinical documentation, obtaining and/or reviewing separately obtained history, performing a medically appropriate examination, counseling and educating the patient/family/caregiver and ordering medications, tests, or procedures. [facility]                            This note is created with the assistance of a speech recognition program.  While intending to generate a document that actually reflects the content of the visit, the document can still have some errors including those of syntax and sound a like substitutions which may escape proof reading. It such instances, actual meaning can be extrapolated by contextual diversion.

## 2022-09-24 DIAGNOSIS — M62.838 MUSCLE SPASMS OF BOTH LOWER EXTREMITIES: ICD-10-CM

## 2022-09-24 DIAGNOSIS — M79.2 NEUROPATHIC PAIN: ICD-10-CM

## 2022-09-26 ENCOUNTER — TELEPHONE (OUTPATIENT)
Dept: ONCOLOGY | Age: 57
End: 2022-09-26

## 2022-09-26 RX ORDER — DULOXETIN HYDROCHLORIDE 30 MG/1
CAPSULE, DELAYED RELEASE ORAL
Qty: 30 CAPSULE | Refills: 1 | OUTPATIENT
Start: 2022-09-26

## 2022-09-26 RX ORDER — TIZANIDINE 2 MG/1
TABLET ORAL
Qty: 60 TABLET | Refills: 5 | OUTPATIENT
Start: 2022-09-26

## 2022-09-26 NOTE — TELEPHONE ENCOUNTER
Name: Ro Redding  : 1965  MRN: [id number]    Oncology Navigation Follow-Up Note    Contact Type:  Telephone    Notes:  Call made to [facility]. [facility] states that she is doing well since surgery. Pt notes that she starts radiation the first week in October in [facility]. [facility] denies any needs from navigation at this time. Will continue to follow.     Electronically signed by Troy Randall RN on 2022 at 11:48 AM

## 2022-10-06 RX ORDER — FLUDROCORTISONE ACETATE 0.1 MG/1
TABLET ORAL
Qty: 270 TABLET | Refills: 3 | Status: SHIPPED | OUTPATIENT
Start: 2022-10-06

## 2022-10-12 ENCOUNTER — APPOINTMENT (OUTPATIENT)
Dept: GENERAL RADIOLOGY | Age: 57
End: 2022-10-12
Payer: COMMERCIAL

## 2022-10-12 ENCOUNTER — HOSPITAL ENCOUNTER (OUTPATIENT)
Age: 57
Setting detail: OBSERVATION
Discharge: HOME | End: 2022-10-13
Attending: EMERGENCY MEDICINE | Admitting: INTERNAL MEDICINE
Payer: COMMERCIAL

## 2022-10-12 DIAGNOSIS — R07.9 CHEST PAIN, UNSPECIFIED TYPE: Primary | ICD-10-CM

## 2022-10-12 LAB
ABSOLUTE EOS #: 0.11 K/UL (ref 0–0.44)
ABSOLUTE IMMATURE GRANULOCYTE: 0.05 K/UL (ref 0–0.3)
ABSOLUTE LYMPH #: 1.77 K/UL (ref 1.1–3.7)
ABSOLUTE MONO #: 0.42 K/UL (ref 0.1–1.2)
ALBUMIN SERPL-MCNC: 3.5 G/DL (ref 3.5–5.2)
ALBUMIN/GLOBULIN RATIO: 0.9 (ref 1–2.5)
ALP BLD-CCNC: 106 U/L (ref 35–104)
ALT SERPL-CCNC: 6 U/L (ref 5–33)
ANION GAP SERPL CALCULATED.3IONS-SCNC: 11 MMOL/L (ref 9–17)
AST SERPL-CCNC: 7 U/L
BASOPHILS # BLD: 1 % (ref 0–2)
BASOPHILS ABSOLUTE: 0.04 K/UL (ref 0–0.2)
BILIRUB SERPL-MCNC: 0.3 MG/DL (ref 0.3–1.2)
BILIRUBIN DIRECT: <0.1 MG/DL
BILIRUBIN, INDIRECT: ABNORMAL MG/DL (ref 0–1)
BUN BLDV-MCNC: 10 MG/DL (ref 6–20)
BUN/CREAT BLD: 14 (ref 9–20)
CALCIUM SERPL-MCNC: 9.1 MG/DL (ref 8.6–10.4)
CHLORIDE BLD-SCNC: 98 MMOL/L (ref 98–107)
CO2: 23 MMOL/L (ref 20–31)
CREAT SERPL-MCNC: 0.74 MG/DL (ref 0.5–0.9)
D-DIMER QUANTITATIVE: 0.53 MG/L FEU (ref 0–0.59)
EOSINOPHILS RELATIVE PERCENT: 1 % (ref 1–4)
GFR SERPL CREATININE-BSD FRML MDRD: >60 ML/MIN/1.73M2
GLUCOSE BLD-MCNC: 321 MG/DL (ref 70–99)
HCT VFR BLD CALC: 34.3 % (ref 36.3–47.1)
HEMOGLOBIN: 11.1 G/DL (ref 11.9–15.1)
IMMATURE GRANULOCYTES: 1 %
LYMPHOCYTES # BLD: 21 % (ref 24–43)
MCH RBC QN AUTO: 24.1 PG (ref 25.2–33.5)
MCHC RBC AUTO-ENTMCNC: 32.4 G/DL (ref 28.4–34.8)
MCV RBC AUTO: 74.4 FL (ref 82.6–102.9)
MONOCYTES # BLD: 5 % (ref 3–12)
NRBC AUTOMATED: 0 PER 100 WBC
PDW BLD-RTO: 15.4 % (ref 11.8–14.4)
PLATELET # BLD: 329 K/UL (ref 138–453)
PMV BLD AUTO: 9.5 FL (ref 8.1–13.5)
POTASSIUM SERPL-SCNC: 4.2 MMOL/L (ref 3.7–5.3)
RBC # BLD: 4.61 M/UL (ref 3.95–5.11)
SARS-COV-2, RAPID: NOT DETECTED
SEG NEUTROPHILS: 71 % (ref 36–65)
SEGMENTED NEUTROPHILS ABSOLUTE COUNT: 6.1 K/UL (ref 1.5–8.1)
SODIUM BLD-SCNC: 132 MMOL/L (ref 135–144)
SPECIMEN DESCRIPTION: NORMAL
TOTAL PROTEIN: 7.2 G/DL (ref 6.4–8.3)
TROPONIN, HIGH SENSITIVITY: <6 NG/L (ref 0–14)
WBC # BLD: 8.5 K/UL (ref 3.5–11.3)

## 2022-10-12 PROCEDURE — 80048 BASIC METABOLIC PNL TOTAL CA: CPT

## 2022-10-12 PROCEDURE — 94761 N-INVAS EAR/PLS OXIMETRY MLT: CPT

## 2022-10-12 PROCEDURE — 6360000002 HC RX W HCPCS: Performed by: EMERGENCY MEDICINE

## 2022-10-12 PROCEDURE — 71045 X-RAY EXAM CHEST 1 VIEW: CPT

## 2022-10-12 PROCEDURE — 80076 HEPATIC FUNCTION PANEL: CPT

## 2022-10-12 PROCEDURE — 85025 COMPLETE CBC W/AUTO DIFF WBC: CPT

## 2022-10-12 PROCEDURE — 6370000000 HC RX 637 (ALT 250 FOR IP): Performed by: EMERGENCY MEDICINE

## 2022-10-12 PROCEDURE — 94640 AIRWAY INHALATION TREATMENT: CPT

## 2022-10-12 PROCEDURE — C9803 HOPD COVID-19 SPEC COLLECT: HCPCS

## 2022-10-12 PROCEDURE — 6370000000 HC RX 637 (ALT 250 FOR IP): Performed by: INTERNAL MEDICINE

## 2022-10-12 PROCEDURE — 85379 FIBRIN DEGRADATION QUANT: CPT

## 2022-10-12 PROCEDURE — 96374 THER/PROPH/DIAG INJ IV PUSH: CPT

## 2022-10-12 PROCEDURE — 93005 ELECTROCARDIOGRAM TRACING: CPT | Performed by: EMERGENCY MEDICINE

## 2022-10-12 PROCEDURE — G0378 HOSPITAL OBSERVATION PER HR: HCPCS

## 2022-10-12 PROCEDURE — 99285 EMERGENCY DEPT VISIT HI MDM: CPT

## 2022-10-12 PROCEDURE — 84484 ASSAY OF TROPONIN QUANT: CPT

## 2022-10-12 PROCEDURE — 2580000003 HC RX 258: Performed by: INTERNAL MEDICINE

## 2022-10-12 PROCEDURE — 94664 DEMO&/EVAL PT USE INHALER: CPT

## 2022-10-12 PROCEDURE — 87635 SARS-COV-2 COVID-19 AMP PRB: CPT

## 2022-10-12 RX ORDER — NITROGLYCERIN 0.4 MG/1
0.4 TABLET SUBLINGUAL EVERY 5 MIN PRN
Status: DISCONTINUED | OUTPATIENT
Start: 2022-10-12 | End: 2022-10-13 | Stop reason: HOSPADM

## 2022-10-12 RX ORDER — ONDANSETRON 4 MG/1
4 TABLET, ORALLY DISINTEGRATING ORAL EVERY 8 HOURS PRN
Status: DISCONTINUED | OUTPATIENT
Start: 2022-10-12 | End: 2022-10-13 | Stop reason: HOSPADM

## 2022-10-12 RX ORDER — INSULIN LISPRO 100 [IU]/ML
20 INJECTION, SOLUTION INTRAVENOUS; SUBCUTANEOUS
Status: DISCONTINUED | OUTPATIENT
Start: 2022-10-12 | End: 2022-10-13 | Stop reason: HOSPADM

## 2022-10-12 RX ORDER — LOSARTAN POTASSIUM 25 MG/1
25 TABLET ORAL DAILY
Status: DISCONTINUED | OUTPATIENT
Start: 2022-10-13 | End: 2022-10-13 | Stop reason: HOSPADM

## 2022-10-12 RX ORDER — SODIUM CHLORIDE 9 MG/ML
INJECTION, SOLUTION INTRAVENOUS PRN
Status: DISCONTINUED | OUTPATIENT
Start: 2022-10-12 | End: 2022-10-13 | Stop reason: HOSPADM

## 2022-10-12 RX ORDER — ANASTROZOLE 1 MG/1
1 TABLET ORAL DAILY
Status: DISCONTINUED | OUTPATIENT
Start: 2022-10-13 | End: 2022-10-13 | Stop reason: HOSPADM

## 2022-10-12 RX ORDER — ENOXAPARIN SODIUM 100 MG/ML
40 INJECTION SUBCUTANEOUS DAILY
Status: DISCONTINUED | OUTPATIENT
Start: 2022-10-13 | End: 2022-10-13 | Stop reason: HOSPADM

## 2022-10-12 RX ORDER — ASPIRIN 81 MG/1
81 TABLET ORAL DAILY
Status: DISCONTINUED | OUTPATIENT
Start: 2022-10-13 | End: 2022-10-13 | Stop reason: HOSPADM

## 2022-10-12 RX ORDER — LACTOBACILLUS RHAMNOSUS GG 10B CELL
1 CAPSULE ORAL DAILY
Status: DISCONTINUED | OUTPATIENT
Start: 2022-10-13 | End: 2022-10-13 | Stop reason: HOSPADM

## 2022-10-12 RX ORDER — ALBUTEROL SULFATE 90 UG/1
2 AEROSOL, METERED RESPIRATORY (INHALATION) 4 TIMES DAILY
Status: DISCONTINUED | OUTPATIENT
Start: 2022-10-12 | End: 2022-10-13 | Stop reason: HOSPADM

## 2022-10-12 RX ORDER — PANTOPRAZOLE SODIUM 40 MG/1
40 TABLET, DELAYED RELEASE ORAL
Status: DISCONTINUED | OUTPATIENT
Start: 2022-10-13 | End: 2022-10-13 | Stop reason: HOSPADM

## 2022-10-12 RX ORDER — ASPIRIN 81 MG/1
324 TABLET, CHEWABLE ORAL ONCE
Status: COMPLETED | OUTPATIENT
Start: 2022-10-12 | End: 2022-10-12

## 2022-10-12 RX ORDER — INSULIN GLARGINE 100 [IU]/ML
34.4 INJECTION, SOLUTION SUBCUTANEOUS 2 TIMES DAILY
Status: DISCONTINUED | OUTPATIENT
Start: 2022-10-13 | End: 2022-10-13 | Stop reason: HOSPADM

## 2022-10-12 RX ORDER — METFORMIN HYDROCHLORIDE 500 MG/1
1000 TABLET, EXTENDED RELEASE ORAL
Status: DISCONTINUED | OUTPATIENT
Start: 2022-10-13 | End: 2022-10-13 | Stop reason: HOSPADM

## 2022-10-12 RX ORDER — ONDANSETRON 2 MG/ML
4 INJECTION INTRAMUSCULAR; INTRAVENOUS ONCE
Status: COMPLETED | OUTPATIENT
Start: 2022-10-12 | End: 2022-10-12

## 2022-10-12 RX ORDER — ATORVASTATIN CALCIUM 40 MG/1
80 TABLET, FILM COATED ORAL DAILY
Status: DISCONTINUED | OUTPATIENT
Start: 2022-10-13 | End: 2022-10-13 | Stop reason: HOSPADM

## 2022-10-12 RX ORDER — POLYETHYLENE GLYCOL 3350 17 G/17G
17 POWDER, FOR SOLUTION ORAL DAILY PRN
Status: DISCONTINUED | OUTPATIENT
Start: 2022-10-12 | End: 2022-10-13 | Stop reason: HOSPADM

## 2022-10-12 RX ORDER — DULOXETIN HYDROCHLORIDE 30 MG/1
30 CAPSULE, DELAYED RELEASE ORAL DAILY
Status: DISCONTINUED | OUTPATIENT
Start: 2022-10-13 | End: 2022-10-13 | Stop reason: HOSPADM

## 2022-10-12 RX ORDER — ACETAMINOPHEN 650 MG/1
650 SUPPOSITORY RECTAL EVERY 6 HOURS PRN
Status: DISCONTINUED | OUTPATIENT
Start: 2022-10-12 | End: 2022-10-13 | Stop reason: HOSPADM

## 2022-10-12 RX ORDER — ACETAMINOPHEN 325 MG/1
650 TABLET ORAL EVERY 6 HOURS PRN
Status: DISCONTINUED | OUTPATIENT
Start: 2022-10-12 | End: 2022-10-13 | Stop reason: HOSPADM

## 2022-10-12 RX ORDER — METOPROLOL SUCCINATE 100 MG/1
100 TABLET, EXTENDED RELEASE ORAL DAILY
Status: DISCONTINUED | OUTPATIENT
Start: 2022-10-13 | End: 2022-10-13 | Stop reason: HOSPADM

## 2022-10-12 RX ORDER — SODIUM CHLORIDE 0.9 % (FLUSH) 0.9 %
5-40 SYRINGE (ML) INJECTION EVERY 12 HOURS SCHEDULED
Status: DISCONTINUED | OUTPATIENT
Start: 2022-10-12 | End: 2022-10-13 | Stop reason: HOSPADM

## 2022-10-12 RX ORDER — FLUDROCORTISONE ACETATE 0.1 MG/1
0.1 TABLET ORAL DAILY
Status: DISCONTINUED | OUTPATIENT
Start: 2022-10-13 | End: 2022-10-13 | Stop reason: HOSPADM

## 2022-10-12 RX ORDER — SODIUM CHLORIDE 0.9 % (FLUSH) 0.9 %
10 SYRINGE (ML) INJECTION PRN
Status: DISCONTINUED | OUTPATIENT
Start: 2022-10-12 | End: 2022-10-13 | Stop reason: HOSPADM

## 2022-10-12 RX ORDER — GABAPENTIN 400 MG/1
800 CAPSULE ORAL 3 TIMES DAILY
Status: DISCONTINUED | OUTPATIENT
Start: 2022-10-12 | End: 2022-10-13 | Stop reason: HOSPADM

## 2022-10-12 RX ORDER — CLOPIDOGREL BISULFATE 75 MG/1
75 TABLET ORAL DAILY
Status: DISCONTINUED | OUTPATIENT
Start: 2022-10-13 | End: 2022-10-13 | Stop reason: HOSPADM

## 2022-10-12 RX ADMIN — ONDANSETRON 4 MG: 2 INJECTION INTRAMUSCULAR; INTRAVENOUS at 20:02

## 2022-10-12 RX ADMIN — ASPIRIN 324 MG: 81 TABLET, CHEWABLE ORAL at 19:40

## 2022-10-12 RX ADMIN — GABAPENTIN 800 MG: 400 CAPSULE ORAL at 22:36

## 2022-10-12 RX ADMIN — ALBUTEROL SULFATE 2 PUFF: 90 AEROSOL, METERED RESPIRATORY (INHALATION) at 22:39

## 2022-10-12 RX ADMIN — SODIUM CHLORIDE, PRESERVATIVE FREE 10 ML: 5 INJECTION INTRAVENOUS at 22:37

## 2022-10-12 ASSESSMENT — HEART SCORE: ECG: 0

## 2022-10-12 ASSESSMENT — ENCOUNTER SYMPTOMS
NAUSEA: 1
SORE THROAT: 0
VOMITING: 0
SHORTNESS OF BREATH: 0
ABDOMINAL PAIN: 0
RHINORRHEA: 0

## 2022-10-12 ASSESSMENT — PAIN - FUNCTIONAL ASSESSMENT: PAIN_FUNCTIONAL_ASSESSMENT: 0-10

## 2022-10-12 ASSESSMENT — PAIN SCALES - GENERAL: PAINLEVEL_OUTOF10: 3

## 2022-10-12 ASSESSMENT — PAIN DESCRIPTION - LOCATION: LOCATION: CHEST

## 2022-10-12 ASSESSMENT — PAIN DESCRIPTION - ORIENTATION: ORIENTATION: RIGHT;ANTERIOR

## 2022-10-12 ASSESSMENT — PAIN DESCRIPTION - PAIN TYPE: TYPE: ACUTE PAIN

## 2022-10-12 ASSESSMENT — PAIN DESCRIPTION - FREQUENCY: FREQUENCY: CONTINUOUS

## 2022-10-12 ASSESSMENT — PAIN DESCRIPTION - DESCRIPTORS: DESCRIPTORS: ACHING;DULL;PRESSURE

## 2022-10-12 NOTE — ED PROVIDER NOTES
[facility]  EMERGENCY DEPARTMENT ENCOUNTER      Pt Name: Anderson Aguiar  MRN: [id number]  [place] 1965  Date of evaluation: 10/12/2022  Provider: Nat Aaron MD    CHIEF COMPLAINT       Chief Complaint   Patient presents with    Chest Pain     Pt arrives with c/o Rt sided chest pressure that began approx 30 minutes prior. Pt has previous cardiac hx of 2 MI and 3 stent placement. Most recent stent placed in 2021         [facility]      Anderson Aguiar is a 62 y.o. 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 States that she began to have sharp, right-sided chest pain approximately 1 hour prior to evaluation. Radiates to center of chest, moderate in intensity at the start though now markedly improved. States it feels like prior MI, though pain is not as strong. No other complaints. REVIEW OF SYSTEMS       Review of Systems   Constitutional:  Negative for fever. HENT:  Negative for congestion, rhinorrhea and sore throat. Respiratory:  Negative for shortness of breath. Cardiovascular:  Positive for chest pain. Negative for leg swelling. Gastrointestinal:  Positive for nausea. Negative for abdominal pain and vomiting. Neurological: Negative. All other systems reviewed and are negative. PAST MEDICAL HISTORY     Past Medical History:   Diagnosis Date    Anxiety     Asthma     CAD (coronary artery disease)     x1 stent 2010,x1 stent 2016, x1 stent May 2022    Cancer [facility]) 06/2022    right breast cancer    Clinical trial participant at discharge 3/31/16    COPD (chronic obstructive pulmonary disease) ([facility] [address]     Depression     Diabetic neuropathy (HCC)     GERD (gastroesophageal reflux disease)     H/O cardiac catheterization 4/26/16    LMCA: Mild Irregularities 10-20%.  LAD: Lesion on Prox LAD: Proximal subsection. 100% stenosis. LCx: Mild irregularities 10-20%. RCA: Mild irregularities 10-20%. Widely patent mid RCA stent. EF:60%. H/O cardiovascular stress test 10/07/2016    Overall results are most consistent with a low risk for significant CAD. H/O echocardiogram 10/05/2016    EF:>60%. Inferoseptal wall abnormal in its motion which is not unusal status post open heart surgery. Evidence of mild (grade I) diastolic dysfunction is seen. H/O tilt table evaluation 07/12/2016    Abnormal. PTs HR, Blood Pressure response and symptoms were most consistent with dysautonomia. Combined w/viligant maintenance of euvolemia and maintaining a moderate salt intake, pharmaciologic treatment w/ ProAmatine, SSRI such as Lexapro and/or Mestinon among other treatments have shown some effectiveness in the treatment of this condition. History of cardiovascular stress test 02/13/2019    Abnormal. Small/moderate perfusion defect of mild/moderate intesity in the anterior and anterolateral regions during stress imaging which is most consistent w/ ischemia but may be artifact. Overall low/intermediate risk for significant CAD. History of echocardiogram 09/06/2018    EF >60%. Inferoseptal wall is abnormal in its motion which is not unusual s/p open heart. Evidence of mild (grade I) diastolic dysfunction seen.     Hx of blood clots     Hyperlipidemia     Hypertension     Intermittent claudication (HCC)     Kidney stones     Movement disorder     neuropathy in legs    Neuromuscular disorder (HCC)     neuropathy    Osteoarthritis     Reflux     Seizures ([facility] [address] 1980'Ss last seizure    Stented coronary artery 9/22/2010     LAD/[place]    Stented coronary artery 3/31/16    RCA/[place]    Type II or unspecified type diabetes mellitus without mention of complication, not stated as uncontrolled     Unspecified sleep apnea     no machine         SURGICAL HISTORY       Past Surgical History:   Procedure Laterality Date    ABDOMINAL HERNIA REPAIR  01/2016    repair done julian    APPENDECTOMY      AXILLARY SURGERY Right 9/2/2022    RIGHT AXILLARY LYMPH SENTINAL NODE BIOPSY  @  12PM performed by Alize Jefferson DO at [address] [facility] Right 7/12/2022    NEEDLE DIRECTED ( 1130     )    RIGHT BREAST LUMPECTOMY performed by Alize Jefferson DO at [facility] [address] Left 04/26/2016    right radial/ [facility] [place]/ [address] Left 03/07/2019    Left Radial/[facility] [place]/    CARDIAC CATHETERIZATION  05/20/2022    Dr Johan Hensley radial    CARDIAC CATHETERIZATION  05/20/2022    Dr Johan Hensley radial    [facility]      CABG 2019    CHOLECYSTECTOMY  1991    COLONOSCOPY  12/16/2014    -hemorrhoids,bx    CORONARY ANGIOPLASTY WITH STENT PLACEMENT  09/2010    CORONARY ANGIOPLASTY WITH STENT PLACEMENT  03/31/2016    JESSE RCA / DR Mahesh Cunningham    CORONARY ANGIOPLASTY WITH STENT PLACEMENT  05/20/2022    CORONARY ARTERY BYPASS GRAFT  08/15/2016    OP X 1- Dr Margret Samayoa, DIAGNOSTIC  12/16/2014    HERNIA REPAIR  12/13/2012    at the medial aspect of a [place] RUQ scar); repaired byDr. Doherty Members  02/16/2015    incisional, recurrent    HERNIA REPAIR  02/2016    HYSTERECTOMY (CERVIX STATUS UNKNOWN)      OTHER SURGICAL HISTORY  10/08/2015    abd wound washout, mesh removal, wound vac placement    UT SUCT NICOLE LIPECTOMY,HEAD/NECK Left 08/27/2018    THIGH LESION BIOPSY EXCISION, SOFT TISSUE MASS performed by Thomas Carter MD at [facility] Left 2018    leg    UPPP UVULOPALATOPHARYGOPLASTY  06/26/2012    US BREAST BIOPSY NEEDLE ADDITIONAL RIGHT Right 6/9/2022    US BREAST BIOPSY NEEDLE ADDITIONAL RIGHT 6/9/2022 [facility] ULTRASOUND    US BREAST NEEDLE BIOPSY RIGHT Right 6/9/2022    US BREAST NEEDLE BIOPSY RIGHT 6/9/2022 [facility] ULTRASOUND    US GUIDED NEEDLE LOC OF RIGHT BREAST Right 7/12/2022    US GUIDED NEEDLE LOC OF RIGHT BREAST 7/12/2022 [facility] ULTRASOUND    VENTRAL HERNIA REPAIR  09/21/2015    With Mesh - Dr Haskins Saul       Previous Medications    ACETAMINOPHEN (TYLENOL) 500 MG TABLET    Take 1 tablet by mouth 4 times daily as needed for Pain    ACETAMINOPHEN (TYLENOL) 500 MG TABLET    Take 2 tablets by mouth every 6 hours as needed for Pain    ALBUTEROL SULFATE HFA (VENTOLIN HFA) 108 (90 BASE) MCG/ACT INHALER    INHALE 2 PUFFS EVERY 6 HOURS AS NEEDED FOR WHEEZING    ANASTROZOLE (ARIMIDEX) 1 MG TABLET    Take 1 tablet by mouth daily    ASPIRIN 81 MG EC TABLET    TAKE 1 TABLET BY MOUTH DAILY    ATORVASTATIN (LIPITOR) 80 MG TABLET    Take 1 tablet by mouth daily    BLOOD GLUCOSE MONITOR STRIPS    Test 3 times a day & as needed for symptoms of irregular blood glucose. Dispense sufficient amount for indicated testing frequency plus additional to accommodate PRN testing needs. BLOOD GLUCOSE MONITORING SUPPL (ONE TOUCH ULTRA 2) W/DEVICE KIT    1 kit by Does not apply route daily    BLOOD PRESSURE MONITOR KIT    1 each by Does not apply route daily as needed ESSENTIAL HYPERTENSION   I10    CLOPIDOGREL (PLAVIX) 75 MG TABLET    Take 1 tablet by mouth daily    DULOXETINE (CYMBALTA) 30 MG EXTENDED RELEASE CAPSULE    Take 1 capsule by mouth daily    FLUDROCORTISONE (FLORINEF) 0.1 MG TABLET    TAKE 3 TABLETS BY MOUTH EVERY DAY    GABAPENTIN (NEURONTIN) 800 MG TABLET    Take 1 tablet by mouth 3 times daily for 30 days.     INSULIN DEGLUDEC (TRESIBA FLEXTOUCH) 200 UNIT/ML SOPN    Inject 86 Units into the skin daily PER DR MCELROY    INSULIN LISPRO, 1 UNIT DIAL, (HUMALOG KWIKPEN) 100 UNIT/ML SOPN    Inject 20 Units into the skin 3 times daily (before meals)    INSULIN PEN NEEDLE (BD ULTRA-FINE PEN NEEDLES) 29G X 12.7MM MISC    USE AS DIRECTED BY PHYSICIAN 5 times daily    LANCETS MISC    1 each by Does not apply route 2 times daily    LOSARTAN (COZAAR) 25 MG TABLET    Take 1 tablet by mouth daily    METFORMIN (GLUCOPHAGE-XR) 500 MG EXTENDED RELEASE TABLET    Take 2 tablets by mouth daily (with breakfast)    METOPROLOL SUCCINATE (TOPROL XL) 100 MG EXTENDED RELEASE TABLET    TAKE 1 AND 1/2 TABLETS BY MOUTH EVERY DAY    NITROGLYCERIN (NITROSTAT) 0.4 MG SL TABLET    Place 1 tablet under the tongue every 5 minutes as needed for Chest pain    OMEPRAZOLE (PRILOSEC) 20 MG DELAYED RELEASE CAPSULE    TAKE 1 CAPSULE BY MOUTH EVERY DAY IN THE MORNING BEFORE BREAKFAST    ONDANSETRON (ZOFRAN ODT) 4 MG DISINTEGRATING TABLET    Take 1 tablet by mouth every 8 hours as needed for Nausea    SACCHAROMYCES BOULARDII (PROBIOTIC) 250 MG CAPS    Take 1 capsule by mouth daily    TIZANIDINE (ZANAFLEX) 2 MG TABLET    Take 2 tablets by mouth nightly as needed (spasms) TAKE 2 TABLET BY MOUTH NIGHTLY AS NEEDED(SPASMS)    TRULICITY 3 KH/3.4QA SOPN    INJECT 0.5 ML (1 PEN) SUBCUTANEOUSLY WEEKLY,X30 DAYS,INSTR:ROTATE INJECTION SITES       ALLERGIES       Tape Thre[employer] tape]    FAMILY HISTORY       Family History   Problem Relation Age of Onset    Cancer Mother     Diabetes Mother     Cancer Father         thyroid    Diabetes Sister     Heart Disease Sister     Heart Disease Brother     Heart Disease Maternal Uncle     Cancer Maternal Grandmother           SOCIAL HISTORY       Social History     Tobacco Use    Smoking status: Former     Packs/day: 2.00     Years: 10.00     Pack years: 20.00     Types: Cigarettes     Quit date: 2010     Years since quittin.0    Smokeless tobacco: Never   Vaping Use    Vaping Use: Never used   Substance Use Topics    Alcohol use: No    Drug use: No         PHYSICAL EXAM       ED Triage Vitals [10/12/22 1916]   BP Temp Temp src Heart Rate Resp SpO2 Height Weight   (!) 165/67 -- -- 88 18 96 % 5' 3\" (1.6 m) 218 lb (98.9 kg)       Physical Exam  Vitals reviewed. Constitutional:       General: She is not in acute distress. Appearance: She is not ill-appearing, toxic-appearing or diaphoretic.    HENT:      Head: Normocephalic and atraumatic. Nose: Nose normal.      Mouth/Throat:      Mouth: Mucous membranes are moist.      Pharynx: Oropharynx is clear. Eyes:      General: No scleral icterus. Right eye: No discharge. Left eye: No discharge. Cardiovascular:      Rate and Rhythm: Normal rate and regular rhythm. Heart sounds: No murmur heard. Pulmonary:      Effort: Pulmonary effort is normal.      Breath sounds: Normal breath sounds. Abdominal:      General: There is no distension. Palpations: Abdomen is soft. There is no mass. Tenderness: no abdominal tenderness There is no guarding or rebound. Musculoskeletal:      Cervical back: Neck supple. Comments: No LE swelling or TTP. Skin:     General: Skin is warm and dry. Coloration: Skin is not jaundiced or pale. Neurological:      Mental Status: She is alert. Psychiatric:         Mood and Affect: Mood normal.         Behavior: Behavior normal.       DIAGNOSTIC RESULTS     EKG: Sinus rhythm at 90 bpm.  No STEMI. QTc 440. RADIOLOGY:     Interpretation per the Radiologist below, if available at the time of this note:    XR CHEST PORTABLE   Final Result   Clear lungs. Cardiomegaly. No acute cardiopulmonary abnormality.                      LABS:  Labs Reviewed   BASIC METABOLIC PANEL - Abnormal; Notable for the following components:       Result Value    Glucose 321 (*)     Sodium 132 (*)     All other components within normal limits   CBC WITH AUTO DIFFERENTIAL - Abnormal; Notable for the following components:    Hemoglobin 11.1 (*)     Hematocrit 34.3 (*)     MCV 74.4 (*)     MCH 24.1 (*)     RDW 15.4 (*)     Seg Neutrophils 71 (*)     Lymphocytes 21 (*)     Immature Granulocytes 1 (*)     All other components within normal limits   HEPATIC FUNCTION PANEL - Abnormal; Notable for the following components:    Alkaline Phosphatase 106 (*)     Albumin/Globulin Ratio 0.9 (*)     All other components within normal limits COVID-19, RAPID   TROPONIN   D-DIMER, QUANTITATIVE   TROPONIN       All other labs were within normal range or not returned as of this dictation. EMERGENCY DEPARTMENT COURSE and DIFFERENTIAL DIAGNOSIS/MDM:       History: 1  EC  Patient Age: 1  Risk Factors: 2  Troponin: 0  Heart Score Total: 4    Patient presented for evaluation of chest pain which was improving as of the time of examination. She was hemodynamically stable and well-appearing. EKG demonstrates no acute process. Troponin is within normal limits. HEART score is 4. Age-adjusted D-dimer is within normal limits. Given the patient's significant history of coronary artery disease and prior MI plan at this time is for admission to the hospitalist service for further evaluation and cardiology consultation. No indication for transfer to higher level of care at this time. REASSESSMENT     ED Course as of 10/12/22 2047   Wed Oct 12, 2022   2000 Óscar Davison' Criteria for Pulmonary Embolism from [email or web address]  on 10/12/2022  ** All calculations should be rechecked by clinician prior to use **    RESULT SUMMARY:  1.0 points  Low risk group: 1.3% chance of PE in an ED population. Another study assigned scores = 4 as \"PE Unlikely\" and had a 3% incidence of PE.      INPUTS:  Clinical signs and symptoms of DVT -> 0 = No  PE is #1 diagnosis OR equally likely -> 0 = No  Heart rate > 100 -> 0 = No  Immobilization at least 3 days OR surgery in the previous 4 weeks -> 0 = No  Previous, objectively diagnosed PE or DVT -> 0 = No  Hemoptysis -> 0 = No  Malignancy w/ treatment within 6 months or palliative -> 1 = Yes   [SR]      ED Course User Index  [SR] Delmy Rose MD         FINAL IMPRESSION      1.  Chest pain, unspecified type          DISPOSITION/PLAN     DISPOSITION Decision To Admit 10/12/2022 08:39:32 PM    (Please note that portions of this note were completed with a voice recognition program.  Efforts were made to edit the dictations but occasionally words are mis-transcribed.)    Olvin Torres MD (electronically signed)  Attending Emergency Physician           Olvin Torres MD  10/12/22 8710

## 2022-10-13 VITALS
DIASTOLIC BLOOD PRESSURE: 56 MMHG | SYSTOLIC BLOOD PRESSURE: 104 MMHG | HEIGHT: 63 IN | RESPIRATION RATE: 16 BRPM | BODY MASS INDEX: 38.87 KG/M2 | OXYGEN SATURATION: 96 % | HEART RATE: 76 BPM | TEMPERATURE: 97.1 F | WEIGHT: 219.36 LBS

## 2022-10-13 LAB
ABSOLUTE EOS #: 0.14 K/UL (ref 0–0.44)
ABSOLUTE IMMATURE GRANULOCYTE: 0.05 K/UL (ref 0–0.3)
ABSOLUTE LYMPH #: 1.96 K/UL (ref 1.1–3.7)
ABSOLUTE MONO #: 0.47 K/UL (ref 0.1–1.2)
ALBUMIN SERPL-MCNC: 3.5 G/DL (ref 3.5–5.2)
ALBUMIN/GLOBULIN RATIO: 1 (ref 1–2.5)
ALP BLD-CCNC: 96 U/L (ref 35–104)
ALT SERPL-CCNC: 5 U/L (ref 5–33)
ANION GAP SERPL CALCULATED.3IONS-SCNC: 10 MMOL/L (ref 9–17)
AST SERPL-CCNC: 8 U/L
BASOPHILS # BLD: 1 % (ref 0–2)
BASOPHILS ABSOLUTE: 0.04 K/UL (ref 0–0.2)
BILIRUB SERPL-MCNC: 0.3 MG/DL (ref 0.3–1.2)
BUN BLDV-MCNC: 11 MG/DL (ref 6–20)
BUN/CREAT BLD: 17 (ref 9–20)
CALCIUM SERPL-MCNC: 9.2 MG/DL (ref 8.6–10.4)
CHLORIDE BLD-SCNC: 102 MMOL/L (ref 98–107)
CO2: 24 MMOL/L (ref 20–31)
CREAT SERPL-MCNC: 0.63 MG/DL (ref 0.5–0.9)
EKG ATRIAL RATE: 89 BPM
EKG P AXIS: 28 DEGREES
EKG P-R INTERVAL: 138 MS
EKG Q-T INTERVAL: 360 MS
EKG QRS DURATION: 80 MS
EKG QTC CALCULATION (BAZETT): 438 MS
EKG R AXIS: 30 DEGREES
EKG T AXIS: 25 DEGREES
EKG VENTRICULAR RATE: 89 BPM
EOSINOPHILS RELATIVE PERCENT: 2 % (ref 1–4)
GFR SERPL CREATININE-BSD FRML MDRD: >60 ML/MIN/1.73M2
GLUCOSE BLD-MCNC: 190 MG/DL (ref 74–100)
GLUCOSE BLD-MCNC: 215 MG/DL (ref 70–99)
GLUCOSE BLD-MCNC: 280 MG/DL (ref 74–100)
HCT VFR BLD CALC: 33.4 % (ref 36.3–47.1)
HEMOGLOBIN: 10.9 G/DL (ref 11.9–15.1)
IMMATURE GRANULOCYTES: 1 %
LYMPHOCYTES # BLD: 26 % (ref 24–43)
MCH RBC QN AUTO: 24.4 PG (ref 25.2–33.5)
MCHC RBC AUTO-ENTMCNC: 32.6 G/DL (ref 28.4–34.8)
MCV RBC AUTO: 74.9 FL (ref 82.6–102.9)
MONOCYTES # BLD: 6 % (ref 3–12)
NRBC AUTOMATED: 0 PER 100 WBC
PDW BLD-RTO: 15.5 % (ref 11.8–14.4)
PLATELET # BLD: 318 K/UL (ref 138–453)
PMV BLD AUTO: 9.5 FL (ref 8.1–13.5)
POTASSIUM SERPL-SCNC: 4 MMOL/L (ref 3.7–5.3)
RBC # BLD: 4.46 M/UL (ref 3.95–5.11)
SEG NEUTROPHILS: 64 % (ref 36–65)
SEGMENTED NEUTROPHILS ABSOLUTE COUNT: 4.8 K/UL (ref 1.5–8.1)
SODIUM BLD-SCNC: 136 MMOL/L (ref 135–144)
TOTAL PROTEIN: 6.9 G/DL (ref 6.4–8.3)
WBC # BLD: 7.5 K/UL (ref 3.5–11.3)

## 2022-10-13 PROCEDURE — 93010 ELECTROCARDIOGRAM REPORT: CPT | Performed by: FAMILY MEDICINE

## 2022-10-13 PROCEDURE — 82947 ASSAY GLUCOSE BLOOD QUANT: CPT

## 2022-10-13 PROCEDURE — G0378 HOSPITAL OBSERVATION PER HR: HCPCS

## 2022-10-13 PROCEDURE — 85025 COMPLETE CBC W/AUTO DIFF WBC: CPT

## 2022-10-13 PROCEDURE — 36415 COLL VENOUS BLD VENIPUNCTURE: CPT

## 2022-10-13 PROCEDURE — 94761 N-INVAS EAR/PLS OXIMETRY MLT: CPT

## 2022-10-13 PROCEDURE — 80053 COMPREHEN METABOLIC PANEL: CPT

## 2022-10-13 PROCEDURE — 6370000000 HC RX 637 (ALT 250 FOR IP): Performed by: INTERNAL MEDICINE

## 2022-10-13 PROCEDURE — 6360000002 HC RX W HCPCS: Performed by: INTERNAL MEDICINE

## 2022-10-13 PROCEDURE — 96372 THER/PROPH/DIAG INJ SC/IM: CPT

## 2022-10-13 PROCEDURE — 99215 OFFICE O/P EST HI 40 MIN: CPT | Performed by: INTERNAL MEDICINE

## 2022-10-13 PROCEDURE — 94640 AIRWAY INHALATION TREATMENT: CPT

## 2022-10-13 PROCEDURE — 2580000003 HC RX 258: Performed by: INTERNAL MEDICINE

## 2022-10-13 RX ORDER — EZETIMIBE 10 MG/1
10 TABLET ORAL NIGHTLY
Status: DISCONTINUED | OUTPATIENT
Start: 2022-10-13 | End: 2022-10-13 | Stop reason: HOSPADM

## 2022-10-13 RX ORDER — ISOSORBIDE MONONITRATE 30 MG/1
30 TABLET, EXTENDED RELEASE ORAL DAILY
Status: DISCONTINUED | OUTPATIENT
Start: 2022-10-13 | End: 2022-10-13 | Stop reason: HOSPADM

## 2022-10-13 RX ORDER — ISOSORBIDE MONONITRATE 30 MG/1
30 TABLET, EXTENDED RELEASE ORAL DAILY
Qty: 30 TABLET | Refills: 3 | Status: SHIPPED | OUTPATIENT
Start: 2022-10-13

## 2022-10-13 RX ORDER — EZETIMIBE 10 MG/1
10 TABLET ORAL NIGHTLY
Qty: 30 TABLET | Refills: 3 | Status: SHIPPED | OUTPATIENT
Start: 2022-10-13

## 2022-10-13 RX ADMIN — ATORVASTATIN CALCIUM 80 MG: 40 TABLET, FILM COATED ORAL at 09:07

## 2022-10-13 RX ADMIN — GABAPENTIN 800 MG: 400 CAPSULE ORAL at 14:18

## 2022-10-13 RX ADMIN — Medication 1 CAPSULE: at 09:07

## 2022-10-13 RX ADMIN — SODIUM CHLORIDE, PRESERVATIVE FREE 10 ML: 5 INJECTION INTRAVENOUS at 09:08

## 2022-10-13 RX ADMIN — PANTOPRAZOLE SODIUM 40 MG: 40 TABLET, DELAYED RELEASE ORAL at 07:00

## 2022-10-13 RX ADMIN — INSULIN LISPRO 20 UNITS: 100 INJECTION, SOLUTION INTRAVENOUS; SUBCUTANEOUS at 11:38

## 2022-10-13 RX ADMIN — INSULIN LISPRO 20 UNITS: 100 INJECTION, SOLUTION INTRAVENOUS; SUBCUTANEOUS at 09:10

## 2022-10-13 RX ADMIN — INSULIN GLARGINE 34 UNITS: 100 INJECTION, SOLUTION SUBCUTANEOUS at 09:10

## 2022-10-13 RX ADMIN — GABAPENTIN 800 MG: 400 CAPSULE ORAL at 09:07

## 2022-10-13 RX ADMIN — FLUDROCORTISONE ACETATE 0.1 MG: 0.1 TABLET ORAL at 09:07

## 2022-10-13 RX ADMIN — ALBUTEROL SULFATE 2 PUFF: 90 AEROSOL, METERED RESPIRATORY (INHALATION) at 05:34

## 2022-10-13 RX ADMIN — LOSARTAN POTASSIUM 25 MG: 25 TABLET, FILM COATED ORAL at 09:07

## 2022-10-13 RX ADMIN — CLOPIDOGREL BISULFATE 75 MG: 75 TABLET ORAL at 09:07

## 2022-10-13 RX ADMIN — ENOXAPARIN SODIUM 40 MG: 100 INJECTION SUBCUTANEOUS at 09:07

## 2022-10-13 RX ADMIN — ASPIRIN 81 MG: 81 TABLET, COATED ORAL at 09:07

## 2022-10-13 RX ADMIN — DULOXETINE HYDROCHLORIDE 30 MG: 30 CAPSULE, DELAYED RELEASE ORAL at 09:07

## 2022-10-13 RX ADMIN — METFORMIN HYDROCHLORIDE 1000 MG: 500 TABLET, EXTENDED RELEASE ORAL at 09:07

## 2022-10-13 RX ADMIN — METOPROLOL SUCCINATE 100 MG: 100 TABLET, FILM COATED, EXTENDED RELEASE ORAL at 09:07

## 2022-10-13 NOTE — PLAN OF CARE
Problem: Discharge Planning  Goal: Discharge to home or other facility with appropriate resources  Outcome: Progressing  Flowsheets (Taken 10/12/2022 2255)  Discharge to home or other facility with appropriate resources: Identify barriers to discharge with patient and caregiver     Problem: Pain  Goal: Verbalizes/displays adequate comfort level or baseline comfort level  Outcome: Progressing  Flowsheets (Taken 10/12/2022 2255)  Verbalizes/displays adequate comfort level or baseline comfort level:   Encourage patient to monitor pain and request assistance   Assess pain using appropriate pain scale   Administer analgesics based on type and severity of pain and evaluate response   Implement non-pharmacological measures as appropriate and evaluate response     Problem: Safety - Adult  Goal: Free from fall injury  Outcome: Progressing  Flowsheets (Taken 10/12/2022 2255)  Free From Fall Injury: Instruct family/caregiver on patient safety

## 2022-10-13 NOTE — PLAN OF CARE
Problem: Discharge Planning  Goal: Discharge to home or other facility with appropriate resources  10/13/2022 1222 by Dinh Barrera RN  Outcome: Progressing  Flowsheets (Taken 10/13/2022 0827)  Discharge to home or other facility with appropriate resources: Identify barriers to discharge with patient and caregiver  10/12/2022 2255 by Clementina Linton RN  Outcome: Progressing  Flowsheets (Taken 10/12/2022 2255)  Discharge to home or other facility with appropriate resources: Identify barriers to discharge with patient and caregiver     Problem: Pain  Goal: Verbalizes/displays adequate comfort level or baseline comfort level  10/13/2022 1222 by Dinh Barrera RN  Outcome: Progressing  Flowsheets (Taken 10/12/2022 2255 by Clementina Linton RN)  Verbalizes/displays adequate comfort level or baseline comfort level:   Encourage patient to monitor pain and request assistance   Assess pain using appropriate pain scale   Administer analgesics based on type and severity of pain and evaluate response   Implement non-pharmacological measures as appropriate and evaluate response  10/12/2022 2255 by Clementina Linton RN  Outcome: Progressing  Flowsheets (Taken 10/12/2022 2255)  Verbalizes/displays adequate comfort level or baseline comfort level:   Encourage patient to monitor pain and request assistance   Assess pain using appropriate pain scale   Administer analgesics based on type and severity of pain and evaluate response   Implement non-pharmacological measures as appropriate and evaluate response     Problem: Safety - Adult  Goal: Free from fall injury  10/13/2022 1222 by Dinh Barrera RN  Outcome: Progressing  Flowsheets (Taken 10/13/2022 1221)  Free From Fall Injury: Instruct family/caregiver on patient safety  10/12/2022 2255 by Clementina Linton RN  Outcome: Progressing  Flowsheets (Taken 10/12/2022 2255)  Free From Fall Injury: Instruct family/caregiver on patient safety

## 2022-10-13 NOTE — PROGRESS NOTES
SW met with pt to complete assessment. Pt is alert and oriented and pleasant with assessment. Pt is a 62year old female admitted for chest pain. Pt lives with her boyfriend in their home in [place]. Pt reports that she used a cane prn. Pt is not using any community services at this time. Pt drives and is able to get herself to appointments as needed. Pt is a full code and follows with Ernie Plasencia CNP as PCP. Pt does not have advance directives. Pt reports that her son Noa Treadwell would be her decision maker if needed. Pt also reports that her medications are affordable with her insurance coverage. Pt plans to return home at discharge. Pt states that her boyfriend just had open heart surgery and he is in the process of healing. Pt identifies no discharge needs or concerns at this time. SW will remain available as needed.  Johanna WEATHERS 10/13/2022

## 2022-10-13 NOTE — H&P
History and Physical    Patient:  Gertrudis Winter  MRN: [id number]    Chief Complaint:  chest pain     History Obtained From:  patient    PCP: Lynnie Lesch Might, APRN - CNP    History of Present Illness: The patient is a 62 y.o. female who presented to the ER with complaints of CP. She stated that it started on her right side and thought it was due to her Breast CA and undergoing radiation. She has CA on the right an dis on 8/18 treatment. No skin irritaton. She stated the pain then became a \"heaviness\". She has history of cardiac stent in May. She has had no further CP since admission. Denied CP with activity    Past Medical History:        Diagnosis Date    Anxiety     Asthma     CAD (coronary artery disease)     x1 stent 2010,x1 stent 2016, x1 stent May 2022    Cancer [facility]) 06/2022    right breast cancer    Clinical trial participant at discharge 3/31/16    COPD (chronic obstructive pulmonary disease) ([facility]     Depression     Diabetic neuropathy (HCC)     GERD (gastroesophageal reflux disease)     H/O cardiac catheterization 4/26/16    LMCA: Mild Irregularities 10-20%. LAD: Lesion on Prox LAD: Proximal subsection. 100% stenosis. LCx: Mild irregularities 10-20%. RCA: Mild irregularities 10-20%. Widely patent mid RCA stent. EF:60%. H/O cardiovascular stress test 10/07/2016    Overall results are most consistent with a low risk for significant CAD. H/O echocardiogram 10/05/2016    EF:>60%. Inferoseptal wall abnormal in its motion which is not unusal status post open heart surgery. Evidence of mild (grade I) diastolic dysfunction is seen. H/O tilt table evaluation 07/12/2016    Abnormal. PTs HR, Blood Pressure response and symptoms were most consistent with dysautonomia.  Combined w/viligant maintenance of euvolemia and maintaining a moderate salt intake, pharmaciologic treatment w/ ProAmatine, SSRI such as Lexapro and/or Mestinon among other treatments have shown some effectiveness in the treatment of this condition. History of cardiovascular stress test 02/13/2019    Abnormal. Small/moderate perfusion defect of mild/moderate intesity in the anterior and anterolateral regions during stress imaging which is most consistent w/ ischemia but may be artifact. Overall low/intermediate risk for significant CAD. History of echocardiogram 09/06/2018    EF >60%. Inferoseptal wall is abnormal in its motion which is not unusual s/p open heart. Evidence of mild (grade I) diastolic dysfunction seen.     Hx of blood clots     Hyperlipidemia     Hypertension     Intermittent claudication (HCC)     Kidney stones     Movement disorder     neuropathy in legs    Neuromuscular disorder (HCC)     neuropathy    Osteoarthritis     Reflux     Seizures ([facility] [address] 1980'Ss last seizure    Stented coronary artery 9/22/2010     LAD/[place]    Stented coronary artery 3/31/16    RCA/[place]    Type II or unspecified type diabetes mellitus without mention of complication, not stated as uncontrolled     Unspecified sleep apnea     no machine       Past Surgical History:        Procedure Laterality Date    ABDOMINAL HERNIA REPAIR  01/2016    repair done [place]    APPENDECTOMY      AXILLARY SURGERY Right 9/2/2022    RIGHT AXILLARY LYMPH SENTINAL NODE BIOPSY  @  12PM performed by Karma Hall DO at [address] Right 7/12/2022    NEEDLE DIRECTED ( 1130     )    RIGHT BREAST LUMPECTOMY performed by Karma Hall DO at [address] Left 04/26/2016    right radial/ [facility] [place]/ Dr Cardona [facility] Left 03/07/2019    Left Radial/[facility] [place]/    CARDIAC CATHETERIZATION  05/20/2022    Dr Ada Ruiz radial    CARDIAC CATHETERIZATION  05/20/2022    Dr Ada Ruiz radial    [address] [facility]      CABG 2019    CHOLECYSTECTOMY  1991    COLONOSCOPY  12/16/2014    -hemorrhoids,bx    CORONARY ANGIOPLASTY WITH STENT PLACEMENT  09/2010 CORONARY ANGIOPLASTY WITH STENT PLACEMENT  03/31/2016    JESSE RCA / DR Yesica Polanco    CORONARY ANGIOPLASTY WITH STENT PLACEMENT  05/20/2022    CORONARY ARTERY BYPASS GRAFT  08/15/2016    OP X 1- Dr Bonnie Jackson, DIAGNOSTIC  12/16/2014    HERNIA REPAIR  12/13/2012    at the medial aspect of a [place] RUQ scar); repaired byDr. [address]  02/16/2015    incisional, recurrent    HERNIA REPAIR  02/2016    HYSTERECTOMY (CERVIX STATUS UNKNOWN)      OTHER SURGICAL HISTORY  10/08/2015    abd wound washout, mesh removal, wound vac placement    WI SUCT NICOLE LIPECTOMY,HEAD/NECK Left 08/27/2018    THIGH LESION BIOPSY EXCISION, SOFT TISSUE MASS performed by Adria Vee MD at [address] [facility] Left 2018    leg    UPPP UVULOPALATOPHARYGOPLASTY  06/26/2012    US BREAST BIOPSY NEEDLE ADDITIONAL RIGHT Right 6/9/2022    US BREAST BIOPSY NEEDLE ADDITIONAL RIGHT 6/9/2022 [facility] ULTRASOUND    US BREAST NEEDLE BIOPSY RIGHT Right 6/9/2022    US BREAST NEEDLE BIOPSY RIGHT 6/9/2022 [facility] ULTRASOUND    US GUIDED NEEDLE LOC OF RIGHT BREAST Right 7/12/2022    US GUIDED NEEDLE LOC OF RIGHT BREAST 7/12/2022 [facility] ULTRASOUND    VENTRAL HERNIA REPAIR  09/21/2015    With Mesh - Dr Vangie Mojica       Medications Prior to Admission:    Prior to Admission medications    Medication Sig Start Date End Date Taking?  Authorizing Provider   fludrocortisone (FLORINEF) 0.1 MG tablet TAKE 3 TABLETS BY MOUTH EVERY DAY 10/6/22   Sal Mendez MD   anastrozole (ARIMIDEX) 1 MG tablet Take 1 tablet by mouth daily  Patient not taking: Reported on 10/12/2022 9/22/22 10/22/22  Adria Irene MD   acetaminophen (TYLENOL) 500 MG tablet Take 2 tablets by mouth every 6 hours as needed for Pain 9/2/22 9/22/22  Ramos George DO   DULoxetine (CYMBALTA) 30 MG extended release capsule Take 1 capsule by mouth daily 9/1/22   CHELSEA Baez - CNP   tiZANidine (ZANAFLEX) 2 MG tablet Take 2 tablets by mouth nightly as needed (spasms) TAKE 2 TABLET BY MOUTH NIGHTLY AS NEEDED(SPASMS) 9/1/22   CHELSEA Long CNP   gabapentin (NEURONTIN) 800 MG tablet Take 1 tablet by mouth 3 times daily for 30 days. 9/1/22 10/12/22  CHELSEA Long CNP   metoprolol succinate (TOPROL XL) 100 MG extended release tablet TAKE 1 AND 1/2 TABLETS BY MOUTH EVERY DAY 7/12/22   Dmitri Rodríguez MD   albuterol sulfate HFA (VENTOLIN HFA) 108 (90 Base) MCG/ACT inhaler INHALE 2 PUFFS EVERY 6 HOURS AS NEEDED FOR WHEEZING 7/7/22   CHELSEA De Leon CNP   nitroGLYCERIN (NITROSTAT) 0.4 MG SL tablet Place 1 tablet under the tongue every 5 minutes as needed for Chest pain 5/27/22   Dmitri Rodríguez MD   clopidogrel (PLAVIX) 75 MG tablet Take 1 tablet by mouth daily 5/21/22   CHELSEA Orozco CNP   TRULICITY 3 CO/9.9IW SOPN INJECT 0.5 ML (1 PEN) SUBCUTANEOUSLY WEEKLY,X30 DAYS,INSTR:ROTATE INJECTION SITES 5/9/22   [place]ula Savers   atorvastatin (LIPITOR) 80 MG tablet Take 1 tablet by mouth daily 11/1/21   Dmitri Rodríguez MD   Blood Glucose Monitoring Suppl (ONE TOUCH ULTRA 2) w/Device KIT 1 kit by Does not apply route daily 7/20/21   CHLESEA De Leon CNP   blood glucose monitor strips Test 3 times a day & as needed for symptoms of irregular blood glucose. Dispense sufficient amount for indicated testing frequency plus additional to accommodate PRN testing needs.  7/16/21   CHELSEA Negro CNP   Lancets MISC 1 each by Does not apply route 2 times daily 7/16/21   CHELSEA So CNP   Insulin Degludec (TRESIBA FLEXTOUCH) 200 UNIT/ML SOPN Inject 86 Units into the skin daily PER DR MCELROY  Patient taking differently: Inject 110 Units into the skin at bedtime 3/30/21   CHELSEA De Leon CNP   metFORMIN (GLUCOPHAGE-XR) 500 MG extended release tablet Take 2 tablets by mouth daily (with breakfast) 3/30/21   [place] Might, APRN - CNP   omeprazole (PRILOSEC) 20 MG delayed release capsule TAKE 1 CAPSULE BY MOUTH EVERY DAY IN [address] [place] [address]  Patient taking differently: Take 20 mg by mouth Daily 11/23/20   Elex FortCHELSEA Thorne - CNP   ondansetron (ZOFRAN ODT) 4 MG disintegrating tablet Take 1 tablet by mouth every 8 hours as needed for Nausea 11/10/20   Alix Friend MD   Saccharomyces boulardii (PROBIOTIC) 250 MG CAPS Take 1 capsule by mouth daily 9/30/20   Zulayx CHELSEA Kothari - CNP   acetaminophen (TYLENOL) 500 MG tablet Take 1 tablet by mouth 4 times daily as needed for Pain 9/9/20   [facility] Deats, CHELSEA - CNP   losartan (COZAAR) 25 MG tablet Take 1 tablet by mouth daily 7/23/20   CHELSEA Marina - CNP   insulin lispro, 1 Unit Dial, (HUMALOG KWIKPEN) 100 UNIT/ML SOPN Inject 20 Units into the skin 3 times daily (before meals) 6/23/20   Zulayx CHELSEA Kothari - CNP   aspirin 81 MG EC tablet TAKE 1 TABLET BY MOUTH DAILY  Patient taking differently: Take 81 mg by mouth daily 7/1/19   CHELSEA Marina - CNP   Insulin Pen Needle (BD ULTRA-FINE PEN NEEDLES) 29G X 12.7MM MISC USE AS DIRECTED BY PHYSICIAN 5 times daily 8/7/18   CHELSEA Marina - CNP   Blood Pressure Monitor KIT 1 each by Does not apply route daily as needed ESSENTIAL HYPERTENSION   I10 5/31/16   Guido Plasencia APRN - CNP       Allergies:  Tape Patricia Westfall tape]    Social History:   TOBACCO:   reports that she quit smoking about 12 years ago. Her smoking use included cigarettes. She has a 20.00 pack-year smoking history. She has never used smokeless tobacco.  ETOH:   reports no history of alcohol use. Family History:       Problem Relation Age of Onset    Cancer Mother     Diabetes Mother     Cancer Father         thyroid    Diabetes Sister     Heart Disease Sister     Heart Disease Brother     Heart Disease Maternal Uncle     Cancer Maternal Grandmother        Allergies:  Tape [adhesive tape]    Medications Prior to Admission:    Prior to Admission medications    Medication Sig Start Date End Date Taking?  Authorizing Provider   fludrocortisone (FLORINEF) 0.1 MG tablet TAKE 3 TABLETS BY MOUTH EVERY DAY 10/6/22   Steve Givens MD   anastrozole (ARIMIDEX) 1 MG tablet Take 1 tablet by mouth daily  Patient not taking: Reported on 10/12/2022 9/22/22 10/22/22  Lynda Dennis MD   acetaminophen (TYLENOL) 500 MG tablet Take 2 tablets by mouth every 6 hours as needed for Pain 9/2/22 9/22/22  Zohreh Dwyer DO   DULoxetine (CYMBALTA) 30 MG extended release capsule Take 1 capsule by mouth daily 9/1/22   CHELSEA Malik CNP   tiZANidine (ZANAFLEX) 2 MG tablet Take 2 tablets by mouth nightly as needed (spasms) TAKE 2 TABLET BY MOUTH NIGHTLY AS NEEDED(SPASMS) 9/1/22   CEHLSEA Malik CNP   gabapentin (NEURONTIN) 800 MG tablet Take 1 tablet by mouth 3 times daily for 30 days. 9/1/22 10/12/22  CHELSEA Malik CNP   metoprolol succinate (TOPROL XL) 100 MG extended release tablet TAKE 1 AND 1/2 TABLETS BY MOUTH EVERY DAY 7/12/22   Steve Givens MD   albuterol sulfate HFA (VENTOLIN HFA) 108 (90 Base) MCG/ACT inhaler INHALE 2 PUFFS EVERY 6 HOURS AS NEEDED FOR WHEEZING 7/7/22   CHELSEA Taveras CNP   nitroGLYCERIN (NITROSTAT) 0.4 MG SL tablet Place 1 tablet under the tongue every 5 minutes as needed for Chest pain 5/27/22   Steve Givens MD   clopidogrel (PLAVIX) 75 MG tablet Take 1 tablet by mouth daily 5/21/22   Ova BullsCHELSEA CNP   TRULICITY 3 FE/5.0ED SOPN INJECT 0.5 ML (1 PEN) SUBCUTANEOUSLY WEEKLY,X30 DAYS,INSTR:ROTATE INJECTION SITES 5/9/22   Nazario Renee   atorvastatin (LIPITOR) 80 MG tablet Take 1 tablet by mouth daily 11/1/21   Steve Givens MD   Blood Glucose Monitoring Suppl (ONE TOUCH ULTRA 2) w/Device KIT 1 kit by Does not apply route daily 7/20/21   CHELSEA Taveras CNP   blood glucose monitor strips Test 3 times a day & as needed for symptoms of irregular blood glucose. Dispense sufficient amount for indicated testing frequency plus additional to accommodate PRN testing needs.  7/16/21   Tae Zamudio, APRN - CNP   Lancets MISC 1 each by Does not apply route 2 times daily 7/16/21   CHELSEA So CNP   Insulin Degludec (TRESIBA FLEXTOUCH) 200 UNIT/ML SOPN Inject 86 Units into the skin daily PER DR MCELROY  Patient taking differently: Inject 110 Units into the skin at bedtime 3/30/21   CHELSEA Gates CNP   metFORMIN (GLUCOPHAGE-XR) 500 MG extended release tablet Take 2 tablets by mouth daily (with breakfast) 3/30/21   CHELSEA Gates CNP   omeprazole (PRILOSEC) 20 MG delayed release capsule TAKE 1 CAPSULE BY MOUTH EVERY DAY IN [address] [place] [address]  Patient taking differently: Take 20 mg by mouth Daily 11/23/20   CHELSEA Gates CNP   ondansetron (ZOFRAN ODT) 4 MG disintegrating tablet Take 1 tablet by mouth every 8 hours as needed for Nausea 11/10/20   Nolan Pritchard MD   Saccharomyces boulardii (PROBIOTIC) 250 MG CAPS Take 1 capsule by mouth daily 9/30/20   CHELSEA Gates CNP   acetaminophen (TYLENOL) 500 MG tablet Take 1 tablet by mouth 4 times daily as needed for Pain 9/9/20   CHELSEA Maurice CNP   losartan (COZAAR) 25 MG tablet Take 1 tablet by mouth daily 7/23/20   CHELSEA Gates CNP   insulin lispro, 1 Unit Dial, (HUMALOG KWIKPEN) 100 UNIT/ML SOPN Inject 20 Units into the skin 3 times daily (before meals) 6/23/20   CHELSEA Gates CNP   aspirin 81 MG EC tablet TAKE 1 TABLET BY MOUTH DAILY  Patient taking differently: Take 81 mg by mouth daily 7/1/19   CHELSEA Gates CNP   Insulin Pen Needle (BD ULTRA-FINE PEN NEEDLES) 29G X 12.7MM MISC USE AS DIRECTED BY PHYSICIAN 5 times daily 8/7/18   CHELSEA Gates CNP   Blood Pressure Monitor KIT 1 each by Does not apply route daily as needed ESSENTIAL HYPERTENSION   I10 5/31/16   CHELSEA Gates CNP       Review of Systems:  Constitutional:negative  for fevers, and negative for chills.   Eyes: negative for visual disturbance   ENT: negative for sore throat, negative nasal congestion, and negative for earache  Respiratory: negative for shortness 24 10/13/2022    PROT 6.9 10/13/2022    LABALBU 3.5 10/13/2022    BILITOT 0.3 10/13/2022    ALKPHOS 96 10/13/2022    ALT 5 10/13/2022    AST 8 10/13/2022       UA:   Lab Results   Component Value Date    COLORU Yellow 07/18/2022    SPECGRAV 1.025 (H) 07/18/2022    WBCUA 2 TO 5 07/18/2022    RBCUA 5 TO 10 07/18/2022    EPITHUA 2 TO 5 07/18/2022    LEUKOCYTESUR SMALL (A) 07/18/2022    GLUCOSEU NEGATIVE 07/18/2022    KETUA NEGATIVE 07/18/2022    PROTEINU NEGATIVE 07/18/2022    HGBUR 2+ (A) 07/18/2022    CASTUA NOT REPORTED 11/09/2020    CRYSTUA NOT REPORTED 11/09/2020    BACTERIA 1+ (A) 11/09/2020    YEAST 2+ (A) 11/09/2020       Lactic Acid:   Lab Results   Component Value Date    LACTA 1.9 07/18/2022       D-Dimer:  Lab Results   Component Value Date    DDIMER 0.53 10/12/2022       PT/INR:  Lab Results   Component Value Date/Time    PROTIME 10.6 04/24/2022 05:27 AM    PROTIME 10.3 09/20/2011 01:41 AM    INR 1.0 04/24/2022 05:27 AM       High Sensitivity Troponin:  Recent Labs     10/12/22  1920   TROPHS <6       ABGs:   Lab Results   Component Value Date/Time    UOR0BAL 22 08/15/2016 08:16 PM    FIO2 NOT REPORTED 11/09/2020 10:55 PM           XR CHEST PORTABLE   Final Result   Clear lungs. Cardiomegaly. No acute cardiopulmonary abnormality. EKG reviewed    Assessment:    Principal Problem:    Chest pain  Active Problems:    Essential hypertension    DCIS (ductal carcinoma in situ)    Malignant neoplasm of central portion of right breast in female, estrogen receptor positive ([address]  Resolved Problems:    * No resolved hospital problems.  *      Patient Active Problem List    Diagnosis Date Noted    Abscess of left great toe 06/15/2021    Abnormal cardiovascular stress test     Heart palpitations 05/10/2016    Essential hypertension     Osteopenia of multiple sites 09/22/2022    Malignant neoplasm of central portion of right breast in female, estrogen receptor positive ([address] 07/21/2022    DCIS (ductal carcinoma in situ) 06/16/2022    S/P angioplasty with stent 05/20/2022    Chest pain 04/23/2022    Epigastric pain 04/23/2022    [facility] 04/23/2022    Obesity (BMI 30-39.9) 04/23/2022    Paronychia of great toe of left foot 06/15/2021    Muscle spasms of both lower extremities 05/21/2020    Soft tissue mass     Neuropathic pain 03/01/2018    Precordial pain 05/09/2017    DARON (obstructive sleep apnea) 05/09/2017    S/P CABG x 1 09/26/2016    Angina, class III ([facility] 07/08/2016    Coronary artery disease involving native coronary artery of native heart     Dysautonomia orthostatic hypotension syndrome 05/11/2016    Primary osteoarthritis involving multiple joints 04/14/2016    Uncontrolled type 2 diabetes mellitus with diabetic polyneuropathy, with long-term current use of insulin 11/23/2015    Gastroesophageal reflux disease without esophagitis 10/27/2015    History of ventral hernia repair 10/06/2015    History of incisional hernia repair 10/06/2015    Affective disorder ([facility] 05/22/2014    Muscle spasm 09/24/2013    Gait difficulty 03/18/2013    Diabetic neuropathy 03/18/2013    Back pain 03/18/2013    Bilateral leg weakness 03/02/2013    Insomnia 12/22/2012    Allergic rhinitis 12/22/2012    COPD (chronic obstructive pulmonary disease) 12/22/2012    Depression 12/22/2012    Dyslipidemia 11/22/2011    Radiculopathy 11/22/2011       Plan:      This patient requires overnight observation because of chest pain  Factors affecting the medical complexity of this patient include right breast CA, CAD, HTN  Estimated length of stay is 1 days  Chest Pain  Appreciate Cardiology  I do not feel this is cardiac but related to her radation  Does have history of CABG and stents  CAD  Continue ASA, Lipitor, Plavix  HTN  Continue losartan, Toprol XL  Breast CA  Currently undergoing radiation  DVT prophylaxis: Lovenox  Peptic ulcer prophylaxis: Pepcid  High risk medications: none  Social Service and Case Management consults for DC planning  Dietician consult initiated    CORE MEASURES  DVT prophylaxis: Lovenox  Decubitus ulcer present on admission: No  CODE STATUS: FULL CODE  Nutrition Status: good   Physical therapy: No   Old Charts reviewed: Yes  EKG Reviewed:  Yes  Advance Directive Addressed: Yes    CHELSEA Payne - CNP, APRN, NP-C  10/13/2022, 10:07 AM

## 2022-10-13 NOTE — PROGRESS NOTES
Vitals and assessment done at this time. See flowsheet for more details. Pt denied any chest pain, chest heaviness, palpitations, and lightheadedness. Pt resting in bed at this time. Call light within reach. Will continue to monitor.

## 2022-10-13 NOTE — PROGRESS NOTES
All questions and concerns answered. Discharge instructions given. Pt denied any other needs at this time. Pt walked out to personal vehicle.

## 2022-10-13 NOTE — PROGRESS NOTES
Comprehensive Nutrition Assessment    Type and Reason for Visit:  Initial    Nutrition Recommendations/Plan:   Continue current diet. Pt would benefit from comprehensive outpatient diabetes education. Attach CC/heart healthy diet information materials to d/c. Malnutrition Assessment:  Malnutrition Status:  No malnutrition (10/13/22 7959)    Context:  Acute Illness     Findings of the 6 clinical characteristics of malnutrition:  Energy Intake:  No significant decrease in energy intake  Weight Loss:  No significant weight loss     Body Fat Loss:  No significant body fat loss     Muscle Mass Loss:  No significant muscle mass loss    Fluid Accumulation:  No significant fluid accumulation     Strength:  Not Performed    Nutrition Assessment:    Altered nutrition related labs r/t endocrine dysfunction aeb A1c 9.3, glucose 215. On a probiotic. Pt denied any weight or PO changes. States she \"sometimes\" counts carbohydrates. Pt encouraged to count carbs at every meal to improve glycemic control. She does report eating 3 meals per day and walking. States she tries to follow heart healthy plan too. Denied any diet education needs at this time. Will attach heart healthy CC diet information to d/c. Nutrition Related Findings:    appears well nourished Wound Type: None       Current Nutrition Intake & Therapies:    Average Meal Intake: Unable to assess  Average Supplements Intake: None Ordered  ADULT DIET; Regular; 3 carb choices (45 gm/meal)    Anthropometric Measures:  Height: 5' 3\" (160 cm)  Ideal Body Weight (IBW): 115 lbs (52 kg)    Admission Body Weight: 219 lb 5 oz (99.5 kg)  Current Body Weight: 219 lb 5.7 oz (99.5 kg), 190.7 % IBW.  Weight Source: Bed Scale  Current BMI (kg/m2): 38.9  Usual Body Weight: 213 lb (96.6 kg)  % Weight Change (Calculated): 3  Weight Adjustment For: No Adjustment                 BMI Categories: Obese Class 2 (BMI 35.0 -39.9)      Nutrition Diagnosis:   Altered nutrition-related lab values related to endocrine dysfuntion as evidenced by lab values    Nutrition Interventions:   Food and/or Nutrient Delivery: Continue Current Diet  Nutrition Education/Counseling: Education declined  Coordination of Nutrition Care: Continue to monitor while inpatient  Plan of Care discussed with: Patient    Goals:     Goals: PO intake 75% or greater     Recent Labs     10/12/22  1920 10/13/22  0620   * 136   K 4.2 4.0   CL 98 102   CO2 23 24   BUN 10 11   CREATININE 0.74 0.63   GLUCOSE 321* 215*   ALT 6 5   ALKPHOS 106* 96      Lab Results   Component Value Date/Time    LABALBU 3.5 10/13/2022 06:20 AM    LABALBU 4.2 12/05/2011 11:35 AM       Lab Results   Component Value Date/Time    LABA1C 9.3 07/01/2022 10:26 AM    No results for input(s): POCGLU in the last 72 hours.    Lab Results   Component Value Date/Time    TRIG 151 12/29/2021 09:40 AM    HDL 40 12/29/2021 09:40 AM      Nutrition Monitoring and Evaluation:   Behavioral-Environmental Outcomes: Readiness for Change  Food/Nutrient Intake Outcomes: Food and Nutrient Intake  Physical Signs/Symptoms Outcomes: Biochemical Data, Weight    Discharge Planning:    Continue current diet, Recommend pursue outpatient diabetes education     Arcadio Wolff, [address],   Contact: [phone]

## 2022-10-13 NOTE — DISCHARGE SUMMARY
Discharge Summary    Jacqulyne Frankel  :  1965  MRN:  [id number]    Admit date:  10/12/2022      Discharge date: 10/13/2022     Admitting Physician:  Dia Edwards MD    Discharge Diagnoses:    Principal Problem:    Chest pain  Active Problems:    Essential hypertension    DCIS (ductal carcinoma in situ)    Malignant neoplasm of central portion of right breast in female, estrogen receptor positive ([address]  Resolved Problems:    * No resolved hospital problems. [facility] Course: Jacqulyne Frankel is a 62 y.o. female admitted with chest pain. She presented to the ER with complaints of CP. She stated that it started on her right side and thought it was due to her Breast CA and undergoing radiation. She has CA on the right an dis on  treatment. No skin irritaton. She stated the pain then became a \"heaviness\". She has history of cardiac stent in May. She has had no further CP since admission. Denied CP with activity. During patient's admission cardiology was consulted and patient was started on Zetia and Imdur. Patient had echocardiogram completed. Patient is remained chest pain-free and will be discharged home today. She will follow-up with her cardiologist as an outpatient. Consultants:  Dr. Timo Maurice, cardiology    Procedures: None    Complications: none    Discharge Condition: fair    Exam:  GEN:    Awake, alert and oriented x3. EYES:   EOMI, pupils equal   NECK: Supple. No lymphadenopathy. No carotid bruit  CVS:     regular rate and rhythm, no audible murmur  PULM:  CTA, no wheezes, rales or rhonchi, no acute respiratory distress  ABD:     Bowels sounds normal.  Abdomen is soft. No distention. no tenderness to palpation. EXT:     no edema bilaterally . No calf tenderness. NEURO: Moves all extremities. Motor and sensory are grossly intact  SKIN:    No rashes. No skin lesions.       Significant Diagnostic Studies:   Lab Results   Component Value Date    WBC 7.5 10/13/2022    HGB 10.9 (L) 10/13/2022     10/13/2022       Lab Results   Component Value Date    BUN 11 10/13/2022    CREATININE 0.63 10/13/2022     10/13/2022    K 4.0 10/13/2022    CALCIUM 9.2 10/13/2022     10/13/2022    CO2 24 10/13/2022    LABGLOM >60 10/13/2022       Lab Results   Component Value Date    WBCUA 2 TO 5 07/18/2022    RBCUA 5 TO 10 07/18/2022    EPITHUA 2 TO 5 07/18/2022    LEUKOCYTESUR SMALL (A) 07/18/2022    SPECGRAV 1.025 (H) 07/18/2022    GLUCOSEU NEGATIVE 07/18/2022    KETUA NEGATIVE 07/18/2022    PROTEINU NEGATIVE 07/18/2022    HGBUR 2+ (A) 07/18/2022    CASTUA NOT REPORTED 11/09/2020    CRYSTUA NOT REPORTED 11/09/2020    BACTERIA 1+ (A) 11/09/2020    YEAST 2+ (A) 11/09/2020       XR CHEST PORTABLE    Result Date: 10/12/2022  EXAMINATION: ONE XRAY VIEW OF THE CHEST 10/12/2022 6:49 pm COMPARISON: April 22, 2022 HISTORY: ORDERING SYSTEM PROVIDED HISTORY: chest pain TECHNOLOGIST PROVIDED HISTORY: chest pain FINDINGS: A portable upright frontal view chest radiograph was obtained. The heart is mildly enlarged. Sternal fixation wires are present. The mediastinal contour and pleural spaces are otherwise within normal limits. The lungs are grossly clear. There is no focal consolidation or pneumothorax. The pulmonary vascular pattern is within normal limits. No acute thoracic osseous abnormality. Clear lungs. Cardiomegaly. No acute cardiopulmonary abnormality.        Assessment and Plan:  Patient Active Problem List    Diagnosis Date Noted    Abscess of left great toe 06/15/2021    Abnormal cardiovascular stress test     Heart palpitations 05/10/2016    Essential hypertension     Osteopenia of multiple sites 09/22/2022    Malignant neoplasm of central portion of right breast in female, estrogen receptor positive ([facility] 07/21/2022    DCIS (ductal carcinoma in situ) 06/16/2022    S/P angioplasty with stent 05/20/2022    Chest pain 04/23/2022    Epigastric pain 04/23/2022    ACS (acute coronary syndrome) ([facility] 04/23/2022    Obesity (BMI 30-39.9) 04/23/2022    Paronychia of great toe of left foot 06/15/2021    Muscle spasms of both lower extremities 05/21/2020    Soft tissue mass     Neuropathic pain 03/01/2018    Precordial pain 05/09/2017    DARON (obstructive sleep apnea) 05/09/2017    S/P CABG x 1 09/26/2016    Angina, class III ([facility] 07/08/2016    Coronary artery disease involving native coronary artery of native heart     Dysautonomia orthostatic hypotension syndrome 05/11/2016    Primary osteoarthritis involving multiple joints 04/14/2016    Uncontrolled type 2 diabetes mellitus with diabetic polyneuropathy, with long-term current use of insulin 11/23/2015    Gastroesophageal reflux disease without esophagitis 10/27/2015    History of ventral hernia repair 10/06/2015    History of incisional hernia repair 10/06/2015    Affective disorder ([facility] 05/22/2014    Muscle spasm 09/24/2013    Gait difficulty 03/18/2013    Diabetic neuropathy 03/18/2013    Back pain 03/18/2013    Bilateral leg weakness 03/02/2013    Insomnia 12/22/2012    Allergic rhinitis 12/22/2012    COPD (chronic obstructive pulmonary disease) 12/22/2012    Depression 12/22/2012    Dyslipidemia 11/22/2011    Radiculopathy 11/22/2011        Discharge Medications:         Medication List        START taking these medications      ezetimibe 10 MG tablet  Commonly known as: ZETIA  Take 1 tablet by mouth nightly     isosorbide mononitrate 30 MG extended release tablet  Commonly known as: IMDUR  Take 1 tablet by mouth daily            CHANGE how you take these medications      acetaminophen 500 MG tablet  Commonly known as: TYLENOL  Take 1 tablet by mouth 4 times daily as needed for Pain  What changed: Another medication with the same name was removed. Continue taking this medication, and follow the directions you see here. aspirin 81 MG EC tablet  TAKE 1 TABLET BY MOUTH DAILY  What changed: See the new instructions.      omeprazole 20 MG delayed release capsule  Commonly known as: PRILOSEC  TAKE 1 CAPSULE BY MOUTH EVERY DAY IN THE MORNING BEFORE BREAKFAST  What changed: See the new instructions. CONTINUE taking these medications      albuterol sulfate  (90 Base) MCG/ACT inhaler  Commonly known as: Ventolin HFA  INHALE 2 PUFFS EVERY 6 HOURS AS NEEDED FOR WHEEZING     atorvastatin 80 MG tablet  Commonly known as: LIPITOR  Take 1 tablet by mouth daily     blood glucose test strips  Test 3 times a day & as needed for symptoms of irregular blood glucose. Dispense sufficient amount for indicated testing frequency plus additional to accommodate PRN testing needs. Blood Pressure Monitor Kit  1 each by Does not apply route daily as needed ESSENTIAL HYPERTENSION   I10     clopidogrel 75 MG tablet  Commonly known as: PLAVIX  Take 1 tablet by mouth daily     DULoxetine 30 MG extended release capsule  Commonly known as: CYMBALTA  Take 1 capsule by mouth daily     fludrocortisone 0.1 MG tablet  Commonly known as: FLORINEF  TAKE 3 TABLETS BY MOUTH EVERY DAY     gabapentin 800 MG tablet  Commonly known as: Neurontin  Take 1 tablet by mouth 3 times daily for 30 days.      insulin lispro (1 Unit Dial) 100 UNIT/ML Sopn  Commonly known as: HumaLOG KwikPen  Inject 20 Units into the skin 3 times daily (before meals)     Insulin Pen Needle 29G X 12.7MM Misc  Commonly known as: BD ULTRA-FINE PEN NEEDLES  USE AS DIRECTED BY PHYSICIAN 5 times daily     Lancets Misc  1 each by Does not apply route 2 times daily     losartan 25 MG tablet  Commonly known as: COZAAR  Take 1 tablet by mouth daily     metFORMIN 500 MG extended release tablet  Commonly known as: GLUCOPHAGE-XR  Take 2 tablets by mouth daily (with breakfast)     metoprolol succinate 100 MG extended release tablet  Commonly known as: TOPROL XL  TAKE 1 AND 1/2 TABLETS BY MOUTH EVERY DAY     nitroGLYCERIN 0.4 MG SL tablet  Commonly known as: NITROSTAT  Place 1 tablet under the tongue every 5 minutes as needed for Chest pain     ondansetron 4 MG disintegrating tablet  Commonly known as: Zofran ODT  Take 1 tablet by mouth every 8 hours as needed for Nausea     ONE TOUCH ULTRA 2 w/Device Kit  1 kit by Does not apply route daily     Probiotic 250 MG Caps  Take 1 capsule by mouth daily     tiZANidine 2 MG tablet  Commonly known as: ZANAFLEX  Take 2 tablets by mouth nightly as needed (spasms) TAKE 2 TABLET BY MOUTH NIGHTLY AS NEEDED(SPASMS)     Trulicity 3 OG/0.6RY Sopn  Generic drug: Dulaglutide            STOP taking these medications      anastrozole 1 MG tablet  Commonly known as: ARIMIDEX            ASK your doctor about these medications      Tresiba FlexTouch 200 UNIT/ML Sopn  Generic drug: Insulin Degludec  Inject 86 Units into the skin daily PER DR MCELROY               Where to Get Your Medications        These medications were sent to [facility]/pharmacy #[id number]- [place] - [address] [phone] [facility] [phone]  [facility] [address]      Phone: [phone]   ezetimibe 10 MG tablet  isosorbide mononitrate 30 MG extended release tablet         Patient Instructions:    Activity: activity as tolerated  Diet: cardiac diet  Wound Care: none needed  Other: none     Disposition:   Discharge to Home    Follow up:  Patient will be followed by CHELSEA Zhang CNP in 1-2 weeks    CORE MEASURES on Discharge (if applicable)  ACE/ARB in CHF: NA  Statin in MI: NA  ASA in MI: NA  Statin in CVA: NA  Antiplatelet in CVA: NA    Total time spent on discharge services: 40 minutes    Including the following activities:  Evaluation and Management of patient  Discussion with patient and/or surrogate about current care plan  Coordination with Case Management and/or   Coordination of care with Consultants (if applicable)   Coordination of care with Receiving Facility Physician (if applicable)  Completion of DME forms (if applicable)  Preparation of Discharge Summary  Preparation of Medication Reconciliation  Preparation of Discharge Prescriptions    Signed:  CHELSEA Nur - CNP, CHELSEA, NP-C  10/13/2022, 4:00 PM

## 2022-10-13 NOTE — PROGRESS NOTES
Vitals and assessment done at this time. Pt resting in bed. Pt denied any pain or chest heaviness at this time. Pt denied feeling light headed or any palpitations. Pt denied any other symptoms at this time. Pt denied any other needs at this time. Call light within reach, will continue to monitor.

## 2022-10-13 NOTE — PROGRESS NOTES
Writer at bedside to complete admission assessment, navigator and vital signs. Pt arrived to floor via wheelchair. Pt transfered independently. Shift assessment, vital signs and addmission navigator complete, see flow sheet for details. Pt stated pain level is 0/10. Pt denies any other needs at this time. Will continue to monitor.

## 2022-10-13 NOTE — CONSULTS
Roula Oviedo am scribing for and in the presence of [place], [place]. Patient: Tari Trevino  : 1965  Date of Admission: 10/12/2022  Primary Care Physician: Dajuan Plasencia  Today's Date: 10/13/2022    REASON FOR CONSULTATION: Chest Pain (Pt arrives with c/o Rt sided chest pressure that began approx 30 minutes prior. Pt has previous cardiac hx of 2 MI and 3 stent placement. Most recent stent placed in )      HPI: Ms. Chi Acuña is a 62 y.o. female who was admitted to the hospital with chest pain. She who presents with a history of intermittent episodes of back and chest discomfort that started developing since her recent CABG involving a LIMA-LAD 8/15/16. She also has a history of  dysautonomia on a tilt table test, and was started on Florinef as well as Lexapro. Recent heart cath done 3/7/2019 shows severe single vessel disease involving LAD Normal LVEDP. She had a Holter monitor done on 10/23/2019 that did show PAC's and PVC's but was largely unremarkable. She did come to the ER on 2022 due to abdominal pain and shortness of breath. She was told her EKG was abnormal and also she had an elevated troponin which was only 15 ng/L and only had minor EKG changes. She was sent to [facility]. 's for this and she was not very happy about this. Cardiovascular stress test done on 2022 showed overall, these results are most consistent with an intermediate/high risk for significant coronary artery disease. Echocardiogram on 2022 showed EF:>55% with mild diastolic dysfunction and LV wall thickness mildly increased. She had a heart cath done back in May and had successful PTAC -JESSE Left Main disease to treat large dominant native LCX at that time. Patient is admitted to the hospital with right-sided chest pain that moved into the center of the chest.  She said the pain lasted for about 2 hours. It is different than the pain she had prior to her bypass and stents. She attributes her pain this time to right chest radiation secondary to right breast cancer. She has no further episodes of chest pain since admission to the hospital.  Her ECG showed no acute ischemic changes. Her troponins remained normal.  She was completely asymptomatic by the time I saw her this afternoon. Decision is made to escalate her antianginal medication and optimize her antidyslipidemic drugs and have her follow-up with Dr. Aimee Stuart as early as next week since acute coronary syndrome has been ruled out. Patient counseled extensively to come to the emergency room if she has recurrence of the chest pain. Past Medical History:   Diagnosis Date    Anxiety     Asthma     CAD (coronary artery disease)     x1 stent 2010,x1 stent 2016, x1 stent May 2022    Cancer [facility]) 06/2022    right breast cancer    Clinical trial participant at discharge 3/31/16    COPD (chronic obstructive pulmonary disease) ([address]     Depression     Diabetic neuropathy (HCC)     GERD (gastroesophageal reflux disease)     H/O cardiac catheterization 4/26/16    LMCA: Mild Irregularities 10-20%. LAD: Lesion on Prox LAD: Proximal subsection. 100% stenosis. LCx: Mild irregularities 10-20%. RCA: Mild irregularities 10-20%. Widely patent mid RCA stent. EF:60%. H/O cardiovascular stress test 10/07/2016    Overall results are most consistent with a low risk for significant CAD. H/O echocardiogram 10/05/2016    EF:>60%. Inferoseptal wall abnormal in its motion which is not unusal status post open heart surgery. Evidence of mild (grade I) diastolic dysfunction is seen. H/O tilt table evaluation 07/12/2016    Abnormal. PTs HR, Blood Pressure response and symptoms were most consistent with dysautonomia.  Combined w/viligant maintenance of euvolemia and maintaining a moderate salt intake, pharmaciologic treatment w/ ProAmatine, SSRI such as Lexapro and/or Mestinon among other treatments have shown some effectiveness in the treatment of this condition. History of cardiovascular stress test 02/13/2019    Abnormal. Small/moderate perfusion defect of mild/moderate intesity in the anterior and anterolateral regions during stress imaging which is most consistent w/ ischemia but may be artifact. Overall low/intermediate risk for significant CAD. History of echocardiogram 09/06/2018    EF >60%. Inferoseptal wall is abnormal in its motion which is not unusual s/p open heart. Evidence of mild (grade I) diastolic dysfunction seen. Hx of blood clots     Hyperlipidemia     Hypertension     Intermittent claudication (HCC)     Kidney stones     Movement disorder     neuropathy in legs    Neuromuscular disorder (HCC)     neuropathy    Osteoarthritis     Reflux     Seizures ([facility] 1980'Ss last seizure    Stented coronary artery 9/22/2010     LAD/[place]    Stented coronary artery 3/31/16    RCA/[place]    Type II or unspecified type diabetes mellitus without mention of complication, not stated as uncontrolled     Unspecified sleep apnea     no machine       CURRENT ALLERGIES: Tape [adhesive tape] REVIEW OF SYSTEMS: 14 systems were reviewed. Pertinent positives and negatives as above, all else negative.      Past Surgical History:   Procedure Laterality Date    ABDOMINAL HERNIA REPAIR  01/2016    repair done [place]    APPENDECTOMY      AXILLARY SURGERY Right 9/2/2022    RIGHT AXILLARY LYMPH SENTINAL NODE BIOPSY  @  12PM performed by Zion Bridges DO at [address] Right 7/12/2022    NEEDLE DIRECTED ( 1130     )    RIGHT BREAST LUMPECTOMY performed by Zion Bridges DO at [address] [facility] Left 04/26/2016    [facility] 03/07/2019    Left Radial/[facility]/    CARDIAC CATHETERIZATION  05/20/2022    Dr Sadia William radial    CARDIAC CATHETERIZATION  05/20/2022    Dr Sadia William radial    [facility] CABG     CHOLECYSTECTOMY  1991    COLONOSCOPY  2014    -hemorrhoids,bx    CORONARY ANGIOPLASTY WITH STENT PLACEMENT  2010    CORONARY ANGIOPLASTY WITH STENT PLACEMENT  2016    JESSE RCA / DR Elio Garnett    CORONARY ANGIOPLASTY WITH STENT PLACEMENT  2022    CORONARY ARTERY BYPASS GRAFT  08/15/2016    OP X 1- Dr Zana Gregory, COLON, DIAGNOSTIC  2014    HERNIA REPAIR  2012    at the medial aspect of a [place] RUQ scar); repaired [facility]. [facility]  2015    incisional, recurrent    HERNIA REPAIR  2016    HYSTERECTOMY (CERVIX STATUS UNKNOWN)      OTHER SURGICAL HISTORY  10/08/2015    abd wound washout, mesh removal, wound vac placement    ND SUCT NICOLE LIPECTOMY,HEAD/NECK Left 2018    THIGH LESION BIOPSY EXCISION, SOFT TISSUE MASS performed by Aram Acevedo MD at [address] [place] [address] Left 2018    leg    UPPP UVULOPALATOPHARYGOPLASTY  2012    US BREAST BIOPSY NEEDLE ADDITIONAL RIGHT Right 2022    US BREAST BIOPSY NEEDLE ADDITIONAL RIGHT 2022 [facility] ULTRASOUND    US BREAST NEEDLE BIOPSY RIGHT Right 2022    US BREAST NEEDLE BIOPSY RIGHT 2022 [facility] ULTRASOUND    US GUIDED NEEDLE LOC OF RIGHT BREAST Right 2022    US GUIDED NEEDLE LOC OF RIGHT BREAST 2022 [facility] ULTRASOUND    VENTRAL HERNIA REPAIR  2015    With [facility] - Dr Amada Mena History:  Social History     Tobacco Use    Smoking status: Former     Packs/day: 2.00     Years: 10.00     Pack years: 20.00     Types: Cigarettes     Quit date: 2010     Years since quittin.0    Smokeless tobacco: Never   Vaping Use    Vaping Use: Never used   Substance Use Topics    Alcohol use: No    Drug use: No        CURRENT MEDICATIONS:  Prior to Admission medications    Medication Sig Start Date End Date Taking?  Authorizing Provider   isosorbide mononitrate (IMDUR) 30 MG extended release tablet Take 1 tablet by mouth daily 10/13/22  Yes Lupillo Res, APRN - CNP   ezetimibe (ZETIA) 10 MG tablet Take 1 tablet by mouth nightly 10/13/22  Yes Del CHELSEA CNP   fludrocortisone (FLORINEF) 0.1 MG tablet TAKE 3 TABLETS BY MOUTH EVERY DAY 10/6/22   Susan Daniel MD   DULoxetine (CYMBALTA) 30 MG extended release capsule Take 1 capsule by mouth daily 9/1/22   Odetta Lesch, APRN - CNP   tiZANidine (ZANAFLEX) 2 MG tablet Take 2 tablets by mouth nightly as needed (spasms) TAKE 2 TABLET BY MOUTH NIGHTLY AS NEEDED(SPASMS) 9/1/22   Odetta Lesch, APRN - CNP   gabapentin (NEURONTIN) 800 MG tablet Take 1 tablet by mouth 3 times daily for 30 days. 9/1/22 10/12/22  Odetta Lesch, APRN - CNP   metoprolol succinate (TOPROL XL) 100 MG extended release tablet TAKE 1 AND 1/2 TABLETS BY MOUTH EVERY DAY 7/12/22   Susan Daniel MD   albuterol sulfate HFA (VENTOLIN HFA) 108 (90 Base) MCG/ACT inhaler INHALE 2 PUFFS EVERY 6 HOURS AS NEEDED FOR WHEEZING 7/7/22   CHELSEA Barr CNP   nitroGLYCERIN (NITROSTAT) 0.4 MG SL tablet Place 1 tablet under the tongue every 5 minutes as needed for Chest pain 5/27/22   Susan Daniel MD   clopidogrel (PLAVIX) 75 MG tablet Take 1 tablet by mouth daily 5/21/22   CHELSEA Granados CNP   TRULICITY 3 QO/2.9GD SOPN INJECT 0.5 ML (1 PEN) SUBCUTANEOUSLY WEEKLY,X30 DAYS,INSTR:ROTATE INJECTION SITES 5/9/22   Yunier Reese   atorvastatin (LIPITOR) 80 MG tablet Take 1 tablet by mouth daily 11/1/21   Susan Daniel MD   Blood Glucose Monitoring Suppl (ONE TOUCH ULTRA 2) w/Device KIT 1 kit by Does not apply route daily 7/20/21   CHELSEA Barr CNP   blood glucose monitor strips Test 3 times a day & as needed for symptoms of irregular blood glucose. Dispense sufficient amount for indicated testing frequency plus additional to accommodate PRN testing needs.  7/16/21   Clydie Felty Deats, APRN - CNP   Lancets MISC 1 each by Does not apply route 2 times daily 7/16/21   Clydie Felty Deats, APRN - CNP   Insulin Degludec (Mayela Carrion) 200 UNIT/ML SOPN Inject 86 Units into the skin daily PER DR MCELROY  Patient taking differently: Inject 110 Units into the skin at bedtime 3/30/21   CHELSEA Rosales CNP   metFORMIN (GLUCOPHAGE-XR) 500 MG extended release tablet Take 2 tablets by mouth daily (with breakfast) 3/30/21   CHELSEA Rosales CNP   omeprazole (PRILOSEC) 20 MG delayed release capsule TAKE 1 CAPSULE BY MOUTH EVERY DAY IN [address] [place] [address]  Patient taking differently: Take 20 mg by mouth Daily 11/23/20   CHELSEA Rosales CNP   ondansetron (ZOFRAN ODT) 4 MG disintegrating tablet Take 1 tablet by mouth every 8 hours as needed for Nausea 11/10/20   Cal Angulo MD   Saccharomyces boulardii (PROBIOTIC) 250 MG CAPS Take 1 capsule by mouth daily 9/30/20   CHELSEA Rosales CNP   acetaminophen (TYLENOL) 500 MG tablet Take 1 tablet by mouth 4 times daily as needed for Pain 9/9/20   CHELSEA Dunham CNP   losartan (COZAAR) 25 MG tablet Take 1 tablet by mouth daily 7/23/20   CHELSEA Rosales CNP   insulin lispro, 1 Unit Dial, (HUMALOG KWIKPEN) 100 UNIT/ML SOPN Inject 20 Units into the skin 3 times daily (before meals) 6/23/20   CHELSEA Rosales CNP   aspirin 81 MG EC tablet TAKE 1 TABLET BY MOUTH DAILY  Patient taking differently: Take 81 mg by mouth daily 7/1/19   CHELSEA Rosales CNP   Insulin Pen Needle (BD ULTRA-FINE PEN NEEDLES) 29G X 12.7MM MISC USE AS DIRECTED BY PHYSICIAN 5 times daily 8/7/18   CHELSEA Rosales CNP   Blood Pressure Monitor KIT 1 each by Does not apply route daily as needed ESSENTIAL HYPERTENSION   I10 5/31/16   CHELSEA Rosales CNP                FAMILY HISTORY: family history includes Cancer in her father, maternal grandmother, and mother; Diabetes in her mother and sister; Heart Disease in her brother, maternal uncle, and sister.      PHYSICAL EXAM:   /67   Pulse 83   Temp 96.8 °F (36 °C) (Temporal)   Resp 18   Ht 5' 3\" (1.6 m)   Wt 219 lb 5.7 oz (99.5 kg) LMP 12/05/1996   SpO2 95%   BMI 38.86 kg/m²  Body mass index is 38.86 kg/m². Constitutional: She is oriented to person, place, and time. She appears well-developed and well-nourished. In no acute distress. HEENT: Normocephalic and atraumatic. No JVD present. Carotid bruit is not present. No mass and no thyromegaly present. No lymphadenopathy present. Cardiovascular: Normal rate, regular rhythm, normal heart sounds. Exam reveals no gallop and no friction rubs. No murmur was heard. .  Pulmonary/Chest: Effort normal and breath sounds normal. No respiratory distress. She has no wheezes, rhonchi or rales. Abdominal: Soft, non-tender. Bowel sounds and aorta are normal. She exhibits no organomegaly, mass or bruit. Extremities: Trace. No cyanosis or clubbing. 2+ radial and carotid pulses. Distal extremity pulses: 2+ bilaterally. .  Neurological: She is alert and oriented to person, place, and time. No evidence of gross cranial nerve deficit. Coordination appeared normal.   Skin: Skin is warm and dry. There is no rash or diaphoresis. Psychiatric: She has a normal mood and affect.  Her speech is normal and behavior is normal.      MOST RECENT LABS ON RECORD:   Lab Results   Component Value Date    WBC 7.5 10/13/2022    HGB 10.9 (L) 10/13/2022    HCT 33.4 (L) 10/13/2022     10/13/2022    CHOL 165 12/29/2021    TRIG 151 (H) 12/29/2021    HDL 40 (L) 12/29/2021    LDLCHOLESTEROL 95 12/29/2021    ALT 5 10/13/2022    AST 8 10/13/2022     10/13/2022    K 4.0 10/13/2022     10/13/2022    CREATININE 0.63 10/13/2022    BUN 11 10/13/2022    CO2 24 10/13/2022    TSH 2.80 06/04/2016    INR 1.0 04/24/2022    LABA1C 9.3 (H) 07/01/2022    LABMICR Can not be calculated 12/29/2021    BNP NOT REPORTED 05/11/2014        ASSESSMENT:  Patient Active Problem List    Diagnosis Date Noted    Abscess of left great toe 06/15/2021    Abnormal cardiovascular stress test     Heart palpitations 05/10/2016    Essential hypertension     Osteopenia of multiple sites 09/22/2022    Malignant neoplasm of central portion of right breast in female, estrogen receptor positive ([facility] [address] 07/21/2022    DCIS (ductal carcinoma in situ) 06/16/2022    S/P angioplasty with stent 05/20/2022    Chest pain 04/23/2022    Epigastric pain 04/23/2022    ACS (acute coronary syndrome) ([address] 04/23/2022    Obesity (BMI 30-39.9) 04/23/2022    Paronychia of great toe of left foot 06/15/2021    Muscle spasms of both lower extremities 05/21/2020    Soft tissue mass     Neuropathic pain 03/01/2018    Precordial pain 05/09/2017    DARON (obstructive sleep apnea) 05/09/2017    S/P CABG x 1 09/26/2016    Angina, class III ([address] 07/08/2016    Coronary artery disease involving native coronary artery of native heart     Dysautonomia orthostatic hypotension syndrome 05/11/2016    Primary osteoarthritis involving multiple joints 04/14/2016    Uncontrolled type 2 diabetes mellitus with diabetic polyneuropathy, with long-term current use of insulin 11/23/2015    Gastroesophageal reflux disease without esophagitis 10/27/2015    History of ventral hernia repair 10/06/2015    History of incisional hernia repair 10/06/2015    Affective disorder ([facility] 05/22/2014    Muscle spasm 09/24/2013    Gait difficulty 03/18/2013    Diabetic neuropathy 03/18/2013    Back pain 03/18/2013    Bilateral leg weakness 03/02/2013    Insomnia 12/22/2012    Allergic rhinitis 12/22/2012    COPD (chronic obstructive pulmonary disease) 12/22/2012    Depression 12/22/2012    Dyslipidemia 11/22/2011    Radiculopathy 11/22/2011       PLAN:  Atypical Chest Pain: She did have a heart cath done back in May and had successful PTCA -JESSE Left Main disease to treat large dominant native LCX at that time. She is currently chest pain-free. No recurrence of the chest pain since admission to the hospital.  ECG showed no acute ischemic changes.   Troponin remained normal.  Giving atypical chest pain  Patient is positive that the chest pain is different than the pain she used to get prior to her bypass surgery and stenting. Giving atypical nature of the chest pain, no acute ECG changes and normal troponins, I do not think repeat ischemic work-up is indicated at this point but we must escalate her antianginal therapy and optimize her antidyslipidemic drugs. I will have her follow-up with Dr. Al Jernigan next week. Counseled patient extensively to come to the emergency room if she has recurrence of the chest pain. Patient verbalized understanding and she does believe that this right-sided chest pain is caused by her right breast cancer and radiation therapy to her chest.    Coronary artery disease and myocardial ischemia: Coronary artery stent placement in 2010 and 2016 and CABG involving LIMA-LAD on 8/15/16: Currently appears well controlled  Antiplatelet Agent: Continue aspirin 81 mg daily and Plavix 75 mg daily. Beta Blocker: Continue Metoprolol succinate (Toprol XL) 100 mg daily. Stat imdur 30 mg daily. Continue Nitrostat as needed, counseled the patient extensively how to take Nitrostat. I told her she should have a low threshold to take her sublingual nitroglycerin if she think that the chest pain is cardiac in etiology particularly if the chest pain lasted for more than 5 minutes. She should repeat the nitroglycerin if the chest pain did not improve and if no improvement of that the second dose should take the third 1 and call 911. I also counseled her to take the sublingual nitroglycerin while sitting down or laying flat because it does cause hypotension. Cholesterol Reduction Therapy: Continue Atorvastatin (Lipitor) 80 mg daily. I will also start her on Zetia 10 mg daily. To optimize her lipid-lowering therapy. Dysautonomia Orthostatic Hypotension Syndrome: Currently appears to be well controlled. It sounds like her balance is not as good lately but is probably not related to this.  We will monitor at least for now. Dysautonomia is likely caused by diabetes. Symptoms are also contributed to by her gabapentin and Zanaflex. Pharmacological Management: Continue Fludocortisone (Florinef) 0.1 mg BID. Nonpharmacologic counseling: Because of her condition, I reminded her to try and keep herself well-hydrated and to take extra time when moving from laying to sitting, sitting to standing and standing to walking and not to avoid salt in her diet. I also advised her to put water by her bedside and drink this before she gets out of bed in the morning. Essential Hypertension: Controlled   ACE Inibitor/ARB: Continue losartan (Cozaar) 25 mg daily. Beta Blocker: Continue Metoprolol succinate (Toprol XL) 100 mg daily. Start her Imdur 30 mg daily, counseled her regarding possible dizziness and headache that can be caused by this medicine. I think she will tolerate it well. She has very a history of coronary artery disease and optimization of her antianginal medication, dual antiplatelet therapy and antidyslipidemic drug is absolutely needed. Hyperlipidemia: Mixed, LDL done on 12/29/2021 was 95 mg/dL   Cholesterol Reduction Therapy: Continue Atorvastatin (Lipitor) 80 mg daily. Start Zetia 10 mg daily giving suboptimal LDL. Consult patient regarding better control of her diabetes. I advised her to follow-up with her PCP soon as possible, her last hemoglobin A1c is 9.3%. Obesity: Body mass index is 38.86 kg/m². I also briefly discussed both diet and exercise strategies for her to continue to loses weight and she was very receptive to this. Once again, thank you for allowing me to participate in this patients care. Please do not hesitate to contact me if I could be of any further assistance. Sincerely,  Frank Nath MD, MS, F.A.C.C.   [facility]) [facility], [address] [facility], [facility], [address] [place] [address]  Phone: [phone], Fax: [phone]      I believe that the risk of significant morbidity and mortality related to the patient's current medical conditions are: intermediate-high. The documentation recorded by the scribe, accurately and completely reflects the services I personally performed and the decisions made by me. Irvin Patel MD, F.A.C.C.  October 13, 2022

## 2022-10-14 ENCOUNTER — TELEPHONE (OUTPATIENT)
Dept: PRIMARY CARE CLINIC | Age: 57
End: 2022-10-14

## 2022-10-14 NOTE — TELEPHONE ENCOUNTER
[facility] Transitions Initial Follow Up Call    Outreach made within 2 business days of discharge: Yes    Patient: Иван Gaines Patient : 1965   MRN: [id number]  Reason for Admission: There are no discharge diagnoses documented for the most recent discharge. Discharge Date: 10/13/22       Spoke with: Sammy Alicia     Discharge department/facility: [facility]     TCM Interactive Patient Contact:  Was patient able to fill all prescriptions: Yes  Was patient instructed to bring all medications to the follow-up visit: Yes  Is patient taking all medications as directed in the discharge summary?  Yes  Does patient understand their discharge instructions: Yes  Does patient have questions or concerns that need addressed prior to 7-14 day follow up office visit: no    Scheduled appointment with PCP within 7-14 days    Follow Up  Future Appointments   Date Time Provider Emma Gaxiola   2022 11:20 AM Mel Bruce MD [facility]   2022  2:20 PM Mel Bruce MD [facility]   2022  1:00 PM Arnold Ramirez MD [facility] onc spe TPP   2023  2:20 PM CHELSEA Anders - CNP [facility]       David Buchanan MA

## 2022-10-27 ENCOUNTER — TELEPHONE (OUTPATIENT)
Dept: ONCOLOGY | Age: 57
End: 2022-10-27

## 2022-10-27 NOTE — TELEPHONE ENCOUNTER
Name: Rama Pedroza  : 1965  MRN: [id number]    Oncology Navigation Follow-Up Note    Contact Type:  Telephone    Notes: Call made to patient for ONN follow up. No answer. Left message stating writer called to check in and assess needs. Requested returned phone call.        Electronically signed by Mart Rucker RN on 10/27/2022 at 10:24 AM

## 2022-11-01 ENCOUNTER — OFFICE VISIT (OUTPATIENT)
Dept: CARDIOLOGY | Age: 57
End: 2022-11-01
Payer: COMMERCIAL

## 2022-11-01 VITALS
BODY MASS INDEX: 38.09 KG/M2 | HEIGHT: 63 IN | SYSTOLIC BLOOD PRESSURE: 92 MMHG | RESPIRATION RATE: 18 BRPM | HEART RATE: 87 BPM | DIASTOLIC BLOOD PRESSURE: 67 MMHG | OXYGEN SATURATION: 99 % | WEIGHT: 215 LBS

## 2022-11-01 DIAGNOSIS — I25.810 CORONARY ARTERY DISEASE INVOLVING CORONARY BYPASS GRAFT OF NATIVE HEART WITHOUT ANGINA PECTORIS: Primary | ICD-10-CM

## 2022-11-01 DIAGNOSIS — R94.31 ABNORMAL ECG: ICD-10-CM

## 2022-11-01 DIAGNOSIS — R00.2 HEART PALPITATIONS: ICD-10-CM

## 2022-11-01 DIAGNOSIS — I10 ESSENTIAL HYPERTENSION: ICD-10-CM

## 2022-11-01 DIAGNOSIS — I95.1 DYSAUTONOMIA ORTHOSTATIC HYPOTENSION SYNDROME: ICD-10-CM

## 2022-11-01 DIAGNOSIS — E78.2 MIXED HYPERLIPIDEMIA: ICD-10-CM

## 2022-11-01 PROCEDURE — 3078F DIAST BP <80 MM HG: CPT | Performed by: FAMILY MEDICINE

## 2022-11-01 PROCEDURE — G9899 SCRN MAM PERF RSLTS DOC: HCPCS | Performed by: FAMILY MEDICINE

## 2022-11-01 PROCEDURE — 99214 OFFICE O/P EST MOD 30 MIN: CPT | Performed by: FAMILY MEDICINE

## 2022-11-01 PROCEDURE — G8484 FLU IMMUNIZE NO ADMIN: HCPCS | Performed by: FAMILY MEDICINE

## 2022-11-01 PROCEDURE — 3017F COLORECTAL CA SCREEN DOC REV: CPT | Performed by: FAMILY MEDICINE

## 2022-11-01 PROCEDURE — 1036F TOBACCO NON-USER: CPT | Performed by: FAMILY MEDICINE

## 2022-11-01 PROCEDURE — G8417 CALC BMI ABV UP PARAM F/U: HCPCS | Performed by: FAMILY MEDICINE

## 2022-11-01 PROCEDURE — 3074F SYST BP LT 130 MM HG: CPT | Performed by: FAMILY MEDICINE

## 2022-11-01 PROCEDURE — G8427 DOCREV CUR MEDS BY ELIG CLIN: HCPCS | Performed by: FAMILY MEDICINE

## 2022-11-01 NOTE — PROGRESS NOTES
Felicia Vázquez am scribing for and in the presence of Samule Nyhan MD, MS, F.A.C.C..   Patient: Everton Pyle  : 1965  Date of Visit: 2022    REASON FOR VISIT / CONSULTATION: Follow-up (HX: CAD, S/P stent, HTN, HLD, dysautonomia. Pt is here today for a hospital follow up on 10/12 for CP. He denies any further chest pain, she says she had radiation done that day and thinks that is why. Denies palps, SOB, light headed/dizziness. )    Dear Lary Gutiérrez, APRN - CNP,    I had the pleasure of seeing your patient Everton Pyle in consultation today. As you know, Ms. Julito Taveras is a 62 y.o. female who presents with a history of intermittent episodes of back and chest discomfort that started developing since her recent CABG involving a LIMA-LAD 8/15/16. She also has a history of  dysautonomia on a tilt table test, and was started on Florinef as well as Lexapro. Recent heart cath done 3/7/2019 shows severe single vessel disease involving LAD Normal LVEDP. She had a Holter monitor done on 10/23/2019 that did show PAC's and PVC's but was largely unremarkable. She did come to the ER on 2022 due to abdominal pain and shortness of breath. She was told her EKG was abnormal and also she had an elevated troponin which was only 15 ng/L and only had minor EKG changes. She was sent to [facility] for this and she was not very happy about this. Cardiovascular stress test done on 2022 showed Overall, these results are most consistent with an intermediate/high risk for significant coronary artery disease. Echocardiogram on 2022 showed EF:>55% with mild diastolic dysfunction and LV wall thickness mildly increased. Ms. Julito Taveras is here today for a hospital follow up on 10/12/22 for chest pain. She says she has not had any chest pain since then. She says she had radiation done that day for breast cancer and she thinks this might be why she had her chest pain that day. No leg swelling.  She thinks she is coming down with a cold but other than that she says she has no other issues. She denied any current chest pain, pressure or tightness. She denies having any lightheaded/dizziness. She denies any abdominal pain, bleeding problems, bowel issues, problems with her medications or any other concerns at this time. No cough, fever or chills. No nausea or vomiting. No falls or near falls. Bleeding Risks: Ms. Tristen Madden denies any current or recent bleeding problems including a history of a GI bleed, ulcers, recent or upcoming surgeries, blood in her stool or black tarry stools or blood in her urine. Past Medical History:   Diagnosis Date    Anxiety     Asthma     CAD (coronary artery disease)     x1 stent 2010,x1 stent 2016, x1 stent May 2022    Cancer [facility]) 06/2022    right breast cancer    Clinical trial participant at discharge 3/31/16    COPD (chronic obstructive pulmonary disease) ([facility] [address]     Depression     Diabetic neuropathy (HCC)     GERD (gastroesophageal reflux disease)     H/O cardiac catheterization 4/26/16    LMCA: Mild Irregularities 10-20%. LAD: Lesion on Prox LAD: Proximal subsection. 100% stenosis. LCx: Mild irregularities 10-20%. RCA: Mild irregularities 10-20%. Widely patent mid RCA stent. EF:60%. H/O cardiovascular stress test 10/07/2016    Overall results are most consistent with a low risk for significant CAD. H/O echocardiogram 10/05/2016    EF:>60%. Inferoseptal wall abnormal in its motion which is not unusal status post open heart surgery. Evidence of mild (grade I) diastolic dysfunction is seen. H/O tilt table evaluation 07/12/2016    Abnormal. PTs HR, Blood Pressure response and symptoms were most consistent with dysautonomia. Combined w/viligant maintenance of euvolemia and maintaining a moderate salt intake, pharmaciologic treatment w/ ProAmatine, SSRI such as Lexapro and/or Mestinon among other treatments have shown some effectiveness in the treatment of this condition. History of cardiovascular stress test 02/13/2019    Abnormal. Small/moderate perfusion defect of mild/moderate intesity in the anterior and anterolateral regions during stress imaging which is most consistent w/ ischemia but may be artifact. Overall low/intermediate risk for significant CAD. History of echocardiogram 09/06/2018    EF >60%. Inferoseptal wall is abnormal in its motion which is not unusual s/p open heart. Evidence of mild (grade I) diastolic dysfunction seen. Hx of blood clots     Hyperlipidemia     Hypertension     Intermittent claudication (HCC)     Kidney stones     Movement disorder     neuropathy in legs    Neuromuscular disorder (HCC)     neuropathy    Osteoarthritis     Reflux     Seizures ([address] 1980'Ss last seizure    Stented coronary artery 9/22/2010     LAD/[place]    Stented coronary artery 3/31/16    RCA/[place]    Type II or unspecified type diabetes mellitus without mention of complication, not stated as uncontrolled     Unspecified sleep apnea     no machine       CURRENT ALLERGIES: Tape [adhesive tape] REVIEW OF SYSTEMS: 14 systems were reviewed. Pertinent positives and negatives as above, all else negative.      Past Surgical History:   Procedure Laterality Date    ABDOMINAL HERNIA REPAIR  01/2016    repair done [place]    APPENDECTOMY      AXILLARY SURGERY Right 9/2/2022    RIGHT AXILLARY LYMPH SENTINAL NODE BIOPSY  @  12PM performed by Mariajose Gandhi DO at [address] [facility] Right 7/12/2022    NEEDLE DIRECTED ( 1130     )    RIGHT BREAST LUMPECTOMY performed by Mariajose Gandhi DO at [facility] Left 04/26/2016    right radial/ [facility] [place]/ Dr Isabel Roland Left 03/07/2019    Left Radial/[facility] [place]/    CARDIAC CATHETERIZATION  05/20/2022    Dr Mo Arreguin radial    CARDIAC CATHETERIZATION  05/20/2022    Dr Mo Arreguin radial    [facility]      CABG 2019 CHOLECYSTECTOMY  1991    COLONOSCOPY  2014    -hemorrhoids,bx    CORONARY ANGIOPLASTY WITH STENT PLACEMENT  2010    CORONARY ANGIOPLASTY WITH STENT PLACEMENT  2016    JESSE RCA / DR Laina Matos    CORONARY ANGIOPLASTY WITH STENT PLACEMENT  2022    CORONARY ARTERY BYPASS GRAFT  08/15/2016    OP X 1- Dr Ceballos Sis, COLON, DIAGNOSTIC  2014    HERNIA REPAIR  2012    at the medial aspect of a [place] RUQ scar); repaired byDr. [facility]  2015    incisional, recurrent    HERNIA REPAIR  2016    HYSTERECTOMY (CERVIX STATUS UNKNOWN)      OTHER SURGICAL HISTORY  10/08/2015    abd wound washout, mesh removal, wound vac placement    VT SUCT NICOLE LIPECTOMY,HEAD/NECK Left 2018    THIGH LESION BIOPSY EXCISION, SOFT TISSUE MASS performed by Ramonita Chatman MD at [address] [place] [address] Left 2018    leg    UPPP UVULOPALATOPHARYGOPLASTY  2012    US BREAST BIOPSY NEEDLE ADDITIONAL RIGHT Right 2022    US BREAST BIOPSY NEEDLE ADDITIONAL RIGHT 2022 [facility] ULTRASOUND    US BREAST NEEDLE BIOPSY RIGHT Right 2022    US BREAST NEEDLE BIOPSY RIGHT 2022 [facility] ULTRASOUND    US GUIDED NEEDLE LOC OF RIGHT BREAST Right 2022    US GUIDED NEEDLE LOC OF RIGHT BREAST 2022 [facility] ULTRASOUND    VENTRAL HERNIA REPAIR  2015    With [facility] - Dr Ada Grajeda History:  Social History     Tobacco Use    Smoking status: Former     Packs/day: 2.00     Years: 10.00     Pack years: 20.00     Types: Cigarettes     Quit date: 2010     Years since quittin.1    Smokeless tobacco: Never   Vaping Use    Vaping Use: Never used   Substance Use Topics    Alcohol use: No    Drug use: No        CURRENT MEDICATIONS:  Outpatient Medications Marked as Taking for the 22 encounter (Office Visit) with Reyes Robles MD   Medication Sig Dispense Refill    isosorbide mononitrate (IMDUR) 30 MG extended release tablet Take 1 tablet by mouth daily 30 tablet 3 ezetimibe (ZETIA) 10 MG tablet Take 1 tablet by mouth nightly 30 tablet 3    fludrocortisone (FLORINEF) 0.1 MG tablet TAKE 3 TABLETS BY MOUTH EVERY  tablet 3    DULoxetine (CYMBALTA) 30 MG extended release capsule Take 1 capsule by mouth daily 30 capsule 1    tiZANidine (ZANAFLEX) 2 MG tablet Take 2 tablets by mouth nightly as needed (spasms) TAKE 2 TABLET BY MOUTH NIGHTLY AS NEEDED(SPASMS) 60 tablet 5    gabapentin (NEURONTIN) 800 MG tablet Take 1 tablet by mouth 3 times daily for 30 days. 90 tablet 5    metoprolol succinate (TOPROL XL) 100 MG extended release tablet TAKE 1 AND 1/2 TABLETS BY MOUTH EVERY DAY 90 tablet 3    albuterol sulfate HFA (VENTOLIN HFA) 108 (90 Base) MCG/ACT inhaler INHALE 2 PUFFS EVERY 6 HOURS AS NEEDED FOR WHEEZING 18 g 5    clopidogrel (PLAVIX) 75 MG tablet Take 1 tablet by mouth daily 30 tablet 3    TRULICITY 3 SD/7.4NY SOPN INJECT 0.5 ML (1 PEN) SUBCUTANEOUSLY WEEKLY,X30 DAYS,INSTR:ROTATE INJECTION SITES      atorvastatin (LIPITOR) 80 MG tablet Take 1 tablet by mouth daily 90 tablet 3    Blood Glucose Monitoring Suppl (ONE TOUCH ULTRA 2) w/Device KIT 1 kit by Does not apply route daily 1 kit 0    blood glucose monitor strips Test 3 times a day & as needed for symptoms of irregular blood glucose. Dispense sufficient amount for indicated testing frequency plus additional to accommodate PRN testing needs.  300 strip 0    Lancets MISC 1 each by Does not apply route 2 times daily 300 each 1    metFORMIN (GLUCOPHAGE-XR) 500 MG extended release tablet Take 2 tablets by mouth daily (with breakfast) 180 tablet 3    omeprazole (PRILOSEC) 20 MG delayed release capsule TAKE 1 CAPSULE BY MOUTH EVERY DAY IN THE MORNING BEFORE BREAKFAST (Patient taking differently: Take 20 mg by mouth Daily) 90 capsule 1    ondansetron (ZOFRAN ODT) 4 MG disintegrating tablet Take 1 tablet by mouth every 8 hours as needed for Nausea 20 tablet 0    Saccharomyces boulardii (PROBIOTIC) 250 MG CAPS Take 1 capsule by mouth daily 30 capsule 0    acetaminophen (TYLENOL) 500 MG tablet Take 1 tablet by mouth 4 times daily as needed for Pain 40 tablet 0    losartan (COZAAR) 25 MG tablet Take 1 tablet by mouth daily 90 tablet 3    insulin lispro, 1 Unit Dial, (HUMALOG KWIKPEN) 100 UNIT/ML SOPN Inject 20 Units into the skin 3 times daily (before meals) (Patient taking differently: Inject 15 Units into the skin 3 times daily (before meals)) 12 pen 1    aspirin 81 MG EC tablet TAKE 1 TABLET BY MOUTH DAILY (Patient taking differently: Take 81 mg by mouth daily) 90 tablet 3    Insulin Pen Needle (BD ULTRA-FINE PEN NEEDLES) 29G X 12.7MM MISC USE AS DIRECTED BY PHYSICIAN 5 times daily 150 each 11    Blood Pressure Monitor KIT 1 each by Does not apply route daily as needed ESSENTIAL HYPERTENSION   I10 1 kit 0       FAMILY HISTORY: family history includes Cancer in her father, maternal grandmother, and mother; Diabetes in her mother and sister; Heart Disease in her brother, maternal uncle, and sister. PHYSICAL EXAM:   BP 92/67 (Site: Right Upper Arm, Position: Standing, Cuff Size: Medium Adult)   Pulse 87   Resp 18   Ht 5' 3\" (1.6 m)   Wt 215 lb (97.5 kg)   LMP 12/05/1996   SpO2 99%   BMI 38.09 kg/m²  Body mass index is 38.09 kg/m². Constitutional: She is obese but oriented to person, place, and time. She appears well-developed and well-nourished. In no acute distress. HEENT: Normocephalic and atraumatic. No JVD present. Carotid bruit is not present. No mass and no thyromegaly present. No lymphadenopathy present. Cardiovascular: Normal rate, regular rhythm, normal heart sounds. Exam reveals no gallop and no friction rubs. 1/6 systolic murmur, 5th intercostal space on the LEFT in the mid-clavicular line (cardiac apex). Normal rate, regular rhythm, normal heart sounds and intact distal pulses. Exam reveals no gallop and no friction rub. No significant cardiac murmur was heard.    Pulmonary/Chest: Effort normal and breath sounds normal. No respiratory distress. She has no wheezes, rhonchi or rales. Abdominal: Soft, non-tender. Bowel sounds and aorta are normal. She exhibits no organomegaly, mass or bruit. Extremities: Trace. No cyanosis or clubbing. 2+ radial and carotid pulses. Distal extremity pulses: 2+ bilaterally. Neurological: She is alert and oriented to person, place, and time. No evidence of gross cranial nerve deficit. Coordination appeared normal.   Skin: Skin is warm and dry. There is no rash or diaphoresis. Psychiatric: She has a normal mood and affect. Her speech is normal and behavior is normal.      MOST RECENT LABS ON RECORD:   Lab Results   Component Value Date    WBC 7.5 10/13/2022    HGB 10.9 (L) 10/13/2022    HCT 33.4 (L) 10/13/2022     10/13/2022    CHOL 165 12/29/2021    TRIG 151 (H) 12/29/2021    HDL 40 (L) 12/29/2021    LDLCHOLESTEROL 95 12/29/2021    ALT 5 10/13/2022    AST 8 10/13/2022     10/13/2022    K 4.0 10/13/2022     10/13/2022    CREATININE 0.63 10/13/2022    BUN 11 10/13/2022    CO2 24 10/13/2022    TSH 2.80 06/04/2016    INR 1.0 04/24/2022    LABA1C 9.3 (H) 07/01/2022    LABMICR Can not be calculated 12/29/2021    BNP NOT REPORTED 05/11/2014        ASSESSMENT:  1. Coronary artery disease involving coronary bypass graft of native heart without angina pectoris    2. Abnormal ECG    3. Heart palpitations    4. Dysautonomia orthostatic hypotension syndrome    5. Essential hypertension    6. Mixed hyperlipidemia      PLAN:  Coronary artery disease and myocardial ischemia: Coronary artery stent placement in 2010 and 2016 and CABG involving LIMA-LAD on 8/15/16: Currently appears well controlled but with her recent   Antiplatelet Agent: Continue Aspirin 81 mg daily. Beta Blocker: Continue Metoprolol succinate (Toprol XL) 150 mg daily. Anti-anginal medications: Not indicated at this time. Cholesterol Reduction Therapy: Continue Atorvastatin (Lipitor) 80 mg daily.     I ordered an Echocardiogram due to her having radiation of the chest to make sure this has not damaged the heart. History of Abnormal EKG/ EKG Changes/ Mildly Elevated Troponin Level: Reported ACS, admitted but no inpatient testing done. Tachycardia & Intermittent Heart palpitations:Awaiting on CAM monitor results: Fairly Asymptomatic at this time. Had one episode on Easter morning however no other reoccurrences. Beta Blocker: Continue Metoprolol succinate (Toprol XL) 150 mg daily. Dysautonomia Orthostatic Hypotension Syndrome: Currently appears to be well controlled. It sounds like her balance is not as good lately but is probably not related to this. We will monitor at least for now. Pharmacological Management: Continue Fludocortisone (Florinef) 0.1 mg BID. Nonpharmacologic counseling: Because of her condition, I reminded her to try and keep herself well-hydrated and to take extra time when moving from laying to sitting, sitting to standing and standing to walking and not to avoid salt in her diet. I also advised her to put water by her bedside and drink this before she gets out of bed in the morning. Essential Hypertension: Controlled   ACE Inibitor/ARB: Continue losartan (Cozaar) 25 mg daily. Beta Blocker: Continue Metoprolol succinate (Toprol XL) 150 mg daily. Hyperlipidemia: Mixed, LDL done on 12/29/2021 was 95 mg/dL   Cholesterol Reduction Therapy: Continue Atorvastatin (Lipitor) 80 mg daily. Finally, I recommended that she continue her other medications and follow up with you as previously scheduled. FOLLOW UP:   I told Ms. Lynette Carney to call my office if she had any problems, but otherwise told her to Return in about 6 months (around 5/1/2023). However, I would be happy to see her sooner should the need arise. Once again, thank you for allowing me to participate in this patients care. Please do not hesitate to contact me could I be of further assistance. Sincerely,  Victor Hugo Claudio.  Alee Caballero MD, MS, [email or web address]. [facility]) [facility], [address], [facility], [address] [place] [address]  Phone: [phone], Fax: [phone]     I believe that the risk of significant morbidity and mortality related to the patient's current medical conditions are: Intermediate. The documentation recorded by the scribe, accurately and completely reflects the services I personally performed and the decisions made by me. Reyes Robles MD, MS, F.A.C.C.  November 1, 2022

## 2022-11-01 NOTE — PATIENT INSTRUCTIONS
SURVEY:    You may be receiving a survey from [employer] regarding your visit today. Please complete the survey to enable us to provide the highest quality of care to you and your family. If you cannot score us a very good on any question, please call the office to discuss how we could have made your experience a very good one. Thank you.

## 2022-11-04 ENCOUNTER — HOSPITAL ENCOUNTER (EMERGENCY)
Age: 57
Discharge: HOME | End: 2022-11-04
Attending: EMERGENCY MEDICINE
Payer: COMMERCIAL

## 2022-11-04 VITALS
DIASTOLIC BLOOD PRESSURE: 113 MMHG | RESPIRATION RATE: 20 BRPM | HEART RATE: 108 BPM | TEMPERATURE: 98.6 F | OXYGEN SATURATION: 93 % | SYSTOLIC BLOOD PRESSURE: 163 MMHG

## 2022-11-04 DIAGNOSIS — L58.9 RADIATION DERMATITIS: Primary | ICD-10-CM

## 2022-11-04 PROCEDURE — 6370000000 HC RX 637 (ALT 250 FOR IP): Performed by: EMERGENCY MEDICINE

## 2022-11-04 PROCEDURE — 99283 EMERGENCY DEPT VISIT LOW MDM: CPT

## 2022-11-04 RX ORDER — TRIAMCINOLONE ACETONIDE 1 MG/G
CREAM TOPICAL
Qty: 15 G | Refills: 0 | Status: SHIPPED | OUTPATIENT
Start: 2022-11-04

## 2022-11-04 RX ADMIN — Medication 4 TABLET: at 22:39

## 2022-11-05 NOTE — ED PROVIDER NOTES
[address] [facility] [place] ED  EMERGENCY DEPARTMENT ENCOUNTER      Pt Name: Leesa Morgan  MRN: [id number]  [place] 1965  Date of evaluation: 11/4/2022  Provider: Tita Burch MD    CHIEF COMPLAINT       Chief Complaint   Patient presents with    Other     Pt states she had radiation on the 27th of Oct. Pt reports skin issues to the R nipple. Since radiation the skin has been sloughing off and is painful          HISTORY OF PRESENT ILLNESS      Leesa Morgan is a 62 y.o. female who presents to the emergency department for evaluation of irritation to the right breast.  States that she completed her course of radiation for breast cancer on October 27 and has had, over the past few days, increasing irritation and skin sloughing around the right nipple. Denies any discharge from the nipple. No pus from the affected area. No fever, nausea or vomiting. No other breast pain complaints. No abdominal pain. No chest pain. No dyspnea. No other complaints at this time. REVIEW OF SYSTEMS       As above in HPI. PAST MEDICAL HISTORY     Past Medical History:   Diagnosis Date    Anxiety     Asthma     CAD (coronary artery disease)     x1 stent 2010,x1 stent 2016, x1 stent May 2022    Cancer [facility]) 06/2022    right breast cancer    Clinical trial participant at discharge 3/31/16    COPD (chronic obstructive pulmonary disease) ([facility] [address]     Depression     Diabetic neuropathy (HCC)     GERD (gastroesophageal reflux disease)     H/O cardiac catheterization 4/26/16    LMCA: Mild Irregularities 10-20%. LAD: Lesion on Prox LAD: Proximal subsection. 100% stenosis. LCx: Mild irregularities 10-20%. RCA: Mild irregularities 10-20%. Widely patent mid RCA stent. EF:60%. H/O cardiovascular stress test 10/07/2016    Overall results are most consistent with a low risk for significant CAD. H/O echocardiogram 10/05/2016    EF:>60%. Inferoseptal wall abnormal in its motion which is not unusal status post open heart surgery. Evidence of mild (grade I) diastolic dysfunction is seen. H/O tilt table evaluation 07/12/2016    Abnormal. PTs HR, Blood Pressure response and symptoms were most consistent with dysautonomia. Combined w/viligant maintenance of euvolemia and maintaining a moderate salt intake, pharmaciologic treatment w/ ProAmatine, SSRI such as Lexapro and/or Mestinon among other treatments have shown some effectiveness in the treatment of this condition. History of cardiovascular stress test 02/13/2019    Abnormal. Small/moderate perfusion defect of mild/moderate intesity in the anterior and anterolateral regions during stress imaging which is most consistent w/ ischemia but may be artifact. Overall low/intermediate risk for significant CAD. History of echocardiogram 09/06/2018    EF >60%. Inferoseptal wall is abnormal in its motion which is not unusual s/p open heart. Evidence of mild (grade I) diastolic dysfunction seen.     Hx of blood clots     Hyperlipidemia     Hypertension     Intermittent claudication (HCC)     Kidney stones     Movement disorder     neuropathy in legs    Neuromuscular disorder (HCC)     neuropathy    Osteoarthritis     Reflux     Seizures ([facility] [address] 1980'Ss last seizure    Stented coronary artery 9/22/2010     LAD/[place]    Stented coronary artery 3/31/16    RCA/[place]    Type II or unspecified type diabetes mellitus without mention of complication, not stated as uncontrolled     Unspecified sleep apnea     no machine         SURGICAL HISTORY       Past Surgical History:   Procedure Laterality Date    ABDOMINAL HERNIA REPAIR  01/2016    repair done [place]    APPENDECTOMY      AXILLARY SURGERY Right 9/2/2022    RIGHT AXILLARY LYMPH SENTINAL NODE BIOPSY  @  12PM performed by Karma Hall DO at [address] Right 7/12/2022    NEEDLE DIRECTED ( 1130     )    RIGHT BREAST LUMPECTOMY performed by Karma Hall DO at [address] [facility] Left 04/26/2016    right radial/ [id number] [facility] [place]/ Dr Clarice Castillo Left 03/07/2019    Left Radial/[facility] [place]/    CARDIAC CATHETERIZATION  05/20/2022    Dr Merna Fritz radial    CARDIAC CATHETERIZATION  05/20/2022    Dr Merna Fritz radial    [address]      CABG 2019    CHOLECYSTECTOMY  1991    COLONOSCOPY  12/16/2014    -hemorrhoids,bx    CORONARY ANGIOPLASTY WITH STENT PLACEMENT  09/2010    CORONARY ANGIOPLASTY WITH STENT PLACEMENT  03/31/2016    JESSE RCA / DR Jose Fabian    CORONARY ANGIOPLASTY WITH STENT PLACEMENT  05/20/2022    CORONARY ARTERY BYPASS GRAFT  08/15/2016    OP X 1- Dr Anabella Barrios, DIAGNOSTIC  12/16/2014    HERNIA REPAIR  12/13/2012    at the medial aspect of a [place] RUQ scar); repaired byDr. [facility]  02/16/2015    incisional, recurrent    HERNIA REPAIR  02/2016    HYSTERECTOMY (CERVIX STATUS UNKNOWN)      OTHER SURGICAL HISTORY  10/08/2015    abd wound washout, mesh removal, wound vac placement    LA SUCT NICOLE LIPECTOMY,HEAD/NECK Left 08/27/2018    THIGH LESION BIOPSY EXCISION, SOFT TISSUE MASS performed by Ildefonso Guerra MD at [facility] [place] 2018    leg    UPPP UVULOPALATOPHARYGOPLASTY  06/26/2012    US BREAST BIOPSY NEEDLE ADDITIONAL RIGHT Right 6/9/2022    US BREAST BIOPSY NEEDLE ADDITIONAL RIGHT 6/9/2022 [facility] ULTRASOUND    US BREAST NEEDLE BIOPSY RIGHT Right 6/9/2022    [facility] BREAST NEED[facility]    US GUIDED NEEDLE LOC OF RIGHT BREAST Right 7/12/2022    US GUIDED NEEDLE LOC OF RIGHT BREAST 7/12/2022 [facility] ULTRASOUND    VENTRAL HERNIA REPAIR  09/21/2015    With Mesh - Dr Nima Sandra       Discharge Medication List as of 11/4/2022 10:33 PM        CONTINUE these medications which have NOT CHANGED    Details   isosorbide mononitrate (IMDUR) 30 MG extended release tablet Take 1 tablet by mouth daily, Disp-30 tablet, R-3Normal      ezetimibe (ZETIA) 10 MG tablet Take 1 tablet by mouth nightly, Disp-30 tablet, R-3Normal      fludrocortisone (FLORINEF) 0.1 MG tablet TAKE 3 TABLETS BY MOUTH EVERY DAY, Disp-270 tablet, R-3Normal      DULoxetine (CYMBALTA) 30 MG extended release capsule Take 1 capsule by mouth daily, Disp-30 capsule, R-1Normal      tiZANidine (ZANAFLEX) 2 MG tablet Take 2 tablets by mouth nightly as needed (spasms) TAKE 2 TABLET BY MOUTH NIGHTLY AS NEEDED(SPASMS), Disp-60 tablet, R-5Normal      gabapentin (NEURONTIN) 800 MG tablet Take 1 tablet by mouth 3 times daily for 30 days. , Disp-90 tablet, R-5Normal      metoprolol succinate (TOPROL XL) 100 MG extended release tablet TAKE 1 AND 1/2 TABLETS BY MOUTH EVERY DAY, Disp-90 tablet, R-3Normal      albuterol sulfate HFA (VENTOLIN HFA) 108 (90 Base) MCG/ACT inhaler INHALE 2 PUFFS EVERY 6 HOURS AS NEEDED FOR WHEEZING, Disp-18 g, R-5Normal      nitroGLYCERIN (NITROSTAT) 0.4 MG SL tablet Place 1 tablet under the tongue every 5 minutes as needed for Chest pain, Disp-25 tablet, R-1Normal      clopidogrel (PLAVIX) 75 MG tablet Take 1 tablet by mouth daily, Disp-30 tablet, [id number]      TRULICITY 3 SN/7.3NE SOPN INJECT 0.5 ML (1 PEN) SUBCUTANEOUSLY WEEKLY,X30 DAYS,INSTR:ROTATE INJECTION SITES, DAWHistorical Med      atorvastatin (LIPITOR) 80 MG tablet Take 1 tablet by mouth daily, Disp-90 tablet, R-3Normal      Blood Glucose Monitoring Suppl (ONE TOUCH ULTRA 2) w/Device KIT DAILY Starting Tue 7/20/2021, Disp-1 kit, R-0, Normal      blood glucose monitor strips Test 3 times a day & as needed for symptoms of irregular blood glucose. Dispense sufficient amount for indicated testing frequency plus additional to accommodate PRN testing needs. , Disp-300 strip, R-0, Normal      Lancets MISC 2 TIMES DAILY Starting Fri 7/16/2021, Disp-300 each, R-1, Normal      Insulin Degludec (TRESIBA FLEXTOUCH) 200 UNIT/ML SOPN Inject 86 Units into the skin daily PER DR MCELROY, Disp-1 pen, R-0Adjust Sig      metFORMIN (GLUCOPHAGE-XR) 500 MG extended release tablet Take 2 tablets by mouth daily (with breakfast), Disp-180 tablet, R-3Normal      omeprazole (PRILOSEC) 20 MG delayed release capsule TAKE 1 CAPSULE BY MOUTH EVERY DAY IN THE MORNING BEFORE BREAKFAST, Disp-90 capsule, R-1Normal      ondansetron (ZOFRAN ODT) 4 MG disintegrating tablet Take 1 tablet by mouth every 8 hours as needed for Nausea, Disp-20 tablet,R-0Print      Saccharomyces boulardii (PROBIOTIC) 250 MG CAPS Take 1 capsule by mouth daily, Disp-30 capsule,R-0Normal      acetaminophen (TYLENOL) 500 MG tablet Take 1 tablet by mouth 4 times daily as needed for Pain, Disp-40 tablet,R-0Normal      losartan (COZAAR) 25 MG tablet Take 1 tablet by mouth daily, Disp-90 tablet,R-3Normal      insulin lispro, 1 Unit Dial, (HUMALOG KWIKPEN) 100 UNIT/ML SOPN Inject 20 Units into the skin 3 times daily (before meals), Disp-12 pen, R-1Adjust Sig      aspirin 81 MG EC tablet TAKE 1 TABLET BY MOUTH DAILY, Disp-90 tablet, R-3Normal      Insulin Pen Needle (BD ULTRA-FINE PEN NEEDLES) 29G X 12.7MM MISC Disp-150 each, R-11, NormalUSE AS DIRECTED BY PHYSICIAN 5 times daily      Blood Pressure Monitor KIT DAILY PRN Starting 2016, Disp-1 kit, R-0, PrintESSENTIAL HYPERTENSION   I10             ALLERGIES       Tape Ghassan Brokc tape]    FAMILY HISTORY       Family History   Problem Relation Age of Onset    Cancer Mother     Diabetes Mother     Cancer Father         thyroid    Diabetes Sister     Heart Disease Sister     Heart Disease Brother     Heart Disease Maternal Uncle     Cancer Maternal Grandmother           SOCIAL HISTORY       Social History     Tobacco Use    Smoking status: Former     Packs/day: 2.00     Years: 10.00     Pack years: 20.00     Types: Cigarettes     Quit date: 2010     Years since quittin.1    Smokeless tobacco: Never   Vaping Use    Vaping Use: Never used   Substance Use Topics    Alcohol use: No    Drug use: No         PHYSICAL EXAM       ED Triage Vitals [11/04/22 2134]   BP Temp Temp Source Heart Rate Resp SpO2 Height Weight   (!) 163/113 98.6 °F (37 °C) Oral (!) 108 20 93 % -- --       Physical Exam  Vitals reviewed. Constitutional:       General: She is not in acute distress. Appearance: She is not ill-appearing, toxic-appearing or diaphoretic. HENT:      Head: Normocephalic and atraumatic. Eyes:      General: No scleral icterus. Pulmonary:      Effort: Pulmonary effort is normal.   Chest:      Comments: Examination of the right breast was performed with female RN chaperone. There does appear to be stage I radiation dermatitis around the areola on the right. Minimal sloughing of skin is noted. Skin appears dry and not macerated. With the exception of discomfort with palpation around the affected skin there is no breast tenderness. No suggestion of abscess. No purulent material is noted. Skin:     General: Skin is warm and dry. Coloration: Skin is not jaundiced or pale. Neurological:      Mental Status: She is alert. Psychiatric:         Mood and Affect: Mood normal.         Behavior: Behavior normal.         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  Advised that the patient continue the moisturizer as recommended by her oncology team.  We will add triamcinolone 0.1% twice daily; unknown duration at this time, but have advised that she follow-up with her oncology team this week to determine both response to and at length of treatment. At this time there is no suggestion of concomitant infection. Given 4 tablets of hydrocodone in the emergency department for acute pain; states that she has tolerated these without issue in the past.    Patient is amenable to plan. FINAL IMPRESSION      1.  Radiation dermatitis          DISPOSITION/PLAN     DISPOSITION Decision To Discharge 11/04/2022 10:32:24 PM      DISCHARGE MEDICATIONS:  Discharge Medication List as of 11/4/2022 10:33 PM        START taking these medications    Details   triamcinolone (KENALOG) 0.1 % cream Apply topically 2 times daily. , Disp-15 g, R-0, Normal             (Please note that portions of this note were completed with a voice recognition program.  Efforts were made to edit the dictations but occasionally words are mis-transcribed.)    Joaquin Johnson MD (electronically signed)  Attending Emergency Physician           Joaquin Johnson MD  11/04/22 [address] [address] [place], [place]  11/19/22 [id number]

## 2022-11-05 NOTE — DISCHARGE INSTRUCTIONS
Follow-up with your oncologist or radiation oncologist within 48 hours. Continue to use your moisturizer and care for your skin as advised by your oncology team.    Begin using the steroid cream as prescribed. Your blood pressure is elevated today. This should be discussed with your physician within the next week. Return to the ED for fever, increasing pain, pus draining from the wound, increasing size of the affected area or any other concerns.

## 2022-11-07 ENCOUNTER — TELEPHONE (OUTPATIENT)
Dept: PRIMARY CARE CLINIC | Age: 57
End: 2022-11-07

## 2022-11-07 DIAGNOSIS — I25.10 ASHD (ARTERIOSCLEROTIC HEART DISEASE): ICD-10-CM

## 2022-11-07 DIAGNOSIS — R06.02 SHORTNESS OF BREATH ON EXERTION: ICD-10-CM

## 2022-11-07 DIAGNOSIS — I95.1 DYSAUTONOMIA ORTHOSTATIC HYPOTENSION SYNDROME: ICD-10-CM

## 2022-11-07 DIAGNOSIS — Z95.1 S/P CABG (CORONARY ARTERY BYPASS GRAFT): ICD-10-CM

## 2022-11-07 DIAGNOSIS — R00.2 PALPITATIONS: ICD-10-CM

## 2022-11-07 DIAGNOSIS — I10 ESSENTIAL HYPERTENSION: Chronic | ICD-10-CM

## 2022-11-07 DIAGNOSIS — E78.2 MIXED HYPERLIPIDEMIA: ICD-10-CM

## 2022-11-07 DIAGNOSIS — I20.9 ANGINA, CLASS II (HCC): ICD-10-CM

## 2022-11-07 RX ORDER — ATORVASTATIN CALCIUM 80 MG/1
TABLET, FILM COATED ORAL
Qty: 90 TABLET | Refills: 3 | Status: SHIPPED | OUTPATIENT
Start: 2022-11-07

## 2022-11-07 NOTE — TELEPHONE ENCOUNTER
[facility] Transitions Initial Follow Up Call    Outreach made within 2 business days of discharge: Yes    Patient: Iain Mccormack Patient : 1965   MRN: [id number]  Reason for Admission: There are no discharge diagnoses documented for the most recent discharge. Discharge Date: 22       Spoke with: Nathaniel Jeff    Discharge department/facility: [facility]     TCM Interactive Patient Contact:  Was patient able to fill all prescriptions: Yes  Was patient instructed to bring all medications to the follow-up visit: Yes  Is patient taking all medications as directed in the discharge summary?  Yes  Does patient understand their discharge instructions: Yes  Does patient have questions or concerns that need addressed prior to 7-14 day follow up office visit: no    Scheduled appointment with PCP within 7-14 days    Follow Up  Future Appointments   Date Time Provider Emma Gaxiola   2022  9:30 AM [facility] ECHO ROOM [facility] ECHO [place]   2022  1:00 PM Nicoel Cabello MD [facility]   2023  2:20 PM CHELSEA Ronquillo - CNP [facility]   2023  2:00 PM [facility]       Anderson Bejarano MA

## 2022-11-23 ENCOUNTER — HOSPITAL ENCOUNTER (OUTPATIENT)
Dept: NON INVASIVE DIAGNOSTICS | Age: 57
Discharge: HOME | End: 2022-11-23
Payer: COMMERCIAL

## 2022-11-23 DIAGNOSIS — R00.2 HEART PALPITATIONS: ICD-10-CM

## 2022-11-23 DIAGNOSIS — I25.810 CORONARY ARTERY DISEASE INVOLVING CORONARY BYPASS GRAFT OF NATIVE HEART WITHOUT ANGINA PECTORIS: ICD-10-CM

## 2022-11-23 DIAGNOSIS — E78.2 MIXED HYPERLIPIDEMIA: ICD-10-CM

## 2022-11-23 DIAGNOSIS — R94.31 ABNORMAL ECG: ICD-10-CM

## 2022-11-23 DIAGNOSIS — I10 ESSENTIAL HYPERTENSION: ICD-10-CM

## 2022-11-23 DIAGNOSIS — I95.1 DYSAUTONOMIA ORTHOSTATIC HYPOTENSION SYNDROME: ICD-10-CM

## 2022-11-23 PROCEDURE — 93306 TTE W/DOPPLER COMPLETE: CPT

## 2022-11-25 ENCOUNTER — TELEPHONE (OUTPATIENT)
Dept: CARDIOLOGY | Age: 57
End: 2022-11-25

## 2022-11-25 NOTE — TELEPHONE ENCOUNTER
----- Message from Zander Suazo MD sent at 11/24/2022 12:08 AM EST -----  Let Ms. Caridad Sanders know their test result was ok. Will discuss at next visit. Thanks.

## 2022-11-28 ENCOUNTER — OFFICE VISIT (OUTPATIENT)
Dept: ONCOLOGY | Age: 57
End: 2022-11-28
Payer: COMMERCIAL

## 2022-11-28 VITALS
SYSTOLIC BLOOD PRESSURE: 153 MMHG | HEART RATE: 90 BPM | TEMPERATURE: 97.9 F | RESPIRATION RATE: 18 BRPM | BODY MASS INDEX: 38.98 KG/M2 | DIASTOLIC BLOOD PRESSURE: 91 MMHG | HEIGHT: 63 IN | WEIGHT: 220 LBS

## 2022-11-28 DIAGNOSIS — G47.33 OSA (OBSTRUCTIVE SLEEP APNEA): ICD-10-CM

## 2022-11-28 DIAGNOSIS — J44.9 CHRONIC OBSTRUCTIVE PULMONARY DISEASE, UNSPECIFIED COPD TYPE (HCC): ICD-10-CM

## 2022-11-28 DIAGNOSIS — C50.111 MALIGNANT NEOPLASM OF CENTRAL PORTION OF RIGHT BREAST IN FEMALE, ESTROGEN RECEPTOR POSITIVE (HCC): Primary | ICD-10-CM

## 2022-11-28 DIAGNOSIS — M54.10 RADICULOPATHY, UNSPECIFIED SPINAL REGION: ICD-10-CM

## 2022-11-28 DIAGNOSIS — M79.2 NEUROPATHIC PAIN: ICD-10-CM

## 2022-11-28 DIAGNOSIS — Z17.0 MALIGNANT NEOPLASM OF CENTRAL PORTION OF RIGHT BREAST IN FEMALE, ESTROGEN RECEPTOR POSITIVE (HCC): Primary | ICD-10-CM

## 2022-11-28 DIAGNOSIS — Z17.0 MALIGNANT NEOPLASM OF CENTRAL PORTION OF RIGHT BREAST IN FEMALE, ESTROGEN RECEPTOR POSITIVE (HCC): ICD-10-CM

## 2022-11-28 DIAGNOSIS — C50.111 MALIGNANT NEOPLASM OF CENTRAL PORTION OF RIGHT BREAST IN FEMALE, ESTROGEN RECEPTOR POSITIVE (HCC): ICD-10-CM

## 2022-11-28 DIAGNOSIS — I89.0 LYMPHEDEMA: ICD-10-CM

## 2022-11-28 PROCEDURE — G8427 DOCREV CUR MEDS BY ELIG CLIN: HCPCS | Performed by: INTERNAL MEDICINE

## 2022-11-28 PROCEDURE — 3074F SYST BP LT 130 MM HG: CPT | Performed by: INTERNAL MEDICINE

## 2022-11-28 PROCEDURE — G8484 FLU IMMUNIZE NO ADMIN: HCPCS | Performed by: INTERNAL MEDICINE

## 2022-11-28 PROCEDURE — G9899 SCRN MAM PERF RSLTS DOC: HCPCS | Performed by: INTERNAL MEDICINE

## 2022-11-28 PROCEDURE — 99215 OFFICE O/P EST HI 40 MIN: CPT | Performed by: INTERNAL MEDICINE

## 2022-11-28 PROCEDURE — G8417 CALC BMI ABV UP PARAM F/U: HCPCS | Performed by: INTERNAL MEDICINE

## 2022-11-28 PROCEDURE — 3023F SPIROM DOC REV: CPT | Performed by: INTERNAL MEDICINE

## 2022-11-28 PROCEDURE — 3078F DIAST BP <80 MM HG: CPT | Performed by: INTERNAL MEDICINE

## 2022-11-28 PROCEDURE — 1036F TOBACCO NON-USER: CPT | Performed by: INTERNAL MEDICINE

## 2022-11-28 PROCEDURE — 3017F COLORECTAL CA SCREEN DOC REV: CPT | Performed by: INTERNAL MEDICINE

## 2022-11-28 NOTE — PROGRESS NOTES
Patient ID: Magali Gupta, 1965, [id number], 62 y.o. Referred by :  No ref. provider found   Reason for consultation: Right DCIS      HISTORY OF PRESENT ILLNESS:    Oncologic History:    Magali Gupta is a very pleasant 62 y.o. female who found on a routine mammogram on May 22, 2022 new group of calcifications upper central right breast and ultrasound did show suspicious lesion at 11:00 6 cm from the nipple core biopsy was done on 6/9/2022 and that showed DCIS strongly ER/TN positive and patient was referred for medical oncology  No family history of breast cancer  No history of using birth control in her younger age  Patient had hysterectomy in her 35s  Patient underwent lumpectomy on July 12, 2022 and there was 1 cm invasive carcinoma in addition to the DCIS  No sentinel lymph node biopsy initially and this was repeated and came back negative  Oncotype DX 5        Oncology history  Stage I invasive carcinoma with right breast no sentinel lymph node biopsy  Oncotype DX 5    Interim history  Patient presents to the clinic for a follow-up visit and to discuss results of his lab work-up and other relevant clinical data. During this visit patient's allergy, social, medical, surgical history and medications were reviewed and updated. Swelling on the right breast where she had surgery, with severe tenderness and localized breast lymphedema      Past Medical History:   Diagnosis Date    Anxiety     Asthma     CAD (coronary artery disease)     x1 stent 2010,x1 stent 2016, x1 stent May 2022    Cancer [facility]) 06/2022    right breast cancer    Clinical trial participant at discharge 3/31/16    COPD (chronic obstructive pulmonary disease) ([facility] [address]     Depression     Diabetic neuropathy (HCC)     GERD (gastroesophageal reflux disease)     H/O cardiac catheterization 4/26/16    LMCA: Mild Irregularities 10-20%. LAD: Lesion on Prox LAD: Proximal subsection. 100% stenosis. LCx: Mild irregularities 10-20%.  RCA: Mild irregularities 10-20%. Widely patent mid RCA stent. EF:60%. H/O cardiovascular stress test 10/07/2016    Overall results are most consistent with a low risk for significant CAD. H/O echocardiogram 10/05/2016    EF:>60%. Inferoseptal wall abnormal in its motion which is not unusal status post open heart surgery. Evidence of mild (grade I) diastolic dysfunction is seen. H/O tilt table evaluation 07/12/2016    Abnormal. PTs HR, Blood Pressure response and symptoms were most consistent with dysautonomia. Combined w/viligant maintenance of euvolemia and maintaining a moderate salt intake, pharmaciologic treatment w/ ProAmatine, SSRI such as Lexapro and/or Mestinon among other treatments have shown some effectiveness in the treatment of this condition. History of cardiovascular stress test 02/13/2019    Abnormal. Small/moderate perfusion defect of mild/moderate intesity in the anterior and anterolateral regions during stress imaging which is most consistent w/ ischemia but may be artifact. Overall low/intermediate risk for significant CAD. History of echocardiogram 09/06/2018    EF >60%. Inferoseptal wall is abnormal in its motion which is not unusual s/p open heart. Evidence of mild (grade I) diastolic dysfunction seen.     Hx of blood clots     Hyperlipidemia     Hypertension     Intermittent claudication (HCC)     Kidney stones     Movement disorder     neuropathy in legs    Neuromuscular disorder (HCC)     neuropathy    Osteoarthritis     Reflux     Seizures ([facility] [address] 1980'Ss last seizure    Stented coronary artery 9/22/2010     LAD/[place]    Stented coronary artery 3/31/16    RCA/[place]    Type II or unspecified type diabetes mellitus without mention of complication, not stated as uncontrolled     Unspecified sleep apnea     no machine       Past Surgical History:   Procedure Laterality Date    ABDOMINAL HERNIA REPAIR  01/2016    repair done [place]    APPENDECTOMY      AXILLARY SURGERY Right 9/2/2022 RIGHT AXILLARY LYMPH SENTINAL NODE BIOPSY  @  12PM performed by Nehemiah Sweeney DO at [address] Right 7/12/2022    NEEDLE DIRECTED ( 1130     )    RIGHT BREAST LUMPECTOMY performed by Nehemiah Sweeney DO at [facility] [address] Left 04/26/2016    right radial/ [facility]/ Dr Mona Maloney Left 03/07/2019    Left Radial/[facility]/    CARDIAC CATHETERIZATION  05/20/2022    Dr Delmi rubalcava    CARDIAC CATHETERIZATION  05/20/2022    Dr Delmi Yin radial    [address] [facility]      CABG 2019    CHOLECYSTECTOMY  1991    COLONOSCOPY  12/16/2014    -hemorrhoids,bx    CORONARY ANGIOPLASTY WITH STENT PLACEMENT  09/2010    CORONARY ANGIOPLASTY WITH STENT PLACEMENT  03/31/2016    JESSE RCA / DR Madhu London    CORONARY ANGIOPLASTY WITH STENT PLACEMENT  05/20/2022    CORONARY ARTERY BYPASS GRAFT  08/15/2016    OP X 1- Dr Iván Diaz, DIAGNOSTIC  12/16/2014    HERNIA REPAIR  12/13/2012    at the medial aspect of a [place] RUQ scar); repaired byDr. Kathy Calero  02/16/2015    incisional, recurrent    HERNIA REPAIR  02/2016    HYSTERECTOMY (CERVIX STATUS UNKNOWN)      OTHER SURGICAL HISTORY  10/08/2015    abd wound washout, mesh removal, wound vac placement    NV SUCT NICOLE LIPECTOMY,HEAD/NECK Left 08/27/2018    THIGH LESION BIOPSY EXCISION, SOFT TISSUE MASS performed by Mohit Molina MD at [address] [facility] Left 2018    leg    UPPP UVULOPALATOPHARYGOPLASTY  06/26/2012    US BREAST BIOPSY NEEDLE ADDITIONAL RIGHT Right 6/9/2022    US BREAST BIOPSY NEEDLE ADDITIONAL RIGHT 6/9/2022 [facility] ULTRASOUND    US BREAST NEEDLE BIOPSY RIGHT Right 6/9/2022    [facility]    US GUIDED NEEDLE LOC OF RIGHT BREAST Right 7/12/2022    US GUIDED NEEDLE LOC OF RIGHT BREAST 7/12/2022 [facility] ULTRASOUND    VENTRAL HERNIA REPAIR  09/21/2015    With Mesh - Dr Cory Avila Allergies   Allergen Reactions    Tape Moises Bundy Tape] Rash       Current Outpatient Medications   Medication Sig Dispense Refill    atorvastatin (LIPITOR) 80 MG tablet TAKE 1 TABLET BY MOUTH EVERY DAY 90 tablet 3    triamcinolone (KENALOG) 0.1 % cream Apply topically 2 times daily. 15 g 0    isosorbide mononitrate (IMDUR) 30 MG extended release tablet Take 1 tablet by mouth daily 30 tablet 3    ezetimibe (ZETIA) 10 MG tablet Take 1 tablet by mouth nightly 30 tablet 3    fludrocortisone (FLORINEF) 0.1 MG tablet TAKE 3 TABLETS BY MOUTH EVERY  tablet 3    DULoxetine (CYMBALTA) 30 MG extended release capsule Take 1 capsule by mouth daily 30 capsule 1    tiZANidine (ZANAFLEX) 2 MG tablet Take 2 tablets by mouth nightly as needed (spasms) TAKE 2 TABLET BY MOUTH NIGHTLY AS NEEDED(SPASMS) 60 tablet 5    metoprolol succinate (TOPROL XL) 100 MG extended release tablet TAKE 1 AND 1/2 TABLETS BY MOUTH EVERY DAY 90 tablet 3    albuterol sulfate HFA (VENTOLIN HFA) 108 (90 Base) MCG/ACT inhaler INHALE 2 PUFFS EVERY 6 HOURS AS NEEDED FOR WHEEZING 18 g 5    nitroGLYCERIN (NITROSTAT) 0.4 MG SL tablet Place 1 tablet under the tongue every 5 minutes as needed for Chest pain 25 tablet 1    clopidogrel (PLAVIX) 75 MG tablet Take 1 tablet by mouth daily 30 tablet 3    TRULICITY 3 JM/8.6DP SOPN INJECT 0.5 ML (1 PEN) SUBCUTANEOUSLY WEEKLY,X30 DAYS,INSTR:ROTATE INJECTION SITES      Blood Glucose Monitoring Suppl (ONE TOUCH ULTRA 2) w/Device KIT 1 kit by Does not apply route daily 1 kit 0    blood glucose monitor strips Test 3 times a day & as needed for symptoms of irregular blood glucose. Dispense sufficient amount for indicated testing frequency plus additional to accommodate PRN testing needs.  300 strip 0    Lancets MISC 1 each by Does not apply route 2 times daily 300 each 1    Insulin Degludec (TRESIBA FLEXTOUCH) 200 UNIT/ML SOPN Inject 86 Units into the skin daily PER DR MCELROY (Patient taking differently: Inject 120 Units into the skin at bedtime) 1 pen 0    metFORMIN (GLUCOPHAGE-XR) 500 MG extended release tablet Take 2 tablets by mouth daily (with breakfast) 180 tablet 3    omeprazole (PRILOSEC) 20 MG delayed release capsule TAKE 1 CAPSULE BY MOUTH EVERY DAY IN THE MORNING BEFORE BREAKFAST (Patient taking differently: Take 20 mg by mouth Daily) 90 capsule 1    ondansetron (ZOFRAN ODT) 4 MG disintegrating tablet Take 1 tablet by mouth every 8 hours as needed for Nausea 20 tablet 0    Saccharomyces boulardii (PROBIOTIC) 250 MG CAPS Take 1 capsule by mouth daily 30 capsule 0    acetaminophen (TYLENOL) 500 MG tablet Take 1 tablet by mouth 4 times daily as needed for Pain 40 tablet 0    losartan (COZAAR) 25 MG tablet Take 1 tablet by mouth daily 90 tablet 3    insulin lispro, 1 Unit Dial, (HUMALOG KWIKPEN) 100 UNIT/ML SOPN Inject 20 Units into the skin 3 times daily (before meals) (Patient taking differently: Inject 15 Units into the skin 3 times daily (before meals)) 12 pen 1    aspirin 81 MG EC tablet TAKE 1 TABLET BY MOUTH DAILY (Patient taking differently: Take 81 mg by mouth daily) 90 tablet 3    Insulin Pen Needle (BD ULTRA-FINE PEN NEEDLES) 29G X 12.7MM MISC USE AS DIRECTED BY PHYSICIAN 5 times daily 150 each 11    Blood Pressure Monitor KIT 1 each by Does not apply route daily as needed ESSENTIAL HYPERTENSION   I10 1 kit 0    gabapentin (NEURONTIN) 800 MG tablet Take 1 tablet by mouth 3 times daily for 30 days. 90 tablet 5     No current facility-administered medications for this visit.        Social History     Socioeconomic History    Marital status:      Spouse name: Not on file    Number of children: Not on file    Years of education: Not on file    Highest education level: Not on file   Occupational History    Not on file   Tobacco Use    Smoking status: Former     Packs/day: 2.00     Years: 10.00     Pack years: 20.00     Types: Cigarettes     Quit date: 2010     Years since quittin.2    Smokeless tobacco: Never   Vaping Use    Vaping Use: Never used   Substance and Sexual Activity    Alcohol use: No    Drug use: No    Sexual activity: Yes     Partners: Male   Other Topics Concern    Not on file   Social History Narrative    Not on file     Social Determinants of Health     Financial Resource Strain: Unknown    Difficulty of Paying Living Expenses: Patient refused   Food Insecurity: Unknown    Worried About Running Out of Food in the Last Year: Patient refused    Ran Out of Food in the Last Year: Patient refused   Transportation Needs: Not on file   Physical Activity: Not on file   Stress: Not on file   Social Connections: Not on file   Intimate Partner Violence: Not on file   Housing Stability: Not on file       Family History   Problem Relation Age of Onset    Cancer Mother     Diabetes Mother     Cancer Father         thyroid    Diabetes Sister     Heart Disease Sister     Heart Disease Brother     Heart Disease Maternal Uncle     Cancer Maternal Grandmother         REVIEW OF SYSTEM:     Constitutional: No fever or chills. No night sweats, no weight loss   Eyes: No eye discharge, double vision, or eye pain   HEENT: negative for sore mouth, sore throat, hoarseness and voice change   Respiratory: negative for cough , sputum, dyspnea, wheezing, hemoptysis, chest pain   Cardiovascular: negative for chest pain, dyspnea, palpitations, orthopnea, PND   Gastrointestinal: negative for nausea, vomiting, diarrhea, constipation, abdominal pain, Dysphagia, hematemesis and hematochezia   Genitourinary: negative for frequency, dysuria, nocturia, urinary incontinence, and hematuria   Integument: negative for rash, skin lesions, bruises.    Hematologic/Lymphatic: negative for easy bruising, bleeding, lymphadenopathy, petechiae and swelling/edema   Endocrine: negative for heat or cold intolerance, tremor, weight changes, change in bowel habits and hair loss   Musculoskeletal: negative for myalgias, arthralgias, pain, joint swelling,and bone pain   Neurological: negative for headaches, dizziness, seizures, weakness, numbness       OBJECTIVE:         Vitals:    11/28/22 1434   BP: (!) 153/91   Pulse: 90   Resp: 18   Temp: 97.9 °F (36.6 °C)       PHYSICAL EXAM:   General appearance - well appearing, no in pain or distress   Mental status - alert and cooperative   Eyes - pupils equal and reactive, extraocular eye movements intact   Ears - bilateral TM's and external ear canals normal   Mouth - mucous membranes moist, pharynx normal without lesions   Neck - supple, no significant adenopathy   Lymphatics - no palpable lymphadenopathy, no hepatosplenomegaly   Chest - clear to auscultation, no wheezes, rales or rhonchi, symmetric air entry   Heart - normal rate, regular rhythm, normal S1, S2, no murmurs, rubs, clicks or gallops   Abdomen - soft, nontender, nondistended, no masses or organomegaly   Neurological - alert, oriented, normal speech, no focal findings or movement disorder noted   Musculoskeletal - no joint tenderness, deformity or swelling   Extremities - peripheral pulses normal, no pedal edema, no clubbing or cyanosis   Skin - normal coloration and turgor, no rashes, no suspicious skin lesions noted ,  Right breast: Localized tender lymphedema      LABORATORY DATA:     Lab Results   Component Value Date    WBC 7.5 10/13/2022    HGB 10.9 (L) 10/13/2022    HCT 33.4 (L) 10/13/2022    MCV 74.9 (L) 10/13/2022     10/13/2022    LYMPHOPCT 26 10/13/2022    RBC 4.46 10/13/2022    MCH 24.4 (L) 10/13/2022    MCHC 32.6 10/13/2022    RDW 15.5 (H) 10/13/2022    MONOPCT 6 10/13/2022    BASOPCT 1 10/13/2022    NEUTROABS 4.80 10/13/2022    LYMPHSABS 1.96 10/13/2022    MONOSABS 0.47 10/13/2022    EOSABS 0.14 10/13/2022    BASOSABS 0.04 10/13/2022         Chemistry        Component Value Date/Time     10/13/2022 0620    K 4.0 10/13/2022 0620     10/13/2022 0620    CO2 24 10/13/2022 0620    BUN 11 10/13/2022 0620    CREATININE 0.63 10/13/2022 0620        Component Value Date/Time    CALCIUM 9.2 10/13/2022 0620    ALKPHOS 96 10/13/2022 0620    AST 8 10/13/2022 0620    ALT 5 10/13/2022 0620    BILITOT 0.3 10/13/2022 0620            PATHOLOGY DATA:     1 Result Note    1 Follow-up Encounter    Component 6/9/22 1325   Surgical Pathology Report -- Diagnosis --   A. RIGHT BREAST, UOQ, 11:00, 6 CM FN, CORE NEEDLE BIOPSIES:        -  INTERMEDIATE GRADE DUCTAL CARCINOMA IN SITU, SOLID TYPE.             -  ESTROGEN AND PROGESTERONE RECEPTOR IMMUNOSTAINS STRONGLY   POSITIVE IN DUCTAL CARCINOMA IN SITU. B.  RIGHT AXILLA LYMPH NODE, CORE NEEDLE BIOPSIES:        -  BENIGN LYMPH NODE, NEGATIVE FOR MALIGNANCY. Garrett Kirby M.D.   **Electronically Signed Out**         jet/6/13/2022         Clinical Information   Clinical findings: RIGHT UOQ 11:00 6 CM FN, RIGHT AXILLA   Operative Findings:  RIGHT BREAST MASS 11:00; RT AXILLA LYMPH NODE     Source of Specimen   A: RT BREAST MASS   B: RT AXILLA BX     Gross Description   A. \"SIMON WILD, RIGHT BREAST\" Cores and fragments of fibrofatty   tissue, 1.0 x 0.8 x 0.4 cm in aggregate. Entirely 1cs. B. \"SIMON WILD, RIGHT AXILLA\" Cores and fragments of fibrofatty   tissue, 1.0 x 0.8 x 0.3 cm in aggregate. Entirely 1cs. mpb tm       Microscopic Description   A. Initial levels of fibroglandular breast tissue and adipose tissue   contain atrophic breast lobules. Subsequent levels however show with   distended ducts lined by atypical epithelial cells in a solid pattern. Calcifications are not identified. There is no evidence of invasive   malignancy. The focus measures 2.5 mm in largest dimension. Slides   were reviewed with a second pathologist [facility]) who agrees with the   diagnosis.    PROCEDURE:                              Core needle biopsies   SPECIMEN LATERALITY / TUMOR SITE:          Right breast upper outer   quadrant 11:00 6 cm from nipple   HISTOLOGIC TYPE: Ductal carcinoma in situ                     DCIS ARCHITECTURAL PATTERN:               Solid type   NUCLEAR GRADE:                           intermediate grade   NECROSIS:                              not identified   MICROCALCIFICATIONS:                    Not identified             BIOMARKER TESTING  BREAST CARCINOMA         ESTROGEN RECEPTOR*--   -POSITIVE (PERCENT =>10% OF POSITIVE TUMOR CELL NUCLEI):     Yes, over   95%       ---AVERAGE INTENSITY OF POSITIVE STAINING:               3+, strong   -LOW POSITIVE (PERCENT 1-10% OF POSITIVE TUMOR CELL NUCLEI):     No         ---AVERAGE INTENSITY OF POSITIVE STAINING:               N/A   -NEGATIVE (<1% OF POSITIVE TUMOR CELL NUCLEI):           No     ---AVERAGE INTENSITY OF ANY POSITIVE STAINING:           N/A   ---INTERNAL CONTROL PRESENT AND STAIN AS EXPECTED:           Yes     ---INTERNAL CONTROL CELLS ABSENT (COMMENT):                No     ---INTERNAL CONTROL CELLS PRESENT BUT DO NOT STAIN (COMMENT): No         PROGESTERONE RECEPTOR*--   -POSITIVE (=>1% OF TUMOR CELL NUCLEI POSITIVE):           Yes, over   95%   ---AVERAGE INTENSITY OF POSITIVE STAINING:                   3+,   strong   -NEGATIVE (<1% OF POSITIVE TUMOR CELL NUCLEI):              no   ---AVERAGE INTENSITY OF ANY POSITIVE STAINING:           N/A   ---INTERNAL CONTROL PRESENT AND STAIN AS EXPECTED:           Yes     ---INTERNAL CONTROL CELLS ABSENT (COMMENT):              no   ---INTERNAL CONTROL CELLS PRESENT BUT DO NOT STAIN (COMMENT): No             Component 7/12/22 1972   Surgical Pathology Report -- Diagnosis --     A.   RIGHT BREAST, NEEDLE LOCALIZED EXCISIONAL BIOPSY:   -INVASIVE DUCTAL CARCINOMA GRADE 2, 10 MM IN GREATEST EXTENT   -ESTROGEN RECEPTOR POSITIVE, PROGESTERONE RECEPTOR POSITIVE   -DUCTAL CARCINOMA IN SITU, INTERMEDIATE AND HIGH-GRADE, SOLID AND   COMEDO TYPE WITH MICROCALCIFICATIONS   -INVASIVE CARCINOMA IDENTIFIED 0.5 MM FROM THE LATERAL INKED MARGIN   -DUCTAL CARCINOMA IN SITU medical records including outside records and discussed the diagnosis, prognosis and treatment recommendations   I reviewed the surgical pathology results, discuss goals of care and NCCN guidelines with patient and family   Patient initially had DCIS and after surgery there was invasive carcinoma size 1 cm with no sentinel lymph node ,initially she had biopsy of the right axillary lymphadenopathy, case discussed at the tumor board and the plan was to proceed with sentinel lymph node biopsy which was negative   Oncotype DX of 5 and no benefit from chemotherapy and status post adjuvant radiation treatment and on endocrine treatment  in the form of anastrozole which will be given for 5 to 7 years  Side effects of hormonal treatment which include but not limited to osteoporosis has been reviewed with the patient and she would continue calcium vitamin D and Xgeva if she had evidence of osteopenia or osteoporosis  Right breast ultrasound and refer back to surgeon may have seroma  Lymphedema clinic      Thank you for the consult         I spent a total of 40 minutes on the date of the service which included preparing to see the patient, face-to-face patient care, completing clinical documentation, obtaining and/or reviewing separately obtained history, performing a medically appropriate examination, counseling and educating the patient/family/caregiver and ordering medications, tests, or procedures. [facility]                            This note is created with the assistance of a speech recognition program.  While intending to generate a document that actually reflects the content of the visit, the document can still have some errors including those of syntax and sound a like substitutions which may escape proof reading. It such instances, actual meaning can be extrapolated by contextual diversion.

## 2022-11-29 RX ORDER — DULOXETIN HYDROCHLORIDE 30 MG/1
CAPSULE, DELAYED RELEASE ORAL
Qty: 30 CAPSULE | Refills: 5 | Status: SHIPPED | OUTPATIENT
Start: 2022-11-29

## 2022-11-29 NOTE — TELEPHONE ENCOUNTER
Pharmacy requesting refill of Cymbalta 30mg.       Medication active on med list yes      Date of last fill: 09/01/2022    verified on 11/29/2022     verified by  [employer] [place] LPN      Date of last appointment 09/01/2022    Next Visit Date:  Visit date not found

## 2022-11-30 ENCOUNTER — HOSPITAL ENCOUNTER (OUTPATIENT)
Dept: PHYSICAL THERAPY | Age: 57
Setting detail: THERAPIES SERIES
Discharge: HOME | End: 2022-11-30
Payer: COMMERCIAL

## 2022-11-30 PROCEDURE — 97140 MANUAL THERAPY 1/> REGIONS: CPT

## 2022-11-30 PROCEDURE — 97162 PT EVAL MOD COMPLEX 30 MIN: CPT

## 2022-12-02 ENCOUNTER — HOSPITAL ENCOUNTER (OUTPATIENT)
Dept: ULTRASOUND IMAGING | Age: 57
End: 2022-12-02
Payer: COMMERCIAL

## 2022-12-02 DIAGNOSIS — C50.111 MALIGNANT NEOPLASM OF CENTRAL PORTION OF RIGHT BREAST IN FEMALE, ESTROGEN RECEPTOR POSITIVE (HCC): ICD-10-CM

## 2022-12-02 DIAGNOSIS — Z17.0 MALIGNANT NEOPLASM OF CENTRAL PORTION OF RIGHT BREAST IN FEMALE, ESTROGEN RECEPTOR POSITIVE (HCC): ICD-10-CM

## 2022-12-02 PROCEDURE — 76642 ULTRASOUND BREAST LIMITED: CPT

## 2022-12-02 NOTE — PLAN OF CARE
[facility]           Phone: [phone]             Outpatient Physical Therapy  Fax: [phone]                                           Date: 2022  Patient: Olman Orozco : 1965 CSN #: [id number]   Referring Physician: Coni Ortiz MD      [x] Plan of Care   [] Updated Plan of Care    Dates of Service to Include: 2022 to 23    Diagnosis:  malignant neoplasm of central portion of R breast, estrogen receptor positive C50.111, lymphedema I89.0    Rehab (Treatment) Diagnosis:  R UE lymphedema/chest/breast lymphedema             Onset Date:  08/15/22    Attendance  Total # of Visits to Date: 1        Assessment  Assessment: Pt is a 62year old female that presents with R UE/armpit and breast lymphedema post breast cancer with lymphnode removal and radiation. Pt also presents with decreased R UE AROM and strength. Pt will benefit from skilled PT in order to address these deficits.  Measurements at eval R UE midhand 17.8cm, wrist 15.7cm, 3\" above 20.7cm, 6\" above 25.3cm, elbow 26.2cm, 3\" above 28.8cm, 6\" above 38.3, armpit 42.9cm, L UE midhand 18.6cm, wrist 16.6cm, 3\" above 22.2cm, elbow 27.6cm, 3\" above 34.3cm, 6\" above 41.7cm, armpit 43.5cm, chest 119.2cm      Goals  Short Term Goals  Time Frame for Short Term Goals: 3 weeks  Short Term Goal 1: Pt will be educated on her POC and HEP -met  Short Term Goal 2: Pt will initiate pump protocol in order to reduce signs and symptoms of lymphedema  Long Term Goals  Time Frame for Long Term Goals : 6 weeks  Long Term Goal 1: Pt will be safe and independent with lymphedema management  Long Term Goal 2: Pt will demonstrate a 1-2cm decrease in R UE/armpit/breast girth measurements in order to reduce pain and discomfort  Long Term Goal 3: Pt will increase R SHLD AROM with flex/abd to >/=130 in order to perform overhead ADLS  Long Term Goal 4: Pt will be fitted for an at home pump in order to be independent with lymphedema management  Long Term Goal 5: Pt will report 70% improvement in edema, R UE strength and ROM in order to improve daily activities     Prognosis  Therapy Prognosis: Good    Treatment Plan   Plan Frequency: 3x/wk  Plan weeks: 6 weeks  [x] HP/CP      [] Electrical Stim   [x] Therapeutic Exercise      [] Gait Training  [] Aquatics   [] Ultrasound         [x] Patient Education/HEP   [x] Manual Therapy  [] Traction    [] Neuro-mary        [x] Soft Tissue Mobs            [] Home TENS  [] Iontophoresis    [] Orthotic casting/fitting    [x] lymphedema             Electronically signed by: Payton Salinas PT, DPT    Date: 11/30/2022      ______________________________________ Date: 11/30/2022   Physician Signature

## 2022-12-02 NOTE — PROGRESS NOTES
Phone: Honey          Fax: [phone]                      Outpatient Physical Therapy                                                             Lymphedema Evaluation  Date: 2022  Patient: Coralie Litten  : 1965  [facility] #: [id number]  Referring Physician: Franky Durbin MD     Diagnosis: malignant neoplasm of central portion of R breast, estrogen receptor positive C50.111, lymphedema I89.0    Treatment Diagnosis: R UE lymphedema/chest/breast lymphedema  Onset Date: 08/15/22  PT Insurance Information: [facility]  Total # of Visits Approved: 18   Total # of Visits to Date: 1     Subjective  Subjective: Pt reports she was diagnosed with R sided breast cancer in the middle of 2022. Pt had two surgeries in Sept, first surgery removed the cancer and second surgery removed lymphnodes from under her armpit. Pt had 16 radiation treatments with the last one Oct 27. Pt reported she started with increase swelling in her breast and under arm after her radiation treatment. Pt reports swelling in her fingers that comes and goes, pt also reports the underarm swelling also comes and goes but the breast swelling has been consistent.   Additional Pertinent Hx: HTN, breast cancer, OA, 2 Mi open heart surgery and 3 stents, diabetes, depression    Lymph Assessment  AROM:   AROM RUE (degrees)  RUE AROM : Exceptions  R Shoulder Flexion (0-180): 85  R Shoulder ABduction (0-180): 102  R Shoulder Int Rotation  (0-70): L4  R Shoulder Ext Rotation (0-90): 35    PROM       Strength  Strength RUE  Strength RUE: Exception  R Shoulder Flexion: 3+/5  R Shoulder ABduction: 4-/5  R Shoulder Internal Rotation: 4-/5  R Shoulder External Rotation: 3+/5    Skin Integumentary:   Turgor: Shiney/hard  Edema Rebound: Quick  Stemmer Sign: Negative    Signs of Constriction (if applicable):   Papilloma (benign tumor arising from an epithelial layer): No  Fibrotic Areas: No  Lymphorrhea: No    Stage of Lymphedema: Stage 1: Reversible Stage  Description:      Lymphedema Classification:   Type: Post-Surgical  Left:       Right: Mild    M  Exercises:  Exercise 1: HEP pt educated on keeping her arm elevated and compressed at 30mmHg, perform gentle exercises daily and reduce sodium and sugar intake    Manual:        Skin Integumentary  Turgor: Shiney/hard  Skin Integumentary  Turgor: Shiney/hard      Assessment  Assessment: Pt is a 62year old female that presents with R UE/armpit and breast lymphedema post breast cancer with lymphnode removal and radiation. Pt also presents with decreased R UE AROM and strength. Pt will benefit from skilled PT in order to address these deficits. Measurements at eval R UE midhand 17.8cm, wrist 15.7cm, 3\" above 20.7cm, 6\" above 25.3cm, elbow 26.2cm, 3\" above 28.8cm, 6\" above 38.3, armpit 42.9cm, L UE midhand 18.6cm, wrist 16.6cm, 3\" above 22.2cm, elbow 27.6cm, 3\" above 34.3cm, 6\" above 41.7cm, armpit 43.5cm, chest 119.2cm  Therapy Prognosis: Good        Decision Making: Medium Complexity    Patient Education  Patient Education: pt educated on her POC and HEP  Pt verbalized/demonstrated good understanding:     [x] Yes         [] No, pt required further clarification.          Goals  Short Term Goals  Time Frame for Short Term Goals: 3 weeks  Short Term Goal 1: Pt will be educated on her POC and HEP -met  Short Term Goal 2: Pt will initiate pump protocol in order to reduce signs and symptoms of lymphedema    Long Term Goals  Time Frame for Long Term Goals : 6 weeks  Long Term Goal 1: Pt will be safe and independent with lymphedema management  Long Term Goal 2: Pt will demonstrate a 1-2cm decrease in R UE/armpit/breast girth measurements in order to reduce pain and discomfort  Long Term Goal 3: Pt will increase R SHLD AROM with flex/abd to >/=130 in order to perform overhead ADLS  Long Term Goal 4: Pt will be fitted for an at home pump in order to be independent with lymphedema management  Long Term Goal 5: Pt will report 70% improvement in edema, R UE strength and ROM in order to improve daily activities      Patient Goals : \"to get rid of R UE/armpit/breast edema\"        Minutes Tracking:  Time In: 6871  Time Out: 1534  Minutes: 49  Timed Code Treatment Minutes: 17 Wing Del Toro PT, DPT   Date: 11/30/2022

## 2022-12-05 ENCOUNTER — HOSPITAL ENCOUNTER (OUTPATIENT)
Dept: PHYSICAL THERAPY | Age: 57
Setting detail: THERAPIES SERIES
Discharge: HOME | End: 2022-12-05
Payer: COMMERCIAL

## 2022-12-05 ENCOUNTER — TELEPHONE (OUTPATIENT)
Dept: ONCOLOGY | Age: 57
End: 2022-12-05

## 2022-12-05 PROCEDURE — 97110 THERAPEUTIC EXERCISES: CPT

## 2022-12-05 PROCEDURE — 97140 MANUAL THERAPY 1/> REGIONS: CPT

## 2022-12-05 NOTE — PROGRESS NOTES
Phone: Honey           Fax: [phone]                           Outpatient Physical Therapy                                                                            Daily Note    Patient: Hassell Cockayne : 1965  CSN #: [id number]   Referring Physician: Stacy Hope MD    Date: 2022       Treatment Diagnosis: R UE lymphedema/chest/breast lymphedema    Onset Date: 08/15/22  PT Insurance Information: [facility]  Total # of Visits Approved: 18 Per Physician Order  Total # of Visits to Date: 2  No Show: 0  Canceled Appointment: 0      Pre-Treatment Pain:  1-2/10  Subjective: Pt states she is doing ok, states her swelling is a little bigger today and more sore especially in the evening. Pt reports R shld ROM comes and goes, depending on the day it is better or worse. Exercises:  Exercise 1: HEP pt educated on keeping her arm elevated and compressed at 30mmHg, perform gentle exercises daily and reduce sodium and sugar intake  Exercise 2: Pulleys x6 min  Exercise 3: TRX 3x15\", IR strap stretch 4x10\"  Exercise 4: Finger ladder x5  Exercise 5: Orange t-band x10 rows/ext, IR/ER  Exercise 6: Standing cane flex/ER  Exercise 7: Towel slides x10 flex, abd, CW/CCW  Exercise 8: arm bike 3'/3'    Manual:  Joint Mobilization: PROM to B UE    Modalities:       Assessment  Assessment: Initiated exercise tx this visit, good tolerance to exercises, painful at end range on R UE. STM to B anterior pec and PROM to B UE, pt L UE limited by pain this visit at mid range, R UE limited by pain at end range. Activity Tolerance  Activity Tolerance: Patient tolerated treatment well, Patient limited by pain    Patient Education  Patient Education: Exercise rationale  Pt verbalized/demonstrated good understanding:     [x] Yes         [] No, pt required further clarification.        Post Treatment Pain:  -2/10      Plan  Plan Frequency: 3x/wk  Plan weeks: 6 weeks       Goals  (Total # of Visits to Date: 2)      Short Term Goals  Time Frame for Short Term Goals: 3 weeks  Short Term Goal 1: Pt will be educated on her POC and HEP -met  Short Term Goal 2: Pt will initiate pump protocol in order to reduce signs and symptoms of lymphedema    Long Term Goals  Time Frame for Long Term Goals : 6 weeks  Long Term Goal 1: Pt will be safe and independent with lymphedema management  Long Term Goal 2: Pt will demonstrate a 1-2cm decrease in R UE/armpit/breast girth measurements in order to reduce pain and discomfort  Long Term Goal 3: Pt will increase R SHLD AROM with flex/abd to >/=130 in order to perform overhead ADLS  Long Term Goal 4: Pt will be fitted for an at home pump in order to be independent with lymphedema management  Long Term Goal 5: Pt will report 70% improvement in edema, R UE strength and ROM in order to improve daily activities    Minutes Tracking:  Time In: 1340  Time Out: [facility]  Minutes: [id number], PTA     Date: 12/5/2022

## 2022-12-05 NOTE — TELEPHONE ENCOUNTER
Name: Everton Pyle  : 1965  MRN: [id number]    Oncology Navigation Follow-Up Note    Contact Type:  Telephone    Notes: Call made to patient for ONN follow up. No answer. Left message stating writer called to check in and assess needs. Requested returned phone call.          Electronically signed by Whitney Gusman RN on 2022 at 11:57 AM

## 2022-12-07 ENCOUNTER — HOSPITAL ENCOUNTER (OUTPATIENT)
Dept: PHYSICAL THERAPY | Age: 57
Setting detail: THERAPIES SERIES
Discharge: HOME | End: 2022-12-07
Payer: COMMERCIAL

## 2022-12-07 PROCEDURE — 97110 THERAPEUTIC EXERCISES: CPT

## 2022-12-07 PROCEDURE — 97140 MANUAL THERAPY 1/> REGIONS: CPT

## 2022-12-07 NOTE — PROGRESS NOTES
Phone: [phone] [facility]          Fax: [phone]                      Outpatient Physical Therapy                                                             Lymphedema treatment  Date: 2022  Patient: Caridad Lora  : 1965  [facility] #: [id number]  Referring Physician: Enedina Hall MD     Diagnosis: malignant neoplasm of central portion of R breast, estrogen receptor positive C50.111, lymphedema I89.0       Onset Date: 08/15/22  PT Insurance Information: [facility]  Total # of Visits Approved: 18   Total # of Visits to Date: 3     Subjective  Subjective: Pt reported she sees the surgeon Monday and she was a little sore after last treatment  Additional Pertinent Hx: HTN, breast cancer, OA, 2 Mi open heart surgery and 3 stents, diabetes, depression    Lymph Assessment    Skin Integumentary:   Skin Integrity: Scars (comment), Abrasion  Skin Texture: Dry  Edema Rebound: Quick  Stemmer Sign: Negative    Signs of Constriction (if applicable):   Papilloma (benign tumor arising from an epithelial layer): No  Fibrotic Areas: No  Lymphorrhea: No    Stage of Lymphedema: Stage 1: Reversible Stage  Description:      Lymphedema Classification:   Type: Post-Surgical  Left:       Right: Mild      Exercises:  Exercise 1: HEP pt educated on keeping her arm elevated and compressed at 30mmHg, perform gentle exercises daily and reduce sodium and sugar intake    Manual:        Skin Integumentary  Skin Integrity: Scars (comment), Abrasion  Skin Integumentary  Skin Integrity: Scars (comment), Abrasion  RUE Complete Decongestion Therapy  Lymph Drainage Pattern: Upper extremity (MLD to R UE)  Compression Technique: Vaso-pneumatic pump  Force (mmHg): 35 mmHg  Duration : 45 minutes      Assessment  Assessment: PT initiated MLd this date to R UE with focus on R upper arm and armpit. Pt had good tolerance to treatment.  Pt also initiated pumps this date at 35mmHg for 45 minutes with good tolerance  Therapy Prognosis: Good        Decision Making: Medium Complexity    Patient Education  Patient Education: pump use  Pt verbalized/demonstrated good understanding:     [x] Yes         [] No, pt required further clarification.          Goals  Short Term Goals  Time Frame for Short Term Goals: 3 weeks  Short Term Goal 1: Pt will be educated on her POC and HEP -met  Short Term Goal 2: Pt will initiate pump protocol in order to reduce signs and symptoms of lymphedema-met    Long Term Goals  Time Frame for Long Term Goals : 6 weeks  Long Term Goal 1: Pt will be safe and independent with lymphedema management  Long Term Goal 2: Pt will demonstrate a 1-2cm decrease in R UE/armpit/breast girth measurements in order to reduce pain and discomfort  Long Term Goal 3: Pt will increase R SHLD AROM with flex/abd to >/=130 in order to perform overhead ADLS  Long Term Goal 4: Pt will be fitted for an at home pump in order to be independent with lymphedema management  Long Term Goal 5: Pt will report 70% improvement in edema, R UE strength and ROM in order to improve daily activities      Patient Goals : \"to get rid of R UE/armpit/breast edema\"        Minutes Tracking:  Time In: 1252  Time Out: 1351  Minutes: 59  Timed Code Treatment Minutes: [address] [facility], PT, DPT   Date: 12/7/2022

## 2022-12-08 ENCOUNTER — HOSPITAL ENCOUNTER (OUTPATIENT)
Dept: PHYSICAL THERAPY | Age: 57
Setting detail: THERAPIES SERIES
Discharge: HOME | End: 2022-12-08
Payer: COMMERCIAL

## 2022-12-08 PROCEDURE — 97110 THERAPEUTIC EXERCISES: CPT

## 2022-12-08 PROCEDURE — 97140 MANUAL THERAPY 1/> REGIONS: CPT

## 2022-12-08 NOTE — PROGRESS NOTES
Phone: [phone]          Fax: [phone]                      Outpatient Physical Therapy                                                             Lymphedema Treatment  Date: 2022  Patient: Suzy Agudelo  : 1965  [facility] #: [id number]  Referring Physician: Tyler Patten MD          Treatment Diagnosis: R UE lymphedema/chest/breast lymphedema  Onset Date: 08/15/22  PT Insurance Information: [facility]  Total # of Visits Approved: 18   Total # of Visits to Date: 4  No Show: 0  Canceled Appointment: 0     Subjective          Lymph Assessment    Skin Integumentary:   Skin Integrity: Abrasion, Scars (comment)  Skin Texture: Dry  Edema Rebound: Quick  Stemmer Sign: Negative    Signs of Constriction (if applicable):   Papilloma (benign tumor arising from an epithelial layer): No  Fibrotic Areas: No  Lymphorrhea: No    Stage of Lymphedema: Stage 1: Reversible Stage  Description:      Lymphedema Classification:   Type: Post-Surgical  Left:       Right: Mild        Exercises:  Exercise 1: HEP pt educated on keeping her arm elevated and compressed at 30mmHg, perform gentle exercises daily and reduce sodium and sugar intake    Manual:  Soft Tissue Mobilizaton: MLD to R UE     Skin Integumentary  Skin Integrity: Abrasion, Scars (comment)  Skin Integumentary  Skin Integrity: Abrasion, Scars (comment)  RUE Complete Decongestion Therapy  Scale: Stage 1  Lymph Drainage Pattern: Upper extremity  Compression Technique: Vaso-pneumatic pump  Force (mmHg): 35 mmHg  Duration : 60 minutes      Assessment  Assessment: MLD to R UE, focusing on R upper arm and armpit, followed by pumps at 35 mmHg for 60 min. Pt with good tolerance to tx, good skin movement post tx.   Therapy Prognosis: Good        Decision Making: Medium Complexity    Patient Education  Patient Education: MLD  Pt verbalized/demonstrated good understanding:     [x] Yes         [] No, pt required further clarification.          Goals  Short Term Goals  Time Frame for Short Term Goals: 3 weeks  Short Term Goal 1: Pt will be educated on her POC and HEP -met  Short Term Goal 2: Pt will initiate pump protocol in order to reduce signs and symptoms of lymphedema-met    Long Term Goals  Time Frame for Long Term Goals : 6 weeks  Long Term Goal 1: Pt will be safe and independent with lymphedema management  Long Term Goal 2: Pt will demonstrate a 1-2cm decrease in R UE/armpit/breast girth measurements in order to reduce pain and discomfort  Long Term Goal 3: Pt will increase R SHLD AROM with flex/abd to >/=130 in order to perform overhead ADLS  Long Term Goal 4: Pt will be fitted for an at home pump in order to be independent with lymphedema management  Long Term Goal 5: Pt will report 70% improvement in edema, R UE strength and ROM in order to improve daily activities      Patient Goals : \"to get rid of R UE/armpit/breast edema\"        Minutes Tracking:  Time In: 1255  Time Out: 1418  Minutes: 80       Archana Gross, PTA   Date: 12/8/2022

## 2022-12-12 ENCOUNTER — APPOINTMENT (OUTPATIENT)
Dept: PHYSICAL THERAPY | Age: 57
End: 2022-12-12
Payer: COMMERCIAL

## 2022-12-13 ENCOUNTER — HOSPITAL ENCOUNTER (OUTPATIENT)
Dept: PHYSICAL THERAPY | Age: 57
Setting detail: THERAPIES SERIES
Discharge: HOME | End: 2022-12-13
Payer: COMMERCIAL

## 2022-12-14 ENCOUNTER — HOSPITAL ENCOUNTER (OUTPATIENT)
Dept: PHYSICAL THERAPY | Age: 57
Setting detail: THERAPIES SERIES
Discharge: HOME | End: 2022-12-14
Payer: COMMERCIAL

## 2022-12-14 PROCEDURE — 97140 MANUAL THERAPY 1/> REGIONS: CPT

## 2022-12-14 PROCEDURE — 97110 THERAPEUTIC EXERCISES: CPT

## 2022-12-14 NOTE — PROGRESS NOTES
Phone: [address] [facility]          Fax: [phone]                      Outpatient Physical Therapy                                                             Lymphedema Treatment  Date: 2022  Patient: Bard Chopra  : 1965  [facility] #: [id number]  Referring Physician: Silvina Marion MD          Treatment Diagnosis: R UE lymphedema/chest/breast lymphedema  Onset Date: 08/15/22  PT Insurance Information: [facility]  Total # of Visits Approved: 18   Total # of Visits to Date: 5  No Show: 1  Canceled Appointment: 0     Subjective  Subjective: Pt states her R breast felt better after it was drained monday, but it was filled back up by tuesday morning. Pt states she goes back in 3 weeks, 23, to have it drainged again. Lymph Assessment    Skin Integumentary:   Skin Integrity: Abrasion, Scars (comment)  Skin Texture: Dry  Edema Rebound: Quick  Stemmer Sign: Negative    Signs of Constriction (if applicable):   Papilloma (benign tumor arising from an epithelial layer): No  Fibrotic Areas: No  Lymphorrhea: No    Stage of Lymphedema:    Description:      Lymphedema Classification:   Type: Post-Surgical  Left:       Right: Mild        Exercises:  Exercise 1: HEP pt educated on keeping her arm elevated and compressed at 30mmHg, perform gentle exercises daily and reduce sodium and sugar intake    Manual:  Soft Tissue Mobilizaton: MLD to R UE     Skin Integumentary  Skin Integrity: Abrasion, Scars (comment)  Skin Integumentary  Skin Integrity: Abrasion, Scars (comment)  RUE Complete Decongestion Therapy  Scale: Stage 1  Lymph Drainage Pattern: Upper extremity  Compression Technique: Vaso-pneumatic pump  Force (mmHg): 45 mmHg  Duration : 60 minutes      Assessment  Assessment: MLD to R UE, focusing on R upper arm and breast followed by pumps at 45 mmHg for 60 min. Pt with good tolerance to increase in pressure.   Therapy Prognosis: Good        Decision Making: Medium Complexity    Patient Education  Patient Education: HEP  Pt verbalized/demonstrated good understanding:     [x] Yes         [] No, pt required further clarification.          Goals  Short Term Goals  Time Frame for Short Term Goals: 3 weeks  Short Term Goal 1: Pt will be educated on her POC and HEP -met  Short Term Goal 2: Pt will initiate pump protocol in order to reduce signs and symptoms of lymphedema-met    Long Term Goals  Time Frame for Long Term Goals : 6 weeks  Long Term Goal 1: Pt will be safe and independent with lymphedema management - progressing  Long Term Goal 2: Pt will demonstrate a 1-2cm decrease in R UE/armpit/breast girth measurements in order to reduce pain and discomfort- progressing  Long Term Goal 3: Pt will increase R SHLD AROM with flex/abd to >/=130 in order to perform overhead ADLS  Long Term Goal 4: Pt will be fitted for an at home pump in order to be independent with lymphedema management  Long Term Goal 5: Pt will report 70% improvement in edema, R UE strength and ROM in order to improve daily activities      Patient Goals : \"to get rid of R UE/armpit/breast edema\"        Minutes Tracking:  Time In: 1258  Time Out: [address]  Minutes: 80       Samir Cho, SPENCER   Date: 12/14/2022

## 2022-12-14 NOTE — PROGRESS NOTES
[facility]  Inpatient/Observation/Outpatient Rehabilitation    Date: 2022  Patient Name: Cristina Duggan       [] Inpatient Acute/Observation       [x]  Outpatient  : 1965       [x] Pt no showed for scheduled appointment    [] Pt refused/declined therapy at this time due to:           [] Pt cancelled due to:  [] No Reason Given   [] Sick/ill   [] Other:    Therapist/Assistant will attempt to see this patient, at our earliest opportunity.        Nikki Santacruz, PT Date: 2022

## 2022-12-15 ENCOUNTER — HOSPITAL ENCOUNTER (OUTPATIENT)
Dept: PHYSICAL THERAPY | Age: 57
Setting detail: THERAPIES SERIES
End: 2022-12-15
Payer: COMMERCIAL

## 2022-12-19 ENCOUNTER — HOSPITAL ENCOUNTER (OUTPATIENT)
Dept: PHYSICAL THERAPY | Age: 57
Setting detail: THERAPIES SERIES
Discharge: HOME | End: 2022-12-19
Payer: COMMERCIAL

## 2022-12-19 PROCEDURE — 97110 THERAPEUTIC EXERCISES: CPT

## 2022-12-19 PROCEDURE — 97140 MANUAL THERAPY 1/> REGIONS: CPT

## 2022-12-19 NOTE — PROGRESS NOTES
Phone: [phone]          Fax: [phone]                      Outpatient Physical Therapy                                                             Lymphedema Treatment  Date: 2022  Patient: Caridad Lora  : 1965  CSN #: [id number]  Referring Physician: Enedina Hall MD          Treatment Diagnosis: R UE lymphedema/chest/breast lymphedema  Onset Date: 08/15/22  PT Insurance Information: [facility]  Total # of Visits Approved: 18   Total # of Visits to Date: 6  No Show: 1  Canceled Appointment: 0     Subjective  Subjective: Pt reports feeling good today. Pt states her R arm was red after tx last visit, only lasted a few minutes, pt states she does have sensitive skin. Pt reports her R UE felt good when the pumps were on last visit and felt good after tx. Lymph Assessment  Skin Integumentary:   Skin Integrity: Abrasion, Scars (comment)  Pitting Scale Area 1: 0  Skin Texture: Dry  Edema Rebound: Quick  Stemmer Sign: Negative    Signs of Constriction (if applicable):   Papilloma (benign tumor arising from an epithelial layer): No  Fibrotic Areas: No  Lymphorrhea: No    Stage of Lymphedema: Stage 1: Reversible Stage  Description:      Lymphedema Classification:   Type: Post-Surgical  Left:       Right: Mild        Exercises:  Exercise 1: HEP pt educated on keeping her arm elevated and compressed at 30mmHg, perform gentle exercises daily and reduce sodium and sugar intake    Manual:  Soft Tissue Mobilizaton: MLD to R UE     Skin Integumentary  Skin Integrity: Abrasion, Scars (comment)  Skin Integumentary  Skin Integrity: Abrasion, Scars (comment)  RUE Complete Decongestion Therapy  Scale: Stage 1  Lymph Drainage Pattern: Upper extremity  Compression Technique: Vaso-pneumatic pump  Force (mmHg): 45 mmHg  Duration : 60 minutes      Assessment  Assessment: MLD to R UE, focusing on R upper arm and breast followed by pumps at 45 mmHg for 60 min.  Pt with mild redness post tx to R UE, no warmth or pain noted. Pt with noted decrease in edema post tx. Therapy Prognosis: Good        Decision Making: Medium Complexity    Patient Education  Patient Education: Skin care  Pt verbalized/demonstrated good understanding:     [x] Yes         [] No, pt required further clarification.          Goals  Short Term Goals  Time Frame for Short Term Goals: 3 weeks  Short Term Goal 1: Pt will be educated on her POC and HEP -met  Short Term Goal 2: Pt will initiate pump protocol in order to reduce signs and symptoms of lymphedema-met    Long Term Goals  Time Frame for Long Term Goals : 6 weeks  Long Term Goal 1: Pt will be safe and independent with lymphedema management - progressing  Long Term Goal 2: Pt will demonstrate a 1-2cm decrease in R UE/armpit/breast girth measurements in order to reduce pain and discomfort- progressing  Long Term Goal 3: Pt will increase R SHLD AROM with flex/abd to >/=130 in order to perform overhead ADLS  Long Term Goal 4: Pt will be fitted for an at home pump in order to be independent with lymphedema management- progressing  Long Term Goal 5: Pt will report 70% improvement in edema, R UE strength and ROM in order to improve daily activities- progressing      Patient Goals : \"to get rid of R UE/armpit/breast edema\"        Minutes Tracking:  Time In: 1191  Time Out: 1358  Minutes: 80       Roverto Marrero PTA   Date: 12/19/2022

## 2022-12-21 ENCOUNTER — HOSPITAL ENCOUNTER (OUTPATIENT)
Dept: PHYSICAL THERAPY | Age: 57
Setting detail: THERAPIES SERIES
Discharge: HOME | End: 2022-12-21
Payer: COMMERCIAL

## 2022-12-21 PROCEDURE — 97110 THERAPEUTIC EXERCISES: CPT

## 2022-12-21 PROCEDURE — 97140 MANUAL THERAPY 1/> REGIONS: CPT

## 2022-12-21 NOTE — PROGRESS NOTES
Phone: Honey           Fax: [phone]                           Outpatient Physical Therapy                                                                            Daily Note    Patient: Kameron Garcia : 1965  CSN #: [id number]   Referring Physician: Atul Broussard MD    Date: 2022       Treatment Diagnosis: R UE lymphedema/chest/breast lymphedema    Onset Date: 08/15/22  PT Insurance Information: [facility]    Per Physician Order  Total # of Visits to Date: 7  No Show: 1  Canceled Appointment: 0      Pre-Treatment Pain:  0-110  Subjective: Pt states she is feeling good today, states her B UE are not very tight today. Pt denies pain in R UE, states she does have some on the L UE this visit. Exercises:  Exercise 2: Pulleys x6 min  Exercise 3: TRX 3x15\", IR strap stretch 4x10\"  Exercise 4: Finger ladder x5  Exercise 5: Orange t-band x10 rows/ext, IR/ER  Exercise 6: Standing cane flex/ER x10 ea  Exercise 7: ball on wall x10 flex, abd, CW/CCW  Exercise 8: arm bike 3'/3'    Manual:  Joint Mobilization: PROM to B UE  Left AROM  Left PROM         AROM LUE (degrees)  LUE AROM : Exceptions  L Shoulder Flexion (0-180): 135          Right AROM  Right PROM         AROM RUE (degrees)  R Shoulder Flexion (0-180): 160              Modalities:       Assessment  Assessment: Pt tolerated tx well this visit, no pain in R UE, reports pain in L UE with stretching this visit. Measurements taken, pt with full PROM R  UE, limited by pain on L UE in all directions. Continue to progress as pt tolerates. Activity Tolerance  Activity Tolerance: Patient tolerated treatment well    Patient Education  Patient Education: Measurements  Pt verbalized/demonstrated good understanding:     [x] Yes         [] No, pt required further clarification.        Post Treatment Pain:  1-2/10      Plan  Plan Frequency: 3x/wk  Plan weeks: 6 weeks       Goals  (Total # of Visits to Date: 7) Short Term Goals  Time Frame for Short Term Goals: 3 weeks  Short Term Goal 1: Pt will be educated on her POC and HEP -met  Short Term Goal 2: Pt will initiate pump protocol in order to reduce signs and symptoms of lymphedema-met    Long Term Goals  Time Frame for Long Term Goals : 6 weeks  Long Term Goal 1: Pt will be safe and independent with lymphedema management - progressing  Long Term Goal 2: Pt will demonstrate a 1-2cm decrease in R UE/armpit/breast girth measurements in order to reduce pain and discomfort- progressing  Long Term Goal 3: Pt will increase R SHLD AROM with flex/abd to >/=130 in order to perform overhead ADLS- met  Long Term Goal 4: Pt will be fitted for an at home pump in order to be independent with lymphedema management- progressing  Long Term Goal 5: Pt will report 70% improvement in edema, R UE strength and ROM in order to improve daily activities- progressing    Minutes Tracking:  Time In: 1256  Time Out: 1343  Minutes: 6903 Wendy Pickering, PTA     Date: 12/21/2022

## 2022-12-27 ENCOUNTER — TELEPHONE (OUTPATIENT)
Dept: PRIMARY CARE CLINIC | Age: 57
End: 2022-12-27

## 2022-12-27 DIAGNOSIS — M79.602 LEFT ARM PAIN: Primary | ICD-10-CM

## 2022-12-27 NOTE — TELEPHONE ENCOUNTER
Have her speak with PT about this new pain and we can send additional orders if needed. If worsening needs to be seen. Thank you.

## 2022-12-27 NOTE — TELEPHONE ENCOUNTER
Patient called in and stated that she does PT for her right arm, but her left arm is now starting to hurt and cause her a lot of pain. She wanted to know if she should start PT on it as well or what you wanted to do? Patient did state she did not fall or injure her arm in anyway.   Please advise thank you

## 2022-12-27 NOTE — TELEPHONE ENCOUNTER
Talked to patient, she did talk to PT they stated she would need additional orders put in to start it on her left arm.

## 2022-12-28 ENCOUNTER — HOSPITAL ENCOUNTER (OUTPATIENT)
Dept: PHYSICAL THERAPY | Age: 57
Setting detail: THERAPIES SERIES
Discharge: HOME | End: 2022-12-28
Payer: COMMERCIAL

## 2022-12-28 PROCEDURE — 97110 THERAPEUTIC EXERCISES: CPT

## 2022-12-28 PROCEDURE — 97140 MANUAL THERAPY 1/> REGIONS: CPT

## 2022-12-28 NOTE — PROGRESS NOTES
Phone: [address] [facility], [place]          Fax: [phone]                      Outpatient Physical Therapy                                                             Lymphedema Treatment  Date: 2022  Patient: Caridad Lora  : 1965  [facility] #: [id number]  Referring Physician: Enedina Hall MD          Treatment Diagnosis: R UE lymphedema/chest/breast lymphedema  Onset Date: 08/15/22  PT Insurance Information: [employer] [facility]  Total # of Visits Approved: 18   Total # of Visits to Date: 8  No Show: 1  Canceled Appointment: 0     Subjective  Subjective: Pt reports feeling ok today, states she talked to her MD about her L UE yesterday and was going to get a script sent to work on L UE as well. Lymph Assessment    Skin Integumentary:   Skin Integrity: Abrasion, Scars (comment)  Pitting Scale Area 1: 0  Skin Texture: Dry  Edema Rebound: Quick  Stemmer Sign: Negative    Signs of Constriction (if applicable):   Papilloma (benign tumor arising from an epithelial layer): No  Fibrotic Areas: No  Lymphorrhea: No    Stage of Lymphedema: Stage 1: Reversible Stage  Description:      Lymphedema Classification:   Type: Post-Surgical  Left:       Right: Mild      Exercises:  Exercise 1: HEP pt educated on keeping her arm elevated and compressed at 30mmHg, perform gentle exercises daily and reduce sodium and sugar intake    Manual:  Soft Tissue Mobilizaton: MLD to R UE     Skin Integumentary  Skin Integrity: Abrasion, Scars (comment)  Skin Integumentary  Skin Integrity: Abrasion, Scars (comment)  RUE Complete Decongestion Therapy  Scale: Stage 1  Lymph Drainage Pattern: Upper extremity  Compression Technique: Vaso-pneumatic pump  Force (mmHg): 50 mmHg  Duration : 60 minutes      Assessment  Assessment: MLD to R UE, focusing on upper arm and R breast. Pt tolerated increase in pressure on pumps to 50 mmHg for 60 mins. Pt rachele to ANNALISA/DOFF pump IND.   Therapy Prognosis: Good        Decision Making: Medium Complexity    Patient Education  Patient Education: Increase in pressure  Pt verbalized/demonstrated good understanding:     [x] Yes         [] No, pt required further clarification.          Goals  Short Term Goals  Time Frame for Short Term Goals: 3 weeks  Short Term Goal 1: Pt will be educated on her POC and HEP -met  Short Term Goal 2: Pt will initiate pump protocol in order to reduce signs and symptoms of lymphedema-met    Long Term Goals  Time Frame for Long Term Goals : 6 weeks  Long Term Goal 1: Pt will be safe and independent with lymphedema management - progressing  Long Term Goal 2: Pt will demonstrate a 1-2cm decrease in R UE/armpit/breast girth measurements in order to reduce pain and discomfort- progressing  Long Term Goal 3: Pt will increase R SHLD AROM with flex/abd to >/=130 in order to perform overhead ADLS- met  Long Term Goal 4: Pt will be fitted for an at home pump in order to be independent with lymphedema management- progressing  Long Term Goal 5: Pt will report 70% improvement in edema, R UE strength and ROM in order to improve daily activities- progressing      Patient Goals : \"to get rid of R UE/armpit/breast edema\"        Minutes Tracking:  Time In: 1248  Time Out: [place]  Minutes: 80       Ssuan Tran PTA   Date: 12/28/2022

## 2022-12-30 ENCOUNTER — HOSPITAL ENCOUNTER (OUTPATIENT)
Dept: PHYSICAL THERAPY | Age: 57
Setting detail: THERAPIES SERIES
Discharge: HOME | End: 2022-12-30
Payer: COMMERCIAL

## 2022-12-30 PROCEDURE — 97140 MANUAL THERAPY 1/> REGIONS: CPT

## 2022-12-30 PROCEDURE — 97110 THERAPEUTIC EXERCISES: CPT

## 2022-12-30 NOTE — PROGRESS NOTES
Phone: [facility]           Fax: [phone]                           Outpatient Physical Therapy                                                                            Daily Note    Patient: Magali Gupta : 1965  CSN #: [id number]   Referring Physician: Beth Cramer MD    Date: 2022       Treatment Diagnosis: R UE lymphedema/chest/breast lymphedema    Onset Date: 08/15/22  PT Insurance Information: [facility]  Total # of Visits Approved: 18 Per Physician Order  Total # of Visits to Date: 9  No Show: 1  Canceled Appointment: 0      Pre-Treatment Pain:  2/10  Subjective: Pt reports 2/10 R UE and 5/10 L UE today. Pt states her R arm is moving well, just having a lot of problems with her L UE. Exercises:  Exercise 1: HEP pt educated on keeping her arm elevated and compressed at 30mmHg, perform gentle exercises daily and reduce sodium and sugar intake  Exercise 2: Pulleys x6 min  Exercise 3: TRX 3x15\", IR strap stretch 4x10\"  Exercise 5: yellow T-band x10 rows/ext, IR/ER  Exercise 7: ball on wall x10 flex, abd, CW/CCW  Exercise 8: arm bike 3'/3'  Exercise 9: 90/90 1# x10    Manual:  Joint Mobilization: PROM to B UE    Modalities:       Assessment  Assessment: Pt tolerated tx without increase in pain, pt continues to be limited with L UE during exercises. Pt with full R UE PROM this visit. Measurements R UE: FL 18.1 cm, wrist 15.7 cm, 3\" above 20 cm, 6\" above 24.8 cm, elbow 26.1 cm, 3\" above 32.6 cm, 6\" above 39.3 cm, axilla 40.9 cm, chest 119.2 cm. Activity Tolerance  Activity Tolerance: Patient tolerated treatment well    Patient Education  Patient Education: Measurements  Pt verbalized/demonstrated good understanding:     [x] Yes         [] No, pt required further clarification.        Post Treatment Pain:  2/10      Plan  Plan Frequency: 3x/wk  Plan weeks: 6 weeks       Goals  (Total # of Visits to Date: 5)      Short Term Goals  Time Frame for Short Term Goals: 3 weeks  Short Term Goal 1: Pt will be educated on her POC and HEP -met  Short Term Goal 2: Pt will initiate pump protocol in order to reduce signs and symptoms of lymphedema-met    Long Term Goals  Time Frame for Long Term Goals : 6 weeks  Long Term Goal 1: Pt will be safe and independent with lymphedema management - progressing  Long Term Goal 2: Pt will demonstrate a 1-2cm decrease in R UE/armpit/breast girth measurements in order to reduce pain and discomfort- progressing  Long Term Goal 3: Pt will increase R SHLD AROM with flex/abd to >/=130 in order to perform overhead ADLS- met  Long Term Goal 4: Pt will be fitted for an at home pump in order to be independent with lymphedema management- progressing  Long Term Goal 5: Pt will report 70% improvement in edema, R UE strength and ROM in order to improve daily activities- progressing    Minutes Tracking:  Time In: 1310  Time Out: 1355  Minutes: [address] [place], [place]     Date: 12/30/2022

## 2023-01-05 ENCOUNTER — OFFICE VISIT (OUTPATIENT)
Dept: PRIMARY CARE CLINIC | Age: 58
End: 2023-01-05
Payer: COMMERCIAL

## 2023-01-05 VITALS
BODY MASS INDEX: 37.91 KG/M2 | HEART RATE: 79 BPM | WEIGHT: 214 LBS | SYSTOLIC BLOOD PRESSURE: 138 MMHG | TEMPERATURE: 98.6 F | DIASTOLIC BLOOD PRESSURE: 86 MMHG | RESPIRATION RATE: 18 BRPM | OXYGEN SATURATION: 95 %

## 2023-01-05 DIAGNOSIS — R05.9 COUGH, UNSPECIFIED TYPE: ICD-10-CM

## 2023-01-05 DIAGNOSIS — R19.7 DIARRHEA, UNSPECIFIED TYPE: ICD-10-CM

## 2023-01-05 DIAGNOSIS — J10.1 INFLUENZA A: Primary | ICD-10-CM

## 2023-01-05 LAB
INFLUENZA A ANTIBODY: POSITIVE
INFLUENZA B ANTIBODY: NEGATIVE

## 2023-01-05 PROCEDURE — G9899 SCRN MAM PERF RSLTS DOC: HCPCS | Performed by: NURSE PRACTITIONER

## 2023-01-05 PROCEDURE — 3017F COLORECTAL CA SCREEN DOC REV: CPT | Performed by: NURSE PRACTITIONER

## 2023-01-05 PROCEDURE — 99214 OFFICE O/P EST MOD 30 MIN: CPT | Performed by: NURSE PRACTITIONER

## 2023-01-05 PROCEDURE — G8427 DOCREV CUR MEDS BY ELIG CLIN: HCPCS | Performed by: NURSE PRACTITIONER

## 2023-01-05 PROCEDURE — 87804 INFLUENZA ASSAY W/OPTIC: CPT | Performed by: NURSE PRACTITIONER

## 2023-01-05 PROCEDURE — G8417 CALC BMI ABV UP PARAM F/U: HCPCS | Performed by: NURSE PRACTITIONER

## 2023-01-05 PROCEDURE — 3075F SYST BP GE 130 - 139MM HG: CPT | Performed by: NURSE PRACTITIONER

## 2023-01-05 PROCEDURE — 3079F DIAST BP 80-89 MM HG: CPT | Performed by: NURSE PRACTITIONER

## 2023-01-05 PROCEDURE — 1036F TOBACCO NON-USER: CPT | Performed by: NURSE PRACTITIONER

## 2023-01-05 PROCEDURE — G8484 FLU IMMUNIZE NO ADMIN: HCPCS | Performed by: NURSE PRACTITIONER

## 2023-01-05 RX ORDER — GUAIFENESIN AND CODEINE PHOSPHATE 100; 10 MG/5ML; MG/5ML
10 SOLUTION ORAL 4 TIMES DAILY PRN
Qty: 120 ML | Refills: 0 | Status: SHIPPED | OUTPATIENT
Start: 2023-01-05 | End: 2023-01-10

## 2023-01-05 RX ORDER — OSELTAMIVIR PHOSPHATE 75 MG/1
75 CAPSULE ORAL 2 TIMES DAILY
Qty: 10 CAPSULE | Refills: 0 | Status: SHIPPED | OUTPATIENT
Start: 2023-01-05 | End: 2023-01-10

## 2023-01-05 ASSESSMENT — ENCOUNTER SYMPTOMS
SORE THROAT: 1
EYES NEGATIVE: 1
DIARRHEA: 1
ALLERGIC/IMMUNOLOGIC NEGATIVE: 1
COUGH: 1
SHORTNESS OF BREATH: 1
RHINORRHEA: 1
WHEEZING: 1
VOMITING: 0

## 2023-01-05 ASSESSMENT — PATIENT HEALTH QUESTIONNAIRE - PHQ9
SUM OF ALL RESPONSES TO PHQ QUESTIONS 1-9: 0
8. MOVING OR SPEAKING SO SLOWLY THAT OTHER PEOPLE COULD HAVE NOTICED. OR THE OPPOSITE, BEING SO FIGETY OR RESTLESS THAT YOU HAVE BEEN MOVING AROUND A LOT MORE THAN USUAL: 0
2. FEELING DOWN, DEPRESSED OR HOPELESS: 0
SUM OF ALL RESPONSES TO PHQ9 QUESTIONS 1 & 2: 0
SUM OF ALL RESPONSES TO PHQ QUESTIONS 1-9: 0
7. TROUBLE CONCENTRATING ON THINGS, SUCH AS READING THE NEWSPAPER OR WATCHING TELEVISION: 0
1. LITTLE INTEREST OR PLEASURE IN DOING THINGS: 0
6. FEELING BAD ABOUT YOURSELF - OR THAT YOU ARE A FAILURE OR HAVE LET YOURSELF OR YOUR FAMILY DOWN: 0
9. THOUGHTS THAT YOU WOULD BE BETTER OFF DEAD, OR OF HURTING YOURSELF: 0
3. TROUBLE FALLING OR STAYING ASLEEP: 0
10. IF YOU CHECKED OFF ANY PROBLEMS, HOW DIFFICULT HAVE THESE PROBLEMS MADE IT FOR YOU TO DO YOUR WORK, TAKE CARE OF THINGS AT HOME, OR GET ALONG WITH OTHER PEOPLE: 0
SUM OF ALL RESPONSES TO PHQ QUESTIONS 1-9: 0
4. FEELING TIRED OR HAVING LITTLE ENERGY: 0
SUM OF ALL RESPONSES TO PHQ QUESTIONS 1-9: 0
5. POOR APPETITE OR OVEREATING: 0

## 2023-01-05 NOTE — PATIENT INSTRUCTIONS
SURVEY:     You may be receiving a survey from [employer] regarding your visit today. Please complete the survey to enable us to provide the highest quality of care to you and your family. If you cannot score us a very good on any question, please call the office to discuss how we could have made your experience a very good one.      Thank you,    Syl Plasencia, APRN-CNP  Windy Chung, APRN-CNP  Stepan Moura, JEANNA Gifford, JORGE A Wiley, JORGE A Vasquez, CMA  Alice, PCA  Hope, PM

## 2023-01-05 NOTE — PROGRESS NOTES
[facility] PHYSICIANS  [facility], [address] [facility] PRIMARY [facility]  [address]  Dept: [phone]  Dept Fax: [phone]      Name: Janet Yeager  : 1965         Chief Complaint:     Chief Complaint   Patient presents with    Fever     X 4 days. Cough     X 4 days. Diarrhea     X 2 days ago. Headache     X 4 days. Pharyngitis     X 4 days. History of Present Illness:      Janet Yeager is a 62 y.o.  female who presents with Fever (X 4 days. ), Cough (X 4 days. ), Diarrhea (X 2 days ago. ), Headache (X 4 days. ), and Pharyngitis (X 4 days. )    Janett Ivory is here today for complaints of fever, cough, headache, and body aches. She has had chills and sweats. Her cough is dry. Her sympotms started 4 days ago. She has been having a sore throat. She states she has had some whezzing and shortness of breath. She states she has been coughing so hard her chest hurts. She has a history of lymphedema in chest and this has been making it hurt more. She states she can not sleep from coughing. She has tried OTC cough medication with no improvement. Past Medical History:     Past Medical History:   Diagnosis Date    Anxiety     Asthma     CAD (coronary artery disease)     x1 stent ,x1 stent , x1 stent May 2022    Cancer [facility]) 2022    right breast cancer    Clinical trial participant at discharge 3/31/16    COPD (chronic obstructive pulmonary disease) ([facility] [address]     Depression     Diabetic neuropathy (HCC)     GERD (gastroesophageal reflux disease)     H/O cardiac catheterization 16    LMCA: Mild Irregularities 10-20%. LAD: Lesion on Prox LAD: Proximal subsection. 100% stenosis. LCx: Mild irregularities 10-20%. RCA: Mild irregularities 10-20%. Widely patent mid RCA stent. EF:60%. H/O cardiovascular stress test 10/07/2016    Overall results are most consistent with a low risk for significant CAD. H/O echocardiogram 10/05/2016    EF:>60%. Inferoseptal wall abnormal in its motion which is not unusal status post open heart surgery. Evidence of mild (grade I) diastolic dysfunction is seen. H/O tilt table evaluation 07/12/2016    Abnormal. PTs HR, Blood Pressure response and symptoms were most consistent with dysautonomia. Combined w/viligant maintenance of euvolemia and maintaining a moderate salt intake, pharmaciologic treatment w/ ProAmatine, SSRI such as Lexapro and/or Mestinon among other treatments have shown some effectiveness in the treatment of this condition. History of cardiovascular stress test 02/13/2019    Abnormal. Small/moderate perfusion defect of mild/moderate intesity in the anterior and anterolateral regions during stress imaging which is most consistent w/ ischemia but may be artifact. Overall low/intermediate risk for significant CAD. History of echocardiogram 09/06/2018    EF >60%. Inferoseptal wall is abnormal in its motion which is not unusual s/p open heart. Evidence of mild (grade I) diastolic dysfunction seen.     Hx of blood clots     Hyperlipidemia     Hypertension     Intermittent claudication (HCC)     Kidney stones     Movement disorder     neuropathy in legs    Neuromuscular disorder (HCC)     neuropathy    Osteoarthritis     Reflux     Seizures ([address] 1980'Ss last seizure    Stented coronary artery 9/22/2010     LAD/[place]    Stented coronary artery 3/31/16    RCA/[place]    Type II or unspecified type diabetes mellitus without mention of complication, not stated as uncontrolled     Unspecified sleep apnea     no machine      Reviewed all health maintenance requirements and ordered appropriate tests  Health Maintenance Due   Topic Date Due    Diabetic Alb to Cr ratio (uACR) test  Never done    Diabetic foot exam  12/12/2020    Diabetic retinal exam  06/15/2021    Flu vaccine (1) 08/01/2022    A1C test (Diabetic or Prediabetic)  10/01/2022    Lipids  12/29/2022       Past Surgical History:     Past Surgical History:   Procedure Laterality Date    ABDOMINAL HERNIA REPAIR  01/2016    repair done julian    APPENDECTOMY      AXILLARY SURGERY Right 9/2/2022    RIGHT AXILLARY LYMPH SENTINAL NODE BIOPSY  @  12PM performed by Víctor Richey DO at [address] Right 7/12/2022    NEEDLE DIRECTED ( [address]130     )    RIGHT BREAST LUMPECTOMY performed by Víctor Richey DO at [address] [facility] Left 04/26/2016    right radial/ [place]/ Dr Sonja Colmenares Left 03/07/2019    Left Radial/[facility]/    CARDIAC CATHETERIZATION  05/20/2022    Dr Carlos Chavez radial    CARDIAC CATHETERIZATION  05/20/2022    Dr Carlos Chavez radial    [address]      CABG 2019    CHOLECYSTECTOMY  1991    COLONOSCOPY  12/16/2014    -hemorrhoids,bx    CORONARY ANGIOPLASTY WITH STENT PLACEMENT  09/2010    CORONARY ANGIOPLASTY WITH STENT PLACEMENT  03/31/2016    JESSE RCA / DR Laura Guardado WITH STENT PLACEMENT  05/20/2022    CORONARY ARTERY BYPASS GRAFT  08/15/2016    OP X 1- Dr Ml Lancaster, DIAGNOSTIC  12/16/2014    HERNIA REPAIR  12/13/2012    at the medial aspect of a [place] RUQ scar); repaired byDr. Bonnee Angelucci  02/16/2015    incisional, recurrent    HERNIA REPAIR  02/2016    HYSTERECTOMY (CERVIX STATUS UNKNOWN)      OTHER SURGICAL HISTORY  10/08/2015    abd wound washout, mesh removal, wound vac placement    VT SUCTION ASSISTED LIPECTOMY HEAD & NECK Left 08/27/2018    THIGH LESION BIOPSY EXCISION, SOFT TISSUE MASS performed by Ethel Whittington MD at [facility] Left 2018    leg    UPPP UVULOPALATOPHARYGOPLASTY  06/26/2012    US BREAST BIOPSY NEEDLE ADDITIONAL RIGHT Right 6/9/2022    US BREAST BIOPSY NEEDLE ADDITIONAL RIGHT 6/9/2022 [facility] ULTRASOUND    US BREAST NEEDLE BIOPSY RIGHT Right 6/9/2022    US BREAST NEEDLE BIOPSY RIGHT 6/9/2022 [facility] ULTRASOUND    US GUIDED NEEDLE LOC OF RIGHT BREAST Right 7/12/2022    US GUIDED NEEDLE LOC OF RIGHT BREAST 7/12/2022 [facility] ULTRASOUND    VENTRAL HERNIA REPAIR  09/21/2015    With Mesh - Dr Madeleine Patino        Medications:       Prior to Admission medications    Medication Sig Start Date End Date Taking? Authorizing Provider   oseltamivir (TAMIFLU) 75 MG capsule Take 1 capsule by mouth 2 times daily for 5 days 1/5/23 1/10/23 Yes Keeganva MyersCHELSEA CNP   guaiFENesin-codeine (CHERATUSSIN AC) 100-10 MG/5ML syrup Take 10 mLs by mouth 4 times daily as needed for Cough or Congestion for up to 5 days. Max Daily Amount: 40 mLs 1/5/23 1/10/23 Yes CHELSEA [place] CNP   DULoxetine (CYMBALTA) 30 MG extended release capsule TAKE 1 CAPSULE BY MOUTH EVERY DAY 11/29/22  Yes CHELSEA Flores CNP   atorvastatin (LIPITOR) 80 MG tablet TAKE 1 TABLET BY MOUTH EVERY DAY 11/7/22  Yes Guillaume Núñez MD   triamcinolone (KENALOG) 0.1 % cream Apply topically 2 times daily. 11/4/22  Yes Maurice Palencia MD   isosorbide mononitrate (IMDUR) 30 MG extended release tablet Take 1 tablet by mouth daily 10/13/22  Yes CHELSEA Edwards CNP   ezetimibe (ZETIA) 10 MG tablet Take 1 tablet by mouth nightly 10/13/22  Yes CHELSEA Edwards CNP   fludrocortisone (FLORINEF) 0.1 MG tablet TAKE 3 TABLETS BY MOUTH EVERY DAY 10/6/22  Yes Giullaume Núñez MD   tiZANidine (ZANAFLEX) 2 MG tablet Take 2 tablets by mouth nightly as needed (spasms) TAKE 2 TABLET BY MOUTH NIGHTLY AS NEEDED(SPASMS) 9/1/22  Yes CHELSEA Flores CNP   gabapentin (NEURONTIN) 800 MG tablet Take 1 tablet by mouth 3 times daily for 30 days.  9/1/22 1/5/23 Yes CHELSEA Flores CNP   metoprolol succinate (TOPROL XL) 100 MG extended release tablet TAKE 1 AND 1/2 TABLETS BY MOUTH EVERY DAY 7/12/22  Yes Guillaume Núñez MD   albuterol sulfate HFA (VENTOLIN HFA) 108 (90 Base) MCG/ACT inhaler INHALE 2 PUFFS EVERY 6 HOURS AS NEEDED FOR WHEEZING 7/7/22  Yes Dolores Plasencia, APRN - CNP nitroGLYCERIN (NITROSTAT) 0.4 MG SL tablet Place 1 tablet under the tongue every 5 minutes as needed for Chest pain 5/27/22  Yes Farzana Valverde MD   clopidogrel (PLAVIX) 75 MG tablet Take 1 tablet by mouth daily 5/21/22  Yes CHELSEA Castillo CNP   TRULICITY 3 QP/8.3LZ SOPN INJECT 0.5 ML (1 PEN) SUBCUTANEOUSLY WEEKLY,X30 DAYS,INSTR:ROTATE INJECTION SITES 5/9/22  Yes Frankoandrea Shape   Blood Glucose Monitoring Suppl (ONE TOUCH ULTRA 2) w/Device KIT 1 kit by Does not apply route daily 7/20/21  Yes CHELSEA Mejia CNP   blood glucose monitor strips Test 3 times a day & as needed for symptoms of irregular blood glucose. Dispense sufficient amount for indicated testing frequency plus additional to accommodate PRN testing needs.  7/16/21  Yes [de-identified] CHELSEA Mariano CNP   Lancets MISC 1 each by Does not apply route 2 times daily 7/16/21  Yes CHELSEA So CNP   Insulin Degludec (TRESIBA FLEXTOUCH) 200 UNIT/ML SOPN Inject 86 Units into the skin daily PER DR MCELROY  Patient taking differently: Inject 120 Units into the skin at bedtime 3/30/21  Yes CHELSEA Mejia CNP   metFORMIN (GLUCOPHAGE-XR) 500 MG extended release tablet Take 2 tablets by mouth daily (with breakfast) 3/30/21  Yes CHELSEA Mejia CNP   omeprazole (PRILOSEC) 20 MG delayed release capsule TAKE 1 CAPSULE BY MOUTH EVERY DAY IN [address] [place] [address] BREAKFAST  Patient taking differently: Take 20 mg by mouth Daily 11/23/20  Yes CHELSEA Mejia CNP   ondansetron (ZOFRAN ODT) 4 MG disintegrating tablet Take 1 tablet by mouth every 8 hours as needed for Nausea 11/10/20  Yes Mica Harrison MD   Saccharomyces boulardii (PROBIOTIC) 250 MG CAPS Take 1 capsule by mouth daily 9/30/20  Yes CHELSEA Mejia CNP   acetaminophen (TYLENOL) 500 MG tablet Take 1 tablet by mouth 4 times daily as needed for Pain 9/9/20  Yes CHELSEA So CNP   losartan (COZAAR) 25 MG tablet Take 1 tablet by mouth daily 7/23/20  Yes Malka Green, APRN - CNP   insulin lispro, 1 Unit Dial, (HUMALOG KWIKPEN) 100 UNIT/ML SOPN Inject 20 Units into the skin 3 times daily (before meals)  Patient taking differently: Inject 15 Units into the skin 3 times daily (before meals) 6/23/20  Yes Atul Lis BRENDAN PlasenciaN - CNP   aspirin 81 MG EC tablet TAKE 1 TABLET BY MOUTH DAILY  Patient taking differently: Take 81 mg by mouth daily 7/1/19  Yes CHELSEA Ojeda - CNP   Insulin Pen Needle (BD ULTRA-FINE PEN NEEDLES) 29G X 12.7MM MISC USE AS DIRECTED BY PHYSICIAN 5 times daily 8/7/18  Yes Atul Lis Luis Angel APRN - CNP   Blood Pressure Monitor KIT 1 each by Does not apply route daily as needed ESSENTIAL HYPERTENSION   I10 5/31/16  Yes CHELSEA Ojeda - CNP        Allergies:       Tape Sofia Amanda tape]    Social History:     Tobacco:    reports that she quit smoking about 12 years ago. Her smoking use included cigarettes. She has a 20.00 pack-year smoking history. She has never used smokeless tobacco.  Alcohol:      reports no history of alcohol use. Drug Use:  reports no history of drug use. Family History:     Family History   Problem Relation Age of Onset    Cancer Mother     Diabetes Mother     Cancer Father         thyroid    Diabetes Sister     Heart Disease Sister     Heart Disease Brother     Heart Disease Maternal Uncle     Cancer Maternal Grandmother        Review of Systems:     Positive and Negative as described in HPI    Review of Systems   Constitutional:  Positive for chills, diaphoresis and fatigue. HENT:  Positive for rhinorrhea and sore throat. Negative for ear pain. Eyes: Negative. Respiratory:  Positive for cough, shortness of breath and wheezing. Cardiovascular:  Negative for chest pain. Gastrointestinal:  Positive for diarrhea. Negative for vomiting. Endocrine: Negative. Genitourinary: Negative. Musculoskeletal:  Positive for myalgias. Skin: Negative. Allergic/Immunologic: Negative. Neurological:  Positive for headaches. Hematological: Negative. Psychiatric/Behavioral: Negative. Physical Exam:   Vitals:  /86   Pulse 79   Temp 98.6 °F (37 °C) (Temporal)   Resp 18   Wt 214 lb (97.1 kg)   LMP 12/05/1996   SpO2 95%   BMI 37.91 kg/m²     Physical Exam  Vitals and nursing note reviewed. Constitutional:       Appearance: Normal appearance. HENT:      Head: Normocephalic. Right Ear: Tympanic membrane normal.      Left Ear: Tympanic membrane normal.      Nose: Rhinorrhea present. Rhinorrhea is clear. Mouth/Throat:      Lips: Pink. Mouth: Mucous membranes are moist.      Pharynx: Posterior oropharyngeal erythema present. Eyes:      Conjunctiva/sclera: Conjunctivae normal.      Pupils: Pupils are equal, round, and reactive to light. Cardiovascular:      Rate and Rhythm: Normal rate and regular rhythm. Heart sounds: Normal heart sounds. Pulmonary:      Effort: Pulmonary effort is normal.      Breath sounds: Normal breath sounds. No wheezing. Comments: Constant harsh cough with deep inspirations  Musculoskeletal:         General: Normal range of motion. Cervical back: Normal range of motion and neck supple. Lymphadenopathy:      Cervical: Cervical adenopathy (anterior) present. Skin:     General: Skin is warm. Capillary Refill: Capillary refill takes less than 2 seconds. Neurological:      General: No focal deficit present. Mental Status: She is alert and oriented to person, place, and time. Psychiatric:         Mood and Affect: Mood normal.         Behavior: Behavior normal.         Thought Content:  Thought content normal.       Data:     Lab Results   Component Value Date/Time     10/13/2022 06:20 AM    K 4.0 10/13/2022 06:20 AM     10/13/2022 06:20 AM    CO2 24 10/13/2022 06:20 AM    BUN 11 10/13/2022 06:20 AM    CREATININE 0.63 10/13/2022 06:20 AM    GLUCOSE 215 10/13/2022 06:20 AM    GLUCOSE 181 02/16/2012 08:49 AM    PROT 6.9 10/13/2022 06:20 AM LABALBU 3.5 10/13/2022 06:20 AM    LABALBU 4.2 12/05/2011 11:35 AM    BILITOT 0.3 10/13/2022 06:20 AM    ALKPHOS 96 10/13/2022 06:20 AM    AST 8 10/13/2022 06:20 AM    ALT 5 10/13/2022 06:20 AM     Lab Results   Component Value Date/Time    WBC 7.5 10/13/2022 06:20 AM    RBC 4.46 10/13/2022 06:20 AM    RBC 3.88 12/05/2011 11:35 AM    HGB 10.9 10/13/2022 06:20 AM    HCT 33.4 10/13/2022 06:20 AM    MCV 74.9 10/13/2022 06:20 AM    MCH 24.4 10/13/2022 06:20 AM    MCHC 32.6 10/13/2022 06:20 AM    RDW 15.5 10/13/2022 06:20 AM     10/13/2022 06:20 AM     12/05/2011 11:35 AM    MPV 9.5 10/13/2022 06:20 AM     Lab Results   Component Value Date/Time    TSH 2.80 06/04/2016 11:04 PM     Lab Results   Component Value Date/Time    CHOL 165 12/29/2021 09:40 AM    HDL 40 12/29/2021 09:40 AM    LABA1C 9.3 07/01/2022 10:26 AM       Assessment/Plan:      Diagnosis Orders   1. Influenza A  oseltamivir (TAMIFLU) 75 MG capsule    guaiFENesin-codeine (CHERATUSSIN AC) 100-10 MG/5ML syrup      2. Diarrhea, unspecified type  POCT Influenza A/B      3. Cough, unspecified type  POCT Influenza A/B    guaiFENesin-codeine (CHERATUSSIN AC) 100-10 MG/5ML syrup        Practice meticulous handwashing and cover cough to prevent spread of infection  Encouraged to increase fluids and rest  Tylenol/Ibuprofen OTC PRN for pain, discomfort or fever as directed on package  Warm salt water gargles for sore throat  Cool mist humidifier  Hot tea with honey and lemon for cough and sore throat PRN  Tamiflu as prescribed. Cheratussin as needed for cough. Recommend to take Mucinex as directed on package when not taking Cheratussin. Return to office if worsening of symptoms. Controlled Substance Monitoring:    Acute and Chronic Pain Monitoring:   RX Monitoring 1/5/2023   Attestation -   Periodic Controlled Substance Monitoring No signs of potential drug abuse or diversion identified. ;Assessed functional status.            1.  Ariela Cullen received counseling on the following healthy behaviors: nutrition, exercise, and medication adherence  2. Patient given educational materials - see patient instructions  3. Was a self-tracking handout given in paper form or via [employer]? No  If yes, see orders or list here. 4.  Discussed use, benefit, and side effects of prescribed medications. Barriers to medication compliance addressed. All patient questions answered. Pt voiced understanding. 5.  Reviewed prior labs and health maintenance  6. Continue current medications, diet and exercise. Completed Refills   Requested Prescriptions     Signed Prescriptions Disp Refills    oseltamivir (TAMIFLU) 75 MG capsule 10 capsule 0     Sig: Take 1 capsule by mouth 2 times daily for 5 days    guaiFENesin-codeine (CHERATUSSIN AC) 100-10 MG/5ML syrup 120 mL 0     Sig: Take 10 mLs by mouth 4 times daily as needed for Cough or Congestion for up to 5 days. Max Daily Amount: 40 mLs         No follow-ups on file.

## 2023-01-06 RX ORDER — METOPROLOL SUCCINATE 100 MG/1
TABLET, EXTENDED RELEASE ORAL
Qty: 135 TABLET | Refills: 2 | Status: SHIPPED | OUTPATIENT
Start: 2023-01-06

## 2023-01-10 ENCOUNTER — OFFICE VISIT (OUTPATIENT)
Dept: PRIMARY CARE CLINIC | Age: 58
End: 2023-01-10
Payer: COMMERCIAL

## 2023-01-10 VITALS
HEART RATE: 78 BPM | OXYGEN SATURATION: 97 % | TEMPERATURE: 97.7 F | RESPIRATION RATE: 22 BRPM | SYSTOLIC BLOOD PRESSURE: 122 MMHG | BODY MASS INDEX: 38.17 KG/M2 | DIASTOLIC BLOOD PRESSURE: 78 MMHG | WEIGHT: 215.5 LBS

## 2023-01-10 DIAGNOSIS — E11.65 UNCONTROLLED TYPE 2 DIABETES MELLITUS WITH HYPERGLYCEMIA (HCC): Primary | ICD-10-CM

## 2023-01-10 DIAGNOSIS — R05.2 SUBACUTE COUGH: ICD-10-CM

## 2023-01-10 DIAGNOSIS — I10 ESSENTIAL HYPERTENSION: ICD-10-CM

## 2023-01-10 LAB — HBA1C MFR BLD: 10.7 %

## 2023-01-10 PROCEDURE — 3046F HEMOGLOBIN A1C LEVEL >9.0%: CPT | Performed by: NURSE PRACTITIONER

## 2023-01-10 PROCEDURE — 2022F DILAT RTA XM EVC RTNOPTHY: CPT | Performed by: NURSE PRACTITIONER

## 2023-01-10 PROCEDURE — G8484 FLU IMMUNIZE NO ADMIN: HCPCS | Performed by: NURSE PRACTITIONER

## 2023-01-10 PROCEDURE — 3074F SYST BP LT 130 MM HG: CPT | Performed by: NURSE PRACTITIONER

## 2023-01-10 PROCEDURE — G8427 DOCREV CUR MEDS BY ELIG CLIN: HCPCS | Performed by: NURSE PRACTITIONER

## 2023-01-10 PROCEDURE — G9899 SCRN MAM PERF RSLTS DOC: HCPCS | Performed by: NURSE PRACTITIONER

## 2023-01-10 PROCEDURE — G8417 CALC BMI ABV UP PARAM F/U: HCPCS | Performed by: NURSE PRACTITIONER

## 2023-01-10 PROCEDURE — 1036F TOBACCO NON-USER: CPT | Performed by: NURSE PRACTITIONER

## 2023-01-10 PROCEDURE — 83036 HEMOGLOBIN GLYCOSYLATED A1C: CPT | Performed by: NURSE PRACTITIONER

## 2023-01-10 PROCEDURE — 99214 OFFICE O/P EST MOD 30 MIN: CPT | Performed by: NURSE PRACTITIONER

## 2023-01-10 PROCEDURE — 3078F DIAST BP <80 MM HG: CPT | Performed by: NURSE PRACTITIONER

## 2023-01-10 PROCEDURE — 3017F COLORECTAL CA SCREEN DOC REV: CPT | Performed by: NURSE PRACTITIONER

## 2023-01-10 ASSESSMENT — ENCOUNTER SYMPTOMS
WHEEZING: 0
VOMITING: 0
BLURRED VISION: 1
HEMOPTYSIS: 0
ORTHOPNEA: 0
SHORTNESS OF BREATH: 0
ABDOMINAL PAIN: 0
CONSTIPATION: 0
COUGH: 1
HEARTBURN: 0
SORE THROAT: 0
DIARRHEA: 0
RHINORRHEA: 0

## 2023-01-10 ASSESSMENT — PATIENT HEALTH QUESTIONNAIRE - PHQ9
5. POOR APPETITE OR OVEREATING: 0
SUM OF ALL RESPONSES TO PHQ QUESTIONS 1-9: 0
4. FEELING TIRED OR HAVING LITTLE ENERGY: 0
6. FEELING BAD ABOUT YOURSELF - OR THAT YOU ARE A FAILURE OR HAVE LET YOURSELF OR YOUR FAMILY DOWN: 0
1. LITTLE INTEREST OR PLEASURE IN DOING THINGS: 0
7. TROUBLE CONCENTRATING ON THINGS, SUCH AS READING THE NEWSPAPER OR WATCHING TELEVISION: 0
SUM OF ALL RESPONSES TO PHQ QUESTIONS 1-9: 0
SUM OF ALL RESPONSES TO PHQ9 QUESTIONS 1 & 2: 0
SUM OF ALL RESPONSES TO PHQ QUESTIONS 1-9: 0
3. TROUBLE FALLING OR STAYING ASLEEP: 0
10. IF YOU CHECKED OFF ANY PROBLEMS, HOW DIFFICULT HAVE THESE PROBLEMS MADE IT FOR YOU TO DO YOUR WORK, TAKE CARE OF THINGS AT HOME, OR GET ALONG WITH OTHER PEOPLE: 0
2. FEELING DOWN, DEPRESSED OR HOPELESS: 0
9. THOUGHTS THAT YOU WOULD BE BETTER OFF DEAD, OR OF HURTING YOURSELF: 0
SUM OF ALL RESPONSES TO PHQ QUESTIONS 1-9: 0
8. MOVING OR SPEAKING SO SLOWLY THAT OTHER PEOPLE COULD HAVE NOTICED. OR THE OPPOSITE, BEING SO FIGETY OR RESTLESS THAT YOU HAVE BEEN MOVING AROUND A LOT MORE THAN USUAL: 0

## 2023-01-10 NOTE — PATIENT INSTRUCTIONS
SURVEY:     You may be receiving a survey from [employer] regarding your visit today. Please complete the survey to enable us to provide the highest quality of care to you and your family. If you cannot score us a very good on any question, please call the office to discuss how we could have made your experience a very good one.      Thank you,    Zamzam Plasencia, APRN-CNP  Benigno Trinidad, APRN-CNP  Jeffry Adams, JEANNA Ortega, JORGE A Garcia, JORGE A Vasquez, CMA  Alice, PCA  Hope, PM

## 2023-01-10 NOTE — PROGRESS NOTES
Name: Coralie Litten  : 1965         Chief Complaint:     Chief Complaint   Patient presents with    Diabetes     Routine check. Cough     Patient was + influenza A 1 week ago. History of Present Illness:      Coralie Litten is a 62 y.o.  female who presents with Diabetes (Routine check. ) and Cough (Patient was + influenza A 1 week ago. )      Soren Monterroso is here today for a routine office visit. DM-worsening, patient follows with diabetologist.  See below for further comment. Diabetes  She presents for her follow-up diabetic visit. She has type 2 diabetes mellitus. Onset time: YEARS. Her disease course has been worsening. There are no hypoglycemic associated symptoms. Pertinent negatives for hypoglycemia include no dizziness, headaches, nervousness/anxiousness, pallor, sleepiness or sweats. Associated symptoms include blurred vision (CHRONIC RIGHT) and foot paresthesias. Pertinent negatives for diabetes include no chest pain, no fatigue, no polydipsia, no polyphagia, no polyuria, no weakness and no weight loss. There are no hypoglycemic complications. Pertinent negatives for hypoglycemia complications include no hospitalization, no nocturnal hypoglycemia and no required assistance. Symptoms are improving. Diabetic complications include peripheral neuropathy. Pertinent negatives for diabetic complications include no CVA, heart disease or nephropathy. Risk factors for coronary artery disease include diabetes mellitus, dyslipidemia, family history, obesity, hypertension, stress, post-menopausal and sedentary lifestyle. Current diabetic treatment includes intensive insulin program. She is compliant with treatment all of the time. Her weight is stable. She is following a generally healthy diet. Meal planning includes avoidance of concentrated sweets. She has had a previous visit with a dietitian. She rarely participates in exercise. Her home blood glucose trend is increasing steadily.  Her breakfast blood glucose range is generally >200 mg/dl. An ACE inhibitor/angiotensin II receptor blocker is being taken. She sees a podiatrist.Eye exam is current. Hypertension  This is a chronic problem. The current episode started more than 1 year ago. The problem is unchanged. The problem is controlled. Associated symptoms include blurred vision (CHRONIC RIGHT). Pertinent negatives include no chest pain, headaches, malaise/fatigue, neck pain, orthopnea, palpitations, peripheral edema, shortness of breath or sweats. There are no associated agents to hypertension. Risk factors for coronary artery disease include diabetes mellitus, dyslipidemia, obesity, family history, post-menopausal state, sedentary lifestyle and stress. Past treatments include beta blockers and angiotensin blockers. The current treatment provides moderate improvement. Compliance problems include exercise and diet. There is no history of kidney disease, CAD/MI, CVA or heart failure. There is no history of chronic renal disease. Cough  This is a recurrent problem. The current episode started 1 to 4 weeks ago. The problem has been gradually improving. Episode frequency: INTERMITTENTLY. The cough is Non-productive. Associated symptoms include nasal congestion. Pertinent negatives include no chest pain, chills, ear congestion, ear pain, fever, headaches, heartburn, hemoptysis, myalgias, postnasal drip, rash, rhinorrhea, sore throat, shortness of breath, sweats, weight loss or wheezing. The symptoms are aggravated by lying down. She has tried prescription cough suppressant and a beta-agonist inhaler for the symptoms. The treatment provided moderate relief. Her past medical history is significant for bronchitis and environmental allergies. There is no history of asthma, bronchiectasis, COPD, emphysema or pneumonia.        Past Medical History:     Past Medical History:   Diagnosis Date    Anxiety     Asthma     CAD (coronary artery disease)     x1 stent 2010,x1 stent 2016, x1 stent May 2022    Cancer [facility]) 06/2022    right breast cancer    Clinical trial participant at discharge 3/31/16    COPD (chronic obstructive pulmonary disease) ([address]     Depression     Diabetic neuropathy (HCC)     GERD (gastroesophageal reflux disease)     H/O cardiac catheterization 4/26/16    LMCA: Mild Irregularities 10-20%. LAD: Lesion on Prox LAD: Proximal subsection. 100% stenosis. LCx: Mild irregularities 10-20%. RCA: Mild irregularities 10-20%. Widely patent mid RCA stent. EF:60%. H/O cardiovascular stress test 10/07/2016    Overall results are most consistent with a low risk for significant CAD. H/O echocardiogram 10/05/2016    EF:>60%. Inferoseptal wall abnormal in its motion which is not unusal status post open heart surgery. Evidence of mild (grade I) diastolic dysfunction is seen. H/O tilt table evaluation 07/12/2016    Abnormal. PTs HR, Blood Pressure response and symptoms were most consistent with dysautonomia. Combined w/viligant maintenance of euvolemia and maintaining a moderate salt intake, pharmaciologic treatment w/ ProAmatine, SSRI such as Lexapro and/or Mestinon among other treatments have shown some effectiveness in the treatment of this condition. History of cardiovascular stress test 02/13/2019    Abnormal. Small/moderate perfusion defect of mild/moderate intesity in the anterior and anterolateral regions during stress imaging which is most consistent w/ ischemia but may be artifact. Overall low/intermediate risk for significant CAD. History of echocardiogram 09/06/2018    EF >60%. Inferoseptal wall is abnormal in its motion which is not unusual s/p open heart. Evidence of mild (grade I) diastolic dysfunction seen.     Hx of blood clots     Hyperlipidemia     Hypertension     Intermittent claudication (HCC)     Kidney stones     Movement disorder     neuropathy in legs    Neuromuscular disorder (HCC)     neuropathy    Osteoarthritis     Reflux Seizures ([facility] [address] 1980'Ss last seizure    Stented coronary artery 9/22/2010     LAD/[place]    Stented coronary artery 3/31/16    RCA/[place]    Type II or unspecified type diabetes mellitus without mention of complication, not stated as uncontrolled     Unspecified sleep apnea     no machine      Reviewed all health maintenance requirements and ordered appropriate tests  Health Maintenance Due   Topic Date Due    Diabetic Alb to Cr ratio (uACR) test  Never done    Lipids  12/29/2022       Past Surgical History:     Past Surgical History:   Procedure Laterality Date    ABDOMINAL HERNIA REPAIR  01/2016    repair done julian    APPENDECTOMY      AXILLARY SURGERY Right 9/2/2022    RIGHT AXILLARY LYMPH SENTINAL NODE BIOPSY  @  12PM performed by Ashley Jay DO at [address] Right 7/12/2022    NEEDLE DIRECTED ( 1130     )    RIGHT BREAST LUMPECTOMY performed by Ashley Jay DO at [address] [facility] Left 04/26/2016    right radial/ [facility] [place]/ Dr Jose Portillo Left 03/07/2019    Left Radial/[facility] [place]/    CARDIAC CATHETERIZATION  05/20/2022    Dr Estefany Vaughn radial    CARDIAC CATHETERIZATION  05/20/2022    Dr Estefany Vaughn radial    [address] [facility]      CABG 2019    CHOLECYSTECTOMY  1991    COLONOSCOPY  12/16/2014    -hemorrhoids,bx    CORONARY ANGIOPLASTY WITH STENT PLACEMENT  09/2010    CORONARY ANGIOPLASTY WITH STENT PLACEMENT  03/31/2016    JESSE RCA / DR Lopez 10 WITH STENT PLACEMENT  05/20/2022    CORONARY ARTERY BYPASS GRAFT  08/15/2016    OP X 1- Dr Lashaun Warner, DIAGNOSTIC  12/16/2014    HERNIA REPAIR  12/13/2012    at the medial aspect of a [place] RUQ scar); repaired byDr. Tara Parrish  02/16/2015    incisional, recurrent    HERNIA REPAIR  02/2016    HYSTERECTOMY (CERVIX STATUS UNKNOWN)      OTHER SURGICAL HISTORY  10/08/2015    abd wound washout, mesh removal, wound vac placement    MO SUCTION ASSISTED LIPECTOMY HEAD & NECK Left 08/27/2018    THIGH LESION BIOPSY EXCISION, SOFT TISSUE MASS performed by Mahnaz Sanchez MD at [address] [facility] Left 2018    leg    UPPP UVULOPALATOPHARYGOPLASTY  06/26/2012    US BREAST BIOPSY NEEDLE ADDITIONAL RIGHT Right 6/9/2022    US BREAST BIOPSY NEEDLE ADDITIONAL RIGHT 6/9/2022 [facility] ULTRASOUND    US BREAST NEEDLE BIOPSY RIGHT Right 6/9/2022    US BREAST NEEDLE BIOPSY RIGHT 6/9/2022 [facility] ULTRASOUND    US GUIDED NEEDLE LOC OF RIGHT BREAST Right 7/12/2022    US GUIDED NEEDLE LOC OF RIGHT BREAST 7/12/2022 [facility] ULTRASOUND    VENTRAL HERNIA REPAIR  09/21/2015    With Mesh - Dr Adarsh Colby        Medications:       Prior to Admission medications    Medication Sig Start Date End Date Taking? Authorizing Provider   metoprolol succinate (TOPROL XL) 100 MG extended release tablet TAKE 1.5 TABLETS BY MOUTH EVERY DAY 1/6/23  Yes Megan Parikh MD   guaiFENesin-codeine (CHERATUSSIN AC) 100-10 MG/5ML syrup Take 10 mLs by mouth 4 times daily as needed for Cough or Congestion for up to 5 days.  Max Daily Amount: 40 mLs 1/5/23 1/10/23 Yes Gerry Saint, APRN - CNP   DULoxetine (CYMBALTA) 30 MG extended release capsule TAKE 1 CAPSULE BY MOUTH EVERY DAY 11/29/22  Yes CHELSEA Mcclain CNP   atorvastatin (LIPITOR) 80 MG tablet TAKE 1 TABLET BY MOUTH EVERY DAY 11/7/22  Yes Megan Parikh MD   isosorbide mononitrate (IMDUR) 30 MG extended release tablet Take 1 tablet by mouth daily 10/13/22  Yes CHELSEA Marie CNP   ezetimibe (ZETIA) 10 MG tablet Take 1 tablet by mouth nightly 10/13/22  Yes CHELSEA Marie CNP   fludrocortisone (FLORINEF) 0.1 MG tablet TAKE 3 TABLETS BY MOUTH EVERY DAY 10/6/22  Yes Megan Parikh MD   tiZANidine (ZANAFLEX) 2 MG tablet Take 2 tablets by mouth nightly as needed (spasms) TAKE 2 TABLET BY MOUTH NIGHTLY AS NEEDED(SPASMS) 9/1/22  Yes CHELSEA Mcclain CNP gabapentin (NEURONTIN) 800 MG tablet Take 1 tablet by mouth 3 times daily for 30 days. 9/1/22 1/10/23 Yes CHELSEA Banerjee CNP   albuterol sulfate HFA (VENTOLIN HFA) 108 (90 Base) MCG/ACT inhaler INHALE 2 PUFFS EVERY 6 HOURS AS NEEDED FOR WHEEZING 7/7/22  Yes Abdulaziz Plasencia, CHELSEA Coyle CNP   nitroGLYCERIN (NITROSTAT) 0.4 MG SL tablet Place 1 tablet under the tongue every 5 minutes as needed for Chest pain 5/27/22  Yes Chilo Parham MD   clopidogrel (PLAVIX) 75 MG tablet Take 1 tablet by mouth daily 5/21/22  Yes Ginger BalintCHELSEA CNP   TRULICITY 3 FX/9.0WF SOPN INJECT 0.5 ML (1 PEN) SUBCUTANEOUSLY WEEKLY,X30 DAYS,INSTR:ROTATE INJECTION SITES 5/9/22  Yes Conchita Stockton   Blood Glucose Monitoring Suppl (ONE TOUCH ULTRA 2) w/Device KIT 1 kit by Does not apply route daily 7/20/21  Yes Abdulaziz Plasencia, CHELSEA Coyle CNP   blood glucose monitor strips Test 3 times a day & as needed for symptoms of irregular blood glucose. Dispense sufficient amount for indicated testing frequency plus additional to accommodate PRN testing needs.  7/16/21  Yes [de-identified] M CHELSEA Zamudio CNP   Lancets MISC 1 each by Does not apply route 2 times daily 7/16/21  Yes CHELSEA So CNP   Insulin Degludec (TRESIBA FLEXTOUCH) 200 UNIT/ML SOPN Inject 86 Units into the skin daily PER DR MCELROY  Patient taking differently: Inject 120 Units into the skin at bedtime 3/30/21  Yes Abdulaziz Burden Might, CHELSEA Coyle CNP   metFORMIN (GLUCOPHAGE-XR) 500 MG extended release tablet Take 2 tablets by mouth daily (with breakfast) 3/30/21  Yes Abdulaziz Plasencia, APRN - CNP   omeprazole (PRILOSEC) 20 MG delayed release capsule TAKE 1 CAPSULE BY MOUTH EVERY DAY IN THE MORNING BEFORE BREAKFAST 11/23/20  Yes Abdulaziz Burden Might, CHELSEA Coyle CNP   Saccharomyces boulardii (PROBIOTIC) 250 MG CAPS Take 1 capsule by mouth daily 9/30/20  Yes Abdulaziz Burden Might, CHELSEA Coyle CNP   acetaminophen (TYLENOL) 500 MG tablet Take 1 tablet by mouth 4 times daily as needed for Pain 9/9/20  Yes Tae Zamudio, APRN - CNP   losartan (COZAAR) 25 MG tablet Take 1 tablet by mouth daily 7/23/20  Yes Musa Nahid Might, APRN - CNP   insulin lispro, 1 Unit Dial, (HUMALOG KWIKPEN) 100 UNIT/ML SOPN Inject 20 Units into the skin 3 times daily (before meals)  Patient taking differently: Inject 15 Units into the skin 3 times daily (before meals) 6/23/20  Yes Musa Nahid Might, APRN - CNP   aspirin 81 MG EC tablet TAKE 1 TABLET BY MOUTH DAILY 7/1/19  Yes Musa Nahid Might, APRN - CNP   Insulin Pen Needle (BD ULTRA-FINE PEN NEEDLES) 29G X 12.7MM MISC USE AS DIRECTED BY PHYSICIAN 5 times daily 8/7/18  Yes Musa Nahid Might, APRN - CNP   Blood Pressure Monitor KIT 1 each by Does not apply route daily as needed ESSENTIAL HYPERTENSION   I10 5/31/16  Yes Musa Nahid Might, APRN - CNP   triamcinolone (KENALOG) 0.1 % cream Apply topically 2 times daily. Patient not taking: Reported on 1/10/2023 11/4/22   Javy Carrasco MD        Allergies:       Tape Massiel Myers tape]    Social History:     Tobacco:    reports that she quit smoking about 12 years ago. Her smoking use included cigarettes. She has a 20.00 pack-year smoking history. She has never used smokeless tobacco.  Alcohol:      reports no history of alcohol use. Drug Use:  reports no history of drug use. Family History:     Family History   Problem Relation Age of Onset    Cancer Mother     Diabetes Mother     Cancer Father         thyroid    Diabetes Sister     Heart Disease Sister     Heart Disease Brother     Heart Disease Maternal Uncle     Cancer Maternal Grandmother        Review of Systems:     Positive and Negative as described in HPI    Review of Systems   Constitutional:  Negative for appetite change, chills, fatigue, fever, malaise/fatigue and weight loss. HENT:  Negative for congestion, ear pain, postnasal drip, rhinorrhea, sneezing and sore throat. Eyes:  Positive for blurred vision (CHRONIC RIGHT). Negative for visual disturbance. Respiratory:  Positive for cough.  Negative for hemoptysis, shortness of breath and wheezing. Cardiovascular:  Negative for chest pain, palpitations and orthopnea. Gastrointestinal:  Negative for abdominal pain, constipation, diarrhea, heartburn and vomiting. Endocrine: Negative for polydipsia, polyphagia and polyuria. Genitourinary:  Negative for difficulty urinating and dysuria. Musculoskeletal:  Negative for gait problem, myalgias, neck pain and neck stiffness. Skin:  Negative for pallor and rash. Allergic/Immunologic: Positive for environmental allergies. Neurological:  Positive for numbness. Negative for dizziness, syncope, weakness, light-headedness and headaches. Psychiatric/Behavioral:  The patient is not nervous/anxious. Physical Exam:   Vitals:  /78 (Position: Sitting)   Pulse 78   Temp 97.7 °F (36.5 °C) (Temporal)   Resp 22   Wt 215 lb 8 oz (97.8 kg)   LMP 12/05/1996   SpO2 97%   BMI 38.17 kg/m²     Physical Exam  Vitals and nursing note reviewed. Constitutional:       General: She is not in acute distress. Appearance: Normal appearance. She is well-developed. She is obese. She is not ill-appearing. HENT:      Mouth/Throat:      Mouth: Mucous membranes are moist.   Eyes:      General: No scleral icterus. Conjunctiva/sclera: Conjunctivae normal.   Cardiovascular:      Rate and Rhythm: Normal rate and regular rhythm. Heart sounds: No murmur heard. Pulmonary:      Effort: Pulmonary effort is normal.      Breath sounds: Normal breath sounds. No wheezing or rales. Abdominal:      General: Bowel sounds are normal. There is no distension. Palpations: Abdomen is soft. Tenderness: There is no abdominal tenderness. Comments: Obese abdomen   Musculoskeletal:      Cervical back: Normal range of motion and neck supple. Right lower leg: No edema. Left lower leg: No edema. Lymphadenopathy:      Cervical: No cervical adenopathy. Skin:     General: Skin is warm and dry.    Neurological: Mental Status: She is alert and oriented to person, place, and time. Psychiatric:         Mood and Affect: Mood normal.         Behavior: Behavior normal.       Data:     Lab Results   Component Value Date/Time     10/13/2022 06:20 AM    K 4.0 10/13/2022 06:20 AM     10/13/2022 06:20 AM    CO2 24 10/13/2022 06:20 AM    BUN 11 10/13/2022 06:20 AM    CREATININE 0.63 10/13/2022 06:20 AM    GLUCOSE 215 10/13/2022 06:20 AM    GLUCOSE 181 02/16/2012 08:49 AM    PROT 6.9 10/13/2022 06:20 AM    LABALBU 3.5 10/13/2022 06:20 AM    LABALBU 4.2 12/05/2011 11:35 AM    BILITOT 0.3 10/13/2022 06:20 AM    ALKPHOS 96 10/13/2022 06:20 AM    AST 8 10/13/2022 06:20 AM    ALT 5 10/13/2022 06:20 AM     Lab Results   Component Value Date/Time    WBC 7.5 10/13/2022 06:20 AM    RBC 4.46 10/13/2022 06:20 AM    RBC 3.88 12/05/2011 11:35 AM    HGB 10.9 10/13/2022 06:20 AM    HCT 33.4 10/13/2022 06:20 AM    MCV 74.9 10/13/2022 06:20 AM    MCH 24.4 10/13/2022 06:20 AM    MCHC 32.6 10/13/2022 06:20 AM    RDW 15.5 10/13/2022 06:20 AM     10/13/2022 06:20 AM     12/05/2011 11:35 AM    MPV 9.5 10/13/2022 06:20 AM     Lab Results   Component Value Date/Time    TSH 2.80 06/04/2016 11:04 PM     Lab Results   Component Value Date/Time    CHOL 165 12/29/2021 09:40 AM    HDL 40 12/29/2021 09:40 AM    LABA1C 10.7 01/10/2023 11:07 AM       Assessment/Plan:      Diagnosis Orders   1. Uncontrolled type 2 diabetes mellitus with hyperglycemia (HCC)  POCT glycosylated hemoglobin (Hb A1C)    CBC with Auto Differential    ALT    AST    Basic Metabolic Panel    Lipid Panel    Microalbumin, Ur    Hemoglobin A1C      2. Essential hypertension        3. Subacute cough          Continue all current medications. Recommend seeing diabetologist soon. OTC medications for cough. Call if not improving. We will see her back in 6 months with full labs for routine check, sooner if any issues.       1.  Hoda Selby received counseling on the following healthy behaviors: nutrition, exercise, and medication adherence  2. Patient given educational materials - see patient instructions  3. Was a self-tracking handout given in paper form or via [employer]? No  If yes, see orders or list here. 4.  Discussed use, benefit, and side effects of prescribed medications. Barriers to medication compliance addressed. All patient questions answered. Pt voiced understanding. 5.  Reviewed prior labs and health maintenance  6. Continue current medications, diet and exercise. Completed Refills   Requested Prescriptions      No prescriptions requested or ordered in this encounter         Return in about 6 months (around 7/10/2023) for Check up.

## 2023-01-16 ENCOUNTER — HOSPITAL ENCOUNTER (OUTPATIENT)
Dept: PHYSICAL THERAPY | Age: 58
Setting detail: THERAPIES SERIES
Discharge: HOME | End: 2023-01-16
Payer: COMMERCIAL

## 2023-01-16 PROCEDURE — 97140 MANUAL THERAPY 1/> REGIONS: CPT

## 2023-01-16 PROCEDURE — 97110 THERAPEUTIC EXERCISES: CPT

## 2023-01-16 RX ORDER — BENZONATATE 200 MG/1
200 CAPSULE ORAL 3 TIMES DAILY PRN
Qty: 30 CAPSULE | Refills: 0 | Status: SHIPPED | OUTPATIENT
Start: 2023-01-16 | End: 2023-01-26

## 2023-01-16 NOTE — PROGRESS NOTES
Phone: [facility]          Fax: [phone]                      Outpatient Physical Therapy                                                             Lymphedema Treatment  Date: 2023  Patient: Shreya Walker  : 1965  [facility] #: [id number]  Referring Physician: Rosario Gilman MD          Treatment Diagnosis: R UE lymphedema/chest/breast lymphedema  Onset Date: 08/15/22  PT Insurance Information: [facility]  Total # of Visits Approved: 18   Total # of Visits to Date: 10  No Show: 2  Canceled Appointment: 3     Subjective  Subjective: Pt states she is feeling much better today, still has a cough but not as bad as it was. Pt states she had her L breast drained last monday, did not get as much fluid as she did the first time (18 vs 4), and she states she may not have to get her L breast drained again. Lymph Assessment    Skin Integumentary:   Skin Integrity: Abrasion, Scars (comment)  Pitting Scale Area 1: 0  Skin Texture: Dry  Edema Rebound: Quick  Stemmer Sign: Negative    Signs of Constriction (if applicable):   Papilloma (benign tumor arising from an epithelial layer): No  Fibrotic Areas: No  Lymphorrhea: No    Stage of Lymphedema: Stage 1: Reversible Stage  Description:      Lymphedema Classification:   Type: Post-Surgical  Left:       Right: Mild        Exercises:  Exercise 1: HEP pt educated on keeping her arm elevated and compressed at 30mmHg, perform gentle exercises daily and reduce sodium and sugar intake    Manual:  Soft Tissue Mobilizaton: MLD to R UE     Skin Integumentary  Skin Integrity: Abrasion, Scars (comment)  Skin Integumentary  Skin Integrity: Abrasion, Scars (comment)  RUE Complete Decongestion Therapy  Scale: Stage 1  Lymph Drainage Pattern: Upper extremity  Compression Technique: Vaso-pneumatic pump  Force (mmHg): 60 mmHg  Duration : 60 minutes      Assessment  Assessment: MLD to R UE followed by pumps at 60 mmHg for 60 mins.  Good tolerance to tx, good skin movement post tx. Therapy Prognosis: Good        Decision Making: Medium Complexity    Patient Education  Patient Education: Increase in pressure  Pt verbalized/demonstrated good understanding:     [x] Yes         [] No, pt required further clarification.          Goals  Short Term Goals  Time Frame for Short Term Goals: 3 weeks  Short Term Goal 1: Pt will be educated on her POC and HEP -met  Short Term Goal 2: Pt will initiate pump protocol in order to reduce signs and symptoms of lymphedema-met    Long Term Goals  Time Frame for Long Term Goals : 6 weeks  Long Term Goal 1: Pt will be safe and independent with lymphedema management - progressing  Long Term Goal 2: Pt will demonstrate a 1-2cm decrease in R UE/armpit/breast girth measurements in order to reduce pain and discomfort- progressing  Long Term Goal 3: Pt will increase R SHLD AROM with flex/abd to >/=130 in order to perform overhead ADLS- met  Long Term Goal 4: Pt will be fitted for an at home pump in order to be independent with lymphedema management- progressing  Long Term Goal 5: Pt will report 70% improvement in edema, R UE strength and ROM in order to improve daily activities- progressing      Patient Goals : \"to get rid of R UE/armpit/breast edema\"        Minutes Tracking:  Time In: 1227  Time Out: [address]  Minutes: [address] [place], [place]   Date: 1/16/2023

## 2023-01-18 ENCOUNTER — HOSPITAL ENCOUNTER (OUTPATIENT)
Dept: PHYSICAL THERAPY | Age: 58
Setting detail: THERAPIES SERIES
Discharge: HOME | End: 2023-01-18
Payer: COMMERCIAL

## 2023-01-18 NOTE — PROGRESS NOTES
Phone: Honey           Fax: [phone]                           Outpatient Physical Therapy                                                                            Daily Note    Patient: Susan Shoulder : 1965  CSN #: [id number]   Referring Physician: Janett Garner MD    Date: 2023    Diagnosis: malignant neoplasm of central portion of R breast, estrogen receptor positive C50.111, lymphedema I89.0  Treatment Diagnosis: R UE lymphedema/chest/breast lymphedema    Onset Date: 08/15/22  PT Insurance Information: [facility]  Total # of Visits Approved: 18 Per Physician Order  Total # of Visits to Date: 11      Pre-Treatment Pain:  4-5/10  Subjective: Pt reports that the pump pressure at 60mmHg was too much. Pt states most of her pain is coming from her L SHLD    Exercises:  Exercise 1: HEP pt educated on keeping her arm elevated and compressed at 30mmHg, perform gentle exercises daily and reduce sodium and sugar intake  Exercise 2: Pulleys x6 min  Exercise 3: TRX 3x15\", IR strap stretch 5x10\"  Exercise 5: yellow T-band x10 rows/ext, IR/ER  Exercise 8: arm bike 3'/3'  Exercise 9: 90/90 1# x10      Modalities:   USx8 min L SHLD to reduce pain and inflammation 50% at 1w/cm2    Assessment  Assessment: PT assessed L SHLD pain, AROM, and strength this date. L SHLD AROM measurements include flex 122, abd 107, IR glut med, ER 38, Strength includes flex 4/5, scap 4-/5, abd 4-/5, IR 4+/5, ER 3+/5. PT started on US to L SHLD to reduce pain and inflammation    Activity Tolerance  Activity Tolerance: Patient tolerated treatment well    Patient Education  Patient Education: L SHLD assessment  Pt verbalized/demonstrated good understanding:     [x] Yes         [] No, pt required further clarification.        Post Treatment Pain:  2-3/10      Plan  Plan Frequency: 3x/wk  Plan weeks: 6 weeks       Goals  (Total # of Visits to Date: 6)      Short Term Goals  Time Frame for Short Term Goals: 3 weeks  Short Term Goal 1: Pt will be educated on her POC and HEP -met  Short Term Goal 2: Pt will initiate pump protocol in order to reduce signs and symptoms of lymphedema-met    Long Term Goals  Time Frame for Long Term Goals : 6 weeks  Long Term Goal 1: Pt will be safe and independent with lymphedema management - progressing  Long Term Goal 2: Pt will demonstrate a 1-2cm decrease in R UE/armpit/breast girth measurements in order to reduce pain and discomfort- progressing  Long Term Goal 3: Pt will increase R SHLD AROM with flex/abd to >/=130 in order to perform overhead ADLS- met  Long Term Goal 4: Pt will be fitted for an at home pump in order to be independent with lymphedema management- progressing  Long Term Goal 5: Pt will report 70% improvement in edema, R UE strength and ROM in order to improve daily activities- progressing    Minutes Tracking:  Time In: 1303  Time Out: 1346  Minutes: 43  Timed Code Treatment Minutes: 418 HAN Hannon DPT      Date: 1/18/2023

## 2023-01-19 ENCOUNTER — TELEPHONE (OUTPATIENT)
Dept: ONCOLOGY | Age: 58
End: 2023-01-19

## 2023-01-19 NOTE — TELEPHONE ENCOUNTER
Name: Vilma Polk  : 1965  MRN: [id number]    Oncology Navigation Follow-Up Note    Contact Type:  Telephone    Notes: Call made to patient for ONN follow up. No answer. Left message stating writer called to check in and assess needs. Requested returned phone call.        Electronically signed by Shaunna Edwards RN on 2023 at 1:17 PM

## 2023-01-20 NOTE — PLAN OF CARE
[facility]           Phone: [phone]             Outpatient Physical Therapy  Fax: [phone]                                           Date: 2023  Patient: Lakshmi Timmons : 1965 CSN #: [id number]   Referring Physician: Yuliet Ng MD      [] Plan of Care   [x] Updated Plan of Care    Dates of Service to Include: 2023 to 23    Diagnosis:  malignant neoplasm of central portion of R breast, estrogen receptor positive C50.111, lymphedema I89.0    Rehab (Treatment) Diagnosis:  R UE lymphedema/chest/breast lymphedema             Onset Date:  08/15/22    Attendance  Total # of Visits to Date: 11        Assessment  Assessment: PT assessed L SHLD pain, AROM, and strength this date. L SHLD AROM measurements include flex 122, abd 107, IR glut med, ER 38, Strength includes flex 4/5, scap 4-/5, abd 4-/5, IR 4+/5, ER 3+/5.  PT started on US to L SHLD to reduce pain and inflammation      Goals  Short Term Goals  Time Frame for Short Term Goals: 3 weeks  Short Term Goal 1: Pt will be educated on her POC and HEP -met  Short Term Goal 2: Pt will initiate pump protocol in order to reduce signs and symptoms of lymphedema-met  Long Term Goals  Time Frame for Long Term Goals : 6 weeks  Long Term Goal 1: Pt will be safe and independent with lymphedema management - progressing  Long Term Goal 2: Pt will demonstrate a 1-2cm decrease in R UE/armpit/breast girth measurements in order to reduce pain and discomfort- progressing  Long Term Goal 3: Pt will increase R SHLD AROM with flex/abd to >/=130 in order to perform overhead ADLS- met  Long Term Goal 4: Pt will be fitted for an at home pump in order to be independent with lymphedema management- progressing  Long Term Goal 5: Pt will report 70% improvement in edema, R UE strength and ROM in order to improve daily activities- progressing     Prognosis  Therapy Prognosis: Good    Treatment Plan   Plan Frequency: 3x/wk  Plan weeks: 6 weeks  [x] HP/CP      [] Electrical Stim   [x] Therapeutic Exercise      [] Gait Training  [] Aquatics   [x] Ultrasound         [x] Patient Education/HEP   [x] Manual Therapy  [] Traction    [] Neuro-mary        [x] Soft Tissue Mobs            [] Home TENS  [] Iontophoresis    [x]lymphedema      [] Dry Needling  [] Blood Flow Restriction             Electronically signed by: Antelmo Hankins PT, DPT    Date: 1/18/2023      ______________________________________ Date: 1/18/2023   Physician Signature

## 2023-01-23 ENCOUNTER — HOSPITAL ENCOUNTER (OUTPATIENT)
Dept: PHYSICAL THERAPY | Age: 58
Setting detail: THERAPIES SERIES
Discharge: HOME | End: 2023-01-23
Payer: COMMERCIAL

## 2023-01-23 PROCEDURE — 97110 THERAPEUTIC EXERCISES: CPT

## 2023-01-23 PROCEDURE — 97140 MANUAL THERAPY 1/> REGIONS: CPT

## 2023-01-23 NOTE — PROGRESS NOTES
Phone: [address]          Fax: [phone]                      Outpatient Physical Therapy                                                             Lymphedema Treatment  Date: 2023  Patient: Keely Aguirre  : 1965  CSN #: [id number]  Referring Physician: Hugo Knight MD          Treatment Diagnosis: R UE lymphedema/chest/breast lymphedema  Onset Date: 08/15/22  PT Insurance Information: [facility] APPROVED 14 VIITS PLUS EVAL TO 23  Total # of Visits Approved: 24   Total # of Visits to Date: 12  No Show: 2  Canceled Appointment: 3     Subjective  Subjective: Pt states she is doing ok today, denies current pain. Pt reports receiving a call that she is approved for pumps, is hoping to have them this week. Pt reports relief from last tx with the ultrasound.        Lymph Assessment  Skin Integumentary:   Skin Integrity: Abrasion, Scars (comment)  Pitting Scale Area 1: 0  Skin Texture: Dry  Edema Rebound: Quick  Stemmer Sign: Negative    Signs of Constriction (if applicable):   Papilloma (benign tumor arising from an epithelial layer): No  Fibrotic Areas: No  Lymphorrhea: No    Stage of Lymphedema: Stage 1: Reversible Stage  Description:      Lymphedema Classification:   Type: Post-Surgical  Left:       Right: Mild    Measurements: Area Measured: R UE (R UE: 3\" above wrist 19.7 cm, 3\" above elbow 31.4 cm, 6\" above 38.8 cm.)      Right Measurements Left Measurements   R UE Pre Girth Measurement (cm)  Palm (cm): 17.9  Wrist (cm): 15.6  Mid Forearm (cm): 24.8  Elbow (cm): 25  Axilla (cm): 40.3  Total Girth (cm): 123.6             Exercises:  Exercise 1: HEP pt educated on keeping her arm elevated and compressed at 30mmHg, perform gentle exercises daily and reduce sodium and sugar intake    Manual:  Soft Tissue Mobilizaton: MLD to R UE     Skin Integumentary  Skin Integrity: Abrasion, Scars (comment)  Skin Integumentary  Skin Integrity: Abrasion, Scars (comment)  ANGELICA Complete Decongestion Therapy  Scale: Stage 1  Lymph Drainage Pattern: Upper extremity  Compression Technique: Vaso-pneumatic pump  Force (mmHg): 50 mmHg  Duration : 60 minutes      Assessment  Assessment: MLD to R UE followed by pumps at 50 mmHg for 60 mins. Measurements taken this visit, good progress towards goals being made. Pt is going to call about her pumps today.  Therapy Prognosis: Good        Decision Making: Medium Complexity    Patient Education  Patient Education: Measurements  Pt verbalized/demonstrated good understanding:     [x] Yes         [] No, pt required further clarification.         Goals  Short Term Goals  Time Frame for Short Term Goals: 3 weeks  Short Term Goal 1: Pt will be educated on her POC and HEP -met  Short Term Goal 2: Pt will initiate pump protocol in order to reduce signs and symptoms of lymphedema-met    Long Term Goals  Time Frame for Long Term Goals : 6 weeks  Long Term Goal 1: Pt will be safe and independent with lymphedema management - progressing  Long Term Goal 2: Pt will demonstrate a 1-2cm decrease in R UE/armpit/breast girth measurements in order to reduce pain and discomfort- progressing  Long Term Goal 3: Pt will increase R SHLD AROM with flex/abd to >/=130 in order to perform overhead ADLS- met  Long Term Goal 4: Pt will be fitted for an at home pump in order to be independent with lymphedema management- progressing  Long Term Goal 5: Pt will report 70% improvement in edema, R UE strength and ROM in order to improve daily activities- progressing      Patient Goals : \"to get rid of R UE/armpit/breast edema\"        Minutes Tracking:  Time In: 1232  Time Out: 1352  Minutes: 80       Michelle Temple, SPENCER   Date: 1/23/2023

## 2023-01-25 ENCOUNTER — APPOINTMENT (OUTPATIENT)
Dept: PHYSICAL THERAPY | Age: 58
End: 2023-01-25
Payer: COMMERCIAL

## 2023-01-30 ENCOUNTER — APPOINTMENT (OUTPATIENT)
Dept: PHYSICAL THERAPY | Age: 58
End: 2023-01-30
Payer: COMMERCIAL

## 2023-02-01 ENCOUNTER — HOSPITAL ENCOUNTER (OUTPATIENT)
Dept: PHYSICAL THERAPY | Age: 58
Setting detail: THERAPIES SERIES
Discharge: HOME | End: 2023-02-01
Payer: COMMERCIAL

## 2023-02-01 PROCEDURE — 97140 MANUAL THERAPY 1/> REGIONS: CPT

## 2023-02-01 PROCEDURE — 97035 APP MDLTY 1+ULTRASOUND EA 15: CPT

## 2023-02-01 PROCEDURE — 97110 THERAPEUTIC EXERCISES: CPT

## 2023-02-01 NOTE — PROGRESS NOTES
Phone: [address] [facility]          Fax: [phone]                      Outpatient Physical Therapy                                                             Lymphedema Evaluation  Date: 2023  Patient: Иван Gaines  : 1965  CSN #: [id number]  Referring Physician: Zachary Aguirre MD     Diagnosis: malignant neoplasm of central portion of R breast, estrogen receptor positive C50.111, lymphedema I89.0    Treatment Diagnosis: R UE lymphedema/chest/breast lymphedema  Onset Date: 08/15/22  PT Insurance Information: [facility] APPROVED 14 VIITS PLUS EVAL TO 23  Total # of Visits Approved: 24   Total # of Visits to Date: 13     Subjective  Subjective: pt states she is receiving her pumps this Friday, pt reports no real change in pain in L SHLD  Additional Pertinent Hx: HTN, breast cancer, OA, 2 Mi open heart surgery and 3 stents, diabetes, depression    Lymph Assessment    Skin Integumentary:   Pitting Scale Area 1: 0  Skin Texture: Dry  Edema Rebound: Quick  Stemmer Sign: Negative    Signs of Constriction (if applicable):   Papilloma (benign tumor arising from an epithelial layer): No  Fibrotic Areas: No    Stage of Lymphedema: Stage 1: Reversible Stage  Description:      Lymphedema Classification:   Type: Post-Surgical  Left:       Right: Mild      Exercises:  Exercise 1: HEP pt educated on keeping her arm elevated and compressed at 30mmHg, perform gentle exercises daily and reduce sodium and sugar intake  Exercise 2: Pulleys x6 min    Manual:     RUE Complete Decongestion Therapy  Lymph Drainage Pattern: Upper extremity (MLD to R LE)  Force (mmHg): 50 mmHg  Duration : 45 minutes      Assessment  Assessment: US x8 min to L SHLD to reduce pain, followed by pulleys to increse ROM. MLD to [facility], pt had breast tissue drained Monday.  Followed by pumps at 50mmHg for 45 mintues  Therapy Prognosis: Good        Decision Making: Medium Complexity    Patient Education  Patient Education: continuing with POC for L SHLD pain  Pt verbalized/demonstrated good understanding:     [x] Yes         [] No, pt required further clarification.          Goals  Short Term Goals  Time Frame for Short Term Goals: 3 weeks  Short Term Goal 1: Pt will be educated on her POC and HEP -met  Short Term Goal 2: Pt will initiate pump protocol in order to reduce signs and symptoms of lymphedema-met    Long Term Goals  Time Frame for Long Term Goals : 6 weeks  Long Term Goal 1: Pt will be safe and independent with lymphedema management - progressing  Long Term Goal 2: Pt will demonstrate a 1-2cm decrease in R UE/armpit/breast girth measurements in order to reduce pain and discomfort- progressing  Long Term Goal 3: Pt will increase R SHLD AROM with flex/abd to >/=130 in order to perform overhead ADLS- met  Long Term Goal 4: Pt will be fitted for an at home pump in order to be independent with lymphedema management- progressing  Long Term Goal 5: Pt will report 70% improvement in edema, R UE strength and ROM in order to improve daily activities- progressing      Patient Goals : \"to get rid of R UE/armpit/breast edema\"        Minutes Tracking:  Time In: 1302  Time Out: 1425  Minutes: 83  Timed Code Treatment Minutes: 81 Minutes    Arnold Maria PT, DPT   Date: 2/1/2023

## 2023-02-03 ENCOUNTER — HOSPITAL ENCOUNTER (OUTPATIENT)
Dept: PHYSICAL THERAPY | Age: 58
Setting detail: THERAPIES SERIES
Discharge: HOME | End: 2023-02-03
Payer: COMMERCIAL

## 2023-02-03 PROCEDURE — 97110 THERAPEUTIC EXERCISES: CPT

## 2023-02-03 PROCEDURE — 97140 MANUAL THERAPY 1/> REGIONS: CPT

## 2023-02-03 NOTE — PROGRESS NOTES
Phone: Honey           Fax: [phone]                           Outpatient Physical Therapy                                                                            Daily Note    Patient: Michael Gonzalez : 1965  CSN #: [id number]   Referring Physician: Clyde Pina MD    Date: 2/3/2023       Treatment Diagnosis: R UE lymphedema/chest/breast lymphedema    Onset Date: 08/15/22  PT Insurance Information: [facility] APPROVED 14 VIITS PLUS EVAL TO 23  Total # of Visits Approved: 24 Per Physician Order  Total # of Visits to Date: 14  No Show: 2  Canceled Appointment: 3      Pre-Treatment Pain:  -3/10  Subjective: Pt reports feeling ok today, reports pain in L SHLD if she moves the wrong way, -3/10. Pt states she is getting her pumps today. Exercises:  Exercise 1: HEP pt educated on keeping her arm elevated and compressed at 30mmHg, perform gentle exercises daily and reduce sodium and sugar intake  Exercise 2: Pulleys x6 min  Exercise 3: TRX 3x15\", IR strap stretch 5x10\"  Exercise 4: Finger ladder x5  Exercise 5: yellow T-band x10 rows/ext, IR/ER  Exercise 6: Standing ext x10  Exercise 7: ball on wall x10 flex, abd, CW/CCW  Exercise 8: arm bike 3'/3'  Exercise 9: 90/90 1# x10    Manual:  Joint Mobilization: PROM to B UE    Edema/Girth:  non-pitting    Left Right    Initial Most Recent Initial Most Recent   Upper  Extremity Palm (cm): 18.8  Wrist (cm): 16.8  Mid Forearm (cm): 26.3  Elbow (cm): 28.1  Axilla (cm): 43.8   Palm (cm): 18.5  Wrist (cm): 15.9  Mid Forearm (cm): 25.3  Elbow (cm): 26.2  Axilla (cm): 40.5     Lower  Extremity            R UE: 3\" above wrist 19.8 cm, 3\" above elbow33.6 cm, 6\" above elbow 29 cm. L UE: 3\" above wrist 21.3 cm, 3\" above elbow34.7 cm, 6\" above elbow 42.2 cm. Chest 117.4 cm.       Assessment  Assessment: Pt limited with ABD this visit to L UE d/t pain, ranges 108-115*, increasing to 7/10 which decreases as L UE moves below 100*. Measurements taken, R UE edema improving. Activity Tolerance  Activity Tolerance: Patient limited by pain, Patient tolerated treatment well    Patient Education  Patient Education: Measurements  Pt verbalized/demonstrated good understanding:     [x] Yes         [] No, pt required further clarification.        Post Treatment Pain:  1-3/10      Plan  Plan Frequency: 3x/wk  Plan weeks: 6 weeks       Goals  (Total # of Visits to Date: 15)      Short Term Goals  Time Frame for Short Term Goals: 3 weeks  Short Term Goal 1: Pt will be educated on her POC and HEP -met  Short Term Goal 2: Pt will initiate pump protocol in order to reduce signs and symptoms of lymphedema-met    Long Term Goals  Time Frame for Long Term Goals : 6 weeks  Long Term Goal 1: Pt will be safe and independent with lymphedema management - progressing  Long Term Goal 2: Pt will demonstrate a 1-2cm decrease in R UE/armpit/breast girth measurements in order to reduce pain and discomfort- progressing  Long Term Goal 3: Pt will increase R SHLD AROM with flex/abd to >/=130 in order to perform overhead ADLS- met  Long Term Goal 4: Pt will be fitted for an at home pump in order to be independent with lymphedema management- progressing  Long Term Goal 5: Pt will report 70% improvement in edema, R UE strength and ROM in order to improve daily activities- progressing    Minutes Tracking:  Time In: 0930  Time Out: 1015  Minutes: [address], [place]     Date: 2/3/2023

## 2023-02-07 ENCOUNTER — OFFICE VISIT (OUTPATIENT)
Dept: NEUROLOGY | Age: 58
End: 2023-02-07
Payer: COMMERCIAL

## 2023-02-07 VITALS
BODY MASS INDEX: 39.34 KG/M2 | SYSTOLIC BLOOD PRESSURE: 123 MMHG | WEIGHT: 222 LBS | DIASTOLIC BLOOD PRESSURE: 79 MMHG | HEART RATE: 96 BPM | HEIGHT: 63 IN

## 2023-02-07 DIAGNOSIS — E08.42 DIABETIC POLYNEUROPATHY ASSOCIATED WITH DIABETES MELLITUS DUE TO UNDERLYING CONDITION (HCC): Primary | ICD-10-CM

## 2023-02-07 DIAGNOSIS — M62.838 MUSCLE SPASMS OF BOTH LOWER EXTREMITIES: ICD-10-CM

## 2023-02-07 PROCEDURE — 1036F TOBACCO NON-USER: CPT | Performed by: PSYCHIATRY & NEUROLOGY

## 2023-02-07 PROCEDURE — G8484 FLU IMMUNIZE NO ADMIN: HCPCS | Performed by: PSYCHIATRY & NEUROLOGY

## 2023-02-07 PROCEDURE — 3080F DIAST BP >= 90 MM HG: CPT | Performed by: PSYCHIATRY & NEUROLOGY

## 2023-02-07 PROCEDURE — 3017F COLORECTAL CA SCREEN DOC REV: CPT | Performed by: PSYCHIATRY & NEUROLOGY

## 2023-02-07 PROCEDURE — 2022F DILAT RTA XM EVC RTNOPTHY: CPT | Performed by: PSYCHIATRY & NEUROLOGY

## 2023-02-07 PROCEDURE — G9899 SCRN MAM PERF RSLTS DOC: HCPCS | Performed by: PSYCHIATRY & NEUROLOGY

## 2023-02-07 PROCEDURE — G8417 CALC BMI ABV UP PARAM F/U: HCPCS | Performed by: PSYCHIATRY & NEUROLOGY

## 2023-02-07 PROCEDURE — 99214 OFFICE O/P EST MOD 30 MIN: CPT | Performed by: PSYCHIATRY & NEUROLOGY

## 2023-02-07 PROCEDURE — G8427 DOCREV CUR MEDS BY ELIG CLIN: HCPCS | Performed by: PSYCHIATRY & NEUROLOGY

## 2023-02-07 PROCEDURE — 3075F SYST BP GE 130 - 139MM HG: CPT | Performed by: PSYCHIATRY & NEUROLOGY

## 2023-02-07 RX ORDER — TIZANIDINE 2 MG/1
4 TABLET ORAL NIGHTLY PRN
Qty: 60 TABLET | Refills: 5 | Status: SHIPPED | OUTPATIENT
Start: 2023-02-07

## 2023-02-07 RX ORDER — GABAPENTIN 800 MG/1
800 TABLET ORAL 3 TIMES DAILY
Qty: 90 TABLET | Refills: 5 | Status: SHIPPED | OUTPATIENT
Start: 2023-02-07 | End: 2023-03-09

## 2023-02-07 NOTE — PROGRESS NOTES
NEUROLOGY FOLLOW-UP  Patient Name:  Karina Lamb  :   1965  Clinic Visit Date: 2023  Last Visit: 22         Dear Dr. Gus Mclaughlin, APRN - CNP       I saw Ms. Karina Lamb in follow-up in the office today in continuation of neurologic care. As you know she  is a 62 y.o.  female with painful diabetic peripheral polyneuropathy comes to clinic stating that she has been using gabapentin 800 tid for the last couple of months. In the past she was on gabapentin for several years and it did not help. At that point of time; gabapentin dose was switched to Lyrica and initially it did help but afterwards it did not. Therefore Lyrica was switched back to gabapentin and presently doing well on it. She also stated that she was recently diagnosed with breast carcinoma and she is undergoing therapy for it. She also feels that tizanidine is helping for muscle spasms. Her blood sugars had been poorly controlled and recent A1c was 10.7. She was told that she needs insulin pump. She also feels that duloxetine, Cymbalta along with capsaicin cream has helped her for dysesthesias. She has been checking blood pressure intermittently and feels well controlled. Financial Resource Strain: Unknown   Difficulty of Paying Living Expenses: Patient refused  Food Insecurity: Unknown   Worried About Running Out of Food in the Last Year: Patient refused   Ran Out of Food in the Last Year: Patient refused  Transportation Needs: Not on file  Physical Activity: Not on file  Stress: Not on file  Social Connections: Not on file  Intimate Partner Violence: Not on file  Housing Stability: Not on file    Review of systems done by staff reviewed and pertinent positives include numbness in lower extremities and pain in lower extremities and occasional balance difficulties.      Current Outpatient Medications on File Prior to Visit   Medication Sig Dispense Refill    metoprolol succinate (TOPROL XL) 100 MG extended release tablet TAKE 1.5 TABLETS BY MOUTH EVERY  tablet 2    DULoxetine (CYMBALTA) 30 MG extended release capsule TAKE 1 CAPSULE BY MOUTH EVERY DAY 30 capsule 5    atorvastatin (LIPITOR) 80 MG tablet TAKE 1 TABLET BY MOUTH EVERY DAY 90 tablet 3    triamcinolone (KENALOG) 0.1 % cream Apply topically 2 times daily. (Patient not taking: Reported on 1/10/2023) 15 g 0    isosorbide mononitrate (IMDUR) 30 MG extended release tablet Take 1 tablet by mouth daily 30 tablet 3    ezetimibe (ZETIA) 10 MG tablet Take 1 tablet by mouth nightly 30 tablet 3    fludrocortisone (FLORINEF) 0.1 MG tablet TAKE 3 TABLETS BY MOUTH EVERY  tablet 3    tiZANidine (ZANAFLEX) 2 MG tablet Take 2 tablets by mouth nightly as needed (spasms) TAKE 2 TABLET BY MOUTH NIGHTLY AS NEEDED(SPASMS) 60 tablet 5    gabapentin (NEURONTIN) 800 MG tablet Take 1 tablet by mouth 3 times daily for 30 days. 90 tablet 5    albuterol sulfate HFA (VENTOLIN HFA) 108 (90 Base) MCG/ACT inhaler INHALE 2 PUFFS EVERY 6 HOURS AS NEEDED FOR WHEEZING 18 g 5    nitroGLYCERIN (NITROSTAT) 0.4 MG SL tablet Place 1 tablet under the tongue every 5 minutes as needed for Chest pain 25 tablet 1    clopidogrel (PLAVIX) 75 MG tablet Take 1 tablet by mouth daily 30 tablet 3    TRULICITY 3 ÁNGEL/4.1CY SOPN INJECT 0.5 ML (1 PEN) SUBCUTANEOUSLY WEEKLY,X30 DAYS,INSTR:ROTATE INJECTION SITES      Blood Glucose Monitoring Suppl (ONE TOUCH ULTRA 2) w/Device KIT 1 kit by Does not apply route daily 1 kit 0    blood glucose monitor strips Test 3 times a day & as needed for symptoms of irregular blood glucose. Dispense sufficient amount for indicated testing frequency plus additional to accommodate PRN testing needs.  300 strip 0    Lancets MISC 1 each by Does not apply route 2 times daily 300 each 1    Insulin Degludec (TRESIBA FLEXTOUCH) 200 UNIT/ML SOPN Inject 86 Units into the skin daily PER DR MCELROY (Patient taking differently: Inject 120 Units into the skin at bedtime) 1 pen 0 metFORMIN (GLUCOPHAGE-XR) 500 MG extended release tablet Take 2 tablets by mouth daily (with breakfast) 180 tablet 3    omeprazole (PRILOSEC) 20 MG delayed release capsule TAKE 1 CAPSULE BY MOUTH EVERY DAY IN THE MORNING BEFORE BREAKFAST 90 capsule 1    Saccharomyces boulardii (PROBIOTIC) 250 MG CAPS Take 1 capsule by mouth daily 30 capsule 0    acetaminophen (TYLENOL) 500 MG tablet Take 1 tablet by mouth 4 times daily as needed for Pain 40 tablet 0    losartan (COZAAR) 25 MG tablet Take 1 tablet by mouth daily 90 tablet 3    insulin lispro, 1 Unit Dial, (HUMALOG KWIKPEN) 100 UNIT/ML SOPN Inject 20 Units into the skin 3 times daily (before meals) (Patient taking differently: Inject 15 Units into the skin 3 times daily (before meals)) 12 pen 1    aspirin 81 MG EC tablet TAKE 1 TABLET BY MOUTH DAILY 90 tablet 3    Insulin Pen Needle (BD ULTRA-FINE PEN NEEDLES) 29G X 12.7MM MISC USE AS DIRECTED BY PHYSICIAN 5 times daily 150 each 11    Blood Pressure Monitor KIT 1 each by Does not apply route daily as needed ESSENTIAL HYPERTENSION   I10 1 kit 0     No current facility-administered medications on file prior to visit. Allergies: Ed Fritz is allergic to tape [adhesive tape]. Past Medical History:   Diagnosis Date    Anxiety     Asthma     CAD (coronary artery disease)     x1 stent 2010,x1 stent 2016, x1 stent May 2022    Cancer [facility]) 06/2022    right breast cancer    Clinical trial participant at discharge 3/31/16    COPD (chronic obstructive pulmonary disease) ([facility] [address]     Depression     Diabetic neuropathy (HCC)     GERD (gastroesophageal reflux disease)     H/O cardiac catheterization 4/26/16    LMCA: Mild Irregularities 10-20%. LAD: Lesion on Prox LAD: Proximal subsection. 100% stenosis. LCx: Mild irregularities 10-20%. RCA: Mild irregularities 10-20%. Widely patent mid RCA stent. EF:60%. H/O cardiovascular stress test 10/07/2016    Overall results are most consistent with a low risk for significant CAD. H/O echocardiogram 10/05/2016    EF:>60%. Inferoseptal wall abnormal in its motion which is not unusal status post open heart surgery. Evidence of mild (grade I) diastolic dysfunction is seen. H/O tilt table evaluation 07/12/2016    Abnormal. PTs HR, Blood Pressure response and symptoms were most consistent with dysautonomia. Combined w/viligant maintenance of euvolemia and maintaining a moderate salt intake, pharmaciologic treatment w/ ProAmatine, SSRI such as Lexapro and/or Mestinon among other treatments have shown some effectiveness in the treatment of this condition. History of cardiovascular stress test 02/13/2019    Abnormal. Small/moderate perfusion defect of mild/moderate intesity in the anterior and anterolateral regions during stress imaging which is most consistent w/ ischemia but may be artifact. Overall low/intermediate risk for significant CAD. History of echocardiogram 09/06/2018    EF >60%. Inferoseptal wall is abnormal in its motion which is not unusual s/p open heart. Evidence of mild (grade I) diastolic dysfunction seen.     Hx of blood clots     Hyperlipidemia     Hypertension     Intermittent claudication (HCC)     Kidney stones     Movement disorder     neuropathy in legs    Neuromuscular disorder (HCC)     neuropathy    Osteoarthritis     Reflux     Seizures ([facility] [address] 1980'Ss last seizure    Stented coronary artery 9/22/2010     LAD/[place]    Stented coronary artery 3/31/16    RCA/[place]    Type II or unspecified type diabetes mellitus without mention of complication, not stated as uncontrolled     Unspecified sleep apnea     no machine       Past Surgical History:   Procedure Laterality Date    ABDOMINAL HERNIA REPAIR  01/2016    repair done [facility]    APPENDECTOMY      AXILLARY SURGERY Right 9/2/2022    RIGHT AXILLARY LYMPH SENTINAL NODE BIOPSY  @  12PM performed by Nakul Hall DO at [address] Right 7/12/2022    NEEDLE DIRECTED ( 1130     )    RIGHT BREAST LUMPECTOMY performed by Nicola Fleming DO at [address] Left 04/26/2016    right radial/ [address] [facility] [place]/ Dr Munguia Last Left 03/07/2019    Left Radial/[facility] [place]/    CARDIAC CATHETERIZATION  05/20/2022    Dr Jl De La Rosa radial    CARDIAC CATHETERIZATION  05/20/2022    Dr Jl De La Rosa radial    [address]      CABG 2019    CHOLECYSTECTOMY  1991    COLONOSCOPY  12/16/2014    -hemorrhoids,bx    CORONARY ANGIOPLASTY WITH STENT PLACEMENT  09/2010    CORONARY ANGIOPLASTY WITH STENT PLACEMENT  03/31/2016    JESSE RCA / DR Faviola Lancaster    CORONARY ANGIOPLASTY WITH STENT PLACEMENT  05/20/2022    CORONARY ARTERY BYPASS GRAFT  08/15/2016    OP X 1- Dr Cameron Tate, DIAGNOSTIC  12/16/2014    HERNIA REPAIR  12/13/2012    at the medial aspect of a [place] RUQ scar); repaired byDr. [facility]  02/16/2015    incisional, recurrent    HERNIA REPAIR  02/2016    HYSTERECTOMY (CERVIX STATUS UNKNOWN)      OTHER SURGICAL HISTORY  10/08/2015    abd wound washout, mesh removal, wound vac placement    MD SUCTION ASSISTED LIPECTOMY HEAD & NECK Left 08/27/2018    THIGH LESION BIOPSY EXCISION, SOFT TISSUE MASS performed by Megan Comer MD at [address] [facility] Left 2018    leg    UPPP UVULOPALATOPHARYGOPLASTY  06/26/2012    US BREAST BIOPSY NEEDLE ADDITIONAL RIGHT Right 6/9/2022    US BREAST [facility]SY [facility]    US BREAST BIOPSY W LOC DEVICE 1ST LESION RIGHT Right 6/9/2022    US BREAST NEEDLE BIOPSY RIGHT 6/9/2022 [facility] ULTRASOUND    US GUIDED NEEDLE LOC OF RIGHT BREAST Right 7/12/2022    US GUIDED NEEDLE LOC OF RIGHT BREAST 7/12/2022 [facility] ULTRASOUND    VENTRAL HERNIA REPAIR  09/21/2015    With Mesh - Dr Luis M Orr     Social History: Anton Dacosta  reports that she quit smoking about 12 years ago. Her smoking use included cigarettes.  She has a 20.00 pack-year smoking history. She has never used smokeless tobacco. She reports that she does not drink alcohol and does not use drugs. Family History   Problem Relation Age of Onset    Cancer Mother     Diabetes Mother     Cancer Father         thyroid    Diabetes Sister     Heart Disease Sister     Heart Disease Brother     Heart Disease Maternal Uncle     Cancer Maternal Grandmother      On exam: Blood pressure (!) 137/90, pulse 92, height 5' 3\" (1.6 m), weight 222 lb (100.7 kg), last menstrual period 12/05/1996, not currently breastfeeding. Recheck the systolic and it was 048. GENERAL  Appears comfortable and in no distress   HEENT  NC/ AT   NECK  Supple and no bruits heard   MENTAL STATUS:  Alert, oriented, intact memory, no confusion, normal speech, normal language, no hallucination or delusion; appropriate affect   CRANIAL NERVES: II     -      PERRLA, Fundi reveal intact venous pulsations; Visual fields intact to confrontation  III,IV,VI -  EOMs full, no afferent defect, no                      ELOY, no ptosis  V     -     Normal facial sensation  VII    -     Normal facial symmetry  VIII   -     Intact hearing  IX,X -     Symmetrical palate  XI    -     Symmetrical shoulder shrug  XII   -     Midline tongue, no atrophy    MOTOR FUNCTION:  significant for good strength of grade 5/5 in bilateral proximal and distal muscle groups of both upper and lower extremities with normal bulk, normal tone and no involuntary movements, no tremor   SENSORY FUNCTION:  Impaired light touch, pinprick and temperature in stocking pattern 70-80% lesser proximally in lower extremities left lower extremity greater than right lower extremity.      CEREBELLAR FUNCTION:  Intact fine motor control over upper limbs   REFLEX FUNCTION:  Symmetric, no perverted reflex, no Babinski sign   STATION and GAIT  Normal station, wide based gait; could not do tandem gait; positive Romberg sign but able to walk without using cane presently         Diagnostic data reviewed with the patient:   NCS/ EMG (8-22-14): There is electrodiagnostic evidence of peripheral polyneuropathy with predominant involvement of sensory fibers only. Supporting features include reduced amplitudes of bilateral superficial peroneal sensory studies and prolonged H-reflex latencies. X-ray of lumbar spine (3-20-13): Mild degenerative change is noted at L3-L4 with disk space narrowing and spur formation. MRI BRAIN (w/wo): done on 1-2-2013: essentially unremarkable. EEG (1-2-2013): unremarkable. Lab Results   Component Value Date    LABA1C 10.7 01/10/2023       EMG bilateral LE (10/21/17): abnormal electrophysiological study indicative of primarily axonal sensorimotor neuropathy in both lower extremities. There is no evidence of lumbosacral radiculopathy or plexopathy. EKG (8/23/18); NSR. Impression and Plan: Ms. Andres Banks is a 62 y.o. female with   Painful diabetic peripheral polyneuropathy; Lyrica was d/c'ed and started on  tid in sept 2022; patient feels better on it; to continue the same. Muscle spasms; under control and to continue tizandine. Uncontrolled diabetes with hb A1c at 10.7 (Jan 2023); on schedule for insulin pump as per patient; presently on insulin, tresiba and metformin    Medication counseling including risks and benefits and possible adverse effects from the meds discussed and she voiced understanding those instructions. Follow up in 6 months. This note was partially created using voice recognition software and is inherently subject to errors including those of syntax and \"sound alike\" substitutions which may escape proofreading. In such instances, original meaning may be extrapolated by contextual derivation.

## 2023-02-08 ENCOUNTER — OFFICE VISIT (OUTPATIENT)
Dept: SURGERY | Age: 58
End: 2023-02-08

## 2023-02-08 ENCOUNTER — HOSPITAL ENCOUNTER (OUTPATIENT)
Dept: PHYSICAL THERAPY | Age: 58
Setting detail: THERAPIES SERIES
Discharge: HOME | End: 2023-02-08
Payer: COMMERCIAL

## 2023-02-08 DIAGNOSIS — N64.89 SEROMA OF BREAST: Primary | ICD-10-CM

## 2023-02-08 PROCEDURE — 97035 APP MDLTY 1+ULTRASOUND EA 15: CPT

## 2023-02-08 PROCEDURE — 97110 THERAPEUTIC EXERCISES: CPT

## 2023-02-08 NOTE — PROGRESS NOTES
Phone: Honey           Fax: [phone]                           Outpatient Physical Therapy                                                                            Daily Note    Patient: Manda Chaudhari : 1965  CSN #: [id number]   Referring Physician: Jere Hoskins MD    Date: 2023       Treatment Diagnosis: R UE lymphedema/chest/breast lymphedema    Onset Date: 08/15/22  PT Insurance Information: [facility] APPROVED 14 VIITS PLUS EVAL TO 23  Total # of Visits Approved: 24 Per Physician Order  Total # of Visits to Date: 15  No Show: 2  Canceled Appointment: 3      Pre-Treatment Pain:  2/10  Subjective: Pt reports 2/10 L SHLD today. Pt states she has been using her pumps every day. Exercises:  Exercise 1: HEP pt educated on keeping her arm elevated and compressed at 30mmHg, perform gentle exercises daily and reduce sodium and sugar intake  Exercise 2: Pulleys x6 min  Exercise 3: TRX 3x15\", IR strap stretch 5x10\"  Exercise 5: yellow T-band x10 rows/ext, IR/ER  Exercise 6: Standing cane ext x10  Exercise 7: ball on wall x10 flex, abd, CW/CCW  Exercise 8: arm bike 3'/3'  Exercise 9: 90/90 1# x10  Exercise 10: modified pushup x10    Manual:  Joint Mobilization: PROM to B UE    Modalities:   US x8 min 3mHz 1.0 w/cm2 50%    Assessment  Assessment: Pt continues to have limited/painful ROM on L UE ABD. Added modified pushups with good tolerance. HEP handouts and band given. Pt to be placed on hold pending more visits by insurance. Activity Tolerance  Activity Tolerance: Patient limited by pain, Patient tolerated treatment well    Patient Education  Patient Education: HEP handouts  Pt verbalized/demonstrated good understanding:     [x] Yes         [] No, pt required further clarification.        Post Treatment Pain:  210      Plan  Plan Frequency: 3x/wk  Plan weeks: 6 weeks       Goals  (Total # of Visits to Date: 13)      Short Term Goals  Time Frame for Short Term Goals: 3 weeks  Short Term Goal 1: Pt will be educated on her POC and HEP -met  Short Term Goal 2: Pt will initiate pump protocol in order to reduce signs and symptoms of lymphedema-met    Long Term Goals  Time Frame for Long Term Goals : 6 weeks  Long Term Goal 1: Pt will be safe and independent with lymphedema management - progressing  Long Term Goal 2: Pt will demonstrate a 1-2cm decrease in R UE/armpit/breast girth measurements in order to reduce pain and discomfort- progressing  Long Term Goal 3: Pt will increase R SHLD AROM with flex/abd to >/=130 in order to perform overhead ADLS- met  Long Term Goal 4: Pt will be fitted for an at home pump in order to be independent with lymphedema management- met  Long Term Goal 5: Pt will report 70% improvement in edema, R UE strength and ROM in order to improve daily activities- progressing    Minutes Tracking:  Time In: 1100  Time Out: [address]  Minutes: [address] [place], [place]     Date: 2/8/2023

## 2023-02-09 ENCOUNTER — TELEPHONE (OUTPATIENT)
Dept: PRIMARY CARE CLINIC | Age: 58
End: 2023-02-09

## 2023-02-09 ENCOUNTER — HOSPITAL ENCOUNTER (OUTPATIENT)
Dept: GENERAL RADIOLOGY | Age: 58
Discharge: HOME | End: 2023-02-11
Payer: COMMERCIAL

## 2023-02-09 ENCOUNTER — HOSPITAL ENCOUNTER (OUTPATIENT)
Age: 58
Discharge: HOME | End: 2023-02-11
Payer: COMMERCIAL

## 2023-02-09 DIAGNOSIS — M25.512 ACUTE PAIN OF LEFT SHOULDER: ICD-10-CM

## 2023-02-09 DIAGNOSIS — M79.602 LEFT ARM PAIN: Primary | ICD-10-CM

## 2023-02-09 PROCEDURE — 73030 X-RAY EXAM OF SHOULDER: CPT

## 2023-02-09 NOTE — TELEPHONE ENCOUNTER
----- Message from CHELSEA Whiteside CNP sent at 2/9/2023  2:15 PM EST -----  Please notify patient of xray results showing some degenerative changes. No fracture or dislocation.   Thanks Ashley[facility]

## 2023-02-09 NOTE — TELEPHONE ENCOUNTER
Patient is requesting Xrays of left shoulder. Patient has a Physical Therapy referral for her let shoulder pain. Her insurance approved 14 visits for her first initial visits with PT for another reason so now she is waiting for her next approval for the shoulder visits. Patient wanting to get Xrays before she starts PT on it. Please advise.

## 2023-02-10 ENCOUNTER — APPOINTMENT (OUTPATIENT)
Dept: PHYSICAL THERAPY | Age: 58
End: 2023-02-10
Payer: COMMERCIAL

## 2023-02-13 ENCOUNTER — TELEPHONE (OUTPATIENT)
Dept: ONCOLOGY | Age: 58
End: 2023-02-13

## 2023-02-13 ENCOUNTER — OFFICE VISIT (OUTPATIENT)
Dept: ONCOLOGY | Age: 58
End: 2023-02-13
Payer: COMMERCIAL

## 2023-02-13 ENCOUNTER — HOSPITAL ENCOUNTER (OUTPATIENT)
Age: 58
Discharge: HOME | End: 2023-02-13
Payer: COMMERCIAL

## 2023-02-13 VITALS
BODY MASS INDEX: 39.15 KG/M2 | WEIGHT: 221 LBS | RESPIRATION RATE: 18 BRPM | TEMPERATURE: 97.3 F | DIASTOLIC BLOOD PRESSURE: 87 MMHG | HEART RATE: 92 BPM | SYSTOLIC BLOOD PRESSURE: 138 MMHG

## 2023-02-13 DIAGNOSIS — C50.111 MALIGNANT NEOPLASM OF CENTRAL PORTION OF RIGHT BREAST IN FEMALE, ESTROGEN RECEPTOR POSITIVE (HCC): ICD-10-CM

## 2023-02-13 DIAGNOSIS — M85.89 OSTEOPENIA OF MULTIPLE SITES: ICD-10-CM

## 2023-02-13 DIAGNOSIS — N64.89 SEROMA OF BREAST: ICD-10-CM

## 2023-02-13 DIAGNOSIS — C50.111 MALIGNANT NEOPLASM OF CENTRAL PORTION OF RIGHT BREAST IN FEMALE, ESTROGEN RECEPTOR POSITIVE (HCC): Primary | ICD-10-CM

## 2023-02-13 DIAGNOSIS — M85.80 OSTEOPENIA, UNSPECIFIED LOCATION: ICD-10-CM

## 2023-02-13 DIAGNOSIS — Z17.0 MALIGNANT NEOPLASM OF CENTRAL PORTION OF RIGHT BREAST IN FEMALE, ESTROGEN RECEPTOR POSITIVE (HCC): Primary | ICD-10-CM

## 2023-02-13 DIAGNOSIS — Z17.0 MALIGNANT NEOPLASM OF CENTRAL PORTION OF RIGHT BREAST IN FEMALE, ESTROGEN RECEPTOR POSITIVE (HCC): ICD-10-CM

## 2023-02-13 LAB
ALBUMIN SERPL-MCNC: 3.9 G/DL (ref 3.5–5.2)
ALBUMIN/GLOBULIN RATIO: 0.9 (ref 1–2.5)
ALP SERPL-CCNC: 103 U/L (ref 35–104)
ALT SERPL-CCNC: 10 U/L (ref 5–33)
ANION GAP SERPL CALCULATED.3IONS-SCNC: 12 MMOL/L (ref 9–17)
AST SERPL-CCNC: 11 U/L
BILIRUB SERPL-MCNC: 0.4 MG/DL (ref 0.3–1.2)
BUN SERPL-MCNC: 17 MG/DL (ref 6–20)
BUN/CREAT BLD: 26 (ref 9–20)
CALCIUM SERPL-MCNC: 9.8 MG/DL (ref 8.6–10.4)
CHLORIDE SERPL-SCNC: 98 MMOL/L (ref 98–107)
CO2 SERPL-SCNC: 24 MMOL/L (ref 20–31)
CREAT SERPL-MCNC: 0.65 MG/DL (ref 0.5–0.9)
GFR SERPL CREATININE-BSD FRML MDRD: >60 ML/MIN/1.73M2
GLUCOSE SERPL-MCNC: 287 MG/DL (ref 70–99)
MAGNESIUM SERPL-MCNC: 1.9 MG/DL (ref 1.6–2.6)
PHOSPHATE SERPL-MCNC: 4 MG/DL (ref 2.6–4.5)
POTASSIUM SERPL-SCNC: 4.6 MMOL/L (ref 3.7–5.3)
PROT SERPL-MCNC: 8.4 G/DL (ref 6.4–8.3)
SODIUM SERPL-SCNC: 134 MMOL/L (ref 135–144)

## 2023-02-13 PROCEDURE — 1036F TOBACCO NON-USER: CPT | Performed by: INTERNAL MEDICINE

## 2023-02-13 PROCEDURE — G9899 SCRN MAM PERF RSLTS DOC: HCPCS | Performed by: INTERNAL MEDICINE

## 2023-02-13 PROCEDURE — G8427 DOCREV CUR MEDS BY ELIG CLIN: HCPCS | Performed by: INTERNAL MEDICINE

## 2023-02-13 PROCEDURE — 80053 COMPREHEN METABOLIC PANEL: CPT

## 2023-02-13 PROCEDURE — 36415 COLL VENOUS BLD VENIPUNCTURE: CPT

## 2023-02-13 PROCEDURE — 3017F COLORECTAL CA SCREEN DOC REV: CPT | Performed by: INTERNAL MEDICINE

## 2023-02-13 PROCEDURE — 3075F SYST BP GE 130 - 139MM HG: CPT | Performed by: INTERNAL MEDICINE

## 2023-02-13 PROCEDURE — 99215 OFFICE O/P EST HI 40 MIN: CPT | Performed by: INTERNAL MEDICINE

## 2023-02-13 PROCEDURE — 3079F DIAST BP 80-89 MM HG: CPT | Performed by: INTERNAL MEDICINE

## 2023-02-13 PROCEDURE — 84100 ASSAY OF PHOSPHORUS: CPT

## 2023-02-13 PROCEDURE — 83735 ASSAY OF MAGNESIUM: CPT

## 2023-02-13 PROCEDURE — G8417 CALC BMI ABV UP PARAM F/U: HCPCS | Performed by: INTERNAL MEDICINE

## 2023-02-13 PROCEDURE — G8484 FLU IMMUNIZE NO ADMIN: HCPCS | Performed by: INTERNAL MEDICINE

## 2023-02-13 RX ORDER — FAMOTIDINE 10 MG/ML
20 INJECTION, SOLUTION INTRAVENOUS
OUTPATIENT
Start: 2023-02-20

## 2023-02-13 RX ORDER — DIPHENHYDRAMINE HYDROCHLORIDE 50 MG/ML
50 INJECTION INTRAMUSCULAR; INTRAVENOUS
OUTPATIENT
Start: 2023-02-20

## 2023-02-13 RX ORDER — ACETAMINOPHEN 325 MG/1
650 TABLET ORAL
OUTPATIENT
Start: 2023-02-20

## 2023-02-13 RX ORDER — SODIUM CHLORIDE 9 MG/ML
INJECTION, SOLUTION INTRAVENOUS CONTINUOUS
OUTPATIENT
Start: 2023-02-20

## 2023-02-13 RX ORDER — ONDANSETRON 2 MG/ML
8 INJECTION INTRAMUSCULAR; INTRAVENOUS
OUTPATIENT
Start: 2023-02-20

## 2023-02-13 RX ORDER — ALBUTEROL SULFATE 90 UG/1
4 AEROSOL, METERED RESPIRATORY (INHALATION) PRN
OUTPATIENT
Start: 2023-02-20

## 2023-02-13 RX ORDER — EPINEPHRINE 1 MG/ML
0.3 INJECTION, SOLUTION, CONCENTRATE INTRAVENOUS PRN
OUTPATIENT
Start: 2023-02-20

## 2023-02-13 NOTE — TELEPHONE ENCOUNTER
Name: Tatiana Flaherty  : 1965  MRN: [id number]    Oncology Navigation Follow-Up Note    Contact Type:  Medical Oncology    Notes: Writer met with patient in clinic. Pt states she is currently going to lymphedema therapy and has her pump at home. Pt sates she is doing well. Pt offered survivorship visit. Pt is interested in this and will  be scheduled at next follow up. Pt is in agreement with plan.     Electronically signed by Rj Alvarado RN on 2023 at 11:32 AM

## 2023-02-13 NOTE — PROGRESS NOTES
Patient ID: Estevan Nunez, 1965, [id number], 62 y.o. Referred by :  No ref. provider found   Reason for consultation: Right DCIS      HISTORY OF PRESENT ILLNESS:    Oncologic History:    Estevan Nunez is a very pleasant 62 y.o. female who found on a routine mammogram on May 22, 2022 new group of calcifications upper central right breast and ultrasound did show suspicious lesion at 11:00 6 cm from the nipple core biopsy was done on 6/9/2022 and that showed DCIS strongly ER/AK positive and patient was referred for medical oncology  No family history of breast cancer  No history of using birth control in her younger age  Patient had hysterectomy in her 35s  Patient underwent lumpectomy on July 12, 2022 and there was 1 cm invasive carcinoma in addition to the DCIS  No sentinel lymph node biopsy initially and this was repeated and came back negative  Oncotype DX 5        Oncology history  Stage I invasive carcinoma with right breast no sentinel lymph node biopsy  Oncotype DX 5  Adjuvant radiation  On anastrozole started September 2022    Interim history  Patient presents to the clinic for a follow-up visit and to discuss results of his lab work-up and other relevant clinical data. During this visit patient's allergy, social, medical, surgical history and medications were reviewed and updated.    Swelling on the right breast where she had surgery, with severe tenderness and localized breast lymphedema had to drain several times for seroma  Bone density scan did show osteopenia      Past Medical History:   Diagnosis Date    Anxiety     Asthma     CAD (coronary artery disease)     x1 stent 2010,x1 stent 2016, x1 stent May 2022    Cancer [facility]) 06/2022    right breast cancer    Clinical trial participant at discharge 3/31/16    COPD (chronic obstructive pulmonary disease) ([facility] [address]     Depression     Diabetic neuropathy (HCC)     GERD (gastroesophageal reflux disease)     H/O cardiac catheterization 4/26/16    LMCA: Mild Irregularities 10-20%. LAD: Lesion on Prox LAD: Proximal subsection. 100% stenosis. LCx: Mild irregularities 10-20%. RCA: Mild irregularities 10-20%. Widely patent mid RCA stent. EF:60%. H/O cardiovascular stress test 10/07/2016    Overall results are most consistent with a low risk for significant CAD. H/O echocardiogram 10/05/2016    EF:>60%. Inferoseptal wall abnormal in its motion which is not unusal status post open heart surgery. Evidence of mild (grade I) diastolic dysfunction is seen. H/O tilt table evaluation 07/12/2016    Abnormal. PTs HR, Blood Pressure response and symptoms were most consistent with dysautonomia. Combined w/viligant maintenance of euvolemia and maintaining a moderate salt intake, pharmaciologic treatment w/ ProAmatine, SSRI such as Lexapro and/or Mestinon among other treatments have shown some effectiveness in the treatment of this condition. History of cardiovascular stress test 02/13/2019    Abnormal. Small/moderate perfusion defect of mild/moderate intesity in the anterior and anterolateral regions during stress imaging which is most consistent w/ ischemia but may be artifact. Overall low/intermediate risk for significant CAD. History of echocardiogram 09/06/2018    EF >60%. Inferoseptal wall is abnormal in its motion which is not unusual s/p open heart. Evidence of mild (grade I) diastolic dysfunction seen.     Hx of blood clots     Hyperlipidemia     Hypertension     Intermittent claudication (HCC)     Kidney stones     Movement disorder     neuropathy in legs    Neuromuscular disorder (HCC)     neuropathy    Osteoarthritis     Reflux     Seizures ([facility] [address] 1980'Ss last seizure    Stented coronary artery 9/22/2010     LAD/[place]    Stented coronary artery 3/31/16    RCA/[place]    Type II or unspecified type diabetes mellitus without mention of complication, not stated as uncontrolled     Unspecified sleep apnea     no machine       Past Surgical History: Procedure Laterality Date    ABDOMINAL HERNIA REPAIR  01/2016    repair done julian    APPENDECTOMY      AXILLARY SURGERY Right 9/2/2022    RIGHT AXILLARY LYMPH SENTINAL NODE BIOPSY  @  12PM performed by Edison Warner DO at [address] [facility] Right 7/12/2022    NEEDLE DIRECTED ( 1130     )    RIGHT BREAST LUMPECTOMY performed by Edison Warner DO at [address] [facility] Left 04/26/2016    right radial/ [facility] [place]/ Dr Yasemin De La Rosa Left 03/07/2019    Left Radial/[facility] [place]/    CARDIAC CATHETERIZATION  05/20/2022    Dr Bronson Olea radial    CARDIAC CATHETERIZATION  05/20/2022    Dr Bronson Olea radial    [address] [facility]      CABG 2019    CHOLECYSTECTOMY  1991    COLONOSCOPY  12/16/2014    -hemorrhoids,bx    CORONARY ANGIOPLASTY WITH STENT PLACEMENT  09/2010    CORONARY ANGIOPLASTY WITH STENT PLACEMENT  03/31/2016    JESSE RCA / DR Kiana Johnson WITH STENT PLACEMENT  05/20/2022    CORONARY ARTERY BYPASS GRAFT  08/15/2016    OP X 1- Dr Hellen Clark, DIAGNOSTIC  12/16/2014    HERNIA REPAIR  12/13/2012    at the medial aspect of a [place] RUQ scar); repaired byDr. [facility]  02/16/2015    incisional, recurrent    HERNIA REPAIR  02/2016    HYSTERECTOMY (CERVIX STATUS UNKNOWN)      OTHER SURGICAL HISTORY  10/08/2015    abd wound washout, mesh removal, wound vac placement    KS SUCTION ASSISTED LIPECTOMY HEAD & NECK Left 08/27/2018    THIGH LESION BIOPSY EXCISION, SOFT TISSUE MASS performed by Amy Dunbar MD at [address] [facility] Left 2018    leg    UPPP UVULOPALATOPHARYGOPLASTY  06/26/2012    US BREAST BIOPSY NEEDLE ADDITIONAL RIGHT Right 6/9/2022    US BREAST BIOPSY NEEDLE ADDITIONAL RIGHT 6/9/2022 [facility] ULTRASOUND    US BREAST BIOPSY W LOC DEVICE 1ST LESION RIGHT Right 6/9/2022    US BREAST NEEDLE BIOPSY RIGHT 6/9/2022 [facility] ULTRASOUND    US GUIDED NEEDLE LOC OF RIGHT BREAST Right 7/12/2022    US GUIDED NEEDLE LOC OF RIGHT BREAST 7/12/2022 [facility] ULTRASOUND    VENTRAL HERNIA REPAIR  09/21/2015    With Mesh - Dr Frye Marrow       Allergies   Allergen Reactions    Tape [place] Tape] Rash       Current Outpatient Medications   Medication Sig Dispense Refill    tiZANidine (ZANAFLEX) 2 MG tablet Take 2 tablets by mouth nightly as needed (spasms) TAKE 2 TABLET BY MOUTH NIGHTLY AS NEEDED(SPASMS) 60 tablet 5    gabapentin (NEURONTIN) 800 MG tablet Take 1 tablet by mouth 3 times daily for 30 days. 90 tablet 5    metoprolol succinate (TOPROL XL) 100 MG extended release tablet TAKE 1.5 TABLETS BY MOUTH EVERY  tablet 2    DULoxetine (CYMBALTA) 30 MG extended release capsule TAKE 1 CAPSULE BY MOUTH EVERY DAY 30 capsule 5    atorvastatin (LIPITOR) 80 MG tablet TAKE 1 TABLET BY MOUTH EVERY DAY 90 tablet 3    triamcinolone (KENALOG) 0.1 % cream Apply topically 2 times daily. 15 g 0    isosorbide mononitrate (IMDUR) 30 MG extended release tablet Take 1 tablet by mouth daily 30 tablet 3    ezetimibe (ZETIA) 10 MG tablet Take 1 tablet by mouth nightly 30 tablet 3    fludrocortisone (FLORINEF) 0.1 MG tablet TAKE 3 TABLETS BY MOUTH EVERY  tablet 3    albuterol sulfate HFA (VENTOLIN HFA) 108 (90 Base) MCG/ACT inhaler INHALE 2 PUFFS EVERY 6 HOURS AS NEEDED FOR WHEEZING 18 g 5    nitroGLYCERIN (NITROSTAT) 0.4 MG SL tablet Place 1 tablet under the tongue every 5 minutes as needed for Chest pain 25 tablet 1    clopidogrel (PLAVIX) 75 MG tablet Take 1 tablet by mouth daily 30 tablet 3    TRULICITY 3 GZ/3.8DH SOPN INJECT 0.5 ML (1 PEN) SUBCUTANEOUSLY WEEKLY,X30 DAYS,INSTR:ROTATE INJECTION SITES      Blood Glucose Monitoring Suppl (ONE TOUCH ULTRA 2) w/Device KIT 1 kit by Does not apply route daily 1 kit 0    blood glucose monitor strips Test 3 times a day & as needed for symptoms of irregular blood glucose.  Dispense sufficient amount for indicated testing frequency plus additional to accommodate PRN testing needs. 300 strip 0    Lancets MISC 1 each by Does not apply route 2 times daily 300 each 1    Insulin Degludec (TRESIBA FLEXTOUCH) 200 UNIT/ML SOPN Inject 86 Units into the skin daily PER DR MCELROY (Patient taking differently: Inject 120 Units into the skin at bedtime) 1 pen 0    metFORMIN (GLUCOPHAGE-XR) 500 MG extended release tablet Take 2 tablets by mouth daily (with breakfast) 180 tablet 3    omeprazole (PRILOSEC) 20 MG delayed release capsule TAKE 1 CAPSULE BY MOUTH EVERY DAY IN THE MORNING BEFORE BREAKFAST 90 capsule 1    Saccharomyces boulardii (PROBIOTIC) 250 MG CAPS Take 1 capsule by mouth daily 30 capsule 0    acetaminophen (TYLENOL) 500 MG tablet Take 1 tablet by mouth 4 times daily as needed for Pain 40 tablet 0    losartan (COZAAR) 25 MG tablet Take 1 tablet by mouth daily 90 tablet 3    insulin lispro, 1 Unit Dial, (HUMALOG KWIKPEN) 100 UNIT/ML SOPN Inject 20 Units into the skin 3 times daily (before meals) (Patient taking differently: Inject 15 Units into the skin 3 times daily (before meals)) 12 pen 1    aspirin 81 MG EC tablet TAKE 1 TABLET BY MOUTH DAILY 90 tablet 3    Insulin Pen Needle (BD ULTRA-FINE PEN NEEDLES) 29G X 12.7MM MISC USE AS DIRECTED BY PHYSICIAN 5 times daily 150 each 11    Blood Pressure Monitor KIT 1 each by Does not apply route daily as needed ESSENTIAL HYPERTENSION   I10 1 kit 0     No current facility-administered medications for this visit.        Social History     Socioeconomic History    Marital status:      Spouse name: Not on file    Number of children: Not on file    Years of education: Not on file    Highest education level: Not on file   Occupational History    Not on file   Tobacco Use    Smoking status: Former     Packs/day: 2.00     Years: 10.00     Pack years: 20.00     Types: Cigarettes     Quit date: 2010     Years since quittin.4    Smokeless tobacco: Never   Vaping Use    Vaping Use: Never used   Substance and Sexual Activity    Alcohol use: No    Drug use: No    Sexual activity: Yes     Partners: Male   Other Topics Concern    Not on file   Social History Narrative    Not on file     Social Determinants of Health     Financial Resource Strain: Unknown    Difficulty of Paying Living Expenses: Patient refused   Food Insecurity: Unknown    Worried About Running Out of Food in the Last Year: Patient refused    Ran Out of Food in the Last Year: Patient refused   Transportation Needs: Not on file   Physical Activity: Not on file   Stress: Not on file   Social Connections: Not on file   Intimate Partner Violence: Not on file   Housing Stability: Not on file       Family History   Problem Relation Age of Onset    Cancer Mother     Diabetes Mother     Cancer Father         thyroid    Diabetes Sister     Heart Disease Sister     Heart Disease Brother     Heart Disease Maternal Uncle     Cancer Maternal Grandmother         REVIEW OF SYSTEM:     Constitutional: No fever or chills. No night sweats, no weight loss   Eyes: No eye discharge, double vision, or eye pain   HEENT: negative for sore mouth, sore throat, hoarseness and voice change   Respiratory: negative for cough , sputum, dyspnea, wheezing, hemoptysis, chest pain   Cardiovascular: negative for chest pain, dyspnea, palpitations, orthopnea, PND   Gastrointestinal: negative for nausea, vomiting, diarrhea, constipation, abdominal pain, Dysphagia, hematemesis and hematochezia   Genitourinary: negative for frequency, dysuria, nocturia, urinary incontinence, and hematuria   Integument: negative for rash, skin lesions, bruises.    Hematologic/Lymphatic: negative for easy bruising, bleeding, lymphadenopathy, petechiae and swelling/edema   Endocrine: negative for heat or cold intolerance, tremor, weight changes, change in bowel habits and hair loss   Musculoskeletal: negative for myalgias, arthralgias, pain, joint swelling,and bone pain   Neurological: negative for headaches, dizziness, seizures, weakness, numbness       OBJECTIVE:         Vitals:    02/13/23 1054   BP: 138/87   Pulse: 92   Resp: 18   Temp: 97.3 °F (36.3 °C)       PHYSICAL EXAM:   General appearance - well appearing, no in pain or distress   Mental status - alert and cooperative   Eyes - pupils equal and reactive, extraocular eye movements intact   Ears - bilateral TM's and external ear canals normal   Mouth - mucous membranes moist, pharynx normal without lesions   Neck - supple, no significant adenopathy   Lymphatics - no palpable lymphadenopathy, no hepatosplenomegaly   Chest - clear to auscultation, no wheezes, rales or rhonchi, symmetric air entry   Heart - normal rate, regular rhythm, normal S1, S2, no murmurs, rubs, clicks or gallops   Abdomen - soft, nontender, nondistended, no masses or organomegaly   Neurological - alert, oriented, normal speech, no focal findings or movement disorder noted   Musculoskeletal - no joint tenderness, deformity or swelling   Extremities - peripheral pulses normal, no pedal edema, no clubbing or cyanosis   Skin - normal coloration and turgor, no rashes, no suspicious skin lesions noted ,  Right breast: Localized tender lymphedema      LABORATORY DATA:     Lab Results   Component Value Date    WBC 7.5 10/13/2022    HGB 10.9 (L) 10/13/2022    HCT 33.4 (L) 10/13/2022    MCV 74.9 (L) 10/13/2022     10/13/2022    LYMPHOPCT 26 10/13/2022    RBC 4.46 10/13/2022    MCH 24.4 (L) 10/13/2022    MCHC 32.6 10/13/2022    RDW 15.5 (H) 10/13/2022    MONOPCT 6 10/13/2022    BASOPCT 1 10/13/2022    NEUTROABS 4.80 10/13/2022    LYMPHSABS 1.96 10/13/2022    MONOSABS 0.47 10/13/2022    EOSABS 0.14 10/13/2022    BASOSABS 0.04 10/13/2022         Chemistry        Component Value Date/Time     10/13/2022 0620    K 4.0 10/13/2022 0620     10/13/2022 0620    CO2 24 10/13/2022 0620    BUN 11 10/13/2022 0620    CREATININE 0.63 10/13/2022 0620        Component Value Date/Time    CALCIUM 9.2 10/13/2022 0620    ALKPHOS 96 10/13/2022 0620    AST 8 10/13/2022 0620    ALT 5 10/13/2022 0620    BILITOT 0.3 10/13/2022 0620            PATHOLOGY DATA:     1 Result Note    1 Follow-up Encounter    Component 6/9/22 1325   Surgical Pathology Report -- Diagnosis --   A. RIGHT BREAST, UOQ, 11:00, 6 CM FN, CORE NEEDLE BIOPSIES:        -  INTERMEDIATE GRADE DUCTAL CARCINOMA IN SITU, SOLID TYPE.             -  ESTROGEN AND PROGESTERONE RECEPTOR IMMUNOSTAINS STRONGLY   POSITIVE IN DUCTAL CARCINOMA IN SITU. B.  RIGHT AXILLA LYMPH NODE, CORE NEEDLE BIOPSIES:        -  BENIGN LYMPH NODE, NEGATIVE FOR MALIGNANCY. Harshad Daley M.D.   **Electronically Signed Out**         jet/6/13/2022         Clinical Information   Clinical findings: RIGHT UOQ 11:00 6 CM FN, RIGHT AXILLA   Operative Findings:  RIGHT BREAST MASS 11:00; RT AXILLA LYMPH NODE     Source of Specimen   A: RT BREAST MASS   B: RT AXILLA BX     Gross Description   A. \"SIMON WILD, RIGHT BREAST\" Cores and fragments of fibrofatty   tissue, 1.0 x 0.8 x 0.4 cm in aggregate. Entirely 1cs. B. \"SIMON WILD, RIGHT AXILLA\" Cores and fragments of fibrofatty   tissue, 1.0 x 0.8 x 0.3 cm in aggregate. Entirely 1cs. mpb tm       Microscopic Description   A. Initial levels of fibroglandular breast tissue and adipose tissue   contain atrophic breast lobules. Subsequent levels however show with   distended ducts lined by atypical epithelial cells in a solid pattern. Calcifications are not identified. There is no evidence of invasive   malignancy. The focus measures 2.5 mm in largest dimension. Slides   were reviewed with a second pathologist [facility]) who agrees with the   diagnosis.    PROCEDURE:                              Core needle biopsies   SPECIMEN LATERALITY / TUMOR SITE:          Right breast upper outer   quadrant 11:00 6 cm from nipple   HISTOLOGIC TYPE:                         Ductal carcinoma in situ                     DCIS ARCHITECTURAL PATTERN: Solid type   NUCLEAR GRADE:                           intermediate grade   NECROSIS:                              not identified   MICROCALCIFICATIONS:                    Not identified             BIOMARKER TESTING  BREAST CARCINOMA         ESTROGEN RECEPTOR*--   -POSITIVE (PERCENT =>10% OF POSITIVE TUMOR CELL NUCLEI):     Yes, over   95%       ---AVERAGE INTENSITY OF POSITIVE STAINING:               3+, strong   -LOW POSITIVE (PERCENT 1-10% OF POSITIVE TUMOR CELL NUCLEI):     No         ---AVERAGE INTENSITY OF POSITIVE STAINING:               N/A   -NEGATIVE (<1% OF POSITIVE TUMOR CELL NUCLEI):           No     ---AVERAGE INTENSITY OF ANY POSITIVE STAINING:           N/A   ---INTERNAL CONTROL PRESENT AND STAIN AS EXPECTED:           Yes     ---INTERNAL CONTROL CELLS ABSENT (COMMENT):                No     ---INTERNAL CONTROL CELLS PRESENT BUT DO NOT STAIN (COMMENT): No         PROGESTERONE RECEPTOR*--   -POSITIVE (=>1% OF TUMOR CELL NUCLEI POSITIVE):           Yes, over   95%   ---AVERAGE INTENSITY OF POSITIVE STAINING:                   3+,   strong   -NEGATIVE (<1% OF POSITIVE TUMOR CELL NUCLEI):              no   ---AVERAGE INTENSITY OF ANY POSITIVE STAINING:           N/A   ---INTERNAL CONTROL PRESENT AND STAIN AS EXPECTED:           Yes     ---INTERNAL CONTROL CELLS ABSENT (COMMENT):              no   ---INTERNAL CONTROL CELLS PRESENT BUT DO NOT STAIN (COMMENT): No             Component 7/12/22 4402   Surgical Pathology Report -- Diagnosis --     A.   RIGHT BREAST, NEEDLE LOCALIZED EXCISIONAL BIOPSY:   -INVASIVE DUCTAL CARCINOMA GRADE 2, 10 MM IN GREATEST EXTENT   -ESTROGEN RECEPTOR POSITIVE, PROGESTERONE RECEPTOR POSITIVE   -DUCTAL CARCINOMA IN SITU, INTERMEDIATE AND HIGH-GRADE, SOLID AND   COMEDO TYPE WITH MICROCALCIFICATIONS   -INVASIVE CARCINOMA IDENTIFIED 0.5 MM FROM THE LATERAL INKED MARGIN   -DUCTAL CARCINOMA IN SITU IDENTIFIED 0.5 MM FROM THE LATERAL INKED   MARGIN     B.  RIGHT BREAST, ADDITIONAL INFERIOR MARGIN:   -BENIGN BREAST TISSUE     C.  RIGHT BREAST, ADDITIONAL LATERAL MARGIN:   -BENIGN BREAST TISSUE     D.  RIGHT BREAST, ADDITIONAL MEDIAL MARGIN:   -BENIGN BREAST TISSUE     E.  RIGHT BREAST, ADDITIONAL SUPERIOR MARGIN:   -BENIGN BREAST TISSUE     -- Diagnosis Comment --     The final inked margins are clear. Invasive carcinoma and ductal   carcinoma in situ identified 10.5 mm from the nearest margin (lateral   margin). Rubio Orozco D.O.   **Electronically Signed Out**         [facility]/7/14/2022              IMAGING DATA:      XR SHOULDER LEFT (MIN 2 VIEWS)  Narrative: EXAMINATION:  3 XRAY VIEWS OF THE LEFT SHOULDER    2/9/2023 12:45 pm    COMPARISON:  None. HISTORY:  ORDERING SYSTEM PROVIDED HISTORY: Acute pain of left shoulder  TECHNOLOGIST PROVIDED HISTORY:  Recommended by PT before starting, pain in left shoulder    63-year-old female with acute left shoulder pain    FINDINGS:  Mild degenerative changes of the left AC and glenohumeral joints. Visualized  left-sided ribs appear intact. No acute fracture or dislocation. Diffuse  osteopenia. Prior median sternotomy. Impression: 1. Mild degenerative changes and osteopenia as above. 2. No acute fracture or dislocation. ASSESSMENT:       Diagnosis Orders   1. Malignant neoplasm of central portion of right breast in female, estrogen receptor positive ([facility] [address]        2. Osteopenia, unspecified location        3.  Seroma of breast             pStage I/pT1pNX invasive carcinoma of the right breast with DCIS status postlumpectomy done on July 12, 2022  Multiple comorbidity  Previous smoker  Osteopenia  Right breast tenderness/localized lymphedema  Right breast seroma    PLAN:     I 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 with no sentinel lymph node ,initially she had biopsy of the right axillary lymphadenopathy, case discussed at the tumor board and the plan was to proceed with sentinel lymph node biopsy which was negative   Oncotype DX of 5 and no benefit from chemotherapy and status post adjuvant radiation treatment and on endocrine treatment  in the form of anastrozole which will be given for 5 to 7 years  Side effects of hormonal treatment which include but not limited to osteoporosis has been reviewed with the patient and she would continue calcium vitamin D and as bone density scan did show osteopenia> Prolia  Side effect of Prolia has been discussed with the patient  Repeat right breast ultrasound and left breast mammogram  Lymphedema clinic  Follow-up with the surgeon      Thank you for the consult                                           [facility]                            This note is created with the assistance of a speech recognition program.  While intending to generate a document that actually reflects the content of the visit, the document can still have some errors including those of syntax and sound a like substitutions which may escape proof reading. It such instances, actual meaning can be extrapolated by contextual diversion.

## 2023-02-14 ENCOUNTER — HOSPITAL ENCOUNTER (OUTPATIENT)
Dept: PHYSICAL THERAPY | Age: 58
Setting detail: THERAPIES SERIES
Discharge: HOME | End: 2023-02-14
Payer: COMMERCIAL

## 2023-02-14 ENCOUNTER — APPOINTMENT (OUTPATIENT)
Dept: PHYSICAL THERAPY | Age: 58
End: 2023-02-14
Payer: COMMERCIAL

## 2023-02-14 NOTE — PROGRESS NOTES
[facility]  Inpatient/Observation/Outpatient Rehabilitation    Date: 2023  Patient Name: Gage Nicole       [] Inpatient Acute/Observation       []  Outpatient  : 1965       [] Pt no showed for scheduled appointment    [] Pt refused/declined therapy at this time due to:           [x] Pt cancelled due to:  [] No Reason Given   [] Sick/ill   [] Other:    Had a family emergency. Therapist/Assistant will attempt to see this patient, at our earliest opportunity.        Salome Mandujano Date: 2023

## 2023-02-16 ENCOUNTER — APPOINTMENT (OUTPATIENT)
Dept: PHYSICAL THERAPY | Age: 58
End: 2023-02-16
Payer: COMMERCIAL

## 2023-02-21 ENCOUNTER — APPOINTMENT (OUTPATIENT)
Dept: PHYSICAL THERAPY | Age: 58
End: 2023-02-21
Payer: COMMERCIAL

## 2023-02-21 ENCOUNTER — OFFICE VISIT (OUTPATIENT)
Dept: PRIMARY CARE CLINIC | Age: 58
End: 2023-02-21
Payer: COMMERCIAL

## 2023-02-21 VITALS
OXYGEN SATURATION: 95 % | BODY MASS INDEX: 38.79 KG/M2 | SYSTOLIC BLOOD PRESSURE: 128 MMHG | RESPIRATION RATE: 18 BRPM | DIASTOLIC BLOOD PRESSURE: 74 MMHG | TEMPERATURE: 97.1 F | HEART RATE: 90 BPM | WEIGHT: 219 LBS

## 2023-02-21 DIAGNOSIS — H65.111 ACUTE MUCOID OTITIS MEDIA OF RIGHT EAR: ICD-10-CM

## 2023-02-21 DIAGNOSIS — J20.9 BRONCHITIS, ACUTE, WITH BRONCHOSPASM: Primary | ICD-10-CM

## 2023-02-21 PROCEDURE — G8484 FLU IMMUNIZE NO ADMIN: HCPCS | Performed by: NURSE PRACTITIONER

## 2023-02-21 PROCEDURE — 3078F DIAST BP <80 MM HG: CPT | Performed by: NURSE PRACTITIONER

## 2023-02-21 PROCEDURE — 99214 OFFICE O/P EST MOD 30 MIN: CPT | Performed by: NURSE PRACTITIONER

## 2023-02-21 PROCEDURE — 1036F TOBACCO NON-USER: CPT | Performed by: NURSE PRACTITIONER

## 2023-02-21 PROCEDURE — G8417 CALC BMI ABV UP PARAM F/U: HCPCS | Performed by: NURSE PRACTITIONER

## 2023-02-21 PROCEDURE — 3074F SYST BP LT 130 MM HG: CPT | Performed by: NURSE PRACTITIONER

## 2023-02-21 PROCEDURE — G9899 SCRN MAM PERF RSLTS DOC: HCPCS | Performed by: NURSE PRACTITIONER

## 2023-02-21 PROCEDURE — G8427 DOCREV CUR MEDS BY ELIG CLIN: HCPCS | Performed by: NURSE PRACTITIONER

## 2023-02-21 PROCEDURE — 3017F COLORECTAL CA SCREEN DOC REV: CPT | Performed by: NURSE PRACTITIONER

## 2023-02-21 RX ORDER — AMOXICILLIN AND CLAVULANATE POTASSIUM 875; 125 MG/1; MG/1
1 TABLET, FILM COATED ORAL 2 TIMES DAILY
Qty: 20 TABLET | Refills: 0 | Status: SHIPPED | OUTPATIENT
Start: 2023-02-21 | End: 2023-03-03

## 2023-02-21 SDOH — ECONOMIC STABILITY: FOOD INSECURITY: WITHIN THE PAST 12 MONTHS, THE FOOD YOU BOUGHT JUST DIDN'T LAST AND YOU DIDN'T HAVE MONEY TO GET MORE.: NEVER TRUE

## 2023-02-21 SDOH — ECONOMIC STABILITY: FOOD INSECURITY: WITHIN THE PAST 12 MONTHS, YOU WORRIED THAT YOUR FOOD WOULD RUN OUT BEFORE YOU GOT MONEY TO BUY MORE.: NEVER TRUE

## 2023-02-21 SDOH — ECONOMIC STABILITY: INCOME INSECURITY: HOW HARD IS IT FOR YOU TO PAY FOR THE VERY BASICS LIKE FOOD, HOUSING, MEDICAL CARE, AND HEATING?: NOT HARD AT ALL

## 2023-02-21 SDOH — ECONOMIC STABILITY: HOUSING INSECURITY
IN THE LAST 12 MONTHS, WAS THERE A TIME WHEN YOU DID NOT HAVE A STEADY PLACE TO SLEEP OR SLEPT IN A SHELTER (INCLUDING NOW)?: NO

## 2023-02-21 ASSESSMENT — PATIENT HEALTH QUESTIONNAIRE - PHQ9
SUM OF ALL RESPONSES TO PHQ QUESTIONS 1-9: 0
8. MOVING OR SPEAKING SO SLOWLY THAT OTHER PEOPLE COULD HAVE NOTICED. OR THE OPPOSITE, BEING SO FIGETY OR RESTLESS THAT YOU HAVE BEEN MOVING AROUND A LOT MORE THAN USUAL: 0
SUM OF ALL RESPONSES TO PHQ QUESTIONS 1-9: 0
9. THOUGHTS THAT YOU WOULD BE BETTER OFF DEAD, OR OF HURTING YOURSELF: 0
5. POOR APPETITE OR OVEREATING: 0
SUM OF ALL RESPONSES TO PHQ9 QUESTIONS 1 & 2: 0
10. IF YOU CHECKED OFF ANY PROBLEMS, HOW DIFFICULT HAVE THESE PROBLEMS MADE IT FOR YOU TO DO YOUR WORK, TAKE CARE OF THINGS AT HOME, OR GET ALONG WITH OTHER PEOPLE: 0
2. FEELING DOWN, DEPRESSED OR HOPELESS: 0
7. TROUBLE CONCENTRATING ON THINGS, SUCH AS READING THE NEWSPAPER OR WATCHING TELEVISION: 0
6. FEELING BAD ABOUT YOURSELF - OR THAT YOU ARE A FAILURE OR HAVE LET YOURSELF OR YOUR FAMILY DOWN: 0
SUM OF ALL RESPONSES TO PHQ QUESTIONS 1-9: 0
1. LITTLE INTEREST OR PLEASURE IN DOING THINGS: 0
SUM OF ALL RESPONSES TO PHQ QUESTIONS 1-9: 0
3. TROUBLE FALLING OR STAYING ASLEEP: 0
4. FEELING TIRED OR HAVING LITTLE ENERGY: 0

## 2023-02-21 ASSESSMENT — ENCOUNTER SYMPTOMS
WHEEZING: 1
COUGH: 1
ALLERGIC/IMMUNOLOGIC NEGATIVE: 1
GASTROINTESTINAL NEGATIVE: 1
SORE THROAT: 1
SHORTNESS OF BREATH: 1
EYES NEGATIVE: 1
RHINORRHEA: 1

## 2023-02-21 NOTE — PATIENT INSTRUCTIONS
SURVEY:     You may be receiving a survey from [employer] regarding your visit today. Please complete the survey to enable us to provide the highest quality of care to you and your family. If you cannot score us a very good on any question, please call the office to discuss how we could have made your experience a very good one.      Thank you,    Xander Plasencia, APRN-CNP  Jessi Villar, APRN-CNP  Jaqueline Maharaj, JEANNA Martin, JORGE A Cunningham, JORGE A Vasquez, CMA  Alice, PCA  Hope, PM

## 2023-02-23 ENCOUNTER — APPOINTMENT (OUTPATIENT)
Dept: PHYSICAL THERAPY | Age: 58
End: 2023-02-23
Payer: COMMERCIAL

## 2023-02-23 ENCOUNTER — HOSPITAL ENCOUNTER (OUTPATIENT)
Dept: PHYSICAL THERAPY | Age: 58
Setting detail: THERAPIES SERIES
Discharge: HOME | End: 2023-02-23
Payer: COMMERCIAL

## 2023-02-23 NOTE — PROGRESS NOTES
[facility]  Inpatient/Observation/Outpatient Rehabilitation    Date: 2023  Patient Name: Hassell Cockayne       [] Inpatient Acute/Observation       []  Outpatient  : 1965       [] Pt no showed for scheduled appointment    [] Pt refused/declined therapy at this time due to:           [x] Pt cancelled due to:  [] No Reason Given   [x] Sick/ill   [] Other:    Therapist/Assistant will attempt to see this patient, at our earliest opportunity.        [employer] Date: 2023

## 2023-02-24 ENCOUNTER — HOSPITAL ENCOUNTER (OUTPATIENT)
Dept: ULTRASOUND IMAGING | Age: 58
End: 2023-02-24
Payer: COMMERCIAL

## 2023-02-24 ENCOUNTER — HOSPITAL ENCOUNTER (OUTPATIENT)
Dept: WOMENS IMAGING | Age: 58
End: 2023-02-24
Payer: COMMERCIAL

## 2023-02-24 DIAGNOSIS — C50.111 MALIGNANT NEOPLASM OF CENTRAL PORTION OF RIGHT BREAST IN FEMALE, ESTROGEN RECEPTOR POSITIVE (HCC): ICD-10-CM

## 2023-02-24 DIAGNOSIS — Z17.0 MALIGNANT NEOPLASM OF CENTRAL PORTION OF RIGHT BREAST IN FEMALE, ESTROGEN RECEPTOR POSITIVE (HCC): ICD-10-CM

## 2023-02-24 DIAGNOSIS — N64.89 SEROMA OF BREAST: ICD-10-CM

## 2023-02-24 PROCEDURE — 76642 ULTRASOUND BREAST LIMITED: CPT

## 2023-02-24 PROCEDURE — 77065 DX MAMMO INCL CAD UNI: CPT

## 2023-02-28 ENCOUNTER — TELEPHONE (OUTPATIENT)
Dept: ONCOLOGY | Age: 58
End: 2023-02-28

## 2023-02-28 RX ORDER — ALENDRONATE SODIUM 70 MG/1
70 TABLET ORAL
Qty: 12 TABLET | Refills: 3 | Status: SHIPPED | OUTPATIENT
Start: 2023-02-28

## 2023-02-28 NOTE — TELEPHONE ENCOUNTER
Informed patient that Prolia was denied by her insurance company and Dr. Tellez has prescribed Fosamax instead.  Patient requests that prescription be sent to [facility].

## 2023-03-01 ENCOUNTER — APPOINTMENT (OUTPATIENT)
Dept: PHYSICAL THERAPY | Age: 58
End: 2023-03-01
Payer: COMMERCIAL

## 2023-03-01 ENCOUNTER — HOSPITAL ENCOUNTER (OUTPATIENT)
Dept: PHYSICAL THERAPY | Age: 58
Setting detail: THERAPIES SERIES
Discharge: HOME | End: 2023-03-01
Payer: COMMERCIAL

## 2023-03-01 PROCEDURE — 97110 THERAPEUTIC EXERCISES: CPT

## 2023-03-01 PROCEDURE — 97140 MANUAL THERAPY 1/> REGIONS: CPT

## 2023-03-01 NOTE — PROGRESS NOTES
Phone: Honey           Fax: [phone]                           Outpatient Physical Therapy                                                                            Daily Note    Patient: Alayna Fierro : 1965  CSN #: [id number]   Referring Physician: Marija Ambrose MD    Date: 3/1/2023       Treatment Diagnosis: R UE lymphedema/chest/breast lymphedema    Onset Date: 08/15/22  PT Insurance Information: Mariela Stubbs  Total # of Visits Approved: 24 Per Physician Order  Total # of Visits to Date: 16  No Show: 3  Canceled Appointment: 7      Pre-Treatment Pain:  2-3/10  Subjective: Pt reports 2-3/10 B SHLD pain. Pt states she is still limited in her mobility, but has not been able to do her HEP d/t family issues. Exercises:  Exercise 1: HEP pt educated on keeping her arm elevated and compressed at 30mmHg, perform gentle exercises daily and reduce sodium and sugar intake  Exercise 2: Pulleys x6 min  Exercise 3: TRX 3x15\", IR strap stretch 5x10\"  Exercise 4: Finger ladder x5  Exercise 5: yellow T-band x10 rows/ext, IR/ER  Exercise 6: Standing cane ext x10  Exercise 8: arm bike 3'/3'  Exercise 9: 90/90 2# x10  Exercise 10: modified pushup x10  Exercise 11: 2# ball pass around body x10 ea    Manual:  Joint Mobilization: PROM to B UE    Modalities:       Assessment  Assessment: Pt with increase in pain with end range L UE ROm in flex and ABD. Progressed ther ex with cane extension and ball pass for improved ER/IR. Pt L UE IR to T12. Activity Tolerance  Activity Tolerance: Patient tolerated treatment well    Patient Education  Patient Education: New exercises  Pt verbalized/demonstrated good understanding:     [x] Yes         [] No, pt required further clarification.        Post Treatment Pain:  2-3/10      Plan  Plan Frequency: 3x/wk  Plan weeks: 6 weeks       Goals  (Total # of Visits to Date: 12)      Short Term Goals  Time Frame for Short Term Goals: 3 weeks  Short Term Goal 1: Pt will be educated on her POC and HEP -met  Short Term Goal 2: Pt will initiate pump protocol in order to reduce signs and symptoms of lymphedema-met    Long Term Goals  Time Frame for Long Term Goals : 6 weeks  Long Term Goal 1: Pt will be safe and independent with lymphedema management - progressing  Long Term Goal 2: Pt will demonstrate a 1-2cm decrease in R UE/armpit/breast girth measurements in order to reduce pain and discomfort- progressing  Long Term Goal 3: Pt will increase R SHLD AROM with flex/abd to >/=130 in order to perform overhead ADLS- met  Long Term Goal 4: Pt will be fitted for an at home pump in order to be independent with lymphedema management- met  Long Term Goal 5: Pt will report 70% improvement in edema, R UE strength and ROM in order to improve daily activities- progressing    Minutes Tracking:  Time In: 1129  Time Out: 1212  Minutes: 30 [place], [place]     Date: 3/1/2023

## 2023-03-03 ENCOUNTER — APPOINTMENT (OUTPATIENT)
Dept: PHYSICAL THERAPY | Age: 58
End: 2023-03-03
Payer: COMMERCIAL

## 2023-03-03 ENCOUNTER — HOSPITAL ENCOUNTER (OUTPATIENT)
Dept: PHYSICAL THERAPY | Age: 58
Setting detail: THERAPIES SERIES
Discharge: HOME | End: 2023-03-03
Payer: COMMERCIAL

## 2023-03-03 PROCEDURE — 97110 THERAPEUTIC EXERCISES: CPT

## 2023-03-03 PROCEDURE — 97140 MANUAL THERAPY 1/> REGIONS: CPT

## 2023-03-03 NOTE — PROGRESS NOTES
Phone: Honey           Fax: [phone]                           Outpatient Physical Therapy                                                                            Daily Note    Patient: Andres Banks : 1965  CSN #: [id number]   Referring Physician: Helen Wheat MD    Date: 3/3/2023       Treatment Diagnosis: R UE lymphedema/chest/breast lymphedema    Onset Date: 08/15/22  PT Insurance Information: [employer]-APPROVED 14 PT VISIT FROM  [facility] 2023  Total # of Visits Approved: 24 Per Physician Order  Total # of Visits to Date: 2  No Show: 3  Canceled Appointment: 7      Pre-Treatment Pain:  2/10  Subjective: Pt reports 2/10 L SHLD, a little more achy d/t the weather. Exercises:  Exercise 1: HEP pt educated on keeping her arm elevated and compressed at 30mmHg, perform gentle exercises daily and reduce sodium and sugar intake  Exercise 2: Pulleys x6 min  Exercise 3: TRX 3x15\", IR strap stretch 5x10\"  Exercise 5: yellow T-band x10 rows/ext, IR/ER. Orange t-band PNF x10 ea  Exercise 6: Standing cane ext x10  Exercise 8: arm bike 3'/3'  Exercise 10: modified pushup x10  Exercise 11: 2# ball pass around body x10 ea  Exercise 12: Supine punches x10    Manual:  Joint Mobilization: PROM to L UE    Modalities:       Assessment  Assessment: Pt continues to have limited end range L UE ROM in flex and ABD. Pt tolerated progression of exercises, no increase in pain. Pt required RB this visit to mild lightheadedness, with relief after seated RB. Activity Tolerance  Activity Tolerance: Patient tolerated treatment well    Patient Education  Patient Education: New exercises  Pt verbalized/demonstrated good understanding:     [x] Yes         [] No, pt required further clarification.        Post Treatment Pain:  2/10      Plan  Plan Frequency: 3x/wk  Plan weeks: 6 weeks       Goals  (Total # of Visits to Date: 2)      Short Term Goals  Time Frame for Short Term Goals: 3 weeks  Short Term Goal 1: Pt will be educated on her POC and HEP -met  Short Term Goal 2: Pt will initiate pump protocol in order to reduce signs and symptoms of lymphedema-met    Long Term Goals  Time Frame for Long Term Goals : 6 weeks  Long Term Goal 1: Pt will be safe and independent with lymphedema management - progressing  Long Term Goal 2: Pt will demonstrate a 1-2cm decrease in R UE/armpit/breast girth measurements in order to reduce pain and discomfort- progressing  Long Term Goal 3: Pt will increase R SHLD AROM with flex/abd to >/=130 in order to perform overhead ADLS- met  Long Term Goal 4: Pt will be fitted for an at home pump in order to be independent with lymphedema management- met  Long Term Goal 5: Pt will report 70% improvement in edema, R UE strength and ROM in order to improve daily activities- progressing    Minutes Tracking:  Time In: 1128  Time Out: 1213  Minutes: 45           Michelle Temple, PTA     Date: 3/3/2023

## 2023-03-10 ENCOUNTER — HOSPITAL ENCOUNTER (OUTPATIENT)
Dept: PHYSICAL THERAPY | Age: 58
Setting detail: THERAPIES SERIES
Discharge: HOME | End: 2023-03-10
Payer: COMMERCIAL

## 2023-03-10 NOTE — PROGRESS NOTES
[facility]  Inpatient/Observation/Outpatient Rehabilitation    Date: 3/10/2023  Patient Name: Christie Belcher       [] Inpatient Acute/Observation       []  Outpatient  : 1965       [] Pt no showed for scheduled appointment    [] Pt refused/declined therapy at this time due to:           [x] Pt cancelled due to:  [x] No Reason Given   [] Sick/ill   [] Other:    Therapist/Assistant will attempt to see this patient, at our earliest opportunity.       Vivienne Estrada Date: 3/10/2023

## 2023-03-17 ENCOUNTER — APPOINTMENT (OUTPATIENT)
Dept: PHYSICAL THERAPY | Age: 58
End: 2023-03-17
Payer: COMMERCIAL

## 2023-03-20 ENCOUNTER — TELEPHONE (OUTPATIENT)
Dept: ONCOLOGY | Age: 58
End: 2023-03-20

## 2023-03-20 NOTE — TELEPHONE ENCOUNTER
Name: Radha Rhodes  : 1965  MRN: [id number]    Oncology Navigation Follow-Up Note    Contact Type:  Telephone    Notes: Call made to patient for ONN follow. Rupa Lomax states she has started taking her Fosamax without any issues. Pt states that she currently lives in an efficiency apartment with her significant other. Pt notes that 3 one bedroom apartments will be coming open and she spoke to her landlord about moving into a bigger apartment. Rupa Lomax explained that she was told she would need a note from a physician to move into a bigger apartment stating it was medically necessary. Pt encouraged to call Altaf Plasencia CNP office for assistance with letter. Patient verbalized understanding. Pt denies other needs from navigation at this time.     Electronically signed by Candido Best RN on 3/20/2023 at 10:10 AM

## 2023-03-22 ENCOUNTER — APPOINTMENT (OUTPATIENT)
Dept: PHYSICAL THERAPY | Age: 58
End: 2023-03-22
Payer: COMMERCIAL

## 2023-03-22 ENCOUNTER — PROCEDURE VISIT (OUTPATIENT)
Dept: SURGERY | Age: 58
End: 2023-03-22

## 2023-03-22 DIAGNOSIS — N64.89 SEROMA OF BREAST: Primary | ICD-10-CM

## 2023-03-22 RX ORDER — LATANOPROST 50 UG/ML
SOLUTION/ DROPS OPHTHALMIC
COMMUNITY
Start: 2023-02-27

## 2023-03-24 ENCOUNTER — APPOINTMENT (OUTPATIENT)
Dept: PHYSICAL THERAPY | Age: 58
End: 2023-03-24
Payer: COMMERCIAL

## 2023-04-16 ENCOUNTER — APPOINTMENT (OUTPATIENT)
Dept: GENERAL RADIOLOGY | Age: 58
End: 2023-04-16
Payer: COMMERCIAL

## 2023-04-16 ENCOUNTER — APPOINTMENT (OUTPATIENT)
Dept: CT IMAGING | Age: 58
End: 2023-04-16
Payer: COMMERCIAL

## 2023-04-16 ENCOUNTER — HOSPITAL ENCOUNTER (EMERGENCY)
Age: 58
Discharge: ANOTHER HOSPITAL | End: 2023-04-16
Attending: EMERGENCY MEDICINE
Payer: COMMERCIAL

## 2023-04-16 ENCOUNTER — HOSPITAL ENCOUNTER (INPATIENT)
Age: 58
LOS: 2 days | Discharge: HOME | DRG: 190 | End: 2023-04-18
Attending: PHYSICIAN ASSISTANT
Payer: COMMERCIAL

## 2023-04-16 VITALS
RESPIRATION RATE: 19 BRPM | TEMPERATURE: 98.7 F | OXYGEN SATURATION: 94 % | HEART RATE: 91 BPM | DIASTOLIC BLOOD PRESSURE: 73 MMHG | SYSTOLIC BLOOD PRESSURE: 118 MMHG | BODY MASS INDEX: 37.91 KG/M2 | WEIGHT: 214 LBS

## 2023-04-16 DIAGNOSIS — I24.9 ACUTE CORONARY SYNDROME (HCC): Primary | ICD-10-CM

## 2023-04-16 PROBLEM — I21.4 NSTEMI (NON-ST ELEVATED MYOCARDIAL INFARCTION) (HCC): Status: ACTIVE | Noted: 2023-04-16

## 2023-04-16 LAB
ABSOLUTE EOS #: 0.13 K/UL (ref 0–0.44)
ABSOLUTE IMMATURE GRANULOCYTE: 0.05 K/UL (ref 0–0.3)
ABSOLUTE LYMPH #: 2.32 K/UL (ref 1.1–3.7)
ABSOLUTE MONO #: 0.54 K/UL (ref 0.1–1.2)
ALBUMIN SERPL-MCNC: 4 G/DL (ref 3.5–5.2)
ALBUMIN/GLOBULIN RATIO: 1 (ref 1–2.5)
ALP SERPL-CCNC: 109 U/L (ref 35–104)
ALT SERPL-CCNC: 9 U/L (ref 5–33)
ANION GAP SERPL CALCULATED.3IONS-SCNC: 14 MMOL/L (ref 9–17)
AST SERPL-CCNC: 10 U/L
BASOPHILS # BLD: 1 % (ref 0–2)
BASOPHILS ABSOLUTE: 0.06 K/UL (ref 0–0.2)
BILIRUB SERPL-MCNC: 0.4 MG/DL (ref 0.3–1.2)
BUN SERPL-MCNC: 12 MG/DL (ref 6–20)
BUN/CREAT BLD: 14 (ref 9–20)
CALCIUM SERPL-MCNC: 9.5 MG/DL (ref 8.6–10.4)
CHLORIDE SERPL-SCNC: 96 MMOL/L (ref 98–107)
CO2 SERPL-SCNC: 23 MMOL/L (ref 20–31)
CREAT SERPL-MCNC: 0.83 MG/DL (ref 0.5–0.9)
EOSINOPHILS RELATIVE PERCENT: 1 % (ref 1–4)
GFR SERPL CREATININE-BSD FRML MDRD: >60 ML/MIN/1.73M2
GLUCOSE BLD-MCNC: 237 MG/DL
GLUCOSE BLD-MCNC: 327 MG/DL (ref 74–100)
GLUCOSE SERPL-MCNC: 327 MG/DL (ref 70–99)
HCT VFR BLD AUTO: 42.6 % (ref 36.3–47.1)
HGB BLD-MCNC: 13.6 G/DL (ref 11.9–15.1)
IMMATURE GRANULOCYTES: 1 %
INR PPP: 1
LYMPHOCYTES # BLD: 23 % (ref 24–43)
MCH RBC QN AUTO: 24 PG (ref 25.2–33.5)
MCHC RBC AUTO-ENTMCNC: 31.9 G/DL (ref 28.4–34.8)
MCV RBC AUTO: 75.3 FL (ref 82.6–102.9)
MONOCYTES # BLD: 5 % (ref 3–12)
NRBC AUTOMATED: 0 PER 100 WBC
PARTIAL THROMBOPLASTIN TIME: 29.4 SEC (ref 26.8–34.8)
PDW BLD-RTO: 15.8 % (ref 11.8–14.4)
PLATELET # BLD AUTO: 348 K/UL (ref 138–453)
PMV BLD AUTO: 9.5 FL (ref 8.1–13.5)
POTASSIUM SERPL-SCNC: 4.2 MMOL/L (ref 3.7–5.3)
PROT SERPL-MCNC: 8 G/DL (ref 6.4–8.3)
PROTHROMBIN TIME: 13.7 SEC (ref 11.9–14.8)
RBC # BLD: 5.66 M/UL (ref 3.95–5.11)
SEG NEUTROPHILS: 69 % (ref 36–65)
SEGMENTED NEUTROPHILS ABSOLUTE COUNT: 7.19 K/UL (ref 1.5–8.1)
SODIUM SERPL-SCNC: 133 MMOL/L (ref 135–144)
TROPONIN I SERPL DL<=0.01 NG/ML-MCNC: 13 NG/L (ref 0–14)
TROPONIN I SERPL DL<=0.01 NG/ML-MCNC: 22 NG/L (ref 0–14)
WBC # BLD AUTO: 10.3 K/UL (ref 3.5–11.3)

## 2023-04-16 PROCEDURE — 6370000000 HC RX 637 (ALT 250 FOR IP): Performed by: EMERGENCY MEDICINE

## 2023-04-16 PROCEDURE — 99285 EMERGENCY DEPT VISIT HI MDM: CPT

## 2023-04-16 PROCEDURE — 70498 CT ANGIOGRAPHY NECK: CPT

## 2023-04-16 PROCEDURE — 93005 ELECTROCARDIOGRAM TRACING: CPT | Performed by: EMERGENCY MEDICINE

## 2023-04-16 PROCEDURE — 96374 THER/PROPH/DIAG INJ IV PUSH: CPT

## 2023-04-16 PROCEDURE — 2140000000 HC CCU INTERMEDIATE R&B

## 2023-04-16 PROCEDURE — 82947 ASSAY GLUCOSE BLOOD QUANT: CPT

## 2023-04-16 PROCEDURE — 36415 COLL VENOUS BLD VENIPUNCTURE: CPT

## 2023-04-16 PROCEDURE — 85025 COMPLETE CBC W/AUTO DIFF WBC: CPT

## 2023-04-16 PROCEDURE — 85730 THROMBOPLASTIN TIME PARTIAL: CPT

## 2023-04-16 PROCEDURE — 70450 CT HEAD/BRAIN W/O DYE: CPT

## 2023-04-16 PROCEDURE — 80053 COMPREHEN METABOLIC PANEL: CPT

## 2023-04-16 PROCEDURE — 71045 X-RAY EXAM CHEST 1 VIEW: CPT

## 2023-04-16 PROCEDURE — 85610 PROTHROMBIN TIME: CPT

## 2023-04-16 PROCEDURE — 6370000000 HC RX 637 (ALT 250 FOR IP)

## 2023-04-16 PROCEDURE — 6360000004 HC RX CONTRAST MEDICATION: Performed by: EMERGENCY MEDICINE

## 2023-04-16 PROCEDURE — 84484 ASSAY OF TROPONIN QUANT: CPT

## 2023-04-16 PROCEDURE — 6360000002 HC RX W HCPCS: Performed by: EMERGENCY MEDICINE

## 2023-04-16 RX ORDER — TRIAMCINOLONE ACETONIDE 1 MG/G
CREAM TOPICAL 2 TIMES DAILY
Status: DISCONTINUED | OUTPATIENT
Start: 2023-04-17 | End: 2023-04-18 | Stop reason: HOSPADM

## 2023-04-16 RX ORDER — ACETAMINOPHEN 650 MG/1
650 SUPPOSITORY RECTAL EVERY 6 HOURS PRN
Status: DISCONTINUED | OUTPATIENT
Start: 2023-04-16 | End: 2023-04-18 | Stop reason: HOSPADM

## 2023-04-16 RX ORDER — NITROGLYCERIN 0.4 MG/1
0.4 TABLET SUBLINGUAL ONCE
Status: COMPLETED | OUTPATIENT
Start: 2023-04-16 | End: 2023-04-16

## 2023-04-16 RX ORDER — ISOSORBIDE MONONITRATE 30 MG/1
30 TABLET, EXTENDED RELEASE ORAL DAILY
Status: DISCONTINUED | OUTPATIENT
Start: 2023-04-17 | End: 2023-04-18 | Stop reason: HOSPADM

## 2023-04-16 RX ORDER — INSULIN GLARGINE 100 [IU]/ML
48 INJECTION, SOLUTION SUBCUTANEOUS 2 TIMES DAILY
Status: DISCONTINUED | OUTPATIENT
Start: 2023-04-17 | End: 2023-04-17

## 2023-04-16 RX ORDER — MORPHINE SULFATE 2 MG/ML
2 INJECTION, SOLUTION INTRAMUSCULAR; INTRAVENOUS ONCE
Status: COMPLETED | OUTPATIENT
Start: 2023-04-16 | End: 2023-04-16

## 2023-04-16 RX ORDER — HEPARIN SODIUM 1000 [USP'U]/ML
40 INJECTION, SOLUTION INTRAVENOUS; SUBCUTANEOUS PRN
Status: DISCONTINUED | OUTPATIENT
Start: 2023-04-16 | End: 2023-04-16 | Stop reason: HOSPADM

## 2023-04-16 RX ORDER — ASPIRIN 81 MG/1
162 TABLET, CHEWABLE ORAL ONCE
Status: COMPLETED | OUTPATIENT
Start: 2023-04-16 | End: 2023-04-16

## 2023-04-16 RX ORDER — GABAPENTIN 400 MG/1
800 CAPSULE ORAL 3 TIMES DAILY
Status: DISCONTINUED | OUTPATIENT
Start: 2023-04-17 | End: 2023-04-18 | Stop reason: HOSPADM

## 2023-04-16 RX ORDER — NITROGLYCERIN 0.4 MG/1
TABLET SUBLINGUAL
Status: COMPLETED
Start: 2023-04-16 | End: 2023-04-16

## 2023-04-16 RX ORDER — LACTOBACILLUS RHAMNOSUS GG 10B CELL
1 CAPSULE ORAL DAILY
Status: DISCONTINUED | OUTPATIENT
Start: 2023-04-17 | End: 2023-04-18 | Stop reason: HOSPADM

## 2023-04-16 RX ORDER — FLUDROCORTISONE ACETATE 0.1 MG/1
0.3 TABLET ORAL DAILY
Status: DISCONTINUED | OUTPATIENT
Start: 2023-04-17 | End: 2023-04-18 | Stop reason: HOSPADM

## 2023-04-16 RX ORDER — HEPARIN SODIUM 10000 [USP'U]/100ML
5-30 INJECTION, SOLUTION INTRAVENOUS CONTINUOUS
Status: DISCONTINUED | OUTPATIENT
Start: 2023-04-17 | End: 2023-04-18

## 2023-04-16 RX ORDER — HEPARIN SODIUM 1000 [USP'U]/ML
80 INJECTION, SOLUTION INTRAVENOUS; SUBCUTANEOUS ONCE
Status: COMPLETED | OUTPATIENT
Start: 2023-04-16 | End: 2023-04-16

## 2023-04-16 RX ORDER — DEXTROSE MONOHYDRATE 100 MG/ML
INJECTION, SOLUTION INTRAVENOUS CONTINUOUS PRN
Status: DISCONTINUED | OUTPATIENT
Start: 2023-04-16 | End: 2023-04-17 | Stop reason: SDUPTHER

## 2023-04-16 RX ORDER — HEPARIN SODIUM 1000 [USP'U]/ML
80 INJECTION, SOLUTION INTRAVENOUS; SUBCUTANEOUS PRN
Status: DISCONTINUED | OUTPATIENT
Start: 2023-04-16 | End: 2023-04-16 | Stop reason: HOSPADM

## 2023-04-16 RX ORDER — POTASSIUM CHLORIDE 20 MEQ/1
40 TABLET, EXTENDED RELEASE ORAL PRN
Status: DISCONTINUED | OUTPATIENT
Start: 2023-04-16 | End: 2023-04-18 | Stop reason: HOSPADM

## 2023-04-16 RX ORDER — ACETAMINOPHEN 325 MG/1
650 TABLET ORAL EVERY 6 HOURS PRN
Status: DISCONTINUED | OUTPATIENT
Start: 2023-04-16 | End: 2023-04-17 | Stop reason: DRUGHIGH

## 2023-04-16 RX ORDER — ATORVASTATIN CALCIUM 80 MG/1
80 TABLET, FILM COATED ORAL DAILY
Status: DISCONTINUED | OUTPATIENT
Start: 2023-04-17 | End: 2023-04-18 | Stop reason: HOSPADM

## 2023-04-16 RX ORDER — PANTOPRAZOLE SODIUM 40 MG/1
40 TABLET, DELAYED RELEASE ORAL
Status: DISCONTINUED | OUTPATIENT
Start: 2023-04-17 | End: 2023-04-18 | Stop reason: HOSPADM

## 2023-04-16 RX ORDER — MAGNESIUM SULFATE IN WATER 40 MG/ML
2000 INJECTION, SOLUTION INTRAVENOUS PRN
Status: DISCONTINUED | OUTPATIENT
Start: 2023-04-16 | End: 2023-04-18 | Stop reason: HOSPADM

## 2023-04-16 RX ORDER — SODIUM CHLORIDE 0.9 % (FLUSH) 0.9 %
5-40 SYRINGE (ML) INJECTION EVERY 12 HOURS SCHEDULED
Status: DISCONTINUED | OUTPATIENT
Start: 2023-04-17 | End: 2023-04-18 | Stop reason: HOSPADM

## 2023-04-16 RX ORDER — LOSARTAN POTASSIUM 25 MG/1
25 TABLET ORAL DAILY
Status: DISCONTINUED | OUTPATIENT
Start: 2023-04-17 | End: 2023-04-18 | Stop reason: HOSPADM

## 2023-04-16 RX ORDER — ALBUTEROL SULFATE 90 UG/1
2 AEROSOL, METERED RESPIRATORY (INHALATION) EVERY 4 HOURS PRN
Status: DISCONTINUED | OUTPATIENT
Start: 2023-04-16 | End: 2023-04-18 | Stop reason: HOSPADM

## 2023-04-16 RX ORDER — POLYETHYLENE GLYCOL 3350 17 G/17G
17 POWDER, FOR SOLUTION ORAL DAILY PRN
Status: DISCONTINUED | OUTPATIENT
Start: 2023-04-16 | End: 2023-04-18 | Stop reason: HOSPADM

## 2023-04-16 RX ORDER — LATANOPROST 50 UG/ML
1 SOLUTION/ DROPS OPHTHALMIC NIGHTLY
Status: DISCONTINUED | OUTPATIENT
Start: 2023-04-17 | End: 2023-04-18 | Stop reason: HOSPADM

## 2023-04-16 RX ORDER — CLOPIDOGREL BISULFATE 75 MG/1
75 TABLET ORAL DAILY
Status: DISCONTINUED | OUTPATIENT
Start: 2023-04-17 | End: 2023-04-17

## 2023-04-16 RX ORDER — ONDANSETRON 2 MG/ML
4 INJECTION INTRAMUSCULAR; INTRAVENOUS EVERY 6 HOURS PRN
Status: DISCONTINUED | OUTPATIENT
Start: 2023-04-16 | End: 2023-04-18 | Stop reason: HOSPADM

## 2023-04-16 RX ORDER — HEPARIN SODIUM 1000 [USP'U]/ML
4000 INJECTION, SOLUTION INTRAVENOUS; SUBCUTANEOUS PRN
Status: DISCONTINUED | OUTPATIENT
Start: 2023-04-16 | End: 2023-04-18 | Stop reason: ALTCHOICE

## 2023-04-16 RX ORDER — TIZANIDINE 4 MG/1
4 TABLET ORAL NIGHTLY PRN
Status: DISCONTINUED | OUTPATIENT
Start: 2023-04-17 | End: 2023-04-18 | Stop reason: HOSPADM

## 2023-04-16 RX ORDER — POTASSIUM CHLORIDE 7.45 MG/ML
10 INJECTION INTRAVENOUS PRN
Status: DISCONTINUED | OUTPATIENT
Start: 2023-04-16 | End: 2023-04-18 | Stop reason: HOSPADM

## 2023-04-16 RX ORDER — SODIUM CHLORIDE 0.9 % (FLUSH) 0.9 %
5-40 SYRINGE (ML) INJECTION PRN
Status: DISCONTINUED | OUTPATIENT
Start: 2023-04-16 | End: 2023-04-18 | Stop reason: HOSPADM

## 2023-04-16 RX ORDER — HEPARIN SODIUM 1000 [USP'U]/ML
2000 INJECTION, SOLUTION INTRAVENOUS; SUBCUTANEOUS PRN
Status: DISCONTINUED | OUTPATIENT
Start: 2023-04-17 | End: 2023-04-18 | Stop reason: ALTCHOICE

## 2023-04-16 RX ORDER — INSULIN LISPRO 100 [IU]/ML
15 INJECTION, SOLUTION INTRAVENOUS; SUBCUTANEOUS
Status: DISCONTINUED | OUTPATIENT
Start: 2023-04-17 | End: 2023-04-17

## 2023-04-16 RX ORDER — SODIUM CHLORIDE 9 MG/ML
INJECTION, SOLUTION INTRAVENOUS PRN
Status: DISCONTINUED | OUTPATIENT
Start: 2023-04-16 | End: 2023-04-18 | Stop reason: HOSPADM

## 2023-04-16 RX ORDER — ASPIRIN 81 MG/1
81 TABLET ORAL DAILY
Status: DISCONTINUED | OUTPATIENT
Start: 2023-04-17 | End: 2023-04-18 | Stop reason: HOSPADM

## 2023-04-16 RX ORDER — ONDANSETRON 4 MG/1
4 TABLET, ORALLY DISINTEGRATING ORAL EVERY 8 HOURS PRN
Status: DISCONTINUED | OUTPATIENT
Start: 2023-04-16 | End: 2023-04-18 | Stop reason: HOSPADM

## 2023-04-16 RX ORDER — MORPHINE SULFATE 2 MG/ML
2 INJECTION, SOLUTION INTRAMUSCULAR; INTRAVENOUS
Status: DISCONTINUED | OUTPATIENT
Start: 2023-04-16 | End: 2023-04-16 | Stop reason: HOSPADM

## 2023-04-16 RX ORDER — NITROGLYCERIN 0.4 MG/1
0.4 TABLET SUBLINGUAL
Status: DISCONTINUED | OUTPATIENT
Start: 2023-04-16 | End: 2023-04-16

## 2023-04-16 RX ORDER — ALENDRONATE SODIUM 70 MG/1
70 TABLET ORAL
Status: DISCONTINUED | OUTPATIENT
Start: 2023-04-17 | End: 2023-04-16 | Stop reason: RX

## 2023-04-16 RX ORDER — EZETIMIBE 10 MG/1
10 TABLET ORAL NIGHTLY
Status: DISCONTINUED | OUTPATIENT
Start: 2023-04-17 | End: 2023-04-18 | Stop reason: HOSPADM

## 2023-04-16 RX ORDER — DULOXETIN HYDROCHLORIDE 30 MG/1
30 CAPSULE, DELAYED RELEASE ORAL DAILY
Status: DISCONTINUED | OUTPATIENT
Start: 2023-04-17 | End: 2023-04-18 | Stop reason: HOSPADM

## 2023-04-16 RX ORDER — HEPARIN SODIUM 10000 [USP'U]/100ML
5-30 INJECTION, SOLUTION INTRAVENOUS CONTINUOUS
Status: DISCONTINUED | OUTPATIENT
Start: 2023-04-16 | End: 2023-04-16 | Stop reason: HOSPADM

## 2023-04-16 RX ADMIN — NITROGLYCERIN 0.4 MG: 0.4 TABLET SUBLINGUAL at 17:11

## 2023-04-16 RX ADMIN — ASPIRIN 162 MG: 81 TABLET, CHEWABLE ORAL at 19:30

## 2023-04-16 RX ADMIN — MORPHINE SULFATE 2 MG: 2 INJECTION, SOLUTION INTRAMUSCULAR; INTRAVENOUS at 18:34

## 2023-04-16 RX ADMIN — MORPHINE SULFATE 2 MG: 2 INJECTION, SOLUTION INTRAMUSCULAR; INTRAVENOUS at 18:13

## 2023-04-16 RX ADMIN — HEPARIN SODIUM 18 UNITS/KG/HR: 10000 INJECTION, SOLUTION INTRAVENOUS at 18:31

## 2023-04-16 RX ADMIN — HEPARIN SODIUM 7770 UNITS: 1000 INJECTION INTRAVENOUS; SUBCUTANEOUS at 18:28

## 2023-04-16 RX ADMIN — IOPAMIDOL 75 ML: 755 INJECTION, SOLUTION INTRAVENOUS at 17:30

## 2023-04-16 ASSESSMENT — LIFESTYLE VARIABLES
HOW OFTEN DO YOU HAVE A DRINK CONTAINING ALCOHOL: NEVER
HOW MANY STANDARD DRINKS CONTAINING ALCOHOL DO YOU HAVE ON A TYPICAL DAY: PATIENT DOES NOT DRINK

## 2023-04-16 ASSESSMENT — PAIN DESCRIPTION - ORIENTATION
ORIENTATION: LEFT
ORIENTATION: LEFT

## 2023-04-16 ASSESSMENT — PAIN SCALES - GENERAL
PAINLEVEL_OUTOF10: 5
PAINLEVEL_OUTOF10: 5
PAINLEVEL_OUTOF10: 3
PAINLEVEL_OUTOF10: 0
PAINLEVEL_OUTOF10: 3

## 2023-04-16 ASSESSMENT — PAIN - FUNCTIONAL ASSESSMENT: PAIN_FUNCTIONAL_ASSESSMENT: 0-10

## 2023-04-16 ASSESSMENT — PAIN DESCRIPTION - FREQUENCY: FREQUENCY: CONTINUOUS

## 2023-04-16 ASSESSMENT — PAIN DESCRIPTION - DESCRIPTORS
DESCRIPTORS: ACHING;DISCOMFORT
DESCRIPTORS: ACHING;BURNING

## 2023-04-16 ASSESSMENT — PAIN DESCRIPTION - LOCATION
LOCATION: JAW
LOCATION: CHEST;FACE

## 2023-04-16 ASSESSMENT — PAIN DESCRIPTION - PAIN TYPE: TYPE: ACUTE PAIN

## 2023-04-17 ENCOUNTER — APPOINTMENT (OUTPATIENT)
Dept: CARDIAC CATH/INVASIVE PROCEDURES | Age: 58
DRG: 190 | End: 2023-04-17
Attending: PHYSICIAN ASSISTANT
Payer: COMMERCIAL

## 2023-04-17 PROBLEM — Z95.5 S/P CORONARY ARTERY STENT PLACEMENT: Status: ACTIVE | Noted: 2023-04-17

## 2023-04-17 PROBLEM — Z85.3 HISTORY OF BREAST CANCER: Status: ACTIVE | Noted: 2023-04-17

## 2023-04-17 PROBLEM — Z87.898 HISTORY OF SEIZURES: Status: ACTIVE | Noted: 2023-04-17

## 2023-04-17 LAB
ACTIVATED CLOTTING TIME: 281 SECONDS (ref 1–150)
ANION GAP SERPL CALC-SCNC: 11 MEQ/L (ref 8–16)
ANION GAP SERPL CALC-SCNC: 11 MEQ/L (ref 8–16)
APTT PPP: 58.6 SECONDS (ref 22–38)
BUN SERPL-MCNC: 15 MG/DL (ref 7–22)
BUN SERPL-MCNC: 15 MG/DL (ref 7–22)
CA-I BLD ISE-SCNC: 1.09 MMOL/L (ref 1.12–1.32)
CA-I BLD ISE-SCNC: 1.16 MMOL/L (ref 1.12–1.32)
CALCIUM SERPL-MCNC: 8.7 MG/DL (ref 8.5–10.5)
CALCIUM SERPL-MCNC: 9.1 MG/DL (ref 8.5–10.5)
CHLORIDE SERPL-SCNC: 102 MEQ/L (ref 98–111)
CHLORIDE SERPL-SCNC: 104 MEQ/L (ref 98–111)
CHOLEST SERPL-MCNC: 162 MG/DL (ref 100–199)
CO2 SERPL-SCNC: 24 MEQ/L (ref 23–33)
CO2 SERPL-SCNC: 24 MEQ/L (ref 23–33)
CREAT SERPL-MCNC: 0.6 MG/DL (ref 0.4–1.2)
CREAT SERPL-MCNC: 0.6 MG/DL (ref 0.4–1.2)
DEPRECATED MEAN GLUCOSE BLD GHB EST-ACNC: 273 MG/DL (ref 70–126)
DEPRECATED RDW RBC AUTO: 44.9 FL (ref 35–45)
DEPRECATED RDW RBC AUTO: 45.3 FL (ref 35–45)
EKG ATRIAL RATE: 102 BPM
EKG ATRIAL RATE: 108 BPM
EKG P AXIS: 51 DEGREES
EKG P AXIS: 53 DEGREES
EKG P-R INTERVAL: 128 MS
EKG P-R INTERVAL: 130 MS
EKG Q-T INTERVAL: 336 MS
EKG Q-T INTERVAL: 342 MS
EKG QRS DURATION: 84 MS
EKG QRS DURATION: 86 MS
EKG QTC CALCULATION (BAZETT): 445 MS
EKG QTC CALCULATION (BAZETT): 450 MS
EKG R AXIS: 33 DEGREES
EKG R AXIS: 53 DEGREES
EKG T AXIS: 146 DEGREES
EKG T AXIS: 157 DEGREES
EKG VENTRICULAR RATE: 102 BPM
EKG VENTRICULAR RATE: 108 BPM
ERYTHROCYTE [DISTWIDTH] IN BLOOD BY AUTOMATED COUNT: 16.2 % (ref 11.5–14.5)
ERYTHROCYTE [DISTWIDTH] IN BLOOD BY AUTOMATED COUNT: 16.3 % (ref 11.5–14.5)
GFR SERPL CREATININE-BSD FRML MDRD: > 60 ML/MIN/1.73M2
GFR SERPL CREATININE-BSD FRML MDRD: > 60 ML/MIN/1.73M2
GLUCOSE BLD STRIP.AUTO-MCNC: 156 MG/DL (ref 70–108)
GLUCOSE BLD STRIP.AUTO-MCNC: 164 MG/DL (ref 70–108)
GLUCOSE BLD STRIP.AUTO-MCNC: 187 MG/DL (ref 70–108)
GLUCOSE BLD STRIP.AUTO-MCNC: 187 MG/DL (ref 70–108)
GLUCOSE BLD STRIP.AUTO-MCNC: 194 MG/DL (ref 70–108)
GLUCOSE BLD STRIP.AUTO-MCNC: 207 MG/DL (ref 70–108)
GLUCOSE SERPL-MCNC: 162 MG/DL (ref 70–108)
GLUCOSE SERPL-MCNC: 187 MG/DL (ref 70–108)
HBA1C MFR BLD HPLC: 11.1 % (ref 4.4–6.4)
HCT VFR BLD AUTO: 37.7 % (ref 37–47)
HCT VFR BLD AUTO: 39.8 % (ref 37–47)
HDLC SERPL-MCNC: 46 MG/DL
HEPARIN UNFRACTIONATED: 0.41 U/ML (ref 0.3–0.7)
HEPARIN UNFRACTIONATED: 0.54 U/ML (ref 0.3–0.7)
HGB BLD-MCNC: 11.8 GM/DL (ref 12–16)
HGB BLD-MCNC: 12.1 GM/DL (ref 12–16)
INR PPP: 1.09 (ref 0.85–1.13)
LDLC SERPL CALC-MCNC: 76 MG/DL
MAGNESIUM SERPL-MCNC: 2 MG/DL (ref 1.6–2.4)
MCH RBC QN AUTO: 23.9 PG (ref 26–33)
MCH RBC QN AUTO: 24.1 PG (ref 26–33)
MCHC RBC AUTO-ENTMCNC: 30.4 GM/DL (ref 32.2–35.5)
MCHC RBC AUTO-ENTMCNC: 31.3 GM/DL (ref 32.2–35.5)
MCV RBC AUTO: 76.9 FL (ref 81–99)
MCV RBC AUTO: 78.7 FL (ref 81–99)
NT-PROBNP SERPL IA-MCNC: 408.1 PG/ML (ref 0–124)
PHOSPHATE SERPL-MCNC: 3.9 MG/DL (ref 2.4–4.7)
PHOSPHATE SERPL-MCNC: 4.4 MG/DL (ref 2.4–4.7)
PLATELET # BLD AUTO: 305 THOU/MM3 (ref 130–400)
PLATELET # BLD AUTO: 320 THOU/MM3 (ref 130–400)
PMV BLD AUTO: 9.5 FL (ref 9.4–12.4)
PMV BLD AUTO: 9.7 FL (ref 9.4–12.4)
POTASSIUM SERPL-SCNC: 4 MEQ/L (ref 3.5–5.2)
POTASSIUM SERPL-SCNC: 4.2 MEQ/L (ref 3.5–5.2)
RBC # BLD AUTO: 4.9 MILL/MM3 (ref 4.2–5.4)
RBC # BLD AUTO: 5.06 MILL/MM3 (ref 4.2–5.4)
SODIUM SERPL-SCNC: 137 MEQ/L (ref 135–145)
SODIUM SERPL-SCNC: 139 MEQ/L (ref 135–145)
TRIGL SERPL-MCNC: 200 MG/DL (ref 0–199)
TROPONIN T: 0.16 NG/ML
TROPONIN T: 0.16 NG/ML
TROPONIN T: 0.18 NG/ML
WBC # BLD AUTO: 10.5 THOU/MM3 (ref 4.8–10.8)
WBC # BLD AUTO: 8.8 THOU/MM3 (ref 4.8–10.8)

## 2023-04-17 PROCEDURE — 99232 SBSQ HOSP IP/OBS MODERATE 35: CPT | Performed by: FAMILY MEDICINE

## 2023-04-17 PROCEDURE — 2140000000 HC CCU INTERMEDIATE R&B

## 2023-04-17 PROCEDURE — B2111ZZ FLUOROSCOPY OF MULTIPLE CORONARY ARTERIES USING LOW OSMOLAR CONTRAST: ICD-10-PCS | Performed by: INTERNAL MEDICINE

## 2023-04-17 PROCEDURE — 85520 HEPARIN ASSAY: CPT

## 2023-04-17 PROCEDURE — 80061 LIPID PANEL: CPT

## 2023-04-17 PROCEDURE — 93459 L HRT ART/GRFT ANGIO: CPT

## 2023-04-17 PROCEDURE — 93010 ELECTROCARDIOGRAM REPORT: CPT | Performed by: INTERNAL MEDICINE

## 2023-04-17 PROCEDURE — C1887 CATHETER, GUIDING: HCPCS

## 2023-04-17 PROCEDURE — C1769 GUIDE WIRE: HCPCS

## 2023-04-17 PROCEDURE — 2580000003 HC RX 258

## 2023-04-17 PROCEDURE — C1894 INTRO/SHEATH, NON-LASER: HCPCS

## 2023-04-17 PROCEDURE — 36415 COLL VENOUS BLD VENIPUNCTURE: CPT

## 2023-04-17 PROCEDURE — 2580000003 HC RX 258: Performed by: INTERNAL MEDICINE

## 2023-04-17 PROCEDURE — 80048 BASIC METABOLIC PNL TOTAL CA: CPT

## 2023-04-17 PROCEDURE — 6370000000 HC RX 637 (ALT 250 FOR IP)

## 2023-04-17 PROCEDURE — 83735 ASSAY OF MAGNESIUM: CPT

## 2023-04-17 PROCEDURE — 85610 PROTHROMBIN TIME: CPT

## 2023-04-17 PROCEDURE — 027034Z DILATION OF CORONARY ARTERY, ONE ARTERY WITH DRUG-ELUTING INTRALUMINAL DEVICE, PERCUTANEOUS APPROACH: ICD-10-PCS | Performed by: INTERNAL MEDICINE

## 2023-04-17 PROCEDURE — 82330 ASSAY OF CALCIUM: CPT

## 2023-04-17 PROCEDURE — 85027 COMPLETE CBC AUTOMATED: CPT

## 2023-04-17 PROCEDURE — 93005 ELECTROCARDIOGRAM TRACING: CPT

## 2023-04-17 PROCEDURE — C1874 STENT, COATED/COV W/DEL SYS: HCPCS

## 2023-04-17 PROCEDURE — 83036 HEMOGLOBIN GLYCOSYLATED A1C: CPT

## 2023-04-17 PROCEDURE — 84100 ASSAY OF PHOSPHORUS: CPT

## 2023-04-17 PROCEDURE — 99223 1ST HOSP IP/OBS HIGH 75: CPT | Performed by: INTERNAL MEDICINE

## 2023-04-17 PROCEDURE — 6360000004 HC RX CONTRAST MEDICATION: Performed by: INTERNAL MEDICINE

## 2023-04-17 PROCEDURE — 83880 ASSAY OF NATRIURETIC PEPTIDE: CPT

## 2023-04-17 PROCEDURE — 6360000002 HC RX W HCPCS

## 2023-04-17 PROCEDURE — 4A023N7 MEASUREMENT OF CARDIAC SAMPLING AND PRESSURE, LEFT HEART, PERCUTANEOUS APPROACH: ICD-10-PCS | Performed by: INTERNAL MEDICINE

## 2023-04-17 PROCEDURE — 85347 COAGULATION TIME ACTIVATED: CPT

## 2023-04-17 PROCEDURE — 85730 THROMBOPLASTIN TIME PARTIAL: CPT

## 2023-04-17 PROCEDURE — 92928 PRQ TCAT PLMT NTRAC ST 1 LES: CPT

## 2023-04-17 PROCEDURE — 6370000000 HC RX 637 (ALT 250 FOR IP): Performed by: FAMILY MEDICINE

## 2023-04-17 PROCEDURE — 94640 AIRWAY INHALATION TREATMENT: CPT

## 2023-04-17 PROCEDURE — 82948 REAGENT STRIP/BLOOD GLUCOSE: CPT

## 2023-04-17 PROCEDURE — 2500000003 HC RX 250 WO HCPCS

## 2023-04-17 PROCEDURE — 84484 ASSAY OF TROPONIN QUANT: CPT

## 2023-04-17 PROCEDURE — C1760 CLOSURE DEV, VASC: HCPCS

## 2023-04-17 PROCEDURE — C1725 CATH, TRANSLUMIN NON-LASER: HCPCS

## 2023-04-17 RX ORDER — CALCIUM GLUCONATE 20 MG/ML
2000 INJECTION, SOLUTION INTRAVENOUS ONCE
Status: COMPLETED | OUTPATIENT
Start: 2023-04-17 | End: 2023-04-17

## 2023-04-17 RX ORDER — NITROGLYCERIN 0.4 MG/1
0.4 TABLET SUBLINGUAL EVERY 5 MIN PRN
Status: DISCONTINUED | OUTPATIENT
Start: 2023-04-17 | End: 2023-04-18 | Stop reason: HOSPADM

## 2023-04-17 RX ORDER — INSULIN GLARGINE 100 [IU]/ML
40 INJECTION, SOLUTION SUBCUTANEOUS 2 TIMES DAILY
Status: DISCONTINUED | OUTPATIENT
Start: 2023-04-17 | End: 2023-04-18 | Stop reason: HOSPADM

## 2023-04-17 RX ORDER — SODIUM CHLORIDE 9 MG/ML
INJECTION, SOLUTION INTRAVENOUS CONTINUOUS
Status: ACTIVE | OUTPATIENT
Start: 2023-04-17 | End: 2023-04-17

## 2023-04-17 RX ORDER — SODIUM CHLORIDE 0.9 % (FLUSH) 0.9 %
5-40 SYRINGE (ML) INJECTION EVERY 12 HOURS SCHEDULED
Status: DISCONTINUED | OUTPATIENT
Start: 2023-04-17 | End: 2023-04-18 | Stop reason: HOSPADM

## 2023-04-17 RX ORDER — SODIUM CHLORIDE 9 MG/ML
INJECTION, SOLUTION INTRAVENOUS PRN
Status: DISCONTINUED | OUTPATIENT
Start: 2023-04-17 | End: 2023-04-17 | Stop reason: SDUPTHER

## 2023-04-17 RX ORDER — INSULIN GLARGINE 100 [IU]/ML
25 INJECTION, SOLUTION SUBCUTANEOUS 2 TIMES DAILY
Status: DISCONTINUED | OUTPATIENT
Start: 2023-04-17 | End: 2023-04-17

## 2023-04-17 RX ORDER — ALBUTEROL SULFATE 90 UG/1
2 AEROSOL, METERED RESPIRATORY (INHALATION) 2 TIMES DAILY
Status: DISCONTINUED | OUTPATIENT
Start: 2023-04-17 | End: 2023-04-18 | Stop reason: HOSPADM

## 2023-04-17 RX ORDER — SODIUM CHLORIDE 0.9 % (FLUSH) 0.9 %
5-40 SYRINGE (ML) INJECTION PRN
Status: DISCONTINUED | OUTPATIENT
Start: 2023-04-17 | End: 2023-04-18 | Stop reason: HOSPADM

## 2023-04-17 RX ORDER — DEXTROSE MONOHYDRATE 100 MG/ML
INJECTION, SOLUTION INTRAVENOUS CONTINUOUS PRN
Status: DISCONTINUED | OUTPATIENT
Start: 2023-04-17 | End: 2023-04-18 | Stop reason: HOSPADM

## 2023-04-17 RX ORDER — INSULIN LISPRO 100 [IU]/ML
0-8 INJECTION, SOLUTION INTRAVENOUS; SUBCUTANEOUS EVERY 4 HOURS
Status: DISCONTINUED | OUTPATIENT
Start: 2023-04-17 | End: 2023-04-18

## 2023-04-17 RX ORDER — NITROGLYCERIN 20 MG/100ML
5-200 INJECTION INTRAVENOUS CONTINUOUS
Status: DISCONTINUED | OUTPATIENT
Start: 2023-04-17 | End: 2023-04-17

## 2023-04-17 RX ORDER — ACETAMINOPHEN 325 MG/1
650 TABLET ORAL EVERY 4 HOURS PRN
Status: DISCONTINUED | OUTPATIENT
Start: 2023-04-17 | End: 2023-04-18 | Stop reason: HOSPADM

## 2023-04-17 RX ADMIN — ALBUTEROL SULFATE 2 PUFF: 90 AEROSOL, METERED RESPIRATORY (INHALATION) at 06:37

## 2023-04-17 RX ADMIN — ALBUTEROL SULFATE 2 PUFF: 90 AEROSOL, METERED RESPIRATORY (INHALATION) at 17:52

## 2023-04-17 RX ADMIN — SODIUM CHLORIDE: 9 INJECTION, SOLUTION INTRAVENOUS at 16:04

## 2023-04-17 RX ADMIN — METOPROLOL SUCCINATE 150 MG: 100 TABLET, EXTENDED RELEASE ORAL at 09:12

## 2023-04-17 RX ADMIN — INSULIN LISPRO 2 UNITS: 100 INJECTION, SOLUTION INTRAVENOUS; SUBCUTANEOUS at 09:14

## 2023-04-17 RX ADMIN — CALCIUM GLUCONATE 2000 MG: 20 INJECTION, SOLUTION INTRAVENOUS at 03:16

## 2023-04-17 RX ADMIN — GABAPENTIN 800 MG: 400 CAPSULE ORAL at 00:18

## 2023-04-17 RX ADMIN — GABAPENTIN 800 MG: 400 CAPSULE ORAL at 09:13

## 2023-04-17 RX ADMIN — SODIUM CHLORIDE, PRESERVATIVE FREE 10 ML: 5 INJECTION INTRAVENOUS at 08:21

## 2023-04-17 RX ADMIN — Medication 1 CAPSULE: at 09:12

## 2023-04-17 RX ADMIN — PANTOPRAZOLE SODIUM 40 MG: 40 TABLET, DELAYED RELEASE ORAL at 05:19

## 2023-04-17 RX ADMIN — GABAPENTIN 800 MG: 400 CAPSULE ORAL at 20:20

## 2023-04-17 RX ADMIN — ISOSORBIDE MONONITRATE 30 MG: 30 TABLET, EXTENDED RELEASE ORAL at 09:12

## 2023-04-17 RX ADMIN — INSULIN GLARGINE 40 UNITS: 100 INJECTION, SOLUTION SUBCUTANEOUS at 20:31

## 2023-04-17 RX ADMIN — ATORVASTATIN CALCIUM 80 MG: 80 TABLET, FILM COATED ORAL at 09:13

## 2023-04-17 RX ADMIN — IOPAMIDOL 150 ML: 755 INJECTION, SOLUTION INTRAVENOUS at 15:58

## 2023-04-17 RX ADMIN — EZETIMIBE 10 MG: 10 TABLET ORAL at 20:20

## 2023-04-17 RX ADMIN — TRIAMCINOLONE ACETONIDE: 1 CREAM TOPICAL at 21:58

## 2023-04-17 RX ADMIN — HEPARIN SODIUM 18 UNITS/KG/HR: 10000 INJECTION, SOLUTION INTRAVENOUS at 09:06

## 2023-04-17 RX ADMIN — CLOPIDOGREL BISULFATE 75 MG: 75 TABLET ORAL at 09:13

## 2023-04-17 RX ADMIN — FLUDROCORTISONE ACETATE 0.3 MG: 0.1 TABLET ORAL at 09:12

## 2023-04-17 RX ADMIN — HEPARIN SODIUM 18 UNITS/KG/HR: 10000 INJECTION, SOLUTION INTRAVENOUS at 00:21

## 2023-04-17 RX ADMIN — SODIUM CHLORIDE, PRESERVATIVE FREE 10 ML: 5 INJECTION INTRAVENOUS at 20:20

## 2023-04-17 RX ADMIN — DULOXETINE 30 MG: 30 CAPSULE, DELAYED RELEASE ORAL at 09:13

## 2023-04-17 RX ADMIN — GABAPENTIN 800 MG: 400 CAPSULE ORAL at 16:28

## 2023-04-17 RX ADMIN — EZETIMIBE 10 MG: 10 TABLET ORAL at 00:18

## 2023-04-17 RX ADMIN — ASPIRIN 81 MG: 81 TABLET, COATED ORAL at 09:13

## 2023-04-17 ASSESSMENT — PAIN SCALES - GENERAL
PAINLEVEL_OUTOF10: 0

## 2023-04-17 NOTE — CONSULTS
[facility]'s  Consult    Patient's Name/Date of Birth: Naga Gonzalez / 1965 (65 y.o.)    Date: April 17, 2023     Referring Provider: Chemo Culver MD    CHIEF COMPLAINT: chest pain, concern for NSTEMI      HPI: This is a pleasant 62 y.o. female with a past medical history of CAD status post PCI x3, CABG involving LIMA-LAD in 2016, hypertension, hyperlipidemia, COPD, right breast cancer status post mastectomy with lymphedema, type 2 diabetes mellitus who presents for chest pain. Patient states that yesterday she was shopping and all of a sudden developed intense jaw pain, head pain, shoulder pain, chest pain. She states the pain is different than her previous heart attacks and rates it a 7 out of 10. She then proceeded to get into her car and drive to OSH and states that when she arrived there the pain decreased down to around a 4 out of 10. She was given nitroglycerin and morphine which she states helped her jaw pain and decreased her chest pain/pressure but did not completely take it away. At present she denies any jaw pain, shoulder pain, left arm pain, numbness or tingling, shortness of breath. She does note that she has a chest discomfort and describes it as of having chest congestion and a head cold but is not having any pain at the current moment. She states she has been compliant with all of her medications. Of note, patient last had cardiac cath in May 2022 which showed severe three-vessel disease involving the left main, LAD, right coronary artery with widely patent LIMA/LAD. Normal left ventricular end diastolic pressure. Patient also had successful PTCA-JESSE left main disease to treat large dominant native LCx. Echo: Echo from November 2022 shows global left ventricular systolic function appears to be preserved with an estimated ejection fraction of greater than 55%.   Left ventricular cavity size is within normal limits and the left ventricular wall

## 2023-04-17 NOTE — H&P
brachiocephalic or subclavian arteries. Mild ectasia of ascending thoracic aorta measuring 3.2 x 3.4 cm. CAROTID ARTERIES: No dissection, arterial injury, or hemodynamically significant stenosis by NASCET criteria. There is mild atherosclerotic calcification of bilateral carotid bulbs extending into the origin of internal carotid arteries not resulting in hemodynamically significant stenosis. VERTEBRAL ARTERIES: No dissection, arterial injury, or significant stenosis. SOFT TISSUES: The lung apices are clear. No cervical or superior mediastinal lymphadenopathy. The larynx and pharynx are unremarkable. No acute abnormality of the salivary and thyroid glands. 1.1 cm exophytic nodule arising from inferior aspect of left thyroid lobe. BONES: No acute osseous abnormality. CTA HEAD: ANTERIOR CIRCULATION: No significant stenosis of the intracranial internal carotid, anterior cerebral, or middle cerebral arteries. No aneurysm. POSTERIOR CIRCULATION: No significant stenosis of the vertebral, basilar, or posterior cerebral arteries. No aneurysm. OTHER: No dural venous sinus thrombosis on this non-dedicated study. BRAIN: No mass effect or midline shift. No extra-axial fluid collection. The gray-white differentiation is maintained. Unremarkable CTA of the head and neck. No hemodynamically significant stenosis. No aneurysm.          EKG: Sinus rhythm    Electronically signed by Kristan Collet, APRN - CNP on 4/16/2023 at 11:44 PM

## 2023-04-17 NOTE — BRIEF OP NOTE
[facility]  Sedation/Analgesia Post Sedation Record        Pt Name: Charles Shannon  MRN: [id number]  YOB: 1965  Procedure Performed By: Carmina Roger MD MD, Stefan Negron, [address]  Primary Care Physician: CHELSEA Alvarez - ANGELIKA    POST-PROCEDURE                                  Sedation/Anesthesia:  Local Anesthesia and IV Conscious Sedation with continuous O2 monitoring    Estimated Blood Loss: 10 cc     Specimens Removed:  [x]None []Other:      Disposition of Specimen:  []Pathology []Other        Complications:   [x]None Immediate []Other:         Procedure Performed:  Left heart cath and PCI to LM to LCx    Post Procedure Diagnosis/Findings:  NSTEMI         Recommendations:   Transfer to Tele unit  DAPT   Lipid lowering therapy  Aggressive risk factor modification  Cardiac rehab  Monitor access site closely for bleeding  IV Fluids    All questions and concerns were addressed and patient is in agreement with plan.                  Carmina Roger MD MD, Stefan Negron, MARIA LVI  Electronically signed 4/17/2023 at 4:05 PM

## 2023-04-17 NOTE — RT PROTOCOL NOTE
RT Inhaler-Nebulizer Bronchodilator Protocol Note    There is a bronchodilator order in the chart from a provider indicating to follow the RT Bronchodilator Protocol and there is an Initiate RT Inhaler-Nebulizer Bronchodilator Protocol order as well (see protocol at bottom of note). CXR Findings:  XR CHEST PORTABLE    Result Date: 4/16/2023  No radiographic evidence of an acute cardiopulmonary process. The findings from the last RT Protocol Assessment were as follows:   History Pulmonary Disease: Chronic pulmonary disease  Respiratory Pattern: Regular pattern and RR 12-20 bpm  Breath Sounds: Slightly diminished and/or crackles  Cough: Strong, spontaneous, non-productive  Indication for Bronchodilator Therapy: Decreased or absent breath sounds  Bronchodilator Assessment Score: 4    Aerosolized bronchodilator medication orders have been revised according to the RT Inhaler-Nebulizer Bronchodilator Protocol below. Respiratory Therapist to perform RT Therapy Protocol Assessment initially then follow the protocol. Repeat RT Therapy Protocol Assessment PRN for score 0-3 or on second treatment, BID, and PRN for scores above 3. No Indications - adjust the frequency to every 6 hours PRN wheezing or bronchospasm, if no treatments needed after 48 hours then discontinue using Per Protocol order mode. If indication present, adjust the RT bronchodilator orders based on the Bronchodilator Assessment Score as indicated below. Use Inhaler orders unless patient has one or more of the following: on home nebulizer, not able to hold breath for 10 seconds, is not alert and oriented, cannot activate and use MDI correctly, or respiratory rate 25 breaths per minute or more, then use the equivalent nebulizer order(s) with same Frequency and PRN reasons based on the score. If a patient is on this medication at home then do not decrease Frequency below that used at home.     0-3 - enter or revise RT bronchodilator

## 2023-04-18 VITALS
DIASTOLIC BLOOD PRESSURE: 69 MMHG | SYSTOLIC BLOOD PRESSURE: 113 MMHG | WEIGHT: 224.3 LBS | BODY MASS INDEX: 39.74 KG/M2 | OXYGEN SATURATION: 95 % | HEIGHT: 63 IN | HEART RATE: 69 BPM | RESPIRATION RATE: 18 BRPM | TEMPERATURE: 97.8 F

## 2023-04-18 LAB
ANION GAP SERPL CALC-SCNC: 15 MEQ/L (ref 8–16)
BUN SERPL-MCNC: 14 MG/DL (ref 7–22)
CALCIUM SERPL-MCNC: 8.4 MG/DL (ref 8.5–10.5)
CHLORIDE SERPL-SCNC: 104 MEQ/L (ref 98–111)
CO2 SERPL-SCNC: 21 MEQ/L (ref 23–33)
CREAT SERPL-MCNC: 0.8 MG/DL (ref 0.4–1.2)
DEPRECATED RDW RBC AUTO: 45.7 FL (ref 35–45)
EKG ATRIAL RATE: 77 BPM
EKG ATRIAL RATE: 83 BPM
EKG P AXIS: 27 DEGREES
EKG P AXIS: 29 DEGREES
EKG P-R INTERVAL: 140 MS
EKG P-R INTERVAL: 148 MS
EKG Q-T INTERVAL: 402 MS
EKG Q-T INTERVAL: 422 MS
EKG QRS DURATION: 74 MS
EKG QRS DURATION: 82 MS
EKG QTC CALCULATION (BAZETT): 472 MS
EKG QTC CALCULATION (BAZETT): 477 MS
EKG R AXIS: 30 DEGREES
EKG R AXIS: 33 DEGREES
EKG T AXIS: 104 DEGREES
EKG T AXIS: 98 DEGREES
EKG VENTRICULAR RATE: 77 BPM
EKG VENTRICULAR RATE: 83 BPM
ERYTHROCYTE [DISTWIDTH] IN BLOOD BY AUTOMATED COUNT: 16.1 % (ref 11.5–14.5)
GFR SERPL CREATININE-BSD FRML MDRD: > 60 ML/MIN/1.73M2
GLUCOSE BLD STRIP.AUTO-MCNC: 207 MG/DL (ref 70–108)
GLUCOSE BLD STRIP.AUTO-MCNC: 254 MG/DL (ref 70–108)
GLUCOSE SERPL-MCNC: 188 MG/DL (ref 70–108)
HCT VFR BLD AUTO: 36.8 % (ref 37–47)
HEPARIN UNFRACTIONATED: < 0.04 U/ML (ref 0.3–0.7)
HGB BLD-MCNC: 11.2 GM/DL (ref 12–16)
LV EF: 58 %
LVEF MODALITY: NORMAL
MCH RBC QN AUTO: 23.7 PG (ref 26–33)
MCHC RBC AUTO-ENTMCNC: 30.4 GM/DL (ref 32.2–35.5)
MCV RBC AUTO: 78 FL (ref 81–99)
PHOSPHATE SERPL-MCNC: 3.7 MG/DL (ref 2.4–4.7)
PLATELET # BLD AUTO: 300 THOU/MM3 (ref 130–400)
PMV BLD AUTO: 9.6 FL (ref 9.4–12.4)
POTASSIUM SERPL-SCNC: 4.5 MEQ/L (ref 3.5–5.2)
RBC # BLD AUTO: 4.72 MILL/MM3 (ref 4.2–5.4)
SODIUM SERPL-SCNC: 140 MEQ/L (ref 135–145)
WBC # BLD AUTO: 9.1 THOU/MM3 (ref 4.8–10.8)

## 2023-04-18 PROCEDURE — 93010 ELECTROCARDIOGRAM REPORT: CPT | Performed by: INTERNAL MEDICINE

## 2023-04-18 PROCEDURE — 6370000000 HC RX 637 (ALT 250 FOR IP)

## 2023-04-18 PROCEDURE — 36415 COLL VENOUS BLD VENIPUNCTURE: CPT

## 2023-04-18 PROCEDURE — 99239 HOSP IP/OBS DSCHRG MGMT >30: CPT | Performed by: FAMILY MEDICINE

## 2023-04-18 PROCEDURE — 6370000000 HC RX 637 (ALT 250 FOR IP): Performed by: NURSE PRACTITIONER

## 2023-04-18 PROCEDURE — 2580000003 HC RX 258: Performed by: INTERNAL MEDICINE

## 2023-04-18 PROCEDURE — 82948 REAGENT STRIP/BLOOD GLUCOSE: CPT

## 2023-04-18 PROCEDURE — 85520 HEPARIN ASSAY: CPT

## 2023-04-18 PROCEDURE — 84100 ASSAY OF PHOSPHORUS: CPT

## 2023-04-18 PROCEDURE — 94640 AIRWAY INHALATION TREATMENT: CPT

## 2023-04-18 PROCEDURE — 85027 COMPLETE CBC AUTOMATED: CPT

## 2023-04-18 PROCEDURE — 80048 BASIC METABOLIC PNL TOTAL CA: CPT

## 2023-04-18 PROCEDURE — 93306 TTE W/DOPPLER COMPLETE: CPT

## 2023-04-18 PROCEDURE — 99232 SBSQ HOSP IP/OBS MODERATE 35: CPT | Performed by: NURSE PRACTITIONER

## 2023-04-18 PROCEDURE — 2580000003 HC RX 258

## 2023-04-18 RX ORDER — INSULIN LISPRO 100 [IU]/ML
0-4 INJECTION, SOLUTION INTRAVENOUS; SUBCUTANEOUS NIGHTLY
Status: DISCONTINUED | OUTPATIENT
Start: 2023-04-18 | End: 2023-04-18 | Stop reason: HOSPADM

## 2023-04-18 RX ORDER — INSULIN LISPRO 100 [IU]/ML
0-16 INJECTION, SOLUTION INTRAVENOUS; SUBCUTANEOUS
Status: DISCONTINUED | OUTPATIENT
Start: 2023-04-18 | End: 2023-04-18 | Stop reason: HOSPADM

## 2023-04-18 RX ADMIN — GABAPENTIN 800 MG: 400 CAPSULE ORAL at 13:44

## 2023-04-18 RX ADMIN — INSULIN LISPRO 8 UNITS: 100 INJECTION, SOLUTION INTRAVENOUS; SUBCUTANEOUS at 12:22

## 2023-04-18 RX ADMIN — GABAPENTIN 800 MG: 400 CAPSULE ORAL at 09:58

## 2023-04-18 RX ADMIN — METOPROLOL SUCCINATE 150 MG: 100 TABLET, EXTENDED RELEASE ORAL at 09:58

## 2023-04-18 RX ADMIN — INSULIN GLARGINE 40 UNITS: 100 INJECTION, SOLUTION SUBCUTANEOUS at 08:49

## 2023-04-18 RX ADMIN — DULOXETINE 30 MG: 30 CAPSULE, DELAYED RELEASE ORAL at 09:59

## 2023-04-18 RX ADMIN — TICAGRELOR 180 MG: 90 TABLET ORAL at 09:58

## 2023-04-18 RX ADMIN — SODIUM CHLORIDE, PRESERVATIVE FREE 10 ML: 5 INJECTION INTRAVENOUS at 09:59

## 2023-04-18 RX ADMIN — ASPIRIN 81 MG: 81 TABLET, COATED ORAL at 09:59

## 2023-04-18 RX ADMIN — ALBUTEROL SULFATE 2 PUFF: 90 AEROSOL, METERED RESPIRATORY (INHALATION) at 07:58

## 2023-04-18 RX ADMIN — ATORVASTATIN CALCIUM 80 MG: 80 TABLET, FILM COATED ORAL at 09:59

## 2023-04-18 RX ADMIN — Medication 1 CAPSULE: at 09:58

## 2023-04-18 RX ADMIN — PANTOPRAZOLE SODIUM 40 MG: 40 TABLET, DELAYED RELEASE ORAL at 05:55

## 2023-04-18 RX ADMIN — INSULIN LISPRO 4 UNITS: 100 INJECTION, SOLUTION INTRAVENOUS; SUBCUTANEOUS at 08:50

## 2023-04-18 RX ADMIN — FLUDROCORTISONE ACETATE 0.3 MG: 0.1 TABLET ORAL at 09:58

## 2023-04-18 ASSESSMENT — PAIN SCALES - GENERAL: PAINLEVEL_OUTOF10: 0

## 2023-04-18 NOTE — DISCHARGE INSTRUCTIONS
Groin Care Instructions        Normal Observation: You may or may not experience these. Soreness or tenderness that may last a few weeks. Possible bruising that could last a few weeks and up to one month. Formation of a small lump (dime to quarter size) that should last only a few weeks. Care of your incision  You may shower 24 hours after the procedure. Wet the dressing thoroughly and gently remove the bandage from the hospital during showering. It is easier to remove this way. Gently clean your site daily using soap and water while standing in the shower. Dry thoroughly. Do not apply powders or lotions to the site for 2 weeks. Keep the site clean and dry to prevent infection. Do not sit in a bathtub or a pool of water for 7 days. Inspect the site daily. Activity   You may resume normal activity in 2 days, including driving, letting pain be your     guide. Limit lifting over 5 pounds (half gallon of milk) to one week or until site heals. Limit vigorous activity (contact sports) to two weeks time. You will be able to return to work in 1-3 days. Call our office immediately if you experience any of the following  Significant bleeding does not stop after 10 minutes of applying firm pressure directly over incision. Increased swelling of groin or leg. Unusual pain at groin or down that leg. Signs of infection: redness, warmth to touch, drainage, poorly healing incision, fever, or chills.           Follow with Dr. Chan Butler 2 weeks - sp PCI

## 2023-04-18 NOTE — PLAN OF CARE
Problem: Discharge Planning  Goal: Discharge to home or other facility with appropriate resources  4/18/2023 0302 by Zabrina Kwon RN  Outcome: Progressing  Flowsheets (Taken 4/18/2023 0302)  Discharge to home or other facility with appropriate resources:   Identify barriers to discharge with patient and caregiver   Arrange for needed discharge resources and transportation as appropriate     Problem: Safety - Adult  Goal: Free from fall injury  4/18/2023 0302 by Zabrina Kwon RN  Outcome: Progressing  Flowsheets (Taken 4/18/2023 0302)  Free From Fall Injury: Instruct family/caregiver on patient safety  Note: Bed locked & in low position, call light in reach, side-rails up x2, bed/chair alarm utilized, non-slip socks on when ambulating, reminded patient to use call light to call for assistance. Problem: Chronic Conditions and Co-morbidities  Goal: Patient's chronic conditions and co-morbidity symptoms are monitored and maintained or improved  4/18/2023 0302 by Zabrina Kwon RN  Outcome: Progressing  Flowsheets (Taken 4/18/2023 0302)  Care Plan - Patient's Chronic Conditions and Co-Morbidity Symptoms are Monitored and Maintained or Improved: Monitor and assess patient's chronic conditions and comorbid symptoms for stability, deterioration, or improvement     Problem: Pain  Goal: Verbalizes/displays adequate comfort level or baseline comfort level  4/18/2023 0302 by Zabrina Kwon RN  Outcome: Progressing  Flowsheets (Taken 4/18/2023 0302)  Verbalizes/displays adequate comfort level or baseline comfort level:   Assess pain using appropriate pain scale   Encourage patient to monitor pain and request assistance  Note: Ongoing assessment & interventions provided throughout shift. Reminded patient to report any pain, pressure, or shortness of breath to the nurse. Pain medications provided per physician's orders.        Problem: Skin/Tissue Integrity - Adult  Goal: Skin integrity remains
Problem: Respiratory - Adult  Goal: Clear lung sounds  4/17/2023 1755 by Chi Uriarte RCP  Outcome: Progressing
Problem: Respiratory - Adult  Goal: Clear lung sounds  4/18/2023 0801 by Jaye Pham RCP  Outcome: Progressing  Note: Patient agrees to goals
Problem: Respiratory - Adult  Goal: Clear lung sounds  Outcome: Progressing  Note: Mdi to improve lung aeration. Patient mutually agreed on goals.
Intact: Monitor for areas of redness and/or skin breakdown    Care plan reviewed with patient. Patient verbalizes understanding of the plan of care and contributes to goal setting.
Progressing  Flowsheets (Taken 4/17/2023 1404)  Verbalizes/displays adequate comfort level or baseline comfort level:   Encourage patient to monitor pain and request assistance   Administer analgesics based on type and severity of pain and evaluate response   Assess pain using appropriate pain scale   Implement non-pharmacological measures as appropriate and evaluate response   Consider cultural and social influences on pain and pain management     Problem: Skin/Tissue Integrity - Adult  Goal: Skin integrity remains intact  4/17/2023 1404 by Lynne Mccarthy  Outcome: Progressing  Flowsheets (Taken 4/17/2023 1404)  Skin Integrity Remains Intact:   Monitor for areas of redness and/or skin breakdown    Problem: Cardiovascular - Adult  Goal: Maintains optimal cardiac output and hemodynamic stability  Outcome: Progressing  Flowsheets (Taken 4/17/2023 1404)  Maintains optimal cardiac output and hemodynamic stability:   Monitor blood pressure and heart rate   Assess for signs of decreased cardiac output  Goal: Absence of cardiac dysrhythmias or at baseline  Outcome: Progressing  Flowsheets (Taken 4/17/2023 1404)  Absence of cardiac dysrhythmias or at baseline:   Monitor cardiac rate and rhythm   Assess for signs of decreased cardiac output     Problem: Infection - Adult  Goal: Absence of infection during hospitalization  Outcome: Progressing  Flowsheets (Taken 4/17/2023 1404)  Absence of infection during hospitalization:   Assess and monitor for signs and symptoms of infection   Monitor lab/diagnostic results   Monitor all insertion sites i.e., indwelling lines, tubes and drains   Administer medications as ordered   Instruct and encourage patient and family to use good hand hygiene technique   Identify and instruct in appropriate isolation precautions for identified infection/condition     Problem: Metabolic/Fluid and Electrolytes - Adult  Goal: Electrolytes maintained within normal limits  Outcome: Progressing  Flowsheets

## 2023-04-18 NOTE — DISCHARGE SUMMARY
discharge  HTN  Imdur discontinued per cardiology --> continue home toprol and cozaar  HLD  Lipid panel 4/17/2023: Chol 162, HDL 46, LDL 76,   Continue Lipitor and Zetia and follow up with PCP  COPD not in exacerbation  Continue home albuterol inhaler as needed and follow up with PCP  Depression/anxiety  Continue Cymbalta  GERD  Continue Protonix  DARON not on CPAP  Follow-up as outpatient  History of seizures  Patient reports not having seizure for years, not currently on medication  Follow-up as outpatient  History of breast cancer  S/p right breast mastectomy with radiation completed October 2022  Follow up with Dr Frank Chavira    Disposition: The patient was seen and examined on day of discharge and this discharge summary is in conjunction with any daily progress note from day of discharge. The patient is hemodynamically stable, NSR on telemetry and denies any further chest pain. The diagnoses and plans were discussed with the patient who verbalizes understanding and is agreeable to discharge with outpatient follow up. Chief Complaint: Chest/jaw pain    Hospital Course:     Per h&p: Mercedes Martin is a 62year old male who presents to [facility] after having chest and jaw pain that occurred while she was at a shopping center. She stated that the jaw pain occurred first and was a 20/10, sharp and unlike any other pain she has had in the past with her other heart attacks. She stated that soon after the jaw pain occurred she started to have some chest pain that was only a 4/10 and was not similar in nature as her previous heart attacks either. She stated shortly after the pain occurred she drover herself to the OSH. While at the OSH she was given nitroglycerin and morphine, which helped her jaw pain. Patient was then started on a Heparin drip. Currently patient denies any chest or jaw pain. Denies dizziness or lightheadedness. Denies any N/V/D. Denies syncope or loss of consciousness.  \"    4/17/2023: Patient on

## 2023-04-18 NOTE — CARE COORDINATION
4/18/23, 2:08 PM EDT    Patient goals/plan/ treatment preferences discussed by  and . Patient goals/plan/ treatment preferences reviewed with patient/ family. Patient/ family verbalize understanding of discharge plan and are in agreement with goal/plan/treatment preferences. Understanding was demonstrated using the teach back method. AVS provided by RN at time of discharge, which includes all necessary medical information pertaining to the patients current course of illness, treatment, post-discharge goals of care, and treatment preferences. Services At/After Discharge: Outpatient Cardiac Rehab             Planning discharge today with OP Cardiac Rehab.
to discuss the discharge plan with any other family members/significant others, and if so, who? No  Plans to Return to Present Housing: Yes  Other Identified Issues/Barriers to RETURNING to current housing: None  Potential Assistance needed at discharge: N/A            Potential DME:    Patient expects to discharge to: [address] [place] [address] for transportation at discharge: Family    Financial    Payor: Ari Joshua / Plan: Love Muro / Product Type: *No Product type* /     Does insurance require precert for SNF: Yes    Potential assistance Purchasing Medications: No  Meds-to-Beds request: Yes      [id number] [place], [place] - [address] [phone] Tedd Seip [phone]  Fransisco Liriano 66  [address]  Phone: [phone] Fax: [phone]      Notes:    Factors facilitating achievement of predicted outcomes: Family support, Friend support, Cooperative, Pleasant, and Has needed Durable Medical Equipment at home    Barriers to discharge: Cardiac work up    Additional Case Management Notes: Admitted through [facility] with chest and jaw pain. Sent here for Cardiology consult. Heparin gtt. Planning heart cath today. Procedure:   4/17 Cardiac cath: planned    The Plan for Transition of Care is related to the following treatment goals of NSTEMI (non-ST elevated myocardial infarction) [facility]) [I21.4]    Patient Goals/Plan/Treatment Preferences: Spoke with Casey Edwards. She lives at home with her S.O. Denies any needs at discharge. Transportation/Food Security/Housekeeping Addressed: No issues identified.      Magalys Kauffman RN  Case Management Department

## 2023-04-18 NOTE — PROGRESS NOTES
AVS discussed with patient and family member. Patient educated on follow up appointments and new medications. Patient educated on post cath procedures. Patient discharged in stable condition per wheelchair with tech.
Echo completed at bedside.
Heart attack teaching covered with patient and/or family or significant other:  Signs and symptoms of a heart attack. When to call 911 and the importance of calling 911. Personal risk factors and ways to lower their risk. 4.   Importance of quitting smoking if applicable. Heart attack booklet given to the patient and/or family or significant other. Reviewed: How to take Nitroglycerin. The importance of participating in Cardiac Rehab and hours of operation. Heart Healthy Diet. Risk factor modification. (Overweight, Obesity, Diabetes, Hypertension, Smoking, High Cholesterol, Stress)  Discharge instructions for Cath/Intervention procedure site if applicable. Stent card given to patient.
Inpatient Cardiac Rehabilitation Consult    Received consult for Phase II Cardiac Rehabilitation. Patient needs cardiac rehab due to NSTEMI / PCI on 4/17/23. Importance of Cardiac Rehab discussed with patient. Reviewed cardiac rehab class times. Patient questions answered. Pt not interested. Pt stated she finished cardiac rehab at [facility] recently. Cardiac Rehab brochure given.
PROGRESS NOTE      Patient:  Riki Bragg  Unit/Bed:3B-37/037-A  YOB: 1965  MRN: [id number]   Acct: [de-identified]    PCP: CHELSEA Hernandez CNP    Date of Admission: 4/16/2023 LOS: 1    Date of Evaluation:  4/17/2023    Anticipated Discharge: pending clinical course    Assessment/Plan:    NSTEMI  Cardiology consulted, appreciate recs  At OSH high-sensitivity troponin elevated => on arrival to [facility] troponin trended 0.161 => 0.157 => 0.179  Initial EKG at [facility] ED showed sinus tachycardia with nonspecific ST/T wave changes in V2 and V3 => repeat EKG 4/17 normal sinus rhythm  Echo pending  Heart score 7  History of CAD with PCI x3 and CABG x1, see #2  On heparin gtt overnight 4/16 - 4/17 in preparation for cath  => [facility] 4/17 with Dr. Sadiq Call with PCI to LM to Lcx  Continue cath access site per protocol, IV fluid hydration  Aggressive risk factor modification, continue Lipitor, Toprol, Imdur and losartan with hold parameters, ASA and Brilinta  Continuous telemetry  Plan for cardiac rehab and follow-up with cardiology as outpatient  CAD  History of PCI with JESSE to LAD in 2010, RCA in 2016 and LCx in 2022  History of CABG LIMA to LAD in 2016  Cardiac cath May 2022 showed severe three-vessel disease involving left main, LAD, RCA with widely patent ruiz to LAD  Echo 2022 showed preserved global LV systolic function with EF > 55%, abnormal motion in septal wall, moderate diastolic dysfunction --> repeat echo pending  Follows with Dr. Francine Randle to optimize GDMT with ACE/ARB, statin, DAPT, beta-blocker  IDDM 2  A1c 4/17/23 11.1%  On Tresiba 120 U nightly, patient reports compliance but recent uncontrolled blood sugars with glucose 300s at home  POCT glucose every 4 hours with Lantus 40 U twice daily and n.p.o. medium dose sliding scale while n.p.o.  HTN  On Imdur, losartan at home held the morning of cath => will restart tomorrow morning  Continue home metoprolol 150 mg daily with hold
Patient admitted to 3B Room 37 via direct admit    Complaint upon arrival to the room: NSTEMI    IV site free of s/s of infection or infiltration. Vital signs obtained. Assessment and data collection initiated. Oriented to room. Policies and procedures for  explained. All questions answered with no further questions at this time. Fall prevention and safety brochure discussed with patient. 2 person skin check completed with Tasha BARAHONA.
Pt was with a member of her family   04/18/23 2334   Encounter Summary   Encounter Overview/Reason  Initial Encounter   Service Provided For: Patient and family together   Referral/Consult From: [address] [facility] Family members   Last Encounter  04/18/23   Complexity of Encounter Low   Spiritual/Emotional needs   Type Spiritual Support   Assessment/Intervention/Outcome   Assessment Coping   Intervention Empowerment     . She was about leaving for home. She wanted prayer to cope and heal. Prayer was appreciated.
post cath instructions       Objective:   /61   Pulse 72   Temp 97.6 °F (36.4 °C) (Oral)   Resp 18   Ht 5' 3\" (1.6 m)   Wt 224 lb 4.8 oz (101.7 kg)   LMP 12/05/1996   SpO2 95%   BMI 39.73 kg/m²        TELEMETRY: SR no ectopy     Physical Exam:  General Appearance: alert and oriented to person, place and time, in no acute distress  Cardiovascular: normal rate, regular rhythm, normal S1 and S2, no murmurs, rubs, clicks, or gallops, distal pulses intact  Pulmonary/Chest: clear to auscultation bilaterally- no wheezes, rales or rhonchi, normal air movement, no respiratory distress  Abdomen: soft, non-tender, non-distended, normal bowel sounds, no masses Extremities: no cyanosis, clubbing or edema, pulses present    Skin: warm and dry, no rash or erythema   Musculoskeletal: normal range of motion, no joint swelling, deformity or tenderness  Neurological: alert, oriented, normal speech, no focal findings or movement disorder noted    Medications:    insulin lispro  0-16 Units SubCUTAneous TID WC    insulin lispro  0-4 Units SubCUTAneous Nightly    ticagrelor  90 mg Oral BID    albuterol sulfate HFA  2 puff Inhalation BID    calcium replacement protocol   Other RX Placeholder    insulin glargine  40 Units SubCUTAneous BID    sodium chloride flush  5-40 mL IntraVENous 2 times per day    aspirin  81 mg Oral Daily    atorvastatin  80 mg Oral Daily    DULoxetine  30 mg Oral Daily    ezetimibe  10 mg Oral Nightly    fludrocortisone  0.3 mg Oral Daily    gabapentin  800 mg Oral TID    [Held by provider] isosorbide mononitrate  30 mg Oral Daily    latanoprost  1 drop Both Eyes Nightly    [Held by provider] losartan  25 mg Oral Daily    metoprolol succinate  150 mg Oral Daily    pantoprazole  40 mg Oral QAM AC    lactobacillus  1 capsule Oral Daily    triamcinolone   Topical BID    sodium chloride flush  5-40 mL IntraVENous 2 times per day      dextrose      sodium chloride      heparin (PORCINE) Infusion 18
04/17/23 0018    fludrocortisone (FLORINEF) tablet 0.3 mg, 0.3 mg, Oral, Daily, CHELSEA Sexton - CNP, 0.3 mg at 04/17/23 0912    gabapentin (NEURONTIN) capsule 800 mg, 800 mg, Oral, TID, CHELSEA Sexton - CNP, 800 mg at 04/17/23 0913    [Held by provider] isosorbide mononitrate (IMDUR) extended release tablet 30 mg, 30 mg, Oral, Daily, Renata Melchor APRN - CNP, 30 mg at 04/17/23 0912    latanoprost (XALATAN) 0.005 % ophthalmic solution 1 drop, 1 drop, Both Eyes, Nightly, CHELSEA Sexton - CNP    [Held by provider] losartan (COZAAR) tablet 25 mg, 25 mg, Oral, Daily, Renata Melchor APRN - CNP    metoprolol succinate (TOPROL XL) extended release tablet 150 mg, 150 mg, Oral, Daily, Renata Melchor APRN - CNP, 150 mg at 04/17/23 0912    pantoprazole (PROTONIX) tablet 40 mg, 40 mg, Oral, QAM AC, Renata Melchor APRN - CNP, 40 mg at 04/17/23 0519    lactobacillus (CULTURELLE) capsule 1 capsule, 1 capsule, Oral, Daily, Renata Melchor APRN - CNP, 1 capsule at 04/17/23 0912    tiZANidine (ZANAFLEX) tablet 4 mg, 4 mg, Oral, Nightly PRN, Renata Melchor APRN - CNP    triamcinolone (KENALOG) 0.1 % cream, , Topical, BID, Renata Melchor APRN - CNP    sodium chloride flush 0.9 % injection 5-40 mL, 5-40 mL, IntraVENous, 2 times per day, Renata Melchor APRN - CNP, 10 mL at 04/17/23 2887    sodium chloride flush 0.9 % injection 5-40 mL, 5-40 mL, IntraVENous, PRN, Renata Melchor APRN - CNP    0.9 % sodium chloride infusion, , IntraVENous, PRN, Renata Melchor APRN - CNP    ondansetron (ZOFRAN-ODT) disintegrating tablet 4 mg, 4 mg, Oral, Q8H PRN **OR** ondansetron (ZOFRAN) injection 4 mg, 4 mg, IntraVENous, Q6H PRN, CHELSEA Sexton CNP    acetaminophen (TYLENOL) tablet 650 mg, 650 mg, Oral, Q6H PRN **OR** acetaminophen (TYLENOL) suppository 650 mg, 650 mg, Rectal, Q6H PRN, CHELSEA Sexton CNP    polyethylene glycol (GLYCOLAX) packet 17 g, 17 g, Oral, Daily PRN, CHELSEA Sexton CNP    potassium

## 2023-04-19 ENCOUNTER — TELEPHONE (OUTPATIENT)
Dept: PRIMARY CARE CLINIC | Age: 58
End: 2023-04-19

## 2023-04-25 NOTE — PROCEDURES
[address] [id number]                            CARDIAC CATHETERIZATION    PATIENT NAME: Jamari Arenas                     :        1965  MED REC NO:   [id number]                           ROOM:       [phone]  ACCOUNT NO:   [de-identified]                           ADMIT DATE: 2023  PROVIDER:     Miles Jolly MD    DATE OF PROCEDURE:  2023    PROCEDURE PERFORMED:  Coronary angiogram, graft angiography, PCI of left  main into the circumflex. INDICATION FOR STUDY:  NSTEMI. DESCRIPTION OF PROCEDURE:  After written informed consent was obtained,  the patient was brought to the cardiac catheterization laboratory in a  fasting, nonsedated state. Preprocedure timeout was performed. 2%  lidocaine was used to anesthetize the subcutaneous tissue overlying the  right femoral artery. Using ultrasound and fluoroscopic guidance, we  were able to place a 6-Lao sheath in the right common femoral artery. Once the sheath was in place, angiogram was performed which showed good  common femoral artery stick. ESTIMATED BLOOD LOSS DURING THIS PROCEDURE:  Less than 20 mL. MEDICATIONS:  Please see MAR. CORONARY ANATOMY:  1. Right coronary artery is totally occluded in the proximal segment. There is left-to-right collateralization. 2.  There is 95 to 99% stenosis with in-stent restenosis followed by  proximal portion of the left circumflex which is overall patent with no  significant disease. 3.  LAD is totally occluded. 4.  [facility] is overall patent. The native LAD  beyond the touchdown point has moderate diffuse disease. 5.  LVEDP was measured to be 16 mmHg. There was no significant gradient  across the aortic valve and LV systolic function was estimated at 40%.     Based on these angiographic films and patient's clinical presentation  and abnormal testing, we decided to percutaneously

## 2023-04-26 ENCOUNTER — OFFICE VISIT (OUTPATIENT)
Dept: PRIMARY CARE CLINIC | Age: 58
End: 2023-04-26
Payer: COMMERCIAL

## 2023-04-26 VITALS
BODY MASS INDEX: 39.69 KG/M2 | HEIGHT: 63 IN | TEMPERATURE: 98.2 F | DIASTOLIC BLOOD PRESSURE: 84 MMHG | HEART RATE: 95 BPM | WEIGHT: 224 LBS | OXYGEN SATURATION: 97 % | RESPIRATION RATE: 18 BRPM | SYSTOLIC BLOOD PRESSURE: 130 MMHG

## 2023-04-26 DIAGNOSIS — I21.4 NSTEMI (NON-ST ELEVATED MYOCARDIAL INFARCTION) (HCC): Primary | ICD-10-CM

## 2023-04-26 DIAGNOSIS — Z09 HOSPITAL DISCHARGE FOLLOW-UP: ICD-10-CM

## 2023-04-26 DIAGNOSIS — I20.9 ANGINA, CLASS III (HCC): ICD-10-CM

## 2023-04-26 PROCEDURE — 99214 OFFICE O/P EST MOD 30 MIN: CPT | Performed by: NURSE PRACTITIONER

## 2023-04-26 PROCEDURE — 1111F DSCHRG MED/CURRENT MED MERGE: CPT | Performed by: NURSE PRACTITIONER

## 2023-04-26 NOTE — PATIENT INSTRUCTIONS
SURVEY:     You may be receiving a survey from [employer] regarding your visit today. Please complete the survey to enable us to provide the highest quality of care to you and your family. If you cannot score us a very good on any question, please call the office to discuss how we could have made your experience a very good one.      Thank you,    Bhupendra Plasencia, APRN-CNP  Iram Odell, APRN-CNP  Anat Deng, LPN  Carmina Vincent, CMA  Jadon Cables, CMA  Christina, CMA  Alice, PCA  Hope, PM

## 2023-04-26 NOTE — PROGRESS NOTES
Post-Discharge Transitional Care  Follow Up      Johanne Dalton   YOB: 1965    Date of Office Visit:  4/26/2023  Date of Hospital Admission: 4/16/23  Date of Hospital Discharge: 4/18/23  Risk of hospital readmission (high >=14%. Medium >=10%) :Readmission Risk Score: 13.5      Care management risk score Rising risk (score 2-5) and Complex Care (Scores >=6): No Risk Score On File     Non face to face  following discharge, date last encounter closed (first attempt may have been earlier): 04/19/2023    Call initiated 2 business days of discharge: Yes    ASSESSMENT/PLAN:   NSTEMI (non-ST elevated myocardial infarction) ([facility] [address]  Angina, class III [facility])  Hospital discharge follow-up  -     NV DISCHARGE MEDS RECONCILED W/ CURRENT OUTPATIENT MED LIST      Medical Decision Making: moderate complexity  Return if symptoms worsen or fail to improve. On this date 4/26/2023 I have spent 52 minutes reviewing previous notes, test results and face to face with the patient discussing the diagnosis and importance of compliance with the treatment plan as well as documenting on the day of the visit.        Subjective:   HPI:  Follow up of Hospital problems/diagnosis(es):     Assessment/Plan:     NSTEMI  Cardiology consulted, appreciate recs  At OSH high-sensitivity troponin elevated => on arrival to [facility] troponin trend: 0.161 => 0.157 => 0.179  Initial EKG at [facility] ED showed sinus tachycardia with nonspecific ST/T wave changes in V2 and V3 => repeat EKG 4/17 normal sinus rhythm  Heart score 7  History of CAD with PCI x3 and CABG x1, see #2  On heparin gtt overnight 4/16 - 4/17 in preparation for cath  => [facility] 4/17 with Dr. Robin Frost with PCI to LM to Lcx  Aggressive risk factor modification, continue Lipitor, Toprol, cozaar, ASA and Brilinta at discharge  Discontinue imdur until follow up with cardiology  Plan for cardiac rehab and follow-up with cardiology as outpatient  CAD  History of PCI with JESSE to LAD in 2010, RCA in

## 2023-04-27 LAB
EKG ATRIAL RATE: 77 BPM
EKG ATRIAL RATE: 83 BPM
EKG P AXIS: 27 DEGREES
EKG P AXIS: 29 DEGREES
EKG P-R INTERVAL: 140 MS
EKG P-R INTERVAL: 148 MS
EKG Q-T INTERVAL: 402 MS
EKG Q-T INTERVAL: 422 MS
EKG QRS DURATION: 74 MS
EKG QRS DURATION: 82 MS
EKG QTC CALCULATION (BAZETT): 472 MS
EKG QTC CALCULATION (BAZETT): 477 MS
EKG R AXIS: 30 DEGREES
EKG R AXIS: 33 DEGREES
EKG T AXIS: 104 DEGREES
EKG T AXIS: 98 DEGREES
EKG VENTRICULAR RATE: 77 BPM
EKG VENTRICULAR RATE: 83 BPM

## 2023-04-29 VITALS
DIASTOLIC BLOOD PRESSURE: 62 MMHG | BODY MASS INDEX: 38.82 KG/M2 | HEIGHT: 63 IN | WEIGHT: 219.1 LBS | OXYGEN SATURATION: 94 % | HEART RATE: 94 BPM | SYSTOLIC BLOOD PRESSURE: 110 MMHG | RESPIRATION RATE: 18 BRPM

## 2023-04-29 VITALS
BODY MASS INDEX: 38.86 KG/M2 | HEART RATE: 96 BPM | SYSTOLIC BLOOD PRESSURE: 128 MMHG | DIASTOLIC BLOOD PRESSURE: 84 MMHG | OXYGEN SATURATION: 98 % | RESPIRATION RATE: 18 BRPM | HEIGHT: 63 IN | WEIGHT: 219.3 LBS

## 2023-05-03 ENCOUNTER — OFFICE VISIT (OUTPATIENT)
Dept: CARDIOLOGY | Age: 58
End: 2023-05-03
Payer: COMMERCIAL

## 2023-05-03 VITALS
WEIGHT: 220.2 LBS | BODY MASS INDEX: 39.02 KG/M2 | HEIGHT: 63 IN | HEART RATE: 98 BPM | OXYGEN SATURATION: 97 % | SYSTOLIC BLOOD PRESSURE: 114 MMHG | DIASTOLIC BLOOD PRESSURE: 74 MMHG | RESPIRATION RATE: 22 BRPM

## 2023-05-03 DIAGNOSIS — I25.10 ASHD (ARTERIOSCLEROTIC HEART DISEASE): Primary | ICD-10-CM

## 2023-05-03 DIAGNOSIS — T50.905A MEDICATION SIDE EFFECT, INITIAL ENCOUNTER: ICD-10-CM

## 2023-05-03 DIAGNOSIS — Z95.820 S/P ANGIOPLASTY WITH STENT: ICD-10-CM

## 2023-05-03 PROCEDURE — 99214 OFFICE O/P EST MOD 30 MIN: CPT | Performed by: FAMILY MEDICINE

## 2023-05-03 PROCEDURE — 1036F TOBACCO NON-USER: CPT | Performed by: FAMILY MEDICINE

## 2023-05-03 PROCEDURE — G9899 SCRN MAM PERF RSLTS DOC: HCPCS | Performed by: FAMILY MEDICINE

## 2023-05-03 PROCEDURE — 3074F SYST BP LT 130 MM HG: CPT | Performed by: FAMILY MEDICINE

## 2023-05-03 PROCEDURE — 3017F COLORECTAL CA SCREEN DOC REV: CPT | Performed by: FAMILY MEDICINE

## 2023-05-03 PROCEDURE — G8417 CALC BMI ABV UP PARAM F/U: HCPCS | Performed by: FAMILY MEDICINE

## 2023-05-03 PROCEDURE — 1111F DSCHRG MED/CURRENT MED MERGE: CPT | Performed by: FAMILY MEDICINE

## 2023-05-03 PROCEDURE — 3078F DIAST BP <80 MM HG: CPT | Performed by: FAMILY MEDICINE

## 2023-05-03 PROCEDURE — G8427 DOCREV CUR MEDS BY ELIG CLIN: HCPCS | Performed by: FAMILY MEDICINE

## 2023-05-03 RX ORDER — PRASUGREL 10 MG/1
10 TABLET, FILM COATED ORAL DAILY
Qty: 90 TABLET | Refills: 3 | Status: SHIPPED | OUTPATIENT
Start: 2023-05-03

## 2023-05-03 NOTE — PROGRESS NOTES
Justina Wadsworth am scribing for and in the presence of Norma Mac MD, MS, F.A.C.C..     Patient: Andres Banks  : 1965  Date of Visit: May 3, 2023    REASON FOR VISIT / CONSULTATION: Coronary Artery Disease (HX: CAD, S/P stent, HTN, HLD, dysautonomia. Echo on 2022. ER/IP on  for Acute Coronary Syndrome. She has been doing alright besides her medication change (Brilinta) causing SOB. Palpitations happen with not being able to breathe. //Denies: CP, Lightheaded/dizziness. )    Dear CHELSEA Batista - CNP,    I had the pleasure of seeing your patient Andres Banks in consultation today. As you know, Ms. Valerie Wynne is a 62 y.o. female who presents with a history of intermittent episodes of back and chest discomfort that started developing since her recent CABG involving a LIMA-LAD 8/15/16. She also has a history of  dysautonomia on a tilt table test, and was started on Florinef as well as Lexapro. Recent heart cath done 3/7/2019 shows severe single vessel disease involving LAD Normal LVEDP. She had a Holter monitor done on 10/23/2019 that did show PAC's and PVC's but was largely unremarkable. She did come to the ER on 2022 due to abdominal pain and shortness of breath. She was told her EKG was abnormal and also she had an elevated troponin which was only 15 ng/L and only had minor EKG changes. She was sent to [facility]. 's for this and she was not very happy about this. Cardiovascular stress test done on 2022 showed Overall, these results are most consistent with an intermediate/high risk for significant coronary artery disease. Echocardiogram on 2022 showed EF:>55% with mild diastolic dysfunction and LV wall thickness mildly increased. ER on 2023 due to acute coronary syndrome -Transferred to [facility] - Successful PCI of left main and circumflex in-stent restenosis with 3.5 x 26 mm Medtronic Kurt drug-eluting stent which was post dilated to 4.0 mm in diameter.     Since I last saw Ms.

## 2023-05-03 NOTE — PATIENT INSTRUCTIONS
SURVEY:    You may be receiving a survey from [employer] regarding your visit today. Please complete the survey to enable us to provide the highest quality of care to you and your family. If you cannot score us a very good on any question, please call the office to discuss how we could have made your experience a very good one. Thank you.

## 2023-05-10 NOTE — TELEPHONE ENCOUNTER
[facility] Transitions Initial Follow Up Call    Outreach made within 2 business days of discharge: Yes    Patient: Sergio Villatroo Patient : 1965   MRN: [id number]  Reason for Admission: There are no discharge diagnoses documented for the most recent discharge. Discharge Date: 23       Spoke with: Nickolas Mckee    Discharge department/facility: [facility]     TCM Interactive Patient Contact:  Was patient able to fill all prescriptions: Yes  Was patient instructed to bring all medications to the follow-up visit: Yes  Is patient taking all medications as directed in the discharge summary?  Yes  Does patient understand their discharge instructions: Yes  Does patient have questions or concerns that need addressed prior to 7-14 day follow up office visit: no    Scheduled appointment with PCP within 7-14 days    Follow Up  Future Appointments   Date Time Provider Emma Gaxiola   2023 12:00 PM CHELSEA Joel CNPf Prim Ca [facility]   5/3/2023  1:00 PM MD SHEBA TerrazasF CARD [facility]   2023  2:00 PM MD SHEBA TerrazasF CARD [facility]   2023 11:15 AM MD sheba Foremanf onc spe [facility]   2023  1:20 PM CHELSEA Joel CNPf Prim Ca [facility]       Bailey Zamora MA
Exicisional ...

## 2023-05-16 ENCOUNTER — OFFICE VISIT (OUTPATIENT)
Dept: PODIATRY | Age: 58
End: 2023-05-16
Payer: COMMERCIAL

## 2023-05-16 VITALS
HEIGHT: 63 IN | DIASTOLIC BLOOD PRESSURE: 73 MMHG | BODY MASS INDEX: 39.01 KG/M2 | SYSTOLIC BLOOD PRESSURE: 129 MMHG | RESPIRATION RATE: 18 BRPM | HEART RATE: 101 BPM | TEMPERATURE: 97.7 F

## 2023-05-16 DIAGNOSIS — S91.109A OPEN WOUND OF TOE, INITIAL ENCOUNTER: Primary | ICD-10-CM

## 2023-05-16 PROCEDURE — G9899 SCRN MAM PERF RSLTS DOC: HCPCS | Performed by: PODIATRIST

## 2023-05-16 PROCEDURE — 99212 OFFICE O/P EST SF 10 MIN: CPT | Performed by: PODIATRIST

## 2023-05-16 PROCEDURE — G8427 DOCREV CUR MEDS BY ELIG CLIN: HCPCS | Performed by: PODIATRIST

## 2023-05-16 PROCEDURE — G8417 CALC BMI ABV UP PARAM F/U: HCPCS | Performed by: PODIATRIST

## 2023-05-16 PROCEDURE — 3074F SYST BP LT 130 MM HG: CPT | Performed by: PODIATRIST

## 2023-05-16 PROCEDURE — 1111F DSCHRG MED/CURRENT MED MERGE: CPT | Performed by: PODIATRIST

## 2023-05-16 PROCEDURE — 3078F DIAST BP <80 MM HG: CPT | Performed by: PODIATRIST

## 2023-05-16 PROCEDURE — 3017F COLORECTAL CA SCREEN DOC REV: CPT | Performed by: PODIATRIST

## 2023-05-16 PROCEDURE — 1036F TOBACCO NON-USER: CPT | Performed by: PODIATRIST

## 2023-05-16 RX ORDER — INSULIN HUMAN 500 [IU]/ML
INJECTION, SOLUTION SUBCUTANEOUS
COMMUNITY
Start: 2023-05-10

## 2023-05-16 NOTE — PATIENT INSTRUCTIONS
PODIATRY PATIENT DISCHARGE INSTRUCTIONS    CALL [phone] regarding podiatry questions    OFFICE HOURS ARE TUESDAY MORNING  8:30-NOON and can change with out notice    Discharge instructions for patient's plan of care:  Left great toe    Paint with betadine twice a day for the next 30 days. Call us in 6-8 months on how nail is coming back in. We hope we gave you VERY GOOD care today!

## 2023-05-18 NOTE — PROGRESS NOTES
Mayela Valencia (:  1965) is a 62 y.o. female,Established patient, here for evaluation of the following chief complaint(s):  Ingrown Toenail (Left great toenail)         ASSESSMENT/PLAN: see below       No follow-ups on file. Subjective   SUBJECTIVE/OBJECTIVE:  HPI < 3 weeks , s/p insidious onset           Autolysis of nail plate left / subsequent self care           N: fell off in bed       Review of Systems ; neg. Constitutional                                       -pain     Past Medical History:   Diagnosis Date    Anxiety     Asthma     CAD (coronary artery disease)     x1 stent ,x1 stent , x1 stent May 2022    Cancer [facility]) 2022    right breast cancer    Clinical trial participant at discharge 3/31/16    COPD (chronic obstructive pulmonary disease) ([facility] [address]     Depression     Diabetic neuropathy (HCC)     GERD (gastroesophageal reflux disease)     H/O cardiac catheterization 16    LMCA: Mild Irregularities 10-20%. LAD: Lesion on Prox LAD: Proximal subsection. 100% stenosis. LCx: Mild irregularities 10-20%. RCA: Mild irregularities 10-20%. Widely patent mid RCA stent. EF:60%. H/O cardiovascular stress test 10/07/2016    Overall results are most consistent with a low risk for significant CAD. H/O echocardiogram 10/05/2016    EF:>60%. Inferoseptal wall abnormal in its motion which is not unusal status post open heart surgery. Evidence of mild (grade I) diastolic dysfunction is seen. H/O tilt table evaluation 2016    Abnormal. PTs HR, Blood Pressure response and symptoms were most consistent with dysautonomia. Combined w/viligant maintenance of euvolemia and maintaining a moderate salt intake, pharmaciologic treatment w/ ProAmatine, SSRI such as Lexapro and/or Mestinon among other treatments have shown some effectiveness in the treatment of this condition.      History of cardiovascular stress test 2019    Abnormal. Small/moderate perfusion defect of mild/moderate

## 2023-05-22 ENCOUNTER — TELEPHONE (OUTPATIENT)
Dept: ONCOLOGY | Age: 58
End: 2023-05-22

## 2023-05-22 ENCOUNTER — OFFICE VISIT (OUTPATIENT)
Dept: ONCOLOGY | Age: 58
End: 2023-05-22
Payer: COMMERCIAL

## 2023-05-22 VITALS
RESPIRATION RATE: 18 BRPM | DIASTOLIC BLOOD PRESSURE: 67 MMHG | BODY MASS INDEX: 39.86 KG/M2 | HEART RATE: 92 BPM | WEIGHT: 225 LBS | TEMPERATURE: 97.6 F | SYSTOLIC BLOOD PRESSURE: 109 MMHG

## 2023-05-22 DIAGNOSIS — M79.2 NEUROPATHIC PAIN: ICD-10-CM

## 2023-05-22 DIAGNOSIS — Z95.5 S/P CORONARY ARTERY STENT PLACEMENT: ICD-10-CM

## 2023-05-22 DIAGNOSIS — M15.9 PRIMARY OSTEOARTHRITIS INVOLVING MULTIPLE JOINTS: ICD-10-CM

## 2023-05-22 DIAGNOSIS — Z17.0 MALIGNANT NEOPLASM OF CENTRAL PORTION OF RIGHT BREAST IN FEMALE, ESTROGEN RECEPTOR POSITIVE (HCC): Primary | ICD-10-CM

## 2023-05-22 DIAGNOSIS — M85.89 OSTEOPENIA OF MULTIPLE SITES: ICD-10-CM

## 2023-05-22 DIAGNOSIS — D05.11 DUCTAL CARCINOMA IN SITU (DCIS) OF RIGHT BREAST: ICD-10-CM

## 2023-05-22 DIAGNOSIS — C50.111 MALIGNANT NEOPLASM OF CENTRAL PORTION OF RIGHT BREAST IN FEMALE, ESTROGEN RECEPTOR POSITIVE (HCC): Primary | ICD-10-CM

## 2023-05-22 PROCEDURE — G8417 CALC BMI ABV UP PARAM F/U: HCPCS | Performed by: INTERNAL MEDICINE

## 2023-05-22 PROCEDURE — 3078F DIAST BP <80 MM HG: CPT | Performed by: INTERNAL MEDICINE

## 2023-05-22 PROCEDURE — G9899 SCRN MAM PERF RSLTS DOC: HCPCS | Performed by: INTERNAL MEDICINE

## 2023-05-22 PROCEDURE — 99214 OFFICE O/P EST MOD 30 MIN: CPT | Performed by: INTERNAL MEDICINE

## 2023-05-22 PROCEDURE — G8427 DOCREV CUR MEDS BY ELIG CLIN: HCPCS | Performed by: INTERNAL MEDICINE

## 2023-05-22 PROCEDURE — 1036F TOBACCO NON-USER: CPT | Performed by: INTERNAL MEDICINE

## 2023-05-22 PROCEDURE — 3017F COLORECTAL CA SCREEN DOC REV: CPT | Performed by: INTERNAL MEDICINE

## 2023-05-22 PROCEDURE — 3074F SYST BP LT 130 MM HG: CPT | Performed by: INTERNAL MEDICINE

## 2023-05-22 RX ORDER — ANASTROZOLE 1 MG/1
1 TABLET ORAL DAILY
Qty: 30 TABLET | Refills: 6 | Status: SHIPPED | OUTPATIENT
Start: 2023-05-22 | End: 2023-06-21

## 2023-05-22 NOTE — TELEPHONE ENCOUNTER
Patient calls to report she does not have any anastrozole and has not been taking the medication.   She would like the prescription sent to [facility].

## 2023-05-22 NOTE — PATIENT INSTRUCTIONS
RTC in 3 months with repeated diagnostic mammogram  Patient to go back to assure she is taking anastrozole

## 2023-05-22 NOTE — PROGRESS NOTES
condition->Emergency Medical Condition (MA)    FINDINGS:  BRAIN/VENTRICLES: There is no acute intracranial hemorrhage, mass effect or  midline shift. No abnormal extra-axial fluid collection. The gray-white  differentiation is maintained without evidence of an acute infarct. There is  no evidence of hydrocephalus. ORBITS: The visualized portion of the orbits demonstrate no acute abnormality. SINUSES: The visualized paranasal sinuses and mastoid air cells demonstrate  no acute abnormality. SOFT TISSUES/SKULL:  No acute abnormality of the visualized skull or soft  tissues. Impression: No acute intracranial findings. XR CHEST PORTABLE  Narrative: EXAMINATION:  ONE XRAY VIEW OF THE CHEST    4/16/2023 2:05 pm    COMPARISON:  10/12/2022. HISTORY:  ORDERING SYSTEM PROVIDED HISTORY: chest pain  TECHNOLOGIST PROVIDED HISTORY:  chest pain    FINDINGS:  There is no confluent airspace consolidation or effusion. The cardiac  silhouette, given AP technique, is within normal limits. The mediastinal  contour and pulmonary vessels appear normal.  Impression: No radiographic evidence of an acute cardiopulmonary process. ASSESSMENT:       Diagnosis Orders   1. Malignant neoplasm of central portion of right breast in female, estrogen receptor positive ([facility] [address]  ELIGIO DIGITAL DIAGNOSTIC W OR WO CAD BILATERAL      2. Ductal carcinoma in situ (DCIS) of right breast        3. Osteopenia of multiple sites        4. S/P coronary artery stent placement        5. Neuropathic pain        6.  Primary osteoarthritis involving multiple joints             pStage I/pT1pNX invasive carcinoma of the right breast with DCIS status postlumpectomy done on July 12, 2022  Multiple comorbidity  Previous smoker  Osteopenia  Right breast tenderness/localized lymphedema  Right breast seroma    PLAN:     I reviewed labs, imaging studies, related electronic medical records including outside records and discussed the diagnosis, prognosis and

## 2023-05-26 ENCOUNTER — TELEPHONE (OUTPATIENT)
Dept: CARDIAC REHAB | Age: 58
End: 2023-05-26

## 2023-05-26 NOTE — TELEPHONE ENCOUNTER
Rehab staff attempted to contact Charity peña by phone call/voicemail and once via mail with no success. Patient's information will be filed unless she contacts rehab to schedule an assessment.  Thank you for the referral!

## 2023-06-07 ENCOUNTER — OFFICE VISIT (OUTPATIENT)
Dept: SURGERY | Age: 58
End: 2023-06-07

## 2023-06-07 VITALS
WEIGHT: 225 LBS | BODY MASS INDEX: 39.87 KG/M2 | SYSTOLIC BLOOD PRESSURE: 139 MMHG | HEIGHT: 63 IN | DIASTOLIC BLOOD PRESSURE: 86 MMHG | RESPIRATION RATE: 18 BRPM | HEART RATE: 99 BPM

## 2023-06-07 DIAGNOSIS — N64.89 RECURRENT SEROMA OF BREAST: Primary | ICD-10-CM

## 2023-06-07 PROCEDURE — 99999 PR OFFICE/OUTPT VISIT,PROCEDURE ONLY: CPT | Performed by: SURGERY

## 2023-06-07 NOTE — PROGRESS NOTES
Office Procedure:     Diagnosis: recurrent right breast seroma    Surgeon: Dr. Luis Ferrari    Procedure: simple aspiration right breast seroma    EBL: none    Complications: none    Specimen: none      Details:    Recurrent right breast seroma, patient of my partner Dr. Nory Lobo- surgery not recommended per their discussion, poor healing, radiation etc.  Today is the third time I have aspirated the area and it is improving in size. 1st time aspirated in [place] was 17 ml out, 2nd time was 3 ml out. Today, under steril conditions, 18 gauge needle used to aspirate 2 ml serous fluid. Tolerated well. Return as needed. It was several months since last aspiration. No complaints, doing well. Return prn    Thank you CHELSEA Baca CNP for allowing me to participate in the care of your patients.

## 2023-07-07 ENCOUNTER — OFFICE VISIT (OUTPATIENT)
Dept: CARDIOLOGY | Age: 58
End: 2023-07-07

## 2023-07-07 VITALS
BODY MASS INDEX: 39.34 KG/M2 | HEIGHT: 63 IN | WEIGHT: 222 LBS | HEART RATE: 85 BPM | RESPIRATION RATE: 18 BRPM | DIASTOLIC BLOOD PRESSURE: 77 MMHG | OXYGEN SATURATION: 95 % | SYSTOLIC BLOOD PRESSURE: 120 MMHG

## 2023-07-07 DIAGNOSIS — I95.1 DYSAUTONOMIA ORTHOSTATIC HYPOTENSION SYNDROME: ICD-10-CM

## 2023-07-07 DIAGNOSIS — Z95.1 S/P CABG (CORONARY ARTERY BYPASS GRAFT): ICD-10-CM

## 2023-07-07 DIAGNOSIS — I10 ESSENTIAL HYPERTENSION: ICD-10-CM

## 2023-07-07 DIAGNOSIS — I25.10 ASHD (ARTERIOSCLEROTIC HEART DISEASE): Primary | ICD-10-CM

## 2023-07-07 DIAGNOSIS — Z95.820 S/P ANGIOPLASTY WITH STENT: ICD-10-CM

## 2023-07-07 DIAGNOSIS — Z79.899 ON STATIN THERAPY: ICD-10-CM

## 2023-07-07 DIAGNOSIS — R00.2 PALPITATIONS: ICD-10-CM

## 2023-07-07 DIAGNOSIS — E78.2 MIXED HYPERLIPIDEMIA: ICD-10-CM

## 2023-07-07 RX ORDER — INSULIN DEGLUDEC 200 U/ML
INJECTION, SOLUTION SUBCUTANEOUS
COMMUNITY
Start: 2023-06-18

## 2023-07-07 RX ORDER — EZETIMIBE 10 MG/1
10 TABLET ORAL NIGHTLY
Qty: 90 TABLET | Refills: 3 | Status: SHIPPED | OUTPATIENT
Start: 2023-07-07

## 2023-07-07 RX ORDER — ALIROCUMAB 150 MG/ML
INJECTION, SOLUTION SUBCUTANEOUS
Qty: 1 ADJUSTABLE DOSE PRE-FILLED PEN SYRINGE | Refills: 11 | Status: SHIPPED | OUTPATIENT
Start: 2023-07-07

## 2023-07-07 NOTE — PROGRESS NOTES
Patient: Moira Benjamin  : 1965  Date of Visit: 2023    REASON FOR VISIT / CONSULTATION: Follow-up (Patient in office for follow up. Patient states she is doing good. Denies any cp, palpitations, dizziness, lightheadedness.)    Dear CHELSEA Brown CNP,    I had the pleasure of seeing your patient Moira Benjamin in consultation today. As you know, Ms. Jaspreet Franklin is a 62 y.o. female who presents with a history of intermittent episodes of back and chest discomfort that started developing since her recent CABG involving a LIMA-LAD 8/15/16. She also has a history of  dysautonomia on a tilt table test, and was started on Florinef as well as Lexapro. Recent heart cath done 3/7/2019 shows severe single vessel disease involving LAD Normal LVEDP. She had a Holter monitor done on 10/23/2019 that did show PAC's and PVC's but was largely unremarkable. She did come to the ER on 2022 due to abdominal pain and shortness of breath. She was told her EKG was abnormal and also she had an elevated troponin which was only 15 ng/L and only had minor EKG changes. She was sent to [facility]'s for this and she was not very happy about this. Cardiovascular stress test done on 2022 showed Overall, these results are most consistent with an intermediate/high risk for significant coronary artery disease. Echocardiogram on 2022 showed EF:>55% with mild diastolic dysfunction and LV wall thickness mildly increased. ER on 2023 due to acute coronary syndrome -Transferred to [facility] - Successful PCI of left main and circumflex in-stent restenosis with 3.5 x 26 mm Medtronic [place] drug-eluting stent which was post dilated to 4.0 mm in diameter. Ms. Jaspreet Franklin is here today for a 6 week follow up. She reports since she was switched from the [address] [place] [address] at her last appointment and her breathing has been significantly better. No further palpitations or shortness of breath.      She reports she was doing cancer radiation and

## 2023-07-17 ENCOUNTER — OFFICE VISIT (OUTPATIENT)
Dept: PRIMARY CARE CLINIC | Age: 58
End: 2023-07-17
Payer: COMMERCIAL

## 2023-07-17 VITALS
TEMPERATURE: 97.7 F | SYSTOLIC BLOOD PRESSURE: 128 MMHG | BODY MASS INDEX: 39.4 KG/M2 | DIASTOLIC BLOOD PRESSURE: 74 MMHG | WEIGHT: 222.4 LBS | RESPIRATION RATE: 22 BRPM | HEART RATE: 88 BPM | OXYGEN SATURATION: 98 %

## 2023-07-17 DIAGNOSIS — E11.65 UNCONTROLLED TYPE 2 DIABETES MELLITUS WITH HYPERGLYCEMIA (HCC): Primary | ICD-10-CM

## 2023-07-17 DIAGNOSIS — I10 ESSENTIAL HYPERTENSION: ICD-10-CM

## 2023-07-17 LAB — HBA1C MFR BLD: 11 %

## 2023-07-17 PROCEDURE — 3074F SYST BP LT 130 MM HG: CPT | Performed by: NURSE PRACTITIONER

## 2023-07-17 PROCEDURE — 99214 OFFICE O/P EST MOD 30 MIN: CPT | Performed by: NURSE PRACTITIONER

## 2023-07-17 PROCEDURE — 3046F HEMOGLOBIN A1C LEVEL >9.0%: CPT | Performed by: NURSE PRACTITIONER

## 2023-07-17 PROCEDURE — G9899 SCRN MAM PERF RSLTS DOC: HCPCS | Performed by: NURSE PRACTITIONER

## 2023-07-17 PROCEDURE — 1036F TOBACCO NON-USER: CPT | Performed by: NURSE PRACTITIONER

## 2023-07-17 PROCEDURE — 2022F DILAT RTA XM EVC RTNOPTHY: CPT | Performed by: NURSE PRACTITIONER

## 2023-07-17 PROCEDURE — 83037 HB GLYCOSYLATED A1C HOME DEV: CPT | Performed by: NURSE PRACTITIONER

## 2023-07-17 PROCEDURE — G8417 CALC BMI ABV UP PARAM F/U: HCPCS | Performed by: NURSE PRACTITIONER

## 2023-07-17 PROCEDURE — G8427 DOCREV CUR MEDS BY ELIG CLIN: HCPCS | Performed by: NURSE PRACTITIONER

## 2023-07-17 PROCEDURE — 3017F COLORECTAL CA SCREEN DOC REV: CPT | Performed by: NURSE PRACTITIONER

## 2023-07-17 PROCEDURE — 3078F DIAST BP <80 MM HG: CPT | Performed by: NURSE PRACTITIONER

## 2023-07-17 ASSESSMENT — ENCOUNTER SYMPTOMS
CONSTIPATION: 0
ORTHOPNEA: 0
ABDOMINAL PAIN: 0
RHINORRHEA: 0
DIARRHEA: 0
SORE THROAT: 0
BLURRED VISION: 1
COUGH: 0
VOMITING: 0
SHORTNESS OF BREATH: 0
WHEEZING: 0

## 2023-07-17 NOTE — PATIENT INSTRUCTIONS
SURVEY:     You may be receiving a survey from [employer] regarding your visit today. Please complete the survey to enable us to provide the highest quality of care to you and your family. If you cannot score us a very good on any question, please call the office to discuss how we could have made your experience a very good one.      Thank you,    Shelbi Plasencia, APRN-CNP  Neha Randle, APRN-CNP  Qing Horn, EANN  Jackson Butler, CMA  Omaira Saucedo, CMA  Christina, CMA  Alice, PCA  Hope, PM

## 2023-07-17 NOTE — PROGRESS NOTES
Name: Timbo Dawkins  : 1965         Chief Complaint:     Chief Complaint   Patient presents with    Diabetes     Routine check. No concerns. Hypertension       History of Present Illness:      Timbo Dawkins is a 62 y.o.  female who presents with Diabetes (Routine check. No concerns.) and Hypertension      Deepika Arreola is here today for a routine office visit. DM-unfortunately continues to worsen, patient follows with diabetologist.  She states she was put on a new insulin but she cannot recall the name. She states it is no longer [place]. .  See below for further comment. Diabetes  She presents for her follow-up diabetic visit. She has type 2 diabetes mellitus. Onset time: YEARS. Her disease course has been worsening. There are no hypoglycemic associated symptoms. Pertinent negatives for hypoglycemia include no dizziness, headaches, nervousness/anxiousness, pallor, sleepiness or sweats. Associated symptoms include blurred vision (CHRONIC RIGHT) and foot paresthesias. Pertinent negatives for diabetes include no chest pain, no fatigue, no polydipsia, no polyphagia, no polyuria, no weakness and no weight loss. There are no hypoglycemic complications. Pertinent negatives for hypoglycemia complications include no hospitalization, no nocturnal hypoglycemia and no required assistance. Symptoms are improving. Diabetic complications include peripheral neuropathy. Pertinent negatives for diabetic complications include no CVA, heart disease or nephropathy. Risk factors for coronary artery disease include diabetes mellitus, dyslipidemia, family history, obesity, hypertension, stress, post-menopausal and sedentary lifestyle. Current diabetic treatment includes intensive insulin program. She is compliant with treatment all of the time. Her weight is stable. She is following a generally healthy diet. Meal planning includes avoidance of concentrated sweets. She has had a previous visit with a dietitian.  She rarely

## 2023-08-08 ENCOUNTER — OFFICE VISIT (OUTPATIENT)
Dept: NEUROLOGY | Age: 58
End: 2023-08-08

## 2023-08-08 VITALS
BODY MASS INDEX: 38.8 KG/M2 | SYSTOLIC BLOOD PRESSURE: 132 MMHG | HEART RATE: 90 BPM | WEIGHT: 219 LBS | DIASTOLIC BLOOD PRESSURE: 84 MMHG | HEIGHT: 63 IN

## 2023-08-08 DIAGNOSIS — E08.42 DIABETIC POLYNEUROPATHY ASSOCIATED WITH DIABETES MELLITUS DUE TO UNDERLYING CONDITION (HCC): Primary | ICD-10-CM

## 2023-08-08 DIAGNOSIS — G90.1 DYSAUTONOMIA (HCC): ICD-10-CM

## 2023-08-08 DIAGNOSIS — G90.3 MULTI-SYSTEM DEGENERATION OF THE AUTONOMIC NERVOUS SYSTEM (HCC): ICD-10-CM

## 2023-08-08 DIAGNOSIS — M62.838 MUSCLE SPASMS OF BOTH LOWER EXTREMITIES: ICD-10-CM

## 2023-08-08 DIAGNOSIS — M79.2 NEUROPATHIC PAIN: ICD-10-CM

## 2023-08-08 RX ORDER — GABAPENTIN 800 MG/1
TABLET ORAL
Qty: 90 TABLET | Refills: 6 | Status: SHIPPED | OUTPATIENT
Start: 2023-08-08 | End: 2024-08-19

## 2023-08-08 NOTE — PROGRESS NOTES
drugs. Family History   Problem Relation Age of Onset    Cancer Mother     Diabetes Mother     Cancer Father         thyroid    Diabetes Sister     Heart Disease Sister     Heart Disease Brother     Heart Disease Maternal Uncle     Cancer Maternal Grandmother      On exam: Blood pressure 132/84, pulse 90, height 5' 3\" (1.6 m), weight 219 lb (99.3 kg), last menstrual period 12/05/1996, not currently breastfeeding. GENERAL  Appears comfortable and in no distress   HEENT  NC/ AT   NECK  Supple and no bruits heard   MENTAL STATUS:  Alert, oriented, intact memory, no confusion, normal speech, normal language, no hallucination or delusion; appropriate affect   CRANIAL NERVES: II     -      PERRLA, Fundi reveal intact venous pulsations; Visual fields intact to confrontation  III,IV,VI -  EOMs full, no afferent defect, no                      ELOY, no ptosis  V     -     Normal facial sensation  VII    -     Normal facial symmetry  VIII   -     Intact hearing  IX,X -     Symmetrical palate  XI    -     Symmetrical shoulder shrug  XII   -     Midline tongue, no atrophy    MOTOR FUNCTION:  significant for good strength of grade 5/5 in bilateral proximal and distal muscle groups of both upper and lower extremities with normal bulk, normal tone and no involuntary movements, no tremor   SENSORY FUNCTION:  Impaired light touch, pinprick and temperature in stocking pattern 70-80% lesser proximally in lower extremities left lower extremity greater than right lower extremity. CEREBELLAR FUNCTION:  Intact fine motor control over upper limbs   REFLEX FUNCTION:  Symmetric, no perverted reflex, no Babinski sign   STATION and GAIT  Normal station, wide based gait; could not do tandem gait; positive Romberg sign but able to walk without using cane presently         Diagnostic data reviewed with the patient:   NCS/ EMG (8-22-14):  There is electrodiagnostic evidence of peripheral polyneuropathy with predominant involvement of

## 2023-08-15 ENCOUNTER — HOSPITAL ENCOUNTER (EMERGENCY)
Age: 58
Discharge: HOME | End: 2023-08-15
Attending: EMERGENCY MEDICINE
Payer: COMMERCIAL

## 2023-08-15 ENCOUNTER — APPOINTMENT (OUTPATIENT)
Dept: GENERAL RADIOLOGY | Age: 58
End: 2023-08-15
Payer: COMMERCIAL

## 2023-08-15 VITALS
SYSTOLIC BLOOD PRESSURE: 160 MMHG | OXYGEN SATURATION: 96 % | TEMPERATURE: 98.6 F | DIASTOLIC BLOOD PRESSURE: 72 MMHG | HEART RATE: 88 BPM | RESPIRATION RATE: 17 BRPM

## 2023-08-15 DIAGNOSIS — L03.031 CELLULITIS OF GREAT TOE OF RIGHT FOOT: ICD-10-CM

## 2023-08-15 DIAGNOSIS — S99.921A INJURY OF RIGHT GREAT TOE, INITIAL ENCOUNTER: Primary | ICD-10-CM

## 2023-08-15 PROCEDURE — 99284 EMERGENCY DEPT VISIT MOD MDM: CPT

## 2023-08-15 PROCEDURE — 73630 X-RAY EXAM OF FOOT: CPT

## 2023-08-15 PROCEDURE — 6360000002 HC RX W HCPCS: Performed by: EMERGENCY MEDICINE

## 2023-08-15 PROCEDURE — 6370000000 HC RX 637 (ALT 250 FOR IP): Performed by: EMERGENCY MEDICINE

## 2023-08-15 PROCEDURE — 96372 THER/PROPH/DIAG INJ SC/IM: CPT

## 2023-08-15 RX ORDER — CEPHALEXIN 500 MG/1
500 CAPSULE ORAL 4 TIMES DAILY
Qty: 40 CAPSULE | Refills: 0 | Status: SHIPPED | OUTPATIENT
Start: 2023-08-15

## 2023-08-15 RX ORDER — CEPHALEXIN 500 MG/1
500 CAPSULE ORAL ONCE
Status: COMPLETED | OUTPATIENT
Start: 2023-08-15 | End: 2023-08-15

## 2023-08-15 RX ORDER — KETOROLAC TROMETHAMINE 10 MG/1
10 TABLET, FILM COATED ORAL EVERY 8 HOURS PRN
Qty: 15 TABLET | Refills: 0 | Status: SHIPPED | OUTPATIENT
Start: 2023-08-15

## 2023-08-15 RX ORDER — KETOROLAC TROMETHAMINE 30 MG/ML
30 INJECTION, SOLUTION INTRAMUSCULAR; INTRAVENOUS ONCE
Status: COMPLETED | OUTPATIENT
Start: 2023-08-15 | End: 2023-08-15

## 2023-08-15 RX ADMIN — CEPHALEXIN 500 MG: 500 CAPSULE ORAL at 20:49

## 2023-08-15 RX ADMIN — KETOROLAC TROMETHAMINE 30 MG: 30 INJECTION, SOLUTION INTRAMUSCULAR at 20:16

## 2023-08-16 NOTE — ED PROVIDER NOTES
HPI:  8/15/23,   Time: 8:32 PM EDT         Shannon Patten is a 62 y.o. female presenting to the ED for banged her right toe on a vacuum , beginning 2 days ago. The complaint has been constant, moderate in severity, and worsened by changing position. And movement of the toe but also toe is red. No fever or chills no chest pain shortness of breath or cough no other injuries    ROS:   Pertinent positives and negatives are stated within HPI, all other systems reviewed and are negative.  --------------------------------------------- PAST HISTORY ---------------------------------------------  Past Medical History:  has a past medical history of Anxiety, Asthma, CAD (coronary artery disease), Cancer ([address]), Clinical trial participant at discharge, COPD (chronic obstructive pulmonary disease) ([address]), Depression, Diabetic neuropathy ([address]), GERD (gastroesophageal reflux disease), H/O cardiac catheterization, H/O cardiovascular stress test, H/O echocardiogram, H/O tilt table evaluation, History of cardiovascular stress test, History of echocardiogram, Hx of blood clots, Hyperlipidemia, Hypertension, Intermittent claudication ([address]), Kidney stones, Movement disorder, Neuromuscular disorder ([address]), Osteoarthritis, Reflux, Seizures ([address]), Stented coronary artery, Stented coronary artery, Type II or unspecified type diabetes mellitus without mention of complication, not stated as uncontrolled, and Unspecified sleep apnea. Past Surgical History:  has a past surgical history that includes Hysterectomy; Cholecystectomy (1991); Coronary angioplasty with stent (09/2010); UPPP (06/26/2012); Colonoscopy (12/16/2014); Endoscopy, colon, diagnostic (12/16/2014); ventral hernia repair (09/21/2015); other surgical history (10/08/2015); Abdominal hernia repair (01/2016); Coronary angioplasty with stent (03/31/2016); Appendectomy; hernia repair (12/13/2012); hernia repair (02/16/2015); hernia repair (02/2016);  Coronary artery bypass

## 2023-08-18 ENCOUNTER — OFFICE VISIT (OUTPATIENT)
Dept: CARDIOLOGY | Age: 58
End: 2023-08-18

## 2023-08-18 VITALS
HEIGHT: 63 IN | OXYGEN SATURATION: 98 % | BODY MASS INDEX: 39.65 KG/M2 | RESPIRATION RATE: 18 BRPM | WEIGHT: 223.8 LBS | HEART RATE: 86 BPM | DIASTOLIC BLOOD PRESSURE: 66 MMHG | SYSTOLIC BLOOD PRESSURE: 112 MMHG

## 2023-08-18 DIAGNOSIS — I95.1 DYSAUTONOMIA ORTHOSTATIC HYPOTENSION SYNDROME: ICD-10-CM

## 2023-08-18 DIAGNOSIS — I25.10 ASHD (ARTERIOSCLEROTIC HEART DISEASE): Primary | ICD-10-CM

## 2023-08-18 DIAGNOSIS — Z95.820 S/P ANGIOPLASTY WITH STENT: ICD-10-CM

## 2023-08-18 DIAGNOSIS — I10 ESSENTIAL HYPERTENSION: ICD-10-CM

## 2023-08-18 DIAGNOSIS — Z95.1 S/P CABG (CORONARY ARTERY BYPASS GRAFT): ICD-10-CM

## 2023-08-18 DIAGNOSIS — E78.2 MIXED HYPERLIPIDEMIA: ICD-10-CM

## 2023-08-18 DIAGNOSIS — R00.2 PALPITATIONS: ICD-10-CM

## 2023-08-18 NOTE — PATIENT INSTRUCTIONS
SURVEY:    You may be receiving a survey from [employer] regarding your visit today. Please complete the survey to enable us to provide the highest quality of care to you and your family. If you cannot score us a very good on any question, please call the office to discuss how we could have made your experience a very good one. Thank you.

## 2023-08-18 NOTE — PROGRESS NOTES
I, Kasandra Amor am scribing for and in the presence of Braulio Foster PA-C. Patient: Janine Trujillo  : 1965  Date of Visit: 2023    REASON FOR VISIT / CONSULTATION: Coronary Artery Disease (HX:ASHD, s/p stent, CABG, palp, dysautonomia, HTN,HLD PT is here for 6 week follow up she was in ER for toe injury she states she has headache today denies:CP, sob, lightheaded/dizziness,palp )    Dear Fabian Boogie, CHELSEA - CNP,    I had the pleasure of seeing your patient Janine Trujillo in consultation today. As you know, Ms. Torri Phillips is a 62 y.o. female who presents with a history of intermittent episodes of back and chest discomfort that started developing since her recent CABG involving a LIMA-LAD 8/15/16. She also has a history of  dysautonomia on a tilt table test, and was started on Florinef as well as Lexapro. Recent heart cath done 3/7/2019 shows severe single vessel disease involving LAD Normal LVEDP. She had a Holter monitor done on 10/23/2019 that did show PAC's and PVC's but was largely unremarkable. She did come to the ER on 2022 due to abdominal pain and shortness of breath. She was told her EKG was abnormal and also she had an elevated troponin which was only 15 ng/L and only had minor EKG changes. She was sent to [facility]'s for this and she was not very happy about this. Cardiovascular stress test done on 2022 showed Overall, these results are most consistent with an intermediate/high risk for significant coronary artery disease. Echocardiogram on 2022 showed EF:>55% with mild diastolic dysfunction and LV wall thickness mildly increased. ER on 2023 due to acute coronary syndrome -Transferred to [facility] - Successful PCI of left main and circumflex in-stent restenosis with 3.5 x 26 mm Medtronic [place] drug-eluting stent which was post dilated to 4.0 mm in diameter. Ms. Torri Phillips is here today for a six week follow up. She states she is doing well.  She was in emergency room for

## 2023-08-22 ENCOUNTER — HOSPITAL ENCOUNTER (OUTPATIENT)
Dept: WOMENS IMAGING | Age: 58
Discharge: HOME | End: 2023-08-24
Payer: COMMERCIAL

## 2023-08-22 DIAGNOSIS — Z17.0 MALIGNANT NEOPLASM OF CENTRAL PORTION OF RIGHT BREAST IN FEMALE, ESTROGEN RECEPTOR POSITIVE (HCC): ICD-10-CM

## 2023-08-22 DIAGNOSIS — C50.111 MALIGNANT NEOPLASM OF CENTRAL PORTION OF RIGHT BREAST IN FEMALE, ESTROGEN RECEPTOR POSITIVE (HCC): ICD-10-CM

## 2023-08-22 PROCEDURE — G0279 TOMOSYNTHESIS, MAMMO: HCPCS

## 2023-08-24 ENCOUNTER — OFFICE VISIT (OUTPATIENT)
Dept: ONCOLOGY | Age: 58
End: 2023-08-24
Payer: COMMERCIAL

## 2023-08-24 VITALS
DIASTOLIC BLOOD PRESSURE: 70 MMHG | RESPIRATION RATE: 18 BRPM | SYSTOLIC BLOOD PRESSURE: 119 MMHG | TEMPERATURE: 96.9 F | BODY MASS INDEX: 40.03 KG/M2 | WEIGHT: 226 LBS | HEART RATE: 92 BPM

## 2023-08-24 DIAGNOSIS — M79.621 TENDERNESS OF RIGHT AXILLA: ICD-10-CM

## 2023-08-24 DIAGNOSIS — N64.89 SEROMA OF BREAST: ICD-10-CM

## 2023-08-24 DIAGNOSIS — Z17.0 MALIGNANT NEOPLASM OF CENTRAL PORTION OF RIGHT BREAST IN FEMALE, ESTROGEN RECEPTOR POSITIVE (HCC): Primary | ICD-10-CM

## 2023-08-24 DIAGNOSIS — C50.111 MALIGNANT NEOPLASM OF CENTRAL PORTION OF RIGHT BREAST IN FEMALE, ESTROGEN RECEPTOR POSITIVE (HCC): Primary | ICD-10-CM

## 2023-08-24 DIAGNOSIS — E08.42 DIABETIC POLYNEUROPATHY ASSOCIATED WITH DIABETES MELLITUS DUE TO UNDERLYING CONDITION (HCC): ICD-10-CM

## 2023-08-24 DIAGNOSIS — M62.838 MUSCLE SPASMS OF BOTH LOWER EXTREMITIES: ICD-10-CM

## 2023-08-24 PROCEDURE — 1036F TOBACCO NON-USER: CPT | Performed by: INTERNAL MEDICINE

## 2023-08-24 PROCEDURE — G8428 CUR MEDS NOT DOCUMENT: HCPCS | Performed by: INTERNAL MEDICINE

## 2023-08-24 PROCEDURE — G8417 CALC BMI ABV UP PARAM F/U: HCPCS | Performed by: INTERNAL MEDICINE

## 2023-08-24 PROCEDURE — 3074F SYST BP LT 130 MM HG: CPT | Performed by: INTERNAL MEDICINE

## 2023-08-24 PROCEDURE — 99214 OFFICE O/P EST MOD 30 MIN: CPT | Performed by: INTERNAL MEDICINE

## 2023-08-24 PROCEDURE — 3017F COLORECTAL CA SCREEN DOC REV: CPT | Performed by: INTERNAL MEDICINE

## 2023-08-24 PROCEDURE — 3078F DIAST BP <80 MM HG: CPT | Performed by: INTERNAL MEDICINE

## 2023-08-24 PROCEDURE — G9899 SCRN MAM PERF RSLTS DOC: HCPCS | Performed by: INTERNAL MEDICINE

## 2023-08-24 RX ORDER — OXYCODONE HYDROCHLORIDE AND ACETAMINOPHEN 5; 325 MG/1; MG/1
1 TABLET ORAL EVERY 6 HOURS PRN
Qty: 12 TABLET | Refills: 0 | Status: SHIPPED | OUTPATIENT
Start: 2023-08-24 | End: 2023-08-27

## 2023-08-24 RX ORDER — TIZANIDINE 2 MG/1
TABLET ORAL
Qty: 60 TABLET | Refills: 5 | Status: SHIPPED | OUTPATIENT
Start: 2023-08-24

## 2023-08-24 NOTE — PROGRESS NOTES
Patient ID: Luis Galvan, 1965, [id number], 62 y.o. Referred by :  No ref. provider found   Reason for consultation: Right DCIS      HISTORY OF PRESENT ILLNESS:    Oncologic History:    Luis Galvan is a very pleasant 62 y.o. female who found on a routine mammogram on May 22, 2022 new group of calcifications upper central right breast and ultrasound did show suspicious lesion at 11:00 6 cm from the nipple core biopsy was done on 6/9/2022 and that showed DCIS strongly ER/OK positive and patient was referred for medical oncology  No family history of breast cancer  No history of using birth control in her younger age  Patient had hysterectomy in her 35s  Patient underwent lumpectomy on July 12, 2022 and there was 1 cm invasive carcinoma in addition to the DCIS  No sentinel lymph node biopsy initially and this was repeated and came back negative  Oncotype DX 5  Bone density scan did show osteopenia and Prolia was denied and started on Fosamax  Status post radiation and started on anastrozole        Oncology history  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 During this visit patient's allergy, social, medical, surgical history and medications were reviewed and updated.    Most recent viral diagnostic mammogram done on August 22, 2023 and no evidence of recurrence  Status post simple aspiration right breast seroma and recur  Right side axillary pain          Past Medical History:   Diagnosis Date    Anxiety     Asthma     CAD (coronary artery disease)     x1 stent 2010,x1 stent 2016, x1 stent May 2022    Cancer

## 2023-08-24 NOTE — TELEPHONE ENCOUNTER
Pharmacy requesting refill of Zanaflex.       Medication active on med list yes      Date of last fill: 2/7/23  verified on 8/24/2023   verified by [facility] LPN      Date of last appointment 8/8/23    Next Visit Date:  Visit date not found

## 2023-08-30 ENCOUNTER — OFFICE VISIT (OUTPATIENT)
Dept: SURGERY | Age: 58
End: 2023-08-30

## 2023-08-30 DIAGNOSIS — N64.89 SEROMA OF BREAST: Primary | ICD-10-CM

## 2023-08-30 PROCEDURE — NBSRV NON-BILLABLE SERVICE: Performed by: SURGERY

## 2023-08-30 RX ORDER — ALIROCUMAB 150 MG/ML
INJECTION, SOLUTION SUBCUTANEOUS
Qty: 1 ADJUSTABLE DOSE PRE-FILLED PEN SYRINGE | Refills: 11 | Status: SHIPPED | OUTPATIENT
Start: 2023-08-30

## 2023-08-30 NOTE — PROGRESS NOTES
8/30/23 office visit    Patient with history of righ breas seroma after crast cancer surgery in [facility]. She has had seromas drained in office her in [place] in the past, about 3 times, and each time the fluuid gets lessand less. She has pain at site    Right breast skin prepped, local anesthetic injected, attempted drainage at the same site performed with 18 gauge needle. This time no fluid. Needle was re-introduced and re-directed different angles of the papable region without any drainage. Sigmund Skates may just be scar tissue. Will get an US of the area. Wait a while for the swelling to go down from the local anesthetic injected. Will call with results. No sigs of infection. No eed for antibitoics. Here with . She is agreeable with plan. US right breast ordered to be done after 9/11/23.

## 2023-08-30 NOTE — TELEPHONE ENCOUNTER
Patient needs a new script for Praluent sent to [facility]. The previous scripts prior auth got closed before it could be done.

## 2023-09-12 ENCOUNTER — HOSPITAL ENCOUNTER (OUTPATIENT)
Dept: ULTRASOUND IMAGING | Age: 58
Discharge: HOME | End: 2023-09-14
Attending: SURGERY
Payer: COMMERCIAL

## 2023-09-12 VITALS
SYSTOLIC BLOOD PRESSURE: 150 MMHG | DIASTOLIC BLOOD PRESSURE: 92 MMHG | HEART RATE: 88 BPM | RESPIRATION RATE: 18 BRPM | WEIGHT: 226 LBS | BODY MASS INDEX: 40.04 KG/M2 | HEIGHT: 63 IN | OXYGEN SATURATION: 98 %

## 2023-09-12 DIAGNOSIS — N64.89 SEROMA OF BREAST: ICD-10-CM

## 2023-09-12 PROCEDURE — 76642 ULTRASOUND BREAST LIMITED: CPT

## 2023-09-13 ENCOUNTER — TELEPHONE (OUTPATIENT)
Dept: SURGERY | Age: 58
End: 2023-09-13

## 2023-09-13 NOTE — TELEPHONE ENCOUNTER
----- Message from Heidi Bledsoe DO sent at 9/12/2023 12:51 PM EDT -----  Sharron Severance please let Christal Linton know there is no more seroma in the breast. It is fatty scar tissue. Thi I actually good since she has had the seroma drained periodically for a long time. The tissue should soften up over time. Gentle healting pad for pain as needed.  No surgery recommended, it will start the seroma all over again

## 2023-10-02 ENCOUNTER — HOSPITAL ENCOUNTER (EMERGENCY)
Age: 58
Discharge: ANOTHER HOSPITAL | End: 2023-10-02
Attending: EMERGENCY MEDICINE
Payer: COMMERCIAL

## 2023-10-02 ENCOUNTER — HOSPITAL ENCOUNTER (INPATIENT)
Age: 58
LOS: 2 days | Discharge: HOME | DRG: 250 | End: 2023-10-04
Attending: INTERNAL MEDICINE
Payer: COMMERCIAL

## 2023-10-02 ENCOUNTER — APPOINTMENT (OUTPATIENT)
Dept: CT IMAGING | Age: 58
End: 2023-10-02
Attending: EMERGENCY MEDICINE
Payer: COMMERCIAL

## 2023-10-02 ENCOUNTER — APPOINTMENT (OUTPATIENT)
Dept: GENERAL RADIOLOGY | Age: 58
End: 2023-10-02
Payer: COMMERCIAL

## 2023-10-02 VITALS
DIASTOLIC BLOOD PRESSURE: 40 MMHG | BODY MASS INDEX: 39.69 KG/M2 | RESPIRATION RATE: 25 BRPM | SYSTOLIC BLOOD PRESSURE: 91 MMHG | WEIGHT: 224 LBS | HEIGHT: 63 IN | OXYGEN SATURATION: 91 % | HEART RATE: 84 BPM | TEMPERATURE: 98 F

## 2023-10-02 DIAGNOSIS — R07.9 CHEST PAIN, UNSPECIFIED TYPE: Primary | ICD-10-CM

## 2023-10-02 DIAGNOSIS — R06.00 DYSPNEA, UNSPECIFIED TYPE: ICD-10-CM

## 2023-10-02 LAB
ALBUMIN SERPL-MCNC: 4 G/DL (ref 3.5–5.2)
ALBUMIN/GLOB SERPL: 1.1 {RATIO} (ref 1–2.5)
ALP SERPL-CCNC: 99 U/L (ref 35–104)
ALT SERPL-CCNC: 11 U/L (ref 5–33)
ANION GAP SERPL CALCULATED.3IONS-SCNC: 14 MMOL/L (ref 9–17)
APTT PPP: 28.7 SECONDS (ref 22–38)
AST SERPL-CCNC: 13 U/L
BASOPHILS # BLD: 0.05 K/UL (ref 0–0.2)
BASOPHILS NFR BLD: 1 % (ref 0–2)
BILIRUB SERPL-MCNC: 0.6 MG/DL (ref 0.3–1.2)
BUN SERPL-MCNC: 11 MG/DL (ref 6–20)
BUN/CREAT SERPL: 14 (ref 9–20)
CALCIUM SERPL-MCNC: 9.2 MG/DL (ref 8.6–10.4)
CHLORIDE SERPL-SCNC: 104 MMOL/L (ref 98–107)
CO2 SERPL-SCNC: 22 MMOL/L (ref 20–31)
CREAT SERPL-MCNC: 0.8 MG/DL (ref 0.5–0.9)
D DIMER PPP FEU-MCNC: 1.22 UG/ML FEU (ref 0–0.59)
DEPRECATED RDW RBC AUTO: 44 FL (ref 35–45)
EKG ATRIAL RATE: 101 BPM
EKG P AXIS: 45 DEGREES
EKG P-R INTERVAL: 126 MS
EKG Q-T INTERVAL: 354 MS
EKG QRS DURATION: 88 MS
EKG QTC CALCULATION (BAZETT): 459 MS
EKG R AXIS: 36 DEGREES
EKG T AXIS: 156 DEGREES
EKG VENTRICULAR RATE: 101 BPM
EOSINOPHIL # BLD: 0.09 K/UL (ref 0–0.44)
EOSINOPHILS RELATIVE PERCENT: 1 % (ref 1–4)
ERYTHROCYTE [DISTWIDTH] IN BLOOD BY AUTOMATED COUNT: 15 % (ref 11.8–14.4)
ERYTHROCYTE [DISTWIDTH] IN BLOOD BY AUTOMATED COUNT: 15.4 % (ref 11.5–14.5)
FLUAV RNA RESP QL NAA+PROBE: NOT DETECTED
FLUBV RNA RESP QL NAA+PROBE: NOT DETECTED
GFR SERPL CREATININE-BSD FRML MDRD: >60 ML/MIN/1.73M2
GLUCOSE BLD STRIP.AUTO-MCNC: 153 MG/DL (ref 70–108)
GLUCOSE BLD STRIP.AUTO-MCNC: 227 MG/DL (ref 70–108)
GLUCOSE SERPL-MCNC: 137 MG/DL (ref 70–99)
HCT VFR BLD AUTO: 37.9 % (ref 37–47)
HCT VFR BLD AUTO: 39.5 % (ref 36.3–47.1)
HEPARIN UNFRACTIONATED: < 0.04 U/ML (ref 0.3–0.7)
HGB BLD-MCNC: 12 GM/DL (ref 12–16)
HGB BLD-MCNC: 12.7 G/DL (ref 11.9–15.1)
IMM GRANULOCYTES # BLD AUTO: 0.06 K/UL (ref 0–0.3)
IMM GRANULOCYTES NFR BLD: 1 %
INR PPP: 1.02 (ref 0.85–1.13)
LYMPHOCYTES NFR BLD: 1.69 K/UL (ref 1.1–3.7)
LYMPHOCYTES RELATIVE PERCENT: 19 % (ref 24–43)
MCH RBC QN AUTO: 24.9 PG (ref 25.2–33.5)
MCH RBC QN AUTO: 25.2 PG (ref 26–33)
MCHC RBC AUTO-ENTMCNC: 31.7 GM/DL (ref 32.2–35.5)
MCHC RBC AUTO-ENTMCNC: 32.2 G/DL (ref 28.4–34.8)
MCV RBC AUTO: 77.5 FL (ref 82.6–102.9)
MCV RBC AUTO: 79.6 FL (ref 81–99)
MONOCYTES NFR BLD: 0.5 K/UL (ref 0.1–1.2)
MONOCYTES NFR BLD: 6 % (ref 3–12)
NEUTROPHILS NFR BLD: 72 % (ref 36–65)
NEUTS SEG NFR BLD: 6.35 K/UL (ref 1.5–8.1)
NRBC BLD-RTO: 0 PER 100 WBC
PARTIAL THROMBOPLASTIN TIME: 136.3 SEC (ref 26.8–34.8)
PLATELET # BLD AUTO: 337 K/UL (ref 138–453)
PLATELET # BLD AUTO: 352 THOU/MM3 (ref 130–400)
PMV BLD AUTO: 9.6 FL (ref 8.1–13.5)
PMV BLD AUTO: 9.7 FL (ref 9.4–12.4)
POTASSIUM SERPL-SCNC: 4.1 MMOL/L (ref 3.7–5.3)
PROT SERPL-MCNC: 7.8 G/DL (ref 6.4–8.3)
RBC # BLD AUTO: 4.76 MILL/MM3 (ref 4.2–5.4)
RBC # BLD AUTO: 5.1 M/UL (ref 3.95–5.11)
SARS-COV-2 RNA RESP QL NAA+PROBE: NOT DETECTED
SODIUM SERPL-SCNC: 140 MMOL/L (ref 135–144)
TROPONIN I SERPL HS-MCNC: 249 NG/L (ref 0–14)
TROPONIN I SERPL HS-MCNC: 250 NG/L (ref 0–14)
TROPONIN, HIGH SENSITIVITY: 274 NG/L (ref 0–12)
WBC # BLD AUTO: 9 THOU/MM3 (ref 4.8–10.8)
WBC OTHER # BLD: 8.7 K/UL (ref 3.5–11.3)

## 2023-10-02 PROCEDURE — 99223 1ST HOSP IP/OBS HIGH 75: CPT | Performed by: PHYSICIAN ASSISTANT

## 2023-10-02 PROCEDURE — 71260 CT THORAX DX C+: CPT

## 2023-10-02 PROCEDURE — 6360000002 HC RX W HCPCS: Performed by: EMERGENCY MEDICINE

## 2023-10-02 PROCEDURE — 6370000000 HC RX 637 (ALT 250 FOR IP): Performed by: PHYSICIAN ASSISTANT

## 2023-10-02 PROCEDURE — 36415 COLL VENOUS BLD VENIPUNCTURE: CPT

## 2023-10-02 PROCEDURE — 6360000002 HC RX W HCPCS: Performed by: PHYSICIAN ASSISTANT

## 2023-10-02 PROCEDURE — 96365 THER/PROPH/DIAG IV INF INIT: CPT

## 2023-10-02 PROCEDURE — 85730 THROMBOPLASTIN TIME PARTIAL: CPT

## 2023-10-02 PROCEDURE — 84484 ASSAY OF TROPONIN QUANT: CPT

## 2023-10-02 PROCEDURE — 99285 EMERGENCY DEPT VISIT HI MDM: CPT

## 2023-10-02 PROCEDURE — 93005 ELECTROCARDIOGRAM TRACING: CPT | Performed by: EMERGENCY MEDICINE

## 2023-10-02 PROCEDURE — 82948 REAGENT STRIP/BLOOD GLUCOSE: CPT

## 2023-10-02 PROCEDURE — 96376 TX/PRO/DX INJ SAME DRUG ADON: CPT

## 2023-10-02 PROCEDURE — 85379 FIBRIN DEGRADATION QUANT: CPT

## 2023-10-02 PROCEDURE — 71045 X-RAY EXAM CHEST 1 VIEW: CPT

## 2023-10-02 PROCEDURE — 80053 COMPREHEN METABOLIC PANEL: CPT

## 2023-10-02 PROCEDURE — 93010 ELECTROCARDIOGRAM REPORT: CPT | Performed by: INTERNAL MEDICINE

## 2023-10-02 PROCEDURE — 93005 ELECTROCARDIOGRAM TRACING: CPT | Performed by: PHYSICIAN ASSISTANT

## 2023-10-02 PROCEDURE — 85520 HEPARIN ASSAY: CPT

## 2023-10-02 PROCEDURE — 87636 SARSCOV2 & INF A&B AMP PRB: CPT

## 2023-10-02 PROCEDURE — 2140000000 HC CCU INTERMEDIATE R&B

## 2023-10-02 PROCEDURE — 96366 THER/PROPH/DIAG IV INF ADDON: CPT

## 2023-10-02 PROCEDURE — 85610 PROTHROMBIN TIME: CPT

## 2023-10-02 PROCEDURE — 6360000004 HC RX CONTRAST MEDICATION: Performed by: EMERGENCY MEDICINE

## 2023-10-02 PROCEDURE — 85027 COMPLETE CBC AUTOMATED: CPT

## 2023-10-02 PROCEDURE — 85025 COMPLETE CBC W/AUTO DIFF WBC: CPT

## 2023-10-02 RX ORDER — HEPARIN SODIUM 10000 [USP'U]/100ML
5-30 INJECTION, SOLUTION INTRAVENOUS CONTINUOUS
Status: DISCONTINUED | OUTPATIENT
Start: 2023-10-02 | End: 2023-10-02 | Stop reason: HOSPADM

## 2023-10-02 RX ORDER — SODIUM CHLORIDE 9 MG/ML
INJECTION, SOLUTION INTRAVENOUS PRN
Status: DISCONTINUED | OUTPATIENT
Start: 2023-10-02 | End: 2023-10-04 | Stop reason: HOSPADM

## 2023-10-02 RX ORDER — GABAPENTIN 400 MG/1
800 CAPSULE ORAL 3 TIMES DAILY
Status: DISCONTINUED | OUTPATIENT
Start: 2023-10-02 | End: 2023-10-04 | Stop reason: HOSPADM

## 2023-10-02 RX ORDER — SODIUM CHLORIDE 0.9 % (FLUSH) 0.9 %
5-40 SYRINGE (ML) INJECTION PRN
Status: DISCONTINUED | OUTPATIENT
Start: 2023-10-02 | End: 2023-10-04 | Stop reason: HOSPADM

## 2023-10-02 RX ORDER — EZETIMIBE 10 MG/1
10 TABLET ORAL NIGHTLY
Status: DISCONTINUED | OUTPATIENT
Start: 2023-10-02 | End: 2023-10-04 | Stop reason: HOSPADM

## 2023-10-02 RX ORDER — INSULIN LISPRO 100 [IU]/ML
0-4 INJECTION, SOLUTION INTRAVENOUS; SUBCUTANEOUS NIGHTLY
Status: DISCONTINUED | OUTPATIENT
Start: 2023-10-02 | End: 2023-10-04 | Stop reason: HOSPADM

## 2023-10-02 RX ORDER — ATORVASTATIN CALCIUM 80 MG/1
80 TABLET, FILM COATED ORAL DAILY
Status: DISCONTINUED | OUTPATIENT
Start: 2023-10-03 | End: 2023-10-03 | Stop reason: SDUPTHER

## 2023-10-02 RX ORDER — INSULIN LISPRO 100 [IU]/ML
0-8 INJECTION, SOLUTION INTRAVENOUS; SUBCUTANEOUS
Status: DISCONTINUED | OUTPATIENT
Start: 2023-10-03 | End: 2023-10-04 | Stop reason: HOSPADM

## 2023-10-02 RX ORDER — HEPARIN SODIUM 1000 [USP'U]/ML
2000 INJECTION, SOLUTION INTRAVENOUS; SUBCUTANEOUS PRN
Status: DISCONTINUED | OUTPATIENT
Start: 2023-10-02 | End: 2023-10-02 | Stop reason: HOSPADM

## 2023-10-02 RX ORDER — HEPARIN SODIUM 10000 [USP'U]/100ML
5-30 INJECTION, SOLUTION INTRAVENOUS CONTINUOUS
Status: DISCONTINUED | OUTPATIENT
Start: 2023-10-02 | End: 2023-10-04 | Stop reason: HOSPADM

## 2023-10-02 RX ORDER — DEXTROSE MONOHYDRATE 100 MG/ML
INJECTION, SOLUTION INTRAVENOUS CONTINUOUS PRN
Status: DISCONTINUED | OUTPATIENT
Start: 2023-10-02 | End: 2023-10-03 | Stop reason: SDUPTHER

## 2023-10-02 RX ORDER — ONDANSETRON 2 MG/ML
4 INJECTION INTRAMUSCULAR; INTRAVENOUS EVERY 6 HOURS PRN
Status: DISCONTINUED | OUTPATIENT
Start: 2023-10-02 | End: 2023-10-04 | Stop reason: HOSPADM

## 2023-10-02 RX ORDER — POLYETHYLENE GLYCOL 3350 17 G/17G
17 POWDER, FOR SOLUTION ORAL DAILY PRN
Status: DISCONTINUED | OUTPATIENT
Start: 2023-10-02 | End: 2023-10-04 | Stop reason: HOSPADM

## 2023-10-02 RX ORDER — ASPIRIN 81 MG/1
81 TABLET, CHEWABLE ORAL DAILY
Status: DISCONTINUED | OUTPATIENT
Start: 2023-10-03 | End: 2023-10-03 | Stop reason: SDUPTHER

## 2023-10-02 RX ORDER — FLUDROCORTISONE ACETATE 0.1 MG/1
0.3 TABLET ORAL DAILY
Status: DISCONTINUED | OUTPATIENT
Start: 2023-10-03 | End: 2023-10-04 | Stop reason: HOSPADM

## 2023-10-02 RX ORDER — LOSARTAN POTASSIUM 25 MG/1
25 TABLET ORAL DAILY
Status: DISCONTINUED | OUTPATIENT
Start: 2023-10-03 | End: 2023-10-04 | Stop reason: HOSPADM

## 2023-10-02 RX ORDER — PRASUGREL 10 MG/1
10 TABLET, FILM COATED ORAL DAILY
Status: DISCONTINUED | OUTPATIENT
Start: 2023-10-03 | End: 2023-10-03 | Stop reason: ALTCHOICE

## 2023-10-02 RX ORDER — ALBUTEROL SULFATE 90 UG/1
2 AEROSOL, METERED RESPIRATORY (INHALATION) EVERY 6 HOURS PRN
Status: DISCONTINUED | OUTPATIENT
Start: 2023-10-02 | End: 2023-10-04 | Stop reason: HOSPADM

## 2023-10-02 RX ORDER — HEPARIN SODIUM 1000 [USP'U]/ML
4000 INJECTION, SOLUTION INTRAVENOUS; SUBCUTANEOUS ONCE
Status: COMPLETED | OUTPATIENT
Start: 2023-10-02 | End: 2023-10-02

## 2023-10-02 RX ORDER — ACETAMINOPHEN 650 MG/1
650 SUPPOSITORY RECTAL EVERY 6 HOURS PRN
Status: DISCONTINUED | OUTPATIENT
Start: 2023-10-02 | End: 2023-10-04 | Stop reason: HOSPADM

## 2023-10-02 RX ORDER — IBUPROFEN 600 MG/1
1 TABLET ORAL PRN
Status: DISCONTINUED | OUTPATIENT
Start: 2023-10-02 | End: 2023-10-03 | Stop reason: SDUPTHER

## 2023-10-02 RX ORDER — PANTOPRAZOLE SODIUM 40 MG/1
40 TABLET, DELAYED RELEASE ORAL
Status: DISCONTINUED | OUTPATIENT
Start: 2023-10-03 | End: 2023-10-04 | Stop reason: HOSPADM

## 2023-10-02 RX ORDER — DULOXETIN HYDROCHLORIDE 30 MG/1
30 CAPSULE, DELAYED RELEASE ORAL DAILY
Status: DISCONTINUED | OUTPATIENT
Start: 2023-10-03 | End: 2023-10-04 | Stop reason: HOSPADM

## 2023-10-02 RX ORDER — HEPARIN SODIUM 1000 [USP'U]/ML
4000 INJECTION, SOLUTION INTRAVENOUS; SUBCUTANEOUS PRN
Status: DISCONTINUED | OUTPATIENT
Start: 2023-10-02 | End: 2023-10-02 | Stop reason: HOSPADM

## 2023-10-02 RX ORDER — HEPARIN SODIUM 1000 [USP'U]/ML
4000 INJECTION, SOLUTION INTRAVENOUS; SUBCUTANEOUS PRN
Status: DISCONTINUED | OUTPATIENT
Start: 2023-10-02 | End: 2023-10-04 | Stop reason: HOSPADM

## 2023-10-02 RX ORDER — ACETAMINOPHEN 325 MG/1
650 TABLET ORAL EVERY 6 HOURS PRN
Status: DISCONTINUED | OUTPATIENT
Start: 2023-10-02 | End: 2023-10-03 | Stop reason: SDUPTHER

## 2023-10-02 RX ORDER — ONDANSETRON 4 MG/1
4 TABLET, ORALLY DISINTEGRATING ORAL EVERY 8 HOURS PRN
Status: DISCONTINUED | OUTPATIENT
Start: 2023-10-02 | End: 2023-10-04 | Stop reason: HOSPADM

## 2023-10-02 RX ORDER — HEPARIN SODIUM 1000 [USP'U]/ML
2000 INJECTION, SOLUTION INTRAVENOUS; SUBCUTANEOUS PRN
Status: DISCONTINUED | OUTPATIENT
Start: 2023-10-02 | End: 2023-10-04 | Stop reason: HOSPADM

## 2023-10-02 RX ORDER — SODIUM CHLORIDE 0.9 % (FLUSH) 0.9 %
5-40 SYRINGE (ML) INJECTION EVERY 12 HOURS SCHEDULED
Status: DISCONTINUED | OUTPATIENT
Start: 2023-10-02 | End: 2023-10-04 | Stop reason: HOSPADM

## 2023-10-02 RX ORDER — ANASTROZOLE 1 MG/1
1 TABLET ORAL DAILY
Status: DISCONTINUED | OUTPATIENT
Start: 2023-10-03 | End: 2023-10-04 | Stop reason: HOSPADM

## 2023-10-02 RX ADMIN — HEPARIN SODIUM 9 UNITS/KG/HR: 10000 INJECTION, SOLUTION INTRAVENOUS at 18:41

## 2023-10-02 RX ADMIN — IOPAMIDOL 75 ML: 755 INJECTION, SOLUTION INTRAVENOUS at 12:35

## 2023-10-02 RX ADMIN — HEPARIN SODIUM 9 UNITS/KG/HR: 10000 INJECTION, SOLUTION INTRAVENOUS at 11:13

## 2023-10-02 RX ADMIN — EZETIMIBE 10 MG: 10 TABLET ORAL at 20:13

## 2023-10-02 RX ADMIN — GABAPENTIN 800 MG: 400 CAPSULE ORAL at 20:13

## 2023-10-02 RX ADMIN — HEPARIN SODIUM 4000 UNITS: 1000 INJECTION INTRAVENOUS; SUBCUTANEOUS at 11:11

## 2023-10-02 RX ADMIN — INSULIN HUMAN 30 UNITS: 500 INJECTION, SOLUTION SUBCUTANEOUS at 20:14

## 2023-10-02 ASSESSMENT — LIFESTYLE VARIABLES
HOW MANY STANDARD DRINKS CONTAINING ALCOHOL DO YOU HAVE ON A TYPICAL DAY: PATIENT DOES NOT DRINK
HOW OFTEN DO YOU HAVE A DRINK CONTAINING ALCOHOL: NEVER
HOW MANY STANDARD DRINKS CONTAINING ALCOHOL DO YOU HAVE ON A TYPICAL DAY: PATIENT DOES NOT DRINK
HOW OFTEN DO YOU HAVE A DRINK CONTAINING ALCOHOL: NEVER

## 2023-10-02 ASSESSMENT — PAIN - FUNCTIONAL ASSESSMENT: PAIN_FUNCTIONAL_ASSESSMENT: NONE - DENIES PAIN

## 2023-10-02 NOTE — ED PROVIDER NOTES
[facility]      Pt Name: Elan Godwin  MRN: [id number]  [facility] 1965  Date of evaluation: 10/2/2023  Provider: Laveta Hammans, MD    CHIEF COMPLAINT       Chief Complaint   Patient presents with    Shortness of Breath         HISTORY OF PRESENT ILLNESS      Elan Godwin is a 62 y.o. female who presents to the emergency department for evaluation of dyspnea and chest pain. States it began overnight last night. Symptoms similar to prior episodes of COPD exacerbation, though the chest pain is a new component. She denies any fever, nausea, vomiting. No abdominal pain. No recent illness. No other complaints. PAST MEDICAL HISTORY     Past Medical History:   Diagnosis Date    Anxiety     Asthma     CAD (coronary artery disease)     x1 stent 2010,x1 stent 2016, x1 stent May 2022    Cancer [facility]) 06/2022    right breast cancer    Clinical trial participant at discharge 3/31/16    COPD (chronic obstructive pulmonary disease) ([address])     Depression     Diabetic neuropathy (HCC)     GERD (gastroesophageal reflux disease)     H/O cardiac catheterization 4/26/16    LMCA: Mild Irregularities 10-20%. LAD: Lesion on Prox LAD: Proximal subsection. 100% stenosis. LCx: Mild irregularities 10-20%. RCA: Mild irregularities 10-20%. Widely patent mid RCA stent. EF:60%. H/O cardiovascular stress test 10/07/2016    Overall results are most consistent with a low risk for significant CAD. H/O echocardiogram 10/05/2016    EF:>60%. Inferoseptal wall abnormal in its motion which is not unusal status post open heart surgery. Evidence of mild (grade I) diastolic dysfunction is seen. H/O tilt table evaluation 07/12/2016    Abnormal. PTs HR, Blood Pressure response and symptoms were most consistent with dysautonomia.  Combined w/viligant maintenance of euvolemia and maintaining a moderate salt intake, pharmaciologic treatment w/ ProAmatine, SSRI such as Lexapro and/or Mestinon among other

## 2023-10-02 NOTE — PROGRESS NOTES
Transfer from [place]   Hx COPD HF CAD CABG 2016 in April this year was cathed and the LAD was open has L circ stenosis came in with CP and sob EKG changes- Sinus tachycardia Marked ST abnormality, possible lateral subendocardial injury Abnormal ECG When compared with ECG of 16-APR-2023 17:51, T wave inversion more evident in Anterior leads 98% 115 19 108/69 98T, needs cathed Dr Yesenia Briscoe

## 2023-10-02 NOTE — PROGRESS NOTES
Patient arrived per stretcher to 3B. Heart monitor applied and vitals taken. Admission paperwork completed. Explained to patient that [facility] is not responsible for any lost or stolen items. Patient verbalized understanding. Oriented to room and use of call light and bed controls. Patient denies pain or needs. No signs of distress noted. Bed locked & in low position, side-rails up x2. Call light in reach. Reminded patient to call nurse if any needs arise. 2 person skin assessment performed by this nurse and Cheryl Walton RN. Explained patients right to have family, representative or physician notified of their admission. Patient has Declined for physician to be notified. Patient has Declined for family/representative to be notified.

## 2023-10-03 ENCOUNTER — APPOINTMENT (OUTPATIENT)
Dept: CARDIAC CATH/INVASIVE PROCEDURES | Age: 58
DRG: 250 | End: 2023-10-03
Attending: INTERNAL MEDICINE
Payer: COMMERCIAL

## 2023-10-03 PROBLEM — D64.9 CHRONIC ANEMIA: Status: ACTIVE | Noted: 2023-10-03

## 2023-10-03 LAB
ACTIVATED CLOTTING TIME: 245 SECONDS (ref 1–150)
ANION GAP SERPL CALC-SCNC: 11 MEQ/L (ref 8–16)
BUN SERPL-MCNC: 9 MG/DL (ref 7–22)
CALCIUM SERPL-MCNC: 8.5 MG/DL (ref 8.5–10.5)
CHLORIDE SERPL-SCNC: 106 MEQ/L (ref 98–111)
CO2 SERPL-SCNC: 21 MEQ/L (ref 23–33)
CREAT SERPL-MCNC: 0.6 MG/DL (ref 0.4–1.2)
DEPRECATED MEAN GLUCOSE BLD GHB EST-ACNC: 255 MG/DL (ref 70–126)
DEPRECATED RDW RBC AUTO: 46.8 FL (ref 35–45)
ERYTHROCYTE [DISTWIDTH] IN BLOOD BY AUTOMATED COUNT: 15.3 % (ref 11.5–14.5)
GFR SERPL CREATININE-BSD FRML MDRD: > 60 ML/MIN/1.73M2
GLUCOSE BLD STRIP.AUTO-MCNC: 193 MG/DL (ref 70–108)
GLUCOSE BLD STRIP.AUTO-MCNC: 197 MG/DL (ref 70–108)
GLUCOSE BLD STRIP.AUTO-MCNC: 217 MG/DL (ref 70–108)
GLUCOSE BLD STRIP.AUTO-MCNC: 219 MG/DL (ref 70–108)
GLUCOSE SERPL-MCNC: 217 MG/DL (ref 70–108)
HBA1C MFR BLD HPLC: 10.5 % (ref 4.4–6.4)
HCT VFR BLD AUTO: 40.5 % (ref 37–47)
HEPARIN UNFRACTIONATED: 0.05 U/ML (ref 0.3–0.7)
HEPARIN UNFRACTIONATED: 0.06 U/ML (ref 0.3–0.7)
HEPARIN UNFRACTIONATED: < 0.04 U/ML (ref 0.3–0.7)
HGB BLD-MCNC: 11.9 GM/DL (ref 12–16)
MCH RBC QN AUTO: 24.6 PG (ref 26–33)
MCHC RBC AUTO-ENTMCNC: 29.4 GM/DL (ref 32.2–35.5)
MCV RBC AUTO: 83.9 FL (ref 81–99)
PLATELET # BLD AUTO: 259 THOU/MM3 (ref 130–400)
PMV BLD AUTO: 9.9 FL (ref 9.4–12.4)
POTASSIUM SERPL-SCNC: 4.5 MEQ/L (ref 3.5–5.2)
POTASSIUM SERPL-SCNC: 4.5 MEQ/L (ref 3.5–5.2)
RBC # BLD AUTO: 4.83 MILL/MM3 (ref 4.2–5.4)
SODIUM SERPL-SCNC: 138 MEQ/L (ref 135–145)
TROPONIN, HIGH SENSITIVITY: 191 NG/L (ref 0–12)
WBC # BLD AUTO: 7.2 THOU/MM3 (ref 4.8–10.8)

## 2023-10-03 PROCEDURE — 83036 HEMOGLOBIN GLYCOSYLATED A1C: CPT

## 2023-10-03 PROCEDURE — 6360000002 HC RX W HCPCS

## 2023-10-03 PROCEDURE — 92978 ENDOLUMINL IVUS OCT C 1ST: CPT | Performed by: INTERNAL MEDICINE

## 2023-10-03 PROCEDURE — C1887 CATHETER, GUIDING: HCPCS

## 2023-10-03 PROCEDURE — 36415 COLL VENOUS BLD VENIPUNCTURE: CPT

## 2023-10-03 PROCEDURE — 93005 ELECTROCARDIOGRAM TRACING: CPT | Performed by: INTERNAL MEDICINE

## 2023-10-03 PROCEDURE — C1894 INTRO/SHEATH, NON-LASER: HCPCS

## 2023-10-03 PROCEDURE — 82948 REAGENT STRIP/BLOOD GLUCOSE: CPT

## 2023-10-03 PROCEDURE — C1753 CATH, INTRAVAS ULTRASOUND: HCPCS

## 2023-10-03 PROCEDURE — 92920 PRQ TRLUML C ANGIOP 1ART&/BR: CPT

## 2023-10-03 PROCEDURE — C1760 CLOSURE DEV, VASC: HCPCS

## 2023-10-03 PROCEDURE — 85520 HEPARIN ASSAY: CPT

## 2023-10-03 PROCEDURE — 93457 R HRT ART/GRFT ANGIO: CPT | Performed by: INTERNAL MEDICINE

## 2023-10-03 PROCEDURE — 99233 SBSQ HOSP IP/OBS HIGH 50: CPT | Performed by: FAMILY MEDICINE

## 2023-10-03 PROCEDURE — 6360000002 HC RX W HCPCS: Performed by: PHYSICIAN ASSISTANT

## 2023-10-03 PROCEDURE — 6360000004 HC RX CONTRAST MEDICATION: Performed by: INTERNAL MEDICINE

## 2023-10-03 PROCEDURE — 6370000000 HC RX 637 (ALT 250 FOR IP)

## 2023-10-03 PROCEDURE — 99255 IP/OBS CONSLTJ NEW/EST HI 80: CPT | Performed by: INTERNAL MEDICINE

## 2023-10-03 PROCEDURE — 85027 COMPLETE CBC AUTOMATED: CPT

## 2023-10-03 PROCEDURE — 93455 CORONARY ART/GRFT ANGIO S&I: CPT

## 2023-10-03 PROCEDURE — 84484 ASSAY OF TROPONIN QUANT: CPT

## 2023-10-03 PROCEDURE — 2500000003 HC RX 250 WO HCPCS

## 2023-10-03 PROCEDURE — C1725 CATH, TRANSLUMIN NON-LASER: HCPCS

## 2023-10-03 PROCEDURE — 93010 ELECTROCARDIOGRAM REPORT: CPT | Performed by: INTERNAL MEDICINE

## 2023-10-03 PROCEDURE — 92920 PRQ TRLUML C ANGIOP 1ART&/BR: CPT | Performed by: INTERNAL MEDICINE

## 2023-10-03 PROCEDURE — 2140000000 HC CCU INTERMEDIATE R&B

## 2023-10-03 PROCEDURE — 6370000000 HC RX 637 (ALT 250 FOR IP): Performed by: PHYSICIAN ASSISTANT

## 2023-10-03 PROCEDURE — 2580000003 HC RX 258: Performed by: INTERNAL MEDICINE

## 2023-10-03 PROCEDURE — 93307 TTE W/O DOPPLER COMPLETE: CPT

## 2023-10-03 PROCEDURE — 80048 BASIC METABOLIC PNL TOTAL CA: CPT

## 2023-10-03 PROCEDURE — C1769 GUIDE WIRE: HCPCS

## 2023-10-03 PROCEDURE — 4A023N7 MEASUREMENT OF CARDIAC SAMPLING AND PRESSURE, LEFT HEART, PERCUTANEOUS APPROACH: ICD-10-PCS

## 2023-10-03 PROCEDURE — 92978 ENDOLUMINL IVUS OCT C 1ST: CPT

## 2023-10-03 PROCEDURE — 85347 COAGULATION TIME ACTIVATED: CPT

## 2023-10-03 RX ORDER — OXYCODONE HYDROCHLORIDE AND ACETAMINOPHEN 5; 325 MG/1; MG/1
1 TABLET ORAL EVERY 4 HOURS PRN
Status: DISCONTINUED | OUTPATIENT
Start: 2023-10-03 | End: 2023-10-04 | Stop reason: HOSPADM

## 2023-10-03 RX ORDER — CLOPIDOGREL BISULFATE 75 MG/1
75 TABLET ORAL DAILY
Status: ON HOLD | COMMUNITY
End: 2023-10-04 | Stop reason: HOSPADM

## 2023-10-03 RX ORDER — SODIUM CHLORIDE 0.9 % (FLUSH) 0.9 %
5-40 SYRINGE (ML) INJECTION EVERY 12 HOURS SCHEDULED
Status: DISCONTINUED | OUTPATIENT
Start: 2023-10-03 | End: 2023-10-04 | Stop reason: HOSPADM

## 2023-10-03 RX ORDER — SODIUM CHLORIDE 9 MG/ML
INJECTION, SOLUTION INTRAVENOUS PRN
Status: DISCONTINUED | OUTPATIENT
Start: 2023-10-03 | End: 2023-10-04 | Stop reason: HOSPADM

## 2023-10-03 RX ORDER — SODIUM CHLORIDE 0.9 % (FLUSH) 0.9 %
5-40 SYRINGE (ML) INJECTION PRN
Status: DISCONTINUED | OUTPATIENT
Start: 2023-10-03 | End: 2023-10-04 | Stop reason: HOSPADM

## 2023-10-03 RX ORDER — DEXTROSE MONOHYDRATE 100 MG/ML
INJECTION, SOLUTION INTRAVENOUS CONTINUOUS PRN
Status: DISCONTINUED | OUTPATIENT
Start: 2023-10-03 | End: 2023-10-04 | Stop reason: HOSPADM

## 2023-10-03 RX ORDER — PRASUGREL 10 MG/1
10 TABLET, FILM COATED ORAL DAILY
Status: DISCONTINUED | OUTPATIENT
Start: 2023-10-04 | End: 2023-10-04 | Stop reason: HOSPADM

## 2023-10-03 RX ORDER — IBUPROFEN 600 MG/1
1 TABLET ORAL PRN
Status: DISCONTINUED | OUTPATIENT
Start: 2023-10-03 | End: 2023-10-04 | Stop reason: HOSPADM

## 2023-10-03 RX ORDER — ASPIRIN 81 MG/1
81 TABLET, CHEWABLE ORAL DAILY
Status: DISCONTINUED | OUTPATIENT
Start: 2023-10-04 | End: 2023-10-04 | Stop reason: HOSPADM

## 2023-10-03 RX ORDER — ACETAMINOPHEN 325 MG/1
650 TABLET ORAL EVERY 4 HOURS PRN
Status: DISCONTINUED | OUTPATIENT
Start: 2023-10-03 | End: 2023-10-04 | Stop reason: HOSPADM

## 2023-10-03 RX ORDER — ATORVASTATIN CALCIUM 80 MG/1
80 TABLET, FILM COATED ORAL NIGHTLY
Status: DISCONTINUED | OUTPATIENT
Start: 2023-10-04 | End: 2023-10-04 | Stop reason: HOSPADM

## 2023-10-03 RX ORDER — METOPROLOL SUCCINATE 100 MG/1
150 TABLET, EXTENDED RELEASE ORAL DAILY
Qty: 135 TABLET | Refills: 3 | Status: SHIPPED | OUTPATIENT
Start: 2023-10-03 | End: 2023-10-04 | Stop reason: HOSPADM

## 2023-10-03 RX ADMIN — ATORVASTATIN CALCIUM 80 MG: 80 TABLET, FILM COATED ORAL at 10:33

## 2023-10-03 RX ADMIN — HEPARIN SODIUM 4000 UNITS: 1000 INJECTION INTRAVENOUS; SUBCUTANEOUS at 01:36

## 2023-10-03 RX ADMIN — EZETIMIBE 10 MG: 10 TABLET ORAL at 19:54

## 2023-10-03 RX ADMIN — INSULIN LISPRO 2 UNITS: 100 INJECTION, SOLUTION INTRAVENOUS; SUBCUTANEOUS at 10:28

## 2023-10-03 RX ADMIN — IOPAMIDOL 100 ML: 755 INJECTION, SOLUTION INTRAVENOUS at 17:36

## 2023-10-03 RX ADMIN — PRASUGREL 10 MG: 10 TABLET, FILM COATED ORAL at 10:33

## 2023-10-03 RX ADMIN — FLUDROCORTISONE ACETATE 0.3 MG: 0.1 TABLET ORAL at 10:32

## 2023-10-03 RX ADMIN — ANASTROZOLE 1 MG: 1 TABLET, COATED ORAL at 10:33

## 2023-10-03 RX ADMIN — GABAPENTIN 800 MG: 400 CAPSULE ORAL at 10:41

## 2023-10-03 RX ADMIN — INSULIN HUMAN 30 UNITS: 500 INJECTION, SOLUTION SUBCUTANEOUS at 18:39

## 2023-10-03 RX ADMIN — HEPARIN SODIUM 4000 UNITS: 1000 INJECTION INTRAVENOUS; SUBCUTANEOUS at 13:22

## 2023-10-03 RX ADMIN — PANTOPRAZOLE SODIUM 40 MG: 40 TABLET, DELAYED RELEASE ORAL at 05:06

## 2023-10-03 RX ADMIN — DULOXETINE HYDROCHLORIDE 30 MG: 30 CAPSULE, DELAYED RELEASE ORAL at 10:33

## 2023-10-03 RX ADMIN — GABAPENTIN 800 MG: 400 CAPSULE ORAL at 13:25

## 2023-10-03 RX ADMIN — SODIUM CHLORIDE 75 ML/HR: 9 INJECTION, SOLUTION INTRAVENOUS at 17:52

## 2023-10-03 RX ADMIN — ASPIRIN 81 MG CHEWABLE TABLET 81 MG: 81 TABLET CHEWABLE at 10:34

## 2023-10-03 RX ADMIN — GABAPENTIN 800 MG: 400 CAPSULE ORAL at 19:54

## 2023-10-03 NOTE — PROGRESS NOTES
MI Documentation    MI: : NSTEMI    PCI: YES - 4/16/23    EF: 55-60% 4/16/23  ASA w/i first 24 hours: YES - transfer from [place]  BB w/i first 24 hours: CONTRAINDICATION BP low (96/47 - 110//71) - transfer from [place]       ------------------------------------------  1. ASA: YES  2.  BB: CONTRAINDICATION BP low (96/47 - 110//71)  3. ACE/ARB: CONTRAINDICATION BP low (96/47 - 110//71)  4. Statin: YES - atorvastatin 80 mg QD  5.   P2Y12: YES - prasugrel 10 mg     ))))))REMEMBER NTG SL AT DISCHARGE((((((

## 2023-10-03 NOTE — BRIEF OP NOTE
[facility]  Sedation/Analgesia Post Sedation Record    Pt Name: Sanjay Stoll  Account number: [de-identified]  MRN: [id number]  YOB: 1965  Procedure Performed By: Linda Sames, MD MD Celeste Bosworth, [facility]  Primary Care Physician: Demond Brower, APRN - CNP  Date: 10/3/2023    POST-PROCEDURE    Physicians/Assistants: Linda Sames, MD MD Celeste Bosworth, RPVI    Procedure Performed:Cath/PCL     Sedation/Anesthesia: Versed/ Fentanyl and 2% xylocaine local anesthesia. Estimated Blood Loss: < 50 ml. Specimens Removed: None         Disposition of Specimen: N/A        Complications: No Immediate Complications.        Post-procedure Diagnosis/Findings:       IVUS for severe ISR - stent is underexpanded and malapposed  PCI with 4.0 CBA, then 4.0 NC and 5.0 NC  DAPT         Linda Sames, MD MD Celeste Bosworth, [facility]  Electronically signed 10/3/2023 at 5:32 PM  Interventional Cardiology

## 2023-10-03 NOTE — PROGRESS NOTES
Pharmacy Medication History Note      List of current medications patient is taking is complete. Source of information: Patient, fill history, [facility] in [place]    Changes made to medication list:  Medications removed (include reason, ex. therapy complete or physician discontinued):  Removed losartan - last fill 5/21/21, pt reports as still taking    Medications added/doses adjusted:  Tizanidine 2 mg, 2 tabs PO nightly PRN changed to 1 tab PO nightly PRN  Added clopidogrel 75 mg QD - last fill 9/14/23    Other notes (ex. Recent course of antibiotics, Coumadin dosing):  Patient reports taking prasugrel last fill 5/2023 x 90 days supply. Last fill at [facility] for clopidogrel 9/14/23 x 90 day supply  Denies use of other OTC or herbal medications.     Allergies reviewed    Electronically signed by Krista Santana on 10/3/2023 at 2:23 PM     Chata CaD   Pharmacy Resident  10/3/2023 3:18 PM

## 2023-10-03 NOTE — CARE COORDINATION
Case Management Assessment  Initial Evaluation    Date/Time of Evaluation: 10/3/2023 1:04 PM  Assessment Completed by: Kwasi Metcalf RN    If patient is discharged prior to next notation, then this note serves as note for discharge by case management. Patient Name: Daniela Maldonado                   YOB: 1965  Diagnosis: Chest pain [R07.9]  NSTEMI (non-ST elevated myocardial infarction) [facility]) [I21.4]                   Date / Time: 10/2/2023  4:10 PM  Location: [address] [place] [address]     Patient Admission Status: Inpatient   Readmission Risk Low 0-14, Mod 15-19), High > 20: Readmission Risk Score: 11.2    Current PCP: CHELSEA Hua CNP  PCP verified by CM? Yes    Chart Reviewed: Yes      History Provided by: Patient  Patient Orientation: Alert and Oriented    Patient Cognition: Alert    Hospitalization in the last 30 days (Readmission):  No    If yes, Readmission Assessment in CM Navigator will be completed. Advance Directives:      Code Status: Full Code   Patient's Primary Decision Maker is: Patient Declined (Legal Next of Kin Remains as Decision Maker)    Primary Decision Maker (Active): Kiet Patelmarnie Child - [phone]    Secondary Decision Maker: Liza Hassan - Brother/Sister - [phone]    Discharge Planning:    Patient lives with: Spouse/Significant Other Type of Home: Apartment  Primary Care Giver: Self  Patient Support Systems include: Family Members, Children, Spouse/Significant Other, Friends/Neighbors   Current Financial resources: Medicaid  Current community resources: None  Current services prior to admission: Durable Medical Equipment            Current DME: Crow Fitting, Shower Chair, Glucometer            Type of Home Care services:  None    ADLS  Prior functional level: Independent in ADLs/IADLs  Current functional level: Independent in ADLs/IADLs    Family can provide assistance at DC:  Yes  Would you like Case Management to discuss the discharge plan with any other family

## 2023-10-03 NOTE — PROGRESS NOTES
PROGRESS NOTE      Patient:  Nafisa Short      Unit/Bed:3B-24/024-A    YOB: 1965    MRN: [id number]       Acct: [de-identified]     PCP: CHELSEA Celis CNP    Date of Admission: 10/2/2023      Assessment/Plan:    Anticipated Discharge in : 1-2 days    Active Hospital Problems    Diagnosis Date Noted    Chest pain [R07.9] 04/23/2022     Priority: Medium    NSTEMI (non-ST elevated myocardial infarction) [facility]) [I21.4] 04/16/2023     [facility]  [facility] with sinus tachycardia (), ST segment depression in multiple leads, and borderline Qtc (460ms). CT Chest (10/2/2023) with small bilateral effusions and subtle bibasilar infiltration representing atelectasis versus pneumonia but no acute or chronic pulmonary embolism. XR Chest (10/2/2023) with scattered interstitial infiltrates. EKG ([facility] 10/2/2023 at 10:21) with sinus tachycardia () and new ST depression in leads V3-5  EKG ([facility] 10/2/2023 at 16:31) with normal sinus rhythm (HR 84) and ST depression in V2-6  - Troponin Trend: 249 --> 250 --> 274 --> 191  - Plan for NPO pending cardiology  - Continue heparin infusion  - Limited echo ordered  - Continue Prasugrel 10mg, Aspirin 81mg, Atorvastatin 80mg, Toprol-XL 150mg  - Cardiology consulted, appreciate recommendations     H/o Coronary Artery Disease  H/o CABG (8/15/2016 and 2019)  Cath History: 3/31/2016 (stent to RCA), 4/26/2016 (chronic total LAD occlusion, consider LIMA-LAD bypass graft), 3/7/2019 (severe single vessel disease of the LAD), 5/20/2022 (stent to Left Main), 4/17/2023 (stent of Left Main and Circumflex)  ECHO (4/17/2023) with EF 55-60% and no regional wall motion abnormalities  NOTE[de-identified] Per pharmacy, patient last filled prescription for Prasugrel 5/2023 for 90 days then filled Clopidogrel 75mg 9/14/2023.  Per chart review, patient's Plavix was stopped by Cardiology at 8/18/2023 office visit with plan to continue Prasugrel.  - Continue Prasugrel 10mg, Aspirin 81mg, Atorvastatin 80mg, Toprol-XL 150mg  - Cardiology consulted, appreciate recommendations     Insulin-Dependent Type 2 Diabetes Mellitus  Home meds include Metformin, Trulicity, and Humulin O-406 60U BID  - 10/3/2023 HgbA1C 10.5 (baseline 10-11)  - Received 30U Humulin 10/2/2023 evening since NPO after midnight, to continue 10/3/2023 evening after diet resumes  - Continue medium dose SSI  - Hypoglycemia protocol in place  - POCT glucose checks    Diabetic Neuropathy  - Continue Gabapentin 800mg TID and Duloxetine 30mg    Chronic Microcytic Anemia  Likely related to uncontrolled diabetes  On arrival (10/2/2023), Hgb 12.7 with MCV 77.5 (baseline 77)  - 10/3/2023 Hgb 11.9 with MCV 83.9    Essential HTN  NOTE[de-identified] Per pharmacy, patient last filled prescription for Losartan 5/21/2021  - Continue Losartan 25mg, Toprol-XL 150mg  - Cardiology consulted, appreciate recommendations and clarification of medications and management of concurrent Dysautonomia with CAD     HLD  Lipid Panel 4/17/2023 with LDL 76  - Continue Lipitor and Ezetimibe 10mg    GERD  - Continue Pantoprazole 40mg    COPD   No acute exacerbation currently  - Continue Albuterol PRN    Dysautonomia Orthostatic Hypotension Syndrome   Diagnosed with 7/12/2016 Table Tilt Test  - Continue Fludrocortisone 0.3mg  - Cardiology consulted, appreciate recommendations and clarification of medications and management of concurrent Dysautonomia with CAD     Invasive Carcinoma of the Right Breast with DCIS s/p lumpectomy  Follows with oncology  - Continue Anastrozole 1mg        Chief Complaint: Shortness of Breath    Hospital Course:    Per HPI:  Kavita Duvall is a 62 y.o. female with a history of coronary artery disease, hypertension, hyperlipidemia, insulin-dependent diabetes, breast cancer, dysautonomia, and COPD who presented to [facility] with chief complaint of shortness of breath. The patient states this morning she woke up and was feeling short of breath.

## 2023-10-04 VITALS
OXYGEN SATURATION: 96 % | BODY MASS INDEX: 39.88 KG/M2 | WEIGHT: 225.1 LBS | HEIGHT: 63 IN | TEMPERATURE: 97.5 F | RESPIRATION RATE: 16 BRPM | SYSTOLIC BLOOD PRESSURE: 100 MMHG | HEART RATE: 76 BPM | DIASTOLIC BLOOD PRESSURE: 72 MMHG

## 2023-10-04 LAB
ANION GAP SERPL CALC-SCNC: 10 MEQ/L (ref 8–16)
BUN SERPL-MCNC: 8 MG/DL (ref 7–22)
CALCIUM SERPL-MCNC: 8.4 MG/DL (ref 8.5–10.5)
CHLORIDE SERPL-SCNC: 102 MEQ/L (ref 98–111)
CHOLEST SERPL-MCNC: 109 MG/DL (ref 100–199)
CO2 SERPL-SCNC: 22 MEQ/L (ref 23–33)
CREAT SERPL-MCNC: 0.7 MG/DL (ref 0.4–1.2)
DEPRECATED RDW RBC AUTO: 44 FL (ref 35–45)
EKG ATRIAL RATE: 80 BPM
EKG ATRIAL RATE: 84 BPM
EKG P AXIS: 29 DEGREES
EKG P AXIS: 62 DEGREES
EKG P-R INTERVAL: 140 MS
EKG P-R INTERVAL: 142 MS
EKG Q-T INTERVAL: 412 MS
EKG Q-T INTERVAL: 422 MS
EKG QRS DURATION: 84 MS
EKG QRS DURATION: 90 MS
EKG QTC CALCULATION (BAZETT): 486 MS
EKG QTC CALCULATION (BAZETT): 486 MS
EKG R AXIS: 40 DEGREES
EKG R AXIS: 43 DEGREES
EKG T AXIS: 134 DEGREES
EKG T AXIS: 139 DEGREES
EKG VENTRICULAR RATE: 80 BPM
EKG VENTRICULAR RATE: 84 BPM
ERYTHROCYTE [DISTWIDTH] IN BLOOD BY AUTOMATED COUNT: 15.2 % (ref 11.5–14.5)
GFR SERPL CREATININE-BSD FRML MDRD: > 60 ML/MIN/1.73M2
GLUCOSE BLD STRIP.AUTO-MCNC: 210 MG/DL (ref 70–108)
GLUCOSE BLD STRIP.AUTO-MCNC: 277 MG/DL (ref 70–108)
GLUCOSE SERPL-MCNC: 231 MG/DL (ref 70–108)
HCT VFR BLD AUTO: 35.6 % (ref 37–47)
HDLC SERPL-MCNC: 33 MG/DL
HGB BLD-MCNC: 11.1 GM/DL (ref 12–16)
LDLC SERPL CALC-MCNC: 47 MG/DL
MCH RBC QN AUTO: 25.1 PG (ref 26–33)
MCHC RBC AUTO-ENTMCNC: 31.2 GM/DL (ref 32.2–35.5)
MCV RBC AUTO: 80.4 FL (ref 81–99)
PLATELET # BLD AUTO: 319 THOU/MM3 (ref 130–400)
PMV BLD AUTO: 9.6 FL (ref 9.4–12.4)
POTASSIUM SERPL-SCNC: 3.9 MEQ/L (ref 3.5–5.2)
RBC # BLD AUTO: 4.43 MILL/MM3 (ref 4.2–5.4)
SODIUM SERPL-SCNC: 134 MEQ/L (ref 135–145)
TRIGL SERPL-MCNC: 143 MG/DL (ref 0–199)
WBC # BLD AUTO: 9.1 THOU/MM3 (ref 4.8–10.8)

## 2023-10-04 PROCEDURE — 2580000003 HC RX 258: Performed by: INTERNAL MEDICINE

## 2023-10-04 PROCEDURE — B2111ZZ FLUOROSCOPY OF MULTIPLE CORONARY ARTERIES USING LOW OSMOLAR CONTRAST: ICD-10-PCS | Performed by: INTERNAL MEDICINE

## 2023-10-04 PROCEDURE — 99232 SBSQ HOSP IP/OBS MODERATE 35: CPT | Performed by: PHYSICIAN ASSISTANT

## 2023-10-04 PROCEDURE — 6370000000 HC RX 637 (ALT 250 FOR IP)

## 2023-10-04 PROCEDURE — 6370000000 HC RX 637 (ALT 250 FOR IP): Performed by: INTERNAL MEDICINE

## 2023-10-04 PROCEDURE — 6370000000 HC RX 637 (ALT 250 FOR IP): Performed by: PHYSICIAN ASSISTANT

## 2023-10-04 PROCEDURE — 4A023N7 MEASUREMENT OF CARDIAC SAMPLING AND PRESSURE, LEFT HEART, PERCUTANEOUS APPROACH: ICD-10-PCS | Performed by: INTERNAL MEDICINE

## 2023-10-04 PROCEDURE — B24BZZ3 ULTRASONOGRAPHY OF HEART WITH AORTA, INTRAVASCULAR: ICD-10-PCS | Performed by: INTERNAL MEDICINE

## 2023-10-04 PROCEDURE — 82948 REAGENT STRIP/BLOOD GLUCOSE: CPT

## 2023-10-04 PROCEDURE — 85027 COMPLETE CBC AUTOMATED: CPT

## 2023-10-04 PROCEDURE — 36415 COLL VENOUS BLD VENIPUNCTURE: CPT

## 2023-10-04 PROCEDURE — 80061 LIPID PANEL: CPT

## 2023-10-04 PROCEDURE — 80048 BASIC METABOLIC PNL TOTAL CA: CPT

## 2023-10-04 PROCEDURE — 02703ZZ DILATION OF CORONARY ARTERY, ONE ARTERY, PERCUTANEOUS APPROACH: ICD-10-PCS | Performed by: INTERNAL MEDICINE

## 2023-10-04 RX ORDER — METOPROLOL SUCCINATE 50 MG/1
150 TABLET, EXTENDED RELEASE ORAL DAILY
Qty: 90 TABLET | Refills: 1 | Status: SHIPPED | OUTPATIENT
Start: 2023-10-05

## 2023-10-04 RX ORDER — LOSARTAN POTASSIUM 25 MG/1
25 TABLET ORAL DAILY
Qty: 30 TABLET | Refills: 3 | Status: ON HOLD | OUTPATIENT
Start: 2023-10-05 | End: 2023-10-08 | Stop reason: SDUPTHER

## 2023-10-04 RX ADMIN — PANTOPRAZOLE SODIUM 40 MG: 40 TABLET, DELAYED RELEASE ORAL at 06:01

## 2023-10-04 RX ADMIN — LOSARTAN POTASSIUM 25 MG: 25 TABLET, FILM COATED ORAL at 09:27

## 2023-10-04 RX ADMIN — METOPROLOL SUCCINATE 150 MG: 100 TABLET, EXTENDED RELEASE ORAL at 09:27

## 2023-10-04 RX ADMIN — DULOXETINE HYDROCHLORIDE 30 MG: 30 CAPSULE, DELAYED RELEASE ORAL at 09:26

## 2023-10-04 RX ADMIN — SODIUM CHLORIDE, PRESERVATIVE FREE 10 ML: 5 INJECTION INTRAVENOUS at 09:31

## 2023-10-04 RX ADMIN — FLUDROCORTISONE ACETATE 0.3 MG: 0.1 TABLET ORAL at 09:26

## 2023-10-04 RX ADMIN — ASPIRIN 81 MG: 81 TABLET, CHEWABLE ORAL at 09:27

## 2023-10-04 RX ADMIN — INSULIN LISPRO 4 UNITS: 100 INJECTION, SOLUTION INTRAVENOUS; SUBCUTANEOUS at 12:34

## 2023-10-04 RX ADMIN — GABAPENTIN 800 MG: 400 CAPSULE ORAL at 09:26

## 2023-10-04 RX ADMIN — PRASUGREL 10 MG: 10 TABLET, FILM COATED ORAL at 09:27

## 2023-10-04 RX ADMIN — INSULIN LISPRO 2 UNITS: 100 INJECTION, SOLUTION INTRAVENOUS; SUBCUTANEOUS at 09:31

## 2023-10-04 RX ADMIN — ANASTROZOLE 1 MG: 1 TABLET, COATED ORAL at 09:26

## 2023-10-04 NOTE — PLAN OF CARE
Problem: Discharge Planning  Goal: Discharge to home or other facility with appropriate resources  Outcome: Progressing  Flowsheets (Taken 10/2/2023 1733 by Jamaal Blevins RN)  Discharge to home or other facility with appropriate resources:   Identify barriers to discharge with patient and caregiver   Arrange for needed discharge resources and transportation as appropriate   Identify discharge learning needs (meds, wound care, etc)     Problem: Safety - Adult  Goal: Free from fall injury  Outcome: Progressing  Flowsheets (Taken 10/2/2023 1733 by Jamaal Blevins RN)  Free From Fall Injury: Instruct family/caregiver on patient safety     Problem: Pain  Goal: Verbalizes/displays adequate comfort level or baseline comfort level  Outcome: Progressing  Flowsheets (Taken 10/2/2023 1733 by Jamaal Blevins RN)  Verbalizes/displays adequate comfort level or baseline comfort level:   Encourage patient to monitor pain and request assistance   Assess pain using appropriate pain scale   Administer analgesics based on type and severity of pain and evaluate response     Problem: Cardiovascular - Adult  Goal: Maintains optimal cardiac output and hemodynamic stability  Outcome: Progressing  Flowsheets (Taken 10/2/2023 1733 by Jamaal Blevins RN)  Maintains optimal cardiac output and hemodynamic stability:   Monitor blood pressure and heart rate   Monitor urine output and notify Licensed Independent Practitioner for values outside of normal range   Assess for signs of decreased cardiac output  Goal: Absence of cardiac dysrhythmias or at baseline  Outcome: Progressing  Flowsheets (Taken 10/2/2023 1733 by Jamaal Blevins RN)  Absence of cardiac dysrhythmias or at baseline:   Monitor cardiac rate and rhythm   Assess for signs of decreased cardiac output     Problem: Chronic Conditions and Co-morbidities  Goal: Patient's chronic conditions and co-morbidity symptoms are monitored and maintained or improved  Outcome: Progressing  Flowsheets (Taken
Problem: Discharge Planning  Goal: Discharge to home or other facility with appropriate resources  Outcome: Progressing  Flowsheets (Taken 10/2/2023 1733)  Discharge to home or other facility with appropriate resources:   Identify barriers to discharge with patient and caregiver   Arrange for needed discharge resources and transportation as appropriate   Identify discharge learning needs (meds, wound care, etc)     Problem: Safety - Adult  Goal: Free from fall injury  Outcome: Progressing  Flowsheets (Taken 10/2/2023 1733)  Free From Fall Injury: Instruct family/caregiver on patient safety     Problem: Pain  Goal: Verbalizes/displays adequate comfort level or baseline comfort level  Outcome: Progressing  Flowsheets (Taken 10/2/2023 1733)  Verbalizes/displays adequate comfort level or baseline comfort level:   Encourage patient to monitor pain and request assistance   Assess pain using appropriate pain scale   Administer analgesics based on type and severity of pain and evaluate response     Problem: Cardiovascular - Adult  Goal: Maintains optimal cardiac output and hemodynamic stability  Outcome: Progressing  Flowsheets (Taken 10/2/2023 1733)  Maintains optimal cardiac output and hemodynamic stability:   Monitor blood pressure and heart rate   Monitor urine output and notify Licensed Independent Practitioner for values outside of normal range   Assess for signs of decreased cardiac output  Goal: Absence of cardiac dysrhythmias or at baseline  Outcome: Progressing  Flowsheets (Taken 10/2/2023 1733)  Absence of cardiac dysrhythmias or at baseline:   Monitor cardiac rate and rhythm   Assess for signs of decreased cardiac output
Progressing  Flowsheets (Taken 10/2/2023 9398)  Absence of cardiac dysrhythmias or at baseline:   Monitor cardiac rate and rhythm   Assess for signs of decreased cardiac output

## 2023-10-04 NOTE — PROGRESS NOTES
CLINICAL PHARMACY: DISCHARGE MED RECONCILIATION/REVIEW    [facility] ([facility]) Select Patient?: Yes  Total # of Interventions Recommended: 0  Total # Interventions Accepted: 0  Intervention Severity:   - Level 1 Intervention Present?: No   - Level 2 #: 0   - Level 3 #: 0   Time Spent (min): 15    Zafar Ahuja PharmD   Pharmacy Resident  10/4/2023 3:05 PM

## 2023-10-04 NOTE — PROGRESS NOTES
Inpatient Cardiac Rehabilitation Consult    Received consult for Phase II Cardiac Rehabilitation. Patient needs cardiac rehab due to PTCA, NSTEMI on 10/3/23. Importance of Cardiac Rehab discussed with patient. Reviewed cardiac rehab class times. Patient questions answered. Patient stated she is not interested at this time. Informed patient that she can contact her cardiologist or PCP for a referral to be placed if she decides to participate. Cardiac Rehab brochure given.

## 2023-10-04 NOTE — PROCEDURES
[place] [address]                            CARDIAC CATHETERIZATION    PATIENT NAME: Narcisa Calderon                     :        1965  MED REC NO:   [id number]                           ROOM:       0024  ACCOUNT NO:   [de-identified]                           ADMIT DATE: 10/02/2023  PROVIDER:     Florinda Watt MD    DATE OF PROCEDURE:  10/03/2023    CARDIAC CATHETERIZATION    SURGEON:  Florinda Watt MD    INDICATION:  NSTEMI with history of left main and circumflex PCI;  history of CABG. DESCRIPTION OF PROCEDURE:  After informed consent was obtained from the  patient, she was was brought to the cardiac catheterization laboratory  and prepped in sterile fashion. Right femoral artery was chosen as a  primary point of access. Pre-procedure time-out was completed. After  infiltration of right inguinal region with 2% lidocaine using  micropuncture and modified Seldinger technique under fluoroscopic  guidance and ultrasound guidance, I was able to insert a 5-Welsh sheath  in the right femoral artery. I then performed diagnostic coronary  angiography using 5-Welsh JL-4 and 5-Welsh JR-4 catheters. CORONARY ANGIOGRAM:  1. Left main:  Previously placed stent in the left main that continues  onto circumflex. 2.  LAD:   in the ostium. 3.  LCX:  The left main to LCX stent extends proximally. There is about  99% stenosis in the LCX, gives rise to an OM-1 and OM-2 system without  any significant obstruction. 4.  RCA:   in the proximal segment. We then manipulated the JR-4 into the left subclavian artery beneath the  ostium of LIMA and anterior of the LIMA. BYPASS ANGIOGRAPHY:  LIMA to LAD:  The ostium, body, and anastomotic  segments of LIMA to LAD are patent. The LAD beyond the anastomosis is  small, diffusely diseased, but patent, supplies left-to-right  collaterals to the RCA.     INTERVENTION:  Given the weeks postprocedure. All the above was explained to the patient and the patient's family. They were agreeable and amenable to the plan.         Wicho Jones MD    D: 10/04/2023 11:20:34       T: 10/04/2023 12:58:36     [email or web address]  Job#: [id number]     Doc#: [id number]    CC:

## 2023-10-04 NOTE — DISCHARGE SUMMARY
release tablet  Commonly known as: GLUCOPHAGE-XR  Take 2 tablets by mouth daily (with breakfast)     metoprolol succinate 100 MG extended release tablet  Commonly known as: TOPROL XL  TAKE 1 AND 1/2 TABLETS BY MOUTH EVERY DAY  Ask about: Which instructions should I use?     omeprazole 20 MG delayed release capsule  Commonly known as: PRILOSEC  TAKE 1 CAPSULE BY MOUTH EVERY DAY IN THE MORNING BEFORE BREAKFAST     Probiotic 250 MG Caps  Take 1 capsule by mouth daily     tiZANidine 2 MG tablet  Commonly known as: ZANAFLEX  TAKE 2 TABLETS BY MOUTH NIGHTLY AS NEEDED (SPASMS)     triamcinolone 0.1 % cream  Commonly known as: KENALOG  Apply topically 2 times daily. Trulicity 3 RW/4.4NZ Sopn  Generic drug: Dulaglutide               Where to Get Your Medications        These medications were sent to [facility]/pharmacy #[id number] - [place], [place] - [address] [phone] [facility] [phone]  [address], [place] [address] [facility]      Phone: [phone]   metoprolol succinate 100 MG extended release tablet         Time Spent on discharge is more than 30 minutes in the examination, evaluation, counseling and review of medications and discharge plan. Discharge Medications for PCI/MI (performed or attempted):   ASA:   Aspirin 81mg   Statin:   Atorvastatin 80mg  ACE/ARB:  Losartan 25mg   P2Y12 Inhibitor:  Prasugrel 10mg   Beta Blocker:   Metoprolol Succinate 50mg TID  Nitro SL:   Nitro 0.4mg SL   Cardiac Rehab:  Ordered  Dietary Consult:  Ordered           Signed: Thank you Might, CHELSEA Cook CNP for the opportunity to be involved in this patient's care.     Electronically signed by Kady Barton DO on 10/4/2023 at 9:44 AM

## 2023-10-04 NOTE — PROGRESS NOTES
Discharge instructions reviewed with patient. All questions answered. Patient verbalized understanding. Copies of AVS given. IV discontinued with tip intact per policy. Telemetry discontinued. All personal belongings gathered. Patient awaits for transportation.

## 2023-10-04 NOTE — PROGRESS NOTES
[facility]--. [address] [facility] PROGRESS NOTE      Patient: Claudean Anger  Room #: 4D-75/894-R            YOB: 1965  Age: 62 y.o. Gender: female            Admit Date & Time: 10/2/2023  4:10 PM    Assessment:    The patient declined a visit at this time due to preparing to leave the hospital.     Interventions: The patient was provided information about Spiritual Care being available. Outcomes: The  politely wished the patient a positive day. Plan: 1. Spiritual care will continue to follow the patient according to [facility]. Jessy's spiritual care SOP.        Electronically signed by Dia Pimentel on 10/4/2023 at 2:45 PM.  Juany  [phone]     10/04/23 1444   Encounter Summary   Encounter Overview/Reason  Initial Encounter   Service Provided For: Patient   [facility]   Support System Unknown   Last Encounter  10/04/23   Complexity of Encounter Low   Begin Time 1400   End Time  1405   Total Time Calculated 5 min   Spiritual/Emotional needs   Type Spiritual Support   Assessment/Intervention/Outcome   Assessment Hopeful   Intervention Sustaining Presence/Ministry of presence   Outcome Refused/Declined

## 2023-10-04 NOTE — PROGRESS NOTES
PRN  acetaminophen, 650 mg, Q6H PRN  polyethylene glycol, 17 g, Daily PRN  heparin (porcine), 4,000 Units, PRN  heparin (porcine), 2,000 Units, PRN  albuterol sulfate HFA, 2 puff, Q6H PRN        Diagnostics:  TTE   Summary   Left ventricle size is normal.   Normal left ventricular wall thickness. There was mild global hypokinesis of the left ventricle. Ejection fraction is visually estimated in the range of 45% to 50%. Signature      ----------------------------------------------------------------   Electronically signed by Tereso Delarosa MD (Interpreting   physician) on 10/03/2023 at 07:27 PM      Lab Data:    Cardiac Enzymes:  No results for input(s): \"CKTOTAL\", \"CKMB\", \"CKMBINDEX\", \"TROPONINI\" in the last 72 hours.     CBC:   Lab Results   Component Value Date/Time    WBC 9.1 10/04/2023 03:55 AM    RBC 4.43 10/04/2023 03:55 AM    RBC 3.88 12/05/2011 11:35 AM    HGB 11.1 10/04/2023 03:55 AM    HCT 35.6 10/04/2023 03:55 AM     10/04/2023 03:55 AM     12/05/2011 11:35 AM       CMP:    Lab Results   Component Value Date/Time     10/04/2023 03:55 AM    K 3.9 10/04/2023 03:55 AM    K 4.5 10/03/2023 07:52 AM     10/04/2023 03:55 AM    CO2 22 10/04/2023 03:55 AM    BUN 8 10/04/2023 03:55 AM    CREATININE 0.7 10/04/2023 03:55 AM    GFRAA >60 07/18/2022 11:43 PM    LABGLOM >60 10/04/2023 03:55 AM    GLUCOSE 231 10/04/2023 03:55 AM    GLUCOSE 181 02/16/2012 08:49 AM    CALCIUM 8.4 10/04/2023 03:55 AM       Hepatic Function Panel:    Lab Results   Component Value Date/Time    ALKPHOS 99 10/02/2023 10:30 AM    ALT 11 10/02/2023 10:30 AM    AST 13 10/02/2023 10:30 AM    PROT 7.8 10/02/2023 10:30 AM    BILITOT 0.6 10/02/2023 10:30 AM    BILIDIR <0.1 10/12/2022 07:20 PM    IBILI Can not be calculated 10/12/2022 07:20 PM    LABALBU 4.0 10/02/2023 10:30 AM    LABALBU 4.2 12/05/2011 11:35 AM       Magnesium:    Lab Results   Component Value Date/Time    MG 2.0 04/17/2023 12:10 AM       PT/INR: Lab Results   Component Value Date/Time    PROTIME 13.7 04/16/2023 04:57 PM    PROTIME 10.3 09/20/2011 01:41 AM    INR 1.02 10/02/2023 05:29 PM       HgBA1c:    Lab Results   Component Value Date/Time    LABA1C 10.5 10/03/2023 07:52 AM       FLP:    Lab Results   Component Value Date/Time    TRIG 143 10/04/2023 03:55 AM    HDL 33 10/04/2023 03:55 AM    LDLCALC 47 10/04/2023 03:55 AM       TSH:    Lab Results   Component Value Date/Time    TSH 2.80 06/04/2016 11:04 PM         Assessment:    NSTEMI  S/p cath 10/3/23 - IVUS for severe ISR - stent is underexpanded and malapposed, PCI with 4.0 CBA, then 4.0 NC and 5.0 NC - dictation pending  CAD - hx CABG 2016, hx PCIs  New mild ICMP - Ef 45-50 per TTE 10/3/23, ef 55-60 per TTE 4/18/23  HTN  HLD  DM  Hx dysautonomia orthostatic hypotension - on florinef   Hx right breast cancer  with DCIS s/p lumpectomy 7/2022  Hx COPD      Plan:    Cont asa/effient/statin/arb/BB  Cardiac rehab ordered  Sl ntg upon dc  F/up dr Lisset Griffith 2 weeks  Will follow prn  Recheck bp at noon today prior to dc    Patient and Practitioner mutually agreed upon goal:   Patient and provider goals: Feel better and have more energy     Electronically signed by Kennedi Britt PA-C on 10/4/2023 at 8:42 AM

## 2023-10-05 ENCOUNTER — HOSPITAL ENCOUNTER (EMERGENCY)
Age: 58
Discharge: HOME | DRG: 194 | End: 2023-10-05
Attending: EMERGENCY MEDICINE
Payer: COMMERCIAL

## 2023-10-05 ENCOUNTER — TELEPHONE (OUTPATIENT)
Dept: PRIMARY CARE CLINIC | Age: 58
End: 2023-10-05

## 2023-10-05 ENCOUNTER — APPOINTMENT (OUTPATIENT)
Dept: GENERAL RADIOLOGY | Age: 58
DRG: 194 | End: 2023-10-05
Payer: COMMERCIAL

## 2023-10-05 VITALS
HEIGHT: 63 IN | SYSTOLIC BLOOD PRESSURE: 134 MMHG | TEMPERATURE: 98 F | RESPIRATION RATE: 23 BRPM | BODY MASS INDEX: 39.87 KG/M2 | OXYGEN SATURATION: 92 % | HEART RATE: 81 BPM | DIASTOLIC BLOOD PRESSURE: 60 MMHG | WEIGHT: 225 LBS

## 2023-10-05 DIAGNOSIS — J44.9 CHRONIC OBSTRUCTIVE PULMONARY DISEASE, UNSPECIFIED COPD TYPE (HCC): Primary | ICD-10-CM

## 2023-10-05 LAB
ALBUMIN SERPL-MCNC: 3.8 G/DL (ref 3.5–5.2)
ALBUMIN/GLOB SERPL: 1.1 {RATIO} (ref 1–2.5)
ALP SERPL-CCNC: 98 U/L (ref 35–104)
ALT SERPL-CCNC: 11 U/L (ref 5–33)
ANION GAP SERPL CALCULATED.3IONS-SCNC: 12 MMOL/L (ref 9–17)
AST SERPL-CCNC: 11 U/L
BASOPHILS # BLD: 0.05 K/UL (ref 0–0.2)
BASOPHILS NFR BLD: 1 % (ref 0–2)
BILIRUB SERPL-MCNC: 0.5 MG/DL (ref 0.3–1.2)
BUN SERPL-MCNC: 13 MG/DL (ref 6–20)
BUN/CREAT SERPL: 16 (ref 9–20)
CALCIUM SERPL-MCNC: 9.1 MG/DL (ref 8.6–10.4)
CHLORIDE SERPL-SCNC: 103 MMOL/L (ref 98–107)
CO2 SERPL-SCNC: 20 MMOL/L (ref 20–31)
CREAT SERPL-MCNC: 0.8 MG/DL (ref 0.5–0.9)
EKG ATRIAL RATE: 78 BPM
EKG P AXIS: 54 DEGREES
EKG P-R INTERVAL: 142 MS
EKG Q-T INTERVAL: 400 MS
EKG QRS DURATION: 88 MS
EKG QTC CALCULATION (BAZETT): 456 MS
EKG R AXIS: 41 DEGREES
EKG T AXIS: 124 DEGREES
EKG VENTRICULAR RATE: 78 BPM
EOSINOPHIL # BLD: 0.1 K/UL (ref 0–0.44)
EOSINOPHILS RELATIVE PERCENT: 1 % (ref 1–4)
ERYTHROCYTE [DISTWIDTH] IN BLOOD BY AUTOMATED COUNT: 15.1 % (ref 11.8–14.4)
GFR SERPL CREATININE-BSD FRML MDRD: >60 ML/MIN/1.73M2
GLUCOSE SERPL-MCNC: 331 MG/DL (ref 70–99)
HCT VFR BLD AUTO: 36.2 % (ref 36.3–47.1)
HGB BLD-MCNC: 11.5 G/DL (ref 11.9–15.1)
IMM GRANULOCYTES # BLD AUTO: 0.05 K/UL (ref 0–0.3)
IMM GRANULOCYTES NFR BLD: 1 %
LYMPHOCYTES NFR BLD: 1.42 K/UL (ref 1.1–3.7)
LYMPHOCYTES RELATIVE PERCENT: 18 % (ref 24–43)
MCH RBC QN AUTO: 24.8 PG (ref 25.2–33.5)
MCHC RBC AUTO-ENTMCNC: 31.8 G/DL (ref 28.4–34.8)
MCV RBC AUTO: 78.2 FL (ref 82.6–102.9)
MONOCYTES NFR BLD: 0.39 K/UL (ref 0.1–1.2)
MONOCYTES NFR BLD: 5 % (ref 3–12)
NEUTROPHILS NFR BLD: 74 % (ref 36–65)
NEUTS SEG NFR BLD: 5.8 K/UL (ref 1.5–8.1)
NRBC BLD-RTO: 0 PER 100 WBC
PLATELET # BLD AUTO: 311 K/UL (ref 138–453)
PMV BLD AUTO: 9.6 FL (ref 8.1–13.5)
POTASSIUM SERPL-SCNC: 4.3 MMOL/L (ref 3.7–5.3)
PROT SERPL-MCNC: 7.2 G/DL (ref 6.4–8.3)
RBC # BLD AUTO: 4.63 M/UL (ref 3.95–5.11)
SARS-COV-2 RDRP RESP QL NAA+PROBE: NOT DETECTED
SODIUM SERPL-SCNC: 135 MMOL/L (ref 135–144)
SPECIMEN DESCRIPTION: NORMAL
TROPONIN I SERPL HS-MCNC: 154 NG/L (ref 0–14)
TROPONIN I SERPL HS-MCNC: 168 NG/L (ref 0–14)
WBC OTHER # BLD: 7.8 K/UL (ref 3.5–11.3)

## 2023-10-05 PROCEDURE — 80053 COMPREHEN METABOLIC PANEL: CPT

## 2023-10-05 PROCEDURE — 87635 SARS-COV-2 COVID-19 AMP PRB: CPT

## 2023-10-05 PROCEDURE — 36415 COLL VENOUS BLD VENIPUNCTURE: CPT

## 2023-10-05 PROCEDURE — 93005 ELECTROCARDIOGRAM TRACING: CPT | Performed by: EMERGENCY MEDICINE

## 2023-10-05 PROCEDURE — 71045 X-RAY EXAM CHEST 1 VIEW: CPT

## 2023-10-05 PROCEDURE — 84484 ASSAY OF TROPONIN QUANT: CPT

## 2023-10-05 PROCEDURE — 94640 AIRWAY INHALATION TREATMENT: CPT

## 2023-10-05 PROCEDURE — 6370000000 HC RX 637 (ALT 250 FOR IP): Performed by: EMERGENCY MEDICINE

## 2023-10-05 PROCEDURE — 93010 ELECTROCARDIOGRAM REPORT: CPT | Performed by: INTERNAL MEDICINE

## 2023-10-05 PROCEDURE — 85025 COMPLETE CBC W/AUTO DIFF WBC: CPT

## 2023-10-05 PROCEDURE — 99285 EMERGENCY DEPT VISIT HI MDM: CPT

## 2023-10-05 PROCEDURE — 94664 DEMO&/EVAL PT USE INHALER: CPT

## 2023-10-05 RX ORDER — IPRATROPIUM BROMIDE AND ALBUTEROL SULFATE 2.5; .5 MG/3ML; MG/3ML
1 SOLUTION RESPIRATORY (INHALATION) ONCE
Status: COMPLETED | OUTPATIENT
Start: 2023-10-05 | End: 2023-10-05

## 2023-10-05 RX ORDER — ALBUTEROL SULFATE 2.5 MG/3ML
2.5 SOLUTION RESPIRATORY (INHALATION) EVERY 6 HOURS PRN
Qty: 25 EACH | Refills: 3 | Status: SHIPPED | OUTPATIENT
Start: 2023-10-05

## 2023-10-05 RX ADMIN — IPRATROPIUM BROMIDE AND ALBUTEROL SULFATE 1 DOSE: .5; 3 SOLUTION RESPIRATORY (INHALATION) at 09:24

## 2023-10-05 RX ADMIN — IPRATROPIUM BROMIDE AND ALBUTEROL SULFATE 1 DOSE: .5; 3 SOLUTION RESPIRATORY (INHALATION) at 12:43

## 2023-10-05 ASSESSMENT — PAIN - FUNCTIONAL ASSESSMENT: PAIN_FUNCTIONAL_ASSESSMENT: NONE - DENIES PAIN

## 2023-10-05 NOTE — TELEPHONE ENCOUNTER
Patient calls office stating she was in the ER and transferred to have a heart cath done. Patient was discharged 10/4/2023 and now has shortness of breath again and can hardly catch her breath. Writer advised patient to go back to the ER for evaluation.

## 2023-10-05 NOTE — DISCHARGE INSTRUCTIONS
You must continue current medications as prescribed. Albuterol aerosol solution nebulizer every 6-8 hours as needed for cough wheezing or shortness of breath. Must seek medical attention immediately should he develop any worsening symptoms or any other acute concerns.

## 2023-10-06 ENCOUNTER — APPOINTMENT (OUTPATIENT)
Dept: GENERAL RADIOLOGY | Age: 58
DRG: 194 | End: 2023-10-06
Payer: COMMERCIAL

## 2023-10-06 ENCOUNTER — HOSPITAL ENCOUNTER (INPATIENT)
Age: 58
LOS: 2 days | Discharge: HOME | DRG: 194 | End: 2023-10-08
Attending: EMERGENCY MEDICINE | Admitting: INTERNAL MEDICINE
Payer: COMMERCIAL

## 2023-10-06 ENCOUNTER — TELEPHONE (OUTPATIENT)
Dept: PRIMARY CARE CLINIC | Age: 58
End: 2023-10-06

## 2023-10-06 DIAGNOSIS — I50.23 ACUTE ON CHRONIC SYSTOLIC CONGESTIVE HEART FAILURE (HCC): Primary | ICD-10-CM

## 2023-10-06 DIAGNOSIS — I50.21 ACUTE SYSTOLIC CHF (CONGESTIVE HEART FAILURE), NYHA CLASS 3 (HCC): ICD-10-CM

## 2023-10-06 LAB
ANION GAP SERPL CALCULATED.3IONS-SCNC: 14 MMOL/L (ref 9–17)
BASOPHILS # BLD: 0.05 K/UL (ref 0–0.2)
BASOPHILS NFR BLD: 1 % (ref 0–2)
BNP SERPL-MCNC: 3040 PG/ML
BUN SERPL-MCNC: 11 MG/DL (ref 6–20)
BUN/CREAT SERPL: 16 (ref 9–20)
CALCIUM SERPL-MCNC: 9.1 MG/DL (ref 8.6–10.4)
CHLORIDE SERPL-SCNC: 98 MMOL/L (ref 98–107)
CO2 SERPL-SCNC: 22 MMOL/L (ref 20–31)
CREAT SERPL-MCNC: 0.7 MG/DL (ref 0.5–0.9)
EKG ATRIAL RATE: 84 BPM
EKG P AXIS: 50 DEGREES
EKG P-R INTERVAL: 138 MS
EKG Q-T INTERVAL: 396 MS
EKG QRS DURATION: 84 MS
EKG QTC CALCULATION (BAZETT): 467 MS
EKG R AXIS: 42 DEGREES
EKG T AXIS: 129 DEGREES
EKG VENTRICULAR RATE: 84 BPM
EOSINOPHIL # BLD: 0.11 K/UL (ref 0–0.44)
EOSINOPHILS RELATIVE PERCENT: 1 % (ref 1–4)
ERYTHROCYTE [DISTWIDTH] IN BLOOD BY AUTOMATED COUNT: 14.9 % (ref 11.8–14.4)
GFR SERPL CREATININE-BSD FRML MDRD: >60 ML/MIN/1.73M2
GLUCOSE BLD-MCNC: 255 MG/DL (ref 74–100)
GLUCOSE SERPL-MCNC: 259 MG/DL (ref 70–99)
HCT VFR BLD AUTO: 38.7 % (ref 36.3–47.1)
HGB BLD-MCNC: 12.5 G/DL (ref 11.9–15.1)
IMM GRANULOCYTES # BLD AUTO: 0.09 K/UL (ref 0–0.3)
IMM GRANULOCYTES NFR BLD: 1 %
LYMPHOCYTES NFR BLD: 1.93 K/UL (ref 1.1–3.7)
LYMPHOCYTES RELATIVE PERCENT: 20 % (ref 24–43)
MCH RBC QN AUTO: 24.9 PG (ref 25.2–33.5)
MCHC RBC AUTO-ENTMCNC: 32.3 G/DL (ref 28.4–34.8)
MCV RBC AUTO: 76.9 FL (ref 82.6–102.9)
MONOCYTES NFR BLD: 0.36 K/UL (ref 0.1–1.2)
MONOCYTES NFR BLD: 4 % (ref 3–12)
NEUTROPHILS NFR BLD: 73 % (ref 36–65)
NEUTS SEG NFR BLD: 7.24 K/UL (ref 1.5–8.1)
NRBC BLD-RTO: 0 PER 100 WBC
PLATELET # BLD AUTO: 360 K/UL (ref 138–453)
PMV BLD AUTO: 9.2 FL (ref 8.1–13.5)
POTASSIUM SERPL-SCNC: 4.3 MMOL/L (ref 3.7–5.3)
RBC # BLD AUTO: 5.03 M/UL (ref 3.95–5.11)
SODIUM SERPL-SCNC: 134 MMOL/L (ref 135–144)
TROPONIN I SERPL HS-MCNC: 124 NG/L (ref 0–14)
TROPONIN I SERPL HS-MCNC: 133 NG/L (ref 0–14)
WBC OTHER # BLD: 9.8 K/UL (ref 3.5–11.3)

## 2023-10-06 PROCEDURE — 85025 COMPLETE CBC W/AUTO DIFF WBC: CPT

## 2023-10-06 PROCEDURE — 94761 N-INVAS EAR/PLS OXIMETRY MLT: CPT

## 2023-10-06 PROCEDURE — 6370000000 HC RX 637 (ALT 250 FOR IP): Performed by: INTERNAL MEDICINE

## 2023-10-06 PROCEDURE — 94640 AIRWAY INHALATION TREATMENT: CPT

## 2023-10-06 PROCEDURE — 6360000002 HC RX W HCPCS: Performed by: EMERGENCY MEDICINE

## 2023-10-06 PROCEDURE — 96374 THER/PROPH/DIAG INJ IV PUSH: CPT

## 2023-10-06 PROCEDURE — 2580000003 HC RX 258: Performed by: INTERNAL MEDICINE

## 2023-10-06 PROCEDURE — 82947 ASSAY GLUCOSE BLOOD QUANT: CPT

## 2023-10-06 PROCEDURE — 71045 X-RAY EXAM CHEST 1 VIEW: CPT

## 2023-10-06 PROCEDURE — 93010 ELECTROCARDIOGRAM REPORT: CPT | Performed by: INTERNAL MEDICINE

## 2023-10-06 PROCEDURE — 36415 COLL VENOUS BLD VENIPUNCTURE: CPT

## 2023-10-06 PROCEDURE — 83880 ASSAY OF NATRIURETIC PEPTIDE: CPT

## 2023-10-06 PROCEDURE — 94664 DEMO&/EVAL PT USE INHALER: CPT

## 2023-10-06 PROCEDURE — 80048 BASIC METABOLIC PNL TOTAL CA: CPT

## 2023-10-06 PROCEDURE — 84484 ASSAY OF TROPONIN QUANT: CPT

## 2023-10-06 PROCEDURE — 1200000000 HC SEMI PRIVATE

## 2023-10-06 PROCEDURE — 93005 ELECTROCARDIOGRAM TRACING: CPT | Performed by: EMERGENCY MEDICINE

## 2023-10-06 PROCEDURE — 99285 EMERGENCY DEPT VISIT HI MDM: CPT

## 2023-10-06 RX ORDER — ACETAMINOPHEN 650 MG/1
650 SUPPOSITORY RECTAL EVERY 6 HOURS PRN
Status: DISCONTINUED | OUTPATIENT
Start: 2023-10-06 | End: 2023-10-08 | Stop reason: HOSPADM

## 2023-10-06 RX ORDER — POLYETHYLENE GLYCOL 3350 17 G/17G
17 POWDER, FOR SOLUTION ORAL DAILY PRN
Status: DISCONTINUED | OUTPATIENT
Start: 2023-10-06 | End: 2023-10-08 | Stop reason: HOSPADM

## 2023-10-06 RX ORDER — EZETIMIBE 10 MG/1
10 TABLET ORAL NIGHTLY
Status: DISCONTINUED | OUTPATIENT
Start: 2023-10-06 | End: 2023-10-08 | Stop reason: HOSPADM

## 2023-10-06 RX ORDER — FLUDROCORTISONE ACETATE 0.1 MG/1
0.3 TABLET ORAL DAILY
Status: DISCONTINUED | OUTPATIENT
Start: 2023-10-07 | End: 2023-10-08 | Stop reason: HOSPADM

## 2023-10-06 RX ORDER — LOSARTAN POTASSIUM 25 MG/1
25 TABLET ORAL DAILY
Status: DISCONTINUED | OUTPATIENT
Start: 2023-10-06 | End: 2023-10-08 | Stop reason: HOSPADM

## 2023-10-06 RX ORDER — ASPIRIN 81 MG/1
81 TABLET ORAL DAILY
Status: DISCONTINUED | OUTPATIENT
Start: 2023-10-06 | End: 2023-10-08 | Stop reason: HOSPADM

## 2023-10-06 RX ORDER — ONDANSETRON 2 MG/ML
4 INJECTION INTRAMUSCULAR; INTRAVENOUS EVERY 6 HOURS PRN
Status: DISCONTINUED | OUTPATIENT
Start: 2023-10-06 | End: 2023-10-08 | Stop reason: HOSPADM

## 2023-10-06 RX ORDER — GABAPENTIN 400 MG/1
800 CAPSULE ORAL 3 TIMES DAILY
Status: DISCONTINUED | OUTPATIENT
Start: 2023-10-06 | End: 2023-10-08 | Stop reason: HOSPADM

## 2023-10-06 RX ORDER — SODIUM CHLORIDE 0.9 % (FLUSH) 0.9 %
5-40 SYRINGE (ML) INJECTION EVERY 12 HOURS SCHEDULED
Status: DISCONTINUED | OUTPATIENT
Start: 2023-10-06 | End: 2023-10-08 | Stop reason: HOSPADM

## 2023-10-06 RX ORDER — ONDANSETRON 4 MG/1
4 TABLET, ORALLY DISINTEGRATING ORAL EVERY 8 HOURS PRN
Status: DISCONTINUED | OUTPATIENT
Start: 2023-10-06 | End: 2023-10-08 | Stop reason: HOSPADM

## 2023-10-06 RX ORDER — SODIUM CHLORIDE 9 MG/ML
INJECTION, SOLUTION INTRAVENOUS PRN
Status: DISCONTINUED | OUTPATIENT
Start: 2023-10-06 | End: 2023-10-08 | Stop reason: HOSPADM

## 2023-10-06 RX ORDER — ALBUTEROL SULFATE 90 UG/1
2 AEROSOL, METERED RESPIRATORY (INHALATION)
Status: DISCONTINUED | OUTPATIENT
Start: 2023-10-07 | End: 2023-10-08 | Stop reason: HOSPADM

## 2023-10-06 RX ORDER — ALBUTEROL SULFATE 90 UG/1
2 AEROSOL, METERED RESPIRATORY (INHALATION) EVERY 6 HOURS PRN
Status: DISCONTINUED | OUTPATIENT
Start: 2023-10-06 | End: 2023-10-06

## 2023-10-06 RX ORDER — ACETAMINOPHEN 325 MG/1
650 TABLET ORAL EVERY 6 HOURS PRN
Status: DISCONTINUED | OUTPATIENT
Start: 2023-10-06 | End: 2023-10-08 | Stop reason: HOSPADM

## 2023-10-06 RX ORDER — PANTOPRAZOLE SODIUM 40 MG/1
40 TABLET, DELAYED RELEASE ORAL
Status: DISCONTINUED | OUTPATIENT
Start: 2023-10-07 | End: 2023-10-08 | Stop reason: HOSPADM

## 2023-10-06 RX ORDER — FUROSEMIDE 10 MG/ML
20 INJECTION INTRAMUSCULAR; INTRAVENOUS ONCE
Status: COMPLETED | OUTPATIENT
Start: 2023-10-06 | End: 2023-10-06

## 2023-10-06 RX ORDER — SODIUM CHLORIDE 0.9 % (FLUSH) 0.9 %
10 SYRINGE (ML) INJECTION PRN
Status: DISCONTINUED | OUTPATIENT
Start: 2023-10-06 | End: 2023-10-08 | Stop reason: HOSPADM

## 2023-10-06 RX ORDER — ATORVASTATIN CALCIUM 40 MG/1
80 TABLET, FILM COATED ORAL DAILY
Status: DISCONTINUED | OUTPATIENT
Start: 2023-10-06 | End: 2023-10-08 | Stop reason: HOSPADM

## 2023-10-06 RX ORDER — METFORMIN HYDROCHLORIDE 500 MG/1
1000 TABLET, EXTENDED RELEASE ORAL
Status: DISCONTINUED | OUTPATIENT
Start: 2023-10-07 | End: 2023-10-08 | Stop reason: HOSPADM

## 2023-10-06 RX ORDER — PRASUGREL 10 MG/1
10 TABLET, FILM COATED ORAL DAILY
Status: DISCONTINUED | OUTPATIENT
Start: 2023-10-06 | End: 2023-10-08 | Stop reason: HOSPADM

## 2023-10-06 RX ORDER — ALBUTEROL SULFATE 90 UG/1
2 AEROSOL, METERED RESPIRATORY (INHALATION) EVERY 4 HOURS PRN
Status: DISCONTINUED | OUTPATIENT
Start: 2023-10-06 | End: 2023-10-08 | Stop reason: HOSPADM

## 2023-10-06 RX ORDER — LATANOPROST 50 UG/ML
1 SOLUTION/ DROPS OPHTHALMIC NIGHTLY
Status: DISCONTINUED | OUTPATIENT
Start: 2023-10-06 | End: 2023-10-08 | Stop reason: HOSPADM

## 2023-10-06 RX ORDER — FUROSEMIDE 20 MG/1
20 TABLET ORAL DAILY
Status: DISCONTINUED | OUTPATIENT
Start: 2023-10-07 | End: 2023-10-08 | Stop reason: HOSPADM

## 2023-10-06 RX ADMIN — FUROSEMIDE 20 MG: 10 INJECTION, SOLUTION INTRAMUSCULAR; INTRAVENOUS at 14:50

## 2023-10-06 RX ADMIN — ALBUTEROL SULFATE 2 PUFF: 90 AEROSOL, METERED RESPIRATORY (INHALATION) at 20:16

## 2023-10-06 RX ADMIN — SODIUM CHLORIDE, PRESERVATIVE FREE 10 ML: 5 INJECTION INTRAVENOUS at 21:05

## 2023-10-06 RX ADMIN — EZETIMIBE 10 MG: 10 TABLET ORAL at 21:05

## 2023-10-06 RX ADMIN — GABAPENTIN 800 MG: 400 CAPSULE ORAL at 21:05

## 2023-10-06 ASSESSMENT — ENCOUNTER SYMPTOMS
ABDOMINAL DISTENTION: 0
SHORTNESS OF BREATH: 1
BACK PAIN: 0
COUGH: 1
SORE THROAT: 0

## 2023-10-06 ASSESSMENT — PAIN - FUNCTIONAL ASSESSMENT: PAIN_FUNCTIONAL_ASSESSMENT: NONE - DENIES PAIN

## 2023-10-06 NOTE — ED NOTES
Contacted Dr. Jonathan Shay as hospitalist per Dr. Jonathan Snider request.     Siddharth Wakonda  10/06/23 2299

## 2023-10-06 NOTE — TELEPHONE ENCOUNTER
Patient called and stated that she is having SOB and could barely talk on the phone I told her to go back out to the ED. She voiced understanding.

## 2023-10-06 NOTE — ED NOTES
Bedside commode placed for patient safety, patient assisted to commode. Patient tolerated well.      Gilberto Sutton RN  10/06/23 9451

## 2023-10-07 LAB
ALBUMIN SERPL-MCNC: 3.8 G/DL (ref 3.5–5.2)
ALBUMIN/GLOB SERPL: 1.2 {RATIO} (ref 1–2.5)
ALP SERPL-CCNC: 94 U/L (ref 35–104)
ALT SERPL-CCNC: 10 U/L (ref 5–33)
ANION GAP SERPL CALCULATED.3IONS-SCNC: 12 MMOL/L (ref 9–17)
AST SERPL-CCNC: 12 U/L
BASOPHILS # BLD: 0.06 K/UL (ref 0–0.2)
BASOPHILS NFR BLD: 1 % (ref 0–2)
BILIRUB SERPL-MCNC: 0.6 MG/DL (ref 0.3–1.2)
BUN SERPL-MCNC: 11 MG/DL (ref 6–20)
BUN/CREAT SERPL: 16 (ref 9–20)
CALCIUM SERPL-MCNC: 8.8 MG/DL (ref 8.6–10.4)
CHLORIDE SERPL-SCNC: 101 MMOL/L (ref 98–107)
CO2 SERPL-SCNC: 25 MMOL/L (ref 20–31)
CREAT SERPL-MCNC: 0.7 MG/DL (ref 0.5–0.9)
EOSINOPHIL # BLD: 0.19 K/UL (ref 0–0.44)
EOSINOPHILS RELATIVE PERCENT: 2 % (ref 1–4)
ERYTHROCYTE [DISTWIDTH] IN BLOOD BY AUTOMATED COUNT: 15.1 % (ref 11.8–14.4)
GFR SERPL CREATININE-BSD FRML MDRD: >60 ML/MIN/1.73M2
GLUCOSE BLD-MCNC: 101 MG/DL (ref 74–100)
GLUCOSE BLD-MCNC: 184 MG/DL (ref 74–100)
GLUCOSE BLD-MCNC: 192 MG/DL (ref 74–100)
GLUCOSE BLD-MCNC: 240 MG/DL (ref 74–100)
GLUCOSE SERPL-MCNC: 210 MG/DL (ref 70–99)
HCT VFR BLD AUTO: 37 % (ref 36.3–47.1)
HGB BLD-MCNC: 11.9 G/DL (ref 11.9–15.1)
IMM GRANULOCYTES # BLD AUTO: 0.04 K/UL (ref 0–0.3)
IMM GRANULOCYTES NFR BLD: 1 %
LYMPHOCYTES NFR BLD: 1.97 K/UL (ref 1.1–3.7)
LYMPHOCYTES RELATIVE PERCENT: 24 % (ref 24–43)
MCH RBC QN AUTO: 25 PG (ref 25.2–33.5)
MCHC RBC AUTO-ENTMCNC: 32.2 G/DL (ref 28.4–34.8)
MCV RBC AUTO: 77.7 FL (ref 82.6–102.9)
MONOCYTES NFR BLD: 0.4 K/UL (ref 0.1–1.2)
MONOCYTES NFR BLD: 5 % (ref 3–12)
NEUTROPHILS NFR BLD: 67 % (ref 36–65)
NEUTS SEG NFR BLD: 5.41 K/UL (ref 1.5–8.1)
NRBC BLD-RTO: 0 PER 100 WBC
PLATELET # BLD AUTO: 330 K/UL (ref 138–453)
PMV BLD AUTO: 9.7 FL (ref 8.1–13.5)
POTASSIUM SERPL-SCNC: 3.9 MMOL/L (ref 3.7–5.3)
PROT SERPL-MCNC: 6.9 G/DL (ref 6.4–8.3)
RBC # BLD AUTO: 4.76 M/UL (ref 3.95–5.11)
SODIUM SERPL-SCNC: 138 MMOL/L (ref 135–144)
WBC OTHER # BLD: 8.1 K/UL (ref 3.5–11.3)

## 2023-10-07 PROCEDURE — 80053 COMPREHEN METABOLIC PANEL: CPT

## 2023-10-07 PROCEDURE — 6370000000 HC RX 637 (ALT 250 FOR IP): Performed by: STUDENT IN AN ORGANIZED HEALTH CARE EDUCATION/TRAINING PROGRAM

## 2023-10-07 PROCEDURE — 94640 AIRWAY INHALATION TREATMENT: CPT

## 2023-10-07 PROCEDURE — 1200000000 HC SEMI PRIVATE

## 2023-10-07 PROCEDURE — 82947 ASSAY GLUCOSE BLOOD QUANT: CPT

## 2023-10-07 PROCEDURE — 6370000000 HC RX 637 (ALT 250 FOR IP): Performed by: INTERNAL MEDICINE

## 2023-10-07 PROCEDURE — 85025 COMPLETE CBC W/AUTO DIFF WBC: CPT

## 2023-10-07 PROCEDURE — 94664 DEMO&/EVAL PT USE INHALER: CPT

## 2023-10-07 PROCEDURE — 36415 COLL VENOUS BLD VENIPUNCTURE: CPT

## 2023-10-07 PROCEDURE — 94761 N-INVAS EAR/PLS OXIMETRY MLT: CPT

## 2023-10-07 PROCEDURE — 2580000003 HC RX 258: Performed by: INTERNAL MEDICINE

## 2023-10-07 PROCEDURE — 94669 MECHANICAL CHEST WALL OSCILL: CPT

## 2023-10-07 RX ORDER — DEXTROSE MONOHYDRATE 100 MG/ML
INJECTION, SOLUTION INTRAVENOUS CONTINUOUS PRN
Status: DISCONTINUED | OUTPATIENT
Start: 2023-10-07 | End: 2023-10-08 | Stop reason: HOSPADM

## 2023-10-07 RX ORDER — GLUCAGON 1 MG/ML
1 KIT INJECTION PRN
Status: DISCONTINUED | OUTPATIENT
Start: 2023-10-07 | End: 2023-10-08 | Stop reason: HOSPADM

## 2023-10-07 RX ORDER — INSULIN LISPRO 100 [IU]/ML
0-4 INJECTION, SOLUTION INTRAVENOUS; SUBCUTANEOUS
Status: DISCONTINUED | OUTPATIENT
Start: 2023-10-07 | End: 2023-10-08 | Stop reason: HOSPADM

## 2023-10-07 RX ORDER — INSULIN LISPRO 100 [IU]/ML
0-4 INJECTION, SOLUTION INTRAVENOUS; SUBCUTANEOUS NIGHTLY
Status: DISCONTINUED | OUTPATIENT
Start: 2023-10-07 | End: 2023-10-08 | Stop reason: HOSPADM

## 2023-10-07 RX ADMIN — FLUDROCORTISONE ACETATE 0.3 MG: 0.1 TABLET ORAL at 08:39

## 2023-10-07 RX ADMIN — EZETIMIBE 10 MG: 10 TABLET ORAL at 20:33

## 2023-10-07 RX ADMIN — GABAPENTIN 800 MG: 400 CAPSULE ORAL at 14:06

## 2023-10-07 RX ADMIN — METOPROLOL SUCCINATE 150 MG: 100 TABLET, EXTENDED RELEASE ORAL at 08:39

## 2023-10-07 RX ADMIN — INSULIN HUMAN 60 UNITS: 100 INJECTION, SOLUTION PARENTERAL at 08:58

## 2023-10-07 RX ADMIN — ASPIRIN 81 MG: 81 TABLET, COATED ORAL at 08:30

## 2023-10-07 RX ADMIN — GABAPENTIN 800 MG: 400 CAPSULE ORAL at 08:31

## 2023-10-07 RX ADMIN — PRASUGREL 10 MG: 10 TABLET, FILM COATED ORAL at 08:30

## 2023-10-07 RX ADMIN — SODIUM CHLORIDE, PRESERVATIVE FREE 10 ML: 5 INJECTION INTRAVENOUS at 20:34

## 2023-10-07 RX ADMIN — GABAPENTIN 800 MG: 400 CAPSULE ORAL at 20:33

## 2023-10-07 RX ADMIN — PANTOPRAZOLE SODIUM 40 MG: 40 TABLET, DELAYED RELEASE ORAL at 08:38

## 2023-10-07 RX ADMIN — FUROSEMIDE 20 MG: 20 TABLET ORAL at 08:30

## 2023-10-07 RX ADMIN — SODIUM CHLORIDE, PRESERVATIVE FREE 10 ML: 5 INJECTION INTRAVENOUS at 08:29

## 2023-10-07 RX ADMIN — ALBUTEROL SULFATE 2 PUFF: 90 AEROSOL, METERED RESPIRATORY (INHALATION) at 20:41

## 2023-10-07 RX ADMIN — ALBUTEROL SULFATE 2 PUFF: 90 AEROSOL, METERED RESPIRATORY (INHALATION) at 16:11

## 2023-10-07 RX ADMIN — METFORMIN HYDROCHLORIDE 1000 MG: 500 TABLET, EXTENDED RELEASE ORAL at 08:30

## 2023-10-07 RX ADMIN — LOSARTAN POTASSIUM 25 MG: 25 TABLET, FILM COATED ORAL at 08:31

## 2023-10-07 RX ADMIN — ALBUTEROL SULFATE 2 PUFF: 90 AEROSOL, METERED RESPIRATORY (INHALATION) at 10:59

## 2023-10-07 RX ADMIN — ATORVASTATIN CALCIUM 80 MG: 40 TABLET, FILM COATED ORAL at 08:30

## 2023-10-07 ASSESSMENT — PAIN SCALES - GENERAL: PAINLEVEL_OUTOF10: 0

## 2023-10-07 NOTE — H&P
Kodak Johnson M.D. Internal Medicine History and Phyisical    Patient: Nafisa Short  Date of Admission: 10/6/2023  1:50 PM  Date of Evaluation: 10/7/2023      Patient:  Nafisa Short  MRN: [id number]    Chief Complaint:    Chief Complaint   Patient presents with    Shortness of Breath     Shortness of breath for the past week. Seen here in ED yesterday. Hx of COPD. Pt was a rapid response for sob. History Obtained From:  patient, electronic medical record    PCP: Maria R Plasencia APRN - CNP    History of Present Illness: The patient is a 62 y.o. female who presents with shortness of breath for the past week. Patient was in the emergency department twice this week. She was here on Monday and was subsequently transferred to [facility] for elevated troponins. Over there she had angiogram performed which patient states that did not find any acute blockages but they did go in and exchange out the stent from the left main artery. There was approximately 99% stenosis in the Lcx. she got discharged home on Wednesday and then yesterday she was seen in the emergency department again with complaints of shortness of breath. Patient does have a history of COPD. She was given a nebulizer treatment here in the emergency department with some repeat labs and was sent home in a stable condition. Today she states that she woke up this morning and just could not get enough air. She denies any swelling in her lower extremities although she does mention that she has some orthopnea and her breathing gets worse when she lays down flat. She denies any previous history of congestive heart failure. She has had a slight cough that is nonproductive. She denies any fevers or chills.     Past Medical History:        Diagnosis Date    Anxiety     Asthma     CAD (coronary artery disease)     x1 stent 2010,x1 stent 2016, x1 stent May 2022    Cancer [facility]) 06/2022    right breast cancer    Clinical trial participant at discharge

## 2023-10-07 NOTE — PLAN OF CARE
Problem: Discharge Planning  Goal: Discharge to home or other facility with appropriate resources  10/7/2023 1023 by Nereida Genao RN  Outcome: Progressing  Flowsheets (Taken 10/7/2023 0715)  Discharge to home or other facility with appropriate resources: Identify barriers to discharge with patient and caregiver  10/7/2023 0429 by Neri Bass RN  Outcome: Progressing  Flowsheets (Taken 10/7/2023 3787)  Discharge to home or other facility with appropriate resources: Identify barriers to discharge with patient and caregiver     Problem: Safety - Adult  Goal: Free from fall injury  10/7/2023 1023 by Nereida Genao RN  Outcome: Progressing  Flowsheets (Taken 10/7/2023 1022)  Free From Fall Injury: Instruct family/caregiver on patient safety  10/7/2023 0429 by Neri Bass RN  Outcome: Progressing  Flowsheets (Taken 10/7/2023 0429)  Free From Fall Injury: Instruct family/caregiver on patient safety     Problem: Cardiovascular - Adult  Goal: Maintains optimal cardiac output and hemodynamic stability  10/7/2023 1023 by Nereida Genao RN  Outcome: Progressing  Flowsheets (Taken 10/7/2023 0715)  Maintains optimal cardiac output and hemodynamic stability: Monitor blood pressure and heart rate  10/7/2023 0429 by Neri Bass RN  Outcome: Progressing  Flowsheets (Taken 10/7/2023 3192)  Maintains optimal cardiac output and hemodynamic stability:   Monitor blood pressure and heart rate   Monitor urine output and notify Licensed Independent Practitioner for values outside of normal range   Assess for signs of decreased cardiac output  Goal: Absence of cardiac dysrhythmias or at baseline  10/7/2023 1023 by Nereida Genao RN  Outcome: Progressing  Flowsheets (Taken 10/7/2023 0715)  Absence of cardiac dysrhythmias or at baseline: Monitor cardiac rate and rhythm  10/7/2023 0429 by Neri Bass RN  Outcome: Progressing  Flowsheets (Taken 10/7/2023 0429)  Absence of cardiac dysrhythmias or at baseline:

## 2023-10-07 NOTE — PLAN OF CARE
Problem: Discharge Planning  Goal: Discharge to home or other facility with appropriate resources  Outcome: Progressing  Flowsheets (Taken 10/7/2023 0429)  Discharge to home or other facility with appropriate resources: Identify barriers to discharge with patient and caregiver     Problem: Safety - Adult  Goal: Free from fall injury  Outcome: Progressing  Flowsheets (Taken 10/7/2023 0429)  Free From Fall Injury: Instruct family/caregiver on patient safety     Problem: Cardiovascular - Adult  Goal: Maintains optimal cardiac output and hemodynamic stability  Outcome: Progressing  Flowsheets (Taken 10/7/2023 0429)  Maintains optimal cardiac output and hemodynamic stability:   Monitor blood pressure and heart rate   Monitor urine output and notify Licensed Independent Practitioner for values outside of normal range   Assess for signs of decreased cardiac output  Goal: Absence of cardiac dysrhythmias or at baseline  Outcome: Progressing  Flowsheets (Taken 10/7/2023 0429)  Absence of cardiac dysrhythmias or at baseline:   Monitor cardiac rate and rhythm   Assess for signs of decreased cardiac output

## 2023-10-08 VITALS
RESPIRATION RATE: 18 BRPM | OXYGEN SATURATION: 95 % | HEIGHT: 63 IN | WEIGHT: 223 LBS | TEMPERATURE: 96.7 F | SYSTOLIC BLOOD PRESSURE: 121 MMHG | DIASTOLIC BLOOD PRESSURE: 62 MMHG | BODY MASS INDEX: 39.51 KG/M2 | HEART RATE: 67 BPM

## 2023-10-08 LAB
ALBUMIN SERPL-MCNC: 3.6 G/DL (ref 3.5–5.2)
ALBUMIN/GLOB SERPL: 1.2 {RATIO} (ref 1–2.5)
ALP SERPL-CCNC: 86 U/L (ref 35–104)
ALT SERPL-CCNC: 10 U/L (ref 5–33)
ANION GAP SERPL CALCULATED.3IONS-SCNC: 11 MMOL/L (ref 9–17)
AST SERPL-CCNC: 11 U/L
BASOPHILS # BLD: 0.07 K/UL (ref 0–0.2)
BASOPHILS NFR BLD: 1 % (ref 0–2)
BILIRUB SERPL-MCNC: 0.7 MG/DL (ref 0.3–1.2)
BUN SERPL-MCNC: 15 MG/DL (ref 6–20)
BUN/CREAT SERPL: 21 (ref 9–20)
CALCIUM SERPL-MCNC: 9.1 MG/DL (ref 8.6–10.4)
CHLORIDE SERPL-SCNC: 102 MMOL/L (ref 98–107)
CO2 SERPL-SCNC: 25 MMOL/L (ref 20–31)
CREAT SERPL-MCNC: 0.7 MG/DL (ref 0.5–0.9)
EOSINOPHIL # BLD: 0.18 K/UL (ref 0–0.44)
EOSINOPHILS RELATIVE PERCENT: 2 % (ref 1–4)
ERYTHROCYTE [DISTWIDTH] IN BLOOD BY AUTOMATED COUNT: 15 % (ref 11.8–14.4)
GFR SERPL CREATININE-BSD FRML MDRD: >60 ML/MIN/1.73M2
GLUCOSE BLD-MCNC: 211 MG/DL (ref 74–100)
GLUCOSE BLD-MCNC: 244 MG/DL (ref 74–100)
GLUCOSE SERPL-MCNC: 200 MG/DL (ref 70–99)
HCT VFR BLD AUTO: 36.1 % (ref 36.3–47.1)
HGB BLD-MCNC: 11.9 G/DL (ref 11.9–15.1)
IMM GRANULOCYTES # BLD AUTO: 0.06 K/UL (ref 0–0.3)
IMM GRANULOCYTES NFR BLD: 1 %
LYMPHOCYTES NFR BLD: 1.83 K/UL (ref 1.1–3.7)
LYMPHOCYTES RELATIVE PERCENT: 20 % (ref 24–43)
MCH RBC QN AUTO: 25.5 PG (ref 25.2–33.5)
MCHC RBC AUTO-ENTMCNC: 33 G/DL (ref 28.4–34.8)
MCV RBC AUTO: 77.3 FL (ref 82.6–102.9)
MONOCYTES NFR BLD: 0.51 K/UL (ref 0.1–1.2)
MONOCYTES NFR BLD: 6 % (ref 3–12)
NEUTROPHILS NFR BLD: 70 % (ref 36–65)
NEUTS SEG NFR BLD: 6.54 K/UL (ref 1.5–8.1)
NRBC BLD-RTO: 0 PER 100 WBC
PLATELET # BLD AUTO: 314 K/UL (ref 138–453)
PMV BLD AUTO: 9.3 FL (ref 8.1–13.5)
POTASSIUM SERPL-SCNC: 4.1 MMOL/L (ref 3.7–5.3)
PROT SERPL-MCNC: 6.7 G/DL (ref 6.4–8.3)
RBC # BLD AUTO: 4.67 M/UL (ref 3.95–5.11)
SODIUM SERPL-SCNC: 138 MMOL/L (ref 135–144)
WBC OTHER # BLD: 9.2 K/UL (ref 3.5–11.3)

## 2023-10-08 PROCEDURE — 36415 COLL VENOUS BLD VENIPUNCTURE: CPT

## 2023-10-08 PROCEDURE — 6370000000 HC RX 637 (ALT 250 FOR IP): Performed by: INTERNAL MEDICINE

## 2023-10-08 PROCEDURE — 85025 COMPLETE CBC W/AUTO DIFF WBC: CPT

## 2023-10-08 PROCEDURE — 2580000003 HC RX 258: Performed by: INTERNAL MEDICINE

## 2023-10-08 PROCEDURE — 80053 COMPREHEN METABOLIC PANEL: CPT

## 2023-10-08 PROCEDURE — 82947 ASSAY GLUCOSE BLOOD QUANT: CPT

## 2023-10-08 PROCEDURE — 94761 N-INVAS EAR/PLS OXIMETRY MLT: CPT

## 2023-10-08 RX ORDER — FUROSEMIDE 20 MG/1
20 TABLET ORAL DAILY
Qty: 60 TABLET | Refills: 3 | Status: SHIPPED | OUTPATIENT
Start: 2023-10-08

## 2023-10-08 RX ORDER — LOSARTAN POTASSIUM 25 MG/1
12.5 TABLET ORAL DAILY
Qty: 30 TABLET | Refills: 3 | COMMUNITY
Start: 2023-10-08

## 2023-10-08 RX ADMIN — LOSARTAN POTASSIUM 25 MG: 25 TABLET, FILM COATED ORAL at 08:21

## 2023-10-08 RX ADMIN — INSULIN HUMAN 60 UNITS: 100 INJECTION, SOLUTION PARENTERAL at 08:20

## 2023-10-08 RX ADMIN — FLUDROCORTISONE ACETATE 0.3 MG: 0.1 TABLET ORAL at 08:22

## 2023-10-08 RX ADMIN — METOPROLOL SUCCINATE 150 MG: 100 TABLET, EXTENDED RELEASE ORAL at 08:21

## 2023-10-08 RX ADMIN — PRASUGREL 10 MG: 10 TABLET, FILM COATED ORAL at 08:22

## 2023-10-08 RX ADMIN — SODIUM CHLORIDE, PRESERVATIVE FREE 10 ML: 5 INJECTION INTRAVENOUS at 09:00

## 2023-10-08 RX ADMIN — PANTOPRAZOLE SODIUM 40 MG: 40 TABLET, DELAYED RELEASE ORAL at 08:22

## 2023-10-08 RX ADMIN — GABAPENTIN 800 MG: 400 CAPSULE ORAL at 08:22

## 2023-10-08 RX ADMIN — ASPIRIN 81 MG: 81 TABLET, COATED ORAL at 08:22

## 2023-10-08 RX ADMIN — FUROSEMIDE 20 MG: 20 TABLET ORAL at 08:21

## 2023-10-08 RX ADMIN — ATORVASTATIN CALCIUM 80 MG: 40 TABLET, FILM COATED ORAL at 08:20

## 2023-10-08 RX ADMIN — INSULIN LISPRO 1 UNITS: 100 INJECTION, SOLUTION INTRAVENOUS; SUBCUTANEOUS at 08:20

## 2023-10-08 RX ADMIN — METFORMIN HYDROCHLORIDE 1000 MG: 500 TABLET, EXTENDED RELEASE ORAL at 08:21

## 2023-10-08 ASSESSMENT — PAIN SCALES - GENERAL: PAINLEVEL_OUTOF10: 0

## 2023-10-08 NOTE — DISCHARGE INSTRUCTIONS
Weigh yourself daily and check your ankles for swelling. If you gain more than 5 pounds then you need to contact your primary care provider    Blood test every 2 weeks for 3 times looking for kidney function. This lab work is not fasting. Note losartan is 25 mg half a tablet daily. Your blood pressure was low and I do not want the full dosage.          Patient will be followed by Edil Plasencia APRN - CNP in 1-2 weeks

## 2023-10-08 NOTE — PLAN OF CARE
Problem: Discharge Planning  Goal: Discharge to home or other facility with appropriate resources  10/8/2023 1117 by Tammy Dawson RN  Outcome: Completed     Problem: Safety - Adult  Goal: Free from fall injury  10/8/2023 1117 by Tammy Dawson RN  Outcome: Completed     Problem: Cardiovascular - Adult  Goal: Maintains optimal cardiac output and hemodynamic stability  10/8/2023 1117 by Tammy Dawson RN  Outcome: Completed     Problem: Cardiovascular - Adult  Goal: Absence of cardiac dysrhythmias or at baseline  10/8/2023 1117 by Tammy Dawson RN  Outcome: Completed     Problem: Pain  Goal: Verbalizes/displays adequate comfort level or baseline comfort level  10/8/2023 1117 by Tammy Dawson RN  Outcome: Completed

## 2023-10-08 NOTE — PLAN OF CARE
Problem: Discharge Planning  Goal: Discharge to home or other facility with appropriate resources  10/7/2023 2257 by Anoop Cavazos RN  Outcome: Progressing  Flowsheets (Taken 10/7/2023 2257)  Discharge to home or other facility with appropriate resources: Identify barriers to discharge with patient and caregiver     Problem: Safety - Adult  Goal: Free from fall injury  10/7/2023 2257 by Anoop Cavazos RN  Outcome: Progressing  Flowsheets (Taken 10/7/2023 2257)  Free From Fall Injury: Instruct family/caregiver on patient safety     Problem: Cardiovascular - Adult  Goal: Maintains optimal cardiac output and hemodynamic stability  10/7/2023 2257 by Anoop Cavazos RN  Outcome: Progressing  Flowsheets (Taken 10/7/2023 2257)  Maintains optimal cardiac output and hemodynamic stability:   Monitor blood pressure and heart rate   Monitor urine output and notify Licensed Independent Practitioner for values outside of normal range   Administer fluid and/or volume expanders as ordered     Problem: Cardiovascular - Adult  Goal: Absence of cardiac dysrhythmias or at baseline  10/7/2023 2257 by Anoop Cavazos RN  Outcome: Progressing  Flowsheets (Taken 10/7/2023 2257)  Absence of cardiac dysrhythmias or at baseline: Monitor cardiac rate and rhythm     Problem: Pain  Goal: Verbalizes/displays adequate comfort level or baseline comfort level  Outcome: Progressing  Flowsheets (Taken 10/7/2023 2257)  Verbalizes/displays adequate comfort level or baseline comfort level:   Encourage patient to monitor pain and request assistance   Administer analgesics based on type and severity of pain and evaluate response   Consider cultural and social influences on pain and pain management   Assess pain using appropriate pain scale   Implement non-pharmacological measures as appropriate and evaluate response   Notify Licensed Independent Practitioner if interventions unsuccessful or patient reports new pain

## 2023-10-08 NOTE — PROGRESS NOTES
Evening medications given at this time. Patient took medications with no issues. Insulin not given for a blood sugar level of 192. Patient denies any further needs or concerns at this time. Call light and over bed table within reach. Side rails up times two.
Patient IV and telemetry were removed. Patient tolerated well. Discharge instructions reviewed with the patient including medication changes in detail. Education regarding side effects were addressed along with labs and appointments. All questions and concerns were answered. Unit number provided to the patient should they come up with any questions once they are home. Patient verbalizes understanding of instructions. All personal belonging are with patient. Patient was escorted out by wheelchair to personal vehicle.
Patient educated on call light, bed, lights, and room. Writer gone over plan of care and orders with patient. Questions answered. Patient educated on fluid restriction. Patient educated on using call light to call out for assistance or for using the bathroom. Patient acknowledged. Call light, bedside table and personal belongings within reach. Patient is alert and orientated. Patient is stable on feet and capable of moving about in the room independently. Patient encouraged to call out if patient is feeling lightheadness, unsteady or is in need of any assistance.
Patient in bed, awake. Reassessment completed at this time. Vitals taken and documented. Patient encouraged to ask questions. Patient denies pain or complaints. Call light and bed side table within reach. Side rails up times two.
Patient in bed, watching television. Family present at bedside. Patient educated on medication to be given tonight and physician's orders to be completed. Patient stated understanding. Patient encouraged to ask questions. Patient denies of any questions at this time. Vitals taken and documented. See flow sheet for details. Assessment completed and documented. Patient alert, oriented x4. Calm, pleasant. Speech clear. Lung sounds clear in upper bilateral lobes of lungs, right middle lobe of lung. Fine crackles heard in bilateral lower lobes of lungs. Abdomen obese, soft, non tender to palpation. Bowel sounds active in all four quadrants. No edema noted. Scattered ecchymosis and abrasions noted. Patient denies of pain, chest pain, numbness, tingling, or shortness of breath. Provided patient with HS snack at this time. Patient denies any further needs or concerns at this time. Call light and over bed table in reach. Side rails up times two.
RESPIRATORY ASSESSMENT PROTOCOL                                                                                              Patient Name: Lu Poon Room#: [id number] : 1965     Admitting diagnosis: Acute on chronic systolic congestive heart failure (HCC) [I51.89]  Acute systolic CHF (congestive heart failure), NYHA class 3 ([address]) [I50.21]       Medical History:   Past Medical History:   Diagnosis Date    Anxiety     Asthma     CAD (coronary artery disease)     x1 stent ,x1 stent , x1 stent May 2022    Cancer [facility]) 2022    right breast cancer    Clinical trial participant at discharge 3/31/16    COPD (chronic obstructive pulmonary disease) ([address] )     Depression     Diabetic neuropathy ([facility])     GERD (gastroesophageal reflux disease)     H/O cardiac catheterization 16    LMCA: Mild Irregularities 10-20%. LAD: Lesion on Prox LAD: Proximal subsection. 100% stenosis. LCx: Mild irregularities 10-20%. RCA: Mild irregularities 10-20%. Widely patent mid RCA stent. EF:60%. H/O cardiovascular stress test 10/07/2016    Overall results are most consistent with a low risk for significant CAD. H/O echocardiogram 10/05/2016    EF:>60%. Inferoseptal wall abnormal in its motion which is not unusal status post open heart surgery. Evidence of mild (grade I) diastolic dysfunction is seen. H/O tilt table evaluation 2016    Abnormal. PTs HR, Blood Pressure response and symptoms were most consistent with dysautonomia. Combined w/viligant maintenance of euvolemia and maintaining a moderate salt intake, pharmaciologic treatment w/ ProAmatine, SSRI such as Lexapro and/or Mestinon among other treatments have shown some effectiveness in the treatment of this condition.      History of cardiovascular stress test 2019    Abnormal. Small/moderate perfusion defect of mild/moderate intesity in the anterior and anterolateral regions during stress imaging which is most consistent w/ ischemia but may
Stan Score at bedside completing navigator with patient. Per Cecile BARAHONA patient arrived to floor at roughly [phone]. Telemetry has already been applied and vital signs were obtained.
Vitals and assessment completed at this time, see flowsheet for more details. Pt denies any SOB or plain at this time. No chest discomfort at this ttime, denies any numbness/tingling. All needs met at this time, call light within reach. Care ongoing.
Writer to bedside to complete morning assessment. Upon entry to room, pt up to chair, respirations regular while on room air. Vitals obtained and assessment completed, see flow sheet for details. Pt denies needs from writer at this time. Call light in reach. Care ongoing.
ory muscle use at rest. Abn.  resp. []  SOB at rest.   0   Bilateral Breath Sounds (BBS) []  Clear [x]  Diminish-ed bases  []  Diminish-ed t/o, or rales   []  Sporadic, scattered wheezes or rhonchi []  Persistentwheezes and, or absent BBS 1   Cough [x]  Strong, effective, & non-prod. []  Effective & prod. Less than 25 ml (2 TBSP) over past 24 hrs []  Ineffective & non-prod to less than 25 ML over past 24 hrs []  Ineffective and, or greater than 25 ml sputum prod. past 24 hrs. []  Nonspon- taneous; Requires suctioning 0   Pulmonary History  (PULM HX) []  No smoking and no chronic pulmonary history []  Former smoker. Quit over 12 mos. ago []  Current smoker or quit w/ in 12 mos [x]  Pulm. History and, or 20 pk/yr smoking hx []  Admitted w/ acute pulm. dx and, or has been admitted w/ pulm. dx 2 or more times over past 12 mos 3   Surgical History this Admit  (SURG HX) [x]  No surgery []  General surgery []  Lower abdominal []  Thoracic or upper abdominal   []  Thoracic w/ pulm. disease 0   Chest X-Ray (CXR)/CT Scan []  Clear or not applicable []  Not available [x]  Atelectasis or pleural effusions []  Localized infiltrate or pulm. edema []  Con-solidated Infiltrates, bilateral, or in more than 1 lobe 2   TOTAL ACUITY: 6       CARE PLAN    If Acuity Level is 2, 3, or 4 in any of the following:    [] BILATERAL BREATH SOUNDS (BBS)     [x] PULMONARY HISTORY (PULM HX)  [] Respiratory Rate  (RR)    Goal: Improve respiratory functions in patients with airway disease and decrease WOB    [x] AEROSOL PROTOCOL    Total Acuity:   14-28  []  Secondary Assessment in 24 hrs Total Acuity:  9-13  []  Secondary Assessment in 24 hrs Total Acuity:  4-8  [x]  Secondary Assessment in 24 hrs Total Acuity:  0-3  []  Secondary Assessment in 48 hrs   HHN AEROSOL THERAPY with  [physician-ordered bronchodilator(s)] q 4 & Albuterol PRN q2 hrs. Breath-Actuated Neb if BBS Acuity = 4, and pt. can use MP. Notify physician if condition deteriorates.

## 2023-10-08 NOTE — DISCHARGE SUMMARY
times a day & as needed for symptoms of irregular blood glucose. Dispense sufficient amount for indicated testing frequency plus additional to accommodate PRN testing needs. Blood Pressure Monitor Kit  1 each by Does not apply route daily as needed ESSENTIAL HYPERTENSION   I10     ezetimibe 10 MG tablet  Commonly known as: ZETIA  Take 1 tablet by mouth nightly     fludrocortisone 0.1 MG tablet  Commonly known as: FLORINEF  TAKE 3 TABLETS BY MOUTH EVERY DAY     gabapentin 800 MG tablet  Commonly known as: Neurontin  Take one tab three times daily     HumuLIN R U-500 KwikPen 500 UNIT/ML Sopn concentrated injection pen  Generic drug: insulin regular human     Insulin Pen Needle 29G X 12.7MM Misc  Commonly known as: BD ULTRA-FINE PEN NEEDLES  USE AS DIRECTED BY PHYSICIAN 5 times daily     Lancets Misc  1 each by Does not apply route 2 times daily     latanoprost 0.005 % ophthalmic solution  Commonly known as: XALATAN     metFORMIN 500 MG extended release tablet  Commonly known as: GLUCOPHAGE-XR  Take 2 tablets by mouth daily (with breakfast)     metoprolol succinate 50 MG extended release tablet  Commonly known as: TOPROL XL  Take 3 tablets by mouth daily     nitroGLYCERIN 0.4 MG SL tablet  Commonly known as: NITROSTAT  Place 1 tablet under the tongue every 5 minutes as needed for Chest pain     omeprazole 20 MG delayed release capsule  Commonly known as: PRILOSEC  TAKE 1 CAPSULE BY MOUTH EVERY DAY IN THE MORNING BEFORE BREAKFAST     ONE TOUCH ULTRA 2 w/Device Kit  1 kit by Does not apply route daily     Praluent 150 MG/ML Soaj  Generic drug: Alirocumab  Inject once per month as directed. prasugrel 10 MG Tabs  Commonly known as: EFFIENT  Take 1 tablet by mouth daily     Trulicity 3 IW/0.9JP Sopn  Generic drug: Dulaglutide           * This list has 2 medication(s) that are the same as other medications prescribed for you.  Read the directions carefully, and ask your doctor or other care provider to review them

## 2023-10-09 ENCOUNTER — TELEPHONE (OUTPATIENT)
Dept: PRIMARY CARE CLINIC | Age: 58
End: 2023-10-09

## 2023-10-09 DIAGNOSIS — J20.9 BRONCHITIS, ACUTE, WITH BRONCHOSPASM: Primary | ICD-10-CM

## 2023-10-09 RX ORDER — NEBULIZER ACCESSORIES
1 KIT MISCELLANEOUS DAILY
Qty: 1 KIT | Refills: 0 | Status: SHIPPED | OUTPATIENT
Start: 2023-10-09

## 2023-10-09 NOTE — TELEPHONE ENCOUNTER
Patient contacted the office in regards to her receiving abuterol solution from her ER Visit. But patient did not receive a prescription for a nebulizer. Please advise.

## 2023-10-09 NOTE — PROGRESS NOTES
Physician Progress Note      PATIENT:               Gold Olmedo  CSN #:                  [id number]  :                       1965  ADMIT DATE:       10/2/2023 4:10 PM  [address] [place] [address] DATE:        10/4/2023 3:22 PM  RESPONDING  PROVIDER #:        Gadiel Browning          QUERY TEXT:    Pt admitted with NSTEMI and has HFrEF documented. If possible, please document   in progress notes and discharge summary further specificity regarding the   acuity of HFrEF:    The medical record reflects the following:    Risk Factors: HTN, HLD  Clinical Indicators: Echo 10/3/23 EF 45-50%, CXR 10/2 Scattered interstitial   infiltrates, no Pro-BNP,  no edema  Treatment: Metoprolol, Losartan, Lipitor    Thank you! Nicky Thomas, CRCR  RN Clinical   Options provided:  -- Chronic Systolic CHF/HFrEF  -- Acute systolic CHF/HFrEF  -- Acute on Chronic Systolic CHF/HFrEF  -- Other - I will add my own diagnosis  -- Disagree - Not applicable / Not valid  -- Disagree - Clinically unable to determine / Unknown  -- Refer to Clinical Documentation Reviewer    PROVIDER RESPONSE TEXT:    This patient has chronic systolic CHF/HFrEF.     Query created by: Lynne Arias on 10/9/2023 10:46 AM      Electronically signed by:  Gadiel Browning 10/9/2023 10:50 AM

## 2023-10-12 ENCOUNTER — OFFICE VISIT (OUTPATIENT)
Dept: PRIMARY CARE CLINIC | Age: 58
End: 2023-10-12
Payer: COMMERCIAL

## 2023-10-12 VITALS
OXYGEN SATURATION: 97 % | HEART RATE: 78 BPM | WEIGHT: 218.9 LBS | BODY MASS INDEX: 38.78 KG/M2 | DIASTOLIC BLOOD PRESSURE: 74 MMHG | RESPIRATION RATE: 20 BRPM | SYSTOLIC BLOOD PRESSURE: 118 MMHG | TEMPERATURE: 98.7 F

## 2023-10-12 DIAGNOSIS — J44.9 COPD WITHOUT EXACERBATION (HCC): ICD-10-CM

## 2023-10-12 DIAGNOSIS — I24.9 ACS (ACUTE CORONARY SYNDROME) (HCC): ICD-10-CM

## 2023-10-12 DIAGNOSIS — Z09 HOSPITAL DISCHARGE FOLLOW-UP: ICD-10-CM

## 2023-10-12 DIAGNOSIS — Z23 NEED FOR VACCINATION FOR H FLU TYPE B: ICD-10-CM

## 2023-10-12 DIAGNOSIS — I50.21 ACUTE SYSTOLIC CHF (CONGESTIVE HEART FAILURE), NYHA CLASS 3 (HCC): Primary | ICD-10-CM

## 2023-10-12 PROCEDURE — 99214 OFFICE O/P EST MOD 30 MIN: CPT | Performed by: NURSE PRACTITIONER

## 2023-10-12 PROCEDURE — 1111F DSCHRG MED/CURRENT MED MERGE: CPT | Performed by: NURSE PRACTITIONER

## 2023-10-12 PROCEDURE — 90471 IMMUNIZATION ADMIN: CPT | Performed by: NURSE PRACTITIONER

## 2023-10-12 PROCEDURE — 90674 CCIIV4 VAC NO PRSV 0.5 ML IM: CPT | Performed by: NURSE PRACTITIONER

## 2023-10-12 RX ORDER — OMEPRAZOLE 20 MG/1
20 CAPSULE, DELAYED RELEASE ORAL
Qty: 90 CAPSULE | Refills: 1 | Status: SHIPPED | OUTPATIENT
Start: 2023-10-12

## 2023-10-12 SDOH — ECONOMIC STABILITY: FOOD INSECURITY: WITHIN THE PAST 12 MONTHS, THE FOOD YOU BOUGHT JUST DIDN'T LAST AND YOU DIDN'T HAVE MONEY TO GET MORE.: PATIENT DECLINED

## 2023-10-12 SDOH — ECONOMIC STABILITY: FOOD INSECURITY: WITHIN THE PAST 12 MONTHS, YOU WORRIED THAT YOUR FOOD WOULD RUN OUT BEFORE YOU GOT MONEY TO BUY MORE.: PATIENT DECLINED

## 2023-10-12 SDOH — ECONOMIC STABILITY: INCOME INSECURITY: HOW HARD IS IT FOR YOU TO PAY FOR THE VERY BASICS LIKE FOOD, HOUSING, MEDICAL CARE, AND HEATING?: PATIENT DECLINED

## 2023-10-12 SDOH — ECONOMIC STABILITY: HOUSING INSECURITY
IN THE LAST 12 MONTHS, WAS THERE A TIME WHEN YOU DID NOT HAVE A STEADY PLACE TO SLEEP OR SLEPT IN A SHELTER (INCLUDING NOW)?: PATIENT REFUSED

## 2023-10-12 ASSESSMENT — PATIENT HEALTH QUESTIONNAIRE - PHQ9
5. POOR APPETITE OR OVEREATING: 0
SUM OF ALL RESPONSES TO PHQ9 QUESTIONS 1 & 2: 0
SUM OF ALL RESPONSES TO PHQ QUESTIONS 1-9: 0
4. FEELING TIRED OR HAVING LITTLE ENERGY: 0
1. LITTLE INTEREST OR PLEASURE IN DOING THINGS: 0
9. THOUGHTS THAT YOU WOULD BE BETTER OFF DEAD, OR OF HURTING YOURSELF: 0
2. FEELING DOWN, DEPRESSED OR HOPELESS: 0
SUM OF ALL RESPONSES TO PHQ QUESTIONS 1-9: 0
6. FEELING BAD ABOUT YOURSELF - OR THAT YOU ARE A FAILURE OR HAVE LET YOURSELF OR YOUR FAMILY DOWN: 0
3. TROUBLE FALLING OR STAYING ASLEEP: 0
SUM OF ALL RESPONSES TO PHQ QUESTIONS 1-9: 0
10. IF YOU CHECKED OFF ANY PROBLEMS, HOW DIFFICULT HAVE THESE PROBLEMS MADE IT FOR YOU TO DO YOUR WORK, TAKE CARE OF THINGS AT HOME, OR GET ALONG WITH OTHER PEOPLE: 0
7. TROUBLE CONCENTRATING ON THINGS, SUCH AS READING THE NEWSPAPER OR WATCHING TELEVISION: 0
8. MOVING OR SPEAKING SO SLOWLY THAT OTHER PEOPLE COULD HAVE NOTICED. OR THE OPPOSITE, BEING SO FIGETY OR RESTLESS THAT YOU HAVE BEEN MOVING AROUND A LOT MORE THAN USUAL: 0
SUM OF ALL RESPONSES TO PHQ QUESTIONS 1-9: 0

## 2023-10-12 NOTE — PROGRESS NOTES
After obtaining consent, and per orders of Dr. Norma Plasencia CNP, injection of Flu given in Right deltoid by Zaira Zuleta LPN. Patient instructed to remain in clinic for 20 minutes afterwards, and to report any adverse reaction to me immediately. Vaccine Information Sheet, \"Influenza - Inactivated\"  given to Daniela Maldonado, or parent/legal guardian of  Daniela Maldonado and verbalized understanding. Patient responses:    Have you ever had a reaction to a flu vaccine? No  Are you able to eat eggs without adverse effects? Yes  Do you have any current illness? No  Have you ever had Guillian [place] Syndrome? No    Flu vaccine given per order. Please see immunization tab.

## 2023-10-12 NOTE — PATIENT INSTRUCTIONS
SURVEY:     You may be receiving a survey from [employer] regarding your visit today. Please complete the survey to enable us to provide the highest quality of care to you and your family. If you cannot score us a very good on any question, please call the office to discuss how we could have made your experience a very good one.      Thank you,    Norma Plasencia, APRN-CNP  Phuong Conde, APRN-CNP  Suzette Lo, LPN  Enriqueta Paige, JORGE A Baez, CMA  Christina, CMA  Alice, PCA  Hope, PM

## 2023-10-12 NOTE — PROGRESS NOTES
Post-Discharge Transitional Care  Follow Up      Jamee Morgan   YOB: 1965    Date of Office Visit:  10/12/2023  Date of Hospital Admission: 10/6/23  Date of Hospital Discharge: 10/8/23  Risk of hospital readmission (high >=14%. Medium >=10%) :Readmission Risk Score: 20.5      Care management risk score Rising risk (score 2-5) and Complex Care (Scores >=6): No Risk Score On File     Non face to face  following discharge, date last encounter closed (first attempt may have been earlier): *No documented post hospital discharge outreach found in the last 14 days    Call initiated 2 business days of discharge: *No response recorded in the last 14 days    ASSESSMENT/PLAN:   Acute systolic CHF (congestive heart failure), NYHA class 3 (HCC)  ACS (acute coronary syndrome) ([address] [facility]  COPD without exacerbation [facility])  Hospital discharge follow-up  -     [place] DISCHARGE MEDS RECONCILED W/ CURRENT OUTPATIENT MED LIST  Need for vaccination for H flu type B  -     Influenza, FLUCELVAX, (age 10 mo+), IM, Preservative Free, 0.5 mL      Medical Decision Making: moderate complexity  Return if symptoms worsen or fail to improve. On this date 10/12/2023 I have spent 57 minutes reviewing previous notes, test results and face to face with the patient discussing the diagnosis and importance of compliance with the treatment plan as well as documenting on the day of the visit. Subjective:   HPI:  Follow up of Hospital problems/diagnosis(es):     Hospital Course:   Per HPI:  Jamee Morgan is a 62 y.o. female with a history of coronary artery disease, hypertension, hyperlipidemia, insulin-dependent diabetes, breast cancer, dysautonomia, and COPD who presented to [facility] with chief complaint of shortness of breath. The patient states this morning she woke up and was feeling short of breath.   She took a puff of her inhaler which she states helped briefly, but was experiencing persistent dyspnea so decided to seek treatment at

## 2023-10-18 ENCOUNTER — HOSPITAL ENCOUNTER (OUTPATIENT)
Age: 58
Discharge: HOME | End: 2023-10-18
Payer: COMMERCIAL

## 2023-10-18 DIAGNOSIS — I50.21 ACUTE SYSTOLIC CHF (CONGESTIVE HEART FAILURE), NYHA CLASS 3 (HCC): ICD-10-CM

## 2023-10-18 LAB
ANION GAP SERPL CALCULATED.3IONS-SCNC: 10 MMOL/L (ref 9–17)
BUN SERPL-MCNC: 15 MG/DL (ref 6–20)
BUN/CREAT SERPL: 19 (ref 9–20)
CALCIUM SERPL-MCNC: 9.7 MG/DL (ref 8.6–10.4)
CHLORIDE SERPL-SCNC: 100 MMOL/L (ref 98–107)
CO2 SERPL-SCNC: 27 MMOL/L (ref 20–31)
CREAT SERPL-MCNC: 0.8 MG/DL (ref 0.5–0.9)
GFR SERPL CREATININE-BSD FRML MDRD: >60 ML/MIN/1.73M2
GLUCOSE SERPL-MCNC: 160 MG/DL (ref 70–99)
POTASSIUM SERPL-SCNC: 4.8 MMOL/L (ref 3.7–5.3)
SODIUM SERPL-SCNC: 137 MMOL/L (ref 135–144)

## 2023-10-18 PROCEDURE — 80048 BASIC METABOLIC PNL TOTAL CA: CPT

## 2023-10-18 PROCEDURE — 36415 COLL VENOUS BLD VENIPUNCTURE: CPT

## 2023-10-19 ENCOUNTER — TELEPHONE (OUTPATIENT)
Dept: PRIMARY CARE CLINIC | Age: 58
End: 2023-10-19

## 2023-10-19 ENCOUNTER — OFFICE VISIT (OUTPATIENT)
Dept: CARDIOLOGY | Age: 58
End: 2023-10-19

## 2023-10-19 VITALS
DIASTOLIC BLOOD PRESSURE: 81 MMHG | HEIGHT: 63 IN | SYSTOLIC BLOOD PRESSURE: 119 MMHG | WEIGHT: 218 LBS | HEART RATE: 85 BPM | BODY MASS INDEX: 38.62 KG/M2

## 2023-10-19 DIAGNOSIS — R00.2 PALPITATIONS: ICD-10-CM

## 2023-10-19 DIAGNOSIS — E78.2 MIXED HYPERLIPIDEMIA: ICD-10-CM

## 2023-10-19 DIAGNOSIS — I95.1 DYSAUTONOMIA ORTHOSTATIC HYPOTENSION SYNDROME: ICD-10-CM

## 2023-10-19 DIAGNOSIS — I10 ESSENTIAL HYPERTENSION: ICD-10-CM

## 2023-10-19 DIAGNOSIS — Z95.1 S/P CABG (CORONARY ARTERY BYPASS GRAFT): ICD-10-CM

## 2023-10-19 DIAGNOSIS — I50.22 CHRONIC SYSTOLIC (CONGESTIVE) HEART FAILURE (HCC): ICD-10-CM

## 2023-10-19 DIAGNOSIS — I25.10 ASHD (ARTERIOSCLEROTIC HEART DISEASE): ICD-10-CM

## 2023-10-19 DIAGNOSIS — Z95.820 S/P ANGIOPLASTY WITH STENT: ICD-10-CM

## 2023-10-19 NOTE — PATIENT INSTRUCTIONS
SURVEY:    You may be receiving a survey from [employer] regarding your visit today. Please complete the survey to enable us to provide the highest quality of care to you and your family. If you cannot score us a very good on any question, please call the office to discuss how we could have made your experience a very good one. Thank you.

## 2023-10-19 NOTE — PROGRESS NOTES
Patient: Bryan Chaudhari  : 1965  Date of Visit: 2023    REASON FOR VISIT / CONSULTATION: Follow-Up from Hospital (HX:CHF, [facility] PT is here for hosp follow up patient states she has been feeling pretty good. Denies SOB, chest pain, palpitations and light headedness. No questions or concerns. )    Dear Yoly Plasencia, CHELSEA - CNP,    I had the pleasure of seeing your patient Bryan Chaudhari in consultation today. As you know, Ms. Kieran Villalpando is a 62 y.o. female who presents with a history of intermittent episodes of back and chest discomfort that started developing since her recent CABG involving a LIMA-LAD 8/15/16. She also has a history of  dysautonomia on a tilt table test, and was started on Florinef as well as Lexapro. Recent heart cath done 3/7/2019 shows severe single vessel disease involving LAD Normal LVEDP. She had a Holter monitor done on 10/23/2019 that did show PAC's and PVC's but was largely unremarkable. She did come to the ER on 2022 due to abdominal pain and shortness of breath. She was told her EKG was abnormal and also she had an elevated troponin which was only 15 ng/L and only had minor EKG changes. [facility] was sent to [facility] for this and she was not very happy about this. Cardiovascular stress test done on 2022 showed Overall, these results are most consistent with an intermediate/high risk for significant coronary artery disease. Echocardiogram on 2022 showed EF:>55% with mild diastolic dysfunction and LV wall thickness mildly increased. ER on 2023 due to acute coronary syndrome -Transferred to [facility] - Successful PCI of left main and circumflex in-stent restenosis with 3.5 x 26 mm Medtronic Velarde drug-eluting stent which was post dilated to 4.0 mm in diameter. She was in the ER on 10/2/2023 and transferred to [address] [place] [address] on 10/3/2023 and diagnoised with a NSTEMI.  Dr Les Albright did a heart cath, IVUS for severe ISR - stent is underexpanded and malapposed PCI with 4.0 CBA,

## 2023-11-07 ENCOUNTER — TELEPHONE (OUTPATIENT)
Dept: CARDIAC REHAB | Age: 58
End: 2023-11-07

## 2023-11-07 NOTE — TELEPHONE ENCOUNTER
Attempted to call Carrie Rao 3x via phone and once through the mail per your referral for cardiac rehab and have been unsuccessful.  We will file her information unless she contacts us wishing to start the program. Thank you for the referral!

## 2023-11-27 ENCOUNTER — OFFICE VISIT (OUTPATIENT)
Dept: ONCOLOGY | Age: 58
End: 2023-11-27
Payer: COMMERCIAL

## 2023-11-27 ENCOUNTER — HOSPITAL ENCOUNTER (OUTPATIENT)
Age: 58
Discharge: HOME | End: 2023-11-27
Payer: COMMERCIAL

## 2023-11-27 VITALS
HEART RATE: 84 BPM | WEIGHT: 224 LBS | DIASTOLIC BLOOD PRESSURE: 69 MMHG | BODY MASS INDEX: 39.69 KG/M2 | SYSTOLIC BLOOD PRESSURE: 151 MMHG | RESPIRATION RATE: 18 BRPM | TEMPERATURE: 97.8 F

## 2023-11-27 DIAGNOSIS — Z17.0 MALIGNANT NEOPLASM OF CENTRAL PORTION OF RIGHT BREAST IN FEMALE, ESTROGEN RECEPTOR POSITIVE (HCC): ICD-10-CM

## 2023-11-27 DIAGNOSIS — C50.111 MALIGNANT NEOPLASM OF CENTRAL PORTION OF RIGHT BREAST IN FEMALE, ESTROGEN RECEPTOR POSITIVE (HCC): Primary | ICD-10-CM

## 2023-11-27 DIAGNOSIS — C50.111 MALIGNANT NEOPLASM OF CENTRAL PORTION OF RIGHT BREAST IN FEMALE, ESTROGEN RECEPTOR POSITIVE (HCC): ICD-10-CM

## 2023-11-27 DIAGNOSIS — M85.89 OSTEOPENIA OF MULTIPLE SITES: ICD-10-CM

## 2023-11-27 DIAGNOSIS — D50.0 IRON DEFICIENCY ANEMIA DUE TO CHRONIC BLOOD LOSS: ICD-10-CM

## 2023-11-27 DIAGNOSIS — D05.11 DUCTAL CARCINOMA IN SITU (DCIS) OF RIGHT BREAST: ICD-10-CM

## 2023-11-27 DIAGNOSIS — N64.4 BREAST PAIN: ICD-10-CM

## 2023-11-27 DIAGNOSIS — Z17.0 MALIGNANT NEOPLASM OF CENTRAL PORTION OF RIGHT BREAST IN FEMALE, ESTROGEN RECEPTOR POSITIVE (HCC): Primary | ICD-10-CM

## 2023-11-27 PROBLEM — D50.9 IRON DEFICIENCY ANEMIA: Status: ACTIVE | Noted: 2023-11-27

## 2023-11-27 LAB
ALBUMIN SERPL-MCNC: 3.9 G/DL (ref 3.5–5.2)
ALBUMIN/GLOB SERPL: 1 {RATIO} (ref 1–2.5)
ALP SERPL-CCNC: 109 U/L (ref 35–104)
ALT SERPL-CCNC: 11 U/L (ref 5–33)
ANION GAP SERPL CALCULATED.3IONS-SCNC: 12 MMOL/L (ref 9–17)
AST SERPL-CCNC: 11 U/L
BASOPHILS # BLD: 0.06 K/UL (ref 0–0.2)
BASOPHILS NFR BLD: 1 % (ref 0–2)
BILIRUB SERPL-MCNC: 0.3 MG/DL (ref 0.3–1.2)
BUN SERPL-MCNC: 19 MG/DL (ref 6–20)
BUN/CREAT SERPL: 27 (ref 9–20)
CALCIUM SERPL-MCNC: 9.5 MG/DL (ref 8.6–10.4)
CHLORIDE SERPL-SCNC: 104 MMOL/L (ref 98–107)
CO2 SERPL-SCNC: 22 MMOL/L (ref 20–31)
CREAT SERPL-MCNC: 0.7 MG/DL (ref 0.5–0.9)
EOSINOPHIL # BLD: 0.12 K/UL (ref 0–0.44)
EOSINOPHILS RELATIVE PERCENT: 1 % (ref 1–4)
ERYTHROCYTE [DISTWIDTH] IN BLOOD BY AUTOMATED COUNT: 15 % (ref 11.8–14.4)
FERRITIN SERPL-MCNC: 88 NG/ML (ref 13–150)
GFR SERPL CREATININE-BSD FRML MDRD: >60 ML/MIN/1.73M2
GLUCOSE SERPL-MCNC: 392 MG/DL (ref 70–99)
HCT VFR BLD AUTO: 40.4 % (ref 36.3–47.1)
HGB BLD-MCNC: 13.2 G/DL (ref 11.9–15.1)
IMM GRANULOCYTES # BLD AUTO: 0.07 K/UL (ref 0–0.3)
IMM GRANULOCYTES NFR BLD: 1 %
IRON SATN MFR SERPL: 15 % (ref 20–55)
IRON SERPL-MCNC: 44 UG/DL (ref 37–145)
LYMPHOCYTES NFR BLD: 1.7 K/UL (ref 1.1–3.7)
LYMPHOCYTES RELATIVE PERCENT: 19 % (ref 24–43)
MAGNESIUM SERPL-MCNC: 1.9 MG/DL (ref 1.6–2.6)
MCH RBC QN AUTO: 24.9 PG (ref 25.2–33.5)
MCHC RBC AUTO-ENTMCNC: 32.7 G/DL (ref 28.4–34.8)
MCV RBC AUTO: 76.2 FL (ref 82.6–102.9)
MONOCYTES NFR BLD: 0.35 K/UL (ref 0.1–1.2)
MONOCYTES NFR BLD: 4 % (ref 3–12)
NEUTROPHILS NFR BLD: 74 % (ref 36–65)
NEUTS SEG NFR BLD: 6.55 K/UL (ref 1.5–8.1)
NRBC BLD-RTO: 0 PER 100 WBC
PHOSPHATE SERPL-MCNC: 3.4 MG/DL (ref 2.6–4.5)
PLATELET # BLD AUTO: 294 K/UL (ref 138–453)
PMV BLD AUTO: 9.4 FL (ref 8.1–13.5)
POTASSIUM SERPL-SCNC: 4.8 MMOL/L (ref 3.7–5.3)
PROT SERPL-MCNC: 7.7 G/DL (ref 6.4–8.3)
RBC # BLD AUTO: 5.3 M/UL (ref 3.95–5.11)
SODIUM SERPL-SCNC: 138 MMOL/L (ref 135–144)
TIBC SERPL-MCNC: 289 UG/DL (ref 250–450)
UNSATURATED IRON BINDING CAPACITY: 245 UG/DL (ref 112–347)
WBC OTHER # BLD: 8.9 K/UL (ref 3.5–11.3)

## 2023-11-27 PROCEDURE — G8427 DOCREV CUR MEDS BY ELIG CLIN: HCPCS | Performed by: INTERNAL MEDICINE

## 2023-11-27 PROCEDURE — 82728 ASSAY OF FERRITIN: CPT

## 2023-11-27 PROCEDURE — 83735 ASSAY OF MAGNESIUM: CPT

## 2023-11-27 PROCEDURE — 85025 COMPLETE CBC W/AUTO DIFF WBC: CPT

## 2023-11-27 PROCEDURE — 83550 IRON BINDING TEST: CPT

## 2023-11-27 PROCEDURE — 36415 COLL VENOUS BLD VENIPUNCTURE: CPT

## 2023-11-27 PROCEDURE — 3078F DIAST BP <80 MM HG: CPT | Performed by: INTERNAL MEDICINE

## 2023-11-27 PROCEDURE — 3077F SYST BP >= 140 MM HG: CPT | Performed by: INTERNAL MEDICINE

## 2023-11-27 PROCEDURE — 84100 ASSAY OF PHOSPHORUS: CPT

## 2023-11-27 PROCEDURE — G9899 SCRN MAM PERF RSLTS DOC: HCPCS | Performed by: INTERNAL MEDICINE

## 2023-11-27 PROCEDURE — 83540 ASSAY OF IRON: CPT

## 2023-11-27 PROCEDURE — 80053 COMPREHEN METABOLIC PANEL: CPT

## 2023-11-27 PROCEDURE — 3017F COLORECTAL CA SCREEN DOC REV: CPT | Performed by: INTERNAL MEDICINE

## 2023-11-27 PROCEDURE — G8482 FLU IMMUNIZE ORDER/ADMIN: HCPCS | Performed by: INTERNAL MEDICINE

## 2023-11-27 PROCEDURE — 1036F TOBACCO NON-USER: CPT | Performed by: INTERNAL MEDICINE

## 2023-11-27 PROCEDURE — G8417 CALC BMI ABV UP PARAM F/U: HCPCS | Performed by: INTERNAL MEDICINE

## 2023-11-27 PROCEDURE — 99214 OFFICE O/P EST MOD 30 MIN: CPT | Performed by: INTERNAL MEDICINE

## 2023-11-27 RX ORDER — OXYCODONE HYDROCHLORIDE AND ACETAMINOPHEN 5; 325 MG/1; MG/1
1 TABLET ORAL EVERY 6 HOURS PRN
Qty: 120 TABLET | Refills: 0 | Status: SHIPPED | OUTPATIENT
Start: 2023-11-27 | End: 2023-12-27

## 2023-11-27 NOTE — PROGRESS NOTES
Patient ID: Theresa Sims, 1965, [id number], 62 y.o. Referred by :  No ref. 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 female who found on a routine mammogram on May 22, 2022 new group of calcifications upper central right breast and ultrasound did show suspicious lesion at 11:00 6 cm from the nipple core biopsy was done on 6/9/2022 and that showed DCIS strongly ER/TX positive and patient was referred for medical oncology  No family history of breast cancer  No history of using birth control in her younger age  Patient had hysterectomy in her 35s  Patient underwent lumpectomy on July 12, 2022 and there was 1 cm invasive carcinoma in addition to the DCIS  No sentinel lymph node biopsy initially and this was repeated and came back negative  Oncotype DX 5  Bone density scan did show osteopenia and Prolia was denied and started on Fosamax  Status post radiation and started on anastrozole        Oncology history  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 During this visit patient's allergy, social, medical, surgical history and medications were reviewed and updated.    Most recent diagnostic mammogram done on August 22, 2023 and no evidence of recurrence  Status post simple aspiration right breast seroma and recur and nothing to drain  Right side axillary pain  microcytosis          Past Medical History:   Diagnosis Date    Anxiety     Asthma     CAD (coronary artery

## 2023-12-05 DIAGNOSIS — E78.2 MIXED HYPERLIPIDEMIA: ICD-10-CM

## 2023-12-05 DIAGNOSIS — I20.9 ANGINA, CLASS II (HCC): ICD-10-CM

## 2023-12-05 DIAGNOSIS — N64.4 BREAST PAIN: ICD-10-CM

## 2023-12-05 DIAGNOSIS — I95.1 DYSAUTONOMIA ORTHOSTATIC HYPOTENSION SYNDROME: ICD-10-CM

## 2023-12-05 DIAGNOSIS — Z95.1 S/P CABG (CORONARY ARTERY BYPASS GRAFT): ICD-10-CM

## 2023-12-05 DIAGNOSIS — I10 ESSENTIAL HYPERTENSION: Chronic | ICD-10-CM

## 2023-12-05 DIAGNOSIS — R00.2 PALPITATIONS: ICD-10-CM

## 2023-12-05 DIAGNOSIS — R06.02 SHORTNESS OF BREATH ON EXERTION: ICD-10-CM

## 2023-12-05 DIAGNOSIS — I25.10 ASHD (ARTERIOSCLEROTIC HEART DISEASE): ICD-10-CM

## 2023-12-05 RX ORDER — ATORVASTATIN CALCIUM 80 MG/1
TABLET, FILM COATED ORAL
Qty: 90 TABLET | Refills: 3 | Status: SHIPPED | OUTPATIENT
Start: 2023-12-05

## 2023-12-05 RX ORDER — FLUDROCORTISONE ACETATE 0.1 MG/1
TABLET ORAL
Qty: 270 TABLET | Refills: 3 | Status: SHIPPED | OUTPATIENT
Start: 2023-12-05

## 2023-12-05 RX ORDER — OXYCODONE HYDROCHLORIDE AND ACETAMINOPHEN 5; 325 MG/1; MG/1
1 TABLET ORAL EVERY 6 HOURS PRN
Qty: 120 TABLET | Refills: 0 | Status: SHIPPED | OUTPATIENT
Start: 2023-12-05 | End: 2024-01-04

## 2023-12-05 NOTE — TELEPHONE ENCOUNTER
Pharmacy was only able to dispense 28 tablets with previous prescription dated 11/27/23. That prescription closed; route new prescription to MD for review.

## 2023-12-07 DIAGNOSIS — M79.2 NEUROPATHIC PAIN: ICD-10-CM

## 2023-12-07 NOTE — TELEPHONE ENCOUNTER
Pharmacy requesting refill of Cymbalta.      Medication active on med list yes      Date of last fill: 11/29/22 (Claudia)  verified on 12/7/2023   verified by SF LPN      Date of last appointment 8/8/23    Next Visit Date:  2/12/2024

## 2023-12-07 NOTE — RESULT ENCOUNTER NOTE
Ashwini Haile or [facility] [place] [facility] please let Bibiana Cordero know there is no more seroma in the breast. It is fatty scar tissue. Thi I actually good since she has had the seroma drained periodically for a long time. The tissue should soften up over time. Gentle healting pad for pain as needed.  No surgery recommended, it will start the seroma all over again negative cranial nerves II-XII intact/sensation intact

## 2023-12-08 RX ORDER — DULOXETIN HYDROCHLORIDE 30 MG/1
CAPSULE, DELAYED RELEASE ORAL
Qty: 30 CAPSULE | Refills: 0 | Status: SHIPPED | OUTPATIENT
Start: 2023-12-08 | End: 2024-01-05

## 2023-12-08 NOTE — TELEPHONE ENCOUNTER
Please let the patient know that her last Blood pressure was high; ask her to check her pressures again and let us know.   Because duloxetine can make the blood pressures to go up.  If blood pressures are well-controlled; I can send the prescription for it.  Please let me know  Thank you.   -dr. garcia

## 2023-12-12 ENCOUNTER — APPOINTMENT (OUTPATIENT)
Dept: CT IMAGING | Age: 58
End: 2023-12-12
Payer: COMMERCIAL

## 2023-12-12 ENCOUNTER — HOSPITAL ENCOUNTER (EMERGENCY)
Age: 58
Discharge: ANOTHER HOSPITAL | End: 2023-12-12
Attending: EMERGENCY MEDICINE
Payer: COMMERCIAL

## 2023-12-12 ENCOUNTER — APPOINTMENT (OUTPATIENT)
Dept: GENERAL RADIOLOGY | Age: 58
End: 2023-12-12
Payer: COMMERCIAL

## 2023-12-12 ENCOUNTER — HOSPITAL ENCOUNTER (INPATIENT)
Age: 58
LOS: 2 days | Discharge: HOME | End: 2023-12-14
Attending: FAMILY MEDICINE | Admitting: STUDENT IN AN ORGANIZED HEALTH CARE EDUCATION/TRAINING PROGRAM
Payer: COMMERCIAL

## 2023-12-12 VITALS
HEART RATE: 81 BPM | BODY MASS INDEX: 39.69 KG/M2 | OXYGEN SATURATION: 94 % | SYSTOLIC BLOOD PRESSURE: 76 MMHG | TEMPERATURE: 97.8 F | RESPIRATION RATE: 26 BRPM | WEIGHT: 224 LBS | DIASTOLIC BLOOD PRESSURE: 55 MMHG

## 2023-12-12 DIAGNOSIS — I24.9 ACS (ACUTE CORONARY SYNDROME) (HCC): ICD-10-CM

## 2023-12-12 DIAGNOSIS — I21.4 NSTEMI (NON-ST ELEVATED MYOCARDIAL INFARCTION) (HCC): Primary | ICD-10-CM

## 2023-12-12 LAB
ALBUMIN SERPL-MCNC: 3.4 G/DL (ref 3.5–5.2)
ALBUMIN/GLOB SERPL: 0.9 {RATIO} (ref 1–2.5)
ALP SERPL-CCNC: 144 U/L (ref 35–104)
ALT SERPL-CCNC: 13 U/L (ref 5–33)
ANION GAP SERPL CALCULATED.3IONS-SCNC: 14 MMOL/L (ref 9–17)
ANTI-XA UNFRAC HEPARIN: 1.93 IU/L
ANTI-XA UNFRAC HEPARIN: <0.1 IU/L
AST SERPL-CCNC: 17 U/L
B-OH-BUTYR SERPL-MCNC: 0.14 MMOL/L (ref 0.02–0.27)
BASOPHILS # BLD: 0.07 K/UL (ref 0–0.2)
BASOPHILS NFR BLD: 1 % (ref 0–2)
BILIRUB DIRECT SERPL-MCNC: <0.1 MG/DL
BILIRUB INDIRECT SERPL-MCNC: ABNORMAL MG/DL (ref 0–1)
BILIRUB SERPL-MCNC: 0.4 MG/DL (ref 0.3–1.2)
BNP SERPL-MCNC: 9305 PG/ML
BUN SERPL-MCNC: 16 MG/DL (ref 6–20)
BUN/CREAT SERPL: 23 (ref 9–20)
CALCIUM SERPL-MCNC: 9.5 MG/DL (ref 8.6–10.4)
CHLORIDE SERPL-SCNC: 95 MMOL/L (ref 98–107)
CHP ED QC CHECK: YES
CO2 SERPL-SCNC: 23 MMOL/L (ref 20–31)
CREAT SERPL-MCNC: 0.7 MG/DL (ref 0.5–0.9)
D DIMER PPP FEU-MCNC: 1.05 UG/ML FEU (ref 0–0.59)
EKG ATRIAL RATE: 79 BPM
EKG P AXIS: 56 DEGREES
EKG P-R INTERVAL: 146 MS
EKG Q-T INTERVAL: 350 MS
EKG QRS DURATION: 96 MS
EKG QTC CALCULATION (BAZETT): 401 MS
EKG R AXIS: 53 DEGREES
EKG T AXIS: -179 DEGREES
EKG VENTRICULAR RATE: 79 BPM
EOSINOPHIL # BLD: 0.09 K/UL (ref 0–0.44)
EOSINOPHILS RELATIVE PERCENT: 1 % (ref 1–4)
ERYTHROCYTE [DISTWIDTH] IN BLOOD BY AUTOMATED COUNT: 15.2 % (ref 11.8–14.4)
ERYTHROCYTE [DISTWIDTH] IN BLOOD BY AUTOMATED COUNT: 15.4 % (ref 11.8–14.4)
GFR SERPL CREATININE-BSD FRML MDRD: >60 ML/MIN/1.73M2
GLUCOSE BLD-MCNC: 194 MG/DL (ref 65–105)
GLUCOSE BLD-MCNC: 197 MG/DL (ref 65–105)
GLUCOSE BLD-MCNC: 249 MG/DL (ref 65–105)
GLUCOSE BLD-MCNC: 294 MG/DL
GLUCOSE BLD-MCNC: 294 MG/DL (ref 74–100)
GLUCOSE SERPL-MCNC: 294 MG/DL (ref 70–99)
HCT VFR BLD AUTO: 33.2 % (ref 36.3–47.1)
HCT VFR BLD AUTO: 37.5 % (ref 36.3–47.1)
HGB BLD-MCNC: 10.6 G/DL (ref 11.9–15.1)
HGB BLD-MCNC: 11.7 G/DL (ref 11.9–15.1)
IMM GRANULOCYTES # BLD AUTO: 0.07 K/UL (ref 0–0.3)
IMM GRANULOCYTES NFR BLD: 1 %
INR PPP: 1.3
LACTATE BLDV-SCNC: 2.4 MMOL/L (ref 0.5–1.9)
LACTATE BLDV-SCNC: 3.2 MMOL/L (ref 0.5–1.9)
LYMPHOCYTES NFR BLD: 1.8 K/UL (ref 1.1–3.7)
LYMPHOCYTES RELATIVE PERCENT: 15 % (ref 24–43)
MCH RBC QN AUTO: 24 PG (ref 25.2–33.5)
MCH RBC QN AUTO: 24.8 PG (ref 25.2–33.5)
MCHC RBC AUTO-ENTMCNC: 31.2 G/DL (ref 28.4–34.8)
MCHC RBC AUTO-ENTMCNC: 31.9 G/DL (ref 28.4–34.8)
MCV RBC AUTO: 77 FL (ref 82.6–102.9)
MCV RBC AUTO: 77.6 FL (ref 82.6–102.9)
MONOCYTES NFR BLD: 0.6 K/UL (ref 0.1–1.2)
MONOCYTES NFR BLD: 5 % (ref 3–12)
MYOGLOBIN SERPL-MCNC: 154 NG/ML (ref 25–58)
MYOGLOBIN SERPL-MCNC: <21 NG/ML (ref 25–58)
NEUTROPHILS NFR BLD: 77 % (ref 36–65)
NEUTS SEG NFR BLD: 9.06 K/UL (ref 1.5–8.1)
NRBC BLD-RTO: 0 PER 100 WBC
NRBC BLD-RTO: 0 PER 100 WBC
PARTIAL THROMBOPLASTIN TIME: 29.9 SEC (ref 26.8–34.8)
PARTIAL THROMBOPLASTIN TIME: >180 SEC (ref 23–36.5)
PLATELET # BLD AUTO: 394 K/UL (ref 138–453)
PLATELET # BLD AUTO: 417 K/UL (ref 138–453)
PMV BLD AUTO: 10 FL (ref 8.1–13.5)
PMV BLD AUTO: 9.9 FL (ref 8.1–13.5)
POTASSIUM SERPL-SCNC: 4.1 MMOL/L (ref 3.7–5.3)
PROT SERPL-MCNC: 7.4 G/DL (ref 6.4–8.3)
PROTHROMBIN TIME: 16.1 SEC (ref 11.7–14.9)
RBC # BLD AUTO: 4.28 M/UL (ref 3.95–5.11)
RBC # BLD AUTO: 4.87 M/UL (ref 3.95–5.11)
SARS-COV-2 RDRP RESP QL NAA+PROBE: NOT DETECTED
SODIUM SERPL-SCNC: 132 MMOL/L (ref 135–144)
SPECIMEN DESCRIPTION: NORMAL
TROPONIN I SERPL HS-MCNC: 588 NG/L (ref 0–14)
TROPONIN I SERPL HS-MCNC: 682 NG/L (ref 0–14)
TROPONIN I SERPL HS-MCNC: 697 NG/L (ref 0–14)
TROPONIN I SERPL HS-MCNC: 699 NG/L (ref 0–14)
TROPONIN I SERPL HS-MCNC: 814 NG/L (ref 0–14)
WBC OTHER # BLD: 11.7 K/UL (ref 3.5–11.3)
WBC OTHER # BLD: 11.7 K/UL (ref 3.5–11.3)

## 2023-12-12 PROCEDURE — 71260 CT THORAX DX C+: CPT

## 2023-12-12 PROCEDURE — 71045 X-RAY EXAM CHEST 1 VIEW: CPT

## 2023-12-12 PROCEDURE — 36415 COLL VENOUS BLD VENIPUNCTURE: CPT

## 2023-12-12 PROCEDURE — C9803 HOPD COVID-19 SPEC COLLECT: HCPCS

## 2023-12-12 PROCEDURE — 83874 ASSAY OF MYOGLOBIN: CPT

## 2023-12-12 PROCEDURE — 93010 ELECTROCARDIOGRAM REPORT: CPT | Performed by: FAMILY MEDICINE

## 2023-12-12 PROCEDURE — 84484 ASSAY OF TROPONIN QUANT: CPT

## 2023-12-12 PROCEDURE — 6370000000 HC RX 637 (ALT 250 FOR IP): Performed by: FAMILY MEDICINE

## 2023-12-12 PROCEDURE — 87635 SARS-COV-2 COVID-19 AMP PRB: CPT

## 2023-12-12 PROCEDURE — 2580000003 HC RX 258: Performed by: EMERGENCY MEDICINE

## 2023-12-12 PROCEDURE — 87040 BLOOD CULTURE FOR BACTERIA: CPT

## 2023-12-12 PROCEDURE — 85730 THROMBOPLASTIN TIME PARTIAL: CPT

## 2023-12-12 PROCEDURE — 2060000000 HC ICU INTERMEDIATE R&B

## 2023-12-12 PROCEDURE — 85379 FIBRIN DEGRADATION QUANT: CPT

## 2023-12-12 PROCEDURE — 85520 HEPARIN ASSAY: CPT

## 2023-12-12 PROCEDURE — 99285 EMERGENCY DEPT VISIT HI MDM: CPT

## 2023-12-12 PROCEDURE — 85610 PROTHROMBIN TIME: CPT

## 2023-12-12 PROCEDURE — 80048 BASIC METABOLIC PNL TOTAL CA: CPT

## 2023-12-12 PROCEDURE — 82947 ASSAY GLUCOSE BLOOD QUANT: CPT

## 2023-12-12 PROCEDURE — 80076 HEPATIC FUNCTION PANEL: CPT

## 2023-12-12 PROCEDURE — 83880 ASSAY OF NATRIURETIC PEPTIDE: CPT

## 2023-12-12 PROCEDURE — 99222 1ST HOSP IP/OBS MODERATE 55: CPT | Performed by: FAMILY MEDICINE

## 2023-12-12 PROCEDURE — 85027 COMPLETE CBC AUTOMATED: CPT

## 2023-12-12 PROCEDURE — 6360000004 HC RX CONTRAST MEDICATION: Performed by: EMERGENCY MEDICINE

## 2023-12-12 PROCEDURE — 6360000002 HC RX W HCPCS: Performed by: NURSE PRACTITIONER

## 2023-12-12 PROCEDURE — 2580000003 HC RX 258: Performed by: NURSE PRACTITIONER

## 2023-12-12 PROCEDURE — 6360000002 HC RX W HCPCS: Performed by: EMERGENCY MEDICINE

## 2023-12-12 PROCEDURE — 82010 KETONE BODYS QUAN: CPT

## 2023-12-12 PROCEDURE — 6370000000 HC RX 637 (ALT 250 FOR IP): Performed by: NURSE PRACTITIONER

## 2023-12-12 PROCEDURE — 83605 ASSAY OF LACTIC ACID: CPT

## 2023-12-12 PROCEDURE — 85025 COMPLETE CBC W/AUTO DIFF WBC: CPT

## 2023-12-12 PROCEDURE — 93005 ELECTROCARDIOGRAM TRACING: CPT | Performed by: EMERGENCY MEDICINE

## 2023-12-12 RX ORDER — ACETAMINOPHEN 650 MG/1
650 SUPPOSITORY RECTAL EVERY 6 HOURS PRN
Status: DISCONTINUED | OUTPATIENT
Start: 2023-12-12 | End: 2023-12-14 | Stop reason: HOSPADM

## 2023-12-12 RX ORDER — HEPARIN SODIUM 1000 [USP'U]/ML
2000 INJECTION, SOLUTION INTRAVENOUS; SUBCUTANEOUS PRN
Status: DISCONTINUED | OUTPATIENT
Start: 2023-12-12 | End: 2023-12-13

## 2023-12-12 RX ORDER — ANASTROZOLE 1 MG/1
1 TABLET ORAL DAILY
COMMUNITY

## 2023-12-12 RX ORDER — HEPARIN SODIUM 1000 [USP'U]/ML
4000 INJECTION, SOLUTION INTRAVENOUS; SUBCUTANEOUS ONCE
Status: COMPLETED | OUTPATIENT
Start: 2023-12-12 | End: 2023-12-12

## 2023-12-12 RX ORDER — INSULIN LISPRO 100 [IU]/ML
0-4 INJECTION, SOLUTION INTRAVENOUS; SUBCUTANEOUS NIGHTLY
Status: DISCONTINUED | OUTPATIENT
Start: 2023-12-12 | End: 2023-12-14 | Stop reason: HOSPADM

## 2023-12-12 RX ORDER — INSULIN LISPRO 100 [IU]/ML
60 INJECTION, SOLUTION INTRAVENOUS; SUBCUTANEOUS 2 TIMES DAILY WITH MEALS
Status: DISCONTINUED | OUTPATIENT
Start: 2023-12-13 | End: 2023-12-12

## 2023-12-12 RX ORDER — ASPIRIN 81 MG/1
81 TABLET, CHEWABLE ORAL DAILY
Status: DISCONTINUED | OUTPATIENT
Start: 2023-12-13 | End: 2023-12-12

## 2023-12-12 RX ORDER — DEXTROSE MONOHYDRATE 100 MG/ML
INJECTION, SOLUTION INTRAVENOUS CONTINUOUS PRN
Status: DISCONTINUED | OUTPATIENT
Start: 2023-12-12 | End: 2023-12-14 | Stop reason: HOSPADM

## 2023-12-12 RX ORDER — HEPARIN SODIUM 1000 [USP'U]/ML
40 INJECTION, SOLUTION INTRAVENOUS; SUBCUTANEOUS PRN
Status: DISCONTINUED | OUTPATIENT
Start: 2023-12-12 | End: 2023-12-12 | Stop reason: HOSPADM

## 2023-12-12 RX ORDER — SODIUM CHLORIDE 0.9 % (FLUSH) 0.9 %
5-40 SYRINGE (ML) INJECTION EVERY 12 HOURS SCHEDULED
Status: DISCONTINUED | OUTPATIENT
Start: 2023-12-12 | End: 2023-12-14 | Stop reason: HOSPADM

## 2023-12-12 RX ORDER — HEPARIN SODIUM 1000 [USP'U]/ML
4000 INJECTION, SOLUTION INTRAVENOUS; SUBCUTANEOUS PRN
Status: DISCONTINUED | OUTPATIENT
Start: 2023-12-12 | End: 2023-12-12 | Stop reason: HOSPADM

## 2023-12-12 RX ORDER — HEPARIN SODIUM 10000 [USP'U]/100ML
5-30 INJECTION, SOLUTION INTRAVENOUS CONTINUOUS
Status: DISCONTINUED | OUTPATIENT
Start: 2023-12-12 | End: 2023-12-13

## 2023-12-12 RX ORDER — FLUDROCORTISONE ACETATE 0.1 MG/1
100 TABLET ORAL DAILY
Status: DISCONTINUED | OUTPATIENT
Start: 2023-12-12 | End: 2023-12-14 | Stop reason: HOSPADM

## 2023-12-12 RX ORDER — INSULIN LISPRO 100 [IU]/ML
0-16 INJECTION, SOLUTION INTRAVENOUS; SUBCUTANEOUS
Status: DISCONTINUED | OUTPATIENT
Start: 2023-12-12 | End: 2023-12-14 | Stop reason: HOSPADM

## 2023-12-12 RX ORDER — 0.9 % SODIUM CHLORIDE 0.9 %
250 INTRAVENOUS SOLUTION INTRAVENOUS ONCE
Status: COMPLETED | OUTPATIENT
Start: 2023-12-12 | End: 2023-12-12

## 2023-12-12 RX ORDER — PANTOPRAZOLE SODIUM 40 MG/1
40 TABLET, DELAYED RELEASE ORAL
Status: DISCONTINUED | OUTPATIENT
Start: 2023-12-13 | End: 2023-12-14 | Stop reason: HOSPADM

## 2023-12-12 RX ORDER — ATORVASTATIN CALCIUM 80 MG/1
80 TABLET, FILM COATED ORAL NIGHTLY
Status: DISCONTINUED | OUTPATIENT
Start: 2023-12-12 | End: 2023-12-14 | Stop reason: HOSPADM

## 2023-12-12 RX ORDER — HEPARIN SODIUM 1000 [USP'U]/ML
4000 INJECTION, SOLUTION INTRAVENOUS; SUBCUTANEOUS PRN
Status: DISCONTINUED | OUTPATIENT
Start: 2023-12-12 | End: 2023-12-13

## 2023-12-12 RX ORDER — POTASSIUM CHLORIDE 20 MEQ/1
40 TABLET, EXTENDED RELEASE ORAL PRN
Status: DISCONTINUED | OUTPATIENT
Start: 2023-12-12 | End: 2023-12-14 | Stop reason: HOSPADM

## 2023-12-12 RX ORDER — HEPARIN SODIUM 1000 [USP'U]/ML
60 INJECTION, SOLUTION INTRAVENOUS; SUBCUTANEOUS ONCE
Status: DISCONTINUED | OUTPATIENT
Start: 2023-12-12 | End: 2023-12-12

## 2023-12-12 RX ORDER — PRASUGREL 10 MG/1
10 TABLET, FILM COATED ORAL DAILY
Status: DISCONTINUED | OUTPATIENT
Start: 2023-12-12 | End: 2023-12-14 | Stop reason: HOSPADM

## 2023-12-12 RX ORDER — ONDANSETRON 2 MG/ML
4 INJECTION INTRAMUSCULAR; INTRAVENOUS EVERY 6 HOURS PRN
Status: DISCONTINUED | OUTPATIENT
Start: 2023-12-12 | End: 2023-12-14 | Stop reason: HOSPADM

## 2023-12-12 RX ORDER — ACETAMINOPHEN 325 MG/1
650 TABLET ORAL EVERY 6 HOURS PRN
Status: DISCONTINUED | OUTPATIENT
Start: 2023-12-12 | End: 2023-12-14 | Stop reason: HOSPADM

## 2023-12-12 RX ORDER — HEPARIN SODIUM 10000 [USP'U]/100ML
5-30 INJECTION, SOLUTION INTRAVENOUS CONTINUOUS
Status: DISCONTINUED | OUTPATIENT
Start: 2023-12-12 | End: 2023-12-12 | Stop reason: HOSPADM

## 2023-12-12 RX ORDER — NITROGLYCERIN 0.4 MG/1
0.4 TABLET SUBLINGUAL EVERY 5 MIN PRN
Status: DISCONTINUED | OUTPATIENT
Start: 2023-12-12 | End: 2023-12-14 | Stop reason: HOSPADM

## 2023-12-12 RX ORDER — ONDANSETRON 4 MG/1
4 TABLET, ORALLY DISINTEGRATING ORAL EVERY 8 HOURS PRN
Status: DISCONTINUED | OUTPATIENT
Start: 2023-12-12 | End: 2023-12-14 | Stop reason: HOSPADM

## 2023-12-12 RX ORDER — ATORVASTATIN CALCIUM 80 MG/1
80 TABLET, FILM COATED ORAL NIGHTLY
Status: DISCONTINUED | OUTPATIENT
Start: 2023-12-12 | End: 2023-12-12

## 2023-12-12 RX ORDER — ASPIRIN 81 MG/1
81 TABLET ORAL DAILY
Status: DISCONTINUED | OUTPATIENT
Start: 2023-12-12 | End: 2023-12-14 | Stop reason: HOSPADM

## 2023-12-12 RX ORDER — SODIUM CHLORIDE 9 MG/ML
INJECTION, SOLUTION INTRAVENOUS PRN
Status: DISCONTINUED | OUTPATIENT
Start: 2023-12-12 | End: 2023-12-14 | Stop reason: HOSPADM

## 2023-12-12 RX ORDER — POTASSIUM CHLORIDE 7.45 MG/ML
10 INJECTION INTRAVENOUS PRN
Status: DISCONTINUED | OUTPATIENT
Start: 2023-12-12 | End: 2023-12-14 | Stop reason: HOSPADM

## 2023-12-12 RX ORDER — SODIUM CHLORIDE 0.9 % (FLUSH) 0.9 %
10 SYRINGE (ML) INJECTION PRN
Status: DISCONTINUED | OUTPATIENT
Start: 2023-12-12 | End: 2023-12-14 | Stop reason: HOSPADM

## 2023-12-12 RX ORDER — MAGNESIUM SULFATE 1 G/100ML
1000 INJECTION INTRAVENOUS PRN
Status: DISCONTINUED | OUTPATIENT
Start: 2023-12-12 | End: 2023-12-14 | Stop reason: HOSPADM

## 2023-12-12 RX ORDER — INSULIN LISPRO 100 [IU]/ML
60 INJECTION, SOLUTION INTRAVENOUS; SUBCUTANEOUS 2 TIMES DAILY WITH MEALS
Status: DISCONTINUED | OUTPATIENT
Start: 2023-12-12 | End: 2023-12-14 | Stop reason: HOSPADM

## 2023-12-12 RX ORDER — DULOXETIN HYDROCHLORIDE 30 MG/1
30 CAPSULE, DELAYED RELEASE ORAL DAILY
Status: DISCONTINUED | OUTPATIENT
Start: 2023-12-12 | End: 2023-12-14 | Stop reason: HOSPADM

## 2023-12-12 RX ADMIN — SODIUM CHLORIDE 250 ML: 9 INJECTION, SOLUTION INTRAVENOUS at 10:28

## 2023-12-12 RX ADMIN — ATORVASTATIN CALCIUM 80 MG: 80 TABLET, FILM COATED ORAL at 21:06

## 2023-12-12 RX ADMIN — ASPIRIN 81 MG: 81 TABLET, COATED ORAL at 16:55

## 2023-12-12 RX ADMIN — IOPAMIDOL 75 ML: 755 INJECTION, SOLUTION INTRAVENOUS at 10:13

## 2023-12-12 RX ADMIN — HEPARIN SODIUM 9 UNITS/KG/HR: 10000 INJECTION, SOLUTION INTRAVENOUS at 13:51

## 2023-12-12 RX ADMIN — HEPARIN SODIUM 18 UNITS/KG/HR: 10000 INJECTION, SOLUTION INTRAVENOUS at 09:36

## 2023-12-12 RX ADMIN — SODIUM CHLORIDE, PRESERVATIVE FREE 10 ML: 5 INJECTION INTRAVENOUS at 21:11

## 2023-12-12 RX ADMIN — HEPARIN SODIUM 4000 UNITS: 1000 INJECTION INTRAVENOUS; SUBCUTANEOUS at 09:34

## 2023-12-12 RX ADMIN — PRASUGREL 10 MG: 10 TABLET, FILM COATED ORAL at 16:55

## 2023-12-12 RX ADMIN — DULOXETINE HYDROCHLORIDE 30 MG: 30 CAPSULE, DELAYED RELEASE ORAL at 16:55

## 2023-12-12 RX ADMIN — FLUDROCORTISONE ACETATE 100 MCG: 0.1 TABLET ORAL at 16:55

## 2023-12-12 RX ADMIN — SODIUM CHLORIDE 250 ML: 9 INJECTION, SOLUTION INTRAVENOUS at 09:27

## 2023-12-12 RX ADMIN — HEPARIN SODIUM 4000 UNITS: 1000 INJECTION INTRAVENOUS; SUBCUTANEOUS at 17:57

## 2023-12-12 RX ADMIN — SODIUM CHLORIDE 250 ML: 9 INJECTION, SOLUTION INTRAVENOUS at 08:47

## 2023-12-12 ASSESSMENT — PAIN - FUNCTIONAL ASSESSMENT: PAIN_FUNCTIONAL_ASSESSMENT: NONE - DENIES PAIN

## 2023-12-12 NOTE — ED TRIAGE NOTES
Pt to ed by ems from [facility] for ntsemi. Pt went to [facility] c/o sob. Pt has a hx of chf, bnp 9000. Pt had an elevated ddimer, cta was negative. Pt has been hypotensive 70/40, on arrival pts bp 101/72. Pt had elevated troponin, 814 at [facility]. On arrival, pt is on a heparin drip. Pt denies pain. Pt denies sob. Possible cath lab. Pt is alert and oriented x4. Pt able to move self to cot. Pt in gown, on full cardiac monitor. Iv placed at [facility]. Admitting team notified that patient arrived. Will continue with plan of care.

## 2023-12-12 NOTE — PLAN OF CARE
Patient was seen and examined at bedside. H/O CABG LIMA-LAD 8/15/16. Multiple stents with PCIs afterwards. Most recent PCI 10/2023. Transferred here from [address] [place] [address] for NSTEMI. HS trops elevated to 600s. Currently CP free. Will need coronary angiogram in the morning. Continue low-dose IV heparin. DAPT, statin. NPO midnight.

## 2023-12-12 NOTE — ED NOTES
Report to [address] [place] [address] RN at [facility]. Dorcas Zheng RN  12/12/23 1200

## 2023-12-12 NOTE — ED NOTES
Called [facility] for transfer to [facility] Dorcas mckeon Hospitalist      Dilcia Barron  12/12/23 2609

## 2023-12-12 NOTE — ED NOTES
Redraw for anti xa sent to lab for possible diluted lab sample of previous one.      Chester Felty, RN  12/12/23 7988

## 2023-12-12 NOTE — ED NOTES
Dr Yeimi Murry connected with Dr Cindi Ratliff, [facility]  12/12/23 6891

## 2023-12-12 NOTE — ED PROVIDER NOTES
[facility] ED  EMERGENCY DEPARTMENT ENCOUNTER      Pt Name: David Cabezas  MRN: [id number]  [address] [facility] 1965  Date of evaluation: 12/12/2023  Provider: Rajiv Stahl MD    [address]       Chief Complaint   Patient presents with    Fatigue    Hyperglycemia    Shortness of Breath         HISTORY OF PRESENT ILLNESS   (Location/Symptom, Timing/Onset, Context/Setting, Quality, Duration, Modifying Factors, Severity)  Note limiting factors. David Cabezas is a 62 y.o. female who presents to the emergency department      77-year-old female present emergency department present emergency department for evaluation of fatigue and feeling short of breath. Patient states she started feeling poorly this morning after waking. Just complains of generalized fatigue. No focal weakness. Patient denies any headache. Denies any URI symptoms or cough. States she was running high blood sugars last night was able to temporarily get them down but they would go back up as high as 500. No abdominal pain. No nausea or vomiting. No UTI symptoms. Patient denies any dizziness weakness or lightheadedness. She does have a significant history of coronary artery disease with multiple stents and status post CABG. She denies having any chest pain at this time. Nursing Notes were reviewed. REVIEW OF SYSTEMS    (2-9 systems for level 4, 10 or more for level 5)     Review of Systems   All other systems reviewed and are negative. Except as noted above the remainder of the review of systems was reviewed and negative.        PAST MEDICAL HISTORY     Past Medical History:   Diagnosis Date    Anxiety     Asthma     CAD (coronary artery disease)     x1 stent 2010,x1 stent 2016, x1 stent May 2022    Cancer [facility]) 06/2022    right breast cancer    Clinical trial participant at discharge 3/31/16    COPD (chronic obstructive pulmonary disease) ([address] )     Depression     Diabetic neuropathy (HCC)     GERD (gastroesophageal time was *** minutes, excluding separately reportable procedures. There was a high probability of clinically significant/life threatening deterioration in the patient's condition which required my urgent intervention. ***    CONSULTS:  None    PROCEDURES:  Unless otherwise noted below, none     Procedures    No LOS Charge filed ***    FINAL IMPRESSION      1. NSTEMI (non-ST elevated myocardial infarction) [facility])          DISPOSITION/PLAN   DISPOSITION Decision To Transfer 12/12/2023 09:27:59 AM      PATIENT REFERRED TO:  No follow-up provider specified. DISCHARGE MEDICATIONS:  New Prescriptions    No medications on file     Controlled Substances Monitoring:     RX Monitoring Periodic Controlled Substance Monitoring   1/5/2023  11:13 AM No signs of potential drug abuse or diversion identified. ;Assessed functional status.        (Please note that portions of this note were completed with a voice recognition program.  Efforts were made to edit the dictations but occasionally words are mis-transcribed.)    Viktoria Proctor MD (electronically signed)  Attending Emergency Physician

## 2023-12-13 LAB
ANION GAP SERPL CALCULATED.3IONS-SCNC: 11 MMOL/L (ref 9–17)
ANTI-XA UNFRAC HEPARIN: <0.1 IU/L
ANTI-XA UNFRAC HEPARIN: <0.1 IU/L
BASOPHILS # BLD: 0.06 K/UL (ref 0–0.2)
BASOPHILS NFR BLD: 1 % (ref 0–2)
BUN SERPL-MCNC: 16 MG/DL (ref 6–20)
CALCIUM SERPL-MCNC: 8.5 MG/DL (ref 8.6–10.4)
CHLORIDE SERPL-SCNC: 100 MMOL/L (ref 98–107)
CHOLEST SERPL-MCNC: 89 MG/DL
CHOLESTEROL/HDL RATIO: 3.3
CO2 SERPL-SCNC: 22 MMOL/L (ref 20–31)
CREAT SERPL-MCNC: 0.6 MG/DL (ref 0.5–0.9)
ECHO BSA: 1.99 M2
EOSINOPHIL # BLD: 0.08 K/UL (ref 0–0.44)
EOSINOPHILS RELATIVE PERCENT: 1 % (ref 1–4)
ERYTHROCYTE [DISTWIDTH] IN BLOOD BY AUTOMATED COUNT: 15.5 % (ref 11.8–14.4)
GFR SERPL CREATININE-BSD FRML MDRD: >60 ML/MIN/1.73M2
GLUCOSE BLD-MCNC: 184 MG/DL (ref 65–105)
GLUCOSE BLD-MCNC: 190 MG/DL (ref 65–105)
GLUCOSE BLD-MCNC: 287 MG/DL (ref 65–105)
GLUCOSE BLD-MCNC: 307 MG/DL (ref 65–105)
GLUCOSE SERPL-MCNC: 283 MG/DL (ref 70–99)
HCT VFR BLD AUTO: 35.1 % (ref 36.3–47.1)
HDLC SERPL-MCNC: 27 MG/DL
HGB BLD-MCNC: 10.8 G/DL (ref 11.9–15.1)
IMM GRANULOCYTES # BLD AUTO: 0.08 K/UL (ref 0–0.3)
IMM GRANULOCYTES NFR BLD: 1 %
LDLC SERPL CALC-MCNC: 30 MG/DL (ref 0–130)
LYMPHOCYTES NFR BLD: 1.85 K/UL (ref 1.1–3.7)
LYMPHOCYTES RELATIVE PERCENT: 17 % (ref 24–43)
MAGNESIUM SERPL-MCNC: 1.8 MG/DL (ref 1.6–2.6)
MCH RBC QN AUTO: 24.4 PG (ref 25.2–33.5)
MCHC RBC AUTO-ENTMCNC: 30.8 G/DL (ref 28.4–34.8)
MCV RBC AUTO: 79.2 FL (ref 82.6–102.9)
MONOCYTES NFR BLD: 0.63 K/UL (ref 0.1–1.2)
MONOCYTES NFR BLD: 6 % (ref 3–12)
NEUTROPHILS NFR BLD: 74 % (ref 36–65)
NEUTS SEG NFR BLD: 7.92 K/UL (ref 1.5–8.1)
NRBC BLD-RTO: 0 PER 100 WBC
PLATELET # BLD AUTO: 355 K/UL (ref 138–453)
PMV BLD AUTO: 10.2 FL (ref 8.1–13.5)
POTASSIUM SERPL-SCNC: 4.1 MMOL/L (ref 3.7–5.3)
RBC # BLD AUTO: 4.43 M/UL (ref 3.95–5.11)
RBC # BLD: ABNORMAL 10*6/UL
SODIUM SERPL-SCNC: 133 MMOL/L (ref 135–144)
TRIGL SERPL-MCNC: 160 MG/DL
WBC OTHER # BLD: 10.6 K/UL (ref 3.5–11.3)

## 2023-12-13 PROCEDURE — C1769 GUIDE WIRE: HCPCS | Performed by: INTERNAL MEDICINE

## 2023-12-13 PROCEDURE — C1725 CATH, TRANSLUMIN NON-LASER: HCPCS | Performed by: INTERNAL MEDICINE

## 2023-12-13 PROCEDURE — C1874 STENT, COATED/COV W/DEL SYS: HCPCS | Performed by: INTERNAL MEDICINE

## 2023-12-13 PROCEDURE — 6370000000 HC RX 637 (ALT 250 FOR IP): Performed by: NURSE PRACTITIONER

## 2023-12-13 PROCEDURE — 92929 PR PRQ TRLUML CORONARY STENT W/ANGIO ADDL ART/BRNCH: CPT | Performed by: INTERNAL MEDICINE

## 2023-12-13 PROCEDURE — 2580000003 HC RX 258: Performed by: NURSE PRACTITIONER

## 2023-12-13 PROCEDURE — 99223 1ST HOSP IP/OBS HIGH 75: CPT | Performed by: INTERNAL MEDICINE

## 2023-12-13 PROCEDURE — 80048 BASIC METABOLIC PNL TOTAL CA: CPT

## 2023-12-13 PROCEDURE — 2500000003 HC RX 250 WO HCPCS: Performed by: INTERNAL MEDICINE

## 2023-12-13 PROCEDURE — 94761 N-INVAS EAR/PLS OXIMETRY MLT: CPT

## 2023-12-13 PROCEDURE — 85520 HEPARIN ASSAY: CPT

## 2023-12-13 PROCEDURE — C1892 INTRO/SHEATH,FIXED,PEEL-AWAY: HCPCS | Performed by: INTERNAL MEDICINE

## 2023-12-13 PROCEDURE — C9600 PERC DRUG-EL COR STENT SING: HCPCS | Performed by: INTERNAL MEDICINE

## 2023-12-13 PROCEDURE — 93459 L HRT ART/GRFT ANGIO: CPT | Performed by: INTERNAL MEDICINE

## 2023-12-13 PROCEDURE — 83735 ASSAY OF MAGNESIUM: CPT

## 2023-12-13 PROCEDURE — 99232 SBSQ HOSP IP/OBS MODERATE 35: CPT | Performed by: STUDENT IN AN ORGANIZED HEALTH CARE EDUCATION/TRAINING PROGRAM

## 2023-12-13 PROCEDURE — 92928 PRQ TCAT PLMT NTRAC ST 1 LES: CPT | Performed by: INTERNAL MEDICINE

## 2023-12-13 PROCEDURE — 6370000000 HC RX 637 (ALT 250 FOR IP): Performed by: FAMILY MEDICINE

## 2023-12-13 PROCEDURE — 85025 COMPLETE CBC W/AUTO DIFF WBC: CPT

## 2023-12-13 PROCEDURE — 2580000003 HC RX 258: Performed by: INTERNAL MEDICINE

## 2023-12-13 PROCEDURE — 80061 LIPID PANEL: CPT

## 2023-12-13 PROCEDURE — 99152 MOD SED SAME PHYS/QHP 5/>YRS: CPT | Performed by: INTERNAL MEDICINE

## 2023-12-13 PROCEDURE — 82947 ASSAY GLUCOSE BLOOD QUANT: CPT

## 2023-12-13 PROCEDURE — 99153 MOD SED SAME PHYS/QHP EA: CPT | Performed by: INTERNAL MEDICINE

## 2023-12-13 PROCEDURE — 6360000002 HC RX W HCPCS: Performed by: STUDENT IN AN ORGANIZED HEALTH CARE EDUCATION/TRAINING PROGRAM

## 2023-12-13 PROCEDURE — 93454 CORONARY ARTERY ANGIO S&I: CPT | Performed by: INTERNAL MEDICINE

## 2023-12-13 PROCEDURE — C1894 INTRO/SHEATH, NON-LASER: HCPCS | Performed by: INTERNAL MEDICINE

## 2023-12-13 PROCEDURE — 92920 PRQ TRLUML C ANGIOP 1ART&/BR: CPT | Performed by: INTERNAL MEDICINE

## 2023-12-13 PROCEDURE — 2060000000 HC ICU INTERMEDIATE R&B

## 2023-12-13 PROCEDURE — 6370000000 HC RX 637 (ALT 250 FOR IP): Performed by: INTERNAL MEDICINE

## 2023-12-13 PROCEDURE — 6360000002 HC RX W HCPCS: Performed by: INTERNAL MEDICINE

## 2023-12-13 PROCEDURE — 6360000002 HC RX W HCPCS: Performed by: NURSE PRACTITIONER

## 2023-12-13 PROCEDURE — 2709999900 HC NON-CHARGEABLE SUPPLY: Performed by: INTERNAL MEDICINE

## 2023-12-13 PROCEDURE — 83695 ASSAY OF LIPOPROTEIN(A): CPT

## 2023-12-13 PROCEDURE — 36415 COLL VENOUS BLD VENIPUNCTURE: CPT

## 2023-12-13 RX ORDER — ENOXAPARIN SODIUM 100 MG/ML
40 INJECTION SUBCUTANEOUS DAILY
Status: DISCONTINUED | OUTPATIENT
Start: 2023-12-13 | End: 2023-12-14 | Stop reason: HOSPADM

## 2023-12-13 RX ORDER — PRASUGREL 10 MG/1
TABLET, FILM COATED ORAL PRN
Status: DISCONTINUED | OUTPATIENT
Start: 2023-12-13 | End: 2023-12-13 | Stop reason: HOSPADM

## 2023-12-13 RX ORDER — LIDOCAINE HYDROCHLORIDE 10 MG/ML
INJECTION, SOLUTION INFILTRATION; PERINEURAL PRN
Status: DISCONTINUED | OUTPATIENT
Start: 2023-12-13 | End: 2023-12-13 | Stop reason: HOSPADM

## 2023-12-13 RX ORDER — FENTANYL CITRATE 50 UG/ML
INJECTION, SOLUTION INTRAMUSCULAR; INTRAVENOUS PRN
Status: DISCONTINUED | OUTPATIENT
Start: 2023-12-13 | End: 2023-12-13 | Stop reason: HOSPADM

## 2023-12-13 RX ORDER — NITROGLYCERIN 20 MG/100ML
INJECTION INTRAVENOUS PRN
Status: DISCONTINUED | OUTPATIENT
Start: 2023-12-13 | End: 2023-12-13 | Stop reason: HOSPADM

## 2023-12-13 RX ORDER — MIDAZOLAM HYDROCHLORIDE 1 MG/ML
INJECTION INTRAMUSCULAR; INTRAVENOUS PRN
Status: DISCONTINUED | OUTPATIENT
Start: 2023-12-13 | End: 2023-12-13 | Stop reason: HOSPADM

## 2023-12-13 RX ORDER — BIVALIRUDIN 250 MG/5ML
INJECTION, POWDER, LYOPHILIZED, FOR SOLUTION INTRAVENOUS PRN
Status: DISCONTINUED | OUTPATIENT
Start: 2023-12-13 | End: 2023-12-13 | Stop reason: HOSPADM

## 2023-12-13 RX ADMIN — INSULIN LISPRO 8 UNITS: 100 INJECTION, SOLUTION INTRAVENOUS; SUBCUTANEOUS at 12:20

## 2023-12-13 RX ADMIN — PRASUGREL 10 MG: 10 TABLET, FILM COATED ORAL at 09:53

## 2023-12-13 RX ADMIN — ASPIRIN 81 MG: 81 TABLET, COATED ORAL at 09:53

## 2023-12-13 RX ADMIN — HEPARIN SODIUM 17 UNITS/KG/HR: 10000 INJECTION, SOLUTION INTRAVENOUS at 07:10

## 2023-12-13 RX ADMIN — ENOXAPARIN SODIUM 40 MG: 100 INJECTION SUBCUTANEOUS at 18:58

## 2023-12-13 RX ADMIN — HEPARIN SODIUM 4000 UNITS: 1000 INJECTION INTRAVENOUS; SUBCUTANEOUS at 01:09

## 2023-12-13 RX ADMIN — INSULIN LISPRO 12 UNITS: 100 INJECTION, SOLUTION INTRAVENOUS; SUBCUTANEOUS at 09:53

## 2023-12-13 RX ADMIN — FLUDROCORTISONE ACETATE 100 MCG: 0.1 TABLET ORAL at 09:53

## 2023-12-13 RX ADMIN — DULOXETINE HYDROCHLORIDE 30 MG: 30 CAPSULE, DELAYED RELEASE ORAL at 09:53

## 2023-12-13 RX ADMIN — SODIUM CHLORIDE, PRESERVATIVE FREE 10 ML: 5 INJECTION INTRAVENOUS at 20:41

## 2023-12-13 RX ADMIN — PANTOPRAZOLE SODIUM 40 MG: 40 TABLET, DELAYED RELEASE ORAL at 09:53

## 2023-12-13 RX ADMIN — METOPROLOL TARTRATE 25 MG: 25 TABLET, FILM COATED ORAL at 09:53

## 2023-12-13 RX ADMIN — SODIUM CHLORIDE, PRESERVATIVE FREE 5 ML: 5 INJECTION INTRAVENOUS at 09:23

## 2023-12-13 RX ADMIN — ATORVASTATIN CALCIUM 80 MG: 80 TABLET, FILM COATED ORAL at 20:41

## 2023-12-13 ASSESSMENT — PAIN SCALES - GENERAL
PAINLEVEL_OUTOF10: 0
PAINLEVEL_OUTOF10: 0

## 2023-12-13 NOTE — PROGRESS NOTES
[facility]  Office: [phone]  Elo Kat, DO,  Ba, DO, Gardenia Mondragon, DO, Brigido Bob Blood, DO, Dagoberto Luong MD, Severo Lopes, MD, Yarelis Alberts MD, Carlene Wilcox MD,  Ismael iPttman MD, Lamont Oquendo MD, Margaux Krause MD,  Bhavesh Santana MD, Clara Pineda MD, Zabrina Steel DO, Mackenzie Davila MD,  Juan Manuel Gu DO, Jose Salazar MD, Juliet Wahl MD, Quinton Joyner MD, Roni Kahn MD,  Steve Contreras MD, Pavel Salomon MD, Donnell Capone MD, Cristian Lane MD, Celia Mortimer, MD, Kat Ramos MD, Rocky Pierce DO, Darcy Shaffer DO, Jose Martin Suero MD,  Neil Zaman MD, Huan Jackman, CNP,  Bernard Encarnacion, CNP, Tiera Bateman, CNP,  Tim De La Rosa, [facility], Daniel Dawkins, CNP, Alexa Samuel, CNP, Edmundo Bartholomew, CNP, Usama Sparks, CNP, Steffen Tracy, CNP, NELSON ChanC, NELSON ArellanoC, Yue Stern, CNP, Lorenzo Gonzalez, CNS, Jun Carvajal, CNP, Inna Brumfield, CNP, Joey Linares, [address]    Progress Note    12/13/2023    7:44 AM    Name:   Kavita Duvall  MRN:     [id number]     Acct:      [de-identified]   Room:   2009/2009-01  IP Day:  1  Admit Date:  12/12/2023  1:17 PM    PCP:   CHELSEA Stafford CNP  Code Status:  Full Code    Subjective:     C/C:   Chief Complaint   Patient presents with    Shortness of Breath     Interval History Status: improved. Vitals reviewed, afebrile and hemodynamically stable. Saturating well on room air  Labs reviewed, leukocytosis has resolved and hemoglobin remained stable on heparin infusion. Hyperglycemia 307 but will treat with corrective algorithm today given n.p.o. status. Overnight patient had no significant events. On examination patient resting comfortably in bed. Plan for cardiac catheterization today. Remains on heparin infusion. No complaints aside from fatigue and dyspnea on exertion.     Brief

## 2023-12-13 NOTE — CARE COORDINATION
Case Management Assessment  Initial Evaluation    Date/Time of Evaluation: 12/13/2023 10:41 AM  Assessment Completed by: Dawson Diamond RN    If patient is discharged prior to next notation, then this note serves as note for discharge by case management. Patient Name: Key Rodarte                   YOB: 1965  Diagnosis: NSTEMI (non-ST elevated myocardial infarction) [facility]) [I21.4]                   Date / Time: 12/12/2023  1:17 PM    Patient Admission Status: Inpatient   Readmission Risk (Low < 19, Mod (19-27), High > 27): Readmission Risk Score: 22.3    Current PCP: Robert Plasencia, CHELSEA - CNP  PCP verified by CM? (P) Yes    Chart Reviewed: Yes      History Provided by: (P) Patient  Patient Orientation: (P) Alert and Oriented    Patient Cognition: (P) Alert    Hospitalization in the last 30 days (Readmission):  No    If yes, Readmission Assessment in CM Navigator will be completed.     Advance Directives:      Code Status: Full Code   Patient's Primary Decision Maker is: (P) Legal Next of Kin    Primary Decision Maker (Active): Anoop Yaos Child - [phone]    Secondary Decision Maker: Liza Hassan - Brother/Sister - [phone]    Discharge Planning:    Patient lives with: (P) Spouse/Significant Other Type of Home: (P) Apartment  Primary Care Giver: (P) Self  Patient Support Systems include: (P) Spouse/Significant Other, Family Members, Friends/Neighbors   Current Financial resources: (P) Medicaid  Current community resources:    Current services prior to admission: (P) None            Current DME:              Type of Home Care services:  (P) None    ADLS  Prior functional level: (P) Independent in ADLs/IADLs  Current functional level: (P) Independent in ADLs/IADLs    PT AM-PAC:   /24  OT AM-PAC:   /24    Family can provide assistance at DC: (P) Yes  Would you like Case Management to discuss the discharge plan with any other family members/significant others, and if so, who? (P) No  Plans

## 2023-12-13 NOTE — CONSULTS
auscultation bilaterally  Cardiovascular:  Regular S1 and S2. No JVD  Peripheral pulses are symmetrical and full   Abdomen:   Soft, non tender   Bowel sounds present  Extremities:  No Le edema or cyanosis   Neurological:  Deferred     DATA:    EKG:   Normal sinus rhythm  Left atrial enlargement  Nonspecific ST/T wave changes    ECHO:   10/2023   Summary   Left ventricle size is normal.   Normal left ventricular wall thickness. There was mild global hypokinesis of the left ventricle. Ejection fraction is visually estimated in the range of 45% to 50%. Cardiac Angiography:   10/2023  CORONARY ANGIOGRAM:  1. Left main:  Previously placed stent in the left main that continues  onto circumflex. 2.  LAD:   in the ostium. 3.  LCX:  The left main to LCX stent extends proximally. There is about  99% stenosis in the LCX, gives rise to an OM-1 and OM-2 system without  any significant obstruction. 4.  RCA:   in the proximal segment. SUMMARY:  Successful PCI of the left main to circumflex with IVUS  guidance using high pressure PTCA and [place] cutting balloon. LABS:   CBC:   Recent Labs     12/12/23  0810 12/12/23  1328   WBC 11.7* 11.7*   HGB 11.7* 10.6*   HCT 37.5 33.2*    394     BMP:   Recent Labs     12/12/23  0804 12/12/23  0810   NA  --  132*   K  --  4.1   CO2  --  23   BUN  --  16   CREATININE  --  0.7   LABGLOM  --  >60   GLUCOSE 294 294*     BNP: No results for input(s): \"BNP\" in the last 72 hours. PT/INR:   Recent Labs     12/12/23  1328   PROTIME 16.1*   INR 1.3     APTT:  Recent Labs     12/12/23  0810 12/12/23  1328   APTT 29.9 >180.0*     CARDIAC ENZYMES:No results for input(s): \"CKTOTAL\", \"CKMB\", \"CKMBINDEX\", \"TROPONINI\" in the last 72 hours. ASSESSMENT:  NSTEMI  History of CAD s/p CABG LIMA-LAD 8/15/16. Multiple stents with PCIs afterwards.  Most recent PCI 10/2023 as above  Diabetes mellitus  Hypertension  Hyperlipidemia  COPD      RECOMMENDATIONS:  Continue low dose IV

## 2023-12-14 VITALS
HEIGHT: 63 IN | SYSTOLIC BLOOD PRESSURE: 112 MMHG | OXYGEN SATURATION: 97 % | DIASTOLIC BLOOD PRESSURE: 73 MMHG | TEMPERATURE: 98 F | WEIGHT: 196.21 LBS | RESPIRATION RATE: 15 BRPM | HEART RATE: 96 BPM | BODY MASS INDEX: 34.77 KG/M2

## 2023-12-14 LAB
ANION GAP SERPL CALCULATED.3IONS-SCNC: 13 MMOL/L (ref 9–17)
BASOPHILS # BLD: 0.05 K/UL (ref 0–0.2)
BASOPHILS NFR BLD: 1 % (ref 0–2)
BUN SERPL-MCNC: 14 MG/DL (ref 6–20)
CALCIUM SERPL-MCNC: 8.5 MG/DL (ref 8.6–10.4)
CHLORIDE SERPL-SCNC: 100 MMOL/L (ref 98–107)
CO2 SERPL-SCNC: 20 MMOL/L (ref 20–31)
CREAT SERPL-MCNC: 0.5 MG/DL (ref 0.5–0.9)
EOSINOPHIL # BLD: 0.05 K/UL (ref 0–0.44)
EOSINOPHILS RELATIVE PERCENT: 1 % (ref 1–4)
ERYTHROCYTE [DISTWIDTH] IN BLOOD BY AUTOMATED COUNT: 15.7 % (ref 11.8–14.4)
GFR SERPL CREATININE-BSD FRML MDRD: >60 ML/MIN/1.73M2
GLUCOSE BLD-MCNC: 197 MG/DL (ref 65–105)
GLUCOSE BLD-MCNC: 295 MG/DL (ref 65–105)
GLUCOSE SERPL-MCNC: 197 MG/DL (ref 70–99)
HCT VFR BLD AUTO: 35.3 % (ref 36.3–47.1)
HGB BLD-MCNC: 10.8 G/DL (ref 11.9–15.1)
IMM GRANULOCYTES # BLD AUTO: 0.06 K/UL (ref 0–0.3)
IMM GRANULOCYTES NFR BLD: 1 %
LYMPHOCYTES NFR BLD: 1.66 K/UL (ref 1.1–3.7)
LYMPHOCYTES RELATIVE PERCENT: 18 % (ref 24–43)
MAGNESIUM SERPL-MCNC: 1.9 MG/DL (ref 1.6–2.6)
MCH RBC QN AUTO: 24.3 PG (ref 25.2–33.5)
MCHC RBC AUTO-ENTMCNC: 30.6 G/DL (ref 28.4–34.8)
MCV RBC AUTO: 79.5 FL (ref 82.6–102.9)
MONOCYTES NFR BLD: 0.53 K/UL (ref 0.1–1.2)
MONOCYTES NFR BLD: 6 % (ref 3–12)
NEUTROPHILS NFR BLD: 73 % (ref 36–65)
NEUTS SEG NFR BLD: 6.82 K/UL (ref 1.5–8.1)
NRBC BLD-RTO: 0 PER 100 WBC
PLATELET # BLD AUTO: 343 K/UL (ref 138–453)
PMV BLD AUTO: 9.6 FL (ref 8.1–13.5)
POTASSIUM SERPL-SCNC: 4.2 MMOL/L (ref 3.7–5.3)
RBC # BLD AUTO: 4.44 M/UL (ref 3.95–5.11)
RBC # BLD: ABNORMAL 10*6/UL
SODIUM SERPL-SCNC: 133 MMOL/L (ref 135–144)
WBC OTHER # BLD: 9.2 K/UL (ref 3.5–11.3)

## 2023-12-14 PROCEDURE — 6370000000 HC RX 637 (ALT 250 FOR IP): Performed by: NURSE PRACTITIONER

## 2023-12-14 PROCEDURE — 80048 BASIC METABOLIC PNL TOTAL CA: CPT

## 2023-12-14 PROCEDURE — 85025 COMPLETE CBC W/AUTO DIFF WBC: CPT

## 2023-12-14 PROCEDURE — 6360000002 HC RX W HCPCS: Performed by: STUDENT IN AN ORGANIZED HEALTH CARE EDUCATION/TRAINING PROGRAM

## 2023-12-14 PROCEDURE — 99232 SBSQ HOSP IP/OBS MODERATE 35: CPT | Performed by: STUDENT IN AN ORGANIZED HEALTH CARE EDUCATION/TRAINING PROGRAM

## 2023-12-14 PROCEDURE — 6370000000 HC RX 637 (ALT 250 FOR IP): Performed by: FAMILY MEDICINE

## 2023-12-14 PROCEDURE — 36415 COLL VENOUS BLD VENIPUNCTURE: CPT

## 2023-12-14 PROCEDURE — 83695 ASSAY OF LIPOPROTEIN(A): CPT

## 2023-12-14 PROCEDURE — 83735 ASSAY OF MAGNESIUM: CPT

## 2023-12-14 PROCEDURE — 2580000003 HC RX 258: Performed by: NURSE PRACTITIONER

## 2023-12-14 PROCEDURE — 99233 SBSQ HOSP IP/OBS HIGH 50: CPT | Performed by: SURGERY

## 2023-12-14 PROCEDURE — 82947 ASSAY GLUCOSE BLOOD QUANT: CPT

## 2023-12-14 RX ADMIN — ENOXAPARIN SODIUM 40 MG: 100 INJECTION SUBCUTANEOUS at 09:31

## 2023-12-14 RX ADMIN — ASPIRIN 81 MG: 81 TABLET, COATED ORAL at 09:31

## 2023-12-14 RX ADMIN — DULOXETINE HYDROCHLORIDE 30 MG: 30 CAPSULE, DELAYED RELEASE ORAL at 09:28

## 2023-12-14 RX ADMIN — METOPROLOL TARTRATE 25 MG: 25 TABLET, FILM COATED ORAL at 09:32

## 2023-12-14 RX ADMIN — PANTOPRAZOLE SODIUM 40 MG: 40 TABLET, DELAYED RELEASE ORAL at 09:31

## 2023-12-14 RX ADMIN — FLUDROCORTISONE ACETATE 100 MCG: 0.1 TABLET ORAL at 09:28

## 2023-12-14 RX ADMIN — INSULIN LISPRO 60 UNITS: 100 INJECTION, SOLUTION INTRAVENOUS; SUBCUTANEOUS at 09:31

## 2023-12-14 RX ADMIN — SODIUM CHLORIDE, PRESERVATIVE FREE 10 ML: 5 INJECTION INTRAVENOUS at 09:28

## 2023-12-14 RX ADMIN — PRASUGREL 10 MG: 10 TABLET, FILM COATED ORAL at 09:28

## 2023-12-14 ASSESSMENT — PAIN SCALES - GENERAL
PAINLEVEL_OUTOF10: 0
PAINLEVEL_OUTOF10: 0

## 2023-12-14 NOTE — PLAN OF CARE
Problem: Discharge Planning  Goal: Discharge to home or other facility with appropriate resources  12/14/2023 1030 by Sunil De Luna RN  Outcome: Completed  12/14/2023 0437 by Carson Monzon RN  Outcome: Progressing     Problem: Safety - Adult  Goal: Free from fall injury  12/14/2023 1030 by Sunil De Luna RN  Outcome: Completed  12/14/2023 0437 by Carson Monzon RN  Outcome: Progressing     Problem: ABCDS Injury Assessment  Goal: Absence of physical injury  12/14/2023 1030 by Sunil De Luna RN  Outcome: Completed  12/14/2023 0437 by Carson Monzon RN  Outcome: Progressing     Problem: Chronic Conditions and Co-morbidities  Goal: Patient's chronic conditions and co-morbidity symptoms are monitored and maintained or improved  12/14/2023 1030 by Sunil De Luna RN  Outcome: Completed  12/14/2023 0437 by Carson Monzon RN  Outcome: Progressing

## 2023-12-14 NOTE — PROGRESS NOTES
CLINICAL PHARMACY NOTE: MEDS TO BEDS    Total # of Prescriptions Filled: 1   The following medications were delivered to the patient:  metoprolol    Additional Documentation:

## 2023-12-14 NOTE — DISCHARGE SUMMARY
[facility]  Office: [address]26, DO, Ernestine Milan DO, Cuba Rasheed, DO, Holley Thomas, DO, Meng Elizabeth MD, Aaron Lancaster MD, Ranjan Redding MD, Tasneem Lynch MD,  Esther Kuo MD, Estephania Milian MD, Todd Mattson MD,  Rj Montalvo DO, Sarah Alamo MD, Yury Branch MD, Ash Hollins DO, Lit Barnett MD,  Rochel Spatz, DO, Erica Walker MD, Shelton Varghese MD, Preet Acuna MD, Eugene Ravi MD,  Prem Decker MD, Flakito Quan MD, Gladys Pimentel MD, Lary Larry MD, Ansley Durán MD, Mateus Lantigua MD, Tasha Arias, DO, Lorenzo Dee DO, Nirmala Lugo MD,  Robert Trevino MD, Wanita Canavan, CNP,  Yury Aiken, CNP, Luca Marion, CNP,  Salomón Swann, [facility], Thais Mark, CNP, Geetha Clemons, CNP, Nirmala Shukla, CNP, Obed Muro, CNP, Madelyn Phelps, CNP, Kristin Valle PALeonidesC, Juliet Macias PALeonidesC, Jessica, CNP, Danielle Rush, CNS, Dot Barrios, CNP, Caroline Perera, CNP, Delfinohang Sarah, [address] [facility]    Discharge Summary     Patient ID: Jace Johnson  :  1965   MRN: [id number]     ACCOUNT:  [de-identified]   Patient's PCP: CHELSEA Rojo CNP  Admit Date: 2023   Discharge Date: 2023    Length of Stay: 2  Code Status:  Full Code  Admitting Physician: Rochel Spatz, DO  Discharge Physician: Rochel Spatz, DO     Active Discharge Diagnoses:     Hospital Problem Lists:  Principal Problem:    NSTEMI (non-ST elevated myocardial infarction) [facility]  Active Problems:    Essential hypertension    ACS (acute coronary syndrome) ([facility])    Obesity (BMI 30-39. 9)    S/P angioplasty with stent    Diabetic neuropathy    Affective disorder (HCC)    Hx of CABG    DARON (obstructive sleep apnea)  Resolved Problems:    * No resolved hospital problems. *      Admission Condition:  fair     Discharged Condition: good    Hospital Stay:     Hospital Course:       This drug: Dulaglutide           * This list has 1 medication(s) that are the same as other medications prescribed for you. Read the directions carefully, and ask your doctor or other care provider to review them with you. STOP taking these medications      alendronate 70 MG tablet  Commonly known as: FOSAMAX     losartan 25 MG tablet  Commonly known as: COZAAR     metoprolol succinate 50 MG extended release tablet  Commonly known as: TOPROL XL     triamcinolone 0.1 % cream  Commonly known as: KENALOG            ASK your doctor about these medications      * albuterol sulfate  (90 Base) MCG/ACT inhaler  Commonly known as: Ventolin HFA  INHALE 2 PUFFS EVERY 6 HOURS AS NEEDED FOR WHEEZING     nitroGLYCERIN 0.4 MG SL tablet  Commonly known as: NITROSTAT  Place 1 tablet under the tongue every 5 minutes as needed for Chest pain     Praluent 150 MG/ML Soaj  Generic drug: Alirocumab  Inject once per month as directed. * This list has 1 medication(s) that are the same as other medications prescribed for you. Read the directions carefully, and ask your doctor or other care provider to review them with you. Where to Get Your Medications        These medications were sent to [address], [address] [place] [address]      Phone: [phone]   metoprolol tartrate 25 MG tablet         No discharge procedures on file. Time Spent on discharge is  31 mins in patient examination, evaluation, counseling as well as medication reconciliation, prescriptions for required medications, discharge plan and follow up. Electronically signed by   Sofia Garcia DO  12/14/2023  9:20 AM      Thank you Dr. Abilio Huerta, APRN - CNP for the opportunity to be involved in this patient's care.

## 2023-12-14 NOTE — PROGRESS NOTES
Patient was discharged with all of their belongings and Meds to [facility]. Patient was given discharge instructions, which were reviewed with the patient, and all questions were answered. Patient declined a wheelchair and walked off of the unit upon discharge.

## 2023-12-14 NOTE — PROGRESS NOTES
[facility]  Progress Note                   Date:   12/14/2023  Patient name: Darrell Judd  Date of admission:  12/12/2023  1:17 PM  MRN:   [id number]  YOB: 1965  PCP: Haider Plasencia APRN - CNP    Reason for Admission: NSTEMI (non-ST elevated myocardial infarction) ([address]) [I21.4]    Subjective:       Clinical Changes /Abnormalities:Patient seen and examined. Denies chest pain or shortness of breath. Tele/vitals/labs reviewed . Review of Systems    Medications:   Scheduled Meds:   enoxaparin  40 mg SubCUTAneous Daily    sodium chloride flush  5-40 mL IntraVENous 2 times per day    metoprolol tartrate  25 mg Oral BID    aspirin  81 mg Oral Daily    atorvastatin  80 mg Oral Nightly    DULoxetine  30 mg Oral Daily    fludrocortisone  100 mcg Oral Daily    pantoprazole  40 mg Oral QAM AC    prasugrel  10 mg Oral Daily    insulin lispro  0-16 Units SubCUTAneous TID WC    insulin lispro  0-4 Units SubCUTAneous Nightly    insulin lispro  60 Units SubCUTAneous BID WC     Continuous Infusions:   sodium chloride      dextrose       CBC:   Recent Labs     12/12/23  1328 12/13/23  0806 12/14/23  0653   WBC 11.7* 10.6 9.2   HGB 10.6* 10.8* 10.8*    355 343     BMP:    Recent Labs     12/12/23  0810 12/13/23  0806 12/14/23  0653   * 133* 133*   K 4.1 4.1 4.2   CL 95* 100 100   CO2 23 22 20   BUN 16 16 14   CREATININE 0.7 0.6 0.5   GLUCOSE 294* 283* 197*     Hepatic:  Recent Labs     12/12/23  0810   AST 17   ALT 13   BILITOT 0.4   ALKPHOS 144*     Troponin:   Recent Labs     12/12/23  1328 12/12/23  1530 12/12/23  1830   TROPHS 682* 699* 588*     BNP: No results for input(s): \"BNP\" in the last 72 hours.   Lipids:   Recent Labs     12/13/23  0806   CHOL 89   HDL 27*     INR:   Recent Labs     12/12/23  1328   INR 1.3       Expand All Collapse All       [facility]    Consult / H&P                 Today's Date:  12/13/2023  Patient Name: Darrell Judd  Date of History of seizures     History of breast cancer     Tenderness of right axilla     Seroma of breast     Chronic anemia     Acute systolic CHF (congestive heart failure), NYHA class 3 (HCC)     Iron deficiency anemia      Plan of Treatment:     Cardiac stable -cath site with no issues ,  continue BB, dual antiplatelet therapy , statin and nitro as needed for chest pain   Discussed in detail with patient post cath POC including but not limited to medications, diet, exercise,  artery site care, and follow-up. Questions and concerns addressed. OK for discharge home today. F/U in office in 1-3 weeks.      Electronically signed by CHELSEA Sosa NP on 12/14/2023 at 10:17 AM  [address] [facility].  [phone]

## 2023-12-14 NOTE — PROGRESS NOTES
[facility]  Office: [phone]  Eulalia Ledezma, DO, Ronald Interiano, DO, Bettina Acuña, DO, Maykel Thomas, DO, Aaron Meléndez MD, Stepan Saini MD, Abdirashid Pyle MD, Justina Farias MD,  Ramses Ardon MD, Kennedi Eddy MD, Huan Rivers MD,  Samy Jones MD, Orlando Redding MD, Eugene Delgado DO, Albaro Zee MD,  Caden Fenton DO, Matthew Patel MD, Carmela Ricardo MD, Lavon Yu MD, Carl Penn MD,  Jocy Rice MD, Gary Bob MD, Tristan Polo MD, Demian Aguila MD, Ranjit Pace MD, Nataliia Guerrero MD, Horacio Almendarez DO, Marj Mcdonald DO, Milka Robert MD,  Adalberto Amor MD, Jorge Iyer, CNP,  Abiodun Salmeron, CNP, Mariana Marvin, CNP,  Franko Calvo, [facility], Lizzette Ortez, CNP, Shanta Flores, CNP, Mildred Diaz, CNP, Pastor Robert, CNP, Alix Stephen, CNP, NLESON BrowningC, NELSON ArellanoC, Jessica, CNP, Peggy Menchaca, CNS, Vicenta Benton, CNP, Lissette Hays, CNP, Binu Riley, [address]    Progress Note    12/14/2023    7:32 AM    Name:   Carroll Jaime  MRN:     [id number]     Acct:      [de-identified]   Room:   2009/2009-01  IP Day:  2  Admit Date:  12/12/2023  1:17 PM    PCP:   CHELSEA Larry CNP  Code Status:  Full Code    Subjective:     C/C:   Chief Complaint   Patient presents with    Shortness of Breath     Interval History Status: improved. Vitals reviewed, afebrile and hemodynamically stable. Saturating well on room air  Labs reviewed, mild hyponatremia 133 but stable, kidney function unchanged postcardiac catheterization, blood sugar well-controlled. Hemoglobin and platelet count remained stable. Overnight patient had no significant events. On examination patient resting comfortably in bed. No complaints at this time.   Status post cardiac catheterization 12/13/2023 demonstrating severe LAD with patent LIMA to LAD, chronic oriented to person, place and time and normal affect  Lungs:  clear to auscultation bilaterally, normal effort  Heart:  regular rate and rhythm, no murmur  Abdomen:  soft, nontender, nondistended. Extremities:  no edema, redness, tenderness in the calves  Skin:  no gross lesions, rashes, induration    Assessment:        Hospital Problems             Last Modified POA    * (Principal) NSTEMI (non-ST elevated myocardial infarction) ([address]) 12/12/2023 Yes    Essential hypertension (Chronic) 12/12/2023 Yes    ACS (acute coronary syndrome) ([address]) 4/23/2022 Unknown    Obesity (BMI 30-39.9) 12/12/2023 Yes    S/P angioplasty with stent 12/12/2023 Yes    Diabetic neuropathy (Chronic) 12/12/2023 Yes    Affective disorder ([address]) 12/12/2023 Yes    Hx of CABG 12/12/2023 Yes    Overview Signed 9/26/2016 10:40 AM by Geovanny Beard MD     8/16. HAYES-LAD. Dr. Day Olson         DARON (obstructive sleep apnea) 12/12/2023 Yes    Overview Signed 5/9/2017  3:53 PM by Geovanny Beard MD     suspected          Plan:        NSTEMI. Cardiology following. Continue aspirin 81 mg daily, Effient 10 mg daily, Lipitor 80 mg nightly, Lopressor 25 mg twice daily. Heparin infusion discontinued. Status post cardiac catheterization 12/13/2023 demonstrating severe LAD with patent LIMA to LAD, chronic total occlusion of RCA, left main with 100% occlusion of stent status post PCI and stent placement and distal left circumflex stenosis with stent placement. Hypertension. Initially hypotension on arrival but responded to fluids. Continue Lopressor 25 mg twice daily. Hold losartan 12.5 mg daily. Remains on Florinef 100 mcg daily. Hyperlipidemia. Continue Lipitor 80 mg nightly. Type 2 diabetes mellitus with diabetic neuropathy and hyperglycemia. Takes U-500 60 units twice daily at home. N.p.o. today. Continue high-dose corrective algorithm, POCT glucose and hypoglycemia protocol. CAD status post CABG.   Continue aspirin, Effient, Lipitor,

## 2023-12-14 NOTE — CARE COORDINATION
Pt with transitional planning with d/c to home today with daughter to transport no needs. Discharge [address] Case Management Department  Written by: Saman De Luna    Patient Name: Daniel Haynes  Attending Provider: Nelson Kelly DO  Admit Date: 2023  1:17 PM  MRN: [id number]  Account: [de-identified]                     : 1965  Discharge Date:       Disposition: Home w/ no needs .  Family is transport    Saman De Luna

## 2023-12-14 NOTE — PLAN OF CARE
Problem: Discharge Planning  Goal: Discharge to home or other facility with appropriate resources  12/14/2023 0437 by Beatrice Khan RN  Outcome: Progressing  12/13/2023 1626 by Emma Goldberg RN  Outcome: Progressing     Problem: Safety - Adult  Goal: Free from fall injury  12/14/2023 0437 by Beatrice Khan RN  Outcome: Progressing  12/13/2023 1626 by Emma Goldberg RN  Outcome: Progressing     Problem: ABCDS Injury Assessment  Goal: Absence of physical injury  12/14/2023 0437 by Beatrice Khan RN  Outcome: Progressing  12/13/2023 1626 by Emma Goldberg RN  Outcome: Progressing     Problem: Chronic Conditions and Co-morbidities  Goal: Patient's chronic conditions and co-morbidity symptoms are monitored and maintained or improved  12/14/2023 0437 by Beatrice Khan RN  Outcome: Progressing  12/13/2023 1626 by Emma Goldberg RN  Outcome: Progressing

## 2023-12-15 LAB — LPA SERPL-MCNC: 76 MG/DL

## 2023-12-17 LAB
MICROORGANISM SPEC CULT: NORMAL
MICROORGANISM SPEC CULT: NORMAL
SERVICE CMNT-IMP: NORMAL
SERVICE CMNT-IMP: NORMAL
SPECIMEN DESCRIPTION: NORMAL
SPECIMEN DESCRIPTION: NORMAL

## 2023-12-18 ENCOUNTER — APPOINTMENT (OUTPATIENT)
Dept: GENERAL RADIOLOGY | Age: 58
DRG: 194 | End: 2023-12-18
Attending: FAMILY MEDICINE
Payer: COMMERCIAL

## 2023-12-18 ENCOUNTER — HOSPITAL ENCOUNTER (INPATIENT)
Age: 58
LOS: 4 days | Discharge: HOME | DRG: 194 | End: 2023-12-22
Attending: FAMILY MEDICINE
Payer: COMMERCIAL

## 2023-12-18 ENCOUNTER — HOSPITAL ENCOUNTER (EMERGENCY)
Age: 58
Discharge: STILL ADMITTED OR ADMINISTRATIVE | DRG: 194 | End: 2023-12-18
Payer: COMMERCIAL

## 2023-12-18 DIAGNOSIS — J90 PLEURAL EFFUSION, BILATERAL: Primary | ICD-10-CM

## 2023-12-18 PROBLEM — G47.30 SLEEP APNEA: Status: ACTIVE | Noted: 2023-12-18

## 2023-12-18 LAB
EKG ATRIAL RATE: 81 BPM
EKG P AXIS: 40 DEGREES
EKG P-R INTERVAL: 136 MS
EKG Q-T INTERVAL: 398 MS
EKG QRS DURATION: 96 MS
EKG QTC CALCULATION (BAZETT): 462 MS
EKG R AXIS: 24 DEGREES
EKG T AXIS: -90 DEGREES
EKG VENTRICULAR RATE: 81 BPM
GLUCOSE BLD-MCNC: 175 MG/DL (ref 65–105)
GLUCOSE BLD-MCNC: 228 MG/DL (ref 65–105)
GLUCOSE BLD-MCNC: 241 MG/DL (ref 65–105)
GLUCOSE BLD-MCNC: 263 MG/DL (ref 65–105)
TROPONIN I SERPL HS-MCNC: 541 NG/L (ref 0–14)

## 2023-12-18 PROCEDURE — 6370000000 HC RX 637 (ALT 250 FOR IP): Performed by: PHYSICIAN ASSISTANT

## 2023-12-18 PROCEDURE — 84484 ASSAY OF TROPONIN QUANT: CPT

## 2023-12-18 PROCEDURE — 93010 ELECTROCARDIOGRAM REPORT: CPT | Performed by: INTERNAL MEDICINE

## 2023-12-18 PROCEDURE — 93005 ELECTROCARDIOGRAM TRACING: CPT | Performed by: PHYSICIAN ASSISTANT

## 2023-12-18 PROCEDURE — 99223 1ST HOSP IP/OBS HIGH 75: CPT | Performed by: FAMILY MEDICINE

## 2023-12-18 PROCEDURE — 6360000002 HC RX W HCPCS: Performed by: PHYSICIAN ASSISTANT

## 2023-12-18 PROCEDURE — 71046 X-RAY EXAM CHEST 2 VIEWS: CPT

## 2023-12-18 PROCEDURE — APPSS45 APP SPLIT SHARED TIME 31-45 MINUTES: Performed by: PHYSICIAN ASSISTANT

## 2023-12-18 PROCEDURE — 6370000000 HC RX 637 (ALT 250 FOR IP): Performed by: FAMILY MEDICINE

## 2023-12-18 PROCEDURE — 2060000000 HC ICU INTERMEDIATE R&B

## 2023-12-18 PROCEDURE — 99285 EMERGENCY DEPT VISIT HI MDM: CPT

## 2023-12-18 PROCEDURE — 2580000003 HC RX 258: Performed by: PHYSICIAN ASSISTANT

## 2023-12-18 PROCEDURE — 82947 ASSAY GLUCOSE BLOOD QUANT: CPT

## 2023-12-18 RX ORDER — POLYETHYLENE GLYCOL 3350 17 G/17G
17 POWDER, FOR SOLUTION ORAL DAILY PRN
Status: DISCONTINUED | OUTPATIENT
Start: 2023-12-18 | End: 2023-12-22 | Stop reason: HOSPADM

## 2023-12-18 RX ORDER — DULOXETIN HYDROCHLORIDE 30 MG/1
30 CAPSULE, DELAYED RELEASE ORAL DAILY
Status: DISCONTINUED | OUTPATIENT
Start: 2023-12-18 | End: 2023-12-22 | Stop reason: HOSPADM

## 2023-12-18 RX ORDER — PANTOPRAZOLE SODIUM 40 MG/1
40 TABLET, DELAYED RELEASE ORAL
Status: DISCONTINUED | OUTPATIENT
Start: 2023-12-19 | End: 2023-12-22 | Stop reason: HOSPADM

## 2023-12-18 RX ORDER — ACETAMINOPHEN 650 MG/1
650 SUPPOSITORY RECTAL EVERY 6 HOURS PRN
Status: DISCONTINUED | OUTPATIENT
Start: 2023-12-18 | End: 2023-12-22 | Stop reason: HOSPADM

## 2023-12-18 RX ORDER — ATORVASTATIN CALCIUM 80 MG/1
80 TABLET, FILM COATED ORAL DAILY
Status: DISCONTINUED | OUTPATIENT
Start: 2023-12-18 | End: 2023-12-22 | Stop reason: HOSPADM

## 2023-12-18 RX ORDER — MAGNESIUM SULFATE IN WATER 40 MG/ML
2000 INJECTION, SOLUTION INTRAVENOUS PRN
Status: DISCONTINUED | OUTPATIENT
Start: 2023-12-18 | End: 2023-12-22 | Stop reason: HOSPADM

## 2023-12-18 RX ORDER — DEXTROSE MONOHYDRATE 100 MG/ML
INJECTION, SOLUTION INTRAVENOUS CONTINUOUS PRN
Status: DISCONTINUED | OUTPATIENT
Start: 2023-12-18 | End: 2023-12-22 | Stop reason: HOSPADM

## 2023-12-18 RX ORDER — ASPIRIN 81 MG/1
81 TABLET ORAL DAILY
Status: DISCONTINUED | OUTPATIENT
Start: 2023-12-18 | End: 2023-12-22 | Stop reason: HOSPADM

## 2023-12-18 RX ORDER — ANASTROZOLE 1 MG/1
1 TABLET ORAL DAILY
Status: DISCONTINUED | OUTPATIENT
Start: 2023-12-18 | End: 2023-12-22 | Stop reason: HOSPADM

## 2023-12-18 RX ORDER — INSULIN LISPRO 100 [IU]/ML
0-4 INJECTION, SOLUTION INTRAVENOUS; SUBCUTANEOUS NIGHTLY
Status: DISCONTINUED | OUTPATIENT
Start: 2023-12-18 | End: 2023-12-22 | Stop reason: HOSPADM

## 2023-12-18 RX ORDER — POTASSIUM CHLORIDE 7.45 MG/ML
10 INJECTION INTRAVENOUS PRN
Status: DISCONTINUED | OUTPATIENT
Start: 2023-12-18 | End: 2023-12-22 | Stop reason: HOSPADM

## 2023-12-18 RX ORDER — EZETIMIBE 10 MG/1
10 TABLET ORAL NIGHTLY
Status: DISCONTINUED | OUTPATIENT
Start: 2023-12-18 | End: 2023-12-22 | Stop reason: HOSPADM

## 2023-12-18 RX ORDER — SODIUM CHLORIDE 9 MG/ML
INJECTION, SOLUTION INTRAVENOUS PRN
Status: DISCONTINUED | OUTPATIENT
Start: 2023-12-18 | End: 2023-12-22 | Stop reason: HOSPADM

## 2023-12-18 RX ORDER — INSULIN LISPRO 100 [IU]/ML
0-8 INJECTION, SOLUTION INTRAVENOUS; SUBCUTANEOUS
Status: DISCONTINUED | OUTPATIENT
Start: 2023-12-18 | End: 2023-12-22 | Stop reason: HOSPADM

## 2023-12-18 RX ORDER — GABAPENTIN 600 MG/1
300 TABLET ORAL 3 TIMES DAILY
Status: DISCONTINUED | OUTPATIENT
Start: 2023-12-18 | End: 2023-12-22 | Stop reason: HOSPADM

## 2023-12-18 RX ORDER — ENOXAPARIN SODIUM 100 MG/ML
40 INJECTION SUBCUTANEOUS DAILY
Status: DISCONTINUED | OUTPATIENT
Start: 2023-12-18 | End: 2023-12-22 | Stop reason: HOSPADM

## 2023-12-18 RX ORDER — FUROSEMIDE 10 MG/ML
40 INJECTION INTRAMUSCULAR; INTRAVENOUS 2 TIMES DAILY
Status: DISCONTINUED | OUTPATIENT
Start: 2023-12-18 | End: 2023-12-20

## 2023-12-18 RX ORDER — PRASUGREL 10 MG/1
10 TABLET, FILM COATED ORAL DAILY
Status: DISCONTINUED | OUTPATIENT
Start: 2023-12-18 | End: 2023-12-22 | Stop reason: HOSPADM

## 2023-12-18 RX ORDER — ACETAMINOPHEN 325 MG/1
650 TABLET ORAL EVERY 6 HOURS PRN
Status: DISCONTINUED | OUTPATIENT
Start: 2023-12-18 | End: 2023-12-22 | Stop reason: HOSPADM

## 2023-12-18 RX ORDER — ONDANSETRON 4 MG/1
4 TABLET, ORALLY DISINTEGRATING ORAL EVERY 8 HOURS PRN
Status: DISCONTINUED | OUTPATIENT
Start: 2023-12-18 | End: 2023-12-22 | Stop reason: HOSPADM

## 2023-12-18 RX ORDER — ONDANSETRON 2 MG/ML
4 INJECTION INTRAMUSCULAR; INTRAVENOUS EVERY 6 HOURS PRN
Status: DISCONTINUED | OUTPATIENT
Start: 2023-12-18 | End: 2023-12-22 | Stop reason: HOSPADM

## 2023-12-18 RX ORDER — SODIUM CHLORIDE 0.9 % (FLUSH) 0.9 %
5-40 SYRINGE (ML) INJECTION PRN
Status: DISCONTINUED | OUTPATIENT
Start: 2023-12-18 | End: 2023-12-22 | Stop reason: HOSPADM

## 2023-12-18 RX ORDER — POTASSIUM CHLORIDE 20 MEQ/1
40 TABLET, EXTENDED RELEASE ORAL PRN
Status: DISCONTINUED | OUTPATIENT
Start: 2023-12-18 | End: 2023-12-22 | Stop reason: HOSPADM

## 2023-12-18 RX ORDER — SODIUM CHLORIDE 0.9 % (FLUSH) 0.9 %
5-40 SYRINGE (ML) INJECTION EVERY 12 HOURS SCHEDULED
Status: DISCONTINUED | OUTPATIENT
Start: 2023-12-18 | End: 2023-12-22 | Stop reason: HOSPADM

## 2023-12-18 RX ADMIN — GABAPENTIN 300 MG: 600 TABLET ORAL at 15:45

## 2023-12-18 RX ADMIN — ENOXAPARIN SODIUM 40 MG: 100 INJECTION SUBCUTANEOUS at 18:06

## 2023-12-18 RX ADMIN — INSULIN HUMAN 60 UNITS: 500 INJECTION, SOLUTION SUBCUTANEOUS at 21:30

## 2023-12-18 RX ADMIN — FUROSEMIDE 40 MG: 10 INJECTION, SOLUTION INTRAMUSCULAR; INTRAVENOUS at 18:06

## 2023-12-18 RX ADMIN — SODIUM CHLORIDE, PRESERVATIVE FREE 10 ML: 5 INJECTION INTRAVENOUS at 20:35

## 2023-12-18 RX ADMIN — ATORVASTATIN CALCIUM 80 MG: 80 TABLET, FILM COATED ORAL at 15:41

## 2023-12-18 RX ADMIN — METOPROLOL TARTRATE 25 MG: 25 TABLET, FILM COATED ORAL at 20:35

## 2023-12-18 RX ADMIN — DULOXETINE HYDROCHLORIDE 30 MG: 30 CAPSULE, DELAYED RELEASE ORAL at 15:45

## 2023-12-18 RX ADMIN — EMPAGLIFLOZIN 10 MG: 10 TABLET, FILM COATED ORAL at 16:51

## 2023-12-18 RX ADMIN — PRASUGREL 10 MG: 10 TABLET, FILM COATED ORAL at 16:51

## 2023-12-18 RX ADMIN — INSULIN LISPRO 4 UNITS: 100 INJECTION, SOLUTION INTRAVENOUS; SUBCUTANEOUS at 14:50

## 2023-12-18 RX ADMIN — EZETIMIBE 10 MG: 10 TABLET ORAL at 20:35

## 2023-12-18 RX ADMIN — GABAPENTIN 300 MG: 600 TABLET ORAL at 20:55

## 2023-12-18 RX ADMIN — ASPIRIN 81 MG: 81 TABLET, COATED ORAL at 15:41

## 2023-12-18 NOTE — ED NOTES
Pt to ED via EMS as a top gun/direct transfer from [place]. Pt had a stent placed last wednesday and since then states that she has had SOB, she was seen and treated on Thursday but decided to come back to ER due to increased SOB. Pt has been placed on monitor, pt on 2L NC, call light within reach and warm blankets applied. No other needs stated.       Sherrell Dotson RN  12/18/23 7388

## 2023-12-18 NOTE — CARE COORDINATION
Case Management Assessment  Initial Evaluation    Date/Time of Evaluation: 12/18/2023 2:22 PM  Assessment Completed by: Justin Layne RN    If patient is discharged prior to next notation, then this note serves as note for discharge by case management. Patient Name: Jessee Perez                   YOB: 1965  Diagnosis: Bilateral pleural effusion [J90]  Pleural effusion, bilateral [J90]                   Date / Time: 12/18/2023 12:36 PM    Patient Admission Status: Inpatient   Readmission Risk (Low < 19, Mod (19-27), High > 27): Readmission Risk Score: 28.9    Current PCP: Stephon Plasencia APRN - CNP  PCP verified by CM? (P) Yes    Chart Reviewed: Yes      History Provided by: (P) Patient  Patient Orientation: (P) Alert and Oriented    Patient Cognition: (P) Alert    Hospitalization in the last 30 days (Readmission):  No    If yes, Readmission Assessment in CM Navigator will be completed.     Advance Directives:      Code Status: Full Code   Patient's Primary Decision Maker is:      Primary Decision Maker (Active): [place] Fears Child - [phone]    Secondary Decision Maker: Liza Hassan - Brother/Sister - [phone]    Discharge Planning:    Patient lives with: (P) Spouse/Significant Other Type of Home: (P) Apartment  Primary Care Giver: (P) Self  Patient Support Systems include: (P) Spouse/Significant Other   Current Financial resources: (P) Medicaid  Current community resources:    Current services prior to admission: (P) None            Current DME:              Type of Home Care services:  (P) None    ADLS  Prior functional level: (P) Independent in ADLs/IADLs  Current functional level: (P) Independent in ADLs/IADLs    PT AM-PAC:   /24  OT AM-PAC:   /24    Family can provide assistance at DC: (P) Yes  Would you like Case Management to discuss the discharge plan with any other family members/significant others, and if so, who? (P) No  Plans to Return to Present Housing: (P) Yes  Other Identified Issues/Barriers to RETURNING to current housing: none  Potential Assistance needed at discharge: (P) N/A            Potential DME:    Patient expects to discharge to: (P) [address] for transportation at discharge: (P) Family    Financial    Payor: Michael Ruffin / Plan: Kristi Platt / Product Type: *No Product type* /     Does insurance require precert for SNF: Yes    Potential assistance Purchasing Medications: (P) No (fills at [facility] in [place])  Meds-to-Beds request:        [facility]/pharmacy #[id number]- [place], [place] - [address] [phone] [facility] [phone]  [address] [place] [address]  Phone: [phone] Fax: [phone]      Notes:    Factors facilitating achievement of predicted outcomes: Cooperative and Pleasant    Barriers to discharge: medical clearance    Additional Case Management Notes: goal is home with signif other, family to provide transportation, will need to follow for potential HC needs    The Plan for Transition of Care is related to the following treatment goals of Bilateral pleural effusion [J90]  Pleural effusion, bilateral [S82]    IF APPLICABLE: The Patient and/or patient representative Carrie Marcus and her family were provided with a choice of provider and agrees with the discharge plan. Freedom of choice list with basic dialogue that supports the patient's individualized plan of care/goals and shares the quality data associated with the providers was provided to: (P) Patient   Patient Representative Name:       The Patient and/or Patient Representative Agree with the Discharge Plan?  (P) Yes    Vicki Clements RN  Case Management Department

## 2023-12-18 NOTE — PROGRESS NOTES
I signed up for this patient in error. I was not involved in their care.      Perlita Martin MD  12/18/23  12:27 PM

## 2023-12-18 NOTE — H&P
[facility]  Office: [phone]  1826, DO, John Madrid, DO, Jessee Galvez, DO, Jaci Thomas, DO, Nhi Sharma MD, Valente Favre, MD, Brandee Park MD, Carlos Sutton MD,  Matthew Ramirez MD, Geoffrey Davison MD, Marianna Serrato MD,  Anne Osman MD, Lissette Monrael MD, Siri Staples DO, Jamey Laguna MD,  Nelson Kelly DO, Rudy Luong MD, Fernando Bradley MD, Bard Iain MD, Makayla Soriano MD,  Vineet Jeff MD, Merle Zacarias MD, Real Caldwell MD, Thaddeus Tabor MD, Inocencia Stover MD, Luz Conner MD, Justin Pimentel DO, Gene Franklin DO, Tito No MD,  Cristina Martínez MD, Claudette Means, CNP,  Parth Dalal, CNP, Gertrude Matias, CNP,  Taras Solano, DNP, Jamari Lancaster, CNP, Tena Alpers, CNP, Yuliet Fontanez, CNP, Mariela Soliman, CNP, Marylin Sewell, CNP, Valarie Belle PA-C, Juliet Macias PA-C, Jessica, CNP, Caridad Mayen, CNS, Regino Gomez, CNP, John Fine, CNP, Connie Torres, [address] [facility] 28    HISTORY AND PHYSICAL EXAMINATION            Date:   12/18/2023  Patient name:  Daniel Haynes  Date of admission:  12/18/2023 12:36 PM  MRN:   [id number]  Account:  [de-identified]  YOB: 1965  PCP:    CHELSEA Vizcarra CNP  Room:   40/40  Code Status:    Full Code    Chief Complaint:     Chief Complaint   Patient presents with    Shortness of Breath       History Obtained From:     patient    History of Present Illness:     Daniel Haynes is a 62 y.o. Non- / non  female who presented to [facility] ED with shortness of breath. Patient has history significant for  type 2 diabetes mellitus, CAD s/p CABG 2016, COPD, hypertension, hyperlipidemia, and sleep apnea. Patient is being admitted to the hospital for the management of bilateral pleural effusion. Patient was admitted 12/12/23 for management of NSTEMI.  Cath performed 12/13/23, patient Automated 0.0 0.0 per 100 WBC    Neutrophils % 82 (H) 36 - 65 %    Lymphocytes % 13 (L) 24 - 43 %    Monocytes % 4 3 - 12 %    Eosinophils % 1 1 - 4 %    Basophils % 0 0 - 2 %    Immature Granulocytes 0 0 %    Neutrophils Absolute 7.60 1.50 - 8.10 k/uL    Lymphocytes Absolute 1.21 1.10 - 3.70 k/uL    Monocytes Absolute 0.35 0.10 - 1.20 k/uL    Eosinophils Absolute 0.05 0.00 - 0.44 k/uL    Basophils Absolute 0.04 0.00 - 0.20 k/uL    Absolute Immature Granulocyte 0.04 0.00 - 0.30 k/uL   Troponin    Collection Time: 12/18/23  6:40 AM   Result Value Ref Range    Troponin, High Sensitivity 635 (HH) 0 - 14 ng/L   Brain Natriuretic Peptide    Collection Time: 12/18/23  6:40 AM   Result Value Ref Range    Pro-BNP 6,786 (H) <300 pg/mL   Troponin    Collection Time: 12/18/23  8:22 AM   Result Value Ref Range    Troponin, High Sensitivity 618 (HH) 0 - 14 ng/L   EKG 12 lead    Collection Time: 12/18/23  1:41 PM   Result Value Ref Range    Ventricular Rate 81 BPM    Atrial Rate 81 BPM    P-R Interval 136 ms    QRS Duration 96 ms    Q-T Interval 398 ms    QTc Calculation (Bazett) 462 ms    P Axis 40 degrees    R Axis 24 degrees    T Axis -90 degrees   POC Glucose Fingerstick    Collection Time: 12/18/23  1:52 PM   Result Value Ref Range    POC Glucose 263 (H) 65 - 105 mg/dL       Imaging/Diagnostics:  CTA CHEST W CONTRAST    Result Date: 12/18/2023  1. No evidence for acute pulmonary embolism. 2. No evidence for thoracic aorta aneurysm or dissection. 3. Moderate bilateral pleural effusion right larger than left increased from prior. Additional changes in the lungs indicative of pulmonary edema. Combination of findings suggestive of CHF. 4. Minor bibasilar compressive subsegmental atelectasis. 5. A 1.5 cm incidental exophytic left thyroid lobe nodule along the lower pole. Recommend non emergency thyroid ultrasound. RECOMMENDATIONS: 1.5 cm incidental left thyroid nodule. Recommend non-emergent thyroid ultrasound.  Reference: J Am

## 2023-12-19 LAB
ANION GAP SERPL CALCULATED.3IONS-SCNC: 15 MMOL/L (ref 9–17)
BASOPHILS # BLD: 0.06 K/UL (ref 0–0.2)
BASOPHILS NFR BLD: 1 % (ref 0–2)
BNP SERPL-MCNC: 5781 PG/ML
BUN SERPL-MCNC: 12 MG/DL (ref 6–20)
CALCIUM SERPL-MCNC: 8.6 MG/DL (ref 8.6–10.4)
CHLORIDE SERPL-SCNC: 100 MMOL/L (ref 98–107)
CO2 SERPL-SCNC: 23 MMOL/L (ref 20–31)
CREAT SERPL-MCNC: 0.7 MG/DL (ref 0.5–0.9)
EOSINOPHIL # BLD: 0.16 K/UL (ref 0–0.44)
EOSINOPHILS RELATIVE PERCENT: 2 % (ref 1–4)
ERYTHROCYTE [DISTWIDTH] IN BLOOD BY AUTOMATED COUNT: 15.6 % (ref 11.8–14.4)
FERRITIN SERPL-MCNC: 166 NG/ML (ref 13–150)
GFR SERPL CREATININE-BSD FRML MDRD: >60 ML/MIN/1.73M2
GLUCOSE BLD-MCNC: 207 MG/DL (ref 65–105)
GLUCOSE BLD-MCNC: 238 MG/DL (ref 65–105)
GLUCOSE BLD-MCNC: 244 MG/DL (ref 65–105)
GLUCOSE BLD-MCNC: 245 MG/DL (ref 65–105)
GLUCOSE SERPL-MCNC: 165 MG/DL (ref 70–99)
HCT VFR BLD AUTO: 35.5 % (ref 36.3–47.1)
HGB BLD-MCNC: 10.9 G/DL (ref 11.9–15.1)
IMM GRANULOCYTES # BLD AUTO: 0.04 K/UL (ref 0–0.3)
IMM GRANULOCYTES NFR BLD: 0 %
IRON SATN MFR SERPL: 16 % (ref 20–55)
IRON SERPL-MCNC: 51 UG/DL (ref 37–145)
LYMPHOCYTES NFR BLD: 2.48 K/UL (ref 1.1–3.7)
LYMPHOCYTES RELATIVE PERCENT: 24 % (ref 24–43)
MCH RBC QN AUTO: 24.3 PG (ref 25.2–33.5)
MCHC RBC AUTO-ENTMCNC: 30.7 G/DL (ref 28.4–34.8)
MCV RBC AUTO: 79.2 FL (ref 82.6–102.9)
MONOCYTES NFR BLD: 0.77 K/UL (ref 0.1–1.2)
MONOCYTES NFR BLD: 7 % (ref 3–12)
NEUTROPHILS NFR BLD: 66 % (ref 36–65)
NEUTS SEG NFR BLD: 6.84 K/UL (ref 1.5–8.1)
NRBC BLD-RTO: 0 PER 100 WBC
PLATELET # BLD AUTO: 436 K/UL (ref 138–453)
PMV BLD AUTO: 9.3 FL (ref 8.1–13.5)
POTASSIUM SERPL-SCNC: 4 MMOL/L (ref 3.7–5.3)
RBC # BLD AUTO: 4.48 M/UL (ref 3.95–5.11)
RBC # BLD: ABNORMAL 10*6/UL
SODIUM SERPL-SCNC: 138 MMOL/L (ref 135–144)
TIBC SERPL-MCNC: 312 UG/DL (ref 250–450)
TSH SERPL DL<=0.05 MIU/L-ACNC: 5.67 UIU/ML (ref 0.3–5)
UNSATURATED IRON BINDING CAPACITY: 261 UG/DL (ref 112–347)
WBC OTHER # BLD: 10.4 K/UL (ref 3.5–11.3)

## 2023-12-19 PROCEDURE — 83550 IRON BINDING TEST: CPT

## 2023-12-19 PROCEDURE — 99232 SBSQ HOSP IP/OBS MODERATE 35: CPT | Performed by: STUDENT IN AN ORGANIZED HEALTH CARE EDUCATION/TRAINING PROGRAM

## 2023-12-19 PROCEDURE — 99255 IP/OBS CONSLTJ NEW/EST HI 80: CPT | Performed by: INTERNAL MEDICINE

## 2023-12-19 PROCEDURE — 82947 ASSAY GLUCOSE BLOOD QUANT: CPT

## 2023-12-19 PROCEDURE — 6370000000 HC RX 637 (ALT 250 FOR IP): Performed by: PHYSICIAN ASSISTANT

## 2023-12-19 PROCEDURE — 85025 COMPLETE CBC W/AUTO DIFF WBC: CPT

## 2023-12-19 PROCEDURE — 83880 ASSAY OF NATRIURETIC PEPTIDE: CPT

## 2023-12-19 PROCEDURE — 83540 ASSAY OF IRON: CPT

## 2023-12-19 PROCEDURE — 80048 BASIC METABOLIC PNL TOTAL CA: CPT

## 2023-12-19 PROCEDURE — 2060000000 HC ICU INTERMEDIATE R&B

## 2023-12-19 PROCEDURE — 99212 OFFICE O/P EST SF 10 MIN: CPT

## 2023-12-19 PROCEDURE — 2580000003 HC RX 258: Performed by: PHYSICIAN ASSISTANT

## 2023-12-19 PROCEDURE — 6360000002 HC RX W HCPCS: Performed by: PHYSICIAN ASSISTANT

## 2023-12-19 PROCEDURE — 82728 ASSAY OF FERRITIN: CPT

## 2023-12-19 PROCEDURE — 6370000000 HC RX 637 (ALT 250 FOR IP): Performed by: FAMILY MEDICINE

## 2023-12-19 PROCEDURE — 84443 ASSAY THYROID STIM HORMONE: CPT

## 2023-12-19 PROCEDURE — 84439 ASSAY OF FREE THYROXINE: CPT

## 2023-12-19 PROCEDURE — 36415 COLL VENOUS BLD VENIPUNCTURE: CPT

## 2023-12-19 RX ADMIN — ENOXAPARIN SODIUM 40 MG: 100 INJECTION SUBCUTANEOUS at 08:06

## 2023-12-19 RX ADMIN — DULOXETINE HYDROCHLORIDE 30 MG: 30 CAPSULE, DELAYED RELEASE ORAL at 08:07

## 2023-12-19 RX ADMIN — INSULIN LISPRO 2 UNITS: 100 INJECTION, SOLUTION INTRAVENOUS; SUBCUTANEOUS at 17:01

## 2023-12-19 RX ADMIN — FUROSEMIDE 40 MG: 10 INJECTION, SOLUTION INTRAMUSCULAR; INTRAVENOUS at 08:07

## 2023-12-19 RX ADMIN — SODIUM CHLORIDE, PRESERVATIVE FREE 10 ML: 5 INJECTION INTRAVENOUS at 20:09

## 2023-12-19 RX ADMIN — SODIUM CHLORIDE, PRESERVATIVE FREE 10 ML: 5 INJECTION INTRAVENOUS at 08:07

## 2023-12-19 RX ADMIN — GABAPENTIN 300 MG: 600 TABLET ORAL at 08:06

## 2023-12-19 RX ADMIN — ANASTROZOLE 1 MG: 1 TABLET, COATED ORAL at 13:05

## 2023-12-19 RX ADMIN — ATORVASTATIN CALCIUM 80 MG: 80 TABLET, FILM COATED ORAL at 08:06

## 2023-12-19 RX ADMIN — PRASUGREL 10 MG: 10 TABLET, FILM COATED ORAL at 08:07

## 2023-12-19 RX ADMIN — INSULIN LISPRO 2 UNITS: 100 INJECTION, SOLUTION INTRAVENOUS; SUBCUTANEOUS at 13:07

## 2023-12-19 RX ADMIN — INSULIN LISPRO 2 UNITS: 100 INJECTION, SOLUTION INTRAVENOUS; SUBCUTANEOUS at 09:42

## 2023-12-19 RX ADMIN — FUROSEMIDE 40 MG: 10 INJECTION, SOLUTION INTRAMUSCULAR; INTRAVENOUS at 17:01

## 2023-12-19 RX ADMIN — EMPAGLIFLOZIN 10 MG: 10 TABLET, FILM COATED ORAL at 08:06

## 2023-12-19 RX ADMIN — GABAPENTIN 300 MG: 600 TABLET ORAL at 14:48

## 2023-12-19 RX ADMIN — PANTOPRAZOLE SODIUM 40 MG: 40 TABLET, DELAYED RELEASE ORAL at 08:06

## 2023-12-19 RX ADMIN — INSULIN HUMAN 60 UNITS: 500 INJECTION, SOLUTION SUBCUTANEOUS at 08:07

## 2023-12-19 RX ADMIN — GABAPENTIN 300 MG: 600 TABLET ORAL at 20:09

## 2023-12-19 RX ADMIN — EZETIMIBE 10 MG: 10 TABLET ORAL at 20:09

## 2023-12-19 RX ADMIN — INSULIN HUMAN 60 UNITS: 500 INJECTION, SOLUTION SUBCUTANEOUS at 20:11

## 2023-12-19 RX ADMIN — ASPIRIN 81 MG: 81 TABLET, COATED ORAL at 08:06

## 2023-12-19 NOTE — DISCHARGE INSTR - COC
Continuity of Care Form    Patient Name: Whit Roblero   :  1965  MRN:  [id number]    Admit date:  2023  Discharge date:  ***    Code Status Order: Full Code   Advance Directives:     Admitting Physician:  No admitting provider for patient encounter. PCP: Alea Plasencia APRN - CNP    Discharging Nurse: [facility] Unit/Room#: 8408/0224-28  Discharging Unit Phone Number: ***    Emergency Contact:   Extended Emergency Contact Information  Primary Emergency Contact: [facility] of [address] [address] [employer] [address] Phone: [phone]  Relation: Child   needed? No  Secondary Emergency Contact: Liza Hassan  Address: [facility] of [address] [address] Phone: [phone]  Relation: Brother/Sister  Hearing or visual needs: None  Other needs: None  Preferred language: English   needed?  No    Past Surgical History:  Past Surgical History:   Procedure Laterality Date    ABDOMINAL HERNIA REPAIR  2016    repair done julian    APPENDECTOMY      AXILLARY SURGERY Right 2022    RIGHT AXILLARY LYMPH SENTINAL NODE BIOPSY  @  12PM performed by Melquiades Larios DO at [address]  2022    NEEDLE DIRECTED ( 1130     )    RIGHT BREAST LUMPECTOMY performed by Melquiades Larios DO at [address] [facility] Left 2016    right radial/ [facility]/ Dr Tiffany Malcolm Left 2019    Left Radial/[facility]/    CARDIAC CATHETERIZATION  2022    Dr Kwame rubalcava    CARDIAC CATHETERIZATION  2022    Dr Kwame Collazo radial    [address]      CABG 2019    CHOLECYSTECTOMY  1991    COLONOSCOPY  2014    -hemorrhoids,bx    CORONARY ANGIOPLASTY WITH STENT PLACEMENT  2010    CORONARY ANGIOPLASTY WITH STENT PLACEMENT  2016    JESSE RCA / DR Philippe Nelson    CORONARY ANGIOPLASTY WITH STENT PLACEMENT  2022    CORONARY ARTERY BYPASS GRAFT

## 2023-12-19 NOTE — CARE COORDINATION
12/19/23 0909   Readmission Assessment   Number of Days since last admission? 1-7 days  (RAC n/a transfer from [facility])   Previous Disposition Other (comment)  (na)   Who is being Interviewed Patient  (na)   What was the patient's/caregiver's perception as to why they think they needed to return back to the hospital? Other (Comment)  (na)   Did you visit your Primary Care Physician after you left the hospital, before you returned this time? No  (na)   Why weren't you able to visit your PCP? Other (Comment)  (na)   Who advised the patient to return to the hospital? Other (Comment)  (na)   Does the patient report anything that got in the way of taking their medications? No  (na)   In our efforts to provide the best possible care to you and others like you, can you think of anything that we could have done to help you after you left the hospital the first time, so that you might not have needed to return so soon?  Other (Comment)  (na)

## 2023-12-19 NOTE — PROGRESS NOTES
[facility]  Office: [phone]  Liv Vivek, DO, Abby Frasers, DO, Charles Rodrigues, DO, Laverne Rodriguez Blood, DO, Leonora Cross MD, Ruthie Villa MD, Junior Whitley MD, Erika Weems MD,  Miya Doan MD, Lakshmi White MD, Gokul Nielsen MD,  Trell Marina MD, Dimas Villegas MD, Nubia Nguyễn, DO, Homero Arnett MD,  Peyton Lin, DO, Chucky Murillo MD, Kevin Villatoro MD, Marck Wagner MD, Stanley Coelho MD,  Nick Ibrahim MD, Dallin Doe MD, Felix Curling, MD, Joesph Palma MD, Nicole Cat MD, Petros Atkinson MD, Carmen Fay, DO, Dionisio Fails, DO, Jose E Dudley MD,  Stefan Rosen MD, Harvinder Hart, CNP,  Yin Parra, CNP, Watson Hoang, CNP,  Roxane Avila, [facility], Kalpesh Alford, CNP, Cande Ledesma, CNP, Suraj Light, CNP, Nallely Smith, CNP, Giuliana Torres, CNP, Prince Olvera, PALeonidesC, NELSON ArellanoC, Clemente Peña, CNP, Holland Kussmaul, [facility], Felisha Muhammad, CNP, Cameron Almanzar, CNP, Kathryn Albert, [address] [facility]    Progress Note    12/19/2023    8:13 AM    Name:   Shannon Patten  MRN:     [id number]     Acct:      [de-identified]   Room:   0140/0140-01   Day:  1  Admit Date:  12/18/2023 12:36 PM    PCP:   CHELSEA Angel CNP  Code Status:  Full Code    Subjective:     C/C:   Chief Complaint   Patient presents with    Shortness of Breath     Interval History Status: not changed. Patient feels much better today. Shortness of breath has significantly improved. She reports increased urine output with Lasix. Patient is only taking 20 mg daily. Patient is now on room air saturating around 94%. Blood pressure around  systolic. She does not report any dizziness.   Labs and vitals reviewed  Blood sugar elevated to 244  Troponin elevated to 500  Hemoglobin 10.9    Brief History:     55-year-old female with past medical history of morbid obesity, systolic heart Sleep apnea 12/18/2023 Yes       Plan:        Acute on chronic systolic heart failure-continue Lasix 40 mg IV twice daily, monitor intake and output, repeat chest x-ray in the morning to follow-up on pleural effusion, cardiology following, continue goal-directed medical therapy, obtain limited echo    Coronary artery disease with recent PCI stent-continue aspirin, Effient, statin    Type 2 diabetes mellitus-continue U-500 60 units twice daily, continue sliding scale    Hyperlipidemia on statin, Zetia    Left thyroid nodule- needs ultrasound thyroid outpatient with pcp    Morbid obesity-significant risk factor, encouraged lifestyle modifications for weight loss    Advise microcytic anemia follow up with pcp, will check iron panel    H/o breast cancer on anastrozole    Chio Alvarez MD  12/19/2023  8:13 AM

## 2023-12-19 NOTE — PLAN OF CARE
Problem: Chronic Conditions and Co-morbidities  Goal: Patient's chronic conditions and co-morbidity symptoms are monitored and maintained or improved  12/19/2023 1747 by Yuan Fong RN  Outcome: Progressing  Flowsheets (Taken 12/19/2023 0800)  Care Plan - Patient's Chronic Conditions and Co-Morbidity Symptoms are Monitored and Maintained or Improved:   Monitor and assess patient's chronic conditions and comorbid symptoms for stability, deterioration, or improvement   Collaborate with multidisciplinary team to address chronic and comorbid conditions and prevent exacerbation or deterioration   Update acute care plan with appropriate goals if chronic or comorbid symptoms are exacerbated and prevent overall improvement and discharge  12/19/2023 0505 by Johny Botello RN  Outcome: Progressing     Problem: Discharge Planning  Goal: Discharge to home or other facility with appropriate resources  12/19/2023 1747 by Yuan Fong RN  Outcome: Progressing  Flowsheets (Taken 12/19/2023 0800)  Discharge to home or other facility with appropriate resources:   Identify barriers to discharge with patient and caregiver   Arrange for needed discharge resources and transportation as appropriate   Identify discharge learning needs (meds, wound care, etc)   Refer to discharge planning if patient needs post-hospital services based on physician order or complex needs related to functional status, cognitive ability or social support system  12/19/2023 0505 by Johny Botello RN  Outcome: Progressing     Problem: Safety - Adult  Goal: Free from fall injury  12/19/2023 1747 by Yuan Fong RN  Outcome: Progressing  [address] (Taken 12/19/2023 0727)  Free From Fall Injury: Instruct family/caregiver on patient safety  12/19/2023 0505 by Johny Botello RN  Outcome: Progressing

## 2023-12-19 NOTE — CONSULTS
sounds present  Extremities:  No Le edema or cyanosis   Neurological:  Deferred     DATA:    EKG:   Normal sinus rhythm,  Negative for any new changes. T wave inversions at the time of recent NSTEMI have resolved. ECHO:   10/2023   Summary   Left ventricle size is normal.   Normal left ventricular wall thickness. There was mild global hypokinesis of the left ventricle. Ejection fraction is visually estimated in the range of 45% to 50%. Cardiac Angiography:   12/13/23  Conclusion    Severe LAD disease with patent LIMA-LAD. Chronic total occlusion of RCA with left to right collaterals. Left main has 100% occlusion inside previous stent, supplying large LCX, reduced to 0% using 3.25x33 mm Resolute Kurt JESSE. Distal LCX  had 80% stenosis reduced to 0% using 3x18 mm [place] JESSE. Recommendations    DAPT and risk factor modifications. LABS:   CBC:   Recent Labs     12/18/23  0640 12/19/23  0238   WBC 9.3 10.4   HGB 11.2* 10.9*   HCT 35.8* 35.5*    436     BMP:   Recent Labs     12/18/23  0640 12/19/23  0238    138   K 3.9 4.0   CO2 25 23   BUN 10 12   CREATININE 0.7 0.7   LABGLOM >60 >60   GLUCOSE 254* 165*     BNP: No results for input(s): \"BNP\" in the last 72 hours. PT/INR: No results for input(s): \"PROTIME\", \"INR\" in the last 72 hours. APTT:No results for input(s): \"APTT\" in the last 72 hours. CARDIAC ENZYMES:No results for input(s): \"CKTOTAL\", \"CKMB\", \"CKMBINDEX\", \"TROPONINI\" in the last 72 hours. ASSESSMENT:  Acute on chronic systolic CHF/HFrEF. EF 72-73%  Ischemic cardiomyopathy  History of CAD s/p CABG LIMA-LAD 8/15/16. Multiple stents with PCIs afterwards. Most recent PCI 12/13/2023 as above  Diabetes mellitus  Hypertension  Hyperlipidemia  COPD      RECOMMENDATIONS:  No new ischemic EKG changes. Troponin elevation secondary to recent NSTEMI/PCI and have down trended from before. No chest pain.   Continue diuresis with close monitoring of I/O's  Continue DAPT, high intensity statin, BB  Continue Jardiance. Add Aldactone. Not able to add ACEI/ARB due to soft BP  Obtain limited echo for LVEF, WMA  Will follow      Please follow the rounding attending's attestation for final recommendations. Sabino Bazan MD  Fellow, cardiovascular diseases  [address]         Attending Physician Statement:    I have discussed the care of  Daniel Haynes , including pertinent history and exam findings, with the Cardiology fellow/resident. I have seen and examined the patient and the key elements of all parts of the encounter have been performed by me. I agree with the assessment, plan and orders as documented by the fellow/resident. [Joint Pain] : joint pain [Negative] : Heme/Lymph

## 2023-12-19 NOTE — PROGRESS NOTES
Verbally reviewed medication list with patient; patient verbalized understanding. Discussed 2000mg/day sodium restricted diet; patient verbalized understanding. Moderate daily exercise encouraged as tolerated. Discussed rest breaks as needed; patient verbalized understanding. Patient instructed to weigh self at the same time of each day, using same clothes and same scale; reinforced teaching to monitor for 3-5 lb weight increase over 1-2 days, and to notify the CHF clinic at [phone] or physician office if weight change noted. Patient verbalized understanding. Risks of smoking discussed with the patient if applicable; patient strongly discouraged to smoke. Patient verbalized understanding. Signs and symptoms of CHF discussed with patient, such as feeling more tired than normal, feeling short of breath, coughing that increases when you lie down, sudden weight gain, swelling of your feet, legs or belly. Patient verbalized understanding to notify the CHF clinic at [phone] or physician office if these symptoms occur. Compliance with plan of care and further disease process causes discussed with patient, patient encouraged to keep all follow up appointments. Patient verbalized understanding.

## 2023-12-19 NOTE — PROGRESS NOTES
[facility] Wound Ostomy  Nurse  Consult Note       NAME:  [id number] [facility] RECORD NUMBER:  [id number]  AGE: 62 y.o. GENDER: female  : 1965  TODAY'S DATE:  2023    Subjective   Reason for [address] [facility] Nurse Evaluation and Assessment: chronic abdominal wound      Alex Dimas is a 62 y.o. female referred by:   [x] Physician  [] Nursing  [] Other:     Wound Identification:  Wound Type: non-healing/non-surgical  Contributing Factors: chronic pressure, decreased mobility, obesity, and moisture and pressure     Wound History:   Patient reports wound has been present for about 2 months and initially started out as a traumatic wound from a fall that is aggravated by her pants beltline. Wound is full thickness and within an abdominal crease   Current Wound Care Treatment:  Reports she uses antibacterial ointment and a Band-Aid at home; wound improves and then gets bad again. Patient Goal of Care:  [x] Wound Healing  [] Odor Control  [] Palliative Care  [] Pain Control   [] Other:         PAST MEDICAL HISTORY        Diagnosis Date    Anxiety     Asthma     CAD (coronary artery disease)     x1 stent ,x1 stent , x1 stent May 2022    Cancer [facility]) 2022    right breast cancer    Clinical trial participant at discharge 3/31/16    COPD (chronic obstructive pulmonary disease) ([address])     Depression     Diabetic neuropathy (HCC)     GERD (gastroesophageal reflux disease)     H/O cardiac catheterization 16    LMCA: Mild Irregularities 10-20%. LAD: Lesion on Prox LAD: Proximal subsection. 100% stenosis. LCx: Mild irregularities 10-20%. RCA: Mild irregularities 10-20%. Widely patent mid RCA stent. EF:60%. H/O cardiovascular stress test 10/07/2016    Overall results are most consistent with a low risk for significant CAD. H/O echocardiogram 10/05/2016    EF:>60%. Inferoseptal wall abnormal in its motion which is not unusal status post open heart surgery.  Evidence of mild (grade I) diastolic understanding       [] No evidence of learning  [] Refused teaching         [] Caitlyn Suazo, RN BSN CWON

## 2023-12-20 ENCOUNTER — APPOINTMENT (OUTPATIENT)
Age: 58
DRG: 194 | End: 2023-12-20
Attending: FAMILY MEDICINE
Payer: COMMERCIAL

## 2023-12-20 ENCOUNTER — APPOINTMENT (OUTPATIENT)
Dept: GENERAL RADIOLOGY | Age: 58
DRG: 194 | End: 2023-12-20
Attending: FAMILY MEDICINE
Payer: COMMERCIAL

## 2023-12-20 LAB
ANION GAP SERPL CALCULATED.3IONS-SCNC: 13 MMOL/L (ref 9–17)
BASOPHILS # BLD: 0.05 K/UL (ref 0–0.2)
BASOPHILS NFR BLD: 1 % (ref 0–2)
BUN SERPL-MCNC: 17 MG/DL (ref 6–20)
CALCIUM SERPL-MCNC: 9 MG/DL (ref 8.6–10.4)
CHLORIDE SERPL-SCNC: 97 MMOL/L (ref 98–107)
CO2 SERPL-SCNC: 30 MMOL/L (ref 20–31)
CREAT SERPL-MCNC: 0.8 MG/DL (ref 0.5–0.9)
ECHO AO ROOT DIAM: 3.6 CM
ECHO AO ROOT INDEX: 1.8 CM/M2
ECHO BSA: 2.09 M2
ECHO IVC PROX: 1.7 CM
ECHO LA DIAMETER INDEX: 2.2 CM/M2
ECHO LA DIAMETER: 4.4 CM
ECHO LA TO AORTIC ROOT RATIO: 1.22
ECHO LV EDV A2C: 53 ML
ECHO LV EDV A4C: 93 ML
ECHO LV EDV INDEX A4C: 47 ML/M2
ECHO LV EDV NDEX A2C: 27 ML/M2
ECHO LV EJECTION FRACTION A2C: 38 %
ECHO LV EJECTION FRACTION A4C: 41 %
ECHO LV EJECTION FRACTION BIPLANE: 40 % (ref 55–100)
ECHO LV ESV A2C: 33 ML
ECHO LV ESV A4C: 55 ML
ECHO LV ESV INDEX A2C: 17 ML/M2
ECHO LV ESV INDEX A4C: 28 ML/M2
ECHO LV INTERNAL DIMENSION DIASTOLE INDEX: 2.85 CM/M2
ECHO LV INTERNAL DIMENSION DIASTOLIC: 5.7 CM (ref 3.9–5.3)
ECHO LV IVSD: 1 CM (ref 0.6–0.9)
ECHO LV MASS 2D: 197.5 G (ref 67–162)
ECHO LV MASS INDEX 2D: 98.8 G/M2 (ref 43–95)
ECHO LV POSTERIOR WALL DIASTOLIC: 0.8 CM (ref 0.6–0.9)
ECHO LV RELATIVE WALL THICKNESS RATIO: 0.28
ECHO LVOT AREA: 3.8 CM2
ECHO LVOT DIAM: 2.2 CM
ECHO RV INTERNAL DIMENSION: 2.4 CM
EOSINOPHIL # BLD: 0.08 K/UL (ref 0–0.44)
EOSINOPHILS RELATIVE PERCENT: 1 % (ref 1–4)
ERYTHROCYTE [DISTWIDTH] IN BLOOD BY AUTOMATED COUNT: 15.3 % (ref 11.8–14.4)
GFR SERPL CREATININE-BSD FRML MDRD: >60 ML/MIN/1.73M2
GLUCOSE BLD-MCNC: 133 MG/DL (ref 65–105)
GLUCOSE BLD-MCNC: 199 MG/DL (ref 65–105)
GLUCOSE BLD-MCNC: 246 MG/DL (ref 65–105)
GLUCOSE SERPL-MCNC: 107 MG/DL (ref 70–99)
HCT VFR BLD AUTO: 40.3 % (ref 36.3–47.1)
HGB BLD-MCNC: 11.9 G/DL (ref 11.9–15.1)
IMM GRANULOCYTES # BLD AUTO: 0.04 K/UL (ref 0–0.3)
IMM GRANULOCYTES NFR BLD: 0 %
LYMPHOCYTES NFR BLD: 1.82 K/UL (ref 1.1–3.7)
LYMPHOCYTES RELATIVE PERCENT: 20 % (ref 24–43)
MAGNESIUM SERPL-MCNC: 2.2 MG/DL (ref 1.6–2.6)
MCH RBC QN AUTO: 23.3 PG (ref 25.2–33.5)
MCHC RBC AUTO-ENTMCNC: 29.5 G/DL (ref 28.4–34.8)
MCV RBC AUTO: 79 FL (ref 82.6–102.9)
MONOCYTES NFR BLD: 0.5 K/UL (ref 0.1–1.2)
MONOCYTES NFR BLD: 6 % (ref 3–12)
NEUTROPHILS NFR BLD: 72 % (ref 36–65)
NEUTS SEG NFR BLD: 6.53 K/UL (ref 1.5–8.1)
NRBC BLD-RTO: 0 PER 100 WBC
PLATELET # BLD AUTO: 438 K/UL (ref 138–453)
PMV BLD AUTO: 8.9 FL (ref 8.1–13.5)
POTASSIUM SERPL-SCNC: 3 MMOL/L (ref 3.7–5.3)
POTASSIUM SERPL-SCNC: 4.9 MMOL/L (ref 3.7–5.3)
RBC # BLD AUTO: 5.1 M/UL (ref 3.95–5.11)
RBC # BLD: ABNORMAL 10*6/UL
SODIUM SERPL-SCNC: 140 MMOL/L (ref 135–144)
WBC OTHER # BLD: 9 K/UL (ref 3.5–11.3)

## 2023-12-20 PROCEDURE — 99233 SBSQ HOSP IP/OBS HIGH 50: CPT | Performed by: SURGERY

## 2023-12-20 PROCEDURE — 93308 TTE F-UP OR LMTD: CPT | Performed by: INTERNAL MEDICINE

## 2023-12-20 PROCEDURE — 2060000000 HC ICU INTERMEDIATE R&B

## 2023-12-20 PROCEDURE — 6370000000 HC RX 637 (ALT 250 FOR IP): Performed by: FAMILY MEDICINE

## 2023-12-20 PROCEDURE — 6370000000 HC RX 637 (ALT 250 FOR IP): Performed by: PHYSICIAN ASSISTANT

## 2023-12-20 PROCEDURE — 83735 ASSAY OF MAGNESIUM: CPT

## 2023-12-20 PROCEDURE — 6360000002 HC RX W HCPCS: Performed by: PHYSICIAN ASSISTANT

## 2023-12-20 PROCEDURE — 84132 ASSAY OF SERUM POTASSIUM: CPT

## 2023-12-20 PROCEDURE — 36415 COLL VENOUS BLD VENIPUNCTURE: CPT

## 2023-12-20 PROCEDURE — 2580000003 HC RX 258: Performed by: PHYSICIAN ASSISTANT

## 2023-12-20 PROCEDURE — 80048 BASIC METABOLIC PNL TOTAL CA: CPT

## 2023-12-20 PROCEDURE — 82947 ASSAY GLUCOSE BLOOD QUANT: CPT

## 2023-12-20 PROCEDURE — 99232 SBSQ HOSP IP/OBS MODERATE 35: CPT | Performed by: STUDENT IN AN ORGANIZED HEALTH CARE EDUCATION/TRAINING PROGRAM

## 2023-12-20 PROCEDURE — 85025 COMPLETE CBC W/AUTO DIFF WBC: CPT

## 2023-12-20 PROCEDURE — 93308 TTE F-UP OR LMTD: CPT

## 2023-12-20 PROCEDURE — 6370000000 HC RX 637 (ALT 250 FOR IP): Performed by: STUDENT IN AN ORGANIZED HEALTH CARE EDUCATION/TRAINING PROGRAM

## 2023-12-20 PROCEDURE — 71045 X-RAY EXAM CHEST 1 VIEW: CPT

## 2023-12-20 PROCEDURE — 93325 DOPPLER ECHO COLOR FLOW MAPG: CPT | Performed by: INTERNAL MEDICINE

## 2023-12-20 PROCEDURE — 6360000002 HC RX W HCPCS: Performed by: STUDENT IN AN ORGANIZED HEALTH CARE EDUCATION/TRAINING PROGRAM

## 2023-12-20 RX ORDER — SPIRONOLACTONE 25 MG/1
25 TABLET ORAL DAILY
Status: DISCONTINUED | OUTPATIENT
Start: 2023-12-20 | End: 2023-12-20

## 2023-12-20 RX ORDER — FUROSEMIDE 10 MG/ML
40 INJECTION INTRAMUSCULAR; INTRAVENOUS 3 TIMES DAILY
Status: DISCONTINUED | OUTPATIENT
Start: 2023-12-20 | End: 2023-12-21

## 2023-12-20 RX ORDER — POTASSIUM CHLORIDE 20 MEQ/1
40 TABLET, EXTENDED RELEASE ORAL
Status: COMPLETED | OUTPATIENT
Start: 2023-12-20 | End: 2023-12-20

## 2023-12-20 RX ADMIN — EZETIMIBE 10 MG: 10 TABLET ORAL at 20:55

## 2023-12-20 RX ADMIN — FUROSEMIDE 40 MG: 10 INJECTION, SOLUTION INTRAMUSCULAR; INTRAVENOUS at 08:13

## 2023-12-20 RX ADMIN — SODIUM CHLORIDE, PRESERVATIVE FREE 10 ML: 5 INJECTION INTRAVENOUS at 08:13

## 2023-12-20 RX ADMIN — GABAPENTIN 300 MG: 600 TABLET ORAL at 08:11

## 2023-12-20 RX ADMIN — GABAPENTIN 300 MG: 600 TABLET ORAL at 20:54

## 2023-12-20 RX ADMIN — INSULIN HUMAN 60 UNITS: 500 INJECTION, SOLUTION SUBCUTANEOUS at 20:56

## 2023-12-20 RX ADMIN — PRASUGREL 10 MG: 10 TABLET, FILM COATED ORAL at 08:11

## 2023-12-20 RX ADMIN — POTASSIUM CHLORIDE 40 MEQ: 1500 TABLET, EXTENDED RELEASE ORAL at 08:12

## 2023-12-20 RX ADMIN — POTASSIUM BICARBONATE 20 MEQ: 782 TABLET, EFFERVESCENT ORAL at 20:55

## 2023-12-20 RX ADMIN — GABAPENTIN 300 MG: 600 TABLET ORAL at 13:34

## 2023-12-20 RX ADMIN — ANASTROZOLE 1 MG: 1 TABLET, COATED ORAL at 08:11

## 2023-12-20 RX ADMIN — ASPIRIN 81 MG: 81 TABLET, COATED ORAL at 08:12

## 2023-12-20 RX ADMIN — EMPAGLIFLOZIN 10 MG: 10 TABLET, FILM COATED ORAL at 08:12

## 2023-12-20 RX ADMIN — POTASSIUM CHLORIDE 40 MEQ: 1500 TABLET, EXTENDED RELEASE ORAL at 10:34

## 2023-12-20 RX ADMIN — DULOXETINE HYDROCHLORIDE 30 MG: 30 CAPSULE, DELAYED RELEASE ORAL at 08:11

## 2023-12-20 RX ADMIN — INSULIN HUMAN 60 UNITS: 500 INJECTION, SOLUTION SUBCUTANEOUS at 08:12

## 2023-12-20 RX ADMIN — PANTOPRAZOLE SODIUM 40 MG: 40 TABLET, DELAYED RELEASE ORAL at 08:12

## 2023-12-20 RX ADMIN — FUROSEMIDE 40 MG: 10 INJECTION, SOLUTION INTRAMUSCULAR; INTRAVENOUS at 12:15

## 2023-12-20 RX ADMIN — POTASSIUM CHLORIDE 10 MEQ: 7.46 INJECTION, SOLUTION INTRAVENOUS at 06:50

## 2023-12-20 RX ADMIN — SODIUM CHLORIDE, PRESERVATIVE FREE 10 ML: 5 INJECTION INTRAVENOUS at 20:56

## 2023-12-20 RX ADMIN — INSULIN LISPRO 2 UNITS: 100 INJECTION, SOLUTION INTRAVENOUS; SUBCUTANEOUS at 16:50

## 2023-12-20 RX ADMIN — ENOXAPARIN SODIUM 40 MG: 100 INJECTION SUBCUTANEOUS at 08:12

## 2023-12-20 RX ADMIN — INSULIN LISPRO 2 UNITS: 100 INJECTION, SOLUTION INTRAVENOUS; SUBCUTANEOUS at 12:15

## 2023-12-20 RX ADMIN — ATORVASTATIN CALCIUM 80 MG: 80 TABLET, FILM COATED ORAL at 08:12

## 2023-12-20 RX ADMIN — FUROSEMIDE 40 MG: 10 INJECTION, SOLUTION INTRAMUSCULAR; INTRAVENOUS at 20:55

## 2023-12-20 NOTE — PLAN OF CARE
Problem: Chronic Conditions and Co-morbidities  Goal: Patient's chronic conditions and co-morbidity symptoms are monitored and maintained or improved  12/20/2023 0601 by Franko Holcomb RN  Outcome: Progressing  12/19/2023 1747 by Mela Rosario RN  Outcome: Progressing  Flowsheets (Taken 12/19/2023 0800)  Care Plan - Patient's Chronic Conditions and Co-Morbidity Symptoms are Monitored and Maintained or Improved:   Monitor and assess patient's chronic conditions and comorbid symptoms for stability, deterioration, or improvement   Collaborate with multidisciplinary team to address chronic and comorbid conditions and prevent exacerbation or deterioration   Update acute care plan with appropriate goals if chronic or comorbid symptoms are exacerbated and prevent overall improvement and discharge     Problem: Discharge Planning  Goal: Discharge to home or other facility with appropriate resources  12/20/2023 0601 by Franko Holcomb RN  Outcome: Progressing  12/19/2023 1747 by Mela Rosario RN  Outcome: Progressing  Flowsheets (Taken 12/19/2023 0800)  Discharge to home or other facility with appropriate resources:   Identify barriers to discharge with patient and caregiver   Arrange for needed discharge resources and transportation as appropriate   Identify discharge learning needs (meds, wound care, etc)   Refer to discharge planning if patient needs post-hospital services based on physician order or complex needs related to functional status, cognitive ability or social support system     Problem: Safety - Adult  Goal: Free from fall injury  12/20/2023 0601 by Franko Holcomb RN  Outcome: Progressing  12/19/2023 1747 by Mela Rosario RN  Outcome: Progressing  Flowsheets (Taken 12/19/2023 0727)  Free From Fall Injury: Instruct family/caregiver on patient safety     Problem: Skin/Tissue Integrity  Goal: Absence of new skin breakdown  Description: 1.   Monitor for areas of redness and/or skin

## 2023-12-20 NOTE — PLAN OF CARE
Problem: Chronic Conditions and Co-morbidities  Goal: Patient's chronic conditions and co-morbidity symptoms are monitored and maintained or improved  12/20/2023 1730 by Armani Burgos RN  Outcome: Progressing  Flowsheets (Taken 12/20/2023 0800)  Care Plan - Patient's Chronic Conditions and Co-Morbidity Symptoms are Monitored and Maintained or Improved:   Monitor and assess patient's chronic conditions and comorbid symptoms for stability, deterioration, or improvement   Collaborate with multidisciplinary team to address chronic and comorbid conditions and prevent exacerbation or deterioration   Update acute care plan with appropriate goals if chronic or comorbid symptoms are exacerbated and prevent overall improvement and discharge  12/20/2023 0601 by Hector Baird RN  Outcome: Progressing     Problem: Discharge Planning  Goal: Discharge to home or other facility with appropriate resources  12/20/2023 1730 by Armani Burgos RN  Outcome: Progressing  Flowsheets (Taken 12/20/2023 0800)  Discharge to home or other facility with appropriate resources:   Identify barriers to discharge with patient and caregiver   Arrange for needed discharge resources and transportation as appropriate   Identify discharge learning needs (meds, wound care, etc)   Refer to discharge planning if patient needs post-hospital services based on physician order or complex needs related to functional status, cognitive ability or social support system  12/20/2023 0601 by Hector Baird RN  Outcome: Progressing     Problem: Safety - Adult  Goal: Free from fall injury  12/20/2023 1730 by Armani Burgos RN  Outcome: Progressing  [address] (Taken 12/20/2023 0717)  Free From Fall Injury: Instruct family/caregiver on patient safety  12/20/2023 0601 by Hector Baird RN  Outcome: Progressing     Problem: Skin/Tissue Integrity  Goal: Absence of new skin breakdown  Description: 1.   Monitor for areas of redness and/or skin

## 2023-12-20 NOTE — PROGRESS NOTES
[facility]  Office: [phone]  Maxine Mendoza, DO, Yuridia Hoang, DO, [place] Me, DO, Oralee Oj Blood, DO, Kendy Nguyen MD, Aurea Singleton MD, Brittnee Irving MD, Soledad Lock MD,  Racheal Canales MD, Malka Cunha MD, Juani Bernal MD,  Kellen Martinez MD, Suzette Fregoso MD, Kimberlee Hernandez, DO, Paula Swift MD,  Willie Gamboa DO, Gee Pyle MD, Johanna Gomez MD, Carmela Marshall MD, Milan Fallon MD,  Miya Zapata MD, Ken Candelaria MD, Meme Chapman MD, Johanna Ruiz MD, Mateus Boyle MD, Shania Tiwari MD, Will Mercedes, DO, Kalli Kerr, DO, Pedrito Hoskins MD,  Karla Santamaria MD, Bailey Cordero, CNP,  Joby Wong, CNP, Jayjay Hagen, CNP,  Dontae Vang, DNP, Karel Dietz, CNP, Nena Canales, CNP, Ricarda Sutton, CNP, Letitia Mcnair, CNP, Nguyen Disla, CNP, Can Stanley PA-C, Juliet Macias PA-C, Lisa Hoffman, CNP, Stalin Mixon, CNS, Rhina Rojo, CNP, Nehal Guerrero, CNP, Delmi Pham, [address]    Progress Note    12/20/2023    11:59 AM    Name:   Jessee Perez  MRN:     [id number]     Acct:      [de-identified]   Room:   0140/0140-01   Day:  2  Admit Date:  12/18/2023 12:36 PM    PCP:   CHELSEA Parker CNP  Code Status:  Full Code    Subjective:     C/C:   Chief Complaint   Patient presents with    Shortness of Breath     Interval History Status: not changed. Patient feels much better today. She feels sob on exertion. No chest pain, Patient had episodes of hypoxia overnight. She still has basal crackles and cxr shows persistent basal congestion. Labs and vitals reviewed  Potassium is low.   Bp is soft but patient is not dizzy    Brief History:     66-year-old female with past medical history of morbid obesity, systolic heart failure, recent coronary artery disease with stenting, most recent PCI 12/13 presents to the hospital with shortness of

## 2023-12-20 NOTE — CARE COORDINATION
Transitional Planning  Spoke with patient at bedside, plan to return home, has transportation, declines [address]. Monitor for home oxygen needs.

## 2023-12-20 NOTE — PROGRESS NOTES
[facility]  Progress Note                   Date:   12/20/2023  Patient name: Marvel Taveras  Date of admission:  12/18/2023 12:36 PM  MRN:   [id number]  YOB: 1965  PCP: Sydney Plasencia APRN - CNP    Reason for Admission: Bilateral pleural effusion [J90]  Pleural effusion, bilateral [J90]    Subjective:       Clinical Changes /Abnormalities:Patient seen and examined. Denies chest pain or  increased shortness of breath. Tele/vitals/labs reviewed . SR on tele     +1 liter since admission ? ??  Review of Systems    Medications:   Scheduled Meds:   potassium bicarb-citric acid  20 mEq Oral BID    sodium chloride flush  5-40 mL IntraVENous 2 times per day    enoxaparin  40 mg SubCUTAneous Daily    insulin lispro  0-8 Units SubCUTAneous TID WC    insulin lispro  0-4 Units SubCUTAneous Nightly    furosemide  40 mg IntraVENous BID    anastrozole  1 mg Oral Daily    aspirin  81 mg Oral Daily    atorvastatin  80 mg Oral Daily    DULoxetine  30 mg Oral Daily    empagliflozin  10 mg Oral Daily    ezetimibe  10 mg Oral Nightly    gabapentin  300 mg Oral TID    metoprolol tartrate  25 mg Oral BID    pantoprazole  40 mg Oral QAM AC    insulin regular human  60 Units SubCUTAneous BID    prasugrel  10 mg Oral Daily     Continuous Infusions:   sodium chloride      dextrose       CBC:   Recent Labs     12/18/23  0640 12/19/23  0238 12/20/23  0506   WBC 9.3 10.4 9.0   HGB 11.2* 10.9* 11.9    436 438     BMP:    Recent Labs     12/18/23  0640 12/19/23  0238 12/20/23  0506    138 140   K 3.9 4.0 3.0*    100 97*   CO2 25 23 30   BUN 10 12 17   CREATININE 0.7 0.7 0.8   GLUCOSE 254* 165* 107*     Hepatic:  Recent Labs     12/17/23  1215   AST 10   ALT 10   BILITOT 0.6   ALKPHOS 189*     Troponin:   Recent Labs     12/18/23  0640 12/18/23  0822 12/18/23  1401   TROPHS 635* 618* 541*     BNP: No results for input(s): \"BNP\" in the last 72 hours.   Lipids: No results for input(s): \"CHOL\", \"HDL\" neuropathy     Back pain     Muscle spasm     Affective disorder ([facility])     History of ventral hernia repair     History of incisional hernia repair     Essential hypertension     Gastroesophageal reflux disease     Type 2 diabetes mellitus with hyperglycemia, with long-term current use of insulin ([facility])     Primary osteoarthritis involving multiple joints     Heart palpitations     Dysautonomia orthostatic hypotension syndrome     Coronary artery disease involving native coronary artery     Angina, class III ([facility])     Hx of CABG     Precordial pain     DARON (obstructive sleep apnea)     Neuropathic pain     Soft tissue mass     Dysautonomia ([facility])     Abnormal cardiovascular stress test     Muscle spasms of both lower extremities     Abscess of left great toe     Paronychia of great toe of left foot     Chest pain     Epigastric pain     ACS (acute coronary syndrome) ([facility])     Obesity (BMI 30-39. 9)     S/P angioplasty with stent     DCIS (ductal carcinoma in situ)     Malignant neoplasm of central portion of right breast in female, estrogen receptor positive ([address])     Osteopenia of multiple sites     NSTEMI (non-ST elevated myocardial infarction) ([address])     History of coronary artery stent placement     History of seizures     History of breast cancer     Tenderness of right axilla     Seroma of breast     Chronic anemia     Acute systolic CHF (congestive heart failure), NYHA class 3 ([facility])     Iron deficiency anemia     Pleural effusion, bilateral     Sleep apnea      Plan of Treatment:   Stable - no chest pain Continue DAPT, statin, BB  Continue diuresis with close monitoring of I/O's,  Not able to add ACEI/ARB due to soft BP  ECHO pending   Keep K>4, Mg>2     Electronically signed by CHELSEA Titus NP on 12/20/2023 at 11:52 [address] [facility]  [phone]

## 2023-12-21 PROBLEM — R04.0 EPISTAXIS: Status: ACTIVE | Noted: 2023-12-21

## 2023-12-21 PROBLEM — N30.00 ACUTE CYSTITIS WITHOUT HEMATURIA: Status: ACTIVE | Noted: 2023-12-21

## 2023-12-21 LAB
ANION GAP SERPL CALCULATED.3IONS-SCNC: 14 MMOL/L (ref 9–17)
BACTERIA URNS QL MICRO: ABNORMAL
BASOPHILS # BLD: 0.06 K/UL (ref 0–0.2)
BASOPHILS NFR BLD: 1 % (ref 0–2)
BILIRUB UR QL STRIP: NEGATIVE
BUN SERPL-MCNC: 18 MG/DL (ref 6–20)
CALCIUM SERPL-MCNC: 9.6 MG/DL (ref 8.6–10.4)
CASTS #/AREA URNS LPF: ABNORMAL /LPF (ref 0–8)
CHLORIDE SERPL-SCNC: 94 MMOL/L (ref 98–107)
CLARITY UR: CLEAR
CO2 SERPL-SCNC: 31 MMOL/L (ref 20–31)
COLOR UR: YELLOW
CREAT SERPL-MCNC: 0.9 MG/DL (ref 0.5–0.9)
EOSINOPHIL # BLD: 0.17 K/UL (ref 0–0.44)
EOSINOPHILS RELATIVE PERCENT: 2 % (ref 1–4)
EPI CELLS #/AREA URNS HPF: ABNORMAL /HPF (ref 0–5)
ERYTHROCYTE [DISTWIDTH] IN BLOOD BY AUTOMATED COUNT: 15.3 % (ref 11.8–14.4)
GFR SERPL CREATININE-BSD FRML MDRD: >60 ML/MIN/1.73M2
GLUCOSE BLD-MCNC: 112 MG/DL (ref 65–105)
GLUCOSE BLD-MCNC: 222 MG/DL (ref 65–105)
GLUCOSE BLD-MCNC: 230 MG/DL (ref 65–105)
GLUCOSE BLD-MCNC: 245 MG/DL (ref 65–105)
GLUCOSE BLD-MCNC: 282 MG/DL (ref 65–105)
GLUCOSE SERPL-MCNC: 131 MG/DL (ref 70–99)
GLUCOSE UR STRIP-MCNC: ABNORMAL MG/DL
HCT VFR BLD AUTO: 40.5 % (ref 36.3–47.1)
HCT VFR BLD AUTO: 44 % (ref 36.3–47.1)
HGB BLD-MCNC: 12.8 G/DL (ref 11.9–15.1)
HGB BLD-MCNC: 13.2 G/DL (ref 11.9–15.1)
HGB UR QL STRIP.AUTO: ABNORMAL
IMM GRANULOCYTES # BLD AUTO: 0.03 K/UL (ref 0–0.3)
IMM GRANULOCYTES NFR BLD: 0 %
KETONES UR STRIP-MCNC: NEGATIVE MG/DL
LEUKOCYTE ESTERASE UR QL STRIP: ABNORMAL
LYMPHOCYTES NFR BLD: 2.18 K/UL (ref 1.1–3.7)
LYMPHOCYTES RELATIVE PERCENT: 22 % (ref 24–43)
MCH RBC QN AUTO: 24.1 PG (ref 25.2–33.5)
MCHC RBC AUTO-ENTMCNC: 30 G/DL (ref 28.4–34.8)
MCV RBC AUTO: 80.3 FL (ref 82.6–102.9)
MONOCYTES NFR BLD: 0.44 K/UL (ref 0.1–1.2)
MONOCYTES NFR BLD: 4 % (ref 3–12)
NEUTROPHILS NFR BLD: 71 % (ref 36–65)
NEUTS SEG NFR BLD: 7.25 K/UL (ref 1.5–8.1)
NITRITE UR QL STRIP: NEGATIVE
NRBC BLD-RTO: 0 PER 100 WBC
PH UR STRIP: 6 [PH] (ref 5–8)
PLATELET # BLD AUTO: 564 K/UL (ref 138–453)
PMV BLD AUTO: 8.8 FL (ref 8.1–13.5)
POTASSIUM SERPL-SCNC: 4 MMOL/L (ref 3.7–5.3)
PROT UR STRIP-MCNC: NEGATIVE MG/DL
RBC # BLD AUTO: 5.48 M/UL (ref 3.95–5.11)
RBC # BLD: ABNORMAL 10*6/UL
RBC #/AREA URNS HPF: ABNORMAL /HPF (ref 0–4)
SODIUM SERPL-SCNC: 139 MMOL/L (ref 135–144)
SP GR UR STRIP: 1.01 (ref 1–1.03)
UROBILINOGEN UR STRIP-ACNC: NORMAL EU/DL (ref 0–1)
WBC #/AREA URNS HPF: ABNORMAL /HPF (ref 0–5)
WBC OTHER # BLD: 10.1 K/UL (ref 3.5–11.3)

## 2023-12-21 PROCEDURE — 99232 SBSQ HOSP IP/OBS MODERATE 35: CPT | Performed by: STUDENT IN AN ORGANIZED HEALTH CARE EDUCATION/TRAINING PROGRAM

## 2023-12-21 PROCEDURE — 80048 BASIC METABOLIC PNL TOTAL CA: CPT

## 2023-12-21 PROCEDURE — 2060000000 HC ICU INTERMEDIATE R&B

## 2023-12-21 PROCEDURE — 6370000000 HC RX 637 (ALT 250 FOR IP): Performed by: STUDENT IN AN ORGANIZED HEALTH CARE EDUCATION/TRAINING PROGRAM

## 2023-12-21 PROCEDURE — 99253 IP/OBS CNSLTJ NEW/EST LOW 45: CPT

## 2023-12-21 PROCEDURE — 85014 HEMATOCRIT: CPT

## 2023-12-21 PROCEDURE — 87186 SC STD MICRODIL/AGAR DIL: CPT

## 2023-12-21 PROCEDURE — 6370000000 HC RX 637 (ALT 250 FOR IP)

## 2023-12-21 PROCEDURE — 87088 URINE BACTERIA CULTURE: CPT

## 2023-12-21 PROCEDURE — 81001 URINALYSIS AUTO W/SCOPE: CPT

## 2023-12-21 PROCEDURE — 6360000002 HC RX W HCPCS: Performed by: STUDENT IN AN ORGANIZED HEALTH CARE EDUCATION/TRAINING PROGRAM

## 2023-12-21 PROCEDURE — 6370000000 HC RX 637 (ALT 250 FOR IP): Performed by: PHYSICIAN ASSISTANT

## 2023-12-21 PROCEDURE — 94761 N-INVAS EAR/PLS OXIMETRY MLT: CPT

## 2023-12-21 PROCEDURE — 2700000000 HC OXYGEN THERAPY PER DAY

## 2023-12-21 PROCEDURE — 82947 ASSAY GLUCOSE BLOOD QUANT: CPT

## 2023-12-21 PROCEDURE — 2580000003 HC RX 258: Performed by: PHYSICIAN ASSISTANT

## 2023-12-21 PROCEDURE — 6370000000 HC RX 637 (ALT 250 FOR IP): Performed by: FAMILY MEDICINE

## 2023-12-21 PROCEDURE — 85025 COMPLETE CBC W/AUTO DIFF WBC: CPT

## 2023-12-21 PROCEDURE — 99233 SBSQ HOSP IP/OBS HIGH 50: CPT

## 2023-12-21 PROCEDURE — 6360000002 HC RX W HCPCS: Performed by: PHYSICIAN ASSISTANT

## 2023-12-21 PROCEDURE — 85018 HEMOGLOBIN: CPT

## 2023-12-21 PROCEDURE — 87086 URINE CULTURE/COLONY COUNT: CPT

## 2023-12-21 PROCEDURE — 36415 COLL VENOUS BLD VENIPUNCTURE: CPT

## 2023-12-21 PROCEDURE — 6360000002 HC RX W HCPCS

## 2023-12-21 RX ORDER — OXYMETAZOLINE HYDROCHLORIDE 0.05 G/100ML
2 SPRAY NASAL 2 TIMES DAILY
Status: DISCONTINUED | OUTPATIENT
Start: 2023-12-21 | End: 2023-12-22 | Stop reason: HOSPADM

## 2023-12-21 RX ORDER — FUROSEMIDE 10 MG/ML
40 INJECTION INTRAMUSCULAR; INTRAVENOUS 2 TIMES DAILY
Status: DISCONTINUED | OUTPATIENT
Start: 2023-12-21 | End: 2023-12-22

## 2023-12-21 RX ORDER — SPIRONOLACTONE 25 MG/1
12.5 TABLET ORAL DAILY
Status: DISCONTINUED | OUTPATIENT
Start: 2023-12-21 | End: 2023-12-22 | Stop reason: HOSPADM

## 2023-12-21 RX ORDER — ALBUTEROL SULFATE 2.5 MG/3ML
2.5 SOLUTION RESPIRATORY (INHALATION) EVERY 6 HOURS PRN
Status: DISCONTINUED | OUTPATIENT
Start: 2023-12-21 | End: 2023-12-22 | Stop reason: HOSPADM

## 2023-12-21 RX ADMIN — SODIUM CHLORIDE, PRESERVATIVE FREE 10 ML: 5 INJECTION INTRAVENOUS at 22:18

## 2023-12-21 RX ADMIN — Medication 2 SPRAY: at 21:06

## 2023-12-21 RX ADMIN — FUROSEMIDE 40 MG: 10 INJECTION, SOLUTION INTRAMUSCULAR; INTRAVENOUS at 08:26

## 2023-12-21 RX ADMIN — INSULIN LISPRO 2 UNITS: 100 INJECTION, SOLUTION INTRAVENOUS; SUBCUTANEOUS at 11:54

## 2023-12-21 RX ADMIN — ASPIRIN 81 MG: 81 TABLET, COATED ORAL at 08:26

## 2023-12-21 RX ADMIN — Medication 2 SPRAY: at 11:54

## 2023-12-21 RX ADMIN — INSULIN HUMAN 60 UNITS: 500 INJECTION, SOLUTION SUBCUTANEOUS at 22:16

## 2023-12-21 RX ADMIN — ATORVASTATIN CALCIUM 80 MG: 80 TABLET, FILM COATED ORAL at 08:26

## 2023-12-21 RX ADMIN — EZETIMIBE 10 MG: 10 TABLET ORAL at 21:06

## 2023-12-21 RX ADMIN — ANASTROZOLE 1 MG: 1 TABLET, COATED ORAL at 08:27

## 2023-12-21 RX ADMIN — GABAPENTIN 300 MG: 600 TABLET ORAL at 21:05

## 2023-12-21 RX ADMIN — INSULIN HUMAN 60 UNITS: 500 INJECTION, SOLUTION SUBCUTANEOUS at 08:30

## 2023-12-21 RX ADMIN — PRASUGREL 10 MG: 10 TABLET, FILM COATED ORAL at 08:26

## 2023-12-21 RX ADMIN — GABAPENTIN 300 MG: 600 TABLET ORAL at 15:33

## 2023-12-21 RX ADMIN — DULOXETINE HYDROCHLORIDE 30 MG: 30 CAPSULE, DELAYED RELEASE ORAL at 08:26

## 2023-12-21 RX ADMIN — GABAPENTIN 300 MG: 600 TABLET ORAL at 08:26

## 2023-12-21 RX ADMIN — EMPAGLIFLOZIN 10 MG: 10 TABLET, FILM COATED ORAL at 08:26

## 2023-12-21 RX ADMIN — ENOXAPARIN SODIUM 40 MG: 100 INJECTION SUBCUTANEOUS at 08:26

## 2023-12-21 RX ADMIN — METOPROLOL TARTRATE 25 MG: 25 TABLET, FILM COATED ORAL at 08:26

## 2023-12-21 RX ADMIN — SPIRONOLACTONE 12.5 MG: 25 TABLET ORAL at 11:54

## 2023-12-21 RX ADMIN — INSULIN LISPRO 2 UNITS: 100 INJECTION, SOLUTION INTRAVENOUS; SUBCUTANEOUS at 16:45

## 2023-12-21 RX ADMIN — FUROSEMIDE 40 MG: 10 INJECTION, SOLUTION INTRAMUSCULAR; INTRAVENOUS at 16:45

## 2023-12-21 RX ADMIN — PANTOPRAZOLE SODIUM 40 MG: 40 TABLET, DELAYED RELEASE ORAL at 06:01

## 2023-12-21 RX ADMIN — Medication 1000 MG: at 18:04

## 2023-12-21 RX ADMIN — SODIUM CHLORIDE, PRESERVATIVE FREE 10 ML: 5 INJECTION INTRAVENOUS at 08:27

## 2023-12-21 NOTE — PROGRESS NOTES
[unfilled]    [address]    Progress Note    12/21/2023    5:07 PM    Name:   Janine Trujillo  MRN:     [id number]     Acct:      [de-identified]   Room:   [address] [place] [address] Day:  3  Admit Date:  12/18/2023 12:36 PM    PCP:   Radha Plasencia APRN - CNP  Code Status:  Full Code    Subjective:     C/C:   Chief Complaint   Patient presents with    Shortness of Breath     Interval History Status: improved. Seen at bedside, hemodynamically stable, currently on room air  States that breathing is much better,  Patient had episode of epistaxis this morning, ENT consulted  Started on humidified oxygen by nasal cannula, on Afrin spray  Urine analysis concerning for UTI, will start on Rocephin    Brief History:     Per my colleague     \"62year-old female with past medical history of morbid obesity, systolic heart failure, recent coronary artery disease with stenting, most recent PCI 12/13 presents to the hospital with shortness of breath. Patient was noted to have acute on chronic systolic heart failure, patient was started on IV Lasix with improvement. Patient was also noted to have elevated troponin but around baseline of prior stenting. No new EKG changes. \"    Medications: Allergies:     Allergies   Allergen Reactions    Tape Rommel Hoof Tape] Rash       Current Meds:   Scheduled Meds:    furosemide  40 mg IntraVENous BID    spironolactone  12.5 mg Oral Daily    oxymetazoline  2 spray Each Nostril BID    cefTRIAXone (ROCEPHIN) IV  1,000 mg IntraVENous Q24H    [Held by provider] potassium bicarb-citric acid  20 mEq Oral BID    sodium chloride flush  5-40 mL IntraVENous 2 times per day    [Held by provider] enoxaparin  40 mg SubCUTAneous Daily    insulin lispro  0-8 Units SubCUTAneous TID     insulin lispro  0-4 Units SubCUTAneous Nightly    anastrozole  1 mg Oral Daily    aspirin  81 mg Oral Daily    atorvastatin  80 mg Oral Daily    DULoxetine  30 mg Drink plenty fluids do not skip any meals. Take the Medrol Dosepak he can start this tomorrow. Continue your other medications for epilepsy and migraines. Call the neurologist so they can hopefully fit you in over the next couple of days for the Botox injections. Return for worsening symptoms.

## 2023-12-21 NOTE — PROGRESS NOTES
[facility]  Progress Note                   Date:   12/21/2023  Patient name: Cindi Hearn  Date of admission:  12/18/2023 12:36 PM  MRN:   [id number]  YOB: 1965  PCP: Marcelina Plasencia APRN - CNP    Reason for Admission: Bilateral pleural effusion [J90]  Pleural effusion, bilateral [J90]    Subjective:       Clinical Changes /Abnormalities:Patient seen and examined. Denies chest pain or  increased shortness of breath. Tele/vitals/labs reviewed . SR on tele   On NC- no acute distress,     Review of Systems    Medications:   Scheduled Meds:   [Held by provider] potassium bicarb-citric acid  20 mEq Oral BID    furosemide  40 mg IntraVENous TID    sodium chloride flush  5-40 mL IntraVENous 2 times per day    [Held by provider] enoxaparin  40 mg SubCUTAneous Daily    insulin lispro  0-8 Units SubCUTAneous TID WC    insulin lispro  0-4 Units SubCUTAneous Nightly    anastrozole  1 mg Oral Daily    aspirin  81 mg Oral Daily    atorvastatin  80 mg Oral Daily    DULoxetine  30 mg Oral Daily    empagliflozin  10 mg Oral Daily    ezetimibe  10 mg Oral Nightly    gabapentin  300 mg Oral TID    metoprolol tartrate  25 mg Oral BID    pantoprazole  40 mg Oral QAM AC    insulin regular human  60 Units SubCUTAneous BID    prasugrel  10 mg Oral Daily     Continuous Infusions:   sodium chloride      dextrose       CBC:   Recent Labs     12/19/23  0238 12/20/23  0506 12/21/23  0929   WBC 10.4 9.0 10.1   HGB 10.9* 11.9 13.2    438 564*       BMP:    Recent Labs     12/19/23  0238 12/20/23  0506 12/20/23  1604 12/21/23  0929    140  --  139   K 4.0 3.0* 4.9 4.0    97*  --  94*   CO2 23 30  --  31   BUN 12 17  --  18   CREATININE 0.7 0.8  --  0.9   GLUCOSE 165* 107*  --  131*       Hepatic:  No results for input(s): \"AST\", \"ALT\", \"ALB\", \"BILITOT\", \"ALKPHOS\" in the last 72 hours. Troponin:   Recent Labs     12/18/23  1401   TROPHS 541*       BNP: No results for input(s):  \"BNP\" in the Acute systolic CHF (congestive heart failure), NYHA class 3 (HCC)     Iron deficiency anemia     Pleural effusion, bilateral     Sleep apnea      Plan of Treatment:   Troponin elevation secondary to recent NSTEMI/PCI. Continue ASA, Statin, Effient, and BB. Denies any anginal type CP. ENT consulted for reoccurring nose bleeds. Echocardiogram reviewed. LVEF reduced 35-40%. Optimize GDMT as BP tolerates. Continue BB, Lasix, and Jardiance. Will add low dose Aldactone. Likely switch lasix to oral tomorrow.  Continue diuresis with close monitoring of I/O's,  Not able to add ACEI/ARB due to soft BP  Keep K>4, Mg>2     Electronically signed by CHELSEA Bell CNP on 12/21/2023 at [address] [place] [address] Road.  [phone]

## 2023-12-21 NOTE — PLAN OF CARE
Problem: Chronic Conditions and Co-morbidities  Goal: Patient's chronic conditions and co-morbidity symptoms are monitored and maintained or improved  12/20/2023 1938 by Shubham Pham RN  Outcome: Progressing  12/20/2023 1730 by Ekta Farrell RN  Outcome: Progressing  Flowsheets (Taken 12/20/2023 0800)  Care Plan - Patient's Chronic Conditions and Co-Morbidity Symptoms are Monitored and Maintained or Improved:   Monitor and assess patient's chronic conditions and comorbid symptoms for stability, deterioration, or improvement   Collaborate with multidisciplinary team to address chronic and comorbid conditions and prevent exacerbation or deterioration   Update acute care plan with appropriate goals if chronic or comorbid symptoms are exacerbated and prevent overall improvement and discharge  12/20/2023 0601 by Audie Opitz, RN  Outcome: Progressing     Problem: Discharge Planning  Goal: Discharge to home or other facility with appropriate resources  12/20/2023 1938 by Shubham Pham RN  Outcome: Progressing  12/20/2023 1730 by Ekta Farrell RN  Outcome: Progressing  Flowsheets (Taken 12/20/2023 0800)  Discharge to home or other facility with appropriate resources:   Identify barriers to discharge with patient and caregiver   Arrange for needed discharge resources and transportation as appropriate   Identify discharge learning needs (meds, wound care, etc)   Refer to discharge planning if patient needs post-hospital services based on physician order or complex needs related to functional status, cognitive ability or social support system  12/20/2023 0601 by Audie Opitz, RN  Outcome: Progressing     Problem: Safety - Adult  Goal: Free from fall injury  12/20/2023 1938 by Shubham Pham RN  Outcome: Progressing  12/20/2023 1730 by Ekta Farrell RN  Outcome: Progressing  Flowsheets (Taken 12/20/2023 0717)  Free From Fall Injury: Instruct family/caregiver on patient safety  12/20/2023 0601 by Sharon Underwood RN  Outcome: Progressing     Problem: Skin/Tissue Integrity  Goal: Absence of new skin breakdown  Description: 1. Monitor for areas of redness and/or skin breakdown  2. Assess vascular access sites hourly  3. Every 4-6 hours minimum:  Change oxygen saturation probe site  4. Every 4-6 hours:  If on nasal continuous positive airway pressure, respiratory therapy assess nares and determine need for appliance change or resting period.   12/20/2023 1938 by Fidencio Guy RN  Outcome: Progressing  12/20/2023 1730 by Eh Goldberg RN  Outcome: Progressing  12/20/2023 0601 by Sharon Underwood RN  Outcome: Progressing

## 2023-12-21 NOTE — PLAN OF CARE
Problem: Chronic Conditions and Co-morbidities  Goal: Patient's chronic conditions and co-morbidity symptoms are monitored and maintained or improved  Outcome: Progressing     Problem: Discharge Planning  Goal: Discharge to home or other facility with appropriate resources  Outcome: Progressing     Problem: Safety - Adult  Goal: Free from fall injury  Outcome: Progressing  Flowsheets (Taken 12/21/2023 5612)  Free From Fall Injury: Instruct family/caregiver on patient safety     Problem: Skin/Tissue Integrity  Goal: Absence of new skin breakdown  Description: 1. Monitor for areas of redness and/or skin breakdown  2. Assess vascular access sites hourly  3. Every 4-6 hours minimum:  Change oxygen saturation probe site  4. Every 4-6 hours:  If on nasal continuous positive airway pressure, respiratory therapy assess nares and determine need for appliance change or resting period.   Outcome: Progressing

## 2023-12-22 VITALS
RESPIRATION RATE: 15 BRPM | DIASTOLIC BLOOD PRESSURE: 69 MMHG | BODY MASS INDEX: 36.29 KG/M2 | OXYGEN SATURATION: 96 % | WEIGHT: 204.81 LBS | TEMPERATURE: 97.4 F | SYSTOLIC BLOOD PRESSURE: 111 MMHG | HEIGHT: 63 IN | HEART RATE: 71 BPM

## 2023-12-22 LAB
ANION GAP SERPL CALCULATED.3IONS-SCNC: 13 MMOL/L (ref 9–17)
BASOPHILS # BLD: 0.06 K/UL (ref 0–0.2)
BASOPHILS NFR BLD: 1 % (ref 0–2)
BUN SERPL-MCNC: 21 MG/DL (ref 6–20)
CALCIUM SERPL-MCNC: 10 MG/DL (ref 8.6–10.4)
CHLORIDE SERPL-SCNC: 96 MMOL/L (ref 98–107)
CO2 SERPL-SCNC: 33 MMOL/L (ref 20–31)
CREAT SERPL-MCNC: 1 MG/DL (ref 0.5–0.9)
EOSINOPHIL # BLD: 0.23 K/UL (ref 0–0.44)
EOSINOPHILS RELATIVE PERCENT: 2 % (ref 1–4)
ERYTHROCYTE [DISTWIDTH] IN BLOOD BY AUTOMATED COUNT: 15.5 % (ref 11.8–14.4)
GFR SERPL CREATININE-BSD FRML MDRD: >60 ML/MIN/1.73M2
GLUCOSE BLD-MCNC: 178 MG/DL (ref 65–105)
GLUCOSE BLD-MCNC: 76 MG/DL (ref 65–105)
GLUCOSE SERPL-MCNC: 57 MG/DL (ref 70–99)
HCT VFR BLD AUTO: 45.1 % (ref 36.3–47.1)
HGB BLD-MCNC: 14.1 G/DL (ref 11.9–15.1)
IMM GRANULOCYTES # BLD AUTO: 0.03 K/UL (ref 0–0.3)
IMM GRANULOCYTES NFR BLD: 0 %
LYMPHOCYTES NFR BLD: 3.19 K/UL (ref 1.1–3.7)
LYMPHOCYTES RELATIVE PERCENT: 31 % (ref 24–43)
MCH RBC QN AUTO: 23.9 PG (ref 25.2–33.5)
MCHC RBC AUTO-ENTMCNC: 31.3 G/DL (ref 28.4–34.8)
MCV RBC AUTO: 76.4 FL (ref 82.6–102.9)
MICROORGANISM SPEC CULT: ABNORMAL
MONOCYTES NFR BLD: 0.66 K/UL (ref 0.1–1.2)
MONOCYTES NFR BLD: 6 % (ref 3–12)
NEUTROPHILS NFR BLD: 60 % (ref 36–65)
NEUTS SEG NFR BLD: 6.29 K/UL (ref 1.5–8.1)
NRBC BLD-RTO: 0 PER 100 WBC
PLATELET # BLD AUTO: 532 K/UL (ref 138–453)
PMV BLD AUTO: 8.8 FL (ref 8.1–13.5)
POTASSIUM SERPL-SCNC: 3.8 MMOL/L (ref 3.7–5.3)
RBC # BLD AUTO: 5.9 M/UL (ref 3.95–5.11)
RBC # BLD: ABNORMAL 10*6/UL
SODIUM SERPL-SCNC: 142 MMOL/L (ref 135–144)
SPECIMEN DESCRIPTION: ABNORMAL
WBC OTHER # BLD: 10.5 K/UL (ref 3.5–11.3)

## 2023-12-22 PROCEDURE — 2580000003 HC RX 258: Performed by: PHYSICIAN ASSISTANT

## 2023-12-22 PROCEDURE — 82947 ASSAY GLUCOSE BLOOD QUANT: CPT

## 2023-12-22 PROCEDURE — 6370000000 HC RX 637 (ALT 250 FOR IP): Performed by: FAMILY MEDICINE

## 2023-12-22 PROCEDURE — 6370000000 HC RX 637 (ALT 250 FOR IP): Performed by: STUDENT IN AN ORGANIZED HEALTH CARE EDUCATION/TRAINING PROGRAM

## 2023-12-22 PROCEDURE — 36415 COLL VENOUS BLD VENIPUNCTURE: CPT

## 2023-12-22 PROCEDURE — 6370000000 HC RX 637 (ALT 250 FOR IP)

## 2023-12-22 PROCEDURE — 80048 BASIC METABOLIC PNL TOTAL CA: CPT

## 2023-12-22 PROCEDURE — 85025 COMPLETE CBC W/AUTO DIFF WBC: CPT

## 2023-12-22 PROCEDURE — 99239 HOSP IP/OBS DSCHRG MGMT >30: CPT | Performed by: STUDENT IN AN ORGANIZED HEALTH CARE EDUCATION/TRAINING PROGRAM

## 2023-12-22 PROCEDURE — 99233 SBSQ HOSP IP/OBS HIGH 50: CPT | Performed by: NURSE PRACTITIONER

## 2023-12-22 RX ORDER — SPIRONOLACTONE 25 MG/1
12.5 TABLET ORAL DAILY
Qty: 30 TABLET | Refills: 3 | Status: SHIPPED | OUTPATIENT
Start: 2023-12-23

## 2023-12-22 RX ORDER — CEPHALEXIN 250 MG/1
250 CAPSULE ORAL 4 TIMES DAILY
Qty: 20 CAPSULE | Refills: 0 | Status: SHIPPED | OUTPATIENT
Start: 2023-12-22 | End: 2023-12-27

## 2023-12-22 RX ORDER — FUROSEMIDE 40 MG/1
40 TABLET ORAL DAILY
Qty: 60 TABLET | Refills: 3 | Status: SHIPPED | OUTPATIENT
Start: 2023-12-23

## 2023-12-22 RX ORDER — FUROSEMIDE 40 MG/1
40 TABLET ORAL DAILY
Status: DISCONTINUED | OUTPATIENT
Start: 2023-12-22 | End: 2023-12-22 | Stop reason: HOSPADM

## 2023-12-22 RX ADMIN — SODIUM CHLORIDE, PRESERVATIVE FREE 10 ML: 5 INJECTION INTRAVENOUS at 08:54

## 2023-12-22 RX ADMIN — PRASUGREL 10 MG: 10 TABLET, FILM COATED ORAL at 08:52

## 2023-12-22 RX ADMIN — ANASTROZOLE 1 MG: 1 TABLET, COATED ORAL at 08:52

## 2023-12-22 RX ADMIN — GABAPENTIN 300 MG: 600 TABLET ORAL at 13:49

## 2023-12-22 RX ADMIN — METOPROLOL TARTRATE 25 MG: 25 TABLET, FILM COATED ORAL at 08:52

## 2023-12-22 RX ADMIN — PANTOPRAZOLE SODIUM 40 MG: 40 TABLET, DELAYED RELEASE ORAL at 08:52

## 2023-12-22 RX ADMIN — ATORVASTATIN CALCIUM 80 MG: 80 TABLET, FILM COATED ORAL at 08:52

## 2023-12-22 RX ADMIN — FUROSEMIDE 40 MG: 40 TABLET ORAL at 08:59

## 2023-12-22 RX ADMIN — GABAPENTIN 300 MG: 600 TABLET ORAL at 08:52

## 2023-12-22 RX ADMIN — DULOXETINE HYDROCHLORIDE 30 MG: 30 CAPSULE, DELAYED RELEASE ORAL at 08:52

## 2023-12-22 RX ADMIN — EMPAGLIFLOZIN 10 MG: 10 TABLET, FILM COATED ORAL at 11:15

## 2023-12-22 RX ADMIN — ASPIRIN 81 MG: 81 TABLET, COATED ORAL at 08:52

## 2023-12-22 RX ADMIN — Medication 2 SPRAY: at 08:52

## 2023-12-22 RX ADMIN — SPIRONOLACTONE 12.5 MG: 25 TABLET ORAL at 08:52

## 2023-12-22 NOTE — PLAN OF CARE
Reviewed d/c paperwork with patient to include wound care instructions. Instructed patient to weigh self daily and follow instructions as noted in d/c summary. PIV removed, meds to bed delivered medications. Patient will d/c home once ride arrives. 1515pm patient's ride arrived. Patient d/c home. Declined need for homecare services at this time. Problem: Chronic Conditions and Co-morbidities  Goal: Patient's chronic conditions and co-morbidity symptoms are monitored and maintained or improved  12/22/2023 1352 by Fatuma Blake RN  Outcome: Completed  12/22/2023 0411 by Crystal Vallejo RN  Outcome: Progressing     Problem: Discharge Planning  Goal: Discharge to home or other facility with appropriate resources  12/22/2023 1352 by Fatuma Blake RN  Outcome: Completed  12/22/2023 0411 by Crystal Vallejo RN  Outcome: Progressing     Problem: Safety - Adult  Goal: Free from fall injury  12/22/2023 1352 by Fatuma Blake RN  Outcome: Completed  12/22/2023 0411 by Crystal Vallejo RN  Outcome: Progressing  Flowsheets (Taken 12/21/2023 2000)  Free From Fall Injury: Instruct family/caregiver on patient safety     Problem: Skin/Tissue Integrity  Goal: Absence of new skin breakdown  Description: 1. Monitor for areas of redness and/or skin breakdown  2. Assess vascular access sites hourly  3. Every 4-6 hours minimum:  Change oxygen saturation probe site  4. Every 4-6 hours:  If on nasal continuous positive airway pressure, respiratory therapy assess nares and determine need for appliance change or resting period.   12/22/2023 1352 by Fatuma Blake RN  Outcome: Completed  12/22/2023 0411 by Crystal Vallejo RN  Outcome: Progressing

## 2023-12-22 NOTE — DISCHARGE INSTRUCTIONS
Continue taking antibiotics as prescribed  We have changed dose of Lasix to 40 mg daily, also added a medication called Aldactone for your heart problems, please follow-up with your PCP and cardiologist as outpatient for further optimization of medications.   Follow-up with PCP regarding thyroid nodule found on imaging   Follow-up with heart failure clinic as outpatient

## 2023-12-22 NOTE — PROGRESS NOTES
RT to bedside to check on patient and ask about home CPAP. Patient stated she does not wear one at home and would not like to wear one of the hospitals.

## 2023-12-22 NOTE — PLAN OF CARE
Problem: Chronic Conditions and Co-morbidities  Goal: Patient's chronic conditions and co-morbidity symptoms are monitored and maintained or improved  12/22/2023 0411 by Filomena Roberts RN  Outcome: Progressing     Problem: Discharge Planning  Goal: Discharge to home or other facility with appropriate resources  12/22/2023 0411 by Filomena Roberts RN  Outcome: Progressing     Problem: Safety - Adult  Goal: Free from fall injury  12/22/2023 0411 by Filomena Roberts RN  Outcome: Progressing  Flowsheets (Taken 12/21/2023 2000)  Free From Fall Injury: Instruct family/caregiver on patient safety     Problem: Skin/Tissue Integrity  Goal: Absence of new skin breakdown  Description: 1. Monitor for areas of redness and/or skin breakdown  2. Assess vascular access sites hourly  3. Every 4-6 hours minimum:  Change oxygen saturation probe site  4. Every 4-6 hours:  If on nasal continuous positive airway pressure, respiratory therapy assess nares and determine need for appliance change or resting period.   12/22/2023 0411 by Filomena Roberts RN  Outcome: Progressing

## 2023-12-22 NOTE — PROGRESS NOTES
CLINICAL PHARMACY NOTE: MEDS TO BEDS    Total # of Prescriptions Filled: 3   The following medications were delivered to the patient:  Furosemide 40mg  Spironolactone 25mg  Cephalexin 500mg    Additional Documentation: delivered to patient in room 140 12/22 at 2:22pm. No co-pay.

## 2023-12-28 ENCOUNTER — OFFICE VISIT (OUTPATIENT)
Dept: ONCOLOGY | Age: 58
End: 2023-12-28
Payer: COMMERCIAL

## 2023-12-28 VITALS
RESPIRATION RATE: 18 BRPM | DIASTOLIC BLOOD PRESSURE: 62 MMHG | TEMPERATURE: 97.2 F | SYSTOLIC BLOOD PRESSURE: 108 MMHG | HEIGHT: 63 IN | HEART RATE: 102 BPM | BODY MASS INDEX: 36.68 KG/M2 | WEIGHT: 207 LBS

## 2023-12-28 DIAGNOSIS — N64.4 BREAST PAIN: ICD-10-CM

## 2023-12-28 DIAGNOSIS — Z17.0 MALIGNANT NEOPLASM OF CENTRAL PORTION OF RIGHT BREAST IN FEMALE, ESTROGEN RECEPTOR POSITIVE (HCC): Primary | ICD-10-CM

## 2023-12-28 DIAGNOSIS — C50.111 MALIGNANT NEOPLASM OF CENTRAL PORTION OF RIGHT BREAST IN FEMALE, ESTROGEN RECEPTOR POSITIVE (HCC): Primary | ICD-10-CM

## 2023-12-28 PROCEDURE — 1036F TOBACCO NON-USER: CPT | Performed by: INTERNAL MEDICINE

## 2023-12-28 PROCEDURE — 99214 OFFICE O/P EST MOD 30 MIN: CPT | Performed by: INTERNAL MEDICINE

## 2023-12-28 PROCEDURE — 1111F DSCHRG MED/CURRENT MED MERGE: CPT | Performed by: INTERNAL MEDICINE

## 2023-12-28 PROCEDURE — 3074F SYST BP LT 130 MM HG: CPT | Performed by: INTERNAL MEDICINE

## 2023-12-28 PROCEDURE — G8417 CALC BMI ABV UP PARAM F/U: HCPCS | Performed by: INTERNAL MEDICINE

## 2023-12-28 PROCEDURE — G8427 DOCREV CUR MEDS BY ELIG CLIN: HCPCS | Performed by: INTERNAL MEDICINE

## 2023-12-28 PROCEDURE — G9899 SCRN MAM PERF RSLTS DOC: HCPCS | Performed by: INTERNAL MEDICINE

## 2023-12-28 PROCEDURE — G8482 FLU IMMUNIZE ORDER/ADMIN: HCPCS | Performed by: INTERNAL MEDICINE

## 2023-12-28 PROCEDURE — 3078F DIAST BP <80 MM HG: CPT | Performed by: INTERNAL MEDICINE

## 2023-12-28 PROCEDURE — 3017F COLORECTAL CA SCREEN DOC REV: CPT | Performed by: INTERNAL MEDICINE

## 2023-12-28 RX ORDER — OXYCODONE HYDROCHLORIDE AND ACETAMINOPHEN 5; 325 MG/1; MG/1
1 TABLET ORAL EVERY 6 HOURS PRN
Qty: 120 TABLET | Refills: 0 | Status: SHIPPED | OUTPATIENT
Start: 2024-01-02 | End: 2024-02-01

## 2023-12-28 NOTE — PROGRESS NOTES
History:   Diagnosis Date    Anxiety     Asthma     CAD (coronary artery disease)     x1 stent 2010,x1 stent 2016, x1 stent May 2022    Cancer [facility]) 06/2022    right breast cancer    Clinical trial participant at discharge 3/31/16    COPD (chronic obstructive pulmonary disease) ([address])     Depression     Diabetic neuropathy (HCC)     GERD (gastroesophageal reflux disease)     H/O cardiac catheterization 4/26/16    LMCA: Mild Irregularities 10-20%. LAD: Lesion on Prox LAD: Proximal subsection. 100% stenosis. LCx: Mild irregularities 10-20%. RCA: Mild irregularities 10-20%. Widely patent mid RCA stent. EF:60%. H/O cardiovascular stress test 10/07/2016    Overall results are most consistent with a low risk for significant CAD. H/O echocardiogram 10/05/2016    EF:>60%. Inferoseptal wall abnormal in its motion which is not unusal status post open heart surgery. Evidence of mild (grade I) diastolic dysfunction is seen. H/O tilt table evaluation 07/12/2016    Abnormal. PTs HR, Blood Pressure response and symptoms were most consistent with dysautonomia. Combined w/viligant maintenance of euvolemia and maintaining a moderate salt intake, pharmaciologic treatment w/ ProAmatine, SSRI such as Lexapro and/or Mestinon among other treatments have shown some effectiveness in the treatment of this condition. History of cardiovascular stress test 02/13/2019    Abnormal. Small/moderate perfusion defect of mild/moderate intesity in the anterior and anterolateral regions during stress imaging which is most consistent w/ ischemia but may be artifact. Overall low/intermediate risk for significant CAD. History of echocardiogram 09/06/2018    EF >60%. Inferoseptal wall is abnormal in its motion which is not unusual s/p open heart. Evidence of mild (grade I) diastolic dysfunction seen.     Hx of blood clots     Hyperlipidemia     Hypertension     Intermittent claudication (HCC)     Kidney stones     Movement disorder

## 2023-12-30 ENCOUNTER — HOSPITAL ENCOUNTER (EMERGENCY)
Age: 58
Discharge: HOME | End: 2023-12-30
Payer: COMMERCIAL

## 2023-12-30 ENCOUNTER — APPOINTMENT (OUTPATIENT)
Dept: GENERAL RADIOLOGY | Age: 58
End: 2023-12-30
Payer: COMMERCIAL

## 2023-12-30 VITALS
TEMPERATURE: 98.1 F | WEIGHT: 207 LBS | OXYGEN SATURATION: 97 % | HEIGHT: 63 IN | SYSTOLIC BLOOD PRESSURE: 106 MMHG | RESPIRATION RATE: 16 BRPM | DIASTOLIC BLOOD PRESSURE: 65 MMHG | BODY MASS INDEX: 36.68 KG/M2 | HEART RATE: 91 BPM

## 2023-12-30 DIAGNOSIS — S92.354A CLOSED NONDISPLACED FRACTURE OF FIFTH METATARSAL BONE OF RIGHT FOOT, INITIAL ENCOUNTER: Primary | ICD-10-CM

## 2023-12-30 PROCEDURE — 73630 X-RAY EXAM OF FOOT: CPT

## 2023-12-30 PROCEDURE — 99283 EMERGENCY DEPT VISIT LOW MDM: CPT

## 2023-12-30 PROCEDURE — 6370000000 HC RX 637 (ALT 250 FOR IP): Performed by: PHYSICIAN ASSISTANT

## 2023-12-30 RX ORDER — ACETAMINOPHEN 325 MG/1
650 TABLET ORAL ONCE
Status: COMPLETED | OUTPATIENT
Start: 2023-12-30 | End: 2023-12-30

## 2023-12-30 RX ADMIN — ACETAMINOPHEN 650 MG: 325 TABLET ORAL at 14:32

## 2023-12-30 ASSESSMENT — PAIN DESCRIPTION - LOCATION
LOCATION: FOOT
LOCATION: FOOT

## 2023-12-30 ASSESSMENT — PAIN DESCRIPTION - DESCRIPTORS
DESCRIPTORS: ACHING
DESCRIPTORS: ACHING

## 2023-12-30 ASSESSMENT — PAIN DESCRIPTION - ORIENTATION
ORIENTATION: RIGHT
ORIENTATION: RIGHT

## 2023-12-30 ASSESSMENT — LIFESTYLE VARIABLES
HOW MANY STANDARD DRINKS CONTAINING ALCOHOL DO YOU HAVE ON A TYPICAL DAY: PATIENT DOES NOT DRINK
HOW OFTEN DO YOU HAVE A DRINK CONTAINING ALCOHOL: NEVER

## 2023-12-30 ASSESSMENT — PAIN DESCRIPTION - PAIN TYPE: TYPE: ACUTE PAIN

## 2023-12-30 ASSESSMENT — PAIN SCALES - GENERAL
PAINLEVEL_OUTOF10: 1
PAINLEVEL_OUTOF10: 5

## 2023-12-30 ASSESSMENT — PAIN DESCRIPTION - FREQUENCY: FREQUENCY: INTERMITTENT

## 2023-12-30 ASSESSMENT — PAIN - FUNCTIONAL ASSESSMENT: PAIN_FUNCTIONAL_ASSESSMENT: 0-10

## 2023-12-30 NOTE — ED PROVIDER NOTES
of mild (grade I) diastolic dysfunction is seen. H/O tilt table evaluation 07/12/2016    Abnormal. PTs HR, Blood Pressure response and symptoms were most consistent with dysautonomia. Combined w/viligant maintenance of euvolemia and maintaining a moderate salt intake, pharmaciologic treatment w/ ProAmatine, SSRI such as Lexapro and/or Mestinon among other treatments have shown some effectiveness in the treatment of this condition. History of cardiovascular stress test 02/13/2019    Abnormal. Small/moderate perfusion defect of mild/moderate intesity in the anterior and anterolateral regions during stress imaging which is most consistent w/ ischemia but may be artifact. Overall low/intermediate risk for significant CAD. History of echocardiogram 09/06/2018    EF >60%. Inferoseptal wall is abnormal in its motion which is not unusual s/p open heart. Evidence of mild (grade I) diastolic dysfunction seen.     Hx of blood clots     Hyperlipidemia     Hypertension     Intermittent claudication (HCC)     Kidney stones     Movement disorder     neuropathy in legs    Neuromuscular disorder (HCC)     neuropathy    Osteoarthritis     Reflux     Seizures ([address]) 1980'Ss last seizure    Stented coronary artery 9/22/2010     LAD/[place]    Stented coronary artery 3/31/16    RCA/[place]    Type II or unspecified type diabetes mellitus without mention of complication, not stated as uncontrolled     Unspecified sleep apnea     no machine         SURGICAL HISTORY       Past Surgical History:   Procedure Laterality Date    ABDOMINAL HERNIA REPAIR  01/2016    repair done [place]    APPENDECTOMY      AXILLARY SURGERY Right 9/2/2022    RIGHT AXILLARY LYMPH SENTINAL NODE BIOPSY  @  12PM performed by Gabriel Burnett DO at [address] [facility] [address] [place] 7/12/2022    NEEDLE DIRECTED ( 1130     )    RIGHT BREAST LUMPECTOMY performed by Gabriel Burnett DO at [address] Left 04/26/2016    right radial/ Behavior: Behavior normal.         Thought Content: Thought content normal.         Judgment: Judgment normal.             DIAGNOSTIC RESULTS         Interpretation per the Radiologist below, if available at the time of this note:    XR FOOT RIGHT (MIN 3 VIEWS)    (Results Pending)         LABS:  Labs Reviewed - No data to display    All other labs were within normal range or not returned as of this dictation.    EMERGENCY DEPARTMENT COURSE and DIFFERENTIAL DIAGNOSIS/MDM:     Patient seen and evaluated the emergency department with right lateral foot pain.  Patient does not recall a specific injury or trauma but was walking in a store.  X-ray does reveal a 5th metatarsal fracture.  This was discussed with the patient.  She was placed in a fracture boot.  Plan is discharge home and follow-up with podiatry.      FINAL IMPRESSION      1. Closed nondisplaced fracture of fifth metatarsal bone of right foot, initial encounter          DISPOSITION/PLAN     DISPOSITION Decision To Discharge 12/30/2023 02:27:24 PM      PATIENT REFERRED TO:  Tuan Daniels, JONNATHAN  [address] [address]  [phone]    Schedule an appointment as soon as possible for a visit         DISCHARGE MEDICATIONS:  New Prescriptions    No medications on file       (Please note that portions of this note were completed with a voice recognition program.  Efforts were made to edit the dictations but occasionally words are mis-transcribed.)    Chelsea Segovia PA-C (electronically signed)  Attending Emergency Physician           Chelsea Segovia PA-C  12/30/23 [id number]

## 2024-01-02 RX ORDER — ANASTROZOLE 1 MG/1
1 TABLET ORAL DAILY
Qty: 30 TABLET | Refills: 5 | Status: SHIPPED | OUTPATIENT
Start: 2024-01-02

## 2024-01-04 DIAGNOSIS — M79.2 NEUROPATHIC PAIN: ICD-10-CM

## 2024-01-04 NOTE — TELEPHONE ENCOUNTER
Pharmacy requesting refill of Duloxetine 30 mg.      Medication active on med list yes      Date of last Rx: 12/8/2023 with 0 refills          verified by SB, RMA      Date of last appointment 8/8/2023    Next Visit Date:  2/12/2024

## 2024-01-05 RX ORDER — DULOXETIN HYDROCHLORIDE 30 MG/1
CAPSULE, DELAYED RELEASE ORAL
Qty: 30 CAPSULE | Refills: 5 | Status: SHIPPED | OUTPATIENT
Start: 2024-01-05

## 2024-01-10 ENCOUNTER — HOSPITAL ENCOUNTER (OUTPATIENT)
Age: 59
Discharge: HOME | End: 2024-01-10
Payer: COMMERCIAL

## 2024-01-10 DIAGNOSIS — E11.65 UNCONTROLLED TYPE 2 DIABETES MELLITUS WITH HYPERGLYCEMIA (HCC): ICD-10-CM

## 2024-01-10 LAB
ALT SERPL-CCNC: 13 U/L (ref 5–33)
ANION GAP SERPL CALCULATED.3IONS-SCNC: 15 MMOL/L (ref 9–17)
AST SERPL-CCNC: 14 U/L
BASOPHILS # BLD: 0.05 K/UL (ref 0–0.2)
BASOPHILS NFR BLD: 1 % (ref 0–2)
BUN SERPL-MCNC: 9 MG/DL (ref 6–20)
BUN/CREAT SERPL: 15 (ref 9–20)
CALCIUM SERPL-MCNC: 9.6 MG/DL (ref 8.6–10.4)
CHLORIDE SERPL-SCNC: 102 MMOL/L (ref 98–107)
CHOLEST SERPL-MCNC: 181 MG/DL
CHOLESTEROL/HDL RATIO: 4.3
CO2 SERPL-SCNC: 21 MMOL/L (ref 20–31)
CREAT SERPL-MCNC: 0.6 MG/DL (ref 0.5–0.9)
CREAT UR-MCNC: 104.4 MG/DL (ref 28–217)
EOSINOPHIL # BLD: 0.14 K/UL (ref 0–0.44)
EOSINOPHILS RELATIVE PERCENT: 2 % (ref 1–4)
ERYTHROCYTE [DISTWIDTH] IN BLOOD BY AUTOMATED COUNT: 16.5 % (ref 11.8–14.4)
EST. AVERAGE GLUCOSE BLD GHB EST-MCNC: 240 MG/DL
GFR SERPL CREATININE-BSD FRML MDRD: >60 ML/MIN/1.73M2
GLUCOSE SERPL-MCNC: 177 MG/DL (ref 70–99)
HBA1C MFR BLD: 10 % (ref 4–6)
HCT VFR BLD AUTO: 39.3 % (ref 36.3–47.1)
HDLC SERPL-MCNC: 42 MG/DL
HGB BLD-MCNC: 12.5 G/DL (ref 11.9–15.1)
IMM GRANULOCYTES # BLD AUTO: 0.03 K/UL (ref 0–0.3)
IMM GRANULOCYTES NFR BLD: 0 %
LDLC SERPL CALC-MCNC: 91 MG/DL (ref 0–130)
LYMPHOCYTES NFR BLD: 1.6 K/UL (ref 1.1–3.7)
LYMPHOCYTES RELATIVE PERCENT: 19 % (ref 24–43)
MCH RBC QN AUTO: 24.4 PG (ref 25.2–33.5)
MCHC RBC AUTO-ENTMCNC: 31.8 G/DL (ref 28.4–34.8)
MCV RBC AUTO: 76.6 FL (ref 82.6–102.9)
MICROALBUMIN UR-MCNC: 45 MG/L
MICROALBUMIN/CREAT UR-RTO: 43 MCG/MG CREAT
MONOCYTES NFR BLD: 0.34 K/UL (ref 0.1–1.2)
MONOCYTES NFR BLD: 4 % (ref 3–12)
NEUTROPHILS NFR BLD: 74 % (ref 36–65)
NEUTS SEG NFR BLD: 6.33 K/UL (ref 1.5–8.1)
NRBC BLD-RTO: 0 PER 100 WBC
PLATELET # BLD AUTO: 292 K/UL (ref 138–453)
PMV BLD AUTO: 9.9 FL (ref 8.1–13.5)
POTASSIUM SERPL-SCNC: 4.1 MMOL/L (ref 3.7–5.3)
RBC # BLD AUTO: 5.13 M/UL (ref 3.95–5.11)
SODIUM SERPL-SCNC: 138 MMOL/L (ref 135–144)
TRIGL SERPL-MCNC: 240 MG/DL
WBC OTHER # BLD: 8.5 K/UL (ref 3.5–11.3)

## 2024-01-10 PROCEDURE — 80061 LIPID PANEL: CPT

## 2024-01-10 PROCEDURE — 36415 COLL VENOUS BLD VENIPUNCTURE: CPT

## 2024-01-10 PROCEDURE — 85025 COMPLETE CBC W/AUTO DIFF WBC: CPT

## 2024-01-10 PROCEDURE — 82570 ASSAY OF URINE CREATININE: CPT

## 2024-01-10 PROCEDURE — 84450 TRANSFERASE (AST) (SGOT): CPT

## 2024-01-10 PROCEDURE — 83036 HEMOGLOBIN GLYCOSYLATED A1C: CPT

## 2024-01-10 PROCEDURE — 84460 ALANINE AMINO (ALT) (SGPT): CPT

## 2024-01-10 PROCEDURE — 80048 BASIC METABOLIC PNL TOTAL CA: CPT

## 2024-01-10 PROCEDURE — 82043 UR ALBUMIN QUANTITATIVE: CPT

## 2024-01-17 ENCOUNTER — OFFICE VISIT (OUTPATIENT)
Dept: PRIMARY CARE CLINIC | Age: 59
End: 2024-01-17
Payer: COMMERCIAL

## 2024-01-17 VITALS
DIASTOLIC BLOOD PRESSURE: 74 MMHG | RESPIRATION RATE: 20 BRPM | TEMPERATURE: 98.5 F | HEART RATE: 82 BPM | SYSTOLIC BLOOD PRESSURE: 122 MMHG | BODY MASS INDEX: 37.36 KG/M2 | WEIGHT: 210.9 LBS | OXYGEN SATURATION: 98 %

## 2024-01-17 DIAGNOSIS — Z17.0 MALIGNANT NEOPLASM OF CENTRAL PORTION OF RIGHT BREAST IN FEMALE, ESTROGEN RECEPTOR POSITIVE (HCC): ICD-10-CM

## 2024-01-17 DIAGNOSIS — G90.1 DYSAUTONOMIA (HCC): ICD-10-CM

## 2024-01-17 DIAGNOSIS — C50.111 MALIGNANT NEOPLASM OF CENTRAL PORTION OF RIGHT BREAST IN FEMALE, ESTROGEN RECEPTOR POSITIVE (HCC): ICD-10-CM

## 2024-01-17 DIAGNOSIS — E11.42 TYPE 2 DIABETES MELLITUS WITH DIABETIC POLYNEUROPATHY, WITHOUT LONG-TERM CURRENT USE OF INSULIN (HCC): ICD-10-CM

## 2024-01-17 DIAGNOSIS — J44.9 CHRONIC OBSTRUCTIVE PULMONARY DISEASE, UNSPECIFIED COPD TYPE (HCC): ICD-10-CM

## 2024-01-17 PROBLEM — R04.0 EPISTAXIS: Status: RESOLVED | Noted: 2023-12-21 | Resolved: 2024-01-17

## 2024-01-17 PROBLEM — I50.21 ACUTE SYSTOLIC CHF (CONGESTIVE HEART FAILURE), NYHA CLASS 3 (HCC): Status: RESOLVED | Noted: 2023-10-06 | Resolved: 2024-01-17

## 2024-01-17 PROBLEM — N30.00 ACUTE CYSTITIS WITHOUT HEMATURIA: Status: RESOLVED | Noted: 2023-12-21 | Resolved: 2024-01-17

## 2024-01-17 PROBLEM — R07.9 CHEST PAIN: Status: RESOLVED | Noted: 2022-04-23 | Resolved: 2024-01-17

## 2024-01-17 PROBLEM — R07.2 PRECORDIAL PAIN: Status: RESOLVED | Noted: 2017-05-09 | Resolved: 2024-01-17

## 2024-01-17 PROBLEM — N64.89 SEROMA OF BREAST: Status: RESOLVED | Noted: 2023-08-24 | Resolved: 2024-01-17

## 2024-01-17 PROBLEM — R10.13 EPIGASTRIC PAIN: Status: RESOLVED | Noted: 2022-04-23 | Resolved: 2024-01-17

## 2024-01-17 PROBLEM — M79.621 TENDERNESS OF RIGHT AXILLA: Status: RESOLVED | Noted: 2023-08-24 | Resolved: 2024-01-17

## 2024-01-17 PROBLEM — L02.612 ABSCESS OF LEFT GREAT TOE: Status: RESOLVED | Noted: 2021-06-15 | Resolved: 2024-01-17

## 2024-01-17 PROCEDURE — 1111F DSCHRG MED/CURRENT MED MERGE: CPT | Performed by: NURSE PRACTITIONER

## 2024-01-17 PROCEDURE — 3074F SYST BP LT 130 MM HG: CPT | Performed by: NURSE PRACTITIONER

## 2024-01-17 PROCEDURE — G8427 DOCREV CUR MEDS BY ELIG CLIN: HCPCS | Performed by: NURSE PRACTITIONER

## 2024-01-17 PROCEDURE — G8482 FLU IMMUNIZE ORDER/ADMIN: HCPCS | Performed by: NURSE PRACTITIONER

## 2024-01-17 PROCEDURE — 99214 OFFICE O/P EST MOD 30 MIN: CPT | Performed by: NURSE PRACTITIONER

## 2024-01-17 PROCEDURE — G8417 CALC BMI ABV UP PARAM F/U: HCPCS | Performed by: NURSE PRACTITIONER

## 2024-01-17 PROCEDURE — 3078F DIAST BP <80 MM HG: CPT | Performed by: NURSE PRACTITIONER

## 2024-01-17 PROCEDURE — 3046F HEMOGLOBIN A1C LEVEL >9.0%: CPT | Performed by: NURSE PRACTITIONER

## 2024-01-17 PROCEDURE — 3023F SPIROM DOC REV: CPT | Performed by: NURSE PRACTITIONER

## 2024-01-17 PROCEDURE — 2022F DILAT RTA XM EVC RTNOPTHY: CPT | Performed by: NURSE PRACTITIONER

## 2024-01-17 PROCEDURE — 3017F COLORECTAL CA SCREEN DOC REV: CPT | Performed by: NURSE PRACTITIONER

## 2024-01-17 PROCEDURE — 1036F TOBACCO NON-USER: CPT | Performed by: NURSE PRACTITIONER

## 2024-01-17 RX ORDER — CLOPIDOGREL BISULFATE 75 MG/1
75 TABLET ORAL DAILY
COMMUNITY
Start: 2024-01-01

## 2024-01-17 SDOH — ECONOMIC STABILITY: FOOD INSECURITY: WITHIN THE PAST 12 MONTHS, THE FOOD YOU BOUGHT JUST DIDN'T LAST AND YOU DIDN'T HAVE MONEY TO GET MORE.: PATIENT DECLINED

## 2024-01-17 SDOH — ECONOMIC STABILITY: INCOME INSECURITY: HOW HARD IS IT FOR YOU TO PAY FOR THE VERY BASICS LIKE FOOD, HOUSING, MEDICAL CARE, AND HEATING?: PATIENT DECLINED

## 2024-01-17 SDOH — ECONOMIC STABILITY: HOUSING INSECURITY
IN THE LAST 12 MONTHS, WAS THERE A TIME WHEN YOU DID NOT HAVE A STEADY PLACE TO SLEEP OR SLEPT IN A SHELTER (INCLUDING NOW)?: PATIENT DECLINED

## 2024-01-17 SDOH — ECONOMIC STABILITY: FOOD INSECURITY: WITHIN THE PAST 12 MONTHS, YOU WORRIED THAT YOUR FOOD WOULD RUN OUT BEFORE YOU GOT MONEY TO BUY MORE.: PATIENT DECLINED

## 2024-01-17 ASSESSMENT — PATIENT HEALTH QUESTIONNAIRE - PHQ9
10. IF YOU CHECKED OFF ANY PROBLEMS, HOW DIFFICULT HAVE THESE PROBLEMS MADE IT FOR YOU TO DO YOUR WORK, TAKE CARE OF THINGS AT HOME, OR GET ALONG WITH OTHER PEOPLE: 0
6. FEELING BAD ABOUT YOURSELF - OR THAT YOU ARE A FAILURE OR HAVE LET YOURSELF OR YOUR FAMILY DOWN: 0
4. FEELING TIRED OR HAVING LITTLE ENERGY: 0
SUM OF ALL RESPONSES TO PHQ QUESTIONS 1-9: 0
9. THOUGHTS THAT YOU WOULD BE BETTER OFF DEAD, OR OF HURTING YOURSELF: 0
5. POOR APPETITE OR OVEREATING: 0
3. TROUBLE FALLING OR STAYING ASLEEP: 0
2. FEELING DOWN, DEPRESSED OR HOPELESS: 0
SUM OF ALL RESPONSES TO PHQ QUESTIONS 1-9: 0
8. MOVING OR SPEAKING SO SLOWLY THAT OTHER PEOPLE COULD HAVE NOTICED. OR THE OPPOSITE, BEING SO FIGETY OR RESTLESS THAT YOU HAVE BEEN MOVING AROUND A LOT MORE THAN USUAL: 0
SUM OF ALL RESPONSES TO PHQ QUESTIONS 1-9: 0
1. LITTLE INTEREST OR PLEASURE IN DOING THINGS: 0
SUM OF ALL RESPONSES TO PHQ QUESTIONS 1-9: 0
SUM OF ALL RESPONSES TO PHQ9 QUESTIONS 1 & 2: 0
7. TROUBLE CONCENTRATING ON THINGS, SUCH AS READING THE NEWSPAPER OR WATCHING TELEVISION: 0

## 2024-01-17 NOTE — PROGRESS NOTES
used smokeless tobacco.  Alcohol:      reports no history of alcohol use.  Drug Use:  reports no history of drug use.    Family History:     Family History   Problem Relation Age of Onset    Cancer Mother     Diabetes Mother     Cancer Father         thyroid    Diabetes Sister     Heart Disease Sister     Heart Disease Brother     Heart Disease Maternal Uncle     Cancer Maternal Grandmother        Review of Systems:     Positive and Negative as described in HPI    Review of Systems   Constitutional:  Negative for appetite change, chills, fatigue, fever, malaise/fatigue and weight loss.   HENT:  Negative for congestion, ear pain, hoarse voice, postnasal drip, rhinorrhea, sneezing, sore throat and trouble swallowing.    Eyes:  Positive for blurred vision (CHRONIC RIGHT). Negative for visual disturbance.   Respiratory:  Negative for cough, hemoptysis, sputum production, shortness of breath and wheezing.    Cardiovascular:  Negative for chest pain, dyspnea on exertion, palpitations and PND.   Gastrointestinal:  Negative for abdominal pain, constipation, diarrhea, heartburn and vomiting.   Endocrine: Negative for polydipsia, polyphagia and polyuria.   Genitourinary:  Negative for difficulty urinating and dysuria.   Musculoskeletal:  Negative for gait problem, myalgias, neck pain and neck stiffness.   Skin:  Negative for pallor and rash.   Neurological:  Positive for numbness. Negative for dizziness, syncope, weakness, light-headedness and headaches.   Psychiatric/Behavioral:  The patient is not nervous/anxious.        Physical Exam:   Vitals:  /74   Pulse 82   Temp 98.5 °F (36.9 °C) (Temporal)   Resp 20   Wt 95.7 kg (210 lb 14.4 oz)   LMP 12/05/1996   SpO2 98%   BMI 37.36 kg/m²     Physical Exam  Vitals and nursing note reviewed.   Constitutional:       General: She is not in acute distress.     Appearance: Normal appearance. She is well-developed. She is obese. She is not ill-appearing.   HENT:

## 2024-01-17 NOTE — PATIENT INSTRUCTIONS
SURVEY:     You may be receiving a survey from [facility] regarding your visit today.     Please complete the survey to enable us to provide the highest quality of care to you and your family.     If you cannot score us a very good on any question, please call the office to discuss how we could have made your experience a very good one.     Thank you,    Bulmaro Plasencia, APRN-CNP  Neema Alejandre, APRN-CNP  Earlene, JEANNA Faith, JORGE A Moore, CMA  Christina, CMA  Alice, PCA  Hope, PM

## 2024-01-18 ASSESSMENT — ENCOUNTER SYMPTOMS
SPUTUM PRODUCTION: 0
COUGH: 0
HOARSE VOICE: 0
CONSTIPATION: 0
RHINORRHEA: 0
VOMITING: 0
SHORTNESS OF BREATH: 0
TROUBLE SWALLOWING: 0
SORE THROAT: 0
DIFFICULTY BREATHING: 0
ABDOMINAL PAIN: 0
DIARRHEA: 0
CHEST TIGHTNESS: 0
HEARTBURN: 0
WHEEZING: 0
BLURRED VISION: 1
FREQUENT THROAT CLEARING: 0
HEMOPTYSIS: 0

## 2024-01-18 ASSESSMENT — COPD QUESTIONNAIRES: COPD: 1

## 2024-01-24 ENCOUNTER — OFFICE VISIT (OUTPATIENT)
Dept: CARDIOLOGY | Age: 59
End: 2024-01-24
Payer: COMMERCIAL

## 2024-01-24 VITALS
RESPIRATION RATE: 18 BRPM | OXYGEN SATURATION: 95 % | SYSTOLIC BLOOD PRESSURE: 99 MMHG | HEART RATE: 84 BPM | HEIGHT: 63 IN | BODY MASS INDEX: 38.02 KG/M2 | DIASTOLIC BLOOD PRESSURE: 67 MMHG | WEIGHT: 214.6 LBS

## 2024-01-24 DIAGNOSIS — I50.22 CHRONIC SYSTOLIC (CONGESTIVE) HEART FAILURE (HCC): Primary | ICD-10-CM

## 2024-01-24 DIAGNOSIS — Z95.820 S/P ANGIOPLASTY WITH STENT: ICD-10-CM

## 2024-01-24 DIAGNOSIS — E78.2 MIXED HYPERLIPIDEMIA: ICD-10-CM

## 2024-01-24 DIAGNOSIS — I10 ESSENTIAL HYPERTENSION: ICD-10-CM

## 2024-01-24 DIAGNOSIS — R00.2 PALPITATIONS: ICD-10-CM

## 2024-01-24 DIAGNOSIS — I20.9 ANGINA, CLASS II (HCC): ICD-10-CM

## 2024-01-24 DIAGNOSIS — Z95.1 S/P CABG (CORONARY ARTERY BYPASS GRAFT): ICD-10-CM

## 2024-01-24 DIAGNOSIS — I25.10 ASHD (ARTERIOSCLEROTIC HEART DISEASE): ICD-10-CM

## 2024-01-24 DIAGNOSIS — I95.1 DYSAUTONOMIA ORTHOSTATIC HYPOTENSION SYNDROME: ICD-10-CM

## 2024-01-24 PROCEDURE — G8482 FLU IMMUNIZE ORDER/ADMIN: HCPCS | Performed by: FAMILY MEDICINE

## 2024-01-24 PROCEDURE — G8427 DOCREV CUR MEDS BY ELIG CLIN: HCPCS | Performed by: FAMILY MEDICINE

## 2024-01-24 PROCEDURE — 1036F TOBACCO NON-USER: CPT | Performed by: FAMILY MEDICINE

## 2024-01-24 PROCEDURE — 99214 OFFICE O/P EST MOD 30 MIN: CPT | Performed by: FAMILY MEDICINE

## 2024-01-24 PROCEDURE — G8417 CALC BMI ABV UP PARAM F/U: HCPCS | Performed by: FAMILY MEDICINE

## 2024-01-24 PROCEDURE — 3017F COLORECTAL CA SCREEN DOC REV: CPT | Performed by: FAMILY MEDICINE

## 2024-01-24 PROCEDURE — 3074F SYST BP LT 130 MM HG: CPT | Performed by: FAMILY MEDICINE

## 2024-01-24 PROCEDURE — 3078F DIAST BP <80 MM HG: CPT | Performed by: FAMILY MEDICINE

## 2024-01-24 RX ORDER — FUROSEMIDE 20 MG/1
20 TABLET ORAL DAILY
Qty: 90 TABLET | Refills: 3 | Status: SHIPPED | OUTPATIENT
Start: 2024-01-24

## 2024-01-24 RX ORDER — LOSARTAN POTASSIUM 25 MG/1
25 TABLET ORAL DAILY
Qty: 90 TABLET | Refills: 3 | Status: SHIPPED | OUTPATIENT
Start: 2024-01-24

## 2024-01-24 RX ORDER — FLUDROCORTISONE ACETATE 0.1 MG/1
0.3 TABLET ORAL DAILY
COMMUNITY

## 2024-01-24 NOTE — PROGRESS NOTES
I, Almita Stokes am scribing for and in the presence of Jose Becker MD, MS, F.A.C.C..    Patient: Christie Belcher  : 1965  Date of Visit: 2023    REASON FOR VISIT / CONSULTATION: Follow-Up from Hospital (HX:CHF, [facility] PT is here for hosp follow up patient states she has been feeling pretty good. Denies SOB, chest pain, palpitations and light headedness. No questions or concerns. )    Dear Luis Angel, Bulmaro GATES, CHELSEA - CNP,    I had the pleasure of seeing your patient Christie Belcher in consultation today. As you know, Ms. Belcher is a 58 y.o. female who presents with a history of intermittent episodes of back and chest discomfort that started developing since her recent CABG involving a LIMA-LAD 8/15/16.  She also has a history of  dysautonomia on a tilt table test, and was started on Florinef as well as Lexapro. Recent heart cath done 3/7/2019 shows severe single vessel disease involving LAD Normal LVEDP. She had a Holter monitor done on 10/23/2019 that did show PAC's and PVC's but was largely unremarkable. She did come to the ER on 2022 due to abdominal pain and shortness of breath. She was told her EKG was abnormal and also she had an elevated troponin which was only 15 ng/L and only had minor EKG changes. She was sent to [facility] for this and she was not very happy about this. Cardiovascular stress test done on 2022 showed Overall, these results are most consistent with an intermediate/high risk for significant coronary artery disease. Echocardiogram on 2022 showed EF:>55% with mild diastolic dysfunction and LV wall thickness mildly increased. ER on 2023 due to acute coronary syndrome -Transferred to [place] - Successful PCI of left main and circumflex in-stent restenosis with 3.5 x 26 mm Medtronic [place] drug-eluting stent which was post dilated to 4.0 mm in diameter. She was in the ER on 10/2/2023 and transferred to [facility] on 10/3/2023 and diagnoised with a NSTEMI. Dr Evans did a

## 2024-01-24 NOTE — PATIENT INSTRUCTIONS
SURVEY:    You may be receiving a survey from [employer] regarding your visit today.    Please complete the survey to enable us to provide the highest quality of care to you and your family.    If you cannot score us a very good on any question, please call the office to discuss how we could have made your experience a very good one.    Thank you.

## 2024-01-25 RX ORDER — OMEPRAZOLE 20 MG/1
20 CAPSULE, DELAYED RELEASE ORAL
Qty: 90 CAPSULE | Refills: 1 | OUTPATIENT
Start: 2024-01-25

## 2024-01-29 ENCOUNTER — HOSPITAL ENCOUNTER (OUTPATIENT)
Age: 59
Discharge: HOME | End: 2024-01-29
Payer: COMMERCIAL

## 2024-01-29 ENCOUNTER — HOSPITAL ENCOUNTER (EMERGENCY)
Age: 59
Discharge: HOME | End: 2024-01-29
Payer: COMMERCIAL

## 2024-01-29 ENCOUNTER — HOSPITAL ENCOUNTER (OUTPATIENT)
Dept: GENERAL RADIOLOGY | Age: 59
Discharge: HOME | End: 2024-01-31
Payer: COMMERCIAL

## 2024-01-29 ENCOUNTER — HOSPITAL ENCOUNTER (OUTPATIENT)
Age: 59
Discharge: HOME | End: 2024-01-31
Payer: COMMERCIAL

## 2024-01-29 ENCOUNTER — TELEPHONE (OUTPATIENT)
Dept: CARDIOLOGY | Age: 59
End: 2024-01-29

## 2024-01-29 VITALS
DIASTOLIC BLOOD PRESSURE: 73 MMHG | TEMPERATURE: 98.7 F | RESPIRATION RATE: 18 BRPM | OXYGEN SATURATION: 95 % | BODY MASS INDEX: 37.2 KG/M2 | HEART RATE: 96 BPM | WEIGHT: 210 LBS | SYSTOLIC BLOOD PRESSURE: 127 MMHG

## 2024-01-29 DIAGNOSIS — I50.22 CHRONIC SYSTOLIC (CONGESTIVE) HEART FAILURE (HCC): ICD-10-CM

## 2024-01-29 DIAGNOSIS — R00.2 PALPITATIONS: ICD-10-CM

## 2024-01-29 DIAGNOSIS — Z95.820 S/P ANGIOPLASTY WITH STENT: ICD-10-CM

## 2024-01-29 DIAGNOSIS — Z95.1 S/P CABG (CORONARY ARTERY BYPASS GRAFT): ICD-10-CM

## 2024-01-29 DIAGNOSIS — N64.4 BREAST PAIN: ICD-10-CM

## 2024-01-29 DIAGNOSIS — E78.2 MIXED HYPERLIPIDEMIA: ICD-10-CM

## 2024-01-29 DIAGNOSIS — I10 ESSENTIAL HYPERTENSION: ICD-10-CM

## 2024-01-29 DIAGNOSIS — R73.9 HYPERGLYCEMIA: Primary | ICD-10-CM

## 2024-01-29 DIAGNOSIS — T14.8XXA FRACTURE: ICD-10-CM

## 2024-01-29 DIAGNOSIS — I25.10 ASHD (ARTERIOSCLEROTIC HEART DISEASE): ICD-10-CM

## 2024-01-29 DIAGNOSIS — I95.1 DYSAUTONOMIA ORTHOSTATIC HYPOTENSION SYNDROME: ICD-10-CM

## 2024-01-29 DIAGNOSIS — I20.9 ANGINA, CLASS II (HCC): ICD-10-CM

## 2024-01-29 LAB
ANION GAP SERPL CALCULATED.3IONS-SCNC: 17 MMOL/L (ref 9–17)
B-OH-BUTYR SERPL-MCNC: 0.2 MMOL/L (ref 0.02–0.27)
BACTERIA URNS QL MICRO: ABNORMAL
BILIRUB UR QL STRIP: NEGATIVE
BUN SERPL-MCNC: 12 MG/DL (ref 6–20)
BUN/CREAT SERPL: 17 (ref 9–20)
CALCIUM SERPL-MCNC: 9.2 MG/DL (ref 8.6–10.4)
CHLORIDE SERPL-SCNC: 94 MMOL/L (ref 98–107)
CLARITY UR: CLEAR
CO2 SERPL-SCNC: 21 MMOL/L (ref 20–31)
COLOR UR: YELLOW
CREAT SERPL-MCNC: 0.7 MG/DL (ref 0.5–0.9)
EPI CELLS #/AREA URNS HPF: ABNORMAL /HPF (ref 0–25)
ERYTHROCYTE [DISTWIDTH] IN BLOOD BY AUTOMATED COUNT: 16.5 % (ref 11.8–14.4)
GFR SERPL CREATININE-BSD FRML MDRD: >60 ML/MIN/1.73M2
GLUCOSE BLD-MCNC: 276 MG/DL (ref 74–100)
GLUCOSE BLD-MCNC: 397 MG/DL (ref 74–100)
GLUCOSE SERPL-MCNC: 441 MG/DL (ref 70–99)
GLUCOSE UR STRIP-MCNC: ABNORMAL MG/DL
HCT VFR BLD AUTO: 37.3 % (ref 36.3–47.1)
HGB BLD-MCNC: 12 G/DL (ref 11.9–15.1)
HGB UR QL STRIP.AUTO: NEGATIVE
KETONES UR STRIP-MCNC: NEGATIVE MG/DL
LEUKOCYTE ESTERASE UR QL STRIP: NEGATIVE
MCH RBC QN AUTO: 24.8 PG (ref 25.2–33.5)
MCHC RBC AUTO-ENTMCNC: 32.2 G/DL (ref 28.4–34.8)
MCV RBC AUTO: 77.2 FL (ref 82.6–102.9)
MUCOUS THREADS URNS QL MICRO: ABNORMAL
NITRITE UR QL STRIP: NEGATIVE
NRBC BLD-RTO: 0 PER 100 WBC
PH UR STRIP: 6 [PH] (ref 5–9)
PLATELET # BLD AUTO: 371 K/UL (ref 138–453)
PMV BLD AUTO: 10 FL (ref 8.1–13.5)
POTASSIUM SERPL-SCNC: 4.9 MMOL/L (ref 3.7–5.3)
PROT UR STRIP-MCNC: NEGATIVE MG/DL
RBC # BLD AUTO: 4.83 M/UL (ref 3.95–5.11)
RBC #/AREA URNS HPF: ABNORMAL /HPF (ref 0–2)
SODIUM SERPL-SCNC: 132 MMOL/L (ref 135–144)
SP GR UR STRIP: 1.02 (ref 1.01–1.02)
UROBILINOGEN UR STRIP-ACNC: NORMAL EU/DL (ref 0–1)
WBC #/AREA URNS HPF: ABNORMAL /HPF (ref 0–5)
WBC OTHER # BLD: 9.4 K/UL (ref 3.5–11.3)

## 2024-01-29 PROCEDURE — 6370000000 HC RX 637 (ALT 250 FOR IP): Performed by: PHYSICIAN ASSISTANT

## 2024-01-29 PROCEDURE — 81001 URINALYSIS AUTO W/SCOPE: CPT

## 2024-01-29 PROCEDURE — 82010 KETONE BODYS QUAN: CPT

## 2024-01-29 PROCEDURE — 36415 COLL VENOUS BLD VENIPUNCTURE: CPT

## 2024-01-29 PROCEDURE — 2580000003 HC RX 258: Performed by: PHYSICIAN ASSISTANT

## 2024-01-29 PROCEDURE — 96372 THER/PROPH/DIAG INJ SC/IM: CPT

## 2024-01-29 PROCEDURE — 80048 BASIC METABOLIC PNL TOTAL CA: CPT

## 2024-01-29 PROCEDURE — 99284 EMERGENCY DEPT VISIT MOD MDM: CPT

## 2024-01-29 PROCEDURE — 73630 X-RAY EXAM OF FOOT: CPT

## 2024-01-29 PROCEDURE — 85027 COMPLETE CBC AUTOMATED: CPT

## 2024-01-29 PROCEDURE — 96361 HYDRATE IV INFUSION ADD-ON: CPT

## 2024-01-29 PROCEDURE — 96374 THER/PROPH/DIAG INJ IV PUSH: CPT

## 2024-01-29 PROCEDURE — 82947 ASSAY GLUCOSE BLOOD QUANT: CPT

## 2024-01-29 RX ORDER — 0.9 % SODIUM CHLORIDE 0.9 %
1000 INTRAVENOUS SOLUTION INTRAVENOUS ONCE
Status: COMPLETED | OUTPATIENT
Start: 2024-01-29 | End: 2024-01-29

## 2024-01-29 RX ADMIN — INSULIN HUMAN 8 UNITS: 100 INJECTION, SOLUTION PARENTERAL at 17:22

## 2024-01-29 RX ADMIN — SODIUM CHLORIDE 1000 ML: 9 INJECTION, SOLUTION INTRAVENOUS at 16:25

## 2024-01-29 RX ADMIN — INSULIN HUMAN 50 UNITS: 100 INJECTION, SOLUTION PARENTERAL at 19:01

## 2024-01-29 ASSESSMENT — PAIN - FUNCTIONAL ASSESSMENT: PAIN_FUNCTIONAL_ASSESSMENT: NONE - DENIES PAIN

## 2024-01-29 ASSESSMENT — ENCOUNTER SYMPTOMS
ABDOMINAL PAIN: 0
SHORTNESS OF BREATH: 0
VOMITING: 0
NAUSEA: 0
COUGH: 0

## 2024-01-29 NOTE — TELEPHONE ENCOUNTER
Lab called about Ms. Belcher having a critical lab. Her glucose is 441. please advise, thank you.

## 2024-01-29 NOTE — DISCHARGE INSTRUCTIONS
Keep a close eye on your blood sugars and continue on your current medication regiment.  Log your blood sugars for follow-up in Dr. Posada office.

## 2024-01-29 NOTE — ED PROVIDER NOTES
9/2/2022    RIGHT AXILLARY LYMPH SENTINAL NODE BIOPSY  @  12PM performed by Brianne Nix DO at [facility] OR    BREAST BIOPSY Right 7/12/2022    NEEDLE DIRECTED ( 1130     )    RIGHT BREAST LUMPECTOMY performed by Brianne Nix DO at [facility] OR    CARDIAC CATHETERIZATION Left 04/26/2016    right radial/ [facility]/ Dr Becker    CARDIAC CATHETERIZATION Left 03/07/2019    Left Radial/[facility]/    CARDIAC CATHETERIZATION  05/20/2022    Dr Becker/[facility]/left radial    CARDIAC CATHETERIZATION  05/20/2022    Dr Becker/[facility]/left radial    CARDIAC PROCEDURE N/A 12/13/2023    taleb / Left heart cath / rm 2009 performed by Rangel Paez MD at [facility] CARDIAC CATH LAB    CARDIAC PROCEDURE N/A 12/13/2023    Percutaneous coronary intervention performed by Rangel Paez MD at [facility] CARDIAC CATH LAB    CARDIAC SURGERY      CABG 2019    CHOLECYSTECTOMY  1991    COLONOSCOPY  12/16/2014    -hemorrhoids,bx    CORONARY ANGIOPLASTY WITH STENT PLACEMENT  09/2010    CORONARY ANGIOPLASTY WITH STENT PLACEMENT  03/31/2016    JESSE RCA / DR GUADALUPE    CORONARY ANGIOPLASTY WITH STENT PLACEMENT  05/20/2022    CORONARY ARTERY BYPASS GRAFT  08/15/2016    OP X 1- Dr Barros    ENDOSCOPY, COLON, DIAGNOSTIC  12/16/2014    HERNIA REPAIR  12/13/2012    at the medial aspect of a [place] RUQ scar); repaired byDr. Soto    HERNIA REPAIR  02/16/2015    incisional, recurrent    HERNIA REPAIR  02/2016    HYSTERECTOMY (CERVIX STATUS UNKNOWN)      OTHER SURGICAL HISTORY  10/08/2015    abd wound washout, mesh removal, wound vac placement    NV SUCTION ASSISTED LIPECTOMY HEAD & NECK Left 08/27/2018    THIGH LESION BIOPSY EXCISION, SOFT TISSUE MASS performed by Rajesh Doll MD at [facility] OR    TUMOR REMOVAL Left 2018    leg    UPPP UVULOPALATOPHARYGOPLASTY  06/26/2012    US BREAST BIOPSY NEEDLE ADDITIONAL RIGHT Right 6/9/2022    US BREAST BIOPSY NEEDLE ADDITIONAL RIGHT 6/9/2022 [facility] ULTRASOUND    US BREAST

## 2024-01-31 RX ORDER — OXYCODONE HYDROCHLORIDE AND ACETAMINOPHEN 5; 325 MG/1; MG/1
1 TABLET ORAL EVERY 6 HOURS PRN
Qty: 120 TABLET | Refills: 0 | Status: SHIPPED | OUTPATIENT
Start: 2024-01-31 | End: 2024-03-01

## 2024-02-28 DIAGNOSIS — N64.4 BREAST PAIN: ICD-10-CM

## 2024-02-28 RX ORDER — OXYCODONE HYDROCHLORIDE AND ACETAMINOPHEN 5; 325 MG/1; MG/1
1 TABLET ORAL EVERY 6 HOURS PRN
Qty: 120 TABLET | Refills: 0 | Status: SHIPPED | OUTPATIENT
Start: 2024-02-28 | End: 2024-03-29

## 2024-02-28 NOTE — TELEPHONE ENCOUNTER
Patient calls and states she needs a refill on her oxycodone. Sent over to [facility] in [place]

## 2024-03-12 ENCOUNTER — OFFICE VISIT (OUTPATIENT)
Dept: NEUROLOGY | Age: 59
End: 2024-03-12
Payer: COMMERCIAL

## 2024-03-12 VITALS
HEIGHT: 63 IN | SYSTOLIC BLOOD PRESSURE: 122 MMHG | DIASTOLIC BLOOD PRESSURE: 82 MMHG | BODY MASS INDEX: 38.45 KG/M2 | HEART RATE: 103 BPM | WEIGHT: 217 LBS

## 2024-03-12 DIAGNOSIS — E08.42 DIABETIC POLYNEUROPATHY ASSOCIATED WITH DIABETES MELLITUS DUE TO UNDERLYING CONDITION (HCC): ICD-10-CM

## 2024-03-12 DIAGNOSIS — M62.838 MUSCLE SPASMS OF BOTH LOWER EXTREMITIES: ICD-10-CM

## 2024-03-12 DIAGNOSIS — M79.2 NEUROPATHIC PAIN: Primary | ICD-10-CM

## 2024-03-12 PROCEDURE — 3017F COLORECTAL CA SCREEN DOC REV: CPT | Performed by: PSYCHIATRY & NEUROLOGY

## 2024-03-12 PROCEDURE — 3079F DIAST BP 80-89 MM HG: CPT | Performed by: PSYCHIATRY & NEUROLOGY

## 2024-03-12 PROCEDURE — G8427 DOCREV CUR MEDS BY ELIG CLIN: HCPCS | Performed by: PSYCHIATRY & NEUROLOGY

## 2024-03-12 PROCEDURE — 2022F DILAT RTA XM EVC RTNOPTHY: CPT | Performed by: PSYCHIATRY & NEUROLOGY

## 2024-03-12 PROCEDURE — 3074F SYST BP LT 130 MM HG: CPT | Performed by: PSYCHIATRY & NEUROLOGY

## 2024-03-12 PROCEDURE — G8482 FLU IMMUNIZE ORDER/ADMIN: HCPCS | Performed by: PSYCHIATRY & NEUROLOGY

## 2024-03-12 PROCEDURE — 1036F TOBACCO NON-USER: CPT | Performed by: PSYCHIATRY & NEUROLOGY

## 2024-03-12 PROCEDURE — G8417 CALC BMI ABV UP PARAM F/U: HCPCS | Performed by: PSYCHIATRY & NEUROLOGY

## 2024-03-12 PROCEDURE — 99214 OFFICE O/P EST MOD 30 MIN: CPT | Performed by: PSYCHIATRY & NEUROLOGY

## 2024-03-12 RX ORDER — DULOXETIN HYDROCHLORIDE 30 MG/1
30 CAPSULE, DELAYED RELEASE ORAL DAILY
Qty: 30 CAPSULE | Refills: 6 | Status: SHIPPED | OUTPATIENT
Start: 2024-03-12

## 2024-03-12 RX ORDER — GABAPENTIN 800 MG/1
TABLET ORAL
Qty: 90 TABLET | Refills: 6 | Status: SHIPPED | OUTPATIENT
Start: 2024-03-12 | End: 2025-03-24

## 2024-03-12 RX ORDER — TIZANIDINE 2 MG/1
TABLET ORAL
Qty: 30 TABLET | Refills: 0 | Status: SHIPPED | OUTPATIENT
Start: 2024-03-12

## 2024-03-12 RX ORDER — ACYCLOVIR 400 MG/1
TABLET ORAL
COMMUNITY
Start: 2024-01-26

## 2024-03-12 RX ORDER — ACYCLOVIR 400 MG/1
TABLET ORAL
COMMUNITY
Start: 2024-02-23

## 2024-03-12 RX ORDER — NEOMYCIN SULFATE, POLYMYXIN B SULFATE AND DEXAMETHASONE 3.5; 10000; 1 MG/ML; [USP'U]/ML; MG/ML
SUSPENSION/ DROPS OPHTHALMIC
COMMUNITY
Start: 2024-02-20

## 2024-03-12 NOTE — PROGRESS NOTES
electrophysiological study indicative of primarily axonal sensorimotor neuropathy in both lower extremities.  There is no evidence of lumbosacral radiculopathy or plexopathy.    EKG (8/23/18); NSR.             Impression and Plan: Ms. Christie Belcher is a 59 y.o. female with   Painful diabetic peripheral polyneuropathy; well controlled on  tid and Duloxetine 30 mg qd. To continue the same.   Muscle spasms sec to above; under control and to continue tizandine prn.   Diabetic dysautonomia: Denied bladder and bowel dysfunction.  Denied symptoms of neurogenic bladder and orthostatic hypotension.  Uncontrolled diabetes with hb A1c at 10 as above. Has been on daily insulin and weekly trulicity and metformin    Medication counseling including risks and benefits and possible adverse effects from the meds discussed and she voiced understanding those instructions.  Follow up in 6 months.        This note was partially created using voice recognition software and is inherently subject to errors including those of syntax and \"sound alike\" substitutions which may escape proofreading.  In such instances, original meaning may be extrapolated by contextual derivation.

## 2024-03-25 ENCOUNTER — HOSPITAL ENCOUNTER (OUTPATIENT)
Dept: WOMENS IMAGING | Age: 59
Discharge: HOME | End: 2024-03-27
Payer: COMMERCIAL

## 2024-03-25 ENCOUNTER — HOSPITAL ENCOUNTER (OUTPATIENT)
Age: 59
Discharge: HOME | End: 2024-03-25
Payer: COMMERCIAL

## 2024-03-25 ENCOUNTER — TELEPHONE (OUTPATIENT)
Dept: ONCOLOGY | Age: 59
End: 2024-03-25

## 2024-03-25 DIAGNOSIS — Z17.0 MALIGNANT NEOPLASM OF CENTRAL PORTION OF RIGHT BREAST IN FEMALE, ESTROGEN RECEPTOR POSITIVE (HCC): ICD-10-CM

## 2024-03-25 DIAGNOSIS — C50.111 MALIGNANT NEOPLASM OF CENTRAL PORTION OF RIGHT BREAST IN FEMALE, ESTROGEN RECEPTOR POSITIVE (HCC): ICD-10-CM

## 2024-03-25 LAB
ALBUMIN SERPL-MCNC: 3.7 G/DL (ref 3.5–5.2)
ALBUMIN/GLOB SERPL: 0.9 {RATIO} (ref 1–2.5)
ALP SERPL-CCNC: 114 U/L (ref 35–104)
ALT SERPL-CCNC: 11 U/L (ref 5–33)
ANION GAP SERPL CALCULATED.3IONS-SCNC: 14 MMOL/L (ref 9–17)
AST SERPL-CCNC: 12 U/L
BASOPHILS # BLD: 0.05 K/UL (ref 0–0.2)
BASOPHILS NFR BLD: 1 % (ref 0–2)
BILIRUB SERPL-MCNC: 0.4 MG/DL (ref 0.3–1.2)
BUN SERPL-MCNC: 18 MG/DL (ref 6–20)
BUN/CREAT SERPL: 23 (ref 9–20)
CALCIUM SERPL-MCNC: 9.2 MG/DL (ref 8.6–10.4)
CHLORIDE SERPL-SCNC: 95 MMOL/L (ref 98–107)
CO2 SERPL-SCNC: 24 MMOL/L (ref 20–31)
CREAT SERPL-MCNC: 0.8 MG/DL (ref 0.5–0.9)
EOSINOPHIL # BLD: 0.1 K/UL (ref 0–0.44)
EOSINOPHILS RELATIVE PERCENT: 1 % (ref 1–4)
ERYTHROCYTE [DISTWIDTH] IN BLOOD BY AUTOMATED COUNT: 15.2 % (ref 11.8–14.4)
GFR SERPL CREATININE-BSD FRML MDRD: 85 ML/MIN/1.73M2
GLUCOSE SERPL-MCNC: 491 MG/DL (ref 70–99)
HCT VFR BLD AUTO: 37.4 % (ref 36.3–47.1)
HGB BLD-MCNC: 12 G/DL (ref 11.9–15.1)
IMM GRANULOCYTES # BLD AUTO: 0.05 K/UL (ref 0–0.3)
IMM GRANULOCYTES NFR BLD: 1 %
LYMPHOCYTES NFR BLD: 1.78 K/UL (ref 1.1–3.7)
LYMPHOCYTES RELATIVE PERCENT: 20 % (ref 24–43)
MCH RBC QN AUTO: 24.5 PG (ref 25.2–33.5)
MCHC RBC AUTO-ENTMCNC: 32.1 G/DL (ref 28.4–34.8)
MCV RBC AUTO: 76.5 FL (ref 82.6–102.9)
MONOCYTES NFR BLD: 0.37 K/UL (ref 0.1–1.2)
MONOCYTES NFR BLD: 4 % (ref 3–12)
NEUTROPHILS NFR BLD: 73 % (ref 36–65)
NEUTS SEG NFR BLD: 6.38 K/UL (ref 1.5–8.1)
NRBC BLD-RTO: 0 PER 100 WBC
PLATELET # BLD AUTO: 338 K/UL (ref 138–453)
PMV BLD AUTO: 9.7 FL (ref 8.1–13.5)
POTASSIUM SERPL-SCNC: 4.8 MMOL/L (ref 3.7–5.3)
PROT SERPL-MCNC: 7.7 G/DL (ref 6.4–8.3)
RBC # BLD AUTO: 4.89 M/UL (ref 3.95–5.11)
SODIUM SERPL-SCNC: 133 MMOL/L (ref 135–144)
WBC OTHER # BLD: 8.7 K/UL (ref 3.5–11.3)

## 2024-03-25 PROCEDURE — 36415 COLL VENOUS BLD VENIPUNCTURE: CPT

## 2024-03-25 PROCEDURE — 80053 COMPREHEN METABOLIC PANEL: CPT

## 2024-03-25 PROCEDURE — G0279 TOMOSYNTHESIS, MAMMO: HCPCS

## 2024-03-25 PROCEDURE — 85025 COMPLETE CBC W/AUTO DIFF WBC: CPT

## 2024-03-25 NOTE — TELEPHONE ENCOUNTER
Call to patient to inform her that her blood glucose today is 491.  Explained that I discussed this with Dr. Tellez and he requests she call her PCP as soon as possible to determine plan for a critically high value.  She verbalizes understanding and will call primary care

## 2024-04-01 DIAGNOSIS — D05.11 DUCTAL CARCINOMA IN SITU (DCIS) OF RIGHT BREAST: Primary | ICD-10-CM

## 2024-04-01 DIAGNOSIS — N64.4 BREAST PAIN: ICD-10-CM

## 2024-04-01 RX ORDER — OXYCODONE HYDROCHLORIDE AND ACETAMINOPHEN 5; 325 MG/1; MG/1
1 TABLET ORAL EVERY 6 HOURS PRN
Qty: 120 TABLET | Refills: 0 | Status: SHIPPED | OUTPATIENT
Start: 2024-04-01 | End: 2024-05-01

## 2024-04-09 NOTE — PROGRESS NOTES
[facility] PHYSICIANS  Sekou Woo, [address] [facility] PRIMARY CARE  [address]  Dept: [phone]  Dept Fax: [phone]      Name: Nohelia Wisdom  : 1965         Chief Complaint:     Chief Complaint   Patient presents with    Cough     X 2 days. Headache     X 2 days. Shortness of Breath     X 2 days. History of Present Illness:      Nohelia Wisdom is a 62 y.o.  female who presents with Cough (X 2 days. ), Headache (X 2 days. ), and Shortness of Breath (X 2 days. )      Cough  This is a new problem. Episode onset: 2 days ago. The problem has been gradually worsening. The problem occurs constantly. The cough is Non-productive. Associated symptoms include ear pain, headaches, postnasal drip, rhinorrhea, a sore throat, shortness of breath and wheezing. Pertinent negatives include no fever. Treatments tried: has used some tessalon at home. Headache  Shortness of Breath  Associated symptoms include ear pain, headaches, rhinorrhea, a sore throat and wheezing. Pertinent negatives include no fever. Past Medical History:     Past Medical History:   Diagnosis Date    Anxiety     Asthma     CAD (coronary artery disease)     x1 stent ,x1 stent , x1 stent May 2022    Cancer [facility]) 2022    right breast cancer    Clinical trial participant at discharge 3/31/16    COPD (chronic obstructive pulmonary disease) ([facility] [address]     Depression     Diabetic neuropathy (HCC)     GERD (gastroesophageal reflux disease)     H/O cardiac catheterization 16    LMCA: Mild Irregularities 10-20%. LAD: Lesion on Prox LAD: Proximal subsection. 100% stenosis. LCx: Mild irregularities 10-20%. RCA: Mild irregularities 10-20%. Widely patent mid RCA stent. EF:60%. H/O cardiovascular stress test 10/07/2016    Overall results are most consistent with a low risk for significant CAD. H/O echocardiogram 10/05/2016    EF:>60%.  Inferoseptal wall abnormal in its motion which is not unusal status post open heart surgery. Evidence of mild (grade I) diastolic dysfunction is seen. H/O tilt table evaluation 07/12/2016    Abnormal. PTs HR, Blood Pressure response and symptoms were most consistent with dysautonomia. Combined w/viligant maintenance of euvolemia and maintaining a moderate salt intake, pharmaciologic treatment w/ ProAmatine, SSRI such as Lexapro and/or Mestinon among other treatments have shown some effectiveness in the treatment of this condition. History of cardiovascular stress test 02/13/2019    Abnormal. Small/moderate perfusion defect of mild/moderate intesity in the anterior and anterolateral regions during stress imaging which is most consistent w/ ischemia but may be artifact. Overall low/intermediate risk for significant CAD. History of echocardiogram 09/06/2018    EF >60%. Inferoseptal wall is abnormal in its motion which is not unusual s/p open heart. Evidence of mild (grade I) diastolic dysfunction seen.     Hx of blood clots     Hyperlipidemia     Hypertension     Intermittent claudication (HCC)     Kidney stones     Movement disorder     neuropathy in legs    Neuromuscular disorder (HCC)     neuropathy    Osteoarthritis     Reflux     Seizures ([facility] [address] 1980'Ss last seizure    Stented coronary artery 9/22/2010     LAD/[place]    Stented coronary artery 3/31/16    RCA/[place]our    Type II or unspecified type diabetes mellitus without mention of complication, not stated as uncontrolled     Unspecified sleep apnea     no machine      Reviewed all health maintenance requirements and ordered appropriate tests  Health Maintenance Due   Topic Date Due    Diabetic Alb to Cr ratio (uACR) test  12/29/2022    Lipids  12/29/2022       Past Surgical History:     Past Surgical History:   Procedure Laterality Date    ABDOMINAL HERNIA REPAIR  01/2016    repair done [place]    APPENDECTOMY      AXILLARY SURGERY Right 9/2/2022    RIGHT AXILLARY LYMPH SENTINAL NODE BIOPSY  @  12PM performed by Roxie Kingsley DO at [address] Right 7/12/2022    NEEDLE DIRECTED ( [address]130     )    RIGHT BREAST LUMPECTOMY performed by Roxie Kingsley DO at [address] [facility] Left 04/26/2016    right radial/ [facility]/ Dr Wood Roles Left 03/07/2019    Left Radial/[facility]/    CARDIAC CATHETERIZATION  05/20/2022    Dr Venkata Colon radial    CARDIAC CATHETERIZATION  05/20/2022    Dr Venkata Colon radial    [address] [facility]      CABG 2019    CHOLECYSTECTOMY  1991    COLONOSCOPY  12/16/2014    -hemorrhoids,bx    CORONARY ANGIOPLASTY WITH STENT PLACEMENT  09/2010    CORONARY ANGIOPLASTY WITH STENT PLACEMENT  03/31/2016    JESSE RCA / DR Malina Penny    CORONARY ANGIOPLASTY WITH STENT PLACEMENT  05/20/2022    CORONARY ARTERY BYPASS GRAFT  08/15/2016    OP X 1- Dr Olegario Chappell, COLON, DIAGNOSTIC  12/16/2014    HERNIA REPAIR  12/13/2012    at the medial aspect of a [place] RUQ scar); repaired byDr. [address] [place] [address]  02/16/2015    incisional, recurrent    HERNIA REPAIR  02/2016    HYSTERECTOMY (CERVIX STATUS UNKNOWN)      OTHER SURGICAL HISTORY  10/08/2015    abd wound washout, mesh removal, wound vac placement    OH SUCTION ASSISTED LIPECTOMY HEAD & NECK Left 08/27/2018    THIGH LESION BIOPSY EXCISION, SOFT TISSUE MASS performed by Patsy Salmeron MD at [address] [facility] Left 2018    leg    UPPP UVULOPALATOPHARYGOPLASTY  06/26/2012    US BREAST BIOPSY NEEDLE ADDITIONAL RIGHT Right 6/9/2022    US BREAST [facility]    US BREAST BIOPSY W LOC DEVICE 1ST LESION RIGHT Right 6/9/2022    US BREAST NEEDLE BIOPSY RIGHT 6/9/2022 [facility] ULTRASOUND    US GUIDED NEEDLE LOC OF RIGHT BREAST Right 7/12/2022    US GUIDED NEEDLE LOC OF RIGHT BREAST 7/12/2022 [facility] ULTRASOUND    VENTRAL HERNIA REPAIR  09/21/2015    With Mesh - Dr [place]        Medications: Prior to Admission medications    Medication Sig Start Date End Date Taking? Authorizing Provider   amoxicillin-clavulanate (AUGMENTIN) 875-125 MG per tablet Take 1 tablet by mouth 2 times daily for 10 days 2/21/23 3/3/23 Yes Beverley Litten, APRN - CNP   tiZANidine (ZANAFLEX) 2 MG tablet Take 2 tablets by mouth nightly as needed (spasms) TAKE 2 TABLET BY MOUTH NIGHTLY AS NEEDED(SPASMS) 2/7/23  Yes Carole Zee MD   gabapentin (NEURONTIN) 800 MG tablet Take 1 tablet by mouth 3 times daily for 30 days. 2/7/23 3/9/23 Yes Carole Zee MD   metoprolol succinate (TOPROL XL) 100 MG extended release tablet TAKE 1.5 TABLETS BY MOUTH EVERY DAY 1/6/23  Yes Steffen Barba MD   DULoxetine (CYMBALTA) 30 MG extended release capsule TAKE 1 CAPSULE BY MOUTH EVERY DAY 11/29/22  Yes CHELSEA Daniels CNP   atorvastatin (LIPITOR) 80 MG tablet TAKE 1 TABLET BY MOUTH EVERY DAY 11/7/22  Yes Steffen Barba MD   triamcinolone (KENALOG) 0.1 % cream Apply topically 2 times daily.  11/4/22  Yes Kristy Jose MD   isosorbide mononitrate (IMDUR) 30 MG extended release tablet Take 1 tablet by mouth daily 10/13/22  Yes CHELSEA Gandara CNP   ezetimibe (ZETIA) 10 MG tablet Take 1 tablet by mouth nightly 10/13/22  Yes CHELSEA Gandara CNP   fludrocortisone (FLORINEF) 0.1 MG tablet TAKE 3 TABLETS BY MOUTH EVERY DAY 10/6/22  Yes Steffen Barba MD   albuterol sulfate HFA (VENTOLIN HFA) 108 (90 Base) MCG/ACT inhaler INHALE 2 PUFFS EVERY 6 HOURS AS NEEDED FOR WHEEZING 7/7/22  Yes CHELSEA Rosales CNP   nitroGLYCERIN (NITROSTAT) 0.4 MG SL tablet Place 1 tablet under the tongue every 5 minutes as needed for Chest pain 5/27/22  Yes Steffen Barba MD   clopidogrel (PLAVIX) 75 MG tablet Take 1 tablet by mouth daily 5/21/22  Yes CHELSEA De Guzman CNP   TRULICITY 3 MJ/6.9YX SOPN INJECT 0.5 ML (1 PEN) SUBCUTANEOUSLY WEEKLY,X30 DAYS,INSTR:ROTATE INJECTION SITES 5/9/22  Yes Esteban Thomas Glucose Monitoring Suppl (ONE TOUCH ULTRA 2) w/Device KIT 1 kit by Does not apply route daily 7/20/21  Yes CHELSEA Chan CNP   blood glucose monitor strips Test 3 times a day & as needed for symptoms of irregular blood glucose. Dispense sufficient amount for indicated testing frequency plus additional to accommodate PRN testing needs.  7/16/21  Yes [de-identified] M CHELSEA Zamudio CNP   Lancets MISC 1 each by Does not apply route 2 times daily 7/16/21  Yes CHELSEA So CNP   Insulin Degludec (TRESIBA FLEXTOUCH) 200 UNIT/ML SOPN Inject 86 Units into the skin daily PER DR MCELROY  Patient taking differently: Inject 120 Units into the skin at bedtime 3/30/21  Yes CHELSEA Chan CNP   metFORMIN (GLUCOPHAGE-XR) 500 MG extended release tablet Take 2 tablets by mouth daily (with breakfast) 3/30/21  Yes CHELSEA Chan CNP   omeprazole (PRILOSEC) 20 MG delayed release capsule TAKE 1 CAPSULE BY MOUTH EVERY DAY IN THE MORNING BEFORE BREAKFAST 11/23/20  Yes CHELSEA Chan CNP   Saccharomyces boulardii (PROBIOTIC) 250 MG CAPS Take 1 capsule by mouth daily 9/30/20  Yes CHELSEA Chan CNP   acetaminophen (TYLENOL) 500 MG tablet Take 1 tablet by mouth 4 times daily as needed for Pain 9/9/20  Yes CHELSEA So CNP   losartan (COZAAR) 25 MG tablet Take 1 tablet by mouth daily 7/23/20  Yes CHELSEA Chan CNP   insulin lispro, 1 Unit Dial, (HUMALOG KWIKPEN) 100 UNIT/ML SOPN Inject 20 Units into the skin 3 times daily (before meals)  Patient taking differently: Inject 15 Units into the skin 3 times daily (before meals) 6/23/20  Yes CHELSEA Chan CNP   aspirin 81 MG EC tablet TAKE 1 TABLET BY MOUTH DAILY 7/1/19  Yes CHELSEA Chan CNP   Insulin Pen Needle (BD ULTRA-FINE PEN NEEDLES) 29G X 12.7MM MISC USE AS DIRECTED BY PHYSICIAN 5 times daily 8/7/18  Yes Maia Brinks Might, APRN - CNP   Blood Pressure Monitor KIT 1 each by Does not apply route daily as needed ESSENTIAL HYPERTENSION I10 5/31/16  Yes Cesarjuventino Sherwood Might, APRN - CNP        Allergies:       Tape Claudia Calk tape]    Social History:     Tobacco:    reports that she quit smoking about 12 years ago. Her smoking use included cigarettes. She has a 20.00 pack-year smoking history. She has never used smokeless tobacco.  Alcohol:      reports no history of alcohol use. Drug Use:  reports no history of drug use. Family History:     Family History   Problem Relation Age of Onset    Cancer Mother     Diabetes Mother     Cancer Father         thyroid    Diabetes Sister     Heart Disease Sister     Heart Disease Brother     Heart Disease Maternal Uncle     Cancer Maternal Grandmother        Review of Systems:     Positive and Negative as described in HPI    Review of Systems   Constitutional:  Positive for fatigue. Negative for fever. HENT:  Positive for ear pain, postnasal drip, rhinorrhea and sore throat. Eyes: Negative. Respiratory:  Positive for cough, shortness of breath and wheezing. Cardiovascular: Negative. Gastrointestinal: Negative. Endocrine: Negative. Genitourinary: Negative. Musculoskeletal: Negative. Skin: Negative. Allergic/Immunologic: Negative. Neurological:  Positive for headaches. Hematological: Negative. Psychiatric/Behavioral: Negative. Physical Exam:   Vitals:  /74   Pulse 90   Temp 97.1 °F (36.2 °C) (Temporal)   Resp 18   Wt 219 lb (99.3 kg)   LMP 12/05/1996   SpO2 95%   BMI 38.79 kg/m²     Physical Exam  Vitals reviewed. Constitutional:       General: She is not in acute distress. Appearance: Normal appearance. She is obese. HENT:      Head: Normocephalic. Right Ear: Tympanic membrane is injected, erythematous and bulging. Left Ear: Tympanic membrane normal.      Ears:      Comments: Thick yellow fluid noted in right canal.     Nose: Rhinorrhea present. Rhinorrhea is clear. Mouth/Throat:      Lips: Pink.       Mouth: Mucous membranes are moist.      Pharynx: Oropharynx is clear. Posterior oropharyngeal erythema present.      Tonsils: No tonsillar exudate.   Eyes:      Extraocular Movements: Extraocular movements intact.      Pupils: Pupils are equal, round, and reactive to light.   Cardiovascular:      Rate and Rhythm: Normal rate and regular rhythm.      Heart sounds: Normal heart sounds. No murmur heard.  Pulmonary:      Effort: Pulmonary effort is normal.      Breath sounds: Normal breath sounds. No wheezing.      Comments: Harsh dry cough during exam  Musculoskeletal:         General: Normal range of motion.      Cervical back: Normal range of motion and neck supple.   Skin:     General: Skin is warm.      Capillary Refill: Capillary refill takes less than 2 seconds.   Neurological:      General: No focal deficit present.      Mental Status: She is alert and oriented to person, place, and time.   Psychiatric:         Mood and Affect: Mood normal.         Behavior: Behavior normal.         Thought Content: Thought content normal.         Judgment: Judgment normal.       Data:     Lab Results   Component Value Date/Time     02/13/2023 12:09 PM    K 4.6 02/13/2023 12:09 PM    CL 98 02/13/2023 12:09 PM    CO2 24 02/13/2023 12:09 PM    BUN 17 02/13/2023 12:09 PM    CREATININE 0.65 02/13/2023 12:09 PM    GLUCOSE 287 02/13/2023 12:09 PM    GLUCOSE 181 02/16/2012 08:49 AM    PROT 8.4 02/13/2023 12:09 PM    LABALBU 3.9 02/13/2023 12:09 PM    LABALBU 4.2 12/05/2011 11:35 AM    BILITOT 0.4 02/13/2023 12:09 PM    ALKPHOS 103 02/13/2023 12:09 PM    AST 11 02/13/2023 12:09 PM    ALT 10 02/13/2023 12:09 PM     Lab Results   Component Value Date/Time    WBC 7.5 10/13/2022 06:20 AM    RBC 4.46 10/13/2022 06:20 AM    RBC 3.88 12/05/2011 11:35 AM    HGB 10.9 10/13/2022 06:20 AM    HCT 33.4 10/13/2022 06:20 AM    MCV 74.9 10/13/2022 06:20 AM    MCH 24.4 10/13/2022 06:20 AM    MCHC 32.6 10/13/2022 06:20 AM    RDW 15.5 10/13/2022 06:20 AM     10/13/2022 06:20  AM     12/05/2011 11:35 AM    MPV 9.5 10/13/2022 06:20 AM     Lab Results   Component Value Date/Time    TSH 2.80 06/04/2016 11:04 PM     Lab Results   Component Value Date/Time    CHOL 165 12/29/2021 09:40 AM    HDL 40 12/29/2021 09:40 AM    LABA1C 10.7 01/10/2023 11:07 AM       Assessment/Plan:      Diagnosis Orders   1. Bronchitis, acute, with bronchospasm        2. Acute mucoid otitis media of right ear  amoxicillin-clavulanate (AUGMENTIN) 875-125 MG per tablet        Practice meticulous handwashing and cover cough to prevent spread of infection  Encouraged to increase fluids and rest  Tylenol/Ibuprofen OTC PRN for pain, discomfort or fever as directed on package  Warm salt water gargles for sore throat  Cool mist humidifier  Hot tea with honey and lemon for cough and sore throat PRN  Augmentin BID for 10 days. Use Albuterol every 4 hours if needed for wheezing and shortness of breath. May use Tessalon as previously prescribed. Recommend plain Mucinex OTC as directed on package. 1.  Luis Rossi received counseling on the following healthy behaviors: nutrition, exercise, and medication adherence  2. Patient given educational materials - see patient instructions  3. Was a self-tracking handout given in paper form or via Meiy[employer]? No  If yes, see orders or list here. 4.  Discussed use, benefit, and side effects of prescribed medications. Barriers to medication compliance addressed. All patient questions answered. Pt voiced understanding. 5.  Reviewed prior labs and health maintenance  6. Continue current medications, diet and exercise. Completed Refills   Requested Prescriptions     Signed Prescriptions Disp Refills    amoxicillin-clavulanate (AUGMENTIN) 875-125 MG per tablet 20 tablet 0     Sig: Take 1 tablet by mouth 2 times daily for 10 days         No follow-ups on file. regular rate and rhythm/no rub/no murmur

## 2024-04-13 DIAGNOSIS — M62.838 MUSCLE SPASMS OF BOTH LOWER EXTREMITIES: ICD-10-CM

## 2024-04-13 DIAGNOSIS — E08.42 DIABETIC POLYNEUROPATHY ASSOCIATED WITH DIABETES MELLITUS DUE TO UNDERLYING CONDITION (HCC): ICD-10-CM

## 2024-04-15 NOTE — TELEPHONE ENCOUNTER
Pharmacy sent a request for a 90 day Tizanidine Rx.  Previous 3/12/24 Rx given for just 30 tablets.      Medication active on med list: yes      Date of last fill: 3/12/24 for #30 NR  verified on 4/15/2024    verified by Alicia PARTIDA LPN      Date of last appointment: 3/12/2024    Next Visit Date: 6m f/u - schedule currently unavailable

## 2024-04-16 RX ORDER — TIZANIDINE 2 MG/1
TABLET ORAL
Qty: 90 TABLET | Refills: 0 | Status: SHIPPED | OUTPATIENT
Start: 2024-04-16

## 2024-04-22 ENCOUNTER — OFFICE VISIT (OUTPATIENT)
Dept: ONCOLOGY | Age: 59
End: 2024-04-22
Payer: COMMERCIAL

## 2024-04-22 VITALS
HEART RATE: 98 BPM | WEIGHT: 221 LBS | HEIGHT: 63 IN | RESPIRATION RATE: 18 BRPM | SYSTOLIC BLOOD PRESSURE: 134 MMHG | TEMPERATURE: 96.8 F | DIASTOLIC BLOOD PRESSURE: 73 MMHG | BODY MASS INDEX: 39.16 KG/M2

## 2024-04-22 DIAGNOSIS — D50.0 IRON DEFICIENCY ANEMIA DUE TO CHRONIC BLOOD LOSS: ICD-10-CM

## 2024-04-22 DIAGNOSIS — C50.111 MALIGNANT NEOPLASM OF CENTRAL PORTION OF RIGHT BREAST IN FEMALE, ESTROGEN RECEPTOR POSITIVE (HCC): Primary | ICD-10-CM

## 2024-04-22 DIAGNOSIS — R79.0 ABNORMAL IRON SATURATION: ICD-10-CM

## 2024-04-22 DIAGNOSIS — M79.621 PAIN IN RIGHT AXILLA: ICD-10-CM

## 2024-04-22 DIAGNOSIS — Z17.0 MALIGNANT NEOPLASM OF CENTRAL PORTION OF RIGHT BREAST IN FEMALE, ESTROGEN RECEPTOR POSITIVE (HCC): Primary | ICD-10-CM

## 2024-04-22 PROCEDURE — 3017F COLORECTAL CA SCREEN DOC REV: CPT | Performed by: INTERNAL MEDICINE

## 2024-04-22 PROCEDURE — 3075F SYST BP GE 130 - 139MM HG: CPT | Performed by: INTERNAL MEDICINE

## 2024-04-22 PROCEDURE — 1036F TOBACCO NON-USER: CPT | Performed by: INTERNAL MEDICINE

## 2024-04-22 PROCEDURE — 3078F DIAST BP <80 MM HG: CPT | Performed by: INTERNAL MEDICINE

## 2024-04-22 PROCEDURE — 99214 OFFICE O/P EST MOD 30 MIN: CPT | Performed by: INTERNAL MEDICINE

## 2024-04-22 PROCEDURE — G8427 DOCREV CUR MEDS BY ELIG CLIN: HCPCS | Performed by: INTERNAL MEDICINE

## 2024-04-22 PROCEDURE — G8417 CALC BMI ABV UP PARAM F/U: HCPCS | Performed by: INTERNAL MEDICINE

## 2024-04-22 NOTE — PROGRESS NOTES
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 with no sentinel lymph node ,initially she had biopsy of the right axillary lymphadenopathy, case discussed at the tumor board and the plan was to proceed with sentinel lymph node biopsy which was negative   Oncotype DX of 5 and no benefit from chemotherapy and status post adjuvant radiation treatment and on endocrine treatment  in the form of anastrozole which will be given for 5 to 10 years  Side effects of hormonal treatment which include but not limited to osteoporosis has been reviewed with the patient and she would continue calcium vitamin D and Fosamax as Prolia was denied>  Most recent diagnostic mammogram done on March 2024 and no evidence of recurrence> mammogram on March 2025  Lymphedema clinic  Continue Percocet for pain  Hemoglobin in normal limit iron saturation is low> monitor H&H and iron panel May need referral to GI if still have persistent iron deficiency anemia  Rtc in 3 months with labs                                               Cheng Tellez MD                          [facility]                            This note is created with the assistance of a speech recognition program.  While intending to generate a document that actually reflects the content of the visit, the document can still have some errors including those of syntax and sound a like substitutions which may escape proof reading.  It such instances, actual meaning can be extrapolated by contextual diversion.

## 2024-04-29 DIAGNOSIS — N64.4 BREAST PAIN: ICD-10-CM

## 2024-04-29 DIAGNOSIS — D05.11 DUCTAL CARCINOMA IN SITU (DCIS) OF RIGHT BREAST: ICD-10-CM

## 2024-04-29 NOTE — TELEPHONE ENCOUNTER
Patient calls in and states she needs a refill on her oxycodone. Sent over to [facility] in [place].

## 2024-04-30 RX ORDER — OXYCODONE HYDROCHLORIDE AND ACETAMINOPHEN 5; 325 MG/1; MG/1
1 TABLET ORAL EVERY 6 HOURS PRN
Qty: 120 TABLET | Refills: 0 | Status: SHIPPED | OUTPATIENT
Start: 2024-04-30 | End: 2024-05-30

## 2024-05-10 ENCOUNTER — APPOINTMENT (OUTPATIENT)
Dept: GENERAL RADIOLOGY | Age: 59
End: 2024-05-10
Payer: COMMERCIAL

## 2024-05-10 ENCOUNTER — HOSPITAL ENCOUNTER (EMERGENCY)
Age: 59
Discharge: HOME | End: 2024-05-10
Payer: COMMERCIAL

## 2024-05-10 VITALS
WEIGHT: 214 LBS | TEMPERATURE: 98.5 F | OXYGEN SATURATION: 93 % | BODY MASS INDEX: 37.92 KG/M2 | HEIGHT: 63 IN | SYSTOLIC BLOOD PRESSURE: 165 MMHG | HEART RATE: 87 BPM | RESPIRATION RATE: 16 BRPM | DIASTOLIC BLOOD PRESSURE: 83 MMHG

## 2024-05-10 DIAGNOSIS — S90.212A CONTUSION OF LEFT GREAT TOE WITH DAMAGE TO NAIL, INITIAL ENCOUNTER: ICD-10-CM

## 2024-05-10 DIAGNOSIS — S90.412A ABRASION, LEFT GREAT TOE, INITIAL ENCOUNTER: ICD-10-CM

## 2024-05-10 DIAGNOSIS — S91.209A NAIL AVULSION, TOE, INITIAL ENCOUNTER: Primary | ICD-10-CM

## 2024-05-10 PROCEDURE — 2500000003 HC RX 250 WO HCPCS: Performed by: NURSE PRACTITIONER

## 2024-05-10 PROCEDURE — 6370000000 HC RX 637 (ALT 250 FOR IP): Performed by: NURSE PRACTITIONER

## 2024-05-10 PROCEDURE — 73660 X-RAY EXAM OF TOE(S): CPT

## 2024-05-10 PROCEDURE — 99283 EMERGENCY DEPT VISIT LOW MDM: CPT

## 2024-05-10 PROCEDURE — 11730 AVULSION NAIL PLATE SIMPLE 1: CPT

## 2024-05-10 RX ORDER — CLINDAMYCIN HYDROCHLORIDE 150 MG/1
300 CAPSULE ORAL ONCE
Status: COMPLETED | OUTPATIENT
Start: 2024-05-10 | End: 2024-05-10

## 2024-05-10 RX ORDER — BACITRACIN ZINC 500 [USP'U]/G
OINTMENT TOPICAL ONCE
Status: COMPLETED | OUTPATIENT
Start: 2024-05-10 | End: 2024-05-10

## 2024-05-10 RX ORDER — LIDOCAINE HYDROCHLORIDE 10 MG/ML
5 INJECTION, SOLUTION EPIDURAL; INFILTRATION; INTRACAUDAL; PERINEURAL ONCE
Status: COMPLETED | OUTPATIENT
Start: 2024-05-10 | End: 2024-05-10

## 2024-05-10 RX ORDER — CLINDAMYCIN HYDROCHLORIDE 300 MG/1
300 CAPSULE ORAL 3 TIMES DAILY
Qty: 30 CAPSULE | Refills: 0 | Status: SHIPPED | OUTPATIENT
Start: 2024-05-10 | End: 2024-05-20

## 2024-05-10 RX ADMIN — CLINDAMYCIN HYDROCHLORIDE 300 MG: 150 CAPSULE ORAL at 21:12

## 2024-05-10 RX ADMIN — BACITRACIN ZINC: 500 OINTMENT TOPICAL at 21:13

## 2024-05-10 RX ADMIN — LIDOCAINE HYDROCHLORIDE 5 ML: 10 INJECTION, SOLUTION EPIDURAL; INFILTRATION; INTRACAUDAL; PERINEURAL at 21:12

## 2024-05-10 ASSESSMENT — PAIN - FUNCTIONAL ASSESSMENT: PAIN_FUNCTIONAL_ASSESSMENT: 0-10

## 2024-05-10 ASSESSMENT — PAIN DESCRIPTION - ONSET: ONSET: SUDDEN

## 2024-05-10 ASSESSMENT — PAIN DESCRIPTION - FREQUENCY: FREQUENCY: CONTINUOUS

## 2024-05-10 ASSESSMENT — PAIN DESCRIPTION - ORIENTATION: ORIENTATION: LEFT

## 2024-05-10 ASSESSMENT — PAIN DESCRIPTION - DESCRIPTORS: DESCRIPTORS: ACHING

## 2024-05-10 ASSESSMENT — PAIN DESCRIPTION - LOCATION: LOCATION: TOE (COMMENT WHICH ONE);FOOT

## 2024-05-10 ASSESSMENT — PAIN SCALES - GENERAL: PAINLEVEL_OUTOF10: 3

## 2024-05-10 ASSESSMENT — PAIN DESCRIPTION - PAIN TYPE: TYPE: ACUTE PAIN

## 2024-05-11 NOTE — DISCHARGE INSTRUCTIONS
Wash left great toe twice daily with soap and water.  Dry well. Apply mupirocin ointment and dressing. Continue until healed  Clindamycin 300 mg 1 by mouth 3 times daily x 10 days or as directed per Dr Grider.   Weight bearing as tolerated.  Post op shoe x 10 days or until wound is healed.

## 2024-05-11 NOTE — ED PROVIDER NOTES
EVERY DAY, Disp-30 tablet, R-5Normal      spironolactone (ALDACTONE) 25 MG tablet Take 0.5 tablets by mouth daily, Disp-30 tablet, R-3Normal      metoprolol tartrate (LOPRESSOR) 25 MG tablet Take 1 tablet by mouth 2 times daily, Disp-60 tablet, R-3Normal      atorvastatin (LIPITOR) 80 MG tablet TAKE 1 TABLET BY MOUTH EVERY DAY, Disp-90 tablet, R-3Normal      empagliflozin (JARDIANCE) 10 MG tablet Take 1 tablet by mouth daily, Disp-90 tablet, R-1Normal      omeprazole (PRILOSEC) 20 MG delayed release capsule Take 1 capsule by mouth every morning (before breakfast), Disp-90 capsule, R-1Normal      albuterol (PROVENTIL) (2.5 MG/3ML) 0.083% nebulizer solution Take 3 mLs by nebulization every 6 hours as needed for Wheezing, Disp-25 each, R-3Normal      Alirocumab (PRALUENT) 150 MG/ML SOAJ Inject once per month as directed., Disp-1 Adjustable Dose Pre-filled Pen Syringe, R-11Normal      ezetimibe (ZETIA) 10 MG tablet Take 1 tablet by mouth nightly, Disp-90 tablet, R-3Normal      HUMULIN R U-500 KWIKPEN 500 UNIT/ML SOPN concentrated injection pen Inject 60 Units into the skin 2 times daily Patient states she takes 60 units in the morning and 60 units at night, DAWHistorical Med      prasugrel (EFFIENT) 10 MG TABS Take 1 tablet by mouth daily, Disp-90 tablet, R-3Normal      albuterol sulfate HFA (VENTOLIN HFA) 108 (90 Base) MCG/ACT inhaler INHALE 2 PUFFS EVERY 6 HOURS AS NEEDED FOR WHEEZING, Disp-18 g, R-5Normal      nitroGLYCERIN (NITROSTAT) 0.4 MG SL tablet Place 1 tablet under the tongue every 5 minutes as needed for Chest pain, Disp-25 tablet, R-1Normal      TRULICITY 3 MG/0.5ML SOPN Inject 3 mg into the skin once a week Sundays, DAWHistorical Med      metFORMIN (GLUCOPHAGE-XR) 500 MG extended release tablet Take 2 tablets by mouth daily (with breakfast), Disp-180 tablet, R-3Normal      acetaminophen (TYLENOL) 500 MG tablet Take 1 tablet by mouth 4 times daily as needed for Pain, Disp-40 tablet, R-0Normal      aspirin  days  For wound re-check    [facility] ED  [address] [facility] [address]  [phone]    As needed, If symptoms worsen      DISCHARGE MEDICATIONS:  Discharge Medication List as of 5/10/2024  9:37 PM        START taking these medications    Details   clindamycin (CLEOCIN) 300 MG capsule Take 1 capsule by mouth 3 times daily for 10 days, Disp-30 capsule, R-0Normal      mupirocin (BACTROBAN) 2 % ointment Apply twice daily to left great toe abrasions and nail bed until healed., Disp-22 g, R-0, Normal           Controlled Substances Monitoring:     RX Monitoring Periodic Controlled Substance Monitoring   1/5/2023  11:13 AM No signs of potential drug abuse or diversion identified.;Assessed functional status.       (Please note that portions of this note were completed with a voice recognition program.  Efforts were made to edit the dictations but occasionally words are mis-transcribed.)    CHELSEA Zepeda CNP (electronically signed)  Attending Emergency Physician           JOHN Olmos APRN - CNP  05/11/24 1217

## 2024-05-22 RX ORDER — CLOPIDOGREL BISULFATE 75 MG/1
75 TABLET ORAL DAILY
COMMUNITY
Start: 2024-03-31

## 2024-05-22 RX ORDER — OMEPRAZOLE 20 MG/1
20 CAPSULE, DELAYED RELEASE ORAL
Qty: 90 CAPSULE | Refills: 1 | Status: SHIPPED | OUTPATIENT
Start: 2024-05-22

## 2024-05-22 NOTE — TELEPHONE ENCOUNTER
Health Maintenance   Topic Date Due    A1C test (Diabetic or Prediabetic)  04/10/2024    Diabetic foot exam  07/17/2024 (Originally 12/12/2020)    Hepatitis B vaccine (1 of 3 - 3-dose series) 07/17/2024 (Originally 1965)    Shingles vaccine (1 of 2) 07/17/2024 (Originally 2/13/2015)    Pneumococcal 0-64 years Vaccine (2 of 2 - PCV) 07/17/2024 (Originally 7/4/2015)    COVID-19 Vaccine (3 - 2023-24 season) 01/17/2025 (Originally 9/1/2023)    Diabetic retinal exam  01/08/2025    Diabetic Alb to Cr ratio (uACR) test  01/10/2025    Lipids  01/10/2025    Depression Monitoring  01/17/2025    GFR test (Diabetes, CKD 3-4, OR last GFR 15-59)  03/25/2025    Breast cancer screen  03/25/2025    Colorectal Cancer Screen  01/27/2026    DTaP/Tdap/Td vaccine (2 - Td or Tdap) 07/02/2031    Flu vaccine  Completed    Hepatitis C screen  Completed    HIV screen  Completed    Hepatitis A vaccine  Aged Out    Hib vaccine  Aged Out    Polio vaccine  Aged Out    Meningococcal (ACWY) vaccine  Aged Out    Low dose CT lung screening &/or counseling  Discontinued    Cervical cancer screen  Discontinued             (applicable per patient's age: Cancer Screenings, Depression Screening, Fall Risk Screening, Immunizations)    Hemoglobin A1C (%)   Date Value   01/10/2024 10.0 (H)   10/03/2023 10.5 (H)   07/17/2023 11.0     AST (U/L)   Date Value   03/25/2024 12     ALT (U/L)   Date Value   03/25/2024 11     BUN (mg/dL)   Date Value   03/25/2024 18      (goal A1C is < 7)   (goal LDL is <100) need 30-50% reduction from baseline     BP Readings from Last 3 Encounters:   05/10/24 (!) 165/83   04/22/24 134/73   03/12/24 122/82    (goal /80)      All Future Testing planned in [facility]:  Lab Frequency Next Occurrence   Basic Metabolic Panel Once 07/17/2024   Hemoglobin A1C Once 07/15/2024   Iron and TIBC Once 04/22/2024   Vitamin B12 & Folate Once 04/22/2024   Ferritin Once 04/22/2024   CBC with Auto Differential Once 04/29/2024   Comprehensive

## 2024-06-03 DIAGNOSIS — N64.4 BREAST PAIN: ICD-10-CM

## 2024-06-03 DIAGNOSIS — D05.11 DUCTAL CARCINOMA IN SITU (DCIS) OF RIGHT BREAST: ICD-10-CM

## 2024-06-04 ENCOUNTER — OFFICE VISIT (OUTPATIENT)
Dept: CARDIOLOGY | Age: 59
End: 2024-06-04
Payer: COMMERCIAL

## 2024-06-04 ENCOUNTER — HOSPITAL ENCOUNTER (OUTPATIENT)
Age: 59
Discharge: HOME | End: 2024-06-04
Payer: COMMERCIAL

## 2024-06-04 VITALS
WEIGHT: 224 LBS | HEIGHT: 63 IN | BODY MASS INDEX: 39.69 KG/M2 | OXYGEN SATURATION: 97 % | HEART RATE: 85 BPM | RESPIRATION RATE: 18 BRPM | SYSTOLIC BLOOD PRESSURE: 111 MMHG | DIASTOLIC BLOOD PRESSURE: 73 MMHG

## 2024-06-04 DIAGNOSIS — Z01.818 PREOPERATIVE CLEARANCE: ICD-10-CM

## 2024-06-04 DIAGNOSIS — I95.1 DYSAUTONOMIA ORTHOSTATIC HYPOTENSION SYNDROME: ICD-10-CM

## 2024-06-04 DIAGNOSIS — Z95.820 S/P ANGIOPLASTY WITH STENT: ICD-10-CM

## 2024-06-04 DIAGNOSIS — I10 ESSENTIAL HYPERTENSION: ICD-10-CM

## 2024-06-04 DIAGNOSIS — I25.10 ASHD (ARTERIOSCLEROTIC HEART DISEASE): ICD-10-CM

## 2024-06-04 DIAGNOSIS — R00.2 PALPITATIONS: ICD-10-CM

## 2024-06-04 DIAGNOSIS — Z95.1 S/P CABG (CORONARY ARTERY BYPASS GRAFT): ICD-10-CM

## 2024-06-04 DIAGNOSIS — I50.22 CHRONIC SYSTOLIC (CONGESTIVE) HEART FAILURE (HCC): ICD-10-CM

## 2024-06-04 DIAGNOSIS — E78.2 MIXED HYPERLIPIDEMIA: ICD-10-CM

## 2024-06-04 DIAGNOSIS — Z01.818 PREOPERATIVE CLEARANCE: Primary | ICD-10-CM

## 2024-06-04 LAB
CHOLEST SERPL-MCNC: 143 MG/DL (ref 0–199)
CHOLESTEROL/HDL RATIO: 3
HDLC SERPL-MCNC: 45 MG/DL
LDLC SERPL CALC-MCNC: 76 MG/DL (ref 0–100)
TRIGL SERPL-MCNC: 112 MG/DL
VLDLC SERPL CALC-MCNC: 22 MG/DL

## 2024-06-04 PROCEDURE — G8417 CALC BMI ABV UP PARAM F/U: HCPCS | Performed by: PHYSICIAN ASSISTANT

## 2024-06-04 PROCEDURE — G8427 DOCREV CUR MEDS BY ELIG CLIN: HCPCS | Performed by: PHYSICIAN ASSISTANT

## 2024-06-04 PROCEDURE — 93000 ELECTROCARDIOGRAM COMPLETE: CPT | Performed by: INTERNAL MEDICINE

## 2024-06-04 PROCEDURE — 3017F COLORECTAL CA SCREEN DOC REV: CPT | Performed by: PHYSICIAN ASSISTANT

## 2024-06-04 PROCEDURE — 3078F DIAST BP <80 MM HG: CPT | Performed by: PHYSICIAN ASSISTANT

## 2024-06-04 PROCEDURE — 1036F TOBACCO NON-USER: CPT | Performed by: PHYSICIAN ASSISTANT

## 2024-06-04 PROCEDURE — 36415 COLL VENOUS BLD VENIPUNCTURE: CPT

## 2024-06-04 PROCEDURE — 80061 LIPID PANEL: CPT

## 2024-06-04 PROCEDURE — 3074F SYST BP LT 130 MM HG: CPT | Performed by: PHYSICIAN ASSISTANT

## 2024-06-04 PROCEDURE — 99214 OFFICE O/P EST MOD 30 MIN: CPT | Performed by: PHYSICIAN ASSISTANT

## 2024-06-04 NOTE — PROGRESS NOTES
I, Darlin Plascencia am scribing for and in the presence of Gloria West PA-C.    Patient: Christie Belcher  : 1965  Date of Visit: 2024    REASON FOR VISIT / CONSULTATION: Cardiac Clearance (HX:CHF, ASHD. Pt is here for cardiac clearance for a VITRECTOMY PARS PLANA 25G, AIR FLUID EXCHANGE on 24 out of [place] with Dr. Garcia. Pt is doing okay. She has occasional palps. Denies CP, light headed/dizziness, SOB. )    Dear Luis Angel, Bulmaro GATES, APRN - CNP,    I had the pleasure of seeing your patient Christie Belcher in consultation today. As you know, Ms. Belcher is a 59 y.o. female who presents with a history of intermittent episodes of back and chest discomfort that started developing since her recent CABG involving a LIMA-LAD 8/15/16.  She also has a history of  dysautonomia on a tilt table test, and was started on Florinef as well as Lexapro. Recent heart cath done 3/7/2019 shows severe single vessel disease involving LAD Normal LVEDP. She had a Holter monitor done on 10/23/2019 that did show PAC's and PVC's but was largely unremarkable. She did come to the ER on 2022 due to abdominal pain and shortness of breath. She was told her EKG was abnormal and also she had an elevated troponin which was only 15 ng/L and only had minor EKG changes. She was sent to [facility] for this and she was not very happy about this. Cardiovascular stress test done on 2022 showed Overall, these results are most consistent with an intermediate/high risk for significant coronary artery disease. Echocardiogram on 2022 showed EF:>55% with mild diastolic dysfunction and LV wall thickness mildly increased. ER on 2023 due to acute coronary syndrome -Transferred to [place] - Successful PCI of left main and circumflex in-stent restenosis with 3.5 x 26 mm Medtronic Bosler drug-eluting stent which was post dilated to 4.0 mm in diameter. She was in the ER on 10/2/2023 and transferred to [facility] on 10/3/2023 and diagnoised

## 2024-06-05 ENCOUNTER — TELEPHONE (OUTPATIENT)
Dept: CARDIOLOGY | Age: 59
End: 2024-06-05

## 2024-06-05 RX ORDER — ALIROCUMAB 75 MG/ML
75 INJECTION, SOLUTION SUBCUTANEOUS
Qty: 2.24 ML | Refills: 6 | Status: SHIPPED | OUTPATIENT
Start: 2024-06-05 | End: 2025-01-02

## 2024-06-05 NOTE — RESULT ENCOUNTER NOTE
Please notify patient that their lab results show LDL is 76, goal is 70. I would consider starting an injectable cholesterol medication if she is on board as discussed in the office. Encourage healthy diet and exercise. Her LDL has improved since last check.    Please continue current treatment and follow up.

## 2024-06-05 NOTE — TELEPHONE ENCOUNTER
----- Message from Gloria West PA-C sent at 6/5/2024  8:05 AM EDT -----  Please notify patient that their lab results show LDL is 76, goal is 70. I would consider starting an injectable cholesterol medication if she is on board as discussed in the office. Encourage healthy diet and exercise. Her LDL has improved since last check.    Please continue current treatment and follow up.

## 2024-06-10 RX ORDER — OXYCODONE HYDROCHLORIDE AND ACETAMINOPHEN 5; 325 MG/1; MG/1
1 TABLET ORAL EVERY 6 HOURS PRN
Qty: 120 TABLET | Refills: 0 | Status: SHIPPED | OUTPATIENT
Start: 2024-06-10 | End: 2024-07-10

## 2024-06-26 ENCOUNTER — HOSPITAL ENCOUNTER (OUTPATIENT)
Age: 59
Discharge: HOME | End: 2024-06-26
Payer: COMMERCIAL

## 2024-06-26 DIAGNOSIS — Z17.0 MALIGNANT NEOPLASM OF CENTRAL PORTION OF RIGHT BREAST IN FEMALE, ESTROGEN RECEPTOR POSITIVE (HCC): ICD-10-CM

## 2024-06-26 DIAGNOSIS — D50.0 IRON DEFICIENCY ANEMIA DUE TO CHRONIC BLOOD LOSS: ICD-10-CM

## 2024-06-26 DIAGNOSIS — C50.111 MALIGNANT NEOPLASM OF CENTRAL PORTION OF RIGHT BREAST IN FEMALE, ESTROGEN RECEPTOR POSITIVE (HCC): ICD-10-CM

## 2024-06-26 LAB
ALBUMIN SERPL-MCNC: 3.9 G/DL (ref 3.5–5.2)
ALBUMIN/GLOB SERPL: 1.1 {RATIO} (ref 1–2.5)
ALP SERPL-CCNC: 98 U/L (ref 35–104)
ALT SERPL-CCNC: 8 U/L (ref 5–33)
ANION GAP SERPL CALCULATED.3IONS-SCNC: 13 MMOL/L (ref 9–17)
AST SERPL-CCNC: 10 U/L
BASOPHILS # BLD: 0.07 K/UL (ref 0–0.2)
BASOPHILS NFR BLD: 1 % (ref 0–2)
BILIRUB SERPL-MCNC: 0.5 MG/DL (ref 0.3–1.2)
BUN SERPL-MCNC: 13 MG/DL (ref 6–20)
BUN/CREAT SERPL: 19 (ref 9–20)
CALCIUM SERPL-MCNC: 9.3 MG/DL (ref 8.6–10.4)
CHLORIDE SERPL-SCNC: 99 MMOL/L (ref 98–107)
CO2 SERPL-SCNC: 25 MMOL/L (ref 20–31)
CREAT SERPL-MCNC: 0.7 MG/DL (ref 0.5–0.9)
EOSINOPHIL # BLD: 0.11 K/UL (ref 0–0.44)
EOSINOPHILS RELATIVE PERCENT: 1 % (ref 1–4)
ERYTHROCYTE [DISTWIDTH] IN BLOOD BY AUTOMATED COUNT: 15.9 % (ref 11.8–14.4)
GFR, ESTIMATED: >90 ML/MIN/1.73M2
GLUCOSE SERPL-MCNC: 312 MG/DL (ref 70–99)
HCT VFR BLD AUTO: 37.1 % (ref 36.3–47.1)
HGB BLD-MCNC: 11.8 G/DL (ref 11.9–15.1)
IMM GRANULOCYTES # BLD AUTO: 0.07 K/UL (ref 0–0.3)
IMM GRANULOCYTES NFR BLD: 1 %
LYMPHOCYTES NFR BLD: 1.91 K/UL (ref 1.1–3.7)
LYMPHOCYTES RELATIVE PERCENT: 22 % (ref 24–43)
MCH RBC QN AUTO: 24.4 PG (ref 25.2–33.5)
MCHC RBC AUTO-ENTMCNC: 31.8 G/DL (ref 28.4–34.8)
MCV RBC AUTO: 76.7 FL (ref 82.6–102.9)
MONOCYTES NFR BLD: 0.33 K/UL (ref 0.1–1.2)
MONOCYTES NFR BLD: 4 % (ref 3–12)
NEUTROPHILS NFR BLD: 71 % (ref 36–65)
NEUTS SEG NFR BLD: 6.14 K/UL (ref 1.5–8.1)
NRBC BLD-RTO: 0 PER 100 WBC
PLATELET # BLD AUTO: 313 K/UL (ref 138–453)
PMV BLD AUTO: 9.4 FL (ref 8.1–13.5)
POTASSIUM SERPL-SCNC: 4.7 MMOL/L (ref 3.7–5.3)
PROT SERPL-MCNC: 7.6 G/DL (ref 6.4–8.3)
RBC # BLD AUTO: 4.84 M/UL (ref 3.95–5.11)
SODIUM SERPL-SCNC: 137 MMOL/L (ref 135–144)
WBC OTHER # BLD: 8.6 K/UL (ref 3.5–11.3)

## 2024-06-26 PROCEDURE — 80053 COMPREHEN METABOLIC PANEL: CPT

## 2024-06-26 PROCEDURE — 83550 IRON BINDING TEST: CPT

## 2024-06-26 PROCEDURE — 36415 COLL VENOUS BLD VENIPUNCTURE: CPT

## 2024-06-26 PROCEDURE — 82728 ASSAY OF FERRITIN: CPT

## 2024-06-26 PROCEDURE — 83540 ASSAY OF IRON: CPT

## 2024-06-26 PROCEDURE — 82607 VITAMIN B-12: CPT

## 2024-06-26 PROCEDURE — 85025 COMPLETE CBC W/AUTO DIFF WBC: CPT

## 2024-06-26 PROCEDURE — 82746 ASSAY OF FOLIC ACID SERUM: CPT

## 2024-06-27 LAB
FERRITIN SERPL-MCNC: 133 NG/ML (ref 13–150)
FOLATE SERPL-MCNC: 9.2 NG/ML (ref 4.8–24.2)
IRON SATN MFR SERPL: 15 % (ref 20–55)
IRON SERPL-MCNC: 46 UG/DL (ref 37–145)
TIBC SERPL-MCNC: 304 UG/DL (ref 250–450)
UNSATURATED IRON BINDING CAPACITY: 258 UG/DL (ref 112–347)
VIT B12 SERPL-MCNC: 436 PG/ML (ref 232–1245)

## 2024-06-27 RX ORDER — CLOPIDOGREL BISULFATE 75 MG/1
75 TABLET ORAL DAILY
Qty: 90 TABLET | Refills: 5 | OUTPATIENT
Start: 2024-06-27

## 2024-07-01 ENCOUNTER — OFFICE VISIT (OUTPATIENT)
Dept: ONCOLOGY | Age: 59
End: 2024-07-01
Payer: COMMERCIAL

## 2024-07-01 VITALS
TEMPERATURE: 96.9 F | RESPIRATION RATE: 18 BRPM | DIASTOLIC BLOOD PRESSURE: 86 MMHG | HEIGHT: 63 IN | HEART RATE: 102 BPM | BODY MASS INDEX: 40.22 KG/M2 | SYSTOLIC BLOOD PRESSURE: 130 MMHG | WEIGHT: 227 LBS

## 2024-07-01 DIAGNOSIS — M85.89 OSTEOPENIA OF MULTIPLE SITES: ICD-10-CM

## 2024-07-01 DIAGNOSIS — C50.111 MALIGNANT NEOPLASM OF CENTRAL PORTION OF RIGHT BREAST IN FEMALE, ESTROGEN RECEPTOR POSITIVE (HCC): ICD-10-CM

## 2024-07-01 DIAGNOSIS — Z17.0 MALIGNANT NEOPLASM OF CENTRAL PORTION OF RIGHT BREAST IN FEMALE, ESTROGEN RECEPTOR POSITIVE (HCC): ICD-10-CM

## 2024-07-01 DIAGNOSIS — D50.0 IRON DEFICIENCY ANEMIA DUE TO CHRONIC BLOOD LOSS: Primary | ICD-10-CM

## 2024-07-01 PROCEDURE — 99214 OFFICE O/P EST MOD 30 MIN: CPT | Performed by: INTERNAL MEDICINE

## 2024-07-01 PROCEDURE — 1036F TOBACCO NON-USER: CPT | Performed by: INTERNAL MEDICINE

## 2024-07-01 PROCEDURE — G8427 DOCREV CUR MEDS BY ELIG CLIN: HCPCS | Performed by: INTERNAL MEDICINE

## 2024-07-01 PROCEDURE — 3017F COLORECTAL CA SCREEN DOC REV: CPT | Performed by: INTERNAL MEDICINE

## 2024-07-01 PROCEDURE — G8417 CALC BMI ABV UP PARAM F/U: HCPCS | Performed by: INTERNAL MEDICINE

## 2024-07-01 PROCEDURE — 3075F SYST BP GE 130 - 139MM HG: CPT | Performed by: INTERNAL MEDICINE

## 2024-07-01 PROCEDURE — 3079F DIAST BP 80-89 MM HG: CPT | Performed by: INTERNAL MEDICINE

## 2024-07-01 NOTE — PROGRESS NOTES
NUCLEI):     Yes, over   95%       ---AVERAGE INTENSITY OF POSITIVE STAINING:               3+, strong   -LOW POSITIVE (PERCENT 1-10% OF POSITIVE TUMOR CELL NUCLEI):     No         ---AVERAGE INTENSITY OF POSITIVE STAINING:               N/A   -NEGATIVE (<1% OF POSITIVE TUMOR CELL NUCLEI):           No     ---AVERAGE INTENSITY OF ANY POSITIVE STAINING:           N/A   ---INTERNAL CONTROL PRESENT AND STAIN AS EXPECTED:           Yes     ---INTERNAL CONTROL CELLS ABSENT (COMMENT):                No     ---INTERNAL CONTROL CELLS PRESENT BUT DO NOT STAIN (COMMENT): No         PROGESTERONE RECEPTOR*--   -POSITIVE (=>1% OF TUMOR CELL NUCLEI POSITIVE):           Yes, over   95%   ---AVERAGE INTENSITY OF POSITIVE STAINING:                   3+,   strong   -NEGATIVE (<1% OF POSITIVE TUMOR CELL NUCLEI):              no   ---AVERAGE INTENSITY OF ANY POSITIVE STAINING:           N/A   ---INTERNAL CONTROL PRESENT AND STAIN AS EXPECTED:           Yes     ---INTERNAL CONTROL CELLS ABSENT (COMMENT):              no   ---INTERNAL CONTROL CELLS PRESENT BUT DO NOT STAIN (COMMENT): No             Component 7/12/22 0804   Surgical Pathology Report -- Diagnosis --     A.  RIGHT BREAST, NEEDLE LOCALIZED EXCISIONAL BIOPSY:   -INVASIVE DUCTAL CARCINOMA GRADE 2, 10 MM IN GREATEST EXTENT   -ESTROGEN RECEPTOR POSITIVE, PROGESTERONE RECEPTOR POSITIVE   -DUCTAL CARCINOMA IN SITU, INTERMEDIATE AND HIGH-GRADE, SOLID AND   COMEDO TYPE WITH MICROCALCIFICATIONS   -INVASIVE CARCINOMA IDENTIFIED 0.5 MM FROM THE LATERAL INKED MARGIN   -DUCTAL CARCINOMA IN SITU IDENTIFIED 0.5 MM FROM THE LATERAL INKED   MARGIN     B.  RIGHT BREAST, ADDITIONAL INFERIOR MARGIN:   -BENIGN BREAST TISSUE     C.  RIGHT BREAST, ADDITIONAL LATERAL MARGIN:   -BENIGN BREAST TISSUE     D.  RIGHT BREAST, ADDITIONAL MEDIAL MARGIN:   -BENIGN BREAST TISSUE     E.  RIGHT BREAST, ADDITIONAL SUPERIOR MARGIN:   -BENIGN BREAST TISSUE     -- Diagnosis Comment --     The final inked margins

## 2024-07-08 DIAGNOSIS — N64.4 BREAST PAIN: ICD-10-CM

## 2024-07-08 DIAGNOSIS — D05.11 DUCTAL CARCINOMA IN SITU (DCIS) OF RIGHT BREAST: ICD-10-CM

## 2024-07-09 ENCOUNTER — TELEPHONE (OUTPATIENT)
Dept: PRIMARY CARE CLINIC | Age: 59
End: 2024-07-09

## 2024-07-11 ENCOUNTER — HOSPITAL ENCOUNTER (OUTPATIENT)
Age: 59
Discharge: HOME | End: 2024-07-11
Payer: COMMERCIAL

## 2024-07-11 DIAGNOSIS — E11.42 TYPE 2 DIABETES MELLITUS WITH DIABETIC POLYNEUROPATHY, WITHOUT LONG-TERM CURRENT USE OF INSULIN (HCC): ICD-10-CM

## 2024-07-11 LAB
ANION GAP SERPL CALCULATED.3IONS-SCNC: 12 MMOL/L (ref 9–17)
BUN SERPL-MCNC: 14 MG/DL (ref 6–20)
BUN/CREAT SERPL: 18 (ref 9–20)
CALCIUM SERPL-MCNC: 9 MG/DL (ref 8.6–10.4)
CHLORIDE SERPL-SCNC: 101 MMOL/L (ref 98–107)
CO2 SERPL-SCNC: 24 MMOL/L (ref 20–31)
CREAT SERPL-MCNC: 0.8 MG/DL (ref 0.5–0.9)
EST. AVERAGE GLUCOSE BLD GHB EST-MCNC: 214 MG/DL
GFR, ESTIMATED: 85 ML/MIN/1.73M2
GLUCOSE SERPL-MCNC: 204 MG/DL (ref 70–99)
HBA1C MFR BLD: 9.1 % (ref 4–6)
POTASSIUM SERPL-SCNC: 4.6 MMOL/L (ref 3.7–5.3)
SODIUM SERPL-SCNC: 137 MMOL/L (ref 135–144)

## 2024-07-11 PROCEDURE — 80048 BASIC METABOLIC PNL TOTAL CA: CPT

## 2024-07-11 PROCEDURE — 36415 COLL VENOUS BLD VENIPUNCTURE: CPT

## 2024-07-11 PROCEDURE — 83036 HEMOGLOBIN GLYCOSYLATED A1C: CPT

## 2024-07-11 RX ORDER — EZETIMIBE 10 MG/1
10 TABLET ORAL NIGHTLY
Qty: 90 TABLET | Refills: 3 | Status: SHIPPED | OUTPATIENT
Start: 2024-07-11

## 2024-07-11 RX ORDER — ANASTROZOLE 1 MG/1
1 TABLET ORAL DAILY
Qty: 30 TABLET | Refills: 5 | Status: SHIPPED | OUTPATIENT
Start: 2024-07-11

## 2024-07-11 RX ORDER — CLOPIDOGREL BISULFATE 75 MG/1
75 TABLET ORAL DAILY
Qty: 90 TABLET | Refills: 5 | Status: SHIPPED | OUTPATIENT
Start: 2024-07-11

## 2024-07-11 RX ORDER — OXYCODONE HYDROCHLORIDE AND ACETAMINOPHEN 5; 325 MG/1; MG/1
1 TABLET ORAL EVERY 6 HOURS PRN
Qty: 120 TABLET | Refills: 0 | Status: SHIPPED | OUTPATIENT
Start: 2024-07-11 | End: 2024-08-10

## 2024-07-17 ENCOUNTER — OFFICE VISIT (OUTPATIENT)
Dept: PRIMARY CARE CLINIC | Age: 59
End: 2024-07-17
Payer: COMMERCIAL

## 2024-07-17 ENCOUNTER — HOSPITAL ENCOUNTER (OUTPATIENT)
Age: 59
Setting detail: SPECIMEN
Discharge: HOME | End: 2024-07-17

## 2024-07-17 VITALS
SYSTOLIC BLOOD PRESSURE: 130 MMHG | DIASTOLIC BLOOD PRESSURE: 80 MMHG | TEMPERATURE: 98.5 F | OXYGEN SATURATION: 97 % | WEIGHT: 229.9 LBS | HEART RATE: 83 BPM | RESPIRATION RATE: 20 BRPM | BODY MASS INDEX: 40.72 KG/M2

## 2024-07-17 DIAGNOSIS — E11.42 TYPE 2 DIABETES MELLITUS WITH DIABETIC POLYNEUROPATHY, WITHOUT LONG-TERM CURRENT USE OF INSULIN (HCC): Primary | ICD-10-CM

## 2024-07-17 DIAGNOSIS — I10 ESSENTIAL HYPERTENSION: Chronic | ICD-10-CM

## 2024-07-17 PROCEDURE — 3046F HEMOGLOBIN A1C LEVEL >9.0%: CPT | Performed by: NURSE PRACTITIONER

## 2024-07-17 PROCEDURE — 3079F DIAST BP 80-89 MM HG: CPT | Performed by: NURSE PRACTITIONER

## 2024-07-17 PROCEDURE — 1036F TOBACCO NON-USER: CPT | Performed by: NURSE PRACTITIONER

## 2024-07-17 PROCEDURE — 99214 OFFICE O/P EST MOD 30 MIN: CPT | Performed by: NURSE PRACTITIONER

## 2024-07-17 PROCEDURE — 2022F DILAT RTA XM EVC RTNOPTHY: CPT | Performed by: NURSE PRACTITIONER

## 2024-07-17 PROCEDURE — 3075F SYST BP GE 130 - 139MM HG: CPT | Performed by: NURSE PRACTITIONER

## 2024-07-17 PROCEDURE — G8417 CALC BMI ABV UP PARAM F/U: HCPCS | Performed by: NURSE PRACTITIONER

## 2024-07-17 PROCEDURE — G8427 DOCREV CUR MEDS BY ELIG CLIN: HCPCS | Performed by: NURSE PRACTITIONER

## 2024-07-17 PROCEDURE — 3017F COLORECTAL CA SCREEN DOC REV: CPT | Performed by: NURSE PRACTITIONER

## 2024-07-17 RX ORDER — ZOSTER VACCINE RECOMBINANT, ADJUVANTED 50 MCG/0.5
0.5 KIT INTRAMUSCULAR SEE ADMIN INSTRUCTIONS
Qty: 0.5 ML | Refills: 0 | Status: SHIPPED | OUTPATIENT
Start: 2024-07-17 | End: 2025-01-13

## 2024-07-17 RX ORDER — METFORMIN HYDROCHLORIDE 500 MG/1
1000 TABLET, EXTENDED RELEASE ORAL
Qty: 180 TABLET | Refills: 3 | Status: SHIPPED | OUTPATIENT
Start: 2024-07-17

## 2024-07-17 RX ORDER — ASPIRIN 81 MG/1
81 TABLET ORAL DAILY
Qty: 90 TABLET | Refills: 3 | Status: SHIPPED | OUTPATIENT
Start: 2024-07-17

## 2024-07-17 ASSESSMENT — ENCOUNTER SYMPTOMS
WHEEZING: 0
DIARRHEA: 0
SHORTNESS OF BREATH: 0
VOMITING: 0
CONSTIPATION: 0
TROUBLE SWALLOWING: 0
ABDOMINAL PAIN: 0
SORE THROAT: 0
BLURRED VISION: 1
COUGH: 0
RHINORRHEA: 0
ORTHOPNEA: 0

## 2024-07-17 ASSESSMENT — PATIENT HEALTH QUESTIONNAIRE - PHQ9
4. FEELING TIRED OR HAVING LITTLE ENERGY: NOT AT ALL
SUM OF ALL RESPONSES TO PHQ QUESTIONS 1-9: 0
8. MOVING OR SPEAKING SO SLOWLY THAT OTHER PEOPLE COULD HAVE NOTICED. OR THE OPPOSITE, BEING SO FIGETY OR RESTLESS THAT YOU HAVE BEEN MOVING AROUND A LOT MORE THAN USUAL: NOT AT ALL
9. THOUGHTS THAT YOU WOULD BE BETTER OFF DEAD, OR OF HURTING YOURSELF: NOT AT ALL
SUM OF ALL RESPONSES TO PHQ QUESTIONS 1-9: 0
SUM OF ALL RESPONSES TO PHQ QUESTIONS 1-9: 0
5. POOR APPETITE OR OVEREATING: NOT AT ALL
3. TROUBLE FALLING OR STAYING ASLEEP: NOT AT ALL
2. FEELING DOWN, DEPRESSED OR HOPELESS: NOT AT ALL
1. LITTLE INTEREST OR PLEASURE IN DOING THINGS: NOT AT ALL
7. TROUBLE CONCENTRATING ON THINGS, SUCH AS READING THE NEWSPAPER OR WATCHING TELEVISION: NOT AT ALL
SUM OF ALL RESPONSES TO PHQ9 QUESTIONS 1 & 2: 0
6. FEELING BAD ABOUT YOURSELF - OR THAT YOU ARE A FAILURE OR HAVE LET YOURSELF OR YOUR FAMILY DOWN: NOT AT ALL
10. IF YOU CHECKED OFF ANY PROBLEMS, HOW DIFFICULT HAVE THESE PROBLEMS MADE IT FOR YOU TO DO YOUR WORK, TAKE CARE OF THINGS AT HOME, OR GET ALONG WITH OTHER PEOPLE: NOT DIFFICULT AT ALL
SUM OF ALL RESPONSES TO PHQ QUESTIONS 1-9: 0

## 2024-07-17 NOTE — PROGRESS NOTES
Exam:   Vitals:  /80   Pulse 83   Temp 98.5 °F (36.9 °C) (Temporal)   Resp 20   Wt 104.3 kg (229 lb 14.4 oz)   LMP 12/05/1996   SpO2 97%   BMI 40.72 kg/m²     Physical Exam  Vitals and nursing note reviewed.   Constitutional:       General: She is not in acute distress.     Appearance: Normal appearance. She is well-developed. She is obese. She is not ill-appearing.   HENT:      Mouth/Throat:      Mouth: Mucous membranes are moist.   Eyes:      General: No scleral icterus.     Conjunctiva/sclera: Conjunctivae normal.   Cardiovascular:      Rate and Rhythm: Normal rate and regular rhythm.      Heart sounds: No murmur heard.  Pulmonary:      Effort: Pulmonary effort is normal.      Breath sounds: Normal breath sounds. No wheezing or rales.   Abdominal:      General: Bowel sounds are normal. There is no distension.      Palpations: Abdomen is soft.      Tenderness: There is no abdominal tenderness.      Comments: Obese abdomen   Musculoskeletal:      Cervical back: Normal range of motion and neck supple.      Right lower leg: No edema.      Left lower leg: No edema.   Lymphadenopathy:      Cervical: No cervical adenopathy.   Skin:     General: Skin is warm and dry.   Neurological:      Mental Status: She is alert and oriented to person, place, and time.   Psychiatric:         Mood and Affect: Mood normal.         Behavior: Behavior normal.         Data:     Lab Results   Component Value Date/Time     07/11/2024 04:03 PM    K 4.6 07/11/2024 04:03 PM    K 4.5 10/03/2023 07:52 AM     07/11/2024 04:03 PM    CO2 24 07/11/2024 04:03 PM    BUN 14 07/11/2024 04:03 PM    CREATININE 0.8 07/11/2024 04:03 PM    GLUCOSE 204 07/11/2024 04:03 PM    GLUCOSE 181 02/16/2012 08:49 AM    BILITOT 0.5 06/26/2024 01:10 PM    ALKPHOS 98 06/26/2024 01:10 PM    AST 10 06/26/2024 01:10 PM    ALT 8 06/26/2024 01:10 PM     Lab Results   Component Value Date/Time    WBC 8.6 06/26/2024 01:10 PM    RBC 4.84 06/26/2024 01:10

## 2024-07-17 NOTE — PATIENT INSTRUCTIONS
SURVEY:     You may be receiving a survey from [facility] [employer] regarding your visit today.     Please complete the survey to enable us to provide the highest quality of care to you and your family.     If you cannot score us a very good on any question, please call the office to discuss how we could have made your experience a very good one.     Thank you,    Bulmaro Plasencia, APRN-CNP  Neema Alejandre, APRN-CNP  Earlene, LPN  Vianney, CMA  Oscar, CMA  Christina, CMA  Alice, PCA  Gladys, CMA  Hope, PM

## 2024-07-18 ENCOUNTER — TELEPHONE (OUTPATIENT)
Dept: PHARMACY | Facility: CLINIC | Age: 59
End: 2024-07-18

## 2024-07-19 RX ORDER — METOPROLOL SUCCINATE 50 MG/1
50 TABLET, EXTENDED RELEASE ORAL DAILY
Qty: 90 TABLET | Refills: 3 | Status: SHIPPED | OUTPATIENT
Start: 2024-07-19

## 2024-07-19 NOTE — TELEPHONE ENCOUNTER
Called and lvm for patient regarding medication change. Will attempt to call back before the end of the day today.   
Called and patient voiced understanding of medication change.   
Please let Ms. Belcher know that we have changed her metoprolol to a different formulation. She will now take Metoprolol XL 50 mg once daily. Thank you.  
daily   50 mg BID 50 mg daily 5 mg daily 80 mg daily 40 mg BID 80 mg daily 100 mg BID 5 mg  mg BID  100 mg daily 12.5 mg BID 40 mg daily 5 mg daily   100 mg  mg daily 10 mg daily 160 mg daily 80 mg  mg daily 200 mg BID 10-30 mg -400 mg BID  200 mg daily (Target dose) 25 mg BID (Target dose) 80 mg daily (Target dose) 10 mg daily (Target dose)   VERONICA: Intrinsic Sympathomimetic Activity  Sources: Joseph OATES, et al. SLOW-HF randomized controlled trial. Trials 19, 103 (2018). Retrieved from: https://doi.org/10.1186/v66407-577-8283-1.   Elvi Ponce. Beta Blocker Conversion Table. Hospital Handbook. Retrieved from: [email or web address].    ASSESSMENT (HFrEF GDMT):   - Four Pillars:   ARNI/ACEi/ARB:   Beta blocker:   Aldosterone antagonist:   SGLT2i:     - Other HF medications:  Diuretic:  Hydralazine/Nitrates (1a in symptomatic AA population, 2b if unable to tolerate ACE/ARB/ARNI):  If Channel inhibitor (2a indication if HR >70 on maximally tolerated beta blocker):  Cardiac glycoside (2b indication for symptomatic HFrEF):  Soluble Guanylate Cyclase (sGC) Stimulator (2b indication LVEF <45% with recent HFH, IV diuretics or elevated BNP):  Potassium binders (2b indication for hyperkalemia while taking RAASi):      PLAN  The following are interventions that have been identified:  HFrEF Beta-Blocker Care Gap, consider evidence-based beta-blocker    Future Appointments   Date Time Provider Department Center   9/3/2024  1:00 PM [facility]   9/10/2024  2:20 PM Gemini Montes MD Neuro Spec Neurology -   9/30/2024 12:15 PM Cheng Tellez MD tiff onc spe [facility]   12/10/2024  1:00 PM Jose Becker MD TIFF CARD [facility]   1/17/2025  1:00 PM Bulmaro Plasencia, APRN - CNP T[facility] Prim [facility]       Suellen Ly, PharmD, [facility]  [facility] Clinical Pharmacist  Department: [phone]    For Pharmacy

## 2024-07-24 LAB — SURGICAL PATHOLOGY REPORT: NORMAL

## 2024-08-08 DIAGNOSIS — N64.4 BREAST PAIN: ICD-10-CM

## 2024-08-08 DIAGNOSIS — D05.11 DUCTAL CARCINOMA IN SITU (DCIS) OF RIGHT BREAST: ICD-10-CM

## 2024-08-08 RX ORDER — OXYCODONE HYDROCHLORIDE AND ACETAMINOPHEN 5; 325 MG/1; MG/1
1 TABLET ORAL EVERY 6 HOURS PRN
Qty: 120 TABLET | Refills: 0 | Status: SHIPPED | OUTPATIENT
Start: 2024-08-08 | End: 2024-09-07

## 2024-08-17 NOTE — PROGRESS NOTES
Royal Ahmuada am scribing for and in the presence of King Jeffery Becker MD, MS, F.A.C.C..    Patient: Flora Shepherd  : 1965  Date of Visit: 2021    REASON FOR VISIT / CONSULTATION: 1 Year Follow Up (Hx:Palps,Dizziness,ASHD,Angina,Dysautonomia,HTN,HLD. CAM on . She has been doing very well. A1c is almost normal. Some SOB & palpitations at times. Denies: CP, Lightheaded/dizziness. )    Dear [address] [place] [address], APRN - CNP,    I had the pleasure of seeing your patient Flora Shepherd in consultation today. As you know, Ms. Lesly Gann is a 64 y.o. female who presents with a history of intermittent episodes of back and chest discomfort that started developing since her recent CABG involving a LIMA-LAD 8/15/16. She also has a history of  dysautonomia on a tilt table test, and was started on Florinef as well as Lexapro. Recent heart cath done 3/7/2019 shows severe single vessel disease involving LAD Normal LVEDP. She had a Holter monitor done on 10/23/2019 that did show PAC's and PVC's but was largely unremarkable. Since the last time I saw Ms. Lesly Gann she reports doing very well. She does have some shortness of breath and palpitations at times, nothing bothersome. She reports her hemoglobin is almost back to normal. She does have neuropathy in her legs/feet that bothers her at times. She is up 11 lbs since her last visit with me but says he HgbA1c has dropped from 12 to 8. She denied any chest pain, pressure or tightness. She denies having any lightheaded/dizziness. She denies any abdominal pain, bleeding problems, bowel issues, problems with her medications or any other concerns at this time. No cough, fever or chills. No nausea or vomiting. No falls or near falls. Exercise Tolerance: Ms. Lesly Gann reports that she has a fairly good exercise tolerance. Her says that she could walk a 1/2 mile before stopping due to balance issues.        Past Medical History:   Diagnosis Date    Anxiety     Asthma  CAD (coronary artery disease)     Clinical trial participant at discharge 3/31/16    [facility]     Depression     Diabetic neuropathy ([facility]     H/O cardiac catheterization 4/26/16    LMCA: Mild Irregularities 10-20%. LAD: Lesion on Prox LAD: Proximal subsection. 100% stenosis. LCx: Mild irregularities 10-20%. RCA: Mild irregularities 10-20%. Widely patent mid RCA stent. EF:60%.  H/O cardiovascular stress test 10/07/2016    Overall results are most consistent with a low risk for significant CAD.     H/O echocardiogram 10/05/2016    EF:>60%. Inferoseptal wall abnormal in its motion which is not unusal status post open heart surgery. Evidence of mild (grade I) diastolic dysfunction is seen.  H/O tilt table evaluation 07/12/2016    Abnormal. PTs HR, Blood Pressure response and symptoms were most consistent with dysautonomia. Combined w/viligant maintenance of euvolemia and maintaining a moderate salt intake, pharmaciologic treatment w/ ProAmatine, SSRI such as Lexapro and/or Mestinon among other treatments have shown some effectiveness in the treatment of this condition.  History of cardiovascular stress test 02/13/2019    Abnormal. Small/moderate perfusion defect of mild/moderate intesity in the anterior and anterolateral regions during stress imaging which is most consistent w/ ischemia but may be artifact. Overall low/intermediate risk for significant CAD.  History of echocardiogram 09/06/2018    EF >60%. Inferoseptal wall is abnormal in its motion which is not unusual s/p open heart. Evidence of mild (grade I) diastolic dysfunction seen.     Hx of blood clots     Hyperlipidemia     Hypertension     Intermittent claudication (HCC)     Kidney stones     Movement disorder     neuropathy in legs    Neuromuscular disorder (HCC)     neuropathy    Osteoarthritis     Reflux     Seizures ([facility]     last over 10 yr ago    Stented coronary artery 2010     LAD/[place]    Stented coronary artery 3/31/16    RCA/K[facility]our    Type II or unspecified type diabetes mellitus without mention of complication, not stated as uncontrolled     Unspecified sleep apnea     no machine       CURRENT ALLERGIES: Tape [adhesive tape] REVIEW OF SYSTEMS: 14 systems were reviewed. Pertinent positives and negatives as above, all else negative.      Past Surgical History:   Procedure Laterality Date    ABDOMINAL HERNIA REPAIR  -    repair done julian    APPENDECTOMY      CARDIAC CATHETERIZATION Left 2016    right radial/ [facility]/ Dr María Elena Sanford Left 2019    [facility] Radial/[facility]/    CARDIAC SURGERY      CHOLECYSTECTOMY      COLONOSCOPY  14    -hemorrhoids,bx    CORONARY ANGIOPLASTY WITH STENT PLACEMENT  2010    CORONARY ANGIOPLASTY WITH STENT PLACEMENT  3-    [facility] RCA / DR Brain Maher    CORONARY ARTERY BYPASS GRAFT  08/15/2016    OP X 1- Dr Jossy Nicole, COLON, DIAGNOSTIC  2014    HERNIA REPAIR  12    at the medial aspect of a [place] RUQ scar); [facility]. [id number]  [facility]  2015    incisional, recurrent    HERNIA REPAIR  2016    HYSTERECTOMY      OTHER SURGICAL HISTORY  10/8/2015    abd wound washout, mesh removal, wound vac placement    UT SUCT NICOLE LIPECTOMY,HEAD/NECK Left 2018    THIGH LESION BIOPSY EXCISION, SOFT TISSUE MASS performed by Little Zaman [facility] at [facility] Left 2018    leg    UPPP UVULOPALATOPHARYGOPLASTY  2012    VENTRAL HERNIA REPAIR  2015    With [facility] - Dr Madhuri Pelayo History:  Social History     Tobacco Use    Smoking status: Former Smoker     Packs/day: 2.00     Years: 10.00     Pack years: 20.00     Types: Cigarettes     Quit date: 2010     Years since quittin.1    Smokeless tobacco: Never Used   Vaping Use    Vaping Use: Never used   Substance Use Topics    Alcohol use: No    Drug use: No        CURRENT MEDICATIONS:  Outpatient Medications Marked as Taking for the 11/1/21 encounter (Office Visit) with Katja Garcia MD   Medication Sig Dispense Refill    pregabalin (LYRICA) 150 MG capsule Take one cap three times daily 90 capsule 6    fludrocortisone (FLORINEF) 0.1 MG tablet TAKE 3 TABLETS BY MOUTH EVERY  tablet 3    Blood Glucose Monitoring Suppl (ONE TOUCH ULTRA 2) w/Device KIT 1 kit by Does not apply route daily 1 kit 0    blood glucose monitor strips Test 3 times a day & as needed for symptoms of irregular blood glucose. Dispense sufficient amount for indicated testing frequency plus additional to accommodate PRN testing needs.  300 strip 0    Lancets MISC 1 each by Does not apply route 2 times daily 300 each 1    Continuous Blood Gluc Sensor (FREESTYLE MCKAYLA 2 SENSOR) MISC 2 FREESTYLE MCKAYLA 2 SENSORS TO USE WITH MCKAYLA 2 READER TO CHECK BLOOD SUGARS 6 8 TIMES A DAY      ibuprofen (IBU) 800 MG tablet Take 1 tablet by mouth every 8 hours as needed for Pain 21 tablet 0    TRULICITY 1.5 QK/7.0TG SOPN INJECT 1.5 MG INTO THE SKIN ONCE A WEEK 12 pen 3    Insulin Degludec (TRESIBA FLEXTOUCH) 200 UNIT/ML SOPN Inject 86 Units into the skin daily PER DR MCELROY (Patient taking differently: Inject 110 Units into the skin daily PER DR MCELROY) 1 pen 0    metFORMIN (GLUCOPHAGE-XR) 500 MG extended release tablet Take 2 tablets by mouth daily (with breakfast) 180 tablet 3    omeprazole (PRILOSEC) 20 MG delayed release capsule TAKE 1 CAPSULE BY MOUTH EVERY DAY IN THE MORNING BEFORE BREAKFAST 90 capsule 1    albuterol sulfate HFA (VENTOLIN HFA) 108 (90 Base) MCG/ACT inhaler INHALE 2 PUFFS EVERY 6 HOURS AS NEEDED FOR WHEEZING 18 Inhaler 3    ondansetron (ZOFRAN ODT) 4 MG disintegrating tablet Take 1 tablet by mouth every 8 hours as needed for Nausea 20 tablet 0    nitroGLYCERIN (NITROSTAT) 0.4 MG SL tablet Place 1 tablet under the tongue every 5 minutes as needed for atraumatic. No JVD present. Carotid bruit is not present. No mass and no thyromegaly present. No lymphadenopathy present. Cardiovascular: Normal rate, regular rhythm, normal heart sounds. Exam reveals no gallop and no friction rubs. 2/6 systolic murmur, 2nd intercostal space on the RIGHT just lateral to the sternum. Normal rate, regular rhythm, normal heart sounds and intact distal pulses. Exam reveals no gallop and no friction rub. No significant cardiac murmur was heard. Pulmonary/Chest: Effort normal and breath sounds normal. No respiratory distress. She has no wheezes, rhonchi or rales. Abdominal: Soft, non-tender. Bowel sounds and aorta are normal. She exhibits no organomegaly, mass or bruit. Extremities: Trace. No cyanosis or clubbing. 2+ radial and carotid pulses. Distal extremity pulses: 2+ bilaterally. Neurological: She is alert and oriented to person, place, and time. No evidence of gross cranial nerve deficit. Coordination appeared normal.   Skin: Skin is warm and dry. There is no rash or diaphoresis. Psychiatric: She has a normal mood and affect. Her speech is normal and behavior is normal.      MOST RECENT LABS ON RECORD:   Lab Results   Component Value Date    WBC 13.1 (H) 03/23/2021    HGB 14.5 03/23/2021    HCT 44.4 03/23/2021     03/23/2021    CHOL 166 03/23/2021    TRIG 193 (H) 03/23/2021    HDL 44 03/23/2021    LDLCHOLESTEROL 83 03/23/2021    ALT 11 03/23/2021    AST 13 03/23/2021     (L) 03/23/2021    K 3.9 03/23/2021    CL 97 (L) 03/23/2021    CREATININE 0.61 03/23/2021    BUN 16 03/23/2021    CO2 24 03/23/2021    TSH 2.80 06/04/2016    INR 1.0 11/09/2020    LABA1C 9.4 10/01/2021    LABMICR 8 03/23/2021    BNP NOT REPORTED 05/11/2014        ASSESSMENT:  1. Shortness of breath on exertion    2. Palpitations    3. ASHD (arteriosclerotic heart disease)    4. S/P CABG (coronary artery bypass graft)    5. Angina, class II ([facility]    6.  Dysautonomia orthostatic hypotension syndrome    7. Essential hypertension    8. Mixed hyperlipidemia       PLAN:  Intermittent Heart palpitations Stble: Mild with some mild chest pain with episodes  · Beta Blocker: Continue metoprolol succinate (Toprol XL) 100 mg once daily. · Anticoagulation: Not indicated     · Lightheadedness/dizziness: No reoccurance  · Continue Florinef (fludrocortisone) 0.3 mg daily. I explained that this can cause some increased lower extremity edema but usually this is fairly mild. · Nonpharmacologic counseling: Because of her condition, I reminded her to try and keep herself well-hydrated and to take extra time when moving from laying to sitting, sitting to standing and standing to walking. I also explained to her to help improve her symptoms she should include 3 g sodium diet, 1 or 2 L of sports drinks daily, knee-high compressions stockings. · Atherosclerotic Heart Disease: CABG involving LIMA-LAD on 8/15/16: Currently appears well controlled  Antiplatelet Agent: Continue ASA 81 mg daily. I also reminded her to watch for signs of blood in her stool or black tarry stools and stop the medication immediately if this develops as this could be life threatening. · Beta Blocker Therapy: Continue Toprol XL to 100 mg once daily. I discussed the potential side effects of this medication including lightheadedness and dizziness and told her to call the office if this occurs. · Cholesterol Reduction Therapy: INCREASE to Atorvastatin (Lipitor) 80 mg daily. · Additional Testing List: I ordered a echocardiogram to better assess for the etiology of this problem and to help guide future management    · Angina: Recent [place] Class II but not lifestyle limiting  Beta Blocker: Continue Toprol XL  100 mg once daily. Dysautonomia Orthostatic Hypotension Syndrome: Currently appears to be well controlled. It sounds like her balance is not as good lately but is probably not related to this.  We will monitor at least for now.  · Pharmacological Management: Continue Fludocortisone (Florinef)  0.3 mg  · ACE/ARB Inhibitor: Continue Losartan (Cozaar) 25 mg once daily. · Nonpharmacologic counseling: Because of her condition, I reminded her to try and keep herself well-hydrated and to take extra time when moving from laying to sitting, sitting to standing and standing to walking and not to avoid salt in her diet. I also advised her to put water by her bedside and drink this before she gets out of bed in the morning. Essential Hypertension: Controlled   · ACE Inibitor/ARB: Continue losartan (Cozaar)      · Beta Blocker: Continue Toprol  mg once daily. · Hyperlipidemia: Mixed  · Cholesterol Reduction Therapy: INCREASE to Atorvastatin (Lipitor) 80 mg daily. I discussed the potential benefits of statin therapy as well as the potential risks including myalgia as well as the rare but potentially serious complication of liver or kidney damage. Although rare, I told them that this could be serious and therefore told them to stop the medication immediately and call if they developed any severe muscle aches or pains and they agreed to do so. Shortness of breath with mild-moderate exertion: Mild to moderately lifestyle limiting, probably at least partially related to 11 lb increase but also may be cardiac  Additional Testing List: I ordered a echocardiogram to better assess for the etiology of this problem and to help guide future management    Finally, I recommended that she continue her other medications and follow up with you as previously scheduled. FOLLOW UP:   I told Ms. Eloise Timmons to call my office if she had any problems, but otherwise told her to No follow-ups on file. However, I would be happy to see her sooner should the need arise. Once again, thank you for allowing me to participate in this patients care. Please do not hesitate to contact me could I be of further assistance. Sincerely,  Melonie Becker MD, MS, JAIR. A.CHeatherC. [facility] Cardiology Specialist   [address] [place] [address]  Phone: [phone]; Fax: [phone]    I believe that the risk of significant morbidity and mortality related to the patient's current medical conditions are: Intermediate. The documentation recorded by the scribe, accurately and completely reflects the services I personally performed and the decisions made by me. Laura Meléndez MD, MS, F.A.C.C.  November 1, 2021 swelling of lower extremities

## 2024-08-23 ENCOUNTER — TELEPHONE (OUTPATIENT)
Dept: PRIMARY CARE CLINIC | Age: 59
End: 2024-08-23

## 2024-08-23 DIAGNOSIS — Z23 NEED FOR SHINGLES VACCINE: Primary | ICD-10-CM

## 2024-08-23 RX ORDER — ZOSTER VACCINE RECOMBINANT, ADJUVANTED 50 MCG/0.5
0.5 KIT INTRAMUSCULAR SEE ADMIN INSTRUCTIONS
Qty: 0.5 ML | Refills: 0 | Status: SHIPPED | OUTPATIENT
Start: 2024-08-23 | End: 2024-08-23

## 2024-08-23 RX ORDER — ZOSTER VACCINE RECOMBINANT, ADJUVANTED 50 MCG/0.5
0.5 KIT INTRAMUSCULAR SEE ADMIN INSTRUCTIONS
Qty: 0.5 ML | Refills: 0 | Status: SHIPPED | OUTPATIENT
Start: 2024-08-23 | End: 2025-02-19

## 2024-09-03 ENCOUNTER — HOSPITAL ENCOUNTER (OUTPATIENT)
Age: 59
Discharge: HOME | End: 2024-09-03
Payer: COMMERCIAL

## 2024-09-03 ENCOUNTER — HOSPITAL ENCOUNTER (OUTPATIENT)
Dept: WOMENS IMAGING | Age: 59
Discharge: HOME | End: 2024-09-05
Payer: COMMERCIAL

## 2024-09-03 DIAGNOSIS — M85.89 OSTEOPENIA OF MULTIPLE SITES: ICD-10-CM

## 2024-09-03 DIAGNOSIS — D50.0 IRON DEFICIENCY ANEMIA DUE TO CHRONIC BLOOD LOSS: ICD-10-CM

## 2024-09-03 LAB
ALBUMIN SERPL-MCNC: 3.9 G/DL (ref 3.5–5.2)
ALBUMIN/GLOB SERPL: 1.1 {RATIO} (ref 1–2.5)
ALP SERPL-CCNC: 89 U/L (ref 35–104)
ALT SERPL-CCNC: <5 U/L (ref 10–35)
ANION GAP SERPL CALCULATED.3IONS-SCNC: 12 MMOL/L (ref 9–16)
AST SERPL-CCNC: 12 U/L (ref 10–35)
BASOPHILS # BLD: 0.05 K/UL (ref 0–0.2)
BASOPHILS NFR BLD: 1 % (ref 0–2)
BILIRUB SERPL-MCNC: 0.3 MG/DL (ref 0–1.2)
BUN SERPL-MCNC: 14 MG/DL (ref 6–20)
BUN/CREAT SERPL: 18 (ref 9–20)
CALCIUM SERPL-MCNC: 9.4 MG/DL (ref 8.6–10.4)
CHLORIDE SERPL-SCNC: 103 MMOL/L (ref 98–107)
CO2 SERPL-SCNC: 24 MMOL/L (ref 20–31)
CREAT SERPL-MCNC: 0.8 MG/DL (ref 0.5–0.9)
EOSINOPHIL # BLD: 0.14 K/UL (ref 0–0.44)
EOSINOPHILS RELATIVE PERCENT: 2 % (ref 1–4)
ERYTHROCYTE [DISTWIDTH] IN BLOOD BY AUTOMATED COUNT: 15.7 % (ref 11.8–14.4)
GFR, ESTIMATED: 87 ML/MIN/1.73M2
GLUCOSE SERPL-MCNC: 77 MG/DL (ref 74–99)
HCT VFR BLD AUTO: 36.4 % (ref 36.3–47.1)
HGB BLD-MCNC: 11.4 G/DL (ref 11.9–15.1)
IMM GRANULOCYTES # BLD AUTO: 0.08 K/UL (ref 0–0.3)
IMM GRANULOCYTES NFR BLD: 1 %
LYMPHOCYTES NFR BLD: 1.99 K/UL (ref 1.1–3.7)
LYMPHOCYTES RELATIVE PERCENT: 21 % (ref 24–43)
MCH RBC QN AUTO: 24.4 PG (ref 25.2–33.5)
MCHC RBC AUTO-ENTMCNC: 31.3 G/DL (ref 28.4–34.8)
MCV RBC AUTO: 77.9 FL (ref 82.6–102.9)
MONOCYTES NFR BLD: 0.47 K/UL (ref 0.1–1.2)
MONOCYTES NFR BLD: 5 % (ref 3–12)
NEUTROPHILS NFR BLD: 70 % (ref 36–65)
NEUTS SEG NFR BLD: 6.68 K/UL (ref 1.5–8.1)
NRBC BLD-RTO: 0 PER 100 WBC
PLATELET # BLD AUTO: 322 K/UL (ref 138–453)
PMV BLD AUTO: 9 FL (ref 8.1–13.5)
POTASSIUM SERPL-SCNC: 4.2 MMOL/L (ref 3.7–5.3)
PROT SERPL-MCNC: 7.4 G/DL (ref 6.6–8.7)
RBC # BLD AUTO: 4.67 M/UL (ref 3.95–5.11)
SODIUM SERPL-SCNC: 139 MMOL/L (ref 136–145)
WBC OTHER # BLD: 9.4 K/UL (ref 3.5–11.3)

## 2024-09-03 PROCEDURE — 77080 DXA BONE DENSITY AXIAL: CPT

## 2024-09-03 PROCEDURE — 82607 VITAMIN B-12: CPT

## 2024-09-03 PROCEDURE — 83550 IRON BINDING TEST: CPT

## 2024-09-03 PROCEDURE — 80053 COMPREHEN METABOLIC PANEL: CPT

## 2024-09-03 PROCEDURE — 82746 ASSAY OF FOLIC ACID SERUM: CPT

## 2024-09-03 PROCEDURE — 85025 COMPLETE CBC W/AUTO DIFF WBC: CPT

## 2024-09-03 PROCEDURE — 82728 ASSAY OF FERRITIN: CPT

## 2024-09-03 PROCEDURE — 36415 COLL VENOUS BLD VENIPUNCTURE: CPT

## 2024-09-03 PROCEDURE — 83540 ASSAY OF IRON: CPT

## 2024-09-04 ENCOUNTER — TELEPHONE (OUTPATIENT)
Dept: PRIMARY CARE CLINIC | Age: 59
End: 2024-09-04

## 2024-09-04 DIAGNOSIS — Z23 NEED FOR SHINGLES VACCINE: Primary | ICD-10-CM

## 2024-09-04 LAB
FOLATE SERPL-MCNC: 8.2 NG/ML (ref 4.8–24.2)
VIT B12 SERPL-MCNC: 402 PG/ML (ref 232–1245)

## 2024-09-04 RX ORDER — ZOSTER VACCINE RECOMBINANT, ADJUVANTED 50 MCG/0.5
0.5 KIT INTRAMUSCULAR SEE ADMIN INSTRUCTIONS
Qty: 0.5 ML | Refills: 0 | Status: SHIPPED | OUTPATIENT
Start: 2024-09-04 | End: 2025-03-03

## 2024-09-04 NOTE — TELEPHONE ENCOUNTER
Patient contacted the office in regards to needing a script to get the second round of her Shingles Vaccine.       Please advise.

## 2024-09-05 LAB
FERRITIN SERPL-MCNC: 116 NG/ML (ref 13–150)
IRON SATN MFR SERPL: 10 % (ref 20–55)
IRON SERPL-MCNC: 31 UG/DL (ref 37–145)
TIBC SERPL-MCNC: 306 UG/DL (ref 250–450)
UNSATURATED IRON BINDING CAPACITY: 275 UG/DL (ref 112–347)

## 2024-09-06 DIAGNOSIS — N64.4 BREAST PAIN: ICD-10-CM

## 2024-09-06 DIAGNOSIS — D05.11 DUCTAL CARCINOMA IN SITU (DCIS) OF RIGHT BREAST: ICD-10-CM

## 2024-09-06 RX ORDER — OXYCODONE AND ACETAMINOPHEN 5; 325 MG/1; MG/1
1 TABLET ORAL EVERY 6 HOURS PRN
Qty: 120 TABLET | Refills: 0 | Status: SHIPPED | OUTPATIENT
Start: 2024-09-06 | End: 2024-10-06

## 2024-09-10 ENCOUNTER — OFFICE VISIT (OUTPATIENT)
Dept: NEUROLOGY | Age: 59
End: 2024-09-10
Payer: COMMERCIAL

## 2024-09-10 VITALS
DIASTOLIC BLOOD PRESSURE: 73 MMHG | BODY MASS INDEX: 41.46 KG/M2 | SYSTOLIC BLOOD PRESSURE: 119 MMHG | HEIGHT: 63 IN | HEART RATE: 87 BPM | WEIGHT: 234 LBS

## 2024-09-10 DIAGNOSIS — M79.2 NEUROPATHIC PAIN: ICD-10-CM

## 2024-09-10 DIAGNOSIS — E08.42 DIABETIC POLYNEUROPATHY ASSOCIATED WITH DIABETES MELLITUS DUE TO UNDERLYING CONDITION (HCC): Primary | ICD-10-CM

## 2024-09-10 DIAGNOSIS — M62.838 MUSCLE SPASMS OF BOTH LOWER EXTREMITIES: ICD-10-CM

## 2024-09-10 PROCEDURE — 3017F COLORECTAL CA SCREEN DOC REV: CPT | Performed by: PSYCHIATRY & NEUROLOGY

## 2024-09-10 PROCEDURE — 2022F DILAT RTA XM EVC RTNOPTHY: CPT | Performed by: PSYCHIATRY & NEUROLOGY

## 2024-09-10 PROCEDURE — 3078F DIAST BP <80 MM HG: CPT | Performed by: PSYCHIATRY & NEUROLOGY

## 2024-09-10 PROCEDURE — G8417 CALC BMI ABV UP PARAM F/U: HCPCS | Performed by: PSYCHIATRY & NEUROLOGY

## 2024-09-10 PROCEDURE — G8427 DOCREV CUR MEDS BY ELIG CLIN: HCPCS | Performed by: PSYCHIATRY & NEUROLOGY

## 2024-09-10 PROCEDURE — 1036F TOBACCO NON-USER: CPT | Performed by: PSYCHIATRY & NEUROLOGY

## 2024-09-10 PROCEDURE — 3074F SYST BP LT 130 MM HG: CPT | Performed by: PSYCHIATRY & NEUROLOGY

## 2024-09-10 PROCEDURE — 99214 OFFICE O/P EST MOD 30 MIN: CPT | Performed by: PSYCHIATRY & NEUROLOGY

## 2024-09-10 RX ORDER — GABAPENTIN 800 MG/1
TABLET ORAL
Qty: 90 TABLET | Refills: 6 | Status: SHIPPED | OUTPATIENT
Start: 2024-09-10 | End: 2025-09-22

## 2024-09-25 ENCOUNTER — APPOINTMENT (OUTPATIENT)
Dept: GENERAL RADIOLOGY | Age: 59
End: 2024-09-25
Payer: COMMERCIAL

## 2024-09-25 ENCOUNTER — HOSPITAL ENCOUNTER (EMERGENCY)
Age: 59
Discharge: HOME | End: 2024-09-25
Payer: COMMERCIAL

## 2024-09-25 VITALS
OXYGEN SATURATION: 95 % | SYSTOLIC BLOOD PRESSURE: 138 MMHG | TEMPERATURE: 98.4 F | RESPIRATION RATE: 16 BRPM | HEART RATE: 96 BPM | DIASTOLIC BLOOD PRESSURE: 83 MMHG

## 2024-09-25 DIAGNOSIS — S90.222A CONTUSION OF LEFT LESSER TOE(S) WITH DAMAGE TO NAIL, INITIAL ENCOUNTER: Primary | ICD-10-CM

## 2024-09-25 PROCEDURE — 99283 EMERGENCY DEPT VISIT LOW MDM: CPT

## 2024-09-25 PROCEDURE — 73610 X-RAY EXAM OF ANKLE: CPT

## 2024-09-25 PROCEDURE — 73630 X-RAY EXAM OF FOOT: CPT

## 2024-09-25 RX ORDER — BACITRACIN ZINC 500 [USP'U]/G
OINTMENT TOPICAL ONCE
Status: DISCONTINUED | OUTPATIENT
Start: 2024-09-25 | End: 2024-09-25 | Stop reason: HOSPADM

## 2024-09-25 ASSESSMENT — PAIN DESCRIPTION - PAIN TYPE: TYPE: ACUTE PAIN

## 2024-09-25 ASSESSMENT — PAIN DESCRIPTION - ORIENTATION: ORIENTATION: LEFT

## 2024-09-25 ASSESSMENT — PAIN DESCRIPTION - DESCRIPTORS: DESCRIPTORS: ACHING

## 2024-09-25 ASSESSMENT — PAIN - FUNCTIONAL ASSESSMENT: PAIN_FUNCTIONAL_ASSESSMENT: 0-10

## 2024-09-25 ASSESSMENT — PAIN DESCRIPTION - LOCATION: LOCATION: ANKLE;FOOT

## 2024-09-25 ASSESSMENT — PAIN SCALES - GENERAL: PAINLEVEL_OUTOF10: 4

## 2024-09-29 ENCOUNTER — HOSPITAL ENCOUNTER (EMERGENCY)
Age: 59
Discharge: HOME | End: 2024-09-29
Attending: EMERGENCY MEDICINE
Payer: COMMERCIAL

## 2024-09-29 ENCOUNTER — APPOINTMENT (OUTPATIENT)
Dept: GENERAL RADIOLOGY | Age: 59
End: 2024-09-29
Payer: COMMERCIAL

## 2024-09-29 VITALS
TEMPERATURE: 97.8 F | RESPIRATION RATE: 16 BRPM | HEART RATE: 101 BPM | SYSTOLIC BLOOD PRESSURE: 128 MMHG | DIASTOLIC BLOOD PRESSURE: 78 MMHG | OXYGEN SATURATION: 96 %

## 2024-09-29 DIAGNOSIS — L08.9 TOE INFECTION: Primary | ICD-10-CM

## 2024-09-29 PROCEDURE — 99283 EMERGENCY DEPT VISIT LOW MDM: CPT

## 2024-09-29 PROCEDURE — 73630 X-RAY EXAM OF FOOT: CPT

## 2024-09-29 PROCEDURE — 6370000000 HC RX 637 (ALT 250 FOR IP): Performed by: EMERGENCY MEDICINE

## 2024-09-29 RX ORDER — CEPHALEXIN 500 MG/1
500 CAPSULE ORAL 4 TIMES DAILY
Qty: 28 CAPSULE | Refills: 0 | Status: SHIPPED | OUTPATIENT
Start: 2024-09-29 | End: 2024-10-06

## 2024-09-29 RX ORDER — CEPHALEXIN 500 MG/1
500 CAPSULE ORAL ONCE
Status: COMPLETED | OUTPATIENT
Start: 2024-09-29 | End: 2024-09-29

## 2024-09-29 RX ADMIN — CEPHALEXIN 500 MG: 500 CAPSULE ORAL at 23:22

## 2024-09-29 ASSESSMENT — PAIN SCALES - GENERAL: PAINLEVEL_OUTOF10: 3

## 2024-09-30 NOTE — DISCHARGE INSTRUCTIONS
Take antibiotics as prescribed for toe infection, do warm water soaks twice a day.  Wound check on Tuesday with either pcp or back in Ed.  Or return to Ed sooner for worsening pain or redness spreading.

## 2024-09-30 NOTE — ED PROVIDER NOTES
reactive to light.   Cardiovascular:      Rate and Rhythm: Normal rate.   Pulmonary:      Effort: Pulmonary effort is normal. No respiratory distress.   Musculoskeletal:      Right lower leg: No edema.   Feet:      Comments: Yellowish crusting along the nail of the left 3rd foot, and erythema localized to the distal phalanx. Tender to palpation  Neurological:      General: No focal deficit present.      Mental Status: She is alert and oriented to person, place, and time.           DDX/DIAGNOSTIC RESULTS / EMERGENCY DEPARTMENT COURSE / MDM     Medical Decision Making  Patient with trauma to nail, now with infection, paronychia, will plan on antibiotics and wound check within 48 hours.  Patient is a diabetic.    Amount and/or Complexity of Data Reviewed  Radiology: ordered.    Risk  Prescription drug management.        EMERGENCY DEPARTMENT COURSE:  Wound care, abx, and recheck in 24 hours           FINAL IMPRESSION      1. Toe infection          DISPOSITION / PLAN     DISPOSITION Decision To Discharge 09/29/2024 11:16:53 PM  Condition at Disposition: Data Unavailable      PATIENT REFERRED TO:  Bulmaro Plasencia, APRN - CNP  [address] [facility] [address] [phone]    In 2 days  For wound re-check      DISCHARGE MEDICATIONS:  Discharge Medication List as of 9/29/2024 11:18 PM        START taking these medications    Details   cephALEXin (KEFLEX) 500 MG capsule Take 1 capsule by mouth 4 times daily for 7 days, Disp-28 capsule, R-0Normal             Letha Durand DO  Emergency Medicine     (Please note that portions of thisnote were completed with a voice recognition program.  Efforts were made to edit the dictations but occasionally words are mis-transcribed.)        Letha Durand DO  09/30/24 0317

## 2024-10-01 ENCOUNTER — HOSPITAL ENCOUNTER (EMERGENCY)
Age: 59
Discharge: HOME | End: 2024-10-01
Attending: EMERGENCY MEDICINE
Payer: COMMERCIAL

## 2024-10-01 VITALS
RESPIRATION RATE: 18 BRPM | HEART RATE: 85 BPM | OXYGEN SATURATION: 93 % | TEMPERATURE: 97.7 F | SYSTOLIC BLOOD PRESSURE: 156 MMHG | DIASTOLIC BLOOD PRESSURE: 84 MMHG

## 2024-10-01 DIAGNOSIS — Z51.89 VISIT FOR WOUND CHECK: Primary | ICD-10-CM

## 2024-10-01 PROCEDURE — 99282 EMERGENCY DEPT VISIT SF MDM: CPT

## 2024-10-01 ASSESSMENT — PAIN - FUNCTIONAL ASSESSMENT: PAIN_FUNCTIONAL_ASSESSMENT: 0-10

## 2024-10-01 ASSESSMENT — PAIN DESCRIPTION - LOCATION: LOCATION: TOE (COMMENT WHICH ONE)

## 2024-10-01 ASSESSMENT — PAIN SCALES - GENERAL: PAINLEVEL_OUTOF10: 1

## 2024-10-01 ASSESSMENT — PAIN DESCRIPTION - ORIENTATION: ORIENTATION: LEFT

## 2024-10-01 ASSESSMENT — PAIN DESCRIPTION - DESCRIPTORS: DESCRIPTORS: DULL;ACHING

## 2024-10-01 ASSESSMENT — PAIN DESCRIPTION - PAIN TYPE: TYPE: ACUTE PAIN

## 2024-10-01 NOTE — ED PROVIDER NOTES
APRN - CNP   metoprolol succinate (TOPROL XL) 50 MG extended release tablet Take 1 tablet by mouth daily 7/19/24   Bulmaro Plasencia APRN - CNP   empagliflozin (JARDIANCE) 10 MG tablet Take 1 tablet by mouth daily 7/17/24   Bulmaro Plasencia APRN - CNP   metFORMIN (GLUCOPHAGE-XR) 500 MG extended release tablet Take 2 tablets by mouth daily (with breakfast) 7/17/24   Bulmaro Plasencia APRN - CNP   aspirin 81 MG EC tablet Take 1 tablet by mouth daily 7/17/24   Bulmaro Plasencia APRN - CNP   ezetimibe (ZETIA) 10 MG tablet TAKE 1 TABLET BY MOUTH EVERY DAY AT NIGHT 7/11/24   Jose Becker MD   anastrozole (ARIMIDEX) 1 MG tablet TAKE 1 TABLET BY MOUTH EVERY DAY 7/11/24   Cheng Tellez MD   clopidogrel (PLAVIX) 75 MG tablet TAKE 1 TABLET BY MOUTH EVERY DAY 7/11/24   Jose Becker MD   alirocumab (PRALUENT) 75 MG/ML SOAJ injection pen Inject 1 mL into the skin every 14 days for 16 doses  Patient not taking: Reported on 9/10/2024 6/5/24 1/2/25  Gloria West PA-C   omeprazole (PRILOSEC) 20 MG delayed release capsule TAKE 1 CAPSULE BY MOUTH EVERY DAY IN THE MORNING BEFORE BREAKFAST 5/22/24   Bulmaro Plasencia APRN - CNP   tiZANidine (ZANAFLEX) 2 MG tablet TAKE ONE TABLET BY MOUTH NIGHTLY AS NEEDED FOR MUSCLE SPASMS 4/16/24   Gemini Montes MD   Continuous Blood Gluc  (DEXCOM G7 ) BERE DEXCOM G7  USE WITH SENSOR/TRANSMITTER TO CHECK BLOOD GLUCOSE 6-8 TIMES ADAY E11.9 1/26/24   Mike Carlson MD   Continuous Blood Gluc Sensor (DEXCOM G7 SENSOR) MISC DEXCOM G7 SENSORS CHANGE EVERY 10 DAYS TO CHECK BLOOD GLUCOSE 6-8 TIMES ADAY E11.9 2/23/24   Mike Carlson MD   DULoxetine (CYMBALTA) 30 MG extended release capsule Take 1 capsule by mouth daily 3/12/24   Gemini Montes MD   fludrocortisone (FLORINEF) 0.1 MG tablet Take 3 tablets by mouth daily    Provider, MD Mike   losartan (COZAAR) 25 MG tablet Take 1 tablet by mouth daily 1/24/24   Jose Becker MD   furosemide

## 2024-10-01 NOTE — ED NOTES
Redness to left middle toe, nail remains in place. Patent reports antibiotic adherence and improved pain. No drainage noted. Dr. Durand at bedside.

## 2024-10-01 NOTE — DISCHARGE INSTRUCTIONS
Continue to do warm water soaks twice a day, and take antibiotics until completed, follow up with primary care provider in 3 days or return to ER sooner for increased redness and pain

## 2024-10-01 NOTE — ED NOTES
Discussed home care and s/s to return to ED. Aware of recommended wound check on Friday with PCP. Patient denies questions/concerns at this time.

## 2024-10-03 DIAGNOSIS — D05.11 DUCTAL CARCINOMA IN SITU (DCIS) OF RIGHT BREAST: ICD-10-CM

## 2024-10-03 DIAGNOSIS — N64.4 BREAST PAIN: ICD-10-CM

## 2024-10-03 RX ORDER — OXYCODONE AND ACETAMINOPHEN 5; 325 MG/1; MG/1
1 TABLET ORAL EVERY 6 HOURS PRN
Qty: 120 TABLET | Refills: 0 | Status: SHIPPED | OUTPATIENT
Start: 2024-10-03 | End: 2024-11-02

## 2024-10-03 NOTE — TELEPHONE ENCOUNTER
Patient states she needs a refill on her oxycodone. Sent over to [facility] in [facility]

## 2024-10-11 ENCOUNTER — OFFICE VISIT (OUTPATIENT)
Dept: ONCOLOGY | Age: 59
End: 2024-10-11
Payer: COMMERCIAL

## 2024-10-11 VITALS
TEMPERATURE: 97.2 F | BODY MASS INDEX: 42.16 KG/M2 | WEIGHT: 238 LBS | DIASTOLIC BLOOD PRESSURE: 80 MMHG | RESPIRATION RATE: 18 BRPM | SYSTOLIC BLOOD PRESSURE: 142 MMHG | HEART RATE: 88 BPM

## 2024-10-11 DIAGNOSIS — C50.111 MALIGNANT NEOPLASM OF CENTRAL PORTION OF RIGHT BREAST IN FEMALE, ESTROGEN RECEPTOR POSITIVE (HCC): Primary | ICD-10-CM

## 2024-10-11 DIAGNOSIS — D50.0 IRON DEFICIENCY ANEMIA DUE TO CHRONIC BLOOD LOSS: ICD-10-CM

## 2024-10-11 DIAGNOSIS — M54.50 ACUTE MIDLINE LOW BACK PAIN WITHOUT SCIATICA: ICD-10-CM

## 2024-10-11 DIAGNOSIS — R79.0 ABNORMAL IRON SATURATION: ICD-10-CM

## 2024-10-11 DIAGNOSIS — Z17.0 MALIGNANT NEOPLASM OF CENTRAL PORTION OF RIGHT BREAST IN FEMALE, ESTROGEN RECEPTOR POSITIVE (HCC): Primary | ICD-10-CM

## 2024-10-11 DIAGNOSIS — M85.89 OSTEOPENIA OF MULTIPLE SITES: ICD-10-CM

## 2024-10-11 PROCEDURE — 1036F TOBACCO NON-USER: CPT | Performed by: INTERNAL MEDICINE

## 2024-10-11 PROCEDURE — 3017F COLORECTAL CA SCREEN DOC REV: CPT | Performed by: INTERNAL MEDICINE

## 2024-10-11 PROCEDURE — G8427 DOCREV CUR MEDS BY ELIG CLIN: HCPCS | Performed by: INTERNAL MEDICINE

## 2024-10-11 PROCEDURE — G8484 FLU IMMUNIZE NO ADMIN: HCPCS | Performed by: INTERNAL MEDICINE

## 2024-10-11 PROCEDURE — 3079F DIAST BP 80-89 MM HG: CPT | Performed by: INTERNAL MEDICINE

## 2024-10-11 PROCEDURE — G8417 CALC BMI ABV UP PARAM F/U: HCPCS | Performed by: INTERNAL MEDICINE

## 2024-10-11 PROCEDURE — 99214 OFFICE O/P EST MOD 30 MIN: CPT | Performed by: INTERNAL MEDICINE

## 2024-10-11 PROCEDURE — 3077F SYST BP >= 140 MM HG: CPT | Performed by: INTERNAL MEDICINE

## 2024-10-11 NOTE — PROGRESS NOTES
Patient ID: Christie Belcher, 1965, [id number], 59 y.o.  Referred by :  No ref. 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 female who found on a routine mammogram on May 22, 2022 new group of calcifications upper central right breast and ultrasound did show suspicious lesion at 11:00 6 cm from the nipple core biopsy was done on 6/9/2022 and that showed DCIS strongly ER/ND positive and patient was referred for medical oncology  No family history of breast cancer  No history of using birth control in her younger age  Patient had hysterectomy in her 30s  Patient underwent lumpectomy on July 12, 2022 and there was 1 cm invasive carcinoma in addition to the DCIS  No sentinel lymph node biopsy initially and this was repeated and came back negative  Oncotype DX 5  Bone density scan did show osteopenia and Prolia was denied and started on Fosamax  Status post radiation and started on anastrozole  Status post simple aspiration right breast seroma and recur and nothing to drain        Oncology history  Stage I invasive carcinoma with right breast no sentinel lymph node biopsy  Oncotype DX 5  Adjuvant radiation  On anastrozole started September 2022  Swelling on the right breast where she had surgery, with severe tenderness and localized breast lymphedema had to drain several times for seroma  Breast cancer index did show benefit from extended hormonal treatment    Interim history  Patient presents to the clinic for a follow-up visit and to discuss results of his lab work-up and other relevant clinical data. During this visit patient's allergy, social, medical, surgical history and medications were reviewed and updated.   Most recent diagnostic mammogram done on March 2024 and no evidence of recurrence  Bone density scan did show osteopenia  Iron panel still showing iron

## 2024-10-20 ENCOUNTER — APPOINTMENT (OUTPATIENT)
Dept: GENERAL RADIOLOGY | Age: 59
End: 2024-10-20
Payer: COMMERCIAL

## 2024-10-20 ENCOUNTER — HOSPITAL ENCOUNTER (EMERGENCY)
Age: 59
Discharge: HOME | End: 2024-10-20
Payer: COMMERCIAL

## 2024-10-20 VITALS
HEART RATE: 94 BPM | TEMPERATURE: 99.9 F | SYSTOLIC BLOOD PRESSURE: 159 MMHG | RESPIRATION RATE: 20 BRPM | OXYGEN SATURATION: 95 % | DIASTOLIC BLOOD PRESSURE: 74 MMHG

## 2024-10-20 DIAGNOSIS — J06.9 VIRAL URI WITH COUGH: Primary | ICD-10-CM

## 2024-10-20 LAB
FLUAV AG SPEC QL: NEGATIVE
FLUBV AG SPEC QL: NEGATIVE
SARS-COV-2 RDRP RESP QL NAA+PROBE: NOT DETECTED
SPECIMEN DESCRIPTION: NORMAL
SPECIMEN SOURCE: NORMAL
STREP A, MOLECULAR: NEGATIVE

## 2024-10-20 PROCEDURE — 87651 STREP A DNA AMP PROBE: CPT

## 2024-10-20 PROCEDURE — 87804 INFLUENZA ASSAY W/OPTIC: CPT

## 2024-10-20 PROCEDURE — 87635 SARS-COV-2 COVID-19 AMP PRB: CPT

## 2024-10-20 PROCEDURE — 6370000000 HC RX 637 (ALT 250 FOR IP)

## 2024-10-20 PROCEDURE — 99284 EMERGENCY DEPT VISIT MOD MDM: CPT

## 2024-10-20 PROCEDURE — 71046 X-RAY EXAM CHEST 2 VIEWS: CPT

## 2024-10-20 RX ORDER — BENZONATATE 200 MG/1
200 CAPSULE ORAL 3 TIMES DAILY PRN
Qty: 21 CAPSULE | Refills: 0 | Status: SHIPPED | OUTPATIENT
Start: 2024-10-20 | End: 2024-10-27

## 2024-10-20 RX ORDER — IBUPROFEN 600 MG/1
600 TABLET, FILM COATED ORAL ONCE
Status: COMPLETED | OUTPATIENT
Start: 2024-10-20 | End: 2024-10-20

## 2024-10-20 RX ORDER — AZITHROMYCIN 250 MG/1
TABLET, FILM COATED ORAL
Qty: 1 PACKET | Refills: 0 | Status: SHIPPED | OUTPATIENT
Start: 2024-10-20 | End: 2024-10-24

## 2024-10-20 RX ADMIN — IBUPROFEN 600 MG: 600 TABLET, FILM COATED ORAL at 22:48

## 2024-10-21 ENCOUNTER — TELEPHONE (OUTPATIENT)
Dept: PRIMARY CARE CLINIC | Age: 59
End: 2024-10-21

## 2024-10-21 NOTE — ED NOTES
Discharge instructions reviewed with patient and spouse. Encouraged to f/u with PCP. Aware new prescriptions sent to documented preferred pharmacy. Instructed to return with any new or worsening symptoms.

## 2024-10-21 NOTE — ED PROVIDER NOTES
participant at discharge (03/31/2016), COPD (chronic obstructive pulmonary disease) (HCC), Depression, Diabetic neuropathy (HCC), GERD (gastroesophageal reflux disease), H/O cardiac catheterization (04/26/2016), H/O cardiovascular stress test (10/07/2016), H/O echocardiogram (10/05/2016), H/O tilt table evaluation (07/12/2016), History of cardiovascular stress test (02/13/2019), History of echocardiogram (09/06/2018), History of therapeutic radiation, blood clots, Hyperlipidemia, Hypertension, Intermittent claudication (HCC), Kidney stones, Movement disorder, Neuromuscular disorder (HCC), Osteoarthritis, Reflux, Seizures (HCC) (1980'Ss last seizure), Stented coronary artery (09/22/2010), Stented coronary artery (03/31/2016), Type II or unspecified type diabetes mellitus without mention of complication, not stated as uncontrolled, and Unspecified sleep apnea.     Christie Belcher is a 59 y.o. female     Vital signs BP (!) 159/74   Pulse 94   Temp 99.9 °F (37.7 °C)   Resp 20   LMP 12/05/1996   SpO2 95%   While in the ED patient was afebrile, nontoxic-appearing, in no respiratory distress.   Physical exam remarkable for diminished breath sounds throughout with no wheezing rales or rhonchi.  Regular rate and rhythm with no murmurs.  Ddx: Working diagnosis include but not limited to viral URI, bacterial URI, pneumonia.  Labs interpreted by me listed below:  COVID, influenza and rapid strep is negative   CXR with no obvious effusions or consolidations concerning pna, no ptx     Patient administered:  Orders Placed This Encounter   Medications    ibuprofen (ADVIL;MOTRIN) tablet 600 mg    azithromycin (ZITHROMAX Z-KWADWO) 250 MG tablet     Sig: Take 2 tablets (500 mg) on Day 1, and then take 1 tablet (250 mg) on days 2 through 5.     Dispense:  1 packet     Refill:  0    benzonatate (TESSALON) 200 MG capsule     Sig: Take 1 capsule by mouth 3 times daily as needed for Cough     Dispense:  21 capsule     Refill:  0       On

## 2024-10-21 NOTE — TELEPHONE ENCOUNTER
Care Transitions Initial Follow Up Call    Outreach made within 2 business days of discharge: Yes    Patient: Christie Belcher Patient : 1965   MRN: [id number]  Reason for Admission: ED Visit-Viral   Discharge Date: 10/20/24       Spoke with: Christie    Discharge department/facility: [facility]     TCM Interactive Patient Contact:  Was patient able to fill all prescriptions: Yes  Was patient instructed to bring all medications to the follow-up visit: Yes  Is patient taking all medications as directed in the discharge summary? Yes  Does patient understand their discharge instructions: Yes  Does patient have questions or concerns that need addressed prior to 7-14 day follow up office visit: no    Additional needs identified to be addressed with provider  No needs identified             Scheduled appointment with PCP within 7-14 days    Follow Up  Future Appointments   Date Time Provider Department [place]   12/10/2024  1:00 PM Jose Becker MD TIFF CARD [facility]   2025  9:45 AM Cheng Tellez MD tiff onc spe [facility]   2025  1:00 PM Bulmaro Plasencia, APRN - CNP [facility] DEP       Oscar Chapa, [facility]

## 2024-10-21 NOTE — DISCHARGE INSTRUCTIONS
Continue taking your albuterol inhaler at home.  I also prescribed you a Z-Mathieu and Tessalon Perles.    Continue taking Tylenol and ibuprofen every 6 hours for body aches, fevers.  Please schedule a follow-up visit with your primary care doctor within the next 2 to 3 days.  Return to the ED for any worsening symptoms or concerns

## 2024-11-01 ENCOUNTER — HOSPITAL ENCOUNTER (OUTPATIENT)
Age: 59
Discharge: HOME | End: 2024-11-01
Payer: COMMERCIAL

## 2024-11-01 DIAGNOSIS — C50.111 MALIGNANT NEOPLASM OF CENTRAL PORTION OF RIGHT BREAST IN FEMALE, ESTROGEN RECEPTOR POSITIVE (HCC): ICD-10-CM

## 2024-11-01 DIAGNOSIS — Z17.0 MALIGNANT NEOPLASM OF CENTRAL PORTION OF RIGHT BREAST IN FEMALE, ESTROGEN RECEPTOR POSITIVE (HCC): ICD-10-CM

## 2024-11-01 DIAGNOSIS — D50.0 IRON DEFICIENCY ANEMIA DUE TO CHRONIC BLOOD LOSS: ICD-10-CM

## 2024-11-01 LAB
ALBUMIN SERPL-MCNC: 3.8 G/DL (ref 3.5–5.2)
ALBUMIN/GLOB SERPL: 1 {RATIO} (ref 1–2.5)
ALP SERPL-CCNC: 88 U/L (ref 35–104)
ALT SERPL-CCNC: 7 U/L (ref 10–35)
ANION GAP SERPL CALCULATED.3IONS-SCNC: 12 MMOL/L (ref 9–16)
AST SERPL-CCNC: 12 U/L (ref 10–35)
BASOPHILS # BLD: 0.04 K/UL (ref 0–0.2)
BASOPHILS NFR BLD: 1 % (ref 0–2)
BILIRUB SERPL-MCNC: 0.4 MG/DL (ref 0–1.2)
BUN SERPL-MCNC: 19 MG/DL (ref 6–20)
BUN/CREAT SERPL: 24 (ref 9–20)
CALCIUM SERPL-MCNC: 9.3 MG/DL (ref 8.6–10.4)
CHLORIDE SERPL-SCNC: 101 MMOL/L (ref 98–107)
CO2 SERPL-SCNC: 25 MMOL/L (ref 20–31)
CREAT SERPL-MCNC: 0.8 MG/DL (ref 0.5–0.9)
EOSINOPHIL # BLD: 0.11 K/UL (ref 0–0.44)
EOSINOPHILS RELATIVE PERCENT: 1 % (ref 1–4)
ERYTHROCYTE [DISTWIDTH] IN BLOOD BY AUTOMATED COUNT: 15.8 % (ref 11.8–14.4)
EST. AVERAGE GLUCOSE BLD GHB EST-MCNC: 183 MG/DL
FERRITIN SERPL-MCNC: 130 NG/ML (ref 15–150)
GFR, ESTIMATED: 89 ML/MIN/1.73M2
GLUCOSE SERPL-MCNC: 137 MG/DL (ref 74–99)
HBA1C MFR BLD: 8 % (ref 4–6)
HCT VFR BLD AUTO: 37.4 % (ref 36.3–47.1)
HGB BLD-MCNC: 11.8 G/DL (ref 11.9–15.1)
IMM GRANULOCYTES # BLD AUTO: 0.03 K/UL (ref 0–0.3)
IMM GRANULOCYTES NFR BLD: 0 %
IRON SATN MFR SERPL: 13 % (ref 20–55)
IRON SERPL-MCNC: 37 UG/DL (ref 37–145)
LYMPHOCYTES NFR BLD: 1.81 K/UL (ref 1.1–3.7)
LYMPHOCYTES RELATIVE PERCENT: 21 % (ref 24–43)
MCH RBC QN AUTO: 24.1 PG (ref 25.2–33.5)
MCHC RBC AUTO-ENTMCNC: 31.6 G/DL (ref 28.4–34.8)
MCV RBC AUTO: 76.3 FL (ref 82.6–102.9)
MONOCYTES NFR BLD: 0.26 K/UL (ref 0.1–1.2)
MONOCYTES NFR BLD: 3 % (ref 3–12)
NEUTROPHILS NFR BLD: 74 % (ref 36–65)
NEUTS SEG NFR BLD: 6.43 K/UL (ref 1.5–8.1)
NRBC BLD-RTO: 0 PER 100 WBC
PLATELET # BLD AUTO: 322 K/UL (ref 138–453)
PMV BLD AUTO: 9.3 FL (ref 8.1–13.5)
POTASSIUM SERPL-SCNC: 4.6 MMOL/L (ref 3.7–5.3)
PROT SERPL-MCNC: 7.6 G/DL (ref 6.6–8.7)
RBC # BLD AUTO: 4.9 M/UL (ref 3.95–5.11)
SODIUM SERPL-SCNC: 138 MMOL/L (ref 136–145)
TIBC SERPL-MCNC: 289 UG/DL (ref 250–450)
TSH SERPL DL<=0.05 MIU/L-ACNC: 2.38 UIU/ML (ref 0.27–4.2)
UNSATURATED IRON BINDING CAPACITY: 252 UG/DL (ref 112–347)
WBC OTHER # BLD: 8.7 K/UL (ref 3.5–11.3)

## 2024-11-01 PROCEDURE — 83540 ASSAY OF IRON: CPT

## 2024-11-01 PROCEDURE — 82728 ASSAY OF FERRITIN: CPT

## 2024-11-01 PROCEDURE — 36415 COLL VENOUS BLD VENIPUNCTURE: CPT

## 2024-11-01 PROCEDURE — 85025 COMPLETE CBC W/AUTO DIFF WBC: CPT

## 2024-11-01 PROCEDURE — 83036 HEMOGLOBIN GLYCOSYLATED A1C: CPT

## 2024-11-01 PROCEDURE — 83550 IRON BINDING TEST: CPT

## 2024-11-01 PROCEDURE — 80053 COMPREHEN METABOLIC PANEL: CPT

## 2024-11-01 PROCEDURE — 84443 ASSAY THYROID STIM HORMONE: CPT

## 2024-11-04 DIAGNOSIS — D05.11 DUCTAL CARCINOMA IN SITU (DCIS) OF RIGHT BREAST: ICD-10-CM

## 2024-11-04 DIAGNOSIS — N64.4 BREAST PAIN: ICD-10-CM

## 2024-11-04 RX ORDER — OXYCODONE AND ACETAMINOPHEN 5; 325 MG/1; MG/1
1 TABLET ORAL EVERY 6 HOURS PRN
Qty: 120 TABLET | Refills: 0 | Status: SHIPPED | OUTPATIENT
Start: 2024-11-04 | End: 2024-12-04

## 2024-11-24 ENCOUNTER — APPOINTMENT (OUTPATIENT)
Dept: CT IMAGING | Age: 59
DRG: 463 | End: 2024-11-24
Payer: COMMERCIAL

## 2024-11-24 ENCOUNTER — APPOINTMENT (OUTPATIENT)
Dept: GENERAL RADIOLOGY | Age: 59
DRG: 463 | End: 2024-11-24
Payer: COMMERCIAL

## 2024-11-24 ENCOUNTER — HOSPITAL ENCOUNTER (INPATIENT)
Age: 59
LOS: 1 days | Discharge: ANOTHER HOSPITAL | DRG: 463 | End: 2024-11-26
Attending: EMERGENCY MEDICINE | Admitting: STUDENT IN AN ORGANIZED HEALTH CARE EDUCATION/TRAINING PROGRAM
Payer: COMMERCIAL

## 2024-11-24 DIAGNOSIS — N39.0 ACUTE UTI: ICD-10-CM

## 2024-11-24 DIAGNOSIS — R07.89 ATYPICAL CHEST PAIN: ICD-10-CM

## 2024-11-24 DIAGNOSIS — R06.02 SHORTNESS OF BREATH: Primary | ICD-10-CM

## 2024-11-24 LAB
ALBUMIN SERPL-MCNC: 3.7 G/DL (ref 3.5–5.2)
ALBUMIN/GLOB SERPL: 1.1 {RATIO} (ref 1–2.5)
ALP SERPL-CCNC: 89 U/L (ref 35–104)
ALT SERPL-CCNC: 7 U/L (ref 10–35)
ANION GAP SERPL CALCULATED.3IONS-SCNC: 13 MMOL/L (ref 9–16)
AST SERPL-CCNC: 16 U/L (ref 10–35)
BACTERIA URNS QL MICRO: ABNORMAL
BASOPHILS # BLD: 0.06 K/UL (ref 0–0.2)
BASOPHILS NFR BLD: 1 % (ref 0–2)
BILIRUB SERPL-MCNC: 0.4 MG/DL (ref 0–1.2)
BILIRUB UR QL STRIP: NEGATIVE
BNP SERPL-MCNC: 1312 PG/ML (ref 0–125)
BUN SERPL-MCNC: 20 MG/DL (ref 6–20)
BUN/CREAT SERPL: 25 (ref 9–20)
CALCIUM SERPL-MCNC: 8.9 MG/DL (ref 8.6–10.4)
CHLORIDE SERPL-SCNC: 103 MMOL/L (ref 98–107)
CLARITY UR: ABNORMAL
CO2 SERPL-SCNC: 22 MMOL/L (ref 20–31)
COLOR UR: YELLOW
CREAT SERPL-MCNC: 0.8 MG/DL (ref 0.5–0.9)
EOSINOPHIL # BLD: 0.23 K/UL (ref 0–0.44)
EOSINOPHILS RELATIVE PERCENT: 2 % (ref 1–4)
EPI CELLS #/AREA URNS HPF: ABNORMAL /HPF (ref 0–25)
ERYTHROCYTE [DISTWIDTH] IN BLOOD BY AUTOMATED COUNT: 16.7 % (ref 11.8–14.4)
FLUAV AG SPEC QL: NEGATIVE
FLUBV AG SPEC QL: NEGATIVE
GFR, ESTIMATED: 80 ML/MIN/1.73M2
GLUCOSE SERPL-MCNC: 207 MG/DL (ref 74–99)
GLUCOSE UR STRIP-MCNC: NEGATIVE MG/DL
HCT VFR BLD AUTO: 37.6 % (ref 36.3–47.1)
HGB BLD-MCNC: 12 G/DL (ref 11.9–15.1)
HGB UR QL STRIP.AUTO: NEGATIVE
IMM GRANULOCYTES # BLD AUTO: 0.08 K/UL (ref 0–0.3)
IMM GRANULOCYTES NFR BLD: 1 %
KETONES UR STRIP-MCNC: NEGATIVE MG/DL
LACTATE BLDV-SCNC: 2.5 MMOL/L (ref 0.5–2.2)
LEUKOCYTE ESTERASE UR QL STRIP: ABNORMAL
LYMPHOCYTES NFR BLD: 2.47 K/UL (ref 1.1–3.7)
LYMPHOCYTES RELATIVE PERCENT: 22 % (ref 24–43)
MAGNESIUM SERPL-MCNC: 1.9 MG/DL (ref 1.6–2.6)
MCH RBC QN AUTO: 25 PG (ref 25.2–33.5)
MCHC RBC AUTO-ENTMCNC: 31.9 G/DL (ref 28.4–34.8)
MCV RBC AUTO: 78.3 FL (ref 82.6–102.9)
MONOCYTES NFR BLD: 0.42 K/UL (ref 0.1–1.2)
MONOCYTES NFR BLD: 4 % (ref 3–12)
NEUTROPHILS NFR BLD: 70 % (ref 36–65)
NEUTS SEG NFR BLD: 8.12 K/UL (ref 1.5–8.1)
NITRITE UR QL STRIP: POSITIVE
NRBC BLD-RTO: 0 PER 100 WBC
PH UR STRIP: 6 [PH] (ref 5–9)
PLATELET # BLD AUTO: 312 K/UL (ref 138–453)
PMV BLD AUTO: 9.8 FL (ref 8.1–13.5)
POTASSIUM SERPL-SCNC: 4.6 MMOL/L (ref 3.7–5.3)
PROT SERPL-MCNC: 7.3 G/DL (ref 6.6–8.7)
PROT UR STRIP-MCNC: ABNORMAL MG/DL
RBC # BLD AUTO: 4.8 M/UL (ref 3.95–5.11)
RBC #/AREA URNS HPF: ABNORMAL /HPF (ref 0–2)
RENAL EPITHELIAL, UA: ABNORMAL /HPF
SARS-COV-2 RDRP RESP QL NAA+PROBE: NOT DETECTED
SODIUM SERPL-SCNC: 138 MMOL/L (ref 136–145)
SP GR UR STRIP: 1.02 (ref 1.01–1.02)
SPECIMEN DESCRIPTION: NORMAL
TROPONIN I SERPL HS-MCNC: 35 NG/L (ref 0–14)
TROPONIN I SERPL HS-MCNC: 39 NG/L (ref 0–14)
UROBILINOGEN UR STRIP-ACNC: NORMAL EU/DL (ref 0–1)
WBC #/AREA URNS HPF: ABNORMAL /HPF (ref 0–5)
WBC OTHER # BLD: 11.4 K/UL (ref 3.5–11.3)
YEAST URNS QL MICRO: ABNORMAL

## 2024-11-24 PROCEDURE — 99285 EMERGENCY DEPT VISIT HI MDM: CPT

## 2024-11-24 PROCEDURE — 6360000002 HC RX W HCPCS: Performed by: EMERGENCY MEDICINE

## 2024-11-24 PROCEDURE — 85025 COMPLETE CBC W/AUTO DIFF WBC: CPT

## 2024-11-24 PROCEDURE — 70450 CT HEAD/BRAIN W/O DYE: CPT

## 2024-11-24 PROCEDURE — 87088 URINE BACTERIA CULTURE: CPT

## 2024-11-24 PROCEDURE — 96374 THER/PROPH/DIAG INJ IV PUSH: CPT

## 2024-11-24 PROCEDURE — 83735 ASSAY OF MAGNESIUM: CPT

## 2024-11-24 PROCEDURE — 83880 ASSAY OF NATRIURETIC PEPTIDE: CPT

## 2024-11-24 PROCEDURE — 87040 BLOOD CULTURE FOR BACTERIA: CPT

## 2024-11-24 PROCEDURE — 87186 SC STD MICRODIL/AGAR DIL: CPT

## 2024-11-24 PROCEDURE — 84484 ASSAY OF TROPONIN QUANT: CPT

## 2024-11-24 PROCEDURE — 87804 INFLUENZA ASSAY W/OPTIC: CPT

## 2024-11-24 PROCEDURE — 2580000003 HC RX 258: Performed by: EMERGENCY MEDICINE

## 2024-11-24 PROCEDURE — 6360000004 HC RX CONTRAST MEDICATION: Performed by: EMERGENCY MEDICINE

## 2024-11-24 PROCEDURE — 93005 ELECTROCARDIOGRAM TRACING: CPT | Performed by: EMERGENCY MEDICINE

## 2024-11-24 PROCEDURE — 71260 CT THORAX DX C+: CPT

## 2024-11-24 PROCEDURE — 80053 COMPREHEN METABOLIC PANEL: CPT

## 2024-11-24 PROCEDURE — 87086 URINE CULTURE/COLONY COUNT: CPT

## 2024-11-24 PROCEDURE — 36415 COLL VENOUS BLD VENIPUNCTURE: CPT

## 2024-11-24 PROCEDURE — 87635 SARS-COV-2 COVID-19 AMP PRB: CPT

## 2024-11-24 PROCEDURE — 81001 URINALYSIS AUTO W/SCOPE: CPT

## 2024-11-24 PROCEDURE — 83605 ASSAY OF LACTIC ACID: CPT

## 2024-11-24 RX ORDER — 0.9 % SODIUM CHLORIDE 0.9 %
500 INTRAVENOUS SOLUTION INTRAVENOUS ONCE
Status: COMPLETED | OUTPATIENT
Start: 2024-11-24 | End: 2024-11-25

## 2024-11-24 RX ORDER — IOPAMIDOL 755 MG/ML
75 INJECTION, SOLUTION INTRAVASCULAR
Status: COMPLETED | OUTPATIENT
Start: 2024-11-24 | End: 2024-11-24

## 2024-11-24 RX ADMIN — SODIUM CHLORIDE 500 ML: 9 INJECTION, SOLUTION INTRAVENOUS at 23:29

## 2024-11-24 RX ADMIN — IOPAMIDOL 75 ML: 755 INJECTION, SOLUTION INTRAVENOUS at 21:56

## 2024-11-24 RX ADMIN — CEFTRIAXONE SODIUM 1000 MG: 1 INJECTION, POWDER, FOR SOLUTION INTRAMUSCULAR; INTRAVENOUS at 23:09

## 2024-11-24 ASSESSMENT — PAIN DESCRIPTION - LOCATION: LOCATION: HEAD

## 2024-11-24 ASSESSMENT — PAIN - FUNCTIONAL ASSESSMENT: PAIN_FUNCTIONAL_ASSESSMENT: 0-10

## 2024-11-24 ASSESSMENT — PAIN DESCRIPTION - PAIN TYPE: TYPE: ACUTE PAIN

## 2024-11-24 ASSESSMENT — PAIN SCALES - GENERAL: PAINLEVEL_OUTOF10: 2

## 2024-11-25 ENCOUNTER — APPOINTMENT (OUTPATIENT)
Age: 59
DRG: 463 | End: 2024-11-25
Payer: COMMERCIAL

## 2024-11-25 PROBLEM — N39.0 COMPLICATED UTI (URINARY TRACT INFECTION): Status: ACTIVE | Noted: 2024-11-25

## 2024-11-25 PROBLEM — R06.02 SHORTNESS OF BREATH: Status: ACTIVE | Noted: 2024-11-25

## 2024-11-25 PROBLEM — I50.42 CHRONIC COMBINED SYSTOLIC AND DIASTOLIC CHF (CONGESTIVE HEART FAILURE) (HCC): Status: ACTIVE | Noted: 2024-11-25

## 2024-11-25 PROBLEM — I20.0 UNSTABLE ANGINA (HCC): Status: ACTIVE | Noted: 2024-11-25

## 2024-11-25 LAB
B PARAP IS1001 DNA NPH QL NAA+NON-PROBE: NOT DETECTED
B PERT DNA SPEC QL NAA+PROBE: NOT DETECTED
BASOPHILS # BLD: 0.04 K/UL (ref 0–0.2)
BASOPHILS NFR BLD: 0 % (ref 0–2)
C PNEUM DNA NPH QL NAA+NON-PROBE: NOT DETECTED
ECHO AO SINUS VALSALVA DIAM: 2.8 CM
ECHO AO SINUS VALSALVA INDEX: 1.33 CM/M2
ECHO AO ST JNCT DIAM: 2.4 CM
ECHO AR MAX VEL PISA: 4.1 M/S
ECHO AV CUSP MM: 1.6 CM
ECHO AV MEAN GRADIENT: 4 MMHG
ECHO AV MEAN VELOCITY: 1 M/S
ECHO AV PEAK GRADIENT: 8 MMHG
ECHO AV PEAK VELOCITY: 1.4 M/S
ECHO AV REGURGITANT PHT: 407 MS
ECHO AV VELOCITY RATIO: 0.5
ECHO AV VTI: 26 CM
ECHO BSA: 2.23 M2
ECHO EST RA PRESSURE: 3 MMHG
ECHO LA AREA 2C: 19.9 CM2
ECHO LA AREA 4C: 24.6 CM2
ECHO LA MAJOR AXIS: 6.1 CM
ECHO LA MINOR AXIS: 6.2 CM
ECHO LA VOL BP: 66 ML (ref 22–52)
ECHO LA VOL MOD A2C: 53 ML (ref 22–52)
ECHO LA VOL MOD A4C: 81 ML (ref 22–52)
ECHO LA VOL/BSA BIPLANE: 31 ML/M2 (ref 16–34)
ECHO LA VOLUME INDEX MOD A2C: 25 ML/M2 (ref 16–34)
ECHO LA VOLUME INDEX MOD A4C: 38 ML/M2 (ref 16–34)
ECHO LV E' LATERAL VELOCITY: 8.27 CM/S
ECHO LV E' SEPTAL VELOCITY: 6.85 CM/S
ECHO LV EDV A2C: 76 ML
ECHO LV EDV A4C: 77 ML
ECHO LV EDV INDEX A4C: 36 ML/M2
ECHO LV EDV NDEX A2C: 36 ML/M2
ECHO LV EJECTION FRACTION A2C: 25 %
ECHO LV EJECTION FRACTION A4C: 39 %
ECHO LV EJECTION FRACTION BIPLANE: 33 % (ref 55–100)
ECHO LV ESV A2C: 57 ML
ECHO LV ESV A4C: 47 ML
ECHO LV ESV INDEX A2C: 27 ML/M2
ECHO LV ESV INDEX A4C: 22 ML/M2
ECHO LV FRACTIONAL SHORTENING: 14 % (ref 28–44)
ECHO LV INTERNAL DIMENSION DIASTOLE INDEX: 2.42 CM/M2
ECHO LV INTERNAL DIMENSION DIASTOLIC: 5.1 CM (ref 3.9–5.3)
ECHO LV INTERNAL DIMENSION SYSTOLIC INDEX: 2.09 CM/M2
ECHO LV INTERNAL DIMENSION SYSTOLIC: 4.4 CM
ECHO LV IVSD: 1 CM (ref 0.6–0.9)
ECHO LV MASS 2D: 200.8 G (ref 67–162)
ECHO LV MASS INDEX 2D: 95.2 G/M2 (ref 43–95)
ECHO LV POSTERIOR WALL DIASTOLIC: 1.1 CM (ref 0.6–0.9)
ECHO LV RELATIVE WALL THICKNESS RATIO: 0.43
ECHO LVOT AV VTI INDEX: 0.44
ECHO LVOT MEAN GRADIENT: 1 MMHG
ECHO LVOT PEAK GRADIENT: 2 MMHG
ECHO LVOT PEAK VELOCITY: 0.7 M/S
ECHO LVOT VTI: 11.4 CM
ECHO MV A VELOCITY: 0.49 M/S
ECHO MV E DECELERATION TIME (DT): 98 MS
ECHO MV E VELOCITY: 1.4 M/S
ECHO MV E/A RATIO: 2.86
ECHO MV E/E' LATERAL: 16.93
ECHO MV E/E' RATIO (AVERAGED): 18.68
ECHO MV E/E' SEPTAL: 20.44
ECHO PV MAX VELOCITY: 1 M/S
ECHO PV PEAK GRADIENT: 4 MMHG
ECHO RIGHT VENTRICULAR SYSTOLIC PRESSURE (RVSP): 42 MMHG
ECHO TV REGURGITANT MAX VELOCITY: 3.13 M/S
ECHO TV REGURGITANT PEAK GRADIENT: 39 MMHG
EOSINOPHIL # BLD: 0.22 K/UL (ref 0–0.44)
EOSINOPHILS RELATIVE PERCENT: 2 % (ref 1–4)
ERYTHROCYTE [DISTWIDTH] IN BLOOD BY AUTOMATED COUNT: 16.8 % (ref 11.8–14.4)
FLUAV RNA NPH QL NAA+NON-PROBE: NOT DETECTED
FLUBV RNA NPH QL NAA+NON-PROBE: NOT DETECTED
GLUCOSE BLD-MCNC: 115 MG/DL (ref 74–100)
GLUCOSE BLD-MCNC: 120 MG/DL (ref 74–100)
GLUCOSE BLD-MCNC: 121 MG/DL (ref 74–100)
GLUCOSE BLD-MCNC: 137 MG/DL (ref 74–100)
GLUCOSE BLD-MCNC: 204 MG/DL (ref 74–100)
HADV DNA NPH QL NAA+NON-PROBE: NOT DETECTED
HCOV 229E RNA NPH QL NAA+NON-PROBE: NOT DETECTED
HCOV HKU1 RNA NPH QL NAA+NON-PROBE: NOT DETECTED
HCOV NL63 RNA NPH QL NAA+NON-PROBE: NOT DETECTED
HCOV OC43 RNA NPH QL NAA+NON-PROBE: NOT DETECTED
HCT VFR BLD AUTO: 34.2 % (ref 36.3–47.1)
HGB BLD-MCNC: 10.5 G/DL (ref 11.9–15.1)
HMPV RNA NPH QL NAA+NON-PROBE: NOT DETECTED
HPIV1 RNA NPH QL NAA+NON-PROBE: NOT DETECTED
HPIV2 RNA NPH QL NAA+NON-PROBE: NOT DETECTED
HPIV3 RNA NPH QL NAA+NON-PROBE: NOT DETECTED
HPIV4 RNA NPH QL NAA+NON-PROBE: NOT DETECTED
IMM GRANULOCYTES # BLD AUTO: 0.07 K/UL (ref 0–0.3)
IMM GRANULOCYTES NFR BLD: 1 %
LACTATE BLDV-SCNC: 2.6 MMOL/L (ref 0.5–2.2)
LYMPHOCYTES NFR BLD: 2.01 K/UL (ref 1.1–3.7)
LYMPHOCYTES RELATIVE PERCENT: 21 % (ref 24–43)
M PNEUMO DNA NPH QL NAA+NON-PROBE: NOT DETECTED
MCH RBC QN AUTO: 24.5 PG (ref 25.2–33.5)
MCHC RBC AUTO-ENTMCNC: 30.7 G/DL (ref 28.4–34.8)
MCV RBC AUTO: 79.9 FL (ref 82.6–102.9)
MONOCYTES NFR BLD: 0.5 K/UL (ref 0.1–1.2)
MONOCYTES NFR BLD: 5 % (ref 3–12)
NEUTROPHILS NFR BLD: 71 % (ref 36–65)
NEUTS SEG NFR BLD: 6.55 K/UL (ref 1.5–8.1)
NRBC BLD-RTO: 0 PER 100 WBC
PLATELET # BLD AUTO: 283 K/UL (ref 138–453)
PMV BLD AUTO: 9.8 FL (ref 8.1–13.5)
RBC # BLD AUTO: 4.28 M/UL (ref 3.95–5.11)
RSV RNA NPH QL NAA+NON-PROBE: NOT DETECTED
RV+EV RNA NPH QL NAA+NON-PROBE: NOT DETECTED
SARS-COV-2 RNA NPH QL NAA+NON-PROBE: NOT DETECTED
SPECIMEN DESCRIPTION: NORMAL
TROPONIN I SERPL HS-MCNC: 44 NG/L (ref 0–14)
WBC OTHER # BLD: 9.4 K/UL (ref 3.5–11.3)

## 2024-11-25 PROCEDURE — 6360000002 HC RX W HCPCS

## 2024-11-25 PROCEDURE — 94761 N-INVAS EAR/PLS OXIMETRY MLT: CPT

## 2024-11-25 PROCEDURE — 82947 ASSAY GLUCOSE BLOOD QUANT: CPT

## 2024-11-25 PROCEDURE — 0202U NFCT DS 22 TRGT SARS-COV-2: CPT

## 2024-11-25 PROCEDURE — 93306 TTE W/DOPPLER COMPLETE: CPT

## 2024-11-25 PROCEDURE — 93005 ELECTROCARDIOGRAM TRACING: CPT | Performed by: FAMILY MEDICINE

## 2024-11-25 PROCEDURE — 2580000003 HC RX 258

## 2024-11-25 PROCEDURE — 94664 DEMO&/EVAL PT USE INHALER: CPT

## 2024-11-25 PROCEDURE — 1200000000 HC SEMI PRIVATE

## 2024-11-25 PROCEDURE — 83605 ASSAY OF LACTIC ACID: CPT

## 2024-11-25 PROCEDURE — 85025 COMPLETE CBC W/AUTO DIFF WBC: CPT

## 2024-11-25 PROCEDURE — 6370000000 HC RX 637 (ALT 250 FOR IP)

## 2024-11-25 PROCEDURE — 36415 COLL VENOUS BLD VENIPUNCTURE: CPT

## 2024-11-25 PROCEDURE — 84484 ASSAY OF TROPONIN QUANT: CPT

## 2024-11-25 PROCEDURE — 93306 TTE W/DOPPLER COMPLETE: CPT | Performed by: FAMILY MEDICINE

## 2024-11-25 PROCEDURE — 94640 AIRWAY INHALATION TREATMENT: CPT

## 2024-11-25 RX ORDER — ENOXAPARIN SODIUM 100 MG/ML
30 INJECTION SUBCUTANEOUS 2 TIMES DAILY
Status: DISCONTINUED | OUTPATIENT
Start: 2024-11-25 | End: 2024-11-26 | Stop reason: HOSPADM

## 2024-11-25 RX ORDER — FUROSEMIDE 20 MG/1
20 TABLET ORAL DAILY
Status: DISCONTINUED | OUTPATIENT
Start: 2024-11-26 | End: 2024-11-26 | Stop reason: HOSPADM

## 2024-11-25 RX ORDER — GABAPENTIN 400 MG/1
800 CAPSULE ORAL 3 TIMES DAILY
Status: DISCONTINUED | OUTPATIENT
Start: 2024-11-25 | End: 2024-11-26 | Stop reason: HOSPADM

## 2024-11-25 RX ORDER — FUROSEMIDE 20 MG/1
20 TABLET ORAL DAILY
Status: DISCONTINUED | OUTPATIENT
Start: 2024-11-25 | End: 2024-11-25

## 2024-11-25 RX ORDER — SODIUM CHLORIDE 0.9 % (FLUSH) 0.9 %
5-40 SYRINGE (ML) INJECTION EVERY 12 HOURS SCHEDULED
Status: DISCONTINUED | OUTPATIENT
Start: 2024-11-25 | End: 2024-11-26 | Stop reason: HOSPADM

## 2024-11-25 RX ORDER — POLYETHYLENE GLYCOL 3350 17 G/17G
17 POWDER, FOR SOLUTION ORAL DAILY PRN
Status: DISCONTINUED | OUTPATIENT
Start: 2024-11-25 | End: 2024-11-26 | Stop reason: HOSPADM

## 2024-11-25 RX ORDER — FLUDROCORTISONE ACETATE 0.1 MG/1
0.3 TABLET ORAL DAILY
Status: DISCONTINUED | OUTPATIENT
Start: 2024-11-25 | End: 2024-11-26 | Stop reason: HOSPADM

## 2024-11-25 RX ORDER — SPIRONOLACTONE 25 MG/1
12.5 TABLET ORAL DAILY
Status: DISCONTINUED | OUTPATIENT
Start: 2024-11-25 | End: 2024-11-26 | Stop reason: HOSPADM

## 2024-11-25 RX ORDER — CLOPIDOGREL BISULFATE 75 MG/1
75 TABLET ORAL DAILY
Status: DISCONTINUED | OUTPATIENT
Start: 2024-11-25 | End: 2024-11-26 | Stop reason: HOSPADM

## 2024-11-25 RX ORDER — ALBUTEROL SULFATE 0.83 MG/ML
2.5 SOLUTION RESPIRATORY (INHALATION) EVERY 6 HOURS PRN
Status: DISCONTINUED | OUTPATIENT
Start: 2024-11-25 | End: 2024-11-26 | Stop reason: HOSPADM

## 2024-11-25 RX ORDER — GLUCAGON 1 MG/ML
1 KIT INJECTION PRN
Status: DISCONTINUED | OUTPATIENT
Start: 2024-11-25 | End: 2024-11-26 | Stop reason: HOSPADM

## 2024-11-25 RX ORDER — ACETAMINOPHEN 650 MG/1
650 SUPPOSITORY RECTAL EVERY 6 HOURS PRN
Status: DISCONTINUED | OUTPATIENT
Start: 2024-11-25 | End: 2024-11-26 | Stop reason: HOSPADM

## 2024-11-25 RX ORDER — ONDANSETRON 4 MG/1
4 TABLET, ORALLY DISINTEGRATING ORAL EVERY 8 HOURS PRN
Status: DISCONTINUED | OUTPATIENT
Start: 2024-11-25 | End: 2024-11-26 | Stop reason: HOSPADM

## 2024-11-25 RX ORDER — ALBUTEROL SULFATE 90 UG/1
2 INHALANT RESPIRATORY (INHALATION) EVERY 6 HOURS PRN
Status: DISCONTINUED | OUTPATIENT
Start: 2024-11-25 | End: 2024-11-26 | Stop reason: HOSPADM

## 2024-11-25 RX ORDER — LOSARTAN POTASSIUM 25 MG/1
25 TABLET ORAL DAILY
Status: DISCONTINUED | OUTPATIENT
Start: 2024-11-25 | End: 2024-11-26 | Stop reason: HOSPADM

## 2024-11-25 RX ORDER — ACETAMINOPHEN 325 MG/1
650 TABLET ORAL EVERY 6 HOURS PRN
Status: DISCONTINUED | OUTPATIENT
Start: 2024-11-25 | End: 2024-11-26 | Stop reason: HOSPADM

## 2024-11-25 RX ORDER — DULOXETIN HYDROCHLORIDE 30 MG/1
30 CAPSULE, DELAYED RELEASE ORAL DAILY
Status: DISCONTINUED | OUTPATIENT
Start: 2024-11-25 | End: 2024-11-26 | Stop reason: HOSPADM

## 2024-11-25 RX ORDER — ANASTROZOLE 1 MG/1
1 TABLET ORAL DAILY
Status: DISCONTINUED | OUTPATIENT
Start: 2024-11-25 | End: 2024-11-26 | Stop reason: HOSPADM

## 2024-11-25 RX ORDER — METOPROLOL SUCCINATE 50 MG/1
50 TABLET, EXTENDED RELEASE ORAL DAILY
Status: DISCONTINUED | OUTPATIENT
Start: 2024-11-25 | End: 2024-11-26 | Stop reason: HOSPADM

## 2024-11-25 RX ORDER — EZETIMIBE 10 MG/1
10 TABLET ORAL NIGHTLY
Status: DISCONTINUED | OUTPATIENT
Start: 2024-11-25 | End: 2024-11-26 | Stop reason: HOSPADM

## 2024-11-25 RX ORDER — METFORMIN HYDROCHLORIDE 500 MG/1
1000 TABLET, EXTENDED RELEASE ORAL
Status: DISCONTINUED | OUTPATIENT
Start: 2024-11-25 | End: 2024-11-26 | Stop reason: HOSPADM

## 2024-11-25 RX ORDER — ASPIRIN 81 MG/1
81 TABLET ORAL DAILY
Status: DISCONTINUED | OUTPATIENT
Start: 2024-11-25 | End: 2024-11-26 | Stop reason: HOSPADM

## 2024-11-25 RX ORDER — ATORVASTATIN CALCIUM 40 MG/1
80 TABLET, FILM COATED ORAL DAILY
Status: DISCONTINUED | OUTPATIENT
Start: 2024-11-25 | End: 2024-11-26 | Stop reason: HOSPADM

## 2024-11-25 RX ORDER — SODIUM CHLORIDE 9 MG/ML
INJECTION, SOLUTION INTRAVENOUS PRN
Status: DISCONTINUED | OUTPATIENT
Start: 2024-11-25 | End: 2024-11-26 | Stop reason: HOSPADM

## 2024-11-25 RX ORDER — PANTOPRAZOLE SODIUM 40 MG/1
40 TABLET, DELAYED RELEASE ORAL
Status: DISCONTINUED | OUTPATIENT
Start: 2024-11-25 | End: 2024-11-26 | Stop reason: HOSPADM

## 2024-11-25 RX ORDER — SODIUM CHLORIDE 0.9 % (FLUSH) 0.9 %
5-40 SYRINGE (ML) INJECTION PRN
Status: DISCONTINUED | OUTPATIENT
Start: 2024-11-25 | End: 2024-11-26 | Stop reason: HOSPADM

## 2024-11-25 RX ORDER — FUROSEMIDE 10 MG/ML
20 INJECTION INTRAMUSCULAR; INTRAVENOUS ONCE
Status: DISCONTINUED | OUTPATIENT
Start: 2024-11-25 | End: 2024-11-25

## 2024-11-25 RX ORDER — ONDANSETRON 2 MG/ML
4 INJECTION INTRAMUSCULAR; INTRAVENOUS EVERY 6 HOURS PRN
Status: DISCONTINUED | OUTPATIENT
Start: 2024-11-25 | End: 2024-11-26 | Stop reason: HOSPADM

## 2024-11-25 RX ORDER — DEXTROSE MONOHYDRATE 100 MG/ML
INJECTION, SOLUTION INTRAVENOUS CONTINUOUS PRN
Status: DISCONTINUED | OUTPATIENT
Start: 2024-11-25 | End: 2024-11-26 | Stop reason: HOSPADM

## 2024-11-25 RX ADMIN — ENOXAPARIN SODIUM 30 MG: 100 INJECTION SUBCUTANEOUS at 20:28

## 2024-11-25 RX ADMIN — ENOXAPARIN SODIUM 30 MG: 100 INJECTION SUBCUTANEOUS at 01:04

## 2024-11-25 RX ADMIN — EZETIMIBE 10 MG: 10 TABLET ORAL at 20:28

## 2024-11-25 RX ADMIN — SODIUM CHLORIDE, PRESERVATIVE FREE 10 ML: 5 INJECTION INTRAVENOUS at 20:28

## 2024-11-25 RX ADMIN — INSULIN HUMAN 60 UNITS: 100 INJECTION, SOLUTION PARENTERAL at 07:54

## 2024-11-25 RX ADMIN — METFORMIN HYDROCHLORIDE 1000 MG: 500 TABLET, EXTENDED RELEASE ORAL at 07:48

## 2024-11-25 RX ADMIN — PANTOPRAZOLE SODIUM 40 MG: 40 TABLET, DELAYED RELEASE ORAL at 07:47

## 2024-11-25 RX ADMIN — CLOPIDOGREL BISULFATE 75 MG: 75 TABLET ORAL at 07:47

## 2024-11-25 RX ADMIN — ALBUTEROL SULFATE 2.5 MG: 2.5 SOLUTION RESPIRATORY (INHALATION) at 04:39

## 2024-11-25 RX ADMIN — ATORVASTATIN CALCIUM 80 MG: 40 TABLET, FILM COATED ORAL at 07:48

## 2024-11-25 RX ADMIN — DULOXETINE HYDROCHLORIDE 30 MG: 30 CAPSULE, DELAYED RELEASE ORAL at 07:48

## 2024-11-25 RX ADMIN — SODIUM CHLORIDE, PRESERVATIVE FREE 10 ML: 5 INJECTION INTRAVENOUS at 09:34

## 2024-11-25 RX ADMIN — GABAPENTIN 800 MG: 400 CAPSULE ORAL at 14:04

## 2024-11-25 RX ADMIN — METOPROLOL SUCCINATE 50 MG: 50 TABLET, EXTENDED RELEASE ORAL at 09:34

## 2024-11-25 RX ADMIN — GABAPENTIN 800 MG: 400 CAPSULE ORAL at 20:28

## 2024-11-25 RX ADMIN — GABAPENTIN 800 MG: 400 CAPSULE ORAL at 09:33

## 2024-11-25 RX ADMIN — CEFTRIAXONE SODIUM 1000 MG: 1 INJECTION, POWDER, FOR SOLUTION INTRAMUSCULAR; INTRAVENOUS at 22:56

## 2024-11-25 RX ADMIN — FLUDROCORTISONE ACETATE 0.3 MG: 0.1 TABLET ORAL at 07:48

## 2024-11-25 RX ADMIN — ENOXAPARIN SODIUM 30 MG: 100 INJECTION SUBCUTANEOUS at 07:55

## 2024-11-25 RX ADMIN — ASPIRIN 81 MG: 81 TABLET, COATED ORAL at 07:48

## 2024-11-25 RX ADMIN — EMPAGLIFLOZIN 10 MG: 10 TABLET, FILM COATED ORAL at 07:48

## 2024-11-25 RX ADMIN — INSULIN HUMAN 60 UNITS: 100 INJECTION, SOLUTION PARENTERAL at 20:27

## 2024-11-25 NOTE — CONSULTS
I, Darlin Plascencia am scribing for and in the presence of Gloria West PA-C.    Patient: Christie Belcher  : 1965  Date of Visit: 2024    REASON FOR VISIT / CONSULTATION: Shortness of Breath (Consistent sob starting yesterday, worsening with exertion. Headache, aching to lower neck. )    Dear Bulmaro Plasencia, CHELSEA - CNP,    I had the pleasure of seeing your patient Christie Belcher in consultation today. As you know, Ms. Belcher is a 59 y.o. female who presents with a history of intermittent episodes of back and chest discomfort that started developing since her recent CABG involving a LIMA-LAD 8/15/16.  She also has a history of  dysautonomia on a tilt table test, and was started on Florinef as well as Lexapro. Recent heart cath done 3/7/2019 shows severe single vessel disease involving LAD Normal LVEDP. She had a Holter monitor done on 10/23/2019 that did show PAC's and PVC's but was largely unremarkable. She did come to the ER on 2022 due to abdominal pain and shortness of breath. She was told her EKG was abnormal and also she had an elevated troponin which was only 15 ng/L and only had minor EKG changes. She was sent to [facility] for this and she was not very happy about this. Cardiovascular stress test done on 2022 showed Overall, these results are most consistent with an intermediate/high risk for significant coronary artery disease. Echocardiogram on 2022 showed EF:>55% with mild diastolic dysfunction and LV wall thickness mildly increased. ER on 2023 due to acute coronary syndrome -Transferred to [place] - Successful PCI of left main and circumflex in-stent restenosis with 3.5 x 26 mm Medtronic Kurt drug-eluting stent which was post dilated to 4.0 mm in diameter. She was in the ER on 10/2/2023 and transferred to [facility] on 10/3/2023 and diagnoised with a NSTEMI. Dr Evans did a heart cath, IVUS for severe ISR - stent is underexpanded and malapposed PCI with 4.0 CBA, then

## 2024-11-25 NOTE — ED PROVIDER NOTES
[facility] ED  EMERGENCY DEPARTMENT ENCOUNTER      Pt Name: Christie Belcher  MRN: [id number]  Birthdate 1965  Date of evaluation: 11/24/2024  Provider: Raymon Bob PA-C  10:28 PM    CHIEF COMPLAINT       Chief Complaint   Patient presents with    Shortness of Breath     Consistent sob starting yesterday, worsening with exertion. Headache, aching to lower neck.          HISTORY OF PRESENT ILLNESS    Christie Belcher is a 59 y.o. female who presents to the emergency department with her  and complaints of increased shortness of breath and history of congestive heart failure.  She does take Lasix her cardiologist is Dr. Becker and she states she is diabetic her last A1c was 8.  She states that her symptoms were to started with neck discomfort and lower occipital headache for the past few days and she has had increased left sided occipital headache.  She denies any chest pain associated with her symptoms she denies any fever or chills.      The history is provided by the patient and the spouse. No  was used.       Nursing Notes were reviewed.    REVIEW OF SYSTEMS       Review of Systems    Except as noted above the remainder of the review of systems was reviewed and negative.       PAST MEDICAL HISTORY     Past Medical History:   Diagnosis Date    Anxiety     Asthma     Breast cancer ([facility]) 2022    RT    CAD (coronary artery disease)     x1 stent 2010,x1 stent 2016, x1 stent May 2022    Cancer ([facility]) 06/2022    right breast cancer    Clinical trial participant at discharge 03/31/2016    COPD (chronic obstructive pulmonary disease) ([facility])     Depression     Diabetic neuropathy ([facility])     GERD (gastroesophageal reflux disease)     H/O cardiac catheterization 04/26/2016    LMCA: Mild Irregularities 10-20%. LAD: Lesion on Prox LAD: Proximal subsection. 100% stenosis. LCx: Mild irregularities 10-20%. RCA: Mild irregularities 10-20%. Widely patent mid RCA stent. EF:60%.    H/O cardiovascular 
noted in aVR.  There is poor R wave progression.    Repeat EKG 20 minutes later does show similar findings.    Given tachycardia concern for possible PE.  I do have concern for possible anginal equivalent.  Will check troponins at this time initial EKG was stamped as ACS, how not clear STEMI, but   She does not appear t o be in CHF, and is not wheezing on my exam.  Will check trop, and scan chest, and if trop elevated will anticipate admission for further cardiac r/o        Patient's urine grossly infected.  And concern for infectious etiology of her tachycardia.  Lactic acid was drawn and blood cultures to be obtained, and patient given antibiotics.  Troponin 35-39.  Plan for admission at this time.  ED Course as of 11/25/24 0225   Sun Nov 24, 2024   [id number] Spoke with ambrose who accepted the patient for admission [CE]      ED Course User Index  [CE] Letha Durand, Letha Hodge DO  11/25/24 0226

## 2024-11-25 NOTE — H&P
History and Physical    Patient:  Christie Belcher  MRN: [id number]    Chief Complaint:  shortness of breath    History Obtained From:  patient, electronic medical record    PCP: Bulmaro Plasencia APRN - CNP    History of Present Illness:   The patient is a 59 y.o. female who presents with shortness of breath.  Patient states this has been ongoing for a few days.  She's short of breath at rest and with exertion.  She has had a nonproductive cough.  She denies any chest pain.  She denies any increased swelling. She has complained of neck pain on the left side of her neck with a subsequent dull left sided headache.  She has been having urinary urgency and frequency. She denies any recent medication changes.  She follows with Dr. Becker for cardiology. Past medical history includes, MI, stents, CABG, Type 2 DM, DARON, breast cancer, CHF, COPD and asthma.  Initial lactic 2.5.  WBC 11.4.  Glucose 207.  proBNP 1312.  Troponin 35 and 39.  Serial EKGs showed ST changes in the lateral leads.  CT head negative for any acute findings.  CT chest shows no evidence of PE or acute pulmonary abnormality.  Urinalysis showed cloudy urine with positive nitrites and trace leukocyte Estrace as well as 3+ bacteria, yeast and 10-20 WBCs.  Patient currently denies any complaints.  She was tachycardic in the ER with a heart rate around 107.  It is currently in the 90s.    Past Medical History:        Diagnosis Date    Anxiety     Asthma     Breast cancer ([facility]) 2022    RT    CAD (coronary artery disease)     x1 stent 2010,x1 stent 2016, x1 stent May 2022    Cancer ([facility]) 06/2022    right breast cancer    Clinical trial participant at discharge 03/31/2016    COPD (chronic obstructive pulmonary disease) ([facility])     Depression     Diabetic neuropathy ([facility])     GERD (gastroesophageal reflux disease)     H/O cardiac catheterization 04/26/2016    LMCA: Mild Irregularities 10-20%. LAD: Lesion on Prox LAD: Proximal subsection. 100% stenosis. LCx: Mild

## 2024-11-25 NOTE — RT PROTOCOL NOTE
X)  ____Yes    ____ No     ____ NA    Smoking Cessation Booklet given:  ____Yes  ____No ____Patient Refused

## 2024-11-26 ENCOUNTER — HOSPITAL ENCOUNTER (INPATIENT)
Age: 59
LOS: 2 days | Discharge: HOME | DRG: 175 | End: 2024-11-28
Attending: STUDENT IN AN ORGANIZED HEALTH CARE EDUCATION/TRAINING PROGRAM | Admitting: INTERNAL MEDICINE
Payer: COMMERCIAL

## 2024-11-26 VITALS
OXYGEN SATURATION: 95 % | DIASTOLIC BLOOD PRESSURE: 69 MMHG | RESPIRATION RATE: 18 BRPM | TEMPERATURE: 97.4 F | WEIGHT: 243.83 LBS | SYSTOLIC BLOOD PRESSURE: 109 MMHG | BODY MASS INDEX: 43.2 KG/M2 | HEART RATE: 93 BPM | HEIGHT: 63 IN

## 2024-11-26 DIAGNOSIS — I25.119 CORONARY ARTERY DISEASE WITH ANGINA PECTORIS, UNSPECIFIED VESSEL OR LESION TYPE, UNSPECIFIED WHETHER NATIVE OR TRANSPLANTED HEART (HCC): ICD-10-CM

## 2024-11-26 PROBLEM — I25.10 CORONARY ARTERY DISEASE, UNSPECIFIED VESSEL OR LESION TYPE, UNSPECIFIED WHETHER ANGINA PRESENT, UNSPECIFIED WHETHER NATIVE OR TRANSPLANTED HEART: Status: ACTIVE | Noted: 2024-11-26

## 2024-11-26 LAB
ALBUMIN SERPL-MCNC: 3.5 G/DL (ref 3.5–5.2)
ALBUMIN/GLOB SERPL: 1.1 {RATIO} (ref 1–2.5)
ALP SERPL-CCNC: 80 U/L (ref 35–104)
ALT SERPL-CCNC: 7 U/L (ref 10–35)
ANION GAP SERPL CALCULATED.3IONS-SCNC: 9 MMOL/L (ref 9–16)
ANTI-XA UNFRAC HEPARIN: <0.1 IU/L
AST SERPL-CCNC: 14 U/L (ref 10–35)
BASOPHILS # BLD: 0.07 K/UL (ref 0–0.2)
BASOPHILS NFR BLD: 1 % (ref 0–2)
BILIRUB SERPL-MCNC: 0.3 MG/DL (ref 0–1.2)
BUN SERPL-MCNC: 21 MG/DL (ref 6–20)
BUN/CREAT SERPL: 23 (ref 9–20)
CALCIUM SERPL-MCNC: 8.7 MG/DL (ref 8.6–10.4)
CHLORIDE SERPL-SCNC: 106 MMOL/L (ref 98–107)
CO2 SERPL-SCNC: 25 MMOL/L (ref 20–31)
CREAT SERPL-MCNC: 0.9 MG/DL (ref 0.5–0.9)
EKG ATRIAL RATE: 110 BPM
EKG ATRIAL RATE: 113 BPM
EKG ATRIAL RATE: 90 BPM
EKG ATRIAL RATE: 96 BPM
EKG P AXIS: 45 DEGREES
EKG P AXIS: 47 DEGREES
EKG P AXIS: 50 DEGREES
EKG P AXIS: 54 DEGREES
EKG P-R INTERVAL: 134 MS
EKG P-R INTERVAL: 134 MS
EKG P-R INTERVAL: 146 MS
EKG P-R INTERVAL: 146 MS
EKG Q-T INTERVAL: 326 MS
EKG Q-T INTERVAL: 342 MS
EKG Q-T INTERVAL: 378 MS
EKG Q-T INTERVAL: 384 MS
EKG QRS DURATION: 92 MS
EKG QRS DURATION: 96 MS
EKG QTC CALCULATION (BAZETT): 447 MS
EKG QTC CALCULATION (BAZETT): 462 MS
EKG QTC CALCULATION (BAZETT): 469 MS
EKG QTC CALCULATION (BAZETT): 477 MS
EKG R AXIS: 37 DEGREES
EKG R AXIS: 38 DEGREES
EKG R AXIS: 39 DEGREES
EKG R AXIS: 46 DEGREES
EKG T AXIS: 151 DEGREES
EKG T AXIS: 151 DEGREES
EKG T AXIS: 156 DEGREES
EKG T AXIS: 177 DEGREES
EKG VENTRICULAR RATE: 110 BPM
EKG VENTRICULAR RATE: 113 BPM
EKG VENTRICULAR RATE: 90 BPM
EKG VENTRICULAR RATE: 96 BPM
EOSINOPHIL # BLD: 0.13 K/UL (ref 0–0.44)
EOSINOPHILS RELATIVE PERCENT: 1 % (ref 1–4)
ERYTHROCYTE [DISTWIDTH] IN BLOOD BY AUTOMATED COUNT: 16.8 % (ref 11.8–14.4)
ERYTHROCYTE [DISTWIDTH] IN BLOOD BY AUTOMATED COUNT: 17 % (ref 11.8–14.4)
GFR, ESTIMATED: 78 ML/MIN/1.73M2
GLUCOSE BLD-MCNC: 119 MG/DL (ref 74–100)
GLUCOSE BLD-MCNC: 129 MG/DL (ref 74–100)
GLUCOSE BLD-MCNC: 138 MG/DL (ref 74–100)
GLUCOSE BLD-MCNC: 139 MG/DL (ref 74–100)
GLUCOSE BLD-MCNC: 51 MG/DL (ref 74–100)
GLUCOSE BLD-MCNC: 61 MG/DL (ref 74–100)
GLUCOSE BLD-MCNC: 66 MG/DL (ref 74–100)
GLUCOSE BLD-MCNC: 86 MG/DL (ref 74–100)
GLUCOSE SERPL-MCNC: 140 MG/DL (ref 74–99)
HCT VFR BLD AUTO: 32.6 % (ref 36.3–47.1)
HCT VFR BLD AUTO: 33.2 % (ref 36.3–47.1)
HGB BLD-MCNC: 10.2 G/DL (ref 11.9–15.1)
HGB BLD-MCNC: 9.6 G/DL (ref 11.9–15.1)
IMM GRANULOCYTES # BLD AUTO: 0.06 K/UL (ref 0–0.3)
IMM GRANULOCYTES NFR BLD: 1 %
INR PPP: 1.1
LYMPHOCYTES NFR BLD: 1.82 K/UL (ref 1.1–3.7)
LYMPHOCYTES RELATIVE PERCENT: 19 % (ref 24–43)
MCH RBC QN AUTO: 24.1 PG (ref 25.2–33.5)
MCH RBC QN AUTO: 24.5 PG (ref 25.2–33.5)
MCHC RBC AUTO-ENTMCNC: 29.4 G/DL (ref 28.4–34.8)
MCHC RBC AUTO-ENTMCNC: 30.7 G/DL (ref 28.4–34.8)
MCV RBC AUTO: 79.6 FL (ref 82.6–102.9)
MCV RBC AUTO: 81.9 FL (ref 82.6–102.9)
MICROORGANISM SPEC CULT: ABNORMAL
MONOCYTES NFR BLD: 0.44 K/UL (ref 0.1–1.2)
MONOCYTES NFR BLD: 5 % (ref 3–12)
NEUTROPHILS NFR BLD: 73 % (ref 36–65)
NEUTS SEG NFR BLD: 7.08 K/UL (ref 1.5–8.1)
NRBC BLD-RTO: 0 PER 100 WBC
NRBC BLD-RTO: 0 PER 100 WBC
PARTIAL THROMBOPLASTIN TIME: 32.8 SEC (ref 23–36.5)
PARTIAL THROMBOPLASTIN TIME: 37.2 SEC (ref 26.8–34.8)
PLATELET # BLD AUTO: 249 K/UL (ref 138–453)
PLATELET # BLD AUTO: 272 K/UL (ref 138–453)
PMV BLD AUTO: 10 FL (ref 8.1–13.5)
PMV BLD AUTO: 9.6 FL (ref 8.1–13.5)
POTASSIUM SERPL-SCNC: 5.1 MMOL/L (ref 3.7–5.3)
PROT SERPL-MCNC: 6.7 G/DL (ref 6.6–8.7)
PROTHROMBIN TIME: 14.4 SEC (ref 11.7–14.9)
RBC # BLD AUTO: 3.98 M/UL (ref 3.95–5.11)
RBC # BLD AUTO: 4.17 M/UL (ref 3.95–5.11)
SERVICE CMNT-IMP: ABNORMAL
SODIUM SERPL-SCNC: 140 MMOL/L (ref 136–145)
SPECIMEN DESCRIPTION: ABNORMAL
WBC OTHER # BLD: 10.6 K/UL (ref 3.5–11.3)
WBC OTHER # BLD: 9.6 K/UL (ref 3.5–11.3)

## 2024-11-26 PROCEDURE — 4A023N7 MEASUREMENT OF CARDIAC SAMPLING AND PRESSURE, LEFT HEART, PERCUTANEOUS APPROACH: ICD-10-PCS | Performed by: STUDENT IN AN ORGANIZED HEALTH CARE EDUCATION/TRAINING PROGRAM

## 2024-11-26 PROCEDURE — 2580000003 HC RX 258: Performed by: NURSE PRACTITIONER

## 2024-11-26 PROCEDURE — C1894 INTRO/SHEATH, NON-LASER: HCPCS | Performed by: FAMILY MEDICINE

## 2024-11-26 PROCEDURE — 85520 HEPARIN ASSAY: CPT

## 2024-11-26 PROCEDURE — 99152 MOD SED SAME PHYS/QHP 5/>YRS: CPT | Performed by: FAMILY MEDICINE

## 2024-11-26 PROCEDURE — B2111ZZ FLUOROSCOPY OF MULTIPLE CORONARY ARTERIES USING LOW OSMOLAR CONTRAST: ICD-10-PCS | Performed by: STUDENT IN AN ORGANIZED HEALTH CARE EDUCATION/TRAINING PROGRAM

## 2024-11-26 PROCEDURE — 2709999900 HC NON-CHARGEABLE SUPPLY: Performed by: FAMILY MEDICINE

## 2024-11-26 PROCEDURE — 93459 L HRT ART/GRFT ANGIO: CPT | Performed by: FAMILY MEDICINE

## 2024-11-26 PROCEDURE — 6360000004 HC RX CONTRAST MEDICATION: Performed by: FAMILY MEDICINE

## 2024-11-26 PROCEDURE — 99153 MOD SED SAME PHYS/QHP EA: CPT | Performed by: FAMILY MEDICINE

## 2024-11-26 PROCEDURE — 93010 ELECTROCARDIOGRAM REPORT: CPT | Performed by: FAMILY MEDICINE

## 2024-11-26 PROCEDURE — 82947 ASSAY GLUCOSE BLOOD QUANT: CPT

## 2024-11-26 PROCEDURE — 85027 COMPLETE CBC AUTOMATED: CPT

## 2024-11-26 PROCEDURE — 80053 COMPREHEN METABOLIC PANEL: CPT

## 2024-11-26 PROCEDURE — 94761 N-INVAS EAR/PLS OXIMETRY MLT: CPT

## 2024-11-26 PROCEDURE — 6360000002 HC RX W HCPCS: Performed by: FAMILY MEDICINE

## 2024-11-26 PROCEDURE — C1769 GUIDE WIRE: HCPCS | Performed by: FAMILY MEDICINE

## 2024-11-26 PROCEDURE — 36415 COLL VENOUS BLD VENIPUNCTURE: CPT

## 2024-11-26 PROCEDURE — 85730 THROMBOPLASTIN TIME PARTIAL: CPT

## 2024-11-26 PROCEDURE — 6360000002 HC RX W HCPCS

## 2024-11-26 PROCEDURE — 2060000000 HC ICU INTERMEDIATE R&B

## 2024-11-26 PROCEDURE — B2151ZZ FLUOROSCOPY OF LEFT HEART USING LOW OSMOLAR CONTRAST: ICD-10-PCS | Performed by: STUDENT IN AN ORGANIZED HEALTH CARE EDUCATION/TRAINING PROGRAM

## 2024-11-26 PROCEDURE — 85025 COMPLETE CBC W/AUTO DIFF WBC: CPT

## 2024-11-26 PROCEDURE — 2580000003 HC RX 258

## 2024-11-26 PROCEDURE — 6370000000 HC RX 637 (ALT 250 FOR IP)

## 2024-11-26 PROCEDURE — 7100000011 HC PHASE II RECOVERY - ADDTL 15 MIN: Performed by: FAMILY MEDICINE

## 2024-11-26 PROCEDURE — 7100000010 HC PHASE II RECOVERY - FIRST 15 MIN: Performed by: FAMILY MEDICINE

## 2024-11-26 PROCEDURE — 85610 PROTHROMBIN TIME: CPT

## 2024-11-26 RX ORDER — HEPARIN SODIUM 1000 [USP'U]/ML
INJECTION, SOLUTION INTRAVENOUS; SUBCUTANEOUS PRN
Status: DISCONTINUED | OUTPATIENT
Start: 2024-11-26 | End: 2024-11-26 | Stop reason: HOSPADM

## 2024-11-26 RX ORDER — HEPARIN SODIUM 1000 [USP'U]/ML
4000 INJECTION, SOLUTION INTRAVENOUS; SUBCUTANEOUS PRN
Status: DISCONTINUED | OUTPATIENT
Start: 2024-11-26 | End: 2024-11-26 | Stop reason: HOSPADM

## 2024-11-26 RX ORDER — IOPAMIDOL 755 MG/ML
INJECTION, SOLUTION INTRAVASCULAR PRN
Status: DISCONTINUED | OUTPATIENT
Start: 2024-11-26 | End: 2024-11-26 | Stop reason: HOSPADM

## 2024-11-26 RX ORDER — POTASSIUM CHLORIDE 7.45 MG/ML
10 INJECTION INTRAVENOUS PRN
Status: DISCONTINUED | OUTPATIENT
Start: 2024-11-26 | End: 2024-11-28 | Stop reason: HOSPADM

## 2024-11-26 RX ORDER — HEPARIN SODIUM 10000 [USP'U]/100ML
5-30 INJECTION, SOLUTION INTRAVENOUS CONTINUOUS
Status: DISCONTINUED | OUTPATIENT
Start: 2024-11-26 | End: 2024-11-27

## 2024-11-26 RX ORDER — HEPARIN SODIUM 1000 [USP'U]/ML
4000 INJECTION, SOLUTION INTRAVENOUS; SUBCUTANEOUS PRN
Status: DISCONTINUED | OUTPATIENT
Start: 2024-11-26 | End: 2024-11-27

## 2024-11-26 RX ORDER — MIDAZOLAM HYDROCHLORIDE 1 MG/ML
INJECTION, SOLUTION INTRAMUSCULAR; INTRAVENOUS PRN
Status: DISCONTINUED | OUTPATIENT
Start: 2024-11-26 | End: 2024-11-26 | Stop reason: HOSPADM

## 2024-11-26 RX ORDER — ACETAMINOPHEN 325 MG/1
650 TABLET ORAL EVERY 6 HOURS PRN
Status: DISCONTINUED | OUTPATIENT
Start: 2024-11-26 | End: 2024-11-27 | Stop reason: ALTCHOICE

## 2024-11-26 RX ORDER — POTASSIUM CHLORIDE 1500 MG/1
40 TABLET, EXTENDED RELEASE ORAL PRN
Status: DISCONTINUED | OUTPATIENT
Start: 2024-11-26 | End: 2024-11-28 | Stop reason: HOSPADM

## 2024-11-26 RX ORDER — NITROGLYCERIN 20 MG/100ML
INJECTION INTRAVENOUS PRN
Status: DISCONTINUED | OUTPATIENT
Start: 2024-11-26 | End: 2024-11-26 | Stop reason: HOSPADM

## 2024-11-26 RX ORDER — SODIUM CHLORIDE 0.9 % (FLUSH) 0.9 %
5-40 SYRINGE (ML) INJECTION EVERY 12 HOURS SCHEDULED
Status: DISCONTINUED | OUTPATIENT
Start: 2024-11-26 | End: 2024-11-28 | Stop reason: HOSPADM

## 2024-11-26 RX ORDER — POLYETHYLENE GLYCOL 3350 17 G/17G
17 POWDER, FOR SOLUTION ORAL DAILY PRN
Status: DISCONTINUED | OUTPATIENT
Start: 2024-11-26 | End: 2024-11-28 | Stop reason: HOSPADM

## 2024-11-26 RX ORDER — HEPARIN SODIUM 10000 [USP'U]/100ML
5-30 INJECTION, SOLUTION INTRAVENOUS CONTINUOUS
Status: DISCONTINUED | OUTPATIENT
Start: 2024-11-26 | End: 2024-11-26 | Stop reason: HOSPADM

## 2024-11-26 RX ORDER — LIDOCAINE HYDROCHLORIDE 10 MG/ML
INJECTION, SOLUTION INFILTRATION; PERINEURAL PRN
Status: DISCONTINUED | OUTPATIENT
Start: 2024-11-26 | End: 2024-11-26 | Stop reason: HOSPADM

## 2024-11-26 RX ORDER — ACETAMINOPHEN 650 MG/1
650 SUPPOSITORY RECTAL EVERY 6 HOURS PRN
Status: DISCONTINUED | OUTPATIENT
Start: 2024-11-26 | End: 2024-11-27 | Stop reason: ALTCHOICE

## 2024-11-26 RX ORDER — HEPARIN SODIUM 1000 [USP'U]/ML
4000 INJECTION, SOLUTION INTRAVENOUS; SUBCUTANEOUS ONCE
Status: DISCONTINUED | OUTPATIENT
Start: 2024-11-26 | End: 2024-11-26

## 2024-11-26 RX ORDER — MAGNESIUM SULFATE 1 G/100ML
1000 INJECTION INTRAVENOUS PRN
Status: DISCONTINUED | OUTPATIENT
Start: 2024-11-26 | End: 2024-11-28 | Stop reason: HOSPADM

## 2024-11-26 RX ORDER — ONDANSETRON 2 MG/ML
4 INJECTION INTRAMUSCULAR; INTRAVENOUS EVERY 6 HOURS PRN
Status: DISCONTINUED | OUTPATIENT
Start: 2024-11-26 | End: 2024-11-28 | Stop reason: HOSPADM

## 2024-11-26 RX ORDER — HEPARIN SODIUM 1000 [USP'U]/ML
2000 INJECTION, SOLUTION INTRAVENOUS; SUBCUTANEOUS PRN
Status: DISCONTINUED | OUTPATIENT
Start: 2024-11-26 | End: 2024-11-27

## 2024-11-26 RX ORDER — ONDANSETRON 4 MG/1
4 TABLET, ORALLY DISINTEGRATING ORAL EVERY 8 HOURS PRN
Status: DISCONTINUED | OUTPATIENT
Start: 2024-11-26 | End: 2024-11-28 | Stop reason: HOSPADM

## 2024-11-26 RX ORDER — SODIUM CHLORIDE 0.9 % (FLUSH) 0.9 %
10 SYRINGE (ML) INJECTION PRN
Status: DISCONTINUED | OUTPATIENT
Start: 2024-11-26 | End: 2024-11-28 | Stop reason: HOSPADM

## 2024-11-26 RX ORDER — CEPHALEXIN 500 MG/1
500 CAPSULE ORAL 3 TIMES DAILY
Qty: 21 CAPSULE | Refills: 0 | Status: ON HOLD | OUTPATIENT
Start: 2024-11-26 | End: 2024-11-28 | Stop reason: HOSPADM

## 2024-11-26 RX ORDER — SODIUM CHLORIDE 9 MG/ML
INJECTION, SOLUTION INTRAVENOUS PRN
Status: DISCONTINUED | OUTPATIENT
Start: 2024-11-26 | End: 2024-11-28 | Stop reason: HOSPADM

## 2024-11-26 RX ORDER — HEPARIN SODIUM 1000 [USP'U]/ML
2000 INJECTION, SOLUTION INTRAVENOUS; SUBCUTANEOUS PRN
Status: DISCONTINUED | OUTPATIENT
Start: 2024-11-26 | End: 2024-11-26 | Stop reason: HOSPADM

## 2024-11-26 RX ADMIN — METOPROLOL SUCCINATE 50 MG: 50 TABLET, EXTENDED RELEASE ORAL at 07:43

## 2024-11-26 RX ADMIN — GABAPENTIN 800 MG: 400 CAPSULE ORAL at 13:29

## 2024-11-26 RX ADMIN — FLUDROCORTISONE ACETATE 0.3 MG: 0.1 TABLET ORAL at 07:43

## 2024-11-26 RX ADMIN — ENOXAPARIN SODIUM 30 MG: 100 INJECTION SUBCUTANEOUS at 07:46

## 2024-11-26 RX ADMIN — GABAPENTIN 800 MG: 400 CAPSULE ORAL at 07:43

## 2024-11-26 RX ADMIN — SODIUM CHLORIDE, PRESERVATIVE FREE 10 ML: 5 INJECTION INTRAVENOUS at 23:51

## 2024-11-26 RX ADMIN — GABAPENTIN 800 MG: 400 CAPSULE ORAL at 20:33

## 2024-11-26 RX ADMIN — DULOXETINE HYDROCHLORIDE 30 MG: 30 CAPSULE, DELAYED RELEASE ORAL at 07:43

## 2024-11-26 RX ADMIN — EMPAGLIFLOZIN 10 MG: 10 TABLET, FILM COATED ORAL at 07:43

## 2024-11-26 RX ADMIN — EZETIMIBE 10 MG: 10 TABLET ORAL at 20:33

## 2024-11-26 RX ADMIN — ASPIRIN 81 MG: 81 TABLET, COATED ORAL at 07:43

## 2024-11-26 RX ADMIN — HEPARIN SODIUM AND DEXTROSE 9 UNITS/KG/HR: 10000; 5 INJECTION INTRAVENOUS at 18:24

## 2024-11-26 RX ADMIN — INSULIN HUMAN 60 UNITS: 100 INJECTION, SOLUTION PARENTERAL at 07:45

## 2024-11-26 RX ADMIN — METFORMIN HYDROCHLORIDE 1000 MG: 500 TABLET, EXTENDED RELEASE ORAL at 07:43

## 2024-11-26 RX ADMIN — ATORVASTATIN CALCIUM 80 MG: 40 TABLET, FILM COATED ORAL at 07:43

## 2024-11-26 RX ADMIN — SODIUM CHLORIDE, PRESERVATIVE FREE 10 ML: 5 INJECTION INTRAVENOUS at 07:47

## 2024-11-26 RX ADMIN — CLOPIDOGREL BISULFATE 75 MG: 75 TABLET ORAL at 07:43

## 2024-11-26 RX ADMIN — SODIUM CHLORIDE, PRESERVATIVE FREE 10 ML: 5 INJECTION INTRAVENOUS at 20:34

## 2024-11-26 RX ADMIN — PANTOPRAZOLE SODIUM 40 MG: 40 TABLET, DELAYED RELEASE ORAL at 07:43

## 2024-11-26 NOTE — DISCHARGE SUMMARY
Discharge Summary    Christie Belcher  :  1965  MRN:  [id number]    Admit date:  2024      Discharge date: 2024     Admitting Physician:  Zaire Whitfield MD    Discharge Diagnoses:    Principal Problem:    Complicated UTI (urinary tract infection)  Active Problems:    Shortness of breath    Unstable angina (HCC)    S/P angioplasty with stent    COPD without exacerbation (HCC)    Type 2 diabetes mellitus with hyperglycemia, with long-term current use of insulin (HCC)    Coronary artery disease involving native coronary artery    Hx of CABG    DARON (obstructive sleep apnea)    Chronic combined systolic and diastolic CHF (congestive heart failure) ([facility])  Resolved Problems:    * No resolved hospital problems. *      Hospital Course:   Christie Belcher is a 59 y.o. female admitted with UTI. She presented with shortness of breath. Ongoing for several days. Worsens with exertion but is also short of breath at rest. Complained of nonproductive cough. Denied chest pain. Denied increased swelling. Complained of neck pain to left side of her neck with complaints of left sided dull headaches. She complained of urinary frequency and urgency. She does follow with Dr. Bryan cardiology. She does have history of MI, stents, CABG, diabetes, DARON, breast cancer, CHF, COPD and asthma. Initial lactic acid was 2.5. WBC was 11.4. Pro BMP was 1312. Troponin's were 35 and 39. EKG showed ST changes in the lateral leads and continued on repeat studies. CT of head was negative. CT chest was negative for pulmonary embolism or acute pulmonary abnormality. Urinalysis showed UTI. Patient was admitted labs monitored electrolytes replaced. She was placed on IV antibiotics. Cardiology was consulted and patient was evaluated. Patient urine culture is growing E. coli and was treated with Rocephin I did send Keflex to the pharmacy for outpatient treatment. Patient did undergo cardiac catheterization however the report is not available at this

## 2024-11-26 NOTE — DISCHARGE INSTRUCTIONS
Discharge Instructions for Cardiac Catheterization    A cardiac catheterization is a diagnostic test used to evaluate the health of the heart and its blood vessels. The test is done with a thin catheter carefully threaded into your heart from a leg or arm artery. Most likely, you will be allowed to go home the same day as the procedure.   Steps to Take at Home:   Pain- apply ice to site 15-20 minutes every hour for the first 2 days.   Showering is okay 24 hours after procedure.    No soaking in a pool, hot tub, bath tub, or standing water for one week.   Bleeding (outward or under the skin-hematoma)- apply firm pressure for 10-15 minutes or until the bleeding stops, then call your doctor. If unable to get bleeding stopped, call 911.    Kidney damage- Call if you urinate less than normal, have swelling or feel puffy, and/or gain 2 or more pounds over night in the first week.   If procedure was in ARM:  You were instructed to keep wrist straight and still for two hours after the procedure. The arm and hand may now be used for normal daily activities except, avoid using the heal of hand while getting up and down from furniture for the first few days.  Keep affected arm elevated, hand higher than elbow, while pressure dressing in place to decrease swelling.  1)  Gauze and Elastoplast   Remove in 4 hours as follows:     TIME:_____9:30PM_________________  Remove 1 piece of tape at a time, waiting 15 -20 minutes between layers to monitor for bleeding.   If dressing sticks, place wrist under cool running water to help loosen gauze from site then pat site dry.   If hand feels numb, tingly, and/or cold- loosen first 1-2 layers of tape if dressing still in place.  If no relief noticed, remove pressure dressing as per above instructions. Seek medical help if no relief or if dressing already off.  Diet   Drink plenty of fluids after the test to flush the x-ray dye from your system.   Return to your normal diet.   No alcoholic

## 2024-11-27 PROBLEM — E66.01 MORBID OBESITY WITH BMI OF 40.0-44.9, ADULT: Status: ACTIVE | Noted: 2024-11-27

## 2024-11-27 PROBLEM — I25.119 CORONARY ARTERY DISEASE WITH ANGINA PECTORIS (HCC): Status: ACTIVE | Noted: 2024-11-26

## 2024-11-27 LAB
ANTI-XA UNFRAC HEPARIN: 0.12 IU/L
ECHO BSA: 2.17 M2
ECHO BSA: 2.23 M2
GLUCOSE BLD-MCNC: 138 MG/DL (ref 65–105)
GLUCOSE BLD-MCNC: 152 MG/DL (ref 65–105)
GLUCOSE BLD-MCNC: 173 MG/DL (ref 65–105)
INR PPP: 1.1
PROTHROMBIN TIME: 14 SEC (ref 11.7–14.9)

## 2024-11-27 PROCEDURE — 2580000003 HC RX 258: Performed by: STUDENT IN AN ORGANIZED HEALTH CARE EDUCATION/TRAINING PROGRAM

## 2024-11-27 PROCEDURE — 6360000002 HC RX W HCPCS: Performed by: INTERNAL MEDICINE

## 2024-11-27 PROCEDURE — 99222 1ST HOSP IP/OBS MODERATE 55: CPT | Performed by: NURSE PRACTITIONER

## 2024-11-27 PROCEDURE — C1760 CLOSURE DEV, VASC: HCPCS | Performed by: INTERNAL MEDICINE

## 2024-11-27 PROCEDURE — B2151ZZ FLUOROSCOPY OF LEFT HEART USING LOW OSMOLAR CONTRAST: ICD-10-PCS | Performed by: INTERNAL MEDICINE

## 2024-11-27 PROCEDURE — C1887 CATHETER, GUIDING: HCPCS | Performed by: INTERNAL MEDICINE

## 2024-11-27 PROCEDURE — C1769 GUIDE WIRE: HCPCS | Performed by: INTERNAL MEDICINE

## 2024-11-27 PROCEDURE — 6360000002 HC RX W HCPCS: Performed by: NURSE PRACTITIONER

## 2024-11-27 PROCEDURE — 6370000000 HC RX 637 (ALT 250 FOR IP): Performed by: STUDENT IN AN ORGANIZED HEALTH CARE EDUCATION/TRAINING PROGRAM

## 2024-11-27 PROCEDURE — 2709999900 HC NON-CHARGEABLE SUPPLY: Performed by: INTERNAL MEDICINE

## 2024-11-27 PROCEDURE — B2111ZZ FLUOROSCOPY OF MULTIPLE CORONARY ARTERIES USING LOW OSMOLAR CONTRAST: ICD-10-PCS | Performed by: INTERNAL MEDICINE

## 2024-11-27 PROCEDURE — 6360000004 HC RX CONTRAST MEDICATION: Performed by: INTERNAL MEDICINE

## 2024-11-27 PROCEDURE — 2580000003 HC RX 258: Performed by: NURSE PRACTITIONER

## 2024-11-27 PROCEDURE — C1894 INTRO/SHEATH, NON-LASER: HCPCS | Performed by: INTERNAL MEDICINE

## 2024-11-27 PROCEDURE — 85520 HEPARIN ASSAY: CPT

## 2024-11-27 PROCEDURE — 92920 PRQ TRLUML C ANGIOP 1ART&/BR: CPT | Performed by: INTERNAL MEDICINE

## 2024-11-27 PROCEDURE — C1725 CATH, TRANSLUMIN NON-LASER: HCPCS | Performed by: INTERNAL MEDICINE

## 2024-11-27 PROCEDURE — 4A023N7 MEASUREMENT OF CARDIAC SAMPLING AND PRESSURE, LEFT HEART, PERCUTANEOUS APPROACH: ICD-10-PCS | Performed by: INTERNAL MEDICINE

## 2024-11-27 PROCEDURE — 99152 MOD SED SAME PHYS/QHP 5/>YRS: CPT | Performed by: INTERNAL MEDICINE

## 2024-11-27 PROCEDURE — 2580000003 HC RX 258: Performed by: INTERNAL MEDICINE

## 2024-11-27 PROCEDURE — 76937 US GUIDE VASCULAR ACCESS: CPT | Performed by: INTERNAL MEDICINE

## 2024-11-27 PROCEDURE — 94761 N-INVAS EAR/PLS OXIMETRY MLT: CPT

## 2024-11-27 PROCEDURE — 2060000000 HC ICU INTERMEDIATE R&B

## 2024-11-27 PROCEDURE — 6360000002 HC RX W HCPCS: Performed by: STUDENT IN AN ORGANIZED HEALTH CARE EDUCATION/TRAINING PROGRAM

## 2024-11-27 PROCEDURE — 85610 PROTHROMBIN TIME: CPT

## 2024-11-27 PROCEDURE — 99153 MOD SED SAME PHYS/QHP EA: CPT | Performed by: INTERNAL MEDICINE

## 2024-11-27 PROCEDURE — 82947 ASSAY GLUCOSE BLOOD QUANT: CPT

## 2024-11-27 PROCEDURE — 02713ZZ DILATION OF CORONARY ARTERY, TWO ARTERIES, PERCUTANEOUS APPROACH: ICD-10-PCS | Performed by: INTERNAL MEDICINE

## 2024-11-27 PROCEDURE — 99222 1ST HOSP IP/OBS MODERATE 55: CPT | Performed by: STUDENT IN AN ORGANIZED HEALTH CARE EDUCATION/TRAINING PROGRAM

## 2024-11-27 PROCEDURE — 36415 COLL VENOUS BLD VENIPUNCTURE: CPT

## 2024-11-27 PROCEDURE — 6370000000 HC RX 637 (ALT 250 FOR IP): Performed by: INTERNAL MEDICINE

## 2024-11-27 PROCEDURE — C1892 INTRO/SHEATH,FIXED,PEEL-AWAY: HCPCS | Performed by: INTERNAL MEDICINE

## 2024-11-27 RX ORDER — ASPIRIN 325 MG
TABLET ORAL PRN
Status: DISCONTINUED | OUTPATIENT
Start: 2024-11-27 | End: 2024-11-27 | Stop reason: HOSPADM

## 2024-11-27 RX ORDER — PRASUGREL 10 MG/1
60 TABLET, FILM COATED ORAL ONCE
Status: DISCONTINUED | OUTPATIENT
Start: 2024-11-28 | End: 2024-11-27

## 2024-11-27 RX ORDER — MIDAZOLAM HYDROCHLORIDE 1 MG/ML
INJECTION, SOLUTION INTRAMUSCULAR; INTRAVENOUS PRN
Status: DISCONTINUED | OUTPATIENT
Start: 2024-11-27 | End: 2024-11-27 | Stop reason: HOSPADM

## 2024-11-27 RX ORDER — BIVALIRUDIN 250 MG/5ML
INJECTION, POWDER, LYOPHILIZED, FOR SOLUTION INTRAVENOUS PRN
Status: DISCONTINUED | OUTPATIENT
Start: 2024-11-27 | End: 2024-11-27 | Stop reason: HOSPADM

## 2024-11-27 RX ORDER — IOPAMIDOL 755 MG/ML
INJECTION, SOLUTION INTRAVASCULAR PRN
Status: DISCONTINUED | OUTPATIENT
Start: 2024-11-27 | End: 2024-11-27 | Stop reason: HOSPADM

## 2024-11-27 RX ORDER — SODIUM CHLORIDE 0.9 % (FLUSH) 0.9 %
5-40 SYRINGE (ML) INJECTION PRN
Status: DISCONTINUED | OUTPATIENT
Start: 2024-11-27 | End: 2024-11-28 | Stop reason: HOSPADM

## 2024-11-27 RX ORDER — ASPIRIN 81 MG/1
81 TABLET, CHEWABLE ORAL DAILY
Status: DISCONTINUED | OUTPATIENT
Start: 2024-11-27 | End: 2024-11-28 | Stop reason: HOSPADM

## 2024-11-27 RX ORDER — ACETAMINOPHEN 325 MG/1
650 TABLET ORAL EVERY 4 HOURS PRN
Status: DISCONTINUED | OUTPATIENT
Start: 2024-11-27 | End: 2024-11-28 | Stop reason: HOSPADM

## 2024-11-27 RX ORDER — SODIUM CHLORIDE 9 MG/ML
INJECTION, SOLUTION INTRAVENOUS PRN
Status: DISCONTINUED | OUTPATIENT
Start: 2024-11-27 | End: 2024-11-28 | Stop reason: HOSPADM

## 2024-11-27 RX ORDER — PRASUGREL 10 MG/1
60 TABLET, FILM COATED ORAL ONCE
Status: COMPLETED | OUTPATIENT
Start: 2024-11-28 | End: 2024-11-28

## 2024-11-27 RX ORDER — SODIUM CHLORIDE 0.9 % (FLUSH) 0.9 %
5-40 SYRINGE (ML) INJECTION EVERY 12 HOURS SCHEDULED
Status: DISCONTINUED | OUTPATIENT
Start: 2024-11-27 | End: 2024-11-28 | Stop reason: HOSPADM

## 2024-11-27 RX ORDER — ATORVASTATIN CALCIUM 80 MG/1
80 TABLET, FILM COATED ORAL NIGHTLY
Status: DISCONTINUED | OUTPATIENT
Start: 2024-11-27 | End: 2024-11-28 | Stop reason: HOSPADM

## 2024-11-27 RX ORDER — PRASUGREL 10 MG/1
10 TABLET, FILM COATED ORAL DAILY
Status: DISCONTINUED | OUTPATIENT
Start: 2024-11-29 | End: 2024-11-28 | Stop reason: HOSPADM

## 2024-11-27 RX ORDER — CLOPIDOGREL 300 MG/1
TABLET, FILM COATED ORAL PRN
Status: DISCONTINUED | OUTPATIENT
Start: 2024-11-27 | End: 2024-11-27 | Stop reason: HOSPADM

## 2024-11-27 RX ORDER — FLUCONAZOLE 2 MG/ML
400 INJECTION, SOLUTION INTRAVENOUS EVERY 24 HOURS
Status: DISCONTINUED | OUTPATIENT
Start: 2024-11-27 | End: 2024-11-28

## 2024-11-27 RX ORDER — COLCHICINE 0.6 MG/1
0.6 TABLET ORAL DAILY
Status: DISCONTINUED | OUTPATIENT
Start: 2024-11-28 | End: 2024-11-28 | Stop reason: HOSPADM

## 2024-11-27 RX ORDER — NITROGLYCERIN 20 MG/100ML
INJECTION INTRAVENOUS PRN
Status: DISCONTINUED | OUTPATIENT
Start: 2024-11-27 | End: 2024-11-27 | Stop reason: HOSPADM

## 2024-11-27 RX ORDER — FENTANYL CITRATE 50 UG/ML
INJECTION, SOLUTION INTRAMUSCULAR; INTRAVENOUS PRN
Status: DISCONTINUED | OUTPATIENT
Start: 2024-11-27 | End: 2024-11-27 | Stop reason: HOSPADM

## 2024-11-27 RX ADMIN — WATER 1000 MG: 1 INJECTION INTRAMUSCULAR; INTRAVENOUS; SUBCUTANEOUS at 12:50

## 2024-11-27 RX ADMIN — HEPARIN SODIUM 13 UNITS/KG/HR: 10000 INJECTION, SOLUTION INTRAVENOUS at 12:42

## 2024-11-27 RX ADMIN — SODIUM CHLORIDE, PRESERVATIVE FREE 10 ML: 5 INJECTION INTRAVENOUS at 23:11

## 2024-11-27 RX ADMIN — SODIUM CHLORIDE, PRESERVATIVE FREE 10 ML: 5 INJECTION INTRAVENOUS at 21:06

## 2024-11-27 RX ADMIN — ATORVASTATIN CALCIUM 80 MG: 80 TABLET, FILM COATED ORAL at 23:09

## 2024-11-27 RX ADMIN — FLUCONAZOLE 400 MG: 2 INJECTION, SOLUTION INTRAVENOUS at 12:58

## 2024-11-27 RX ADMIN — SODIUM CHLORIDE, PRESERVATIVE FREE 10 ML: 5 INJECTION INTRAVENOUS at 07:41

## 2024-11-27 RX ADMIN — ONDANSETRON 4 MG: 2 INJECTION INTRAMUSCULAR; INTRAVENOUS at 21:06

## 2024-11-27 RX ADMIN — HEPARIN SODIUM 4000 UNITS: 1000 INJECTION INTRAVENOUS; SUBCUTANEOUS at 00:03

## 2024-11-27 RX ADMIN — HEPARIN SODIUM 2000 UNITS: 1000 INJECTION, SOLUTION INTRAVENOUS; SUBCUTANEOUS at 12:45

## 2024-11-27 ASSESSMENT — ENCOUNTER SYMPTOMS
SORE THROAT: 0
SHORTNESS OF BREATH: 1
PHOTOPHOBIA: 0
NAUSEA: 0
DIARRHEA: 0
VOMITING: 0
COUGH: 1

## 2024-11-27 NOTE — H&P
34.8 g/dL    RDW 16.8 (H) 11.8 - 14.4 %    Platelets 249 138 - 453 k/uL    MPV 10.0 8.1 - 13.5 fL    NRBC Automated 0.0 0.0 per 100 WBC   Anti-Xa, Unfractionated Heparin    Collection Time: 11/26/24 11:23 PM   Result Value Ref Range    Anti-XA Unfrac Heparin <0.10 IU/L   Protime-INR    Collection Time: 11/26/24 11:23 PM   Result Value Ref Range    Protime 14.4 11.7 - 14.9 sec    INR 1.1    APTT    Collection Time: 11/26/24 11:23 PM   Result Value Ref Range    APTT 32.8 23.0 - 36.5 sec   POC Glucose Fingerstick    Collection Time: 11/27/24  7:56 AM   Result Value Ref Range    POC Glucose 173 (H) 65 - 105 mg/dL   Protime-INR    Collection Time: 11/27/24  8:57 AM   Result Value Ref Range    Protime 14.0 11.7 - 14.9 sec    INR 1.1    Anti-Xa, Unfractionated Heparin    Collection Time: 11/27/24  8:57 AM   Result Value Ref Range    Anti-XA Unfrac Heparin 0.12 IU/L       Imaging/Diagnostics:  CT CHEST PULMONARY EMBOLISM W CONTRAST    Result Date: 11/24/2024  1. No evidence of pulmonary embolism or acute pulmonary abnormality. 2. Mild enlargement of mediastinal lymph nodes, nonspecific. 3. 1.7 cm nodule at the inferior aspect of the left thyroid lobe has nonspecific appearance may represent a thyroid or parathyroid nodule. RECOMMENDATIONS: Short-term follow-up in 3-6 months is recommended.     CT HEAD WO CONTRAST    Result Date: 11/24/2024  No acute intracranial abnormality.       Assessment :      Hospital Problems             Last Modified POA    * (Principal) Coronary artery disease involving native coronary artery 11/27/2024 Yes    Essential hypertension (Chronic) 11/27/2024 Yes    Shortness of breath 11/27/2024 Yes    Hyperlipidemia 11/27/2024 Yes    COPD without exacerbation (HCC) 11/27/2024 Yes    Type 2 diabetes mellitus with hyperglycemia, with long-term current use of insulin (HCC) 11/27/2024 Yes    Morbid obesity with BMI of 40.0-44.9, adult 11/27/2024 Yes       Plan:     Patient status inpatient in the  Progressive Unit/Step down    CAD: Cardiology following. [facility] 11/26 at [facility] showing severe 3 vessel disease . Hx of multiple stents. Planning for angioplasty/ and or stenting today.  Continue heparin drip. ASA, bb, statin. Resume Plaxix as able.   HFrEF: No acute exacerbation. EF 35%. NPO for cath today. Resume Metoprolol, lasix, Jardiance, Aldactone, Lasix, statin  as able.   UTI:(+) Nitrite, leukocyte esterase and yeast. Continue Rocephin. Add Diflucan.   SOB: Resolved.   HTN: Stable Resume home meds as able.   IDDM: Accu check ac/hs with corrective ISS. Hold Trulicity. Hypoglycemia treatment orders prn.  Carb controlled diet as able.   Morbid obesity complicating health: Encourage healthy weight loss for reduced risk stratifications.   COPD without exacerbation: Supplemental oxygen as needed. Home inhalers. Nebulized respiratory treatments prn.     Consultations:   IP CONSULT TO CARDIOLOGY  PHARMACY TO DOSE MEDICATION     Patient is admitted as inpatient status because of co-morbidities listed above, severity of signs and symptoms as outlined, requirement for current medical therapies and most importantly because of direct risk to patient if care not provided in a hospital setting.  Expected length of stay > 48 hours.    CHELSEA PATRICK NP  11/27/2024  11:14 AM    Copy sent to Bulmaro Florez APRN - CNP

## 2024-11-27 NOTE — PROGRESS NOTES
Met with Patient this p.m. to discuss discharge planning.  Patient is a 59 year old , white female, admitted with a diagnosis of Complicated UTI.  Patient is alert and oriented, polite and cooperative with this assessment.  States that her plan will be to return home when deemed medically stable for discharge.    Patient resides in an apartment in [place].  She is disabled.  Uses a cane, walker, shower chair and grab bars at home for assistance.  No outside resources or services currently in place, per Patient own report.  Patient drives herself ordinarily and is independent with her ADL's.    PCP is Bulmaro Plasencia CNP.  [facility] [place] [facility] [place] and denies need for further assistance with the cost of her prescription medications.    Discharge plan is home when stable.  Patient is a 'Full Code' status and designates her son Darrick to be her decision maker if/as needed.  No unmet needs or discharge planning concerns identified by Patient at this point.    JASIEL Baptiste  11/25/2024     
Access called asking for transport papers to be faxed. Writer faxed rhett. ETA is supposed to be around 1950  
Bedside report received. Writer and dayshift nurse set up patient's heparin drip. Patient is aware of her transfer and that we are waiting for transport. Patient denies questions. Family is a bedside. Left wrist has palpable pulse and is warm to the touch. Pressure dressing remains in place. Patient was reminded not to push up from the bed with her wrist. Call light and bedside table remain within reach. Care ongoing.    
Patient called out stating she woke up feeling shaky and wants her blood sugar checked. Patient's blood sugar is 61. Writer gave patient a snack and juice. Will recheck in 15 minutes.  
Patient resting comfortably at this time. Patient denies any pain and denies any other needs at this time. Patient alert & oriented x4 and able to answer all questions appropriately and follow commands. Assessment and vitals as charted. Bed alarm on, bed locked, gripper socks on, call light within reach and able to use appropriately. Will continue to monitor this shift.    
Patient's blood sugar is 129 and patient is resting comfortably in bed and states she \"feels better.\"   
Patient's sugar was rechecked and patient was 66. Patient states she is feeling better and is no longer shaky. Patient finished turkey sandwich and writer gave her another orange juice. Will recheck in 15 minutes.  
Progress Note    SUBJECTIVE:    Patient seen for f/u of Complicated UTI (urinary tract infection).  She resting in bed no distress. Afebrile.   Complains of neck pain and HA that has been ongoing  since prior to admission. Complains of wheezing and SOB but is better. She reported worsening when lying down. Denied leg edema    ROS:   Constitutional: negative  for fevers, and negative for chills.  Respiratory: positive for shortness of breath, negative for cough, and positive for wheezing  Cardiovascular: negative for chest pain, and negative for palpitations  Gastrointestinal: negative for abdominal pain, negative for nausea,negative for vomiting, negative for diarrhea, and negative for constipation     All other systems were reviewed with the patient and are negative unless otherwise stated in HPI      OBJECTIVE:      Vitals:   Vitals:    11/25/24 0619   BP: 106/63   Pulse: (!) 103   Resp: 16   Temp: 97.2 °F (36.2 °C)   SpO2: 96%     Weight - Scale: 111.9 kg (246 lb 11.1 oz)   Height: 160 cm (5' 3\")     Weight  Wt Readings from Last 3 Encounters:   11/25/24 111.9 kg (246 lb 11.1 oz)   10/11/24 108 kg (238 lb)   09/10/24 106.1 kg (234 lb)     Body mass index is 43.7 kg/m².    24HR INTAKE/OUTPUT:      Intake/Output Summary (Last 24 hours) at 11/25/2024 0645  Last data filed at 11/25/2024 0437  Gross per 24 hour   Intake 750 ml   Output 200 ml   Net 550 ml     -----------------------------------------------------------------  Exam:    GEN:    Awake, alert and oriented x3.   EYES:  EOMI, pupils equal   NECK: Supple. No lymphadenopathy.  No carotid bruit  CVS:    regular rate and rhythm, no audible murmur  PULM:  CTA, no wheezes, rales or rhonchi, no acute respiratory distress  ABD:    Bowels sounds normal.  Abdomen is soft.  No distention.  no tenderness to palpation.   EXT:   no edema bilaterally .  No calf tenderness.   NEURO: Moves all extremities.  Motor and sensory are grossly intact  SKIN:  No rashes.  No skin 
Progress Note    SUBJECTIVE:    Patient seen for f/u of Complicated UTI (urinary tract infection).  She resting in bed no distress. Afebrile.   Feel breathing is better. No other complaints    ROS:   Constitutional: negative  for fevers, and negative for chills.  Respiratory: positive for shortness of breath, negative for cough, and positive for wheezing  Cardiovascular: negative for chest pain, and negative for palpitations  Gastrointestinal: negative for abdominal pain, negative for nausea,negative for vomiting, negative for diarrhea, and negative for constipation     All other systems were reviewed with the patient and are negative unless otherwise stated in HPI      OBJECTIVE:      Vitals:   Vitals:    11/25/24 2255   BP: (!) 100/58   Pulse: 84   Resp:    Temp:    SpO2: 94%     Weight - Scale: 110.6 kg (243 lb 13.3 oz)   Height: 160 cm (5' 2.99\")     Weight  Wt Readings from Last 3 Encounters:   11/26/24 110.6 kg (243 lb 13.3 oz)   10/11/24 108 kg (238 lb)   09/10/24 106.1 kg (234 lb)     Body mass index is 43.2 kg/m².    24HR INTAKE/OUTPUT:      Intake/Output Summary (Last 24 hours) at 11/26/2024 0645  Last data filed at 11/26/2024 0352  Gross per 24 hour   Intake 1650 ml   Output 1000 ml   Net 650 ml     -----------------------------------------------------------------  Exam:    GEN:    Awake, alert and oriented x3.   EYES:  EOMI, pupils equal   NECK: Supple. No lymphadenopathy.  No carotid bruit  CVS:    regular rate and rhythm, no audible murmur  PULM:  CTA, no wheezes, rales or rhonchi, no acute respiratory distress  ABD:    Bowels sounds normal.  Abdomen is soft.  No distention.  no tenderness to palpation.   EXT:   no edema bilaterally .  No calf tenderness.   NEURO: Moves all extremities.  Motor and sensory are grossly intact  SKIN:  No rashes.  No skin lesions.    -----------------------------------------------------------------    Diagnostic Data:      Complete Blood Count:   Recent Labs     
Pt BGS 51 - gave 2-4oz containers of orange juice with 4 sugar packets each; pt is edentulous, but alert, awake, oriented; will recheck BGS in 15 min. Care ongoing, call light within reach.  
Pt arrives to room 315 via w/c. Pt is a/o x4 and denies pain or current sob. Admission assessment and vs obtained. Pt is aware of poc. Oriented to room and call light. Bed alarm on and call light in reach.   
Pt has a dexcom to LUE.   
Rechecked pt BGS - 86. Care ongoing, call light within reach.  
Report was called to [facility]. Transport was given report and paperwork and is now getting patient ready to go. Family remains at bedside. Patient has all of her belongings. Patient cupboard and locked cupboard were checked. Patient denies questions.   
Writer to bedside to complete morning assessment. Upon entry to room, pt awake and lying in bed, respirations even and unlabored while on room air. Vitals obtained and assessment completed, see flow sheet for details. Pt denies needs from writer at this time. Call light in reach. Care ongoing.    
Writer to bedside to complete morning assessment. Upon entry to room, pt awake and sitting in bed, respirations even and unlabored while on room air. Vitals obtained and assessment completed, see flow sheet for details. Pt denies needs from writer at this time. Call light in reach. Care ongoing.    
(ml/day): 2,240 ml    Hematology:  Recent Labs     11/24/24 2059 11/25/24  0605   WBC 11.4* 9.4   HGB 12.0 10.5*   HCT 37.6 34.2*     Chemistry:  Recent Labs     11/24/24 2059      K 4.6      CO2 22   GLUCOSE 207*   BUN 20   CREATININE 0.8   MG 1.9   CALCIUM 8.9     Recent Labs     11/24/24 2059   AST 16   ALT 7*   ALKPHOS 89   BILITOT 0.4     Lab Results   Component Value Date/Time    LABA1C 8.0 11/01/2024 11:32 AM      Recent Labs     11/25/24  0107 11/25/24  0715 11/25/24  1200   POCGLU 120* 204* 115*      Lab Results   Component Value Date/Time    TRIG 112 06/04/2024 11:38 AM    HDL 45 06/04/2024 11:38 AM    No results found for: \"VITD25\"    Nutrition Diagnosis:   Altered nutrition-related lab values related to endocrine dysfunction as evidenced by lab values  Overweight/obese related to excessive energy intake as evidenced by BMI    Nutrition Interventions:   Food and/or Nutrient Delivery: Continue Current Diet     Coordination of Nutrition Care: Continue to monitor while inpatient       Goals:  Goals: Meet at least 75% of estimated needs     Previous Goal Met: New Goal    Nutrition Monitoring and Evaluation:      Food/Nutrient Intake Outcomes: Food and Nutrient Intake  Physical Signs/Symptoms Outcomes: Biochemical Data, Weight    Discharge Planning:    Continue current diet     SOMMER OROURKE RD, LD  Contact: [phone]    
PM

## 2024-11-27 NOTE — PLAN OF CARE
Problem: Chronic Conditions and Co-morbidities  Goal: Patient's chronic conditions and co-morbidity symptoms are monitored and maintained or improved  Outcome: Progressing  Flowsheets (Taken 11/26/2024 2215)  Care Plan - Patient's Chronic Conditions and Co-Morbidity Symptoms are Monitored and Maintained or Improved:   Monitor and assess patient's chronic conditions and comorbid symptoms for stability, deterioration, or improvement   Collaborate with multidisciplinary team to address chronic and comorbid conditions and prevent exacerbation or deterioration   Update acute care plan with appropriate goals if chronic or comorbid symptoms are exacerbated and prevent overall improvement and discharge     Problem: Discharge Planning  Goal: Discharge to home or other facility with appropriate resources  Outcome: Progressing  Flowsheets (Taken 11/26/2024 2215)  Discharge to home or other facility with appropriate resources:   Identify barriers to discharge with patient and caregiver   Arrange for needed discharge resources and transportation as appropriate   Identify discharge learning needs (meds, wound care, etc)     Problem: Safety - Adult  Goal: Free from fall injury  Outcome: Progressing

## 2024-11-27 NOTE — CARE COORDINATION
Case Management Assessment  Initial Evaluation    Date/Time of Evaluation: 11/27/2024 3:21 PM  Assessment Completed by: Ana Cuello RN    If patient is discharged prior to next notation, then this note serves as note for discharge by case management.    Patient Name: Christie Belcher                   YOB: 1965  Diagnosis: Coronary artery disease, unspecified vessel or lesion type, unspecified whether angina present, unspecified whether native or transplanted heart [I25.10]                   Date / Time: 11/26/2024 10:10 PM    Patient Admission Status: Inpatient   Readmission Risk (Low < 19, Mod (19-27), High > 27): Readmission Risk Score: 21.9    Current PCP: Bulmaro Plasencia APRN - CNP  PCP verified by CM? (P) Yes    Chart Reviewed: Yes      History Provided by: (P) Patient  Patient Orientation: (P) Alert and Oriented    Patient Cognition: (P) Alert    Hospitalization in the last 30 days (Readmission):  No    If yes, Readmission Assessment in CM Navigator will be completed.    Advance Directives:      Code Status: Full Code   Patient's Primary Decision Maker is: (P) Legal Next of Kin    Primary Decision Maker (Active): Darrick Belcher - Child - [phone]    Secondary Decision Maker: Liza Hassan - Brother/Sister - [phone]    Discharge Planning:    Patient lives with: (P) Spouse/Significant Other Type of Home: (P) Apartment  Primary Care Giver: (P) Self  Patient Support Systems include: (P) Spouse/Significant Other   Current Financial resources: (P) Medicaid  Current community resources: (P) None  Current services prior to admission: (P) None            Current DME:              Type of Home Care services:  (P) None    ADLS  Prior functional level: (P) Independent in ADLs/IADLs  Current functional level: (P) Independent in ADLs/IADLs    PT AM-PAC:   /24  OT AM-PAC:   /24    Family can provide assistance at DC: (P) Yes  Would you like Case Management to discuss the discharge plan with any

## 2024-11-27 NOTE — PLAN OF CARE
Problem: Chronic Conditions and Co-morbidities  Goal: Patient's chronic conditions and co-morbidity symptoms are monitored and maintained or improved  11/27/2024 0942 by Radhika Valdes RN  Outcome: Progressing  11/27/2024 0231 by Anna Arguelles RN  Outcome: Progressing  Flowsheets (Taken 11/26/2024 2215)  Care Plan - Patient's Chronic Conditions and Co-Morbidity Symptoms are Monitored and Maintained or Improved:   Monitor and assess patient's chronic conditions and comorbid symptoms for stability, deterioration, or improvement   Collaborate with multidisciplinary team to address chronic and comorbid conditions and prevent exacerbation or deterioration   Update acute care plan with appropriate goals if chronic or comorbid symptoms are exacerbated and prevent overall improvement and discharge     Problem: Discharge Planning  Goal: Discharge to home or other facility with appropriate resources  11/27/2024 0942 by Radhika Valdes RN  Outcome: Progressing  11/27/2024 0231 by Anna Arguelles RN  Outcome: Progressing  Flowsheets (Taken 11/26/2024 2215)  Discharge to home or other facility with appropriate resources:   Identify barriers to discharge with patient and caregiver   Arrange for needed discharge resources and transportation as appropriate   Identify discharge learning needs (meds, wound care, etc)     Problem: Safety - Adult  Goal: Free from fall injury  11/27/2024 0942 by Radhika Valdes RN  Outcome: Progressing  11/27/2024 0231 by Anna Arguelles RN  Outcome: Progressing

## 2024-11-27 NOTE — PLAN OF CARE
Problem: Chronic Conditions and Co-morbidities  Goal: Patient's chronic conditions and co-morbidity symptoms are monitored and maintained or improved  Outcome: Completed     Problem: Discharge Planning  Goal: Discharge to home or other facility with appropriate resources  Outcome: Completed     Problem: Pain  Goal: Verbalizes/displays adequate comfort level or baseline comfort level  Outcome: Completed     Problem: Safety - Adult  Goal: Free from fall injury  Outcome: Completed     Problem: Nutrition Deficit:  Goal: Optimize nutritional status  Outcome: Completed     
  Problem: Chronic Conditions and Co-morbidities  Goal: Patient's chronic conditions and co-morbidity symptoms are monitored and maintained or improved  Outcome: Progressing  Flowsheets  Taken 11/25/2024 1937  Care Plan - Patient's Chronic Conditions and Co-Morbidity Symptoms are Monitored and Maintained or Improved: Monitor and assess patient's chronic conditions and comorbid symptoms for stability, deterioration, or improvement  Taken 11/25/2024 1925  Care Plan - Patient's Chronic Conditions and Co-Morbidity Symptoms are Monitored and Maintained or Improved: Monitor and assess patient's chronic conditions and comorbid symptoms for stability, deterioration, or improvement     Problem: Pain  Goal: Verbalizes/displays adequate comfort level or baseline comfort level  Outcome: Progressing  Flowsheets  Taken 11/25/2024 1937  Verbalizes/displays adequate comfort level or baseline comfort level:   Encourage patient to monitor pain and request assistance   Assess pain using appropriate pain scale   Administer analgesics based on type and severity of pain and evaluate response   Implement non-pharmacological measures as appropriate and evaluate response  Taken 11/25/2024 1925  Verbalizes/displays adequate comfort level or baseline comfort level: Encourage patient to monitor pain and request assistance  Note: Patient denies any pain at this time. Will continue to monitor this shift.     Problem: Safety - Adult  Goal: Free from fall injury  Outcome: Progressing  Note: Bed alarm on, bed locked, side rails up, gripper socks on, call light within reach and able to use appropriately. Will continue to monitor this shift.     Problem: Nutrition Deficit:  Goal: Optimize nutritional status  11/25/2024 1937 by Lauren Barillas, RN  Outcome: Progressing  Flowsheets (Taken 11/25/2024 1937)  Nutrient intake appropriate for improving, restoring, or maintaining nutritional needs: Assess nutritional status and recommend course of action  Note: 
You can access the [facility] Portal, offered by [facility], by registering with the following website: [email or web address]

## 2024-11-27 NOTE — CONSULTS
[facility]    Consult               Today's Date: 11/27/2024  Patient Name: Christie Belcher  Date of admission: 11/26/2024 10:10 PM  Patient's age: 59 y.o., 1965  Admission Dx: Coronary artery disease, unspecified vessel or lesion type, unspecified whether angina present, unspecified whether native or transplanted heart [I25.10]    Requesting Physician: Montana Zhu, DO    Cardiac Evaluation Reason: CAD    History Obtained From: patient and chart review     History of Present Illness:    This patient 59 y.o. years old with past medical history of  CABG involving LIMA-LAD 2016  Dysautonomia(on Florinef and Lexapro)  CAD s/p stent/16/2023 and 12/12/20/2023  HFrEF  Patient is transferred from [facility] where she presented with complaints of shortness of breath.  It was exertional and exacerbated by walking.  Patient denied any chest pain, nausea or vomiting or any swelling in her legs.  Patient has history of CABG and stents in 2022 and 2023.  Echo done in [place] showed ejection fraction of 35 to 40%.  CT was negative for any pulmonary embolism.  Blood workup showed BNP of 1312 and troponin level of 39 and 44(lower than baseline 500s).Patient did undergo cardiac catheterization however the report is not available at this time. Dr Becker did speak with cardiology at [facility] who agreed to have patient transferred for Stenting.Currently patient is on nitroglycerin and heparin drip.  Vitally patient is soft blood pressure but maintaining saturation in room air.    Past Medical History:   has a past medical history of Anxiety, Asthma, Breast cancer ([facility]), CAD (coronary artery disease), Cancer ([facility]), Clinical trial participant at discharge, COPD (chronic obstructive pulmonary disease) ([facility]), Depression, Diabetic neuropathy ([facility]), GERD (gastroesophageal reflux disease), H/O cardiac catheterization, H/O cardiovascular stress test, H/O echocardiogram, H/O tilt table evaluation, History of          Samantha Puga MD  Internal Medicine Resident PGY-1  [facility]            Attending Physician Statement:    I have discussed the care of  Christie Belcher , including pertinent history and exam findings, with the Cardiology fellow/resident.     I have seen and examined the patient and the key elements of all parts of the encounter have been performed by me. I agree with the assessment, plan and orders as documented by the fellow/resident, after I modified exam findings and plan of treatments, and the final version is my approved version of the assessment.     Additional Comments:

## 2024-11-28 VITALS
HEART RATE: 79 BPM | DIASTOLIC BLOOD PRESSURE: 69 MMHG | OXYGEN SATURATION: 96 % | WEIGHT: 232.81 LBS | HEIGHT: 63 IN | TEMPERATURE: 97.3 F | RESPIRATION RATE: 16 BRPM | BODY MASS INDEX: 41.25 KG/M2 | SYSTOLIC BLOOD PRESSURE: 132 MMHG

## 2024-11-28 DIAGNOSIS — I20.9 ANGINA, CLASS II (HCC): ICD-10-CM

## 2024-11-28 DIAGNOSIS — I95.1 DYSAUTONOMIA ORTHOSTATIC HYPOTENSION SYNDROME: ICD-10-CM

## 2024-11-28 DIAGNOSIS — I25.10 ASHD (ARTERIOSCLEROTIC HEART DISEASE): ICD-10-CM

## 2024-11-28 DIAGNOSIS — Z95.1 S/P CABG (CORONARY ARTERY BYPASS GRAFT): ICD-10-CM

## 2024-11-28 DIAGNOSIS — R00.2 PALPITATIONS: ICD-10-CM

## 2024-11-28 DIAGNOSIS — E78.2 MIXED HYPERLIPIDEMIA: ICD-10-CM

## 2024-11-28 DIAGNOSIS — R06.02 SHORTNESS OF BREATH ON EXERTION: ICD-10-CM

## 2024-11-28 DIAGNOSIS — I10 ESSENTIAL HYPERTENSION: Chronic | ICD-10-CM

## 2024-11-28 LAB
EKG ATRIAL RATE: 81 BPM
EKG P AXIS: 50 DEGREES
EKG P-R INTERVAL: 142 MS
EKG Q-T INTERVAL: 410 MS
EKG QRS DURATION: 96 MS
EKG QTC CALCULATION (BAZETT): 476 MS
EKG R AXIS: 42 DEGREES
EKG T AXIS: 113 DEGREES
EKG VENTRICULAR RATE: 81 BPM

## 2024-11-28 PROCEDURE — 99233 SBSQ HOSP IP/OBS HIGH 50: CPT | Performed by: NURSE PRACTITIONER

## 2024-11-28 PROCEDURE — 6370000000 HC RX 637 (ALT 250 FOR IP): Performed by: NURSE PRACTITIONER

## 2024-11-28 PROCEDURE — 99239 HOSP IP/OBS DSCHRG MGMT >30: CPT | Performed by: NURSE PRACTITIONER

## 2024-11-28 PROCEDURE — 93005 ELECTROCARDIOGRAM TRACING: CPT | Performed by: STUDENT IN AN ORGANIZED HEALTH CARE EDUCATION/TRAINING PROGRAM

## 2024-11-28 PROCEDURE — 2580000003 HC RX 258: Performed by: STUDENT IN AN ORGANIZED HEALTH CARE EDUCATION/TRAINING PROGRAM

## 2024-11-28 PROCEDURE — 6370000000 HC RX 637 (ALT 250 FOR IP): Performed by: STUDENT IN AN ORGANIZED HEALTH CARE EDUCATION/TRAINING PROGRAM

## 2024-11-28 RX ORDER — NITROFURANTOIN 25; 75 MG/1; MG/1
100 CAPSULE ORAL EVERY 12 HOURS SCHEDULED
Qty: 20 CAPSULE | Refills: 0 | Status: SHIPPED | OUTPATIENT
Start: 2024-11-28 | End: 2024-12-08

## 2024-11-28 RX ORDER — FLUCONAZOLE 200 MG/1
200 TABLET ORAL DAILY
Status: DISCONTINUED | OUTPATIENT
Start: 2024-11-28 | End: 2024-11-28 | Stop reason: HOSPADM

## 2024-11-28 RX ORDER — FLUCONAZOLE 200 MG/1
200 TABLET ORAL DAILY
Qty: 7 TABLET | Refills: 0 | Status: SHIPPED | OUTPATIENT
Start: 2024-11-28 | End: 2024-12-05

## 2024-11-28 RX ORDER — METOPROLOL SUCCINATE 25 MG/1
50 TABLET, EXTENDED RELEASE ORAL DAILY
Status: DISCONTINUED | OUTPATIENT
Start: 2024-11-28 | End: 2024-11-28 | Stop reason: HOSPADM

## 2024-11-28 RX ORDER — NITROFURANTOIN 25; 75 MG/1; MG/1
100 CAPSULE ORAL EVERY 12 HOURS SCHEDULED
Status: DISCONTINUED | OUTPATIENT
Start: 2024-11-28 | End: 2024-11-28 | Stop reason: HOSPADM

## 2024-11-28 RX ORDER — PRASUGREL 10 MG/1
10 TABLET, FILM COATED ORAL DAILY
Qty: 30 TABLET | Refills: 0 | Status: SHIPPED | OUTPATIENT
Start: 2024-11-29

## 2024-11-28 RX ORDER — LOSARTAN POTASSIUM 50 MG/1
25 TABLET ORAL DAILY
Status: DISCONTINUED | OUTPATIENT
Start: 2024-11-28 | End: 2024-11-28 | Stop reason: HOSPADM

## 2024-11-28 RX ORDER — COLCHICINE 0.6 MG/1
0.6 TABLET ORAL DAILY
Qty: 30 TABLET | Refills: 0 | Status: SHIPPED | OUTPATIENT
Start: 2024-11-29

## 2024-11-28 RX ADMIN — SODIUM CHLORIDE, PRESERVATIVE FREE 10 ML: 5 INJECTION INTRAVENOUS at 07:31

## 2024-11-28 RX ADMIN — METOPROLOL SUCCINATE 50 MG: 25 TABLET, EXTENDED RELEASE ORAL at 11:21

## 2024-11-28 RX ADMIN — NITROFURANTOIN MONOHYDRATE/MACROCRYSTALS 100 MG: 75; 25 CAPSULE ORAL at 09:29

## 2024-11-28 RX ADMIN — FLUCONAZOLE 200 MG: 200 TABLET ORAL at 09:29

## 2024-11-28 RX ADMIN — ASPIRIN 81 MG: 81 TABLET, CHEWABLE ORAL at 07:30

## 2024-11-28 RX ADMIN — PRASUGREL 60 MG: 10 TABLET, FILM COATED ORAL at 07:55

## 2024-11-28 NOTE — PROGRESS NOTES
Congestive Heart Failure Education note:      Discussed 2000mg/day sodium restricted diet; patient verbalized understanding.    Moderate daily exercise encouraged as tolerated. Discussed rest breaks as needed; patient verbalized understanding.    Patient instructed to weigh self at the same time of each day, using same clothes and same scale; reinforced teaching to monitor for 3-5 lb weight increase over 1-2 days notify physician if charge noted.  Patient verbalized understanding.    Patient instructed to limit fluid intake to 2 liters per day.  Patient verbalized understanding.    Signs and symptoms of CHF discussed with patient, such as feeling more tired than normal, feeling short of breath, coughing that increases when you lie down, sudden weight gain, swelling of your feet, legs or belly.  Patient verbalized understanding to notify physician office if these symptoms occur.    Compliance with plan of care and further disease process causes discussed with patient, patient encouraged to keep all follow up appointments.  Patient verbalized understanding.  
Patient's discharge paperwork completed and went over with patient at bedside. All questions were answered. Patient's IV's removed. Patient's medications were sent to [facility] pharmacy in [facility]. Patient's belongings gathered by self and with patient. Patient transported off floor via wheelchair.   
26 mm Medtronic Kurt drug-eluting stent which was postdilated to 4.0 mm in diameter.     5/20/2022:  Cath showed patent HAYES -LAD  The Native large LCX is compromised by LM disease 90% long stenosis  Conclusions   Procedure Summary      Successful PTAC -JESSE Left Main disease to treat large dominant native LCX     Recommendations     Post stent protocol    Assessment / Acute Cardiac Problems:   Shortness of breath with elevated BNP  Recent cath showing severe three-vessel coronary artery disease with a chronically occluded ostial RCA,100% ostial LAD occlusion, 80% left main in stent restenosis and a proximal in stent 99% stenosis in the circumflex coronary artery. Widely patent LIMA-LAD with left to left collaterals partly filling the mid and distal Cx and distal RCA. Mildly elevated left ventricular end diastolic pressure.   HFrEF EF of 35 to 40%  CAD s/p stent placement in 2022 and 2023  CABG involving LIMA-LAD on 8/15/2016  Dysautonomia on tilt table test  Essential hypertension    Patient Active Problem List:     Hyperlipidemia     Radiculopathy     Insomnia     Allergic rhinitis     COPD without exacerbation ([facility])     Depression     Bilateral leg weakness     Gait difficulty     Diabetic neuropathy     Back pain     Muscle spasm     Affective disorder ([facility])     History of ventral hernia repair     History of incisional hernia repair     Essential hypertension     Gastroesophageal reflux disease     Type 2 diabetes mellitus with hyperglycemia, with long-term current use of insulin ([facility])     Primary osteoarthritis involving multiple joints     Dysautonomia orthostatic hypotension syndrome     Coronary artery disease involving native coronary artery     Angina, class III ([facility])     Hx of CABG     DARON (obstructive sleep apnea)     Neuropathic pain     Abnormal cardiovascular stress test     Muscle spasms of both lower extremities     Paronychia of great toe of left foot     ACS (acute coronary syndrome) ([facility])

## 2024-11-28 NOTE — DISCHARGE SUMMARY
[facility]  Office: [phone]  Tuan Darden DO, Steffen Herring DO, Montana Zhu DO, Pa Thomas DO, Garo King MD, Michelle Sandy MD, Nancy Matthew MD, Nichelle Daniels MD,  Se Linares MD, Gaby Banks MD, Ami Champion MD,  Jody Blanco DO, Sarah Moreno MD, Evelio Diaz MD, Daniel Darden DO, Antonietta Olivares MD,  Juan Garcia DO, Amanda Carson MD, Casandra Araya MD, Lydia Bateman MD, Cathy Sánchez MD,  Gil Ly MD, Dima Nagel MD, Dimitri Brumfield MD, Dav Huynh MD, Sarwat Hall MD, Magen Levy MD, Marko Chávez DO, Silvestre Wiggins MD, Shirley Waterhouse, CNP,  Amaya Lebron CNP, Marko Meeks, ANGELIKA,  Tory Horvath, MIKAL, Anh Espinoza, CNP, Kristal Chacon, ANGELIKA, Brittnee Olguin, CNP, Liseth Ahuja, CNP, Olivia John PA-C, Juliet Macias PA-C, Mini Toure, CNP, Grant Jiménez, CNP,  Claudia Walters, CNP, Chelsey Almeida, CNP,  Nery Olson, CNP, Lupis Edward, CNP         [facility]   IN-PATIENT SERVICE   [facility]    Discharge Summary     Patient ID: Christie Belcher  :  1965   MRN: [id number]     ACCOUNT:  [id number]   Patient's PCP: Bulmaro Plasencia APRN - CNP  Admit Date: 2024   Discharge Date: 2024     Length of Stay: 2  Code Status:  Full Code  Admitting Physician: Se Linares MD  Discharge Physician: CHELSEA PATRICK NP     Active Discharge Diagnoses:     Hospital Problem Lists:  Principal Problem:    Coronary artery disease involving native coronary artery  Active Problems:    Essential hypertension    Shortness of breath    Hyperlipidemia    COPD without exacerbation (HCC)    Type 2 diabetes mellitus with hyperglycemia, with long-term current use of insulin (HCC)    Coronary artery disease with angina pectoris (HCC)    Morbid obesity with BMI of 40.0-44.9, adult  Resolved Problems:    * No resolved hospital problems. *      Admission Condition:  serious     Discharged    ezetimibe 10 MG tablet  Commonly known as: ZETIA  TAKE 1 TABLET BY MOUTH EVERY DAY AT NIGHT     fludrocortisone 0.1 MG tablet  Commonly known as: FLORINEF     furosemide 20 MG tablet  Commonly known as: Lasix  Take 1 tablet by mouth daily     gabapentin 800 MG tablet  Commonly known as: Neurontin  Take one tab three times daily     HumuLIN R U-500 KwikPen 500 UNIT/ML Sopn concentrated injection pen  Generic drug: insulin regular human     losartan 25 MG tablet  Commonly known as: COZAAR  Take 1 tablet by mouth daily     metFORMIN 500 MG extended release tablet  Commonly known as: GLUCOPHAGE-XR  Take 2 tablets by mouth daily (with breakfast)     metoprolol succinate 50 MG extended release tablet  Commonly known as: TOPROL XL  Take 1 tablet by mouth daily     nitroGLYCERIN 0.4 MG SL tablet  Commonly known as: NITROSTAT  Place 1 tablet under the tongue every 5 minutes as needed for Chest pain     omeprazole 20 MG delayed release capsule  Commonly known as: PRILOSEC  TAKE 1 CAPSULE BY MOUTH EVERY DAY IN THE MORNING BEFORE BREAKFAST     oxyCODONE-acetaminophen 5-325 MG per tablet  Commonly known as: Percocet  Take 1 tablet by mouth every 6 hours as needed for Pain for up to 30 days. Max Daily Amount: 4 tablets     Shingrix 50 MCG/0.5ML Susr injection  Generic drug: zoster recombinant adjuvanted vaccine  Inject 0.5 mLs into the muscle See Admin Instructions 2ND DOSE     spironolactone 25 MG tablet  Commonly known as: ALDACTONE  Take 0.5 tablets by mouth daily     Trulicity 3 MG/0.5ML Sopn  Generic drug: Dulaglutide           * This list has 2 medication(s) that are the same as other medications prescribed for you. Read the directions carefully, and ask your doctor or other care provider to review them with you.                STOP taking these medications      cephALEXin 500 MG capsule  Commonly known as: KEFLEX     clopidogrel 75 MG tablet  Commonly known as: PLAVIX            ASK your doctor about these medications

## 2024-11-28 NOTE — CARE COORDINATION
TRANSITIONAL CARE/DISCHARGE PLANNING ONGOING EVALUATION      Reason for Admission: Coronary artery disease, unspecified vessel or lesion type, unspecified whether angina present, unspecified whether native or transplanted heart [I25.10]     Hospital day:2    Patient goals/Transitional Plan:    Spoke with patient about transition and discharge planning, plan remains return home independent with S.O., denies any needs        Readmission Risk              Risk of Unplanned Readmission:  31         Discharge Report    [facility]  Clinical Case Management Department  Written by: Karis Tineo RN    Patient Name: Christie Belcher  Attending Provider: Se Linares MD  Admit Date: 2024 10:10 PM  MRN: [id number]  Account: [id number]                     : 1965  Discharge Date:       Disposition: home    Karis Tineo RN

## 2024-11-28 NOTE — PLAN OF CARE
Problem: Chronic Conditions and Co-morbidities  Goal: Patient's chronic conditions and co-morbidity symptoms are monitored and maintained or improved  11/28/2024 1204 by Radhika Valdes RN  Outcome: Adequate for Discharge  11/28/2024 0903 by Radhika Valdes RN  Outcome: Progressing  11/28/2024 0131 by Anna Arguelles RN  Outcome: Progressing     Problem: Discharge Planning  Goal: Discharge to home or other facility with appropriate resources  11/28/2024 1204 by Radhika Valdes RN  Outcome: Adequate for Discharge  11/28/2024 0903 by Radhika Valdes RN  Outcome: Progressing  11/28/2024 0131 by Anna Arguelles RN  Outcome: Progressing     Problem: Safety - Adult  Goal: Free from fall injury  11/28/2024 1204 by Radhika Valdes RN  Outcome: Adequate for Discharge  11/28/2024 0903 by Radhika Valdes RN  Outcome: Progressing  11/28/2024 0131 by Anna Arguelles RN  Outcome: Progressing     Problem: Skin/Tissue Integrity  Goal: Absence of new skin breakdown  Description: 1.  Monitor for areas of redness and/or skin breakdown  2.  Assess vascular access sites hourly  3.  Every 4-6 hours minimum:  Change oxygen saturation probe site  4.  Every 4-6 hours:  If on nasal continuous positive airway pressure, respiratory therapy assess nares and determine need for appliance change or resting period.  Outcome: Adequate for Discharge

## 2024-11-28 NOTE — PLAN OF CARE
Problem: Chronic Conditions and Co-morbidities  Goal: Patient's chronic conditions and co-morbidity symptoms are monitored and maintained or improved  11/28/2024 0903 by Radhika Valdes RN  Outcome: Progressing  11/28/2024 0131 by Anna Arguelles RN  Outcome: Progressing     Problem: Discharge Planning  Goal: Discharge to home or other facility with appropriate resources  11/28/2024 0903 by Radhika Valdes RN  Outcome: Progressing  11/28/2024 0131 by Anna Arguelles RN  Outcome: Progressing     Problem: Safety - Adult  Goal: Free from fall injury  11/28/2024 0903 by Radhika Valdes RN  Outcome: Progressing  11/28/2024 0131 by Anna Arguelles RN  Outcome: Progressing

## 2024-12-02 DIAGNOSIS — D05.11 DUCTAL CARCINOMA IN SITU (DCIS) OF RIGHT BREAST: ICD-10-CM

## 2024-12-02 DIAGNOSIS — N64.4 BREAST PAIN: ICD-10-CM

## 2024-12-02 RX ORDER — ATORVASTATIN CALCIUM 80 MG/1
TABLET, FILM COATED ORAL
Qty: 90 TABLET | Refills: 3 | Status: SHIPPED | OUTPATIENT
Start: 2024-12-02

## 2024-12-02 RX ORDER — OXYCODONE AND ACETAMINOPHEN 5; 325 MG/1; MG/1
1 TABLET ORAL EVERY 6 HOURS PRN
Qty: 120 TABLET | Refills: 0 | Status: SHIPPED | OUTPATIENT
Start: 2024-12-02 | End: 2025-01-01

## 2024-12-17 ENCOUNTER — OFFICE VISIT (OUTPATIENT)
Dept: CARDIOLOGY | Age: 59
End: 2024-12-17
Payer: COMMERCIAL

## 2024-12-17 ENCOUNTER — HOSPITAL ENCOUNTER (OUTPATIENT)
Age: 59
Discharge: HOME | End: 2024-12-17
Payer: COMMERCIAL

## 2024-12-17 VITALS
SYSTOLIC BLOOD PRESSURE: 126 MMHG | HEART RATE: 90 BPM | WEIGHT: 231 LBS | RESPIRATION RATE: 18 BRPM | HEIGHT: 63 IN | DIASTOLIC BLOOD PRESSURE: 77 MMHG | BODY MASS INDEX: 40.93 KG/M2

## 2024-12-17 DIAGNOSIS — R00.2 PALPITATIONS: ICD-10-CM

## 2024-12-17 DIAGNOSIS — Z95.1 S/P CABG (CORONARY ARTERY BYPASS GRAFT): ICD-10-CM

## 2024-12-17 DIAGNOSIS — I95.1 DYSAUTONOMIA ORTHOSTATIC HYPOTENSION SYNDROME: ICD-10-CM

## 2024-12-17 DIAGNOSIS — I25.10 ASHD (ARTERIOSCLEROTIC HEART DISEASE): ICD-10-CM

## 2024-12-17 DIAGNOSIS — E11.42 TYPE 2 DIABETES MELLITUS WITH DIABETIC POLYNEUROPATHY, WITHOUT LONG-TERM CURRENT USE OF INSULIN (HCC): ICD-10-CM

## 2024-12-17 DIAGNOSIS — I10 ESSENTIAL HYPERTENSION: ICD-10-CM

## 2024-12-17 DIAGNOSIS — I50.22 CHRONIC SYSTOLIC (CONGESTIVE) HEART FAILURE (HCC): ICD-10-CM

## 2024-12-17 DIAGNOSIS — E78.2 MIXED HYPERLIPIDEMIA: ICD-10-CM

## 2024-12-17 DIAGNOSIS — N39.0 ACUTE UTI: ICD-10-CM

## 2024-12-17 DIAGNOSIS — I50.22 CHRONIC SYSTOLIC (CONGESTIVE) HEART FAILURE (HCC): Primary | ICD-10-CM

## 2024-12-17 LAB
ANION GAP SERPL CALCULATED.3IONS-SCNC: 11 MMOL/L (ref 9–16)
BASOPHILS # BLD: 0.06 K/UL (ref 0–0.2)
BASOPHILS NFR BLD: 1 % (ref 0–2)
BUN SERPL-MCNC: 13 MG/DL (ref 6–20)
BUN/CREAT SERPL: 14 (ref 9–20)
CALCIUM SERPL-MCNC: 9.7 MG/DL (ref 8.6–10.4)
CHLORIDE SERPL-SCNC: 98 MMOL/L (ref 98–107)
CHOLEST SERPL-MCNC: 172 MG/DL (ref 0–199)
CHOLESTEROL/HDL RATIO: 4.3
CO2 SERPL-SCNC: 25 MMOL/L (ref 20–31)
CREAT SERPL-MCNC: 0.9 MG/DL (ref 0.5–0.9)
EOSINOPHIL # BLD: 0.1 K/UL (ref 0–0.44)
EOSINOPHILS RELATIVE PERCENT: 1 % (ref 1–4)
ERYTHROCYTE [DISTWIDTH] IN BLOOD BY AUTOMATED COUNT: 16.3 % (ref 11.8–14.4)
EST. AVERAGE GLUCOSE BLD GHB EST-MCNC: 163 MG/DL
GFR, ESTIMATED: 78 ML/MIN/1.73M2
GLUCOSE SERPL-MCNC: 204 MG/DL (ref 74–99)
HBA1C MFR BLD: 7.3 % (ref 4–6)
HCT VFR BLD AUTO: 40.1 % (ref 36.3–47.1)
HDLC SERPL-MCNC: 40 MG/DL
HGB BLD-MCNC: 12.3 G/DL (ref 11.9–15.1)
IMM GRANULOCYTES # BLD AUTO: <0.03 K/UL (ref 0–0.3)
IMM GRANULOCYTES NFR BLD: 0 %
LDLC SERPL CALC-MCNC: 86 MG/DL (ref 0–100)
LYMPHOCYTES NFR BLD: 1.59 K/UL (ref 1.1–3.7)
LYMPHOCYTES RELATIVE PERCENT: 18 % (ref 24–43)
MCH RBC QN AUTO: 23.7 PG (ref 25.2–33.5)
MCHC RBC AUTO-ENTMCNC: 30.7 G/DL (ref 28.4–34.8)
MCV RBC AUTO: 77.3 FL (ref 82.6–102.9)
MONOCYTES NFR BLD: 0.46 K/UL (ref 0.1–1.2)
MONOCYTES NFR BLD: 5 % (ref 3–12)
NEUTROPHILS NFR BLD: 75 % (ref 36–65)
NEUTS SEG NFR BLD: 6.7 K/UL (ref 1.5–8.1)
NRBC BLD-RTO: 0 PER 100 WBC
PLATELET # BLD AUTO: 371 K/UL (ref 138–453)
PMV BLD AUTO: 9.3 FL (ref 8.1–13.5)
POTASSIUM SERPL-SCNC: 5 MMOL/L (ref 3.7–5.3)
RBC # BLD AUTO: 5.19 M/UL (ref 3.95–5.11)
SODIUM SERPL-SCNC: 134 MMOL/L (ref 136–145)
TRIGL SERPL-MCNC: 228 MG/DL
VLDLC SERPL CALC-MCNC: 46 MG/DL (ref 1–30)
WBC OTHER # BLD: 8.9 K/UL (ref 3.5–11.3)

## 2024-12-17 PROCEDURE — 3074F SYST BP LT 130 MM HG: CPT | Performed by: PHYSICIAN ASSISTANT

## 2024-12-17 PROCEDURE — 80061 LIPID PANEL: CPT

## 2024-12-17 PROCEDURE — 1036F TOBACCO NON-USER: CPT | Performed by: PHYSICIAN ASSISTANT

## 2024-12-17 PROCEDURE — 3017F COLORECTAL CA SCREEN DOC REV: CPT | Performed by: PHYSICIAN ASSISTANT

## 2024-12-17 PROCEDURE — 1111F DSCHRG MED/CURRENT MED MERGE: CPT | Performed by: PHYSICIAN ASSISTANT

## 2024-12-17 PROCEDURE — G8484 FLU IMMUNIZE NO ADMIN: HCPCS | Performed by: PHYSICIAN ASSISTANT

## 2024-12-17 PROCEDURE — G8427 DOCREV CUR MEDS BY ELIG CLIN: HCPCS | Performed by: PHYSICIAN ASSISTANT

## 2024-12-17 PROCEDURE — 99214 OFFICE O/P EST MOD 30 MIN: CPT | Performed by: PHYSICIAN ASSISTANT

## 2024-12-17 PROCEDURE — 83036 HEMOGLOBIN GLYCOSYLATED A1C: CPT

## 2024-12-17 PROCEDURE — 85025 COMPLETE CBC W/AUTO DIFF WBC: CPT

## 2024-12-17 PROCEDURE — 80048 BASIC METABOLIC PNL TOTAL CA: CPT

## 2024-12-17 PROCEDURE — G8417 CALC BMI ABV UP PARAM F/U: HCPCS | Performed by: PHYSICIAN ASSISTANT

## 2024-12-17 PROCEDURE — 3078F DIAST BP <80 MM HG: CPT | Performed by: PHYSICIAN ASSISTANT

## 2024-12-17 PROCEDURE — 36415 COLL VENOUS BLD VENIPUNCTURE: CPT

## 2024-12-17 NOTE — PATIENT INSTRUCTIONS
SURVEY:    You may be receiving a survey from [employer] regarding your visit today.    Please complete the survey to enable us to provide the highest quality of care to you and your family.    If you cannot score us a very good on any question, please call the office to discuss how we could have made your experience a very good one.    Thank you.    
Addended by: ROSAURA RIVERA on: 6/23/2023 11:48 AM     Modules accepted: Orders    
68

## 2024-12-17 NOTE — PROGRESS NOTES
Patient: Christie Belcher  : 1965  Date of Visit: 2024    REASON FOR VISIT / CONSULTATION: Follow-up (HX:CHF, ASHD. Pt is here for 6 month follow up. She states she is doing well. Denies cp, palps, sob, dizziness)    Dear Bulmaro Plasencia APRN - CNP,    I had the pleasure of seeing your patient Christie Belcher in consultation today. As you know, Ms. Belcher is a 59 y.o. female who presents with a history of intermittent episodes of back and chest discomfort that started developing since her recent CABG involving a LIMA-LAD 8/15/16.  She also has a history of  dysautonomia on a tilt table test, and was started on Florinef as well as Lexapro. Recent heart cath done 3/7/2019 shows severe single vessel disease involving LAD Normal LVEDP. She had a Holter monitor done on 10/23/2019 that did show PAC's and PVC's but was largely unremarkable. She did come to the ER on 2022 due to abdominal pain and shortness of breath. She was told her EKG was abnormal and also she had an elevated troponin which was only 15 ng/L and only had minor EKG changes. She was sent to [facility] for this and she was not very happy about this. Cardiovascular stress test done on 2022 showed Overall, these results are most consistent with an intermediate/high risk for significant coronary artery disease. Echocardiogram on 2022 showed EF:>55% with mild diastolic dysfunction and LV wall thickness mildly increased. ER on 2023 due to acute coronary syndrome -Transferred to [place] - Successful PCI of left main and circumflex in-stent restenosis with 3.5 x 26 mm Medtronic Kurt drug-eluting stent which was post dilated to 4.0 mm in diameter. She was in the ER on 10/2/2023 and transferred to [facility] on 10/3/2023 and diagnoised with a NSTEMI. Dr Evans did a heart cath, IVUS for severe ISR - stent is underexpanded and malapposed PCI with 4.0 CBA, then 4.0 NC and 5.0 NC DAPT. She was admitted 10/2/2023 to 10/5/2023. She was

## 2024-12-18 ENCOUNTER — TELEPHONE (OUTPATIENT)
Dept: CARDIOLOGY | Age: 59
End: 2024-12-18

## 2024-12-18 DIAGNOSIS — E87.1 HYPONATREMIA: Primary | ICD-10-CM

## 2024-12-18 NOTE — RESULT ENCOUNTER NOTE
Please notify patient that their BMP is stable.  Sodium is slightly low, will repeat in 1 week.  Please continue current treatment and follow up.

## 2024-12-18 NOTE — TELEPHONE ENCOUNTER
----- Message from Gloria West PA-C sent at 12/18/2024  1:56 PM EST -----  Please notify patient that their BMP is stable.  Sodium is slightly low, will repeat in 1 week.  Please continue current treatment and follow up.

## 2024-12-26 ENCOUNTER — HOSPITAL ENCOUNTER (OUTPATIENT)
Age: 59
Discharge: HOME | End: 2024-12-26
Payer: COMMERCIAL

## 2024-12-26 DIAGNOSIS — E87.1 HYPONATREMIA: ICD-10-CM

## 2024-12-26 LAB
ANION GAP SERPL CALCULATED.3IONS-SCNC: 11 MMOL/L (ref 9–16)
BUN SERPL-MCNC: 13 MG/DL (ref 6–20)
BUN/CREAT SERPL: 19 (ref 9–20)
CALCIUM SERPL-MCNC: 8.8 MG/DL (ref 8.6–10.4)
CHLORIDE SERPL-SCNC: 103 MMOL/L (ref 98–107)
CO2 SERPL-SCNC: 24 MMOL/L (ref 20–31)
CREAT SERPL-MCNC: 0.7 MG/DL (ref 0.5–0.9)
GFR, ESTIMATED: >90 ML/MIN/1.73M2
GLUCOSE SERPL-MCNC: 183 MG/DL (ref 74–99)
POTASSIUM SERPL-SCNC: 4.5 MMOL/L (ref 3.7–5.3)
SODIUM SERPL-SCNC: 138 MMOL/L (ref 136–145)

## 2024-12-26 PROCEDURE — 80048 BASIC METABOLIC PNL TOTAL CA: CPT

## 2024-12-26 PROCEDURE — 36415 COLL VENOUS BLD VENIPUNCTURE: CPT

## 2024-12-30 ENCOUNTER — TELEPHONE (OUTPATIENT)
Dept: CARDIOLOGY | Age: 59
End: 2024-12-30

## 2024-12-30 NOTE — TELEPHONE ENCOUNTER
----- Message from Gloria West PA-C sent at 12/30/2024  9:11 AM EST -----  Please notify patient that their lab results are normal.   Please continue current treatment and follow up.

## 2025-01-02 DIAGNOSIS — D05.11 DUCTAL CARCINOMA IN SITU (DCIS) OF RIGHT BREAST: ICD-10-CM

## 2025-01-02 DIAGNOSIS — N64.4 BREAST PAIN: ICD-10-CM

## 2025-01-02 RX ORDER — OXYCODONE AND ACETAMINOPHEN 5; 325 MG/1; MG/1
1 TABLET ORAL EVERY 6 HOURS PRN
Qty: 120 TABLET | Refills: 0 | Status: SHIPPED | OUTPATIENT
Start: 2025-01-02 | End: 2025-02-01

## 2025-01-03 DIAGNOSIS — Z17.0 MALIGNANT NEOPLASM OF CENTRAL PORTION OF RIGHT BREAST IN FEMALE, ESTROGEN RECEPTOR POSITIVE (HCC): ICD-10-CM

## 2025-01-03 DIAGNOSIS — C50.111 MALIGNANT NEOPLASM OF CENTRAL PORTION OF RIGHT BREAST IN FEMALE, ESTROGEN RECEPTOR POSITIVE (HCC): ICD-10-CM

## 2025-01-03 DIAGNOSIS — D05.11 DUCTAL CARCINOMA IN SITU (DCIS) OF RIGHT BREAST: Primary | ICD-10-CM

## 2025-01-03 DIAGNOSIS — D50.0 IRON DEFICIENCY ANEMIA DUE TO CHRONIC BLOOD LOSS: ICD-10-CM

## 2025-01-06 ENCOUNTER — TELEPHONE (OUTPATIENT)
Dept: PRIMARY CARE CLINIC | Age: 60
End: 2025-01-06

## 2025-01-08 ENCOUNTER — HOSPITAL ENCOUNTER (OUTPATIENT)
Age: 60
Discharge: HOME | End: 2025-01-08
Payer: COMMERCIAL

## 2025-01-08 DIAGNOSIS — Z17.0 MALIGNANT NEOPLASM OF CENTRAL PORTION OF RIGHT BREAST IN FEMALE, ESTROGEN RECEPTOR POSITIVE (HCC): ICD-10-CM

## 2025-01-08 DIAGNOSIS — C50.111 MALIGNANT NEOPLASM OF CENTRAL PORTION OF RIGHT BREAST IN FEMALE, ESTROGEN RECEPTOR POSITIVE (HCC): ICD-10-CM

## 2025-01-08 DIAGNOSIS — E11.42 TYPE 2 DIABETES MELLITUS WITH DIABETIC POLYNEUROPATHY, WITHOUT LONG-TERM CURRENT USE OF INSULIN (HCC): ICD-10-CM

## 2025-01-08 DIAGNOSIS — D50.0 IRON DEFICIENCY ANEMIA DUE TO CHRONIC BLOOD LOSS: ICD-10-CM

## 2025-01-08 DIAGNOSIS — D05.11 DUCTAL CARCINOMA IN SITU (DCIS) OF RIGHT BREAST: ICD-10-CM

## 2025-01-08 LAB
ALBUMIN SERPL-MCNC: 4 G/DL (ref 3.5–5.2)
ALBUMIN/GLOB SERPL: 1.1 {RATIO} (ref 1–2.5)
ALP SERPL-CCNC: 97 U/L (ref 35–104)
ALT SERPL-CCNC: 11 U/L (ref 10–35)
ANION GAP SERPL CALCULATED.3IONS-SCNC: 13 MMOL/L (ref 9–16)
AST SERPL-CCNC: 17 U/L (ref 10–35)
BASOPHILS # BLD: 0.04 K/UL (ref 0–0.2)
BASOPHILS NFR BLD: 0 % (ref 0–2)
BILIRUB SERPL-MCNC: 0.4 MG/DL (ref 0–1.2)
BUN SERPL-MCNC: 15 MG/DL (ref 6–20)
BUN/CREAT SERPL: 21 (ref 9–20)
CALCIUM SERPL-MCNC: 9.1 MG/DL (ref 8.6–10.4)
CHLORIDE SERPL-SCNC: 102 MMOL/L (ref 98–107)
CO2 SERPL-SCNC: 23 MMOL/L (ref 20–31)
CREAT SERPL-MCNC: 0.7 MG/DL (ref 0.5–0.9)
CREAT UR-MCNC: 201 MG/DL (ref 28–217)
EOSINOPHIL # BLD: 0.17 K/UL (ref 0–0.44)
EOSINOPHILS RELATIVE PERCENT: 2 % (ref 1–4)
ERYTHROCYTE [DISTWIDTH] IN BLOOD BY AUTOMATED COUNT: 16.1 % (ref 11.8–14.4)
GFR, ESTIMATED: >90 ML/MIN/1.73M2
GLUCOSE SERPL-MCNC: 69 MG/DL (ref 74–99)
HCT VFR BLD AUTO: 37.9 % (ref 36.3–47.1)
HGB BLD-MCNC: 11.9 G/DL (ref 11.9–15.1)
IMM GRANULOCYTES # BLD AUTO: 0.07 K/UL (ref 0–0.3)
IMM GRANULOCYTES NFR BLD: 1 %
LYMPHOCYTES NFR BLD: 2.29 K/UL (ref 1.1–3.7)
LYMPHOCYTES RELATIVE PERCENT: 23 % (ref 24–43)
MCH RBC QN AUTO: 24.1 PG (ref 25.2–33.5)
MCHC RBC AUTO-ENTMCNC: 31.4 G/DL (ref 28.4–34.8)
MCV RBC AUTO: 76.7 FL (ref 82.6–102.9)
MICROALBUMIN UR-MCNC: 76 MG/L (ref 0–20)
MICROALBUMIN/CREAT UR-RTO: 38 MCG/MG CREAT (ref 0–25)
MONOCYTES NFR BLD: 0.54 K/UL (ref 0.1–1.2)
MONOCYTES NFR BLD: 6 % (ref 3–12)
NEUTROPHILS NFR BLD: 68 % (ref 36–65)
NEUTS SEG NFR BLD: 6.68 K/UL (ref 1.5–8.1)
NRBC BLD-RTO: 0 PER 100 WBC
PLATELET # BLD AUTO: 326 K/UL (ref 138–453)
PMV BLD AUTO: 9.4 FL (ref 8.1–13.5)
POTASSIUM SERPL-SCNC: 4 MMOL/L (ref 3.7–5.3)
PROT SERPL-MCNC: 7.5 G/DL (ref 6.6–8.7)
RBC # BLD AUTO: 4.94 M/UL (ref 3.95–5.11)
SODIUM SERPL-SCNC: 138 MMOL/L (ref 136–145)
WBC OTHER # BLD: 9.8 K/UL (ref 3.5–11.3)

## 2025-01-08 PROCEDURE — 82570 ASSAY OF URINE CREATININE: CPT

## 2025-01-08 PROCEDURE — 83036 HEMOGLOBIN GLYCOSYLATED A1C: CPT

## 2025-01-08 PROCEDURE — 80053 COMPREHEN METABOLIC PANEL: CPT

## 2025-01-08 PROCEDURE — 80061 LIPID PANEL: CPT

## 2025-01-08 PROCEDURE — 83540 ASSAY OF IRON: CPT

## 2025-01-08 PROCEDURE — 82728 ASSAY OF FERRITIN: CPT

## 2025-01-08 PROCEDURE — 85025 COMPLETE CBC W/AUTO DIFF WBC: CPT

## 2025-01-08 PROCEDURE — 83550 IRON BINDING TEST: CPT

## 2025-01-08 PROCEDURE — 82043 UR ALBUMIN QUANTITATIVE: CPT

## 2025-01-08 PROCEDURE — 36415 COLL VENOUS BLD VENIPUNCTURE: CPT

## 2025-01-09 LAB
CHOLEST SERPL-MCNC: 164 MG/DL (ref 0–199)
CHOLESTEROL/HDL RATIO: 3.6
EST. AVERAGE GLUCOSE BLD GHB EST-MCNC: 174 MG/DL
FERRITIN SERPL-MCNC: 89 NG/ML (ref 15–150)
HBA1C MFR BLD: 7.7 % (ref 4–6)
HDLC SERPL-MCNC: 46 MG/DL
IRON SATN MFR SERPL: 9 % (ref 20–55)
IRON SERPL-MCNC: 28 UG/DL (ref 37–145)
LDLC SERPL CALC-MCNC: 88 MG/DL (ref 0–100)
TIBC SERPL-MCNC: 314 UG/DL (ref 250–450)
TRIGL SERPL-MCNC: 151 MG/DL
UNSATURATED IRON BINDING CAPACITY: 286 UG/DL (ref 112–347)
VLDLC SERPL CALC-MCNC: 30 MG/DL (ref 1–30)

## 2025-01-13 ENCOUNTER — OFFICE VISIT (OUTPATIENT)
Dept: ONCOLOGY | Age: 60
End: 2025-01-13
Payer: COMMERCIAL

## 2025-01-13 VITALS
RESPIRATION RATE: 18 BRPM | BODY MASS INDEX: 38.97 KG/M2 | WEIGHT: 220 LBS | SYSTOLIC BLOOD PRESSURE: 128 MMHG | DIASTOLIC BLOOD PRESSURE: 63 MMHG | TEMPERATURE: 97.1 F | HEART RATE: 87 BPM

## 2025-01-13 DIAGNOSIS — C50.111 MALIGNANT NEOPLASM OF CENTRAL PORTION OF RIGHT BREAST IN FEMALE, ESTROGEN RECEPTOR POSITIVE (HCC): Primary | ICD-10-CM

## 2025-01-13 DIAGNOSIS — Z17.0 MALIGNANT NEOPLASM OF CENTRAL PORTION OF RIGHT BREAST IN FEMALE, ESTROGEN RECEPTOR POSITIVE (HCC): Primary | ICD-10-CM

## 2025-01-13 DIAGNOSIS — D50.0 IRON DEFICIENCY ANEMIA DUE TO CHRONIC BLOOD LOSS: ICD-10-CM

## 2025-01-13 PROCEDURE — 3078F DIAST BP <80 MM HG: CPT | Performed by: INTERNAL MEDICINE

## 2025-01-13 PROCEDURE — G8427 DOCREV CUR MEDS BY ELIG CLIN: HCPCS | Performed by: INTERNAL MEDICINE

## 2025-01-13 PROCEDURE — G8417 CALC BMI ABV UP PARAM F/U: HCPCS | Performed by: INTERNAL MEDICINE

## 2025-01-13 PROCEDURE — 3074F SYST BP LT 130 MM HG: CPT | Performed by: INTERNAL MEDICINE

## 2025-01-13 PROCEDURE — 1036F TOBACCO NON-USER: CPT | Performed by: INTERNAL MEDICINE

## 2025-01-13 PROCEDURE — 99214 OFFICE O/P EST MOD 30 MIN: CPT | Performed by: INTERNAL MEDICINE

## 2025-01-13 PROCEDURE — 3017F COLORECTAL CA SCREEN DOC REV: CPT | Performed by: INTERNAL MEDICINE

## 2025-01-13 RX ORDER — ONDANSETRON 2 MG/ML
8 INJECTION INTRAMUSCULAR; INTRAVENOUS
OUTPATIENT
Start: 2025-01-20

## 2025-01-13 RX ORDER — SODIUM CHLORIDE 9 MG/ML
5-250 INJECTION, SOLUTION INTRAVENOUS PRN
OUTPATIENT
Start: 2025-01-20

## 2025-01-13 RX ORDER — EPINEPHRINE 1 MG/ML
0.3 INJECTION, SOLUTION, CONCENTRATE INTRAVENOUS PRN
OUTPATIENT
Start: 2025-01-20

## 2025-01-13 RX ORDER — HEPARIN SODIUM (PORCINE) LOCK FLUSH IV SOLN 100 UNIT/ML 100 UNIT/ML
500 SOLUTION INTRAVENOUS PRN
OUTPATIENT
Start: 2025-01-20

## 2025-01-13 RX ORDER — ALBUTEROL SULFATE 90 UG/1
4 INHALANT RESPIRATORY (INHALATION) PRN
OUTPATIENT
Start: 2025-01-20

## 2025-01-13 RX ORDER — HYDROCORTISONE SODIUM SUCCINATE 100 MG/2ML
100 INJECTION INTRAMUSCULAR; INTRAVENOUS
OUTPATIENT
Start: 2025-01-20

## 2025-01-13 RX ORDER — SODIUM CHLORIDE 9 MG/ML
INJECTION, SOLUTION INTRAVENOUS CONTINUOUS
OUTPATIENT
Start: 2025-01-20

## 2025-01-13 RX ORDER — SODIUM CHLORIDE 0.9 % (FLUSH) 0.9 %
5-40 SYRINGE (ML) INJECTION PRN
OUTPATIENT
Start: 2025-01-20

## 2025-01-13 RX ORDER — DIPHENHYDRAMINE HYDROCHLORIDE 50 MG/ML
50 INJECTION INTRAMUSCULAR; INTRAVENOUS
OUTPATIENT
Start: 2025-01-20

## 2025-01-13 RX ORDER — ACETAMINOPHEN 325 MG/1
650 TABLET ORAL
OUTPATIENT
Start: 2025-01-20

## 2025-01-13 RX ORDER — FAMOTIDINE 10 MG/ML
20 INJECTION, SOLUTION INTRAVENOUS
OUTPATIENT
Start: 2025-01-20

## 2025-01-13 NOTE — PROGRESS NOTES
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 with no sentinel lymph node ,initially she had biopsy of the right axillary lymphadenopathy, case discussed at the tumor board and the plan was to proceed with sentinel lymph node biopsy which was negative   Oncotype DX of 5 and no benefit from chemotherapy and status post adjuvant radiation treatment and on endocrine treatment  in the form of anastrozole which will be given for 10 years based on cancer index  Most recent diagnostic mammogram done on March 2024 and no evidence of recurrence> mammogram on March 2025  Continue Percocet for pain  Microcytic anemia on iron supplement and improved on IV iron> symptoms improved on IV iron infusion looks like she had malabsorption not improving on iron supplement> IV iron infusion due to symptoms management  Bone density scan done on September 2024 and did show osteopenia> continue calcium vitamin D and Fosamax(Prolia was denied  Toxicity profile reviewed with the patient's and she is tolerated treatment well  Continue Arimidex>   Rtc in 3 months with labs and assess response to IV iron                                               Cheng Tellez MD                          [facility]                            This note is created with the assistance of a speech recognition program.  While intending to generate a document that actually reflects the content of the visit, the document can still have some errors including those of syntax and sound a like substitutions which may escape proof reading.  It such instances, actual meaning can be extrapolated by contextual diversion.

## 2025-01-16 ENCOUNTER — HOSPITAL ENCOUNTER (OUTPATIENT)
Dept: INFUSION THERAPY | Age: 60
Discharge: HOME | End: 2025-01-16

## 2025-01-16 VITALS
DIASTOLIC BLOOD PRESSURE: 66 MMHG | SYSTOLIC BLOOD PRESSURE: 115 MMHG | TEMPERATURE: 97.6 F | HEART RATE: 95 BPM | RESPIRATION RATE: 18 BRPM

## 2025-01-16 RX ORDER — LOSARTAN POTASSIUM 25 MG/1
25 TABLET ORAL DAILY
Qty: 90 TABLET | Refills: 3 | Status: SHIPPED | OUTPATIENT
Start: 2025-01-16

## 2025-01-16 NOTE — FLOWSHEET NOTE
Pt discharged to home. Attempted to start IV x6 without success. Pt states \"I have terrible veins\". Pt given the choice to keep trying or reschedule and she chooses to reschedule.

## 2025-01-17 ENCOUNTER — OFFICE VISIT (OUTPATIENT)
Dept: PRIMARY CARE CLINIC | Age: 60
End: 2025-01-17
Payer: COMMERCIAL

## 2025-01-17 VITALS
TEMPERATURE: 98 F | WEIGHT: 236.6 LBS | BODY MASS INDEX: 41.91 KG/M2 | SYSTOLIC BLOOD PRESSURE: 126 MMHG | HEART RATE: 107 BPM | DIASTOLIC BLOOD PRESSURE: 80 MMHG | OXYGEN SATURATION: 95 %

## 2025-01-17 DIAGNOSIS — J44.1 COPD EXACERBATION (HCC): ICD-10-CM

## 2025-01-17 DIAGNOSIS — E11.42 TYPE 2 DIABETES MELLITUS WITH DIABETIC POLYNEUROPATHY, WITHOUT LONG-TERM CURRENT USE OF INSULIN (HCC): Primary | ICD-10-CM

## 2025-01-17 DIAGNOSIS — I50.22 CHRONIC SYSTOLIC (CONGESTIVE) HEART FAILURE (HCC): ICD-10-CM

## 2025-01-17 DIAGNOSIS — Z87.891 PERSONAL HISTORY OF TOBACCO USE: ICD-10-CM

## 2025-01-17 DIAGNOSIS — I10 ESSENTIAL HYPERTENSION: ICD-10-CM

## 2025-01-17 DIAGNOSIS — G90.1 DYSAUTONOMIA (HCC): ICD-10-CM

## 2025-01-17 PROBLEM — H43.812 VITREOUS DEGENERATION OF LEFT EYE: Status: ACTIVE | Noted: 2025-01-17

## 2025-01-17 PROBLEM — H52.213 IRREGULAR ASTIGMATISM OF BOTH EYES: Status: ACTIVE | Noted: 2024-05-02

## 2025-01-17 PROBLEM — H35.372 EPIRETINAL MEMBRANE (ERM) OF LEFT EYE: Status: ACTIVE | Noted: 2024-05-02

## 2025-01-17 PROBLEM — H25.813 COMBINED FORMS OF AGE-RELATED CATARACT OF BOTH EYES: Status: ACTIVE | Noted: 2024-05-02

## 2025-01-17 LAB
INFLUENZA A ANTIGEN, POC: NEGATIVE
INFLUENZA B ANTIGEN, POC: NEGATIVE
LOT NUMBER POC: NORMAL
SARS-COV-2 RNA POC - COV: NORMAL
VALID INTERNAL CONTROL, POC: PRESENT
VENDOR AND KIT NAME POC: NORMAL

## 2025-01-17 PROCEDURE — G8427 DOCREV CUR MEDS BY ELIG CLIN: HCPCS | Performed by: NURSE PRACTITIONER

## 2025-01-17 PROCEDURE — 2022F DILAT RTA XM EVC RTNOPTHY: CPT | Performed by: NURSE PRACTITIONER

## 2025-01-17 PROCEDURE — 3017F COLORECTAL CA SCREEN DOC REV: CPT | Performed by: NURSE PRACTITIONER

## 2025-01-17 PROCEDURE — G0296 VISIT TO DETERM LDCT ELIG: HCPCS | Performed by: NURSE PRACTITIONER

## 2025-01-17 PROCEDURE — 1036F TOBACCO NON-USER: CPT | Performed by: NURSE PRACTITIONER

## 2025-01-17 PROCEDURE — 3023F SPIROM DOC REV: CPT | Performed by: NURSE PRACTITIONER

## 2025-01-17 PROCEDURE — 3074F SYST BP LT 130 MM HG: CPT | Performed by: NURSE PRACTITIONER

## 2025-01-17 PROCEDURE — 3051F HG A1C>EQUAL 7.0%<8.0%: CPT | Performed by: NURSE PRACTITIONER

## 2025-01-17 PROCEDURE — G8417 CALC BMI ABV UP PARAM F/U: HCPCS | Performed by: NURSE PRACTITIONER

## 2025-01-17 PROCEDURE — 99214 OFFICE O/P EST MOD 30 MIN: CPT | Performed by: NURSE PRACTITIONER

## 2025-01-17 PROCEDURE — 3079F DIAST BP 80-89 MM HG: CPT | Performed by: NURSE PRACTITIONER

## 2025-01-17 RX ORDER — DOXYCYCLINE HYCLATE 100 MG
100 TABLET ORAL 2 TIMES DAILY
Qty: 14 TABLET | Refills: 0 | Status: SHIPPED | OUTPATIENT
Start: 2025-01-17 | End: 2025-01-24

## 2025-01-17 RX ORDER — BLOOD-GLUCOSE METER
KIT MISCELLANEOUS
COMMUNITY
Start: 2024-12-17

## 2025-01-17 RX ORDER — LANCETS 30 GAUGE
EACH MISCELLANEOUS
COMMUNITY
Start: 2024-12-17

## 2025-01-17 SDOH — ECONOMIC STABILITY: FOOD INSECURITY: WITHIN THE PAST 12 MONTHS, YOU WORRIED THAT YOUR FOOD WOULD RUN OUT BEFORE YOU GOT MONEY TO BUY MORE.: NEVER TRUE

## 2025-01-17 SDOH — ECONOMIC STABILITY: FOOD INSECURITY: WITHIN THE PAST 12 MONTHS, THE FOOD YOU BOUGHT JUST DIDN'T LAST AND YOU DIDN'T HAVE MONEY TO GET MORE.: NEVER TRUE

## 2025-01-17 ASSESSMENT — PATIENT HEALTH QUESTIONNAIRE - PHQ9
4. FEELING TIRED OR HAVING LITTLE ENERGY: NOT AT ALL
SUM OF ALL RESPONSES TO PHQ9 QUESTIONS 1 & 2: 0
SUM OF ALL RESPONSES TO PHQ QUESTIONS 1-9: 0
5. POOR APPETITE OR OVEREATING: NOT AT ALL
6. FEELING BAD ABOUT YOURSELF - OR THAT YOU ARE A FAILURE OR HAVE LET YOURSELF OR YOUR FAMILY DOWN: NOT AT ALL
1. LITTLE INTEREST OR PLEASURE IN DOING THINGS: NOT AT ALL
10. IF YOU CHECKED OFF ANY PROBLEMS, HOW DIFFICULT HAVE THESE PROBLEMS MADE IT FOR YOU TO DO YOUR WORK, TAKE CARE OF THINGS AT HOME, OR GET ALONG WITH OTHER PEOPLE: NOT DIFFICULT AT ALL
SUM OF ALL RESPONSES TO PHQ QUESTIONS 1-9: 0
8. MOVING OR SPEAKING SO SLOWLY THAT OTHER PEOPLE COULD HAVE NOTICED. OR THE OPPOSITE, BEING SO FIGETY OR RESTLESS THAT YOU HAVE BEEN MOVING AROUND A LOT MORE THAN USUAL: NOT AT ALL
2. FEELING DOWN, DEPRESSED OR HOPELESS: NOT AT ALL
9. THOUGHTS THAT YOU WOULD BE BETTER OFF DEAD, OR OF HURTING YOURSELF: NOT AT ALL
7. TROUBLE CONCENTRATING ON THINGS, SUCH AS READING THE NEWSPAPER OR WATCHING TELEVISION: NOT AT ALL
SUM OF ALL RESPONSES TO PHQ QUESTIONS 1-9: 0
3. TROUBLE FALLING OR STAYING ASLEEP: NOT AT ALL
SUM OF ALL RESPONSES TO PHQ QUESTIONS 1-9: 0

## 2025-01-17 ASSESSMENT — ENCOUNTER SYMPTOMS
ORTHOPNEA: 0
VOMITING: 0
HEARTBURN: 0
HEMOPTYSIS: 0
SORE THROAT: 1
SHORTNESS OF BREATH: 0
BLURRED VISION: 1
COUGH: 1
DIARRHEA: 0
ABDOMINAL PAIN: 0
WHEEZING: 0
CONSTIPATION: 0
RHINORRHEA: 0
NAUSEA: 0

## 2025-01-17 NOTE — PROGRESS NOTES
Name: Christie eBlcher  : 1965         Chief Complaint:     Chief Complaint   Patient presents with    Diabetes     6 month.     Hypertension    Cough     Cough, sob, fever, fatigue, body aches, x 3 days.       History of Present Illness:      Christie Belcher is a 59 y.o.  female who presents with Diabetes (6 month. ), Hypertension, and Cough (Cough, sob, fever, fatigue, body aches, x 3 days.)      Christie is here today for a routine office visit.    Overall she states she is feeling well.  She is congratulated as her A1c has dropped to 7.7.  She has been having some respiratory symptoms for the last week or so.  She states she has a cough and feels a little rundown.    CHF/dysautonomia-stable, patient is following with cardiology on a routine basis.    Diabetes  She presents for her follow-up diabetic visit. She has type 2 diabetes mellitus. Onset time: YEARS. Her disease course has been improving. There are no hypoglycemic associated symptoms. Pertinent negatives for hypoglycemia include no dizziness, headaches, nervousness/anxiousness, pallor, sleepiness or sweats. Associated symptoms include blurred vision (CHRONIC RIGHT) and foot paresthesias. Pertinent negatives for diabetes include no chest pain, no fatigue, no polydipsia, no polyphagia, no polyuria, no weakness and no weight loss. There are no hypoglycemic complications. Pertinent negatives for hypoglycemia complications include no hospitalization, no nocturnal hypoglycemia and no required assistance. Symptoms are improving. Diabetic complications include peripheral neuropathy. Pertinent negatives for diabetic complications include no CVA, heart disease or nephropathy. Risk factors for coronary artery disease include diabetes mellitus, dyslipidemia, family history, obesity, hypertension, stress, post-menopausal and sedentary lifestyle. Current diabetic treatment includes intensive insulin program. She is compliant with treatment all of the time. Her

## 2025-01-17 NOTE — PROGRESS NOTES
Discussed with the patient the current USPSTF guidelines released March 9, 2021 for screening for lung cancer.    For adults aged 50 to 80 years who have a 20 pack-year smoking history and currently smoke or have quit within the past 15 years the grade B recommendation is to:  Screen for lung cancer with low-dose computed tomography (LDCT) every year.  Stop screening once a person has not smoked for 15 years or has a health problem that limits life expectancy or the ability to have lung surgery.    The patient  reports that she quit smoking about 14 years ago. Her smoking use included cigarettes. She started smoking about 24 years ago. She has a 20 pack-year smoking history. She has never used smokeless tobacco.. Discussed with patient the risks and benefits of screening, including over-diagnosis, false positive rate, and total radiation exposure.  The patient currently exhibits no signs or symptoms suggestive of lung cancer.  Discussed with patient the importance of compliance with yearly annual lung cancer screenings and willingness to undergo diagnosis and treatment if screening scan is positive.  In addition, the patient was counseled regarding the importance of remaining smoke free and/or total smoking cessation.    Also reviewed the following if the patient has Medicare that as of February 10, 2022, Medicare only covers LDCT screening in patients aged 50-77 with at least a 20 pack-year smoking history who currently smoke or have quit in the last 15 years

## 2025-01-17 NOTE — PATIENT INSTRUCTIONS
screening?  Your scan may be normal (negative).  For some people who are at higher risk, screening lowers the chance of dying of lung cancer. How much and how long you smoked helps to determine your risk level. Screening can find some cancers early, when treatment may be more likely to work.  What happens after screening?  The results of your CT scan will be sent to your doctor. Someone from your care team will explain the results of your scan and answer any questions you may have. If you need any follow-up, he or she will help you understand what to do next.  After a lung cancer screening, you can go back to your usual activities right away.  A lung cancer screening test can't tell if you have lung cancer. If your results are positive, your doctor can't tell whether an abnormal finding is a harmless nodule, cancer, or something else without doing more tests.  What can you do to help prevent lung cancer?  Some lung cancers can't be prevented. But if you smoke, quitting smoking is the best step you can take to prevent lung cancer. If you want to quit, your doctor can recommend medicines or other ways to help.  Follow-up care is a key part of your treatment and safety. Be sure to make and go to all appointments, and call your doctor if you are having problems. It's also a good idea to know your test results and keep a list of the medicines you take.  Where can you learn more?  Go to https://www.[employer].net/patientEd and enter Q940 to learn more about \"Learning About Lung Cancer Screening.\"  Current as of: October 25, 2023  Content Version: 14.3  © 2024 [employer].   Care instructions adapted under license by [employer]. If you have questions about a medical condition or this instruction, always ask your healthcare professional. [employer] disclaims any warranty or liability for your use of this information.

## 2025-01-21 ENCOUNTER — HOSPITAL ENCOUNTER (OUTPATIENT)
Dept: INFUSION THERAPY | Age: 60
Discharge: HOME | End: 2025-01-21
Payer: COMMERCIAL

## 2025-01-21 VITALS
SYSTOLIC BLOOD PRESSURE: 92 MMHG | TEMPERATURE: 96.8 F | DIASTOLIC BLOOD PRESSURE: 53 MMHG | RESPIRATION RATE: 18 BRPM | HEART RATE: 76 BPM

## 2025-01-21 DIAGNOSIS — D50.0 IRON DEFICIENCY ANEMIA DUE TO CHRONIC BLOOD LOSS: ICD-10-CM

## 2025-01-21 DIAGNOSIS — C50.111 MALIGNANT NEOPLASM OF CENTRAL PORTION OF RIGHT BREAST IN FEMALE, ESTROGEN RECEPTOR POSITIVE (HCC): Primary | ICD-10-CM

## 2025-01-21 DIAGNOSIS — M85.89 OSTEOPENIA OF MULTIPLE SITES: ICD-10-CM

## 2025-01-21 DIAGNOSIS — Z17.0 MALIGNANT NEOPLASM OF CENTRAL PORTION OF RIGHT BREAST IN FEMALE, ESTROGEN RECEPTOR POSITIVE (HCC): Primary | ICD-10-CM

## 2025-01-21 PROCEDURE — 2580000003 HC RX 258: Performed by: INTERNAL MEDICINE

## 2025-01-21 PROCEDURE — 96366 THER/PROPH/DIAG IV INF ADDON: CPT

## 2025-01-21 PROCEDURE — 2500000003 HC RX 250 WO HCPCS: Performed by: INTERNAL MEDICINE

## 2025-01-21 PROCEDURE — 96365 THER/PROPH/DIAG IV INF INIT: CPT

## 2025-01-21 PROCEDURE — 6360000002 HC RX W HCPCS: Performed by: INTERNAL MEDICINE

## 2025-01-21 RX ORDER — SODIUM CHLORIDE 0.9 % (FLUSH) 0.9 %
5-40 SYRINGE (ML) INJECTION PRN
OUTPATIENT
Start: 2025-02-04

## 2025-01-21 RX ORDER — SODIUM CHLORIDE 9 MG/ML
5-250 INJECTION, SOLUTION INTRAVENOUS PRN
OUTPATIENT
Start: 2025-02-04

## 2025-01-21 RX ORDER — DIPHENHYDRAMINE HYDROCHLORIDE 50 MG/ML
50 INJECTION INTRAMUSCULAR; INTRAVENOUS
OUTPATIENT
Start: 2025-02-04

## 2025-01-21 RX ORDER — ONDANSETRON 2 MG/ML
8 INJECTION INTRAMUSCULAR; INTRAVENOUS
OUTPATIENT
Start: 2025-02-04

## 2025-01-21 RX ORDER — FAMOTIDINE 10 MG/ML
20 INJECTION, SOLUTION INTRAVENOUS
OUTPATIENT
Start: 2025-02-04

## 2025-01-21 RX ORDER — SODIUM CHLORIDE 9 MG/ML
5-250 INJECTION, SOLUTION INTRAVENOUS PRN
Status: DISCONTINUED | OUTPATIENT
Start: 2025-01-21 | End: 2025-01-22 | Stop reason: HOSPADM

## 2025-01-21 RX ORDER — HYDROCORTISONE SODIUM SUCCINATE 100 MG/2ML
100 INJECTION INTRAMUSCULAR; INTRAVENOUS
OUTPATIENT
Start: 2025-02-04

## 2025-01-21 RX ORDER — ACETAMINOPHEN 325 MG/1
650 TABLET ORAL
OUTPATIENT
Start: 2025-02-04

## 2025-01-21 RX ORDER — SODIUM CHLORIDE 9 MG/ML
INJECTION, SOLUTION INTRAVENOUS CONTINUOUS
OUTPATIENT
Start: 2025-02-04

## 2025-01-21 RX ORDER — HEPARIN 100 UNIT/ML
500 SYRINGE INTRAVENOUS PRN
OUTPATIENT
Start: 2025-02-04

## 2025-01-21 RX ORDER — SODIUM CHLORIDE 0.9 % (FLUSH) 0.9 %
5-40 SYRINGE (ML) INJECTION PRN
Status: DISCONTINUED | OUTPATIENT
Start: 2025-01-21 | End: 2025-01-22 | Stop reason: HOSPADM

## 2025-01-21 RX ORDER — EPINEPHRINE 1 MG/ML
0.3 INJECTION, SOLUTION, CONCENTRATE INTRAVENOUS PRN
OUTPATIENT
Start: 2025-02-04

## 2025-01-21 RX ORDER — ALBUTEROL SULFATE 90 UG/1
4 INHALANT RESPIRATORY (INHALATION) PRN
OUTPATIENT
Start: 2025-02-04

## 2025-01-21 RX ADMIN — SODIUM CHLORIDE 300 MG: 9 INJECTION, SOLUTION INTRAVENOUS at 10:10

## 2025-01-21 RX ADMIN — SODIUM CHLORIDE 100 ML/HR: 0.9 INJECTION, SOLUTION INTRAVENOUS at 09:58

## 2025-01-21 RX ADMIN — SODIUM CHLORIDE, PRESERVATIVE FREE 10 ML: 5 INJECTION INTRAVENOUS at 09:50

## 2025-01-24 ENCOUNTER — HOSPITAL ENCOUNTER (EMERGENCY)
Age: 60
Discharge: HOME | End: 2025-01-24
Attending: EMERGENCY MEDICINE
Payer: COMMERCIAL

## 2025-01-24 VITALS
SYSTOLIC BLOOD PRESSURE: 148 MMHG | TEMPERATURE: 97.3 F | DIASTOLIC BLOOD PRESSURE: 82 MMHG | HEART RATE: 90 BPM | OXYGEN SATURATION: 98 %

## 2025-01-24 DIAGNOSIS — L85.3 XEROSIS OF SKIN: ICD-10-CM

## 2025-01-24 DIAGNOSIS — L29.9 PRURITIC DISORDER: Primary | ICD-10-CM

## 2025-01-24 PROCEDURE — 99283 EMERGENCY DEPT VISIT LOW MDM: CPT

## 2025-01-24 PROCEDURE — 6370000000 HC RX 637 (ALT 250 FOR IP): Performed by: EMERGENCY MEDICINE

## 2025-01-24 RX ORDER — HYDROXYZINE HYDROCHLORIDE 25 MG/1
25 TABLET, FILM COATED ORAL ONCE
Status: COMPLETED | OUTPATIENT
Start: 2025-01-24 | End: 2025-01-24

## 2025-01-24 RX ORDER — HYDROXYZINE HYDROCHLORIDE 25 MG/1
25 TABLET, FILM COATED ORAL EVERY 8 HOURS PRN
Qty: 20 TABLET | Refills: 0 | Status: SHIPPED | OUTPATIENT
Start: 2025-01-24 | End: 2025-02-03

## 2025-01-24 RX ADMIN — HYDROXYZINE HYDROCHLORIDE 25 MG: 25 TABLET, FILM COATED ORAL at 19:14

## 2025-01-24 ASSESSMENT — PAIN DESCRIPTION - LOCATION: LOCATION: HAND

## 2025-01-24 ASSESSMENT — PAIN - FUNCTIONAL ASSESSMENT: PAIN_FUNCTIONAL_ASSESSMENT: 0-10

## 2025-01-24 ASSESSMENT — PAIN DESCRIPTION - ORIENTATION: ORIENTATION: RIGHT

## 2025-01-25 NOTE — DISCHARGE INSTRUCTIONS
Keep skin clean with soap and water.  Apply a small amount of antibiotic ointment 1-2 times a day.  Continue doxycycline as directed until complete.  Use Atarax every 6-8 hours as needed for itching follow-up primary care provider in 3 to 5 days for reevaluation.  Please return immediately should develop any worsening symptoms or any acute concerns

## 2025-01-28 DIAGNOSIS — Z12.11 COLON CANCER SCREENING: Primary | ICD-10-CM

## 2025-02-03 DIAGNOSIS — D05.11 DUCTAL CARCINOMA IN SITU (DCIS) OF RIGHT BREAST: ICD-10-CM

## 2025-02-03 DIAGNOSIS — N64.4 BREAST PAIN: ICD-10-CM

## 2025-02-03 NOTE — TELEPHONE ENCOUNTER
Patient called in and stated that she need a refill on her oxycodone. Sent over to [facility] in [place]

## 2025-02-04 ENCOUNTER — TELEPHONE (OUTPATIENT)
Dept: PRIMARY CARE CLINIC | Age: 60
End: 2025-02-04

## 2025-02-04 ENCOUNTER — HOSPITAL ENCOUNTER (OUTPATIENT)
Dept: CT IMAGING | Age: 60
Discharge: HOME | End: 2025-02-06
Payer: COMMERCIAL

## 2025-02-04 ENCOUNTER — HOSPITAL ENCOUNTER (OUTPATIENT)
Dept: INFUSION THERAPY | Age: 60
Discharge: HOME | End: 2025-02-04
Payer: COMMERCIAL

## 2025-02-04 VITALS
HEART RATE: 80 BPM | TEMPERATURE: 98.4 F | SYSTOLIC BLOOD PRESSURE: 118 MMHG | DIASTOLIC BLOOD PRESSURE: 72 MMHG | RESPIRATION RATE: 18 BRPM

## 2025-02-04 DIAGNOSIS — C50.111 MALIGNANT NEOPLASM OF CENTRAL PORTION OF RIGHT BREAST IN FEMALE, ESTROGEN RECEPTOR POSITIVE (HCC): Primary | ICD-10-CM

## 2025-02-04 DIAGNOSIS — Z87.891 PERSONAL HISTORY OF TOBACCO USE: ICD-10-CM

## 2025-02-04 DIAGNOSIS — Z17.0 MALIGNANT NEOPLASM OF CENTRAL PORTION OF RIGHT BREAST IN FEMALE, ESTROGEN RECEPTOR POSITIVE (HCC): Primary | ICD-10-CM

## 2025-02-04 DIAGNOSIS — M85.89 OSTEOPENIA OF MULTIPLE SITES: ICD-10-CM

## 2025-02-04 DIAGNOSIS — D50.0 IRON DEFICIENCY ANEMIA DUE TO CHRONIC BLOOD LOSS: ICD-10-CM

## 2025-02-04 PROCEDURE — 2500000003 HC RX 250 WO HCPCS: Performed by: INTERNAL MEDICINE

## 2025-02-04 PROCEDURE — 6360000002 HC RX W HCPCS: Performed by: INTERNAL MEDICINE

## 2025-02-04 PROCEDURE — 96365 THER/PROPH/DIAG IV INF INIT: CPT

## 2025-02-04 PROCEDURE — 71271 CT THORAX LUNG CANCER SCR C-: CPT

## 2025-02-04 PROCEDURE — 96366 THER/PROPH/DIAG IV INF ADDON: CPT

## 2025-02-04 PROCEDURE — 2580000003 HC RX 258: Performed by: INTERNAL MEDICINE

## 2025-02-04 RX ORDER — ACETAMINOPHEN 325 MG/1
650 TABLET ORAL
OUTPATIENT
Start: 2025-02-18

## 2025-02-04 RX ORDER — OXYCODONE AND ACETAMINOPHEN 5; 325 MG/1; MG/1
1 TABLET ORAL EVERY 6 HOURS PRN
Qty: 120 TABLET | Refills: 0 | Status: SHIPPED | OUTPATIENT
Start: 2025-02-04 | End: 2025-03-06

## 2025-02-04 RX ORDER — SODIUM CHLORIDE 9 MG/ML
5-250 INJECTION, SOLUTION INTRAVENOUS PRN
Status: DISCONTINUED | OUTPATIENT
Start: 2025-02-04 | End: 2025-02-05 | Stop reason: HOSPADM

## 2025-02-04 RX ORDER — HEPARIN 100 UNIT/ML
500 SYRINGE INTRAVENOUS PRN
OUTPATIENT
Start: 2025-02-18

## 2025-02-04 RX ORDER — FAMOTIDINE 10 MG/ML
20 INJECTION, SOLUTION INTRAVENOUS
OUTPATIENT
Start: 2025-02-18

## 2025-02-04 RX ORDER — SODIUM CHLORIDE 9 MG/ML
5-250 INJECTION, SOLUTION INTRAVENOUS PRN
OUTPATIENT
Start: 2025-02-18

## 2025-02-04 RX ORDER — ONDANSETRON 2 MG/ML
8 INJECTION INTRAMUSCULAR; INTRAVENOUS
OUTPATIENT
Start: 2025-02-18

## 2025-02-04 RX ORDER — EPINEPHRINE 1 MG/ML
0.3 INJECTION, SOLUTION, CONCENTRATE INTRAVENOUS PRN
OUTPATIENT
Start: 2025-02-18

## 2025-02-04 RX ORDER — HYDROCORTISONE SODIUM SUCCINATE 100 MG/2ML
100 INJECTION INTRAMUSCULAR; INTRAVENOUS
OUTPATIENT
Start: 2025-02-18

## 2025-02-04 RX ORDER — DIPHENHYDRAMINE HYDROCHLORIDE 50 MG/ML
50 INJECTION INTRAMUSCULAR; INTRAVENOUS
OUTPATIENT
Start: 2025-02-18

## 2025-02-04 RX ORDER — ALBUTEROL SULFATE 90 UG/1
4 INHALANT RESPIRATORY (INHALATION) PRN
OUTPATIENT
Start: 2025-02-18

## 2025-02-04 RX ORDER — SODIUM CHLORIDE 0.9 % (FLUSH) 0.9 %
5-40 SYRINGE (ML) INJECTION PRN
Status: DISCONTINUED | OUTPATIENT
Start: 2025-02-04 | End: 2025-02-05 | Stop reason: HOSPADM

## 2025-02-04 RX ORDER — SODIUM CHLORIDE 0.9 % (FLUSH) 0.9 %
5-40 SYRINGE (ML) INJECTION PRN
OUTPATIENT
Start: 2025-02-18

## 2025-02-04 RX ORDER — SODIUM CHLORIDE 9 MG/ML
INJECTION, SOLUTION INTRAVENOUS CONTINUOUS
OUTPATIENT
Start: 2025-02-18

## 2025-02-04 RX ADMIN — SODIUM CHLORIDE 300 MG: 9 INJECTION, SOLUTION INTRAVENOUS at 09:28

## 2025-02-04 RX ADMIN — SODIUM CHLORIDE 100 ML/HR: 0.9 INJECTION, SOLUTION INTRAVENOUS at 09:23

## 2025-02-04 RX ADMIN — SODIUM CHLORIDE, PRESERVATIVE FREE 10 ML: 5 INJECTION INTRAVENOUS at 09:18

## 2025-02-04 NOTE — PROGRESS NOTES
Initial visit and very engaging in sharing and in conversation.  She is having heart surgery this coming Friday.  Active listening offered.  Prayers said at the end of the visit for the surgery and for the treatment.  Receptive and open.  She has family attending to her.  See Flowsheet.  Continue to visit and to support.    Sister Evelin Henderson, OSF/T  [facility]

## 2025-02-04 NOTE — TELEPHONE ENCOUNTER
----- Message from CHELSEA Moore CNP sent at 2/4/2025  2:44 PM EST -----  Results are stable, continue yearly screenings.

## 2025-02-07 ENCOUNTER — HOSPITAL ENCOUNTER (INPATIENT)
Age: 60
LOS: 1 days | Discharge: HOME | DRG: 175 | End: 2025-02-08
Attending: STUDENT IN AN ORGANIZED HEALTH CARE EDUCATION/TRAINING PROGRAM | Admitting: STUDENT IN AN ORGANIZED HEALTH CARE EDUCATION/TRAINING PROGRAM
Payer: COMMERCIAL

## 2025-02-07 DIAGNOSIS — Z98.61 CAD S/P PERCUTANEOUS CORONARY ANGIOPLASTY: Primary | ICD-10-CM

## 2025-02-07 DIAGNOSIS — E11.42 TYPE 2 DIABETES MELLITUS WITH DIABETIC POLYNEUROPATHY, WITHOUT LONG-TERM CURRENT USE OF INSULIN (HCC): ICD-10-CM

## 2025-02-07 DIAGNOSIS — I25.119 CORONARY ARTERY DISEASE WITH ANGINA PECTORIS, UNSPECIFIED VESSEL OR LESION TYPE, UNSPECIFIED WHETHER NATIVE OR TRANSPLANTED HEART (HCC): ICD-10-CM

## 2025-02-07 DIAGNOSIS — I25.10 CAD S/P PERCUTANEOUS CORONARY ANGIOPLASTY: Primary | ICD-10-CM

## 2025-02-07 LAB
BUN BLD-MCNC: 16 MG/DL (ref 8–26)
CHLORIDE BLD-SCNC: 102 MMOL/L (ref 98–107)
EGFR, POC: 74 ML/MIN/1.73M2
GLUCOSE BLD-MCNC: 126 MG/DL (ref 65–105)
GLUCOSE BLD-MCNC: 134 MG/DL (ref 74–100)
HCT VFR BLD AUTO: 39 % (ref 36–46)
PLATELET # BLD AUTO: 395 K/UL (ref 138–453)
POC CREATININE: 0.9 MG/DL (ref 0.51–1.19)
POC HEMOGLOBIN (CALC): 13.3 G/DL (ref 12–16)
POTASSIUM BLD-SCNC: 4.4 MMOL/L (ref 3.5–4.5)
SODIUM BLD-SCNC: 139 MMOL/L (ref 138–146)

## 2025-02-07 PROCEDURE — 92920 PRQ TRLUML C ANGIOP 1ART&/BR: CPT | Performed by: STUDENT IN AN ORGANIZED HEALTH CARE EDUCATION/TRAINING PROGRAM

## 2025-02-07 PROCEDURE — 99152 MOD SED SAME PHYS/QHP 5/>YRS: CPT | Performed by: STUDENT IN AN ORGANIZED HEALTH CARE EDUCATION/TRAINING PROGRAM

## 2025-02-07 PROCEDURE — 2500000003 HC RX 250 WO HCPCS: Performed by: STUDENT IN AN ORGANIZED HEALTH CARE EDUCATION/TRAINING PROGRAM

## 2025-02-07 PROCEDURE — 6360000002 HC RX W HCPCS: Performed by: STUDENT IN AN ORGANIZED HEALTH CARE EDUCATION/TRAINING PROGRAM

## 2025-02-07 PROCEDURE — 2709999900 HC NON-CHARGEABLE SUPPLY: Performed by: STUDENT IN AN ORGANIZED HEALTH CARE EDUCATION/TRAINING PROGRAM

## 2025-02-07 PROCEDURE — C1760 CLOSURE DEV, VASC: HCPCS | Performed by: STUDENT IN AN ORGANIZED HEALTH CARE EDUCATION/TRAINING PROGRAM

## 2025-02-07 PROCEDURE — C1725 CATH, TRANSLUMIN NON-LASER: HCPCS | Performed by: STUDENT IN AN ORGANIZED HEALTH CARE EDUCATION/TRAINING PROGRAM

## 2025-02-07 PROCEDURE — 92921 PR PRQ TRLUML CORONARY ANGIOPLASTY ADDL BRANCH: CPT | Performed by: STUDENT IN AN ORGANIZED HEALTH CARE EDUCATION/TRAINING PROGRAM

## 2025-02-07 PROCEDURE — 02703ZZ DILATION OF CORONARY ARTERY, ONE ARTERY, PERCUTANEOUS APPROACH: ICD-10-PCS | Performed by: STUDENT IN AN ORGANIZED HEALTH CARE EDUCATION/TRAINING PROGRAM

## 2025-02-07 PROCEDURE — 2060000000 HC ICU INTERMEDIATE R&B

## 2025-02-07 PROCEDURE — 84132 ASSAY OF SERUM POTASSIUM: CPT

## 2025-02-07 PROCEDURE — B240ZZ3 ULTRASONOGRAPHY OF SINGLE CORONARY ARTERY, INTRAVASCULAR: ICD-10-PCS | Performed by: STUDENT IN AN ORGANIZED HEALTH CARE EDUCATION/TRAINING PROGRAM

## 2025-02-07 PROCEDURE — 82435 ASSAY OF BLOOD CHLORIDE: CPT

## 2025-02-07 PROCEDURE — C9607 PERC D-E COR REVASC CHRO SIN: HCPCS | Performed by: STUDENT IN AN ORGANIZED HEALTH CARE EDUCATION/TRAINING PROGRAM

## 2025-02-07 PROCEDURE — 82947 ASSAY GLUCOSE BLOOD QUANT: CPT

## 2025-02-07 PROCEDURE — 6360000004 HC RX CONTRAST MEDICATION: Performed by: STUDENT IN AN ORGANIZED HEALTH CARE EDUCATION/TRAINING PROGRAM

## 2025-02-07 PROCEDURE — 7100000010 HC PHASE II RECOVERY - FIRST 15 MIN: Performed by: STUDENT IN AN ORGANIZED HEALTH CARE EDUCATION/TRAINING PROGRAM

## 2025-02-07 PROCEDURE — 76937 US GUIDE VASCULAR ACCESS: CPT | Performed by: STUDENT IN AN ORGANIZED HEALTH CARE EDUCATION/TRAINING PROGRAM

## 2025-02-07 PROCEDURE — C1892 INTRO/SHEATH,FIXED,PEEL-AWAY: HCPCS | Performed by: STUDENT IN AN ORGANIZED HEALTH CARE EDUCATION/TRAINING PROGRAM

## 2025-02-07 PROCEDURE — C1887 CATHETER, GUIDING: HCPCS | Performed by: STUDENT IN AN ORGANIZED HEALTH CARE EDUCATION/TRAINING PROGRAM

## 2025-02-07 PROCEDURE — 0914T PRQ TCAT THR RX NTRC BAL SEP: CPT | Performed by: STUDENT IN AN ORGANIZED HEALTH CARE EDUCATION/TRAINING PROGRAM

## 2025-02-07 PROCEDURE — 027034Z DILATION OF CORONARY ARTERY, ONE ARTERY WITH DRUG-ELUTING INTRALUMINAL DEVICE, PERCUTANEOUS APPROACH: ICD-10-PCS | Performed by: STUDENT IN AN ORGANIZED HEALTH CARE EDUCATION/TRAINING PROGRAM

## 2025-02-07 PROCEDURE — C1753 CATH, INTRAVAS ULTRASOUND: HCPCS | Performed by: STUDENT IN AN ORGANIZED HEALTH CARE EDUCATION/TRAINING PROGRAM

## 2025-02-07 PROCEDURE — 85049 AUTOMATED PLATELET COUNT: CPT

## 2025-02-07 PROCEDURE — 7100000011 HC PHASE II RECOVERY - ADDTL 15 MIN: Performed by: STUDENT IN AN ORGANIZED HEALTH CARE EDUCATION/TRAINING PROGRAM

## 2025-02-07 PROCEDURE — 92978 ENDOLUMINL IVUS OCT C 1ST: CPT | Performed by: STUDENT IN AN ORGANIZED HEALTH CARE EDUCATION/TRAINING PROGRAM

## 2025-02-07 PROCEDURE — 84295 ASSAY OF SERUM SODIUM: CPT

## 2025-02-07 PROCEDURE — C1894 INTRO/SHEATH, NON-LASER: HCPCS | Performed by: STUDENT IN AN ORGANIZED HEALTH CARE EDUCATION/TRAINING PROGRAM

## 2025-02-07 PROCEDURE — C1874 STENT, COATED/COV W/DEL SYS: HCPCS | Performed by: STUDENT IN AN ORGANIZED HEALTH CARE EDUCATION/TRAINING PROGRAM

## 2025-02-07 PROCEDURE — G0378 HOSPITAL OBSERVATION PER HR: HCPCS

## 2025-02-07 PROCEDURE — C1769 GUIDE WIRE: HCPCS | Performed by: STUDENT IN AN ORGANIZED HEALTH CARE EDUCATION/TRAINING PROGRAM

## 2025-02-07 PROCEDURE — 85014 HEMATOCRIT: CPT

## 2025-02-07 PROCEDURE — 92943 PRQ TRLUML REVSC CH OCC ANT: CPT | Performed by: STUDENT IN AN ORGANIZED HEALTH CARE EDUCATION/TRAINING PROGRAM

## 2025-02-07 PROCEDURE — 99153 MOD SED SAME PHYS/QHP EA: CPT | Performed by: STUDENT IN AN ORGANIZED HEALTH CARE EDUCATION/TRAINING PROGRAM

## 2025-02-07 PROCEDURE — 92928 PRQ TCAT PLMT NTRAC ST 1 LES: CPT | Performed by: STUDENT IN AN ORGANIZED HEALTH CARE EDUCATION/TRAINING PROGRAM

## 2025-02-07 PROCEDURE — 99151 MOD SED SAME PHYS/QHP <5 YRS: CPT | Performed by: STUDENT IN AN ORGANIZED HEALTH CARE EDUCATION/TRAINING PROGRAM

## 2025-02-07 PROCEDURE — 82565 ASSAY OF CREATININE: CPT

## 2025-02-07 PROCEDURE — 84520 ASSAY OF UREA NITROGEN: CPT

## 2025-02-07 PROCEDURE — 2580000003 HC RX 258: Performed by: STUDENT IN AN ORGANIZED HEALTH CARE EDUCATION/TRAINING PROGRAM

## 2025-02-07 PROCEDURE — 6370000000 HC RX 637 (ALT 250 FOR IP): Performed by: STUDENT IN AN ORGANIZED HEALTH CARE EDUCATION/TRAINING PROGRAM

## 2025-02-07 PROCEDURE — C9610 HC CATH CORONARY DRUG-DELIVERY: HCPCS | Performed by: STUDENT IN AN ORGANIZED HEALTH CARE EDUCATION/TRAINING PROGRAM

## 2025-02-07 DEVICE — STENT ONYXNG25022UX ONYX 2.50X22RX
Type: IMPLANTABLE DEVICE | Status: FUNCTIONAL
Brand: ONYX FRONTIER™

## 2025-02-07 RX ORDER — PANTOPRAZOLE SODIUM 40 MG/1
40 TABLET, DELAYED RELEASE ORAL
Status: DISCONTINUED | OUTPATIENT
Start: 2025-02-08 | End: 2025-02-08 | Stop reason: HOSPADM

## 2025-02-07 RX ORDER — ACETAMINOPHEN 500 MG
500 TABLET ORAL 4 TIMES DAILY PRN
Status: DISCONTINUED | OUTPATIENT
Start: 2025-02-07 | End: 2025-02-08 | Stop reason: HOSPADM

## 2025-02-07 RX ORDER — FUROSEMIDE 20 MG/1
20 TABLET ORAL DAILY
Status: DISCONTINUED | OUTPATIENT
Start: 2025-02-08 | End: 2025-02-08 | Stop reason: HOSPADM

## 2025-02-07 RX ORDER — SODIUM CHLORIDE 0.9 % (FLUSH) 0.9 %
5-40 SYRINGE (ML) INJECTION EVERY 12 HOURS SCHEDULED
Status: DISCONTINUED | OUTPATIENT
Start: 2025-02-07 | End: 2025-02-08 | Stop reason: HOSPADM

## 2025-02-07 RX ORDER — POTASSIUM CHLORIDE 7.45 MG/ML
10 INJECTION INTRAVENOUS PRN
Status: DISCONTINUED | OUTPATIENT
Start: 2025-02-07 | End: 2025-02-08 | Stop reason: HOSPADM

## 2025-02-07 RX ORDER — MIDAZOLAM HYDROCHLORIDE 1 MG/ML
INJECTION, SOLUTION INTRAMUSCULAR; INTRAVENOUS PRN
Status: DISCONTINUED | OUTPATIENT
Start: 2025-02-07 | End: 2025-02-07 | Stop reason: HOSPADM

## 2025-02-07 RX ORDER — POTASSIUM CHLORIDE 1500 MG/1
40 TABLET, EXTENDED RELEASE ORAL PRN
Status: DISCONTINUED | OUTPATIENT
Start: 2025-02-07 | End: 2025-02-08 | Stop reason: HOSPADM

## 2025-02-07 RX ORDER — ALBUTEROL SULFATE 0.83 MG/ML
2.5 SOLUTION RESPIRATORY (INHALATION) EVERY 6 HOURS PRN
Status: DISCONTINUED | OUTPATIENT
Start: 2025-02-07 | End: 2025-02-08 | Stop reason: HOSPADM

## 2025-02-07 RX ORDER — GABAPENTIN 800 MG/1
800 TABLET ORAL 3 TIMES DAILY
Status: DISCONTINUED | OUTPATIENT
Start: 2025-02-07 | End: 2025-02-08 | Stop reason: HOSPADM

## 2025-02-07 RX ORDER — LOSARTAN POTASSIUM 50 MG/1
25 TABLET ORAL DAILY
Status: DISCONTINUED | OUTPATIENT
Start: 2025-02-08 | End: 2025-02-08 | Stop reason: HOSPADM

## 2025-02-07 RX ORDER — SODIUM CHLORIDE 9 MG/ML
INJECTION, SOLUTION INTRAVENOUS CONTINUOUS
Status: DISCONTINUED | OUTPATIENT
Start: 2025-02-07 | End: 2025-02-07 | Stop reason: HOSPADM

## 2025-02-07 RX ORDER — METOPROLOL SUCCINATE 25 MG/1
50 TABLET, EXTENDED RELEASE ORAL DAILY
Status: DISCONTINUED | OUTPATIENT
Start: 2025-02-08 | End: 2025-02-08 | Stop reason: HOSPADM

## 2025-02-07 RX ORDER — ACETAMINOPHEN 325 MG/1
650 TABLET ORAL EVERY 4 HOURS PRN
Status: DISCONTINUED | OUTPATIENT
Start: 2025-02-07 | End: 2025-02-07

## 2025-02-07 RX ORDER — ASPIRIN 81 MG/1
81 TABLET ORAL DAILY
Status: DISCONTINUED | OUTPATIENT
Start: 2025-02-07 | End: 2025-02-08 | Stop reason: HOSPADM

## 2025-02-07 RX ORDER — DULOXETIN HYDROCHLORIDE 30 MG/1
30 CAPSULE, DELAYED RELEASE ORAL DAILY
Status: DISCONTINUED | OUTPATIENT
Start: 2025-02-08 | End: 2025-02-08 | Stop reason: HOSPADM

## 2025-02-07 RX ORDER — FLUDROCORTISONE ACETATE 0.1 MG/1
0.3 TABLET ORAL DAILY
Status: DISCONTINUED | OUTPATIENT
Start: 2025-02-07 | End: 2025-02-08 | Stop reason: HOSPADM

## 2025-02-07 RX ORDER — BIVALIRUDIN 250 MG/5ML
INJECTION, POWDER, LYOPHILIZED, FOR SOLUTION INTRAVENOUS PRN
Status: DISCONTINUED | OUTPATIENT
Start: 2025-02-07 | End: 2025-02-07 | Stop reason: HOSPADM

## 2025-02-07 RX ORDER — SPIRONOLACTONE 25 MG/1
12.5 TABLET ORAL DAILY
Status: DISCONTINUED | OUTPATIENT
Start: 2025-02-08 | End: 2025-02-08 | Stop reason: HOSPADM

## 2025-02-07 RX ORDER — PRASUGREL 10 MG/1
10 TABLET, FILM COATED ORAL DAILY
Status: DISCONTINUED | OUTPATIENT
Start: 2025-02-08 | End: 2025-02-08 | Stop reason: HOSPADM

## 2025-02-07 RX ORDER — OXYCODONE AND ACETAMINOPHEN 5; 325 MG/1; MG/1
1 TABLET ORAL EVERY 6 HOURS PRN
Status: DISCONTINUED | OUTPATIENT
Start: 2025-02-07 | End: 2025-02-08 | Stop reason: HOSPADM

## 2025-02-07 RX ORDER — COLCHICINE 0.6 MG/1
0.6 TABLET ORAL DAILY
Status: DISCONTINUED | OUTPATIENT
Start: 2025-02-07 | End: 2025-02-08 | Stop reason: HOSPADM

## 2025-02-07 RX ORDER — IOPAMIDOL 755 MG/ML
INJECTION, SOLUTION INTRAVASCULAR PRN
Status: DISCONTINUED | OUTPATIENT
Start: 2025-02-07 | End: 2025-02-07 | Stop reason: HOSPADM

## 2025-02-07 RX ORDER — SODIUM CHLORIDE 0.9 % (FLUSH) 0.9 %
5-40 SYRINGE (ML) INJECTION PRN
Status: DISCONTINUED | OUTPATIENT
Start: 2025-02-07 | End: 2025-02-08 | Stop reason: HOSPADM

## 2025-02-07 RX ORDER — FENTANYL CITRATE 50 UG/ML
INJECTION, SOLUTION INTRAMUSCULAR; INTRAVENOUS PRN
Status: DISCONTINUED | OUTPATIENT
Start: 2025-02-07 | End: 2025-02-07 | Stop reason: HOSPADM

## 2025-02-07 RX ORDER — EZETIMIBE 10 MG/1
10 TABLET ORAL NIGHTLY
Status: DISCONTINUED | OUTPATIENT
Start: 2025-02-07 | End: 2025-02-08 | Stop reason: HOSPADM

## 2025-02-07 RX ORDER — ALBUTEROL SULFATE 90 UG/1
1 INHALANT RESPIRATORY (INHALATION) EVERY 4 HOURS PRN
Status: DISCONTINUED | OUTPATIENT
Start: 2025-02-07 | End: 2025-02-08 | Stop reason: HOSPADM

## 2025-02-07 RX ORDER — ATORVASTATIN CALCIUM 80 MG/1
80 TABLET, FILM COATED ORAL DAILY
Status: DISCONTINUED | OUTPATIENT
Start: 2025-02-08 | End: 2025-02-08 | Stop reason: HOSPADM

## 2025-02-07 RX ORDER — ANASTROZOLE 1 MG/1
1 TABLET ORAL DAILY
Status: DISCONTINUED | OUTPATIENT
Start: 2025-02-08 | End: 2025-02-08 | Stop reason: HOSPADM

## 2025-02-07 RX ORDER — SODIUM CHLORIDE 9 MG/ML
INJECTION, SOLUTION INTRAVENOUS PRN
Status: DISCONTINUED | OUTPATIENT
Start: 2025-02-07 | End: 2025-02-08 | Stop reason: HOSPADM

## 2025-02-07 RX ORDER — MAGNESIUM SULFATE IN WATER 40 MG/ML
2000 INJECTION, SOLUTION INTRAVENOUS PRN
Status: DISCONTINUED | OUTPATIENT
Start: 2025-02-07 | End: 2025-02-08 | Stop reason: HOSPADM

## 2025-02-07 RX ORDER — NITROGLYCERIN 0.4 MG/1
0.4 TABLET SUBLINGUAL EVERY 5 MIN PRN
Status: DISCONTINUED | OUTPATIENT
Start: 2025-02-07 | End: 2025-02-08 | Stop reason: HOSPADM

## 2025-02-07 RX ADMIN — INSULIN HUMAN 60 UNITS: 500 INJECTION, SOLUTION SUBCUTANEOUS at 21:10

## 2025-02-07 RX ADMIN — SODIUM CHLORIDE, PRESERVATIVE FREE 10 ML: 5 INJECTION INTRAVENOUS at 20:36

## 2025-02-07 RX ADMIN — EZETIMIBE 10 MG: 10 TABLET ORAL at 20:36

## 2025-02-07 RX ADMIN — ASPIRIN 81 MG: 81 TABLET, COATED ORAL at 20:36

## 2025-02-07 RX ADMIN — GABAPENTIN 800 MG: 800 TABLET, FILM COATED ORAL at 20:36

## 2025-02-07 ASSESSMENT — PAIN SCALES - GENERAL: PAINLEVEL_OUTOF10: 3

## 2025-02-07 NOTE — PLAN OF CARE
Problem: Safety - Adult  Goal: Free from fall injury  Outcome: Progressing     Problem: ABCDS Injury Assessment  Goal: Absence of physical injury  Outcome: Progressing     Problem: Chronic Conditions and Co-morbidities  Goal: Patient's chronic conditions and co-morbidity symptoms are monitored and maintained or improved  Outcome: Progressing     Problem: Discharge Planning  Goal: Discharge to home or other facility with appropriate resources  Outcome: Progressing     Problem: Skin/Tissue Integrity  Goal: Skin integrity remains intact  Description: 1.  Monitor for areas of redness and/or skin breakdown  2.  Assess vascular access sites hourly  3.  Every 4-6 hours minimum:  Change oxygen saturation probe site  4.  Every 4-6 hours:  If on nasal continuous positive airway pressure, respiratory therapy assess nares and determine need for appliance change or resting period  Outcome: Progressing

## 2025-02-07 NOTE — PROGRESS NOTES
Received post PCI procedure to PCC room 10. Assessment obtained. Restrictions reviewed with patient. Post procedure pathway initiated.  Lt. groin site soft , dressing dry and intact.  No hematoma noted.  Family at side.  Patient without complaints. Head of bed flat with Lt. leg straight.

## 2025-02-07 NOTE — H&P
hours as needed for Wheezing 10/5/23  Yes Patricia Ennis MD   HUMULIN R U-500 KWIKPEN 500 UNIT/ML SOPN concentrated injection pen Inject 60 Units into the skin 2 times daily Patient states she takes 80 units in the morning and 80 units at night 5/10/23  Yes Mike Carlson MD   albuterol sulfate HFA (VENTOLIN HFA) 108 (90 Base) MCG/ACT inhaler INHALE 2 PUFFS EVERY 6 HOURS AS NEEDED FOR WHEEZING 7/7/22  Yes Bulmaro Plasencia APRN - CNP   nitroGLYCERIN (NITROSTAT) 0.4 MG SL tablet Place 1 tablet under the tongue every 5 minutes as needed for Chest pain 5/27/22  Yes Jose Becker MD   TRULICITY 3 MG/0.5ML SOPN Inject 3 mg into the skin once a week Sundays 5/9/22  Yes Obdulio Solomon   acetaminophen (TYLENOL) 500 MG tablet Take 1 tablet by mouth 4 times daily as needed for Pain 9/9/20  Yes Tae Zamudio APRN - CNP   Blood Glucose Monitoring Suppl (ONETOUCH VERIO REFLECT) w/Device KIT USE AS DIRECTED 12/17/24   Mike Carlson MD   Lancets (ONETOUCH DELICA PLUS [id number]) MISC USE AS DIRECTED 4 TIMES A DAY 12/17/24   Mike Carlson MD   zoster recombinant adjuvanted vaccine (SHINGRIX) 50 MCG/0.5ML SUSR injection Inject 0.5 mLs into the muscle See Admin Instructions 2ND DOSE 9/4/24 3/3/25  Bulmaro Plasencia APRN - CNP   Continuous Blood Gluc  (DEXCOM G7 ) BERE DEXCOM G7  USE WITH SENSOR/TRANSMITTER TO CHECK BLOOD GLUCOSE 6-8 TIMES ADAY E11.9 1/26/24   Mike Carlson MD   Continuous Blood Gluc Sensor (DEXCOM G7 SENSOR) MISC DEXCOM G7 SENSORS CHANGE EVERY 10 DAYS TO CHECK BLOOD GLUCOSE 6-8 TIMES ADAY E11.9 2/23/24   Mike Carlson MD        Current Facility-Administered Medications: 0.9 % sodium chloride infusion, , IntraVENous, Continuous    Allergies:  Tape [adhesive tape]    Social History:   reports that she quit smoking about 14 years ago. Her smoking use included cigarettes. She started smoking about 24 years ago. She has a 20 pack-year smoking history. She has never

## 2025-02-07 NOTE — CARE COORDINATION
Case Management Assessment  Initial Evaluation    Date/Time of Evaluation: 2/7/2025 5:47 PM  Assessment Completed by: Sonja Graham RN    If patient is discharged prior to next notation, then this note serves as note for discharge by case management.    Patient Name: Christie Belcher                   YOB: 1965  Diagnosis: Coronary artery disease with angina pectoris, unspecified vessel or lesion type, unspecified whether native or transplanted heart (HCC) [I25.119]  CAD S/P percutaneous coronary angioplasty [I25.10, Z98.61]                   Date / Time: 2/7/2025 10:09 AM    Patient Admission Status: Inpatient   Readmission Risk (Low < 19, Mod (19-27), High > 27): Readmission Risk Score: 18.4    Current PCP: Bulmaro Plasencia APRN - CNP  PCP verified by CM? (P) Yes    Chart Reviewed: Yes      History Provided by: (P) Patient  Patient Orientation: (P) Alert and Oriented    Patient Cognition: (P) Alert    Hospitalization in the last 30 days (Readmission):  No    If yes, Readmission Assessment in  Navigator will be completed.    Advance Directives:      Code Status: Full Code   Patient's Primary Decision Maker is: (P) Legal Next of Kin    Primary Decision Maker (Active): Darrick Belcher - Child - [phone]    Secondary Decision Maker: Liza Hassan - Brother/Sister - [phone]    Discharge Planning:    Patient lives with: (P) Spouse/Significant Other Type of Home: (P) Apartment  Primary Care Giver: (P) Self  Patient Support Systems include: (P) Spouse/Significant Other, Children, Family Members   Current Financial resources: (P) Medicaid  Current community resources:    Current services prior to admission: (P) Durable Medical Equipment            Current DME: (P) Cane            Type of Home Care services:  (P) None    ADLS  Prior functional level: (P) Independent in ADLs/IADLs  Current functional level: (P) Independent in ADLs/IADLs    PT AM-PAC:   /24  OT AM-PAC:   /24    Family can provide

## 2025-02-08 VITALS
DIASTOLIC BLOOD PRESSURE: 54 MMHG | HEIGHT: 63 IN | OXYGEN SATURATION: 95 % | HEART RATE: 79 BPM | WEIGHT: 217.81 LBS | BODY MASS INDEX: 38.59 KG/M2 | RESPIRATION RATE: 16 BRPM | TEMPERATURE: 98.4 F | SYSTOLIC BLOOD PRESSURE: 105 MMHG

## 2025-02-08 LAB
ANION GAP SERPL CALCULATED.3IONS-SCNC: 9 MMOL/L (ref 9–16)
BUN SERPL-MCNC: 17 MG/DL (ref 6–20)
CALCIUM SERPL-MCNC: 8.9 MG/DL (ref 8.6–10.4)
CHLORIDE SERPL-SCNC: 101 MMOL/L (ref 98–107)
CHOLEST SERPL-MCNC: 117 MG/DL (ref 0–199)
CHOLESTEROL/HDL RATIO: 3.5
CO2 SERPL-SCNC: 21 MMOL/L (ref 20–31)
CREAT SERPL-MCNC: 0.9 MG/DL (ref 0.6–0.9)
ERYTHROCYTE [DISTWIDTH] IN BLOOD BY AUTOMATED COUNT: 17.3 % (ref 11.8–14.4)
GFR, ESTIMATED: 74 ML/MIN/1.73M2
GLUCOSE BLD-MCNC: 140 MG/DL (ref 65–105)
GLUCOSE SERPL-MCNC: 117 MG/DL (ref 74–99)
HCT VFR BLD AUTO: 37.6 % (ref 36.3–47.1)
HDLC SERPL-MCNC: 33 MG/DL
HGB BLD-MCNC: 11.5 G/DL (ref 11.9–15.1)
LDLC SERPL CALC-MCNC: 57 MG/DL (ref 0–100)
MCH RBC QN AUTO: 24 PG (ref 25.2–33.5)
MCHC RBC AUTO-ENTMCNC: 30.6 G/DL (ref 28.4–34.8)
MCV RBC AUTO: 78.5 FL (ref 82.6–102.9)
NRBC BLD-RTO: 0 PER 100 WBC
PLATELET # BLD AUTO: 322 K/UL (ref 138–453)
PMV BLD AUTO: 9.4 FL (ref 8.1–13.5)
POTASSIUM SERPL-SCNC: 4.2 MMOL/L (ref 3.7–5.3)
RBC # BLD AUTO: 4.79 M/UL (ref 3.95–5.11)
SODIUM SERPL-SCNC: 131 MMOL/L (ref 136–145)
TRIGL SERPL-MCNC: 133 MG/DL
VLDLC SERPL CALC-MCNC: 27 MG/DL (ref 1–30)
WBC OTHER # BLD: 9.3 K/UL (ref 3.5–11.3)

## 2025-02-08 PROCEDURE — 36415 COLL VENOUS BLD VENIPUNCTURE: CPT

## 2025-02-08 PROCEDURE — 2500000003 HC RX 250 WO HCPCS: Performed by: STUDENT IN AN ORGANIZED HEALTH CARE EDUCATION/TRAINING PROGRAM

## 2025-02-08 PROCEDURE — 6370000000 HC RX 637 (ALT 250 FOR IP): Performed by: STUDENT IN AN ORGANIZED HEALTH CARE EDUCATION/TRAINING PROGRAM

## 2025-02-08 PROCEDURE — 85027 COMPLETE CBC AUTOMATED: CPT

## 2025-02-08 PROCEDURE — 82947 ASSAY GLUCOSE BLOOD QUANT: CPT

## 2025-02-08 PROCEDURE — 80061 LIPID PANEL: CPT

## 2025-02-08 PROCEDURE — 99239 HOSP IP/OBS DSCHRG MGMT >30: CPT | Performed by: NURSE PRACTITIONER

## 2025-02-08 PROCEDURE — G0378 HOSPITAL OBSERVATION PER HR: HCPCS

## 2025-02-08 PROCEDURE — 80048 BASIC METABOLIC PNL TOTAL CA: CPT

## 2025-02-08 RX ORDER — METFORMIN HYDROCHLORIDE 500 MG/1
1000 TABLET, EXTENDED RELEASE ORAL
Qty: 180 TABLET | Refills: 3 | Status: SHIPPED | OUTPATIENT
Start: 2025-02-10

## 2025-02-08 RX ADMIN — EMPAGLIFLOZIN 10 MG: 10 TABLET, FILM COATED ORAL at 07:39

## 2025-02-08 RX ADMIN — FUROSEMIDE 20 MG: 20 TABLET ORAL at 07:39

## 2025-02-08 RX ADMIN — ATORVASTATIN CALCIUM 80 MG: 80 TABLET, FILM COATED ORAL at 07:40

## 2025-02-08 RX ADMIN — SPIRONOLACTONE 12.5 MG: 25 TABLET, FILM COATED ORAL at 07:39

## 2025-02-08 RX ADMIN — GABAPENTIN 800 MG: 800 TABLET, FILM COATED ORAL at 07:39

## 2025-02-08 RX ADMIN — PRASUGREL 10 MG: 10 TABLET, FILM COATED ORAL at 07:39

## 2025-02-08 RX ADMIN — INSULIN HUMAN 60 UNITS: 500 INJECTION, SOLUTION SUBCUTANEOUS at 07:43

## 2025-02-08 RX ADMIN — LOSARTAN POTASSIUM 25 MG: 50 TABLET, FILM COATED ORAL at 07:40

## 2025-02-08 RX ADMIN — ANASTROZOLE 1 MG: 1 TABLET, COATED ORAL at 07:40

## 2025-02-08 RX ADMIN — DULOXETINE HYDROCHLORIDE 30 MG: 30 CAPSULE, DELAYED RELEASE ORAL at 07:39

## 2025-02-08 RX ADMIN — METOPROLOL SUCCINATE 50 MG: 25 TABLET, EXTENDED RELEASE ORAL at 07:39

## 2025-02-08 RX ADMIN — COLCHICINE 0.6 MG: 0.6 TABLET, FILM COATED ORAL at 07:39

## 2025-02-08 RX ADMIN — ASPIRIN 81 MG: 81 TABLET, COATED ORAL at 07:39

## 2025-02-08 RX ADMIN — FLUDROCORTISONE ACETATE 0.3 MG: 0.1 TABLET ORAL at 07:38

## 2025-02-08 RX ADMIN — SODIUM CHLORIDE, PRESERVATIVE FREE 10 ML: 5 INJECTION INTRAVENOUS at 07:40

## 2025-02-08 RX ADMIN — PANTOPRAZOLE SODIUM 40 MG: 40 TABLET, DELAYED RELEASE ORAL at 06:20

## 2025-02-08 NOTE — PLAN OF CARE
Problem: Safety - Adult  Goal: Free from fall injury  Outcome: Completed     Problem: ABCDS Injury Assessment  Goal: Absence of physical injury  Outcome: Completed     Problem: Chronic Conditions and Co-morbidities  Goal: Patient's chronic conditions and co-morbidity symptoms are monitored and maintained or improved  Outcome: Completed     Problem: Discharge Planning  Goal: Discharge to home or other facility with appropriate resources  Outcome: Completed     Problem: Skin/Tissue Integrity  Goal: Skin integrity remains intact  Description: 1.  Monitor for areas of redness and/or skin breakdown  2.  Assess vascular access sites hourly  3.  Every 4-6 hours minimum:  Change oxygen saturation probe site  4.  Every 4-6 hours:  If on nasal continuous positive airway pressure, respiratory therapy assess nares and determine need for appliance change or resting period  Outcome: Completed     Problem: Neurosensory - Adult  Goal: Achieves maximal functionality and self care  Outcome: Completed     Problem: Respiratory - Adult  Goal: Achieves optimal ventilation and oxygenation  Outcome: Completed     Problem: Cardiovascular - Adult  Goal: Maintains optimal cardiac output and hemodynamic stability  Outcome: Completed  Goal: Absence of cardiac dysrhythmias or at baseline  Outcome: Completed     Problem: Musculoskeletal - Adult  Goal: Return mobility to safest level of function  Outcome: Completed  Goal: Return ADL status to a safe level of function  Outcome: Completed     Problem: Gastrointestinal - Adult  Goal: Minimal or absence of nausea and vomiting  Outcome: Completed  Goal: Maintains or returns to baseline bowel function  Outcome: Completed  Goal: Maintains adequate nutritional intake  Outcome: Completed     Problem: Skin/Tissue Integrity - Adult  Goal: Incisions, wounds, or drain sites healing without S/S of infection  Outcome: Completed

## 2025-02-08 NOTE — PLAN OF CARE
Problem: Safety - Adult  Goal: Free from fall injury  2/7/2025 2056 by Connie Temple RN  Outcome: Progressing  Flowsheets (Taken 2/7/2025 1930)  Free From Fall Injury: Instruct family/caregiver on patient safety  2/7/2025 1830 by Renita Caro RN  Outcome: Progressing     Problem: ABCDS Injury Assessment  Goal: Absence of physical injury  2/7/2025 2056 by Connie Temple RN  Outcome: Progressing  Flowsheets (Taken 2/7/2025 1930)  Absence of Physical Injury: Implement safety measures based on patient assessment  2/7/2025 1830 by Renita Caro RN  Outcome: Progressing     Problem: Chronic Conditions and Co-morbidities  Goal: Patient's chronic conditions and co-morbidity symptoms are monitored and maintained or improved  2/7/2025 2056 by Connie Temple RN  Outcome: Progressing  Flowsheets (Taken 2/7/2025 2030)  Care Plan - Patient's Chronic Conditions and Co-Morbidity Symptoms are Monitored and Maintained or Improved:   Monitor and assess patient's chronic conditions and comorbid symptoms for stability, deterioration, or improvement   Collaborate with multidisciplinary team to address chronic and comorbid conditions and prevent exacerbation or deterioration   Update acute care plan with appropriate goals if chronic or comorbid symptoms are exacerbated and prevent overall improvement and discharge  2/7/2025 1830 by Renita Caro RN  Outcome: Progressing     Problem: Discharge Planning  Goal: Discharge to home or other facility with appropriate resources  2/7/2025 2056 by Connie Temple RN  Outcome: Progressing  Flowsheets (Taken 2/7/2025 2030)  Discharge to home or other facility with appropriate resources:   Identify barriers to discharge with patient and caregiver   Arrange for needed discharge resources and transportation as appropriate   Identify discharge learning needs (meds, wound care, etc)   Refer to discharge planning if patient needs post-hospital services based on physician order or  baseline  Outcome: Progressing  Flowsheets (Taken 2/7/2025 2030)  Absence of cardiac dysrhythmias or at baseline:   Monitor cardiac rate and rhythm   Assess for signs of decreased cardiac output   Administer antiarrhythmia medication and electrolyte replacement as ordered     Problem: Musculoskeletal - Adult  Goal: Return mobility to safest level of function  Outcome: Progressing  Flowsheets (Taken 2/7/2025 2030)  Return Mobility to Safest Level of Function:   Assess patient stability and activity tolerance for standing, transferring and ambulating with or without assistive devices   Assist with transfers and ambulation using safe patient handling equipment as needed  Goal: Return ADL status to a safe level of function  Outcome: Progressing  Flowsheets (Taken 2/7/2025 2030)  Return ADL Status to a Safe Level of Function:   Administer medication as ordered   Assess activities of daily living deficits and provide assistive devices as needed   Obtain physical therapy/occupational therapy consults as needed     Problem: Gastrointestinal - Adult  Goal: Minimal or absence of nausea and vomiting  Outcome: Progressing  Flowsheets (Taken 2/7/2025 2030)  Minimal or absence of nausea and vomiting: Provide nonpharmacologic comfort measures as appropriate  Goal: Maintains or returns to baseline bowel function  Outcome: Progressing  Flowsheets (Taken 2/7/2025 2030)  Maintains or returns to baseline bowel function:   Assess bowel function   Encourage oral fluids to ensure adequate hydration  Goal: Maintains adequate nutritional intake  Outcome: Progressing  Flowsheets (Taken 2/7/2025 2030)  Maintains adequate nutritional intake:   Monitor percentage of each meal consumed   Assist with meals as needed

## 2025-02-08 NOTE — DISCHARGE SUMMARY
[facility]  Discharge Note                 Name:  Christie Belcher  YOB: 1965  Social Security Number:  [id number]  Medical Record Number:  [id number]    Date of Admission:  2/7/2025  Date of Discharge:  2/8/2025    Admitting physician: Jose Lee MD    Discharge Attending: CHELSEA Horn CNP, CNP  Primary Care Physician: Bulmaro Plasencia APRN - CNP  Consultants: Cardiology  Discharge to Home  Stable Condition   HOSPITAL ADMISSION PROBLEM LIST:  Patient Active Problem List   Diagnosis    Hyperlipidemia    Radiculopathy    Insomnia    Allergic rhinitis    COPD without exacerbation ([facility])    Depression    Bilateral leg weakness    Gait difficulty    Diabetic neuropathy    Back pain    Muscle spasm    Affective disorder ([facility])    History of ventral hernia repair    History of incisional hernia repair    Essential hypertension    Gastroesophageal reflux disease    Type 2 diabetes mellitus with hyperglycemia, with long-term current use of insulin ([facility])    Primary osteoarthritis involving multiple joints    Dysautonomia ([facility])    Coronary artery disease involving native coronary artery    Angina, class III ([facility])    Hx of CABG    DARON (obstructive sleep apnea)    Neuropathic pain    Abnormal cardiovascular stress test    Muscle spasms of both lower extremities    Paronychia of great toe of left foot    ACS (acute coronary syndrome) ([facility])    Obesity (BMI 30-39.9)    S/P angioplasty with stent    DCIS (ductal carcinoma in situ)    Malignant neoplasm of central portion of right breast in female, estrogen receptor positive ([facility])    Osteopenia of multiple sites    NSTEMI (non-ST elevated myocardial infarction) ([facility])    History of coronary artery stent placement    History of seizures    History of breast cancer    Chronic anemia    Iron deficiency anemia    Pleural effusion, bilateral    Sleep apnea    Pain in right axilla    Abnormal iron saturation    Complicated UTI (urinary tract  infection)    Chronic combined systolic and diastolic CHF (congestive heart failure) (HCC)    Shortness of breath    Unstable angina (HCC)    Coronary artery disease with angina pectoris (HCC)    Morbid obesity with BMI of 40.0-44.9, adult    Combined forms of age-related cataract of both eyes    Epiretinal membrane (ERM) of left eye    Vitreous degeneration of left eye    Irregular astigmatism of both eyes    CAD S/P percutaneous coronary angioplasty         Procedures:cardiac catheterization    HOSPITAL COURSE :           The patient was admitted for: PCI  Hospital Procedures if any: [facility] with PCI  Medications changes recommendation: see below  Follow Up Plan: outpatient in 2-4 weeks.      Discharge exam:   Vitals:    02/08/25 1145   BP: (!) 105/54   Pulse: 79   Resp:    Temp:    SpO2:      Neuro: normal  Chest: Clear to ausculation. No wheezing.  Cardiac: Regular rate. s1 and s2 auscultated. No murmur noted.  Abdomen/groin: soft, non-tender, without masses or organomegaly  Lower extremity edema: none     Follow up with primary care provider 1 week  Follow up with cardiology 4 weeks  Follow up with other consultant physicians at their directions.    Discharge Medications:     Medication List        CHANGE how you take these medications      metFORMIN 500 MG extended release tablet  Commonly known as: GLUCOPHAGE-XR  Take 2 tablets by mouth daily (with breakfast)  Start taking on: February 10, 2025  What changed: These instructions start on February 10, 2025. If you are unsure what to do until then, ask your doctor or other care provider.            CONTINUE taking these medications      acetaminophen 500 MG tablet  Commonly known as: TYLENOL  Take 1 tablet by mouth 4 times daily as needed for Pain     * albuterol sulfate  (90 Base) MCG/ACT inhaler  Commonly known as: Ventolin HFA  INHALE 2 PUFFS EVERY 6 HOURS AS NEEDED FOR WHEEZING     * albuterol (2.5 MG/3ML) 0.083% nebulizer solution  Commonly known as:

## 2025-02-08 NOTE — PROGRESS NOTES
[facility]   Progress Note                   Date:   2/8/2025  Patient name: Christie Belcher  Date of admission:  2/7/2025 10:09 AM  MRN:   [id number]  YOB: 1965  PCP: Bulmaro Plasencia, APRN - CNP    Reason for Admission: Coronary artery disease with angina pectoris, unspecified vessel or lesion type, unspecified whether native or transplanted heart (HCC) [I25.119]  CAD S/P percutaneous coronary angioplasty [I25.10, Z98.61]    Subjective:       Clinical Changes / Abnormalities: Pt seen and examined in the room.  Patient resting in bed. Pt denies any CP or sob.  Labs, vitals and tele reviewed-SR   S/P PCI via left groin  Reports she feels great    Medications:   Scheduled Meds:   sodium chloride flush  5-40 mL IntraVENous 2 times per day    anastrozole  1 mg Oral Daily    aspirin  81 mg Oral Daily    atorvastatin  80 mg Oral Daily    colchicine  0.6 mg Oral Daily    DULoxetine  30 mg Oral Daily    empagliflozin  10 mg Oral Daily    ezetimibe  10 mg Oral Nightly    fludrocortisone  0.3 mg Oral Daily    furosemide  20 mg Oral Daily    gabapentin  800 mg Oral TID    insulin regular human  60 Units SubCUTAneous BID    losartan  25 mg Oral Daily    metoprolol succinate  50 mg Oral Daily    pantoprazole  40 mg Oral QAM AC    prasugrel  10 mg Oral Daily    spironolactone  12.5 mg Oral Daily     Continuous Infusions:   sodium chloride       CBC:   Recent Labs     02/07/25  1118 02/08/25  0620   WBC  --  9.3   HGB  --  11.5*    322     BMP:    Recent Labs     02/07/25  1111 02/08/25  0620   NA  --  131*   K  --  4.2   CL  --  101   CO2  --  21   BUN  --  17   CREATININE 0.9 0.9   GLUCOSE  --  117*     Hepatic: No results for input(s): \"AST\", \"ALT\", \"BILITOT\", \"ALKPHOS\" in the last 72 hours.    Invalid input(s): \"ALB\"  Troponin: No results for input(s): \"TROPHS\" in the last 72 hours.  BNP: No results for input(s): \"BNP\" in the last 72 hours.  Lipids:   Recent Labs     02/08/25  0620   CHOL  correlation required.      [facility] 2/7/25:  Conclusion         Successful IVUS guided  PCI of the left main to the left circumflex using antegrade wire escalation with a Eleanor next 3 supported by Corsair microcatheter, we deployed 2.5X 22 mm JESSE into the left circumflex/OM branch, IVUS showed multiple layers of stents into the left main with underexpanded stent and donna-atherosclerosis, we dilated with a 2.0 mm balloon, then we used a 4.0 mm OPN high-pressure balloon and inflated to 40 pietro followed by 4.0X 30 mm drug-coated balloon with excellent results.     Recommendations    Post PCI protocol recommended.    Patient management should include: Aggressive risk factor modification, aggressive medical management, optimal medical management, cardiac rehabillitation and drug-eluting stent maintenance.       Assessment / Acute Cardiac Problems:   Three-vessel CAD,  RCA, and LAD.  ISR of left main and Lcx  LIMA to LAD 2016  LVEF 35%  Hypertension  Hyperlipidemia  Dysautonomia on tilt table bennett    Patient Active Problem List:     Hyperlipidemia     Radiculopathy     Insomnia     Allergic rhinitis     COPD without exacerbation ([facility])     Depression     Bilateral leg weakness     Gait difficulty     Diabetic neuropathy     Back pain     Muscle spasm     Affective disorder ([facility])     History of ventral hernia repair     History of incisional hernia repair     Essential hypertension     Gastroesophageal reflux disease     Type 2 diabetes mellitus with hyperglycemia, with long-term current use of insulin ([facility])     Primary osteoarthritis involving multiple joints     Dysautonomia ([facility])     Coronary artery disease involving native coronary artery     Angina, class III ([facility])     Hx of CABG     DARON (obstructive sleep apnea)     Neuropathic pain     Abnormal cardiovascular stress test     Muscle spasms of both lower extremities     Paronychia of great toe of left foot     ACS (acute coronary syndrome) ([facility])     Obesity (BMI

## 2025-02-08 NOTE — PROGRESS NOTES
AVS reviewed with pt and all questions answered. Pt verbalizes understanding. IVs removed, pt wheeled off unit to private vehicle with all personal belongings.

## 2025-02-08 NOTE — DISCHARGE INSTR - DIET

## 2025-02-08 NOTE — CARE COORDINATION
Transitional planning    Spoke to patient about plan for discharge. Plan is home. She has a ride. Someone will be staying with her. CM gave her printed referral for Cardiac Rehab at [facility]. [facility] in room

## 2025-02-17 ENCOUNTER — OFFICE VISIT (OUTPATIENT)
Dept: CARDIOLOGY | Age: 60
End: 2025-02-17
Payer: COMMERCIAL

## 2025-02-17 VITALS
BODY MASS INDEX: 41.46 KG/M2 | DIASTOLIC BLOOD PRESSURE: 72 MMHG | HEART RATE: 67 BPM | RESPIRATION RATE: 18 BRPM | WEIGHT: 234 LBS | SYSTOLIC BLOOD PRESSURE: 129 MMHG | HEIGHT: 63 IN

## 2025-02-17 DIAGNOSIS — I10 ESSENTIAL HYPERTENSION: ICD-10-CM

## 2025-02-17 DIAGNOSIS — E78.2 MIXED HYPERLIPIDEMIA: ICD-10-CM

## 2025-02-17 DIAGNOSIS — I25.10 ASHD (ARTERIOSCLEROTIC HEART DISEASE): ICD-10-CM

## 2025-02-17 DIAGNOSIS — Z95.820 S/P ANGIOPLASTY WITH STENT: ICD-10-CM

## 2025-02-17 DIAGNOSIS — I95.1 DYSAUTONOMIA ORTHOSTATIC HYPOTENSION SYNDROME: ICD-10-CM

## 2025-02-17 DIAGNOSIS — Z95.1 S/P CABG (CORONARY ARTERY BYPASS GRAFT): ICD-10-CM

## 2025-02-17 DIAGNOSIS — I50.22 CHRONIC SYSTOLIC (CONGESTIVE) HEART FAILURE (HCC): ICD-10-CM

## 2025-02-17 PROCEDURE — 1111F DSCHRG MED/CURRENT MED MERGE: CPT | Performed by: PHYSICIAN ASSISTANT

## 2025-02-17 PROCEDURE — 3078F DIAST BP <80 MM HG: CPT | Performed by: PHYSICIAN ASSISTANT

## 2025-02-17 PROCEDURE — 1036F TOBACCO NON-USER: CPT | Performed by: PHYSICIAN ASSISTANT

## 2025-02-17 PROCEDURE — G8417 CALC BMI ABV UP PARAM F/U: HCPCS | Performed by: PHYSICIAN ASSISTANT

## 2025-02-17 PROCEDURE — 3017F COLORECTAL CA SCREEN DOC REV: CPT | Performed by: PHYSICIAN ASSISTANT

## 2025-02-17 PROCEDURE — G8427 DOCREV CUR MEDS BY ELIG CLIN: HCPCS | Performed by: PHYSICIAN ASSISTANT

## 2025-02-17 PROCEDURE — 99214 OFFICE O/P EST MOD 30 MIN: CPT | Performed by: PHYSICIAN ASSISTANT

## 2025-02-17 PROCEDURE — 3074F SYST BP LT 130 MM HG: CPT | Performed by: PHYSICIAN ASSISTANT

## 2025-02-17 NOTE — PROGRESS NOTES
Patient: Christie Belcher  : 1965  Date of Visit: 2025    REASON FOR VISIT / CONSULTATION: Follow-up (HX:CHF, ASHD. Pt is here for 10 week follow up. She states she is doing well. Denies cp, palps, sob, dizziness. )    Dear Bulmaro Plasencia APRN - CNP,    I had the pleasure of seeing your patient Christie Belcher in consultation today. As you know, Ms. Belcher is a 60 y.o. female who presents with a history of intermittent episodes of back and chest discomfort that started developing since her recent CABG involving a LIMA-LAD 8/15/16.  She also has a history of  dysautonomia on a tilt table test, and was started on Florinef as well as Lexapro. Recent heart cath done 3/7/2019 shows severe single vessel disease involving LAD Normal LVEDP. She had a Holter monitor done on 10/23/2019 that did show PAC's and PVC's but was largely unremarkable. She did come to the ER on 2022 due to abdominal pain and shortness of breath. She was told her EKG was abnormal and also she had an elevated troponin which was only 15 ng/L and only had minor EKG changes. She was sent to [facility] for this and she was not very happy about this. Cardiovascular stress test done on 2022 showed Overall, these results are most consistent with an intermediate/high risk for significant coronary artery disease. Echocardiogram on 2022 showed EF:>55% with mild diastolic dysfunction and LV wall thickness mildly increased. ER on 2023 due to acute coronary syndrome -Transferred to [place] - Successful PCI of left main and circumflex in-stent restenosis with 3.5 x 26 mm Medtronic Kurt drug-eluting stent which was post dilated to 4.0 mm in diameter. She was in the ER on 10/2/2023 and transferred to [facility] on 10/3/2023 and diagnoised with a NSTEMI. Dr Evans did a heart cath, IVUS for severe ISR - stent is underexpanded and malapposed PCI with 4.0 CBA, then 4.0 NC and 5.0 NC DAPT. She was admitted 10/2/2023 to 10/5/2023. She was

## 2025-02-18 ENCOUNTER — HOSPITAL ENCOUNTER (OUTPATIENT)
Dept: INFUSION THERAPY | Age: 60
Discharge: HOME | End: 2025-02-18
Payer: COMMERCIAL

## 2025-02-18 VITALS
HEART RATE: 67 BPM | TEMPERATURE: 97 F | RESPIRATION RATE: 18 BRPM | DIASTOLIC BLOOD PRESSURE: 76 MMHG | SYSTOLIC BLOOD PRESSURE: 111 MMHG

## 2025-02-18 DIAGNOSIS — C50.111 MALIGNANT NEOPLASM OF CENTRAL PORTION OF RIGHT BREAST IN FEMALE, ESTROGEN RECEPTOR POSITIVE (HCC): Primary | ICD-10-CM

## 2025-02-18 DIAGNOSIS — D50.0 IRON DEFICIENCY ANEMIA DUE TO CHRONIC BLOOD LOSS: ICD-10-CM

## 2025-02-18 DIAGNOSIS — Z17.0 MALIGNANT NEOPLASM OF CENTRAL PORTION OF RIGHT BREAST IN FEMALE, ESTROGEN RECEPTOR POSITIVE (HCC): Primary | ICD-10-CM

## 2025-02-18 DIAGNOSIS — M85.89 OSTEOPENIA OF MULTIPLE SITES: ICD-10-CM

## 2025-02-18 PROCEDURE — 6360000002 HC RX W HCPCS: Performed by: INTERNAL MEDICINE

## 2025-02-18 PROCEDURE — 2580000003 HC RX 258: Performed by: INTERNAL MEDICINE

## 2025-02-18 PROCEDURE — 96366 THER/PROPH/DIAG IV INF ADDON: CPT

## 2025-02-18 PROCEDURE — 2500000003 HC RX 250 WO HCPCS: Performed by: INTERNAL MEDICINE

## 2025-02-18 PROCEDURE — 96365 THER/PROPH/DIAG IV INF INIT: CPT

## 2025-02-18 RX ORDER — DIPHENHYDRAMINE HYDROCHLORIDE 50 MG/ML
50 INJECTION INTRAMUSCULAR; INTRAVENOUS
Status: CANCELLED | OUTPATIENT
Start: 2025-02-18

## 2025-02-18 RX ORDER — ONDANSETRON 2 MG/ML
8 INJECTION INTRAMUSCULAR; INTRAVENOUS
Status: CANCELLED | OUTPATIENT
Start: 2025-02-18

## 2025-02-18 RX ORDER — SODIUM CHLORIDE 9 MG/ML
INJECTION, SOLUTION INTRAVENOUS CONTINUOUS
Status: CANCELLED | OUTPATIENT
Start: 2025-02-18

## 2025-02-18 RX ORDER — SODIUM CHLORIDE 9 MG/ML
5-250 INJECTION, SOLUTION INTRAVENOUS PRN
Status: CANCELLED | OUTPATIENT
Start: 2025-02-18

## 2025-02-18 RX ORDER — SODIUM CHLORIDE 0.9 % (FLUSH) 0.9 %
5-40 SYRINGE (ML) INJECTION PRN
Status: CANCELLED | OUTPATIENT
Start: 2025-02-18

## 2025-02-18 RX ORDER — ACETAMINOPHEN 325 MG/1
650 TABLET ORAL
Status: CANCELLED | OUTPATIENT
Start: 2025-02-18

## 2025-02-18 RX ORDER — ALBUTEROL SULFATE 90 UG/1
4 INHALANT RESPIRATORY (INHALATION) PRN
Status: CANCELLED | OUTPATIENT
Start: 2025-02-18

## 2025-02-18 RX ORDER — HEPARIN 100 UNIT/ML
500 SYRINGE INTRAVENOUS PRN
Status: CANCELLED | OUTPATIENT
Start: 2025-02-18

## 2025-02-18 RX ORDER — FAMOTIDINE 10 MG/ML
20 INJECTION, SOLUTION INTRAVENOUS
Status: CANCELLED | OUTPATIENT
Start: 2025-02-18

## 2025-02-18 RX ORDER — HYDROCORTISONE SODIUM SUCCINATE 100 MG/2ML
100 INJECTION INTRAMUSCULAR; INTRAVENOUS
Status: CANCELLED | OUTPATIENT
Start: 2025-02-18

## 2025-02-18 RX ORDER — SODIUM CHLORIDE 0.9 % (FLUSH) 0.9 %
5-40 SYRINGE (ML) INJECTION PRN
Status: DISCONTINUED | OUTPATIENT
Start: 2025-02-18 | End: 2025-02-19 | Stop reason: HOSPADM

## 2025-02-18 RX ORDER — EPINEPHRINE 1 MG/ML
0.3 INJECTION, SOLUTION, CONCENTRATE INTRAVENOUS PRN
Status: CANCELLED | OUTPATIENT
Start: 2025-02-18

## 2025-02-18 RX ADMIN — SODIUM CHLORIDE, PRESERVATIVE FREE 10 ML: 5 INJECTION INTRAVENOUS at 08:10

## 2025-02-18 RX ADMIN — SODIUM CHLORIDE 400 MG: 9 INJECTION, SOLUTION INTRAVENOUS at 08:25

## 2025-02-18 NOTE — PROGRESS NOTES
Follow up visit.  Grateful procedure went well for her.  Optimistic and Hopeful.  A general prayer said with her.  She is grateful for visits and for prayers.  See Flowsheets.    Sister Evelin Henderson, OSF/T  [facility]

## 2025-02-20 NOTE — PROGRESS NOTES
HEENT [] Hearing Loss  [] Visual Disturbance  [] Tinnitus  [] Eye pain   Respiratory [] Shortness of Breath  [] Cough  [] Snoring   Cardiovascular [] Chest Pain  [] Palpitations  [] Lightheaded   GI [] Constipation  [] Diarrhea  [] Swallowing change  [] Nausea/vomiting    [] Urinary Frequency  [] Urinary Urgency   Musculoskeletal [] Neck pain  [] Back pain  [] Muscle pain  [] Restless legs   Dermatologic [] Skin changes   Neurologic [] Memory loss/confusion  [] Seizures  [x] Trouble walking or imbalance  [] Dizziness  [] Sleep disturbance  [x] Weakness  [] Numbness  [] Tremors  [] Speech Difficulty  [] Headaches  [] Light Sensitivity  [] Sound Sensitivity   Endocrinology []Excessive thirst  []Excessive hunger   Psychiatric [] Anxiety/Depression  [] Hallucination   Allergy/immunology []Hives/environmental allergies   Hematologic/lymph [] Abnormal bleeding  [] Abnormal bruising Third Attempt:     LVM for patient to return phone call to schedule ED follow up.      Upon return phone call, please schedule patient this week in an open slot with Dr. Whitten or Florinda Dillon. OK to schedule NP in FU slot.

## 2025-02-24 ENCOUNTER — OFFICE VISIT (OUTPATIENT)
Dept: NEUROLOGY | Age: 60
End: 2025-02-24
Payer: COMMERCIAL

## 2025-02-24 VITALS
WEIGHT: 249 LBS | BODY MASS INDEX: 44.12 KG/M2 | SYSTOLIC BLOOD PRESSURE: 122 MMHG | HEART RATE: 98 BPM | DIASTOLIC BLOOD PRESSURE: 77 MMHG | HEIGHT: 63 IN

## 2025-02-24 DIAGNOSIS — E08.42 DIABETIC POLYNEUROPATHY ASSOCIATED WITH DIABETES MELLITUS DUE TO UNDERLYING CONDITION (HCC): ICD-10-CM

## 2025-02-24 DIAGNOSIS — R20.0 NUMBNESS AND TINGLING OF BOTH LOWER EXTREMITIES: ICD-10-CM

## 2025-02-24 DIAGNOSIS — M79.2 NEUROPATHIC PAIN: Primary | ICD-10-CM

## 2025-02-24 DIAGNOSIS — R20.2 NUMBNESS AND TINGLING OF BOTH LOWER EXTREMITIES: ICD-10-CM

## 2025-02-24 DIAGNOSIS — M62.838 MUSCLE SPASMS OF BOTH LOWER EXTREMITIES: ICD-10-CM

## 2025-02-24 PROCEDURE — 3078F DIAST BP <80 MM HG: CPT | Performed by: PSYCHIATRY & NEUROLOGY

## 2025-02-24 PROCEDURE — G8427 DOCREV CUR MEDS BY ELIG CLIN: HCPCS | Performed by: PSYCHIATRY & NEUROLOGY

## 2025-02-24 PROCEDURE — 99214 OFFICE O/P EST MOD 30 MIN: CPT | Performed by: PSYCHIATRY & NEUROLOGY

## 2025-02-24 PROCEDURE — 3017F COLORECTAL CA SCREEN DOC REV: CPT | Performed by: PSYCHIATRY & NEUROLOGY

## 2025-02-24 PROCEDURE — 3074F SYST BP LT 130 MM HG: CPT | Performed by: PSYCHIATRY & NEUROLOGY

## 2025-02-24 PROCEDURE — 1111F DSCHRG MED/CURRENT MED MERGE: CPT | Performed by: PSYCHIATRY & NEUROLOGY

## 2025-02-24 PROCEDURE — G8417 CALC BMI ABV UP PARAM F/U: HCPCS | Performed by: PSYCHIATRY & NEUROLOGY

## 2025-02-24 PROCEDURE — 2022F DILAT RTA XM EVC RTNOPTHY: CPT | Performed by: PSYCHIATRY & NEUROLOGY

## 2025-02-24 PROCEDURE — 1036F TOBACCO NON-USER: CPT | Performed by: PSYCHIATRY & NEUROLOGY

## 2025-02-24 RX ORDER — DULOXETIN HYDROCHLORIDE 30 MG/1
30 CAPSULE, DELAYED RELEASE ORAL DAILY
Qty: 30 CAPSULE | Refills: 9 | Status: SHIPPED | OUTPATIENT
Start: 2025-02-24

## 2025-02-24 RX ORDER — GABAPENTIN 800 MG/1
TABLET ORAL
Qty: 90 TABLET | Refills: 9 | Status: SHIPPED | OUTPATIENT
Start: 2025-02-24 | End: 2026-03-08

## 2025-02-24 NOTE — PROGRESS NOTES
NEUROLOGY FOLLOW-UP  Patient Name:  Christie Belcher  :   1965  Clinic Visit Date: 2025  Last Visit: 9/10/2024           Dear Bulmaro Florez, CHELSEA - ANGELKIA       I saw Ms. Christie Belcher in follow-up in the office today in continuation of neurologic care.   As you know she  is a 60 y.o.   female with painful diabetic peripheral polyneuropathy and on gabapentin 800 tid. She reports an increase in numbness in his feet, but no associated pain. She experiences occasional burning sensations in his feet, described as pins and needles, which are generally managed with gabapentin and Cymbalta. She also notes numbness in his knees. She is not experiencing any hallucinations or other issues. She has not had any falls but occasionally experiences balance issues. Sheis not experiencing any side effects such as increased sleepiness or dizziness from his medications.She has been taking gabapentin 800 mg 3 times a day and Cymbalta 30 mg daily, which have been effective in managing his symptoms.    She underwent a cardiac stent placement due to a blockage identified in 2024. The initial attempt to place the stent was unsuccessful as it would not remain open, necessitating the use of a new technology involving 2 balloons and a stent. Her first hospitalization lasted 2 days, while the most recent one was an overnight stay for observation. She has noticed a slight increase in his heart rate, which is currently being managed. She is currently on Lipitor 80 mg nightly and Zetia for cholesterol management.  Her diabetes is down to 7.7.  She also stated that she has upcoming eye surgery on 2025.        2024 visit:  Patient presents to clinic stating that present dose of Gabapentin has been helpful and she is feeling much better as her HbA1c is down to 8.5 done two weeks ago at [place] office.  She also stated that she has not been having any muscle spasms associated with neuropathy and she has been

## 2025-03-04 DIAGNOSIS — D05.11 DUCTAL CARCINOMA IN SITU (DCIS) OF RIGHT BREAST: ICD-10-CM

## 2025-03-04 DIAGNOSIS — N64.4 BREAST PAIN: ICD-10-CM

## 2025-03-04 RX ORDER — OXYCODONE AND ACETAMINOPHEN 5; 325 MG/1; MG/1
1 TABLET ORAL EVERY 6 HOURS PRN
Qty: 120 TABLET | Refills: 0 | Status: SHIPPED | OUTPATIENT
Start: 2025-03-04 | End: 2025-04-03

## 2025-03-04 RX ORDER — OMEPRAZOLE 20 MG/1
20 CAPSULE, DELAYED RELEASE ORAL
Qty: 90 CAPSULE | Refills: 1 | Status: SHIPPED | OUTPATIENT
Start: 2025-03-04

## 2025-03-04 NOTE — TELEPHONE ENCOUNTER
Patient calls in and states she needs a refill on her oxycodone. Sent over to [facility] in [place].

## 2025-04-02 DIAGNOSIS — N64.4 BREAST PAIN: ICD-10-CM

## 2025-04-02 DIAGNOSIS — D05.11 DUCTAL CARCINOMA IN SITU (DCIS) OF RIGHT BREAST: ICD-10-CM

## 2025-04-02 NOTE — TELEPHONE ENCOUNTER
Patient called in and stated she needed a refill on her oxycodone.Sent over to [facility] in [place].

## 2025-04-03 RX ORDER — OXYCODONE AND ACETAMINOPHEN 5; 325 MG/1; MG/1
1 TABLET ORAL EVERY 6 HOURS PRN
Qty: 120 TABLET | Refills: 0 | Status: SHIPPED | OUTPATIENT
Start: 2025-04-03 | End: 2025-05-03

## 2025-04-14 ENCOUNTER — HOSPITAL ENCOUNTER (EMERGENCY)
Age: 60
Discharge: HOME | End: 2025-04-14
Attending: EMERGENCY MEDICINE
Payer: COMMERCIAL

## 2025-04-14 ENCOUNTER — APPOINTMENT (OUTPATIENT)
Dept: GENERAL RADIOLOGY | Age: 60
End: 2025-04-14
Payer: COMMERCIAL

## 2025-04-14 VITALS
HEART RATE: 94 BPM | RESPIRATION RATE: 16 BRPM | DIASTOLIC BLOOD PRESSURE: 75 MMHG | SYSTOLIC BLOOD PRESSURE: 146 MMHG | WEIGHT: 230 LBS | TEMPERATURE: 97.8 F | BODY MASS INDEX: 40.74 KG/M2 | OXYGEN SATURATION: 95 %

## 2025-04-14 DIAGNOSIS — S92.901A CLOSED FRACTURE OF RIGHT FOOT, INITIAL ENCOUNTER: Primary | ICD-10-CM

## 2025-04-14 DIAGNOSIS — D50.0 IRON DEFICIENCY ANEMIA DUE TO CHRONIC BLOOD LOSS: Primary | ICD-10-CM

## 2025-04-14 DIAGNOSIS — R79.0 ABNORMAL IRON SATURATION: ICD-10-CM

## 2025-04-14 PROCEDURE — 99283 EMERGENCY DEPT VISIT LOW MDM: CPT

## 2025-04-14 PROCEDURE — 73630 X-RAY EXAM OF FOOT: CPT

## 2025-04-14 NOTE — ED PROVIDER NOTES
tape].  FAMILY HISTORY     She indicated that her mother is . She indicated that her father is . She indicated that two of her six sisters are alive. She indicated that the status of her brother is unknown. She indicated that the status of her maternal grandmother is unknown. She indicated that her maternal uncle is .     SOCIAL HISTORY       Social History     Tobacco Use    Smoking status: Former     Current packs/day: 0.00     Average packs/day: 2.0 packs/day for 10.0 years (20.0 ttl pk-yrs)     Types: Cigarettes     Start date: 2000     Quit date: 2010     Years since quittin.5    Smokeless tobacco: Never   Vaping Use    Vaping status: Never Used   Substance Use Topics    Alcohol use: No    Drug use: No          Negro Mares MD  Attending Emergency Physician          Negro Mares MD  [phone]

## 2025-04-14 NOTE — DISCHARGE INSTRUCTIONS
Use the boot they previously been provided as directed.    You will receive a survey in the next couple days regarding your experience in the ED. We are constantly striving to improve our care and welcome your feedback. Thank you very much for your time.     The emergency department evaluation is not a complete evaluation, you're always required to followup with another doctor within the next few days to assess how your symptoms are progressing and to ensure that there is no indication for further testing or returning to the hospital.  Even with treatment sometimes your condition worsens and you will need to return to the hospital.  If you are having pain and it is getting worse you should return to the hospital.  If you have any new symptoms that were not addressed at your original visit you should return to the hospital. If you're having difficulty breathing but it is getting worse you should return to the hospital.  If you're vomiting and cannot take the medicines that were prescribed you should return to the hospital.  If you're having persistent fevers you should return to the hospital.  If you have any question of whether or not your symptoms are serious enough or for any other urgent concerns- always return to the hospital for repeat evaluation.

## 2025-04-14 NOTE — ED PROVIDER NOTES
500 MG tablet Take 1 tablet by mouth 4 times daily as needed for Pain 40 tablet 0        Allergies  Allergies   Allergen Reactions    Tape [Adhesive Tape] Rash        Family History  Family History   Problem Relation Age of Onset    Cancer Mother     Diabetes Mother     Cancer Father         thyroid    Diabetes Sister     Heart Disease Sister     Heart Disease Brother     Cancer Maternal Grandmother     Heart Disease Maternal Uncle         Social History  Social History     Socioeconomic History    Marital status:      Spouse name: Not on file    Number of children: Not on file    Years of education: Not on file    Highest education level: Not on file   Occupational History    Occupation: Unemployed   Tobacco Use    Smoking status: Former     Current packs/day: 0.00     Average packs/day: 2.0 packs/day for 10.0 years (20.0 ttl pk-yrs)     Types: Cigarettes     Start date: 2000     Quit date: 2010     Years since quittin.5    Smokeless tobacco: Never   Vaping Use    Vaping status: Never Used   Substance and Sexual Activity    Alcohol use: No    Drug use: No    Sexual activity: Not Currently     Partners: Male   Other Topics Concern    Not on file   Social History Narrative    Not on file     Social Drivers of Health     Financial Resource Strain: Patient Declined (2024)    Overall Financial Resource Strain (CARDIA)     Difficulty of Paying Living Expenses: Patient declined   Food Insecurity: No Food Insecurity (2025)    Hunger Vital Sign     Worried About Running Out of Food in the Last Year: Never true     Ran Out of Food in the Last Year: Never true   Transportation Needs: No Transportation Needs (2025)    PRAPARE - Transportation     Lack of Transportation (Medical): No     Lack of Transportation (Non-Medical): No   Physical Activity: Not on file   Stress: Not on file   Social Connections: Not on file   Intimate Partner Violence: Not on file   Housing Stability: Low Risk

## 2025-04-15 ENCOUNTER — HOSPITAL ENCOUNTER (OUTPATIENT)
Age: 60
Discharge: HOME | End: 2025-04-15
Payer: COMMERCIAL

## 2025-04-15 ENCOUNTER — HOSPITAL ENCOUNTER (OUTPATIENT)
Dept: WOMENS IMAGING | Age: 60
Discharge: HOME | End: 2025-04-17
Payer: COMMERCIAL

## 2025-04-15 VITALS — BODY MASS INDEX: 40.75 KG/M2 | WEIGHT: 230 LBS | HEIGHT: 63 IN

## 2025-04-15 DIAGNOSIS — R79.0 ABNORMAL IRON SATURATION: ICD-10-CM

## 2025-04-15 DIAGNOSIS — D50.0 IRON DEFICIENCY ANEMIA DUE TO CHRONIC BLOOD LOSS: ICD-10-CM

## 2025-04-15 DIAGNOSIS — Z12.31 BREAST CANCER SCREENING BY MAMMOGRAM: ICD-10-CM

## 2025-04-15 LAB
ALBUMIN SERPL-MCNC: 4 G/DL (ref 3.5–5.2)
ALBUMIN/GLOB SERPL: 1.1 {RATIO} (ref 1–2.5)
ALP SERPL-CCNC: 103 U/L (ref 35–104)
ALT SERPL-CCNC: 10 U/L (ref 10–35)
ANION GAP SERPL CALCULATED.3IONS-SCNC: 13 MMOL/L (ref 9–16)
AST SERPL-CCNC: 15 U/L (ref 10–35)
BASOPHILS # BLD: 0.05 K/UL (ref 0–0.2)
BASOPHILS NFR BLD: 1 % (ref 0–2)
BILIRUB SERPL-MCNC: 0.5 MG/DL (ref 0–1.2)
BUN SERPL-MCNC: 11 MG/DL (ref 8–23)
BUN/CREAT SERPL: 14 (ref 9–20)
CALCIUM SERPL-MCNC: 9.3 MG/DL (ref 8.6–10.4)
CHLORIDE SERPL-SCNC: 101 MMOL/L (ref 98–107)
CO2 SERPL-SCNC: 23 MMOL/L (ref 20–31)
CREAT SERPL-MCNC: 0.8 MG/DL (ref 0.5–0.9)
EOSINOPHIL # BLD: 0.09 K/UL (ref 0–0.44)
EOSINOPHILS RELATIVE PERCENT: 1 % (ref 1–4)
ERYTHROCYTE [DISTWIDTH] IN BLOOD BY AUTOMATED COUNT: 16.6 % (ref 11.8–14.4)
FERRITIN SERPL-MCNC: 792 NG/ML
GFR, ESTIMATED: 81 ML/MIN/1.73M2
GLUCOSE SERPL-MCNC: 152 MG/DL (ref 74–99)
HCT VFR BLD AUTO: 39 % (ref 36.3–47.1)
HGB BLD-MCNC: 12.5 G/DL (ref 11.9–15.1)
IMM GRANULOCYTES # BLD AUTO: 0.07 K/UL (ref 0–0.3)
IMM GRANULOCYTES NFR BLD: 1 %
IRON SATN MFR SERPL: 16 % (ref 20–55)
IRON SERPL-MCNC: 40 UG/DL (ref 37–145)
LYMPHOCYTES NFR BLD: 2 K/UL (ref 1.1–3.7)
LYMPHOCYTES RELATIVE PERCENT: 19 % (ref 24–43)
MCH RBC QN AUTO: 25.1 PG (ref 25.2–33.5)
MCHC RBC AUTO-ENTMCNC: 32.1 G/DL (ref 28.4–34.8)
MCV RBC AUTO: 78.3 FL (ref 82.6–102.9)
MONOCYTES NFR BLD: 0.52 K/UL (ref 0.1–1.2)
MONOCYTES NFR BLD: 5 % (ref 3–12)
NEUTROPHILS NFR BLD: 73 % (ref 36–65)
NEUTS SEG NFR BLD: 7.79 K/UL (ref 1.5–8.1)
NRBC BLD-RTO: 0 PER 100 WBC
PLATELET # BLD AUTO: 370 K/UL (ref 138–453)
PMV BLD AUTO: 9.2 FL (ref 8.1–13.5)
POTASSIUM SERPL-SCNC: 4.4 MMOL/L (ref 3.7–5.3)
PROT SERPL-MCNC: 7.4 G/DL (ref 6.6–8.7)
RBC # BLD AUTO: 4.98 M/UL (ref 3.95–5.11)
SODIUM SERPL-SCNC: 137 MMOL/L (ref 136–145)
TIBC SERPL-MCNC: 258 UG/DL (ref 250–450)
UNSATURATED IRON BINDING CAPACITY: 218 UG/DL (ref 112–347)
WBC OTHER # BLD: 10.5 K/UL (ref 3.5–11.3)

## 2025-04-15 PROCEDURE — 77063 BREAST TOMOSYNTHESIS BI: CPT

## 2025-04-15 PROCEDURE — 80053 COMPREHEN METABOLIC PANEL: CPT

## 2025-04-15 PROCEDURE — 83540 ASSAY OF IRON: CPT

## 2025-04-15 PROCEDURE — 82728 ASSAY OF FERRITIN: CPT

## 2025-04-15 PROCEDURE — 36415 COLL VENOUS BLD VENIPUNCTURE: CPT

## 2025-04-15 PROCEDURE — 83550 IRON BINDING TEST: CPT

## 2025-04-15 PROCEDURE — 85025 COMPLETE CBC W/AUTO DIFF WBC: CPT

## 2025-04-21 ENCOUNTER — OFFICE VISIT (OUTPATIENT)
Dept: ONCOLOGY | Age: 60
End: 2025-04-21
Payer: COMMERCIAL

## 2025-04-21 VITALS
HEART RATE: 100 BPM | RESPIRATION RATE: 18 BRPM | WEIGHT: 232 LBS | SYSTOLIC BLOOD PRESSURE: 133 MMHG | TEMPERATURE: 96.9 F | DIASTOLIC BLOOD PRESSURE: 90 MMHG | BODY MASS INDEX: 41.1 KG/M2

## 2025-04-21 DIAGNOSIS — M79.2 NEUROPATHIC PAIN: ICD-10-CM

## 2025-04-21 DIAGNOSIS — D50.0 IRON DEFICIENCY ANEMIA DUE TO CHRONIC BLOOD LOSS: ICD-10-CM

## 2025-04-21 DIAGNOSIS — C50.111 MALIGNANT NEOPLASM OF CENTRAL PORTION OF RIGHT BREAST IN FEMALE, ESTROGEN RECEPTOR POSITIVE (HCC): Primary | ICD-10-CM

## 2025-04-21 DIAGNOSIS — Z17.0 MALIGNANT NEOPLASM OF CENTRAL PORTION OF RIGHT BREAST IN FEMALE, ESTROGEN RECEPTOR POSITIVE (HCC): Primary | ICD-10-CM

## 2025-04-21 PROCEDURE — 3080F DIAST BP >= 90 MM HG: CPT | Performed by: INTERNAL MEDICINE

## 2025-04-21 PROCEDURE — 3017F COLORECTAL CA SCREEN DOC REV: CPT | Performed by: INTERNAL MEDICINE

## 2025-04-21 PROCEDURE — 99214 OFFICE O/P EST MOD 30 MIN: CPT | Performed by: INTERNAL MEDICINE

## 2025-04-21 PROCEDURE — G8417 CALC BMI ABV UP PARAM F/U: HCPCS | Performed by: INTERNAL MEDICINE

## 2025-04-21 PROCEDURE — 1036F TOBACCO NON-USER: CPT | Performed by: INTERNAL MEDICINE

## 2025-04-21 PROCEDURE — G8427 DOCREV CUR MEDS BY ELIG CLIN: HCPCS | Performed by: INTERNAL MEDICINE

## 2025-04-21 PROCEDURE — 3075F SYST BP GE 130 - 139MM HG: CPT | Performed by: INTERNAL MEDICINE

## 2025-04-21 NOTE — PROGRESS NOTES
Patient ID: Christie Belcher, 1965, [id number], 60 y.o.  Referred by :  No ref. 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 female who found on a routine mammogram on May 22, 2022 new group of calcifications upper central right breast and ultrasound did show suspicious lesion at 11:00 6 cm from the nipple core biopsy was done on 6/9/2022 and that showed DCIS strongly ER/GA positive and patient was referred for medical oncology  No family history of breast cancer  No history of using birth control in her younger age  Patient had hysterectomy in her 30s  Patient underwent lumpectomy on July 12, 2022 and there was 1 cm invasive carcinoma in addition to the DCIS  No sentinel lymph node biopsy initially and this was repeated and came back negative  Oncotype DX 5  Bone density scan did show osteopenia and Prolia was denied and started on Fosamax  Status post radiation and started on anastrozole  Status post simple aspiration right breast seroma and recur and nothing to drain        Oncology history  Stage I invasive carcinoma with right breast no sentinel lymph node biopsy  Oncotype DX 5  Adjuvant radiation  On anastrozole started September 2022  Swelling on the right breast where she had surgery, with severe tenderness and localized breast lymphedema had to drain several times for seroma  Breast cancer index did show benefit from extended hormonal treatment    Interim history  Patient presents to the clinic for a follow-up visit and to discuss results of his lab work-up and other relevant clinical data. During this visit patient's allergy, social, medical, surgical history and medications were reviewed and updated.   Most recent diagnostic mammogram done on April 2025 and no evidence of recurrence  Status post IV iron infusion and she improved after the

## 2025-05-05 DIAGNOSIS — N64.4 BREAST PAIN: ICD-10-CM

## 2025-05-05 DIAGNOSIS — D05.11 DUCTAL CARCINOMA IN SITU (DCIS) OF RIGHT BREAST: ICD-10-CM

## 2025-05-05 RX ORDER — OXYCODONE AND ACETAMINOPHEN 5; 325 MG/1; MG/1
1 TABLET ORAL EVERY 6 HOURS PRN
Qty: 120 TABLET | Refills: 0 | Status: SHIPPED | OUTPATIENT
Start: 2025-05-05 | End: 2025-06-04

## 2025-06-02 DIAGNOSIS — N64.4 BREAST PAIN: ICD-10-CM

## 2025-06-02 DIAGNOSIS — D05.11 DUCTAL CARCINOMA IN SITU (DCIS) OF RIGHT BREAST: ICD-10-CM

## 2025-06-02 RX ORDER — OXYCODONE AND ACETAMINOPHEN 5; 325 MG/1; MG/1
1 TABLET ORAL EVERY 6 HOURS PRN
Qty: 120 TABLET | Refills: 0 | Status: SHIPPED | OUTPATIENT
Start: 2025-06-02 | End: 2025-07-02

## 2025-06-02 NOTE — TELEPHONE ENCOUNTER
Patient called in and stated that she needs a refill on her oxycodone. Sent over to [facility] in [facility].

## 2025-07-02 DIAGNOSIS — D05.11 DUCTAL CARCINOMA IN SITU (DCIS) OF RIGHT BREAST: ICD-10-CM

## 2025-07-02 DIAGNOSIS — N64.4 BREAST PAIN: ICD-10-CM

## 2025-07-02 RX ORDER — OXYCODONE AND ACETAMINOPHEN 5; 325 MG/1; MG/1
1 TABLET ORAL EVERY 6 HOURS PRN
Qty: 120 TABLET | Refills: 0 | Status: SHIPPED | OUTPATIENT
Start: 2025-07-02 | End: 2025-08-01

## 2025-07-02 NOTE — TELEPHONE ENCOUNTER
Patient called in and states she needs a refill on her percocet. Sent over to [facility] in [place].

## 2025-07-10 ENCOUNTER — TELEPHONE (OUTPATIENT)
Dept: PRIMARY CARE CLINIC | Age: 60
End: 2025-07-10

## 2025-07-10 ENCOUNTER — HOSPITAL ENCOUNTER (OUTPATIENT)
Age: 60
Discharge: HOME | End: 2025-07-10
Payer: COMMERCIAL

## 2025-07-10 DIAGNOSIS — C50.111 MALIGNANT NEOPLASM OF CENTRAL PORTION OF RIGHT BREAST IN FEMALE, ESTROGEN RECEPTOR POSITIVE (HCC): ICD-10-CM

## 2025-07-10 DIAGNOSIS — E11.42 TYPE 2 DIABETES MELLITUS WITH DIABETIC POLYNEUROPATHY, WITHOUT LONG-TERM CURRENT USE OF INSULIN (HCC): ICD-10-CM

## 2025-07-10 DIAGNOSIS — Z17.0 MALIGNANT NEOPLASM OF CENTRAL PORTION OF RIGHT BREAST IN FEMALE, ESTROGEN RECEPTOR POSITIVE (HCC): ICD-10-CM

## 2025-07-10 DIAGNOSIS — D50.0 IRON DEFICIENCY ANEMIA DUE TO CHRONIC BLOOD LOSS: ICD-10-CM

## 2025-07-10 LAB
ALBUMIN SERPL-MCNC: 3.8 G/DL (ref 3.5–5.2)
ALBUMIN/GLOB SERPL: 0.9 {RATIO} (ref 1–2.5)
ALP SERPL-CCNC: 98 U/L (ref 35–104)
ALT SERPL-CCNC: 9 U/L (ref 10–35)
ANION GAP SERPL CALCULATED.3IONS-SCNC: 14 MMOL/L (ref 9–16)
AST SERPL-CCNC: 12 U/L (ref 10–35)
BASOPHILS # BLD: 0.06 K/UL (ref 0–0.2)
BASOPHILS NFR BLD: 1 % (ref 0–2)
BILIRUB SERPL-MCNC: 0.4 MG/DL (ref 0–1.2)
BUN SERPL-MCNC: 9 MG/DL (ref 8–23)
BUN/CREAT SERPL: 11 (ref 9–20)
CALCIUM SERPL-MCNC: 9.2 MG/DL (ref 8.6–10.4)
CHLORIDE SERPL-SCNC: 101 MMOL/L (ref 98–107)
CO2 SERPL-SCNC: 21 MMOL/L (ref 20–31)
CREAT SERPL-MCNC: 0.8 MG/DL (ref 0.5–0.9)
EOSINOPHIL # BLD: 0.14 K/UL (ref 0–0.44)
EOSINOPHILS RELATIVE PERCENT: 1 % (ref 1–4)
ERYTHROCYTE [DISTWIDTH] IN BLOOD BY AUTOMATED COUNT: 14.6 % (ref 11.8–14.4)
FERRITIN SERPL-MCNC: 553 NG/ML
GFR, ESTIMATED: 86 ML/MIN/1.73M2
GLUCOSE SERPL-MCNC: 162 MG/DL (ref 74–99)
HCT VFR BLD AUTO: 38.6 % (ref 36.3–47.1)
HGB BLD-MCNC: 12.9 G/DL (ref 11.9–15.1)
IMM GRANULOCYTES # BLD AUTO: 0.08 K/UL (ref 0–0.3)
IMM GRANULOCYTES NFR BLD: 1 %
IRON SATN MFR SERPL: 15 % (ref 20–55)
IRON SERPL-MCNC: 37 UG/DL (ref 37–145)
LYMPHOCYTES NFR BLD: 2.14 K/UL (ref 1.1–3.7)
LYMPHOCYTES RELATIVE PERCENT: 20 % (ref 24–43)
MCH RBC QN AUTO: 26.4 PG (ref 25.2–33.5)
MCHC RBC AUTO-ENTMCNC: 33.4 G/DL (ref 28.4–34.8)
MCV RBC AUTO: 78.9 FL (ref 82.6–102.9)
MONOCYTES NFR BLD: 0.41 K/UL (ref 0.1–1.2)
MONOCYTES NFR BLD: 4 % (ref 3–12)
NEUTROPHILS NFR BLD: 73 % (ref 36–65)
NEUTS SEG NFR BLD: 8 K/UL (ref 1.5–8.1)
NRBC BLD-RTO: 0 PER 100 WBC
PLATELET # BLD AUTO: 315 K/UL (ref 138–453)
PMV BLD AUTO: 9.6 FL (ref 8.1–13.5)
POTASSIUM SERPL-SCNC: 4.3 MMOL/L (ref 3.7–5.3)
PROT SERPL-MCNC: 7.9 G/DL (ref 6.6–8.7)
RBC # BLD AUTO: 4.89 M/UL (ref 3.95–5.11)
SODIUM SERPL-SCNC: 136 MMOL/L (ref 136–145)
TIBC SERPL-MCNC: 248 UG/DL (ref 250–450)
UNSATURATED IRON BINDING CAPACITY: 211 UG/DL (ref 112–347)
WBC OTHER # BLD: 10.8 K/UL (ref 3.5–11.3)

## 2025-07-10 PROCEDURE — 80053 COMPREHEN METABOLIC PANEL: CPT

## 2025-07-10 PROCEDURE — 83550 IRON BINDING TEST: CPT

## 2025-07-10 PROCEDURE — 82728 ASSAY OF FERRITIN: CPT

## 2025-07-10 PROCEDURE — 85025 COMPLETE CBC W/AUTO DIFF WBC: CPT

## 2025-07-10 PROCEDURE — 36415 COLL VENOUS BLD VENIPUNCTURE: CPT

## 2025-07-10 PROCEDURE — 83540 ASSAY OF IRON: CPT

## 2025-07-10 PROCEDURE — 83036 HEMOGLOBIN GLYCOSYLATED A1C: CPT

## 2025-07-11 LAB
EST. AVERAGE GLUCOSE BLD GHB EST-MCNC: 197 MG/DL
HBA1C MFR BLD: 8.5 % (ref 4–6)

## 2025-07-17 ENCOUNTER — OFFICE VISIT (OUTPATIENT)
Dept: PRIMARY CARE CLINIC | Age: 60
End: 2025-07-17
Payer: COMMERCIAL

## 2025-07-17 VITALS
SYSTOLIC BLOOD PRESSURE: 130 MMHG | BODY MASS INDEX: 41.4 KG/M2 | HEART RATE: 83 BPM | OXYGEN SATURATION: 97 % | TEMPERATURE: 98.5 F | WEIGHT: 233.7 LBS | RESPIRATION RATE: 20 BRPM | DIASTOLIC BLOOD PRESSURE: 84 MMHG

## 2025-07-17 DIAGNOSIS — I10 ESSENTIAL HYPERTENSION: Chronic | ICD-10-CM

## 2025-07-17 DIAGNOSIS — E11.42 TYPE 2 DIABETES MELLITUS WITH DIABETIC POLYNEUROPATHY, WITH LONG-TERM CURRENT USE OF INSULIN (HCC): Primary | ICD-10-CM

## 2025-07-17 DIAGNOSIS — Z79.4 TYPE 2 DIABETES MELLITUS WITH DIABETIC POLYNEUROPATHY, WITH LONG-TERM CURRENT USE OF INSULIN (HCC): Primary | ICD-10-CM

## 2025-07-17 DIAGNOSIS — J01.40 ACUTE NON-RECURRENT PANSINUSITIS: ICD-10-CM

## 2025-07-17 PROCEDURE — 3052F HG A1C>EQUAL 8.0%<EQUAL 9.0%: CPT | Performed by: NURSE PRACTITIONER

## 2025-07-17 PROCEDURE — 2022F DILAT RTA XM EVC RTNOPTHY: CPT | Performed by: NURSE PRACTITIONER

## 2025-07-17 PROCEDURE — 3017F COLORECTAL CA SCREEN DOC REV: CPT | Performed by: NURSE PRACTITIONER

## 2025-07-17 PROCEDURE — G8427 DOCREV CUR MEDS BY ELIG CLIN: HCPCS | Performed by: NURSE PRACTITIONER

## 2025-07-17 PROCEDURE — G8417 CALC BMI ABV UP PARAM F/U: HCPCS | Performed by: NURSE PRACTITIONER

## 2025-07-17 PROCEDURE — 99214 OFFICE O/P EST MOD 30 MIN: CPT | Performed by: NURSE PRACTITIONER

## 2025-07-17 PROCEDURE — 3079F DIAST BP 80-89 MM HG: CPT | Performed by: NURSE PRACTITIONER

## 2025-07-17 PROCEDURE — 1036F TOBACCO NON-USER: CPT | Performed by: NURSE PRACTITIONER

## 2025-07-17 PROCEDURE — 3075F SYST BP GE 130 - 139MM HG: CPT | Performed by: NURSE PRACTITIONER

## 2025-07-17 SDOH — ECONOMIC STABILITY: FOOD INSECURITY: WITHIN THE PAST 12 MONTHS, YOU WORRIED THAT YOUR FOOD WOULD RUN OUT BEFORE YOU GOT MONEY TO BUY MORE.: NEVER TRUE

## 2025-07-17 SDOH — ECONOMIC STABILITY: FOOD INSECURITY: WITHIN THE PAST 12 MONTHS, THE FOOD YOU BOUGHT JUST DIDN'T LAST AND YOU DIDN'T HAVE MONEY TO GET MORE.: NEVER TRUE

## 2025-07-17 ASSESSMENT — ENCOUNTER SYMPTOMS
SINUS PAIN: 1
SWOLLEN GLANDS: 0
COUGH: 1
ABDOMINAL PAIN: 0
VOMITING: 0
CONSTIPATION: 0
SORE THROAT: 1
SINUS PRESSURE: 1
ORTHOPNEA: 0
TROUBLE SWALLOWING: 0
NAUSEA: 0
SHORTNESS OF BREATH: 0
DIARRHEA: 0
RHINORRHEA: 1
BLURRED VISION: 1
WHEEZING: 0

## 2025-07-17 ASSESSMENT — PATIENT HEALTH QUESTIONNAIRE - PHQ9
4. FEELING TIRED OR HAVING LITTLE ENERGY: NOT AT ALL
8. MOVING OR SPEAKING SO SLOWLY THAT OTHER PEOPLE COULD HAVE NOTICED. OR THE OPPOSITE, BEING SO FIGETY OR RESTLESS THAT YOU HAVE BEEN MOVING AROUND A LOT MORE THAN USUAL: NOT AT ALL
6. FEELING BAD ABOUT YOURSELF - OR THAT YOU ARE A FAILURE OR HAVE LET YOURSELF OR YOUR FAMILY DOWN: NOT AT ALL
7. TROUBLE CONCENTRATING ON THINGS, SUCH AS READING THE NEWSPAPER OR WATCHING TELEVISION: NOT AT ALL
3. TROUBLE FALLING OR STAYING ASLEEP: NOT AT ALL
SUM OF ALL RESPONSES TO PHQ QUESTIONS 1-9: 0
2. FEELING DOWN, DEPRESSED OR HOPELESS: NOT AT ALL
SUM OF ALL RESPONSES TO PHQ QUESTIONS 1-9: 0
9. THOUGHTS THAT YOU WOULD BE BETTER OFF DEAD, OR OF HURTING YOURSELF: NOT AT ALL
1. LITTLE INTEREST OR PLEASURE IN DOING THINGS: NOT AT ALL
SUM OF ALL RESPONSES TO PHQ QUESTIONS 1-9: 0
5. POOR APPETITE OR OVEREATING: NOT AT ALL
SUM OF ALL RESPONSES TO PHQ QUESTIONS 1-9: 0

## 2025-07-17 NOTE — PROGRESS NOTES
07/10/2025 03:14 PM    AST 12 07/10/2025 03:14 PM    ALT 9 07/10/2025 03:14 PM     Lab Results   Component Value Date/Time    WBC 10.8 07/10/2025 03:14 PM    RBC 4.89 07/10/2025 03:14 PM    RBC 3.88 12/05/2011 11:35 AM    HGB 12.9 07/10/2025 03:14 PM    HCT 38.6 07/10/2025 03:14 PM    MCV 78.9 07/10/2025 03:14 PM    MCH 26.4 07/10/2025 03:14 PM    MCHC 33.4 07/10/2025 03:14 PM    RDW 14.6 07/10/2025 03:14 PM     07/10/2025 03:14 PM     12/05/2011 11:35 AM    MPV 9.6 07/10/2025 03:14 PM     Lab Results   Component Value Date/Time    TSH 2.38 11/01/2024 11:32 AM     Lab Results   Component Value Date/Time    CHOL 117 02/08/2025 06:20 AM    LDL 57 02/08/2025 06:20 AM    LDL 47 10/04/2023 03:55 AM    HDL 33 02/08/2025 06:20 AM    LABA1C 8.5 07/10/2025 03:22 PM       Assessment/Plan:      Diagnosis Orders   1. Type 2 diabetes mellitus with diabetic polyneuropathy, with long-term current use of insulin (HCC)        2. Essential hypertension        3. Acute non-recurrent pansinusitis  amoxicillin-clavulanate (AUGMENTIN) 875-125 MG per tablet        We will have her continue all current medications.  A1c is stable.  Blood pressure well-controlled.    Continue follow-up with endocrinology as planned.  We will follow with progress.    Continue work on lifestyle modification including more of a Mediterranean style diet.    We will see her back in 6 months with A1c check, sooner if any issues.      1.  Christie received counseling on the following healthy behaviors: nutrition, exercise, and medication adherence  2.  Patient given educational materials - see patient instructions  3.  Was a self-tracking handout given in paper form or via [facility]? No  If yes, see orders or list here.  4.  Discussed use, benefit, and side effects of prescribed medications.  Barriers to medication compliance addressed.  All patient questions answered.Pt voiced understanding.   5.  Reviewed prior labs and health maintenance  6.  Continue  all other ROS negative except as per HPI

## 2025-07-17 NOTE — PATIENT INSTRUCTIONS
No SURVEY:     You may be receiving a survey from [facility] regarding your visit today.     Please complete the survey to enable us to provide the highest quality of care to you and your family.     If you cannot score us a very good on any question, please call the office to discuss how we could have made your experience a very good one.     Thank you,    Bulmaro Plasencia, APRN-CNP  Neema Alejandre, APRN-CNP  Earlene, LPN  Vianney, CMA  Oscar, CMA  Christina, CMA  Alice, PCA  Gladys, CMA  Hope, PM

## 2025-07-21 ENCOUNTER — OFFICE VISIT (OUTPATIENT)
Dept: ONCOLOGY | Age: 60
End: 2025-07-21
Payer: COMMERCIAL

## 2025-07-21 VITALS
TEMPERATURE: 98.2 F | HEART RATE: 92 BPM | HEIGHT: 63 IN | BODY MASS INDEX: 41.29 KG/M2 | SYSTOLIC BLOOD PRESSURE: 141 MMHG | WEIGHT: 233 LBS | DIASTOLIC BLOOD PRESSURE: 77 MMHG | RESPIRATION RATE: 18 BRPM

## 2025-07-21 DIAGNOSIS — M85.89 OSTEOPENIA OF MULTIPLE SITES: ICD-10-CM

## 2025-07-21 DIAGNOSIS — C50.111 MALIGNANT NEOPLASM OF CENTRAL PORTION OF RIGHT BREAST IN FEMALE, ESTROGEN RECEPTOR POSITIVE (HCC): Primary | ICD-10-CM

## 2025-07-21 DIAGNOSIS — Z17.0 MALIGNANT NEOPLASM OF CENTRAL PORTION OF RIGHT BREAST IN FEMALE, ESTROGEN RECEPTOR POSITIVE (HCC): Primary | ICD-10-CM

## 2025-07-21 DIAGNOSIS — R79.0 ABNORMAL IRON SATURATION: ICD-10-CM

## 2025-07-21 PROCEDURE — 3078F DIAST BP <80 MM HG: CPT | Performed by: INTERNAL MEDICINE

## 2025-07-21 PROCEDURE — 3017F COLORECTAL CA SCREEN DOC REV: CPT | Performed by: INTERNAL MEDICINE

## 2025-07-21 PROCEDURE — 99214 OFFICE O/P EST MOD 30 MIN: CPT | Performed by: INTERNAL MEDICINE

## 2025-07-21 PROCEDURE — G8417 CALC BMI ABV UP PARAM F/U: HCPCS | Performed by: INTERNAL MEDICINE

## 2025-07-21 PROCEDURE — G8427 DOCREV CUR MEDS BY ELIG CLIN: HCPCS | Performed by: INTERNAL MEDICINE

## 2025-07-21 PROCEDURE — 3077F SYST BP >= 140 MM HG: CPT | Performed by: INTERNAL MEDICINE

## 2025-07-21 PROCEDURE — 1036F TOBACCO NON-USER: CPT | Performed by: INTERNAL MEDICINE

## 2025-07-21 RX ORDER — ACETAMINOPHEN 325 MG/1
650 TABLET ORAL
OUTPATIENT
Start: 2025-07-28

## 2025-07-21 RX ORDER — ALBUTEROL SULFATE 90 UG/1
4 INHALANT RESPIRATORY (INHALATION) PRN
OUTPATIENT
Start: 2025-07-28

## 2025-07-21 RX ORDER — SODIUM CHLORIDE 9 MG/ML
INJECTION, SOLUTION INTRAVENOUS PRN
OUTPATIENT
Start: 2025-07-28

## 2025-07-21 RX ORDER — HYDROCORTISONE SODIUM SUCCINATE 100 MG/2ML
100 INJECTION INTRAMUSCULAR; INTRAVENOUS
OUTPATIENT
Start: 2025-07-28

## 2025-07-21 RX ORDER — DIPHENHYDRAMINE HYDROCHLORIDE 50 MG/ML
50 INJECTION, SOLUTION INTRAMUSCULAR; INTRAVENOUS
OUTPATIENT
Start: 2025-07-28

## 2025-07-21 RX ORDER — EPINEPHRINE 1 MG/ML
0.3 INJECTION, SOLUTION, CONCENTRATE INTRAVENOUS PRN
OUTPATIENT
Start: 2025-07-28

## 2025-07-21 RX ORDER — FAMOTIDINE 10 MG/ML
20 INJECTION, SOLUTION INTRAVENOUS
OUTPATIENT
Start: 2025-07-28

## 2025-07-21 RX ORDER — ONDANSETRON 2 MG/ML
8 INJECTION INTRAMUSCULAR; INTRAVENOUS
OUTPATIENT
Start: 2025-07-28

## 2025-07-21 NOTE — PROGRESS NOTES
Patient ID: Chritsie Belcher, 1965, [id number], 60 y.o.  Referred by :  No ref. 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 female who found on a routine mammogram on May 22, 2022 new group of calcifications upper central right breast and ultrasound did show suspicious lesion at 11:00 6 cm from the nipple core biopsy was done on 6/9/2022 and that showed DCIS strongly ER/NE positive and patient was referred for medical oncology  No family history of breast cancer  No history of using birth control in her younger age  Patient had hysterectomy in her 30s  Patient underwent lumpectomy on July 12, 2022 and there was 1 cm invasive carcinoma in addition to the DCIS  No sentinel lymph node biopsy initially and this was repeated and came back negative  Oncotype DX 5  Bone density scan did show osteopenia and Prolia was denied and started on Fosamax  Status post radiation and started on anastrozole  Status post simple aspiration right breast seroma and recur and nothing to drain        Oncology history  Stage I invasive carcinoma with right breast no sentinel lymph node biopsy  Oncotype DX 5  Adjuvant radiation  On anastrozole started September 2022  Swelling on the right breast where she had surgery, with severe tenderness and localized breast lymphedema had to drain several times for seroma  Breast cancer index did show benefit from extended hormonal treatment    Interim history/  History of Present Illness  The patient presents for breast cancer  She experiences severe back pain, which she describes as feeling like her backbone is compressed, similar to an accordion. The pain is so intense that it hinders her ability to perform simple tasks such as washing dishes. She is currently on hormone therapy and supplements her diet with calcium and vitamin D. Additionally, she

## 2025-07-22 ENCOUNTER — TELEPHONE (OUTPATIENT)
Dept: ONCOLOGY | Age: 60
End: 2025-07-22

## 2025-07-22 NOTE — TELEPHONE ENCOUNTER
Called patient to confirm if she was taking oral iron supplement.  She states she has been.  Informed her that per Dr. Tellez he would like for her to discontinue taking this.  Further explained Dexa was last year so not likely that he will continue with plan for Dexa at this time.

## 2025-08-01 DIAGNOSIS — N64.4 BREAST PAIN: ICD-10-CM

## 2025-08-01 DIAGNOSIS — D05.11 DUCTAL CARCINOMA IN SITU (DCIS) OF RIGHT BREAST: ICD-10-CM

## 2025-08-01 RX ORDER — ASPIRIN 81 MG/1
81 TABLET, COATED ORAL DAILY
Qty: 90 TABLET | Refills: 3 | Status: SHIPPED | OUTPATIENT
Start: 2025-08-01

## 2025-08-01 RX ORDER — OXYCODONE AND ACETAMINOPHEN 5; 325 MG/1; MG/1
1 TABLET ORAL EVERY 6 HOURS PRN
Qty: 120 TABLET | Refills: 0 | Status: SHIPPED | OUTPATIENT
Start: 2025-08-01 | End: 2025-08-31

## 2025-08-01 NOTE — TELEPHONE ENCOUNTER
Patient called in and states she needs a refill on her oxycodone. Sent over To [facility] in [place].

## 2025-08-06 ENCOUNTER — HOSPITAL ENCOUNTER (OUTPATIENT)
Dept: INFUSION THERAPY | Age: 60
Discharge: HOME | End: 2025-08-06
Payer: COMMERCIAL

## 2025-08-06 DIAGNOSIS — Z17.0 MALIGNANT NEOPLASM OF CENTRAL PORTION OF RIGHT BREAST IN FEMALE, ESTROGEN RECEPTOR POSITIVE (HCC): Primary | ICD-10-CM

## 2025-08-06 DIAGNOSIS — M85.89 OSTEOPENIA OF MULTIPLE SITES: ICD-10-CM

## 2025-08-06 DIAGNOSIS — C50.111 MALIGNANT NEOPLASM OF CENTRAL PORTION OF RIGHT BREAST IN FEMALE, ESTROGEN RECEPTOR POSITIVE (HCC): Primary | ICD-10-CM

## 2025-08-06 PROCEDURE — 6360000002 HC RX W HCPCS: Performed by: INTERNAL MEDICINE

## 2025-08-06 PROCEDURE — 96372 THER/PROPH/DIAG INJ SC/IM: CPT

## 2025-08-06 RX ORDER — ACETAMINOPHEN 325 MG/1
650 TABLET ORAL
OUTPATIENT
Start: 2026-02-01

## 2025-08-06 RX ORDER — SODIUM CHLORIDE 9 MG/ML
INJECTION, SOLUTION INTRAVENOUS PRN
OUTPATIENT
Start: 2026-02-01

## 2025-08-06 RX ORDER — HYDROCORTISONE SODIUM SUCCINATE 100 MG/2ML
100 INJECTION INTRAMUSCULAR; INTRAVENOUS
OUTPATIENT
Start: 2026-02-01

## 2025-08-06 RX ORDER — EPINEPHRINE 1 MG/ML
0.3 INJECTION, SOLUTION, CONCENTRATE INTRAVENOUS PRN
OUTPATIENT
Start: 2026-02-01

## 2025-08-06 RX ORDER — DIPHENHYDRAMINE HYDROCHLORIDE 50 MG/ML
50 INJECTION, SOLUTION INTRAMUSCULAR; INTRAVENOUS
OUTPATIENT
Start: 2026-02-01

## 2025-08-06 RX ORDER — ALBUTEROL SULFATE 90 UG/1
4 INHALANT RESPIRATORY (INHALATION) PRN
OUTPATIENT
Start: 2026-02-01

## 2025-08-06 RX ORDER — ONDANSETRON 2 MG/ML
8 INJECTION INTRAMUSCULAR; INTRAVENOUS
OUTPATIENT
Start: 2026-02-01

## 2025-08-06 RX ORDER — FAMOTIDINE 10 MG/ML
20 INJECTION, SOLUTION INTRAVENOUS
OUTPATIENT
Start: 2026-02-01

## 2025-08-06 RX ADMIN — DENOSUMAB 60 MG: 60 INJECTION SUBCUTANEOUS at 14:18

## 2025-08-15 ENCOUNTER — OFFICE VISIT (OUTPATIENT)
Dept: CARDIOLOGY | Age: 60
End: 2025-08-15

## 2025-08-15 VITALS
DIASTOLIC BLOOD PRESSURE: 75 MMHG | BODY MASS INDEX: 41.57 KG/M2 | RESPIRATION RATE: 18 BRPM | WEIGHT: 234.6 LBS | OXYGEN SATURATION: 98 % | HEART RATE: 90 BPM | SYSTOLIC BLOOD PRESSURE: 121 MMHG | HEIGHT: 63 IN

## 2025-08-15 DIAGNOSIS — I21.4 NSTEMI (NON-ST ELEVATED MYOCARDIAL INFARCTION) (HCC): ICD-10-CM

## 2025-08-15 DIAGNOSIS — I95.1 DYSAUTONOMIA ORTHOSTATIC HYPOTENSION SYNDROME: ICD-10-CM

## 2025-08-15 DIAGNOSIS — Z95.1 S/P CABG (CORONARY ARTERY BYPASS GRAFT): ICD-10-CM

## 2025-08-15 DIAGNOSIS — I10 ESSENTIAL HYPERTENSION: Chronic | ICD-10-CM

## 2025-08-15 DIAGNOSIS — Z95.820 S/P ANGIOPLASTY WITH STENT: ICD-10-CM

## 2025-08-15 DIAGNOSIS — I25.10 ASHD (ARTERIOSCLEROTIC HEART DISEASE): Primary | ICD-10-CM

## 2025-08-15 DIAGNOSIS — E78.2 MIXED HYPERLIPIDEMIA: ICD-10-CM

## 2025-08-15 DIAGNOSIS — I50.22 CHRONIC SYSTOLIC (CONGESTIVE) HEART FAILURE (HCC): ICD-10-CM

## 2025-08-15 RX ORDER — DENOSUMAB 60 MG/ML
60 INJECTION SUBCUTANEOUS ONCE
COMMUNITY

## 2025-09-03 DIAGNOSIS — Z17.0 MALIGNANT NEOPLASM OF CENTRAL PORTION OF RIGHT BREAST IN FEMALE, ESTROGEN RECEPTOR POSITIVE (HCC): Primary | ICD-10-CM

## 2025-09-03 DIAGNOSIS — M79.2 NEUROPATHIC PAIN: ICD-10-CM

## 2025-09-03 DIAGNOSIS — C50.111 MALIGNANT NEOPLASM OF CENTRAL PORTION OF RIGHT BREAST IN FEMALE, ESTROGEN RECEPTOR POSITIVE (HCC): Primary | ICD-10-CM

## 2025-09-04 RX ORDER — OXYCODONE AND ACETAMINOPHEN 5; 325 MG/1; MG/1
1 TABLET ORAL EVERY 6 HOURS PRN
Qty: 120 TABLET | Refills: 0 | Status: SHIPPED | OUTPATIENT
Start: 2025-09-04 | End: 2025-10-04

## 2025-09-05 ENCOUNTER — HOSPITAL ENCOUNTER (OUTPATIENT)
Age: 60
Discharge: HOME | End: 2025-09-05
Attending: FAMILY MEDICINE
Payer: COMMERCIAL

## 2025-09-05 VITALS
BODY MASS INDEX: 41.46 KG/M2 | HEIGHT: 63 IN | DIASTOLIC BLOOD PRESSURE: 75 MMHG | SYSTOLIC BLOOD PRESSURE: 121 MMHG | WEIGHT: 234 LBS

## 2025-09-05 DIAGNOSIS — I25.10 ASHD (ARTERIOSCLEROTIC HEART DISEASE): ICD-10-CM

## 2025-09-05 DIAGNOSIS — I50.22 CHRONIC SYSTOLIC (CONGESTIVE) HEART FAILURE (HCC): ICD-10-CM

## 2025-09-05 DIAGNOSIS — Z95.820 S/P ANGIOPLASTY WITH STENT: ICD-10-CM

## 2025-09-05 DIAGNOSIS — K21.9 GASTROESOPHAGEAL REFLUX DISEASE, UNSPECIFIED WHETHER ESOPHAGITIS PRESENT: Primary | ICD-10-CM

## 2025-09-05 DIAGNOSIS — I21.4 NSTEMI (NON-ST ELEVATED MYOCARDIAL INFARCTION) (HCC): ICD-10-CM

## 2025-09-05 DIAGNOSIS — I95.1 DYSAUTONOMIA ORTHOSTATIC HYPOTENSION SYNDROME: ICD-10-CM

## 2025-09-05 DIAGNOSIS — I10 ESSENTIAL HYPERTENSION: Chronic | ICD-10-CM

## 2025-09-05 DIAGNOSIS — Z95.1 S/P CABG (CORONARY ARTERY BYPASS GRAFT): ICD-10-CM

## 2025-09-05 DIAGNOSIS — E78.2 MIXED HYPERLIPIDEMIA: ICD-10-CM

## 2025-09-05 LAB
ECHO AO SINUS VALSALVA DIAM: 2.9 CM
ECHO AO SINUS VALSALVA INDEX: 1.4 CM/M2
ECHO AV CUSP MM: 1.7 CM
ECHO AV MEAN GRADIENT: 4 MMHG
ECHO AV MEAN VELOCITY: 0.9 M/S
ECHO AV PEAK GRADIENT: 7 MMHG
ECHO AV PEAK VELOCITY: 1.3 M/S
ECHO AV VELOCITY RATIO: 0.54
ECHO AV VTI: 23.3 CM
ECHO BSA: 2.17 M2
ECHO LA AREA 2C: 17.9 CM2
ECHO LA AREA 4C: 15 CM2
ECHO LA MAJOR AXIS: 4.8 CM
ECHO LA MINOR AXIS: 6.1 CM
ECHO LA VOL MOD A2C: 44 ML (ref 22–52)
ECHO LA VOL MOD A4C: 38 ML (ref 22–52)
ECHO LA VOLUME INDEX MOD A2C: 21 ML/M2 (ref 16–34)
ECHO LA VOLUME INDEX MOD A4C: 18 ML/M2 (ref 16–34)
ECHO LV E' LATERAL VELOCITY: 8.05 CM/S
ECHO LV EDV A2C: 83 ML
ECHO LV EDV A4C: 93 ML
ECHO LV EDV INDEX A4C: 45 ML/M2
ECHO LV EDV NDEX A2C: 40 ML/M2
ECHO LV EF PHYSICIAN: 50 %
ECHO LV EJECTION FRACTION A2C: 50 %
ECHO LV EJECTION FRACTION A4C: 51 %
ECHO LV EJECTION FRACTION BIPLANE: 51 % (ref 55–100)
ECHO LV ESV A2C: 41 ML
ECHO LV ESV A4C: 45 ML
ECHO LV ESV INDEX A2C: 20 ML/M2
ECHO LV ESV INDEX A4C: 22 ML/M2
ECHO LV FRACTIONAL SHORTENING: 18 % (ref 28–44)
ECHO LV INTERNAL DIMENSION DIASTOLE INDEX: 2.42 CM/M2
ECHO LV INTERNAL DIMENSION DIASTOLIC: 5 CM (ref 3.9–5.3)
ECHO LV INTERNAL DIMENSION SYSTOLIC INDEX: 1.98 CM/M2
ECHO LV INTERNAL DIMENSION SYSTOLIC: 4.1 CM
ECHO LV IVSD: 0.9 CM (ref 0.6–0.9)
ECHO LV MASS 2D: 146.8 G (ref 67–162)
ECHO LV MASS INDEX 2D: 70.9 G/M2 (ref 43–95)
ECHO LV POSTERIOR WALL DIASTOLIC: 0.8 CM (ref 0.6–0.9)
ECHO LV RELATIVE WALL THICKNESS RATIO: 0.32
ECHO LVOT AV VTI INDEX: 0.57
ECHO LVOT MEAN GRADIENT: 1 MMHG
ECHO LVOT PEAK GRADIENT: 2 MMHG
ECHO LVOT PEAK VELOCITY: 0.7 M/S
ECHO LVOT VTI: 13.3 CM
ECHO MV A VELOCITY: 0.94 M/S
ECHO MV E DECELERATION TIME (DT): 204 MS
ECHO MV E VELOCITY: 0.75 M/S
ECHO MV E/A RATIO: 0.8
ECHO MV E/E' LATERAL: 9.32
ECHO PV MAX VELOCITY: 1 M/S
ECHO PV PEAK GRADIENT: 4 MMHG
ECHO RV BASAL DIMENSION: 2.9 CM
ECHO RV TAPSE: 1.7 CM (ref 1.7–?)

## 2025-09-05 PROCEDURE — 93306 TTE W/DOPPLER COMPLETE: CPT

## 2025-09-05 RX ORDER — OMEPRAZOLE 20 MG/1
20 CAPSULE, DELAYED RELEASE ORAL
Qty: 90 CAPSULE | Refills: 1 | Status: SHIPPED | OUTPATIENT
Start: 2025-09-05

## (undated) DEVICE — PINNACLE R/O II HIFLO INTRODUCER SHEATH WITH RADIOPAQUE MARKER: Brand: PINNACLE

## (undated) DEVICE — 3M™ TEGADERM™ +PAD FILM DRESSING WITH NON-ADHERENT PAD, 3588, 6 IN X 6 IN (15 CM X 15 CM), 25/CAR, 4 CAR/CS: Brand: 3M™ TEGADERM™

## (undated) DEVICE — SUTURE VCRL SZ 3-0 L36IN ABSRB UD L36MM CT-1 1/2 CIR J944H

## (undated) DEVICE — CATHETER DIAG 6FR L100CM SPEC IMA CRV SZ DBL BRAID WIRE SFT

## (undated) DEVICE — RUNTHROUGH NS EXTRA FLOPPY PTCA GUIDEWIRE: Brand: RUNTHROUGH

## (undated) DEVICE — BLANKET WRM W40.2XL55.9IN IORT LO BODY + MISTRAL AIR

## (undated) DEVICE — DRAPE, RADIAL, STERILE: Brand: MEDLINE

## (undated) DEVICE — Device: Brand: ASAHI CORSAIR PRO XS

## (undated) DEVICE — CATHETER DIAG 6FR L100CM LUMN ID0.056IN JR4 CRV 0 SIDE H

## (undated) DEVICE — GARMENT,MEDLINE,DVT,INT,CALF,MED, GEN2: Brand: MEDLINE

## (undated) DEVICE — SUTURE VCRL SZ 3-0 L27IN ABSRB UD L26MM SH 1/2 CIR J416H

## (undated) DEVICE — SUTURE MCRYL SZ 4-0 L27IN ABSRB UD L19MM PS-2 1/2 CIR PRIM Y426H

## (undated) DEVICE — TOWEL,OR,DSP,ST,NATURAL,DLX,4/PK,20PK/CS: Brand: MEDLINE

## (undated) DEVICE — SYRINGE,EAR/ULCER, 2 OZ, STERILE: Brand: MEDLINE

## (undated) DEVICE — SYRINGE, LUER LOCK, 10ML: Brand: MEDLINE

## (undated) DEVICE — CATH BLLN ANGIO 2X30MM SC EUPHORA RX

## (undated) DEVICE — GUIDEWIRE 35/150/FC/PTFE/3J: Brand: GUIDEWIRE

## (undated) DEVICE — BENTSON WIRE GUIDE 20CM DISTAL FLEXIBILITY WITH SOFTENED TIP: Brand: BENTSON

## (undated) DEVICE — MARKER,SKIN,WI/RULER AND LABELS: Brand: MEDLINE

## (undated) DEVICE — PACLITAXEL-COATED BALLOON CATHETER: Brand: AGENT™

## (undated) DEVICE — TRAY SURG CUST CRD CATH TOLEDO

## (undated) DEVICE — SUTURE PROL SZ 3-0 L30IN NONABSORBABLE BLU L26MM SH 1/2 CIR 8832H

## (undated) DEVICE — INTENDED FOR TISSUE SEPARATION, AND OTHER PROCEDURES THAT REQUIRE A SHARP SURGICAL BLADE TO PUNCTURE OR CUT.: Brand: BARD-PARKER ® CARBON RIB-BACK BLADES

## (undated) DEVICE — GLOVE SURG SZ 65 CRM LTX FREE POLYISOPRENE POLYMER BEAD ANTI

## (undated) DEVICE — SKIN PREP TRAY W/CHG: Brand: MEDLINE INDUSTRIES, INC.

## (undated) DEVICE — SUTURE VCRL + SZ 2-0 L27IN ABSRB VLT SH 1/2 CIR TAPERPOINT VCP317H

## (undated) DEVICE — CATHETER ANGIO 6FR L100CM DIA0.056IN FL3.5 CRV VASC ACCS

## (undated) DEVICE — HYPODERMIC SAFETY NEEDLE: Brand: MAGELLAN

## (undated) DEVICE — SUTURE PERMAHAND SZ 3-0 L30IN NONABSORBABLE BLK SH L26MM K832H

## (undated) DEVICE — CATH BLLN ANGIO 2X15MM SC EUPHORA RX

## (undated) DEVICE — ADHESIVE SKIN CLOSURE TOP 36 CC HI VISC DERMBND MINI

## (undated) DEVICE — SUTURE VCRL SZ 4-0 L27IN ABSRB UD L26MM SH 1/2 CIR J415H

## (undated) DEVICE — TUBING, SUCTION, 9/32" X 12', STRAIGHT: Brand: MEDLINE INDUSTRIES, INC.

## (undated) DEVICE — YANKAUER,FLEXIBLE HANDLE,REGLR CAPACITY: Brand: MEDLINE INDUSTRIES, INC.

## (undated) DEVICE — SUTURE VCRL SZ 3-0 L54IN ABSRB UD LIGAPAK REEL POLYGLACTIN J285G

## (undated) DEVICE — DRESSING TRNSPAR W4XL4.8IN W/ N ADH PD SURESITE-123 PLUSPD

## (undated) DEVICE — KIT MICRO INTRO 4FR STIFF 40CM NIGHTENALL TUNGSTEN 7SMT

## (undated) DEVICE — SURGICAL PROCEDURE KIT BASIN MAJ SET UP

## (undated) DEVICE — Device: Brand: EAGLE EYE PLATINUM RX DIGITAL IVUS CATHETER

## (undated) DEVICE — ELECTRODE ES AD CRD L15FT DISP FOR PT BELOW 30LB REM

## (undated) DEVICE — SPONGE LAP W18XL18IN WHT COT 4 PLY FLD STRUNG RADPQ DISP ST

## (undated) DEVICE — MERCY TIFFIN CATH LAB PACK: Brand: MEDLINE INDUSTRIES, INC.

## (undated) DEVICE — GUIDEWIRE VASC L75CM DIA0.035IN TIP L7CM PTFE COAT S STL

## (undated) DEVICE — INTRODUCER SHTH 6FR CANN L23CM DIL TIP 35MM GRN TUNGSTEN

## (undated) DEVICE — Device

## (undated) DEVICE — GUIDEWIRE 35/260/FC/PTFE/3J: Brand: GUIDEWIRE

## (undated) DEVICE — GOWN,SIRUS,POLYRNF,BRTHSLV,LG,30/CS: Brand: MEDLINE

## (undated) DEVICE — SOLUTION IV 0.9% NACL IRRIGATION 3000ML BAG

## (undated) DEVICE — DRAPE,REIN 53X77,STERILE: Brand: MEDLINE

## (undated) DEVICE — ANGIO-SEAL VIP VASCULAR CLOSURE DEVICE: Brand: ANGIO-SEAL

## (undated) DEVICE — GLOVE SURG SZ 7 L12IN FNGR THK79MIL GRN LTX FREE

## (undated) DEVICE — CLIP LIG M TI 6 SIL HNDL FOR OPN AND ENDOSCP SGL APPL

## (undated) DEVICE — BLADE ES ELASTOMERIC COAT INSUL DURABLE BEND UPTO 90DEG

## (undated) DEVICE — PREP-RESISTANT MARKER W/ RULER: Brand: MEDLINE INDUSTRIES, INC.

## (undated) DEVICE — SUTURE MCRYL + SZ 4-0 L27IN ABSRB UD L19MM PS-2 3/8 CIR MCP426H

## (undated) DEVICE — PROVE COVER: Brand: UNBRANDED

## (undated) DEVICE — GUIDEWIRE VASC L260CM DIA0.035IN RAD 3MM J TIP L7CM PTFE

## (undated) DEVICE — Device: Brand: ASAHI GAIA NEXT 3

## (undated) DEVICE — GLIDESHEATH NITINOL HYDROPHILIC COATED INTRODUCER SHEATH: Brand: GLIDESHEATH

## (undated) DEVICE — COUNTER NDL 40 COUNT HLD 70 FOAM BLK ADH W/ MAG

## (undated) DEVICE — MICROSURGICAL DILATATION DEVICE: Brand: WOLVERINE CORONARY CUTTING BALLOON

## (undated) DEVICE — GLOVE SURG SZ 65 THK91MIL LTX FREE SYN POLYISOPRENE

## (undated) DEVICE — Device: Brand: ASAHI SION

## (undated) DEVICE — GAUZE,SPONGE,FLUFF,6"X6.75",STRL,5/TRAY: Brand: MEDLINE

## (undated) DEVICE — PINNACLE INTRODUCER SHEATH: Brand: PINNACLE

## (undated) DEVICE — INTRODUCER SHTH 6FR L11CM 0.038IN STD SIDEPRT EXTN 3 W

## (undated) DEVICE — APPLIER LIG CLP M L11IN TI STR RNG HNDL FOR 20 CLP DISP

## (undated) DEVICE — GAUZE,SPONGE,4"X4",16PLY,XRAY,STRL,LF: Brand: MEDLINE

## (undated) DEVICE — AGENT HEMSTAT 3GM OXIDIZED REGENERATED CELOS ABSRB FOR CONT (ORDER MULTIPLES OF 5EA)

## (undated) DEVICE — Z DISCONTINUED BY MEDLINE USE 2711682 TRAY SKIN PREP DRY W/ PREM GLV

## (undated) DEVICE — CATH BLLN ANGIO 3X20MM NC EUPHORIA RX

## (undated) DEVICE — GLOVE ORANGE PI 7 1/2   MSG9075

## (undated) DEVICE — NEEDLE HYPO 25GA L1.5IN BLU POLYPR HUB S STL REG BVL STR

## (undated) DEVICE — STRIP,CLOSURE,WOUND,MEDI-STRIP,1/2X4: Brand: MEDLINE

## (undated) DEVICE — COVER,TABLE,HEAVY DUTY,77"X90",STRL: Brand: MEDLINE

## (undated) DEVICE — DRAPE,LAPAROTOMY,PED,STERILE: Brand: MEDLINE

## (undated) DEVICE — Device: Brand: ASAHI GAIA NEXT 1

## (undated) DEVICE — BINDER ABD UNISX 3 PNL E PREM CNTCT CLSR L XL 46INX62INX9IN

## (undated) DEVICE — CATHETER GUID EXTRA BACKUP 3.5 0.070IN 6FR 100CM VISTA BRITE TIP